# Patient Record
Sex: FEMALE | Race: WHITE | NOT HISPANIC OR LATINO | Employment: OTHER | URBAN - METROPOLITAN AREA
[De-identification: names, ages, dates, MRNs, and addresses within clinical notes are randomized per-mention and may not be internally consistent; named-entity substitution may affect disease eponyms.]

---

## 2017-10-19 ENCOUNTER — HOSPITAL ENCOUNTER (OUTPATIENT)
Facility: HOSPITAL | Age: 62
Discharge: HOME | End: 2017-10-20
Attending: EMERGENCY MEDICINE | Admitting: HOSPITALIST
Payer: MEDICARE

## 2017-10-19 DIAGNOSIS — R20.0 LEFT SIDED NUMBNESS: ICD-10-CM

## 2017-10-19 DIAGNOSIS — R41.82 ALTERED MENTAL STATUS: ICD-10-CM

## 2017-10-19 DIAGNOSIS — N30.00 ACUTE CYSTITIS WITHOUT HEMATURIA: ICD-10-CM

## 2017-10-19 DIAGNOSIS — G93.40 ENCEPHALOPATHY: Primary | ICD-10-CM

## 2017-10-19 LAB
ALBUMIN SERPL BCP-MCNC: 3.7 G/DL
ALP SERPL-CCNC: 110 U/L
ALT SERPL W/O P-5'-P-CCNC: 13 U/L
AMPHET+METHAMPHET UR QL: NEGATIVE
ANION GAP SERPL CALC-SCNC: 11 MMOL/L
AST SERPL-CCNC: 17 U/L
BACTERIA #/AREA URNS HPF: ABNORMAL /HPF
BARBITURATES UR QL SCN>200 NG/ML: NEGATIVE
BASOPHILS # BLD AUTO: 0 K/UL
BASOPHILS NFR BLD: 0.2 %
BENZODIAZ UR QL SCN>200 NG/ML: NEGATIVE
BILIRUB SERPL-MCNC: 0.4 MG/DL
BILIRUB UR QL STRIP: NEGATIVE
BUN SERPL-MCNC: 13 MG/DL
BZE UR QL SCN: NEGATIVE
CALCIUM SERPL-MCNC: 9.6 MG/DL
CANNABINOIDS UR QL SCN: NORMAL
CHLORIDE SERPL-SCNC: 104 MMOL/L
CLARITY UR: CLEAR
CO2 SERPL-SCNC: 27 MMOL/L
COLOR UR: YELLOW
CREAT SERPL-MCNC: 0.8 MG/DL
CREAT UR-MCNC: 42.3 MG/DL
DIFFERENTIAL METHOD: ABNORMAL
EOSINOPHIL # BLD AUTO: 0.1 K/UL
EOSINOPHIL NFR BLD: 0.9 %
ERYTHROCYTE [DISTWIDTH] IN BLOOD BY AUTOMATED COUNT: 12.9 %
EST. GFR  (AFRICAN AMERICAN): >60 ML/MIN/1.73 M^2
EST. GFR  (NON AFRICAN AMERICAN): >60 ML/MIN/1.73 M^2
GLUCOSE SERPL-MCNC: 103 MG/DL
GLUCOSE UR QL STRIP: NEGATIVE
HCT VFR BLD AUTO: 41.5 %
HGB BLD-MCNC: 14.1 G/DL
HGB UR QL STRIP: NEGATIVE
KETONES UR QL STRIP: NEGATIVE
LEUKOCYTE ESTERASE UR QL STRIP: ABNORMAL
LYMPHOCYTES # BLD AUTO: 0.9 K/UL
LYMPHOCYTES NFR BLD: 11.5 %
MCH RBC QN AUTO: 32.6 PG
MCHC RBC AUTO-ENTMCNC: 33.9 G/DL
MCV RBC AUTO: 96 FL
METHADONE UR QL SCN>300 NG/ML: NEGATIVE
MICROSCOPIC COMMENT: ABNORMAL
MONOCYTES # BLD AUTO: 0.5 K/UL
MONOCYTES NFR BLD: 6.5 %
NEUTROPHILS # BLD AUTO: 6.1 K/UL
NEUTROPHILS NFR BLD: 80.9 %
NITRITE UR QL STRIP: POSITIVE
OPIATES UR QL SCN: NEGATIVE
PCP UR QL SCN>25 NG/ML: NEGATIVE
PH UR STRIP: >8 [PH] (ref 5–8)
PLATELET # BLD AUTO: 271 K/UL
PMV BLD AUTO: 7.3 FL
POTASSIUM SERPL-SCNC: 3.7 MMOL/L
PROT SERPL-MCNC: 7.3 G/DL
PROT UR QL STRIP: NEGATIVE
RBC # BLD AUTO: 4.32 M/UL
SODIUM SERPL-SCNC: 142 MMOL/L
SP GR UR STRIP: 1.01 (ref 1–1.03)
TOXICOLOGY INFORMATION: NORMAL
TROPONIN I SERPL DL<=0.01 NG/ML-MCNC: <0.006 NG/ML
TSH SERPL DL<=0.005 MIU/L-ACNC: 1.82 UIU/ML
URN SPEC COLLECT METH UR: ABNORMAL
UROBILINOGEN UR STRIP-ACNC: NEGATIVE EU/DL
WBC # BLD AUTO: 7.5 K/UL
WBC #/AREA URNS HPF: 5 /HPF (ref 0–5)

## 2017-10-19 PROCEDURE — P9612 CATHETERIZE FOR URINE SPEC: HCPCS

## 2017-10-19 PROCEDURE — 84443 ASSAY THYROID STIM HORMONE: CPT

## 2017-10-19 PROCEDURE — 63600175 PHARM REV CODE 636 W HCPCS: Performed by: HOSPITALIST

## 2017-10-19 PROCEDURE — 87186 SC STD MICRODIL/AGAR DIL: CPT

## 2017-10-19 PROCEDURE — 63600175 PHARM REV CODE 636 W HCPCS: Performed by: NURSE PRACTITIONER

## 2017-10-19 PROCEDURE — 36415 COLL VENOUS BLD VENIPUNCTURE: CPT

## 2017-10-19 PROCEDURE — 85025 COMPLETE CBC W/AUTO DIFF WBC: CPT

## 2017-10-19 PROCEDURE — 87086 URINE CULTURE/COLONY COUNT: CPT

## 2017-10-19 PROCEDURE — 25000003 PHARM REV CODE 250: Performed by: EMERGENCY MEDICINE

## 2017-10-19 PROCEDURE — G0378 HOSPITAL OBSERVATION PER HR: HCPCS

## 2017-10-19 PROCEDURE — 80307 DRUG TEST PRSMV CHEM ANLYZR: CPT

## 2017-10-19 PROCEDURE — 63600175 PHARM REV CODE 636 W HCPCS: Performed by: EMERGENCY MEDICINE

## 2017-10-19 PROCEDURE — 87077 CULTURE AEROBIC IDENTIFY: CPT

## 2017-10-19 PROCEDURE — 96361 HYDRATE IV INFUSION ADD-ON: CPT

## 2017-10-19 PROCEDURE — 99285 EMERGENCY DEPT VISIT HI MDM: CPT | Mod: 25

## 2017-10-19 PROCEDURE — 25000003 PHARM REV CODE 250: Performed by: HOSPITALIST

## 2017-10-19 PROCEDURE — 96365 THER/PROPH/DIAG IV INF INIT: CPT

## 2017-10-19 PROCEDURE — 93005 ELECTROCARDIOGRAM TRACING: CPT

## 2017-10-19 PROCEDURE — 84484 ASSAY OF TROPONIN QUANT: CPT

## 2017-10-19 PROCEDURE — 25000003 PHARM REV CODE 250: Performed by: NURSE PRACTITIONER

## 2017-10-19 PROCEDURE — 81000 URINALYSIS NONAUTO W/SCOPE: CPT

## 2017-10-19 PROCEDURE — 25500020 PHARM REV CODE 255

## 2017-10-19 PROCEDURE — 96375 TX/PRO/DX INJ NEW DRUG ADDON: CPT

## 2017-10-19 PROCEDURE — 80053 COMPREHEN METABOLIC PANEL: CPT

## 2017-10-19 PROCEDURE — 87088 URINE BACTERIA CULTURE: CPT

## 2017-10-19 RX ORDER — ACETAMINOPHEN 325 MG/1
650 TABLET ORAL EVERY 8 HOURS PRN
Status: DISCONTINUED | OUTPATIENT
Start: 2017-10-19 | End: 2017-10-20 | Stop reason: HOSPADM

## 2017-10-19 RX ORDER — LISINOPRIL 10 MG/1
30 TABLET ORAL DAILY
Status: DISCONTINUED | OUTPATIENT
Start: 2017-10-20 | End: 2017-10-20 | Stop reason: HOSPADM

## 2017-10-19 RX ORDER — METHYLPREDNISOLONE SOD SUCC 125 MG
80 VIAL (EA) INJECTION EVERY 6 HOURS
Status: COMPLETED | OUTPATIENT
Start: 2017-10-19 | End: 2017-10-20

## 2017-10-19 RX ORDER — RAMELTEON 8 MG/1
8 TABLET ORAL NIGHTLY
Status: DISCONTINUED | OUTPATIENT
Start: 2017-10-19 | End: 2017-10-20 | Stop reason: HOSPADM

## 2017-10-19 RX ORDER — MECLIZINE HYDROCHLORIDE 25 MG/1
25 TABLET ORAL 3 TIMES DAILY PRN
Status: DISCONTINUED | OUTPATIENT
Start: 2017-10-19 | End: 2017-10-20 | Stop reason: HOSPADM

## 2017-10-19 RX ORDER — PANTOPRAZOLE SODIUM 40 MG/1
40 TABLET, DELAYED RELEASE ORAL DAILY
Status: DISCONTINUED | OUTPATIENT
Start: 2017-10-19 | End: 2017-10-20 | Stop reason: HOSPADM

## 2017-10-19 RX ORDER — ACETAMINOPHEN 325 MG/1
650 TABLET ORAL EVERY 6 HOURS PRN
Status: DISCONTINUED | OUTPATIENT
Start: 2017-10-19 | End: 2017-10-20 | Stop reason: HOSPADM

## 2017-10-19 RX ORDER — ACETAMINOPHEN 10 MG/ML
1000 INJECTION, SOLUTION INTRAVENOUS
Status: COMPLETED | OUTPATIENT
Start: 2017-10-19 | End: 2017-10-19

## 2017-10-19 RX ORDER — AMLODIPINE BESYLATE 5 MG/1
10 TABLET ORAL DAILY
Status: DISCONTINUED | OUTPATIENT
Start: 2017-10-20 | End: 2017-10-20 | Stop reason: HOSPADM

## 2017-10-19 RX ORDER — AMOXICILLIN 250 MG
1 CAPSULE ORAL 2 TIMES DAILY PRN
Status: DISCONTINUED | OUTPATIENT
Start: 2017-10-19 | End: 2017-10-20 | Stop reason: HOSPADM

## 2017-10-19 RX ORDER — TRAMADOL HYDROCHLORIDE 50 MG/1
50 TABLET ORAL 3 TIMES DAILY
Status: DISCONTINUED | OUTPATIENT
Start: 2017-10-19 | End: 2017-10-20 | Stop reason: HOSPADM

## 2017-10-19 RX ORDER — TRAZODONE HYDROCHLORIDE 50 MG/1
50 TABLET ORAL NIGHTLY PRN
Status: DISCONTINUED | OUTPATIENT
Start: 2017-10-19 | End: 2017-10-20 | Stop reason: HOSPADM

## 2017-10-19 RX ORDER — ONDANSETRON 2 MG/ML
4 INJECTION INTRAMUSCULAR; INTRAVENOUS EVERY 8 HOURS PRN
Status: DISCONTINUED | OUTPATIENT
Start: 2017-10-19 | End: 2017-10-20 | Stop reason: HOSPADM

## 2017-10-19 RX ORDER — ONDANSETRON 2 MG/ML
4 INJECTION INTRAMUSCULAR; INTRAVENOUS
Status: COMPLETED | OUTPATIENT
Start: 2017-10-19 | End: 2017-10-19

## 2017-10-19 RX ORDER — ONDANSETRON 2 MG/ML
4 INJECTION INTRAMUSCULAR; INTRAVENOUS EVERY 6 HOURS PRN
Status: DISCONTINUED | OUTPATIENT
Start: 2017-10-19 | End: 2017-10-20 | Stop reason: HOSPADM

## 2017-10-19 RX ORDER — FLUTICASONE PROPIONATE 50 MCG
1 SPRAY, SUSPENSION (ML) NASAL DAILY
Status: DISCONTINUED | OUTPATIENT
Start: 2017-10-20 | End: 2017-10-20 | Stop reason: HOSPADM

## 2017-10-19 RX ORDER — TIZANIDINE 4 MG/1
4 TABLET ORAL EVERY 6 HOURS PRN
Status: DISCONTINUED | OUTPATIENT
Start: 2017-10-19 | End: 2017-10-20 | Stop reason: HOSPADM

## 2017-10-19 RX ORDER — GABAPENTIN 300 MG/1
300 CAPSULE ORAL 3 TIMES DAILY
Status: DISCONTINUED | OUTPATIENT
Start: 2017-10-19 | End: 2017-10-20 | Stop reason: HOSPADM

## 2017-10-19 RX ADMIN — RAMELTEON 8 MG: 8 TABLET, FILM COATED ORAL at 08:10

## 2017-10-19 RX ADMIN — CEFTRIAXONE 1 G: 1 INJECTION, SOLUTION INTRAVENOUS at 12:10

## 2017-10-19 RX ADMIN — SODIUM CHLORIDE 1000 ML: 0.9 INJECTION, SOLUTION INTRAVENOUS at 11:10

## 2017-10-19 RX ADMIN — BUSPIRONE HYDROCHLORIDE 15 MG: 10 TABLET ORAL at 08:10

## 2017-10-19 RX ADMIN — IOHEXOL 100 ML: 350 INJECTION, SOLUTION INTRAVENOUS at 10:10

## 2017-10-19 RX ADMIN — GABAPENTIN 300 MG: 300 CAPSULE ORAL at 08:10

## 2017-10-19 RX ADMIN — PANTOPRAZOLE SODIUM 40 MG: 40 TABLET, DELAYED RELEASE ORAL at 03:10

## 2017-10-19 RX ADMIN — ACETAMINOPHEN 650 MG: 325 TABLET, FILM COATED ORAL at 06:10

## 2017-10-19 RX ADMIN — ONDANSETRON 4 MG: 2 INJECTION INTRAMUSCULAR; INTRAVENOUS at 10:10

## 2017-10-19 RX ADMIN — ACETAMINOPHEN 1000 MG: 10 INJECTION, SOLUTION INTRAVENOUS at 11:10

## 2017-10-19 RX ADMIN — TRAMADOL HYDROCHLORIDE 50 MG: 50 TABLET, FILM COATED ORAL at 08:10

## 2017-10-19 RX ADMIN — METHYLPREDNISOLONE SODIUM SUCCINATE 80 MG: 125 INJECTION, POWDER, FOR SOLUTION INTRAMUSCULAR; INTRAVENOUS at 04:10

## 2017-10-19 NOTE — HPI
Beatriz Gan is a 62 year old female with pmh degenerative disc disease, GERD, HTN, renal cell carcinoma, chronic back pain presenting to the ED with c/o a four day history of worsening back and neck pain. The patient states she was doing housework four days ago when she began having back/neck pain. She made an appointment with pain management and was scheduled to be seen this morning but awoke with nausea and altered mental status.  Per Er report the patient's  states that she urinated on herself twice since being picked up by EMS and is unable to recall events of the morning. She c/o headache, neck, and back pain at this time but is unable to state if she has had any upper respiratory, abdominal, urinary, or other symptoms throughout the week    The   reported that she did not have any changes in her mental status until she received fentanyl by ems then when she arrived in Er she was confused. Pt reports she does not remember anything from the day prior.   She says she does not take narcotic pain medications on a regular basis but that she takes tramadol and neurontin. Her pcp did give her oxycodone for this acute back pain. She admits to marijuana use on occasion  Patient had evaluation in ER and was noted to have abnormal Ua with positive nitrites and a few leukocytes. Her wbc was normal as was CT head and abdomen. She is admitted for encephalopathy. Her UDS was positive for THC but no opiates. Her urine was sent for culture and she was initiated on rocephin and neurochecks and neurology evaluation.

## 2017-10-19 NOTE — ED PROVIDER NOTES
Encounter Date: 10/19/2017       History     Chief Complaint   Patient presents with    Back Pain     hurt back Sunday      Beatriz Gan is a 62 year old female with pmh degenerative disc disease, GERD, HTN, renal cell carcinoma, chronic back pain presenting to the ED with c/o a four day history of worsening back and neck pain. The patient states she was doing housework four days ago when she began having back/neck pain. She made an appointment with pain management and was scheduled to be seen this morning but awoke with nausea and altered mental status. The patient's  states that she urinated on herself twice since being picked up by EMS and is unable to recall events of the morning. She c/o headache, neck, and back pain at this time but is unable to state if she has had any upper respiratory, abdominal, urinary, or other symptoms throughout the week.           Review of patient's allergies indicates:   Allergen Reactions    Promethazine Hives and Rash    Latex, natural rubber Hives     Per pt report-many years    Morphine Hives     Past Medical History:   Diagnosis Date    Alcohol dependence     DDD (degenerative disc disease), lumbosacral     DJD (degenerative joint disease), lumbosacral     Encounter for blood transfusion     GERD (gastroesophageal reflux disease)     Gout     Hypercholesterolemia     Hypertension     NATHALIE (iron deficiency anemia)     questionable white coat hypertension    Pneumonia     Renal cell carcinoma     Shingles 10/13/2012     Past Surgical History:   Procedure Laterality Date    APPENDECTOMY      BLADDER REPAIR      x 2    COLONOSCOPY  9/2011    ESOPHAGOGASTRODUODENOSCOPY  9/2011    EYE SURGERY      lasix bilateral    HERNIA REPAIR      hiatal hernai    HYSTERECTOMY      LAPAROSCOPIC NISSEN FUNDOPLICATION      NEPHRECTOMY      LT partial    REFRACTIVE SURGERY      TOTAL ABDOMINAL HYSTERECTOMY W/ BILATERAL SALPINGOOPHORECTOMY  age 48     Family  "History   Problem Relation Age of Onset    Hypertension Father      Social History   Substance Use Topics    Smoking status: Current Some Day Smoker     Packs/day: 1.00     Years: 40.00     Types: Cigarettes    Smokeless tobacco: Never Used    Alcohol use 7.0 oz/week     14 Standard drinks or equivalent per week      Comment: "cocktail at night"      Review of Systems   Constitutional: Negative for chills and fever.   HENT: Negative for congestion, rhinorrhea and sore throat.    Eyes: Negative for pain and redness.   Respiratory: Negative for cough and shortness of breath.    Cardiovascular: Negative for chest pain and palpitations.   Gastrointestinal: Positive for nausea. Negative for abdominal pain, diarrhea and vomiting.   Genitourinary: Negative for dysuria, flank pain, frequency, hematuria and urgency.   Musculoskeletal: Positive for back pain and neck pain. Negative for gait problem and myalgias.   Skin: Negative for rash.   Neurological: Positive for numbness (left sided) and headaches. Negative for dizziness and light-headedness.   Psychiatric/Behavioral: Positive for confusion.       Physical Exam     Initial Vitals [10/19/17 0853]   BP Pulse Resp Temp SpO2   (!) 136/90 88 14 97 °F (36.1 °C) 97 %      MAP       105.33         Physical Exam    Constitutional: Vital signs are normal. She appears well-developed and well-nourished. She is not diaphoretic. She does not have a sickly appearance. She appears distressed (moaning, crying).   HENT:   Head: Normocephalic and atraumatic.   Eyes: Conjunctivae are normal.   Neck: Normal range of motion and full passive range of motion without pain.   Cardiovascular: Normal rate, regular rhythm and normal heart sounds.   Pulmonary/Chest: Breath sounds normal. No respiratory distress.   Abdominal: Soft. Bowel sounds are normal. There is tenderness (generalized).   Genitourinary: Rectum normal. Rectal exam shows anal tone normal.   Musculoskeletal: Normal range of " motion.   Neurological: She is alert. She has normal strength. She is disoriented. Gait normal.   Reflex Scores:       Patellar reflexes are 3+ on the right side and 3+ on the left side.  Oriented to person only. Follows commands, able to have conversation with some confusion   Skin: Skin is warm and dry. Capillary refill takes less than 2 seconds.   Psychiatric: She has a normal mood and affect.         ED Course   Procedures  Labs Reviewed   CBC W/ AUTO DIFFERENTIAL - Abnormal; Notable for the following:        Result Value    MCH 32.6 (*)     MPV 7.3 (*)     Lymph # 0.9 (*)     Gran% 80.9 (*)     Lymph% 11.5 (*)     All other components within normal limits   URINALYSIS - Abnormal; Notable for the following:     pH, UA >8.0 (*)     Nitrite, UA Positive (*)     Leukocytes, UA Trace (*)     All other components within normal limits   URINALYSIS MICROSCOPIC - Abnormal; Notable for the following:     Bacteria, UA Many (*)     All other components within normal limits   CULTURE, URINE   COMPREHENSIVE METABOLIC PANEL   TROPONIN I   DRUG SCREEN PANEL, URINE EMERGENCY   TSH    Narrative:     Add on                   APC / Resident Notes:   Beatriz Good is a 62 year old female presenting to the ED with altered mental status and worsening chronic neck/back pain. She is noted to have a UTI with no acute findings on head or abdominal CT/chest xray. Patient had good rectal tone and I do not think that emergent MRI is necessary at this time. Patient remains confused and will be admitted to Dr. Garcia for additional evaluation and management.               ED Course      Clinical Impression:   The primary encounter diagnosis was Encephalopathy. Diagnoses of Altered mental status, Acute cystitis without hematuria, and Left sided numbness were also pertinent to this visit.    Disposition:   Disposition: Admitted                        Lois Ortiz NP  10/19/17 6315

## 2017-10-19 NOTE — NURSING
"Patient to room 202A post ED report.  See admission flow sheet.  Patient is currently alert and oriented x 4 but did initailly struggle with upcoming holiday.  Patient reports she does not remember yesterday "at all".    neuros are intact.    "

## 2017-10-20 VITALS
DIASTOLIC BLOOD PRESSURE: 83 MMHG | HEART RATE: 74 BPM | SYSTOLIC BLOOD PRESSURE: 134 MMHG | WEIGHT: 185 LBS | TEMPERATURE: 97 F | HEIGHT: 61 IN | BODY MASS INDEX: 34.93 KG/M2 | OXYGEN SATURATION: 98 % | RESPIRATION RATE: 18 BRPM

## 2017-10-20 PROCEDURE — 96375 TX/PRO/DX INJ NEW DRUG ADDON: CPT

## 2017-10-20 PROCEDURE — 25000003 PHARM REV CODE 250: Performed by: EMERGENCY MEDICINE

## 2017-10-20 PROCEDURE — 25000003 PHARM REV CODE 250: Performed by: HOSPITALIST

## 2017-10-20 PROCEDURE — 63600175 PHARM REV CODE 636 W HCPCS: Performed by: HOSPITALIST

## 2017-10-20 PROCEDURE — 96376 TX/PRO/DX INJ SAME DRUG ADON: CPT

## 2017-10-20 PROCEDURE — G0378 HOSPITAL OBSERVATION PER HR: HCPCS

## 2017-10-20 RX ORDER — METHYLPREDNISOLONE 4 MG/1
TABLET ORAL
Qty: 1 PACKAGE | Refills: 0 | Status: SHIPPED | OUTPATIENT
Start: 2017-10-20 | End: 2017-11-10

## 2017-10-20 RX ADMIN — AMLODIPINE BESYLATE 10 MG: 5 TABLET ORAL at 08:10

## 2017-10-20 RX ADMIN — METHYLPREDNISOLONE SODIUM SUCCINATE 80 MG: 125 INJECTION, POWDER, FOR SOLUTION INTRAMUSCULAR; INTRAVENOUS at 06:10

## 2017-10-20 RX ADMIN — BUSPIRONE HYDROCHLORIDE 15 MG: 10 TABLET ORAL at 06:10

## 2017-10-20 RX ADMIN — TRAMADOL HYDROCHLORIDE 50 MG: 50 TABLET, FILM COATED ORAL at 06:10

## 2017-10-20 RX ADMIN — GABAPENTIN 300 MG: 300 CAPSULE ORAL at 06:10

## 2017-10-20 RX ADMIN — LISINOPRIL 30 MG: 10 TABLET ORAL at 08:10

## 2017-10-20 RX ADMIN — PANTOPRAZOLE SODIUM 40 MG: 40 TABLET, DELAYED RELEASE ORAL at 08:10

## 2017-10-20 RX ADMIN — METHYLPREDNISOLONE SODIUM SUCCINATE 80 MG: 125 INJECTION, POWDER, FOR SOLUTION INTRAMUSCULAR; INTRAVENOUS at 01:10

## 2017-10-20 NOTE — PROGRESS NOTES
11:51am    [facility] acknowledge rolling walker orders for home use for patient.  OK to pull rolling walker from [facility] closet by Blas Christopher with [facility] [phone].      12:30 [facility] delivered rolling walker to patient's hospital room.  Patient signed delivery ticket.  [facility] placed completed paperwork in dme closet tray.NATANAEL Nolasco

## 2017-10-20 NOTE — PLAN OF CARE
PCP is Dr Alonzo in [place].  Verified insurance as [employer] mg[employer] medicare.  Pharmacy is [facility] on [address].  Patient is requesting a rolling walker at discharge; informed Nicky RICCI  D/c plan is home; no needs.       10/20/17 1120   Discharge Assessment   Assessment Type Discharge Planning Assessment   Confirmed/corrected address and phone number on facesheet? Yes   Assessment information obtained from? Patient   Prior to hospitilization cognitive status: Alert/Oriented   Prior to hospitalization functional status: Independent   Current cognitive status: Alert/Oriented   Current Functional Status: Independent   Lives With spouse   Able to Return to Prior Arrangements yes   Is patient able to care for self after discharge? Yes   Patient's perception of discharge disposition home or selfcare   Readmission Within The Last 30 Days no previous admission in last 30 days   Patient currently being followed by outpatient case management? No   Patient currently receives any other outside agency services? No   Equipment Currently Used at Home shower chair;cane, straight   Do you have any problems affording any of your prescribed medications? No   Is the patient taking medications as prescribed? yes   Does the patient have transportation home? Yes   Transportation Available family or friend will provide   Does the patient receive services at the [facility]? No   Discharge Plan A Home   Patient/Family In Agreement With Plan yes

## 2017-10-20 NOTE — PLAN OF CARE
Problem: Fall Risk (Adult)  Goal: Absence of Falls  Patient will demonstrate the desired outcomes by discharge/transition of care.   Outcome: Ongoing (interventions implemented as appropriate)  Alert and oriented  Chronic pain management  Ambulates with walker. Education on safety precautions  Sinus rhythm  Breathing without difficulty  Marine urine  Safe

## 2017-10-20 NOTE — HOSPITAL COURSE
"Admitted for encephalopathy after her ambulance ride during which her  reports she received a dose of iv fentanyl for her pain. Her CT head was negative and neurochecks were normal.  Her symptoms resolved completely and her pain was controlled in hospital with tramadol and zanaflex but I also gave her  Solumedrol and dc her with medrol dosepak which she states she has had in the past.She has chronic pain but "threw her back out"a few days pta and her pcp gave her oxycodone. She had  appt with new pain management physician Dr Melara so this has been rescheduled.She had some ? Findings of uti and was given dose of rocephin in ER. And I did not continue as she had no urinary symptoms.   PE -ALERT nad -she was able to get out of bed and ambulate some steps. She had mild ttp l/s spine   heent-nontraumatic, oral mmm   lungs clear   cor rrr   abd soft, bsx4,  ext -no c/c/e   neuro-Ox3, sensation intact   skin -w/d  Psych-cooperative and calm. Insight fair.     "

## 2017-10-20 NOTE — H&P
[facility] Hospital Medicine  History & Physical    Patient Name: Beatriz Gan  MRN: [id number]  Admission Date: 10/19/2017  Attending Physician: Karol Garcia MD   Primary Care Provider: Jorge Alonzo MD         Patient information was obtained from patient, spouse/SO and ER records.     Subjective:     Principal Problem:Encephalopathy    Chief Complaint:   Chief Complaint   Patient presents with    Back Pain     hurt back Sunday         HPI: Beatriz Gan is a 62 year old female with pmh degenerative disc disease, GERD, HTN, renal cell carcinoma, chronic back pain presenting to the ED with c/o a four day history of worsening back and neck pain. The patient states she was doing housework four days ago when she began having back/neck pain. She made an appointment with pain management and was scheduled to be seen this morning but awoke with nausea and altered mental status.  Per Er report the patient's  states that she urinated on herself twice since being picked up by EMS and is unable to recall events of the morning. She c/o headache, neck, and back pain at this time but is unable to state if she has had any upper respiratory, abdominal, urinary, or other symptoms throughout the week    The   reported that she did not have any changes in her mental status until she received fentanyl by ems then when she arrived in Er she was confused. Pt reports she does not remember anything from the day prior.   She says she does not take narcotic pain medications on a regular basis but that she takes tramadol and neurontin. Her pcp did give her oxycodone for this acute back pain. She admits to marijuana use on occasion  Patient had evaluation in ER and was noted to have abnormal Ua with positive nitrites and a few leukocytes. Her wbc was normal as was CT head and abdomen. She is admitted for encephalopathy. Her UDS was positive for THC but no opiates. Her urine was sent for  culture and she was initiated on rocephin and neurochecks and neurology evaluation.     Past Medical History:   Diagnosis Date    Alcohol dependence     DDD (degenerative disc disease), lumbosacral     DJD (degenerative joint disease), lumbosacral     Encounter for blood transfusion     GERD (gastroesophageal reflux disease)     Gout     Hypercholesterolemia     Hypertension     NATHALIE (iron deficiency anemia)     questionable white coat hypertension    Kidney carcinoma, left 2014    Pneumonia     Renal cell carcinoma     Shingles 10/13/2012       Past Surgical History:   Procedure Laterality Date    APPENDECTOMY      BLADDER REPAIR      x 2    COLONOSCOPY  9/2011    ESOPHAGOGASTRODUODENOSCOPY  9/2011    EYE SURGERY      lasix bilateral    HERNIA REPAIR      hiatal hernai    HYSTERECTOMY      LAPAROSCOPIC NISSEN FUNDOPLICATION      NEPHRECTOMY      LT partial    REFRACTIVE SURGERY      SKIN BIOPSY      TOTAL ABDOMINAL HYSTERECTOMY W/ BILATERAL SALPINGOOPHORECTOMY  age 48       Review of patient's allergies indicates:   Allergen Reactions    Promethazine Hives and Rash    Latex, natural rubber Hives     Per pt report-many years    Morphine Hives       No current facility-administered medications on file prior to encounter.      Current Outpatient Prescriptions on File Prior to Encounter   Medication Sig    alprazolam (XANAX) 0.5 MG tablet Take 0.5 mg by mouth 3 (three) times daily.    amlodipine (NORVASC) 10 MG tablet Take 1 tablet (10 mg total) by mouth once daily.    busPIRone (BUSPAR) 15 MG tablet Take 1 tablet (15 mg total) by mouth 3 (three) times daily.    calcium citrate (CALCITRATE) 200 mg (950 mg) tablet Take 1 tablet by mouth 2 (two) times daily.      cyanocobalamin (VITAMIN B-12) 1000 MCG tablet Take 100 mcg by mouth once daily.    cyanocobalamin 1,000 mcg/mL injection Inject 1 mL (1,000 mcg total) into the muscle Daily. Give 1 mL injection intramuscularly every month     fluticasone (FLONASE) 50 mcg/actuation nasal spray 1 spray by Nasal route daily as needed.     gabapentin (NEURONTIN) 300 MG capsule Take 1 capsule (300 mg total) by mouth 2 (two) times daily. (Patient taking differently: Take 300 mg by mouth 3 (three) times daily. )    hydrocodone-acetaminophen 5-325mg (NORCO) 5-325 mg per tablet Take 1-2 tablets by mouth every 6 (six) hours as needed for Pain.    lisinopril (PRINIVIL,ZESTRIL) 30 MG tablet TAKE ONE TABLET BY MOUTH ONCE DAILY    meclizine (ANTIVERT) 25 mg tablet Take 1 tablet (25 mg total) by mouth 3 (three) times daily as needed.    meloxicam (MOBIC) 15 MG tablet Take 15 mg by mouth once daily.    multivit-iron-min-folic acid (MULTIVITAMIN-IRON-MINERALS-FOLIC ACID) 3,500-18-0.4 unit-mg-mg Chew Take by mouth once daily.      pravastatin (PRAVACHOL) 40 MG tablet TAKE ONE TABLET BY MOUTH ONCE DAILY    tizanidine (ZANAFLEX) 2 MG tablet Take 4 mg by mouth every 6 (six) hours as needed.    tramadol (ULTRAM) 50 mg tablet Take 50 mg by mouth 3 (three) times daily.    VIT A/VIT C/VIT E/ZINC/COPPER (PRESERVISION AREDS ORAL) Take by mouth.    VITAMIN D2 50,000 unit capsule TAKE ONE CAPSULE BY MOUTH ONCE WEEKLY.     Family History     Problem Relation (Age of Onset)    Hypertension Father        Social History Main Topics    Smoking status: Current Some Day Smoker     Packs/day: 1.00     Years: 40.00     Types: Cigarettes    Smokeless tobacco: Never Used    Alcohol use 9.6 oz/week     14 Standard drinks or equivalent, 2 Cans of beer per week      Comment: 2-3 a day    Drug use: No    Sexual activity: Yes     Partners: Male     Review of Systems   Constitutional: Positive for fatigue. Negative for chills, fever and unexpected weight change.   HENT: Negative.  Negative for congestion, sinus pressure and sore throat.    Eyes: Negative.  Negative for discharge and itching.   Respiratory: Negative.  Negative for cough and shortness of breath.    Cardiovascular:  Negative.  Negative for chest pain, palpitations and leg swelling.   Gastrointestinal: Negative for abdominal distention and abdominal pain.   Endocrine: Negative for cold intolerance and heat intolerance.   Genitourinary: Negative for difficulty urinating and dysuria.   Musculoskeletal: Positive for back pain and neck pain.   Skin: Negative for color change and pallor.   Allergic/Immunologic: Negative for environmental allergies, food allergies and immunocompromised state.   Neurological: Positive for speech difficulty and headaches. Negative for dizziness and light-headedness.   Hematological: Negative for adenopathy. Does not bruise/bleed easily.   Psychiatric/Behavioral: Positive for dysphoric mood and sleep disturbance. The patient is nervous/anxious.      Objective:     Vital Signs (Most Recent):  Temp: 98.2 °F (36.8 °C) (10/19/17 1911)  Pulse: 71 (10/19/17 1911)  Resp: 17 (10/19/17 1911)  BP: 133/76 (10/19/17 1911)  SpO2: 97 % (10/19/17 1911) Vital Signs (24h Range):  Temp:  [97 °F (36.1 °C)-98.2 °F (36.8 °C)] 98.2 °F (36.8 °C)  Pulse:  [71-88] 71  Resp:  [14-18] 17  SpO2:  [94 %-98 %] 97 %  BP: (133-162)/(76-90) 133/76     Weight: 83.9 kg (185 lb)  Body mass index is 36.13 kg/m².    Physical Exam   Constitutional: She is oriented to person, place, and time. She appears well-developed and well-nourished. No distress.   HENT:   Head: Normocephalic and atraumatic.   Right Ear: External ear normal.   Left Ear: External ear normal.   Nose: Nose normal.   Mouth/Throat: Oropharynx is clear and moist.   Eyes: Conjunctivae and EOM are normal. Pupils are equal, round, and reactive to light.   Neck: Normal range of motion.   Cardiovascular: Normal rate, regular rhythm, normal heart sounds and intact distal pulses.    Pulmonary/Chest: Effort normal and breath sounds normal.   Abdominal: Soft. Bowel sounds are normal.   Musculoskeletal: She exhibits tenderness (lower back l4-5/s1 area. no spinous process tenderness). She  exhibits no edema.   Neurological: She is alert and oriented to person, place, and time. She displays normal reflexes. No cranial nerve deficit or sensory deficit.   Skin: Skin is warm and dry. Capillary refill takes less than 2 seconds.   Psychiatric: She has a normal mood and affect. Her behavior is normal.   Nursing note and vitals reviewed.       Significant Labs:   CBC:   Recent Labs  Lab 10/19/17  1000   WBC 7.50   HGB 14.1   HCT 41.5        CMP:   Recent Labs  Lab 10/19/17  1000      K 3.7      CO2 27      BUN 13   CREATININE 0.8   CALCIUM 9.6   PROT 7.3   ALBUMIN 3.7   BILITOT 0.4   ALKPHOS 110   AST 17   ALT 13   ANIONGAP 11   EGFRNONAA >60     Urine Studies:   Recent Labs  Lab 10/19/17  1020   COLORU Yellow   APPEARANCEUA Clear   PHUR >8.0*   SPECGRAV 1.015   PROTEINUA Negative   GLUCUA Negative   KETONESU Negative   BILIRUBINUA Negative   OCCULTUA Negative   NITRITE Positive*   UROBILINOGEN Negative   LEUKOCYTESUR Trace*   WBCUA 5   BACTERIA Many*       Significant Imaging: CT: I have reviewed all pertinent results/findings within the past 24 hours and my personal findings are:  ct head wnl  CXR: I have reviewed all pertinent results/findings within the past 24 hours and my personal findings are:  wnl    Assessment/Plan:     * Encephalopathy    Acute, resolved after time in Er and arrival to floor-likely 2/2 medications as doubt uti and ct head negative. Avoid oversedation-will hold sedating medications/narcotics/benzos and of for tramadol and nsaids as well as low dose m relaxers and monitor neurochecks every 4 hours           Essential hypertension    HTN=chronic; controlled; add prn iv coverage for elevation        History of partial nephrectomy- left    Due to renal cell ca-in past; avoid nephrotoxic meds and monitor renal function         Major depressive disorder, recurrent episode, moderate    Chronic and seems stable. No intentional overdose . Continue home meds            Low back pain    Acute on chronic-no particular exacerbating factors and seems acutely ms at present on chronic degen disc/facet disease -has minimal ttp. Neg slr and no evidence of cord compression as no incontinence or leg weakness-  contine meds, ice/heat  And consult PT jl   Needs to reschedule appt with pain management  Will give steroid pulse dose in hospital           VTE Risk Mitigation         Ordered     Medium Risk of VTE  Once      10/19/17 1326     Place RADHA hose  Until discontinued      10/19/17 1326      dispo-home likely am         Annmarie Aquino MD  Department of Hospital Medicine   [facility]

## 2017-10-20 NOTE — NURSING
Patient alert and oriented. Instructed on safety. Reminded to use walker when ambulating. Verbalized understanding.

## 2017-10-20 NOTE — ASSESSMENT & PLAN NOTE
Acute, resolved after time in Er and arrival to floor-likely 2/2 medications as doubt uti and ct head negative. Avoid oversedation-will hold sedating medications/narcotics/benzos and of for tramadol and nsaids as well as low dose m relaxers and monitor neurochecks every 4 hours

## 2017-10-20 NOTE — ASSESSMENT & PLAN NOTE
Acute on chronic-no particular exacerbating factors and seems acutely ms at present on chronic degen disc/facet disease -has minimal ttp. Neg slr and no evidence of cord compression as no incontinence or leg weakness-  contine meds, ice/heat  And consult PT jl   Needs to reschedule appt with pain management  Will give steroid pulse dose in hospital

## 2017-10-20 NOTE — SUBJECTIVE & OBJECTIVE
Past Medical History:   Diagnosis Date    Alcohol dependence     DDD (degenerative disc disease), lumbosacral     DJD (degenerative joint disease), lumbosacral     Encounter for blood transfusion     GERD (gastroesophageal reflux disease)     Gout     Hypercholesterolemia     Hypertension     NATHALIE (iron deficiency anemia)     questionable white coat hypertension    Kidney carcinoma, left 2014    Pneumonia     Renal cell carcinoma     Shingles 10/13/2012       Past Surgical History:   Procedure Laterality Date    APPENDECTOMY      BLADDER REPAIR      x 2    COLONOSCOPY  9/2011    ESOPHAGOGASTRODUODENOSCOPY  9/2011    EYE SURGERY      lasix bilateral    HERNIA REPAIR      hiatal hernai    HYSTERECTOMY      LAPAROSCOPIC NISSEN FUNDOPLICATION      NEPHRECTOMY      LT partial    REFRACTIVE SURGERY      SKIN BIOPSY      TOTAL ABDOMINAL HYSTERECTOMY W/ BILATERAL SALPINGOOPHORECTOMY  age 48       Review of patient's allergies indicates:   Allergen Reactions    Promethazine Hives and Rash    Latex, natural rubber Hives     Per pt report-many years    Morphine Hives       No current facility-administered medications on file prior to encounter.      Current Outpatient Prescriptions on File Prior to Encounter   Medication Sig    alprazolam (XANAX) 0.5 MG tablet Take 0.5 mg by mouth 3 (three) times daily.    amlodipine (NORVASC) 10 MG tablet Take 1 tablet (10 mg total) by mouth once daily.    busPIRone (BUSPAR) 15 MG tablet Take 1 tablet (15 mg total) by mouth 3 (three) times daily.    calcium citrate (CALCITRATE) 200 mg (950 mg) tablet Take 1 tablet by mouth 2 (two) times daily.      cyanocobalamin (VITAMIN B-12) 1000 MCG tablet Take 100 mcg by mouth once daily.    cyanocobalamin 1,000 mcg/mL injection Inject 1 mL (1,000 mcg total) into the muscle Daily. Give 1 mL injection intramuscularly every month    fluticasone (FLONASE) 50 mcg/actuation nasal spray 1 spray by Nasal route daily as needed.      gabapentin (NEURONTIN) 300 MG capsule Take 1 capsule (300 mg total) by mouth 2 (two) times daily. (Patient taking differently: Take 300 mg by mouth 3 (three) times daily. )    hydrocodone-acetaminophen 5-325mg (NORCO) 5-325 mg per tablet Take 1-2 tablets by mouth every 6 (six) hours as needed for Pain.    lisinopril (PRINIVIL,ZESTRIL) 30 MG tablet TAKE ONE TABLET BY MOUTH ONCE DAILY    meclizine (ANTIVERT) 25 mg tablet Take 1 tablet (25 mg total) by mouth 3 (three) times daily as needed.    meloxicam (MOBIC) 15 MG tablet Take 15 mg by mouth once daily.    multivit-iron-min-folic acid (MULTIVITAMIN-IRON-MINERALS-FOLIC ACID) 3,500-18-0.4 unit-mg-mg Chew Take by mouth once daily.      pravastatin (PRAVACHOL) 40 MG tablet TAKE ONE TABLET BY MOUTH ONCE DAILY    tizanidine (ZANAFLEX) 2 MG tablet Take 4 mg by mouth every 6 (six) hours as needed.    tramadol (ULTRAM) 50 mg tablet Take 50 mg by mouth 3 (three) times daily.    VIT A/VIT C/VIT E/ZINC/COPPER (PRESERVISION AREDS ORAL) Take by mouth.    VITAMIN D2 50,000 unit capsule TAKE ONE CAPSULE BY MOUTH ONCE WEEKLY.     Family History     Problem Relation (Age of Onset)    Hypertension Father        Social History Main Topics    Smoking status: Current Some Day Smoker     Packs/day: 1.00     Years: 40.00     Types: Cigarettes    Smokeless tobacco: Never Used    Alcohol use 9.6 oz/week     14 Standard drinks or equivalent, 2 Cans of beer per week      Comment: 2-3 a day    Drug use: No    Sexual activity: Yes     Partners: Male     Review of Systems   Constitutional: Positive for fatigue. Negative for chills, fever and unexpected weight change.   HENT: Negative.  Negative for congestion, sinus pressure and sore throat.    Eyes: Negative.  Negative for discharge and itching.   Respiratory: Negative.  Negative for cough and shortness of breath.    Cardiovascular: Negative.  Negative for chest pain, palpitations and leg swelling.   Gastrointestinal: Negative  for abdominal distention and abdominal pain.   Endocrine: Negative for cold intolerance and heat intolerance.   Genitourinary: Negative for difficulty urinating and dysuria.   Musculoskeletal: Positive for back pain and neck pain.   Skin: Negative for color change and pallor.   Allergic/Immunologic: Negative for environmental allergies, food allergies and immunocompromised state.   Neurological: Positive for speech difficulty and headaches. Negative for dizziness and light-headedness.   Hematological: Negative for adenopathy. Does not bruise/bleed easily.   Psychiatric/Behavioral: Positive for dysphoric mood and sleep disturbance. The patient is nervous/anxious.      Objective:     Vital Signs (Most Recent):  Temp: 98.2 °F (36.8 °C) (10/19/17 1911)  Pulse: 71 (10/19/17 1911)  Resp: 17 (10/19/17 1911)  BP: 133/76 (10/19/17 1911)  SpO2: 97 % (10/19/17 1911) Vital Signs (24h Range):  Temp:  [97 °F (36.1 °C)-98.2 °F (36.8 °C)] 98.2 °F (36.8 °C)  Pulse:  [71-88] 71  Resp:  [14-18] 17  SpO2:  [94 %-98 %] 97 %  BP: (133-162)/(76-90) 133/76     Weight: 83.9 kg (185 lb)  Body mass index is 36.13 kg/m².    Physical Exam   Constitutional: She is oriented to person, place, and time. She appears well-developed and well-nourished. No distress.   HENT:   Head: Normocephalic and atraumatic.   Right Ear: External ear normal.   Left Ear: External ear normal.   Nose: Nose normal.   Mouth/Throat: Oropharynx is clear and moist.   Eyes: Conjunctivae and EOM are normal. Pupils are equal, round, and reactive to light.   Neck: Normal range of motion.   Cardiovascular: Normal rate, regular rhythm, normal heart sounds and intact distal pulses.    Pulmonary/Chest: Effort normal and breath sounds normal.   Abdominal: Soft. Bowel sounds are normal.   Musculoskeletal: She exhibits tenderness (lower back l4-5/s1 area. no spinous process tenderness). She exhibits no edema.   Neurological: She is alert and oriented to person, place, and time. She  displays normal reflexes. No cranial nerve deficit or sensory deficit.   Skin: Skin is warm and dry. Capillary refill takes less than 2 seconds.   Psychiatric: She has a normal mood and affect. Her behavior is normal.   Nursing note and vitals reviewed.       Significant Labs:   CBC:   Recent Labs  Lab 10/19/17  1000   WBC 7.50   HGB 14.1   HCT 41.5        CMP:   Recent Labs  Lab 10/19/17  1000      K 3.7      CO2 27      BUN 13   CREATININE 0.8   CALCIUM 9.6   PROT 7.3   ALBUMIN 3.7   BILITOT 0.4   ALKPHOS 110   AST 17   ALT 13   ANIONGAP 11   EGFRNONAA >60     Urine Studies:   Recent Labs  Lab 10/19/17  1020   COLORU Yellow   APPEARANCEUA Clear   PHUR >8.0*   SPECGRAV 1.015   PROTEINUA Negative   GLUCUA Negative   KETONESU Negative   BILIRUBINUA Negative   OCCULTUA Negative   NITRITE Positive*   UROBILINOGEN Negative   LEUKOCYTESUR Trace*   WBCUA 5   BACTERIA Many*       Significant Imaging: CT: I have reviewed all pertinent results/findings within the past 24 hours and my personal findings are:  ct head wnl  CXR: I have reviewed all pertinent results/findings within the past 24 hours and my personal findings are:  wnl

## 2017-10-20 NOTE — DISCHARGE SUMMARY
[facility]  Hospital Medicine  Discharge Summary      Patient Name: Beatriz aGn  MRN: [id number]  Admission Date: 10/19/2017  Hospital Length of Stay: 0 days  Discharge Date and Time:  10/20/2017 11:06 AM  Attending Physician: Karol Garcia MD   Discharging Provider: Annmarie Aquino MD  Primary Care Provider: Jorge Alonzo MD      HPI:   Beatriz Gan is a 62 year old female with pmh degenerative disc disease, GERD, HTN, renal cell carcinoma, chronic back pain presenting to the ED with c/o a four day history of worsening back and neck pain. The patient states she was doing housework four days ago when she began having back/neck pain. She made an appointment with pain management and was scheduled to be seen this morning but awoke with nausea and altered mental status.  Per Er report the patient's  states that she urinated on herself twice since being picked up by EMS and is unable to recall events of the morning. She c/o headache, neck, and back pain at this time but is unable to state if she has had any upper respiratory, abdominal, urinary, or other symptoms throughout the week    The   reported that she did not have any changes in her mental status until she received fentanyl by ems then when she arrived in Er she was confused. Pt reports she does not remember anything from the day prior.   She says she does not take narcotic pain medications on a regular basis but that she takes tramadol and neurontin. Her pcp did give her oxycodone for this acute back pain. She admits to marijuana use on occasion  Patient had evaluation in ER and was noted to have abnormal Ua with positive nitrites and a few leukocytes. Her wbc was normal as was CT head and abdomen. She is admitted for encephalopathy. Her UDS was positive for THC but no opiates. Her urine was sent for culture and she was initiated on rocephin and neurochecks and neurology evaluation.     * No surgery found *     "  Indwelling Lines/Drains at time of discharge:   Lines/Drains/Airways          No matching active lines, drains, or airways        Hospital Course:   Admitted for encephalopathy after her ambulance ride during which her  reports she received a dose of iv fentanyl for her pain. Her CT head was negative and neurochecks were normal.  Her symptoms resolved completely and her pain was controlled in hospital with tramadol and zanaflex but I also gave her  Solumedrol and dc her with medrol dosepak which she states she has had in the past.She has chronic pain but "threw her back out"a few days pta and her pcp gave her oxycodone. She had  appt with new pain management physician Dr Melara so this has been rescheduled.She had some ? Findings of uti and was given dose of rocephin in ER. And I did not continue as she had no urinary symptoms.   PE -ALERT nad -she was able to get out of bed and ambulate some steps. She had mild ttp l/s spine   heent-nontraumatic, oral mmm   lungs clear   cor rrr   abd soft, bsx4,  ext -no c/c/e   neuro-Ox3, sensation intact   skin -w/d  Psych-cooperative and calm. Insight fair.        Consults:     Significant Diagnostic Studies:   Results for JOSE MOBLEY (MRN [id number]) as of 10/20/2017 11:06   Ref. Range 10/19/2017 10:00   WBC Latest Ref Range: 3.90 - 12.70 K/uL 7.50   RBC Latest Ref Range: 4.00 - 5.40 M/uL 4.32   Hemoglobin Latest Ref Range: 12.0 - 16.0 g/dL 14.1   Hematocrit Latest Ref Range: 37.0 - 48.5 % 41.5   MCV Latest Ref Range: 82 - 98 fL 96   MCH Latest Ref Range: 27.0 - 31.0 pg 32.6 (H)   MCHC Latest Ref Range: 32.0 - 36.0 g/dL 33.9   RDW Latest Ref Range: 11.5 - 14.5 % 12.9   Platelets Latest Ref Range: 150 - 350 K/uL 271     Results for JOSE MOBLEY (MRN [id number]) as of 10/20/2017 11:06   Ref. Range 10/19/2017 10:00   Sodium Latest Ref Range: 136 - 145 mmol/L 142   Potassium Latest Ref Range: 3.5 - 5.1 mmol/L 3.7   Chloride Latest Ref Range: 95 - 110 mmol/L 104 "   CO2 Latest Ref Range: 23 - 29 mmol/L 27   Anion Gap Latest Ref Range: 8 - 16 mmol/L 11   BUN, Bld Latest Ref Range: 8 - 23 mg/dL 13   Creatinine Latest Ref Range: 0.5 - 1.4 mg/dL 0.8   eGFR if non African American Latest Ref Range: >60 mL/min/1.73 m^2 >60   eGFR if  Latest Ref Range: >60 mL/min/1.73 m^2 >60   Glucose Latest Ref Range: 70 - 110 mg/dL 103   Calcium Latest Ref Range: 8.7 - 10.5 mg/dL 9.6   Alkaline Phosphatase Latest Ref Range: 55 - 135 U/L 110   Total Protein Latest Ref Range: 6.0 - 8.4 g/dL 7.3   Albumin Latest Ref Range: 3.5 - 5.2 g/dL 3.7   Total Bilirubin Latest Ref Range: 0.1 - 1.0 mg/dL 0.4   AST Latest Ref Range: 10 - 40 U/L 17   ALT Latest Ref Range: 10 - 44 U/L 13   Troponin I Latest Ref Range: 0.000 - 0.026 ng/mL <0.006   TSH Latest Ref Range: 0.400 - 4.000 uIU/mL 1.822   Results for JOSE MOBLEY (MRN [id number]) as of 10/20/2017 11:06   Ref. Range 10/19/2017 10:19   Benzodiazepines Unknown Negative   Methadone metabolites Unknown Negative   Phencyclidine Unknown Negative   Cocaine (Metab.) Unknown Negative   Opiate Scrn, Ur Unknown Negative   Barbiturate Screen, Ur Unknown Negative   Amphetamine Screen, Ur Unknown Negative   Marijuana (THC) Metabolite Unknown Presumptive Positive   CTHEAD-  IMPRESSION    Mild involutional changes with no acute intracranial findings.     CTAP-  Impression       1.  No acute CT findings in the abdomen or pelvis to explain this patient's symptoms. The gallbladder is mildly dilated which may reflect fasting.    2. Additional findings:  -Postoperative changes of probable fundoplication around the GE junction  -Diffuse hepatic steatosis  -Postoperative changes of left upper pole partial nephrectomy or RFA, unchanged  -Mild colonic diverticulosis  -Hysterectomy  -Probable appendectomy.           cxr  Impression       1.  No acute radiographic findings in the chest.  2. Mild cardiomegaly.       Pending Diagnostic Studies:     None         Final Active Diagnoses:    Diagnosis Date Noted POA    PRINCIPAL PROBLEM:  Encephalopathy [G93.40] 10/19/2017 Yes    Essential hypertension [I10] 08/21/2015 Yes    History of partial nephrectomy- left [Z90.5] 08/21/2015 Not Applicable    Major depressive disorder, recurrent episode, moderate [F33.1] 08/21/2015 Yes    Low back pain [M54.5] 09/24/2014 Yes      Problems Resolved During this Admission:    Diagnosis Date Noted Date Resolved POA      No new Assessment & Plan notes have been filed under this hospital service since the last note was generated.  Service: Hospital Medicine      Discharged Condition: good    Disposition: Home or Self Care    Follow Up:  Follow-up Information     Dustin Melara MD On 10/25/2017.    Specialties:  Pain Medicine, Anesthesiology  Why:  @1:15pm   Contact information:  [address] [place] [address]   [address]  [facility] [id number]  [phone]             Jorge Alonzo MD On 10/24/2017.    Specialty:  Internal Medicine  Why:  @1:00pm with Zoya Griffin NP  Contact information:  [address]  [address]  [facility] [address]  [phone]                 Patient Instructions:     Diet general     Activity as tolerated       Medications:  Reconciled Home Medications:   Current Discharge Medication List      START taking these medications    Details   methylPREDNISolone (MEDROL DOSEPACK) 4 mg tablet use as directed  Qty: 1 Package, Refills: 0         CONTINUE these medications which have NOT CHANGED    Details   alprazolam (XANAX) 0.5 MG tablet Take 0.5 mg by mouth 3 (three) times daily.      amlodipine (NORVASC) 10 MG tablet Take 1 tablet (10 mg total) by mouth once daily.  Qty: 30 tablet, Refills: 3      busPIRone (BUSPAR) 15 MG tablet Take 1 tablet (15 mg total) by mouth 3 (three) times daily.  Qty: 90 tablet, Refills: 11    Associated Diagnoses: Anxiety      calcium citrate (CALCITRATE) 200 mg (950 mg) tablet Take 1 tablet by mouth 2 (two) times daily.         cyanocobalamin (VITAMIN B-12) 1000 MCG tablet Take 100 mcg by mouth once daily.      cyanocobalamin 1,000 mcg/mL injection Inject 1 mL (1,000 mcg total) into the muscle Daily. Give 1 mL injection intramuscularly every month  Qty: 30 mL, Refills: 12    Associated Diagnoses: B12 deficiency      fluticasone (FLONASE) 50 mcg/actuation nasal spray 1 spray by Nasal route daily as needed.       gabapentin (NEURONTIN) 300 MG capsule Take 1 capsule (300 mg total) by mouth 2 (two) times daily.  Qty: 60 capsule, Refills: 0    Associated Diagnoses: Thoracic or lumbosacral neuritis or radiculitis, unspecified      hydrocodone-acetaminophen 5-325mg (NORCO) 5-325 mg per tablet Take 1-2 tablets by mouth every 6 (six) hours as needed for Pain.  Qty: 18 tablet, Refills: 0      lisinopril (PRINIVIL,ZESTRIL) 30 MG tablet TAKE ONE TABLET BY MOUTH ONCE DAILY  Qty: 30 tablet, Refills: 3      meclizine (ANTIVERT) 25 mg tablet Take 1 tablet (25 mg total) by mouth 3 (three) times daily as needed.  Qty: 30 tablet, Refills: 0    Associated Diagnoses: Cough; Shortness of breath      meloxicam (MOBIC) 15 MG tablet Take 15 mg by mouth once daily.      multivit-iron-min-folic acid (MULTIVITAMIN-IRON-MINERALS-FOLIC ACID) 3,500-18-0.4 unit-mg-mg Chew Take by mouth once daily.        pravastatin (PRAVACHOL) 40 MG tablet TAKE ONE TABLET BY MOUTH ONCE DAILY  Qty: 30 tablet, Refills: 4      tizanidine (ZANAFLEX) 2 MG tablet Take 4 mg by mouth every 6 (six) hours as needed.      tramadol (ULTRAM) 50 mg tablet Take 50 mg by mouth 3 (three) times daily.      VIT A/VIT C/VIT E/ZINC/COPPER (PRESERVISION AREDS ORAL) Take by mouth.      VITAMIN D2 50,000 unit capsule TAKE ONE CAPSULE BY MOUTH ONCE WEEKLY.  Qty: 4 capsule, Refills: 0           Time spent on the discharge of patient: 32 minutes      Annmarie Aquino MD  Department of Hospital Medicine  [facility]

## 2017-10-20 NOTE — PROGRESS NOTES
Pt cleared for d/c.  IV and tele removed.  Education provided regarding home medication regimen, follow up appts, and when to seek medical attention. Pt verbalized understanding.  Pt currently awaiting delivery of walker for home use.  Family at BS.  No needs expressed.

## 2017-10-23 LAB — BACTERIA UR CULT: NORMAL

## 2017-10-23 NOTE — PLAN OF CARE
10/23/17 1549   Final Note   Assessment Type Final Discharge Note   Discharge Disposition Home   Hospital Follow Up  Appt(s) scheduled? Yes   Discharge plans and expectations educations in teach back method with documentation complete? Yes

## 2018-05-26 ENCOUNTER — HOSPITAL ENCOUNTER (INPATIENT)
Facility: HOSPITAL | Age: 63
LOS: 7 days | Discharge: HOME | DRG: 871 | End: 2018-06-02
Attending: EMERGENCY MEDICINE | Admitting: HOSPITALIST
Payer: MEDICARE

## 2018-05-26 DIAGNOSIS — A41.50 GRAM NEGATIVE SEPSIS: Primary | ICD-10-CM

## 2018-05-26 DIAGNOSIS — N12 PYELONEPHRITIS: ICD-10-CM

## 2018-05-26 PROBLEM — N17.9 AKI (ACUTE KIDNEY INJURY): Status: ACTIVE | Noted: 2018-05-26

## 2018-05-26 LAB
ALBUMIN SERPL BCP-MCNC: 2.9 G/DL
ALP SERPL-CCNC: 201 U/L
ALT SERPL W/O P-5'-P-CCNC: 43 U/L
ANION GAP SERPL CALC-SCNC: 16 MMOL/L
AST SERPL-CCNC: 34 U/L
BACTERIA #/AREA URNS HPF: ABNORMAL /HPF
BASOPHILS # BLD AUTO: 0 K/UL
BASOPHILS NFR BLD: 0 %
BILIRUB SERPL-MCNC: 0.9 MG/DL
BILIRUB UR QL STRIP: ABNORMAL
BUN SERPL-MCNC: 36 MG/DL
CALCIUM SERPL-MCNC: 9.1 MG/DL
CHLORIDE SERPL-SCNC: 93 MMOL/L
CLARITY UR: ABNORMAL
CO2 SERPL-SCNC: 23 MMOL/L
COLOR UR: YELLOW
CREAT SERPL-MCNC: 1.8 MG/DL
DIFFERENTIAL METHOD: ABNORMAL
EOSINOPHIL # BLD AUTO: 0 K/UL
EOSINOPHIL NFR BLD: 0.1 %
ERYTHROCYTE [DISTWIDTH] IN BLOOD BY AUTOMATED COUNT: 12.8 %
EST. GFR  (AFRICAN AMERICAN): 34 ML/MIN/1.73 M^2
EST. GFR  (NON AFRICAN AMERICAN): 29 ML/MIN/1.73 M^2
GLUCOSE SERPL-MCNC: 106 MG/DL
GLUCOSE UR QL STRIP: NEGATIVE
HCT VFR BLD AUTO: 36.9 %
HGB BLD-MCNC: 12.5 G/DL
HGB UR QL STRIP: ABNORMAL
HYALINE CASTS #/AREA URNS LPF: 0 /LPF
KETONES UR QL STRIP: ABNORMAL
LACTATE SERPL-SCNC: 1.7 MMOL/L
LEUKOCYTE ESTERASE UR QL STRIP: ABNORMAL
LIPASE SERPL-CCNC: 6 U/L
LYMPHOCYTES # BLD AUTO: 0.3 K/UL
LYMPHOCYTES NFR BLD: 3.7 %
MCH RBC QN AUTO: 32.3 PG
MCHC RBC AUTO-ENTMCNC: 33.8 G/DL
MCV RBC AUTO: 96 FL
MICROSCOPIC COMMENT: ABNORMAL
MONOCYTES # BLD AUTO: 0.6 K/UL
MONOCYTES NFR BLD: 6.6 %
NEUTROPHILS # BLD AUTO: 8.1 K/UL
NEUTROPHILS NFR BLD: 89.6 %
NITRITE UR QL STRIP: NEGATIVE
PH UR STRIP: 5 [PH] (ref 5–8)
PLATELET # BLD AUTO: 146 K/UL
PMV BLD AUTO: 8.2 FL
POTASSIUM SERPL-SCNC: 3.4 MMOL/L
PROT SERPL-MCNC: 7.1 G/DL
PROT UR QL STRIP: ABNORMAL
RBC # BLD AUTO: 3.87 M/UL
RBC #/AREA URNS HPF: 15 /HPF (ref 0–4)
SODIUM SERPL-SCNC: 132 MMOL/L
SP GR UR STRIP: 1.02 (ref 1–1.03)
SQUAMOUS #/AREA URNS HPF: 12 /HPF
URN SPEC COLLECT METH UR: ABNORMAL
UROBILINOGEN UR STRIP-ACNC: 1 EU/DL
WBC # BLD AUTO: 9.1 K/UL
WBC #/AREA URNS HPF: >100 /HPF (ref 0–5)

## 2018-05-26 PROCEDURE — 83605 ASSAY OF LACTIC ACID: CPT

## 2018-05-26 PROCEDURE — 99285 EMERGENCY DEPT VISIT HI MDM: CPT | Mod: 25

## 2018-05-26 PROCEDURE — 87040 BLOOD CULTURE FOR BACTERIA: CPT

## 2018-05-26 PROCEDURE — 63600175 PHARM REV CODE 636 W HCPCS: Performed by: EMERGENCY MEDICINE

## 2018-05-26 PROCEDURE — 25000003 PHARM REV CODE 250: Performed by: HOSPITALIST

## 2018-05-26 PROCEDURE — 87086 URINE CULTURE/COLONY COUNT: CPT

## 2018-05-26 PROCEDURE — 83690 ASSAY OF LIPASE: CPT

## 2018-05-26 PROCEDURE — 96361 HYDRATE IV INFUSION ADD-ON: CPT

## 2018-05-26 PROCEDURE — 36415 COLL VENOUS BLD VENIPUNCTURE: CPT

## 2018-05-26 PROCEDURE — 63600175 PHARM REV CODE 636 W HCPCS: Performed by: HOSPITALIST

## 2018-05-26 PROCEDURE — 96365 THER/PROPH/DIAG IV INF INIT: CPT

## 2018-05-26 PROCEDURE — 85025 COMPLETE CBC W/AUTO DIFF WBC: CPT

## 2018-05-26 PROCEDURE — 87088 URINE BACTERIA CULTURE: CPT

## 2018-05-26 PROCEDURE — 87077 CULTURE AEROBIC IDENTIFY: CPT

## 2018-05-26 PROCEDURE — 96375 TX/PRO/DX INJ NEW DRUG ADDON: CPT

## 2018-05-26 PROCEDURE — 81000 URINALYSIS NONAUTO W/SCOPE: CPT

## 2018-05-26 PROCEDURE — 25000003 PHARM REV CODE 250: Performed by: EMERGENCY MEDICINE

## 2018-05-26 PROCEDURE — 87186 SC STD MICRODIL/AGAR DIL: CPT | Mod: 59

## 2018-05-26 PROCEDURE — 80053 COMPREHEN METABOLIC PANEL: CPT

## 2018-05-26 PROCEDURE — 12000002 HC ACUTE/MED SURGE SEMI-PRIVATE ROOM

## 2018-05-26 RX ORDER — AMOXICILLIN 250 MG
1 CAPSULE ORAL 2 TIMES DAILY
Status: DISCONTINUED | OUTPATIENT
Start: 2018-05-26 | End: 2018-06-03 | Stop reason: HOSPADM

## 2018-05-26 RX ORDER — HYDROCODONE BITARTRATE AND ACETAMINOPHEN 5; 325 MG/1; MG/1
2 TABLET ORAL EVERY 6 HOURS PRN
Status: DISCONTINUED | OUTPATIENT
Start: 2018-05-26 | End: 2018-06-03 | Stop reason: HOSPADM

## 2018-05-26 RX ORDER — RAMELTEON 8 MG/1
8 TABLET ORAL NIGHTLY PRN
Status: DISCONTINUED | OUTPATIENT
Start: 2018-05-26 | End: 2018-06-03 | Stop reason: HOSPADM

## 2018-05-26 RX ORDER — CELECOXIB 100 MG/1
200 CAPSULE ORAL DAILY
COMMUNITY
End: 2019-09-26

## 2018-05-26 RX ORDER — GABAPENTIN 300 MG/1
300 CAPSULE ORAL 2 TIMES DAILY
Status: DISCONTINUED | OUTPATIENT
Start: 2018-05-26 | End: 2018-06-03 | Stop reason: HOSPADM

## 2018-05-26 RX ORDER — FLUTICASONE PROPIONATE 50 MCG
1 SPRAY, SUSPENSION (ML) NASAL DAILY
Status: DISCONTINUED | OUTPATIENT
Start: 2018-05-27 | End: 2018-06-03 | Stop reason: HOSPADM

## 2018-05-26 RX ORDER — ONDANSETRON 2 MG/ML
4 INJECTION INTRAMUSCULAR; INTRAVENOUS EVERY 8 HOURS PRN
Status: DISCONTINUED | OUTPATIENT
Start: 2018-05-26 | End: 2018-06-03 | Stop reason: HOSPADM

## 2018-05-26 RX ORDER — FERROUS SULFATE, DRIED 160(50) MG
1 TABLET, EXTENDED RELEASE ORAL 2 TIMES DAILY WITH MEALS
COMMUNITY
End: 2020-04-14

## 2018-05-26 RX ORDER — TIZANIDINE 4 MG/1
4 TABLET ORAL EVERY 6 HOURS PRN
Status: DISCONTINUED | OUTPATIENT
Start: 2018-05-26 | End: 2018-06-03 | Stop reason: HOSPADM

## 2018-05-26 RX ORDER — CALCIUM CARBONATE 500(1250)
500 TABLET ORAL DAILY
Status: DISCONTINUED | OUTPATIENT
Start: 2018-05-26 | End: 2018-06-03 | Stop reason: HOSPADM

## 2018-05-26 RX ORDER — SODIUM CHLORIDE 9 MG/ML
INJECTION, SOLUTION INTRAVENOUS CONTINUOUS
Status: DISCONTINUED | OUTPATIENT
Start: 2018-05-26 | End: 2018-05-27

## 2018-05-26 RX ORDER — ALPRAZOLAM 0.25 MG/1
0.5 TABLET ORAL 3 TIMES DAILY
Status: DISCONTINUED | OUTPATIENT
Start: 2018-05-26 | End: 2018-06-03 | Stop reason: HOSPADM

## 2018-05-26 RX ORDER — CEFTRIAXONE 1 G/50ML
1 INJECTION, SOLUTION INTRAVENOUS
Status: DISCONTINUED | OUTPATIENT
Start: 2018-05-27 | End: 2018-05-27

## 2018-05-26 RX ORDER — CEFTRIAXONE 1 G/50ML
1 INJECTION, SOLUTION INTRAVENOUS
Status: DISCONTINUED | OUTPATIENT
Start: 2018-05-26 | End: 2018-05-26

## 2018-05-26 RX ORDER — ZOLPIDEM TARTRATE 5 MG/1
5 TABLET ORAL NIGHTLY
Status: DISCONTINUED | OUTPATIENT
Start: 2018-05-26 | End: 2018-06-03 | Stop reason: HOSPADM

## 2018-05-26 RX ORDER — ACETAMINOPHEN 325 MG/1
650 TABLET ORAL EVERY 4 HOURS PRN
Status: DISCONTINUED | OUTPATIENT
Start: 2018-05-26 | End: 2018-05-30

## 2018-05-26 RX ORDER — MECLIZINE HYDROCHLORIDE 25 MG/1
25 TABLET ORAL 3 TIMES DAILY PRN
Status: DISCONTINUED | OUTPATIENT
Start: 2018-05-26 | End: 2018-06-03 | Stop reason: HOSPADM

## 2018-05-26 RX ORDER — ZOLPIDEM TARTRATE 5 MG/1
5 TABLET ORAL NIGHTLY
COMMUNITY
End: 2019-10-29

## 2018-05-26 RX ORDER — IBUPROFEN 200 MG
24 TABLET ORAL
Status: DISCONTINUED | OUTPATIENT
Start: 2018-05-26 | End: 2018-06-03 | Stop reason: HOSPADM

## 2018-05-26 RX ORDER — PRAVASTATIN SODIUM 40 MG/1
40 TABLET ORAL DAILY
Status: DISCONTINUED | OUTPATIENT
Start: 2018-05-26 | End: 2018-06-03 | Stop reason: HOSPADM

## 2018-05-26 RX ORDER — IBUPROFEN 200 MG
16 TABLET ORAL
Status: DISCONTINUED | OUTPATIENT
Start: 2018-05-26 | End: 2018-06-03 | Stop reason: HOSPADM

## 2018-05-26 RX ORDER — ACETAMINOPHEN 500 MG
1000 TABLET ORAL
Status: COMPLETED | OUTPATIENT
Start: 2018-05-26 | End: 2018-05-26

## 2018-05-26 RX ADMIN — SODIUM CHLORIDE, SODIUM LACTATE, POTASSIUM CHLORIDE, AND CALCIUM CHLORIDE 1000 ML: .6; .31; .03; .02 INJECTION, SOLUTION INTRAVENOUS at 10:05

## 2018-05-26 RX ADMIN — GABAPENTIN 300 MG: 300 CAPSULE ORAL at 08:05

## 2018-05-26 RX ADMIN — BUSPIRONE HYDROCHLORIDE 15 MG: 10 TABLET ORAL at 08:05

## 2018-05-26 RX ADMIN — ONDANSETRON HYDROCHLORIDE 4 MG: 2 INJECTION INTRAMUSCULAR; INTRAVENOUS at 12:05

## 2018-05-26 RX ADMIN — ACETAMINOPHEN 1000 MG: 500 TABLET, FILM COATED ORAL at 10:05

## 2018-05-26 RX ADMIN — HYDROCODONE BITARTRATE AND ACETAMINOPHEN 2 TABLET: 5; 325 TABLET ORAL at 05:05

## 2018-05-26 RX ADMIN — SODIUM CHLORIDE: 0.9 INJECTION, SOLUTION INTRAVENOUS at 02:05

## 2018-05-26 RX ADMIN — ALPRAZOLAM 0.5 MG: 0.25 TABLET ORAL at 08:05

## 2018-05-26 RX ADMIN — BUSPIRONE HYDROCHLORIDE 15 MG: 10 TABLET ORAL at 04:05

## 2018-05-26 RX ADMIN — PRAVASTATIN SODIUM 40 MG: 40 TABLET ORAL at 03:05

## 2018-05-26 RX ADMIN — ZOLPIDEM TARTRATE 5 MG: 5 TABLET ORAL at 10:05

## 2018-05-26 RX ADMIN — CEFTRIAXONE 1 G: 1 INJECTION, SOLUTION INTRAVENOUS at 12:05

## 2018-05-26 RX ADMIN — ALPRAZOLAM 0.5 MG: 0.25 TABLET ORAL at 03:05

## 2018-05-26 RX ADMIN — CALCIUM 500 MG: 500 TABLET ORAL at 04:05

## 2018-05-26 NOTE — HPI
"Beatriz Gan is a 62 y.o. female with a PMHx of HTN, kidney infections, and kidney cancer who presents to the ED with complaints of fever, burning urination,and abdominal pain with an onset x 3 days PTA. The patient states that she has had a fever ranging between 102-103 and has been having intermittent chills.Tylenol and Nyquil have given her some relief. She reports that she recently finished a round of antibiotics for a kidney infection that was diagnoses by her PCP x 2 weeks ago. She reports that her symptoms today are similar to those she experienced with the kidney infection. The patient states that her burning urination and abdominal pain slightly improved with the antibiotics, but did not dissipate entirely.  I do not have name of antiobiotic name at this time and last culture noted in chart was from 2107 e coli with some resistance but sensitive to rocephin. She describes the abdominal pain as a soreness that is right upper abdomen and flank area  and reports some associated diarrhea without blood. Denies any bowel movements today. Reports that she was feeling nauseous and was vomiting yesterday, but "nausea pills" she took have relieved these symptoms. She also admits to having "flu like symptoms" such as a cough x 1 week ago that has not resolved. She had kidney cancer x 3 years ago which resulted in the removal of 20% of her left kidney. The patient denies frequent urination, blood in stool,  or any other symptoms at this time.   "

## 2018-05-26 NOTE — ED NOTES
Pt placed on BP, pulse ox, cardiac monitor. Hypotension noted. LR infusing per MD orders. Pt in NAD. Pt instructed to call for needs. Call light placed in reach. Understanding verbalized. ED work up in process. Will continue to monitor prn.

## 2018-05-26 NOTE — ED NOTES
"Pt presents to ED with c/o fever x 3 days. T-max 103 this AM. Also c/o urinary sx. States she was diagnosed with a UTI 2 weeks ago and has completed her full antibiotic course. Reports sx initially improved, but returned this AM.  Pt also complains of chills and "sweats." Pt ambulated to room without any difficulty. MD is at bedside for evaluation. Will monitor prn.   "

## 2018-05-26 NOTE — ASSESSMENT & PLAN NOTE
Chronic, stable,   Continue home meds with buspar and xanax  Would benefit from outpt counseling/treatment when medically improved

## 2018-05-26 NOTE — NURSING
MD aware of pt elevated temp and chronic pain, she was placed on contact precautions, pt was asked to collect stool in a collection hat that I have placed in restroom, she verbalized understanding.

## 2018-05-26 NOTE — ASSESSMENT & PLAN NOTE
Acute, failed outpt treatment with po antibiotics  Blood/urine culture  Does not meet sepsis criteria -just ashly  Hydrate, rocephin and fu cultures -consider renal ultrasound/CT  if no improvement over next 24-48 hours

## 2018-05-26 NOTE — ASSESSMENT & PLAN NOTE
Morbid obesity complicates all aspects of disease management from diagnostic modalities to treatment. Weight loss encouraged and health benefits explained to patient.

## 2018-05-26 NOTE — ED PROVIDER NOTES
"Encounter Date: 5/26/2018    SCRIBE #1 NOTE: I, Shauna Izaguirre, am scribing for, and in the presence of,  Dr. Masterson .       History     Chief Complaint   Patient presents with    Fever     since Wednesday.       05/26/2018 10:02 AM     Chief complaint: Fever       Beatriz Gan is a 62 y.o. female with a PMHx of HTN, kidney infections, and kidney cancer who presents to the ED with complaints of fever, burning urination,and abdominal pain with an onset x 3 days PTA. The patient states that she has had a fever ranging between 102-103 and has been having intermittent chills.Tylenol and Nyquil have given her some relief. She reports that she recently finished a round of antibiotics for a kidney infection that was diagnoses by her PCP x 2 weeks ago. She reports that her symptoms today are similar to those she experienced with the kidney infection. The patient states that her burning urination and abdominal pain slightly improved with the antibiotics, but did not dissipate entirely. She describes the abdominal pain as a soreness that is not specific to one quadrant and reports some associated diarrhea. Denies any bowel movements today. Reports that she was feeling nauseous and was vomiting yesterday, but "nausea pills" she took have relieved these symptoms. She also admits to having "flu like symptoms" such as a cough x 1 week ago that has not resolved. The patient relays that she had kidney cancer x 3 years ago which resulted in the removal of 20% of her left kidney. The patient denies frequent urination, blood in stool,  or any other symptoms at this time.         The history is provided by the patient.     Review of patient's allergies indicates:   Allergen Reactions    Promethazine Hives and Rash    Fentanyl Other (See Comments)     Confusion     Latex, natural rubber Hives     Per pt report-many years    Morphine Hives     Past Medical History:   Diagnosis Date    Alcohol dependence     DDD " (degenerative disc disease), lumbosacral     DJD (degenerative joint disease), lumbosacral     Encounter for blood transfusion     GERD (gastroesophageal reflux disease)     Gout     Hypercholesterolemia     Hypertension     NATHALIE (iron deficiency anemia)     questionable white coat hypertension    Kidney carcinoma, left 2014    Pneumonia     Renal cell carcinoma     Shingles 10/13/2012     Past Surgical History:   Procedure Laterality Date    APPENDECTOMY      BLADDER REPAIR      x 2    COLONOSCOPY  9/2011    ESOPHAGOGASTRODUODENOSCOPY  9/2011    EYE SURGERY      lasix bilateral    HERNIA REPAIR      hiatal hernai    HYSTERECTOMY      LAPAROSCOPIC NISSEN FUNDOPLICATION      NEPHRECTOMY      LT partial    REFRACTIVE SURGERY      SKIN BIOPSY      TOTAL ABDOMINAL HYSTERECTOMY W/ BILATERAL SALPINGOOPHORECTOMY  age 48     Family History   Problem Relation Age of Onset    Hypertension Father      Social History   Substance Use Topics    Smoking status: Former Smoker     Packs/day: 1.00     Years: 40.00     Types: Cigarettes     Quit date: 1/1/2018    Smokeless tobacco: Never Used    Alcohol use 9.6 oz/week     2 Cans of beer, 14 Standard drinks or equivalent per week      Comment: 2-3 a day/ social     Review of Systems   Constitutional: Positive for chills and fever. Negative for activity change, appetite change and fatigue.   HENT: Negative for ear pain, rhinorrhea and sore throat.    Eyes: Negative for visual disturbance.   Respiratory: Positive for cough. Negative for apnea and shortness of breath.    Cardiovascular: Negative for chest pain, palpitations and leg swelling.   Gastrointestinal: Positive for abdominal pain, diarrhea, nausea and vomiting. Negative for abdominal distention, anal bleeding, blood in stool and constipation.   Genitourinary: Negative for difficulty urinating, frequency, hematuria and urgency.        Positive burning urination   Musculoskeletal: Negative for neck pain.    Skin: Negative for pallor and rash.   Neurological: Negative for headaches.   Hematological: Does not bruise/bleed easily.   Psychiatric/Behavioral: Negative for agitation.   All other systems reviewed and are negative.      Physical Exam     Initial Vitals [05/26/18 0955]   BP Pulse Resp Temp SpO2   104/64 97 16 99.2 °F (37.3 °C) 96 %      MAP       77.33         Physical Exam    Nursing note and vitals reviewed.  Constitutional: She appears well-developed and well-nourished. She is not diaphoretic. No distress.   HENT:   Head: Normocephalic and atraumatic.   Mouth/Throat: Oropharynx is clear and moist.   Eyes: Conjunctivae are normal.   Neck: Neck supple.   Cardiovascular: Normal rate, regular rhythm, normal heart sounds and intact distal pulses. Exam reveals no gallop and no friction rub.    No murmur heard.  Pulmonary/Chest: Breath sounds normal. She has no wheezes. She has no rhonchi. She has no rales.   Abdominal: Soft. She exhibits no distension. There is tenderness in the right upper quadrant. There is no guarding and negative Belle's sign.   No flank tenderness      Musculoskeletal: Normal range of motion.   Neurological: She is alert and oriented to person, place, and time. She has normal strength. No sensory deficit.   Normal sensation    Skin: No rash noted. No erythema.   Psychiatric: Her speech is normal.         ED Course   Procedures  Labs Reviewed   CBC W/ AUTO DIFFERENTIAL - Abnormal; Notable for the following:        Result Value    RBC 3.87 (*)     Hematocrit 36.9 (*)     MCH 32.3 (*)     Platelets 146 (*)     MPV 8.2 (*)     Gran # (ANC) 8.1 (*)     Lymph # 0.3 (*)     Gran% 89.6 (*)     Lymph% 3.7 (*)     All other components within normal limits   COMPREHENSIVE METABOLIC PANEL - Abnormal; Notable for the following:     Sodium 132 (*)     Potassium 3.4 (*)     Chloride 93 (*)     BUN, Bld 36 (*)     Creatinine 1.8 (*)     Albumin 2.9 (*)     Alkaline Phosphatase 201 (*)     eGFR if   American 34 (*)     eGFR if non  29 (*)     All other components within normal limits   URINALYSIS - Abnormal; Notable for the following:     Appearance, UA Cloudy (*)     Protein, UA 2+ (*)     Ketones, UA Trace (*)     Bilirubin (UA) 1+ (*)     Occult Blood UA 3+ (*)     Leukocytes, UA 2+ (*)     All other components within normal limits   URINALYSIS MICROSCOPIC - Abnormal; Notable for the following:     RBC, UA 15 (*)     WBC, UA >100 (*)     Bacteria, UA Many (*)     All other components within normal limits   CULTURE, URINE   CULTURE, BLOOD   CULTURE, BLOOD   LIPASE   LACTIC ACID, PLASMA             Medical Decision Making:   History:   Old Medical Records: I decided to obtain old medical records.  Clinical Tests:   Lab Tests: Ordered and Reviewed            Scribe Attestation:   Scribe #1: I performed the above scribed service and the documentation accurately describes the services I performed. I attest to the accuracy of the note.    I, Dr. Martinez Masterson, personally performed the services described in this documentation. All medical record entries made by the scribe were at my direction and in my presence.  I have reviewed the chart and agree that the record reflects my personal performance and is accurate and complete. Martinez Masterson MD.  3:20 PM 05/26/2018    Beatriz Gan is a 62 y.o. female presenting with new caps recurrent fever and persistent dysuria since recent treatment for UTI consistent with likely pyelonephritis.  I doubt sepsis.  Lactic acid is normal.  Transient hypotension resolving with fluids in the emergency department more likely secondary to dehydration.  1 L lactated Ringer's bolus given here.  She does have associated ALAN.  Ceftriaxone initiated after blood and urine cultures.  I will admit for IV antibiotics given recent difficulty with oral intake and recurrence of infection on previous oral regimen.  Low suspicion for alternative intra-abdominal  pathology such as cholecystitis, abscess, obstructive uropathy, obstruction.  I do not think further CT imaging is indicated.  I have spoken with hospitalist service who will assume care.          ED Course as of May 26 1520   Sat May 26, 2018   1022 Bedside RUQ US shows gallbladder distention without stones or wall thickening; no pericholecystic fluid or inflammatory changes.  Adjacent R kidney without hydronephrosis or other abscess evident.  [MR]      ED Course User Index  [MR] Martinez Masterson MD     Clinical Impression:   The encounter diagnosis was Pyelonephritis.    Disposition:   Disposition: Admitted                        Martinez Masterson MD  05/26/18 1522

## 2018-05-26 NOTE — SUBJECTIVE & OBJECTIVE
Past Medical History:   Diagnosis Date    Alcohol dependence     DDD (degenerative disc disease), lumbosacral     DJD (degenerative joint disease), lumbosacral     Encounter for blood transfusion     GERD (gastroesophageal reflux disease)     Gout     Hypercholesterolemia     Hypertension     NATHALIE (iron deficiency anemia)     questionable white coat hypertension    Kidney carcinoma, left 2014    Pneumonia     Renal cell carcinoma     Shingles 10/13/2012       Past Surgical History:   Procedure Laterality Date    APPENDECTOMY      BLADDER REPAIR      x 2    COLONOSCOPY  9/2011    ESOPHAGOGASTRODUODENOSCOPY  9/2011    EYE SURGERY      lasix bilateral    HERNIA REPAIR      hiatal hernai    HYSTERECTOMY      LAPAROSCOPIC NISSEN FUNDOPLICATION      NEPHRECTOMY      LT partial    REFRACTIVE SURGERY      SKIN BIOPSY      TOTAL ABDOMINAL HYSTERECTOMY W/ BILATERAL SALPINGOOPHORECTOMY  age 48       Review of patient's allergies indicates:   Allergen Reactions    Promethazine Hives and Rash    Fentanyl Other (See Comments)     Confusion     Latex, natural rubber Hives     Per pt report-many years    Morphine Hives       No current facility-administered medications on file prior to encounter.      Current Outpatient Prescriptions on File Prior to Encounter   Medication Sig    alprazolam (XANAX) 0.5 MG tablet Take 0.5 mg by mouth 3 (three) times daily.    amlodipine (NORVASC) 10 MG tablet Take 1 tablet (10 mg total) by mouth once daily.    busPIRone (BUSPAR) 15 MG tablet Take 1 tablet (15 mg total) by mouth 3 (three) times daily.    calcium citrate (CALCITRATE) 200 mg (950 mg) tablet Take 1 tablet by mouth 2 (two) times daily.      fluticasone (FLONASE) 50 mcg/actuation nasal spray 1 spray by Nasal route daily as needed.     gabapentin (NEURONTIN) 300 MG capsule Take 1 capsule (300 mg total) by mouth 2 (two) times daily. (Patient taking differently: Take 300 mg by mouth 3 (three) times daily as  needed. )    hydrocodone-acetaminophen 5-325mg (NORCO) 5-325 mg per tablet Take 1-2 tablets by mouth every 6 (six) hours as needed for Pain.    lisinopril (PRINIVIL,ZESTRIL) 30 MG tablet TAKE ONE TABLET BY MOUTH ONCE DAILY    meclizine (ANTIVERT) 25 mg tablet Take 1 tablet (25 mg total) by mouth 3 (three) times daily as needed.    meloxicam (MOBIC) 15 MG tablet Take 15 mg by mouth daily as needed.     multivit-iron-min-folic acid (MULTIVITAMIN-IRON-MINERALS-FOLIC ACID) 3,500-18-0.4 unit-mg-mg Chew Take by mouth once daily.      tizanidine (ZANAFLEX) 2 MG tablet Take 4 mg by mouth every 6 (six) hours as needed.    VITAMIN D2 50,000 unit capsule TAKE ONE CAPSULE BY MOUTH ONCE WEEKLY.    pravastatin (PRAVACHOL) 40 MG tablet TAKE ONE TABLET BY MOUTH ONCE DAILY    [DISCONTINUED] cyanocobalamin (VITAMIN B-12) 1000 MCG tablet Take 100 mcg by mouth once daily.    [DISCONTINUED] cyanocobalamin 1,000 mcg/mL injection Inject 1 mL (1,000 mcg total) into the muscle Daily. Give 1 mL injection intramuscularly every month    [DISCONTINUED] tramadol (ULTRAM) 50 mg tablet Take 50 mg by mouth 3 (three) times daily.    [DISCONTINUED] VIT A/VIT C/VIT E/ZINC/COPPER (PRESERVISION AREDS ORAL) Take by mouth.     Family History     Problem Relation (Age of Onset)    Hypertension Father        Social History Main Topics    Smoking status: Current Some Day Smoker     Packs/day: 1.00     Years: 40.00     Types: Cigarettes    Smokeless tobacco: Never Used    Alcohol use 9.6 oz/week     14 Standard drinks or equivalent, 2 Cans of beer per week      Comment: 2-3 a day    Drug use: No    Sexual activity: Yes     Partners: Male     Review of Systems   Constitutional: Positive for appetite change, chills and fever.   HENT: Positive for congestion.    Eyes: Negative for discharge and itching.   Respiratory: Positive for cough. Negative for choking, shortness of breath and wheezing.    Cardiovascular: Negative for chest pain and leg  swelling.   Gastrointestinal: Positive for abdominal pain, diarrhea and nausea.   Endocrine: Negative for polydipsia and polyphagia.   Genitourinary: Positive for dysuria. Negative for difficulty urinating and hematuria.   Musculoskeletal: Positive for back pain and myalgias.   Skin: Negative.    Allergic/Immunologic: Negative for food allergies and immunocompromised state.   Neurological: Negative for headaches.   Hematological: Negative for adenopathy. Does not bruise/bleed easily.   Psychiatric/Behavioral: Positive for dysphoric mood. The patient is nervous/anxious.      Objective:     Vital Signs (Most Recent):  Temp: 99.2 °F (37.3 °C) (05/26/18 0955)  Pulse: 71 (05/26/18 1202)  Resp: 16 (05/26/18 0955)  BP: 104/62 (05/26/18 1202)  SpO2: (!) 93 % (05/26/18 1202) Vital Signs (24h Range):  Temp:  [99.2 °F (37.3 °C)] 99.2 °F (37.3 °C)  Pulse:  [71-97] 71  Resp:  [16] 16  SpO2:  [93 %-96 %] 93 %  BP: ()/(50-64) 104/62     Weight: 86.2 kg (190 lb)  Body mass index is 35.9 kg/m².    Physical Exam   Constitutional: She is oriented to person, place, and time. She appears well-developed and well-nourished. No distress.   HENT:   Head: Normocephalic and atraumatic.   Right Ear: External ear normal.   Left Ear: External ear normal.   Nose: Nose normal.   Mouth/Throat: Oropharynx is clear and moist. No oropharyngeal exudate.   Eyes: Conjunctivae and EOM are normal. Right eye exhibits no discharge. Left eye exhibits no discharge. No scleral icterus.   Neck: Normal range of motion. Neck supple. No thyromegaly present.   Cardiovascular: Normal rate, regular rhythm, normal heart sounds and intact distal pulses.  Exam reveals no gallop and no friction rub.    No murmur heard.  Pulmonary/Chest: Effort normal and breath sounds normal. No respiratory distress. She has no wheezes.   Abdominal: Soft. Bowel sounds are normal. She exhibits no distension and no mass. There is tenderness in the right upper quadrant. There is CVA  tenderness. There is no rigidity, no rebound and no guarding.   Musculoskeletal: Normal range of motion. She exhibits no edema or tenderness.   Lymphadenopathy:     She has no cervical adenopathy.   Neurological: She is alert and oriented to person, place, and time. No cranial nerve deficit. Coordination normal.   Skin: Skin is warm and dry. No rash noted. No erythema.   Psychiatric: She has a normal mood and affect. Her behavior is normal. Judgment and thought content normal.   Nursing note and vitals reviewed.        CRANIAL NERVES     CN III, IV, VI   Extraocular motions are normal.        Significant Labs:   CBC:   Recent Labs  Lab 05/26/18  1042   WBC 9.10   HGB 12.5   HCT 36.9*   *     CMP:   Recent Labs  Lab 05/26/18  1042   *   K 3.4*   CL 93*   CO2 23      BUN 36*   CREATININE 1.8*   CALCIUM 9.1   PROT 7.1   ALBUMIN 2.9*   BILITOT 0.9   ALKPHOS 201*   AST 34   ALT 43   ANIONGAP 16   EGFRNONAA 29*     Urine Studies:   Recent Labs  Lab 05/26/18  1023   COLORU Yellow   APPEARANCEUA Cloudy*   PHUR 5.0   SPECGRAV 1.025   PROTEINUA 2+*   GLUCUA Negative   KETONESU Trace*   BILIRUBINUA 1+*   OCCULTUA 3+*   NITRITE Negative   UROBILINOGEN 1.0   LEUKOCYTESUR 2+*   RBCUA 15*   WBCUA >100*   BACTERIA Many*   SQUAMEPITHEL 12   HYALINECASTS 0       Significant Imaging: na

## 2018-05-26 NOTE — H&P
"[facility] Medicine  History & Physical    Patient Name: Beatriz Gan  MRN: [id number]  Admission Date: 5/26/2018  Attending Physician: Annmarie Aquino MD   Primary Care Provider: Jorge Alonzo MD         Patient information was obtained from patient, past medical records and ER records.     Subjective:     Principal Problem:Pyelonephritis    Chief Complaint:   Chief Complaint   Patient presents with    Fever     since Wednesday.        HPI: Beatriz Gan is a 62 y.o. female with a PMHx of HTN, kidney infections, and kidney cancer who presents to the ED with complaints of fever, burning urination,and abdominal pain with an onset x 3 days PTA. The patient states that she has had a fever ranging between 102-103 and has been having intermittent chills.Tylenol and Nyquil have given her some relief. She reports that she recently finished a round of antibiotics for a kidney infection that was diagnoses by her PCP x 2 weeks ago. She reports that her symptoms today are similar to those she experienced with the kidney infection. The patient states that her burning urination and abdominal pain slightly improved with the antibiotics, but did not dissipate entirely.  I do not have name of antiobiotic name at this time and last culture noted in chart was from 2107 e coli with some resistance but sensitive to rocephin. She describes the abdominal pain as a soreness that is right upper abdomen and flank area  and reports some associated diarrhea without blood. Denies any bowel movements today. Reports that she was feeling nauseous and was vomiting yesterday, but "nausea pills" she took have relieved these symptoms. She also admits to having "flu like symptoms" such as a cough x 1 week ago that has not resolved. She had kidney cancer x 3 years ago which resulted in the removal of 20% of her left kidney. The patient denies frequent urination, blood in stool,  or any other symptoms at " this time.     Past Medical History:   Diagnosis Date    Alcohol dependence     DDD (degenerative disc disease), lumbosacral     DJD (degenerative joint disease), lumbosacral     Encounter for blood transfusion     GERD (gastroesophageal reflux disease)     Gout     Hypercholesterolemia     Hypertension     NATHALIE (iron deficiency anemia)     questionable white coat hypertension    Kidney carcinoma, left 2014    Pneumonia     Renal cell carcinoma     Shingles 10/13/2012       Past Surgical History:   Procedure Laterality Date    APPENDECTOMY      BLADDER REPAIR      x 2    COLONOSCOPY  9/2011    ESOPHAGOGASTRODUODENOSCOPY  9/2011    EYE SURGERY      lasix bilateral    HERNIA REPAIR      hiatal hernai    HYSTERECTOMY      LAPAROSCOPIC NISSEN FUNDOPLICATION      NEPHRECTOMY      LT partial    REFRACTIVE SURGERY      SKIN BIOPSY      TOTAL ABDOMINAL HYSTERECTOMY W/ BILATERAL SALPINGOOPHORECTOMY  age 48       Review of patient's allergies indicates:   Allergen Reactions    Promethazine Hives and Rash    Fentanyl Other (See Comments)     Confusion     Latex, natural rubber Hives     Per pt report-many years    Morphine Hives       No current facility-administered medications on file prior to encounter.      Current Outpatient Prescriptions on File Prior to Encounter   Medication Sig    alprazolam (XANAX) 0.5 MG tablet Take 0.5 mg by mouth 3 (three) times daily.    amlodipine (NORVASC) 10 MG tablet Take 1 tablet (10 mg total) by mouth once daily.    busPIRone (BUSPAR) 15 MG tablet Take 1 tablet (15 mg total) by mouth 3 (three) times daily.    calcium citrate (CALCITRATE) 200 mg (950 mg) tablet Take 1 tablet by mouth 2 (two) times daily.      fluticasone (FLONASE) 50 mcg/actuation nasal spray 1 spray by Nasal route daily as needed.     gabapentin (NEURONTIN) 300 MG capsule Take 1 capsule (300 mg total) by mouth 2 (two) times daily. (Patient taking differently: Take 300 mg by mouth 3 (three)  times daily as needed. )    hydrocodone-acetaminophen 5-325mg (NORCO) 5-325 mg per tablet Take 1-2 tablets by mouth every 6 (six) hours as needed for Pain.    lisinopril (PRINIVIL,ZESTRIL) 30 MG tablet TAKE ONE TABLET BY MOUTH ONCE DAILY    meclizine (ANTIVERT) 25 mg tablet Take 1 tablet (25 mg total) by mouth 3 (three) times daily as needed.    meloxicam (MOBIC) 15 MG tablet Take 15 mg by mouth daily as needed.     multivit-iron-min-folic acid (MULTIVITAMIN-IRON-MINERALS-FOLIC ACID) 3,500-18-0.4 unit-mg-mg Chew Take by mouth once daily.      tizanidine (ZANAFLEX) 2 MG tablet Take 4 mg by mouth every 6 (six) hours as needed.    VITAMIN D2 50,000 unit capsule TAKE ONE CAPSULE BY MOUTH ONCE WEEKLY.    pravastatin (PRAVACHOL) 40 MG tablet TAKE ONE TABLET BY MOUTH ONCE DAILY    [DISCONTINUED] cyanocobalamin (VITAMIN B-12) 1000 MCG tablet Take 100 mcg by mouth once daily.    [DISCONTINUED] cyanocobalamin 1,000 mcg/mL injection Inject 1 mL (1,000 mcg total) into the muscle Daily. Give 1 mL injection intramuscularly every month    [DISCONTINUED] tramadol (ULTRAM) 50 mg tablet Take 50 mg by mouth 3 (three) times daily.    [DISCONTINUED] VIT A/VIT C/VIT E/ZINC/COPPER (PRESERVISION AREDS ORAL) Take by mouth.     Family History     Problem Relation (Age of Onset)    Hypertension Father        Social History Main Topics    Smoking status: Current Some Day Smoker     Packs/day: 1.00     Years: 40.00     Types: Cigarettes    Smokeless tobacco: Never Used    Alcohol use 9.6 oz/week     14 Standard drinks or equivalent, 2 Cans of beer per week      Comment: 2-3 a day    Drug use: No    Sexual activity: Yes     Partners: Male     Review of Systems   Constitutional: Positive for appetite change, chills and fever.   HENT: Positive for congestion.    Eyes: Negative for discharge and itching.   Respiratory: Positive for cough. Negative for choking, shortness of breath and wheezing.    Cardiovascular: Negative for chest  pain and leg swelling.   Gastrointestinal: Positive for abdominal pain, diarrhea and nausea.   Endocrine: Negative for polydipsia and polyphagia.   Genitourinary: Positive for dysuria. Negative for difficulty urinating and hematuria.   Musculoskeletal: Positive for back pain and myalgias.   Skin: Negative.    Allergic/Immunologic: Negative for food allergies and immunocompromised state.   Neurological: Negative for headaches.   Hematological: Negative for adenopathy. Does not bruise/bleed easily.   Psychiatric/Behavioral: Positive for dysphoric mood. The patient is nervous/anxious.      Objective:     Vital Signs (Most Recent):  Temp: 99.2 °F (37.3 °C) (05/26/18 0955)  Pulse: 71 (05/26/18 1202)  Resp: 16 (05/26/18 0955)  BP: 104/62 (05/26/18 1202)  SpO2: (!) 93 % (05/26/18 1202) Vital Signs (24h Range):  Temp:  [99.2 °F (37.3 °C)] 99.2 °F (37.3 °C)  Pulse:  [71-97] 71  Resp:  [16] 16  SpO2:  [93 %-96 %] 93 %  BP: ()/(50-64) 104/62     Weight: 86.2 kg (190 lb)  Body mass index is 35.9 kg/m².    Physical Exam   Constitutional: She is oriented to person, place, and time. She appears well-developed and well-nourished. No distress.   HENT:   Head: Normocephalic and atraumatic.   Right Ear: External ear normal.   Left Ear: External ear normal.   Nose: Nose normal.   Mouth/Throat: Oropharynx is clear and moist. No oropharyngeal exudate.   Eyes: Conjunctivae and EOM are normal. Right eye exhibits no discharge. Left eye exhibits no discharge. No scleral icterus.   Neck: Normal range of motion. Neck supple. No thyromegaly present.   Cardiovascular: Normal rate, regular rhythm, normal heart sounds and intact distal pulses.  Exam reveals no gallop and no friction rub.    No murmur heard.  Pulmonary/Chest: Effort normal and breath sounds normal. No respiratory distress. She has no wheezes.   Abdominal: Soft. Bowel sounds are normal. She exhibits no distension and no mass. There is tenderness in the right upper quadrant.  There is CVA tenderness. There is no rigidity, no rebound and no guarding.   Musculoskeletal: Normal range of motion. She exhibits no edema or tenderness.   Lymphadenopathy:     She has no cervical adenopathy.   Neurological: She is alert and oriented to person, place, and time. No cranial nerve deficit. Coordination normal.   Skin: Skin is warm and dry. No rash noted. No erythema.   Psychiatric: She has a normal mood and affect. Her behavior is normal. Judgment and thought content normal.   Nursing note and vitals reviewed.        CRANIAL NERVES     CN III, IV, VI   Extraocular motions are normal.        Significant Labs:   CBC:   Recent Labs  Lab 05/26/18  1042   WBC 9.10   HGB 12.5   HCT 36.9*   *     CMP:   Recent Labs  Lab 05/26/18  1042   *   K 3.4*   CL 93*   CO2 23      BUN 36*   CREATININE 1.8*   CALCIUM 9.1   PROT 7.1   ALBUMIN 2.9*   BILITOT 0.9   ALKPHOS 201*   AST 34   ALT 43   ANIONGAP 16   EGFRNONAA 29*     Urine Studies:   Recent Labs  Lab 05/26/18  1023   COLORU Yellow   APPEARANCEUA Cloudy*   PHUR 5.0   SPECGRAV 1.025   PROTEINUA 2+*   GLUCUA Negative   KETONESU Trace*   BILIRUBINUA 1+*   OCCULTUA 3+*   NITRITE Negative   UROBILINOGEN 1.0   LEUKOCYTESUR 2+*   RBCUA 15*   WBCUA >100*   BACTERIA Many*   SQUAMEPITHEL 12   HYALINECASTS 0       Significant Imaging: na    Assessment/Plan:     * Pyelonephritis      Acute, failed outpt treatment with po antibiotics  Blood/urine culture  Does not meet sepsis criteria -just alan  Hydrate, rocephin and fu cultures -consider renal ultrasound/CT  if no improvement over next 24-48 hours         ALAN (acute kidney injury)    Avoid non-essential nephrotoxins and  dose medications according to GFR  Avoid aceI, Monitor I/O, daily weight  Check urine  Positive for blood and  Protein/wbc and rbc-sent for culture   Check  uric acid and cpk  Check renal us   Check C3, C4, THI, and urine eosinophils if indicated.   Trend renal function after hydration    No evidence casts             DDD (degenerative disc disease), lumbar      Chronic, pt with pain-  Continue gabapentin and   Prn hydrocodone-no acute issues         Depression      Chronic, stable,   Continue home meds with buspar and xanax  Would benefit from outpt counseling/treatment when medically improved         Body mass index 30.0-30.9, adult      Morbid obesity complicates all aspects of disease management from diagnostic modalities to treatment. Weight loss encouraged and health benefits explained to patient.          Hypokalemia      Acute, likely 2/2 decerased intake and vomiting/diarrhea  Replace and trend with magnesium         HTN (hypertension)      Chronic, stable. -  Po medications on hold due to hypotension on admit  Hold ACEI due to ALAN  Home meds as follows:   Hypertension Medications             amlodipine (NORVASC) 10 MG tablet Take 1 tablet (10 mg total) by mouth once daily.    lisinopril (PRINIVIL,ZESTRIL) 30 MG tablet TAKE ONE TABLET BY MOUTH ONCE DAILY                VTE Risk Mitigation         Ordered     IP VTE HIGH RISK PATIENT  Once      05/26/18 1426     Place RADHA hose  Until discontinued      05/26/18 1426     Place sequential compression device  Until discontinued      05/26/18 1426             Annmarie Aquino MD  Department of Hospital Medicine   [facility]

## 2018-05-26 NOTE — ED NOTES
All results noted. Pt updated on plan of care. Verbalizes understanding. No other needs identified at this time. Will monitor prn.

## 2018-05-26 NOTE — ED NOTES
Pt is to be admitted to hospital. Updated on plan of care and verbalizes understanding. No other needs identified. Will monitor prn.

## 2018-05-26 NOTE — ASSESSMENT & PLAN NOTE
Chronic, stable. -  Po medications on hold due to hypotension on admit  Hold ACEI due to ALAN  Home meds as follows:   Hypertension Medications             amlodipine (NORVASC) 10 MG tablet Take 1 tablet (10 mg total) by mouth once daily.    lisinopril (PRINIVIL,ZESTRIL) 30 MG tablet TAKE ONE TABLET BY MOUTH ONCE DAILY

## 2018-05-26 NOTE — PLAN OF CARE
Problem: Patient Care Overview  Goal: Plan of Care Review  Outcome: Ongoing (interventions implemented as appropriate)  Pt has remained free from injury this shift, she ambulates to restroom with minimal assistance related to scd's and iv pump.  She was place on contract precautions as ordered per MD.  She has been medicated for pain she states the pain is chronic back pain and medicates at home with same meds.  IV fluids started as ordered, she was oriented to room and call light with return demonstration on use of call light.

## 2018-05-26 NOTE — ASSESSMENT & PLAN NOTE
Avoid non-essential nephrotoxins and  dose medications according to GFR  Avoid aceI, Monitor I/O, daily weight  Check urine  Positive for blood and  Protein/wbc and rbc-sent for culture   Check  uric acid and cpk  Check renal us   Check C3, C4, THI, and urine eosinophils if indicated.   Trend renal function after hydration   No evidence casts

## 2018-05-27 PROBLEM — E83.39 HYPOPHOSPHATEMIA: Status: ACTIVE | Noted: 2018-05-27

## 2018-05-27 PROBLEM — E87.1 HYPONATREMIA: Status: ACTIVE | Noted: 2018-05-27

## 2018-05-27 PROBLEM — R06.02 SHORTNESS OF BREATH: Status: ACTIVE | Noted: 2018-05-27

## 2018-05-27 LAB
ANION GAP SERPL CALC-SCNC: 10 MMOL/L
BUN SERPL-MCNC: 39 MG/DL
CALCIUM SERPL-MCNC: 8.5 MG/DL
CHLORIDE SERPL-SCNC: 97 MMOL/L
CK SERPL-CCNC: 17 U/L
CO2 SERPL-SCNC: 21 MMOL/L
CREAT SERPL-MCNC: 1.7 MG/DL
EST. GFR  (AFRICAN AMERICAN): 36 ML/MIN/1.73 M^2
EST. GFR  (NON AFRICAN AMERICAN): 31 ML/MIN/1.73 M^2
GLUCOSE SERPL-MCNC: 131 MG/DL
MAGNESIUM SERPL-MCNC: 1.6 MG/DL
PHOSPHATE SERPL-MCNC: 2.1 MG/DL
POTASSIUM SERPL-SCNC: 3.3 MMOL/L
SODIUM SERPL-SCNC: 128 MMOL/L
SODIUM UR-SCNC: <20 MMOL/L
T4 FREE SERPL-MCNC: 0.95 NG/DL
TSH SERPL DL<=0.005 MIU/L-ACNC: 0.34 UIU/ML
URATE SERPL-MCNC: 7.9 MG/DL

## 2018-05-27 PROCEDURE — 83735 ASSAY OF MAGNESIUM: CPT

## 2018-05-27 PROCEDURE — G8979 MOBILITY GOAL STATUS: HCPCS | Mod: CJ

## 2018-05-27 PROCEDURE — G8978 MOBILITY CURRENT STATUS: HCPCS | Mod: CM

## 2018-05-27 PROCEDURE — 84439 ASSAY OF FREE THYROXINE: CPT

## 2018-05-27 PROCEDURE — 84100 ASSAY OF PHOSPHORUS: CPT

## 2018-05-27 PROCEDURE — 97161 PT EVAL LOW COMPLEX 20 MIN: CPT

## 2018-05-27 PROCEDURE — 99900035 HC TECH TIME PER 15 MIN (STAT)

## 2018-05-27 PROCEDURE — 94761 N-INVAS EAR/PLS OXIMETRY MLT: CPT

## 2018-05-27 PROCEDURE — 36415 COLL VENOUS BLD VENIPUNCTURE: CPT

## 2018-05-27 PROCEDURE — 12000002 HC ACUTE/MED SURGE SEMI-PRIVATE ROOM

## 2018-05-27 PROCEDURE — 84300 ASSAY OF URINE SODIUM: CPT

## 2018-05-27 PROCEDURE — 84443 ASSAY THYROID STIM HORMONE: CPT

## 2018-05-27 PROCEDURE — 82550 ASSAY OF CK (CPK): CPT

## 2018-05-27 PROCEDURE — 25000003 PHARM REV CODE 250: Performed by: HOSPITALIST

## 2018-05-27 PROCEDURE — 84550 ASSAY OF BLOOD/URIC ACID: CPT

## 2018-05-27 PROCEDURE — 25000242 PHARM REV CODE 250 ALT 637 W/ HCPCS: Performed by: HOSPITALIST

## 2018-05-27 PROCEDURE — 27000221 HC OXYGEN, UP TO 24 HOURS

## 2018-05-27 PROCEDURE — 63600175 PHARM REV CODE 636 W HCPCS: Performed by: HOSPITALIST

## 2018-05-27 PROCEDURE — 80048 BASIC METABOLIC PNL TOTAL CA: CPT

## 2018-05-27 RX ORDER — SODIUM,POTASSIUM PHOSPHATES 280-250MG
1 POWDER IN PACKET (EA) ORAL DAILY
Status: DISCONTINUED | OUTPATIENT
Start: 2018-05-27 | End: 2018-06-03 | Stop reason: HOSPADM

## 2018-05-27 RX ORDER — SODIUM CHLORIDE 9 MG/ML
1000 INJECTION, SOLUTION INTRAVENOUS CONTINUOUS
Status: ACTIVE | OUTPATIENT
Start: 2018-05-27 | End: 2018-05-27

## 2018-05-27 RX ORDER — ALBUTEROL SULFATE 2.5 MG/.5ML
2.5 SOLUTION RESPIRATORY (INHALATION) EVERY 4 HOURS PRN
Status: DISCONTINUED | OUTPATIENT
Start: 2018-05-27 | End: 2018-05-28

## 2018-05-27 RX ADMIN — FLUTICASONE PROPIONATE 50 MCG: 50 SPRAY, METERED NASAL at 09:05

## 2018-05-27 RX ADMIN — ACETAMINOPHEN 650 MG: 325 TABLET, FILM COATED ORAL at 09:05

## 2018-05-27 RX ADMIN — ALPRAZOLAM 0.5 MG: 0.25 TABLET ORAL at 09:05

## 2018-05-27 RX ADMIN — POTASSIUM & SODIUM PHOSPHATES POWDER PACK 280-160-250 MG 1 PACKET: 280-160-250 PACK at 11:05

## 2018-05-27 RX ADMIN — ALPRAZOLAM 0.5 MG: 0.25 TABLET ORAL at 02:05

## 2018-05-27 RX ADMIN — STANDARDIZED SENNA CONCENTRATE AND DOCUSATE SODIUM 1 TABLET: 8.6; 5 TABLET, FILM COATED ORAL at 09:05

## 2018-05-27 RX ADMIN — CALCIUM 500 MG: 500 TABLET ORAL at 09:05

## 2018-05-27 RX ADMIN — BUSPIRONE HYDROCHLORIDE 15 MG: 10 TABLET ORAL at 09:05

## 2018-05-27 RX ADMIN — CEFTRIAXONE 1 G: 1 INJECTION, SOLUTION INTRAVENOUS at 12:05

## 2018-05-27 RX ADMIN — GABAPENTIN 300 MG: 300 CAPSULE ORAL at 09:05

## 2018-05-27 RX ADMIN — SODIUM CHLORIDE 1000 ML: 0.9 INJECTION, SOLUTION INTRAVENOUS at 11:05

## 2018-05-27 RX ADMIN — PRAVASTATIN SODIUM 40 MG: 40 TABLET ORAL at 09:05

## 2018-05-27 RX ADMIN — PIPERACILLIN SODIUM AND TAZOBACTAM SODIUM 3.38 G: 3; .375 INJECTION, POWDER, LYOPHILIZED, FOR SOLUTION INTRAVENOUS at 09:05

## 2018-05-27 RX ADMIN — PIPERACILLIN SODIUM AND TAZOBACTAM SODIUM 3.38 G: 3; .375 INJECTION, POWDER, LYOPHILIZED, FOR SOLUTION INTRAVENOUS at 02:05

## 2018-05-27 RX ADMIN — BUSPIRONE HYDROCHLORIDE 15 MG: 10 TABLET ORAL at 02:05

## 2018-05-27 RX ADMIN — ACETAMINOPHEN 650 MG: 325 TABLET, FILM COATED ORAL at 05:05

## 2018-05-27 NOTE — SUBJECTIVE & OBJECTIVE
Interval History:   Feeling very weak and sob. -  Slight cough /feels wheezy-concerned about pneumonia as has had it in past       Review of Systems   Constitutional: Positive for appetite change, chills and fever.   HENT: Negative for congestion.    Respiratory: Positive for cough and shortness of breath. Negative for choking.    Cardiovascular: Negative for chest pain and leg swelling.   Gastrointestinal: Negative for abdominal pain, diarrhea and nausea.   Musculoskeletal: Positive for back pain and myalgias.   Skin: Negative.    Allergic/Immunologic: Negative for food allergies and immunocompromised state.   Neurological: Positive for weakness and light-headedness. Negative for headaches.   Hematological: Negative for adenopathy. Does not bruise/bleed easily.   Psychiatric/Behavioral: Positive for dysphoric mood. The patient is nervous/anxious.      Objective:     Vital Signs (Most Recent):  Temp: (!) 100.9 °F (38.3 °C) (05/27/18 0905)  Pulse: 91 (05/27/18 0742)  Resp: 18 (05/27/18 0742)  BP: 120/71 (05/27/18 0742)  SpO2: (!) 94 % (05/27/18 0742) Vital Signs (24h Range):  Temp:  [97.6 °F (36.4 °C)-101.1 °F (38.4 °C)] 100.9 °F (38.3 °C)  Pulse:  [] 91  Resp:  [16-20] 18  SpO2:  [89 %-98 %] 94 %  BP: ()/(50-71) 120/71     Weight: 86.2 kg (190 lb)  Body mass index is 32.61 kg/m².    Intake/Output Summary (Last 24 hours) at 05/27/18 1007  Last data filed at 05/26/18 1800   Gross per 24 hour   Intake           919.58 ml   Output                0 ml   Net           919.58 ml      Physical Exam   Constitutional: She is oriented to person, place, and time. She appears well-developed and well-nourished. No distress.   HENT:   Head: Normocephalic and atraumatic.   Right Ear: External ear normal.   Left Ear: External ear normal.   Nose: Nose normal.   Mouth/Throat: Oropharynx is clear and moist. No oropharyngeal exudate.   Eyes: Conjunctivae and EOM are normal. Right eye exhibits no discharge. Left eye exhibits  no discharge. No scleral icterus.   Neck: Normal range of motion. Neck supple. No thyromegaly present.   Cardiovascular: Normal rate, regular rhythm, normal heart sounds and intact distal pulses.  Exam reveals no gallop and no friction rub.    No murmur heard.  Pulmonary/Chest: Effort normal and breath sounds normal. No respiratory distress. She has no wheezes.   Abdominal: Soft. Bowel sounds are normal. She exhibits no distension and no mass. There is tenderness in the right upper quadrant. There is CVA tenderness. There is no rigidity, no rebound and no guarding.   Musculoskeletal: Normal range of motion. She exhibits no edema or tenderness.   Lymphadenopathy:     She has no cervical adenopathy.   Neurological: She is alert and oriented to person, place, and time. No cranial nerve deficit. Coordination normal.   Skin: Skin is warm and dry. No rash noted. No erythema.   Psychiatric: She has a normal mood and affect. Her behavior is normal. Judgment and thought content normal.   Nursing note and vitals reviewed.      Significant Labs: All pertinent labs within the past 24 hours have been reviewed.    Significant Imaging: I have reviewed and interpreted all pertinent imaging results/findings within the past 24 hours.

## 2018-05-27 NOTE — ASSESSMENT & PLAN NOTE
likely secondary urinary source with pyelonephritis.  Patient will need broad-spectrum antibiotic therapy with Zosyn and await for cultures in order to de-escalate and target appropriate antibiotics.  Renal ultrasound shows no evidence of post acute obstructive or anatomic abnormalities.  There was a noted potential mass on the renal ultrasound which appears discussion to the radiologist to resemble a fat pad.  Patient does not have any signs or symptoms of weight loss or B symptoms which been lead me to believe that she has a malignancy.

## 2018-05-27 NOTE — PROGRESS NOTES
Call received from [facility]- microbiology.  Spoke with Dr. Aquino regarding positive blood culture.  Skyla, Primary nurse, notified.

## 2018-05-27 NOTE — PLAN OF CARE
Problem: Patient Care Overview  Goal: Plan of Care Review  Outcome: Ongoing (interventions implemented as appropriate)  Pt AAOx4, no c/o pain/ discomfort. IVF infusing, no N/V reported by pt. Afebrile throughout shift, VSS. Pt uses call light appropriately and calls for assistance to BR, standby assist. All meds and POC reviewed with pt, verbalizes understanding, call light in reach

## 2018-05-27 NOTE — ASSESSMENT & PLAN NOTE
Body mass index is 32.61 kg/m². Morbid obesity complicates all aspects of disease management from diagnostic modalities to treatment. Weight loss encouraged and health benefits explained to patient.       No

## 2018-05-27 NOTE — ASSESSMENT & PLAN NOTE
Patient with ALAN likely d/t ATN  Which is currently controlled. Labs reviewed- BMP with Estimated Creatinine Clearance: 43.1 mL/min (based on SCr of 1.4 mg/dL). according to latest data. Monitor UOP and serial BMP and adjust therapy as needed. Renally dose meds for GFR listed above.

## 2018-05-27 NOTE — PT/OT/SLP EVAL
Physical Therapy Evaluation    Patient Name:  Beatriz Gan   MRN:  [id number]    Recommendations:     Discharge Recommendations:  home   Discharge Equipment Recommendations: none   Barriers to discharge: None    Assessment:     Beatriz Gan is a 62 y.o. female admitted with a medical diagnosis of Pyelonephritis.  She presents with the following impairments/functional limitations:  weakness, impaired endurance, gait instability, impaired functional mobilty, pain .    Rehab Prognosis:  good; patient would benefit from acute skilled PT services to address these deficits and reach maximum level of function.      Recent Surgery: * No surgery found *      Plan:     During this hospitalization, patient to be seen 6 x/week to address the above listed problems via gait training, therapeutic activities, therapeutic exercises  · Plan of Care Expires:      Plan of Care Reviewed with: patient    Subjective     Communicated with RN prior to session.  Patient found supine upon PT entry to room, agreeable to evaluation.      Chief Complaint: weakness  Patient comments/goals: improve function.  Pain/Comfort:  · Pain Rating 1: 4/10 (RTabdominal area.)  · Pain Rating Post-Intervention 1: 4/10    Patients cultural, spiritual, Confucianism conflicts given the current situation: NO    Living Environment:  In 2 [address] house with .  Prior to admission, patients level of function was independent,using cane or walker PRN.  Patient has the following equipment:  .  DME owned (not currently used): NA.  Upon discharge, patient will have assistance from .    Objective:     Patient found with: peripheral IV     General Precautions: Standard, fall   Orthopedic Precautions:N/A   Braces: N/A (pt using back brace at home for LBP)     Exams:  · Cognitive Exam:  Patient is oriented to Person, Place, Time and Situation and follows 100% of all commands   · Gross Motor Coordination:  WFL  · Postural Exam:  Patient presented  with the following abnormalities:    · -       Rounded shoulders  · -       Forward head  · Sensation:    · -       Intact  · RLE ROM: WFL  · RLE Strength: WFL  · LLE ROM: WFL  · LLE Strength: WFL    Functional Mobility:  · Bed Mobility:     · Rolling Right: minimum assistance  · Transfers:     · Sit to Stand:  minimum assistance with rolling walker  · Gait: 50' with min/CGA     AM-PAC 6 CLICK MOBILITY  Total Score:        Therapeutic Activities and Exercises:       Patient left supine with all lines intact, call button in reach and RN notified.    GOALS:    Physical Therapy Goals        Problem: Physical Therapy Goal    Goal Priority Disciplines Outcome Goal Variances Interventions   Physical Therapy Goal     PT/OT, PT      Description:  1.Pt will be independent with bed mobility and transfers.  2.Pt will ambulate 500' with AD with CGA.                    History:     Past Medical History:   Diagnosis Date    Alcohol dependence     DDD (degenerative disc disease), lumbosacral     DJD (degenerative joint disease), lumbosacral     Encounter for blood transfusion     GERD (gastroesophageal reflux disease)     Gout     Hypercholesterolemia     Hypertension     NATHALIE (iron deficiency anemia)     questionable white coat hypertension    Kidney carcinoma, left 2014    Pneumonia     Renal cell carcinoma     Shingles 10/13/2012       Past Surgical History:   Procedure Laterality Date    APPENDECTOMY      BLADDER REPAIR      x 2    COLONOSCOPY  9/2011    ESOPHAGOGASTRODUODENOSCOPY  9/2011    EYE SURGERY      lasix bilateral    HERNIA REPAIR      hiatal hernai    HYSTERECTOMY      LAPAROSCOPIC NISSEN FUNDOPLICATION      NEPHRECTOMY      LT partial    REFRACTIVE SURGERY      SKIN BIOPSY      TOTAL ABDOMINAL HYSTERECTOMY W/ BILATERAL SALPINGOOPHORECTOMY  age 48       Clinical Decision Making:     History  Co-morbidities and personal factors that may impact the plan of care Examination  Body Structures and  Functions, activity limitations and participation restrictions that may impact the plan of care Clinical Presentation   Decision Making/ Complexity Score   Co-morbidities:   [] Time since onset of injury / illness / exacerbation  [] Status of current condition  []Patient's cognitive status and safety concerns    [] Multiple Medical Problems (see med hx)  Personal Factors:   [] Patient's age  [] Prior Level of function   [] Patient's home situation (environment and family support)  [] Patient's level of motivation  [] Expected progression of patient      HISTORY:(criteria)    [] 38157 - no personal factors/history    [] 20901 - has 1-2 personal factor/comorbidity     [] 68807 - has >3 personal factor/comorbidity     Body Regions:  [] Objective examination findings  [] Head     []  Neck  [] Trunk   [] Upper Extremity  [] Lower Extremity    Body Systems:  [] For communication ability, affect, cognition, language, and learning style: the assessment of the ability to make needs known, consciousness, orientation (person, place, and time), expected emotional /behavioral responses, and learning preferences (eg, learning barriers, education  needs)  [] For the neuromuscular system: a general assessment of gross coordinated movement (eg, balance, gait, locomotion, transfers, and transitions) and motor function  (motor control and motor learning)  [] For the musculoskeletal system: the assessment of gross symmetry, gross range of motion, gross strength, height, and weight  [] For the integumentary system: the assessment of pliability(texture), presence of scar formation, skin color, and skin integrity  [] For cardiovascular/pulmonary system: the assessment of heart rate, respiratory rate, blood pressure, and edema     Activity limitations:    [] Patient's cognitive status and saf ety concerns          [] Status of current condition      [] Weight bearing restriction  [] Cardiopulmunary Restriction    Participation  Restrictions:   [] Goals and goal agreement with the patient     [] Rehab potential (prognosis) and probable outcome      Examination of Body System: (criteria)    [] 94173 - addressing 1-2 elements    [] 49520 - addressing a total of 3 or more elements     [] 99521 -  Addressing a total of 4 or more elements         Clinical Presentation: (criteria)  Choose one     On examination of body system using standardized tests and measures patient presents with (CHOOSE ONE) elements from any of the following: body structures and functions, activity limitations, and/or participation restrictions.  Leading to a clinical presentation that is considered (CHOOSE ONE)                              Clinical Decision Making  (Eval Complexity):  Choose One     Time Tracking:     PT Received On: 05/27/18  PT Start Time: 1105     PT Stop Time: 1120  PT Total Time (min): 15 min     Billable Minutes: Evaluation 15      Adan Matos, PT  05/27/2018

## 2018-05-27 NOTE — ASSESSMENT & PLAN NOTE
Chronic, controlled.  Monitor BP closely.  Will continue BP medications as needed for sustained BP control.

## 2018-05-27 NOTE — ASSESSMENT & PLAN NOTE
Patient has hypokalemia which is currently uncontrolled. Last electrolytes reviewed-   Recent Labs  Lab 05/27/18 0428 05/28/18 0428   K 3.3* 3.4*   . Will replace potassium and monitor electrolytes closely.

## 2018-05-27 NOTE — PROGRESS NOTES
05/27/18 1155   Patient Assessment/Suction   Level of Consciousness (AVPU) alert   PRE-TX-O2-ETCO2   O2 Device (Oxygen Therapy) nasal cannula   $ Is the patient on Low Flow Oxygen? Yes   Flow (L/min) 2   SpO2 (!) 94 %  (O2 sats 94% on room air. Pt request O2 to be on)   Pulse Oximetry Type Intermittent   $ Pulse Oximetry - Multiple Charge Pulse Oximetry - Multiple   Aerosol Therapy   $ Aerosol Therapy Charges PRN treatment not required   Respiratory Treatment Status PRN treatment not required     Upon entering room to assess pt. Room had heavy odor of cigarette smoke. Pt states she does not smoke. Nurse notified

## 2018-05-27 NOTE — PLAN OF CARE
Problem: Patient Care Overview  Goal: Plan of Care Review  Outcome: Ongoing (interventions implemented as appropriate)  Pt has remained free from injury this shift, she has ambulated to restroom to void as needed, ambulated in room with PT today.  She has been medicated for pain per MD order today. Antibiotics started per MD order today.  Temps elevated and treated this shift.  She is tolerating food and drink, no complaints of nausea no vomiting noted.  She was encouraged to call for assistance or needs with return demonstration on use of call light.

## 2018-05-27 NOTE — PLAN OF CARE
Discharge planner met with patient at the bedside, Patient denies any inpatient admissions less than 30 days.  Patient says she functions independently in ADL's; provide her own transportation, however, upon discharge she may need at home environment support with cleaning, as she has some functional limitations. Verified PCP as Dr. Hernandez; last seen 2 months ago. She has an upcoming appointment scheduled with Dr. Frazier on June 6th. Patient denies active home health care; patient denies dialysis care; denies coumadin for home use. Patient uses a cane and rolling walker as needed.  No needs identified at this time, case management to continue to follow.    5/27/18 1150   Discharge Assessment   Assessment Type Discharge Planning Assessment   Confirmed/corrected address and phone number on face sheet? Yes   Assessment information obtained from? Patient    Communicated expected length of stay with patient/caregiver Yes, 3-5 days    Type of Healthcare Directive Received None    If Healthcare Directive is received, is it scanned into Epic? no (comment)   Prior to hospitalization cognitive status: Alert/Oriented   Prior to hospitalization functional status: Assistive Equipment   Current cognitive status: Alert/Oriented    Current Functional Status: Assistive Equipment   Arrived From home or self-care   Lives With Spouse    Able to Return to Prior Arrangements Yes    Is patient able to care for self after discharge? Yes    Readmission Within The Last 30 Days no previous admission in last 30 days   Patient currently being followed by outpatient case management? No   Patient currently receives home health services? No   Does the patient currently use HME? Yes    Equipment Currently Used at Home Walker, rolling, cane, straight    Do you have any problems affording any of your prescribed medications? No    Is the patient taking medications as prescribed? Yes    Do you have any financial concerns preventing you from receiving  the healthcare you need? No   Does the patient have transportation to healthcare appointments? Yes   Transportation Available family or friend will provide   Discharge Plan A Home with self care   Discharge Plan B Home with family    Patient/Family In Agreement With Plan Yes

## 2018-05-27 NOTE — ASSESSMENT & PLAN NOTE
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  Patient appears to be improving and sodium is normalized.  No further treatment or evaluation is necessary at this point time.  Will continue to monitor BMP and treat as needed.

## 2018-05-28 PROBLEM — E83.39 HYPOPHOSPHATEMIA: Status: RESOLVED | Noted: 2018-05-27 | Resolved: 2018-05-28

## 2018-05-28 PROBLEM — J98.01 ACUTE BRONCHOSPASM: Status: ACTIVE | Noted: 2018-05-27

## 2018-05-28 PROBLEM — A41.50 GRAM NEGATIVE SEPSIS: Status: ACTIVE | Noted: 2018-05-26

## 2018-05-28 LAB
ANION GAP SERPL CALC-SCNC: 13 MMOL/L
BUN SERPL-MCNC: 25 MG/DL
CALCIUM SERPL-MCNC: 9.2 MG/DL
CHLORIDE SERPL-SCNC: 104 MMOL/L
CO2 SERPL-SCNC: 22 MMOL/L
CREAT SERPL-MCNC: 1.4 MG/DL
EST. GFR  (AFRICAN AMERICAN): 46 ML/MIN/1.73 M^2
EST. GFR  (NON AFRICAN AMERICAN): 40 ML/MIN/1.73 M^2
GLUCOSE SERPL-MCNC: 202 MG/DL
MAGNESIUM SERPL-MCNC: 1.9 MG/DL
PHOSPHATE SERPL-MCNC: 1.8 MG/DL
POCT GLUCOSE: 150 MG/DL (ref 70–110)
POTASSIUM SERPL-SCNC: 3.4 MMOL/L
SODIUM SERPL-SCNC: 139 MMOL/L

## 2018-05-28 PROCEDURE — 97110 THERAPEUTIC EXERCISES: CPT

## 2018-05-28 PROCEDURE — 94640 AIRWAY INHALATION TREATMENT: CPT

## 2018-05-28 PROCEDURE — 83735 ASSAY OF MAGNESIUM: CPT

## 2018-05-28 PROCEDURE — 25000003 PHARM REV CODE 250: Performed by: HOSPITALIST

## 2018-05-28 PROCEDURE — G8987 SELF CARE CURRENT STATUS: HCPCS | Mod: CK

## 2018-05-28 PROCEDURE — 87427 SHIGA-LIKE TOXIN AG IA: CPT | Mod: 59

## 2018-05-28 PROCEDURE — 97165 OT EVAL LOW COMPLEX 30 MIN: CPT

## 2018-05-28 PROCEDURE — 25000242 PHARM REV CODE 250 ALT 637 W/ HCPCS: Performed by: HOSPITALIST

## 2018-05-28 PROCEDURE — 97535 SELF CARE MNGMENT TRAINING: CPT

## 2018-05-28 PROCEDURE — 12000002 HC ACUTE/MED SURGE SEMI-PRIVATE ROOM

## 2018-05-28 PROCEDURE — 80048 BASIC METABOLIC PNL TOTAL CA: CPT

## 2018-05-28 PROCEDURE — G8988 SELF CARE GOAL STATUS: HCPCS | Mod: CJ

## 2018-05-28 PROCEDURE — 97530 THERAPEUTIC ACTIVITIES: CPT

## 2018-05-28 PROCEDURE — 99900035 HC TECH TIME PER 15 MIN (STAT)

## 2018-05-28 PROCEDURE — 87046 STOOL CULTR AEROBIC BACT EA: CPT | Mod: 59

## 2018-05-28 PROCEDURE — 87045 FECES CULTURE AEROBIC BACT: CPT

## 2018-05-28 PROCEDURE — 63600175 PHARM REV CODE 636 W HCPCS: Performed by: HOSPITALIST

## 2018-05-28 PROCEDURE — 27000221 HC OXYGEN, UP TO 24 HOURS

## 2018-05-28 PROCEDURE — 97116 GAIT TRAINING THERAPY: CPT

## 2018-05-28 PROCEDURE — 84100 ASSAY OF PHOSPHORUS: CPT

## 2018-05-28 PROCEDURE — 94761 N-INVAS EAR/PLS OXIMETRY MLT: CPT

## 2018-05-28 PROCEDURE — 36415 COLL VENOUS BLD VENIPUNCTURE: CPT

## 2018-05-28 RX ORDER — IPRATROPIUM BROMIDE AND ALBUTEROL SULFATE 2.5; .5 MG/3ML; MG/3ML
3 SOLUTION RESPIRATORY (INHALATION) EVERY 6 HOURS
Status: DISCONTINUED | OUTPATIENT
Start: 2018-05-28 | End: 2018-06-03 | Stop reason: HOSPADM

## 2018-05-28 RX ADMIN — PIPERACILLIN SODIUM AND TAZOBACTAM SODIUM 3.38 G: 3; .375 INJECTION, POWDER, LYOPHILIZED, FOR SOLUTION INTRAVENOUS at 08:05

## 2018-05-28 RX ADMIN — PIPERACILLIN SODIUM AND TAZOBACTAM SODIUM 3.38 G: 3; .375 INJECTION, POWDER, LYOPHILIZED, FOR SOLUTION INTRAVENOUS at 06:05

## 2018-05-28 RX ADMIN — ALPRAZOLAM 0.5 MG: 0.25 TABLET ORAL at 08:05

## 2018-05-28 RX ADMIN — PIPERACILLIN SODIUM AND TAZOBACTAM SODIUM 3.38 G: 3; .375 INJECTION, POWDER, LYOPHILIZED, FOR SOLUTION INTRAVENOUS at 01:05

## 2018-05-28 RX ADMIN — FLUTICASONE PROPIONATE 50 MCG: 50 SPRAY, METERED NASAL at 09:05

## 2018-05-28 RX ADMIN — BUSPIRONE HYDROCHLORIDE 15 MG: 10 TABLET ORAL at 03:05

## 2018-05-28 RX ADMIN — ZOLPIDEM TARTRATE 5 MG: 5 TABLET ORAL at 08:05

## 2018-05-28 RX ADMIN — CALCIUM 500 MG: 500 TABLET ORAL at 09:05

## 2018-05-28 RX ADMIN — BUSPIRONE HYDROCHLORIDE 15 MG: 10 TABLET ORAL at 09:05

## 2018-05-28 RX ADMIN — GABAPENTIN 300 MG: 300 CAPSULE ORAL at 08:05

## 2018-05-28 RX ADMIN — HYDROCODONE BITARTRATE AND ACETAMINOPHEN 2 TABLET: 5; 325 TABLET ORAL at 11:05

## 2018-05-28 RX ADMIN — BUSPIRONE HYDROCHLORIDE 15 MG: 10 TABLET ORAL at 08:05

## 2018-05-28 RX ADMIN — IPRATROPIUM BROMIDE AND ALBUTEROL SULFATE 3 ML: .5; 3 SOLUTION RESPIRATORY (INHALATION) at 01:05

## 2018-05-28 RX ADMIN — ALPRAZOLAM 0.5 MG: 0.25 TABLET ORAL at 03:05

## 2018-05-28 RX ADMIN — IPRATROPIUM BROMIDE AND ALBUTEROL SULFATE 3 ML: .5; 3 SOLUTION RESPIRATORY (INHALATION) at 06:05

## 2018-05-28 RX ADMIN — POTASSIUM & SODIUM PHOSPHATES POWDER PACK 280-160-250 MG 1 PACKET: 280-160-250 PACK at 09:05

## 2018-05-28 RX ADMIN — GABAPENTIN 300 MG: 300 CAPSULE ORAL at 09:05

## 2018-05-28 RX ADMIN — PRAVASTATIN SODIUM 40 MG: 40 TABLET ORAL at 09:05

## 2018-05-28 RX ADMIN — ACETAMINOPHEN 650 MG: 325 TABLET, FILM COATED ORAL at 08:05

## 2018-05-28 RX ADMIN — ALPRAZOLAM 0.5 MG: 0.25 TABLET ORAL at 09:05

## 2018-05-28 NOTE — PT/OT/SLP EVAL
"Occupational Therapy   Evaluation    Name: Beatriz Gan  MRN: [id number]  Admitting Diagnosis:  Gram negative sepsis      Recommendations:     Discharge Recommendations: home  Discharge Equipment Recommendations:  shower chair  Barriers to discharge:  None    History:     Occupational Profile:  Living Environment: Pt lives with  in a 2SH. Pt's bedroom and bathroom on 2nd floor. Pt has a another bathroom on 1st floor.  Previous level of function: Pt was independent with ADLs and functional mobility, using AD occasionally.  Equipment Owned:  walker, rolling, cane, straight  Assistance upon Discharge: Pt will receive assistance from .    Past Medical History:   Diagnosis Date    Alcohol dependence     DDD (degenerative disc disease), lumbosacral     DJD (degenerative joint disease), lumbosacral     Encounter for blood transfusion     GERD (gastroesophageal reflux disease)     Gout     Hypercholesterolemia     Hypertension     NATHALIE (iron deficiency anemia)     questionable white coat hypertension    Kidney carcinoma, left 2014    Pneumonia     Renal cell carcinoma     Shingles 10/13/2012       Past Surgical History:   Procedure Laterality Date    APPENDECTOMY      BLADDER REPAIR      x 2    COLONOSCOPY  9/2011    ESOPHAGOGASTRODUODENOSCOPY  9/2011    EYE SURGERY      lasix bilateral    HERNIA REPAIR      hiatal hernai    HYSTERECTOMY      LAPAROSCOPIC NISSEN FUNDOPLICATION      NEPHRECTOMY      LT partial    REFRACTIVE SURGERY      SKIN BIOPSY      TOTAL ABDOMINAL HYSTERECTOMY W/ BILATERAL SALPINGOOPHORECTOMY  age 48       Subjective     Chief Complaint: Discomfort with movement  Patient comments/goals: "Every bone in my body hurts."  Communicated with: nurse Wilkins prior to session.  Pain/Comfort:  · Pain Rating 1: 0/10  · Pain Rating Post-Intervention 1: 0/10    Patients cultural, spiritual, Orthodoxy conflicts given the current situation:      Objective:     Patient found " with: peripheral IV, oxygen    General Precautions: Standard, fall   Orthopedic Precautions:N/A   Braces: N/A     Occupational Performance:    Bed Mobility:    · Patient completed Scooting/Bridging with stand by assistance  · Patient completed Supine to Sit with stand by assistance  · Patient completed Sit to Supine with stand by assistance    Functional Mobility/Transfers:  · Patient completed Sit <> Stand Transfer with stand by assistance  with  rolling walker   · Patient completed Toilet Transfer Stand Pivot technique with stand by assistance with  rolling walker  · Functional Mobility: Pt ambulated from EOB to bathroom and back to EOB with CGA and RW. Some minimal changes in balance when moving sit>stand, noting posterior lean.    Activities of Daily Living:  · LB Dressing: supervision to don socks at EOB.    Cognitive/Visual Perceptual:  Cognitive/Psychosocial Skills:     -       Oriented to: x4   -       Follows Commands/attention:Follows multistep  commands  -       Communication: clear/fluent  -       Safety awareness/insight to disability: intact   -       Mood/Affect/Coping skills/emotional control: Appropriate to situation  Visual/Perceptual:      -Intact    Physical Exam:  Postural examination/scapula alignment:    -       Rounded shoulders  -       Forward head  Upper Extremity Range of Motion:     -       Right Upper Extremity: WFL  -       Left Upper Extremity: WFL  Upper Extremity Strength:    -       Right Upper Extremity: 3+/5  -       Left Upper Extremity: 3+/5   Strength:    -       Right Upper Extremity: WFL  -       Left Upper Extremity: WFL  Fine Motor Coordination:    -       Intact  Gross motor coordination:   WFL    Patient left HOB elevated with all lines intact and call button in reach    AMPAC 6 Click:  AMPAC Total Score: 19    Treatment & Education:  OT ed patient on safety with walker use for functional mobility with cues for hand placement & sequencing.  "    Education:    Assessment:     Beatriz Gan is a 62 y.o. female with a medical diagnosis of Gram negative sepsis. Performance deficits affecting function are weakness, impaired endurance, impaired cardiopulmonary response to activity, impaired self care skills, impaired functional mobilty, gait instability, decreased lower extremity function. She presents with some balance changes with transfers and ambulation. Pt c/o fatigue at the start of evaluation, limiting participation in ADLs and functional mobility.     Rehab Prognosis:  good; patient would benefit from acute skilled OT services to address these deficits and reach maximum level of function.         Clinical Decision Makin.  OT Low:  "Pt evaluation falls under low complexity for evaluation coding due to performance deficits noted in 1-3 areas as stated above and 0 co-morbities affecting current functional status. Data obtained from problem focused assessments. No modifications or assistance was required for completion of evaluation. Only brief occupational profile and history review completed."     Plan:     Patient to be seen 3 x/week to address the above listed problems via self-care/home management, therapeutic activities, therapeutic exercises  · Plan of Care Expires: 18  · Plan of Care Reviewed with: patient    This Plan of care has been discussed with the patient who was involved in its development and understands and is in agreement with the identified goals and treatment plan    GOALS:    Occupational Therapy Goals        Problem: Occupational Therapy Goal    Goal Priority Disciplines Outcome Interventions   Occupational Therapy Goal     OT, PT/OT Ongoing (interventions implemented as appropriate)    Description:  Goals to be met by: 2018     Patient will increase functional independence with ADLs by performing:    UE Dressing with Modified [place].  LE Dressing with Modified [place].  Grooming while standing " at sink with Modified [place].  Toileting from toilet with Modified [place] for hygiene and clothing management.   Supine to sit with Modified [place].  Stand pivot transfers with Modified [place].  Toilet transfer to toilet with Modified [place].                      Time Tracking:     OT Date of Treatment: 05/28/18  OT Start Time: 1510  OT Stop Time: 1541  OT Total Time (min): 31 min    Billable Minutes:Evaluation 10  Self Care/Home Management 21    Jr Clark, OT  5/28/2018

## 2018-05-28 NOTE — PLAN OF CARE
Problem: Physical Therapy Goal  Goal: Physical Therapy Goal  1.Pt will be independent with bed mobility and transfers.  2.Pt will ambulate 500' with AD with CGA.   Outcome: Ongoing (interventions implemented as appropriate)  Bed mobility, transfers, gait with rw and Min A with O2 attached, LE exercises.

## 2018-05-28 NOTE — PT/OT/SLP PROGRESS
Physical Therapy Treatment    Patient Name:  Beatriz Gan   MRN:  [id number]    Recommendations:     Discharge Recommendations:  home   Discharge Equipment Recommendations:     Barriers to discharge: None    Assessment:     Beatriz Gan is a 62 y.o. female admitted with a medical diagnosis of Pyelonephritis.  She presents with the following impairments/functional limitations:  weakness, impaired endurance, impaired self care skills, impaired functional mobilty, gait instability, decreased lower extremity function, impaired cardiopulmonary response to activity .    Rehab Prognosis:  good; patient would benefit from acute skilled PT services to address these deficits and reach maximum level of function.      Recent Surgery: * No surgery found *      Plan:     During this hospitalization, patient to be seen 6 x/week to address the above listed problems via gait training, therapeutic activities, therapeutic exercises  · Plan of Care Expires:      Plan of Care Reviewed with: patient    Subjective     Communicated with nurse Wilkins prior to session.  Patient found supine in bed upon PT entry to room, agreeable to treatment.      Chief Complaint: none stated. Reports feeling better .  Patient comments/goals: to return home  Pain/Comfort:  · Pain Rating 1: 0/10    Patients cultural, spiritual, Hoahaoism conflicts given the current situation: NO    Objective:     Patient found with: peripheral IV     General Precautions: Standard, fall   Orthopedic Precautions:N/A   Braces: N/A     Functional Mobility:  · Bed Mobility:     · Rolling Left:  contact guard assistance  · Rolling Right: contact guard assistance  · Supine to Sit: contact guard assistance  · Sit to Supine: contact guard assistance  · Transfers:     · Sit to Stand:  contact guard assistance with rolling walker  · Gait: 8' x 2 to from restroom. 200' additional in hallway with Min A.      AM-PAC 6 CLICK MOBILITY          Therapeutic Activities and  Exercises:   Transferred top EOB with A. Ambulated to restroom with Min A. Required CGA for balance to perform hygiene task in standing. Stood at sink following to wash hands with CGA. Returned to EOB with A. Unsteady upon standing , improved with distance. O2 attached throughout. Performed Le exercises at 10 reps each ; TKE's, Hip abd/add/flexion, AP's.    Patient left supine with all lines intact, call button in reach and nurse Franky notified..    GOALS:    Physical Therapy Goals        Problem: Physical Therapy Goal    Goal Priority Disciplines Outcome Goal Variances Interventions   Physical Therapy Goal     PT/OT, PT      Description:  1.Pt will be independent with bed mobility and transfers.  2.Pt will ambulate 500' with AD with CGA.                    Time Tracking:     PT Received On: 05/28/18  PT Start Time: 0850     PT Stop Time: 0920  PT Total Time (min): 30 min     Billable Minutes: Gait Training 10min, Therapeutic Activity 10min and Therapeutic Exercise 10min    Treatment Type: Treatment  PT/PTA: PTA     PTA Visit Number: 1     Vira Prasad PTA  05/28/2018

## 2018-05-28 NOTE — PLAN OF CARE
Problem: Occupational Therapy Goal  Goal: Occupational Therapy Goal  Goals to be met by: 6/7/2018     Patient will increase functional independence with ADLs by performing:    UE Dressing with Modified [place].  LE Dressing with Modified [place].  Grooming while standing at sink with Modified [place].  Toileting from toilet with Modified [place] for hygiene and clothing management.   Supine to sit with Modified [place].  Stand pivot transfers with Modified [place].  Toilet transfer to toilet with Modified [place].    Outcome: Ongoing (interventions implemented as appropriate)  OT evaluation completed today. Goals & care plan established.    MARITO Walters  5/28/2018

## 2018-05-28 NOTE — PLAN OF CARE
Neil reviewed the assessment completed by JAVIER Blackwood.  Cm visit with pt this morning. No needs verbalized at this time.  CM will continue to follow-up.       05/28/18 1051   Discharge Reassessment   Assessment Type Discharge Planning Assessment   Provided patient/caregiver education on the expected discharge date and the discharge plan Yes   Do you have any problems affording any of your prescribed medications? No

## 2018-05-28 NOTE — PLAN OF CARE
05/27/18 2017   Patient Assessment/Suction   Level of Consciousness (AVPU) alert   PRE-TX-O2-ETCO2   O2 Device (Oxygen Therapy) nasal cannula   Flow (L/min) 2   Oxygen Concentration (%) 28   SpO2 97 %   Pulse Oximetry Type Intermittent   Ready to Wean/Extubation Screen   FIO2<=50 (chart decimal) 0.28

## 2018-05-28 NOTE — ASSESSMENT & PLAN NOTE
etiology remains unclear, likely related to environmental from the hospital.  Will start patient on scheduled duo nebs and monitor.  She remains with stable O2 sats in the high 90s on room air.

## 2018-05-28 NOTE — PLAN OF CARE
05/28/18 1333   Patient Assessment/Suction   Level of Consciousness (AVPU) alert   All Lung Fields Breath Sounds clear;diminished   PRE-TX-O2-ETCO2   O2 Device (Oxygen Therapy) nasal cannula   $ Is the patient on Low Flow Oxygen? Yes   Flow (L/min) 2   Oxygen Concentration (%) 28   SpO2 97 %   Pulse Oximetry Type Intermittent   $ Pulse Oximetry - Multiple Charge Pulse Oximetry - Multiple   Pulse 88   Resp 16   Aerosol Therapy   $ Aerosol Therapy Charges Aerosol Treatment   Respiratory Treatment Status given   SVN/Inhaler Treatment Route mask   Position During Treatment HOB at 45 degrees   Patient Tolerance good   Post-Treatment   Post-treatment Heart Rate (beats/min) 91   Post-treatment Resp Rate (breaths/min) 18   All Fields Breath Sounds aeration increased   Ready to Wean/Extubation Screen   FIO2<=50 (chart decimal) 0.28

## 2018-05-28 NOTE — PROGRESS NOTES
"[facility] Medicine  Progress Note    Patient Name: Beatriz Gan  MRN: [id number]  Patient Class: IP- Inpatient   Admission Date: 5/26/2018  Length of Stay: 2 days  Attending Physician: Parminder Baldwin MD  Primary Care Provider: Jorge Alonzo MD        Subjective:     Principal Problem:Gram negative sepsis    HPI:  Beatriz Gan is a 62 y.o. female with a PMHx of HTN, kidney infections, and kidney cancer who presents to the ED with complaints of fever, burning urination,and abdominal pain with an onset x 3 days PTA. The patient states that she has had a fever ranging between 102-103 and has been having intermittent chills.Tylenol and Nyquil have given her some relief. She reports that she recently finished a round of antibiotics for a kidney infection that was diagnoses by her PCP x 2 weeks ago. She reports that her symptoms today are similar to those she experienced with the kidney infection. The patient states that her burning urination and abdominal pain slightly improved with the antibiotics, but did not dissipate entirely.  I do not have name of antiobiotic name at this time and last culture noted in chart was from 2107 e coli with some resistance but sensitive to rocephin. She describes the abdominal pain as a soreness that is right upper abdomen and flank area  and reports some associated diarrhea without blood. Denies any bowel movements today. Reports that she was feeling nauseous and was vomiting yesterday, but "nausea pills" she took have relieved these symptoms. She also admits to having "flu like symptoms" such as a cough x 1 week ago that has not resolved. She had kidney cancer x 3 years ago which resulted in the removal of 20% of her left kidney. The patient denies frequent urination, blood in stool,  or any other symptoms at this time.     Hospital Course:  No notes on file    Interval History: Patient seen and examined.  Overnight, patient continues to " complain of wheezing also complains of  Fever.  Patient fever spike at 1700 yesterday was 102.5.  Urine and blood cultures coming back positive for gram-negative rods likely E coli.  Case discussed with the patient and the bedside nurse in detail.    Review of Systems   Constitutional: Positive for fatigue.   Respiratory: Positive for cough and wheezing. Negative for shortness of breath.    Cardiovascular: Negative for chest pain and leg swelling.   Gastrointestinal: Positive for abdominal pain. Negative for nausea.   Neurological: Negative for weakness.   Psychiatric/Behavioral: Negative for confusion.   All other systems reviewed and are negative.    Objective:     Vital Signs (Most Recent):  Temp: 98 °F (36.7 °C) (05/28/18 1137)  Pulse: 86 (05/28/18 1137)  Resp: 20 (05/28/18 1137)  BP: 118/68 (05/28/18 1137)  SpO2: 96 % (05/28/18 1137) Vital Signs (24h Range):  Temp:  [98 °F (36.7 °C)-102.5 °F (39.2 °C)] 98 °F (36.7 °C)  Pulse:  [] 86  Resp:  [16-20] 20  SpO2:  [96 %-98 %] 96 %  BP: (101-118)/(57-68) 118/68     Weight: 86.2 kg (190 lb)  Body mass index is 32.61 kg/m².    Intake/Output Summary (Last 24 hours) at 05/28/18 1319  Last data filed at 05/27/18 1800   Gross per 24 hour   Intake           896.67 ml   Output              670 ml   Net           226.67 ml      Physical Exam   Constitutional: She is oriented to person, place, and time. She appears well-developed and well-nourished.   Eyes: EOM are normal. Pupils are equal, round, and reactive to light.   Cardiovascular: Normal rate, regular rhythm and intact distal pulses.    No murmur heard.  Pulmonary/Chest: Effort normal. She has wheezes.   Abdominal: Soft. She exhibits no distension. There is tenderness (Primarily in the left lower quadrant and right lower quadrant). There is guarding. There is no rebound.   Musculoskeletal: She exhibits no edema.   Neurological: She is alert and oriented to person, place, and time.   Skin: No rash noted. No pallor.    Nursing note and vitals reviewed.      Significant Labs: All pertinent labs within the past 24 hours have been reviewed.    Significant Imaging: I have reviewed all pertinent imaging results/findings within the past 24 hours.    Assessment/Plan:      * Gram negative sepsis    likely secondary urinary source with pyelonephritis.  Patient will need broad-spectrum antibiotic therapy with Zosyn and await for cultures in order to de-escalate and target appropriate antibiotics.  Renal ultrasound shows no evidence of post acute obstructive or anatomic abnormalities.  There was a noted potential mass on the renal ultrasound which appears discussion to the radiologist to resemble a fat pad.  Patient does not have any signs or symptoms of weight loss or B symptoms which been lead me to believe that she has a malignancy.        ALAN (acute kidney injury)    Patient with ALAN likely d/t ATN  Which is currently controlled. Labs reviewed- BMP with Estimated Creatinine Clearance: 43.1 mL/min (based on SCr of 1.4 mg/dL). according to latest data. Monitor UOP and serial BMP and adjust therapy as needed. Renally dose meds for GFR listed above.            Acute bronchospasm    etiology remains unclear, likely related to environmental from the hospital.  Will start patient on scheduled duo nebs and monitor.  She remains with stable O2 sats in the high 90s on room air.          Hyponatremia    Etiology likely related to SIADH from the vomiting.  Patient appears to be improving and sodium is normalized.  No further treatment or evaluation is necessary at this point time.  Will continue to monitor BMP and treat as needed.          DDD (degenerative disc disease), lumbar    Chronic, pt with pain-  Continue gabapentin and   Prn hydrocodone-no acute issues         Depression    Chronic, stable,   Continue home meds with buspar and xanax  Would benefit from outpt counseling/treatment when medically improved         Body mass index 30.0-30.9,  adult    Body mass index is 32.61 kg/m². Morbid obesity complicates all aspects of disease management from diagnostic modalities to treatment. Weight loss encouraged and health benefits explained to patient.            Hypokalemia    Patient has hypokalemia which is currently uncontrolled. Last electrolytes reviewed-   Recent Labs  Lab 05/27/18 0428 05/28/18 0428   K 3.3* 3.4*   . Will replace potassium and monitor electrolytes closely.           HTN (hypertension)    Chronic, controlled.  Monitor BP closely.  Will continue BP medications as needed for sustained BP control.            VTE Risk Mitigation         Ordered     IP VTE HIGH RISK PATIENT  Once      05/26/18 1426     Place RADHA hose  Until discontinued      05/26/18 1426     Place sequential compression device  Until discontinued      05/26/18 1426              Parminder Baldwin MD  Department of Hospital Medicine   [facility]

## 2018-05-28 NOTE — SUBJECTIVE & OBJECTIVE
Interval History: Patient seen and examined.  Overnight, patient continues to complain of wheezing also complains of  Fever.  Patient fever spike at 1700 yesterday was 102.5.  Urine and blood cultures coming back positive for gram-negative rods likely E coli.  Case discussed with the patient and the bedside nurse in detail.    Review of Systems   Constitutional: Positive for fatigue.   Respiratory: Positive for cough and wheezing. Negative for shortness of breath.    Cardiovascular: Negative for chest pain and leg swelling.   Gastrointestinal: Positive for abdominal pain. Negative for nausea.   Neurological: Negative for weakness.   Psychiatric/Behavioral: Negative for confusion.   All other systems reviewed and are negative.    Objective:     Vital Signs (Most Recent):  Temp: 98 °F (36.7 °C) (05/28/18 1137)  Pulse: 86 (05/28/18 1137)  Resp: 20 (05/28/18 1137)  BP: 118/68 (05/28/18 1137)  SpO2: 96 % (05/28/18 1137) Vital Signs (24h Range):  Temp:  [98 °F (36.7 °C)-102.5 °F (39.2 °C)] 98 °F (36.7 °C)  Pulse:  [] 86  Resp:  [16-20] 20  SpO2:  [96 %-98 %] 96 %  BP: (101-118)/(57-68) 118/68     Weight: 86.2 kg (190 lb)  Body mass index is 32.61 kg/m².    Intake/Output Summary (Last 24 hours) at 05/28/18 1319  Last data filed at 05/27/18 1800   Gross per 24 hour   Intake           896.67 ml   Output              670 ml   Net           226.67 ml      Physical Exam   Constitutional: She is oriented to person, place, and time. She appears well-developed and well-nourished.   Eyes: EOM are normal. Pupils are equal, round, and reactive to light.   Cardiovascular: Normal rate, regular rhythm and intact distal pulses.    No murmur heard.  Pulmonary/Chest: Effort normal. She has wheezes.   Abdominal: Soft. She exhibits no distension. There is tenderness (Primarily in the left lower quadrant and right lower quadrant). There is guarding. There is no rebound.   Musculoskeletal: She exhibits no edema.   Neurological: She is  alert and oriented to person, place, and time.   Skin: No rash noted. No pallor.   Nursing note and vitals reviewed.      Significant Labs: All pertinent labs within the past 24 hours have been reviewed.    Significant Imaging: I have reviewed all pertinent imaging results/findings within the past 24 hours.

## 2018-05-28 NOTE — PLAN OF CARE
Problem: Patient Care Overview  Goal: Plan of Care Review  Outcome: Ongoing (interventions implemented as appropriate)  Patient AAO, VSS. Pt remained afebrile during shift.  IVF and abx infusing as ordered.  Up with 1 person assist.  Complaints of SOB managed with PRN O2.  Pt extremely drowsy during shift.  Ambien refused.  TEDs/SCDs on. Pt verbalized understanding of POC.  Purposeful rounding done during shift to promote patient safety. Patient free from falls and injury during shift.  Non skid socks on when OOB.  Bed in lowest position, brakes locked, and call light within reach.  Will continue to monitor.

## 2018-05-29 LAB
ANION GAP SERPL CALC-SCNC: 13 MMOL/L
BACTERIA UR CULT: NORMAL
BUN SERPL-MCNC: 14 MG/DL
CALCIUM SERPL-MCNC: 9.8 MG/DL
CHLORIDE SERPL-SCNC: 103 MMOL/L
CO2 SERPL-SCNC: 26 MMOL/L
CREAT SERPL-MCNC: 1.1 MG/DL
EST. GFR  (AFRICAN AMERICAN): >60 ML/MIN/1.73 M^2
EST. GFR  (NON AFRICAN AMERICAN): 53 ML/MIN/1.73 M^2
GLUCOSE SERPL-MCNC: 124 MG/DL
MAGNESIUM SERPL-MCNC: 1.9 MG/DL
PHOSPHATE SERPL-MCNC: 3.7 MG/DL
POTASSIUM SERPL-SCNC: 3.4 MMOL/L
SODIUM SERPL-SCNC: 142 MMOL/L

## 2018-05-29 PROCEDURE — 84100 ASSAY OF PHOSPHORUS: CPT

## 2018-05-29 PROCEDURE — 36415 COLL VENOUS BLD VENIPUNCTURE: CPT

## 2018-05-29 PROCEDURE — 63600175 PHARM REV CODE 636 W HCPCS: Performed by: HOSPITALIST

## 2018-05-29 PROCEDURE — 83735 ASSAY OF MAGNESIUM: CPT

## 2018-05-29 PROCEDURE — 94640 AIRWAY INHALATION TREATMENT: CPT

## 2018-05-29 PROCEDURE — 80048 BASIC METABOLIC PNL TOTAL CA: CPT

## 2018-05-29 PROCEDURE — 12000002 HC ACUTE/MED SURGE SEMI-PRIVATE ROOM

## 2018-05-29 PROCEDURE — 97116 GAIT TRAINING THERAPY: CPT

## 2018-05-29 PROCEDURE — 27000221 HC OXYGEN, UP TO 24 HOURS

## 2018-05-29 PROCEDURE — 25000242 PHARM REV CODE 250 ALT 637 W/ HCPCS: Performed by: HOSPITALIST

## 2018-05-29 PROCEDURE — 94761 N-INVAS EAR/PLS OXIMETRY MLT: CPT

## 2018-05-29 PROCEDURE — 25000003 PHARM REV CODE 250: Performed by: HOSPITALIST

## 2018-05-29 PROCEDURE — 97530 THERAPEUTIC ACTIVITIES: CPT

## 2018-05-29 RX ORDER — CEFTRIAXONE 1 G/50ML
1 INJECTION, SOLUTION INTRAVENOUS
Status: DISCONTINUED | OUTPATIENT
Start: 2018-05-29 | End: 2018-06-03 | Stop reason: HOSPADM

## 2018-05-29 RX ADMIN — PIPERACILLIN SODIUM AND TAZOBACTAM SODIUM 3.38 G: 3; .375 INJECTION, POWDER, LYOPHILIZED, FOR SOLUTION INTRAVENOUS at 04:05

## 2018-05-29 RX ADMIN — ZOLPIDEM TARTRATE 5 MG: 5 TABLET ORAL at 09:05

## 2018-05-29 RX ADMIN — BUSPIRONE HYDROCHLORIDE 15 MG: 10 TABLET ORAL at 02:05

## 2018-05-29 RX ADMIN — CALCIUM 500 MG: 500 TABLET ORAL at 08:05

## 2018-05-29 RX ADMIN — HYDROCODONE BITARTRATE AND ACETAMINOPHEN 2 TABLET: 5; 325 TABLET ORAL at 02:05

## 2018-05-29 RX ADMIN — GABAPENTIN 300 MG: 300 CAPSULE ORAL at 09:05

## 2018-05-29 RX ADMIN — FLUTICASONE PROPIONATE 50 MCG: 50 SPRAY, METERED NASAL at 08:05

## 2018-05-29 RX ADMIN — GABAPENTIN 300 MG: 300 CAPSULE ORAL at 08:05

## 2018-05-29 RX ADMIN — BUSPIRONE HYDROCHLORIDE 15 MG: 10 TABLET ORAL at 08:05

## 2018-05-29 RX ADMIN — PIPERACILLIN SODIUM AND TAZOBACTAM SODIUM 3.38 G: 3; .375 INJECTION, POWDER, LYOPHILIZED, FOR SOLUTION INTRAVENOUS at 02:05

## 2018-05-29 RX ADMIN — PRAVASTATIN SODIUM 40 MG: 40 TABLET ORAL at 08:05

## 2018-05-29 RX ADMIN — ALPRAZOLAM 0.5 MG: 0.25 TABLET ORAL at 08:05

## 2018-05-29 RX ADMIN — IPRATROPIUM BROMIDE AND ALBUTEROL SULFATE 3 ML: .5; 3 SOLUTION RESPIRATORY (INHALATION) at 06:05

## 2018-05-29 RX ADMIN — BUSPIRONE HYDROCHLORIDE 15 MG: 10 TABLET ORAL at 09:05

## 2018-05-29 RX ADMIN — POTASSIUM & SODIUM PHOSPHATES POWDER PACK 280-160-250 MG 1 PACKET: 280-160-250 PACK at 08:05

## 2018-05-29 RX ADMIN — ALPRAZOLAM 0.5 MG: 0.25 TABLET ORAL at 09:05

## 2018-05-29 RX ADMIN — HYDROCODONE BITARTRATE AND ACETAMINOPHEN 2 TABLET: 5; 325 TABLET ORAL at 08:05

## 2018-05-29 RX ADMIN — IPRATROPIUM BROMIDE AND ALBUTEROL SULFATE 3 ML: .5; 3 SOLUTION RESPIRATORY (INHALATION) at 07:05

## 2018-05-29 RX ADMIN — ALPRAZOLAM 0.5 MG: 0.25 TABLET ORAL at 02:05

## 2018-05-29 RX ADMIN — IPRATROPIUM BROMIDE AND ALBUTEROL SULFATE 3 ML: .5; 3 SOLUTION RESPIRATORY (INHALATION) at 12:05

## 2018-05-29 RX ADMIN — CEFTRIAXONE 1 G: 1 INJECTION, SOLUTION INTRAVENOUS at 07:05

## 2018-05-29 NOTE — PLAN OF CARE
Problem: Patient Care Overview  Goal: Plan of Care Review  Outcome: Ongoing (interventions implemented as appropriate)  Awake, alert, and oriented. Patient up to bathroom with assist and walker. PIV clean dry and intact; saline locked. VS stable. Remains afebrile throughout shift. Comfort level established. Moderate pain control w/ PRN pain medication. Bed in low position, brakes locked, side rails up x 2, call light w/in reach. Verbalized understanding of POC. Open communication facilitated. Will continue to monitor.

## 2018-05-29 NOTE — PLAN OF CARE
Problem: Patient Care Overview  Goal: Individualization & Mutuality  AAOx4. Up with assist to bedside commode. IV antibiotics infused per order. VSS.  Patient remains fall free throughout my shift. Pt denies pain, denies SOB. Hourly rounding preformed for safety.  Bed low, brakes locked, SR up x2, call light within reach. Will continue to monitor.

## 2018-05-29 NOTE — PROGRESS NOTES
05/29/18 0658   Patient Assessment/Suction   Level of Consciousness (AVPU) alert   All Lung Fields Breath Sounds wheezes, expiratory   Cough Frequency infrequent   Cough Type good;nonproductive   PRE-TX-O2-ETCO2   O2 Device (Oxygen Therapy) nasal cannula   $ Is the patient on Low Flow Oxygen? Yes   Flow (L/min) 2   SpO2 97 %   Pulse Oximetry Type Intermittent   $ Pulse Oximetry - Multiple Charge Pulse Oximetry - Multiple   Pulse 70   Resp 18   Aerosol Therapy   $ Aerosol Therapy Charges Aerosol Treatment   Respiratory Treatment Status given   SVN/Inhaler Treatment Route mask   Position During Treatment HOB at 30 degrees   Patient Tolerance good   Post-Treatment   Post-treatment Heart Rate (beats/min) 73   Post-treatment Resp Rate (breaths/min) 18   All Fields Breath Sounds unchanged

## 2018-05-29 NOTE — ASSESSMENT & PLAN NOTE
Patient has hypokalemia which is currently uncontrolled. Last electrolytes reviewed-     Recent Labs  Lab 05/28/18  0428 05/29/18  0500   K 3.4* 3.4*   . Will replace potassium and monitor electrolytes closely.

## 2018-05-29 NOTE — ASSESSMENT & PLAN NOTE
Patient with ALAN likely d/t ATN  Which is currently Improving. Labs reviewed- BMP with Estimated Creatinine Clearance: 54.9 mL/min (based on SCr of 1.1 mg/dL). according to latest data. Monitor UOP and serial BMP and adjust therapy as needed. Renally dose meds for GFR listed above.

## 2018-05-29 NOTE — PLAN OF CARE
Problem: Patient Care Overview  Goal: Plan of Care Review  Outcome: Ongoing (interventions implemented as appropriate)  Patient AAOx4. POC reviewed with patient. Verbalized understanding. IV antibiotics given during shift. Aspirations precautions maintained. Teds and SCDs in place. Strict I&O calculated. Patient ran fever through out the night treated with PRN medication; relief obtained.  Patient denies pain during shift. Patient tolerating a cardiac diet well. No complaints of N/V.  IV fluids infusing as ordered Md. Patient up to the bathroom with a walker and stand by assistance. Hourly rounding on patient to promote safety. Safety maintained throughout the shift. Patient positions and repositions self independently. No new skin break down noted during shfit.   Bed locked and in lowest position. Call light in reach. Side rails up x2. NON skid socks on when OOB. Patient remained free of falls/ trauma.  Will continue to monitor.

## 2018-05-29 NOTE — SUBJECTIVE & OBJECTIVE
Interval History:  Patient seen and examined.  Continues to have fever spikes overnight with T-max 101°, and low-grade temp this morning.  Also continues to have issues with wheezing but states that her breathing is better than it was yesterday.  Plan of care discussed with the patient and the nurse as well as the patient's  at the bedside in detail.    Review of Systems   Constitutional: Positive for fatigue and fever.   Respiratory: Positive for cough and wheezing. Negative for shortness of breath.    Cardiovascular: Negative for chest pain and leg swelling.   Gastrointestinal: Positive for abdominal pain. Negative for nausea.   Neurological: Negative for weakness.   Psychiatric/Behavioral: Negative for confusion.   All other systems reviewed and are negative.    Objective:     Vital Signs (Most Recent):  Temp: 98.3 °F (36.8 °C) (05/29/18 1132)  Pulse: 66 (05/29/18 1225)  Resp: 18 (05/29/18 1225)  BP: 130/69 (05/29/18 1132)  SpO2: 99 % (05/29/18 1225) Vital Signs (24h Range):  Temp:  [98.1 °F (36.7 °C)-102.2 °F (39 °C)] 98.3 °F (36.8 °C)  Pulse:  [66-95] 66  Resp:  [14-20] 18  SpO2:  [94 %-99 %] 99 %  BP: (120-143)/(66-80) 130/69     Weight: 86.2 kg (190 lb)  Body mass index is 32.61 kg/m².    Intake/Output Summary (Last 24 hours) at 05/29/18 7246  Last data filed at 05/29/18 0443   Gross per 24 hour   Intake              750 ml   Output              900 ml   Net             -150 ml      Physical Exam   Constitutional: She is oriented to person, place, and time. She appears well-developed and well-nourished.   Eyes: EOM are normal. Pupils are equal, round, and reactive to light.   Cardiovascular: Normal rate, regular rhythm and intact distal pulses.    No murmur heard.  Pulmonary/Chest: Effort normal. She has wheezes.   Abdominal: Soft. She exhibits no distension. There is tenderness (Primarily in the left lower quadrant and right lower quadrant). There is no rebound.   Musculoskeletal: She exhibits no  edema.   Neurological: She is alert and oriented to person, place, and time.   Skin: No rash noted. No pallor.   Nursing note and vitals reviewed.      Significant Labs:  All labs in the past 24 hr have been reviewed and are noted in my assessment/plan notes as applicable  Significant Imaging: I have reviewed all pertinent imaging results/findings within the past 24 hours.

## 2018-05-29 NOTE — ASSESSMENT & PLAN NOTE
etiology remains unclear, likely related to environmental.  Will continue patient on scheduled duo nebs and monitor.  She remains with stable O2 sats in the high 90s on room air.

## 2018-05-29 NOTE — PT/OT/SLP PROGRESS
Physical Therapy Treatment    Patient Name:  Beatriz Gan   MRN:  [id number]    Recommendations:     Discharge Recommendations:  home   Discharge Equipment Recommendations: shower chair   Barriers to discharge: None    Assessment:     Beatriz Gan is a 62 y.o. female admitted with a medical diagnosis of Gram negative sepsis.  She presents with the following impairments/functional limitations:  weakness, impaired endurance, impaired cardiopulmonary response to activity, impaired functional mobilty, impaired coordination, decreased lower extremity function, impaired balance, gait instability, currently has loose bowel and pt unable to detect if she was going or not; has decreased tolerance for activity but is improving as demonstrated by increased distance walked since yesterday; pt has very good motivation to get better.    Rehab Prognosis:  good; patient would benefit from acute skilled PT services to address these deficits and reach maximum level of function.      Recent Surgery: * No surgery found *      Plan:     During this hospitalization, patient to be seen 6 x/week to address the above listed problems via gait training, therapeutic activities, therapeutic exercises  · Plan of Care Expires:  07/08/18   Plan of Care Reviewed with: patient    Subjective     Communicated with JOE Ponce prior to session.  Patient found standing, about to go to the bathroom, upon PT entry to room, agreeable to treatment.      Chief Complaint: loose stools  Patient comments/goals: walk more with PT  Pain/Comfort:  · Pain Rating 1: 0/10  · Pain Rating Post-Intervention 1: 0/10    Patients cultural, spiritual, [employer]ism conflicts given the current situation: no    Objective:     Patient found with: peripheral IV, oxygen     General Precautions: Standard, fall   Orthopedic Precautions:N/A   Braces: N/A     Functional Mobility:  · Bed Mobility:     · Rolling Left:  supervision  · Rolling Right: supervision  · Supine to  Sit: supervision  · Sit to Supine: supervision  · Transfers:     · Sit to Stand:  contact guard assistance with rolling walker and and pushing on bed rail/ grab bars in bathroom  · Gait: 350 ft with a RW with CGA, with portable O2 per NC in tow, at 2L/min, during gait  · Balance: sitting: good; Standing: static: good; dynamic: fair (+)      AM-PAC 6 CLICK MOBILITY  Turning over in bed (including adjusting bedclothes, sheets and blankets)?: 4  Sitting down on and standing up from a chair with arms (e.g., wheelchair, bedside commode, etc.): 3  Moving from lying on back to sitting on the side of the bed?: 4  Moving to and from a bed to a chair (including a wheelchair)?: 3  Need to walk in hospital room?: 3  Climbing 3-5 steps with a railing?: 1  Total Score: 18       Therapeutic Activities and Exercises:   Toilet transfers done with CGA; pt was educated on the effects of PT, safety precautions in doing functional mobility as well as mobility techniques.    Patient left supine with all lines intact, call button in reach and JOE Ponce notified..    GOALS:    Physical Therapy Goals        Problem: Physical Therapy Goal    Goal Priority Disciplines Outcome Goal Variances Interventions   Physical Therapy Goal     PT/OT, PT Ongoing (interventions implemented as appropriate)     Description:  1.Pt will be independent with bed mobility and transfers.  2.Pt will ambulate 500' with AD with CGA.                    Time Tracking:     PT Received On: 05/29/18  PT Start Time: 1540     PT Stop Time: 1620  PT Total Time (min): 40 min     Billable Minutes: Gait Training 15 and Therapeutic Activity 25    Treatment Type: Treatment  PT/PTA: PT     PTA Visit Number: 1     Manny Donato, PT  05/29/2018

## 2018-05-29 NOTE — PROGRESS NOTES
"[facility] Medicine  Progress Note    Patient Name: Beatriz Gan  MRN: [id number]  Patient Class: IP- Inpatient   Admission Date: 5/26/2018  Length of Stay: 3 days  Attending Physician: Parminder Baldwin MD  Primary Care Provider: Jorge Alonzo MD        Subjective:     Principal Problem:Gram negative sepsis    HPI:  Beatriz Gan is a 62 y.o. female with a PMHx of HTN, kidney infections, and kidney cancer who presents to the ED with complaints of fever, burning urination,and abdominal pain with an onset x 3 days PTA. The patient states that she has had a fever ranging between 102-103 and has been having intermittent chills.Tylenol and Nyquil have given her some relief. She reports that she recently finished a round of antibiotics for a kidney infection that was diagnoses by her PCP x 2 weeks ago. She reports that her symptoms today are similar to those she experienced with the kidney infection. The patient states that her burning urination and abdominal pain slightly improved with the antibiotics, but did not dissipate entirely.  I do not have name of antiobiotic name at this time and last culture noted in chart was from 2107 e coli with some resistance but sensitive to rocephin. She describes the abdominal pain as a soreness that is right upper abdomen and flank area  and reports some associated diarrhea without blood. Denies any bowel movements today. Reports that she was feeling nauseous and was vomiting yesterday, but "nausea pills" she took have relieved these symptoms. She also admits to having "flu like symptoms" such as a cough x 1 week ago that has not resolved. She had kidney cancer x 3 years ago which resulted in the removal of 20% of her left kidney. The patient denies frequent urination, blood in stool,  or any other symptoms at this time.     Hospital Course:  No notes on file    Interval History:  Patient seen and examined.  Continues to have fever spikes " overnight with T-max 101°, and low-grade temp this morning.  Also continues to have issues with wheezing but states that her breathing is better than it was yesterday.  Plan of care discussed with the patient and the nurse as well as the patient's  at the bedside in detail.    Review of Systems   Constitutional: Positive for fatigue and fever.   Respiratory: Positive for cough and wheezing. Negative for shortness of breath.    Cardiovascular: Negative for chest pain and leg swelling.   Gastrointestinal: Positive for abdominal pain. Negative for nausea.   Neurological: Negative for weakness.   Psychiatric/Behavioral: Negative for confusion.   All other systems reviewed and are negative.    Objective:     Vital Signs (Most Recent):  Temp: 98.3 °F (36.8 °C) (05/29/18 1132)  Pulse: 66 (05/29/18 1225)  Resp: 18 (05/29/18 1225)  BP: 130/69 (05/29/18 1132)  SpO2: 99 % (05/29/18 1225) Vital Signs (24h Range):  Temp:  [98.1 °F (36.7 °C)-102.2 °F (39 °C)] 98.3 °F (36.8 °C)  Pulse:  [66-95] 66  Resp:  [14-20] 18  SpO2:  [94 %-99 %] 99 %  BP: (120-143)/(66-80) 130/69     Weight: 86.2 kg (190 lb)  Body mass index is 32.61 kg/m².    Intake/Output Summary (Last 24 hours) at 05/29/18 1458  Last data filed at 05/29/18 0443   Gross per 24 hour   Intake              750 ml   Output              900 ml   Net             -150 ml      Physical Exam   Constitutional: She is oriented to person, place, and time. She appears well-developed and well-nourished.   Eyes: EOM are normal. Pupils are equal, round, and reactive to light.   Cardiovascular: Normal rate, regular rhythm and intact distal pulses.    No murmur heard.  Pulmonary/Chest: Effort normal. She has wheezes.   Abdominal: Soft. She exhibits no distension. There is tenderness (Primarily in the left lower quadrant and right lower quadrant). There is no rebound.   Musculoskeletal: She exhibits no edema.   Neurological: She is alert and oriented to person, place, and time.    Skin: No rash noted. No pallor.   Nursing note and vitals reviewed.      Significant Labs:  All labs in the past 24 hr have been reviewed and are noted in my assessment/plan notes as applicable  Significant Imaging: I have reviewed all pertinent imaging results/findings within the past 24 hours.    Assessment/Plan:      * Gram negative sepsis    likely secondary urinary source with pyelonephritis. Cultures positive for E coli which appears to be sensitive to Rocephin.  Will deescalate antibiotics.  Patient continues to have fever spikes, so likely will continue in the hospital for the next 24-48 hours until afebrile.   Renal ultrasound shows no evidence of post acute obstructive or anatomic abnormalities.  There was a noted potential mass on the renal ultrasound which appears discussion to the radiologist to resemble a fat pad.         ALAN (acute kidney injury)    Patient with ALAN likely d/t ATN  Which is currently Improving. Labs reviewed- BMP with Estimated Creatinine Clearance: 54.9 mL/min (based on SCr of 1.1 mg/dL). according to latest data. Monitor UOP and serial BMP and adjust therapy as needed. Renally dose meds for GFR listed above.            Acute bronchospasm    etiology remains unclear, likely related to environmental.  Will  continue patient on scheduled duo nebs and monitor.  She remains with stable O2 sats in the high 90s on room air.          Hyponatremia    Etiology likely related to SIADH from the vomiting. Patient appears to be improving and sodium is normalized.  No further treatment or evaluation is necessary at this point time.  Will continue to monitor BMP and treat as needed.          DDD (degenerative disc disease), lumbar    Chronic, pt with pain-  Continue gabapentin and   Prn hydrocodone-no acute issues         Depression    Chronic, stable,   Continue home meds with buspar and xanax  Would benefit from outpt counseling/treatment when medically improved         Body mass index  30.0-30.9, adult    Body mass index is 32.61 kg/m². Morbid obesity complicates all aspects of disease management from diagnostic modalities to treatment. Weight loss encouraged and health benefits explained to patient.            Hypokalemia    Patient has hypokalemia which is currently uncontrolled. Last electrolytes reviewed-     Recent Labs  Lab 05/28/18  0428 05/29/18  0500   K 3.4* 3.4*   . Will replace potassium and monitor electrolytes closely.           HTN (hypertension)    Chronic, controlled.  Monitor BP closely.  Will continue BP medications as needed for sustained BP control.            VTE Risk Mitigation         Ordered     IP VTE HIGH RISK PATIENT  Once      05/26/18 1426     Place RADHA hose  Until discontinued      05/26/18 1426     Place sequential compression device  Until discontinued      05/26/18 1426              Parminder Baldwin MD  Department of Hospital Medicine   [facility]

## 2018-05-29 NOTE — PLAN OF CARE
Problem: Physical Therapy Goal  Goal: Physical Therapy Goal  1.Pt will be independent with bed mobility and transfers.  2.Pt will ambulate 500' with AD with CGA.   Outcome: Ongoing (interventions implemented as appropriate)  Pt seen for functional mob training

## 2018-05-29 NOTE — ASSESSMENT & PLAN NOTE
likely secondary urinary source with pyelonephritis. Cultures positive for E coli which appears to be sensitive to Rocephin.  Will deescalate antibiotics.  Patient continues to have fever spikes, so likely will continue in the hospital for the next 24-48 hours until afebrile.   Renal ultrasound shows no evidence of post acute obstructive or anatomic abnormalities.  There was a noted potential mass on the renal ultrasound which appears discussion to the radiologist to resemble a fat pad.

## 2018-05-30 PROBLEM — K92.2 LOWER GI BLEED: Status: ACTIVE | Noted: 2018-05-30

## 2018-05-30 PROBLEM — D62 ACUTE BLOOD LOSS ANEMIA: Status: ACTIVE | Noted: 2018-05-30

## 2018-05-30 PROBLEM — E87.1 HYPONATREMIA: Status: RESOLVED | Noted: 2018-05-27 | Resolved: 2018-05-30

## 2018-05-30 LAB
ANION GAP SERPL CALC-SCNC: 7 MMOL/L
BACTERIA BLD CULT: NORMAL
BASOPHILS # BLD AUTO: 0 K/UL
BASOPHILS NFR BLD: 0.4 %
BUN SERPL-MCNC: 12 MG/DL
CALCIUM SERPL-MCNC: 9.3 MG/DL
CHLORIDE SERPL-SCNC: 104 MMOL/L
CO2 SERPL-SCNC: 28 MMOL/L
CREAT SERPL-MCNC: 1 MG/DL
DIFFERENTIAL METHOD: ABNORMAL
E COLI SXT1 STL QL IA: NEGATIVE
E COLI SXT2 STL QL IA: NEGATIVE
EOSINOPHIL # BLD AUTO: 0.1 K/UL
EOSINOPHIL NFR BLD: 1.9 %
ERYTHROCYTE [DISTWIDTH] IN BLOOD BY AUTOMATED COUNT: 13.8 %
EST. GFR  (AFRICAN AMERICAN): >60 ML/MIN/1.73 M^2
EST. GFR  (NON AFRICAN AMERICAN): 60 ML/MIN/1.73 M^2
GLUCOSE SERPL-MCNC: 115 MG/DL
HCT VFR BLD AUTO: 27 %
HGB BLD-MCNC: 9.1 G/DL
LYMPHOCYTES # BLD AUTO: 1 K/UL
LYMPHOCYTES NFR BLD: 15.7 %
MAGNESIUM SERPL-MCNC: 1.6 MG/DL
MCH RBC QN AUTO: 32.3 PG
MCHC RBC AUTO-ENTMCNC: 33.8 G/DL
MCV RBC AUTO: 96 FL
MONOCYTES # BLD AUTO: 0.9 K/UL
MONOCYTES NFR BLD: 14.2 %
NEUTROPHILS # BLD AUTO: 4.1 K/UL
NEUTROPHILS NFR BLD: 67.8 %
PHOSPHATE SERPL-MCNC: 3.2 MG/DL
PLATELET # BLD AUTO: 202 K/UL
PLATELET BLD QL SMEAR: ABNORMAL
PMV BLD AUTO: 7.9 FL
POTASSIUM SERPL-SCNC: 3.2 MMOL/L
RBC # BLD AUTO: 2.83 M/UL
SODIUM SERPL-SCNC: 139 MMOL/L
WBC # BLD AUTO: 6.1 K/UL

## 2018-05-30 PROCEDURE — 85025 COMPLETE CBC W/AUTO DIFF WBC: CPT

## 2018-05-30 PROCEDURE — 63600175 PHARM REV CODE 636 W HCPCS: Performed by: HOSPITALIST

## 2018-05-30 PROCEDURE — 84100 ASSAY OF PHOSPHORUS: CPT

## 2018-05-30 PROCEDURE — 25000003 PHARM REV CODE 250: Performed by: HOSPITALIST

## 2018-05-30 PROCEDURE — 25000242 PHARM REV CODE 250 ALT 637 W/ HCPCS: Performed by: HOSPITALIST

## 2018-05-30 PROCEDURE — 12000002 HC ACUTE/MED SURGE SEMI-PRIVATE ROOM

## 2018-05-30 PROCEDURE — 97116 GAIT TRAINING THERAPY: CPT

## 2018-05-30 PROCEDURE — 94761 N-INVAS EAR/PLS OXIMETRY MLT: CPT

## 2018-05-30 PROCEDURE — 25500020 PHARM REV CODE 255

## 2018-05-30 PROCEDURE — 94640 AIRWAY INHALATION TREATMENT: CPT

## 2018-05-30 PROCEDURE — 83735 ASSAY OF MAGNESIUM: CPT

## 2018-05-30 PROCEDURE — 36415 COLL VENOUS BLD VENIPUNCTURE: CPT

## 2018-05-30 PROCEDURE — 27000221 HC OXYGEN, UP TO 24 HOURS

## 2018-05-30 PROCEDURE — 80048 BASIC METABOLIC PNL TOTAL CA: CPT

## 2018-05-30 RX ORDER — POTASSIUM CHLORIDE 20 MEQ/1
40 TABLET, EXTENDED RELEASE ORAL DAILY
Status: DISCONTINUED | OUTPATIENT
Start: 2018-05-30 | End: 2018-06-03 | Stop reason: HOSPADM

## 2018-05-30 RX ORDER — ACETAMINOPHEN 500 MG
1000 TABLET ORAL EVERY 6 HOURS PRN
Status: DISCONTINUED | OUTPATIENT
Start: 2018-05-30 | End: 2018-06-03 | Stop reason: HOSPADM

## 2018-05-30 RX ORDER — SODIUM CHLORIDE 9 MG/ML
INJECTION, SOLUTION INTRAVENOUS
Status: COMPLETED
Start: 2018-05-30 | End: 2018-05-30

## 2018-05-30 RX ADMIN — IPRATROPIUM BROMIDE AND ALBUTEROL SULFATE 3 ML: .5; 3 SOLUTION RESPIRATORY (INHALATION) at 07:05

## 2018-05-30 RX ADMIN — FLUTICASONE PROPIONATE 50 MCG: 50 SPRAY, METERED NASAL at 08:05

## 2018-05-30 RX ADMIN — PRAVASTATIN SODIUM 40 MG: 40 TABLET ORAL at 08:05

## 2018-05-30 RX ADMIN — ZOLPIDEM TARTRATE 5 MG: 5 TABLET ORAL at 09:05

## 2018-05-30 RX ADMIN — BUSPIRONE HYDROCHLORIDE 15 MG: 10 TABLET ORAL at 08:05

## 2018-05-30 RX ADMIN — ONDANSETRON HYDROCHLORIDE 4 MG: 2 INJECTION INTRAMUSCULAR; INTRAVENOUS at 08:05

## 2018-05-30 RX ADMIN — ALPRAZOLAM 0.5 MG: 0.25 TABLET ORAL at 02:05

## 2018-05-30 RX ADMIN — CEFTRIAXONE 1 G: 1 INJECTION, SOLUTION INTRAVENOUS at 06:05

## 2018-05-30 RX ADMIN — IOHEXOL 100 ML: 350 INJECTION, SOLUTION INTRAVENOUS at 08:05

## 2018-05-30 RX ADMIN — GABAPENTIN 300 MG: 300 CAPSULE ORAL at 08:05

## 2018-05-30 RX ADMIN — IPRATROPIUM BROMIDE AND ALBUTEROL SULFATE 3 ML: .5; 3 SOLUTION RESPIRATORY (INHALATION) at 12:05

## 2018-05-30 RX ADMIN — IOHEXOL 30 ML: 350 INJECTION, SOLUTION INTRAVENOUS at 08:05

## 2018-05-30 RX ADMIN — ALPRAZOLAM 0.5 MG: 0.25 TABLET ORAL at 08:05

## 2018-05-30 RX ADMIN — ACETAMINOPHEN 650 MG: 325 TABLET, FILM COATED ORAL at 12:05

## 2018-05-30 RX ADMIN — POTASSIUM CHLORIDE 40 MEQ: 1500 TABLET, EXTENDED RELEASE ORAL at 12:05

## 2018-05-30 RX ADMIN — POTASSIUM & SODIUM PHOSPHATES POWDER PACK 280-160-250 MG 1 PACKET: 280-160-250 PACK at 08:05

## 2018-05-30 RX ADMIN — BUSPIRONE HYDROCHLORIDE 15 MG: 10 TABLET ORAL at 09:05

## 2018-05-30 RX ADMIN — HYDROCODONE BITARTRATE AND ACETAMINOPHEN 2 TABLET: 5; 325 TABLET ORAL at 08:05

## 2018-05-30 RX ADMIN — ACETAMINOPHEN 1000 MG: 500 TABLET, FILM COATED ORAL at 05:05

## 2018-05-30 RX ADMIN — CALCIUM 500 MG: 500 TABLET ORAL at 08:05

## 2018-05-30 RX ADMIN — GABAPENTIN 300 MG: 300 CAPSULE ORAL at 09:05

## 2018-05-30 RX ADMIN — ALPRAZOLAM 0.5 MG: 0.25 TABLET ORAL at 09:05

## 2018-05-30 RX ADMIN — ACETAMINOPHEN 650 MG: 325 TABLET, FILM COATED ORAL at 10:05

## 2018-05-30 RX ADMIN — BUSPIRONE HYDROCHLORIDE 15 MG: 10 TABLET ORAL at 02:05

## 2018-05-30 NOTE — ASSESSMENT & PLAN NOTE
etiology remains unclear, likely related to environmental.  Will continue patient on scheduled duo nebs and monitor.  She remains with stable O2 sats in the high 90s on room air. Improving.

## 2018-05-30 NOTE — ASSESSMENT & PLAN NOTE
Likely secondary to hematochezia.  Potential etiologies could include diverticulosis versus internal hemorrhoids, his bleeding was painless.  Will trend H&H and monitor patient clinically.  No indication for emergent colonoscopy or imaging at this point.  =

## 2018-05-30 NOTE — PT/OT/SLP PROGRESS
Physical Therapy Treatment    Patient Name:  Beatriz Gan   MRN:  [id number]    Recommendations:     Discharge Recommendations:  home   Discharge Equipment Recommendations:     Barriers to discharge: None    Assessment:     Beatriz Gan is a 62 y.o. female admitted with a medical diagnosis of Gram negative sepsis.  She presents with the following impairments/functional limitations:  weakness, impaired endurance, impaired cardiopulmonary response to activity, impaired functional mobilty, decreased lower extremity function, impaired balance, gait instability .    Rehab Prognosis: good; patient would benefit from acute skilled PT services to address these deficits and reach maximum level of function.      Recent Surgery: * No surgery found *      Plan:     During this hospitalization, patient to be seen 6 x/week to address the above listed problems via gait training, therapeutic activities, therapeutic exercises  · Plan of Care Expires:  07/08/18   Plan of Care Reviewed with: patient    Subjective     Communicated with nurse Newman prior to session.  Patient found supine in bed with RT present upon PT entry to room, agreeable to treatment.      Chief Complaint: headache.  Patient comments/goals: to return home. Husbands birthday Saturday.  Pain/Comfort:  · Pain Rating 1: other (see comments) (did not rate)  · Location - Orientation 1: generalized  · Location 1: head (headache)  · Pain Addressed 1: Pre-medicate for activity, Reposition, Nurse notified    Patients cultural, spiritual, Quaker conflicts given the current situation: no    Objective:     Patient found with: peripheral IV     General Precautions: Standard, fall   Orthopedic Precautions:N/A   Braces: N/A     Functional Mobility:  · Bed Mobility:     · Rolling Left:  stand by assistance  · Rolling Right: stand by assistance  · Supine to Sit: contact guard assistance  · Sit to Supine: contact guard assistance  · Transfers:     · Sit to Stand:   minimum assistance with rolling walker  · Gait: 240' with rw and CGA with RT present to assess O2 SATS      AM-PAC 6 CLICK MOBILITY          Therapeutic Activities and Exercises:   Transferred to sitting EOB with A for safety. Ambulated in hallway with rw and CGA , RT present throughout to assess O2 SATS. Returned to bed to apply ice pack given by nurse to head.     Patient left supine with all lines intact, call button in reach, nurse Trent notified and RT present..    GOALS:    Physical Therapy Goals        Problem: Physical Therapy Goal    Goal Priority Disciplines Outcome Goal Variances Interventions   Physical Therapy Goal     PT/OT, PT Ongoing (interventions implemented as appropriate)     Description:  1.Pt will be independent with bed mobility and transfers.  2.Pt will ambulate 500' with AD with CGA.                    Time Tracking:     PT Received On: 05/30/18  PT Start Time: 1140     PT Stop Time: 1150  PT Total Time (min): 10 min     Billable Minutes: Gait Training 10min    Treatment Type: Treatment  PT/PTA: PTA     PTA Visit Number: 1     Vira Prasad PTA  05/30/2018

## 2018-05-30 NOTE — ASSESSMENT & PLAN NOTE
Patient has hypokalemia which is currently uncontrolled. Last electrolytes reviewed-     Recent Labs  Lab 05/29/18  0500 05/30/18  0428   K 3.4* 3.2*   . Will replace potassium and monitor electrolytes closely.

## 2018-05-30 NOTE — PLAN OF CARE
"Problem: Patient Care Overview  Goal: Plan of Care Review  Outcome: Ongoing (interventions implemented as appropriate)  Pt remains free from injury. Pt ambulates independently without difficulty. No bloody stools this shift, h/h lab draw for AM . Febrile this shift. IV abx to be infused this evening. Tolerating meals but refused dinner tray states "too sick". Call light in reach. Nurse hourly rounding to ensure pt safety.      "

## 2018-05-30 NOTE — PROGRESS NOTES
"[facility] Medicine  Progress Note    Patient Name: Beatriz Gan  MRN: [id number]  Patient Class: IP- Inpatient   Admission Date: 5/26/2018  Length of Stay: 4 days  Attending Physician: Parminder Baldwin MD  Primary Care Provider: Jorge Alonzo MD        Subjective:     Principal Problem:Gram negative sepsis    HPI:  Beatriz Gan is a 62 y.o. female with a PMHx of HTN, kidney infections, and kidney cancer who presents to the ED with complaints of fever, burning urination,and abdominal pain with an onset x 3 days PTA. The patient states that she has had a fever ranging between 102-103 and has been having intermittent chills.Tylenol and Nyquil have given her some relief. She reports that she recently finished a round of antibiotics for a kidney infection that was diagnoses by her PCP x 2 weeks ago. She reports that her symptoms today are similar to those she experienced with the kidney infection. The patient states that her burning urination and abdominal pain slightly improved with the antibiotics, but did not dissipate entirely.  I do not have name of antiobiotic name at this time and last culture noted in chart was from 2107 e coli with some resistance but sensitive to rocephin. She describes the abdominal pain as a soreness that is right upper abdomen and flank area  and reports some associated diarrhea without blood. Denies any bowel movements today. Reports that she was feeling nauseous and was vomiting yesterday, but "nausea pills" she took have relieved these symptoms. She also admits to having "flu like symptoms" such as a cough x 1 week ago that has not resolved. She had kidney cancer x 3 years ago which resulted in the removal of 20% of her left kidney. The patient denies frequent urination, blood in stool,  or any other symptoms at this time.     Hospital Course:  No notes on file    Interval History: Patient seen and examined.  Overnight, patient had a series " of large bloody painless bowel movements and nursing report hemoglobin dropped this morning by 3 g. patient also reports some increasing weakness and states she is having headache all morning.  Headache seems to be not responsive to Tylenol or hydrocodone.  Patient states that this was a similar headache to the past when she had developed cervical spine stenosis.    Review of Systems   Constitutional: Positive for fatigue and fever.   Respiratory: Positive for cough. Negative for shortness of breath.    Cardiovascular: Negative for chest pain and leg swelling.   Gastrointestinal: Positive for abdominal pain, anal bleeding and blood in stool. Negative for nausea.   Neurological: Positive for headaches. Negative for weakness.   Psychiatric/Behavioral: Negative for confusion.   All other systems reviewed and are negative.    Objective:     Vital Signs (Most Recent):  Temp: 98.6 °F (37 °C) (05/30/18 1552)  Pulse: 83 (05/30/18 1552)  Resp: 16 (05/30/18 1552)  BP: 121/75 (05/30/18 1552)  SpO2: 96 % (05/30/18 1552) Vital Signs (24h Range):  Temp:  [96.7 °F (35.9 °C)-100.7 °F (38.2 °C)] 98.6 °F (37 °C)  Pulse:  [74-95] 83  Resp:  [16-20] 16  SpO2:  [90 %-99 %] 96 %  BP: (121-176)/(62-79) 121/75     Weight: 86.2 kg (190 lb)  Body mass index is 32.61 kg/m².    Intake/Output Summary (Last 24 hours) at 05/30/18 1558  Last data filed at 05/30/18 0620   Gross per 24 hour   Intake              600 ml   Output                0 ml   Net              600 ml      Physical Exam   Constitutional: She is oriented to person, place, and time. She appears well-developed and well-nourished.   Eyes: EOM are normal. Pupils are equal, round, and reactive to light.   Cardiovascular: Normal rate, regular rhythm and intact distal pulses.    No murmur heard.  Pulmonary/Chest: Effort normal. She has wheezes (Appear to be improving from previous.).   Abdominal: Soft. She exhibits no distension. There is tenderness. There is no rebound.    Musculoskeletal: She exhibits no edema.   Neurological: She is alert and oriented to person, place, and time.   Skin: No rash noted. No pallor.   Nursing note and vitals reviewed.      Significant Labs: All pertinent labs within the past 24 hours have been reviewed.    Significant Imaging: I have reviewed all pertinent imaging results/findings within the past 24 hours.    Assessment/Plan:      * Gram negative sepsis    likely secondary urinary source with pyelonephritis. Cultures positive for E coli which appears to be sensitive to Rocephin.  Will deescalate antibiotics.  Patient continues to have fever spikes, so likely will continue in the hospital for the next 24-48 hours until afebrile- total duration 7 days.   Renal ultrasound shows no evidence of post acute obstructive or anatomic abnormalities.  There was a noted potential mass on the renal ultrasound which appears discussion to the radiologist to resemble a fat pad.         ALAN (acute kidney injury)    Patient with ALAN likely d/t ATN  Which is currently Improving. Labs reviewed- BMP with Estimated Creatinine Clearance: 60.4 mL/min (based on SCr of 1 mg/dL). according to latest data. Monitor UOP and serial BMP and adjust therapy as needed. Renally dose meds for GFR listed above.            Acute bronchospasm    etiology remains unclear, likely related to environmental.  Will continue patient on scheduled duo nebs and monitor.  She remains with stable O2 sats in the high 90s on room air. Improving.          Acute blood loss anemia    Likely secondary to hematochezia.  Potential etiologies could include diverticulosis versus internal hemorrhoids, his bleeding was painless.  Will trend H&H and monitor patient clinically.  No indication for emergent colonoscopy or imaging at this point.  =          Lower GI bleed    Treatment per above with anemia.          DDD (degenerative disc disease), lumbar    Chronic, pt with pain-  Continue gabapentin and   Prn  hydrocodone-no acute issues         Depression    Chronic, stable,   Continue home meds with buspar and xanax  Would benefit from outpt counseling/treatment when medically improved         Body mass index 30.0-30.9, adult    Body mass index is 32.61 kg/m². Morbid obesity complicates all aspects of disease management from diagnostic modalities to treatment. Weight loss encouraged and health benefits explained to patient.            Hypokalemia    Patient has hypokalemia which is currently uncontrolled. Last electrolytes reviewed-     Recent Labs  Lab 05/29/18  0500 05/30/18  0428   K 3.4* 3.2*   . Will replace potassium and monitor electrolytes closely.           HTN (hypertension)    Chronic, controlled.  Monitor BP closely.  Will continue BP medications as needed for sustained BP control.            VTE Risk Mitigation         Ordered     IP VTE HIGH RISK PATIENT  Once      05/26/18 1426     Place RADHA hose  Until discontinued      05/26/18 1426     Place sequential compression device  Until discontinued      05/26/18 1426              Parminder Baldwin MD  Department of Hospital Medicine   [facility]

## 2018-05-30 NOTE — ASSESSMENT & PLAN NOTE
Patient with ALAN likely d/t ATN  Which is currently Improving. Labs reviewed- BMP with Estimated Creatinine Clearance: 60.4 mL/min (based on SCr of 1 mg/dL). according to latest data. Monitor UOP and serial BMP and adjust therapy as needed. Renally dose meds for GFR listed above.

## 2018-05-30 NOTE — PT/OT/SLP PROGRESS
Occupational Therapy      Patient Name:  Beatriz Gan   MRN:  [id number]    Made 3 attempts (2 attempts in the AM and 1 in the PM) to see pt for OT tx. In all attempts, pt c/o severe headache pain and fatigue. Will follow-up when schedule permits and pt is appopriate.    Jr Clark, OT  5/30/2018

## 2018-05-30 NOTE — PROGRESS NOTES
05/30/18 0715   Patient Assessment/Suction   Level of Consciousness (AVPU) alert   All Lung Fields Breath Sounds wheezes, expiratory   Cough Type none   PRE-TX-O2-ETCO2   O2 Device (Oxygen Therapy) nasal cannula   $ Is the patient on Low Flow Oxygen? Yes   Flow (L/min) 2   SpO2 (!) 94 %  (room air)   Pulse Oximetry Type Intermittent   $ Pulse Oximetry - Multiple Charge Pulse Oximetry - Multiple   Pulse 77   Resp 18   Aerosol Therapy   $ Aerosol Therapy Charges Aerosol Treatment   Respiratory Treatment Status given   SVN/Inhaler Treatment Route mask   Position During Treatment HOB at 30 degrees   Patient Tolerance good   Post-Treatment   Post-treatment Heart Rate (beats/min) 83   Post-treatment Resp Rate (breaths/min) 18   All Fields Breath Sounds unchanged

## 2018-05-30 NOTE — PLAN OF CARE
05/29/18 1943   Patient Assessment/Suction   Level of Consciousness (AVPU) alert   Respiratory Effort Normal;Unlabored   Expansion/Accessory Muscles/Retractions expansion symmetric;no retractions;no use of accessory muscles   All Lung Fields Breath Sounds diminished   LLL Breath Sounds crackles fine   RLL Breath Sounds crackles fine   Cough Type nonproductive   PRE-TX-O2-ETCO2   O2 Device (Oxygen Therapy) nasal cannula   Flow (L/min) 2   Oxygen Concentration (%) 28   SpO2 98 %   Pulse Oximetry Type Intermittent   Pulse 77   Resp 18   Aerosol Therapy   $ Aerosol Therapy Charges Aerosol Treatment   Respiratory Treatment Status given   SVN/Inhaler Treatment Route mask;with oxygen   Position During Treatment HOB at 45 degrees   Patient Tolerance good   Post-Treatment   Post-treatment Heart Rate (beats/min) 73   Post-treatment Resp Rate (breaths/min) 16   All Fields Breath Sounds unchanged   Ready to Wean/Extubation Screen   FIO2<=50 (chart decimal) 0.28

## 2018-05-30 NOTE — PLAN OF CARE
Problem: Physical Therapy Goal  Goal: Physical Therapy Goal  1.Pt will be independent with bed mobility and transfers.  2.Pt will ambulate 500' with AD with CGA.   Outcome: Ongoing (interventions implemented as appropriate)  Ambulated with rw and CGA with RT present.

## 2018-05-30 NOTE — ASSESSMENT & PLAN NOTE
likely secondary urinary source with pyelonephritis. Cultures positive for E coli which appears to be sensitive to Rocephin.  Will deescalate antibiotics.  Patient continues to have fever spikes, so likely will continue in the hospital for the next 24-48 hours until afebrile- total duration 7 days.   Renal ultrasound shows no evidence of post acute obstructive or anatomic abnormalities.  There was a noted potential mass on the renal ultrasound which appears discussion to the radiologist to resemble a fat pad.

## 2018-05-30 NOTE — SUBJECTIVE & OBJECTIVE
Interval History: Patient seen and examined.  Overnight, patient had a series of large bloody painless bowel movements and nursing report hemoglobin dropped this morning by 3 g. patient also reports some increasing weakness and states she is having headache all morning.  Headache seems to be not responsive to Tylenol or hydrocodone.  Patient states that this was a similar headache to the past when she had developed cervical spine stenosis.    Review of Systems   Constitutional: Positive for fatigue and fever.   Respiratory: Positive for cough. Negative for shortness of breath.    Cardiovascular: Negative for chest pain and leg swelling.   Gastrointestinal: Positive for abdominal pain, anal bleeding and blood in stool. Negative for nausea.   Neurological: Positive for headaches. Negative for weakness.   Psychiatric/Behavioral: Negative for confusion.   All other systems reviewed and are negative.    Objective:     Vital Signs (Most Recent):  Temp: 98.6 °F (37 °C) (05/30/18 1552)  Pulse: 83 (05/30/18 1552)  Resp: 16 (05/30/18 1552)  BP: 121/75 (05/30/18 1552)  SpO2: 96 % (05/30/18 1552) Vital Signs (24h Range):  Temp:  [96.7 °F (35.9 °C)-100.7 °F (38.2 °C)] 98.6 °F (37 °C)  Pulse:  [74-95] 83  Resp:  [16-20] 16  SpO2:  [90 %-99 %] 96 %  BP: (121-176)/(62-79) 121/75     Weight: 86.2 kg (190 lb)  Body mass index is 32.61 kg/m².    Intake/Output Summary (Last 24 hours) at 05/30/18 1558  Last data filed at 05/30/18 0620   Gross per 24 hour   Intake              600 ml   Output                0 ml   Net              600 ml      Physical Exam   Constitutional: She is oriented to person, place, and time. She appears well-developed and well-nourished.   Eyes: EOM are normal. Pupils are equal, round, and reactive to light.   Cardiovascular: Normal rate, regular rhythm and intact distal pulses.    No murmur heard.  Pulmonary/Chest: Effort normal. She has wheezes (Appear to be improving from previous.).   Abdominal: Soft. She  exhibits no distension. There is tenderness. There is no rebound.   Musculoskeletal: She exhibits no edema.   Neurological: She is alert and oriented to person, place, and time.   Skin: No rash noted. No pallor.   Nursing note and vitals reviewed.      Significant Labs: All pertinent labs within the past 24 hours have been reviewed.    Significant Imaging: I have reviewed all pertinent imaging results/findings within the past 24 hours.

## 2018-05-30 NOTE — NURSING
Dr Baldwin notified of HTN and temp pf 101.9 and pt c/o body aches and nausea. Dr Baldwin states he will put in orders for CT of the abdomen. Tylenol given for fever and pain.

## 2018-05-30 NOTE — PLAN OF CARE
Problem: Patient Care Overview  Goal: Plan of Care Review  Outcome: Ongoing (interventions implemented as appropriate)  Patient AAOx4. POC reviewed with patient. Verbalized understanding. IV antibiotics given during shift. Aspirations precautions maintained. Teds and SCDs in place. Strict I&O calculated. Patient ran fever through out the night treated with PRN medication; relief obtained.  Patient denies pain during shift. Patient tolerating a cardiac diet well. No complaints of N/V. PIV intact. Patient up to the bathroom with a walker and stand by assistance. Hourly rounding on patient to promote safety. Hourly rounding on patient to promote safety. Safety maintained throughout the shift. Patient positions and repositions self independently. No new skin break down noted during shfit.   Bed locked and in lowest position. Call light in reach. Side rails up x2. NON skid socks on when OOB. Patient remained free of falls/ trauma.  Will continue to monitor.

## 2018-05-30 NOTE — ASSESSMENT & PLAN NOTE
Body mass index is 32.61 kg/m². Morbid obesity complicates all aspects of disease management from diagnostic modalities to treatment. Weight loss encouraged and health benefits explained to patient.

## 2018-05-30 NOTE — PROGRESS NOTES
Patient had bloody stool during the day on 5/29. MD was made aware by day nurse on 5/29. Patient continued with blood in stool during the night.  Lab called to report a drop in H&H. M. LENIN Holm made aware she states she will pass it on to Dr. Baldwin and for night nurse to make day nurse aware. Will continue to monitor.

## 2018-05-31 LAB
ANION GAP SERPL CALC-SCNC: 11 MMOL/L
BACTERIA STL CULT: NORMAL
BASOPHILS # BLD AUTO: 0 K/UL
BASOPHILS NFR BLD: 0 %
BUN SERPL-MCNC: 10 MG/DL
CALCIUM SERPL-MCNC: 9.9 MG/DL
CHLORIDE SERPL-SCNC: 103 MMOL/L
CO2 SERPL-SCNC: 29 MMOL/L
CREAT SERPL-MCNC: 1 MG/DL
DIFFERENTIAL METHOD: ABNORMAL
EOSINOPHIL # BLD AUTO: 0.1 K/UL
EOSINOPHIL NFR BLD: 1.7 %
ERYTHROCYTE [DISTWIDTH] IN BLOOD BY AUTOMATED COUNT: 13.4 %
EST. GFR  (AFRICAN AMERICAN): >60 ML/MIN/1.73 M^2
EST. GFR  (NON AFRICAN AMERICAN): 60 ML/MIN/1.73 M^2
GLUCOSE SERPL-MCNC: 98 MG/DL
HCT VFR BLD AUTO: 29.6 %
HGB BLD-MCNC: 10.1 G/DL
LYMPHOCYTES # BLD AUTO: 0.9 K/UL
LYMPHOCYTES NFR BLD: 12.2 %
MAGNESIUM SERPL-MCNC: 1.6 MG/DL
MCH RBC QN AUTO: 32.4 PG
MCHC RBC AUTO-ENTMCNC: 34 G/DL
MCV RBC AUTO: 95 FL
MONOCYTES # BLD AUTO: 0.6 K/UL
MONOCYTES NFR BLD: 8.2 %
NEUTROPHILS # BLD AUTO: 6 K/UL
NEUTROPHILS NFR BLD: 77.9 %
PHOSPHATE SERPL-MCNC: 3.9 MG/DL
PLATELET # BLD AUTO: 306 K/UL
PLATELET BLD QL SMEAR: ABNORMAL
PMV BLD AUTO: 7.6 FL
POTASSIUM SERPL-SCNC: 4 MMOL/L
RBC # BLD AUTO: 3.11 M/UL
SODIUM SERPL-SCNC: 143 MMOL/L
WBC # BLD AUTO: 7.7 K/UL

## 2018-05-31 PROCEDURE — 97165 OT EVAL LOW COMPLEX 30 MIN: CPT

## 2018-05-31 PROCEDURE — 97535 SELF CARE MNGMENT TRAINING: CPT

## 2018-05-31 PROCEDURE — G8988 SELF CARE GOAL STATUS: HCPCS | Mod: CI

## 2018-05-31 PROCEDURE — 25000242 PHARM REV CODE 250 ALT 637 W/ HCPCS: Performed by: HOSPITALIST

## 2018-05-31 PROCEDURE — 25000003 PHARM REV CODE 250: Performed by: HOSPITALIST

## 2018-05-31 PROCEDURE — 83735 ASSAY OF MAGNESIUM: CPT

## 2018-05-31 PROCEDURE — 25000003 PHARM REV CODE 250: Performed by: NURSE PRACTITIONER

## 2018-05-31 PROCEDURE — 36415 COLL VENOUS BLD VENIPUNCTURE: CPT

## 2018-05-31 PROCEDURE — 12000002 HC ACUTE/MED SURGE SEMI-PRIVATE ROOM

## 2018-05-31 PROCEDURE — 94640 AIRWAY INHALATION TREATMENT: CPT

## 2018-05-31 PROCEDURE — 84100 ASSAY OF PHOSPHORUS: CPT

## 2018-05-31 PROCEDURE — G8987 SELF CARE CURRENT STATUS: HCPCS | Mod: CJ

## 2018-05-31 PROCEDURE — 63600175 PHARM REV CODE 636 W HCPCS: Performed by: HOSPITALIST

## 2018-05-31 PROCEDURE — 85025 COMPLETE CBC W/AUTO DIFF WBC: CPT

## 2018-05-31 PROCEDURE — 80048 BASIC METABOLIC PNL TOTAL CA: CPT

## 2018-05-31 PROCEDURE — 97530 THERAPEUTIC ACTIVITIES: CPT

## 2018-05-31 PROCEDURE — 97116 GAIT TRAINING THERAPY: CPT

## 2018-05-31 PROCEDURE — 94761 N-INVAS EAR/PLS OXIMETRY MLT: CPT

## 2018-05-31 PROCEDURE — 27000221 HC OXYGEN, UP TO 24 HOURS

## 2018-05-31 RX ORDER — AMLODIPINE BESYLATE 5 MG/1
10 TABLET ORAL DAILY
Status: DISCONTINUED | OUTPATIENT
Start: 2018-05-31 | End: 2018-06-03 | Stop reason: HOSPADM

## 2018-05-31 RX ADMIN — BUSPIRONE HYDROCHLORIDE 15 MG: 10 TABLET ORAL at 10:05

## 2018-05-31 RX ADMIN — GABAPENTIN 300 MG: 300 CAPSULE ORAL at 10:05

## 2018-05-31 RX ADMIN — ALPRAZOLAM 0.5 MG: 0.25 TABLET ORAL at 10:05

## 2018-05-31 RX ADMIN — BUSPIRONE HYDROCHLORIDE 15 MG: 10 TABLET ORAL at 08:05

## 2018-05-31 RX ADMIN — CALCIUM 500 MG: 500 TABLET ORAL at 10:05

## 2018-05-31 RX ADMIN — PRAVASTATIN SODIUM 40 MG: 40 TABLET ORAL at 10:05

## 2018-05-31 RX ADMIN — POTASSIUM CHLORIDE 40 MEQ: 1500 TABLET, EXTENDED RELEASE ORAL at 10:05

## 2018-05-31 RX ADMIN — CEFTRIAXONE 1 G: 1 INJECTION, SOLUTION INTRAVENOUS at 08:05

## 2018-05-31 RX ADMIN — HYDROCODONE BITARTRATE AND ACETAMINOPHEN 2 TABLET: 5; 325 TABLET ORAL at 10:05

## 2018-05-31 RX ADMIN — HYDROCODONE BITARTRATE AND ACETAMINOPHEN 2 TABLET: 5; 325 TABLET ORAL at 03:05

## 2018-05-31 RX ADMIN — AMLODIPINE BESYLATE 10 MG: 5 TABLET ORAL at 03:05

## 2018-05-31 RX ADMIN — BUSPIRONE HYDROCHLORIDE 15 MG: 10 TABLET ORAL at 03:05

## 2018-05-31 RX ADMIN — GABAPENTIN 300 MG: 300 CAPSULE ORAL at 08:05

## 2018-05-31 RX ADMIN — ZOLPIDEM TARTRATE 5 MG: 5 TABLET ORAL at 10:05

## 2018-05-31 RX ADMIN — ALPRAZOLAM 0.5 MG: 0.25 TABLET ORAL at 08:05

## 2018-05-31 RX ADMIN — IPRATROPIUM BROMIDE AND ALBUTEROL SULFATE 3 ML: .5; 3 SOLUTION RESPIRATORY (INHALATION) at 07:05

## 2018-05-31 RX ADMIN — ALPRAZOLAM 0.5 MG: 0.25 TABLET ORAL at 03:05

## 2018-05-31 RX ADMIN — IPRATROPIUM BROMIDE AND ALBUTEROL SULFATE 3 ML: .5; 3 SOLUTION RESPIRATORY (INHALATION) at 01:05

## 2018-05-31 RX ADMIN — ACETAMINOPHEN 1000 MG: 500 TABLET, FILM COATED ORAL at 10:05

## 2018-05-31 RX ADMIN — POTASSIUM & SODIUM PHOSPHATES POWDER PACK 280-160-250 MG 1 PACKET: 280-160-250 PACK at 10:05

## 2018-05-31 NOTE — SUBJECTIVE & OBJECTIVE
Interval History: Patient seen and examined.  Overnight, the patient spiked 101.8 fever and continues to have headache.  CT scan done overnight shows no evidence of acute abscess in the perinephric area, but noted perinephric stranding over the left kidney which appears abnormal.  Patient remains on appropriate antibiotics per blood/urine sensitivities.    Review of Systems   Constitutional: Positive for fatigue and fever.   Respiratory: Positive for cough. Negative for shortness of breath.    Cardiovascular: Negative for chest pain and leg swelling.   Gastrointestinal: Negative for nausea.   Neurological: Positive for headaches. Negative for weakness.   Psychiatric/Behavioral: Negative for confusion.   All other systems reviewed and are negative.    Objective:     Vital Signs (Most Recent):  Temp: 97.3 °F (36.3 °C) (05/31/18 1129)  Pulse: 78 (05/31/18 1337)  Resp: 14 (05/31/18 1337)  BP: 123/71 (05/31/18 1129)  SpO2: 99 % (05/31/18 1337) Vital Signs (24h Range):  Temp:  [96.6 °F (35.9 °C)-101.9 °F (38.8 °C)] 97.3 °F (36.3 °C)  Pulse:  [75-97] 78  Resp:  [14-22] 14  SpO2:  [91 %-99 %] 99 %  BP: (121-184)/() 123/71     Weight: 86.2 kg (190 lb)  Body mass index is 32.61 kg/m².    Intake/Output Summary (Last 24 hours) at 05/31/18 1426  Last data filed at 05/31/18 0700   Gross per 24 hour   Intake             1450 ml   Output                0 ml   Net             1450 ml      Physical Exam   Constitutional: She is oriented to person, place, and time. She appears well-developed and well-nourished.   Eyes: EOM are normal. Pupils are equal, round, and reactive to light.   Cardiovascular: Normal rate, regular rhythm and intact distal pulses.    No murmur heard.  Pulmonary/Chest: Effort normal. She has wheezes (Appear to be improving from previous.).   Abdominal: Soft. She exhibits no distension. There is tenderness (Mild). There is no rebound.   Musculoskeletal: She exhibits no edema.   Neurological: She is alert and  oriented to person, place, and time.   Skin: No rash noted. No pallor.   Nursing note and vitals reviewed.      Significant Labs: All pertinent labs within the past 24 hours have been reviewed.    Significant Imaging: I have reviewed all pertinent imaging results/findings within the past 24 hours.

## 2018-05-31 NOTE — ASSESSMENT & PLAN NOTE
Likely secondary to hematochezia.  Potential etiologies could include diverticulosis versus internal hemorrhoids, her bleeding was painless.  Will trend H&H and monitor patient clinically.  No indication for emergent colonoscopy or imaging at this point.

## 2018-05-31 NOTE — PROGRESS NOTES
"[facility] Medicine  Progress Note    Patient Name: Beatriz Gan  MRN: [id number]  Patient Class: IP- Inpatient   Admission Date: 5/26/2018  Length of Stay: 5 days  Attending Physician: Parminder Baldwin MD  Primary Care Provider: Jorge Alonzo MD        Subjective:     Principal Problem:Gram negative sepsis    HPI:  Beatriz Gan is a 62 y.o. female with a PMHx of HTN, kidney infections, and kidney cancer who presents to the ED with complaints of fever, burning urination,and abdominal pain with an onset x 3 days PTA. The patient states that she has had a fever ranging between 102-103 and has been having intermittent chills.Tylenol and Nyquil have given her some relief. She reports that she recently finished a round of antibiotics for a kidney infection that was diagnoses by her PCP x 2 weeks ago. She reports that her symptoms today are similar to those she experienced with the kidney infection. The patient states that her burning urination and abdominal pain slightly improved with the antibiotics, but did not dissipate entirely.  I do not have name of antiobiotic name at this time and last culture noted in chart was from 2107 e coli with some resistance but sensitive to rocephin. She describes the abdominal pain as a soreness that is right upper abdomen and flank area  and reports some associated diarrhea without blood. Denies any bowel movements today. Reports that she was feeling nauseous and was vomiting yesterday, but "nausea pills" she took have relieved these symptoms. She also admits to having "flu like symptoms" such as a cough x 1 week ago that has not resolved. She had kidney cancer x 3 years ago which resulted in the removal of 20% of her left kidney. The patient denies frequent urination, blood in stool,  or any other symptoms at this time.     Hospital Course:  No notes on file    Interval History: Patient seen and examined.  Overnight, the patient spiked " 101.8 fever and continues to have headache.  CT scan done overnight shows no evidence of acute abscess in the perinephric area, but noted perinephric stranding over the left kidney which appears abnormal.  Patient remains on appropriate antibiotics per blood/urine sensitivities.    Review of Systems   Constitutional: Positive for fatigue and fever.   Respiratory: Positive for cough. Negative for shortness of breath.    Cardiovascular: Negative for chest pain and leg swelling.   Gastrointestinal: Negative for nausea.   Neurological: Positive for headaches. Negative for weakness.   Psychiatric/Behavioral: Negative for confusion.   All other systems reviewed and are negative.    Objective:     Vital Signs (Most Recent):  Temp: 97.3 °F (36.3 °C) (05/31/18 1129)  Pulse: 78 (05/31/18 1337)  Resp: 14 (05/31/18 1337)  BP: 123/71 (05/31/18 1129)  SpO2: 99 % (05/31/18 1337) Vital Signs (24h Range):  Temp:  [96.6 °F (35.9 °C)-101.9 °F (38.8 °C)] 97.3 °F (36.3 °C)  Pulse:  [75-97] 78  Resp:  [14-22] 14  SpO2:  [91 %-99 %] 99 %  BP: (121-184)/() 123/71     Weight: 86.2 kg (190 lb)  Body mass index is 32.61 kg/m².    Intake/Output Summary (Last 24 hours) at 05/31/18 1426  Last data filed at 05/31/18 0700   Gross per 24 hour   Intake             1450 ml   Output                0 ml   Net             1450 ml      Physical Exam   Constitutional: She is oriented to person, place, and time. She appears well-developed and well-nourished.   Eyes: EOM are normal. Pupils are equal, round, and reactive to light.   Cardiovascular: Normal rate, regular rhythm and intact distal pulses.    No murmur heard.  Pulmonary/Chest: Effort normal. She has wheezes (Appear to be improving from previous.).   Abdominal: Soft. She exhibits no distension. There is tenderness (Mild). There is no rebound.   Musculoskeletal: She exhibits no edema.   Neurological: She is alert and oriented to person, place, and time.   Skin: No rash noted. No pallor.    Nursing note and vitals reviewed.      Significant Labs: All pertinent labs within the past 24 hours have been reviewed.    Significant Imaging: I have reviewed all pertinent imaging results/findings within the past 24 hours.    Assessment/Plan:      * Gram negative sepsis    etiology remains likely secondary to pyelonephritis.  Continue on appropriate antibiotics.  Potential cause for recurrent fever could be forming abscess versus phlegmon or secondary causes including inflammatory or medication related fever.  Will continue appropriate investigations and continue patient on antibiotics for now.  If fever has persisted for greater than 48 hr, would consider reimaging for further development of perinephric abscess.  Patient high risk for perinephric abscess given anatomic abnormalities of her kidney per surgically.        ALAN (acute kidney injury)    Patient with ALAN likely d/t ATN  Which is currently Improving. Labs reviewed- BMP with Estimated Creatinine Clearance: 60.4 mL/min (based on SCr of 1 mg/dL). according to latest data. Monitor UOP and serial BMP and adjust therapy as needed. Renally dose meds for GFR listed above.            Acute bronchospasm    etiology remains unclear, likely related to environmental.  Will continue patient on scheduled duo nebs and monitor.  She remains with stable O2 sats in the high 90s on room air. Improving.          Acute blood loss anemia    Likely secondary to hematochezia.  Potential etiologies could include diverticulosis versus internal hemorrhoids, her bleeding was painless.  Will trend H&H and monitor patient clinically.  No indication for emergent colonoscopy or imaging at this point.           Lower GI bleed    Treatment per above with anemia.          DDD (degenerative disc disease), lumbar    Chronic, pt with pain-  Continue gabapentin and   Prn hydrocodone-no acute issues         Depression    Chronic, stable,   Continue home meds with buspar and xanax  Would  benefit from outpt counseling/treatment when medically improved         Body mass index 30.0-30.9, adult    Body mass index is 32.61 kg/m². Morbid obesity complicates all aspects of disease management from diagnostic modalities to treatment. Weight loss encouraged and health benefits explained to patient.            Hypokalemia    Patient has hypokalemia which is currently controlled. Last electrolytes reviewed-     Recent Labs  Lab 05/30/18  0428 05/31/18  0553   K 3.2* 4.0   . Will replace potassium and monitor electrolytes closely.           HTN (hypertension)    Chronic, controlled.  Monitor BP closely.  Will continue BP medications as needed for sustained BP control.            VTE Risk Mitigation         Ordered     IP VTE HIGH RISK PATIENT  Once      05/26/18 1426     Place RDAHA hose  Until discontinued      05/26/18 1426     Place sequential compression device  Until discontinued      05/26/18 1426              Parminder Baldwin MD  Department of Hospital Medicine   [facility]

## 2018-05-31 NOTE — PLAN OF CARE
Problem: Patient Care Overview  Goal: Plan of Care Review  Outcome: Ongoing (interventions implemented as appropriate)  Patient AAO, VSS.  Pt remained afebrile during shift. Pt reports pain to entire body.  Pain managed with PRN analgesics.  Weak/shaky when ambulating.  Bed alarm utilized.  Pt verbalized understanding of POC.  Purposeful rounding done during shift to promote patient safety. Patient free from falls and injury during shift.  Non skid socks on when OOB.  Bed in lowest position, brakes locked, and call light within reach.  Will continue to monitor.

## 2018-05-31 NOTE — PLAN OF CARE
Problem: Occupational Therapy Goal  Goal: Occupational Therapy Goal  Goals to be met by: 6/7/2018     Patient will increase functional independence with ADLs by performing:    UE Dressing with Modified [place].  LE Dressing with Modified [place].  Grooming while standing at sink with Modified [place].  Toileting from toilet with Modified [place] for hygiene and clothing management.   Supine to sit with Modified [place].  Stand pivot transfers with Modified [place].  Toilet transfer to toilet with Modified [place].     Outcome: Ongoing (interventions implemented as appropriate)  Pt progressing toward goals. Continue with OT tx.

## 2018-05-31 NOTE — ASSESSMENT & PLAN NOTE
Patient has hypokalemia which is currently controlled. Last electrolytes reviewed-     Recent Labs  Lab 05/30/18  0428 05/31/18  0553   K 3.2* 4.0   . Will replace potassium and monitor electrolytes closely.

## 2018-05-31 NOTE — PLAN OF CARE
05/30/18 1938   Patient Assessment/Suction   Level of Consciousness (AVPU) alert   Respiratory Effort Normal;Unlabored   Expansion/Accessory Muscles/Retractions expansion symmetric;no retractions;no use of accessory muscles   All Lung Fields Breath Sounds wheezes, expiratory   Cough Type nonproductive   PRE-TX-O2-ETCO2   O2 Device (Oxygen Therapy) room air   SpO2 96 %   Pulse Oximetry Type Intermittent   Pulse 88   Resp 18   Aerosol Therapy   $ Aerosol Therapy Charges Aerosol Treatment   Respiratory Treatment Status given   SVN/Inhaler Treatment Route mask;with oxygen   Position During Treatment Sitting in bed   Patient Tolerance good   Post-Treatment   Post-treatment Heart Rate (beats/min) 83   Post-treatment Resp Rate (breaths/min) 18   All Fields Breath Sounds unchanged

## 2018-05-31 NOTE — PT/OT/SLP PROGRESS
Occupational Therapy   Treatment    Name: Beatriz Gan  MRN: [id number]  Admitting Diagnosis:  Gram negative sepsis       Recommendations:     Discharge Recommendations: home  Discharge Equipment Recommendations:  shower chair  Barriers to discharge:  None    Subjective     Communicated with: nurse prior to session.  Pain/Comfort:  · Pain Rating 1:  (pt did not rate)  · Pain Rating Post-Intervention 1:  (pt did not rate)    Patients cultural, spiritual, [employer] conflicts given the current situation:      Objective:     Patient found with: telemetry    General Precautions: Standard, fall   Orthopedic Precautions:N/A   Braces: N/A     Occupational Performance:    Bed Mobility:    · Patient completed Scooting/Bridging with stand by assistance  · Patient completed Supine to Sit with stand by assistance  · Patient completed Sit to Supine with stand by assistance     Functional Mobility/Transfers:  · Patient completed Sit <> Stand Transfer with stand by assistance  with  rolling walker   · Patient completed Toilet Transfer Stand Pivot technique with stand by assistance with  rolling walker  · Functional Mobility: Pt ambulated with CGA and RW from EOB to sink, then to bathroom and back to EOB. No LOB. Pt requested to sit while performing hair washing at sink.     Activities of Daily Living:  · Grooming: stand by assistance with oral & facial hygiene while standing at sink. Pt also washed hair with shower cap while seated at sink.   · Toileting: supervision with voiding at toilet.    Patient left HOB elevated with all lines intact and call button in reach    AMPAC 6 Click:  AMPAC Total Score: 20      Education:    Assessment:     Beatriz Gan is a 62 y.o. female with a medical diagnosis of Gram negative sepsis.    Performance deficits affecting function are weakness, impaired endurance, impaired self care skills, impaired functional mobilty. She presents with improved activity tolerance with ADLs while  standing.     Rehab Prognosis:  fair; patient would benefit from acute skilled OT services to address these deficits and reach maximum level of function.       Plan:     Patient to be seen 3 x/week to address the above listed problems via self-care/home management, therapeutic activities, therapeutic exercises  · Plan of Care Expires: 06/07/18  · Plan of Care Reviewed with: patient    This Plan of care has been discussed with the patient who was involved in its development and understands and is in agreement with the identified goals and treatment plan    GOALS:    Occupational Therapy Goals        Problem: Occupational Therapy Goal    Goal Priority Disciplines Outcome Interventions   Occupational Therapy Goal     OT, PT/OT Ongoing (interventions implemented as appropriate)    Description:  Goals to be met by: 6/7/2018     Patient will increase functional independence with ADLs by performing:    UE Dressing with Modified [place].  LE Dressing with Modified [place].  Grooming while standing at sink with Modified [place].  Toileting from toilet with Modified [place] for hygiene and clothing management.   Supine to sit with Modified [place].  Stand pivot transfers with Modified [place].  Toilet transfer to toilet with Modified [place].                      Time Tracking:     OT Date of Treatment: 05/31/18  OT Start Time: 1057  OT Stop Time: 1126  OT Total Time (min): 29 min    Billable Minutes:Self Care/Home Management 29    Jr Clark, OT  5/31/2018

## 2018-05-31 NOTE — PLAN OF CARE
Problem: Patient Care Overview  Goal: Plan of Care Review  Outcome: Ongoing (interventions implemented as appropriate)  Pt free of injury, able to call for assistance when needed, vital signs stable.Pain managed with prn pain medication.Pt worked with PT today. Iv antibiotics scheduled to be given tonight. POC discussed with pt. Will continue to monitor pt.

## 2018-05-31 NOTE — ASSESSMENT & PLAN NOTE
etiology remains likely secondary to pyelonephritis.  Continue on appropriate antibiotics.  Potential cause for recurrent fever could be forming abscess versus phlegmon or secondary causes including inflammatory or medication related fever.  Will continue appropriate investigations and continue patient on antibiotics for now.  If fever has persisted for greater than 48 hr, would consider reimaging for further development of perinephric abscess.  Patient high risk for perinephric abscess given anatomic abnormalities of her kidney per surgically.

## 2018-05-31 NOTE — PLAN OF CARE
Problem: Physical Therapy Goal  Goal: Physical Therapy Goal  1.Pt will be independent with bed mobility and transfers.  2.Pt will ambulate 500' with AD with CGA.   Outcome: Ongoing (interventions implemented as appropriate)  Ambulated 240' with rw and CGA.

## 2018-05-31 NOTE — PT/OT/SLP PROGRESS
Physical Therapy Treatment    Patient Name:  Beatriz Gan   MRN:  [id number]    Recommendations:     Discharge Recommendations:  home   Discharge Equipment Recommendations:     Barriers to discharge: None    Assessment:     Beatriz Gan is a 62 y.o. female admitted with a medical diagnosis of Gram negative sepsis.  She presents with the following impairments/functional limitations:  weakness, impaired endurance, impaired functional mobilty, gait instability, impaired balance .    Rehab Prognosis:  good; patient would benefit from acute skilled PT services to address these deficits and reach maximum level of function.      Recent Surgery: * No surgery found *      Plan:     During this hospitalization, patient to be seen 6 x/week to address the above listed problems via gait training, therapeutic activities, therapeutic exercises  · Plan of Care Expires:  07/08/18   Plan of Care Reviewed with: patient    Subjective     Communicated with nurse Louis prior to session.  Patient found supine in bed upon PT entry to room, agreeable to treatment.      Chief Complaint: recurrent headache.  Patient comments/goals: to return home when feeling better.  Pain/Comfort:  · Pain Rating 1: other (see comments) (did not rate)  · Location - Orientation 1: generalized  · Location 1: head  · Pain Addressed 1: Reposition, Nurse notified    Patients cultural, spiritual, Christian conflicts given the current situation: no    Objective:     Patient found with: telemetry     General Precautions: Standard, fall   Orthopedic Precautions:N/A   Braces: N/A     Functional Mobility:  · Bed Mobility:     · Rolling Left:  stand by assistance  · Rolling Right: stand by assistance  · Supine to Sit: contact guard assistance  · Sit to Supine: contact guard assistance  · Transfers:     · Sit to Stand:  contact guard assistance with rolling walker  · Gait: 10' to restroom, 240' in hallway with rw and CGA.      AM-PAC 6 CLICK MOBILITY           Therapeutic Activities and Exercises:   Sat EOB and donned slippers. Ambulated to restroom on / off commode with SBA. Performed hygiene task without difficulty. Slightly dizzy upon standing from commode. Returned to bed with SBA.    Patient left supine with all lines intact, call button in reach and nurse Missy notified..    GOALS:    Physical Therapy Goals        Problem: Physical Therapy Goal    Goal Priority Disciplines Outcome Goal Variances Interventions   Physical Therapy Goal     PT/OT, PT Ongoing (interventions implemented as appropriate)     Description:  1.Pt will be independent with bed mobility and transfers.  2.Pt will ambulate 500' with AD with CGA.                    Time Tracking:     PT Received On: 05/31/18  PT Start Time: 0900     PT Stop Time: 0920  PT Total Time (min): 20 min     Billable Minutes: Gait Training 12min and Therapeutic Activity 8min    Treatment Type: Treatment  PT/PTA: PTA     PTA Visit Number: 2     Vira Prasad, BENJI  05/31/2018

## 2018-06-01 LAB
ANION GAP SERPL CALC-SCNC: 8 MMOL/L
BACTERIA BLD CULT: NORMAL
BASOPHILS # BLD AUTO: 0 K/UL
BASOPHILS NFR BLD: 0.1 %
BUN SERPL-MCNC: 7 MG/DL
CALCIUM SERPL-MCNC: 9.5 MG/DL
CHLORIDE SERPL-SCNC: 104 MMOL/L
CO2 SERPL-SCNC: 27 MMOL/L
CREAT SERPL-MCNC: 0.8 MG/DL
DIFFERENTIAL METHOD: ABNORMAL
EOSINOPHIL # BLD AUTO: 0.2 K/UL
EOSINOPHIL NFR BLD: 3.2 %
ERYTHROCYTE [DISTWIDTH] IN BLOOD BY AUTOMATED COUNT: 13.8 %
EST. GFR  (AFRICAN AMERICAN): >60 ML/MIN/1.73 M^2
EST. GFR  (NON AFRICAN AMERICAN): >60 ML/MIN/1.73 M^2
GLUCOSE SERPL-MCNC: 107 MG/DL
HCT VFR BLD AUTO: 28.6 %
HGB BLD-MCNC: 9.6 G/DL
LYMPHOCYTES # BLD AUTO: 1.3 K/UL
LYMPHOCYTES NFR BLD: 17.1 %
MAGNESIUM SERPL-MCNC: 1.7 MG/DL
MCH RBC QN AUTO: 32.1 PG
MCHC RBC AUTO-ENTMCNC: 33.7 G/DL
MCV RBC AUTO: 95 FL
MONOCYTES # BLD AUTO: 0.6 K/UL
MONOCYTES NFR BLD: 8 %
NEUTROPHILS # BLD AUTO: 5.3 K/UL
NEUTROPHILS NFR BLD: 71.6 %
PHOSPHATE SERPL-MCNC: 4.7 MG/DL
PLATELET # BLD AUTO: 413 K/UL
PMV BLD AUTO: 7.2 FL
POTASSIUM SERPL-SCNC: 4.2 MMOL/L
RBC # BLD AUTO: 3 M/UL
SODIUM SERPL-SCNC: 139 MMOL/L
WBC # BLD AUTO: 7.4 K/UL

## 2018-06-01 PROCEDURE — 94761 N-INVAS EAR/PLS OXIMETRY MLT: CPT

## 2018-06-01 PROCEDURE — 63600175 PHARM REV CODE 636 W HCPCS: Performed by: HOSPITALIST

## 2018-06-01 PROCEDURE — 84100 ASSAY OF PHOSPHORUS: CPT

## 2018-06-01 PROCEDURE — 25000003 PHARM REV CODE 250: Performed by: NURSE PRACTITIONER

## 2018-06-01 PROCEDURE — 25000242 PHARM REV CODE 250 ALT 637 W/ HCPCS: Performed by: HOSPITALIST

## 2018-06-01 PROCEDURE — 25000003 PHARM REV CODE 250: Performed by: HOSPITALIST

## 2018-06-01 PROCEDURE — 83735 ASSAY OF MAGNESIUM: CPT

## 2018-06-01 PROCEDURE — 27000221 HC OXYGEN, UP TO 24 HOURS

## 2018-06-01 PROCEDURE — 94640 AIRWAY INHALATION TREATMENT: CPT

## 2018-06-01 PROCEDURE — 85025 COMPLETE CBC W/AUTO DIFF WBC: CPT

## 2018-06-01 PROCEDURE — 80048 BASIC METABOLIC PNL TOTAL CA: CPT

## 2018-06-01 PROCEDURE — 36415 COLL VENOUS BLD VENIPUNCTURE: CPT

## 2018-06-01 PROCEDURE — 12000002 HC ACUTE/MED SURGE SEMI-PRIVATE ROOM

## 2018-06-01 PROCEDURE — 97116 GAIT TRAINING THERAPY: CPT

## 2018-06-01 RX ORDER — GUAIFENESIN/DEXTROMETHORPHAN 100-10MG/5
10 SYRUP ORAL EVERY 4 HOURS PRN
Status: DISCONTINUED | OUTPATIENT
Start: 2018-06-01 | End: 2018-06-03 | Stop reason: HOSPADM

## 2018-06-01 RX ADMIN — POTASSIUM CHLORIDE 40 MEQ: 1500 TABLET, EXTENDED RELEASE ORAL at 09:06

## 2018-06-01 RX ADMIN — IPRATROPIUM BROMIDE AND ALBUTEROL SULFATE 3 ML: .5; 3 SOLUTION RESPIRATORY (INHALATION) at 07:06

## 2018-06-01 RX ADMIN — ALPRAZOLAM 0.5 MG: 0.25 TABLET ORAL at 03:06

## 2018-06-01 RX ADMIN — ALPRAZOLAM 0.5 MG: 0.25 TABLET ORAL at 09:06

## 2018-06-01 RX ADMIN — GABAPENTIN 300 MG: 300 CAPSULE ORAL at 09:06

## 2018-06-01 RX ADMIN — BUSPIRONE HYDROCHLORIDE 15 MG: 10 TABLET ORAL at 09:06

## 2018-06-01 RX ADMIN — FLUTICASONE PROPIONATE 50 MCG: 50 SPRAY, METERED NASAL at 06:06

## 2018-06-01 RX ADMIN — HYDROCODONE BITARTRATE AND ACETAMINOPHEN 2 TABLET: 5; 325 TABLET ORAL at 03:06

## 2018-06-01 RX ADMIN — ZOLPIDEM TARTRATE 5 MG: 5 TABLET ORAL at 09:06

## 2018-06-01 RX ADMIN — IPRATROPIUM BROMIDE AND ALBUTEROL SULFATE 3 ML: .5; 3 SOLUTION RESPIRATORY (INHALATION) at 01:06

## 2018-06-01 RX ADMIN — CALCIUM 500 MG: 500 TABLET ORAL at 09:06

## 2018-06-01 RX ADMIN — HYDROCODONE BITARTRATE AND ACETAMINOPHEN 2 TABLET: 5; 325 TABLET ORAL at 06:06

## 2018-06-01 RX ADMIN — BUSPIRONE HYDROCHLORIDE 15 MG: 10 TABLET ORAL at 03:06

## 2018-06-01 RX ADMIN — IPRATROPIUM BROMIDE AND ALBUTEROL SULFATE 3 ML: .5; 3 SOLUTION RESPIRATORY (INHALATION) at 12:06

## 2018-06-01 RX ADMIN — PRAVASTATIN SODIUM 40 MG: 40 TABLET ORAL at 09:06

## 2018-06-01 RX ADMIN — GUAIFENESIN AND DEXTROMETHORPHAN 10 ML: 100; 10 SYRUP ORAL at 03:06

## 2018-06-01 RX ADMIN — POTASSIUM & SODIUM PHOSPHATES POWDER PACK 280-160-250 MG 1 PACKET: 280-160-250 PACK at 09:06

## 2018-06-01 RX ADMIN — AMLODIPINE BESYLATE 10 MG: 5 TABLET ORAL at 09:06

## 2018-06-01 RX ADMIN — CEFTRIAXONE 1 G: 1 INJECTION, SOLUTION INTRAVENOUS at 09:06

## 2018-06-01 NOTE — PT/OT/SLP PROGRESS
Physical Therapy Treatment    Patient Name:  Beatriz Gan   MRN:  [id number]    Recommendations:     Discharge Recommendations:  home       Assessment:     Beatriz Gan is a 62 y.o. female admitted with a medical diagnosis of Gram negative sepsis.  She presents with the following impairments/functional limitations:  weakness, impaired endurance, impaired functional mobilty, gait instability, impaired balance .    Rehab Prognosis:  good; patient would benefit from acute skilled PT services to address these deficits and reach maximum level of function.      Recent Surgery: * No surgery found *      Plan:     During this hospitalization, patient to be seen 6 x/week to address the above listed problems via gait training, therapeutic activities, therapeutic exercises  · Plan of Care Expires:  07/08/18   Plan of Care Reviewed with: patient    Subjective     Communicated with nurse Anguiano prior to session.  Patient found supine upon PT entry to room, agreeable to treatment.      Chief Complaint: none expressed  Patient comments/goals: pt eager to walk  Pain/Comfort:  · Pain Rating 1: 0/10    Patients cultural, spiritual, Church conflicts given the current situation: no    Objective:     Patient found with: telemetry     General Precautions: Standard, fall   Orthopedic Precautions:N/A   Braces: N/A     Functional Mobility:  · Bed Mobility:     · Supine to Sit: modified independence  · Sit to Supine: modified independence    · Transfers:     · Sit to Stand:  contact guard assistance with rolling walker    · Gait: 300' with CGA using RW. occasional lateral sway noted, no major LOB        Therapeutic Activities and Exercises:   pt transferred to sitting EOB, assisted with donning slippers prior to gait    Patient left supine with all lines intact, call button in reach and nurse notified..    GOALS:    Physical Therapy Goals        Problem: Physical Therapy Goal    Goal Priority Disciplines Outcome Goal  Variances Interventions   Physical Therapy Goal     PT/OT, PT Ongoing (interventions implemented as appropriate)     Description:  1.Pt will be independent with bed mobility and transfers.  2.Pt will ambulate 500' with AD with CGA.                    Time Tracking:     PT Received On: 06/01/18  PT Start Time: 0928     PT Stop Time: 0943  PT Total Time (min): 15 min     Billable Minutes: Gait Training 15    Treatment Type: Treatment  PT/PTA: PTA     PTA Visit Number: 3     Jimena Belle PTA  06/01/2018

## 2018-06-01 NOTE — PLAN OF CARE
Problem: Physical Therapy Goal  Goal: Physical Therapy Goal  1.Pt will be independent with bed mobility and transfers.  2.Pt will ambulate 500' with AD with CGA.   Outcome: Ongoing (interventions implemented as appropriate)  PT for gait training

## 2018-06-01 NOTE — PLAN OF CARE
06/01/18 0717   Patient Assessment/Suction   Level of Consciousness (AVPU) alert   Respiratory Effort Unlabored;Normal   Expansion/Accessory Muscles/Retractions no retractions;no use of accessory muscles   All Lung Fields Breath Sounds diminished;clear   Rhythm/Pattern, Respiratory depth regular;pattern regular;unlabored   Cough Frequency infrequent   Cough Type good;nonproductive   PRE-TX-O2-ETCO2   O2 Device (Oxygen Therapy) nasal cannula   $ Is the patient on Low Flow Oxygen? Yes   Flow (L/min) 2   Oxygen Concentration (%) 28   SpO2 (!) 92 %   Pulse Oximetry Type Intermittent   $ Pulse Oximetry - Multiple Charge Pulse Oximetry - Multiple   Pulse 87   Resp 16   Aerosol Therapy   $ Aerosol Therapy Charges Aerosol Treatment   Respiratory Treatment Status given   SVN/Inhaler Treatment Route mask   Position During Treatment Sitting in bed   Patient Tolerance good   Post-Treatment   Post-treatment Heart Rate (beats/min) 83   Post-treatment Resp Rate (breaths/min) 16   All Fields Breath Sounds unchanged   Ready to Wean/Extubation Screen   FIO2<=50 (chart decimal) 0.28

## 2018-06-01 NOTE — PROGRESS NOTES
05/31/18 1928   Patient Assessment/Suction   Level of Consciousness (AVPU) alert   Respiratory Effort Unlabored   Expansion/Accessory Muscles/Retractions no use of accessory muscles;no retractions   All Lung Fields Breath Sounds diminished   Rhythm/Pattern, Respiratory unlabored   Cough Frequency infrequent   Cough Type nonproductive   PRE-TX-O2-ETCO2   O2 Device (Oxygen Therapy) nasal cannula w/ humidification   $ Is the patient on Low Flow Oxygen? Yes   Flow (L/min) 3   SpO2 98 %   Pulse Oximetry Type Intermittent   $ Pulse Oximetry - Multiple Charge Pulse Oximetry - Multiple   Pulse 95   Resp 18   Aerosol Therapy   $ Aerosol Therapy Charges Aerosol Treatment   Respiratory Treatment Status given   SVN/Inhaler Treatment Route mask   Position During Treatment HOB at 30 degrees   Patient Tolerance good   Post-Treatment   Post-treatment Heart Rate (beats/min) 90   Post-treatment Resp Rate (breaths/min) 18   All Fields Breath Sounds unchanged

## 2018-06-01 NOTE — PLAN OF CARE
Problem: Patient Care Overview  Goal: Plan of Care Review  Outcome: Ongoing (interventions implemented as appropriate)  Patient AAO, VSS. Pt remained afebrile throughout shift.  IV abx infusing as ordered. Pain controlled with PRN analgesics. No nausea or vomiting this shift.  Pt reported weakness improved this shift.  Pt verbalized understanding of POC.  Purposeful rounding done during shift to promote patient safety. Patient free from falls and injury during shift.  Non skid socks on when OOB.  Bed in lowest position, brakes locked, and call light within reach.  Will continue to monitor.

## 2018-06-02 VITALS
BODY MASS INDEX: 32.44 KG/M2 | WEIGHT: 190 LBS | TEMPERATURE: 98 F | HEART RATE: 88 BPM | OXYGEN SATURATION: 99 % | SYSTOLIC BLOOD PRESSURE: 121 MMHG | HEIGHT: 64 IN | RESPIRATION RATE: 18 BRPM | DIASTOLIC BLOOD PRESSURE: 72 MMHG

## 2018-06-02 LAB
ANION GAP SERPL CALC-SCNC: 10 MMOL/L
BASOPHILS # BLD AUTO: 0 K/UL
BASOPHILS NFR BLD: 0.3 %
BUN SERPL-MCNC: 7 MG/DL
CALCIUM SERPL-MCNC: 9.9 MG/DL
CHLORIDE SERPL-SCNC: 107 MMOL/L
CO2 SERPL-SCNC: 24 MMOL/L
CREAT SERPL-MCNC: 0.9 MG/DL
DIFFERENTIAL METHOD: ABNORMAL
EOSINOPHIL # BLD AUTO: 0.2 K/UL
EOSINOPHIL NFR BLD: 3.2 %
ERYTHROCYTE [DISTWIDTH] IN BLOOD BY AUTOMATED COUNT: 13.7 %
EST. GFR  (AFRICAN AMERICAN): >60 ML/MIN/1.73 M^2
EST. GFR  (NON AFRICAN AMERICAN): >60 ML/MIN/1.73 M^2
GLUCOSE SERPL-MCNC: 103 MG/DL
HCT VFR BLD AUTO: 29.8 %
HGB BLD-MCNC: 10 G/DL
LYMPHOCYTES # BLD AUTO: 1 K/UL
LYMPHOCYTES NFR BLD: 13.9 %
MAGNESIUM SERPL-MCNC: 1.8 MG/DL
MCH RBC QN AUTO: 32.4 PG
MCHC RBC AUTO-ENTMCNC: 33.7 G/DL
MCV RBC AUTO: 96 FL
MONOCYTES # BLD AUTO: 0.6 K/UL
MONOCYTES NFR BLD: 7.7 %
NEUTROPHILS # BLD AUTO: 5.6 K/UL
NEUTROPHILS NFR BLD: 74.9 %
PHOSPHATE SERPL-MCNC: 4.8 MG/DL
PLATELET # BLD AUTO: 515 K/UL
PMV BLD AUTO: 6.9 FL
POTASSIUM SERPL-SCNC: 4.1 MMOL/L
RBC # BLD AUTO: 3.1 M/UL
SODIUM SERPL-SCNC: 141 MMOL/L
WBC # BLD AUTO: 7.5 K/UL

## 2018-06-02 PROCEDURE — 83735 ASSAY OF MAGNESIUM: CPT

## 2018-06-02 PROCEDURE — 25000003 PHARM REV CODE 250: Performed by: NURSE PRACTITIONER

## 2018-06-02 PROCEDURE — 27000221 HC OXYGEN, UP TO 24 HOURS

## 2018-06-02 PROCEDURE — 84100 ASSAY OF PHOSPHORUS: CPT

## 2018-06-02 PROCEDURE — 25500020 PHARM REV CODE 255

## 2018-06-02 PROCEDURE — 80048 BASIC METABOLIC PNL TOTAL CA: CPT

## 2018-06-02 PROCEDURE — 25000242 PHARM REV CODE 250 ALT 637 W/ HCPCS: Performed by: HOSPITALIST

## 2018-06-02 PROCEDURE — 94640 AIRWAY INHALATION TREATMENT: CPT

## 2018-06-02 PROCEDURE — 85025 COMPLETE CBC W/AUTO DIFF WBC: CPT

## 2018-06-02 PROCEDURE — 63600175 PHARM REV CODE 636 W HCPCS: Performed by: HOSPITALIST

## 2018-06-02 PROCEDURE — 12000002 HC ACUTE/MED SURGE SEMI-PRIVATE ROOM

## 2018-06-02 PROCEDURE — 25000003 PHARM REV CODE 250: Performed by: HOSPITALIST

## 2018-06-02 PROCEDURE — 97116 GAIT TRAINING THERAPY: CPT

## 2018-06-02 PROCEDURE — 36415 COLL VENOUS BLD VENIPUNCTURE: CPT

## 2018-06-02 PROCEDURE — 94761 N-INVAS EAR/PLS OXIMETRY MLT: CPT

## 2018-06-02 RX ORDER — SODIUM CHLORIDE 9 MG/ML
INJECTION, SOLUTION INTRAVENOUS
Status: DISCONTINUED
Start: 2018-06-02 | End: 2018-06-03 | Stop reason: HOSPADM

## 2018-06-02 RX ORDER — HYDROCORTISONE 25 MG/G
CREAM TOPICAL 2 TIMES DAILY
Status: DISCONTINUED | OUTPATIENT
Start: 2018-06-02 | End: 2018-06-03 | Stop reason: HOSPADM

## 2018-06-02 RX ORDER — HYDROCORTISONE 25 MG/G
CREAM TOPICAL 2 TIMES DAILY
Qty: 20 G | Refills: 0 | Status: SHIPPED | OUTPATIENT
Start: 2018-06-02 | End: 2019-03-22 | Stop reason: ALTCHOICE

## 2018-06-02 RX ORDER — CIPROFLOXACIN 500 MG/1
500 TABLET ORAL EVERY 12 HOURS
Qty: 14 TABLET | Refills: 0 | Status: SHIPPED | OUTPATIENT
Start: 2018-06-02 | End: 2018-06-09

## 2018-06-02 RX ADMIN — GABAPENTIN 300 MG: 300 CAPSULE ORAL at 08:06

## 2018-06-02 RX ADMIN — IPRATROPIUM BROMIDE AND ALBUTEROL SULFATE 3 ML: .5; 3 SOLUTION RESPIRATORY (INHALATION) at 07:06

## 2018-06-02 RX ADMIN — BUSPIRONE HYDROCHLORIDE 15 MG: 10 TABLET ORAL at 03:06

## 2018-06-02 RX ADMIN — IPRATROPIUM BROMIDE AND ALBUTEROL SULFATE 3 ML: .5; 3 SOLUTION RESPIRATORY (INHALATION) at 01:06

## 2018-06-02 RX ADMIN — ZOLPIDEM TARTRATE 5 MG: 5 TABLET ORAL at 08:06

## 2018-06-02 RX ADMIN — IOHEXOL 100 ML: 350 INJECTION, SOLUTION INTRAVENOUS at 05:06

## 2018-06-02 RX ADMIN — ACETAMINOPHEN 1000 MG: 500 TABLET, FILM COATED ORAL at 06:06

## 2018-06-02 RX ADMIN — AMLODIPINE BESYLATE 10 MG: 5 TABLET ORAL at 08:06

## 2018-06-02 RX ADMIN — ALPRAZOLAM 0.5 MG: 0.25 TABLET ORAL at 08:06

## 2018-06-02 RX ADMIN — HYDROCORTISONE: 25 CREAM TOPICAL at 07:06

## 2018-06-02 RX ADMIN — BUSPIRONE HYDROCHLORIDE 15 MG: 10 TABLET ORAL at 08:06

## 2018-06-02 RX ADMIN — IPRATROPIUM BROMIDE AND ALBUTEROL SULFATE 3 ML: .5; 3 SOLUTION RESPIRATORY (INHALATION) at 12:06

## 2018-06-02 RX ADMIN — HYDROCODONE BITARTRATE AND ACETAMINOPHEN 2 TABLET: 5; 325 TABLET ORAL at 08:06

## 2018-06-02 RX ADMIN — CALCIUM 500 MG: 500 TABLET ORAL at 08:06

## 2018-06-02 RX ADMIN — CEFTRIAXONE 1 G: 1 INJECTION, SOLUTION INTRAVENOUS at 07:06

## 2018-06-02 RX ADMIN — FLUTICASONE PROPIONATE 50 MCG: 50 SPRAY, METERED NASAL at 08:06

## 2018-06-02 RX ADMIN — PRAVASTATIN SODIUM 40 MG: 40 TABLET ORAL at 08:06

## 2018-06-02 RX ADMIN — ALPRAZOLAM 0.5 MG: 0.25 TABLET ORAL at 03:06

## 2018-06-02 NOTE — PT/OT/SLP PROGRESS
Physical Therapy Treatment    Patient Name:  Beatriz Gan   MRN:  [id number]    Recommendations:     Discharge Recommendations:  home   Discharge Equipment Recommendations:     Barriers to discharge: None    Assessment:     Beatriz Gan is a 62 y.o. female admitted with a medical diagnosis of Gram negative sepsis.  She presents with the following impairments/functional limitations:  weakness, impaired endurance, impaired functional mobilty, gait instability, impaired balance .    Rehab Prognosis:  good; patient would benefit from acute skilled PT services to address these deficits and reach maximum level of function.      Recent Surgery: * No surgery found *      Plan:     During this hospitalization, patient to be seen 6 x/week to address the above listed problems via gait training, therapeutic activities, therapeutic exercises  · Plan of Care Expires:  07/08/18   Plan of Care Reviewed with: patient    Subjective     Communicated with nurse Wilkins prior to session.  Patient found supine in bed upon PT entry to room, agreeable to treatment.      Chief Complaint: headache but not bad.  Patient comments/goals: to return home soon.  Pain/Comfort:  · Pain Rating 1: 0/10    Patients cultural, spiritual, Sabianist conflicts given the current situation: no    Objective:     Patient found with: telemetry     General Precautions: Standard, fall   Orthopedic Precautions:N/A   Braces: N/A     Functional Mobility:  · Bed Mobility:     · Rolling Left:  supervision  · Rolling Right: supervision  · Supine to Sit: supervision  · Sit to Supine: supervision  · Transfers:     · Sit to Stand:  stand by assistance with rolling walker  · Gait: 240' with rw and CGA, 240' additional with SPC      AM-PAC 6 CLICK MOBILITY          Therapeutic Activities and Exercises:   Returned to bed following ambulation in hallway 240' x 2 with rw and CGA, then SPC and CGA. Ambulated slowly / steadily with no LOB noted.    Patient left  supine with all lines intact, call button in reach and nurse Franky notified..    GOALS:    Physical Therapy Goals        Problem: Physical Therapy Goal    Goal Priority Disciplines Outcome Goal Variances Interventions   Physical Therapy Goal     PT/OT, PT Ongoing (interventions implemented as appropriate)     Description:  1.Pt will be independent with bed mobility and transfers.  2.Pt will ambulate 500' with AD with CGA.                    Time Tracking:     PT Received On: 06/02/18  PT Start Time: 0910     PT Stop Time: 0926  PT Total Time (min): 16 min     Billable Minutes: Gait Training 16min    Treatment Type: Treatment  PT/PTA: BENJI LEBLANC Visit Number: 4     Vira Prasad PTA  06/02/2018

## 2018-06-02 NOTE — PLAN OF CARE
Problem: Physical Therapy Goal  Goal: Physical Therapy Goal  1.Pt will be independent with bed mobility and transfers.  2.Pt will ambulate 500' with AD with CGA.   Outcome: Ongoing (interventions implemented as appropriate)  Ambulation with assistive device and assistance for safety. RW / SPC used with no LOB noted.

## 2018-06-02 NOTE — PLAN OF CARE
Problem: Patient Care Overview  Goal: Plan of Care Review  Outcome: Ongoing (interventions implemented as appropriate)   Resting quietly during hourly rounding for patient safety. Pain controlled with PRN pain medication. No falls or trauma this shift. Antibiotic therapy in progress.  1 Loose BM this shift. Vitals remained WNL. Pt. Verbalizes understanding of their plan of care.

## 2018-06-02 NOTE — PLAN OF CARE
06/02/18 0726   Patient Assessment/Suction   Level of Consciousness (AVPU) alert   Respiratory Effort Normal;Unlabored   Expansion/Accessory Muscles/Retractions no retractions;no use of accessory muscles   All Lung Fields Breath Sounds diminished   Rhythm/Pattern, Respiratory unlabored;pattern regular;depth regular   Cough Frequency infrequent   Cough Type good;nonproductive   PRE-TX-O2-ETCO2   O2 Device (Oxygen Therapy) room air   $ Is the patient on Low Flow Oxygen? Yes   SpO2 96 %   Pulse Oximetry Type Intermittent   $ Pulse Oximetry - Multiple Charge Pulse Oximetry - Multiple   Pulse 89   Resp 16   Aerosol Therapy   $ Aerosol Therapy Charges Aerosol Treatment   Respiratory Treatment Status given   SVN/Inhaler Treatment Route mask   Position During Treatment Sitting in bed   Patient Tolerance good   Post-Treatment   Post-treatment Heart Rate (beats/min) 83   Post-treatment Resp Rate (breaths/min) 16   All Fields Breath Sounds unchanged

## 2018-06-02 NOTE — HOSPITAL COURSE
Admitted for pyelonephritis/gm neg sepsis  --due to pyelonephritis treated with iv rocephin and ivfBlood/urine cultures positive for  E Coli s/rocephin and cipro. . Had continued fever spike -so CT done to r/o abscess and showed inflammation around kidney. Fever improved and after 1 week iv abx with rocephin she is dc home on another week of po cipro.   Bhupinder resolved with hydration. She required po potassium replacement for hypokalemia and this resolved and she had acute blood loss anemia from an episode of hematochezia but did not require transfusion or any further gi workup as it resolved on its own. She should have gi fu in the next few weeks. Stool cultures were negative.   She also had mild sob resolved with oxygen and nebulizer tx. She had no wheeze or cough and had noted small bilateeral pleural effusions/atelectasis -likely due to hydration and immobility but her lungs were clear at time of discharge and she required no oxygen supplementation at time of discharge  Alert , nad. Ox3 heent nl lungs clear cor rrr; Abd-soft nontender. No cva tenderness or edema; no flank tendernss

## 2018-06-02 NOTE — SUBJECTIVE & OBJECTIVE
Interval History: still has suprapubic pain but no dysuria  Stool looser  Rash on forearm -round [place]; no trauma; not itchy       Review of Systems   Constitutional: Positive for fatigue and fever.   Respiratory: Positive for cough. Negative for shortness of breath.    Cardiovascular: Negative for chest pain and leg swelling.   Gastrointestinal: Negative for nausea.   Neurological: Positive for headaches. Negative for weakness.   Psychiatric/Behavioral: Negative for confusion.   All other systems reviewed and are negative.    Objective:     Vital Signs (Most Recent):  Temp: 96.6 °F (35.9 °C) (06/02/18 1233)  Pulse: 91 (06/02/18 1319)  Resp: 16 (06/02/18 1319)  BP: 130/65 (06/02/18 1233)  SpO2: 98 % (06/02/18 1319) Vital Signs (24h Range):  Temp:  [96.6 °F (35.9 °C)-100.1 °F (37.8 °C)] 96.6 °F (35.9 °C)  Pulse:  [] 91  Resp:  [16-20] 16  SpO2:  [91 %-98 %] 98 %  BP: (123-146)/(65-79) 130/65     Weight: 86.2 kg (190 lb)  Body mass index is 32.61 kg/m².    Intake/Output Summary (Last 24 hours) at 06/02/18 1514  Last data filed at 06/02/18 0551   Gross per 24 hour   Intake             1240 ml   Output                0 ml   Net             1240 ml      Physical Exam   Constitutional: She is oriented to person, place, and time. She appears well-developed and well-nourished.   Eyes: EOM are normal. Pupils are equal, round, and reactive to light.   Cardiovascular: Normal rate, regular rhythm and intact distal pulses.    No murmur heard.  Pulmonary/Chest: Effort normal. She has wheezes (Appear to be improving from previous.).   Abdominal: Soft. She exhibits no distension. There is tenderness (Mild). There is no rebound.   Musculoskeletal: She exhibits no edema.   Neurological: She is alert and oriented to person, place, and time.   Skin: No rash noted. No pallor.   Nursing note and vitals reviewed.      Significant Labs: All pertinent labs within the past 24 hours have been reviewed.    Significant Imaging: I have  reviewed and interpreted all pertinent imaging results/findings within the past 24 hours.

## 2018-06-02 NOTE — PLAN OF CARE
SW met w/ pt per request from pt to discuss options for in-home help (laundry, housecleaning, etc).  SW provided pt w/ resource directory.  Pt reports the main living area of her home is upstairs (17 steps) and she has difficulty due to her health, would like either an elevator or a chair lift for the stairs.  SW explained that we do not assist w/ arranging that, encouraged pt to contact her insurance provider to see what is covered.  Also explained that type of equipment may be at pt's expense.     06/02/18 2509   Discharge Reassessment   Assessment Type Discharge Planning Reassessment

## 2018-06-02 NOTE — PLAN OF CARE
Problem: Patient Care Overview  Goal: Plan of Care Review  Pt awake and alert, complains of pain in lower R abdomen, heat compress relieved rain. IV site clean, dry and intact during shift, saline locked. Pt ran low grade fever this evening, medication was given. Pt on cardiac diet. Will continue to monitor.

## 2018-06-03 NOTE — NURSING
Discharge instructions given, voiced understanding. IV discontinued, tolerated well. Patient aware of Cipro prescription called in. Discharged via wheelchair.

## 2018-06-03 NOTE — PLAN OF CARE
06/03/18 0859   Final Note   Assessment Type Final Discharge Note   Discharge Disposition Home

## 2018-06-03 NOTE — PLAN OF CARE
Notified NP Suit of CT results, no abscess seen. Received instructions ok to discharge as ordered per Dr. Aquino.

## 2018-06-03 NOTE — PLAN OF CARE
Problem: Patient Care Overview  Goal: Plan of Care Review  Outcome: Ongoing (interventions implemented as appropriate)  Awake, alert, and oriented. Awaiting CT results to DC patient. PIV clean dry and intact. VS stable. Remains afebrile throughout shift. Comfort level established. Moderate pain control w/ PRN pain medication. Bed in low position, brakes locked, side rails up x 2, call light w/in reach. Verbalized understanding of POC. Open communication facilitated. Will continue to monitor.

## 2018-06-04 ENCOUNTER — TELEPHONE (OUTPATIENT)
Dept: MEDSURG UNIT | Facility: HOSPITAL | Age: 63
End: 2018-06-04

## 2018-06-04 NOTE — DISCHARGE SUMMARY
"[facility] Medicine  Discharge Summary      Patient Name: Beatriz Gan  MRN: [id number]  Admission Date: 5/26/2018  Hospital Length of Stay: 7 days  Discharge Date and Time:  06/04/2018 9:40 AM  Attending Physician: No att. providers found   Discharging Provider: Annmarie Aquino MD  Primary Care Provider: Jorge Alonzo MD      HPI:   Beatriz Gan is a 62 y.o. female with a PMHx of HTN, kidney infections, and kidney cancer who presents to the ED with complaints of fever, burning urination,and abdominal pain with an onset x 3 days PTA. The patient states that she has had a fever ranging between 102-103 and has been having intermittent chills.Tylenol and Nyquil have given her some relief. She reports that she recently finished a round of antibiotics for a kidney infection that was diagnoses by her PCP x 2 weeks ago. She reports that her symptoms today are similar to those she experienced with the kidney infection. The patient states that her burning urination and abdominal pain slightly improved with the antibiotics, but did not dissipate entirely.  I do not have name of antiobiotic name at this time and last culture noted in chart was from 2107 e coli with some resistance but sensitive to rocephin. She describes the abdominal pain as a soreness that is right upper abdomen and flank area  and reports some associated diarrhea without blood. Denies any bowel movements today. Reports that she was feeling nauseous and was vomiting yesterday, but "nausea pills" she took have relieved these symptoms. She also admits to having "flu like symptoms" such as a cough x 1 week ago that has not resolved. She had kidney cancer x 3 years ago which resulted in the removal of 20% of her left kidney. The patient denies frequent urination, blood in stool,  or any other symptoms at this time.     * No surgery found *      Hospital Course:   Admitted for pyelonephritis/gm neg sepsis  --due to " pyelonephritis treated with iv rocephin and ivfBlood/urine cultures positive for  E Coli s/rocephin and cipro. . Had continued fever spike -so CT done to r/o abscess and showed inflammation around kidney. Fever improved and after 1 week iv abx with rocephin she is dc home on another week of po cipro.   Bhupinder resolved with hydration. She required po potassium replacement for hypokalemia and this resolved and she had acute blood loss anemia from an episode of hematochezia but did not require transfusion or any further gi workup as it resolved on its own. She should have gi fu in the next few weeks. Stool cultures were negative.   She also had mild sob resolved with oxygen and nebulizer tx. She had no wheeze or cough and had noted small bilateeral pleural effusions/atelectasis -likely due to hydration and immobility but her lungs were clear at time of discharge and she required no oxygen supplementation at time of discharge  Alert , nad. Ox3 heent nl lungs clear cor rrr; Abd-soft nontender. No cva tenderness or edema; no flank tendernss        Consults:     Acute blood loss anemia    Likely secondary to hematochezia.  Potential etiologies could include diverticulosis versus internal hemorrhoids, her bleeding was painless.  Will trend H&H and monitor patient clinically.  No indication for emergent colonoscopy or imaging at this point.           HTN (hypertension)    Chronic, controlled.  Monitor BP closely.  Will continue BP medications as needed for sustained BP control.            Final Active Diagnoses:    Diagnosis Date Noted POA    PRINCIPAL PROBLEM:  Gram negative sepsis [A41.50] 05/26/2018 Yes    Lower GI bleed [K92.2] 05/30/2018 Yes    Acute blood loss anemia [D62] 05/30/2018 No    Acute bronchospasm [J98.01] 05/27/2018 No    BHUPINDER (acute kidney injury) [N17.9] 05/26/2018 Yes    DDD (degenerative disc disease), lumbar [M51.36] 05/09/2014 Yes    Depression [F32.9] 05/07/2014 Yes    Body mass index 30.0-30.9,  adult [Z68.30] 04/15/2014 Not Applicable    HTN (hypertension) [I10] 01/13/2014 Yes    Hypokalemia [E87.6] 01/13/2014 Yes      Problems Resolved During this Admission:    Diagnosis Date Noted Date Resolved POA    Hyponatremia [E87.1] 05/27/2018 05/30/2018 No    Hypophosphatemia [E83.39] 05/27/2018 05/28/2018 Yes       Discharged Condition: good    Disposition: Home or Self Care    Follow Up:  Follow-up Information     Jorge Alonzo MD In 1 week.    Specialty:  Internal Medicine  Contact information:  [address]  [address]  [facility] [address]  [phone]                 Patient Instructions:     Diet Cardiac     Activity as tolerated     Notify your health care provider if you experience any of the following:  persistent nausea and vomiting or diarrhea     Notify your health care provider if you experience any of the following:  severe uncontrolled pain         Significant Diagnostic Studies:   Results for JOSE MOBLEY (MRN [id number]) as of 6/4/2018 09:41   Ref. Range 6/1/2018 04:25 6/2/2018 05:31   WBC Latest Ref Range: 3.90 - 12.70 K/uL 7.40 7.50   RBC Latest Ref Range: 4.00 - 5.40 M/uL 3.00 (L) 3.10 (L)   Hemoglobin Latest Ref Range: 12.0 - 16.0 g/dL 9.6 (L) 10.0 (L)   Hematocrit Latest Ref Range: 37.0 - 48.5 % 28.6 (L) 29.8 (L)   MCV Latest Ref Range: 82 - 98 fL 95 96   MCH Latest Ref Range: 27.0 - 31.0 pg 32.1 (H) 32.4 (H)   MCHC Latest Ref Range: 32.0 - 36.0 g/dL 33.7 33.7   RDW Latest Ref Range: 11.5 - 14.5 % 13.8 13.7   Platelets Latest Ref Range: 150 - 350 K/uL 413 (H) 515 (H)     Results for JOSE MOBLEY (MRN [id number]) as of 6/4/2018 09:41   Ref. Range 6/1/2018 04:25 6/2/2018 05:31   Sodium Latest Ref Range: 136 - 145 mmol/L 139 141   Potassium Latest Ref Range: 3.5 - 5.1 mmol/L 4.2 4.1   Chloride Latest Ref Range: 95 - 110 mmol/L 104 107   CO2 Latest Ref Range: 23 - 29 mmol/L 27 24   Anion Gap Latest Ref Range: 8 - 16 mmol/L 8 10   BUN, Bld Latest Ref Range: 8 - 23 mg/dL 7 (L)  7 (L)   Creatinine Latest Ref Range: 0.5 - 1.4 mg/dL 0.8 0.9   eGFR if non African American Latest Ref Range: >60 mL/min/1.73 m^2 >60 >60   eGFR if  Latest Ref Range: >60 mL/min/1.73 m^2 >60 >60   Glucose Latest Ref Range: 70 - 110 mg/dL 107 103   Calcium Latest Ref Range: 8.7 - 10.5 mg/dL 9.5 9.9   Phosphorus Latest Ref Range: 2.7 - 4.5 mg/dL 4.7 (H) 4.8 (H)   Magnesium Latest Ref Range: 1.6 - 2.6 mg/dL 1.7 1.8     Shiga Toxin 1 E.coli Negative    Shiga Toxin 2 E.coli Negative            Stool Culture No Salmonella,Shigella,Vibrio,Campylobacter,Yersinia isolated.      FINDINGS:  There are postoperative changes at the gastroesophageal junction.  The small bowel is normal in caliber.  The appendix is not seen, with no right lower quadrant findings of appendicitis present.  There is mild diverticulosis coli without evidence for diverticulitis.  The liver is unremarkable.  The spleen is upper normal in size.  Pancreas and adrenal glands are unremarkable.  There is focal cortical scarring of the upper pole of the left kidney without significant change.  There is no hydronephrosis.  Mild perinephric and periureteral stranding is present bilaterally.  There is no free air or free fluid.  No abdominopelvic fluid collection.  There has been a hysterectomy.  There is mild calcification of the aorta without aneurysm.      Pending Diagnostic Studies:     None         Medications:  Reconciled Home Medications:      Medication List      START taking these medications    ciprofloxacin HCl 500 MG tablet  Commonly known as:  CIPRO  Take 1 tablet (500 mg total) by mouth every 12 (twelve) hours.     hydrocortisone 2.5 % cream  Apply topically 2 (two) times daily.        CHANGE how you take these medications    gabapentin 300 MG capsule  Commonly known as:  NEURONTIN  Take 1 capsule (300 mg total) by mouth 2 (two) times daily.  What changed:  · when to take this  · reasons to take this        CONTINUE taking these  medications    ALPRAZolam 0.5 MG tablet  Commonly known as:  XANAX  Take 0.5 mg by mouth 3 (three) times daily.     amLODIPine 10 MG tablet  Commonly known as:  NORVASC  Take 1 tablet (10 mg total) by mouth once daily.     busPIRone 15 MG tablet  Commonly known as:  BUSPAR  Take 1 tablet (15 mg total) by mouth 3 (three) times daily.     CALCIUM 500 + D 500 mg(1,250mg) -200 unit per tablet  Generic drug:  calcium-vitamin D3  Take 1 tablet by mouth 2 (two) times daily with meals.     celecoxib 100 MG capsule  Commonly known as:  CeleBREX  Take 200 mg by mouth once daily.     CENTRUM 3,500-18-0.4 unit-mg-mg Chew  Generic drug:  multivit-iron-min-folic acid  Take by mouth once daily.     fluticasone 50 mcg/actuation nasal spray  Commonly known as:  FLONASE  1 spray by Nasal route daily as needed.     HYDROcodone-acetaminophen 5-325 mg per tablet  Commonly known as:  NORCO  Take 1-2 tablets by mouth every 6 (six) hours as needed for Pain.     lisinopril 30 MG tablet  Commonly known as:  PRINIVIL,ZESTRIL  TAKE ONE TABLET BY MOUTH ONCE DAILY     meclizine 25 mg tablet  Commonly known as:  ANTIVERT  Take 1 tablet (25 mg total) by mouth 3 (three) times daily as needed.     meloxicam 15 MG tablet  Commonly known as:  MOBIC  Take 15 mg by mouth daily as needed.     pravastatin 40 MG tablet  Commonly known as:  PRAVACHOL  TAKE ONE TABLET BY MOUTH ONCE DAILY     zolpidem 5 MG Tab  Commonly known as:  AMBIEN  Take 5 mg by mouth every evening.        STOP taking these medications    cyanocobalamin 1,000 mcg/mL injection     PRESERVISION AREDS ORAL     traMADol 50 mg tablet  Commonly known as:  ULTRAM     VITAMIN B-12 1000 MCG tablet  Generic drug:  cyanocobalamin            Indwelling Lines/Drains at time of discharge:   Lines/Drains/Airways          No matching active lines, drains, or airways          Time spent on the discharge of patient: 32 minutes  Patient was seen and examined on the date of discharge and determined to be  suitable for discharge.         Annmarie Aquino MD  Department of Hospital Medicine  [facility]

## 2018-08-07 ENCOUNTER — HOSPITAL ENCOUNTER (EMERGENCY)
Facility: HOSPITAL | Age: 63
Discharge: HOME | End: 2018-08-07
Attending: EMERGENCY MEDICINE
Payer: MEDICARE

## 2018-08-07 VITALS
DIASTOLIC BLOOD PRESSURE: 77 MMHG | BODY MASS INDEX: 32.79 KG/M2 | TEMPERATURE: 99 F | OXYGEN SATURATION: 95 % | WEIGHT: 191 LBS | SYSTOLIC BLOOD PRESSURE: 123 MMHG | RESPIRATION RATE: 18 BRPM | HEART RATE: 74 BPM

## 2018-08-07 DIAGNOSIS — S80.12XA CONTUSION OF LEFT LOWER EXTREMITY, INITIAL ENCOUNTER: ICD-10-CM

## 2018-08-07 DIAGNOSIS — S16.1XXA STRAIN OF NECK MUSCLE, INITIAL ENCOUNTER: ICD-10-CM

## 2018-08-07 DIAGNOSIS — S06.0X0A CONCUSSION WITHOUT LOSS OF CONSCIOUSNESS, INITIAL ENCOUNTER: ICD-10-CM

## 2018-08-07 DIAGNOSIS — R50.9 FEVER, UNSPECIFIED FEVER CAUSE: ICD-10-CM

## 2018-08-07 DIAGNOSIS — R05.9 COUGH: ICD-10-CM

## 2018-08-07 DIAGNOSIS — J06.9 VIRAL URI WITH COUGH: Primary | ICD-10-CM

## 2018-08-07 LAB
ALBUMIN SERPL BCP-MCNC: 3.8 G/DL
ALP SERPL-CCNC: 122 U/L
ALT SERPL W/O P-5'-P-CCNC: 16 U/L
ANION GAP SERPL CALC-SCNC: 11 MMOL/L
AST SERPL-CCNC: 27 U/L
BASOPHILS # BLD AUTO: 0 K/UL
BASOPHILS NFR BLD: 0.7 %
BILIRUB SERPL-MCNC: 0.4 MG/DL
BILIRUB UR QL STRIP: NEGATIVE
BUN SERPL-MCNC: 12 MG/DL
CALCIUM SERPL-MCNC: 9.6 MG/DL
CHLORIDE SERPL-SCNC: 101 MMOL/L
CLARITY UR: CLEAR
CO2 SERPL-SCNC: 26 MMOL/L
COLOR UR: YELLOW
CREAT SERPL-MCNC: 0.9 MG/DL
DIFFERENTIAL METHOD: ABNORMAL
EOSINOPHIL # BLD AUTO: 0 K/UL
EOSINOPHIL NFR BLD: 0.2 %
ERYTHROCYTE [DISTWIDTH] IN BLOOD BY AUTOMATED COUNT: 14.3 %
EST. GFR  (AFRICAN AMERICAN): >60 ML/MIN/1.73 M^2
EST. GFR  (NON AFRICAN AMERICAN): >60 ML/MIN/1.73 M^2
FLUAV AG SPEC QL IA: NEGATIVE
FLUBV AG SPEC QL IA: NEGATIVE
GLUCOSE SERPL-MCNC: 109 MG/DL
GLUCOSE UR QL STRIP: NEGATIVE
HCT VFR BLD AUTO: 39.1 %
HGB BLD-MCNC: 13 G/DL
HGB UR QL STRIP: NEGATIVE
KETONES UR QL STRIP: NEGATIVE
LEUKOCYTE ESTERASE UR QL STRIP: NEGATIVE
LYMPHOCYTES # BLD AUTO: 0.5 K/UL
LYMPHOCYTES NFR BLD: 17.6 %
MCH RBC QN AUTO: 31.1 PG
MCHC RBC AUTO-ENTMCNC: 33.3 G/DL
MCV RBC AUTO: 93 FL
MONOCYTES # BLD AUTO: 0.3 K/UL
MONOCYTES NFR BLD: 11.8 %
NEUTROPHILS # BLD AUTO: 2 K/UL
NEUTROPHILS NFR BLD: 69.7 %
NITRITE UR QL STRIP: NEGATIVE
PH UR STRIP: 8 [PH] (ref 5–8)
PLATELET # BLD AUTO: 169 K/UL
PMV BLD AUTO: 7.2 FL
POTASSIUM SERPL-SCNC: 4.1 MMOL/L
PROT SERPL-MCNC: 7.2 G/DL
PROT UR QL STRIP: NEGATIVE
RBC # BLD AUTO: 4.19 M/UL
SODIUM SERPL-SCNC: 138 MMOL/L
SP GR UR STRIP: 1.01 (ref 1–1.03)
SPECIMEN SOURCE: NORMAL
URN SPEC COLLECT METH UR: NORMAL
UROBILINOGEN UR STRIP-ACNC: NEGATIVE EU/DL
WBC # BLD AUTO: 2.9 K/UL

## 2018-08-07 PROCEDURE — 99284 EMERGENCY DEPT VISIT MOD MDM: CPT

## 2018-08-07 PROCEDURE — 87400 INFLUENZA A/B EACH AG IA: CPT | Mod: 59

## 2018-08-07 PROCEDURE — 81003 URINALYSIS AUTO W/O SCOPE: CPT

## 2018-08-07 PROCEDURE — 36415 COLL VENOUS BLD VENIPUNCTURE: CPT

## 2018-08-07 PROCEDURE — 25000003 PHARM REV CODE 250: Performed by: EMERGENCY MEDICINE

## 2018-08-07 PROCEDURE — 80053 COMPREHEN METABOLIC PANEL: CPT

## 2018-08-07 PROCEDURE — 85025 COMPLETE CBC W/AUTO DIFF WBC: CPT

## 2018-08-07 RX ORDER — IBUPROFEN 400 MG/1
800 TABLET ORAL
Status: COMPLETED | OUTPATIENT
Start: 2018-08-07 | End: 2018-08-07

## 2018-08-07 RX ORDER — ACETAMINOPHEN 500 MG
1000 TABLET ORAL
Status: COMPLETED | OUTPATIENT
Start: 2018-08-07 | End: 2018-08-07

## 2018-08-07 RX ADMIN — ACETAMINOPHEN 1000 MG: 500 TABLET, FILM COATED ORAL at 08:08

## 2018-08-07 RX ADMIN — IBUPROFEN 800 MG: 400 TABLET, FILM COATED ORAL at 08:08

## 2018-08-07 NOTE — ED PROVIDER NOTES
Encounter Date: 8/7/2018    SCRIBE #1 NOTE: Rosio QUEZADA am scribing for, and in the presence of, .       History     Chief Complaint   Patient presents with    Fever     C/o 101.6 temp this AM with generalized bodyaches/headache.     Nausea       Time seen by provider: 8:27 AM on 08/07/2018    Beatriz Gan is a 62 y.o. female with HTN, HLD, hx of Renal cell carcinoma s/p partial nephrectomy who presents to the ED with fever. Patient states she has had fever onset yesterday with a TMAX 101.6F this AM. She complains of a productive cough, nausea, headache, congestion, runny nose, and sore throat onset 3 days.She otherwise states that she tripped and fell onto her knees bruising her left lower leg and hit her head 1.5 weeks ago and ever since has had head pain, neck pain, burning down her arms and hands, and generalized body aches. Patient denies any burning with urination, blood in her urine, vomiting, diarrhea, ear pain, abdominal pain, changes in vision, or numbness/weakness. She was admitted 3 months ago for pyelonephritis and sepsis; she notes at that time she had urinary symptoms. Patient states her surgical history includes neck surgery 4 months ago. She denies smoking and endorses drinking 1-2 drinks nightly.       The history is provided by the patient. No  was used.     Review of patient's allergies indicates:   Allergen Reactions    Promethazine Hives and Rash    Fentanyl Other (See Comments)     Confusion     Latex, natural rubber Hives     Per pt report-many years    Morphine Hives     Past Medical History:   Diagnosis Date    Alcohol dependence     DDD (degenerative disc disease), lumbosacral     DJD (degenerative joint disease), lumbosacral     Encounter for blood transfusion     GERD (gastroesophageal reflux disease)     Gout     Hypercholesterolemia     Hypertension     NATHALIE (iron deficiency anemia)     questionable white coat hypertension     Kidney carcinoma, left 2014    Pneumonia     Renal cell carcinoma     Shingles 10/13/2012     Past Surgical History:   Procedure Laterality Date    APPENDECTOMY      BLADDER REPAIR      x 2    COLONOSCOPY  9/2011    ESOPHAGOGASTRODUODENOSCOPY  9/2011    EYE SURGERY      lasix bilateral    HERNIA REPAIR      hiatal hernai    HYSTERECTOMY      LAPAROSCOPIC NISSEN FUNDOPLICATION      NEPHRECTOMY      LT partial    REFRACTIVE SURGERY      SKIN BIOPSY      TOTAL ABDOMINAL HYSTERECTOMY W/ BILATERAL SALPINGOOPHORECTOMY  age 48     Family History   Problem Relation Age of Onset    Hypertension Father      Social History   Substance Use Topics    Smoking status: Former Smoker     Packs/day: 1.00     Years: 40.00     Types: Cigarettes     Quit date: 1/1/2018    Smokeless tobacco: Never Used    Alcohol use 9.6 oz/week     2 Cans of beer, 14 Standard drinks or equivalent per week      Comment: 2-3 a day/ social     Review of Systems   Constitutional: Positive for fever.   HENT: Positive for congestion, rhinorrhea and sore throat.    Eyes: Negative for visual disturbance.   Respiratory: Positive for cough. Negative for shortness of breath.    Cardiovascular: Negative for chest pain.   Gastrointestinal: Positive for nausea. Negative for abdominal pain, diarrhea and vomiting.   Genitourinary: Negative for difficulty urinating, dysuria and hematuria.   Musculoskeletal: Positive for myalgias and neck pain. Negative for back pain.   Skin: Negative for rash.   Neurological: Positive for headaches. Negative for weakness and numbness.   Hematological: Does not bruise/bleed easily.       Physical Exam     Initial Vitals [08/07/18 0802]   BP Pulse Resp Temp SpO2   (!) 186/96 95 18 98.9 °F (37.2 °C) 95 %      MAP       --         Physical Exam    Nursing note and vitals reviewed.  Constitutional: She appears well-developed.   HENT:   Head: Normocephalic and atraumatic.   Right Ear: External ear normal.   Left Ear:  External ear normal.   Mouth/Throat: Oropharynx is clear and moist. No oropharyngeal exudate.   No malocclusion. No hemotympanum. Bilateral TM's clear. Throat clear. Mild congestion noted. I do not appreciate a large hematoma, however is tender over right posterior head.   Eyes: EOM are normal. Pupils are equal, round, and reactive to light.   Neck: Normal range of motion. Neck supple.   Cardiovascular: Normal rate, regular rhythm, normal heart sounds and intact distal pulses. Exam reveals no gallop and no friction rub.    No murmur heard.  Pulmonary/Chest: Breath sounds normal. No respiratory distress. She has no decreased breath sounds. She has no wheezes. She has no rhonchi. She has no rales.   Abdominal: Soft. Bowel sounds are normal. She exhibits no distension. There is no tenderness.   Healed scarring to LUQ. Mild suprapubic tenderness.    Musculoskeletal: Normal range of motion.   5/5 grasp, wrist and elbow flexion/extrension.   Neurological: She is alert and oriented to person, place, and time. She has normal strength. No cranial nerve deficit or sensory deficit.   Skin: Skin is warm and dry.   Bruise over left anterior tibia.    Psychiatric: She has a normal mood and affect.         ED Course   Procedures  Labs Reviewed - No data to display       Imaging Results    None          Medical Decision Making:   History:   Old Medical Records: I decided to obtain old medical records.  Clinical Tests:   Lab Tests: Ordered and Reviewed  Radiological Study: Ordered and Reviewed  Patient's chest x-ray shows no evidence of pneumonia.  CT of the head and cervical spine show no acute injury. She likely has a concussion from her fall with a cervical strain and at this point I do not think she has any weakness or significant numbness to warrant emergent MRI.  She can follow up with her neurosurgeon.  In regards to her fever at home which likely related to her congestion headache and cough which is likely a viral upper  respiratory infection.  She can take over-the-counter medicines.  Chest x-ray shows no acute pneumonia.  I doubt this is meningitis.  She is stable for discharge at this time.  She does have a mild leukopenia, but no significant fevers tachycardia hypotension here to suggest sepsis.            Scribe Attestation:   Scribe #1: I performed the above scribed service and the documentation accurately describes the services I performed. I attest to the accuracy of the note.    I, Dr. Dustin Mai personally performed the services described in this documentation. All medical record entries made by the scribe were at my direction and in my presence.  I have reviewed the chart and agree that the record reflects my personal performance and is accurate and complete. Dustin Mai MD.  10:21 AM 08/07/2018    DISCLAIMER: This note was prepared with Dragon NaturallySpeaking voice recognition transcription software. Garbled syntax, mangled pronouns, and other bizarre constructions may be attributed to that software system            Clinical Impression:   The primary encounter diagnosis was Viral URI with cough. Diagnoses of Cough, Fever, unspecified fever cause, Concussion without loss of consciousness, initial encounter, Strain of neck muscle, initial encounter, and Contusion of left lower extremity, initial encounter were also pertinent to this visit.                             Dustin Mai MD  08/07/18 [id number]

## 2018-09-29 NOTE — PROGRESS NOTES
"[facility] Medicine  Progress Note    Patient Name: Beatriz Gan  MRN: [id number]  Patient Class: IP- Inpatient   Admission Date: 5/26/2018  Length of Stay: 7 days  Attending Physician: Parminder Baldwin    Primary Care Provider: Jorge Alonzo MD        Subjective:     Principal Problem:Gram negative sepsis    HPI:  Beatriz Gna is a 62 y.o. female with a PMHx of HTN, kidney infections, and kidney cancer who presents to the ED with complaints of fever, burning urination,and abdominal pain with an onset x 3 days PTA. The patient states that she has had a fever ranging between 102-103 and has been having intermittent chills.Tylenol and Nyquil have given her some relief. She reports that she recently finished a round of antibiotics for a kidney infection that was diagnoses by her PCP x 2 weeks ago. She reports that her symptoms today are similar to those she experienced with the kidney infection. The patient states that her burning urination and abdominal pain slightly improved with the antibiotics, but did not dissipate entirely.  I do not have name of antiobiotic name at this time and last culture noted in chart was from 2107 e coli with some resistance but sensitive to rocephin. She describes the abdominal pain as a soreness that is right upper abdomen and flank area  and reports some associated diarrhea without blood. Denies any bowel movements today. Reports that she was feeling nauseous and was vomiting yesterday, but "nausea pills" she took have relieved these symptoms. She also admits to having "flu like symptoms" such as a cough x 1 week ago that has not resolved. She had kidney cancer x 3 years ago which resulted in the removal of 20% of her left kidney. The patient denies frequent urination, blood in stool,  or any other symptoms at this time.     Hospital Course:  Admitted for pyelonephritis/gm neg sepsis  --due to pyelonephritis treated with iv rocephin and " ivfBlood/urine cultures positive for  E Coli s/rocephin and cipro. . Had continued fever spike -so CT done to r/o abscess and showed inflammation around kidney. Fever improved and after 1 week iv abx with rocephin she is dc home on another week of po cipro.   Bhupinder resolved with hydration. She required po potassium replacement for hypokalemia and this resolved and she had acute blood loss anemia from an episode of hematochezia but did not require transfusion or any further gi workup as it resolved on its own. She should have gi fu in the next few weeks. Stool cultures were negative.   She also had mild sob resolved with oxygen and nebulizer tx. She had no wheeze or cough and had noted small bilateeral pleural effusions/atelectasis -likely due to hydration and immobility but her lungs were clear at time of discharge and she required no oxygen supplementation at time of discharge  Alert , nad. Ox3 heent nl lungs clear cor rrr; Abd-soft nontender. No cva tenderness or edema; no flank tendernss       Interval History: Patient seen and examined. No acute events.  Patient remains on appropriate antibiotics per blood/urine sensitivities.    Review of Systems   Constitutional: Positive for fatigue and fever.   Respiratory: Positive for cough. Negative for shortness of breath.    Cardiovascular: Negative for chest pain and leg swelling.   Gastrointestinal: Negative for nausea.   Neurological: Positive for headaches. Negative for weakness.   Psychiatric/Behavioral: Negative for confusion.   All other systems reviewed and are negative.    Objective:     Vital Signs (Most Recent):  Temp: 98.3 °F (36.8 °C) (06/02/18 2001)  Pulse: 88 (06/02/18 2001)  Resp: 18 (06/02/18 2001)  BP: 121/72 (06/02/18 2001)  SpO2: 99 % (06/02/18 2001) Vital Signs (24h Range):        Weight: 86.2 kg (190 lb)  Body mass index is 32.61 kg/m².  No intake or output data in the 24 hours ending 09/29/18 1614   Physical Exam   Constitutional: She is oriented to  person, place, and time. She appears well-developed and well-nourished.   Eyes: EOM are normal. Pupils are equal, round, and reactive to light.   Cardiovascular: Normal rate, regular rhythm and intact distal pulses.   No murmur heard.  Pulmonary/Chest: Effort normal. She has wheezes (Appear to be improving from previous.).   Abdominal: Soft. She exhibits no distension. There is tenderness (Mild). There is no rebound.   Musculoskeletal: She exhibits no edema.   Neurological: She is alert and oriented to person, place, and time.   Skin: No rash noted. No pallor.   Nursing note and vitals reviewed.      Significant Labs: All pertinent labs within the past 24 hours have been reviewed.    Significant Imaging: I have reviewed all pertinent imaging results/findings within the past 24 hours.    Assessment/Plan:      * Gram negative sepsis    etiology remains likely secondary to pyelonephritis.  Continue on appropriate antibiotics.  Potential cause for recurrent fever could be forming abscess versus phlegmon or secondary causes including inflammatory or medication related fever.  Will continue appropriate investigations and continue patient on antibiotics for now.  If fever has persisted for greater than 48 hr, would consider reimaging for further development of perinephric abscess.  Patient high risk for perinephric abscess given anatomic abnormalities of her kidney per surgically.        ALAN (acute kidney injury)    Patient with ALAN likely d/t ATN  Which is currently Improving. Labs reviewed- BMP with Estimated Creatinine Clearance: 60.4 mL/min (based on SCr of 1 mg/dL). according to latest data. Monitor UOP and serial BMP and adjust therapy as needed. Renally dose meds for GFR listed above.            Acute bronchospasm    etiology remains unclear, likely related to environmental.  Will continue patient on scheduled duo nebs and monitor.  She remains with stable O2 sats in the high 90s on room air. Improving.           Acute blood loss anemia    Likely secondary to hematochezia.  Potential etiologies could include diverticulosis versus internal hemorrhoids, her bleeding was painless.  Will trend H&H and monitor patient clinically.  No indication for emergent colonoscopy or imaging at this point.           Lower GI bleed    Treatment per above with anemia.          DDD (degenerative disc disease), lumbar    Chronic, pt with pain-  Continue gabapentin and   Prn hydrocodone-no acute issues         Depression    Chronic, stable,   Continue home meds with buspar and xanax  Would benefit from outpt counseling/treatment when medically improved         Body mass index 30.0-30.9, adult    Body mass index is 32.61 kg/m². Morbid obesity complicates all aspects of disease management from diagnostic modalities to treatment. Weight loss encouraged and health benefits explained to patient.            Hypokalemia    Patient has hypokalemia which is currently controlled. Last electrolytes reviewed-     Recent Labs  Lab 05/30/18  0428 05/31/18  0553   K 3.2* 4.0   . Will replace potassium and monitor electrolytes closely.           HTN (hypertension)    Chronic, controlled.  Monitor BP closely.  Will continue BP medications as needed for sustained BP control.            VTE Risk Mitigation (From admission, onward)        Ordered     IP VTE HIGH RISK PATIENT  Once      05/26/18 3868              Parminder Baldwin MD  Department of Hospital Medicine   [facility]

## 2018-09-29 NOTE — SUBJECTIVE & OBJECTIVE
Interval History: Patient seen and examined. No acute events.  Patient remains on appropriate antibiotics per blood/urine sensitivities.    Review of Systems   Constitutional: Positive for fatigue and fever.   Respiratory: Positive for cough. Negative for shortness of breath.    Cardiovascular: Negative for chest pain and leg swelling.   Gastrointestinal: Negative for nausea.   Neurological: Positive for headaches. Negative for weakness.   Psychiatric/Behavioral: Negative for confusion.   All other systems reviewed and are negative.    Objective:     Vital Signs (Most Recent):  Temp: 98.3 °F (36.8 °C) (06/02/18 2001)  Pulse: 88 (06/02/18 2001)  Resp: 18 (06/02/18 2001)  BP: 121/72 (06/02/18 2001)  SpO2: 99 % (06/02/18 2001) Vital Signs (24h Range):        Weight: 86.2 kg (190 lb)  Body mass index is 32.61 kg/m².  No intake or output data in the 24 hours ending 09/29/18 1614   Physical Exam   Constitutional: She is oriented to person, place, and time. She appears well-developed and well-nourished.   Eyes: EOM are normal. Pupils are equal, round, and reactive to light.   Cardiovascular: Normal rate, regular rhythm and intact distal pulses.   No murmur heard.  Pulmonary/Chest: Effort normal. She has wheezes (Appear to be improving from previous.).   Abdominal: Soft. She exhibits no distension. There is tenderness (Mild). There is no rebound.   Musculoskeletal: She exhibits no edema.   Neurological: She is alert and oriented to person, place, and time.   Skin: No rash noted. No pallor.   Nursing note and vitals reviewed.      Significant Labs: All pertinent labs within the past 24 hours have been reviewed.    Significant Imaging: I have reviewed all pertinent imaging results/findings within the past 24 hours.

## 2018-10-07 NOTE — PROGRESS NOTES
"[facility] Medicine  Progress Note    Patient Name: Beatriz Gan  MRN: [id number]  Patient Class: IP- Inpatient   Admission Date: 5/26/2018  Length of Stay: 7 days  Attending Physician: No att. providers found  Primary Care Provider: Jorge Alonzo MD        Subjective:     Principal Problem:Gram negative sepsis    HPI:  Beatriz Gan is a 62 y.o. female with a PMHx of HTN, kidney infections, and kidney cancer who presents to the ED with complaints of fever, burning urination,and abdominal pain with an onset x 3 days PTA. The patient states that she has had a fever ranging between 102-103 and has been having intermittent chills.Tylenol and Nyquil have given her some relief. She reports that she recently finished a round of antibiotics for a kidney infection that was diagnoses by her PCP x 2 weeks ago. She reports that her symptoms today are similar to those she experienced with the kidney infection. The patient states that her burning urination and abdominal pain slightly improved with the antibiotics, but did not dissipate entirely.  I do not have name of antiobiotic name at this time and last culture noted in chart was from 2107 e coli with some resistance but sensitive to rocephin. She describes the abdominal pain as a soreness that is right upper abdomen and flank area  and reports some associated diarrhea without blood. Denies any bowel movements today. Reports that she was feeling nauseous and was vomiting yesterday, but "nausea pills" she took have relieved these symptoms. She also admits to having "flu like symptoms" such as a cough x 1 week ago that has not resolved. She had kidney cancer x 3 years ago which resulted in the removal of 20% of her left kidney. The patient denies frequent urination, blood in stool,  or any other symptoms at this time.     Hospital Course:  Admitted for pyelonephritis/gm neg sepsis  --due to pyelonephritis treated with iv rocephin " and ivfBlood/urine cultures positive for  E Coli s/rocephin and cipro. . Had continued fever spike -so CT done to r/o abscess and showed inflammation around kidney. Fever improved and after 1 week iv abx with rocephin she is dc home on another week of po cipro.   Bhupinder resolved with hydration. She required po potassium replacement for hypokalemia and this resolved and she had acute blood loss anemia from an episode of hematochezia but did not require transfusion or any further gi workup as it resolved on its own. She should have gi fu in the next few weeks. Stool cultures were negative.   She also had mild sob resolved with oxygen and nebulizer tx. She had no wheeze or cough and had noted small bilateeral pleural effusions/atelectasis -likely due to hydration and immobility but her lungs were clear at time of discharge and she required no oxygen supplementation at time of discharge  Alert , nad. Ox3 heent nl lungs clear cor rrr; Abd-soft nontender. No cva tenderness or edema; no flank tendernss       Interval History:       Review of Systems   Constitutional: Positive for appetite change, chills and fever.   HENT: Positive for congestion.    Eyes: Negative for discharge and itching.   Respiratory: Positive for cough. Negative for choking, shortness of breath and wheezing.    Cardiovascular: Negative for chest pain and leg swelling.   Gastrointestinal: Positive for abdominal pain, diarrhea and nausea.   Endocrine: Negative for polydipsia and polyphagia.   Genitourinary: Positive for dysuria. Negative for difficulty urinating and hematuria.   Musculoskeletal: Positive for back pain and myalgias.   Skin: Negative.    Allergic/Immunologic: Negative for food allergies and immunocompromised state.   Neurological: Negative for headaches.   Hematological: Negative for adenopathy. Does not bruise/bleed easily.   Psychiatric/Behavioral: Positive for dysphoric mood. The patient is nervous/anxious.      Objective:     Vital Signs  (Most Recent):  Temp: 98.3 °F (36.8 °C) (06/02/18 2001)  Pulse: 88 (06/02/18 2001)  Resp: 18 (06/02/18 2001)  BP: 121/72 (06/02/18 2001)  SpO2: 99 % (06/02/18 2001) Vital Signs (24h Range):        Weight: 86.2 kg (190 lb)  Body mass index is 32.61 kg/m².  No intake or output data in the 24 hours ending 10/07/18 1757   Physical Exam   Constitutional: She is oriented to person, place, and time. She appears well-developed and well-nourished. No distress.   HENT:   Head: Normocephalic and atraumatic.   Right Ear: External ear normal.   Left Ear: External ear normal.   Nose: Nose normal.   Mouth/Throat: Oropharynx is clear and moist. No oropharyngeal exudate.   Eyes: Conjunctivae and EOM are normal. Right eye exhibits no discharge. Left eye exhibits no discharge. No scleral icterus.   Neck: Normal range of motion. Neck supple. No thyromegaly present.   Cardiovascular: Normal rate, regular rhythm, normal heart sounds and intact distal pulses. Exam reveals no gallop and no friction rub.   No murmur heard.  Pulmonary/Chest: Effort normal and breath sounds normal. No respiratory distress. She has no wheezes.   Abdominal: Soft. Bowel sounds are normal. She exhibits no distension and no mass. There is tenderness in the right upper quadrant. There is CVA tenderness. There is no rigidity, no rebound and no guarding.   Musculoskeletal: Normal range of motion. She exhibits no edema or tenderness.   Lymphadenopathy:     She has no cervical adenopathy.   Neurological: She is alert and oriented to person, place, and time. No cranial nerve deficit. Coordination normal.   Skin: Skin is warm and dry. No rash noted. No erythema.   Psychiatric: She has a normal mood and affect. Her behavior is normal. Judgment and thought content normal.   Nursing note and vitals reviewed.      Significant Labs: All pertinent labs within the past 24 hours have been reviewed.    Significant Imaging: I have reviewed and interpreted all pertinent imaging  results/findings within the past 24 hours.    Assessment/Plan:      * Gram negative sepsis    etiology remains likely secondary to pyelonephritis.  Continue on appropriate antibiotics.  Potential cause for recurrent fever could be forming abscess versus phlegmon or secondary causes including inflammatory or medication related fever.  Will continue appropriate investigations and continue patient on antibiotics for now.  If fever has persisted for greater than 48 hr, would consider reimaging for further development of perinephric abscess.  Patient high risk for perinephric abscess given anatomic abnormalities of her kidney per surgically.        Acute blood loss anemia    Likely secondary to hematochezia.  Potential etiologies could include diverticulosis versus internal hemorrhoids, her bleeding was painless.  Will trend H&H and monitor patient clinically.  No indication for emergent colonoscopy or imaging at this point.           Lower GI bleed    Treatment per above with anemia.          Acute bronchospasm    etiology remains unclear, likely related to environmental.  Will continue patient on scheduled duo nebs and monitor.  She remains with stable O2 sats in the high 90s on room air. Improving.          ALAN (acute kidney injury)    Patient with ALAN likely d/t ATN  Which is currently Improving. Labs reviewed according to latest data. Monitor UOP and serial BMP and adjust therapy as needed. Renally dose meds for GFR listed above.            DDD (degenerative disc disease), lumbar    Chronic, pt with pain-  Continue gabapentin and   Prn hydrocodone-no acute issues         Depression    Chronic, stable,   Continue home meds with buspar and xanax  Would benefit from outpt counseling/treatment when medically improved         Body mass index 30.0-30.9, adult    Body mass index is 32.61 kg/m². Morbid obesity complicates all aspects of disease management from diagnostic modalities to treatment. Weight loss encouraged and  health benefits explained to patient.            Hypokalemia    Patient has hypokalemia which is currently controlled. Last electrolytes reviewed-     Recent Labs  Lab 05/30/18  0428 05/31/18  0553   K 3.2* 4.0   . Will replace potassium and monitor electrolytes closely.           HTN (hypertension)    Chronic, controlled.  Monitor BP closely.  Will continue BP medications as needed for sustained BP control.            VTE Risk Mitigation (From admission, onward)        Ordered     IP VTE HIGH RISK PATIENT  Once      05/26/18 1426              Annmarie Aquino MD  Department of Hospital Medicine   [facility]

## 2018-10-07 NOTE — ASSESSMENT & PLAN NOTE
Patient with ALAN likely d/t ATN  Which is currently Improving. Labs reviewed according to latest data. Monitor UOP and serial BMP and adjust therapy as needed. Renally dose meds for GFR listed above.

## 2018-10-07 NOTE — SUBJECTIVE & OBJECTIVE
Interval History:       Review of Systems   Constitutional: Positive for appetite change, chills and fever.   HENT: Positive for congestion.    Eyes: Negative for discharge and itching.   Respiratory: Positive for cough. Negative for choking, shortness of breath and wheezing.    Cardiovascular: Negative for chest pain and leg swelling.   Gastrointestinal: Positive for abdominal pain, diarrhea and nausea.   Endocrine: Negative for polydipsia and polyphagia.   Genitourinary: Positive for dysuria. Negative for difficulty urinating and hematuria.   Musculoskeletal: Positive for back pain and myalgias.   Skin: Negative.    Allergic/Immunologic: Negative for food allergies and immunocompromised state.   Neurological: Negative for headaches.   Hematological: Negative for adenopathy. Does not bruise/bleed easily.   Psychiatric/Behavioral: Positive for dysphoric mood. The patient is nervous/anxious.      Objective:     Vital Signs (Most Recent):  Temp: 98.3 °F (36.8 °C) (06/02/18 2001)  Pulse: 88 (06/02/18 2001)  Resp: 18 (06/02/18 2001)  BP: 121/72 (06/02/18 2001)  SpO2: 99 % (06/02/18 2001) Vital Signs (24h Range):        Weight: 86.2 kg (190 lb)  Body mass index is 32.61 kg/m².  No intake or output data in the 24 hours ending 10/07/18 1757   Physical Exam   Constitutional: She is oriented to person, place, and time. She appears well-developed and well-nourished. No distress.   HENT:   Head: Normocephalic and atraumatic.   Right Ear: External ear normal.   Left Ear: External ear normal.   Nose: Nose normal.   Mouth/Throat: Oropharynx is clear and moist. No oropharyngeal exudate.   Eyes: Conjunctivae and EOM are normal. Right eye exhibits no discharge. Left eye exhibits no discharge. No scleral icterus.   Neck: Normal range of motion. Neck supple. No thyromegaly present.   Cardiovascular: Normal rate, regular rhythm, normal heart sounds and intact distal pulses. Exam reveals no gallop and no friction rub.   No murmur  heard.  Pulmonary/Chest: Effort normal and breath sounds normal. No respiratory distress. She has no wheezes.   Abdominal: Soft. Bowel sounds are normal. She exhibits no distension and no mass. There is tenderness in the right upper quadrant. There is CVA tenderness. There is no rigidity, no rebound and no guarding.   Musculoskeletal: Normal range of motion. She exhibits no edema or tenderness.   Lymphadenopathy:     She has no cervical adenopathy.   Neurological: She is alert and oriented to person, place, and time. No cranial nerve deficit. Coordination normal.   Skin: Skin is warm and dry. No rash noted. No erythema.   Psychiatric: She has a normal mood and affect. Her behavior is normal. Judgment and thought content normal.   Nursing note and vitals reviewed.      Significant Labs: All pertinent labs within the past 24 hours have been reviewed.    Significant Imaging: I have reviewed and interpreted all pertinent imaging results/findings within the past 24 hours.

## 2019-03-22 ENCOUNTER — TELEPHONE (OUTPATIENT)
Dept: UROLOGY | Facility: CLINIC | Age: 64
End: 2019-03-22

## 2019-03-22 ENCOUNTER — OFFICE VISIT (OUTPATIENT)
Dept: UROLOGY | Facility: CLINIC | Age: 64
End: 2019-03-22
Payer: MEDICARE

## 2019-03-22 VITALS
DIASTOLIC BLOOD PRESSURE: 102 MMHG | BODY MASS INDEX: 32.6 KG/M2 | HEIGHT: 64 IN | SYSTOLIC BLOOD PRESSURE: 169 MMHG | RESPIRATION RATE: 18 BRPM | TEMPERATURE: 99 F | HEART RATE: 83 BPM | WEIGHT: 190.94 LBS

## 2019-03-22 DIAGNOSIS — C64.2 RENAL CELL CARCINOMA OF LEFT KIDNEY: Primary | ICD-10-CM

## 2019-03-22 PROCEDURE — 99204 OFFICE O/P NEW MOD 45 MIN: CPT | Mod: S$GLB,,, | Performed by: UROLOGY

## 2019-03-22 PROCEDURE — 1101F PR PT FALLS ASSESS DOC 0-1 FALLS W/OUT INJ PAST YR: ICD-10-PCS | Mod: CPTII,S$GLB,, | Performed by: UROLOGY

## 2019-03-22 PROCEDURE — 1101F PT FALLS ASSESS-DOCD LE1/YR: CPT | Mod: CPTII,S$GLB,, | Performed by: UROLOGY

## 2019-03-22 PROCEDURE — 99999 PR PBB SHADOW E&M-EST. PATIENT-LVL IV: CPT | Mod: PBBFAC,,, | Performed by: UROLOGY

## 2019-03-22 PROCEDURE — 3008F BODY MASS INDEX DOCD: CPT | Mod: CPTII,S$GLB,, | Performed by: UROLOGY

## 2019-03-22 PROCEDURE — 3077F SYST BP >= 140 MM HG: CPT | Mod: CPTII,S$GLB,, | Performed by: UROLOGY

## 2019-03-22 PROCEDURE — 3008F PR BODY MASS INDEX (BMI) DOCUMENTED: ICD-10-PCS | Mod: CPTII,S$GLB,, | Performed by: UROLOGY

## 2019-03-22 PROCEDURE — 3080F DIAST BP >= 90 MM HG: CPT | Mod: CPTII,S$GLB,, | Performed by: UROLOGY

## 2019-03-22 PROCEDURE — 99204 PR OFFICE/OUTPT VISIT, NEW, LEVL IV, 45-59 MIN: ICD-10-PCS | Mod: S$GLB,,, | Performed by: UROLOGY

## 2019-03-22 PROCEDURE — 99999 PR PBB SHADOW E&M-EST. PATIENT-LVL IV: ICD-10-PCS | Mod: PBBFAC,,, | Performed by: UROLOGY

## 2019-03-22 PROCEDURE — 3080F PR MOST RECENT DIASTOLIC BLOOD PRESSURE >= 90 MM HG: ICD-10-PCS | Mod: CPTII,S$GLB,, | Performed by: UROLOGY

## 2019-03-22 PROCEDURE — 3077F PR MOST RECENT SYSTOLIC BLOOD PRESSURE >= 140 MM HG: ICD-10-PCS | Mod: CPTII,S$GLB,, | Performed by: UROLOGY

## 2019-03-22 NOTE — LETTER
March 22, 2019      No Recipients           [facility]  [address] [place] [address] Dr. Rekha SEGOVIA [address]  Phone: [phone]  Fax: [phone]          Patient: Beatriz Gan   MR Number: [id number]   YOB: 1955   Date of Visit: 3/22/2019       Dear :    Thank you for referring Beatriz Gan to me for evaluation. Attached you will find relevant portions of my assessment and plan of care.    If you have questions, please do not hesitate to call me. I look forward to following Beatriz Gan along with you.    Sincerely,    Monico Morrissey MD    Enclosure  CC:  No Recipients    If you would like to receive this communication electronically, please contact [email or web address] or [phone] to request more information on [employer] access.    For providers and/or their staff who would like to refer a patient to [facility], please contact us through our one-stop-shop provider referral line, Melchor Martinez, at [phone].    If you feel you have received this communication in error or would no longer like to receive these types of communications, please e-mail [email or web address]

## 2019-03-22 NOTE — TELEPHONE ENCOUNTER
Returned call, patient wants to schedule her CT bone right now her car broke down and she is having it towed, she wants a call back in 30 mins, informed will do, patient verbally understood.

## 2019-03-22 NOTE — PROGRESS NOTES
HISTORY AND PHYSICAL     DRUG ALLERGIES:  PROMETHAZINE, FENTANYL, LATEX AND MORPHINE      CHIEF COMPLAINT:  Left renal cell carcinoma, status post left partial   nephrectomy.    HISTORY OF PRESENT ILLNESS:  This 63-year-old female returns for routine   recheck.  She had undergone a left partial nephrectomy for renal cell carcinoma   on 01/13/2014 and followup evaluation since then have shown no evidence of any   recurrences, although her last followup visit was in 02/12/2015.  She did   undergo CT scan on 06/02/2018, which showed no evidence of any tumor recurrences   and no evidence of any metastatic disease.  Overall, on today's visit, she   denies any other specific complaints.    PAST MEDICAL HISTORY UPDATE:  Reveals that of being hospitalized with sepsis   from a UTI last year.  She also underwent a cervical fusion one and half years   ago, hypertension, history of pneumonia, history of abnormal liver enzymes,   GERD, degenerative disk disease, iron-deficiency anemia, hyperlipidemia,   vertigo.    PAST SURGICAL HISTORY:  Include hysterectomy, appendectomy, repair of bladder   injury following hysterectomy, colonoscopy, LASIK eye surgery, the   above-mentioned cervical fusion, and the left partial nephrectomy as mentioned   above.    CURRENT MEDICATIONS:  Include BuSpar, Ambien, multivitamins, Prinivil, Flonase,   Celebrex, Norvasc, and Xanax.    FAMILY HISTORY:  No significant health problems that the patient is aware of.    SOCIAL HISTORY:  Works as a , involved in a desk type work, , one   son and one daughter in good health.  Tobacco occasional.  Alcohol none.    REVIEW OF SYMPTOMS:  GENERAL:  No weight change.  Good appetite.  HEAD AND NECK:  No headaches.  CARDIORESPIRATORY:  No chest pain or shortness of breath.  No cough.  GASTROINTESTINAL:  No trouble swallowing, constipation or diarrhea.    PHYSICAL EXAMINATION:   VITAL SIGNS:  BP of 169/102, pulse 83.  GENERAL:  This is a well-developed,  well-nourished 63-year-old female, alert,   oriented, cooperative, in no acute distress.  HEAD AND NECK:  Throat clear.  No adenopathies.  CHEST:  Clear.  HEART:  Regular.  No murmur.  ABDOMEN:  Soft, benign, and nontender.  No masses.  No hernias or organomegaly.    Well-healed left upper quadrant surgical scar.  PELVIC:  Negative.  RECTAL: Negative.    Urinalysis, none done today.    FINAL IMPRESSION:  Renal cell carcinoma status post left partial nephrectomy.    RECOMMENDATION:  We will obtain CT scan of abdomen and pelvis with and without   contrast and CMP and contact the patient with the results.      MD/MG  dd: 03/22/2019 10:43:59 (CDT)  td: 03/22/2019 23:50:37 (CDT)  Doc ID   #[id number]  Job ID #[id number]    CC:

## 2019-03-22 NOTE — TELEPHONE ENCOUNTER
----- Message from Serena Stock sent at 3/22/2019 10:42 AM CDT -----  Contact: self  Type:  Patient Returning Call    Who Called:  self  Who Left Message for Patient:  Not sure  Does the patient know what this is regarding?:  yes  Best Call Back Number:  [phone]  Additional Information:  Patient states she needs to have the CT scan appointment set up. Please call patient. Thanks!

## 2019-04-01 ENCOUNTER — HOSPITAL ENCOUNTER (OUTPATIENT)
Dept: RADIOLOGY | Facility: HOSPITAL | Age: 64
Discharge: HOME | End: 2019-04-01
Attending: UROLOGY
Payer: MEDICARE

## 2019-04-01 DIAGNOSIS — C64.2 RENAL CELL CARCINOMA OF LEFT KIDNEY: ICD-10-CM

## 2019-04-01 PROCEDURE — 74178 CT ABD&PLV WO CNTR FLWD CNTR: CPT | Mod: 26,,, | Performed by: RADIOLOGY

## 2019-04-01 PROCEDURE — 74178 CT UROGRAM ABD PELVIS W WO: ICD-10-PCS | Mod: 26,,, | Performed by: RADIOLOGY

## 2019-04-01 PROCEDURE — 25500020 PHARM REV CODE 255: Performed by: UROLOGY

## 2019-04-01 PROCEDURE — 74178 CT ABD&PLV WO CNTR FLWD CNTR: CPT | Mod: TC

## 2019-04-01 RX ADMIN — IOHEXOL 125 ML: 350 INJECTION, SOLUTION INTRAVENOUS at 09:04

## 2019-04-05 ENCOUNTER — TELEPHONE (OUTPATIENT)
Dept: UROLOGY | Facility: CLINIC | Age: 64
End: 2019-04-05

## 2019-04-05 NOTE — TELEPHONE ENCOUNTER
----- Message from Kristina Salazar sent at 4/5/2019 12:44 PM CDT -----  Type:  Test Results    Who Called:  self  Name of Test (Lab/Mammo/Etc):  Labs and CT scan  Date of Test:  04/01  Ordering Provider:  Yuni  Where the test was performed:  [facility]  Best Call Back Number:  [phone] (home)     Additional Information:  Asking to have results explained

## 2019-04-15 DIAGNOSIS — M54.50 LUMBAGO: Primary | ICD-10-CM

## 2019-04-26 ENCOUNTER — HOSPITAL ENCOUNTER (OUTPATIENT)
Dept: RADIOLOGY | Facility: HOSPITAL | Age: 64
Discharge: HOME | End: 2019-04-26
Attending: ANESTHESIOLOGY
Payer: MEDICARE

## 2019-04-26 DIAGNOSIS — M54.50 LUMBAGO: ICD-10-CM

## 2019-04-26 PROCEDURE — 72148 MRI LUMBAR SPINE W/O DYE: CPT | Mod: 26,,, | Performed by: RADIOLOGY

## 2019-04-26 PROCEDURE — 72148 MRI LUMBAR SPINE WITHOUT CONTRAST: ICD-10-PCS | Mod: 26,,, | Performed by: RADIOLOGY

## 2019-04-26 PROCEDURE — 72148 MRI LUMBAR SPINE W/O DYE: CPT | Mod: TC

## 2019-07-08 ENCOUNTER — OFFICE VISIT (OUTPATIENT)
Dept: OPTOMETRY | Facility: CLINIC | Age: 64
End: 2019-07-08
Payer: MEDICARE

## 2019-07-08 DIAGNOSIS — H04.123 DRY EYE SYNDROME, BILATERAL: ICD-10-CM

## 2019-07-08 DIAGNOSIS — H57.89 EYE IRRITATION: Primary | ICD-10-CM

## 2019-07-08 PROCEDURE — 92002 INTRM OPH EXAM NEW PATIENT: CPT | Mod: S$GLB,,, | Performed by: OPTOMETRIST

## 2019-07-08 PROCEDURE — 99999 PR PBB SHADOW E&M-EST. PATIENT-LVL II: ICD-10-PCS | Mod: PBBFAC,,, | Performed by: OPTOMETRIST

## 2019-07-08 PROCEDURE — 99999 PR PBB SHADOW E&M-EST. PATIENT-LVL II: CPT | Mod: PBBFAC,,, | Performed by: OPTOMETRIST

## 2019-07-08 PROCEDURE — 92002 PR EYE EXAM, NEW PATIENT,INTERMED: ICD-10-PCS | Mod: S$GLB,,, | Performed by: OPTOMETRIST

## 2019-07-08 RX ORDER — PREDNISOLONE ACETATE 10 MG/ML
1 SUSPENSION/ DROPS OPHTHALMIC 4 TIMES DAILY
Qty: 5 ML | Refills: 0 | Status: SHIPPED | OUTPATIENT
Start: 2019-07-08 | End: 2019-07-15

## 2019-07-08 NOTE — PROGRESS NOTES
HPI     HPI    Pt here today because right eye has been crusty in the mornings and tears   throughout the day for the last months. Pt has tried OTC allergy drops and   has not helped with tearing or crust in the morning. Pt states right eye   burns.       (+)drops/ OTC allergy drops 2 x day right eye    (-)flashes  (-)floaters  (-)diplopia     (-)Diabetes     OCULAR HISTORY  Last Eye Exam:   (+)eye surgery / LASIK surgery 12 years ago both eyes             Last edited by Dakota Roe, OD on 7/8/2019 10:48 AM. (History)            Assessment /Plan     For exam results, see Encounter Report.    Eye irritation  -     prednisoLONE acetate (PRED FORTE) 1 % DrpS; Place 1 drop into the right eye 4 (four) times daily. for 7 days  Dispense: 5 mL; Refill: 0    Dry eye syndrome, bilateral  -     prednisoLONE acetate (PRED FORTE) 1 % DrpS; Place 1 drop into the right eye 4 (four) times daily. for 7 days  Dispense: 5 mL; Refill: 0      Discussed findings and treatment options. Start Pred Forte 1%: 1 gt qid OD, shake bottle well. Return in 2 weeks for f/u and comprehensive, sooner if any worsening.

## 2019-07-22 ENCOUNTER — OFFICE VISIT (OUTPATIENT)
Dept: OPTOMETRY | Facility: CLINIC | Age: 64
End: 2019-07-22
Payer: COMMERCIAL

## 2019-07-22 DIAGNOSIS — H25.13 NUCLEAR SCLEROSIS, BILATERAL: ICD-10-CM

## 2019-07-22 DIAGNOSIS — H52.7 REFRACTIVE ERROR: ICD-10-CM

## 2019-07-22 DIAGNOSIS — H26.9 CORTICAL CATARACT: ICD-10-CM

## 2019-07-22 DIAGNOSIS — Z01.00 EXAMINATION OF EYES AND VISION: Primary | ICD-10-CM

## 2019-07-22 DIAGNOSIS — H57.89 EYE IRRITATION: ICD-10-CM

## 2019-07-22 DIAGNOSIS — H04.123 DRY EYE SYNDROME, BILATERAL: ICD-10-CM

## 2019-07-22 DIAGNOSIS — H35.30 ARMD (AGE-RELATED MACULAR DEGENERATION), BILATERAL: ICD-10-CM

## 2019-07-22 PROCEDURE — 99999 PR PBB SHADOW E&M-EST. PATIENT-LVL II: ICD-10-PCS | Mod: PBBFAC,,, | Performed by: OPTOMETRIST

## 2019-07-22 PROCEDURE — 92014 COMPRE OPH EXAM EST PT 1/>: CPT | Mod: S$GLB,,, | Performed by: OPTOMETRIST

## 2019-07-22 PROCEDURE — 99999 PR PBB SHADOW E&M-EST. PATIENT-LVL II: CPT | Mod: PBBFAC,,, | Performed by: OPTOMETRIST

## 2019-07-22 PROCEDURE — 92014 PR EYE EXAM, EST PATIENT,COMPREHESV: ICD-10-PCS | Mod: S$GLB,,, | Performed by: OPTOMETRIST

## 2019-07-22 NOTE — PROGRESS NOTES
HPI     HPI      Pt here today for early eye exam and 2 week eye irritation follow up. Pt   states eye feel better since using steroid drops.      Would patient like a refraction today? Yes. Pt uses OTC readers for small   print. Pt would like glasses so she does not have to take readers on and   off the all the time.     (+)drops / pred forte 1 drop 4 x day right eyes / pt states sometimes will   use in both eyes     (-)flashes  (-)floaters  (-)diplopia     (-)Diabetes     OCULAR HISTORY  Last Eye Exam: 3 years    (+)eye surgery LASIK sx both eyes   FAMILY HISTORY           Last edited by Dakota Roe, OD on 7/22/2019  1:30 PM. (History)            Assessment /Plan     For exam results, see Encounter Report.    Examination of eyes and vision    Eye irritation    Dry eye syndrome, bilateral    Nuclear sclerosis, bilateral    Cortical cataract    Refractive error    ARMD (age-related macular degeneration), bilateral      Improved eye irritation, mild spk temporal. Taper pred forte: 1 gt bid OD x 1 week, then decrease to 1 gt qd OD x 1 week, then d/c drops. Continue otc artificial tears throughout day. Start otc gel drop at bedtime, caution transient blurring of vision with gel use.     Mild cataracts of both eyes, not yet significant. Discussed possible ocular affects of cataracts. Acceptable BCVA OU. Discussed treatment options. Surgery not recommended at this time. Monitor yearly.     Patient denies spec Rx, happy with otc readers, not ready to start using bifocals or PALs yet. Discussed cls option, pt defers at this time. Return as desired for updated spec Rx.    ARMD OU, mild drusen. Not visually significant. Discussed findings. Start otc AREDS 2 vitamins by mouth as directed. Monitor yearly, sooner if any changes.       RTC in 1 year for comprehensive eye exam, or sooner prn.

## 2019-08-05 RX ORDER — CELECOXIB 200 MG/1
CAPSULE ORAL
COMMUNITY
Start: 2019-07-23 | End: 2019-09-26

## 2019-08-05 RX ORDER — DULOXETIN HYDROCHLORIDE 30 MG/1
CAPSULE, DELAYED RELEASE ORAL NIGHTLY
COMMUNITY
Start: 2019-06-03 | End: 2019-11-11 | Stop reason: SDUPTHER

## 2019-08-05 RX ORDER — LOSARTAN POTASSIUM AND HYDROCHLOROTHIAZIDE 25; 100 MG/1; MG/1
TABLET ORAL DAILY
COMMUNITY
Start: 2019-05-23 | End: 2019-11-11

## 2019-08-05 RX ORDER — AMLODIPINE BESYLATE 5 MG/1
TABLET ORAL DAILY
COMMUNITY
Start: 2019-07-23 | End: 2019-11-11 | Stop reason: SDUPTHER

## 2019-08-05 RX ORDER — TRAZODONE HYDROCHLORIDE 100 MG/1
TABLET ORAL
COMMUNITY
Start: 2019-06-24 | End: 2019-11-11

## 2019-08-05 RX ORDER — GABAPENTIN 400 MG/1
CAPSULE ORAL 3 TIMES DAILY
COMMUNITY
Start: 2019-07-23 | End: 2019-11-11 | Stop reason: SDUPTHER

## 2019-09-19 RX ORDER — ALPRAZOLAM 0.5 MG/1
0.5 TABLET ORAL 3 TIMES DAILY
Qty: 90 TABLET | Refills: 0 | Status: SHIPPED | OUTPATIENT
Start: 2019-09-19 | End: 2019-12-16 | Stop reason: SDUPTHER

## 2019-09-19 NOTE — TELEPHONE ENCOUNTER
Spoke to pt. She is askig for a 30 day supply of her xanax to be sent to her local pharmacy.   She has an appt to see you on 11/1.    Currently taking xanax 0.5mg 1 po tid #90 for a 30 day.   Asking if we can increase it to 1mg instead of 0.5mg, told her she might have to waiot until she is seen at her appt before/if we increase her meds.     Ok to fill xanax 0.5mg 1 po tid #90?

## 2019-09-19 NOTE — TELEPHONE ENCOUNTER
----- Message from Yokasta Bolden sent at 9/19/2019  2:58 PM CDT -----  Contact: Fam Otto [phone]  Type:  Needs Medical Advice    Who Called: Fam Otto    Would the patient rather a call back or a response via [facility]? Callback    Best Call Back Number: [phone]    Additional Information:     The pt is needing to spk with the nurse regarding her meds. The pt is requesting a call back

## 2019-09-26 ENCOUNTER — HOSPITAL ENCOUNTER (EMERGENCY)
Facility: HOSPITAL | Age: 64
Discharge: HOME | End: 2019-09-26
Attending: EMERGENCY MEDICINE
Payer: MEDICARE

## 2019-09-26 VITALS
HEIGHT: 64 IN | DIASTOLIC BLOOD PRESSURE: 99 MMHG | BODY MASS INDEX: 31.58 KG/M2 | OXYGEN SATURATION: 96 % | WEIGHT: 185 LBS | TEMPERATURE: 98 F | HEART RATE: 82 BPM | SYSTOLIC BLOOD PRESSURE: 172 MMHG | RESPIRATION RATE: 16 BRPM

## 2019-09-26 DIAGNOSIS — M77.8 LEFT WRIST TENDINITIS: ICD-10-CM

## 2019-09-26 DIAGNOSIS — M25.512 ACUTE PAIN OF LEFT SHOULDER: Primary | ICD-10-CM

## 2019-09-26 PROCEDURE — 63600175 PHARM REV CODE 636 W HCPCS: Performed by: PHYSICIAN ASSISTANT

## 2019-09-26 PROCEDURE — 29125 APPL SHORT ARM SPLINT STATIC: CPT | Mod: LT

## 2019-09-26 PROCEDURE — 96372 THER/PROPH/DIAG INJ SC/IM: CPT

## 2019-09-26 PROCEDURE — 99284 EMERGENCY DEPT VISIT MOD MDM: CPT | Mod: 25

## 2019-09-26 PROCEDURE — 25000003 PHARM REV CODE 250: Performed by: PHYSICIAN ASSISTANT

## 2019-09-26 RX ORDER — LIDOCAINE 50 MG/G
1 PATCH TOPICAL ONCE
Status: DISCONTINUED | OUTPATIENT
Start: 2019-09-26 | End: 2019-09-26 | Stop reason: HOSPADM

## 2019-09-26 RX ORDER — KETOROLAC TROMETHAMINE 30 MG/ML
30 INJECTION, SOLUTION INTRAMUSCULAR; INTRAVENOUS
Status: COMPLETED | OUTPATIENT
Start: 2019-09-26 | End: 2019-09-26

## 2019-09-26 RX ORDER — CYCLOBENZAPRINE HCL 10 MG
10 TABLET ORAL 3 TIMES DAILY PRN
Qty: 12 TABLET | Refills: 0 | Status: SHIPPED | OUTPATIENT
Start: 2019-09-26 | End: 2019-09-30

## 2019-09-26 RX ORDER — DICLOFENAC SODIUM 75 MG/1
75 TABLET, DELAYED RELEASE ORAL 2 TIMES DAILY PRN
Qty: 30 TABLET | Refills: 0 | Status: SHIPPED | OUTPATIENT
Start: 2019-09-26 | End: 2020-02-27

## 2019-09-26 RX ORDER — LIDOCAINE 50 MG/G
1 PATCH TOPICAL DAILY
Qty: 30 PATCH | Refills: 0 | Status: ON HOLD | OUTPATIENT
Start: 2019-09-26 | End: 2020-08-03 | Stop reason: HOSPADM

## 2019-09-26 RX ADMIN — LIDOCAINE 1 PATCH: 50 PATCH TOPICAL at 11:09

## 2019-09-26 RX ADMIN — KETOROLAC TROMETHAMINE 30 MG: 30 INJECTION, SOLUTION INTRAMUSCULAR at 11:09

## 2019-09-26 NOTE — ED PROVIDER NOTES
"Encounter Date: 9/26/2019    SCRIBE #1 NOTE: Jessi QUEZADA and dewayne scribing for, and in the presence of, Hope Pittman PA-C.       History     Chief Complaint   Patient presents with    Shoulder Pain     atraumatic x 1 month, able to bend elbow but not abduct shoulder (worse today)     Time seen by provider: 11:10 AM on 09/26/2019    Beatriz Gan is a 64 y.o. female with PMHx of HTN, gout, and DJD who presents to the ED with an onset of worsening left shoulder pain starting 1 month ago. She reports that the shoulder pain has started to radiate down through her whole left arm and hand. She described the pain as "burning." The patient denies any other symptoms at this time. No PSHx of the left shoulder. She does report a PSHx of a neck surgery. Drug allergies to Promethazine, Fentanyl, and Morphine.     The history is provided by the patient and the spouse.     Review of patient's allergies indicates:   Allergen Reactions    Promethazine Hives and Rash    Fentanyl Other (See Comments)     Confusion     Latex, natural rubber Hives     Per pt report-many years    Morphine Hives     Past Medical History:   Diagnosis Date    Alcohol dependence     DDD (degenerative disc disease), lumbosacral     DJD (degenerative joint disease), lumbosacral     Encounter for blood transfusion     GERD (gastroesophageal reflux disease)     Gout     Hypercholesterolemia     Hypertension     NATHALIE (iron deficiency anemia)     questionable white coat hypertension    Kidney carcinoma, left 2014    Pneumonia     Renal cell carcinoma     Shingles 10/13/2012     Past Surgical History:   Procedure Laterality Date    APPENDECTOMY      BLADDER REPAIR      x 2    COLONOSCOPY  9/2011    ESOPHAGOGASTRODUODENOSCOPY  9/2011    EYE SURGERY      lasix bilateral    HERNIA REPAIR      hiatal hernai    HYSTERECTOMY      LAPAROSCOPIC NISSEN FUNDOPLICATION      NEPHRECTOMY      LT partial    REFRACTIVE SURGERY   "    SKIN BIOPSY      TOTAL ABDOMINAL HYSTERECTOMY W/ BILATERAL SALPINGOOPHORECTOMY  age 48     Family History   Problem Relation Age of Onset    Hypertension Father     Glaucoma Neg Hx     Macular degeneration Neg Hx     Retinal detachment Neg Hx      Social History     Tobacco Use    Smoking status: Former Smoker     Packs/day: 1.00     Years: 40.00     Pack years: 40.00     Types: Cigarettes     Last attempt to quit: 2018     Years since quittin.7    Smokeless tobacco: Never Used   Substance Use Topics    Alcohol use: Yes     Alcohol/week: 16.0 standard drinks     Types: 2 Cans of beer, 14 Standard drinks or equivalent per week     Comment: 2-3 a day/ social    Drug use: No     Review of Systems   Constitutional: Negative for chills and fever.   HENT: Negative for facial swelling and trouble swallowing.    Eyes: Negative for discharge.   Respiratory: Negative for cough, chest tightness, shortness of breath and wheezing.    Cardiovascular: Negative for chest pain and palpitations.   Gastrointestinal: Negative for abdominal pain, diarrhea, nausea and vomiting.   Genitourinary: Negative for dysuria and hematuria.   Musculoskeletal: Positive for arthralgias (left shoulder). Negative for back pain, joint swelling, myalgias, neck pain and neck stiffness.   Skin: Negative for color change, pallor, rash and wound.   Neurological: Negative for dizziness, syncope, weakness, light-headedness, numbness and headaches.   Hematological: Does not bruise/bleed easily.   Psychiatric/Behavioral: The patient is not nervous/anxious.    All other systems reviewed and are negative.      Physical Exam     Initial Vitals [19 1006]   BP Pulse Resp Temp SpO2   (!) 172/99 82 16 97.7 °F (36.5 °C) 96 %      MAP       --         Physical Exam    Nursing note and vitals reviewed.  Constitutional: She appears well-developed and well-nourished. She is not diaphoretic. No distress.   HENT:   Head: Normocephalic and  atraumatic.   Neck: Normal range of motion. Neck supple.   Cardiovascular: Normal rate, regular rhythm, normal heart sounds and intact distal pulses. Exam reveals no gallop and no friction rub.    No murmur heard.  Pulmonary/Chest: Breath sounds normal. No respiratory distress. She has no wheezes. She has no rhonchi. She has no rales.   Abdominal: Soft. She exhibits no distension and no mass. There is no tenderness.   Musculoskeletal: Normal range of motion. She exhibits tenderness. She exhibits no edema.   TTP noted to right anterolateral shoulder with decreased ROM and abduction, secondary to pain.  Associated TTP noted to left trapezius and left subscapular region.  No bony TTP noted to left radial wrist.  Slightly positive DeQuervain's.  Palpable radial pulse.     Neurological: She is alert and oriented to person, place, and time. She has normal strength. No sensory deficit.   Skin: Skin is warm and dry. No rash and no abscess noted. No erythema.   Psychiatric: She has a normal mood and affect.         ED Course   Procedures  Labs Reviewed - No data to display       Imaging Results          X-Ray Shoulder 2 or More Views Left (Final result)  Result time 09/26/19 12:26:41    Final result by Edith Martínez MD (09/26/19 12:26:41)                 Impression:      No acute fracture or dislocation left shoulder.      Electronically signed by: Edith Martínez MD  Date:    09/26/2019  Time:    12:26             Narrative:    EXAMINATION:  XR SHOULDER COMPLETE 2 OR MORE VIEWS LEFT    CLINICAL HISTORY:  left shoulder pain;    TECHNIQUE:  Two or three views of the left shoulder were performed.    COMPARISON:  None    FINDINGS:  No acute fracture or dislocation left shoulder.  There is mild degenerative change at the AC joint.  Postoperative changes noted in the visualized lower cervical spine                                 Medical Decision Making:   History:   Old Medical Records: I decided to obtain old medical  records.  Differential Diagnosis:   Fracture  Dislocation  Sprain  Contusion  Strain  Spasm  Tendonitis    Clinical Tests:   Radiological Study: Ordered and Reviewed       APC / Resident Notes:   X-rays of the left shoulder show no acute abnormalities, fractures or dislocations.  There is some mild underlying osteoarthritis, which is likely contributing to her pain. She may benefit from further imaging or testing of the rotator cuff, but no need to order an MRI here in the emergency department.  The wrist pain is likely related to a tendinitis.  She is given a Velcro wrist brace.  She is discharged home to follow up with Orthopedic surgery and or her pain management physician for re-evaluation and further treatment options.  We will treat with Voltaren, Lidoderm patches and Flexeril.  She voices understanding is agreeable to the plan.  She is given specific return precautions.       Scribe Attestation:   Scribe #1: I performed the above scribed service and the documentation accurately describes the services I performed. I attest to the accuracy of the note.    I, Hope Pittman PA-C, personally performed the services described in this documentation. All medical record entries made by the scribe were at my direction and in my presence.  I have reviewed the chart and agree that the record reflects my personal performance and is accurate and complete. Hope Pittman PA-C.  8:44 PM 09/26/2019          ED Course as of Sep 26 1308   Thu Sep 26, 2019   1259 X-Ray Shoulder 2 or More Views Left [EF]      ED Course User Index  [EF] Hipolito Henry MD     Clinical Impression:       ICD-10-CM ICD-9-CM   1. Acute pain of left shoulder M25.512 719.41   2. Left wrist tendinitis M77.8 727.05         Disposition:   Disposition: Discharged  Condition: Stable                        Hope Pittman PA-C  09/26/19 2044

## 2019-10-14 ENCOUNTER — OFFICE VISIT (OUTPATIENT)
Dept: ORTHOPEDICS | Facility: CLINIC | Age: 64
End: 2019-10-14
Payer: MEDICARE

## 2019-10-14 VITALS
DIASTOLIC BLOOD PRESSURE: 78 MMHG | HEIGHT: 64 IN | SYSTOLIC BLOOD PRESSURE: 126 MMHG | HEART RATE: 72 BPM | BODY MASS INDEX: 31.58 KG/M2 | WEIGHT: 185 LBS

## 2019-10-14 DIAGNOSIS — M47.816 LUMBAR FACET ARTHROPATHY: ICD-10-CM

## 2019-10-14 DIAGNOSIS — M48.062 LUMBAR STENOSIS WITH NEUROGENIC CLAUDICATION: Primary | ICD-10-CM

## 2019-10-14 DIAGNOSIS — Z98.1 HISTORY OF FUSION OF CERVICAL SPINE: ICD-10-CM

## 2019-10-14 DIAGNOSIS — M46.92 CERVICAL SPONDYLITIS WITH RADICULITIS: ICD-10-CM

## 2019-10-14 DIAGNOSIS — M75.42 ROTATOR CUFF IMPINGEMENT SYNDROME OF LEFT SHOULDER: ICD-10-CM

## 2019-10-14 DIAGNOSIS — M54.12 CERVICAL SPONDYLITIS WITH RADICULITIS: ICD-10-CM

## 2019-10-14 DIAGNOSIS — M51.37 DDD (DEGENERATIVE DISC DISEASE), LUMBOSACRAL: ICD-10-CM

## 2019-10-14 PROCEDURE — 99204 PR OFFICE/OUTPT VISIT, NEW, LEVL IV, 45-59 MIN: ICD-10-PCS | Mod: 25,S$GLB,, | Performed by: ORTHOPAEDIC SURGERY

## 2019-10-14 PROCEDURE — 20610 LARGE JOINT ASPIRATION/INJECTION: L SUBACROMIAL BURSA: ICD-10-PCS | Mod: LT,S$GLB,, | Performed by: ORTHOPAEDIC SURGERY

## 2019-10-14 PROCEDURE — 20610 DRAIN/INJ JOINT/BURSA W/O US: CPT | Mod: LT,S$GLB,, | Performed by: ORTHOPAEDIC SURGERY

## 2019-10-14 PROCEDURE — 99204 OFFICE O/P NEW MOD 45 MIN: CPT | Mod: 25,S$GLB,, | Performed by: ORTHOPAEDIC SURGERY

## 2019-10-14 RX ORDER — METHYLPREDNISOLONE ACETATE 40 MG/ML
40 INJECTION, SUSPENSION INTRA-ARTICULAR; INTRALESIONAL; INTRAMUSCULAR; SOFT TISSUE
Status: DISCONTINUED | OUTPATIENT
Start: 2019-10-14 | End: 2019-10-14 | Stop reason: HOSPADM

## 2019-10-14 RX ADMIN — METHYLPREDNISOLONE ACETATE 40 MG: 40 INJECTION, SUSPENSION INTRA-ARTICULAR; INTRALESIONAL; INTRAMUSCULAR; SOFT TISSUE at 01:10

## 2019-10-14 NOTE — PROCEDURES
Large Joint Aspiration/Injection: L subacromial bursa  Date/Time: 10/14/2019 1:00 PM  Performed by: Ruben Martinez MD  Authorized by: Ruben Martinez MD     Consent Done?:  Yes (Verbal)  Indications:  Pain  Procedure site marked: Yes    Timeout: Prior to procedure the correct patient, procedure, and site was verified    Anesthesia  Local anesthesia used  Anesthetic: lidocaine 1% without epinephrine    Location:  Shoulder  Site:  L subacromial bursa  Prep: Patient was prepped and draped in usual sterile fashion    Needle size:  25 G  Medications:  40 mg methylPREDNISolone acetate 40 mg/mL; 40 mg methylPREDNISolone acetate 40 mg/mL  Patient tolerance:  Patient tolerated the procedure well with no immediate complications

## 2019-10-14 NOTE — PROGRESS NOTES
Subjective:       Patient ID: Beatriz Gan is a 64 y.o. female.    Chief Complaint: Pain of the Lumbar Spine (Lumbar pain for a while, worse recently. She sees PM for lower back, she has had [place] in the past. They are lasting less time than they used to. She has radiating pain down left leg. She has also done PT in the past) and Pain of the Neck (Cervical pain for a while, she has radiating pain down left arm. She has had PT in the past. She has numbness into left hand near thumb. She has had a cervical fusion in the past.)       History of Present Illness  64 show lady that comes in with complaints of back pain left shoulder pain and neck pain and back pain is been present for several years she's had periodic injections on numerous occasions to the lumbar spine done by Dr. MELISSA Garcia that do afford her some partial temporary relief recently she's noticed some trouble lifting her left shoulder. She has a history of a previous cervical fusion 3 levels done about 2 years ago with some significant improvement in her neck pain following that surgery performed elsewhere. Pain radiates down left leg. Patient also has some pain in the left arm that goes down to the left hand.    Current Medications  Current Outpatient Medications   Medication Sig Dispense Refill    ALPRAZolam (XANAX) 0.5 MG tablet Take 1 tablet (0.5 mg total) by mouth 3 (three) times daily. 90 tablet 0    amlodipine (NORVASC) 10 MG tablet Take 1 tablet (10 mg total) by mouth once daily. 30 tablet 3    amLODIPine (NORVASC) 5 MG tablet       calcium-vitamin D3 (CALCIUM 500 + D) 500 mg(1,250mg) -200 unit per tablet Take 1 tablet by mouth 2 (two) times daily with meals.      diclofenac (VOLTAREN) 75 MG EC tablet Take 1 tablet (75 mg total) by mouth 2 (two) times daily as needed. 30 tablet 0    DULoxetine (CYMBALTA) 30 MG capsule       fluticasone (FLONASE) 50 mcg/actuation nasal spray 1 spray by Nasal route daily as needed.       gabapentin  (NEURONTIN) 400 MG capsule       lidocaine (LIDODERM) 5 % Place 1 patch onto the skin once daily. Remove & Discard patch within 12 hours or as directed by MD 30 patch 0    lisinopril (PRINIVIL,ZESTRIL) 30 MG tablet TAKE ONE TABLET BY MOUTH ONCE DAILY 30 tablet 3    losartan-hydrochlorothiazide 100-25 mg (HYZAAR) 100-25 mg per tablet       multivit-iron-min-folic acid (MULTIVITAMIN-IRON-MINERALS-FOLIC ACID) 3,500-18-0.4 unit-mg-mg Chew Take by mouth once daily.        traZODone (DESYREL) 100 MG tablet       zolpidem (AMBIEN) 5 MG Tab Take 5 mg by mouth every evening.      busPIRone (BUSPAR) 15 MG tablet Take 1 tablet (15 mg total) by mouth 3 (three) times daily. 90 tablet 11     No current facility-administered medications for this visit.        Allergies  Review of patient's allergies indicates:   Allergen Reactions    Promethazine Hives and Rash    Fentanyl Other (See Comments)     Confusion     Latex, natural rubber Hives     Per pt report-many years    Morphine Hives       Past Medical History  Past Medical History:   Diagnosis Date    Alcohol dependence     DDD (degenerative disc disease), lumbosacral     DJD (degenerative joint disease), lumbosacral     Encounter for blood transfusion     GERD (gastroesophageal reflux disease)     Gout     Hypercholesterolemia     Hypertension     NATHALIE (iron deficiency anemia)     questionable white coat hypertension    Kidney carcinoma, left 2014    Pneumonia     Renal cell carcinoma     Shingles 10/13/2012       Surgical History  Past Surgical History:   Procedure Laterality Date    APPENDECTOMY      BLADDER REPAIR      x 2    COLONOSCOPY  9/2011    ESOPHAGOGASTRODUODENOSCOPY  9/2011    EYE SURGERY      lasix bilateral    HERNIA REPAIR      hiatal hernai    HYSTERECTOMY      LAPAROSCOPIC NISSEN FUNDOPLICATION      NEPHRECTOMY      LT partial    REFRACTIVE SURGERY      SKIN BIOPSY      TOTAL ABDOMINAL HYSTERECTOMY W/ BILATERAL  SALPINGOOPHORECTOMY  age 48       Family History:   Family History   Problem Relation Age of Onset    Hypertension Father     Glaucoma Neg Hx     Macular degeneration Neg Hx     Retinal detachment Neg Hx        Social History:   Social History     Socioeconomic History    Marital status:      Spouse name: Not on file    Number of children: Not on file    Years of education: Not on file    Highest education level: Not on file   Occupational History     Employer: [employer]   Social Needs    Financial resource strain: Not on file    Food insecurity:     Worry: Not on file     Inability: Not on file    Transportation needs:     Medical: Not on file     Non-medical: Not on file   Tobacco Use    Smoking status: Former Smoker     Packs/day: 1.00     Years: 40.00     Pack years: 40.00     Types: Cigarettes     Last attempt to quit: 2018     Years since quittin.7    Smokeless tobacco: Never Used   Substance and Sexual Activity    Alcohol use: Yes     Alcohol/week: 16.0 standard drinks     Types: 2 Cans of beer, 14 Standard drinks or equivalent per week     Comment: 2-3 a day/ social    Drug use: No    Sexual activity: Yes     Partners: Male   Lifestyle    Physical activity:     Days per week: Not on file     Minutes per session: Not on file    Stress: Not on file   Relationships    Social connections:     Talks on phone: Not on file     Gets together: Not on file     Attends Taoist service: Not on file     Active member of club or organization: Not on file     Attends meetings of clubs or organizations: Not on file     Relationship status: Not on file   Other Topics Concern    Not on file   Social History Narrative    Not on file       Hospitalization/Major Diagnostic Procedure:     Review of Systems     General/Constitutional:  Chills denies. Fatigue denies. Fever denies. Weight gain denies. Weight loss denies.    Respiratory:  Shortness of breath denies.    Cardiovascular:  Chest pain  denies.    Gastrointestinal:  Constipation denies. Diarrhea denies. Nausea denies. Vomiting denies.     Hematology:  Easy bruising denies. Prolonged bleeding denies.     Genitourinary:  Frequent urination denies. Pain in lower back denies. Painful urination denies.     Musculoskeletal:  See HPI for details    Skin:  Rash denies.    Neurologic:  Dizziness denies. Gait abnormalities denies. Seizures denies. Tingling/Numbess denies.    Psychiatric:  Anxiety denies. Depressed mood denies.     Objective:   Vital Signs:   Vitals:    10/14/19 1303   BP: 126/78   Pulse: 72        Physical Exam      General Examination:     Constitutional: The patient is alert and oriented to lace person and time. Mood is pleasant.     Head/Face: Normal facial features normal eyebrows    Eyes: Normal extraocular motion bilaterally    Lungs: Respirations are equal and unlabored    Gait is coordinated.    Cardiovascular: There are no swelling or varicosities present.    Lymphatic: Negative for adenopathy    Skin: Normal    Neurological: Level of consciousness normal. Oriented to place person and time and situation    Psychiatric: Oriented to time place person and situation    Examination of the left shoulder shows positive impingement test marked limitation active and passive range of motion and tenderness over the before meals joint. Cervical spine shows well-healed anterior incision nontender minimal tenderness left trapezius muscle cervical range of motion slightly restricted but nonpainful Spurling's maneuver negative reflexes normal both upper extremities. No edema adenopathy upper extremities. Lumbar exam shows patient has moderate tenderness midline over paraspinous muscles L4-S1 lumbar range of motion markedly restricted pain with extension and bending straight leg raising maneuver weakly positive left side hip and knee range of motion intact reflexes symmetrical but hypoactive. No edema adenopathy or varicosities.    XRAY Report/  Interpretation : AP and lateral x-rays of the cervical spine were performed today. Indications neck pain. Findings: Previous anterior cervical fusion with instrumentation C4-C7 with no signs of any hardware loosening. Slight spondylolisthesis C3-4  AP pelvis x-ray was taken today. Indications low back pain and/or hip pain. Findings AP pelvis x-ray appears to be normal with no evidence of fractures or significant degenerative disease  AP and lateral x-rays of lumbar spine will performed today. Indications low back pain. Findings: I'll narrowing L5-S1 disc space level  Previous lumbar MRI performed 04/26/2019 was personally reviewed evidence of disc degeneration L4-5 L5-S1 evidence of disc space narrowing at L4-5 L5-S1 evidence of lumbar spinal stenosis consisting of bilateral foraminal stenosis L4-5 and L5-S1 levels      Assessment:       1. Lumbar stenosis with neurogenic claudication    2. DDD (degenerative disc disease), lumbosacral    3. History of fusion of cervical spine    4. Rotator cuff impingement syndrome of left shoulder    5. Cervical spondylitis with radiculitis    6. Lumbar facet arthropathy        Plan:       Beatriz was seen today for pain and pain.    Diagnoses and all orders for this visit:    Lumbar stenosis with neurogenic claudication    DDD (degenerative disc disease), lumbosacral  -     X-Ray Lumbar Spine Ap And Lateral  -     X-Ray Pelvis Routine AP    History of fusion of cervical spine  -     X-Ray Cervical Spine AP And Lateral    Rotator cuff impingement syndrome of left shoulder    Cervical spondylitis with radiculitis    Lumbar facet arthropathy         No follow-ups on file.    Treatment options were discussed regards to the nature of the spinal condition conservative pain interventional and surgical options were discussed in detail and the probability of success of the separate treatment options was discussed in detail the patient expressed a clear understanding of the treatment  options would regards to surgery the procedure risks benefits complications and outcomes were discussed.  No guarantees were given regards to surgical outcome.  Patient interested in surgical intervention that should she has failed multiple injections of the lumbar spine including epidural steroid injections as well as radiofrequency ablation procedures. New updated MRI study lumbar spine is ordered.  With respect to left shoulder patient has classic rotator cuff impingement symptoms. Today under sterile conditions we injected subacromial bursa of the left shoulder no side effects were noted. MRI left shoulder is ordered.    Should she be interested in surgery she be a candidate to undergo a combined anterior and posterior lumbar fusion L4-5 and L5-S1 levels with posterior decompression and foraminotomies and posterior instrumentation L4 to the sacrum making a final recommendation for surgery until she undergoes D of new repeat lumbar MRI study    This note was created using Dragon voice recognition software that occasionally misinterpreted phrases or words.

## 2019-10-21 ENCOUNTER — HOSPITAL ENCOUNTER (OUTPATIENT)
Dept: RADIOLOGY | Facility: HOSPITAL | Age: 64
Discharge: HOME | End: 2019-10-21
Attending: ORTHOPAEDIC SURGERY
Payer: MEDICARE

## 2019-10-21 DIAGNOSIS — M48.062 LUMBAR STENOSIS WITH NEUROGENIC CLAUDICATION: ICD-10-CM

## 2019-10-21 DIAGNOSIS — M51.37 DDD (DEGENERATIVE DISC DISEASE), LUMBOSACRAL: ICD-10-CM

## 2019-10-21 DIAGNOSIS — M75.42 ROTATOR CUFF IMPINGEMENT SYNDROME OF LEFT SHOULDER: ICD-10-CM

## 2019-10-21 DIAGNOSIS — M47.816 LUMBAR FACET ARTHROPATHY: ICD-10-CM

## 2019-10-21 PROCEDURE — 72148 MRI LUMBAR SPINE W/O DYE: CPT | Mod: TC,PO

## 2019-10-21 PROCEDURE — 73221 MRI JOINT UPR EXTREM W/O DYE: CPT | Mod: TC,PO,LT

## 2019-10-28 ENCOUNTER — OFFICE VISIT (OUTPATIENT)
Dept: ORTHOPEDICS | Facility: CLINIC | Age: 64
End: 2019-10-28
Payer: MEDICARE

## 2019-10-28 VITALS
DIASTOLIC BLOOD PRESSURE: 80 MMHG | BODY MASS INDEX: 32.61 KG/M2 | SYSTOLIC BLOOD PRESSURE: 140 MMHG | HEART RATE: 95 BPM | WEIGHT: 190 LBS

## 2019-10-28 DIAGNOSIS — M75.42 ROTATOR CUFF IMPINGEMENT SYNDROME OF LEFT SHOULDER: ICD-10-CM

## 2019-10-28 DIAGNOSIS — S46.212D TRAUMATIC PARTIAL TEAR OF LEFT BICEPS TENDON, SUBSEQUENT ENCOUNTER: ICD-10-CM

## 2019-10-28 DIAGNOSIS — M47.816 LUMBAR FACET ARTHROPATHY: ICD-10-CM

## 2019-10-28 DIAGNOSIS — Z01.818 PRE-OPERATIVE EXAM: ICD-10-CM

## 2019-10-28 DIAGNOSIS — M75.122 NONTRAUMATIC COMPLETE TEAR OF LEFT ROTATOR CUFF: ICD-10-CM

## 2019-10-28 DIAGNOSIS — M19.019 AC (ACROMIOCLAVICULAR) ARTHRITIS: ICD-10-CM

## 2019-10-28 DIAGNOSIS — M51.37 DDD (DEGENERATIVE DISC DISEASE), LUMBOSACRAL: ICD-10-CM

## 2019-10-28 DIAGNOSIS — M23.312 MEDIAL MENISCUS, ANTERIOR HORN DERANGEMENT, LEFT: Primary | ICD-10-CM

## 2019-10-28 DIAGNOSIS — M75.122 NONTRAUMATIC COMPLETE TEAR OF LEFT ROTATOR CUFF: Primary | ICD-10-CM

## 2019-10-28 DIAGNOSIS — M48.062 LUMBAR STENOSIS WITH NEUROGENIC CLAUDICATION: ICD-10-CM

## 2019-10-28 DIAGNOSIS — M19.019 ACROMIOCLAVICULAR JOINT ARTHRITIS, UNSPECIFIED LATERALITY: ICD-10-CM

## 2019-10-28 PROCEDURE — 99213 PR OFFICE/OUTPT VISIT, EST, LEVL III, 20-29 MIN: ICD-10-PCS | Mod: 57,25,S$GLB, | Performed by: ORTHOPAEDIC SURGERY

## 2019-10-28 PROCEDURE — 20610 LARGE JOINT ASPIRATION/INJECTION: L KNEE: ICD-10-PCS | Mod: LT,S$GLB,, | Performed by: ORTHOPAEDIC SURGERY

## 2019-10-28 PROCEDURE — 20610 DRAIN/INJ JOINT/BURSA W/O US: CPT | Mod: LT,S$GLB,, | Performed by: ORTHOPAEDIC SURGERY

## 2019-10-28 PROCEDURE — 99213 OFFICE O/P EST LOW 20 MIN: CPT | Mod: 57,25,S$GLB, | Performed by: ORTHOPAEDIC SURGERY

## 2019-10-28 RX ORDER — METHYLPREDNISOLONE ACETATE 40 MG/ML
40 INJECTION, SUSPENSION INTRA-ARTICULAR; INTRALESIONAL; INTRAMUSCULAR; SOFT TISSUE
Status: DISCONTINUED | OUTPATIENT
Start: 2019-10-28 | End: 2019-10-28 | Stop reason: HOSPADM

## 2019-10-28 RX ADMIN — METHYLPREDNISOLONE ACETATE 40 MG: 40 INJECTION, SUSPENSION INTRA-ARTICULAR; INTRALESIONAL; INTRAMUSCULAR; SOFT TISSUE at 10:10

## 2019-10-28 NOTE — H&P (VIEW-ONLY)
Subjective:       Patient ID: Beatriz Gan is a 64 y.o. female.    Chief Complaint: Pain of the Lumbar Spine (MRIlumbar/ left shoulder results) and Pain of the Left Knee (Left knee hurts a lot and she wants x ray done, injection did not help)      History of Present Illness   64 year ago lady that comes in with complaints of back pain left shoulder pain and neck pain and back pain is been present for several years she's had periodic injections on numerous occasions to the lumbar spine done by Dr. MELISSA Garcia that do afford her some partial temporary relief recently.  Recent injection of the lumbar spine given by Dr. Hai Crook she's noticed some trouble lifting her left shoulder. She has a history of a previous cervical fusion 3 levels done about 2 years ago with some significant improvement in her neck pain following that surgery performed elsewhere. Pain radiates down left leg. Patient also has some pain in the left arm that goes down to the left hand.  Here to discuss updated lumbar MRI.  We did inject the left shoulder subacromial bursa last visit.  Injection of the left shoulder did not help her left shoulder symptoms We did discuss possibility of lumbar surgery injections of the lumbar spine did not help her complaints of pain in her left leg she has some pain and popping in the left knee itself    Current Medications  Current Outpatient Medications   Medication Sig Dispense Refill    ALPRAZolam (XANAX) 0.5 MG tablet Take 1 tablet (0.5 mg total) by mouth 3 (three) times daily. 90 tablet 0    amlodipine (NORVASC) 10 MG tablet Take 1 tablet (10 mg total) by mouth once daily. 30 tablet 3    amLODIPine (NORVASC) 5 MG tablet       calcium-vitamin D3 (CALCIUM 500 + D) 500 mg(1,250mg) -200 unit per tablet Take 1 tablet by mouth 2 (two) times daily with meals.      diclofenac (VOLTAREN) 75 MG EC tablet Take 1 tablet (75 mg total) by mouth 2 (two) times daily as needed. 30 tablet 0    DULoxetine (CYMBALTA)  30 MG capsule       fluticasone (FLONASE) 50 mcg/actuation nasal spray 1 spray by Nasal route daily as needed.       gabapentin (NEURONTIN) 400 MG capsule       lidocaine (LIDODERM) 5 % Place 1 patch onto the skin once daily. Remove & Discard patch within 12 hours or as directed by MD 30 patch 0    lisinopril (PRINIVIL,ZESTRIL) 30 MG tablet TAKE ONE TABLET BY MOUTH ONCE DAILY 30 tablet 3    losartan-hydrochlorothiazide 100-25 mg (HYZAAR) 100-25 mg per tablet       multivit-iron-min-folic acid (MULTIVITAMIN-IRON-MINERALS-FOLIC ACID) 3,500-18-0.4 unit-mg-mg Chew Take by mouth once daily.        traZODone (DESYREL) 100 MG tablet       zolpidem (AMBIEN) 5 MG Tab Take 5 mg by mouth every evening.      busPIRone (BUSPAR) 15 MG tablet Take 1 tablet (15 mg total) by mouth 3 (three) times daily. 90 tablet 11     No current facility-administered medications for this visit.        Allergies  Review of patient's allergies indicates:   Allergen Reactions    Promethazine Hives and Rash    Fentanyl Other (See Comments)     Confusion     Latex, natural rubber Hives     Per pt report-many years    Morphine Hives       Past Medical History  Past Medical History:   Diagnosis Date    Alcohol dependence     DDD (degenerative disc disease), lumbosacral     DJD (degenerative joint disease), lumbosacral     Encounter for blood transfusion     GERD (gastroesophageal reflux disease)     Gout     Hypercholesterolemia     Hypertension     NATHALIE (iron deficiency anemia)     questionable white coat hypertension    Kidney carcinoma, left 2014    Pneumonia     Renal cell carcinoma     Shingles 10/13/2012       Surgical History  Past Surgical History:   Procedure Laterality Date    APPENDECTOMY      BLADDER REPAIR      x 2    COLONOSCOPY  9/2011    ESOPHAGOGASTRODUODENOSCOPY  9/2011    EYE SURGERY      lasix bilateral    HERNIA REPAIR      hiatal hernai    HYSTERECTOMY      LAPAROSCOPIC NISSEN FUNDOPLICATION       NEPHRECTOMY      LT partial    REFRACTIVE SURGERY      SKIN BIOPSY      TOTAL ABDOMINAL HYSTERECTOMY W/ BILATERAL SALPINGOOPHORECTOMY  age 48       Family History:   Family History   Problem Relation Age of Onset    Hypertension Father     Glaucoma Neg Hx     Macular degeneration Neg Hx     Retinal detachment Neg Hx        Social History:   Social History     Socioeconomic History    Marital status:      Spouse name: Not on file    Number of children: Not on file    Years of education: Not on file    Highest education level: Not on file   Occupational History     Employer: [employer]   Social Needs    Financial resource strain: Not on file    Food insecurity:     Worry: Not on file     Inability: Not on file    Transportation needs:     Medical: Not on file     Non-medical: Not on file   Tobacco Use    Smoking status: Former Smoker     Packs/day: 1.00     Years: 40.00     Pack years: 40.00     Types: Cigarettes     Last attempt to quit: 2018     Years since quittin.8    Smokeless tobacco: Never Used   Substance and Sexual Activity    Alcohol use: Yes     Alcohol/week: 16.0 standard drinks     Types: 2 Cans of beer, 14 Standard drinks or equivalent per week     Comment: 2-3 a day/ social    Drug use: No    Sexual activity: Yes     Partners: Male   Lifestyle    Physical activity:     Days per week: Not on file     Minutes per session: Not on file    Stress: Not on file   Relationships    Social connections:     Talks on phone: Not on file     Gets together: Not on file     Attends Faith service: Not on file     Active member of club or organization: Not on file     Attends meetings of clubs or organizations: Not on file     Relationship status: Not on file   Other Topics Concern    Not on file   Social History Narrative    Not on file       Hospitalization/Major Diagnostic Procedure:     Review of Systems     General/Constitutional:  Chills denies. Fatigue denies. Fever  denies. Weight gain denies. Weight loss denies.    Respiratory:  Shortness of breath denies.    Cardiovascular:  Chest pain denies.    Gastrointestinal:  Constipation denies. Diarrhea denies. Nausea denies. Vomiting denies.     Hematology:  Easy bruising denies. Prolonged bleeding denies.     Genitourinary:  Frequent urination denies. Pain in lower back denies. Painful urination denies.     Musculoskeletal:  See HPI for details    Skin:  Rash denies.    Neurologic:  Dizziness denies. Gait abnormalities denies. Seizures denies. Tingling/Numbess denies.    Psychiatric:  Anxiety denies. Depressed mood denies.     Objective:   Vital Signs:   Vitals:    10/28/19 0952   BP: (!) 140/80   Pulse: 95        Physical Exam      General Examination:     Constitutional: The patient is alert and oriented to lace person and time. Mood is pleasant.     Head/Face: Normal facial features normal eyebrows    Eyes: Normal extraocular motion bilaterally    Lungs: Respirations are equal and unlabored    Gait is coordinated.    Cardiovascular: There are no swelling or varicosities present.    Lymphatic: Negative for adenopathy    Skin: Normal    Neurological: Level of consciousness normal. Oriented to place person and time and situation    Psychiatric: Oriented to time place person and situation  Examination of the left shoulder shows positive impingement test marked limitation active and passive range of motion and tenderness over the before meals joint. Cervical spine shows well-healed anterior incision nontender minimal tenderness left trapezius muscle cervical range of motion slightly restricted but nonpainful Spurling's maneuver negative reflexes normal both upper extremities. No edema adenopathy upper extremities. Lumbar exam shows patient has moderate tenderness midline over paraspinous muscles L4-S1 lumbar range of motion markedly restricted pain with extension and bending straight leg raising maneuver weakly positive left side hip  and knee range of motion intact reflexes symmetrical but hypoactive. No edema adenopathy or varicosities. Left knee exam shows tenderness or medial lateral joint lines with a positive Lazarus's test and positive crepitance no instability and no swelling    XRAY Report/ Interpretation:  MRI left shoulder was reviewed as evidence of a rotator cuff tear and also a tear of the long head of the biceps tendon. As well as osteoarthritis of the acromioclavicular joint MRI lumba stenosis bilaterally of facet arthropathy and foraminal S1 evidence degeneration L4-5 L5 r spine reviewed evidence of disc  joint MRI lumba stenosis bilaterally of facet arthropathy and foraminal S1 evidence degeneration L4-5 L5 r     Assessment:       1. Medial meniscus, anterior horn derangement, left    2. Nontraumatic complete tear of left rotator cuff    3. DDD (degenerative disc disease), lumbar L4-5,L5-S1    4. Lumbar stenosis with neurogenic claudication    5. Lumbar facet arthropathy        Plan:       Beatriz was seen today for pain and pain.    Diagnoses and all orders for this visit:    Medial meniscus, anterior horn derangement, left  -     X-Ray Knee 1 or 2 View Left    Nontraumatic complete tear of left rotator cuff    DDD (degenerative disc disease), lumbar L4-5,L5-S1    Lumbar stenosis with neurogenic claudication    Lumbar facet arthropathy         No follow-ups on file.    MRI left knee ordered for more evaluation of left knee complaints I suspect she has a lateral meniscus tear. Patient feels left shoulder complaints are intolerable and would recommend arthroscopic evaluation left shoulder subacromial decompression acromioplasty partial distal claviculectomy rotator cuff repair and possible biceps tenodesis or Tinel lysis will observe her low back complaints her back symptoms have been manageable with manage with periodic injections by Dr. Leger should she wish to proceed with left shoulder surgery  The technical aspects of the  surgery were discussed in detail with the patient today. The patient was able to verbalize an understanding. The procedure risk benefits alternatives and possible complications of the surgical procedure was discussed including expected outcomes. No guarantees were given regards to outcomes. Consent forms were will be signed at a later date. All questions regarding the surgery itself were answered. The patient wishes to proceed with surgery and will be scheduled. The patient may require preoperative medical clearance.    This note was created using Dragon voice recognition software that occasionally misinterpreted phrases or words.

## 2019-10-28 NOTE — PROGRESS NOTES
Subjective:       Patient ID: Beatriz Gan is a 64 y.o. female.    Chief Complaint: Pain of the Lumbar Spine (MRIlumbar/ left shoulder results) and Pain of the Left Knee (Left knee hurts a lot and she wants x ray done, injection did not help)      History of Present Illness   64 year ago lady that comes in with complaints of back pain left shoulder pain and neck pain and back pain is been present for several years she's had periodic injections on numerous occasions to the lumbar spine done by Dr. MELISSA Garcia that do afford her some partial temporary relief recently.  Recent injection of the lumbar spine given by Dr. Hai Crook she's noticed some trouble lifting her left shoulder. She has a history of a previous cervical fusion 3 levels done about 2 years ago with some significant improvement in her neck pain following that surgery performed elsewhere. Pain radiates down left leg. Patient also has some pain in the left arm that goes down to the left hand.  Here to discuss updated lumbar MRI.  We did inject the left shoulder subacromial bursa last visit.  Injection of the left shoulder did not help her left shoulder symptoms We did discuss possibility of lumbar surgery injections of the lumbar spine did not help her complaints of pain in her left leg she has some pain and popping in the left knee itself    Current Medications  Current Outpatient Medications   Medication Sig Dispense Refill    ALPRAZolam (XANAX) 0.5 MG tablet Take 1 tablet (0.5 mg total) by mouth 3 (three) times daily. 90 tablet 0    amlodipine (NORVASC) 10 MG tablet Take 1 tablet (10 mg total) by mouth once daily. 30 tablet 3    amLODIPine (NORVASC) 5 MG tablet       calcium-vitamin D3 (CALCIUM 500 + D) 500 mg(1,250mg) -200 unit per tablet Take 1 tablet by mouth 2 (two) times daily with meals.      diclofenac (VOLTAREN) 75 MG EC tablet Take 1 tablet (75 mg total) by mouth 2 (two) times daily as needed. 30 tablet 0    DULoxetine (CYMBALTA)  30 MG capsule       fluticasone (FLONASE) 50 mcg/actuation nasal spray 1 spray by Nasal route daily as needed.       gabapentin (NEURONTIN) 400 MG capsule       lidocaine (LIDODERM) 5 % Place 1 patch onto the skin once daily. Remove & Discard patch within 12 hours or as directed by MD 30 patch 0    lisinopril (PRINIVIL,ZESTRIL) 30 MG tablet TAKE ONE TABLET BY MOUTH ONCE DAILY 30 tablet 3    losartan-hydrochlorothiazide 100-25 mg (HYZAAR) 100-25 mg per tablet       multivit-iron-min-folic acid (MULTIVITAMIN-IRON-MINERALS-FOLIC ACID) 3,500-18-0.4 unit-mg-mg Chew Take by mouth once daily.        traZODone (DESYREL) 100 MG tablet       zolpidem (AMBIEN) 5 MG Tab Take 5 mg by mouth every evening.      busPIRone (BUSPAR) 15 MG tablet Take 1 tablet (15 mg total) by mouth 3 (three) times daily. 90 tablet 11     No current facility-administered medications for this visit.        Allergies  Review of patient's allergies indicates:   Allergen Reactions    Promethazine Hives and Rash    Fentanyl Other (See Comments)     Confusion     Latex, natural rubber Hives     Per pt report-many years    Morphine Hives       Past Medical History  Past Medical History:   Diagnosis Date    Alcohol dependence     DDD (degenerative disc disease), lumbosacral     DJD (degenerative joint disease), lumbosacral     Encounter for blood transfusion     GERD (gastroesophageal reflux disease)     Gout     Hypercholesterolemia     Hypertension     NATHALIE (iron deficiency anemia)     questionable white coat hypertension    Kidney carcinoma, left 2014    Pneumonia     Renal cell carcinoma     Shingles 10/13/2012       Surgical History  Past Surgical History:   Procedure Laterality Date    APPENDECTOMY      BLADDER REPAIR      x 2    COLONOSCOPY  9/2011    ESOPHAGOGASTRODUODENOSCOPY  9/2011    EYE SURGERY      lasix bilateral    HERNIA REPAIR      hiatal hernai    HYSTERECTOMY      LAPAROSCOPIC NISSEN FUNDOPLICATION       NEPHRECTOMY      LT partial    REFRACTIVE SURGERY      SKIN BIOPSY      TOTAL ABDOMINAL HYSTERECTOMY W/ BILATERAL SALPINGOOPHORECTOMY  age 48       Family History:   Family History   Problem Relation Age of Onset    Hypertension Father     Glaucoma Neg Hx     Macular degeneration Neg Hx     Retinal detachment Neg Hx        Social History:   Social History     Socioeconomic History    Marital status:      Spouse name: Not on file    Number of children: Not on file    Years of education: Not on file    Highest education level: Not on file   Occupational History     Employer: [employer]   Social Needs    Financial resource strain: Not on file    Food insecurity:     Worry: Not on file     Inability: Not on file    Transportation needs:     Medical: Not on file     Non-medical: Not on file   Tobacco Use    Smoking status: Former Smoker     Packs/day: 1.00     Years: 40.00     Pack years: 40.00     Types: Cigarettes     Last attempt to quit: 2018     Years since quittin.8    Smokeless tobacco: Never Used   Substance and Sexual Activity    Alcohol use: Yes     Alcohol/week: 16.0 standard drinks     Types: 2 Cans of beer, 14 Standard drinks or equivalent per week     Comment: 2-3 a day/ social    Drug use: No    Sexual activity: Yes     Partners: Male   Lifestyle    Physical activity:     Days per week: Not on file     Minutes per session: Not on file    Stress: Not on file   Relationships    Social connections:     Talks on phone: Not on file     Gets together: Not on file     Attends [employer] service: Not on file     Active member of club or organization: Not on file     Attends meetings of clubs or organizations: Not on file     Relationship status: Not on file   Other Topics Concern    Not on file   Social History Narrative    Not on file       Hospitalization/Major Diagnostic Procedure:     Review of Systems     General/Constitutional:  Chills denies. Fatigue denies. Fever  denies. Weight gain denies. Weight loss denies.    Respiratory:  Shortness of breath denies.    Cardiovascular:  Chest pain denies.    Gastrointestinal:  Constipation denies. Diarrhea denies. Nausea denies. Vomiting denies.     Hematology:  Easy bruising denies. Prolonged bleeding denies.     Genitourinary:  Frequent urination denies. Pain in lower back denies. Painful urination denies.     Musculoskeletal:  See HPI for details    Skin:  Rash denies.    Neurologic:  Dizziness denies. Gait abnormalities denies. Seizures denies. Tingling/Numbess denies.    Psychiatric:  Anxiety denies. Depressed mood denies.     Objective:   Vital Signs:   Vitals:    10/28/19 0952   BP: (!) 140/80   Pulse: 95        Physical Exam      General Examination:     Constitutional: The patient is alert and oriented to lace person and time. Mood is pleasant.     Head/Face: Normal facial features normal eyebrows    Eyes: Normal extraocular motion bilaterally    Lungs: Respirations are equal and unlabored    Gait is coordinated.    Cardiovascular: There are no swelling or varicosities present.    Lymphatic: Negative for adenopathy    Skin: Normal    Neurological: Level of consciousness normal. Oriented to place person and time and situation    Psychiatric: Oriented to time place person and situation  Examination of the left shoulder shows positive impingement test marked limitation active and passive range of motion and tenderness over the before meals joint. Cervical spine shows well-healed anterior incision nontender minimal tenderness left trapezius muscle cervical range of motion slightly restricted but nonpainful Spurling's maneuver negative reflexes normal both upper extremities. No edema adenopathy upper extremities. Lumbar exam shows patient has moderate tenderness midline over paraspinous muscles L4-S1 lumbar range of motion markedly restricted pain with extension and bending straight leg raising maneuver weakly positive left side hip  and knee range of motion intact reflexes symmetrical but hypoactive. No edema adenopathy or varicosities. Left knee exam shows tenderness or medial lateral joint lines with a positive Lazarus's test and positive crepitance no instability and no swelling    XRAY Report/ Interpretation:  MRI left shoulder was reviewed as evidence of a rotator cuff tear and also a tear of the long head of the biceps tendon. As well as osteoarthritis of the acromioclavicular joint MRI lumba stenosis bilaterally of facet arthropathy and foraminal S1 evidence degeneration L4-5 L5 r spine reviewed evidence of disc  joint MRI lumba stenosis bilaterally of facet arthropathy and foraminal S1 evidence degeneration L4-5 L5 r   AP and lateral x-rays taken the office today of the left knee demonstrate minor narrowing of the medial compartment  Assessment:       1. Medial meniscus, anterior horn derangement, left    2. Nontraumatic complete tear of left rotator cuff    3. DDD (degenerative disc disease), lumbar L4-5,L5-S1    4. Lumbar stenosis with neurogenic claudication    5. Lumbar facet arthropathy        Plan:       Beatriz was seen today for pain and pain.    Diagnoses and all orders for this visit:    Medial meniscus, anterior horn derangement, left  -     X-Ray Knee 1 or 2 View Left    Nontraumatic complete tear of left rotator cuff    DDD (degenerative disc disease), lumbar L4-5,L5-S1    Lumbar stenosis with neurogenic claudication    Lumbar facet arthropathy         No follow-ups on file.    MRI left knee ordered for more evaluation of left knee complaints I suspect she has a lateral meniscus tear. Patient feels left shoulder complaints are intolerable and would recommend arthroscopic evaluation left shoulder subacromial decompression acromioplasty partial distal claviculectomy rotator cuff repair and possible biceps tenodesis or Tinel lysis will observe her low back complaints her back symptoms have been manageable with manage with  periodic injections by Dr. Leger should she wish to proceed with left shoulder surgery  The technical aspects of the surgery were discussed in detail with the patient today. The patient was able to verbalize an understanding. The procedure risk benefits alternatives and possible complications of the surgical procedure was discussed including expected outcomes. No guarantees were given regards to outcomes. Consent forms were will be signed at a later date. All questions regarding the surgery itself were answered. The patient wishes to proceed with surgery and will be scheduled. The patient may require preoperative medical clearance.    I injected her left knee today to give her some relief of her symptoms.  This note was created using Dragon voice recognition software that occasionally misinterpreted phrases or words.

## 2019-10-28 NOTE — PROCEDURES
Large Joint Aspiration/Injection: L knee  Date/Time: 10/28/2019 10:15 AM  Performed by: Ruben Martinez MD  Authorized by: Ruben Martinez MD     Consent Done?:  Yes (Verbal)  Indications:  Pain  Procedure site marked: Yes    Timeout: Prior to procedure the correct patient, procedure, and site was verified    Anesthesia  Local anesthesia used  Anesthetic: lidocaine 1% without epinephrine    Location:  Knee  Site:  L knee  Prep: Patient was prepped and draped in usual sterile fashion    Needle size:  25 G  Medications:  40 mg methylPREDNISolone acetate 40 mg/mL; 40 mg methylPREDNISolone acetate 40 mg/mL  Patient tolerance:  Patient tolerated the procedure well with no immediate complications

## 2019-10-29 ENCOUNTER — HOSPITAL ENCOUNTER (OUTPATIENT)
Dept: PREADMISSION TESTING | Facility: HOSPITAL | Age: 64
Discharge: HOME | End: 2019-10-29
Attending: ORTHOPAEDIC SURGERY
Payer: MEDICARE

## 2019-10-29 ENCOUNTER — TELEPHONE (OUTPATIENT)
Dept: FAMILY MEDICINE | Facility: CLINIC | Age: 64
End: 2019-10-29

## 2019-10-29 ENCOUNTER — HOSPITAL ENCOUNTER (OUTPATIENT)
Dept: RADIOLOGY | Facility: HOSPITAL | Age: 64
Discharge: HOME | End: 2019-10-29
Attending: ORTHOPAEDIC SURGERY
Payer: MEDICARE

## 2019-10-29 VITALS — HEIGHT: 64 IN | BODY MASS INDEX: 32.61 KG/M2

## 2019-10-29 DIAGNOSIS — M75.122 NONTRAUMATIC COMPLETE TEAR OF LEFT ROTATOR CUFF: ICD-10-CM

## 2019-10-29 DIAGNOSIS — Z01.818 PRE-OPERATIVE EXAM: Primary | ICD-10-CM

## 2019-10-29 DIAGNOSIS — M75.42 ROTATOR CUFF IMPINGEMENT SYNDROME OF LEFT SHOULDER: ICD-10-CM

## 2019-10-29 LAB
ALBUMIN SERPL BCP-MCNC: 4.3 G/DL (ref 3.5–5.2)
ALP SERPL-CCNC: 101 U/L (ref 55–135)
ALT SERPL W/O P-5'-P-CCNC: 15 U/L (ref 10–44)
ANION GAP SERPL CALC-SCNC: 12 MMOL/L (ref 8–16)
AST SERPL-CCNC: 12 U/L (ref 10–40)
BASOPHILS # BLD AUTO: 0.01 K/UL (ref 0–0.2)
BASOPHILS NFR BLD: 0.1 % (ref 0–1.9)
BILIRUB SERPL-MCNC: 0.9 MG/DL (ref 0.1–1)
BUN SERPL-MCNC: 19 MG/DL (ref 8–23)
CALCIUM SERPL-MCNC: 10.4 MG/DL (ref 8.7–10.5)
CHLORIDE SERPL-SCNC: 98 MMOL/L (ref 95–110)
CO2 SERPL-SCNC: 30 MMOL/L (ref 23–29)
CREAT SERPL-MCNC: 1 MG/DL (ref 0.5–1.4)
DIFFERENTIAL METHOD: ABNORMAL
EOSINOPHIL # BLD AUTO: 0 K/UL (ref 0–0.5)
EOSINOPHIL NFR BLD: 0 % (ref 0–8)
ERYTHROCYTE [DISTWIDTH] IN BLOOD BY AUTOMATED COUNT: 12.6 % (ref 11.5–14.5)
EST. GFR  (AFRICAN AMERICAN): >60 ML/MIN/1.73 M^2
EST. GFR  (NON AFRICAN AMERICAN): 59 ML/MIN/1.73 M^2
GLUCOSE SERPL-MCNC: 129 MG/DL (ref 70–110)
HCT VFR BLD AUTO: 41.7 % (ref 37–48.5)
HGB BLD-MCNC: 13.7 G/DL (ref 12–16)
IMM GRANULOCYTES # BLD AUTO: 0.08 K/UL (ref 0–0.04)
LYMPHOCYTES # BLD AUTO: 1.2 K/UL (ref 1–4.8)
LYMPHOCYTES NFR BLD: 7 % (ref 18–48)
MCH RBC QN AUTO: 32.5 PG (ref 27–31)
MCHC RBC AUTO-ENTMCNC: 32.9 G/DL (ref 32–36)
MCV RBC AUTO: 99 FL (ref 82–98)
MONOCYTES # BLD AUTO: 1.2 K/UL (ref 0.3–1)
MONOCYTES NFR BLD: 7.1 % (ref 4–15)
NEUTROPHILS # BLD AUTO: 14.9 K/UL (ref 1.8–7.7)
NEUTROPHILS NFR BLD: 85.3 % (ref 38–73)
NRBC BLD-RTO: 0 /100 WBC
PLATELET # BLD AUTO: 295 K/UL (ref 150–350)
PMV BLD AUTO: 9.5 FL (ref 9.2–12.9)
POTASSIUM SERPL-SCNC: 4.2 MMOL/L (ref 3.5–5.1)
PROT SERPL-MCNC: 7.4 G/DL (ref 6–8.4)
RBC # BLD AUTO: 4.22 M/UL (ref 4–5.4)
SODIUM SERPL-SCNC: 140 MMOL/L (ref 136–145)
WBC # BLD AUTO: 17.4 K/UL (ref 3.9–12.7)

## 2019-10-29 PROCEDURE — 36415 COLL VENOUS BLD VENIPUNCTURE: CPT

## 2019-10-29 PROCEDURE — 71046 X-RAY EXAM CHEST 2 VIEWS: CPT | Mod: TC,FY

## 2019-10-29 PROCEDURE — 99900104 DSU ONLY-NO CHARGE-EA ADD'L HR (STAT)

## 2019-10-29 PROCEDURE — 93005 ELECTROCARDIOGRAM TRACING: CPT

## 2019-10-29 PROCEDURE — 93010 EKG 12-LEAD: ICD-10-PCS | Mod: ,,, | Performed by: INTERNAL MEDICINE

## 2019-10-29 PROCEDURE — 80053 COMPREHEN METABOLIC PANEL: CPT

## 2019-10-29 PROCEDURE — 71046 XR CHEST PA AND LATERAL: ICD-10-PCS | Mod: 26,,, | Performed by: RADIOLOGY

## 2019-10-29 PROCEDURE — 93010 ELECTROCARDIOGRAM REPORT: CPT | Mod: ,,, | Performed by: INTERNAL MEDICINE

## 2019-10-29 PROCEDURE — 71046 X-RAY EXAM CHEST 2 VIEWS: CPT | Mod: 26,,, | Performed by: RADIOLOGY

## 2019-10-29 PROCEDURE — 99900103 DSU ONLY-NO CHARGE-INITIAL HR (STAT)

## 2019-10-29 PROCEDURE — 85025 COMPLETE CBC W/AUTO DIFF WBC: CPT

## 2019-10-29 NOTE — TELEPHONE ENCOUNTER
----- Message from Radha Bell sent at 10/29/2019  2:56 PM CDT -----  Contact: [phone]/self  Patient is requesting to speak with you to reschedule her appt on 11-01-19. She is having surgery but would still like to be seen this month. Please advise.

## 2019-10-29 NOTE — DISCHARGE INSTRUCTIONS
To confirm, Your doctor has instructed you that surgery is scheduled for:     Please report to [facility], Registration the morning of surgery. You must check-in and receive a wristband before going to your procedure.    Pre-Op will call the afternoon prior to surgery between 1:00 and 6:00 PM with the final arrival time.  Phone number: [phone]    PLEASE NOTE:  The surgery schedule has many variables which may affect the time of your surgery case.  Family members should be available if your surgery time changes.  Plan to be here the day of your procedure between 4-6 hours.    MEDICATIONS:  TAKE ONLY THESE MEDICATIONS WITH A SMALL SIP OF WATER THE MORNING OF YOUR PROCEDURE:  XANAX, AMLODIPINE, BUSPIRONE, GABAPENTIN, TRAZONE  FLONASE    DO NOT TAKE THESE MEDICATIONS 5-7 DAYS PRIOR to your procedure or per your surgeon's request: ASPIRIN, ALEVE, ADVIL, IBUPROFEN, FISH OIL VITAMIN E, HERBALS  (May take Tylenol)  DICLOFENAC AND MVI - LD 10-28-19      ONLY if you are prescribed any types of blood thinners such as:  Aspirin, Coumadin, Plavix, Pradaxa, Xarelto, Aggrenox, Effient, Eliquis, Savasya, Brilinta, or any other, ask your surgeon whether you should stop taking them and how long before surgery you should stop.  You may also need to verify with the prescribing physician if it is ok to stop your medication.      INSTRUCTIONS IMPORTANT!!  · Do not eat or drink anything between midnight and the time of your procedure- this includes gum, mints, and candy.  · Do not smoke or drink alcoholic beverages 24 hours prior to your procedure.  · Shower the night before AND the morning of your procedure with a Chlorhexidine wash such as Hibiclens or Dial antibacterial soap from the neck down.  Do not get it on your face or in your eyes.  You may use your own shampoo and face wash. This helps your skin to be as bacteria free as possible.    · If you wear contact lenses, dentures, hearing aids or glasses,  bring a container to put them in during surgery and give to a family member for safe keeping.  Please leave all jewelry, piercing's and valuables at home.   · DO NOT remove hair from the surgery site.  Do not shave the incision site unless you are given specific instructions to do so.    · ONLY if you have been diagnosed with sleep apnea please bring your C-PAP machine.  · ONLY if you wear home oxygen please bring your portable oxygen tank the day of your procedure.  · ONLY if you have a history of OPEN HEART SURGERY you will need a clearance from your Cardiologist per Anesthesia.      · ONLY for patients requiring bowel prep, written instructions will be given by your doctor's office.  · ONLY if you have a neuro stimulator, please bring the controller with you the morning of surgery  · ONLY if a type and screen test is needed before surgery, please return:  · If your doctor has scheduled you for an overnight stay, bring a small overnight bag with any personal items you need.  · Make arrangements in advance for transportation home by a responsible adult.  It is not safe to drive a vehicle during the 24 hours after anesthesia.      · Visiting hours are 10:00AM to 8:30PM.  For the safety of all patients, visitors under the age of 12 are not allowed above the first floor of the hospital.    · All [facility] facilities and properties are tobacco free.  Smoking is NOT allowed.       If you have any questions about these instructions, call Pre-Op Admit  Nursing at [phone] or the Pre-Op Day Surgery Unit at [phone].

## 2019-10-30 ENCOUNTER — HOSPITAL ENCOUNTER (OUTPATIENT)
Dept: RADIOLOGY | Facility: HOSPITAL | Age: 64
Discharge: HOME | End: 2019-10-30
Attending: ORTHOPAEDIC SURGERY
Payer: MEDICARE

## 2019-10-30 ENCOUNTER — HOSPITAL ENCOUNTER (OUTPATIENT)
Dept: PREADMISSION TESTING | Facility: HOSPITAL | Age: 64
Discharge: HOME | End: 2019-10-30
Attending: ORTHOPAEDIC SURGERY
Payer: MEDICARE

## 2019-10-30 ENCOUNTER — ANESTHESIA EVENT (OUTPATIENT)
Dept: SURGERY | Facility: HOSPITAL | Age: 64
End: 2019-10-30
Payer: MEDICARE

## 2019-10-30 DIAGNOSIS — M23.312 MEDIAL MENISCUS, ANTERIOR HORN DERANGEMENT, LEFT: ICD-10-CM

## 2019-10-30 DIAGNOSIS — Z01.818 PRE-OP TESTING: ICD-10-CM

## 2019-10-30 LAB
BASOPHILS # BLD AUTO: 0.02 K/UL (ref 0–0.2)
BASOPHILS NFR BLD: 0.2 % (ref 0–1.9)
BILIRUB UR QL STRIP: NEGATIVE
CLARITY UR: CLEAR
COLOR UR: YELLOW
DIFFERENTIAL METHOD: ABNORMAL
EOSINOPHIL # BLD AUTO: 0.1 K/UL (ref 0–0.5)
EOSINOPHIL NFR BLD: 1.6 % (ref 0–8)
ERYTHROCYTE [DISTWIDTH] IN BLOOD BY AUTOMATED COUNT: 12.6 % (ref 11.5–14.5)
GLUCOSE UR QL STRIP: NEGATIVE
HCT VFR BLD AUTO: 42.2 % (ref 37–48.5)
HGB BLD-MCNC: 14 G/DL (ref 12–16)
HGB UR QL STRIP: NEGATIVE
IMM GRANULOCYTES # BLD AUTO: 0.03 K/UL (ref 0–0.04)
KETONES UR QL STRIP: NEGATIVE
LEUKOCYTE ESTERASE UR QL STRIP: NEGATIVE
LYMPHOCYTES # BLD AUTO: 2 K/UL (ref 1–4.8)
LYMPHOCYTES NFR BLD: 23.9 % (ref 18–48)
MCH RBC QN AUTO: 33.3 PG (ref 27–31)
MCHC RBC AUTO-ENTMCNC: 33.2 G/DL (ref 32–36)
MCV RBC AUTO: 100 FL (ref 82–98)
MONOCYTES # BLD AUTO: 0.9 K/UL (ref 0.3–1)
MONOCYTES NFR BLD: 11.2 % (ref 4–15)
NEUTROPHILS # BLD AUTO: 5.2 K/UL (ref 1.8–7.7)
NEUTROPHILS NFR BLD: 62.7 % (ref 38–73)
NITRITE UR QL STRIP: NEGATIVE
NRBC BLD-RTO: 0 /100 WBC
PH UR STRIP: 7 [PH] (ref 5–8)
PLATELET # BLD AUTO: 243 K/UL (ref 150–350)
PMV BLD AUTO: 9.9 FL (ref 9.2–12.9)
PROT UR QL STRIP: NEGATIVE
RBC # BLD AUTO: 4.21 M/UL (ref 4–5.4)
SP GR UR STRIP: 1.01 (ref 1–1.03)
URN SPEC COLLECT METH UR: NORMAL
UROBILINOGEN UR STRIP-ACNC: NEGATIVE EU/DL
WBC # BLD AUTO: 8.29 K/UL (ref 3.9–12.7)

## 2019-10-30 PROCEDURE — 36415 COLL VENOUS BLD VENIPUNCTURE: CPT

## 2019-10-30 PROCEDURE — 81003 URINALYSIS AUTO W/O SCOPE: CPT

## 2019-10-30 PROCEDURE — 85025 COMPLETE CBC W/AUTO DIFF WBC: CPT

## 2019-10-30 PROCEDURE — 73721 MRI JNT OF LWR EXTRE W/O DYE: CPT | Mod: TC,PO,LT

## 2019-10-30 PROCEDURE — 99900103 DSU ONLY-NO CHARGE-INITIAL HR (STAT)

## 2019-10-31 ENCOUNTER — HOSPITAL ENCOUNTER (OUTPATIENT)
Facility: HOSPITAL | Age: 64
Discharge: HOME | End: 2019-10-31
Attending: ORTHOPAEDIC SURGERY | Admitting: ORTHOPAEDIC SURGERY
Payer: MEDICARE

## 2019-10-31 ENCOUNTER — ANESTHESIA (OUTPATIENT)
Dept: SURGERY | Facility: HOSPITAL | Age: 64
End: 2019-10-31
Payer: MEDICARE

## 2019-10-31 VITALS
TEMPERATURE: 98 F | SYSTOLIC BLOOD PRESSURE: 137 MMHG | RESPIRATION RATE: 18 BRPM | OXYGEN SATURATION: 94 % | DIASTOLIC BLOOD PRESSURE: 74 MMHG | HEART RATE: 85 BPM

## 2019-10-31 DIAGNOSIS — M75.42 ROTATOR CUFF IMPINGEMENT SYNDROME OF LEFT SHOULDER: ICD-10-CM

## 2019-10-31 DIAGNOSIS — Z01.818 PRE-OP TESTING: Primary | ICD-10-CM

## 2019-10-31 DIAGNOSIS — M19.019 AC (ACROMIOCLAVICULAR) ARTHRITIS: ICD-10-CM

## 2019-10-31 DIAGNOSIS — M75.122 NONTRAUMATIC COMPLETE TEAR OF LEFT ROTATOR CUFF: ICD-10-CM

## 2019-10-31 PROCEDURE — 76942 LEFT INTERSCALENE NERVE BLOCK: ICD-10-PCS | Mod: 26,,, | Performed by: ANESTHESIOLOGY

## 2019-10-31 PROCEDURE — 71000033 HC RECOVERY, INTIAL HOUR: Performed by: ORTHOPAEDIC SURGERY

## 2019-10-31 PROCEDURE — 99900104 DSU ONLY-NO CHARGE-EA ADD'L HR (STAT): Performed by: ORTHOPAEDIC SURGERY

## 2019-10-31 PROCEDURE — 63600175 PHARM REV CODE 636 W HCPCS: Performed by: ANESTHESIOLOGY

## 2019-10-31 PROCEDURE — 37000009 HC ANESTHESIA EA ADD 15 MINS: Performed by: ORTHOPAEDIC SURGERY

## 2019-10-31 PROCEDURE — 36000711: Performed by: ORTHOPAEDIC SURGERY

## 2019-10-31 PROCEDURE — 25000003 PHARM REV CODE 250: Performed by: ANESTHESIOLOGY

## 2019-10-31 PROCEDURE — 64415 LEFT INTERSCALENE NERVE BLOCK: ICD-10-PCS | Mod: 59,LT,, | Performed by: ANESTHESIOLOGY

## 2019-10-31 PROCEDURE — 63600175 PHARM REV CODE 636 W HCPCS: Performed by: ORTHOPAEDIC SURGERY

## 2019-10-31 PROCEDURE — 37000008 HC ANESTHESIA 1ST 15 MINUTES: Performed by: ORTHOPAEDIC SURGERY

## 2019-10-31 PROCEDURE — 71000015 HC POSTOP RECOV 1ST HR: Performed by: ORTHOPAEDIC SURGERY

## 2019-10-31 PROCEDURE — 27201423 OPTIME MED/SURG SUP & DEVICES STERILE SUPPLY: Performed by: ORTHOPAEDIC SURGERY

## 2019-10-31 PROCEDURE — 64415 NJX AA&/STRD BRCH PLXS IMG: CPT | Performed by: ANESTHESIOLOGY

## 2019-10-31 PROCEDURE — D9220A PRA ANESTHESIA: ICD-10-PCS | Mod: CRNA,,, | Performed by: NURSE ANESTHETIST, CERTIFIED REGISTERED

## 2019-10-31 PROCEDURE — S0020 INJECTION, BUPIVICAINE HYDRO: HCPCS | Performed by: ANESTHESIOLOGY

## 2019-10-31 PROCEDURE — 71000039 HC RECOVERY, EACH ADD'L HOUR: Performed by: ORTHOPAEDIC SURGERY

## 2019-10-31 PROCEDURE — D9220A PRA ANESTHESIA: ICD-10-PCS | Mod: ANES,,, | Performed by: ANESTHESIOLOGY

## 2019-10-31 PROCEDURE — 63600175 PHARM REV CODE 636 W HCPCS: Performed by: NURSE ANESTHETIST, CERTIFIED REGISTERED

## 2019-10-31 PROCEDURE — 27200708 HC INTUBATION/EXCHANGE WAND: Performed by: NURSE ANESTHETIST, CERTIFIED REGISTERED

## 2019-10-31 PROCEDURE — 36000710: Performed by: ORTHOPAEDIC SURGERY

## 2019-10-31 PROCEDURE — D9220A PRA ANESTHESIA: Mod: ANES,,, | Performed by: ANESTHESIOLOGY

## 2019-10-31 PROCEDURE — C1713 ANCHOR/SCREW BN/BN,TIS/BN: HCPCS | Performed by: ORTHOPAEDIC SURGERY

## 2019-10-31 PROCEDURE — 76942 ECHO GUIDE FOR BIOPSY: CPT | Mod: 26,,, | Performed by: ANESTHESIOLOGY

## 2019-10-31 PROCEDURE — 99900103 DSU ONLY-NO CHARGE-INITIAL HR (STAT): Performed by: ORTHOPAEDIC SURGERY

## 2019-10-31 PROCEDURE — D9220A PRA ANESTHESIA: Mod: CRNA,,, | Performed by: NURSE ANESTHETIST, CERTIFIED REGISTERED

## 2019-10-31 PROCEDURE — 25000003 PHARM REV CODE 250: Performed by: NURSE ANESTHETIST, CERTIFIED REGISTERED

## 2019-10-31 PROCEDURE — 63600175 PHARM REV CODE 636 W HCPCS

## 2019-10-31 DEVICE — ANCHOR SWIVELOCK C BLU FIBERTP: Type: IMPLANTABLE DEVICE | Site: SHOULDER | Status: FUNCTIONAL

## 2019-10-31 DEVICE — ANCHOR BIOCOMP SWVLLOK: Type: IMPLANTABLE DEVICE | Site: SHOULDER | Status: FUNCTIONAL

## 2019-10-31 DEVICE — ANCHOR SWIVELOCK C TIGERTAPE: Type: IMPLANTABLE DEVICE | Site: SHOULDER | Status: FUNCTIONAL

## 2019-10-31 RX ORDER — FENTANYL CITRATE 50 UG/ML
INJECTION, SOLUTION INTRAMUSCULAR; INTRAVENOUS
Status: DISCONTINUED | OUTPATIENT
Start: 2019-10-31 | End: 2019-10-31

## 2019-10-31 RX ORDER — BUPIVACAINE HYDROCHLORIDE 5 MG/ML
INJECTION, SOLUTION EPIDURAL; INTRACAUDAL
Status: COMPLETED | OUTPATIENT
Start: 2019-10-31 | End: 2019-10-31

## 2019-10-31 RX ORDER — SODIUM CHLORIDE, SODIUM LACTATE, POTASSIUM CHLORIDE, CALCIUM CHLORIDE 600; 310; 30; 20 MG/100ML; MG/100ML; MG/100ML; MG/100ML
INJECTION, SOLUTION INTRAVENOUS CONTINUOUS
Status: DISCONTINUED | OUTPATIENT
Start: 2019-10-31 | End: 2019-10-31 | Stop reason: HOSPADM

## 2019-10-31 RX ORDER — ONDANSETRON HYDROCHLORIDE 2 MG/ML
INJECTION, SOLUTION INTRAMUSCULAR; INTRAVENOUS
Status: DISCONTINUED | OUTPATIENT
Start: 2019-10-31 | End: 2019-10-31

## 2019-10-31 RX ORDER — SODIUM CHLORIDE 0.9 % (FLUSH) 0.9 %
10 SYRINGE (ML) INJECTION
Status: DISCONTINUED | OUTPATIENT
Start: 2019-10-31 | End: 2019-10-31 | Stop reason: HOSPADM

## 2019-10-31 RX ORDER — GLYCOPYRROLATE 0.2 MG/ML
INJECTION INTRAMUSCULAR; INTRAVENOUS
Status: DISCONTINUED | OUTPATIENT
Start: 2019-10-31 | End: 2019-10-31

## 2019-10-31 RX ORDER — SODIUM CHLORIDE 0.9 % (FLUSH) 0.9 %
3 SYRINGE (ML) INJECTION EVERY 8 HOURS
Status: DISCONTINUED | OUTPATIENT
Start: 2019-10-31 | End: 2019-10-31 | Stop reason: HOSPADM

## 2019-10-31 RX ORDER — OXYCODONE AND ACETAMINOPHEN 10; 325 MG/1; MG/1
1 TABLET ORAL EVERY 6 HOURS PRN
Qty: 28 TABLET | Refills: 0 | Status: SHIPPED | OUTPATIENT
Start: 2019-10-31 | End: 2019-11-04 | Stop reason: SDUPTHER

## 2019-10-31 RX ORDER — MEPERIDINE HYDROCHLORIDE 50 MG/ML
12.5 INJECTION INTRAMUSCULAR; INTRAVENOUS; SUBCUTANEOUS ONCE AS NEEDED
Status: COMPLETED | OUTPATIENT
Start: 2019-10-31 | End: 2019-10-31

## 2019-10-31 RX ORDER — HYDROMORPHONE HYDROCHLORIDE 2 MG/ML
0.2 INJECTION, SOLUTION INTRAMUSCULAR; INTRAVENOUS; SUBCUTANEOUS EVERY 5 MIN PRN
Status: DISCONTINUED | OUTPATIENT
Start: 2019-10-31 | End: 2019-10-31 | Stop reason: HOSPADM

## 2019-10-31 RX ORDER — OXYCODONE HYDROCHLORIDE 10 MG/1
10 TABLET ORAL EVERY 4 HOURS PRN
Status: DISCONTINUED | OUTPATIENT
Start: 2019-10-31 | End: 2019-10-31 | Stop reason: HOSPADM

## 2019-10-31 RX ORDER — HYDROCODONE BITARTRATE AND ACETAMINOPHEN 5; 325 MG/1; MG/1
1 TABLET ORAL EVERY 4 HOURS PRN
Status: DISCONTINUED | OUTPATIENT
Start: 2019-10-31 | End: 2019-10-31 | Stop reason: HOSPADM

## 2019-10-31 RX ORDER — LIDOCAINE HYDROCHLORIDE 10 MG/ML
1 INJECTION, SOLUTION EPIDURAL; INFILTRATION; INTRACAUDAL; PERINEURAL ONCE
Status: COMPLETED | OUTPATIENT
Start: 2019-10-31 | End: 2019-10-31

## 2019-10-31 RX ORDER — CEFAZOLIN SODIUM 2 G/50ML
2 SOLUTION INTRAVENOUS
Status: COMPLETED | OUTPATIENT
Start: 2019-10-31 | End: 2019-10-31

## 2019-10-31 RX ORDER — SODIUM CHLORIDE 0.9 % (FLUSH) 0.9 %
3 SYRINGE (ML) INJECTION
Status: DISCONTINUED | OUTPATIENT
Start: 2019-10-31 | End: 2019-10-31 | Stop reason: HOSPADM

## 2019-10-31 RX ORDER — SUCCINYLCHOLINE CHLORIDE 20 MG/ML
INJECTION INTRAMUSCULAR; INTRAVENOUS
Status: DISCONTINUED | OUTPATIENT
Start: 2019-10-31 | End: 2019-10-31

## 2019-10-31 RX ORDER — PROPOFOL 10 MG/ML
VIAL (ML) INTRAVENOUS
Status: DISCONTINUED | OUTPATIENT
Start: 2019-10-31 | End: 2019-10-31

## 2019-10-31 RX ORDER — DIPHENHYDRAMINE HYDROCHLORIDE 50 MG/ML
25 INJECTION INTRAMUSCULAR; INTRAVENOUS EVERY 6 HOURS PRN
Status: DISCONTINUED | OUTPATIENT
Start: 2019-10-31 | End: 2019-10-31 | Stop reason: HOSPADM

## 2019-10-31 RX ORDER — LIDOCAINE HCL/PF 100 MG/5ML
SYRINGE (ML) INTRAVENOUS
Status: DISCONTINUED | OUTPATIENT
Start: 2019-10-31 | End: 2019-10-31

## 2019-10-31 RX ORDER — LORAZEPAM 2 MG/ML
0.25 INJECTION INTRAMUSCULAR
Status: DISCONTINUED | OUTPATIENT
Start: 2019-10-31 | End: 2019-10-31 | Stop reason: HOSPADM

## 2019-10-31 RX ORDER — MIDAZOLAM HYDROCHLORIDE 1 MG/ML
INJECTION, SOLUTION INTRAMUSCULAR; INTRAVENOUS
Status: DISCONTINUED | OUTPATIENT
Start: 2019-10-31 | End: 2019-10-31

## 2019-10-31 RX ORDER — MUPIROCIN 20 MG/G
1 OINTMENT TOPICAL 2 TIMES DAILY
Status: DISCONTINUED | OUTPATIENT
Start: 2019-10-31 | End: 2019-10-31 | Stop reason: HOSPADM

## 2019-10-31 RX ORDER — OXYCODONE HYDROCHLORIDE 5 MG/1
5 TABLET ORAL
Status: DISCONTINUED | OUTPATIENT
Start: 2019-10-31 | End: 2019-10-31 | Stop reason: HOSPADM

## 2019-10-31 RX ORDER — ONDANSETRON 2 MG/ML
4 INJECTION INTRAMUSCULAR; INTRAVENOUS EVERY 12 HOURS PRN
Status: DISCONTINUED | OUTPATIENT
Start: 2019-10-31 | End: 2019-10-31 | Stop reason: HOSPADM

## 2019-10-31 RX ORDER — EPINEPHRINE 1 MG/ML
INJECTION, SOLUTION INTRACARDIAC; INTRAMUSCULAR; INTRAVENOUS; SUBCUTANEOUS
Status: DISCONTINUED | OUTPATIENT
Start: 2019-10-31 | End: 2019-10-31 | Stop reason: HOSPADM

## 2019-10-31 RX ORDER — ROCURONIUM BROMIDE 10 MG/ML
INJECTION, SOLUTION INTRAVENOUS
Status: DISCONTINUED | OUTPATIENT
Start: 2019-10-31 | End: 2019-10-31

## 2019-10-31 RX ORDER — METOCLOPRAMIDE HYDROCHLORIDE 5 MG/ML
5 INJECTION INTRAMUSCULAR; INTRAVENOUS EVERY 6 HOURS PRN
Status: DISCONTINUED | OUTPATIENT
Start: 2019-10-31 | End: 2019-10-31 | Stop reason: HOSPADM

## 2019-10-31 RX ORDER — DEXAMETHASONE SODIUM PHOSPHATE 4 MG/ML
INJECTION, SOLUTION INTRA-ARTICULAR; INTRALESIONAL; INTRAMUSCULAR; INTRAVENOUS; SOFT TISSUE
Status: DISCONTINUED | OUTPATIENT
Start: 2019-10-31 | End: 2019-10-31

## 2019-10-31 RX ORDER — ONDANSETRON 2 MG/ML
4 INJECTION INTRAMUSCULAR; INTRAVENOUS DAILY PRN
Status: DISCONTINUED | OUTPATIENT
Start: 2019-10-31 | End: 2019-10-31 | Stop reason: HOSPADM

## 2019-10-31 RX ORDER — ACETAMINOPHEN 10 MG/ML
INJECTION, SOLUTION INTRAVENOUS
Status: DISCONTINUED | OUTPATIENT
Start: 2019-10-31 | End: 2019-10-31

## 2019-10-31 RX ADMIN — LIDOCAINE HYDROCHLORIDE 100 MG: 20 INJECTION, SOLUTION INTRAVENOUS at 11:10

## 2019-10-31 RX ADMIN — OXYCODONE HYDROCHLORIDE 5 MG: 5 TABLET ORAL at 12:10

## 2019-10-31 RX ADMIN — PROPOFOL 160 MG: 10 INJECTION, EMULSION INTRAVENOUS at 10:10

## 2019-10-31 RX ADMIN — HYDROMORPHONE HYDROCHLORIDE 0.2 MG: 2 INJECTION, SOLUTION INTRAMUSCULAR; INTRAVENOUS; SUBCUTANEOUS at 12:10

## 2019-10-31 RX ADMIN — MIDAZOLAM 2 MG: 1 INJECTION INTRAMUSCULAR; INTRAVENOUS at 09:10

## 2019-10-31 RX ADMIN — ACETAMINOPHEN 1000 MG: 10 INJECTION, SOLUTION INTRAVENOUS at 10:10

## 2019-10-31 RX ADMIN — DEXAMETHASONE SODIUM PHOSPHATE 4 MG: 4 INJECTION, SOLUTION INTRAMUSCULAR; INTRAVENOUS at 11:10

## 2019-10-31 RX ADMIN — ONDANSETRON 4 MG: 2 INJECTION, SOLUTION INTRAMUSCULAR; INTRAVENOUS at 11:10

## 2019-10-31 RX ADMIN — GLYCOPYRROLATE 0.2 MG: 0.2 INJECTION, SOLUTION INTRAMUSCULAR; INTRAVENOUS at 10:10

## 2019-10-31 RX ADMIN — LIDOCAINE HYDROCHLORIDE: 10 INJECTION, SOLUTION EPIDURAL; INFILTRATION; INTRACAUDAL; PERINEURAL at 07:10

## 2019-10-31 RX ADMIN — SODIUM CHLORIDE, SODIUM LACTATE, POTASSIUM CHLORIDE, AND CALCIUM CHLORIDE: .6; .31; .03; .02 INJECTION, SOLUTION INTRAVENOUS at 11:10

## 2019-10-31 RX ADMIN — MEPERIDINE HYDROCHLORIDE 12.5 MG: 50 INJECTION, SOLUTION INTRAMUSCULAR; INTRAVENOUS; SUBCUTANEOUS at 12:10

## 2019-10-31 RX ADMIN — BUPIVACAINE HYDROCHLORIDE 5 ML: 5 INJECTION, SOLUTION EPIDURAL; INTRACAUDAL; PERINEURAL at 09:10

## 2019-10-31 RX ADMIN — CEFAZOLIN 2 G: 10 INJECTION, POWDER, FOR SOLUTION INTRAVENOUS at 10:10

## 2019-10-31 RX ADMIN — FENTANYL CITRATE 50 MCG: 50 INJECTION, SOLUTION INTRAMUSCULAR; INTRAVENOUS at 11:10

## 2019-10-31 RX ADMIN — LIDOCAINE HYDROCHLORIDE 100 MG: 20 INJECTION, SOLUTION INTRAVENOUS at 10:10

## 2019-10-31 RX ADMIN — SUCCINYLCHOLINE CHLORIDE 140 MG: 20 INJECTION, SOLUTION INTRAMUSCULAR; INTRAVENOUS at 10:10

## 2019-10-31 RX ADMIN — SODIUM CHLORIDE, SODIUM LACTATE, POTASSIUM CHLORIDE, AND CALCIUM CHLORIDE: .6; .31; .03; .02 INJECTION, SOLUTION INTRAVENOUS at 07:10

## 2019-10-31 RX ADMIN — ROCURONIUM BROMIDE 5 MG: 10 INJECTION, SOLUTION INTRAVENOUS at 10:10

## 2019-10-31 RX ADMIN — FENTANYL CITRATE 50 MCG: 50 INJECTION, SOLUTION INTRAMUSCULAR; INTRAVENOUS at 09:10

## 2019-10-31 RX ADMIN — LORAZEPAM 0.25 MG: 2 INJECTION, SOLUTION INTRAMUSCULAR; INTRAVENOUS at 01:10

## 2019-10-31 RX ADMIN — FENTANYL CITRATE 50 MCG: 50 INJECTION, SOLUTION INTRAMUSCULAR; INTRAVENOUS at 10:10

## 2019-10-31 RX ADMIN — PROPOFOL 20 MG: 10 INJECTION, EMULSION INTRAVENOUS at 11:10

## 2019-10-31 NOTE — ANESTHESIA PROCEDURE NOTES
Left Interscalene Nerve Block    Patient location during procedure: pre-op   Block not for primary anesthetic.  Reason for block: at surgeon's request and post-op pain management   Post-op Pain Location: left shoulder pain  Start time: 10/31/2019 9:05 AM  Timeout: 10/31/2019 9:05 AM   End time: 10/31/2019 9:10 AM    Staffing  Authorizing Provider: Jorge Martinez MD  Performing Provider: Jorge Martinez MD    Preanesthetic Checklist  Completed: patient identified, site marked, surgical consent, pre-op evaluation, timeout performed, IV checked, risks and benefits discussed and monitors and equipment checked  Peripheral Block  Patient position: supine  Prep: ChloraPrep  Patient monitoring: heart rate, cardiac monitor, continuous pulse ox, continuous capnometry and frequent blood pressure checks  Block type: interscalene  Laterality: left  Injection technique: single shot  Needle  Needle type: Stimuplex   Needle gauge: 22 G  Needle length: 2 in  Needle localization: anatomical landmarks and ultrasound guidance  Needle insertion depth: 4 cm   -ultrasound image captured on disc.  Assessment  Injection assessment: negative aspiration, negative parasthesia and local visualized surrounding nerve  Paresthesia pain: none  Heart rate change: no  Slow fractionated injection: yes  Additional Notes  VSS.  [facility] RN monitoring vitals throughout procedure.  Patient tolerated procedure well.     exparel 10 ml dosed under US guidance.

## 2019-10-31 NOTE — DISCHARGE INSTRUCTIONS
We hope your stay was comfortable as you heal now, mend and rest.    For we have enjoyed taking care of you by giving your our best.    And as you get better, by regaining your health and strength;   We count it as a privilege to have served you and hope your time at [facility] was well spent.      Thank  You!!!After Surgery  Do's:   - Advance diet as tolerated   - Keep operative site clean and dry for 48 hours   - Remove dressing 2 days after surgery.  You may then shower.  Reapply     Immobilizer.   - Wear immobilizer.   - Keep ice pack on the shoulder for 48-72 hours.  Ice on for 20 minutes of each     hour while awake.  If discharged with [facility], use as directed.   - Check circulation frequently in fingers by pressing down on finger nail. Nail bed      should turn white and then pink when released.   -  Resume home medications __________________.  Use pain medications as     needed/prescribed.  DON'T:   - No bath or swimming. Shower after 48 hours.   - No driving for 24 hours or while taking narcotic pain medication.   - DO NOT TAKE ADDITIONAL TYLENOL/ACETAMINOPHEN WHILE TAKING  - NARCOTIC PAIN MEDICATION THAT CONTAINS  -       TYLENOL/ACETAMINOPHEN.   - NO USE OR EXERCISE OF OPERATIVE ARM.    CALL PHYSICIAN FOR:   - Redness or swelling   - Drainage (pus) from the incision site   - Persistent pain not relieved by pain medicine.    Make return appointment for ___________________.    FOR EMERGENCIES:  Contact your physician and go to the Emergency room.  Discharge Instructions: After Your Surgery/Procedure  Youve just had surgery. During surgery you were given medicine called anesthesia to keep you relaxed and free of pain. After surgery you may have some pain or nausea. This is common. Here are some tips for feeling better and getting well after surgery.     Stay on schedule with your medication.   Going home  Your doctor or nurse will show you how to take care of yourself when you go home. He or she will also  "answer your questions. Have an adult family member or friend drive you home.      For your safety we recommend these precaution for the first 24 hours after your procedure:  · Do not drive or use heavy equipment.  · Do not make important decisions or sign legal papers.  · Do not drink alcohol.  · Have someone stay with you, if needed. He or she can watch for problems and help keep you safe.  · Your concentration, balance, coordination, and judgement may be impaired for many hours after anesthesia.  Use caution when ambulating or standing up.     · You may feel weak and "washed out" after anesthesia and surgery.      Subtle residual effects of general anesthesia or sedation with regional / local anesthesia can last more than 24 hours.  Rest for the remainder of the day or longer if your Doctor/Surgeon has advised you to do so.  Although you may feel normal within the first 24 hours, your reflexes and mental ability may be impaired without you realizing it.  You may feel dizzy, lightheaded or sleepy for 24 hours or longer.      Be sure to go to all follow-up visits with your doctor. And rest after your surgery for as long as your doctor tells you to.  Coping with pain  If you have pain after surgery, pain medicine will help you feel better. Take it as told, before pain becomes severe. Also, ask your doctor or pharmacist about other ways to control pain. This might be with heat, ice, or relaxation. And follow any other instructions your surgeon or nurse gives you.  Tips for taking pain medicine  To get the best relief possible, remember these points:  · Pain medicines can upset your stomach. Taking them with a little food may help.  · Most pain relievers taken by mouth need at least 20 to 30 minutes to start to work.  · Taking medicine on a schedule can help you remember to take it. Try to time your medicine so that you can take it before starting an activity. This might be before you get dressed, go for a walk, or sit " down for dinner.  · Constipation is a common side effect of pain medicines. Call your doctor before taking any medicines such as laxatives or stool softeners to help ease constipation. Also ask if you should skip any foods. Drinking lots of fluids and eating foods such as fruits and vegetables that are high in fiber can also help. Remember, do not take laxatives unless your surgeon has prescribed them.  · Drinking alcohol and taking pain medicine can cause dizziness and slow your breathing. It can even be deadly. Do not drink alcohol while taking pain medicine.  · Pain medicine can make you react more slowly to things. Do not drive or run machinery while taking pain medicine.  Your health care provider may tell you to take acetaminophen to help ease your pain. Ask him or her how much you are supposed to take each day. Acetaminophen or other pain relievers may interact with your prescription medicines or other over-the-counter (OTC) drugs. Some prescription medicines have acetaminophen and other ingredients. Using both prescription and OTC acetaminophen for pain can cause you to overdose. Read the labels on your OTC medicines with care. This will help you to clearly know the list of ingredients, how much to take, and any warnings. It may also help you not take too much acetaminophen. If you have questions or do not understand the information, ask your pharmacist or health care provider to explain it to you before you take the OTC medicine.  Managing nausea  Some people have an upset stomach after surgery. This is often because of anesthesia, pain, or pain medicine, or the stress of surgery. These tips will help you handle nausea and eat healthy foods as you get better. If you were on a special food plan before surgery, ask your doctor if you should follow it while you get better. These tips may help:  · Do not push yourself to eat. Your body will tell you when to eat and how much.  · Start off with clear liquids and  soup. They are easier to digest.  · Next try semi-solid foods, such as mashed potatoes, applesauce, and gelatin, as you feel ready.  · Slowly move to solid foods. Dont eat fatty, rich, or spicy foods at first.  · Do not force yourself to have 3 large meals a day. Instead eat smaller amounts more often.  · Take pain medicines with a small amount of solid food, such as crackers or toast, to avoid nausea.     Call your surgeon if  · You still have pain an hour after taking medicine. The medicine may not be strong enough.  · You feel too sleepy, dizzy, or groggy. The medicine may be too strong.  · You have side effects like nausea, vomiting, or skin changes, such as rash, itching, or hives.       If you have obstructive sleep apnea  You were given anesthesia medicine during surgery to keep you comfortable and free of pain. After surgery, you may have more apnea spells because of this medicine and other medicines you were given. The spells may last longer than usual.   At home:  · Keep using the continuous positive airway pressure (CPAP) device when you sleep. Unless your health care provider tells you not to, use it when you sleep, day or night. CPAP is a common device used to treat obstructive sleep apnea.  · Talk with your provider before taking any pain medicine, muscle relaxants, or sedatives. Your provider will tell you about the possible dangers of taking these medicines.  © 9375-8480 [employer]. [address] [place] [address]. All rights reserved. This information is not intended as a substitute for professional medical care. Always follow your healthcare professional's instructions.        General Information:    1.  Do not drink alcoholic beverages including beer for 24 hours or as long as you are on pain medication..  2.  Do not drive a motor vehicle, operate machinery or power tools, or signs legal papers for 24 hours or as long as you are on pain medication.   3.  You may  experience light-headedness, dizziness, and sleepiness following surgery. Please do not stay alone. A responsible adult should be with you for this 24 hour period.  4.  Go home and rest.    5. Progress slowly to a normal diet unless instructed.  Otherwise, begin with liquids such as soft drinks, then soup and crackers working up to solid foods. Drink plenty of nonalcoholic fluids.  6.  Certain anesthetics and pain medications produce nausea and vomiting in certain       individuals. If nausea becomes a problem at home, call you doctor.    7. A nurse will be calling you sometime after surgery. Do not be alarmed. This is our way of finding out how you are doing.    8. Several times every hour while you are awake, take 2-3 deep breaths and cough. If you had stomach surgery hold a pillow or rolled towel firmly against your stomach before you cough. This will help with any pain the cough might cause.  9. Several times every hour while you are awake, pump and flex your feet 5-6 times and do foot circles. This will help prevent blood clots.    10.Call your doctor for severe pain, bleeding, fever, or signs or symptoms of infection (pain, swelling, redness, foul odor, drainage).    Post op instructions for prevention of DVT  What is deep vein thrombosis?  Deep vein thrombosis (DVT) is the medical term for blood clots in the deep veins of the leg.  These blood clots can be dangerous.  A DVT can block a blood vessel and keep blood from getting where it needs to go.  Another problem is that the clot can travel to other parts of the body such as the lungs.  A clot that travels to the lungs is called a pulmonary embolus (PE) and can cause serious problems with breathing which can lead to death.  Am I at risk for DVT/PE?  If you are not very active, you are at risk of DVT.  Anyone confined to bed, sitting for long periods of time, recovering from surgery, etc. increases the risk of DVT.  Other risk factors are cancer diagnosis,  certain medications, estrogen replacement in any form,older age, obesity, pregnancy, smoking, history of clotting disorders, and dehydration.  How will I know if I have a DVT?   Swelling in the lower leg   Pain   Warmth, redness, hardness or bulging of the vein  If you have any of these symptoms, call your doctors office right away.  Some people will not have any symptoms until the clot moves to the lungs.  What are the symptoms of a PE?   Panting, shortness of breath, or trouble breathing   Sharp, knife-like chest pain when you breathe   Coughing or coughing up blood   Rapid heartbeat  If you have any of these symptoms or get worse quickly, call 911 for emergency treatment.  How can I prevent a DVT?   Avoid long periods of inactivity and dont cross your legs--get up and walk around every hour or so.   Stay active--walking after surgery is highly encouraged.  This means you should get out of the house and walk in the neighborhood.  Going up and down stairs will not impair healing (unless advised against such activity by your doctor).     Drink plenty of noncaffeinated, nonalcoholic fluids each day to prevent dehydration.   Wear special support stockings, if they have been advised by your doctor.   If you travel, stop at least once an hour and walk around.   Avoid smoking (assistance with stopping is available through your healthcare provider)  Always notify your doctor if you are not able to follow the post operative instructions that are given to you at the time of discharge.  It may be necessary to prescribe one of the medications available to prevent DVT.    We hope your stay was comfortable as you heal now, mend and rest.    For we have enjoyed taking care of you by giving your our best.    And as you get better, by regaining your health and strength;   We count it as a privilege to have served you and hope your time at [facility] was well spent.      Thank  You!!!

## 2019-10-31 NOTE — OP NOTE
DATE OF PROCEDURE:  10/31/2019    PREOPERATIVE DIAGNOSES:  1.  Chronic rotator cuff tear, left shoulder.  2.  Acromioclavicular joint arthritis, left shoulder.    FINAL DIAGNOSES:  1.  Chronic rotator cuff tear, left shoulder.  2.  Acromioclavicular joint arthritis, left shoulder.    PROCEDURES PERFORMED:  1.  Arthroscopic shoulder decompression with acromioplasty and coracoacromial   ligament release, CPT code 02958.  2.  Arthroscopic distal claviculectomy, CPT code 50522.  3.  Mini-open rotator cuff repair, left shoulder, CPT code 67394.    SURGEON:  Ruben Martinez M.D.    ASSISTANT:  Jimena Galo, first surgical assistant.    ANESTHESIA:  General.    OPERATIVE REPORT:  Ms. Gan was brought to the Operating Room after being   given a regional block to the left upper extremity, was intubated then turned to   the right lateral decubitus position where skin traction was used to slightly   abduct and forward flex the arm, 10 pounds countertraction was used and a   beanbag was used to hold the patient slightly off of the straight lateral   position.  The left shoulder and axillary areas were then cleansed with alcohol   and prepped with ChloraPrep solution.  We distended the shoulder joint with   saline and then after prepping and draping the shoulder, introduced the   arthroscope through a posterior portal.  The intra-articular anatomy was   identified.  A tear of the rotator cuff could be identified from within the   joint.  The biceps tendon was intact.  The glenoid labrum was intact.  There   were no significant degenerative changes or loose bodies.  The arthroscope was   redirected to the subacromial bursa.  A moderate amount of proliferative   bursitis was noted.  A lateral portal was used to introduce the arthroscopic   shaver to perform a subtotal bursectomy and to identify the anatomy.   Electrocautery was used to release the coracoacromial ligament and removed soft   tissue from the anterior edge of the  acromion process.  An anterior   acromioplasty was performed with a motorized bur. An anterior portal was made to   perform a distal inferior claviculectomy at the acromioclavicular joint. We removed the proximal in the distal 8 mm of the clavicle at the acromioclavicular joint  We   then redirected the arthroscope to view the rotator cuff insertion where we   identified a 1 cm tear with slight retraction.  We debrided this area and   removed further bursal tissue and then converted the lateral portal into a   mini-open approach, measuring about 3 cm.  We identified the rotator cuff tear,   debrided the edges using the Arthrex suture anchor system.  We then repaired the   rotator cuff with a double-row technique without difficulty.  We were able to   mobilize the tendon and reattach it to its anatomic insertion.  We then   thoroughly irrigated the area and closed the deltoid splitting incision with #1   Vicryl suture, subcutaneous tissues with 2-0 Vicryl and then used Dermabond on   the skin and over the mini-open incision and used 4-0 nylon closed the   arthroscopy portals.  Sterile dressings were applied.  The patient was turned   supine.  A shoulder immobilizer was applied.  She was extubated and brought to   Recovery Room in stable condition.      ROOSEVELT/IN  dd: 10/31/2019 13:16:39 (CDT)  td: 10/31/2019 15:14:19 (CANDY)  Doc ID   #[id number]  Job ID #[id number]    CC:

## 2019-10-31 NOTE — ANESTHESIA PREPROCEDURE EVALUATION
10/31/2019  Beatriz Gan is a 64 y.o., female.    Anesthesia Evaluation    I have reviewed the Patient Summary Reports.    I have reviewed the Nursing Notes.   I have reviewed the Medications.     Review of Systems  Anesthesia Hx:  No problems with previous Anesthesia Pt reports no previous issues with anesthesia - verified at bedside.   Social:  Former Smoker    Hematology/Oncology:         -- Anemia: --  Cancer in past history (Kidney s/p partial nephrectomy):    Cardiovascular:   Hypertension, well controlled hyperlipidemia    Pulmonary:  Pulmonary Normal    Renal/:   Chronic Renal Disease    Hepatic/GI:   Hiatal Hernia, GERD, well controlled Liver Disease,    Musculoskeletal:   Arthritis     Neurological:   Neuromuscular Disease, Headaches    Endocrine:  Endocrine Normal    Psych:   Psychiatric History depression          Physical Exam  General:  Well nourished, Obesity    Airway/Jaw/Neck:  Airway Findings: Mouth Opening: Normal Tongue: Normal  General Airway Assessment: Adult  Oropharynx Findings:  Mallampati: II  Jaw/Neck Findings:  Neck ROM: Normal ROM     Eyes/Ears/Nose:  Eyes/Ears/Nose Findings:    Dental:  Dental Findings:   Chest/Lungs:  Chest/Lungs Findings: Normal Respiratory Rate     Heart/Vascular:  Heart Findings: Rate: Normal  Rhythm: Regular Rhythm        Mental Status:  Mental Status Findings:  Cooperative, Alert and Oriented         Anesthesia Plan  Type of Anesthesia, risks & benefits discussed:  Anesthesia Type:  general  Patient's Preference:   Intra-op Monitoring Plan: standard ASA monitors  Intra-op Monitoring Plan Comments:   Post Op Pain Control Plan: multimodal analgesia and peripheral nerve block  Post Op Pain Control Plan Comments:   Induction:   IV  Beta Blocker:  Patient is not currently on a Beta-Blocker (No further documentation required).       Informed Consent:  "Patient understands risks and agrees with Anesthesia plan.  Questions answered. Anesthesia consent signed with patient.  ASA Score: 3     Day of Surgery Review of History & Physical:  There are no significant changes.   H&P completed by Anesthesiologist.   Anesthesia Plan Notes: Preoperatively, Pt reports "burning" pain in shoulder and scapula radiating down into thumb constantly and not exacerbated or relieved by anything..  Recurrent parasthesias in L arm down into hand, occasional weakness, frequent dropping of items in L hand.    We discussed the possibility of a neurological cause of this pain which may not improve with surgery.        Ready For Surgery From Anesthesia Perspective.       "

## 2019-10-31 NOTE — INTERVAL H&P NOTE
The patient has been examined and the H&P has been reviewed:    I concur with the findings and no changes have occurred since H&P was written.    Anesthesia/Surgery risks, benefits and alternative options discussed and understood by patient/family.          Active Hospital Problems    Diagnosis  POA    AC (acromioclavicular) arthritis [M19.019]  Yes      Resolved Hospital Problems   No resolved problems to display.

## 2019-10-31 NOTE — PLAN OF CARE
Pt aaox4, denies pain and nausea, tolerating clear liquids, dressing cdi, family updated, anesthesia states pt  meets anesthesia criteria to transfer to Phase I, report given to JOE Lyons

## 2019-10-31 NOTE — TRANSFER OF CARE
Anesthesia Transfer of Care Note    Patient: Beatriz Gan    Procedure(s) Performed: Procedure(s) (LRB):  REPAIR, ROTATOR CUFF (Left)  EXCISION, CLAVICLE, DISTAL (Left)  ARTHROSCOPY, SHOULDER, WITH SUBACROMIAL SPACE DECOMPRESSION (Left)    Patient location: PACU    Anesthesia Type: general    Transport from OR: Transported from OR on room air with adequate spontaneous ventilation    Post pain: adequate analgesia    Post assessment: no apparent anesthetic complications    Post vital signs: stable    Level of consciousness: awake and alert    Nausea/Vomiting: no nausea/vomiting    Complications: none    Transfer of care protocol was followed      Last vitals:   Visit Vitals  /75   Pulse 68   Temp 36.4 °C (97.5 °F) (Skin)   Resp 18   SpO2 97%   Breastfeeding? No

## 2019-11-01 NOTE — ANESTHESIA POSTPROCEDURE EVALUATION
Anesthesia Post Evaluation    Patient: Beatriz Gan    Procedure(s) Performed: Procedure(s) (LRB):  REPAIR, ROTATOR CUFF (Left)  EXCISION, CLAVICLE, DISTAL (Left)  ARTHROSCOPY, SHOULDER, WITH SUBACROMIAL SPACE DECOMPRESSION (Left)    Final Anesthesia Type: general  Patient location during evaluation: PACU  Patient participation: Yes- Able to Participate  Level of consciousness: awake and alert, oriented and awake  Post-procedure vital signs: reviewed and stable  Pain management: adequate  Airway patency: patent  PONV status at discharge: No PONV  Anesthetic complications: no      Cardiovascular status: blood pressure returned to baseline and hemodynamically stable  Respiratory status: unassisted, spontaneous ventilation and room air  Hydration status: euvolemic  Follow-up not needed.          Vitals Value Taken Time   /74 10/31/2019  1:57 PM   Temp 36.4 °C (97.5 °F) 10/31/2019  1:44 PM   Pulse 85 10/31/2019  1:57 PM   Resp 18 10/31/2019  1:57 PM   SpO2 94 % 10/31/2019  1:57 PM         Event Time     Out of Recovery 13:40:00          Pain/Yaneli Score: Pain Rating Prior to Med Admin: 8 (10/31/2019 12:36 PM)  Yaneli Score: 10 (10/31/2019  1:45 PM)

## 2019-11-04 ENCOUNTER — OFFICE VISIT (OUTPATIENT)
Dept: ORTHOPEDICS | Facility: CLINIC | Age: 64
End: 2019-11-04
Payer: MEDICARE

## 2019-11-04 VITALS
SYSTOLIC BLOOD PRESSURE: 122 MMHG | HEIGHT: 64 IN | HEART RATE: 82 BPM | DIASTOLIC BLOOD PRESSURE: 70 MMHG | WEIGHT: 190 LBS | BODY MASS INDEX: 32.44 KG/M2

## 2019-11-04 DIAGNOSIS — R11.0 POSTOPERATIVE NAUSEA: ICD-10-CM

## 2019-11-04 DIAGNOSIS — Z98.890 S/P LEFT ROTATOR CUFF REPAIR: Primary | ICD-10-CM

## 2019-11-04 DIAGNOSIS — Z98.890 POSTOPERATIVE NAUSEA: ICD-10-CM

## 2019-11-04 PROCEDURE — 99024 PR POST-OP FOLLOW-UP VISIT: ICD-10-PCS | Mod: S$GLB,POP,, | Performed by: ORTHOPAEDIC SURGERY

## 2019-11-04 PROCEDURE — 99024 POSTOP FOLLOW-UP VISIT: CPT | Mod: S$GLB,POP,, | Performed by: ORTHOPAEDIC SURGERY

## 2019-11-04 RX ORDER — ONDANSETRON 4 MG/1
4 TABLET, FILM COATED ORAL EVERY 6 HOURS PRN
Qty: 60 TABLET | Refills: 2 | Status: SHIPPED | OUTPATIENT
Start: 2019-11-04 | End: 2019-12-19

## 2019-11-04 RX ORDER — OXYCODONE AND ACETAMINOPHEN 10; 325 MG/1; MG/1
1 TABLET ORAL EVERY 6 HOURS PRN
Qty: 28 TABLET | Refills: 0 | Status: SHIPPED | OUTPATIENT
Start: 2019-11-04 | End: 2019-11-11

## 2019-11-04 RX ORDER — HYDROXYZINE PAMOATE 25 MG/1
50 CAPSULE ORAL 2 TIMES DAILY PRN
Qty: 60 CAPSULE | Refills: 1 | Status: SHIPPED | OUTPATIENT
Start: 2019-11-04 | End: 2019-12-04

## 2019-11-04 RX ORDER — HYDROXYZINE PAMOATE 50 MG/1
50 CAPSULE ORAL EVERY 6 HOURS PRN
Qty: 60 CAPSULE | Refills: 1 | Status: CANCELLED | OUTPATIENT
Start: 2019-11-04 | End: 2019-11-19

## 2019-11-04 NOTE — PROGRESS NOTES
Subjective:    Patient ID: Beatriz Gan is a 64 y.o. female.    Chief Complaint: Post-op Evaluation of the Left Shoulder (Left shoulder scope 10.31.19. Suture removal. States that her shoulder is still bothering her and states that she has a sharp throbbing pain in the shoulder. )      History of Present Illness  Patient is here status post left shoulder arthroscopy with subacromial decompression and mini open rotator cuff repair of a complete tear. Patient is struggling with pain and limited mobility postoperatively.  Her surgery was just 4 days ago and she is unsure why she is here so soon after her surgery.  I am wondering as well.    Current Medications  Current Outpatient Medications   Medication Sig Dispense Refill    ALPRAZolam (XANAX) 0.5 MG tablet Take 1 tablet (0.5 mg total) by mouth 3 (three) times daily. 90 tablet 0    amLODIPine (NORVASC) 5 MG tablet once daily.       calcium-vitamin D3 (CALCIUM 500 + D) 500 mg(1,250mg) -200 unit per tablet Take 1 tablet by mouth 2 (two) times daily with meals.      diclofenac (VOLTAREN) 75 MG EC tablet Take 1 tablet (75 mg total) by mouth 2 (two) times daily as needed. 30 tablet 0    DULoxetine (CYMBALTA) 30 MG capsule nightly.       gabapentin (NEURONTIN) 400 MG capsule 3 (three) times daily.       lisinopril (PRINIVIL,ZESTRIL) 30 MG tablet TAKE ONE TABLET BY MOUTH ONCE DAILY 30 tablet 3    losartan-hydrochlorothiazide 100-25 mg (HYZAAR) 100-25 mg per tablet once daily.       multivit-iron-min-folic acid (MULTIVITAMIN-IRON-MINERALS-FOLIC ACID) 3,500-18-0.4 unit-mg-mg Chew Take by mouth once daily.        oxyCODONE-acetaminophen (PERCOCET)  mg per tablet Take 1 tablet by mouth every 6 (six) hours as needed for Pain. 28 tablet 0    amlodipine (NORVASC) 10 MG tablet Take 1 tablet (10 mg total) by mouth once daily. (Patient not taking: Reported on 11/4/2019) 30 tablet 3    busPIRone (BUSPAR) 15 MG tablet Take 1 tablet (15 mg total) by mouth 3  (three) times daily. 90 tablet 11    fluticasone (FLONASE) 50 mcg/actuation nasal spray 1 spray by Nasal route daily as needed.       lidocaine (LIDODERM) 5 % Place 1 patch onto the skin once daily. Remove & Discard patch within 12 hours or as directed by MD (Patient not taking: Reported on 11/4/2019) 30 patch 0    traZODone (DESYREL) 100 MG tablet        No current facility-administered medications for this visit.        Allergies  Review of patient's allergies indicates:   Allergen Reactions    Promethazine Hives and Rash    Latex, natural rubber Hives     Per pt report-many years    Morphine Hives       Past Medical History  Past Medical History:   Diagnosis Date    Alcohol dependence     DDD (degenerative disc disease), lumbosacral     DJD (degenerative joint disease), lumbosacral     Encounter for blood transfusion     GERD (gastroesophageal reflux disease)     Gout     Hypercholesterolemia     Hypertension     NATHALIE (iron deficiency anemia)     questionable white coat hypertension    Kidney carcinoma, left 2014    Pneumonia     Renal cell carcinoma     Shingles 10/13/2012       Surgical History  Past Surgical History:   Procedure Laterality Date    APPENDECTOMY      ARTHROSCOPY OF SHOULDER WITH DECOMPRESSION OF SUBACROMIAL SPACE Left 10/31/2019    Procedure: ARTHROSCOPY, SHOULDER, WITH SUBACROMIAL SPACE DECOMPRESSION;  Surgeon: Ruben Martinez MD;  Location: [facility] OR;  Service: Orthopedics;  Laterality: Left;    BLADDER REPAIR      x 2    COLONOSCOPY  9/2011    DISTAL CLAVICLE EXCISION Left 10/31/2019    Procedure: EXCISION, CLAVICLE, DISTAL;  Surgeon: Ruben Martinez MD;  Location: [facility] OR;  Service: Orthopedics;  Laterality: Left;    ESOPHAGOGASTRODUODENOSCOPY  9/2011    EYE SURGERY      lasix bilateral    HERNIA REPAIR      hiatal hernai    HYSTERECTOMY      LAPAROSCOPIC NISSEN FUNDOPLICATION      NEPHRECTOMY      LT partial    REFRACTIVE SURGERY      ROTATOR CUFF REPAIR Left  10/31/2019    Procedure: REPAIR, ROTATOR CUFF;  Surgeon: Ruben Martinez MD;  Location: [facility];  Service: Orthopedics;  Laterality: Left;  arthrex    SKIN BIOPSY      TOTAL ABDOMINAL HYSTERECTOMY W/ BILATERAL SALPINGOOPHORECTOMY  age 48       Family History:   Family History   Problem Relation Age of Onset    Hypertension Father     Glaucoma Neg Hx     Macular degeneration Neg Hx     Retinal detachment Neg Hx        Social History:   Social History     Socioeconomic History    Marital status:      Spouse name: Not on file    Number of children: Not on file    Years of education: Not on file    Highest education level: Not on file   Occupational History     Employer: [employer]   Social Needs    Financial resource strain: Not on file    Food insecurity:     Worry: Not on file     Inability: Not on file    Transportation needs:     Medical: Not on file     Non-medical: Not on file   Tobacco Use    Smoking status: Former Smoker     Packs/day: 1.00     Years: 40.00     Pack years: 40.00     Types: Cigarettes     Last attempt to quit: 2018     Years since quittin.8    Smokeless tobacco: Never Used   Substance and Sexual Activity    Alcohol use: Yes     Alcohol/week: 16.0 standard drinks     Types: 2 Cans of beer, 14 Standard drinks or equivalent per week     Comment: 2-3 a day/ social    Drug use: No    Sexual activity: Yes     Partners: Male   Lifestyle    Physical activity:     Days per week: Not on file     Minutes per session: Not on file    Stress: Not on file   Relationships    Social connections:     Talks on phone: Not on file     Gets together: Not on file     Attends Jainism service: Not on file     Active member of club or organization: Not on file     Attends meetings of clubs or organizations: Not on file     Relationship status: Not on file   Other Topics Concern    Not on file   Social History Narrative    Not on file       Date of surgery:  10/31/2019    Review of  "Systems     General/Constitutional: Chills denies. Fatigue denies. Fever denies. Weight gain denies. Weight loss denies.    Musculoskeletal: Comments: See HPI for details    Skin: Rash denies.    Objective:   Vital Signs:   Vitals:    11/04/19 1522   BP: 122/70   Pulse: 82        Physical Exam    This a well-developed, well nourished patient in no acute distress.  They are alert and oriented and cooperative to examination.  Pt. walks without an antalgic gait.      General Examination:     Constitutional: The patient is alert and oriented to lace person and time. Mood is pleasant.     Head/Face: Normal facial features normal eyebrows    Eyes: Normal extraocular motion bilaterally    Lungs: Respirations are equal and unlabored    Gait is coordinated.    Cardiovascular: There are no swelling or varicosities present.    Lymphatic: Negative for adenopathy    Skin: Normal    Neurological: Level of consciousness normal. Oriented to place person and time and situation    Psychiatric: Oriented to time place person and situation    Left shoulder exam:  Patient presents in a wheelchair today. Dressing removed. Incision is healing well with no signs or symptoms of infection.  I did not remove the 2 singular sutures that were present; the longer incision is glued.  Patient also presents in the immobilizer sling. Left upper extremity is distally neurovascular intact. Moderate amount of ecchymosis about the shoulder and deltoid.    XRAY Report/ Interpretation:  No new studies today      Assessment:       1. S/P left rotator cuff repair        Plan:       eBatriz was seen today for post-op evaluation.    Diagnoses and all orders for this visit:    S/P left rotator cuff repair         No follow-ups on file.  Harshil Romero, physician's assistant served in the capacity as a "scribe" for this patient encounter  A "face to face" encounter occurred with Dr. Martinez on this date  The treatment plan and medical decision making is " outlined below:  Follow-up later this week for suture removal.  To start physical therapy as directed.  I did refill her pain medication today and also gave her prescription for nausea.    I discussed the case with Dr. Martinez in person and he would like her to get started with physical therapy this week as soon as possible.  We will get this started for her.  I gave her prescription for Vistaril to help with nighttime claustrophobia.      This note was created using Dragon voice recognition software that occasionally misinterpreted phrases or words.

## 2019-11-06 ENCOUNTER — OFFICE VISIT (OUTPATIENT)
Dept: ORTHOPEDICS | Facility: CLINIC | Age: 64
End: 2019-11-06
Payer: MEDICARE

## 2019-11-06 VITALS
WEIGHT: 190 LBS | DIASTOLIC BLOOD PRESSURE: 89 MMHG | HEART RATE: 89 BPM | SYSTOLIC BLOOD PRESSURE: 124 MMHG | HEIGHT: 64 IN | BODY MASS INDEX: 32.44 KG/M2

## 2019-11-06 DIAGNOSIS — Z98.890 S/P LEFT ROTATOR CUFF REPAIR: Primary | ICD-10-CM

## 2019-11-06 PROCEDURE — 99024 PR POST-OP FOLLOW-UP VISIT: ICD-10-PCS | Mod: S$GLB,POP,, | Performed by: ORTHOPAEDIC SURGERY

## 2019-11-06 PROCEDURE — 99024 POSTOP FOLLOW-UP VISIT: CPT | Mod: S$GLB,POP,, | Performed by: ORTHOPAEDIC SURGERY

## 2019-11-06 NOTE — PROGRESS NOTES
Subjective:    Patient ID: Beatriz Gan is a 64 y.o. female.    Chief Complaint: Post-op Evaluation of the Left Shoulder (Left shoulder scope 10.31.19. Suture removal. States that her shoulder is good as long as she does not move it. )      History of Present Illness  Patient is here status post left shoulder arthroscopy with mini open rotator cuff repair.  She is here to have her sutures removed today. She did not start physical therapy as of yet.    Current Medications  Current Outpatient Medications   Medication Sig Dispense Refill    ALPRAZolam (XANAX) 0.5 MG tablet Take 1 tablet (0.5 mg total) by mouth 3 (three) times daily. 90 tablet 0    amlodipine (NORVASC) 10 MG tablet Take 1 tablet (10 mg total) by mouth once daily. 30 tablet 3    amLODIPine (NORVASC) 5 MG tablet once daily.       calcium-vitamin D3 (CALCIUM 500 + D) 500 mg(1,250mg) -200 unit per tablet Take 1 tablet by mouth 2 (two) times daily with meals.      diclofenac (VOLTAREN) 75 MG EC tablet Take 1 tablet (75 mg total) by mouth 2 (two) times daily as needed. 30 tablet 0    DULoxetine (CYMBALTA) 30 MG capsule nightly.       fluticasone (FLONASE) 50 mcg/actuation nasal spray 1 spray by Nasal route daily as needed.       gabapentin (NEURONTIN) 400 MG capsule 3 (three) times daily.       hydrOXYzine pamoate (VISTARIL) 25 MG Cap Take 2 capsules (50 mg total) by mouth 2 (two) times daily as needed (anxiety). 60 capsule 1    lidocaine (LIDODERM) 5 % Place 1 patch onto the skin once daily. Remove & Discard patch within 12 hours or as directed by MD 30 patch 0    lisinopril (PRINIVIL,ZESTRIL) 30 MG tablet TAKE ONE TABLET BY MOUTH ONCE DAILY 30 tablet 3    losartan-hydrochlorothiazide 100-25 mg (HYZAAR) 100-25 mg per tablet once daily.       multivit-iron-min-folic acid (MULTIVITAMIN-IRON-MINERALS-FOLIC ACID) 3,500-18-0.4 unit-mg-mg Chew Take by mouth once daily.        ondansetron (ZOFRAN) 4 MG tablet Take 1 tablet (4 mg total) by  mouth every 6 (six) hours as needed for Nausea. 60 tablet 2    oxyCODONE-acetaminophen (PERCOCET)  mg per tablet Take 1 tablet by mouth every 6 (six) hours as needed for Pain. 28 tablet 0    traZODone (DESYREL) 100 MG tablet       busPIRone (BUSPAR) 15 MG tablet Take 1 tablet (15 mg total) by mouth 3 (three) times daily. 90 tablet 11     No current facility-administered medications for this visit.        Allergies  Review of patient's allergies indicates:   Allergen Reactions    Promethazine Hives and Rash    Latex, natural rubber Hives     Per pt report-many years    Morphine Hives       Past Medical History  Past Medical History:   Diagnosis Date    Alcohol dependence     DDD (degenerative disc disease), lumbosacral     DJD (degenerative joint disease), lumbosacral     Encounter for blood transfusion     GERD (gastroesophageal reflux disease)     Gout     Hypercholesterolemia     Hypertension     NATHALIE (iron deficiency anemia)     questionable white coat hypertension    Kidney carcinoma, left 2014    Pneumonia     Renal cell carcinoma     Shingles 10/13/2012       Surgical History  Past Surgical History:   Procedure Laterality Date    APPENDECTOMY      ARTHROSCOPY OF SHOULDER WITH DECOMPRESSION OF SUBACROMIAL SPACE Left 10/31/2019    Procedure: ARTHROSCOPY, SHOULDER, WITH SUBACROMIAL SPACE DECOMPRESSION;  Surgeon: Ruben Martinez MD;  Location: [facility] OR;  Service: Orthopedics;  Laterality: Left;    BLADDER REPAIR      x 2    COLONOSCOPY  9/2011    DISTAL CLAVICLE EXCISION Left 10/31/2019    Procedure: EXCISION, CLAVICLE, DISTAL;  Surgeon: Ruben Martinez MD;  Location: [facility] OR;  Service: Orthopedics;  Laterality: Left;    ESOPHAGOGASTRODUODENOSCOPY  9/2011    EYE SURGERY      lasix bilateral    HERNIA REPAIR      hiatal hernai    HYSTERECTOMY      LAPAROSCOPIC NISSEN FUNDOPLICATION      NEPHRECTOMY      LT partial    REFRACTIVE SURGERY      ROTATOR CUFF REPAIR Left 10/31/2019     Procedure: REPAIR, ROTATOR CUFF;  Surgeon: Ruben Martinez MD;  Location: [facility];  Service: Orthopedics;  Laterality: Left;  arthrex    SKIN BIOPSY      TOTAL ABDOMINAL HYSTERECTOMY W/ BILATERAL SALPINGOOPHORECTOMY  age 48       Family History:   Family History   Problem Relation Age of Onset    Hypertension Father     Glaucoma Neg Hx     Macular degeneration Neg Hx     Retinal detachment Neg Hx        Social History:   Social History     Socioeconomic History    Marital status:      Spouse name: Not on file    Number of children: Not on file    Years of education: Not on file    Highest education level: Not on file   Occupational History     Employer: [employer]   Social Needs    Financial resource strain: Not on file    Food insecurity:     Worry: Not on file     Inability: Not on file    Transportation needs:     Medical: Not on file     Non-medical: Not on file   Tobacco Use    Smoking status: Former Smoker     Packs/day: 1.00     Years: 40.00     Pack years: 40.00     Types: Cigarettes     Last attempt to quit: 2018     Years since quittin.8    Smokeless tobacco: Never Used   Substance and Sexual Activity    Alcohol use: Yes     Alcohol/week: 16.0 standard drinks     Types: 2 Cans of beer, 14 Standard drinks or equivalent per week     Comment: 2-3 a day/ social    Drug use: No    Sexual activity: Yes     Partners: Male   Lifestyle    Physical activity:     Days per week: Not on file     Minutes per session: Not on file    Stress: Not on file   Relationships    Social connections:     Talks on phone: Not on file     Gets together: Not on file     Attends Anabaptism service: Not on file     Active member of club or organization: Not on file     Attends meetings of clubs or organizations: Not on file     Relationship status: Not on file   Other Topics Concern    Not on file   Social History Narrative    Not on file       Date of surgery:  10/31/2019    Review of Systems  "    General/Constitutional: Chills denies. Fatigue denies. Fever denies. Weight gain denies. Weight loss denies.    Musculoskeletal: Comments: See HPI for details    Skin: Rash denies.    Objective:   Vital Signs:   Vitals:    11/06/19 1343   BP: 124/89   Pulse: 89        Physical Exam    This a well-developed, well nourished patient in no acute distress.  They are alert and oriented and cooperative to examination.  Pt. walks without an antalgic gait.      General Examination:     Constitutional: The patient is alert and oriented to lace person and time. Mood is pleasant.     Head/Face: Normal facial features normal eyebrows    Eyes: Normal extraocular motion bilaterally    Lungs: Respirations are equal and unlabored    Gait is coordinated.    Cardiovascular: There are no swelling or varicosities present.    Lymphatic: Negative for adenopathy    Skin: Normal    Neurological: Level of consciousness normal. Oriented to place person and time and situation    Psychiatric: Oriented to time place person and situation    Left shoulder incisions are healing well with no signs or symptoms of infection.  Sutures removed today.    XRAY Report/ Interpretation:  No new studies today      Assessment:       1. S/P left rotator cuff repair        Plan:       Beatriz was seen today for post-op evaluation.    Diagnoses and all orders for this visit:    S/P left rotator cuff repair         Follow up in about 3 weeks (around 11/27/2019) for PO LT shoulder scope 10/31.  Harshil Romero, physician's assistant served in the capacity as a "scribe" for this patient encounter  A "face to face" encounter occurred with Dr. Martinez on this date  The treatment plan and medical decision making is outlined below:  To begin physical therapy as soon as possible with a postop rotator cuff repair protocol.  Follow up 3 weeks or sooner if needed.      This note was created using Dragon voice recognition software that occasionally misinterpreted phrases " or words.

## 2019-11-11 ENCOUNTER — TELEPHONE (OUTPATIENT)
Dept: FAMILY MEDICINE | Facility: CLINIC | Age: 64
End: 2019-11-11

## 2019-11-11 ENCOUNTER — OFFICE VISIT (OUTPATIENT)
Dept: PRIMARY CARE CLINIC | Facility: CLINIC | Age: 64
End: 2019-11-11
Payer: MEDICARE

## 2019-11-11 VITALS
HEIGHT: 65 IN | HEART RATE: 72 BPM | SYSTOLIC BLOOD PRESSURE: 128 MMHG | BODY MASS INDEX: 31.77 KG/M2 | TEMPERATURE: 98 F | DIASTOLIC BLOOD PRESSURE: 90 MMHG | WEIGHT: 190.69 LBS

## 2019-11-11 DIAGNOSIS — Z78.0 POSTMENOPAUSAL: ICD-10-CM

## 2019-11-11 DIAGNOSIS — F51.01 PRIMARY INSOMNIA: ICD-10-CM

## 2019-11-11 DIAGNOSIS — G89.29 CHRONIC BILATERAL LOW BACK PAIN WITHOUT SCIATICA: ICD-10-CM

## 2019-11-11 DIAGNOSIS — F41.9 ANXIETY: ICD-10-CM

## 2019-11-11 DIAGNOSIS — M54.50 CHRONIC BILATERAL LOW BACK PAIN WITHOUT SCIATICA: ICD-10-CM

## 2019-11-11 DIAGNOSIS — I10 ESSENTIAL HYPERTENSION: ICD-10-CM

## 2019-11-11 DIAGNOSIS — M51.36 DDD (DEGENERATIVE DISC DISEASE), LUMBAR: ICD-10-CM

## 2019-11-11 DIAGNOSIS — F33.1 MAJOR DEPRESSIVE DISORDER, RECURRENT EPISODE, MODERATE: Primary | ICD-10-CM

## 2019-11-11 DIAGNOSIS — Z12.31 SCREENING MAMMOGRAM, ENCOUNTER FOR: ICD-10-CM

## 2019-11-11 DIAGNOSIS — Z23 NEED FOR PROPHYLACTIC VACCINATION AND INOCULATION AGAINST INFLUENZA: ICD-10-CM

## 2019-11-11 PROBLEM — A41.50 GRAM NEGATIVE SEPSIS: Status: RESOLVED | Noted: 2018-05-26 | Resolved: 2019-11-11

## 2019-11-11 PROBLEM — N17.9 AKI (ACUTE KIDNEY INJURY): Status: RESOLVED | Noted: 2018-05-26 | Resolved: 2019-11-11

## 2019-11-11 PROBLEM — D62 ACUTE BLOOD LOSS ANEMIA: Status: RESOLVED | Noted: 2018-05-30 | Resolved: 2019-11-11

## 2019-11-11 PROBLEM — G93.40 ENCEPHALOPATHY: Status: RESOLVED | Noted: 2017-10-19 | Resolved: 2019-11-11

## 2019-11-11 PROBLEM — K92.2 LOWER GI BLEED: Status: RESOLVED | Noted: 2018-05-30 | Resolved: 2019-11-11

## 2019-11-11 PROCEDURE — 99999 PR PBB SHADOW E&M-EST. PATIENT-LVL III: CPT | Mod: PBBFAC,,, | Performed by: INTERNAL MEDICINE

## 2019-11-11 PROCEDURE — 90662 IIV NO PRSV INCREASED AG IM: CPT | Mod: PBBFAC,PN

## 2019-11-11 PROCEDURE — 99214 OFFICE O/P EST MOD 30 MIN: CPT | Mod: S$PBB,,, | Performed by: INTERNAL MEDICINE

## 2019-11-11 PROCEDURE — 99214 PR OFFICE/OUTPT VISIT, EST, LEVL IV, 30-39 MIN: ICD-10-PCS | Mod: S$PBB,,, | Performed by: INTERNAL MEDICINE

## 2019-11-11 PROCEDURE — 99999 PR PBB SHADOW E&M-EST. PATIENT-LVL III: ICD-10-PCS | Mod: PBBFAC,,, | Performed by: INTERNAL MEDICINE

## 2019-11-11 PROCEDURE — 99213 OFFICE O/P EST LOW 20 MIN: CPT | Mod: PBBFAC,PN | Performed by: INTERNAL MEDICINE

## 2019-11-11 RX ORDER — NALOXONE HYDROCHLORIDE 4 MG/.1ML
SPRAY NASAL
COMMUNITY
Start: 2019-11-08 | End: 2020-04-14

## 2019-11-11 RX ORDER — GABAPENTIN 400 MG/1
400 CAPSULE ORAL 3 TIMES DAILY
Qty: 270 CAPSULE | Refills: 3 | Status: SHIPPED | OUTPATIENT
Start: 2019-11-11 | End: 2020-03-03

## 2019-11-11 RX ORDER — AMLODIPINE BESYLATE 5 MG/1
5 TABLET ORAL DAILY
Qty: 90 TABLET | Refills: 3 | Status: SHIPPED | OUTPATIENT
Start: 2019-11-11 | End: 2020-09-09 | Stop reason: SDUPTHER

## 2019-11-11 RX ORDER — LOSARTAN POTASSIUM AND HYDROCHLOROTHIAZIDE 25; 100 MG/1; MG/1
1 TABLET ORAL DAILY
Qty: 90 TABLET | Refills: 3 | Status: SHIPPED | OUTPATIENT
Start: 2019-11-11 | End: 2021-01-11 | Stop reason: SDUPTHER

## 2019-11-11 RX ORDER — POLYETHYLENE GLYCOL 3350 17 G/17G
17 POWDER, FOR SOLUTION ORAL DAILY
Qty: 510 G | Refills: 3 | Status: SHIPPED | OUTPATIENT
Start: 2019-11-11

## 2019-11-11 RX ORDER — ZOLPIDEM TARTRATE 10 MG/1
10 TABLET ORAL NIGHTLY PRN
Qty: 90 TABLET | Refills: 0 | Status: SHIPPED | OUTPATIENT
Start: 2019-11-11 | End: 2020-01-28 | Stop reason: SDUPTHER

## 2019-11-11 RX ORDER — DULOXETIN HYDROCHLORIDE 30 MG/1
30 CAPSULE, DELAYED RELEASE ORAL DAILY
Qty: 90 CAPSULE | Refills: 3 | Status: SHIPPED | OUTPATIENT
Start: 2019-11-11 | End: 2020-04-09

## 2019-11-11 NOTE — TELEPHONE ENCOUNTER
Spoke to Buddy at Dr. Martinez office, she left the sling/cushion in the office. She can go by to pick it up...  Pt is aware

## 2019-11-11 NOTE — PROGRESS NOTES
[facility] Note    Chief Complaint      Chief Complaint   Patient presents with    Establish Care       History of Present Illness      Beatriz Gan is a 64 y.o. female with chronic conditions of HTN, Anxiety, insomnia, osteoarthritis left shoulder, chronic low back pain who presents today for: re-establish care from  and review chronic conditions.  Recently had left shoulder arthroscopy with Dr. Martinez.  Pt in sling and needs a new cushion.    HTN: BP at [facility] on losartan-hctz, amlodipine  Anxiety: incompletely controlled on cymbalta, xanax but has been on hold since surgery because taking oxycodone.    Insomnia: taking trazodone but causing headaches when waking in the morning.  Was on ambien which worked better but insurance stopped covering.    Osteoarthritis, shoulder, chronic low back pain: See above.  Sees Dr. Martinez.  Otherwise on gabapentin.  Will need lumbar spine surgery soon.    Flu shot due.  Will get  record for other immunization.  Mammogram due.  DEXA due.    Cscope 15 yrs ago.  Cologuard UTD.  Will get record.      Past Medical History:  Past Medical History:   Diagnosis Date    Alcohol dependence     DDD (degenerative disc disease), lumbosacral     DJD (degenerative joint disease), lumbosacral     Encounter for blood transfusion     GERD (gastroesophageal reflux disease)     Gout     Hypercholesterolemia     Hypertension     NATHALIE (iron deficiency anemia)     questionable white coat hypertension    Kidney carcinoma, left 2014    Pneumonia     Renal cell carcinoma     Shingles 10/13/2012       Past Surgical History:   has a past surgical history that includes Total abdominal hysterectomy w/ bilateral salpingoophorectomy (age 48); Appendectomy; Bladder repair; Colonoscopy (9/2011); Esophagogastroduodenoscopy (9/2011); Hysterectomy; Refractive surgery; Nephrectomy; Laparoscopic Nissen fundoplication; Eye surgery; Hernia repair; Skin biopsy; Rotator cuff repair  (Left, 10/31/2019); Distal clavicle excision (Left, 10/31/2019); and Arthroscopy of shoulder with decompression of subacromial space (Left, 10/31/2019).    Family History:  family history includes Hypertension in her father.     Social History:  Social History     Tobacco Use    Smoking status: Former Smoker     Packs/day: 1.00     Years: 40.00     Pack years: 40.00     Types: Cigarettes     Last attempt to quit: 2018     Years since quittin.8    Smokeless tobacco: Never Used   Substance Use Topics    Alcohol use: Yes     Alcohol/week: 16.0 standard drinks     Types: 2 Cans of beer, 14 Standard drinks or equivalent per week     Comment: 2-3 a day/ social    Drug use: No       Review of Systems   Constitutional: Positive for chills. Negative for fever and malaise/fatigue.   Respiratory: Negative for shortness of breath.    Cardiovascular: Negative for chest pain.   Gastrointestinal: Positive for constipation. Negative for diarrhea, nausea and vomiting.   Skin: Negative for rash.   Neurological: Negative for weakness.        Medications:  Outpatient Encounter Medications as of 2019   Medication Sig Dispense Refill    ALPRAZolam (XANAX) 0.5 MG tablet Take 1 tablet (0.5 mg total) by mouth 3 (three) times daily. 90 tablet 0    amLODIPine (NORVASC) 5 MG tablet Take 1 tablet (5 mg total) by mouth once daily. 90 tablet 3    calcium-vitamin D3 (CALCIUM 500 + D) 500 mg(1,250mg) -200 unit per tablet Take 1 tablet by mouth 2 (two) times daily with meals.      diclofenac (VOLTAREN) 75 MG EC tablet Take 1 tablet (75 mg total) by mouth 2 (two) times daily as needed. 30 tablet 0    DULoxetine (CYMBALTA) 30 MG capsule Take 1 capsule (30 mg total) by mouth once daily. 90 capsule 3    fluticasone (FLONASE) 50 mcg/actuation nasal spray 1 spray by Nasal route daily as needed.       gabapentin (NEURONTIN) 400 MG capsule Take 1 capsule (400 mg total) by mouth 3 (three) times daily. 270 capsule 3    hydrOXYzine  pamoate (VISTARIL) 25 MG Cap Take 2 capsules (50 mg total) by mouth 2 (two) times daily as needed (anxiety). 60 capsule 1    lidocaine (LIDODERM) 5 % Place 1 patch onto the skin once daily. Remove & Discard patch within 12 hours or as directed by MD 30 patch 0    losartan-hydrochlorothiazide 100-25 mg (HYZAAR) 100-25 mg per tablet Take 1 tablet by mouth once daily. 90 tablet 3    multivit-iron-min-folic acid (MULTIVITAMIN-IRON-MINERALS-FOLIC ACID) 3,500-18-0.4 unit-mg-mg Chew Take by mouth once daily.        NARCAN 4 mg/actuation Spry       ondansetron (ZOFRAN) 4 MG tablet Take 1 tablet (4 mg total) by mouth every 6 (six) hours as needed for Nausea. 60 tablet 2    OPW TEST CLAIM - DO NOT FILL Inject 0.5 tablets into the muscle once. OPW test claim. Do not fill. for 1 dose 1 tablet 0    oxyCODONE-acetaminophen (PERCOCET)  mg per tablet Take 1 tablet by mouth every 6 (six) hours as needed for Pain. 28 tablet 0    [DISCONTINUED] amLODIPine (NORVASC) 5 MG tablet once daily.       [DISCONTINUED] DULoxetine (CYMBALTA) 30 MG capsule nightly.       [DISCONTINUED] gabapentin (NEURONTIN) 400 MG capsule 3 (three) times daily.       [DISCONTINUED] lisinopril (PRINIVIL,ZESTRIL) 30 MG tablet TAKE ONE TABLET BY MOUTH ONCE DAILY 30 tablet 3    [DISCONTINUED] losartan-hydrochlorothiazide 100-25 mg (HYZAAR) 100-25 mg per tablet once daily.       [DISCONTINUED] traZODone (DESYREL) 100 MG tablet       polyethylene glycol (GLYCOLAX) 17 gram/dose powder Take 17 g by mouth once daily. 510 g 3    zolpidem (AMBIEN) 10 mg Tab Take 1 tablet (10 mg total) by mouth nightly as needed. 90 tablet 0    [DISCONTINUED] amlodipine (NORVASC) 10 MG tablet Take 1 tablet (10 mg total) by mouth once daily. 30 tablet 3    [DISCONTINUED] busPIRone (BUSPAR) 15 MG tablet Take 1 tablet (15 mg total) by mouth 3 (three) times daily. 90 tablet 11     No facility-administered encounter medications on file as of 11/11/2019.   "      Allergies:  Review of patient's allergies indicates:   Allergen Reactions    Promethazine Hives and Rash    Latex, natural rubber Hives     Per pt report-many years    Morphine Hives       Health Maintenance:  Immunization History   Administered Date(s) Administered    Influenza - Quadrivalent - PF (6 months and older) 10/16/2014    Pneumococcal Conjugate 01/17/2014      Health Maintenance   Topic Date Due    TETANUS VACCINE  09/08/1971    Lipid Panel  10/31/2015    LDCT Lung Screen  08/28/2016    Mammogram  02/11/2017    DEXA SCAN  02/10/2018    Pneumococcal Vaccine (65+ High/Highest Risk) (2 of 2 - PPSV23) 09/08/2018    Colonoscopy  09/28/2021    Hepatitis C Screening  Completed        Physical Exam      Vital Signs  Temp: 98 °F (36.7 °C)  Pulse: 72  BP: (!) 128/90  BP Location: Right arm  Height and Weight  Height: 5' 4.5" (163.8 cm)  Weight: 86.5 kg (190 lb 11.2 oz)  BSA (Calculated - sq m): 1.98 sq meters  BMI (Calculated): 32.2  Weight in (lb) to have BMI = 25: 147.6]    Physical Exam   Constitutional: She appears well-developed and well-nourished.   HENT:   Head: Normocephalic and atraumatic.   Right Ear: External ear normal.   Left Ear: External ear normal.   Mouth/Throat: Oropharynx is clear and moist.   Eyes: Pupils are equal, round, and reactive to light. Conjunctivae and EOM are normal.   Neck: Carotid bruit is not present.   Cardiovascular: Normal rate, regular rhythm, normal heart sounds and intact distal pulses.   No murmur heard.  Pulmonary/Chest: Effort normal and breath sounds normal. She has no wheezes. She has no rales.   Abdominal: Soft. Bowel sounds are normal. She exhibits no distension. There is no hepatosplenomegaly. There is no tenderness.   Musculoskeletal: She exhibits no edema.   Left shoulder in sling   Vitals reviewed.       Laboratory:  CBC:  Recent Labs   Lab 08/07/18  0918 10/29/19  0945 10/30/19  1040   WBC 2.90 L 17.40 H 8.29   RBC 4.19 4.22 4.21   Hemoglobin " 13.0 13.7 14.0   Hematocrit 39.1 41.7 42.2   Platelets 169 295 243   Mean Corpuscular Volume 93 99 H 100 H   Mean Corpuscular Hemoglobin 31.1 H 32.5 H 33.3 H   Mean Corpuscular Hemoglobin Conc 33.3 32.9 33.2     CMP:  Recent Labs   Lab 08/07/18  0917 04/01/19  0740 10/29/19  0945   Glucose 109 99 129 H   Calcium 9.6 10.7 H 10.4   Albumin 3.8 4.2 4.3   Total Protein 7.2 7.5 7.4   Sodium 138 140 140   Potassium 4.1 3.7 4.2   CO2 26 30 H 30 H   Chloride 101 101 98   BUN, Bld 12 16 19   Alkaline Phosphatase 122 105 101   ALT 16 13 15   AST 27 17 12   Total Bilirubin 0.4 0.6 0.9     URINALYSIS:  Recent Labs   Lab 05/26/18  1023  10/30/19  1040   Color, UA Yellow   < > Yellow   Specific Gravity, UA 1.025   < > 1.015   pH, UA 5.0   < > 7.0   Protein, UA 2+ A   < > Negative   Bacteria Many A  --   --    Nitrite, UA Negative   < > Negative   Leukocytes, UA 2+ A   < > Negative   Urobilinogen, UA 1.0   < > Negative   Hyaline Casts, UA 0  --   --     < > = values in this interval not displayed.      LIPIDS:  Recent Labs   Lab 10/19/17  1000 05/27/18  0428   TSH 1.822 0.344 L     TSH:  Recent Labs   Lab 10/19/17  1000 05/27/18  0428   TSH 1.822 0.344 L     A1C:        Assessment/Plan     Beatriz Gan is a 64 y.o.female with:    1. Essential hypertension  - losartan-hydrochlorothiazide 100-25 mg (HYZAAR) 100-25 mg per tablet; Take 1 tablet by mouth once daily.  Dispense: 90 tablet; Refill: 3  - amLODIPine (NORVASC) 5 MG tablet; Take 1 tablet (5 mg total) by mouth once daily.  Dispense: 90 tablet; Refill: 3  Continue current meds.    2. Major depressive disorder, recurrent episode, moderate  - DULoxetine (CYMBALTA) 30 MG capsule; Take 1 capsule (30 mg total) by mouth once daily.  Dispense: 90 capsule; Refill: 3  Continue current meds.    3. DDD (degenerative disc disease), lumbar  4. Chronic bilateral low back pain without sciatica  - gabapentin (NEURONTIN) 400 MG capsule; Take 1 capsule (400 mg total) by mouth 3 (three)  times daily.  Dispense: 270 capsule; Refill: 3  - polyethylene glycol (GLYCOLAX) 17 gram/dose powder; Take 17 g by mouth once daily.  Dispense: 510 g; Refill: 3  Continue current meds.    5. Primary insomnia  - zolpidem (AMBIEN) 10 mg Tab; Take 1 tablet (10 mg total) by mouth nightly as needed.  Dispense: 90 tablet; Refill: 0  Will switch back to ambien for improved efficacy.  6. Anxiety  Continue current meds.    7. Screening mammogram, encounter for  - Mammo Digital Screening Bilat; Future  8. Postmenopausal  - DXA Bone Density Spine And Hip; Future       Chronic conditions status updated as per HPI.  Other than changes above, cont current medications and maintain follow up with specialists.  Return to clinic in 6 months.    Jorge Alonzo MD  [facility]

## 2019-11-11 NOTE — TELEPHONE ENCOUNTER
----- Message from Jorge Alonzo MD sent at 11/11/2019 11:23 AM CST -----  Regarding: Shoulder sling  Pt recently had left shoulder surgery with ibrahima Patiño, and was prescribed a sling.  Pt lost the cushion that attaches to it and needs another.  Can we call Dr. Martinez's office to see how to order another sling/cushion and if they can send the order? If not, let us know what the order should look like and I will send it.

## 2019-11-12 ENCOUNTER — TELEPHONE (OUTPATIENT)
Dept: FAMILY MEDICINE | Facility: CLINIC | Age: 64
End: 2019-11-12

## 2019-11-12 NOTE — TELEPHONE ENCOUNTER
----- Message from Tanya Giraldo sent at 11/12/2019 12:00 PM CST -----  Contact: [phone]/self  Patient called in returning your call. Please advise.

## 2019-11-12 NOTE — TELEPHONE ENCOUNTER
----- Message from Meagan Moore sent at 11/12/2019 10:34 AM CST -----  Contact: Pt  Patient is requesting a callback regarding her lab work results . Please give the patient a callback at [phone].

## 2019-11-15 ENCOUNTER — OFFICE VISIT (OUTPATIENT)
Dept: ORTHOPEDICS | Facility: CLINIC | Age: 64
End: 2019-11-15
Payer: MEDICARE

## 2019-11-15 VITALS — DIASTOLIC BLOOD PRESSURE: 70 MMHG | SYSTOLIC BLOOD PRESSURE: 122 MMHG | WEIGHT: 185 LBS | BODY MASS INDEX: 31.26 KG/M2

## 2019-11-15 DIAGNOSIS — Z98.890 S/P LEFT ROTATOR CUFF REPAIR: Primary | ICD-10-CM

## 2019-11-15 DIAGNOSIS — M75.22 BICEPS TENDINITIS ON LEFT: ICD-10-CM

## 2019-11-15 PROCEDURE — 96372 IM INJECTION: ICD-10-PCS | Mod: S$GLB,,, | Performed by: PHYSICIAN ASSISTANT

## 2019-11-15 PROCEDURE — 99024 PR POST-OP FOLLOW-UP VISIT: ICD-10-PCS | Mod: S$GLB,POP,, | Performed by: PHYSICIAN ASSISTANT

## 2019-11-15 PROCEDURE — 99024 POSTOP FOLLOW-UP VISIT: CPT | Mod: S$GLB,POP,, | Performed by: PHYSICIAN ASSISTANT

## 2019-11-15 PROCEDURE — 96372 THER/PROPH/DIAG INJ SC/IM: CPT | Mod: S$GLB,,, | Performed by: PHYSICIAN ASSISTANT

## 2019-11-15 RX ORDER — METHYLPREDNISOLONE ACETATE 40 MG/ML
40 INJECTION, SUSPENSION INTRA-ARTICULAR; INTRALESIONAL; INTRAMUSCULAR; SOFT TISSUE
Status: DISCONTINUED | OUTPATIENT
Start: 2019-11-15 | End: 2019-11-15 | Stop reason: HOSPADM

## 2019-11-15 RX ORDER — OXYCODONE AND ACETAMINOPHEN 7.5; 325 MG/1; MG/1
1 TABLET ORAL EVERY 6 HOURS PRN
Qty: 28 TABLET | Refills: 0 | Status: SHIPPED | OUTPATIENT
Start: 2019-11-15 | End: 2019-11-22

## 2019-11-15 RX ADMIN — METHYLPREDNISOLONE ACETATE 40 MG: 40 INJECTION, SUSPENSION INTRA-ARTICULAR; INTRALESIONAL; INTRAMUSCULAR; SOFT TISSUE at 09:11

## 2019-11-15 NOTE — PROGRESS NOTES
Subjective:    Patient ID: Beatriz Gan is a 64 y.o. female.    Chief Complaint: Post-op Evaluation of the Left Shoulder (Left Shoulder Scope 10.31.19 pulled her shoulder trying to break up a dog fight Wednesday as she fell against a cabinet.)      History of Present Illness  Patient is here secondary to new left shoulder and upper extremity pain which started 2 days ago when she fell while trying to break up a fight between 2 of her pet dogs.  She continues to have the postoperative shoulder pain but now has some pain that runs anteriorly down the biceps into the thumb.    Current Medications  Current Outpatient Medications   Medication Sig Dispense Refill    ALPRAZolam (XANAX) 0.5 MG tablet Take 1 tablet (0.5 mg total) by mouth 3 (three) times daily. 90 tablet 0    amLODIPine (NORVASC) 5 MG tablet Take 1 tablet (5 mg total) by mouth once daily. 90 tablet 3    calcium-vitamin D3 (CALCIUM 500 + D) 500 mg(1,250mg) -200 unit per tablet Take 1 tablet by mouth 2 (two) times daily with meals.      diclofenac (VOLTAREN) 75 MG EC tablet Take 1 tablet (75 mg total) by mouth 2 (two) times daily as needed. 30 tablet 0    DULoxetine (CYMBALTA) 30 MG capsule Take 1 capsule (30 mg total) by mouth once daily. 90 capsule 3    fluticasone (FLONASE) 50 mcg/actuation nasal spray 1 spray by Nasal route daily as needed.       gabapentin (NEURONTIN) 400 MG capsule Take 1 capsule (400 mg total) by mouth 3 (three) times daily. 270 capsule 3    hydrOXYzine pamoate (VISTARIL) 25 MG Cap Take 2 capsules (50 mg total) by mouth 2 (two) times daily as needed (anxiety). 60 capsule 1    lidocaine (LIDODERM) 5 % Place 1 patch onto the skin once daily. Remove & Discard patch within 12 hours or as directed by MD 30 patch 0    losartan-hydrochlorothiazide 100-25 mg (HYZAAR) 100-25 mg per tablet Take 1 tablet by mouth once daily. 90 tablet 3    multivit-iron-min-folic acid (MULTIVITAMIN-IRON-MINERALS-FOLIC ACID) 3,500-18-0.4  unit-mg-mg Chew Take by mouth once daily.        NARCAN 4 mg/actuation Spry       ondansetron (ZOFRAN) 4 MG tablet Take 1 tablet (4 mg total) by mouth every 6 (six) hours as needed for Nausea. 60 tablet 2    polyethylene glycol (GLYCOLAX) 17 gram/dose powder Take 17 g by mouth once daily. 510 g 3    zolpidem (AMBIEN) 10 mg Tab Take 1 tablet (10 mg total) by mouth nightly as needed. 90 tablet 0    oxyCODONE-acetaminophen (PERCOCET) 7.5-325 mg per tablet Take 1 tablet by mouth every 6 (six) hours as needed for Pain. 28 tablet 0     No current facility-administered medications for this visit.        Allergies  Review of patient's allergies indicates:   Allergen Reactions    Phenergan [promethazine] Hives and Rash    Latex, natural rubber Hives     Per pt report-many years    Morphine Hives       Past Medical History  Past Medical History:   Diagnosis Date    Alcohol dependence     DDD (degenerative disc disease), lumbosacral     DJD (degenerative joint disease), lumbosacral     Encounter for blood transfusion     GERD (gastroesophageal reflux disease)     Gout     Hypercholesterolemia     Hypertension     NATHALIE (iron deficiency anemia)     questionable white coat hypertension    Kidney carcinoma, left 2014    Pneumonia     Renal cell carcinoma     Shingles 10/13/2012       Surgical History  Past Surgical History:   Procedure Laterality Date    APPENDECTOMY      ARTHROSCOPY OF SHOULDER WITH DECOMPRESSION OF SUBACROMIAL SPACE Left 10/31/2019    Procedure: ARTHROSCOPY, SHOULDER, WITH SUBACROMIAL SPACE DECOMPRESSION;  Surgeon: Ruben Martinez MD;  Location: [facility] OR;  Service: Orthopedics;  Laterality: Left;    BLADDER REPAIR      x 2    COLONOSCOPY  9/2011    DISTAL CLAVICLE EXCISION Left 10/31/2019    Procedure: EXCISION, CLAVICLE, DISTAL;  Surgeon: Ruben Martinez MD;  Location: [facility] OR;  Service: Orthopedics;  Laterality: Left;    ESOPHAGOGASTRODUODENOSCOPY  9/2011    EYE SURGERY      lasix  bilateral    HERNIA REPAIR      hiatal hernai    HYSTERECTOMY      LAPAROSCOPIC NISSEN FUNDOPLICATION      NEPHRECTOMY      LT partial    REFRACTIVE SURGERY      ROTATOR CUFF REPAIR Left 10/31/2019    Procedure: REPAIR, ROTATOR CUFF;  Surgeon: Ruben Martinez MD;  Location: [facility] OR;  Service: Orthopedics;  Laterality: Left;  arthrex    SKIN BIOPSY      TOTAL ABDOMINAL HYSTERECTOMY W/ BILATERAL SALPINGOOPHORECTOMY  age 48       Family History:   Family History   Problem Relation Age of Onset    Hypertension Father     Glaucoma Neg Hx     Macular degeneration Neg Hx     Retinal detachment Neg Hx        Social History:   Social History     Socioeconomic History    Marital status:      Spouse name: Not on file    Number of children: Not on file    Years of education: Not on file    Highest education level: Not on file   Occupational History     Employer: [employer]   Social Needs    Financial resource strain: Not on file    Food insecurity:     Worry: Not on file     Inability: Not on file    Transportation needs:     Medical: Not on file     Non-medical: Not on file   Tobacco Use    Smoking status: Former Smoker     Packs/day: 1.00     Years: 40.00     Pack years: 40.00     Types: Cigarettes     Last attempt to quit: 2018     Years since quittin.8    Smokeless tobacco: Never Used   Substance and Sexual Activity    Alcohol use: Yes     Alcohol/week: 16.0 standard drinks     Types: 2 Cans of beer, 14 Standard drinks or equivalent per week     Comment: 2-3 a day/ social    Drug use: No    Sexual activity: Yes     Partners: Male   Lifestyle    Physical activity:     Days per week: Not on file     Minutes per session: Not on file    Stress: Not on file   Relationships    Social connections:     Talks on phone: Not on file     Gets together: Not on file     Attends Anabaptist service: Not on file     Active member of club or organization: Not on file     Attends meetings of clubs or  organizations: Not on file     Relationship status: Not on file   Other Topics Concern    Not on file   Social History Narrative    Not on file       Date of surgery:  10/31/2019    Review of Systems     General/Constitutional: Chills denies. Fatigue denies. Fever denies. Weight gain denies. Weight loss denies.    Musculoskeletal: Comments: See HPI for details    Skin: Rash denies.    Objective:   Vital Signs:   Vitals:    11/15/19 0921   BP: 122/70        Physical Exam    This a well-developed, well nourished patient in no acute distress.  They are alert and oriented and cooperative to examination.  Pt. walks without an antalgic gait.      General Examination:     Constitutional: The patient is alert and oriented to lace person and time. Mood is pleasant.     Head/Face: Normal facial features normal eyebrows    Eyes: Normal extraocular motion bilaterally    Lungs: Respirations are equal and unlabored    Gait is coordinated.    Cardiovascular: There are no swelling or varicosities present.    Lymphatic: Negative for adenopathy    Skin: Normal    Neurological: Level of consciousness normal. Oriented to place person and time and situation    Psychiatric: Oriented to time place person and situation    Left shoulder exam demonstrates normal postoperative ecchymosis and limited range of motion.  However she definitely has some tenderness palpation along the biceps.  XRAY Report/ Interpretation:  No new studies today      Assessment:       1. S/P left rotator cuff repair    2. Biceps tendinitis on left        Plan:       Beatriz was seen today for post-op evaluation.    Diagnoses and all orders for this visit:    S/P left rotator cuff repair  -     IM injection  -     oxyCODONE-acetaminophen (PERCOCET) 7.5-325 mg per tablet; Take 1 tablet by mouth every 6 (six) hours as needed for Pain.    Biceps tendinitis on left         No follow-ups on file.    Patient no longer needs to wear an immobilizer sling on the left  shoulder.    I do not think that she injured her rotator cuff.  I think she has some tendinitis of the biceps tendon and the biceps itself.  Continue with physical therapy and follow up as scheduled in about 3 weeks.  If the elevated pain remains then at that time we will order an updated left shoulder MRI.  Postop pain medication was refilled today.      This note was created using Dragon voice recognition software that occasionally misinterpreted phrases or words.

## 2019-11-15 NOTE — PROCEDURES
IM injection  Date/Time: 11/15/2019 9:30 AM  Performed by: THANIA Mccauley  Authorized by: THANIA Mccauley     Consent Done?:  Yes (Verbal)  Timeout: prior to procedure the correct patient, procedure, and site was verified    Indications:  Pain  Site marked: the procedure site was marked    Timeout: prior to procedure the correct patient, procedure, and site was verified    Location: IM injection to left gluteus.  Prep: patient was prepped and draped in usual sterile fashion    Needle size:  25 G  Medications:  40 mg methylPREDNISolone acetate 40 mg/mL; 40 mg methylPREDNISolone acetate 40 mg/mL  Patient tolerance:  Patient tolerated the procedure well with no immediate complications

## 2019-11-26 DIAGNOSIS — Z98.890 S/P LEFT ROTATOR CUFF REPAIR: Primary | ICD-10-CM

## 2019-11-26 NOTE — TELEPHONE ENCOUNTER
----- Message from Radha Phillip sent at 11/26/2019 11:51 AM CST -----  Contact: patient  Dr mendoza pt-needs a rx for oxycodone pts# [phone]

## 2019-11-27 RX ORDER — OXYCODONE AND ACETAMINOPHEN 7.5; 325 MG/1; MG/1
1 TABLET ORAL EVERY 6 HOURS PRN
Qty: 28 TABLET | Refills: 0 | Status: SHIPPED | OUTPATIENT
Start: 2019-11-27 | End: 2019-12-04

## 2019-12-02 ENCOUNTER — OFFICE VISIT (OUTPATIENT)
Dept: ORTHOPEDICS | Facility: CLINIC | Age: 64
End: 2019-12-02
Payer: MEDICARE

## 2019-12-02 VITALS — WEIGHT: 190 LBS | DIASTOLIC BLOOD PRESSURE: 90 MMHG | SYSTOLIC BLOOD PRESSURE: 148 MMHG | BODY MASS INDEX: 32.11 KG/M2

## 2019-12-02 DIAGNOSIS — M23.312 MEDIAL MENISCUS, ANTERIOR HORN DERANGEMENT, LEFT: ICD-10-CM

## 2019-12-02 DIAGNOSIS — Z98.890 S/P LEFT ROTATOR CUFF REPAIR: Primary | ICD-10-CM

## 2019-12-02 PROCEDURE — 1159F PR MEDICATION LIST DOCUMENTED IN MEDICAL RECORD: ICD-10-PCS | Mod: S$GLB,,, | Performed by: ORTHOPAEDIC SURGERY

## 2019-12-02 PROCEDURE — 1125F PR PAIN SEVERITY QUANTIFIED, PAIN PRESENT: ICD-10-PCS | Mod: S$GLB,,, | Performed by: ORTHOPAEDIC SURGERY

## 2019-12-02 PROCEDURE — 99213 OFFICE O/P EST LOW 20 MIN: CPT | Mod: 57,S$GLB,, | Performed by: ORTHOPAEDIC SURGERY

## 2019-12-02 PROCEDURE — 1159F MED LIST DOCD IN RCRD: CPT | Mod: S$GLB,,, | Performed by: ORTHOPAEDIC SURGERY

## 2019-12-02 PROCEDURE — 1125F AMNT PAIN NOTED PAIN PRSNT: CPT | Mod: S$GLB,,, | Performed by: ORTHOPAEDIC SURGERY

## 2019-12-02 PROCEDURE — 99213 PR OFFICE/OUTPT VISIT, EST, LEVL III, 20-29 MIN: ICD-10-PCS | Mod: 57,S$GLB,, | Performed by: ORTHOPAEDIC SURGERY

## 2019-12-02 NOTE — PROGRESS NOTES
Subjective:    Patient ID: Beatriz Gan is a 64 y.o. female.    Chief Complaint: Post-op Evaluation of the Left Shoulder ( LT shoulder scope 10/31.  She has moderate pain and severe if she lifts it the way she is not suppose to. She is in P.T.)      History of Present Illness  Patient is here status post left shoulder arthroscopy with mini open rotator cuff repair.  Overall doing very well and progressing with physical therapy.  Today her chief complaint is left knee pain.  Previous left knee MRI demonstrates medial meniscus tear and tricompartmental chondromalacia.  She would like to proceed with arthroscopy.  She has had previous intra-articular corticosteroid injections that have not helped.    Current Medications  Current Outpatient Medications   Medication Sig Dispense Refill    ALPRAZolam (XANAX) 0.5 MG tablet Take 1 tablet (0.5 mg total) by mouth 3 (three) times daily. 90 tablet 0    amLODIPine (NORVASC) 5 MG tablet Take 1 tablet (5 mg total) by mouth once daily. 90 tablet 3    calcium-vitamin D3 (CALCIUM 500 + D) 500 mg(1,250mg) -200 unit per tablet Take 1 tablet by mouth 2 (two) times daily with meals.      diclofenac (VOLTAREN) 75 MG EC tablet Take 1 tablet (75 mg total) by mouth 2 (two) times daily as needed. 30 tablet 0    DULoxetine (CYMBALTA) 30 MG capsule Take 1 capsule (30 mg total) by mouth once daily. 90 capsule 3    fluticasone (FLONASE) 50 mcg/actuation nasal spray 1 spray by Nasal route daily as needed.       gabapentin (NEURONTIN) 400 MG capsule Take 1 capsule (400 mg total) by mouth 3 (three) times daily. 270 capsule 3    hydrOXYzine pamoate (VISTARIL) 25 MG Cap Take 2 capsules (50 mg total) by mouth 2 (two) times daily as needed (anxiety). 60 capsule 1    lidocaine (LIDODERM) 5 % Place 1 patch onto the skin once daily. Remove & Discard patch within 12 hours or as directed by MD 30 patch 0    losartan-hydrochlorothiazide 100-25 mg (HYZAAR) 100-25 mg per tablet Take 1  tablet by mouth once daily. 90 tablet 3    multivit-iron-min-folic acid (MULTIVITAMIN-IRON-MINERALS-FOLIC ACID) 3,500-18-0.4 unit-mg-mg Chew Take by mouth once daily.        NARCAN 4 mg/actuation Spry       ondansetron (ZOFRAN) 4 MG tablet Take 1 tablet (4 mg total) by mouth every 6 (six) hours as needed for Nausea. 60 tablet 2    oxyCODONE-acetaminophen (PERCOCET) 7.5-325 mg per tablet Take 1 tablet by mouth every 6 (six) hours as needed for Pain. 28 tablet 0    polyethylene glycol (GLYCOLAX) 17 gram/dose powder Take 17 g by mouth once daily. 510 g 3    zolpidem (AMBIEN) 10 mg Tab Take 1 tablet (10 mg total) by mouth nightly as needed. 90 tablet 0     No current facility-administered medications for this visit.        Allergies  Review of patient's allergies indicates:   Allergen Reactions    Phenergan [promethazine] Hives and Rash    Latex, natural rubber Hives     Per pt report-many years    Morphine Hives       Past Medical History  Past Medical History:   Diagnosis Date    Alcohol dependence     DDD (degenerative disc disease), lumbosacral     DJD (degenerative joint disease), lumbosacral     Encounter for blood transfusion     GERD (gastroesophageal reflux disease)     Gout     Hypercholesterolemia     Hypertension     NATHALIE (iron deficiency anemia)     questionable white coat hypertension    Kidney carcinoma, left 2014    Pneumonia     Renal cell carcinoma     Shingles 10/13/2012       Surgical History  Past Surgical History:   Procedure Laterality Date    APPENDECTOMY      ARTHROSCOPY OF SHOULDER WITH DECOMPRESSION OF SUBACROMIAL SPACE Left 10/31/2019    Procedure: ARTHROSCOPY, SHOULDER, WITH SUBACROMIAL SPACE DECOMPRESSION;  Surgeon: Ruben Martinez MD;  Location: [facility];  Service: Orthopedics;  Laterality: Left;    BLADDER REPAIR      x 2    COLONOSCOPY  9/2011    DISTAL CLAVICLE EXCISION Left 10/31/2019    Procedure: EXCISION, CLAVICLE, DISTAL;  Surgeon: Ruben Martinez MD;   Location: [facility] OR;  Service: Orthopedics;  Laterality: Left;    ESOPHAGOGASTRODUODENOSCOPY  2011    EYE SURGERY      lasix bilateral    HERNIA REPAIR      hiatal hernai    HYSTERECTOMY      LAPAROSCOPIC NISSEN FUNDOPLICATION      NEPHRECTOMY      LT partial    REFRACTIVE SURGERY      ROTATOR CUFF REPAIR Left 10/31/2019    Procedure: REPAIR, ROTATOR CUFF;  Surgeon: Ruben Martinez MD;  Location: [facility] OR;  Service: Orthopedics;  Laterality: Left;  arthrex    SKIN BIOPSY      TOTAL ABDOMINAL HYSTERECTOMY W/ BILATERAL SALPINGOOPHORECTOMY  age 48       Family History:   Family History   Problem Relation Age of Onset    Hypertension Father     Glaucoma Neg Hx     Macular degeneration Neg Hx     Retinal detachment Neg Hx        Social History:   Social History     Socioeconomic History    Marital status:      Spouse name: Not on file    Number of children: Not on file    Years of education: Not on file    Highest education level: Not on file   Occupational History     Employer: [employer]   Social Needs    Financial resource strain: Not on file    Food insecurity:     Worry: Not on file     Inability: Not on file    Transportation needs:     Medical: Not on file     Non-medical: Not on file   Tobacco Use    Smoking status: Former Smoker     Packs/day: 1.00     Years: 40.00     Pack years: 40.00     Types: Cigarettes     Last attempt to quit: 2018     Years since quittin.9    Smokeless tobacco: Never Used   Substance and Sexual Activity    Alcohol use: Yes     Alcohol/week: 16.0 standard drinks     Types: 2 Cans of beer, 14 Standard drinks or equivalent per week     Comment: 2-3 a day/ social    Drug use: No    Sexual activity: Yes     Partners: Male   Lifestyle    Physical activity:     Days per week: Not on file     Minutes per session: Not on file    Stress: Not on file   Relationships    Social connections:     Talks on phone: Not on file     Gets together: Not on file      Attends [employer] service: Not on file     Active member of club or organization: Not on file     Attends meetings of clubs or organizations: Not on file     Relationship status: Not on file   Other Topics Concern    Not on file   Social History Narrative    Not on file       Date of surgery:  10/31/2019    Review of Systems     General/Constitutional: Chills denies. Fatigue denies. Fever denies. Weight gain denies. Weight loss denies.    Musculoskeletal: Comments: See HPI for details    Skin: Rash denies.    Objective:   Vital Signs:   Vitals:    12/02/19 1013   BP: (!) 148/90        Physical Exam    This a well-developed, well nourished patient in no acute distress.  They are alert and oriented and cooperative to examination.  Pt. walks without an antalgic gait.      General Examination:     Constitutional: The patient is alert and oriented to lace person and time. Mood is pleasant.     Head/Face: Normal facial features normal eyebrows    Eyes: Normal extraocular motion bilaterally    Lungs: Respirations are equal and unlabored    Gait is coordinated.    Cardiovascular: There are no swelling or varicosities present.    Lymphatic: Negative for adenopathy    Skin: Normal    Neurological: Level of consciousness normal. Oriented to place person and time and situation    Psychiatric: Oriented to time place person and situation    Left shoulder exam demonstrates much improved active and passive range of motion with only mild pain.  Good strength.  No signs of impingement.    Left knee exam demonstrates tenderness to palpation of the medial joint line and a mildly antalgic gait. No ligamentous instability. Positive Lazarus's but normal strength and range of motion.    XRAY Report/ Interpretation:  Previous recent left knee MRI demonstrates tricompartmental chondromalacia and medial meniscal tear      Assessment:       1. S/P left rotator cuff repair    2. Medial meniscus, anterior horn derangement, left        Plan:  "      Beatriz was seen today for post-op evaluation.    Diagnoses and all orders for this visit:    S/P left rotator cuff repair    Medial meniscus, anterior horn derangement, left         No follow-ups on file.  Harshil Romero, physician's assistant served in the capacity as a "scribe" for this patient encounter  A "face to face" encounter occurred with Dr. Martinez on this date  The treatment plan and medical decision making is outlined below:  Continue with physical therapy status post left shoulder rotator cuff repair to maximize strength, functional ability, and range of motion. Proceed with left knee arthroscopy with medial meniscal tear debridement.      This note was created using Dragon voice recognition software that occasionally misinterpreted phrases or words.               "

## 2019-12-02 NOTE — LETTER
December 2, 2019      [facility] includes the [facility]  [address]  [place] [address]  Phone: [phone]  Fax: [phone]       Patient: Beatriz Gan   YOB: 1955  Date of Visit: 12/02/2019    To Whom It May Concern:    Destiny Gan  was at our office on 12/02/2019. She may return to work on 12/03/2019 part time as tolerated. If you have any questions or concerns, or if I can be of further assistance, please do not hesitate to contact me.    Sincerely,    Harshil Romero PA-C

## 2019-12-16 DIAGNOSIS — M23.312 MEDIAL MENISCUS, ANTERIOR HORN DERANGEMENT, LEFT: ICD-10-CM

## 2019-12-16 DIAGNOSIS — Z98.890 S/P LEFT ROTATOR CUFF REPAIR: Primary | ICD-10-CM

## 2019-12-16 RX ORDER — OXYCODONE AND ACETAMINOPHEN 7.5; 325 MG/1; MG/1
1 TABLET ORAL EVERY 6 HOURS PRN
Qty: 28 TABLET | Refills: 0 | Status: SHIPPED | OUTPATIENT
Start: 2019-12-16 | End: 2019-12-23

## 2019-12-16 RX ORDER — ALPRAZOLAM 0.5 MG/1
TABLET ORAL
Qty: 90 TABLET | Refills: 0 | Status: SHIPPED | OUTPATIENT
Start: 2019-12-16 | End: 2019-12-30 | Stop reason: SDUPTHER

## 2019-12-16 NOTE — TELEPHONE ENCOUNTER
----- Message from Kendra Doyle sent at 12/16/2019 11:02 AM CST -----  Contact: patient  Patient is due for surgery after [place] with Dr Martinez and she was wondering if she can get something for pain until then, call her back at  [phone].

## 2019-12-30 RX ORDER — ALPRAZOLAM 0.5 MG/1
0.5 TABLET ORAL 3 TIMES DAILY PRN
Qty: 90 TABLET | Refills: 1 | Status: SHIPPED | OUTPATIENT
Start: 2019-12-30 | End: 2020-05-04 | Stop reason: SDUPTHER

## 2019-12-30 NOTE — TELEPHONE ENCOUNTER
Patient states that david did not fill her prescription for the Xanax. Requesting if she can get her refill sent to [facility] in [place].     Patient is states that she is having pain while urinating and wanted to know if you wanted her to come into the office for urine test or if you can call in an antibiotic for her.

## 2019-12-30 NOTE — TELEPHONE ENCOUNTER
----- Message from Gill Gallardo sent at 12/30/2019 11:50 AM CST -----  Contact: self/[phone]  Pt is calling to speak with someone in the office regarding her prescription for ALPRAZolam (XANAX) 0.5 MG tablet. Please call and advise.          Thank You

## 2020-01-07 NOTE — DISCHARGE SUMMARY
[facility]  Orthopedics  Discharge Summary      Patient Name: Beatriz Gan  MRN: [id number]  Admission Date: 10/31/2019  Hospital Length of Stay: 0 days  Discharge Date and Time: 10/31/2019  2:19 PM  Attending Physician: No att. providers found   Discharging Provider: Ruben Martinez MD  Primary Care Provider: Jorge Alonzo MD    HPI:  Patient has history of chronic left shoulder pain due to left rotator cuff repair due to failure of conservative measures surgery was elected    Procedure(s) (LRB):  REPAIR, ROTATOR CUFF (Left)  EXCISION, CLAVICLE, DISTAL (Left)  ARTHROSCOPY, SHOULDER, WITH SUBACROMIAL SPACE DECOMPRESSION (Left)      Hospital Course:  Patient underwent surgery on October 31, 2019 with no problems surgically        Significant Diagnostic Studies: Labs: All labs within the past 24 hours have been reviewed      Pending Diagnostic Studies:     None        Final Active Diagnoses:    Diagnosis Date Noted POA    AC (acromioclavicular) arthritis [M19.019] 10/31/2019 Yes    Nontraumatic complete tear of left rotator cuff [M75.122] 10/31/2019 Yes      Problems Resolved During this Admission:      Discharged Condition: good    Disposition: Home or Self Care    Follow Up:  Follow-up Information     Ruben Martinez MD In 4 days.    Specialties:  Orthopedic Surgery, Surgery  Why:  For wound re-check  Contact information:  [address] [address]  [phone]                 Patient Instructions:      [facility] FOR HOME USE   Order Comments: Shoulder immobilizer at all times     Diet general     Keep surgical extremity elevated     Ice to affected area     No driving, operating heavy equipment or signing legal documents while taking pain medication.     Call MD for:  temperature >100.4     Call MD for:  persistent nausea and vomiting     Call MD for:  severe uncontrolled pain     Call MD for:  difficulty breathing, headache or visual disturbances      Call MD for:  redness, tenderness, or signs of infection (pain, swelling, redness, odor or green/yellow discharge around incision site)     Call MD for:  hives     Call MD for:  persistent dizziness or light-headedness     Call MD for:  extreme fatigue     Leave dressing on - Keep it clean, dry, and intact until clinic visit     Sponge bath only until clinic visit     Other restrictions (specify):     Change dressing (specify)   Order Comments: Dressing change: one times per day using gauze or as needed.     Medications:  Percocet 10 mg every 4 hr as needed for pain  Reconciled Home Medications:      Medication List      CONTINUE taking these medications    Calcium 500 + D 500 mg(1,250mg) -200 unit per tablet  Generic drug:  calcium-vitamin D3  Take 1 tablet by mouth 2 (two) times daily with meals.     Centrum 3,500-18-0.4 unit-mg-mg Chew  Generic drug:  multivit-iron-min-folic acid  Take by mouth once daily.     diclofenac 75 MG EC tablet  Commonly known as:  VOLTAREN  Take 1 tablet (75 mg total) by mouth 2 (two) times daily as needed.     fluticasone propionate 50 mcg/actuation nasal spray  Commonly known as:  FLONASE  1 spray by Nasal route daily as needed.     lidocaine 5 %  Commonly known as:  LIDODERM  Place 1 patch onto the skin once daily. Remove & Discard patch within 12 hours or as directed by MD        STOP taking these medications    busPIRone 15 MG tablet  Commonly known as:  BUSPAR     lisinopril 30 MG tablet  Commonly known as:  PRINIVIL,ZESTRIL     losartan-hydrochlorothiazide 100-25 mg 100-25 mg per tablet  Commonly known as:  HYZAAR     traZODone 100 MG tablet  Commonly known as:  MILDRED Martinez MD  Orthopedics  [facility]

## 2020-01-08 DIAGNOSIS — M23.312 MEDIAL MENISCUS, ANTERIOR HORN DERANGEMENT, LEFT: Primary | ICD-10-CM

## 2020-01-08 DIAGNOSIS — Z98.890 S/P LEFT ROTATOR CUFF REPAIR: Primary | ICD-10-CM

## 2020-01-08 DIAGNOSIS — M23.312 MEDIAL MENISCUS, ANTERIOR HORN DERANGEMENT, LEFT: ICD-10-CM

## 2020-01-08 RX ORDER — OXYCODONE AND ACETAMINOPHEN 7.5; 325 MG/1; MG/1
1 TABLET ORAL EVERY 6 HOURS PRN
Qty: 28 TABLET | Refills: 0 | Status: SHIPPED | OUTPATIENT
Start: 2020-01-08 | End: 2020-01-16

## 2020-01-08 NOTE — TELEPHONE ENCOUNTER
----- Message from Thalia Potter sent at 1/8/2020  9:36 AM CST -----  Contact: Patient  Patient called yesterday and is ready to schedule her surgery with Dr. Martinez for her left knee. Also needs refill on oxycodone. Please call [phone]

## 2020-01-09 ENCOUNTER — HOSPITAL ENCOUNTER (OUTPATIENT)
Dept: PREADMISSION TESTING | Facility: HOSPITAL | Age: 65
Discharge: HOME | End: 2020-01-09
Attending: ORTHOPAEDIC SURGERY
Payer: MEDICARE

## 2020-01-09 VITALS — BODY MASS INDEX: 30.82 KG/M2 | HEIGHT: 65 IN | WEIGHT: 185 LBS

## 2020-01-09 DIAGNOSIS — M23.312 MEDIAL MENISCUS, ANTERIOR HORN DERANGEMENT, LEFT: ICD-10-CM

## 2020-01-09 LAB
ALBUMIN SERPL BCP-MCNC: 4 G/DL (ref 3.5–5.2)
ALP SERPL-CCNC: 106 U/L (ref 55–135)
ALT SERPL W/O P-5'-P-CCNC: 10 U/L (ref 10–44)
ANION GAP SERPL CALC-SCNC: 11 MMOL/L (ref 8–16)
AST SERPL-CCNC: 14 U/L (ref 10–40)
BACTERIA #/AREA URNS HPF: NORMAL /HPF
BASOPHILS # BLD AUTO: 0.03 K/UL (ref 0–0.2)
BASOPHILS NFR BLD: 0.6 % (ref 0–1.9)
BILIRUB SERPL-MCNC: 0.6 MG/DL (ref 0.1–1)
BILIRUB UR QL STRIP: NEGATIVE
BUN SERPL-MCNC: 16 MG/DL (ref 8–23)
CALCIUM SERPL-MCNC: 10 MG/DL (ref 8.7–10.5)
CHLORIDE SERPL-SCNC: 98 MMOL/L (ref 95–110)
CLARITY UR: CLEAR
CO2 SERPL-SCNC: 32 MMOL/L (ref 23–29)
COLOR UR: YELLOW
CREAT SERPL-MCNC: 0.8 MG/DL (ref 0.5–1.4)
DIFFERENTIAL METHOD: ABNORMAL
EOSINOPHIL # BLD AUTO: 0.1 K/UL (ref 0–0.5)
EOSINOPHIL NFR BLD: 2.3 % (ref 0–8)
ERYTHROCYTE [DISTWIDTH] IN BLOOD BY AUTOMATED COUNT: 12.5 % (ref 11.5–14.5)
EST. GFR  (AFRICAN AMERICAN): >60 ML/MIN/1.73 M^2
EST. GFR  (NON AFRICAN AMERICAN): >60 ML/MIN/1.73 M^2
GLUCOSE SERPL-MCNC: 98 MG/DL (ref 70–110)
GLUCOSE UR QL STRIP: NEGATIVE
HCT VFR BLD AUTO: 42 % (ref 37–48.5)
HGB BLD-MCNC: 13.9 G/DL (ref 12–16)
HGB UR QL STRIP: NEGATIVE
IMM GRANULOCYTES # BLD AUTO: 0.01 K/UL (ref 0–0.04)
KETONES UR QL STRIP: NEGATIVE
LEUKOCYTE ESTERASE UR QL STRIP: ABNORMAL
LYMPHOCYTES # BLD AUTO: 1.8 K/UL (ref 1–4.8)
LYMPHOCYTES NFR BLD: 37.6 % (ref 18–48)
MCH RBC QN AUTO: 32 PG (ref 27–31)
MCHC RBC AUTO-ENTMCNC: 33.1 G/DL (ref 32–36)
MCV RBC AUTO: 97 FL (ref 82–98)
MICROSCOPIC COMMENT: NORMAL
MONOCYTES # BLD AUTO: 0.5 K/UL (ref 0.3–1)
MONOCYTES NFR BLD: 9.9 % (ref 4–15)
NEUTROPHILS # BLD AUTO: 2.4 K/UL (ref 1.8–7.7)
NEUTROPHILS NFR BLD: 49.4 % (ref 38–73)
NITRITE UR QL STRIP: NEGATIVE
NRBC BLD-RTO: 0 /100 WBC
PH UR STRIP: 6 [PH] (ref 5–8)
PLATELET # BLD AUTO: 273 K/UL (ref 150–350)
PMV BLD AUTO: 9.5 FL (ref 9.2–12.9)
POTASSIUM SERPL-SCNC: 3.7 MMOL/L (ref 3.5–5.1)
PROT SERPL-MCNC: 7.2 G/DL (ref 6–8.4)
PROT UR QL STRIP: NEGATIVE
RBC # BLD AUTO: 4.34 M/UL (ref 4–5.4)
SODIUM SERPL-SCNC: 141 MMOL/L (ref 136–145)
SP GR UR STRIP: 1.01 (ref 1–1.03)
SQUAMOUS #/AREA URNS HPF: 5 /HPF
URN SPEC COLLECT METH UR: ABNORMAL
UROBILINOGEN UR STRIP-ACNC: NEGATIVE EU/DL
WBC # BLD AUTO: 4.87 K/UL (ref 3.9–12.7)
WBC #/AREA URNS HPF: 4 /HPF (ref 0–5)

## 2020-01-09 PROCEDURE — 85025 COMPLETE CBC W/AUTO DIFF WBC: CPT

## 2020-01-09 PROCEDURE — 81000 URINALYSIS NONAUTO W/SCOPE: CPT

## 2020-01-09 PROCEDURE — 99900103 DSU ONLY-NO CHARGE-INITIAL HR (STAT)

## 2020-01-09 PROCEDURE — 36415 COLL VENOUS BLD VENIPUNCTURE: CPT

## 2020-01-09 PROCEDURE — 99900104 DSU ONLY-NO CHARGE-EA ADD'L HR (STAT)

## 2020-01-09 PROCEDURE — 80053 COMPREHEN METABOLIC PANEL: CPT

## 2020-01-09 NOTE — DISCHARGE INSTRUCTIONS
To confirm, Your doctor has instructed you that surgery is scheduled for: 1/16/2020    Please report to [facility], Registration the morning of surgery. You must check-in and receive a wristband before going to your procedure.    Pre-Op will call the afternoon prior to surgery between 1:00 and 6:00 PM with the final arrival time.  Phone number: [phone]    PLEASE NOTE:  The surgery schedule has many variables which may affect the time of your surgery case.  Family members should be available if your surgery time changes.  Plan to be here the day of your procedure between 4-6 hours.    MEDICATIONS:  TAKE ONLY THESE MEDICATIONS WITH A SMALL SIP OF WATER THE MORNING OF YOUR PROCEDURE:  XANAX IF NEEDED, AMLODIPINE, CYMBALTA, GABAPENTIN, FLONASE, OXYCODONE IF NEEDED    DO NOT TAKE THESE MEDICATIONS 5-7 DAYS PRIOR to your procedure or per your surgeon's request:   ASPIRIN, ALEVE, ADVIL, IBUPROFEN, FISH OIL VITAMIN E, HERBALS, DICLOFENAC  (May take Tylenol)    ONLY if you are prescribed any types of blood thinners such as:  Aspirin, Coumadin, Plavix, Pradaxa, Xarelto, Aggrenox, Effient, Eliquis, Savasya, Brilinta, or any other, ask your surgeon whether you should stop taking them and how long before surgery you should stop.  You may also need to verify with the prescribing physician if it is ok to stop your medication.      INSTRUCTIONS IMPORTANT!!  · Do not eat or drink anything between midnight and the time of your procedure- this includes gum, mints, and candy.  · Do not smoke or drink alcoholic beverages 24 hours prior to your procedure.  · Shower the night before AND the morning of your procedure with a Chlorhexidine wash such as Hibiclens or Dial antibacterial soap from the neck down.  Do not get it on your face or in your eyes.  You may use your own shampoo and face wash. This helps your skin to be as bacteria free as possible.    · If you wear contact lenses, dentures, hearing aids or  glasses, bring a container to put them in during surgery and give to a family member for safe keeping.  Please leave all jewelry, piercing's and valuables at home.   · DO NOT remove hair from the surgery site.  Do not shave the incision site unless you are given specific instructions to do so.    · ONLY if you have been diagnosed with sleep apnea please bring your C-PAP machine.  · ONLY if you wear home oxygen please bring your portable oxygen tank the day of your procedure.  · ONLY if you have a history of OPEN HEART SURGERY you will need a clearance from your Cardiologist per Anesthesia.      · ONLY for patients requiring bowel prep, written instructions will be given by your doctor's office.  · ONLY if you have a neuro stimulator, please bring the controller with you the morning of surgery  · ONLY if a type and screen test is needed before surgery, please return: N/A  · If your doctor has scheduled you for an overnight stay, bring a small overnight bag with any personal items you need.  · Make arrangements in advance for transportation home by a responsible adult.  It is not safe to drive a vehicle during the 24 hours after anesthesia.      · Visiting hours are 10:00AM to 8:30PM.  For the safety of all patients, visitors under the age of 12 are not allowed above the first floor of the hospital.    · All [facility] facilities and properties are tobacco free.  Smoking is NOT allowed.       If you have any questions about these instructions, call Pre-Op Admit  Nursing at [phone] or the Pre-Op Day Surgery Unit at [phone].

## 2020-01-15 ENCOUNTER — ANESTHESIA EVENT (OUTPATIENT)
Dept: SURGERY | Facility: HOSPITAL | Age: 65
End: 2020-01-15
Payer: MEDICARE

## 2020-01-16 ENCOUNTER — HOSPITAL ENCOUNTER (OUTPATIENT)
Facility: HOSPITAL | Age: 65
Discharge: HOME | End: 2020-01-16
Attending: ORTHOPAEDIC SURGERY | Admitting: ORTHOPAEDIC SURGERY
Payer: MEDICARE

## 2020-01-16 ENCOUNTER — ANESTHESIA (OUTPATIENT)
Dept: SURGERY | Facility: HOSPITAL | Age: 65
End: 2020-01-16
Payer: MEDICARE

## 2020-01-16 DIAGNOSIS — M23.312 MEDIAL MENISCUS, ANTERIOR HORN DERANGEMENT, LEFT: ICD-10-CM

## 2020-01-16 PROCEDURE — 99900103 DSU ONLY-NO CHARGE-INITIAL HR (STAT): Performed by: ORTHOPAEDIC SURGERY

## 2020-01-16 PROCEDURE — 27201423 OPTIME MED/SURG SUP & DEVICES STERILE SUPPLY: Performed by: ORTHOPAEDIC SURGERY

## 2020-01-16 PROCEDURE — 63600175 PHARM REV CODE 636 W HCPCS: Performed by: ANESTHESIOLOGY

## 2020-01-16 PROCEDURE — D9220A PRA ANESTHESIA: Mod: CRNA,,, | Performed by: NURSE ANESTHETIST, CERTIFIED REGISTERED

## 2020-01-16 PROCEDURE — D9220A PRA ANESTHESIA: ICD-10-PCS | Mod: CRNA,,, | Performed by: NURSE ANESTHETIST, CERTIFIED REGISTERED

## 2020-01-16 PROCEDURE — 36000710: Performed by: ORTHOPAEDIC SURGERY

## 2020-01-16 PROCEDURE — 25000003 PHARM REV CODE 250: Performed by: ANESTHESIOLOGY

## 2020-01-16 PROCEDURE — D9220A PRA ANESTHESIA: ICD-10-PCS | Mod: ANES,,, | Performed by: ANESTHESIOLOGY

## 2020-01-16 PROCEDURE — S0020 INJECTION, BUPIVICAINE HYDRO: HCPCS | Performed by: ORTHOPAEDIC SURGERY

## 2020-01-16 PROCEDURE — 63600175 PHARM REV CODE 636 W HCPCS: Performed by: ORTHOPAEDIC SURGERY

## 2020-01-16 PROCEDURE — 71000015 HC POSTOP RECOV 1ST HR: Performed by: ORTHOPAEDIC SURGERY

## 2020-01-16 PROCEDURE — 37000009 HC ANESTHESIA EA ADD 15 MINS: Performed by: ORTHOPAEDIC SURGERY

## 2020-01-16 PROCEDURE — 25000003 PHARM REV CODE 250: Performed by: ORTHOPAEDIC SURGERY

## 2020-01-16 PROCEDURE — 36000711: Performed by: ORTHOPAEDIC SURGERY

## 2020-01-16 PROCEDURE — 99900104 DSU ONLY-NO CHARGE-EA ADD'L HR (STAT): Performed by: ORTHOPAEDIC SURGERY

## 2020-01-16 PROCEDURE — 27200651 HC AIRWAY, LMA: Performed by: ANESTHESIOLOGY

## 2020-01-16 PROCEDURE — 63600175 PHARM REV CODE 636 W HCPCS: Performed by: NURSE ANESTHETIST, CERTIFIED REGISTERED

## 2020-01-16 PROCEDURE — 71000033 HC RECOVERY, INTIAL HOUR: Performed by: ORTHOPAEDIC SURGERY

## 2020-01-16 PROCEDURE — 37000008 HC ANESTHESIA 1ST 15 MINUTES: Performed by: ORTHOPAEDIC SURGERY

## 2020-01-16 PROCEDURE — D9220A PRA ANESTHESIA: Mod: ANES,,, | Performed by: ANESTHESIOLOGY

## 2020-01-16 RX ORDER — SODIUM CHLORIDE 0.9 % (FLUSH) 0.9 %
10 SYRINGE (ML) INJECTION
Status: DISCONTINUED | OUTPATIENT
Start: 2020-01-16 | End: 2020-01-16 | Stop reason: HOSPADM

## 2020-01-16 RX ORDER — PHENYLEPHRINE HYDROCHLORIDE 10 MG/ML
INJECTION INTRAVENOUS
Status: DISCONTINUED | OUTPATIENT
Start: 2020-01-16 | End: 2020-01-16

## 2020-01-16 RX ORDER — LIDOCAINE HCL/PF 100 MG/5ML
SYRINGE (ML) INTRAVENOUS
Status: DISCONTINUED | OUTPATIENT
Start: 2020-01-16 | End: 2020-01-16

## 2020-01-16 RX ORDER — CEFAZOLIN SODIUM 2 G/50ML
2 SOLUTION INTRAVENOUS
Status: COMPLETED | OUTPATIENT
Start: 2020-01-16 | End: 2020-01-16

## 2020-01-16 RX ORDER — OXYCODONE HYDROCHLORIDE 5 MG/1
5 TABLET ORAL EVERY 4 HOURS PRN
Status: CANCELLED | OUTPATIENT
Start: 2020-01-16

## 2020-01-16 RX ORDER — DEXAMETHASONE SODIUM PHOSPHATE 4 MG/ML
INJECTION, SOLUTION INTRA-ARTICULAR; INTRALESIONAL; INTRAMUSCULAR; INTRAVENOUS; SOFT TISSUE
Status: DISCONTINUED | OUTPATIENT
Start: 2020-01-16 | End: 2020-01-16

## 2020-01-16 RX ORDER — SODIUM CHLORIDE, SODIUM LACTATE, POTASSIUM CHLORIDE, CALCIUM CHLORIDE 600; 310; 30; 20 MG/100ML; MG/100ML; MG/100ML; MG/100ML
INJECTION, SOLUTION INTRAVENOUS CONTINUOUS
Status: DISCONTINUED | OUTPATIENT
Start: 2020-01-16 | End: 2020-01-16 | Stop reason: HOSPADM

## 2020-01-16 RX ORDER — OXYCODONE AND ACETAMINOPHEN 5; 325 MG/1; MG/1
1 TABLET ORAL EVERY 6 HOURS PRN
Qty: 28 TABLET | Refills: 0 | Status: SHIPPED | OUTPATIENT
Start: 2020-01-16 | End: 2020-01-23

## 2020-01-16 RX ORDER — LIDOCAINE HYDROCHLORIDE 10 MG/ML
0.5 INJECTION, SOLUTION EPIDURAL; INFILTRATION; INTRACAUDAL; PERINEURAL ONCE
Status: COMPLETED | OUTPATIENT
Start: 2020-01-16 | End: 2020-01-16

## 2020-01-16 RX ORDER — LIDOCAINE HYDROCHLORIDE 10 MG/ML
INJECTION, SOLUTION EPIDURAL; INFILTRATION; INTRACAUDAL; PERINEURAL
Status: DISCONTINUED | OUTPATIENT
Start: 2020-01-16 | End: 2020-01-16 | Stop reason: HOSPADM

## 2020-01-16 RX ORDER — OXYCODONE HYDROCHLORIDE 10 MG/1
10 TABLET ORAL EVERY 4 HOURS PRN
Status: CANCELLED | OUTPATIENT
Start: 2020-01-16

## 2020-01-16 RX ORDER — FENTANYL CITRATE 50 UG/ML
INJECTION, SOLUTION INTRAMUSCULAR; INTRAVENOUS
Status: DISCONTINUED | OUTPATIENT
Start: 2020-01-16 | End: 2020-01-16

## 2020-01-16 RX ORDER — OXYCODONE HYDROCHLORIDE 5 MG/1
5 TABLET ORAL ONCE AS NEEDED
Status: COMPLETED | OUTPATIENT
Start: 2020-01-16 | End: 2020-01-16

## 2020-01-16 RX ORDER — FENTANYL CITRATE 50 UG/ML
25 INJECTION, SOLUTION INTRAMUSCULAR; INTRAVENOUS EVERY 5 MIN PRN
Status: COMPLETED | OUTPATIENT
Start: 2020-01-16 | End: 2020-01-16

## 2020-01-16 RX ORDER — BUPIVACAINE HYDROCHLORIDE 5 MG/ML
INJECTION, SOLUTION EPIDURAL; INTRACAUDAL
Status: DISCONTINUED | OUTPATIENT
Start: 2020-01-16 | End: 2020-01-16 | Stop reason: HOSPADM

## 2020-01-16 RX ORDER — KETOROLAC TROMETHAMINE 30 MG/ML
INJECTION, SOLUTION INTRAMUSCULAR; INTRAVENOUS
Status: DISCONTINUED | OUTPATIENT
Start: 2020-01-16 | End: 2020-01-16

## 2020-01-16 RX ORDER — ONDANSETRON HYDROCHLORIDE 2 MG/ML
INJECTION, SOLUTION INTRAMUSCULAR; INTRAVENOUS
Status: DISCONTINUED | OUTPATIENT
Start: 2020-01-16 | End: 2020-01-16

## 2020-01-16 RX ORDER — ONDANSETRON 4 MG/1
8 TABLET, ORALLY DISINTEGRATING ORAL EVERY 8 HOURS PRN
Status: CANCELLED | OUTPATIENT
Start: 2020-01-16

## 2020-01-16 RX ORDER — MIDAZOLAM HYDROCHLORIDE 1 MG/ML
INJECTION, SOLUTION INTRAMUSCULAR; INTRAVENOUS
Status: DISCONTINUED | OUTPATIENT
Start: 2020-01-16 | End: 2020-01-16

## 2020-01-16 RX ORDER — PROPOFOL 10 MG/ML
VIAL (ML) INTRAVENOUS
Status: DISCONTINUED | OUTPATIENT
Start: 2020-01-16 | End: 2020-01-16

## 2020-01-16 RX ORDER — ONDANSETRON 2 MG/ML
4 INJECTION INTRAMUSCULAR; INTRAVENOUS ONCE AS NEEDED
Status: DISCONTINUED | OUTPATIENT
Start: 2020-01-16 | End: 2020-01-16 | Stop reason: HOSPADM

## 2020-01-16 RX ORDER — TRIAMCINOLONE ACETONIDE 40 MG/ML
INJECTION, SUSPENSION INTRA-ARTICULAR; INTRAMUSCULAR
Status: DISCONTINUED | OUTPATIENT
Start: 2020-01-16 | End: 2020-01-16 | Stop reason: HOSPADM

## 2020-01-16 RX ORDER — ACETAMINOPHEN 10 MG/ML
INJECTION, SOLUTION INTRAVENOUS
Status: DISCONTINUED | OUTPATIENT
Start: 2020-01-16 | End: 2020-01-16

## 2020-01-16 RX ADMIN — FENTANYL CITRATE 25 MCG: 0.05 INJECTION, SOLUTION INTRAMUSCULAR; INTRAVENOUS at 07:01

## 2020-01-16 RX ADMIN — OXYCODONE HYDROCHLORIDE 5 MG: 5 TABLET ORAL at 07:01

## 2020-01-16 RX ADMIN — MIDAZOLAM 2 MG: 1 INJECTION INTRAMUSCULAR; INTRAVENOUS at 06:01

## 2020-01-16 RX ADMIN — KETOROLAC TROMETHAMINE 30 MG: 30 INJECTION, SOLUTION INTRAMUSCULAR; INTRAVENOUS at 07:01

## 2020-01-16 RX ADMIN — PHENYLEPHRINE HYDROCHLORIDE 100 MCG: 10 INJECTION INTRAVENOUS at 07:01

## 2020-01-16 RX ADMIN — PROPOFOL 150 MG: 10 INJECTION, EMULSION INTRAVENOUS at 07:01

## 2020-01-16 RX ADMIN — FENTANYL CITRATE 50 MCG: 50 INJECTION, SOLUTION INTRAMUSCULAR; INTRAVENOUS at 06:01

## 2020-01-16 RX ADMIN — CEFAZOLIN SODIUM 2 G: 2 SOLUTION INTRAVENOUS at 07:01

## 2020-01-16 RX ADMIN — SODIUM CHLORIDE, SODIUM GLUCONATE, SODIUM ACETATE, POTASSIUM CHLORIDE, MAGNESIUM CHLORIDE, SODIUM PHOSPHATE, DIBASIC, AND POTASSIUM PHOSPHATE: .53; .5; .37; .037; .03; .012; .00082 INJECTION, SOLUTION INTRAVENOUS at 06:01

## 2020-01-16 RX ADMIN — ONDANSETRON 4 MG: 2 INJECTION, SOLUTION INTRAMUSCULAR; INTRAVENOUS at 07:01

## 2020-01-16 RX ADMIN — DEXAMETHASONE SODIUM PHOSPHATE 4 MG: 4 INJECTION, SOLUTION INTRAMUSCULAR; INTRAVENOUS at 07:01

## 2020-01-16 RX ADMIN — LIDOCAINE HYDROCHLORIDE 75 MG: 20 INJECTION, SOLUTION INTRAVENOUS at 07:01

## 2020-01-16 RX ADMIN — LIDOCAINE HYDROCHLORIDE 5 MG: 10 INJECTION, SOLUTION EPIDURAL; INFILTRATION; INTRACAUDAL; PERINEURAL at 06:01

## 2020-01-16 RX ADMIN — FENTANYL CITRATE 25 MCG: 0.05 INJECTION, SOLUTION INTRAMUSCULAR; INTRAVENOUS at 08:01

## 2020-01-16 RX ADMIN — ACETAMINOPHEN 1000 MG: 10 INJECTION, SOLUTION INTRAVENOUS at 07:01

## 2020-01-16 NOTE — ANESTHESIA POSTPROCEDURE EVALUATION
Anesthesia Post Evaluation    Patient: Beatriz Gan    Procedure(s) Performed: Procedure(s) (LRB):  ARTHROSCOPY, KNEE, WITH MEDIAL MENISCECTOMY (Left)    Final Anesthesia Type: general    Patient location during evaluation: PACU  Patient participation: Yes- Able to Participate  Level of consciousness: awake and alert  Post-procedure vital signs: reviewed and stable  Pain management: adequate  Airway patency: patent    PONV status at discharge: No PONV  Anesthetic complications: no      Cardiovascular status: hemodynamically stable  Respiratory status: unassisted and room air  Hydration status: euvolemic  Follow-up not needed.          Vitals Value Taken Time   /75 1/16/2020  8:42 AM   Temp 36.7 °C (98 °F) 1/16/2020  8:25 AM   Pulse 63 1/16/2020  8:42 AM   Resp 18 1/16/2020  8:42 AM   SpO2 96 % 1/16/2020  8:42 AM         Event Time     Out of Recovery 08:23:44          Pain/Yaneli Score: Pain Rating Prior to Med Admin: 4 (1/16/2020  8:15 AM)  Yaneli Score: 10 (1/16/2020  8:23 AM)

## 2020-01-16 NOTE — OP NOTE
DATE OF PROCEDURE:  01/16/2020    PREOPERATIVE DIAGNOSES:  1.  Medial meniscus tear, left knee.  2.  Chondromalacia, left knee.    FINAL DIAGNOSES:  1.  Medial meniscus tear, left knee.  2.  Chondromalacia, left knee.    PROCEDURE PERFORMED:  Arthroscopic partial medial meniscectomy, left knee.    SURGEON:  Ruben Martinez M.D.    ASSISTANT:  Jimena Galo, surgical assistant.    ANESTHESIA:  General.    OPERATIVE REPORT:  Ms. Gan was brought to the Operating Room, placed on   the table in supine position and intubated.  The tourniquet was placed on the   mid left thigh and then the leg was placed in arthroscopic leg aaron.  The leg   was cleansed with alcohol and prepped with ChloraPrep solution from the   tourniquet down to the ankle and then draped in the usual fashion.  We   exsanguinated the leg with Esmarch tourniquet with the knee flexed.  We inflated   pneumatic tourniquet to 300 mmHg and then inserted the arthroscope to the mid   patellar portal.  Lateral meniscus was inspected.  It was intact.  The articular   surfaces were fine.  The medial compartment was inspected.  There were grade II   to III chondromalacic changes.  In the medial compartment, we noticed a tear of   the posterior horn of the medial meniscus.  An accessory medial portal was made   and arthroscopic shaver as well as scissors was used to debride the tear back   to stable rim.  No loose bodies were noted.  The knee was thoroughly lavaged.    The patellofemoral joint was inspected.  The 4-0 nylon was used to close the 2   stab incisions and then we injected a mixture of Marcaine and Depo-Medrol into   the knee joint for postop pain control, followed by a compressive dressing.  The   tourniquet was deflated.  The patient was extubated and brought to Recovery   Room in stable condition.      ROOSEVELT/MG  dd: 01/16/2020 09:34:24 (CST)  td: 01/16/2020 14:48:07 (CST)  Doc ID   #[id number]  Job ID #[id number]    CC:

## 2020-01-16 NOTE — PLAN OF CARE
Report to Serena. Patient denies pain/nausea, some incisional burning noted. VS stable, resting comfortably, and daughter at bedside.

## 2020-01-16 NOTE — H&P
CC/Indication for Procedure: 64 y.o. female with Medial meniscus, anterior horn derangement, left [M23.312]  Medial meniscus, anterior horn derangement, left [M23.312].    Patient scheduled for MD KNEE SCOPE SINGLE MENISECECTOMY [[id number]] (ARTHROSCOPY, KNEE, WITH MENISCECTOMY).    Past Medical History:   Diagnosis Date    Alcohol dependence     DDD (degenerative disc disease), lumbosacral     DJD (degenerative joint disease), lumbosacral     Encounter for blood transfusion     GERD (gastroesophageal reflux disease)     Gout     Hypercholesterolemia     Hypertension     NATHALIE (iron deficiency anemia)     questionable white coat hypertension    Kidney carcinoma, left 2014    Pneumonia     Renal cell carcinoma     Shingles 10/13/2012     Past Surgical History:   Procedure Laterality Date    APPENDECTOMY      ARTHROSCOPY OF SHOULDER WITH DECOMPRESSION OF SUBACROMIAL SPACE Left 10/31/2019    Procedure: ARTHROSCOPY, SHOULDER, WITH SUBACROMIAL SPACE DECOMPRESSION;  Surgeon: Ruben Martinez MD;  Location: [facility];  Service: Orthopedics;  Laterality: Left;    BLADDER REPAIR      x 2    COLONOSCOPY  9/2011    DISTAL CLAVICLE EXCISION Left 10/31/2019    Procedure: EXCISION, CLAVICLE, DISTAL;  Surgeon: Ruben Martinez MD;  Location: [facility] OR;  Service: Orthopedics;  Laterality: Left;    ESOPHAGOGASTRODUODENOSCOPY  9/2011    EYE SURGERY      lasix bilateral    HERNIA REPAIR      hiatal hernai    HYSTERECTOMY      LAPAROSCOPIC NISSEN FUNDOPLICATION      NEPHRECTOMY      LT partial    REFRACTIVE SURGERY      ROTATOR CUFF REPAIR Left 10/31/2019    Procedure: REPAIR, ROTATOR CUFF;  Surgeon: Ruben Martinez MD;  Location: [facility] OR;  Service: Orthopedics;  Laterality: Left;  arthrex    SKIN BIOPSY      TOTAL ABDOMINAL HYSTERECTOMY W/ BILATERAL SALPINGOOPHORECTOMY  age 48     Family History   Problem Relation Age of Onset    Hypertension Father     Glaucoma Neg Hx     Macular degeneration Neg Hx      Retinal detachment Neg Hx      Social History     Socioeconomic History    Marital status:      Spouse name: Not on file    Number of children: Not on file    Years of education: Not on file    Highest education level: Not on file   Occupational History     Employer: [employer]   Social Needs    Financial resource strain: Not on file    Food insecurity:     Worry: Not on file     Inability: Not on file    Transportation needs:     Medical: Not on file     Non-medical: Not on file   Tobacco Use    Smoking status: Former Smoker     Packs/day: 1.00     Years: 40.00     Pack years: 40.00     Types: Cigarettes     Last attempt to quit: 2018     Years since quittin.0    Smokeless tobacco: Never Used   Substance and Sexual Activity    Alcohol use: Yes     Alcohol/week: 16.0 standard drinks     Types: 2 Cans of beer, 14 Standard drinks or equivalent per week     Comment: 2-3 a day/ social    Drug use: No    Sexual activity: Yes     Partners: Male   Lifestyle    Physical activity:     Days per week: Not on file     Minutes per session: Not on file    Stress: Not on file   Relationships    Social connections:     Talks on phone: Not on file     Gets together: Not on file     Attends [employer] service: Not on file     Active member of club or organization: Not on file     Attends meetings of clubs or organizations: Not on file     Relationship status: Not on file   Other Topics Concern    Not on file   Social History Narrative    Not on file       Review of patient's allergies indicates:   Allergen Reactions    Phenergan [promethazine] Hives and Rash    Latex, natural rubber Hives     Per pt report-many years    Morphine Hives         Current Facility-Administered Medications:     cefazolin (ANCEF) 2 gram in dextrose 5% 50 mL IVPB (premix), 2 g, Intravenous, On Call Procedure, Ruben Martinez MD    electrolyte-S (ISOLYTE), , Intravenous, Continuous, Jorge Martinez MD, Last Rate: 10  mL/hr at 01/16/20 0610    lactated ringers infusion, , Intravenous, Continuous, Jorge Martinez MD    ROS:    Denies chest pain or palpitations  Denies shortness of breath  Denies fevers or chills  Denies chest pain  Denies abdominal pain    PE:    General Appearance: Well nourished  Orientation: Oriented to time, place, person  Mental Status: Alert  Heart: RRR  Lungs: CTA  Abdomen: Soft and non-tender    Anesthesia/Surgery risks, benefits and alternative options discussed and understood by patient/family.    This note was created using Dragon voice recognition software that occasionally misinterpreted phrases or words.

## 2020-01-16 NOTE — ANESTHESIA PREPROCEDURE EVALUATION
01/16/2020  Beatriz Gan is a 64 y.o., female.    Pre-op Assessment    I have reviewed the Patient Summary Reports.     I have reviewed the Nursing Notes.   I have reviewed the Medications.     Review of Systems  Anesthesia Hx:  No problems with previous Anesthesia Hx of Anesthetic complications    Social:  Former Smoker    Hematology/Oncology:         -- Anemia: --  Cancer in past history (Kidney s/p partial nephrectomy):    Cardiovascular:   Hypertension, well controlled hyperlipidemia    Pulmonary:   Pneumonia    Renal/:   Chronic Renal Disease    Hepatic/GI:   Hiatal Hernia, GERD, well controlled Liver Disease,    Musculoskeletal:   Arthritis     Neurological:   Neuromuscular Disease, Headaches    Endocrine:  Endocrine Normal    Psych:   Psychiatric History depression          Physical Exam  General:  Obesity    Airway/Jaw/Neck:  Airway Findings: Mouth Opening: Normal Tongue: Normal  General Airway Assessment: Adult  Oropharynx Findings:  Mallampati: II  Jaw/Neck Findings:  Neck ROM: Normal ROM     Eyes/Ears/Nose:  Eyes/Ears/Nose Findings:    Dental:  Dental Findings: In tact   Chest/Lungs:  Chest/Lungs Findings: Normal Respiratory Rate, Clear to auscultation     Heart/Vascular:  Heart Findings: Rate: Normal  Rhythm: Regular Rhythm        Mental Status:  Mental Status Findings:  Cooperative, Alert and Oriented         Anesthesia Plan  Type of Anesthesia, risks & benefits discussed:  Anesthesia Type:  general  Patient's Preference:   Intra-op Monitoring Plan: standard ASA monitors  Intra-op Monitoring Plan Comments:   Post Op Pain Control Plan: multimodal analgesia and IV/PO Opioids PRN  Post Op Pain Control Plan Comments:   Induction:   IV  Beta Blocker:  Patient is not currently on a Beta-Blocker (No further documentation required).       Informed Consent: Patient understands risks and agrees  with Anesthesia plan.  Questions answered. Anesthesia consent signed with patient.  ASA Score: 3     Day of Surgery Review of History & Physical: I have interviewed and examined the patient. I have reviewed the patient's H&P dated:  There are no significant changes.  H&P update referred to the surgeon.         Ready For Surgery From Anesthesia Perspective.

## 2020-01-16 NOTE — DISCHARGE INSTRUCTIONS
"Discharge Instructions: After Your Surgery/Procedure  Youve just had surgery. During surgery you were given medicine called anesthesia to keep you relaxed and free of pain. After surgery you may have some pain or nausea. This is common. Here are some tips for feeling better and getting well after surgery.     Stay on schedule with your medication.   Going home  Your doctor or nurse will show you how to take care of yourself when you go home. He or she will also answer your questions. Have an adult family member or friend drive you home.      For your safety we recommend these precaution for the first 24 hours after your procedure:  · Do not drive or use heavy equipment.  · Do not make important decisions or sign legal papers.  · Do not drink alcohol.  · Have someone stay with you, if needed. He or she can watch for problems and help keep you safe.  · Your concentration, balance, coordination, and judgement may be impaired for many hours after anesthesia.  Use caution when ambulating or standing up.     · You may feel weak and "washed out" after anesthesia and surgery.      Subtle residual effects of general anesthesia or sedation with regional / local anesthesia can last more than 24 hours.  Rest for the remainder of the day or longer if your Doctor/Surgeon has advised you to do so.  Although you may feel normal within the first 24 hours, your reflexes and mental ability may be impaired without you realizing it.  You may feel dizzy, lightheaded or sleepy for 24 hours or longer.      Be sure to go to all follow-up visits with your doctor. And rest after your surgery for as long as your doctor tells you to.  Coping with pain  If you have pain after surgery, pain medicine will help you feel better. Take it as told, before pain becomes severe. Also, ask your doctor or pharmacist about other ways to control pain. This might be with heat, ice, or relaxation. And follow any other instructions your surgeon or nurse gives " you.  Tips for taking pain medicine  To get the best relief possible, remember these points:  · Pain medicines can upset your stomach. Taking them with a little food may help.  · Most pain relievers taken by mouth need at least 20 to 30 minutes to start to work.  · Taking medicine on a schedule can help you remember to take it. Try to time your medicine so that you can take it before starting an activity. This might be before you get dressed, go for a walk, or sit down for dinner.  · Constipation is a common side effect of pain medicines. Call your doctor before taking any medicines such as laxatives or stool softeners to help ease constipation. Also ask if you should skip any foods. Drinking lots of fluids and eating foods such as fruits and vegetables that are high in fiber can also help. Remember, do not take laxatives unless your surgeon has prescribed them.  · Drinking alcohol and taking pain medicine can cause dizziness and slow your breathing. It can even be deadly. Do not drink alcohol while taking pain medicine.  · Pain medicine can make you react more slowly to things. Do not drive or run machinery while taking pain medicine.  Your health care provider may tell you to take acetaminophen to help ease your pain. Ask him or her how much you are supposed to take each day. Acetaminophen or other pain relievers may interact with your prescription medicines or other over-the-counter (OTC) drugs. Some prescription medicines have acetaminophen and other ingredients. Using both prescription and OTC acetaminophen for pain can cause you to overdose. Read the labels on your OTC medicines with care. This will help you to clearly know the list of ingredients, how much to take, and any warnings. It may also help you not take too much acetaminophen. If you have questions or do not understand the information, ask your pharmacist or health care provider to explain it to you before you take the OTC medicine.  Managing  nausea  Some people have an upset stomach after surgery. This is often because of anesthesia, pain, or pain medicine, or the stress of surgery. These tips will help you handle nausea and eat healthy foods as you get better. If you were on a special food plan before surgery, ask your doctor if you should follow it while you get better. These tips may help:  · Do not push yourself to eat. Your body will tell you when to eat and how much.  · Start off with clear liquids and soup. They are easier to digest.  · Next try semi-solid foods, such as mashed potatoes, applesauce, and gelatin, as you feel ready.  · Slowly move to solid foods. Dont eat fatty, rich, or spicy foods at first.  · Do not force yourself to have 3 large meals a day. Instead eat smaller amounts more often.  · Take pain medicines with a small amount of solid food, such as crackers or toast, to avoid nausea.     Call your surgeon if  · You still have pain an hour after taking medicine. The medicine may not be strong enough.  · You feel too sleepy, dizzy, or groggy. The medicine may be too strong.  · You have side effects like nausea, vomiting, or skin changes, such as rash, itching, or hives.       If you have obstructive sleep apnea  You were given anesthesia medicine during surgery to keep you comfortable and free of pain. After surgery, you may have more apnea spells because of this medicine and other medicines you were given. The spells may last longer than usual.   At home:  · Keep using the continuous positive airway pressure (CPAP) device when you sleep. Unless your health care provider tells you not to, use it when you sleep, day or night. CPAP is a common device used to treat obstructive sleep apnea.  · Talk with your provider before taking any pain medicine, muscle relaxants, or sedatives. Your provider will tell you about the possible dangers of taking these medicines.  © 0873-5915 [employer] [address] [place] [address]  [place] [address]. All rights reserved. This information is not intended as a substitute for professional medical care. Always follow your healthcare professional's instructions.      General Information:    1.  Do not drink alcoholic beverages including beer for 24 hours or as long as you are on pain medication..  2.  Do not drive a motor vehicle, operate machinery or power tools, or signs legal papers for 24 hours or as long as you are on pain medication.   3.  You may experience light-headedness, dizziness, and sleepiness following surgery. Please do not stay alone. A responsible adult should be with you for this 24 hour period.  4.  Go home and rest.    5. Progress slowly to a normal diet unless instructed.  Otherwise, begin with liquids such as soft drinks, then soup and crackers working up to solid foods. Drink plenty of nonalcoholic fluids.  6.  Certain anesthetics and pain medications produce nausea and vomiting in certain       individuals. If nausea becomes a problem at home, call you doctor.    7. A nurse will be calling you sometime after surgery. Do not be alarmed. This is our way of finding out how you are doing.    8. Several times every hour while you are awake, take 2-3 deep breaths and cough. If you had stomach surgery hold a pillow or rolled towel firmly against your stomach before you cough. This will help with any pain the cough might cause.  9. Several times every hour while you are awake, pump and flex your feet 5-6 times and do foot circles. This will help prevent blood clots.    10.Call your doctor for severe pain, bleeding, fever, or signs or symptoms of infection (pain, swelling, redness, foul odor, drainage).    Post op instructions for prevention of DVT  What is deep vein thrombosis?  Deep vein thrombosis (DVT) is the medical term for blood clots in the deep veins of the leg.  These blood clots can be dangerous.  A DVT can block a blood vessel and keep blood from getting where it needs to go.   Another problem is that the clot can travel to other parts of the body such as the lungs.  A clot that travels to the lungs is called a pulmonary embolus (PE) and can cause serious problems with breathing which can lead to death.  Am I at risk for DVT/PE?  If you are not very active, you are at risk of DVT.  Anyone confined to bed, sitting for long periods of time, recovering from surgery, etc. increases the risk of DVT.  Other risk factors are cancer diagnosis, certain medications, estrogen replacement in any form,older age, obesity, pregnancy, smoking, history of clotting disorders, and dehydration.  How will I know if I have a DVT?   Swelling in the lower leg   Pain   Warmth, redness, hardness or bulging of the vein  If you have any of these symptoms, call your doctors office right away.  Some people will not have any symptoms until the clot moves to the lungs.  What are the symptoms of a PE?   Panting, shortness of breath, or trouble breathing   Sharp, knife-like chest pain when you breathe   Coughing or coughing up blood   Rapid heartbeat  If you have any of these symptoms or get worse quickly, call 911 for emergency treatment.  How can I prevent a DVT?   Avoid long periods of inactivity and dont cross your legs--get up and walk around every hour or so.   Stay active--walking after surgery is highly encouraged.  This means you should get out of the house and walk in the neighborhood.  Going up and down stairs will not impair healing (unless advised against such activity by your doctor).     Drink plenty of noncaffeinated, nonalcoholic fluids each day to prevent dehydration.   Wear special support stockings, if they have been advised by your doctor.   If you travel, stop at least once an hour and walk around.   Avoid smoking (assistance with stopping is available through your healthcare provider)  Always notify your doctor if you are not able to follow the post operative instructions that are  given to you at the time of discharge.  It may be necessary to prescribe one of the medications available to prevent DVT.    We hope your stay was comfortable as you heal now, mend and rest.    For we have enjoyed taking care of you by giving your our best.    And as you get better, by regaining your health and strength;   We count it as a privilege to have served you and hope your time at [facility] was well spent.      Thank  You!!!

## 2020-01-16 NOTE — TRANSFER OF CARE
"Anesthesia Transfer of Care Note    Patient: Beatriz Gan    Procedure(s) Performed: Procedure(s) (LRB):  ARTHROSCOPY, KNEE, WITH MEDIAL MENISCECTOMY (Left)    Patient location: PACU    Anesthesia Type: general    Transport from OR: Transported from OR on 2-3 L/min O2 by NC with adequate spontaneous ventilation    Post pain: adequate analgesia    Post assessment: no apparent anesthetic complications    Post vital signs: stable    Level of consciousness: awake    Nausea/Vomiting: no nausea/vomiting    Complications: none    Transfer of care protocol was followed      Last vitals:   Visit Vitals  /67   Pulse 68   Temp 36.5 °C (97.7 °F)   Resp 18   Ht 5' 4.5" (1.638 m)   Wt 83.9 kg (185 lb)   SpO2 97%   Breastfeeding? No   BMI 31.26 kg/m²     "

## 2020-01-16 NOTE — ANESTHESIA PROCEDURE NOTES
Intubation  Performed by: Aman Galicia CRNA  Authorized by: Nik Forbes MD     Intubation:     Induction:  Intravenous    Intubated:  Postinduction    Attempts:  1    Attempted By:  CRNA    Difficult Airway Encountered?: No      Complications:  None    Airway Device:  Supraglottic airway/LMA    Airway Device Size:  3.0    Style/Cuff Inflation:  Cuffed    Secured at:  The lips    Placement Verified By:  Capnometry    Complicating Factors:  None

## 2020-01-16 NOTE — BRIEF OP NOTE
[facility]  Brief Operative Note    Surgery Date: 1/16/2020     Surgeon(s) and Role:     * Ruben Martinez MD - Primary    Assisting Surgeon: Jimena Galo    Pre-op Diagnosis:  Medial meniscus, anterior horn derangement, left [M23.312]    Post-op Diagnosis:  Post-Op Diagnosis Codes:     * Medial meniscus, anterior horn derangement, left [M23.312]    Procedure(s) (LRB):  ARTHROSCOPY, KNEE, WITH MEDIAL MENISCECTOMY (Left)    Anesthesia: General    Description of the findings of the procedure(s):  Posterior horn medial meniscus tear, chondromalacia    Estimated Blood Loss: * No values recorded between 1/16/2020  7:16 AM and 1/16/2020  7:32 AM *         Specimens:   Specimen (12h ago, onward)    None            Discharge Note    OUTCOME: Patient tolerated treatment/procedure well without complication and is now ready for discharge.    DISPOSITION: Home or Self Care    FINAL DIAGNOSIS:  <principal problem not specified>    FOLLOWUP: In clinic

## 2020-01-17 VITALS
OXYGEN SATURATION: 96 % | BODY MASS INDEX: 30.82 KG/M2 | TEMPERATURE: 98 F | WEIGHT: 185 LBS | SYSTOLIC BLOOD PRESSURE: 120 MMHG | RESPIRATION RATE: 18 BRPM | HEIGHT: 65 IN | HEART RATE: 63 BPM | DIASTOLIC BLOOD PRESSURE: 75 MMHG

## 2020-01-22 ENCOUNTER — TELEPHONE (OUTPATIENT)
Dept: ORTHOPEDICS | Facility: CLINIC | Age: 65
End: 2020-01-22

## 2020-01-22 NOTE — TELEPHONE ENCOUNTER
Spoke with the patient. I explained that 2/3 is too long of a wait since her sx was on 1/16. I made her an appt for Friday 1/24.

## 2020-01-22 NOTE — TELEPHONE ENCOUNTER
----- Message from Maci Posadas sent at 1/22/2020 11:38 AM CST -----  Contact: Patient [phone]  Patient removed her sutures and stated she is to sick to keep her appt today. Her next appt is 2/3/20. Dr Martinez

## 2020-01-23 DIAGNOSIS — M48.062 LUMBAR STENOSIS WITH NEUROGENIC CLAUDICATION: Primary | ICD-10-CM

## 2020-01-23 RX ORDER — HYDROCODONE BITARTRATE AND ACETAMINOPHEN 7.5; 325 MG/1; MG/1
1 TABLET ORAL EVERY 6 HOURS PRN
Qty: 28 TABLET | Refills: 0 | Status: SHIPPED | OUTPATIENT
Start: 2020-01-23 | End: 2020-01-24

## 2020-01-24 ENCOUNTER — OFFICE VISIT (OUTPATIENT)
Dept: ORTHOPEDICS | Facility: CLINIC | Age: 65
End: 2020-01-24
Payer: MEDICARE

## 2020-01-24 ENCOUNTER — TELEPHONE (OUTPATIENT)
Dept: ORTHOPEDICS | Facility: CLINIC | Age: 65
End: 2020-01-24

## 2020-01-24 VITALS
WEIGHT: 185 LBS | SYSTOLIC BLOOD PRESSURE: 140 MMHG | HEIGHT: 65 IN | HEART RATE: 74 BPM | DIASTOLIC BLOOD PRESSURE: 89 MMHG | BODY MASS INDEX: 30.82 KG/M2

## 2020-01-24 DIAGNOSIS — M80.00XA AGE-RELATED OSTEOPOROSIS WITH CURRENT PATHOLOGICAL FRACTURE, INITIAL ENCOUNTER: ICD-10-CM

## 2020-01-24 DIAGNOSIS — S22.080A COMPRESSION FRACTURE OF T12 VERTEBRA, INITIAL ENCOUNTER: ICD-10-CM

## 2020-01-24 DIAGNOSIS — S22.080A COMPRESSION FRACTURE OF T12 VERTEBRA, INITIAL ENCOUNTER: Primary | ICD-10-CM

## 2020-01-24 DIAGNOSIS — M54.50 LUMBAR PAIN: Primary | ICD-10-CM

## 2020-01-24 PROCEDURE — 99024 POSTOP FOLLOW-UP VISIT: CPT | Mod: S$GLB,,, | Performed by: PHYSICIAN ASSISTANT

## 2020-01-24 PROCEDURE — 1125F PR PAIN SEVERITY QUANTIFIED, PAIN PRESENT: ICD-10-PCS | Mod: S$GLB,,, | Performed by: PHYSICIAN ASSISTANT

## 2020-01-24 PROCEDURE — 1159F PR MEDICATION LIST DOCUMENTED IN MEDICAL RECORD: ICD-10-PCS | Mod: S$GLB,,, | Performed by: PHYSICIAN ASSISTANT

## 2020-01-24 PROCEDURE — 99213 OFFICE O/P EST LOW 20 MIN: CPT | Mod: 24,25,S$GLB, | Performed by: PHYSICIAN ASSISTANT

## 2020-01-24 PROCEDURE — 1159F MED LIST DOCD IN RCRD: CPT | Mod: S$GLB,,, | Performed by: PHYSICIAN ASSISTANT

## 2020-01-24 PROCEDURE — 99024 PR POST-OP FOLLOW-UP VISIT: ICD-10-PCS | Mod: S$GLB,,, | Performed by: PHYSICIAN ASSISTANT

## 2020-01-24 PROCEDURE — 1125F AMNT PAIN NOTED PAIN PRSNT: CPT | Mod: S$GLB,,, | Performed by: PHYSICIAN ASSISTANT

## 2020-01-24 PROCEDURE — 99213 PR OFFICE/OUTPT VISIT, EST, LEVL III, 20-29 MIN: ICD-10-PCS | Mod: 24,25,S$GLB, | Performed by: PHYSICIAN ASSISTANT

## 2020-01-24 NOTE — H&P (VIEW-ONLY)
Subjective:    Patient ID: Beatriz Gan is a 64 y.o. female.    Chief Complaint: Post-op Evaluation of the Left Knee (Left knee scope 1.16.2020. Suture removal. States that her knee doing ok, States that her stitches came out. ) and Pain of the Lumbar Spine (L spine pain x 1.22.19. States that she fell and and hurt her back. States that she has issues with moving and states that her pain is worse with activity. )      History of Present Illness  Patient is here follow-up for status post left knee arthroscopy.  Overall the left knee is doing well with minimal pain.  However she has severe back pain which has limited her functional mobility.  The secondary to a fall 2 days ago.    Current Medications  Current Outpatient Medications   Medication Sig Dispense Refill    ALPRAZolam (XANAX) 0.5 MG tablet Take 1 tablet (0.5 mg total) by mouth 3 (three) times daily as needed for Anxiety. 90 tablet 1    amLODIPine (NORVASC) 5 MG tablet Take 1 tablet (5 mg total) by mouth once daily. 90 tablet 3    calcium-vitamin D3 (CALCIUM 500 + D) 500 mg(1,250mg) -200 unit per tablet Take 1 tablet by mouth 2 (two) times daily with meals.      diclofenac (VOLTAREN) 75 MG EC tablet Take 1 tablet (75 mg total) by mouth 2 (two) times daily as needed. 30 tablet 0    DULoxetine (CYMBALTA) 30 MG capsule Take 1 capsule (30 mg total) by mouth once daily. 90 capsule 3    fluticasone (FLONASE) 50 mcg/actuation nasal spray 1 spray by Nasal route daily as needed.       gabapentin (NEURONTIN) 400 MG capsule Take 1 capsule (400 mg total) by mouth 3 (three) times daily. 270 capsule 3    HYDROcodone-acetaminophen (NORCO) 7.5-325 mg per tablet Take 1 tablet by mouth every 6 (six) hours as needed for Pain. 28 tablet 0    lidocaine (LIDODERM) 5 % Place 1 patch onto the skin once daily. Remove & Discard patch within 12 hours or as directed by MD 30 patch 0    losartan-hydrochlorothiazide 100-25 mg (HYZAAR) 100-25 mg per tablet Take 1  tablet by mouth once daily. 90 tablet 3    multivit-iron-min-folic acid (MULTIVITAMIN-IRON-MINERALS-FOLIC ACID) 3,500-18-0.4 unit-mg-mg Chew Take by mouth once daily.        NARCAN 4 mg/actuation Spry       polyethylene glycol (GLYCOLAX) 17 gram/dose powder Take 17 g by mouth once daily. 510 g 3    zolpidem (AMBIEN) 10 mg Tab Take 1 tablet (10 mg total) by mouth nightly as needed. 90 tablet 0     No current facility-administered medications for this visit.        Allergies  Review of patient's allergies indicates:   Allergen Reactions    Phenergan [promethazine] Hives and Rash    Latex, natural rubber Hives     Per pt report-many years    Morphine Hives       Past Medical History  Past Medical History:   Diagnosis Date    Alcohol dependence     DDD (degenerative disc disease), lumbosacral     DJD (degenerative joint disease), lumbosacral     Encounter for blood transfusion     GERD (gastroesophageal reflux disease)     Gout     Hypercholesterolemia     Hypertension     NATHALIE (iron deficiency anemia)     questionable white coat hypertension    Kidney carcinoma, left 2014    Pneumonia     Renal cell carcinoma     Shingles 10/13/2012       Surgical History  Past Surgical History:   Procedure Laterality Date    APPENDECTOMY      ARTHROSCOPY OF SHOULDER WITH DECOMPRESSION OF SUBACROMIAL SPACE Left 10/31/2019    Procedure: ARTHROSCOPY, SHOULDER, WITH SUBACROMIAL SPACE DECOMPRESSION;  Surgeon: Ruben Martinez MD;  Location: [facility] OR;  Service: Orthopedics;  Laterality: Left;    BLADDER REPAIR      x 2    COLONOSCOPY  9/2011    DISTAL CLAVICLE EXCISION Left 10/31/2019    Procedure: EXCISION, CLAVICLE, DISTAL;  Surgeon: Ruben Martinez MD;  Location: [facility] OR;  Service: Orthopedics;  Laterality: Left;    ESOPHAGOGASTRODUODENOSCOPY  9/2011    EYE SURGERY      lasix bilateral    HERNIA REPAIR      hiatal hernai    HYSTERECTOMY      KNEE ARTHROSCOPY W/ MENISCECTOMY Left 1/16/2020    Procedure:  ARTHROSCOPY, KNEE, WITH MEDIAL MENISCECTOMY;  Surgeon: Ruben Martinez MD;  Location: [facility] OR;  Service: Orthopedics;  Laterality: Left;    LAPAROSCOPIC NISSEN FUNDOPLICATION      NEPHRECTOMY      LT partial    REFRACTIVE SURGERY      ROTATOR CUFF REPAIR Left 10/31/2019    Procedure: REPAIR, ROTATOR CUFF;  Surgeon: Ruben Martinez MD;  Location: [facility] OR;  Service: Orthopedics;  Laterality: Left;  arthrex    SKIN BIOPSY      TOTAL ABDOMINAL HYSTERECTOMY W/ BILATERAL SALPINGOOPHORECTOMY  age 48       Family History:   Family History   Problem Relation Age of Onset    Hypertension Father     Glaucoma Neg Hx     Macular degeneration Neg Hx     Retinal detachment Neg Hx        Social History:   Social History     Socioeconomic History    Marital status:      Spouse name: Not on file    Number of children: Not on file    Years of education: Not on file    Highest education level: Not on file   Occupational History     Employer: [employer]   Social Needs    Financial resource strain: Not on file    Food insecurity:     Worry: Not on file     Inability: Not on file    Transportation needs:     Medical: Not on file     Non-medical: Not on file   Tobacco Use    Smoking status: Former Smoker     Packs/day: 1.00     Years: 40.00     Pack years: 40.00     Types: Cigarettes     Last attempt to quit: 2018     Years since quittin.0    Smokeless tobacco: Never Used   Substance and Sexual Activity    Alcohol use: Yes     Alcohol/week: 16.0 standard drinks     Types: 2 Cans of beer, 14 Standard drinks or equivalent per week     Comment: 2-3 a day/ social    Drug use: No    Sexual activity: Yes     Partners: Male   Lifestyle    Physical activity:     Days per week: Not on file     Minutes per session: Not on file    Stress: Not on file   Relationships    Social connections:     Talks on phone: Not on file     Gets together: Not on file     Attends Gnosticist service: Not on file     Active member  of club or organization: Not on file     Attends meetings of clubs or organizations: Not on file     Relationship status: Not on file   Other Topics Concern    Not on file   Social History Narrative    Not on file       Date of surgery:  01/16/2020    Review of Systems     General/Constitutional: Chills denies. Fatigue denies. Fever denies. Weight gain denies. Weight loss denies.    Musculoskeletal: Comments: See HPI for details    Skin: Rash denies.    Objective:   Vital Signs:   Vitals:    01/24/20 1140   BP: (!) 140/89   Pulse:         Physical Exam    This a well-developed, well nourished patient in no acute distress.  They are alert and oriented and cooperative to examination.  Pt. walks without an antalgic gait.      General Examination:     Constitutional: The patient is alert and oriented to lace person and time. Mood is pleasant.     Head/Face: Normal facial features normal eyebrows    Eyes: Normal extraocular motion bilaterally    Lungs: Respirations are equal and unlabored    Gait is coordinated.    Cardiovascular: There are no swelling or varicosities present.    Lymphatic: Negative for adenopathy    Skin: Normal    Neurological: Level of consciousness normal. Oriented to place person and time and situation    Psychiatric: Oriented to time place person and situation    Lumbar exam.  Patient sitting in a wheelchair today and not ambulating secondary to complaints of pain.  She is wearing a lumbar corset.  Tenderness palpation at the thoracolumbar junction.  Bilateral lower extremities are distal neurovascular intact.    XRAY Report/ Interpretation:  Lumbar AP and lateral x-rays taken in the office today reviewed the patient demonstrate any anterior wedge deformity compression fracture at T12.  This was not present on previous lumbar MRI from October 2019.      Assessment:       1. Lumbar pain    2. Compression fracture of T12 vertebra, initial encounter    3. Age-related osteoporosis with current  pathological fracture, initial encounter        Plan:       Beatriz was seen today for post-op evaluation and pain.    Diagnoses and all orders for this visit:    Lumbar pain  -     X-Ray Lumbar Spine Ap And Lateral    Compression fracture of T12 vertebra, initial encounter    Age-related osteoporosis with current pathological fracture, initial encounter         No follow-ups on file.    Recommend follow-up with primary care provider for osteoporosis treatment. We also will refer her to Dr. Melara for a T12 kyphoplasty.  Continue with lumbar corset and activity as tolerated.  Follow-up as scheduled for her lumbar spine in the future.  I refilled her pain medicine.      This note was created using Dragon voice recognition software that occasionally misinterpreted phrases or words.

## 2020-01-24 NOTE — PROGRESS NOTES
Subjective:    Patient ID: Beatriz Gan is a 64 y.o. female.    Chief Complaint: Post-op Evaluation of the Left Knee (Left knee scope 1.16.2020. Suture removal. States that her knee doing ok, States that her stitches came out. ) and Pain of the Lumbar Spine (L spine pain x 1.22.19. States that she fell and and hurt her back. States that she has issues with moving and states that her pain is worse with activity. )      History of Present Illness  Patient is here follow-up for status post left knee arthroscopy.  Overall the left knee is doing well with minimal pain.  However she has severe back pain which has limited her functional mobility.  The secondary to a fall 2 days ago.    Current Medications  Current Outpatient Medications   Medication Sig Dispense Refill    ALPRAZolam (XANAX) 0.5 MG tablet Take 1 tablet (0.5 mg total) by mouth 3 (three) times daily as needed for Anxiety. 90 tablet 1    amLODIPine (NORVASC) 5 MG tablet Take 1 tablet (5 mg total) by mouth once daily. 90 tablet 3    calcium-vitamin D3 (CALCIUM 500 + D) 500 mg(1,250mg) -200 unit per tablet Take 1 tablet by mouth 2 (two) times daily with meals.      diclofenac (VOLTAREN) 75 MG EC tablet Take 1 tablet (75 mg total) by mouth 2 (two) times daily as needed. 30 tablet 0    DULoxetine (CYMBALTA) 30 MG capsule Take 1 capsule (30 mg total) by mouth once daily. 90 capsule 3    fluticasone (FLONASE) 50 mcg/actuation nasal spray 1 spray by Nasal route daily as needed.       gabapentin (NEURONTIN) 400 MG capsule Take 1 capsule (400 mg total) by mouth 3 (three) times daily. 270 capsule 3    HYDROcodone-acetaminophen (NORCO) 7.5-325 mg per tablet Take 1 tablet by mouth every 6 (six) hours as needed for Pain. 28 tablet 0    lidocaine (LIDODERM) 5 % Place 1 patch onto the skin once daily. Remove & Discard patch within 12 hours or as directed by MD 30 patch 0    losartan-hydrochlorothiazide 100-25 mg (HYZAAR) 100-25 mg per tablet Take 1  tablet by mouth once daily. 90 tablet 3    multivit-iron-min-folic acid (MULTIVITAMIN-IRON-MINERALS-FOLIC ACID) 3,500-18-0.4 unit-mg-mg Chew Take by mouth once daily.        NARCAN 4 mg/actuation Spry       polyethylene glycol (GLYCOLAX) 17 gram/dose powder Take 17 g by mouth once daily. 510 g 3    zolpidem (AMBIEN) 10 mg Tab Take 1 tablet (10 mg total) by mouth nightly as needed. 90 tablet 0     No current facility-administered medications for this visit.        Allergies  Review of patient's allergies indicates:   Allergen Reactions    Phenergan [promethazine] Hives and Rash    Latex, natural rubber Hives     Per pt report-many years    Morphine Hives       Past Medical History  Past Medical History:   Diagnosis Date    Alcohol dependence     DDD (degenerative disc disease), lumbosacral     DJD (degenerative joint disease), lumbosacral     Encounter for blood transfusion     GERD (gastroesophageal reflux disease)     Gout     Hypercholesterolemia     Hypertension     NATHALIE (iron deficiency anemia)     questionable white coat hypertension    Kidney carcinoma, left 2014    Pneumonia     Renal cell carcinoma     Shingles 10/13/2012       Surgical History  Past Surgical History:   Procedure Laterality Date    APPENDECTOMY      ARTHROSCOPY OF SHOULDER WITH DECOMPRESSION OF SUBACROMIAL SPACE Left 10/31/2019    Procedure: ARTHROSCOPY, SHOULDER, WITH SUBACROMIAL SPACE DECOMPRESSION;  Surgeon: Ruben Martinez MD;  Location: [facility] OR;  Service: Orthopedics;  Laterality: Left;    BLADDER REPAIR      x 2    COLONOSCOPY  9/2011    DISTAL CLAVICLE EXCISION Left 10/31/2019    Procedure: EXCISION, CLAVICLE, DISTAL;  Surgeon: Ruben Martinez MD;  Location: [facility] OR;  Service: Orthopedics;  Laterality: Left;    ESOPHAGOGASTRODUODENOSCOPY  9/2011    EYE SURGERY      lasix bilateral    HERNIA REPAIR      hiatal hernai    HYSTERECTOMY      KNEE ARTHROSCOPY W/ MENISCECTOMY Left 1/16/2020    Procedure:  ARTHROSCOPY, KNEE, WITH MEDIAL MENISCECTOMY;  Surgeon: Ruben Martinez MD;  Location: [facility] OR;  Service: Orthopedics;  Laterality: Left;    LAPAROSCOPIC NISSEN FUNDOPLICATION      NEPHRECTOMY      LT partial    REFRACTIVE SURGERY      ROTATOR CUFF REPAIR Left 10/31/2019    Procedure: REPAIR, ROTATOR CUFF;  Surgeon: Ruben Martinez MD;  Location: [facility] OR;  Service: Orthopedics;  Laterality: Left;  arthrex    SKIN BIOPSY      TOTAL ABDOMINAL HYSTERECTOMY W/ BILATERAL SALPINGOOPHORECTOMY  age 48       Family History:   Family History   Problem Relation Age of Onset    Hypertension Father     Glaucoma Neg Hx     Macular degeneration Neg Hx     Retinal detachment Neg Hx        Social History:   Social History     Socioeconomic History    Marital status:      Spouse name: Not on file    Number of children: Not on file    Years of education: Not on file    Highest education level: Not on file   Occupational History     Employer: [employer]   Social Needs    Financial resource strain: Not on file    Food insecurity:     Worry: Not on file     Inability: Not on file    Transportation needs:     Medical: Not on file     Non-medical: Not on file   Tobacco Use    Smoking status: Former Smoker     Packs/day: 1.00     Years: 40.00     Pack years: 40.00     Types: Cigarettes     Last attempt to quit: 2018     Years since quittin.0    Smokeless tobacco: Never Used   Substance and Sexual Activity    Alcohol use: Yes     Alcohol/week: 16.0 standard drinks     Types: 2 Cans of beer, 14 Standard drinks or equivalent per week     Comment: 2-3 a day/ social    Drug use: No    Sexual activity: Yes     Partners: Male   Lifestyle    Physical activity:     Days per week: Not on file     Minutes per session: Not on file    Stress: Not on file   Relationships    Social connections:     Talks on phone: Not on file     Gets together: Not on file     Attends Zoroastrianism service: Not on file     Active member  of club or organization: Not on file     Attends meetings of clubs or organizations: Not on file     Relationship status: Not on file   Other Topics Concern    Not on file   Social History Narrative    Not on file       Date of surgery:  01/16/2020    Review of Systems     General/Constitutional: Chills denies. Fatigue denies. Fever denies. Weight gain denies. Weight loss denies.    Musculoskeletal: Comments: See HPI for details    Skin: Rash denies.    Objective:   Vital Signs:   Vitals:    01/24/20 1140   BP: (!) 140/89   Pulse:         Physical Exam    This a well-developed, well nourished patient in no acute distress.  They are alert and oriented and cooperative to examination.  Pt. walks without an antalgic gait.      General Examination:     Constitutional: The patient is alert and oriented to lace person and time. Mood is pleasant.     Head/Face: Normal facial features normal eyebrows    Eyes: Normal extraocular motion bilaterally    Lungs: Respirations are equal and unlabored    Gait is coordinated.    Cardiovascular: There are no swelling or varicosities present.    Lymphatic: Negative for adenopathy    Skin: Normal    Neurological: Level of consciousness normal. Oriented to place person and time and situation    Psychiatric: Oriented to time place person and situation    Lumbar exam.  Patient sitting in a wheelchair today and not ambulating secondary to complaints of pain.  She is wearing a lumbar corset.  Tenderness palpation at the thoracolumbar junction.  Bilateral lower extremities are distal neurovascular intact.    XRAY Report/ Interpretation:  Lumbar AP and lateral x-rays taken in the office today reviewed the patient demonstrate any anterior wedge deformity compression fracture at T12.  This was not present on previous lumbar MRI from October 2019.      Assessment:       1. Lumbar pain    2. Compression fracture of T12 vertebra, initial encounter    3. Age-related osteoporosis with current  pathological fracture, initial encounter        Plan:       Beatriz was seen today for post-op evaluation and pain.    Diagnoses and all orders for this visit:    Lumbar pain  -     X-Ray Lumbar Spine Ap And Lateral    Compression fracture of T12 vertebra, initial encounter    Age-related osteoporosis with current pathological fracture, initial encounter         No follow-ups on file.    Recommend follow-up with primary care provider for osteoporosis treatment. We also will refer her to Dr. Melara for a T12 kyphoplasty.  Continue with lumbar corset and activity as tolerated.  Follow-up as scheduled for her lumbar spine in the future.  I refilled her pain medicine.      This note was created using Dragon voice recognition software that occasionally misinterpreted phrases or words.

## 2020-01-24 NOTE — PATIENT INSTRUCTIONS
Kyphoplasty    Kyphoplasty is a procedure that can help relieve the pain of vertebral compression fracture. (This is a collapse of bone in your spine most commonly caused by osteoporosis.) It does this by strengthening your spine (vertebrae) with special cement. The procedure usually takes 30 to 45 minutes.  Preparing for the procedure  Tell your healthcare provider about all medicines you take. This includes over-the-counter medicines, herbs, vitamins, and other supplements.  · Ask your healthcare provider if there are medicines you must stop taking before the procedure.  · Do not eat or drink anything for at least 8 hours before the procedure.  · Arrange for an adult family member or friend to drive you home.  During the procedure    Your healthcare provider will give you anesthesia. This is medicine to keep you from feeling pain during the procedure. During kyphoplasty:  · Your surgeon makes one or more tiny incisions in your back.  · Using video X-ray images as a guide, your surgeon inserts a hollow tube through the incision into the collapsed vertebra.  · A small balloon is passed through the tube into the vertebra. There it is inflated to open a space.  · The balloon is then removed and the empty space is filled with special cement for bones.  Risks and complications  Kyphoplasty is considered safe. If complications do happen, they may include the following:  · Nerve damage  · Cement leakage  · Heart or lung problems  · New or unrelieved back pain  · Infection  After the procedure  You will be sent to a recovery room after the procedure. Usually, you will go home later the same day. Or, you may stay the night in a hospital room. Once youre ready to go home, your healthcare provider will tell you how to care for yourself.  When to call your healthcare provider  Call your healthcare provider if you have any of these symptoms:  · Fever of 100.4°F (38.0°C) or higher  · New pain, weakness, tingling, or numbness  in your legs  · New or unrelieved back pain   Date Last Reviewed: 7/1/2016  © 8814-7549 [employer]. [address] [place] [address], [place] [address]. All rights reserved. This information is not intended as a substitute for professional medical care. Always follow your healthcare professional's instructions.

## 2020-01-28 ENCOUNTER — HOSPITAL ENCOUNTER (OUTPATIENT)
Dept: PREADMISSION TESTING | Facility: HOSPITAL | Age: 65
Discharge: HOME | End: 2020-01-28
Payer: MEDICARE

## 2020-01-28 DIAGNOSIS — S22.080A COMPRESSION FRACTURE OF T12 VERTEBRA, INITIAL ENCOUNTER: Primary | ICD-10-CM

## 2020-01-28 DIAGNOSIS — F51.01 PRIMARY INSOMNIA: ICD-10-CM

## 2020-01-28 RX ORDER — ZOLPIDEM TARTRATE 10 MG/1
10 TABLET ORAL NIGHTLY PRN
Qty: 90 TABLET | Refills: 0 | Status: SHIPPED | OUTPATIENT
Start: 2020-01-28 | End: 2020-04-14 | Stop reason: SDUPTHER

## 2020-01-28 NOTE — TELEPHONE ENCOUNTER
----- Message from Thalia Potter sent at 1/28/2020  4:18 PM CST -----  Contact: Patient  Patient needs refill on oxycodone, Dr. Martinez patient. Patient wants something stronger than this. Please call [phone]

## 2020-01-28 NOTE — TELEPHONE ENCOUNTER
----- Message from Thalia Potter sent at 1/28/2020  4:18 PM CST -----  Contact: Patient  Patient needs refill on oxycodone, Dr. Martinez patient. Patient wants something stronger than this. Please call [phone]

## 2020-01-28 NOTE — TELEPHONE ENCOUNTER
----- Message from Meagan Moore sent at 1/28/2020 12:09 PM CST -----  Contact: Pt   Patient needs refill on zolpidem (AMBIEN) 10 mg Tab  called into  [facility]  pharmacy at [phone] . Please call patient at [phone] if you have ant questions. Thanks !

## 2020-01-29 ENCOUNTER — TELEPHONE (OUTPATIENT)
Dept: ORTHOPEDICS | Facility: CLINIC | Age: 65
End: 2020-01-29

## 2020-01-29 RX ORDER — OXYCODONE AND ACETAMINOPHEN 7.5; 325 MG/1; MG/1
1 TABLET ORAL
Qty: 42 TABLET | Refills: 0 | Status: SHIPPED | OUTPATIENT
Start: 2020-01-29 | End: 2020-02-05

## 2020-01-29 NOTE — TELEPHONE ENCOUNTER
----- Message from Paulette Stein sent at 1/29/2020  9:59 AM CST -----  Contact: Pt  Pt states the pharmacy needs physician approval due to different Rx and stronger dosage. Pt call back # [phone].

## 2020-01-30 ENCOUNTER — ANESTHESIA EVENT (OUTPATIENT)
Dept: SURGERY | Facility: HOSPITAL | Age: 65
End: 2020-01-30
Payer: MEDICARE

## 2020-01-30 ENCOUNTER — TELEPHONE (OUTPATIENT)
Dept: FAMILY MEDICINE | Facility: CLINIC | Age: 65
End: 2020-01-30

## 2020-01-30 DIAGNOSIS — F51.01 PRIMARY INSOMNIA: ICD-10-CM

## 2020-01-30 RX ORDER — ZOLPIDEM TARTRATE 10 MG/1
10 TABLET ORAL NIGHTLY PRN
Qty: 90 TABLET | Refills: 0 | Status: CANCELLED | OUTPATIENT
Start: 2020-01-30 | End: 2020-07-30

## 2020-01-30 NOTE — TELEPHONE ENCOUNTER
----- Message from Kristin Ordaz sent at 1/30/2020  9:59 AM CST -----  Contact: Self  Pt stated that her  put in a request for Dr. Alonzo to refill her Ambien on 01/28/2020. Pt would [facility] [address] [facility] [address] [facility]. Pt stated that she still hasn't heard from [facility] and she wanted to have her med before her back surgery on tomorrow. Pt asked that the nurse call her to let her know what's going on. Mrs. Henderson can be reached at [phone].      Thank you, have a great day.

## 2020-01-31 ENCOUNTER — HOSPITAL ENCOUNTER (OUTPATIENT)
Facility: HOSPITAL | Age: 65
Discharge: HOME | End: 2020-01-31
Attending: ANESTHESIOLOGY | Admitting: ANESTHESIOLOGY
Payer: MEDICARE

## 2020-01-31 ENCOUNTER — HOSPITAL ENCOUNTER (OUTPATIENT)
Dept: RADIOLOGY | Facility: HOSPITAL | Age: 65
Discharge: HOME | End: 2020-01-31
Attending: ANESTHESIOLOGY
Payer: MEDICARE

## 2020-01-31 ENCOUNTER — ANESTHESIA (OUTPATIENT)
Dept: SURGERY | Facility: HOSPITAL | Age: 65
End: 2020-01-31
Payer: MEDICARE

## 2020-01-31 DIAGNOSIS — S22.080A COMPRESSION FRACTURE OF T12 VERTEBRA, INITIAL ENCOUNTER: Primary | ICD-10-CM

## 2020-01-31 DIAGNOSIS — M54.9 BACK PAIN: ICD-10-CM

## 2020-01-31 DIAGNOSIS — S22.080A COMPRESSION FRACTURE OF T12 VERTEBRA: ICD-10-CM

## 2020-01-31 PROCEDURE — 25000003 PHARM REV CODE 250: Performed by: ANESTHESIOLOGY

## 2020-01-31 PROCEDURE — 99900103 DSU ONLY-NO CHARGE-INITIAL HR (STAT): Performed by: ANESTHESIOLOGY

## 2020-01-31 PROCEDURE — D9220A PRA ANESTHESIA: Mod: ANES,,, | Performed by: ANESTHESIOLOGY

## 2020-01-31 PROCEDURE — 36000706: Performed by: ANESTHESIOLOGY

## 2020-01-31 PROCEDURE — 63600175 PHARM REV CODE 636 W HCPCS: Performed by: NURSE ANESTHETIST, CERTIFIED REGISTERED

## 2020-01-31 PROCEDURE — 63600175 PHARM REV CODE 636 W HCPCS: Performed by: ANESTHESIOLOGY

## 2020-01-31 PROCEDURE — C1726 CATH, BAL DIL, NON-VASCULAR: HCPCS | Performed by: ANESTHESIOLOGY

## 2020-01-31 PROCEDURE — 22513 PR PERQ VERTEBRAL AUGMENTATION UNI/BILAT THORACIC: ICD-10-PCS | Mod: ,,, | Performed by: ANESTHESIOLOGY

## 2020-01-31 PROCEDURE — 37000009 HC ANESTHESIA EA ADD 15 MINS: Performed by: ANESTHESIOLOGY

## 2020-01-31 PROCEDURE — 22513 PERQ VERTEBRAL AUGMENTATION: CPT | Mod: ,,, | Performed by: ANESTHESIOLOGY

## 2020-01-31 PROCEDURE — 36000707: Performed by: ANESTHESIOLOGY

## 2020-01-31 PROCEDURE — D9220A PRA ANESTHESIA: ICD-10-PCS | Mod: ANES,,, | Performed by: ANESTHESIOLOGY

## 2020-01-31 PROCEDURE — 25500020 PHARM REV CODE 255: Performed by: ANESTHESIOLOGY

## 2020-01-31 PROCEDURE — 37000008 HC ANESTHESIA 1ST 15 MINUTES: Performed by: ANESTHESIOLOGY

## 2020-01-31 PROCEDURE — D9220A PRA ANESTHESIA: ICD-10-PCS | Mod: CRNA,,, | Performed by: NURSE ANESTHETIST, CERTIFIED REGISTERED

## 2020-01-31 PROCEDURE — 76000 FLUOROSCOPY <1 HR PHYS/QHP: CPT | Mod: TC

## 2020-01-31 PROCEDURE — 71000039 HC RECOVERY, EACH ADD'L HOUR: Performed by: ANESTHESIOLOGY

## 2020-01-31 PROCEDURE — 99900104 DSU ONLY-NO CHARGE-EA ADD'L HR (STAT): Performed by: ANESTHESIOLOGY

## 2020-01-31 PROCEDURE — 71000015 HC POSTOP RECOV 1ST HR: Performed by: ANESTHESIOLOGY

## 2020-01-31 PROCEDURE — C1713 ANCHOR/SCREW BN/BN,TIS/BN: HCPCS | Performed by: ANESTHESIOLOGY

## 2020-01-31 PROCEDURE — D9220A PRA ANESTHESIA: Mod: CRNA,,, | Performed by: NURSE ANESTHETIST, CERTIFIED REGISTERED

## 2020-01-31 PROCEDURE — 71000033 HC RECOVERY, INTIAL HOUR: Performed by: ANESTHESIOLOGY

## 2020-01-31 DEVICE — CEMENT AUTOPLEX KIT W/VERTEPLE: Type: IMPLANTABLE DEVICE | Site: SPINE THORACIC | Status: FUNCTIONAL

## 2020-01-31 RX ORDER — BUPIVACAINE HYDROCHLORIDE AND EPINEPHRINE 2.5; 5 MG/ML; UG/ML
INJECTION, SOLUTION EPIDURAL; INFILTRATION; INTRACAUDAL; PERINEURAL
Status: DISCONTINUED | OUTPATIENT
Start: 2020-01-31 | End: 2020-01-31 | Stop reason: HOSPADM

## 2020-01-31 RX ORDER — DEXAMETHASONE SODIUM PHOSPHATE 4 MG/ML
INJECTION, SOLUTION INTRA-ARTICULAR; INTRALESIONAL; INTRAMUSCULAR; INTRAVENOUS; SOFT TISSUE
Status: DISCONTINUED | OUTPATIENT
Start: 2020-01-31 | End: 2020-01-31

## 2020-01-31 RX ORDER — MIDAZOLAM HYDROCHLORIDE 1 MG/ML
INJECTION, SOLUTION INTRAMUSCULAR; INTRAVENOUS
Status: DISCONTINUED | OUTPATIENT
Start: 2020-01-31 | End: 2020-01-31

## 2020-01-31 RX ORDER — SODIUM CHLORIDE, SODIUM LACTATE, POTASSIUM CHLORIDE, CALCIUM CHLORIDE 600; 310; 30; 20 MG/100ML; MG/100ML; MG/100ML; MG/100ML
INJECTION, SOLUTION INTRAVENOUS CONTINUOUS
Status: ACTIVE | OUTPATIENT
Start: 2020-01-31

## 2020-01-31 RX ORDER — FENTANYL CITRATE 50 UG/ML
INJECTION, SOLUTION INTRAMUSCULAR; INTRAVENOUS
Status: DISCONTINUED | OUTPATIENT
Start: 2020-01-31 | End: 2020-01-31

## 2020-01-31 RX ORDER — ONDANSETRON 2 MG/ML
4 INJECTION INTRAMUSCULAR; INTRAVENOUS ONCE AS NEEDED
Status: DISCONTINUED | OUTPATIENT
Start: 2020-01-31 | End: 2020-01-31 | Stop reason: HOSPADM

## 2020-01-31 RX ORDER — LIDOCAINE HYDROCHLORIDE 10 MG/ML
1 INJECTION, SOLUTION EPIDURAL; INFILTRATION; INTRACAUDAL; PERINEURAL ONCE
Status: COMPLETED | OUTPATIENT
Start: 2020-01-31 | End: 2020-01-31

## 2020-01-31 RX ORDER — LIDOCAINE HYDROCHLORIDE 10 MG/ML
INJECTION, SOLUTION EPIDURAL; INFILTRATION; INTRACAUDAL; PERINEURAL
Status: DISCONTINUED | OUTPATIENT
Start: 2020-01-31 | End: 2020-01-31 | Stop reason: HOSPADM

## 2020-01-31 RX ORDER — LIDOCAINE HCL/PF 100 MG/5ML
SYRINGE (ML) INTRAVENOUS
Status: DISCONTINUED | OUTPATIENT
Start: 2020-01-31 | End: 2020-01-31

## 2020-01-31 RX ORDER — LORAZEPAM 2 MG/ML
0.25 INJECTION INTRAMUSCULAR
Status: DISCONTINUED | OUTPATIENT
Start: 2020-01-31 | End: 2020-01-31 | Stop reason: HOSPADM

## 2020-01-31 RX ORDER — PROPOFOL 10 MG/ML
VIAL (ML) INTRAVENOUS
Status: DISCONTINUED | OUTPATIENT
Start: 2020-01-31 | End: 2020-01-31

## 2020-01-31 RX ORDER — FENTANYL CITRATE 50 UG/ML
25 INJECTION, SOLUTION INTRAMUSCULAR; INTRAVENOUS EVERY 5 MIN PRN
Status: COMPLETED | OUTPATIENT
Start: 2020-01-31 | End: 2020-01-31

## 2020-01-31 RX ORDER — CEFAZOLIN SODIUM 2 G/50ML
2 SOLUTION INTRAVENOUS
Status: COMPLETED | OUTPATIENT
Start: 2020-01-31 | End: 2020-01-31

## 2020-01-31 RX ORDER — KETOROLAC TROMETHAMINE 30 MG/ML
INJECTION, SOLUTION INTRAMUSCULAR; INTRAVENOUS
Status: DISCONTINUED | OUTPATIENT
Start: 2020-01-31 | End: 2020-01-31

## 2020-01-31 RX ORDER — OXYCODONE AND ACETAMINOPHEN 7.5; 325 MG/1; MG/1
1 TABLET ORAL EVERY 8 HOURS PRN
Qty: 21 TABLET | Refills: 0 | Status: SHIPPED | OUTPATIENT
Start: 2020-01-31 | End: 2020-02-26

## 2020-01-31 RX ORDER — SODIUM CHLORIDE, SODIUM LACTATE, POTASSIUM CHLORIDE, CALCIUM CHLORIDE 600; 310; 30; 20 MG/100ML; MG/100ML; MG/100ML; MG/100ML
INJECTION, SOLUTION INTRAVENOUS ONCE AS NEEDED
Status: DISCONTINUED | OUTPATIENT
Start: 2020-01-31 | End: 2020-01-31 | Stop reason: HOSPADM

## 2020-01-31 RX ADMIN — FENTANYL CITRATE 25 MCG: 50 INJECTION INTRAMUSCULAR; INTRAVENOUS at 08:01

## 2020-01-31 RX ADMIN — PROPOFOL 20 MG: 10 INJECTION, EMULSION INTRAVENOUS at 07:01

## 2020-01-31 RX ADMIN — FENTANYL CITRATE 100 MCG: 50 INJECTION, SOLUTION INTRAMUSCULAR; INTRAVENOUS at 07:01

## 2020-01-31 RX ADMIN — LIDOCAINE HYDROCHLORIDE 50 MG: 20 INJECTION, SOLUTION INTRAVENOUS at 07:01

## 2020-01-31 RX ADMIN — LIDOCAINE HYDROCHLORIDE 10 MG: 10 INJECTION, SOLUTION EPIDURAL; INFILTRATION; INTRACAUDAL; PERINEURAL at 06:01

## 2020-01-31 RX ADMIN — SODIUM CHLORIDE, SODIUM GLUCONATE, SODIUM ACETATE, POTASSIUM CHLORIDE, MAGNESIUM CHLORIDE, SODIUM PHOSPHATE, DIBASIC, AND POTASSIUM PHOSPHATE: .53; .5; .37; .037; .03; .012; .00082 INJECTION, SOLUTION INTRAVENOUS at 06:01

## 2020-01-31 RX ADMIN — LORAZEPAM 0.25 MG: 2 INJECTION, SOLUTION INTRAMUSCULAR; INTRAVENOUS at 08:01

## 2020-01-31 RX ADMIN — DEXAMETHASONE SODIUM PHOSPHATE 4 MG: 4 INJECTION, SOLUTION INTRAMUSCULAR; INTRAVENOUS at 07:01

## 2020-01-31 RX ADMIN — KETOROLAC TROMETHAMINE 30 MG: 30 INJECTION, SOLUTION INTRAMUSCULAR; INTRAVENOUS at 07:01

## 2020-01-31 RX ADMIN — CEFAZOLIN SODIUM 2 G: 2 SOLUTION INTRAVENOUS at 07:01

## 2020-01-31 RX ADMIN — MIDAZOLAM 2 MG: 1 INJECTION INTRAMUSCULAR; INTRAVENOUS at 06:01

## 2020-01-31 NOTE — DISCHARGE SUMMARY
[facility]  Discharge Note  Short Stay    Admit Date: 1/31/2020    Discharge Date and Time: 1/31/2020    Attending Physician: Dk Rosales MD     Discharge Provider: Dk Rosales    Diagnoses:  Active Hospital Problems    Diagnosis  POA    *Compression fracture of T12 vertebra [S22.080A]  Yes      Resolved Hospital Problems   No resolved problems to display.       Hospital Course: T12 balloon kyphoplasty     Discharged Condition: Good    Final Diagnoses:   Active Hospital Problems    Diagnosis  POA    *Compression fracture of T12 vertebra [S22.080A]  Yes      Resolved Hospital Problems   No resolved problems to display.       Disposition: Home or Self Care    Follow up/Patient Instructions:    Medications:  Reconciled Home Medications:      Medication List      CHANGE how you take these medications    * oxyCODONE-acetaminophen 7.5-325 mg per tablet  Commonly known as:  PERCOCET  Take 1 tablet by mouth every 4 to 6 hours as needed for Pain.  What changed:  Another medication with the same name was added. Make sure you understand how and when to take each.     * oxyCODONE-acetaminophen 7.5-325 mg per tablet  Commonly known as:  PERCOCET  Take 1 tablet by mouth every 8 (eight) hours as needed for Pain.  What changed:  You were already taking a medication with the same name, and this prescription was added. Make sure you understand how and when to take each.         * This list has 2 medication(s) that are the same as other medications prescribed for you. Read the directions carefully, and ask your doctor or other care provider to review them with you.            CONTINUE taking these medications    ALPRAZolam 0.5 MG tablet  Commonly known as:  XANAX  Take 1 tablet (0.5 mg total) by mouth 3 (three) times daily as needed for Anxiety.     amLODIPine 5 MG tablet  Commonly known as:  NORVASC  Take 1 tablet (5 mg total) by mouth once daily.     Calcium 500 + D 500 mg(1,250mg) -200 unit per tablet  Generic  drug:  calcium-vitamin D3  Take 1 tablet by mouth 2 (two) times daily with meals.     Centrum 3,500-18-0.4 unit-mg-mg Chew  Generic drug:  multivit-iron-min-folic acid  Take by mouth once daily.     diclofenac 75 MG EC tablet  Commonly known as:  VOLTAREN  Take 1 tablet (75 mg total) by mouth 2 (two) times daily as needed.     DULoxetine 30 MG capsule  Commonly known as:  CYMBALTA  Take 1 capsule (30 mg total) by mouth once daily.     fluticasone propionate 50 mcg/actuation nasal spray  Commonly known as:  FLONASE  1 spray by Nasal route daily as needed.     gabapentin 400 MG capsule  Commonly known as:  NEURONTIN  Take 1 capsule (400 mg total) by mouth 3 (three) times daily.     lidocaine 5 %  Commonly known as:  LIDODERM  Place 1 patch onto the skin once daily. Remove & Discard patch within 12 hours or as directed by MD     losartan-hydrochlorothiazide 100-25 mg 100-25 mg per tablet  Commonly known as:  HYZAAR  Take 1 tablet by mouth once daily.     Narcan 4 mg/actuation Spry  Generic drug:  naloxone     polyethylene glycol 17 gram/dose powder  Commonly known as:  GLYCOLAX  Take 17 g by mouth once daily.     zolpidem 10 mg Tab  Commonly known as:  AMBIEN  Take 1 tablet (10 mg total) by mouth nightly as needed.          Discharge Procedure Orders   Diet general     Call MD for:  temperature >100.4     Call MD for:  persistent nausea and vomiting     Call MD for:  severe uncontrolled pain     Call MD for:  difficulty breathing, headache or visual disturbances     Call MD for:  redness, tenderness, or signs of infection (pain, swelling, redness, odor or green/yellow discharge around incision site)     Call MD for:  hives     Call MD for:  persistent dizziness or light-headedness     Call MD for:  extreme fatigue        Follow up with MD in 2-3 weeks    Discharge Procedure Orders (must include Diet, Follow-up, Activity):   Discharge Procedure Orders (must include Diet, Follow-up, Activity)   Diet general     Call MD  for:  temperature >100.4     Call MD for:  persistent nausea and vomiting     Call MD for:  severe uncontrolled pain     Call MD for:  difficulty breathing, headache or visual disturbances     Call MD for:  redness, tenderness, or signs of infection (pain, swelling, redness, odor or green/yellow discharge around incision site)     Call MD for:  hives     Call MD for:  persistent dizziness or light-headedness     Call MD for:  extreme fatigue

## 2020-01-31 NOTE — OP NOTE
PROCEDURE PERFORMED: Transpedicular Kyphoplasty at the T12 level, on the bilateral side.                                                                                PREPROCEDURE DIAGNOSIS:                                                      1.  Wedge compression fracture of the T12 thoracic vertebra         2.  Pain at these levels of compression fracture.                            3.  No myelopathy or retropulsed fragments.                                                                                                             POSTPROCEDURE DIAGNOSIS:   Same    SURGEON:  Dk Rosales MD     Assistant:  none                                                                                                                                                                               Anesthesia: MAC per anesthesia                                                                                                              LOCAL ANESTHETIC USED:  Lidocaine 1% at each side of vertebra, 4 ml at each level                                                                               ESTIMATED BLOOD LOSS: 20 mL                                                                                                                        COMPLICATIONS: none                                                                                                                                 BONE MARROW BIOPSY:  N/A                                                                               TECHNIQUE: A time out was taken to identify patient, procedure and side, and allergies were reviewed. With the patient lying in the prone position and after receiving the intravenous sedation, the area was prepped and draped using the usual sterile fashion, using ChloraPrep and a body fenestrated drape. The area of the compression fracture(s) was determined under fluoroscopic guidance using the AP and lateral views.  Local anesthetic was given with  a 25-gauge 1.5 inch needle.  That was followed with completing the local anesthetic injection with a 3.5inch 22-gauge spinal needle going down all the way to the periosteum of the desired pedicle. A 2-mm longitudinal incision was made around the 22-gauge spinal needle to allow introduction of the trocar and cannula to the vertebral body.  Through a  transpedicular approach, the trocar and cannula were introduced and advanced slowly.  Once in the posterior one-third of the vertebral body, a drill was placed and advanced to the anterior one-third of the vertebral body. This was performed bilaterally at each level.  Kyphon balloons were then placed in each trochar and inflated under live fluoroscopy until noted to be filling the vertebral body, no height change noted, no movement of the end plates noted. The methylmethacrylate resin was mixed and approximately 5 ml was injected. Minimal escape was observed while introducing the cement and this was done under live fluoroscopy.  The cannula was applied to each trocar under live fluoroscopy as well.  Each trocar and cannula was removed under live fluoroscopy in a very slow fashion to observe the contrast-impregnated cement. After the trocars were removed, the sites were cleaned and dried. Dermabond was then used to close the incisions.                                                                                The patient layed supine for 60min after the procedure. The patient was monitored after the procedure with no change in neuromuscular status. The patient was given post-procedure and discharge instructions at home.  Instruction regarding bandage were given.  The patient was advised to call if developing any severe pain neurologic changes. Otherwise the patient will follow up with us in 1 week at our clinic.

## 2020-01-31 NOTE — ANESTHESIA PREPROCEDURE EVALUATION
01/31/2020  Beatriz Gan is a 64 y.o., female.    Pre-op Assessment    I have reviewed the Patient Summary Reports.     I have reviewed the Nursing Notes.   I have reviewed the Medications.     Review of Systems  Anesthesia Hx:  No problems with previous Anesthesia Hx of Anesthetic complications    Social:  Former Smoker    Hematology/Oncology:         -- Anemia: --  Cancer in past history (Kidney s/p partial nephrectomy):    Cardiovascular:   Hypertension, well controlled hyperlipidemia    Pulmonary:   Pneumonia    Renal/:   Chronic Renal Disease    Hepatic/GI:   Hiatal Hernia, GERD, well controlled Liver Disease,    Musculoskeletal:   Arthritis  Compression fracture of T12   Neurological:   Neuromuscular Disease, Headaches    Endocrine:  Endocrine Normal    Psych:   Psychiatric History depression          Physical Exam  General:  Obesity    Airway/Jaw/Neck:  Airway Findings: Mouth Opening: Normal Tongue: Normal  General Airway Assessment: Adult  Oropharynx Findings:  Mallampati: II  Jaw/Neck Findings:  Neck ROM: Normal ROM     Eyes/Ears/Nose:  Eyes/Ears/Nose Findings:    Dental:  Dental Findings: In tact   Chest/Lungs:  Chest/Lungs Findings: Normal Respiratory Rate, Clear to auscultation     Heart/Vascular:  Heart Findings: Rate: Normal  Rhythm: Regular Rhythm        Mental Status:  Mental Status Findings:  Cooperative, Alert and Oriented         Anesthesia Plan  Type of Anesthesia, risks & benefits discussed:  Anesthesia Type:  MAC  Patient's Preference:   Intra-op Monitoring Plan: standard ASA monitors  Intra-op Monitoring Plan Comments:   Post Op Pain Control Plan: multimodal analgesia and IV/PO Opioids PRN  Post Op Pain Control Plan Comments:   Induction:   IV  Beta Blocker:  Patient is not currently on a Beta-Blocker (No further documentation required).       Informed Consent: Patient  understands risks and agrees with Anesthesia plan.  Questions answered. Anesthesia consent signed with patient.  ASA Score: 3     Day of Surgery Review of History & Physical: I have interviewed and examined the patient. I have reviewed the patient's H&P dated:  There are no significant changes.  H&P update referred to the surgeon.         Ready For Surgery From Anesthesia Perspective.

## 2020-01-31 NOTE — TRANSFER OF CARE
"Anesthesia Transfer of Care Note    Patient: Beatriz Gan    Procedure(s) Performed: Procedure(s) (LRB):  Kyphoplasty T12 (N/A)    Patient location: PACU    Anesthesia Type: general    Transport from OR: Transported from OR on 2-3 L/min O2 by NC with adequate spontaneous ventilation    Post pain: pain needs to be addressed    Post assessment: no apparent anesthetic complications and tolerated procedure well    Post vital signs: stable    Level of consciousness: awake, alert and oriented    Nausea/Vomiting: no nausea/vomiting    Complications: none    Transfer of care protocol was followed      Last vitals:   Visit Vitals  BP (!) 141/85 (BP Location: Left arm, Patient Position: Sitting)   Pulse 61   Temp 36.7 °C (98.1 °F) (Skin)   Resp 18   Ht 5' 4.5" (1.638 m)   Wt 83.9 kg (185 lb)   SpO2 96%   Breastfeeding? No   BMI 31.26 kg/m²     "

## 2020-01-31 NOTE — DISCHARGE INSTRUCTIONS
Post-Procedure instructions: These instructions are for general purpose only. For specific Post-Procedure instructions, please follow the written instructions given to you following the procedure.  If you received sedation during the procedure, do not drive, operate machinery or make any important decisions until the next 24-48 hours after surgery.   If you have stopped taking Aspirin prior to the procedure, please restart taking it from the day after your procedure. You may restart your Plavix or Coumadin the day after your procedure unless otherwise instructed.  Do not shower or soak in the bathtub for 1-2 days after your surgery.   Remove all small bandages on your incision 24-48 hours after the surgery.   No heavy lifting or strenuous activity until you are seen and cleared by the doctor.       Please seek medical help immediately if any of the following symptoms occur:  1. Severe increase in your usual pain or appearance of new pain. 2. Prolonged or increasing weakness or numbness in the legs or arms. 3. Drainage, redness, active bleeding, or increased swelling at the injection site. 4. Temperature over 100.0 degrees F. 5. Headache that increases when your head is upright and decreases when you lie flat. 6. Shortness of breath, chest pain, or problems breathing. DO NOT LEAVE A MESSAGE ON THE PROCEDURE PHONE LINE      Please contact Selina or Mirlande [phone] if your physical condition changes prior to your procedure, if you should need to delay or cancel your procedure or if you have any questions about your procedure or these instructions.  Please follow up with Dr. Melara on                   at _______                   Please make follow up appt. with     .  Your follow up appt. will be determined AFTER procedure.  Your next procedure is scheduled for    . You will be called with instructions and arrival time several days prior to procedure        General Information:    1.  Do not drink alcoholic  beverages including beer for 24 hours or as long as you are on pain medication..  2.  Do not drive a motor vehicle, operate machinery or power tools, or signs legal papers for 24 hours or as long as you are on pain medication.   3.  You may experience light-headedness, dizziness, and sleepiness following surgery. Please do not stay alone. A responsible adult should be with you for this 24 hour period.  4.  Go home and rest.    5. Progress slowly to a normal diet unless instructed.  Otherwise, begin with liquids such as soft drinks, then soup and crackers working up to solid foods. Drink plenty of nonalcoholic fluids.  6.  Certain anesthetics and pain medications produce nausea and vomiting in certain       individuals. If nausea becomes a problem at home, call you doctor.    7. A nurse will be calling you sometime after surgery. Do not be alarmed. This is our way of finding out how you are doing.    8. Several times every hour while you are awake, take 2-3 deep breaths and cough. If you had stomach surgery hold a pillow or rolled towel firmly against your stomach before you cough. This will help with any pain the cough might cause.  9. Several times every hour while you are awake, pump and flex your feet 5-6 times and do foot circles. This will help prevent blood clots.    10.Call your doctor for severe pain, bleeding, fever, or signs or symptoms of infection (pain, swelling, redness, foul odor, drainage).    Post op instructions for prevention of DVT  What is deep vein thrombosis?  Deep vein thrombosis (DVT) is the medical term for blood clots in the deep veins of the leg.  These blood clots can be dangerous.  A DVT can block a blood vessel and keep blood from getting where it needs to go.  Another problem is that the clot can travel to other parts of the body such as the lungs.  A clot that travels to the lungs is called a pulmonary embolus (PE) and can cause serious problems with breathing which can lead to  death.  Am I at risk for DVT/PE?  If you are not very active, you are at risk of DVT.  Anyone confined to bed, sitting for long periods of time, recovering from surgery, etc. increases the risk of DVT.  Other risk factors are cancer diagnosis, certain medications, estrogen replacement in any form,older age, obesity, pregnancy, smoking, history of clotting disorders, and dehydration.  How will I know if I have a DVT?   Swelling in the lower leg   Pain   Warmth, redness, hardness or bulging of the vein  If you have any of these symptoms, call your doctors office right away.  Some people will not have any symptoms until the clot moves to the lungs.  What are the symptoms of a PE?   Panting, shortness of breath, or trouble breathing   Sharp, knife-like chest pain when you breathe   Coughing or coughing up blood   Rapid heartbeat  If you have any of these symptoms or get worse quickly, call 911 for emergency treatment.  How can I prevent a DVT?   Avoid long periods of inactivity and dont cross your legs--get up and walk around every hour or so.   Stay active--walking after surgery is highly encouraged.  This means you should get out of the house and walk in the neighborhood.  Going up and down stairs will not impair healing (unless advised against such activity by your doctor).     Drink plenty of noncaffeinated, nonalcoholic fluids each day to prevent dehydration.   Wear special support stockings, if they have been advised by your doctor.   If you travel, stop at least once an hour and walk around.   Avoid smoking (assistance with stopping is available through your healthcare provider)  Always notify your doctor if you are not able to follow the post operative instructions that are given to you at the time of discharge.  It may be necessary to prescribe one of the medications available to prevent DVT.    Discharge Instructions: After Your Surgery/Procedure  Youve just had surgery. During surgery you were  "given medicine called anesthesia to keep you relaxed and free of pain. After surgery you may have some pain or nausea. This is common. Here are some tips for feeling better and getting well after surgery.     Stay on schedule with your medication.   Going home  Your doctor or nurse will show you how to take care of yourself when you go home. He or she will also answer your questions. Have an adult family member or friend drive you home.      For your safety we recommend these precaution for the first 24 hours after your procedure:  · Do not drive or use heavy equipment.  · Do not make important decisions or sign legal papers.  · Do not drink alcohol.  · Have someone stay with you, if needed. He or she can watch for problems and help keep you safe.  · Your concentration, balance, coordination, and judgement may be impaired for many hours after anesthesia.  Use caution when ambulating or standing up.     · You may feel weak and "washed out" after anesthesia and surgery.      Subtle residual effects of general anesthesia or sedation with regional / local anesthesia can last more than 24 hours.  Rest for the remainder of the day or longer if your Doctor/Surgeon has advised you to do so.  Although you may feel normal within the first 24 hours, your reflexes and mental ability may be impaired without you realizing it.  You may feel dizzy, lightheaded or sleepy for 24 hours or longer.      Be sure to go to all follow-up visits with your doctor. And rest after your surgery for as long as your doctor tells you to.  Coping with pain  If you have pain after surgery, pain medicine will help you feel better. Take it as told, before pain becomes severe. Also, ask your doctor or pharmacist about other ways to control pain. This might be with heat, ice, or relaxation. And follow any other instructions your surgeon or nurse gives you.  Tips for taking pain medicine  To get the best relief possible, remember these points:  · Pain " medicines can upset your stomach. Taking them with a little food may help.  · Most pain relievers taken by mouth need at least 20 to 30 minutes to start to work.  · Taking medicine on a schedule can help you remember to take it. Try to time your medicine so that you can take it before starting an activity. This might be before you get dressed, go for a walk, or sit down for dinner.  · Constipation is a common side effect of pain medicines. Call your doctor before taking any medicines such as laxatives or stool softeners to help ease constipation. Also ask if you should skip any foods. Drinking lots of fluids and eating foods such as fruits and vegetables that are high in fiber can also help. Remember, do not take laxatives unless your surgeon has prescribed them.  · Drinking alcohol and taking pain medicine can cause dizziness and slow your breathing. It can even be deadly. Do not drink alcohol while taking pain medicine.  · Pain medicine can make you react more slowly to things. Do not drive or run machinery while taking pain medicine.  Your health care provider may tell you to take acetaminophen to help ease your pain. Ask him or her how much you are supposed to take each day. Acetaminophen or other pain relievers may interact with your prescription medicines or other over-the-counter (OTC) drugs. Some prescription medicines have acetaminophen and other ingredients. Using both prescription and OTC acetaminophen for pain can cause you to overdose. Read the labels on your OTC medicines with care. This will help you to clearly know the list of ingredients, how much to take, and any warnings. It may also help you not take too much acetaminophen. If you have questions or do not understand the information, ask your pharmacist or health care provider to explain it to you before you take the OTC medicine.  Managing nausea  Some people have an upset stomach after surgery. This is often because of anesthesia, pain, or pain  medicine, or the stress of surgery. These tips will help you handle nausea and eat healthy foods as you get better. If you were on a special food plan before surgery, ask your doctor if you should follow it while you get better. These tips may help:  · Do not push yourself to eat. Your body will tell you when to eat and how much.  · Start off with clear liquids and soup. They are easier to digest.  · Next try semi-solid foods, such as mashed potatoes, applesauce, and gelatin, as you feel ready.  · Slowly move to solid foods. Dont eat fatty, rich, or spicy foods at first.  · Do not force yourself to have 3 large meals a day. Instead eat smaller amounts more often.  · Take pain medicines with a small amount of solid food, such as crackers or toast, to avoid nausea.     Call your surgeon if  · You still have pain an hour after taking medicine. The medicine may not be strong enough.  · You feel too sleepy, dizzy, or groggy. The medicine may be too strong.  · You have side effects like nausea, vomiting, or skin changes, such as rash, itching, or hives.       If you have obstructive sleep apnea  You were given anesthesia medicine during surgery to keep you comfortable and free of pain. After surgery, you may have more apnea spells because of this medicine and other medicines you were given. The spells may last longer than usual.   At home:  · Keep using the continuous positive airway pressure (CPAP) device when you sleep. Unless your health care provider tells you not to, use it when you sleep, day or night. CPAP is a common device used to treat obstructive sleep apnea.  · Talk with your provider before taking any pain medicine, muscle relaxants, or sedatives. Your provider will tell you about the possible dangers of taking these medicines.  © 7707-1219 [employer]. [address] [place] [address], [place] [address]. All rights reserved. This information is not intended as a substitute for professional medical  care. Always follow your healthcare professional's instructions.      Fall Prevention  Falls often occur due to slipping, tripping or losing your balance. Millions of people fall every year and injure themselves. Here are ways to reduce your risk of falling again.  · Think about your fall, was there anything that caused your fall that can be fixed, removed, or replaced?  · Make your home safe by keeping walkways clear of objects you may trip over.  · Use non-slip pads under rugs. Do not use area rugs or small throw rugs.  · Use non-slip mats in bathtubs and showers.  · Install handrails and lights on staircases.  · Do not walk in poorly lit areas.  · Do not stand on chairs or wobbly ladders.  · Use caution when reaching overhead or looking upward. This position can cause a loss of balance.  · Be sure your shoes fit properly, have non-slip bottoms and are in good condition.   · Wear shoes both inside and out. Avoid going barefoot or wearing slippers.  · Be cautious when going up and down stairs, curbs, and when walking on uneven sidewalks.  · If your balance is poor, consider using a cane or walker.  · If your fall was related to alcohol use, stop or limit alcohol intake.   · If your fall was related to use of sleeping medicines, talk to your doctor about this. You may need to reduce your dosage at bedtime if you awaken during the night to go to the bathroom.    · To reduce the need for nighttime bathroom trips:  ¨ Avoid drinking fluids for several hours before going to bed  ¨ Empty your bladder before going to bed  ¨ Men can keep a urinal at the bedside  · Stay as active as you can. Balance, flexibility, strength, and endurance all come from exercise. They all play a role in preventing falls. Ask your healthcare provider which types of activity are right for you.  · Get your vision checked on a regular basis.  · If you have pets, know where they are before you stand up or walk so you don't trip over them.  · Use  night lights.  Date Last Reviewed: 11/5/2015  © 4887-8509 [employer]. [address] [place] [address], [place] [address]. All rights reserved. This information is not intended as a substitute for professional medical care. Always follow your healthcare professional's instructions.

## 2020-01-31 NOTE — PLAN OF CARE
Pt transferred to phase II. VSS, pain improved with medication, bandaids to back cdi, tolerated clear liquids without N/V. Report given to JOE Lyons.

## 2020-01-31 NOTE — ANESTHESIA POSTPROCEDURE EVALUATION
Anesthesia Post Evaluation    Patient: Beatriz Gan    Procedure(s) Performed: Procedure(s) (LRB):  Kyphoplasty T12 (N/A)    Final Anesthesia Type: general    Patient location during evaluation: PACU  Patient participation: Yes- Able to Participate  Level of consciousness: awake and alert  Post-procedure vital signs: reviewed and stable  Pain management: adequate  Airway patency: patent    PONV status at discharge: No PONV  Anesthetic complications: no      Cardiovascular status: hemodynamically stable  Respiratory status: unassisted and room air  Hydration status: euvolemic  Follow-up not needed.          Vitals Value Taken Time   /74 1/31/2020  9:19 AM   Temp 36.7 °C (98 °F) 1/31/2020  9:08 AM   Pulse 69 1/31/2020  9:19 AM   Resp 18 1/31/2020  9:19 AM   SpO2 98 % 1/31/2020  9:19 AM         Event Time     Out of Recovery 09:07:26          Pain/Yaneli Score: Pain Rating Prior to Med Admin: 5 (1/31/2020  8:26 AM)  Yaneli Score: 10 (1/31/2020  9:20 AM)

## 2020-01-31 NOTE — INTERVAL H&P NOTE
The patient has been examined and the H&P has been reviewed:    I concur with the findings and no changes have occurred since H&P was written.     This patient has been cleared for surgery in an ambulatory surgical facility    ASA 3,  Mallampatti Score 3  No history of anesthetic complications  Plan for MAC per anesthesia      Anesthesia/Surgery risks, benefits and alternative options discussed and understood by patient/family.          Active Hospital Problems    Diagnosis  POA    Compression fracture of T12 vertebra [S22.080A]  Yes      Resolved Hospital Problems   No resolved problems to display.

## 2020-02-03 ENCOUNTER — TELEPHONE (OUTPATIENT)
Dept: PAIN MEDICINE | Facility: CLINIC | Age: 65
End: 2020-02-03

## 2020-02-03 VITALS
OXYGEN SATURATION: 98 % | SYSTOLIC BLOOD PRESSURE: 129 MMHG | RESPIRATION RATE: 18 BRPM | TEMPERATURE: 98 F | HEIGHT: 65 IN | WEIGHT: 185 LBS | DIASTOLIC BLOOD PRESSURE: 74 MMHG | HEART RATE: 69 BPM | BODY MASS INDEX: 30.82 KG/M2

## 2020-02-03 NOTE — TELEPHONE ENCOUNTER
----- Message from Siddhartha Akbar sent at 2/3/2020 12:23 PM CST -----  Contact: Patient  Type: Needs Medical Advice    Who Called:  Patient  Best Call Back Number: [phone]  Additional Information: Patient would like to discuss receiving orders for water therapy. Please call to advise. Thanks!

## 2020-02-04 ENCOUNTER — TELEPHONE (OUTPATIENT)
Dept: ORTHOPEDICS | Facility: CLINIC | Age: 65
End: 2020-02-04

## 2020-02-04 DIAGNOSIS — S22.080A COMPRESSION FRACTURE OF T12 VERTEBRA, INITIAL ENCOUNTER: Primary | ICD-10-CM

## 2020-02-04 NOTE — TELEPHONE ENCOUNTER
----- Message from Paulette Stein sent at 2/4/2020 12:14 PM CST -----  Contact: Pt  Pt states she would like to do water therapy. Pt call back #[phone].

## 2020-02-05 ENCOUNTER — TELEPHONE (OUTPATIENT)
Dept: ORTHOPEDICS | Facility: CLINIC | Age: 65
End: 2020-02-05

## 2020-02-05 NOTE — TELEPHONE ENCOUNTER
----- Message from Kendra Doyle sent at 2/5/2020 12:34 PM CST -----  Contact: Garett New from [facility]  We sent her over to treat a compression fracture and he was wondering if we could change your treatment plan to say they can treat her left shoulder and knee. Call him back at [phone].

## 2020-02-07 ENCOUNTER — TELEPHONE (OUTPATIENT)
Dept: PAIN MEDICINE | Facility: CLINIC | Age: 65
End: 2020-02-07

## 2020-02-07 NOTE — TELEPHONE ENCOUNTER
Please advise if ok to schedule right L4-5 and L5-S1 transforaminal epidural steroid injection. Pt had Kypho T12 on 1/31/2020

## 2020-02-07 NOTE — TELEPHONE ENCOUNTER
----- Message from Sofya Dyer sent at 2/7/2020 11:08 AM CST -----  Type: Needs Medical Advice    Who Called:  patient  Best Call Back Number: [phone]  Additional Information: calling to schedule an appt for an epidural shot. Please give call back

## 2020-02-11 ENCOUNTER — OFFICE VISIT (OUTPATIENT)
Dept: PAIN MEDICINE | Facility: CLINIC | Age: 65
End: 2020-02-11
Payer: MEDICARE

## 2020-02-11 VITALS
BODY MASS INDEX: 30.81 KG/M2 | HEART RATE: 89 BPM | WEIGHT: 184.94 LBS | DIASTOLIC BLOOD PRESSURE: 92 MMHG | HEIGHT: 65 IN | SYSTOLIC BLOOD PRESSURE: 162 MMHG

## 2020-02-11 DIAGNOSIS — M51.36 DDD (DEGENERATIVE DISC DISEASE), LUMBAR: ICD-10-CM

## 2020-02-11 DIAGNOSIS — M47.896 OTHER SPONDYLOSIS, LUMBAR REGION: Primary | ICD-10-CM

## 2020-02-11 DIAGNOSIS — Z87.81 HISTORY OF VERTEBRAL COMPRESSION FRACTURE: ICD-10-CM

## 2020-02-11 PROCEDURE — 99204 PR OFFICE/OUTPT VISIT, NEW, LEVL IV, 45-59 MIN: ICD-10-PCS | Mod: 25,S$PBB,ICN, | Performed by: ANESTHESIOLOGY

## 2020-02-11 PROCEDURE — 99999 PR PBB SHADOW E&M-EST. PATIENT-LVL IV: ICD-10-PCS | Mod: PBBFAC,,, | Performed by: ANESTHESIOLOGY

## 2020-02-11 PROCEDURE — 96372 THER/PROPH/DIAG INJ SC/IM: CPT | Mod: S$PBB,ICN,, | Performed by: ANESTHESIOLOGY

## 2020-02-11 PROCEDURE — 99214 OFFICE O/P EST MOD 30 MIN: CPT | Mod: PBBFAC,PN | Performed by: ANESTHESIOLOGY

## 2020-02-11 PROCEDURE — 99999 PR PBB SHADOW E&M-EST. PATIENT-LVL IV: CPT | Mod: PBBFAC,,, | Performed by: ANESTHESIOLOGY

## 2020-02-11 PROCEDURE — 99204 OFFICE O/P NEW MOD 45 MIN: CPT | Mod: 25,S$PBB,ICN, | Performed by: ANESTHESIOLOGY

## 2020-02-11 PROCEDURE — 96372 PR INJECTION,THERAP/PROPH/DIAG2ST, IM OR SUBCUT: ICD-10-PCS | Mod: S$PBB,ICN,, | Performed by: ANESTHESIOLOGY

## 2020-02-11 RX ORDER — METHYLPREDNISOLONE ACETATE 80 MG/ML
80 INJECTION, SUSPENSION INTRA-ARTICULAR; INTRALESIONAL; INTRAMUSCULAR; SOFT TISSUE
Status: COMPLETED | OUTPATIENT
Start: 2020-02-11 | End: 2020-02-11

## 2020-02-11 RX ADMIN — METHYLPREDNISOLONE ACETATE 80 MG: 80 INJECTION, SUSPENSION INTRA-ARTICULAR; INTRALESIONAL; INTRAMUSCULAR; SOFT TISSUE at 10:02

## 2020-02-11 NOTE — LETTER
February 11, 2020      Ruben Martinez MD  [address]  [address]  [place] [address]           [facility]  [address] [place] [address] [address]  [place] [address]  Phone: [phone]  Fax: [phone]          Patient: Beatriz Gan   MR Number: [id number]   YOB: 1955   Date of Visit: 2/11/2020       Dear Dr. Ruben Martinez:    Thank you for referring Beatriz Gan to me for evaluation. Attached you will find relevant portions of my assessment and plan of care.    If you have questions, please do not hesitate to call me. I look forward to following Beatriz Gan along with you.    Sincerely,    Dk Rosales MD    Enclosure  CC:  No Recipients    If you would like to receive this communication electronically, please contact [email or web address] or [phone] to request more information on [employer] access.    For providers and/or their staff who would like to refer a patient to [facility], please contact us through our one-stop-shop provider referral line, [facility], at [phone].    If you feel you have received this communication in error or would no longer like to receive these types of communications, please e-mail [email or web address]

## 2020-02-11 NOTE — H&P (VIEW-ONLY)
"  Referring Physician: Ruben Martinez MD     PCP: Jorge Alonzo MD    CC: low back pain    HPI:   Beatriz Gan is a 64 y.o. female with PMH significant for hx of left rotator cuff repair (Dr. Martinez), hx of left arthroscopic knee surgery, hx of partial nephrectomy, hx of ETOH dependence, and HTN presents as a referral for the evaluation of low back pain. Patient reports that her pain approximately in 2014 after no inciting incident or trauma. The patient reports that she experienced a new type of mid back pain when she slipped and fell at home approximately 1 week prior to her kyphoplasty with me. Today, the patient wants her low back pain addressed as her mid back pain resolved s/p kyphoplasty. The patient localizes her pain to the area across her lower back. Patient denies of radiation of her pain (the patient reports of having pain previously that radiated towards her left foot but states that her injections via Dr. Leger resolved her radicular pain). Patient describes her pain as a shooting, throbbing, and burning type of pain. Patient reports that her pain current pain is a 10/10. Patient denies of any urinary/fecal incontinence, saddle anesthesia, or weakness.     Of note, the patient reports that she fell 1 week prior to her T12 kyphoplasty with me on 1/31/2020. Patient was in discussion to her lumbar spine surgery with Dr. Martinez but she fell prior to scheduling a procedure date.    Aggravating factors: constant pain; activity in general, sitting    Mitigating factors: ice    Relevant Surgeries: no    Interventional Therapies: yes; Dr. Leger; patient reports of getting approximately 4-5 injections and reports of approximately 3 months of relief with her injections. Patient reports of attempting to perform diagnostic MBB's with Dr. Leger but the patient reports that she could not tolerate the pain. Of note, the patient reports of getting "nerves burned" in [facility] with good relief " several years ago.    1/31/2020: T12 kyphoplasty - resolution of upper back pain    : Reviewed and consistent with medication use as prescribed.  2/4/2020: Percocet 7.5-325 mg; #21 tablets  1/29/2020: Percocet 7.5-325 mg; #42 tablets  1/23/2020: Norco 7.5-325 mg; #28 tablets  1/16/2020: Percocet 5-325 mg; #28 tablets  1/8/2020: Percocet 7.5-325 mg; #28 tablets  12/16/2019: Percocet 7.5-325 mg; #28 tablets  11/27/2019: Percocet 7.5-325 mg; #28 tablets  11/15/2019: Percocet 7.5-325 mg; #28 tablets  11/7/2019: Percocet  mg; #28 tablets  11/1/2019: Percocet  mg; #28 tablets    Non-pharmacologic Treatment:     · Physical Therapy: yes; currently participating in aqua therapy   · Ice/Heat: yes; ice is better than heat   · TENS: no; never used   · Massage: no  · Chiropractic care: yes; saw once a few years ago with benefit   · Acupuncture: no         Pain Medications:         · Currently taking: Gabapentin 400 mg PO TID, duloxetine 30 mg PO q day; OTC Tylenol BID/TID    · Has tried in the past:    · Opioids: yes; oxycodone - stopped taking secondary to side effects   · NSAIDS: no; cannot take secondary to hx of partial nephrectomy   · Tylenol: yes  · Muscle relaxants: no  · TCAs: no  · SNRIs: yes  · Anticonvulsants: yes  · topical creams: yes; lidocaine patches with some relief    Anticoagulation: no    ROS:  Review of Systems   Constitutional: Negative for chills and fever.   HENT: Negative for sore throat.    Eyes: Negative for visual disturbance.   Respiratory: Negative for shortness of breath.    Cardiovascular: Negative for chest pain.   Gastrointestinal: Negative for nausea and vomiting.   Genitourinary: Negative for difficulty urinating.   Musculoskeletal: Positive for back pain.   Skin: Negative for rash.   Allergic/Immunologic: Negative for immunocompromised state.   Neurological: Negative for syncope.   Hematological: Does not bruise/bleed easily.   Psychiatric/Behavioral: Negative for suicidal  ideas.        Past Medical History:   Diagnosis Date    Alcohol dependence     DDD (degenerative disc disease), lumbosacral     DJD (degenerative joint disease), lumbosacral     Encounter for blood transfusion     GERD (gastroesophageal reflux disease)     Gout     Hypercholesterolemia     Hypertension     NATHALIE (iron deficiency anemia)     questionable white coat hypertension    Kidney carcinoma, left 2014    Pneumonia     Renal cell carcinoma     Shingles 10/13/2012     Past Surgical History:   Procedure Laterality Date    APPENDECTOMY      ARTHROSCOPY OF SHOULDER WITH DECOMPRESSION OF SUBACROMIAL SPACE Left 10/31/2019    Procedure: ARTHROSCOPY, SHOULDER, WITH SUBACROMIAL SPACE DECOMPRESSION;  Surgeon: Ruben Martinez MD;  Location: [facility] OR;  Service: Orthopedics;  Laterality: Left;    BLADDER REPAIR      x 2    COLONOSCOPY  9/2011    DISTAL CLAVICLE EXCISION Left 10/31/2019    Procedure: EXCISION, CLAVICLE, DISTAL;  Surgeon: Ruben Martinez MD;  Location: [facility] OR;  Service: Orthopedics;  Laterality: Left;    ESOPHAGOGASTRODUODENOSCOPY  9/2011    EYE SURGERY      lasix bilateral    FIXATION KYPHOPLASTY N/A 1/31/2020    Procedure: Kyphoplasty T12;  Surgeon: Dk Rosales MD;  Location: [facility] OR;  Service: Pain Management;  Laterality: N/A;    HERNIA REPAIR      hiatal hernai    HYSTERECTOMY      KNEE ARTHROSCOPY W/ MENISCECTOMY Left 1/16/2020    Procedure: ARTHROSCOPY, KNEE, WITH MEDIAL MENISCECTOMY;  Surgeon: Ruben Martinez MD;  Location: [facility] OR;  Service: Orthopedics;  Laterality: Left;    LAPAROSCOPIC NISSEN FUNDOPLICATION      NEPHRECTOMY      LT partial    REFRACTIVE SURGERY      ROTATOR CUFF REPAIR Left 10/31/2019    Procedure: REPAIR, ROTATOR CUFF;  Surgeon: Ruben Martinez MD;  Location: [facility] OR;  Service: Orthopedics;  Laterality: Left;  arthrex    SKIN BIOPSY      TOTAL ABDOMINAL HYSTERECTOMY W/ BILATERAL SALPINGOOPHORECTOMY  age 48     Family History   Problem Relation  "Age of Onset    Hypertension Father     Glaucoma Neg Hx     Macular degeneration Neg Hx     Retinal detachment Neg Hx      Social History     Socioeconomic History    Marital status:      Spouse name: Not on file    Number of children: Not on file    Years of education: Not on file    Highest education level: Not on file   Occupational History     Employer: [employer]   Social Needs    Financial resource strain: Not on file    Food insecurity:     Worry: Not on file     Inability: Not on file    Transportation needs:     Medical: Not on file     Non-medical: Not on file   Tobacco Use    Smoking status: Former Smoker     Packs/day: 1.00     Years: 40.00     Pack years: 40.00     Types: Cigarettes     Last attempt to quit: 2018     Years since quittin.1    Smokeless tobacco: Never Used   Substance and Sexual Activity    Alcohol use: Yes     Alcohol/week: 28.0 standard drinks     Types: 14 Standard drinks or equivalent, 14 Cans of beer per week     Comment: 2-3 a day/ social    Drug use: No    Sexual activity: Yes     Partners: Male   Lifestyle    Physical activity:     Days per week: Not on file     Minutes per session: Not on file    Stress: Not on file   Relationships    Social connections:     Talks on phone: Not on file     Gets together: Not on file     Attends Muslim service: Not on file     Active member of club or organization: Not on file     Attends meetings of clubs or organizations: Not on file     Relationship status: Not on file   Other Topics Concern    Not on file   Social History Narrative    Not on file         Allergies: See med card    Vitals:    20 0957   BP: (!) 162/92   Pulse: 89   Weight: 83.9 kg (184 lb 15.5 oz)   Height: 5' 4.5" (1.638 m)   PainSc: 10-Worst pain ever   PainLoc: Back         Physical exam:  Physical Exam   Constitutional: She is oriented to person, place, and time and well-developed, well-nourished, and in no distress.   HENT: "   Head: Normocephalic and atraumatic.   Eyes: Conjunctivae and EOM are normal. Right eye exhibits no discharge. Left eye exhibits no discharge.   Cardiovascular: Normal rate.   Pulmonary/Chest: Effort normal and breath sounds normal. No respiratory distress.   Abdominal: Soft.   Neurological: She is alert and oriented to person, place, and time.   Skin: Skin is warm and dry. No rash noted. She is not diaphoretic.   Psychiatric: Mood, memory, affect and judgment normal.   Nursing note and vitals reviewed.       UPPER EXTREMITIES: Normal alignment, normal range of motion, no atrophy, no skin changes,  hair growth and nail growth normal and equal bilaterally. No swelling, no tenderness.    LOWER EXTREMITIES:  Normal alignment, normal range of motion, no atrophy, no skin changes,  hair growth and nail growth normal and equal bilaterally. No swelling, no tenderness.    LUMBAR SPINE  Lumbar spine: ROM is severely limited with flexion extension and oblique extension with increased pain (worse with extension).     ((--)) Supine straight leg raise    ((+)) Facet loading   Internal and external rotation of the hip causes no increased pain on either side.  Myofascial exam: Tenderness to palpation across lumbar paraspinous muscles.    ((+)) TTP at the SI joint  ((+)) AYLEEN's test  One leg stand: cannot perform secondary to fall risk    ((+)) Distraction test    CRANIAL NERVES:  II:  PERRL bilaterally,   III,IV,VI: EOMI.    V:  Facial sensation equal bilaterally  VII:  Facial motor function normal.  VIII:  Hearing equal to finger rub bilaterally  IX/X: Gag normal, palate symmetric  XI:  Shoulder shrug equal, head turn equal  XII:  Tongue midline without fasciculations      MOTOR: Tone and bulk: normal bilateral upper and lower Strength: normal   Delt Bi Tri WE WF     R 5 5 5 5 5 5   L 5 5 5 5 5 5     IP ADD ABD Quad TA Gas HAM  R 5 5 5 5 5 5 5  L 5 5 5 5 5 5 5    SENSATION: Light touch and pinprick intact  bilaterally  REFLEXES: normal, symmetric, nonbrisk.  Toes down, no clonus. Negative nicholson's sign bilaterally.  GAIT: normal rise, base, steps, and arm swing.        Imaging: MRI Lumbar spine without contrast (10/21/2019):  The lumbar spine is in satisfactory alignment.  The vertebral bodies are of normal height.  There is diffuse disc desiccation with disc space narrowing at L1-2 and L2-3.  There is disc space narrowing and degenerative endplate changes at L5-S1.    The conus medullaris is normal in appearance and ends at L1.    At T12-L1 there is no significant abnormality.    At L1-2 there is broad-based disc bulge without central canal stenosis or foraminal narrowing    At L2-3 there is broad-based disc bulge asymmetric to the right resulting in mild right foraminal narrowing there is no nerve encroachment.    At L3-4 there is a shallow disc bulge without central canal stenosis or foraminal narrowing    At L4-5 there is broad-based disc bulge and facet hypertrophy with flattening of the thecal sac.  There is mild foraminal narrowing    At L5-S1 there is broad-based disc bulge and facet hypertrophy resulting in bilateral foraminal narrowing, right greater than left.  This is stable.    Assessment: Beatriz Gan is a 64 y.o. female with PMH significant for hx of left rotator cuff repair (Dr. Martinez), hx of left arthroscopic knee surgery, hx of partial nephrectomy, hx of ETOH dependence, and HTN presents as a referral for the evaluation of low back pain. The patient reports of resolution of her mid back pain s/p kyphoplasty on 1/31/2020 but now reports of low back pain. I suspect DDD and facet arthropathy are contributing to the patient's pain. Treatment plan outlined below.     Plan:  - Procedural records requested from [facility] pain management with Dr. Twan Becerra.    - Muscle Stimulator/TENS unit was ordered to prevent further disuse atrophy and help with pain. The patient  would likely benefit from this. It is to be use it at least twice a day for a minimum of 60 minutes each time.  - IM depo-medrol injection performed in clinic today as patient is requesting treatment for her acute pain.   - Continue Gabapentin 400 mg PO TID, duloxetine 30 mg PO q day; OTC Tylenol BID/TID as prescribed by PCP  - I encouraged continued participation with aquatherapy. I have stressed the importance of physical activity and a home exercise plan to help with chronic pain and improve health.   - Plan to schedule patient for RFA once procedural records reviewed; if I am unable to obtain those records, the patient will have to perform a diagnostic MBB prior. This was explained to the patient and she expressed understanding.     Thank you for referring this interesting patient, and I look forward to continuing to collaborate in her care.    Dk Rosales MD  Pain Management    Addendum: 2/14/2020 @ 1100    8/21/2017: Right sacroiliac joint injection   4/10/2017: C7-T1 cervical interlaminar epidural steroid injection - 50% relief per chart review  2/6/2017: Right sacroiliac joint injection   8/2016: Lumbar RFA - good relief

## 2020-02-12 ENCOUNTER — TELEPHONE (OUTPATIENT)
Dept: PHYSICAL MEDICINE AND REHAB | Facility: CLINIC | Age: 65
End: 2020-02-12

## 2020-02-12 NOTE — TELEPHONE ENCOUNTER
----- Message from Nicolasa Singer sent at 2/12/2020  2:16 PM CST -----  Contact: Pt  Type: Needs Medical Advice    Who Called:  Pt  Best Call Back Number: [phone]  Additional Information: Requesting a call back regarding if the office received pt paper work from old doctor   Please Advise ---Thank you

## 2020-02-14 ENCOUNTER — HOSPITAL ENCOUNTER (OUTPATIENT)
Dept: RADIOLOGY | Facility: HOSPITAL | Age: 65
Discharge: HOME | End: 2020-02-14
Attending: INTERNAL MEDICINE
Payer: MEDICARE

## 2020-02-14 ENCOUNTER — TELEPHONE (OUTPATIENT)
Dept: PAIN MEDICINE | Facility: CLINIC | Age: 65
End: 2020-02-14

## 2020-02-14 DIAGNOSIS — Z12.31 SCREENING MAMMOGRAM, ENCOUNTER FOR: ICD-10-CM

## 2020-02-14 DIAGNOSIS — Z78.0 POSTMENOPAUSAL: ICD-10-CM

## 2020-02-14 DIAGNOSIS — M43.06 SPONDYLOLYSIS, LUMBAR REGION: Primary | ICD-10-CM

## 2020-02-14 PROCEDURE — 77063 MAMMO DIGITAL SCREENING BILAT WITH TOMOSYNTHESIS_CAD: ICD-10-PCS | Mod: 26,,, | Performed by: RADIOLOGY

## 2020-02-14 PROCEDURE — 77080 DXA BONE DENSITY AXIAL: CPT | Mod: TC

## 2020-02-14 PROCEDURE — 77067 SCR MAMMO BI INCL CAD: CPT | Mod: TC

## 2020-02-14 PROCEDURE — 77063 BREAST TOMOSYNTHESIS BI: CPT | Mod: 26,,, | Performed by: RADIOLOGY

## 2020-02-14 PROCEDURE — 77067 MAMMO DIGITAL SCREENING BILAT WITH TOMOSYNTHESIS_CAD: ICD-10-PCS | Mod: 26,,, | Performed by: RADIOLOGY

## 2020-02-14 PROCEDURE — 77080 DEXA BONE DENSITY SPINE HIP: ICD-10-PCS | Mod: 26,,, | Performed by: RADIOLOGY

## 2020-02-14 PROCEDURE — 77067 SCR MAMMO BI INCL CAD: CPT | Mod: 26,,, | Performed by: RADIOLOGY

## 2020-02-14 PROCEDURE — 77080 DXA BONE DENSITY AXIAL: CPT | Mod: 26,,, | Performed by: RADIOLOGY

## 2020-02-14 NOTE — TELEPHONE ENCOUNTER
----- Message from Meghan Luis sent at 2/14/2020 10:02 AM CST -----     Pt  Is calling to   See  If  Paperwork  Was  Received  From  Dr Villarreal   /please call [phone]

## 2020-02-14 NOTE — PROGRESS NOTES
Bone density exam shows osteoporosis in the lumbar spine and left hip.  Considering her recent spinal stress fracture, I would recommend starting alendronate to strengthen bones and prevent further fractures.  If ok, let me know and I will send in alendronate

## 2020-02-14 NOTE — TELEPHONE ENCOUNTER
----- Message from Dk Rosales MD sent at 2/14/2020 11:01 AM CST -----  Records reviewed. Patient can be scheduled for bilateral L3, L4, L5 medial branch radiofrequency ablation and there is documentation that she has had a prior lumbar RFA with benefit.

## 2020-02-14 NOTE — TELEPHONE ENCOUNTER
Pt informed records are on Dr Rosales's desk and we will call her back with any instructions after he reviews. Pt agreed

## 2020-02-17 DIAGNOSIS — M81.0 AGE-RELATED OSTEOPOROSIS WITHOUT CURRENT PATHOLOGICAL FRACTURE: Primary | ICD-10-CM

## 2020-02-17 RX ORDER — ALENDRONATE SODIUM 70 MG/1
70 TABLET ORAL
Qty: 12 TABLET | Refills: 3 | Status: SHIPPED | OUTPATIENT
Start: 2020-02-17 | End: 2020-02-26

## 2020-02-24 ENCOUNTER — TELEPHONE (OUTPATIENT)
Dept: FAMILY MEDICINE | Facility: CLINIC | Age: 65
End: 2020-02-24

## 2020-02-24 ENCOUNTER — TELEPHONE (OUTPATIENT)
Dept: ORTHOPEDICS | Facility: CLINIC | Age: 65
End: 2020-02-24

## 2020-02-24 NOTE — TELEPHONE ENCOUNTER
Called and spoke with pt in regards of wanting to have her medication changed. Pt states she is taking alendronate 70 mg, and she is having really bad stomach pains and gas. Pt states she also has stomach cramps as well, and will stop taking medication since she keeps feeling like this. Patient wants to know what else can she take besides this medication. Please advise.

## 2020-02-24 NOTE — TELEPHONE ENCOUNTER
----- Message from Ovidio Rojas sent at 2/24/2020  3:30 PM CST -----  Contact: Self   Pt requesting call back in regards to changing alendronate (FOSAMAX) 70 MG tablet to something different. Pt states medication causing stomach issues.      Call back number is [phone].      Thanks

## 2020-02-24 NOTE — TELEPHONE ENCOUNTER
Was on hold with pharmacy and no one came back to the phone. Dr. Alonzo did not prescribe the percocet

## 2020-02-24 NOTE — TELEPHONE ENCOUNTER
----- Message from Lizbet Hill sent at 2/24/2020  3:42 PM CST -----  Contact:  Candi @ [facility]-[phone]   Candi @ [facility]-[phone] is requesting a call back regarding the pt being on ALPRAZolam (XANAX) 0.5 MG tablet as well as oxyCODONE-acetaminophen (PERCOCET) 7.5-325 mg per tablet and would like know if the dr was aware of that. Please call

## 2020-02-24 NOTE — TELEPHONE ENCOUNTER
----- Message from Kendra Doyle sent at 2/24/2020  9:03 AM CST -----  Contact: patient  She had surgery with Dr Martinez 1/16/2020 and he did a left knee arthroscopy, she is still doing the water therapy but is experiencing a lot of pain and swelling still, She is due for a procedure Friday with Dr Rosales and he also put a tens unit on her, call her back at [phone].

## 2020-02-26 ENCOUNTER — PATIENT MESSAGE (OUTPATIENT)
Dept: PRIMARY CARE CLINIC | Facility: CLINIC | Age: 65
End: 2020-02-26

## 2020-02-26 ENCOUNTER — OFFICE VISIT (OUTPATIENT)
Dept: ORTHOPEDICS | Facility: CLINIC | Age: 65
End: 2020-02-26
Payer: MEDICARE

## 2020-02-26 VITALS — WEIGHT: 183 LBS | DIASTOLIC BLOOD PRESSURE: 90 MMHG | SYSTOLIC BLOOD PRESSURE: 132 MMHG | BODY MASS INDEX: 30.93 KG/M2

## 2020-02-26 DIAGNOSIS — M94.262 CHONDROMALACIA, KNEE, LEFT: Primary | ICD-10-CM

## 2020-02-26 DIAGNOSIS — Z98.890 HISTORY OF ARTHROSCOPY OF LEFT KNEE: ICD-10-CM

## 2020-02-26 PROCEDURE — 99024 PR POST-OP FOLLOW-UP VISIT: ICD-10-PCS | Mod: S$GLB,POP,, | Performed by: ORTHOPAEDIC SURGERY

## 2020-02-26 PROCEDURE — 99024 POSTOP FOLLOW-UP VISIT: CPT | Mod: S$GLB,POP,, | Performed by: ORTHOPAEDIC SURGERY

## 2020-02-26 RX ORDER — TRAMADOL HYDROCHLORIDE AND ACETAMINOPHEN 37.5; 325 MG/1; MG/1
1 TABLET, FILM COATED ORAL EVERY 6 HOURS PRN
Qty: 28 TABLET | Refills: 2 | Status: SHIPPED | OUTPATIENT
Start: 2020-02-26 | End: 2020-03-04

## 2020-02-26 NOTE — PROGRESS NOTES
Subjective:    Patient ID: Beatriz Gan is a 64 y.o. female.    Chief Complaint: Post-op Evaluation of the Left Knee (P/O Left knee scope 1/16/2020-  sharp pain/swelling. She is in aqua therapy )      History of Present Illness  Status post arthroscopy left knee patient did well initially but has some persistent pain and swelling on the medial aspect.  Denies any additional trauma.  At the time of surgery for in addition to the medial meniscus tear we did note some moderate chondromalacia of the medial compartment.    Current Medications  Current Outpatient Medications   Medication Sig Dispense Refill    ALPRAZolam (XANAX) 0.5 MG tablet Take 1 tablet (0.5 mg total) by mouth 3 (three) times daily as needed for Anxiety. 90 tablet 1    amLODIPine (NORVASC) 5 MG tablet Take 1 tablet (5 mg total) by mouth once daily. 90 tablet 3    calcium-vitamin D3 (CALCIUM 500 + D) 500 mg(1,250mg) -200 unit per tablet Take 1 tablet by mouth 2 (two) times daily with meals.      diclofenac (VOLTAREN) 75 MG EC tablet Take 1 tablet (75 mg total) by mouth 2 (two) times daily as needed. 30 tablet 0    DULoxetine (CYMBALTA) 30 MG capsule Take 1 capsule (30 mg total) by mouth once daily. 90 capsule 3    fluticasone (FLONASE) 50 mcg/actuation nasal spray 1 spray by Nasal route daily as needed.       gabapentin (NEURONTIN) 400 MG capsule Take 1 capsule (400 mg total) by mouth 3 (three) times daily. 270 capsule 3    lidocaine (LIDODERM) 5 % Place 1 patch onto the skin once daily. Remove & Discard patch within 12 hours or as directed by MD 30 patch 0    losartan-hydrochlorothiazide 100-25 mg (HYZAAR) 100-25 mg per tablet Take 1 tablet by mouth once daily. 90 tablet 3    multivit-iron-min-folic acid (MULTIVITAMIN-IRON-MINERALS-FOLIC ACID) 3,500-18-0.4 unit-mg-mg Chew Take by mouth once daily.        NARCAN 4 mg/actuation Spry       polyethylene glycol (GLYCOLAX) 17 gram/dose powder Take 17 g by mouth once daily. 510 g 3     "zolpidem (AMBIEN) 10 mg Tab Take 1 tablet (10 mg total) by mouth nightly as needed. 90 tablet 0    tramadol-acetaminophen 37.5-325 mg (ULTRACET) 37.5-325 mg Tab Take 1 tablet by mouth every 6 (six) hours as needed for Pain. 28 tablet 2     No current facility-administered medications for this visit.      Facility-Administered Medications Ordered in Other Visits   Medication Dose Route Frequency Provider Last Rate Last Dose    electrolyte-S (ISOLYTE)   Intravenous Continuous Kota Ortega MD 10 mL/hr at 01/31/20 0624      lactated ringers infusion   Intravenous Continuous Kota Ortega MD           Allergies  Review of patient's allergies indicates:   Allergen Reactions    Phenergan [promethazine] Hives and Rash    Latex, natural rubber Hives     Per pt report-many years    Morphine Hives    Nsaids (non-steroidal anti-inflammatory drug)      Due to "kidney cancer"       Past Medical History  Past Medical History:   Diagnosis Date    Alcohol dependence     DDD (degenerative disc disease), lumbosacral     DJD (degenerative joint disease), lumbosacral     Encounter for blood transfusion     GERD (gastroesophageal reflux disease)     Gout     Hypercholesterolemia     Hypertension     NATHALIE (iron deficiency anemia)     questionable white coat hypertension    Kidney carcinoma, left 2014    Pneumonia     Renal cell carcinoma     Shingles 10/13/2012       Surgical History  Past Surgical History:   Procedure Laterality Date    APPENDECTOMY      ARTHROSCOPY OF SHOULDER WITH DECOMPRESSION OF SUBACROMIAL SPACE Left 10/31/2019    Procedure: ARTHROSCOPY, SHOULDER, WITH SUBACROMIAL SPACE DECOMPRESSION;  Surgeon: Ruben Martinez MD;  Location: [facility] OR;  Service: Orthopedics;  Laterality: Left;    BLADDER REPAIR      x 2    COLONOSCOPY  9/2011    DISTAL CLAVICLE EXCISION Left 10/31/2019    Procedure: EXCISION, CLAVICLE, DISTAL;  Surgeon: Ruben Martinez MD;  Location: [facility] OR;  Service: Orthopedics;  " Laterality: Left;    ESOPHAGOGASTRODUODENOSCOPY  2011    EYE SURGERY      lasix bilateral    FIXATION KYPHOPLASTY N/A 2020    Procedure: Kyphoplasty T12;  Surgeon: Dk Rosales MD;  Location: [facility] OR;  Service: Pain Management;  Laterality: N/A;    HERNIA REPAIR      hiatal hernai    HYSTERECTOMY      KNEE ARTHROSCOPY W/ MENISCECTOMY Left 2020    Procedure: ARTHROSCOPY, KNEE, WITH MEDIAL MENISCECTOMY;  Surgeon: Ruben Martinez MD;  Location: [facility] OR;  Service: Orthopedics;  Laterality: Left;    LAPAROSCOPIC NISSEN FUNDOPLICATION      NEPHRECTOMY      LT partial    REFRACTIVE SURGERY      ROTATOR CUFF REPAIR Left 10/31/2019    Procedure: REPAIR, ROTATOR CUFF;  Surgeon: Ruben Martinez MD;  Location: [facility] OR;  Service: Orthopedics;  Laterality: Left;  arthrex    SKIN BIOPSY      TOTAL ABDOMINAL HYSTERECTOMY W/ BILATERAL SALPINGOOPHORECTOMY  age 48       Family History:   Family History   Problem Relation Age of Onset    Hypertension Father     Glaucoma Neg Hx     Macular degeneration Neg Hx     Retinal detachment Neg Hx        Social History:   Social History     Socioeconomic History    Marital status:      Spouse name: Not on file    Number of children: Not on file    Years of education: Not on file    Highest education level: Not on file   Occupational History     Employer: [employer]   Social Needs    Financial resource strain: Not on file    Food insecurity:     Worry: Not on file     Inability: Not on file    Transportation needs:     Medical: Not on file     Non-medical: Not on file   Tobacco Use    Smoking status: Former Smoker     Packs/day: 1.00     Years: 40.00     Pack years: 40.00     Types: Cigarettes     Last attempt to quit: 2018     Years since quittin.1    Smokeless tobacco: Never Used   Substance and Sexual Activity    Alcohol use: Yes     Alcohol/week: 28.0 standard drinks     Types: 14 Standard drinks or equivalent, 14 Cans of beer per week      Comment: 2-3 a day/ social    Drug use: No    Sexual activity: Yes     Partners: Male   Lifestyle    Physical activity:     Days per week: Not on file     Minutes per session: Not on file    Stress: Not on file   Relationships    Social connections:     Talks on phone: Not on file     Gets together: Not on file     Attends Buddhist service: Not on file     Active member of club or organization: Not on file     Attends meetings of clubs or organizations: Not on file     Relationship status: Not on file   Other Topics Concern    Not on file   Social History Narrative    Not on file       Date of surgery:     Review of Systems     General/Constitutional: Chills denies. Fatigue denies. Fever denies. Weight gain denies. Weight loss denies.    Musculoskeletal: Comments: See HPI for details    Skin: Rash denies.    Objective:   Vital Signs:   Vitals:    02/26/20 1015   BP: (!) 132/90        Physical Exam    This a well-developed, well nourished patient in no acute distress.  They are alert and oriented and cooperative to examination.  Pt. walks without an antalgic gait.      General Examination:     Constitutional: The patient is alert and oriented to lace person and time. Mood is pleasant.     Head/Face: Normal facial features normal eyebrows    Eyes: Normal extraocular motion bilaterally    Lungs: Respirations are equal and unlabored    Gait is coordinated.    Cardiovascular: There are no swelling or varicosities present.    Lymphatic: Negative for adenopathy    Skin: Normal    Neurological: Level of consciousness normal. Oriented to place person and time and situation    Psychiatric: Oriented to time place person and situation    Left knee incisions well healed moderate tenderness along the medial joint line no swelling limited flexion no instability noted  XRAY Report/ Interpretation:        Assessment:       1. Chondromalacia, knee, left    2. History of arthroscopy of left knee        Plan:       Beatriz  was seen today for post-op evaluation.    Diagnoses and all orders for this visit:    Chondromalacia, knee, left    History of arthroscopy of left knee    Other orders  -     tramadol-acetaminophen 37.5-325 mg (ULTRACET) 37.5-325 mg Tab; Take 1 tablet by mouth every 6 (six) hours as needed for Pain.         No follow-ups on file.    Pain probably on the basis of chondromalacia however repeat MRI study will be ordered and then if indicated will consider viscosupplementation injection left knee      This note was created using Dragon voice recognition software that occasionally misinterpreted phrases or words.

## 2020-02-27 ENCOUNTER — HOSPITAL ENCOUNTER (OUTPATIENT)
Dept: RADIOLOGY | Facility: HOSPITAL | Age: 65
Discharge: HOME | End: 2020-02-27
Attending: ORTHOPAEDIC SURGERY
Payer: MEDICARE

## 2020-02-27 DIAGNOSIS — Z98.890 HISTORY OF ARTHROSCOPY OF LEFT KNEE: ICD-10-CM

## 2020-02-27 DIAGNOSIS — M94.262 CHONDROMALACIA, KNEE, LEFT: ICD-10-CM

## 2020-02-27 PROCEDURE — 73721 MRI JNT OF LWR EXTRE W/O DYE: CPT | Mod: TC,PO,LT

## 2020-02-28 ENCOUNTER — HOSPITAL ENCOUNTER (OUTPATIENT)
Facility: AMBULARY SURGERY CENTER | Age: 65
Discharge: HOME | End: 2020-02-28
Attending: ANESTHESIOLOGY | Admitting: ANESTHESIOLOGY
Payer: MEDICARE

## 2020-02-28 DIAGNOSIS — M47.816 LUMBAR FACET ARTHROPATHY: Primary | ICD-10-CM

## 2020-02-28 DIAGNOSIS — M47.896 OTHER SPONDYLOSIS, LUMBAR REGION: ICD-10-CM

## 2020-02-28 PROCEDURE — 64636 DESTROY L/S FACET JNT ADDL: CPT | Mod: RT | Performed by: ANESTHESIOLOGY

## 2020-02-28 PROCEDURE — 99152 MOD SED SAME PHYS/QHP 5/>YRS: CPT | Mod: ,,, | Performed by: ANESTHESIOLOGY

## 2020-02-28 PROCEDURE — 64635 DESTROY LUMB/SAC FACET JNT: CPT | Mod: 50,,, | Performed by: ANESTHESIOLOGY

## 2020-02-28 PROCEDURE — 64635 DESTROY LUMB/SAC FACET JNT: CPT | Mod: LT

## 2020-02-28 PROCEDURE — 99152 PR MOD CONSCIOUS SEDATION, SAME PHYS, 5+ YRS, FIRST 15 MIN: ICD-10-PCS | Mod: ,,, | Performed by: ANESTHESIOLOGY

## 2020-02-28 PROCEDURE — 64635 PR DESTROY LUMB/SAC FACET JNT: ICD-10-PCS | Mod: 50,,, | Performed by: ANESTHESIOLOGY

## 2020-02-28 PROCEDURE — 64635 DESTROY LUMB/SAC FACET JNT: CPT | Mod: RT | Performed by: ANESTHESIOLOGY

## 2020-02-28 PROCEDURE — 64636 DESTROY L/S FACET JNT ADDL: CPT | Mod: LT

## 2020-02-28 PROCEDURE — 64636 PR DESTROY L/S FACET JNT ADDL: ICD-10-PCS | Mod: 50,,, | Performed by: ANESTHESIOLOGY

## 2020-02-28 PROCEDURE — 64636 DESTROY L/S FACET JNT ADDL: CPT | Mod: 50,,, | Performed by: ANESTHESIOLOGY

## 2020-02-28 RX ORDER — LIDOCAINE HYDROCHLORIDE 20 MG/ML
INJECTION, SOLUTION EPIDURAL; INFILTRATION; INTRACAUDAL; PERINEURAL
Status: DISCONTINUED | OUTPATIENT
Start: 2020-02-28 | End: 2020-02-28 | Stop reason: HOSPADM

## 2020-02-28 RX ORDER — METHYLPREDNISOLONE ACETATE 80 MG/ML
INJECTION, SUSPENSION INTRA-ARTICULAR; INTRALESIONAL; INTRAMUSCULAR; SOFT TISSUE
Status: DISCONTINUED | OUTPATIENT
Start: 2020-02-28 | End: 2020-02-28 | Stop reason: HOSPADM

## 2020-02-28 RX ORDER — SODIUM CHLORIDE, SODIUM LACTATE, POTASSIUM CHLORIDE, CALCIUM CHLORIDE 600; 310; 30; 20 MG/100ML; MG/100ML; MG/100ML; MG/100ML
INJECTION, SOLUTION INTRAVENOUS ONCE AS NEEDED
Status: COMPLETED | OUTPATIENT
Start: 2020-02-28 | End: 2020-02-28

## 2020-02-28 RX ORDER — MIDAZOLAM HYDROCHLORIDE 2 MG/2ML
INJECTION, SOLUTION INTRAMUSCULAR; INTRAVENOUS
Status: DISCONTINUED | OUTPATIENT
Start: 2020-02-28 | End: 2020-02-28 | Stop reason: HOSPADM

## 2020-02-28 RX ORDER — FENTANYL CITRATE 50 UG/ML
INJECTION, SOLUTION INTRAMUSCULAR; INTRAVENOUS
Status: DISCONTINUED | OUTPATIENT
Start: 2020-02-28 | End: 2020-02-28 | Stop reason: HOSPADM

## 2020-02-28 RX ORDER — LIDOCAINE HYDROCHLORIDE 10 MG/ML
INJECTION, SOLUTION EPIDURAL; INFILTRATION; INTRACAUDAL; PERINEURAL
Status: DISCONTINUED | OUTPATIENT
Start: 2020-02-28 | End: 2020-02-28 | Stop reason: HOSPADM

## 2020-02-28 RX ORDER — BUPIVACAINE HYDROCHLORIDE 2.5 MG/ML
INJECTION, SOLUTION EPIDURAL; INFILTRATION; INTRACAUDAL
Status: DISCONTINUED | OUTPATIENT
Start: 2020-02-28 | End: 2020-02-28 | Stop reason: HOSPADM

## 2020-02-28 RX ADMIN — SODIUM CHLORIDE, SODIUM LACTATE, POTASSIUM CHLORIDE, CALCIUM CHLORIDE: 600; 310; 30; 20 INJECTION, SOLUTION INTRAVENOUS at 08:02

## 2020-02-28 NOTE — DISCHARGE INSTRUCTIONS
Before leaving, please make sure you have all your personal belongings such as glasses, purses, wallets, keys, cell phones, jewelry, jackets etc      Radiofrequency of Nerves    After this procedure you may have increased pain for three days and lingering pain for up to 6 weeks.   Most patients feel better after 4-6  weeks.    Use your pain medications as needed but the goal of this treartment is to wean you off your pain medication.  You may have weakness after the procedure due to the numbing injection.  You may feel a sunburn effect and some patients may need a burn cream for the skin after 2 days.    Use ice pack for discomfort :apply for 20 minutes, remove for 20 minutes for up to 2 days. Do not sleep with ice pack.  Do not use a heating pad or take tub baths or swim for 2 days.  You may shower today  Gradually increase your activities.  Dont lift anything over 10 lbs for the first 24 hours  Dont drive the day of the procedure Wait 24 hrs to drive.  Wait until tomorrow to resume any blood thinners (aspirin, Plavix, Coumadin) but you may resume all your other medications today.  If Diabetic, monitor you glucose carefully due to steroids  used for this procedure    Seek Medical Attention if you have:  Severe pain or headache  Fever or chills  Redness or swelling around the injection site   Difficulty breathing  Vomiting or Increasing numbness or weakness in arms or legs    After Surgery:  Always be aware that any surgery can cause these symptoms:    Pain- Medication can be prescribed for pain to decrease your pain but may not completely take your pain away.  Over the Counter pain medicine my be enough and you can always use Ice and rest to help ease pain.    Bleeding- a little bleeding after a surgery is usually within normal.  If there is a lot of blood you need to notify your MD.  Emergency treatments of bleeding are cold application, elevation of the bleeding site and compression.    Infection- Infection  after surgery is NOT a normal occurrence.  Signs of infection are fever, swelling, hot to touch the incision.  If this occurs notify your MD immediately.    Nausea- this can be common after a surgery especially if you have had anesthesia medicine or are taking pain medicine. The steroid the Dr injected can have a side effect of Nausea.  Staying on clear liquids, bland foods, gingerale, or over the counter anti nausea medicines can help.  If you vomit more than once, notify your MD.  Anti Nausea medicines can be prescribed.

## 2020-02-28 NOTE — DISCHARGE SUMMARY
[facility]  Discharge Note  Short Stay    Admit Date: 2/28/2020    Discharge Date and Time: 2/28/2020    Attending Physician: Dk Rosales MD     Discharge Provider: Dk Rosales    Diagnoses:  Active Hospital Problems    Diagnosis  POA    *Other spondylosis, lumbar region [M47.896]  Yes      Resolved Hospital Problems   No resolved problems to display.       Hospital Course: Lumbar medial branch radiofrequency ablation     Discharged Condition: Good    Final Diagnoses:   Active Hospital Problems    Diagnosis  POA    *Other spondylosis, lumbar region [M47.896]  Yes      Resolved Hospital Problems   No resolved problems to display.       Disposition: Home or Self Care    Follow up/Patient Instructions:    Medications:  Reconciled Home Medications:      Medication List      CONTINUE taking these medications    ALPRAZolam 0.5 MG tablet  Commonly known as:  XANAX  Take 1 tablet (0.5 mg total) by mouth 3 (three) times daily as needed for Anxiety.     amLODIPine 5 MG tablet  Commonly known as:  NORVASC  Take 1 tablet (5 mg total) by mouth once daily.     Calcium 500 + D 500 mg(1,250mg) -200 unit per tablet  Generic drug:  calcium-vitamin D3  Take 1 tablet by mouth 2 (two) times daily with meals.     Centrum 3,500-18-0.4 unit-mg-mg Chew  Generic drug:  multivit-iron-min-folic acid  Take by mouth once daily.     DULoxetine 30 MG capsule  Commonly known as:  CYMBALTA  Take 1 capsule (30 mg total) by mouth once daily.     fluticasone propionate 50 mcg/actuation nasal spray  Commonly known as:  FLONASE  1 spray by Nasal route daily as needed.     gabapentin 400 MG capsule  Commonly known as:  NEURONTIN  Take 1 capsule (400 mg total) by mouth 3 (three) times daily.     lidocaine 5 %  Commonly known as:  LIDODERM  Place 1 patch onto the skin once daily. Remove & Discard patch within 12 hours or as directed by MD     losartan-hydrochlorothiazide 100-25 mg 100-25 mg per tablet  Commonly known as:  HYZAAR  Take  1 tablet by mouth once daily.     Narcan 4 mg/actuation Spry  Generic drug:  naloxone     polyethylene glycol 17 gram/dose powder  Commonly known as:  GLYCOLAX  Take 17 g by mouth once daily.     tramadol-acetaminophen 37.5-325 mg 37.5-325 mg Tab  Commonly known as:  ULTRACET  Take 1 tablet by mouth every 6 (six) hours as needed for Pain.     zolpidem 10 mg Tab  Commonly known as:  AMBIEN  Take 1 tablet (10 mg total) by mouth nightly as needed.          Discharge Procedure Orders   Diet general     Call MD for:  temperature >100.4     Call MD for:  persistent nausea and vomiting     Call MD for:  severe uncontrolled pain     Call MD for:  difficulty breathing, headache or visual disturbances     Call MD for:  redness, tenderness, or signs of infection (pain, swelling, redness, odor or green/yellow discharge around incision site)     Call MD for:  hives     Call MD for:  persistent dizziness or light-headedness     Call MD for:  extreme fatigue        Follow up with MD in 2-3 weeks    Discharge Procedure Orders (must include Diet, Follow-up, Activity):   Discharge Procedure Orders (must include Diet, Follow-up, Activity)   Diet general     Call MD for:  temperature >100.4     Call MD for:  persistent nausea and vomiting     Call MD for:  severe uncontrolled pain     Call MD for:  difficulty breathing, headache or visual disturbances     Call MD for:  redness, tenderness, or signs of infection (pain, swelling, redness, odor or green/yellow discharge around incision site)     Call MD for:  hives     Call MD for:  persistent dizziness or light-headedness     Call MD for:  extreme fatigue

## 2020-02-28 NOTE — INTERVAL H&P NOTE
The patient has been examined and the H&P has been reviewed:    I concur with the findings and no changes have occurred since H&P was written.     This patient has been cleared for surgery in an ambulatory surgical facility    ASA 3,  Mallampatti Score 3  No history of anesthetic complications  Plan for RN IV sedation      Anesthesia/Surgery risks, benefits and alternative options discussed and understood by patient/family.          Active Hospital Problems    Diagnosis  POA    Other spondylosis, lumbar region [M47.896]  Yes      Resolved Hospital Problems   No resolved problems to display.

## 2020-02-28 NOTE — OP NOTE
PROCEDURE DATE: 2/28/2020    PROCEDURE:  Radiofrequency ablation of the L3, L4, L5 medial branch nerves on the bilateral-side utilizing fluoroscopy    DIAGNOSIS:  Other lumbar spondylosis  Post op Diagnosis: Same    PHYSICIAN: Dk Rosales MD    MEDICATIONS INJECTED:  From a mixture of 6ml of 0.25% bupivicaine and 80mg of methylprednisone,  1ml of this solution was injected at each level.    LOCAL ANESTHETIC USED: Lidocaine 1%, 2 ml given at each site.    SEDATION MEDICATIONS: RN IV sedation    ESTIMATED BLOOD LOSS:  none    COMPLICATIONS:  none    TECHNIQUE:  A time out was taken to identify patient and procedure side prior to starting the procedure. Laying in a prone position, the patient was prepped and draped in the usual sterile fashion using ChloraPrep and sterile towels.  The levels were determined under fluoroscopic guidance and then marked.  Local anesthetic was given by raising a wheal at the skin over each site and then infiltrated approximately 2cm deeper.  A 20-gauge  100 mm RF needle was introduced to the anatomic location of the L3, L4, L5 medial branch nerves. Motor stimulation up to 2 Volts at each level confirmed no motor nerve involvement.  Impedance was less than 800 ohms at each level.  1ml of 2% lidocaine was instilled prior to lesioning.  Ablation was performed per level utilizing radiofrequency generator 80°C for 60 seconds.  Needles were then rotated 90° and a second lesion was performed.  The above noted medication was then injected slowly. The patient tolerated the procedure well.     The patient was monitored after the procedure.  Patient was given post procedure and discharge instructions to follow at home.  The patient was discharged in a stable condition

## 2020-02-29 ENCOUNTER — PATIENT MESSAGE (OUTPATIENT)
Dept: PRIMARY CARE CLINIC | Facility: CLINIC | Age: 65
End: 2020-02-29

## 2020-02-29 DIAGNOSIS — M81.0 AGE-RELATED OSTEOPOROSIS WITHOUT CURRENT PATHOLOGICAL FRACTURE: Primary | ICD-10-CM

## 2020-03-02 VITALS
DIASTOLIC BLOOD PRESSURE: 81 MMHG | WEIGHT: 184.94 LBS | HEART RATE: 98 BPM | OXYGEN SATURATION: 96 % | TEMPERATURE: 98 F | RESPIRATION RATE: 17 BRPM | HEIGHT: 65 IN | SYSTOLIC BLOOD PRESSURE: 144 MMHG | BODY MASS INDEX: 30.81 KG/M2

## 2020-03-02 PROBLEM — M81.0 AGE-RELATED OSTEOPOROSIS WITHOUT CURRENT PATHOLOGICAL FRACTURE: Status: ACTIVE | Noted: 2020-03-02

## 2020-03-02 NOTE — TELEPHONE ENCOUNTER
Can you please enter the Dx code in patient chart for the reclast... I can then place the therapy plan and ref for the infusion. Per your Dexa results.... Bone density exam shows osteoporosis in the lumbar spine and left hip

## 2020-03-03 ENCOUNTER — OFFICE VISIT (OUTPATIENT)
Dept: PAIN MEDICINE | Facility: CLINIC | Age: 65
End: 2020-03-03
Payer: MEDICARE

## 2020-03-03 VITALS
HEART RATE: 87 BPM | BODY MASS INDEX: 31.24 KG/M2 | WEIGHT: 183 LBS | HEIGHT: 64 IN | SYSTOLIC BLOOD PRESSURE: 157 MMHG | DIASTOLIC BLOOD PRESSURE: 94 MMHG

## 2020-03-03 DIAGNOSIS — M47.896 OTHER SPONDYLOSIS, LUMBAR REGION: Primary | ICD-10-CM

## 2020-03-03 DIAGNOSIS — M51.36 DDD (DEGENERATIVE DISC DISEASE), LUMBAR: ICD-10-CM

## 2020-03-03 PROCEDURE — 99999 PR PBB SHADOW E&M-EST. PATIENT-LVL III: ICD-10-PCS | Mod: PBBFAC,,, | Performed by: ANESTHESIOLOGY

## 2020-03-03 PROCEDURE — 99213 OFFICE O/P EST LOW 20 MIN: CPT | Mod: PBBFAC,PN | Performed by: ANESTHESIOLOGY

## 2020-03-03 PROCEDURE — 99999 PR PBB SHADOW E&M-EST. PATIENT-LVL III: CPT | Mod: PBBFAC,,, | Performed by: ANESTHESIOLOGY

## 2020-03-03 PROCEDURE — 99214 PR OFFICE/OUTPT VISIT, EST, LEVL IV, 30-39 MIN: ICD-10-PCS | Mod: S$PBB,25,, | Performed by: ANESTHESIOLOGY

## 2020-03-03 PROCEDURE — 99214 OFFICE O/P EST MOD 30 MIN: CPT | Mod: S$PBB,25,, | Performed by: ANESTHESIOLOGY

## 2020-03-03 RX ORDER — GABAPENTIN 600 MG/1
600 TABLET ORAL 3 TIMES DAILY
Qty: 90 TABLET | Refills: 11 | Status: SHIPPED | OUTPATIENT
Start: 2020-03-03 | End: 2021-04-12 | Stop reason: SDUPTHER

## 2020-03-03 NOTE — PROGRESS NOTES
FOLLOW UP NOTE:     CHIEF COMPLAINT: left knee pain     INITIAL HISTORY OF PRESENT ILLNESS: Beatriz Gan is a 64 y.o. female with PMH significant for hx of left rotator cuff repair (Dr. Martinez), hx of left arthroscopic knee surgery, hx of partial nephrectomy, hx of ETOH dependence, and HTN presents as a referral for the evaluation of low back pain. Patient reports that her pain approximately in 2014 after no inciting incident or trauma. The patient reports that she experienced a new type of mid back pain when she slipped and fell at home approximately 1 week prior to her kyphoplasty with me. Today, the patient wants her low back pain addressed as her mid back pain resolved s/p kyphoplasty. The patient localizes her pain to the area across her lower back. Patient denies of radiation of her pain (the patient reports of having pain previously that radiated towards her left foot but states that her injections via Dr. Leger resolved her radicular pain). Patient describes her pain as a shooting, throbbing, and burning type of pain. Patient reports that her pain current pain is a 10/10. Patient denies of any urinary/fecal incontinence, saddle anesthesia, or weakness.      Of note, the patient reports that she fell 1 week prior to her T12 kyphoplasty with me on 1/31/2020. Patient was in discussion to her lumbar spine surgery with Dr. Martinez but she fell prior to scheduling a procedure date.     Aggravating factors: constant pain; activity in general, sitting     Mitigating factors: ice     Relevant Surgeries: no    INTERVAL HISTORY OF PRESENT ILLNESS: Beatriz Gan is a 64 y.o. female with PMH significant for hx of left rotator cuff repair (Dr. Martinez), hx of left arthroscopic knee surgery, hx of partial nephrectomy, hx of ETOH dependence, and HTN presents as an established patient for the continued management of back and left knee pain. The patient is s/p bilateral L3, L4, and L5 medial branch  radiofrequency ablation on 2/28/2020, and the patient reports that she is currently satisfied with the amount of pain relief she has received in regards to her chronic low back pain. I explained to the patient that more time is required in order to determine the overall benefit of her most recent ablation, and she expressed understanding. Today, the patient is reporting that her left knee is bothering her the most at this point in time. Of note, Dr. Martinez is currently evaluating the patient for her left knee pain, and the patient is scheduled to see Dr. Martinez in 1 week to discuss the most recent MRI findings of her left knee. Otherwise, the patient did not report of any other pain issues that she wanted to address at this point in time. The patient denies of any significant changes in her health since her last appointment. Patient denies of any urinary/fecal incontinence, saddle anesthesia, or weakness.     Of note, the patient reports that she has taken gabapentin 400 mg PO TID without any side effects but cannot discern of any benefit.     INTERVENTIONAL PAIN HISTORY:  8/21/2017: Right sacroiliac joint injection   4/10/2017: C7-T1 cervical interlaminar epidural steroid injection - 50% relief per chart review  2/6/2017: Right sacroiliac joint injection   8/2016: Lumbar RFA - good relief     2/28/2020: Radiofrequency ablation of the L3, L4, L5 medial branch nerves on the bilateral-side - good relief  1/31/2020: T12 kyphoplasty - resolution of upper back pain    CURRENT PAIN MEDICATIONS:   Gabapentin 400 mg PO TID  duloxetine 30 mg PO q day  Tramadol PRN    ROS:  Review of Systems   Constitutional: Negative for chills and fever.   HENT: Negative for sore throat.    Eyes: Negative for visual disturbance.   Respiratory: Negative for shortness of breath.    Cardiovascular: Negative for chest pain.   Gastrointestinal: Negative for nausea and vomiting.   Genitourinary: Negative for difficulty urinating.  "  Musculoskeletal: Positive for arthralgias and back pain.   Skin: Negative for rash.   Allergic/Immunologic: Negative for immunocompromised state.   Neurological: Negative for syncope.   Hematological: Does not bruise/bleed easily.   Psychiatric/Behavioral: Negative for suicidal ideas.        MEDICAL, SURGICAL, FAMILY, SOCIAL HX: reviewed    MEDICATIONS/ALLERGIES: reviewed    PHYSICAL EXAM:    VITALS: Vitals reviewed.   Vitals:    03/03/20 1139   BP: (!) 157/94   Pulse: 87   Weight: 83 kg (183 lb)   Height: 5' 4" (1.626 m)   PainSc:   8   PainLoc: Knee       Physical Exam   Constitutional: She is oriented to person, place, and time and well-developed, well-nourished, and in no distress.   HENT:   Head: Normocephalic and atraumatic.   Eyes: Conjunctivae and EOM are normal. Right eye exhibits no discharge. Left eye exhibits no discharge.   Cardiovascular: Normal rate.   Pulmonary/Chest: Effort normal and breath sounds normal. No respiratory distress.   Abdominal: Soft.   Neurological: She is alert and oriented to person, place, and time.   Skin: Skin is warm and dry. No rash noted. She is not diaphoretic.   Psychiatric: Mood, memory, affect and judgment normal.   Nursing note and vitals reviewed.     UPPER EXTREMITIES: Normal alignment, normal range of motion, no atrophy, no skin changes,  hair growth and nail growth normal and equal bilaterally. No swelling, no tenderness.    LOWER EXTREMITIES:  Normal alignment, normal range of motion, no atrophy, no skin changes,  hair growth and nail growth normal and equal bilaterally. No swelling, no tenderness.     LUMBAR SPINE  Lumbar spine: ROM is severely limited with flexion extension and oblique extension with increased pain (worse with extension).     ((--)) Supine straight leg raise    ((+)) Facet loading   Internal and external rotation of the hip causes no increased pain on either side.  Myofascial exam: Tenderness to palpation across lumbar paraspinous " muscles.     ((+)) TTP at the SI joint  ((+)) AYLEEN's test  One leg stand: cannot perform secondary to fall risk    ((+)) Distraction test     MOTOR: Tone and bulk: normal bilateral upper and lower Strength: normal   Delt      Bi         Tri        WE      WF                        R          5          5          5          5          5          5            L          5          5          5          5          5          5               IP         ADD     ABD     Quad   TA        Gas      HAM  R          5          5          5          5          5          5          5  L          5          5          5          5          5          5          5     SENSATION: Light touch and pinprick intact bilaterally  REFLEXES: normal, symmetric, nonbrisk.  Toes down, no clonus. Negative nicholson's sign bilaterally.  GAIT: normal rise, base, steps, and arm swing.      IMAGING: MRI Left Knee without contrast (2/27/2020):  1. Decreased size of posterior horn and body of medial meniscus since 10/28/2019, most suggestive of interval meniscal debridement/partial meniscectomy.  Correlation with surgical history is requested.  No displaced meniscal fragment.  2. New patchy mild bone marrow edema involving proximal left tibia.  Although nonspecific, this could be related to altered mechanics/sequelae of chronic repetitive stress.  3. Advanced right knee osteoarthrosis affecting the medial and patellofemoral compartments as described, unchanged.    ASSESSMENT: Beatriz Gan is a 64 y.o. female with PMH significant for hx of left rotator cuff repair (Dr. Martinez), hx of left arthroscopic knee surgery, hx of partial nephrectomy, hx of ETOH dependence, and HTN presents as an established patient for the continued management of back and left knee pain. The patient is s/p bilateral L3, L4, and L5 medial branch radiofrequency ablation on 2/28/2020 with good relief at this point in time. The patient requested for a left knee  injection, but I instructed the patient to continue follow-up with Dr. Martinez as he is currently evaluating the patient for left knee pain. Treatment plan outlined below.    PLAN:  1. Increase gabapentin to 600 mg PO TID as the patient reports of no benefit with 400 mg PO TID dosing.   2. Encouraged the patient to continue follow-up with Dr. Martinez for left knee pain.   3. Continue duloxetine 30 mg PO q day as prescribed  4. I have stressed the importance of physical activity and a home exercise plan to help with chronic pain and improve health.   5. RTC in 6 weeks for follow-up    Dk Rosales MD  Pain Management

## 2020-03-06 ENCOUNTER — PATIENT MESSAGE (OUTPATIENT)
Dept: PRIMARY CARE CLINIC | Facility: CLINIC | Age: 65
End: 2020-03-06

## 2020-03-09 ENCOUNTER — PATIENT MESSAGE (OUTPATIENT)
Dept: PRIMARY CARE CLINIC | Facility: CLINIC | Age: 65
End: 2020-03-09

## 2020-03-09 NOTE — TELEPHONE ENCOUNTER
Can you cancel the existing therapy plan for the reclast, so that I can enter a new plan for the prolia.

## 2020-03-11 ENCOUNTER — OFFICE VISIT (OUTPATIENT)
Dept: ORTHOPEDICS | Facility: CLINIC | Age: 65
End: 2020-03-11
Payer: MEDICARE

## 2020-03-11 VITALS
HEART RATE: 101 BPM | WEIGHT: 183 LBS | HEIGHT: 64 IN | DIASTOLIC BLOOD PRESSURE: 88 MMHG | BODY MASS INDEX: 31.24 KG/M2 | SYSTOLIC BLOOD PRESSURE: 138 MMHG

## 2020-03-11 DIAGNOSIS — Z98.890 HISTORY OF ARTHROSCOPY OF LEFT KNEE: ICD-10-CM

## 2020-03-11 DIAGNOSIS — M17.12 PRIMARY OSTEOARTHRITIS OF LEFT KNEE: Primary | ICD-10-CM

## 2020-03-11 DIAGNOSIS — M94.262 CHONDROMALACIA, KNEE, LEFT: ICD-10-CM

## 2020-03-11 PROCEDURE — 99024 POSTOP FOLLOW-UP VISIT: CPT | Mod: S$GLB,POP,, | Performed by: ORTHOPAEDIC SURGERY

## 2020-03-11 PROCEDURE — 99024 PR POST-OP FOLLOW-UP VISIT: ICD-10-PCS | Mod: S$GLB,POP,, | Performed by: ORTHOPAEDIC SURGERY

## 2020-03-11 PROCEDURE — 20610 DRAIN/INJ JOINT/BURSA W/O US: CPT | Mod: 58,LT,S$GLB, | Performed by: ORTHOPAEDIC SURGERY

## 2020-03-11 PROCEDURE — 20610 LARGE JOINT ASPIRATION/INJECTION: L KNEE: ICD-10-PCS | Mod: 58,LT,S$GLB, | Performed by: ORTHOPAEDIC SURGERY

## 2020-03-11 RX ORDER — HYDROCODONE BITARTRATE AND ACETAMINOPHEN 5; 325 MG/1; MG/1
1 TABLET ORAL EVERY 8 HOURS PRN
Qty: 21 TABLET | Refills: 0 | Status: SHIPPED | OUTPATIENT
Start: 2020-03-11 | End: 2020-03-18

## 2020-03-11 RX ORDER — TRAMADOL HYDROCHLORIDE AND ACETAMINOPHEN 37.5; 325 MG/1; MG/1
1 TABLET, FILM COATED ORAL EVERY 6 HOURS PRN
COMMUNITY
End: 2020-04-14

## 2020-03-11 NOTE — PROGRESS NOTES
Subjective:    Patient ID: Beatriz Gan is a 64 y.o. female.    Chief Complaint: Post-op Evaluation of the Left Knee (Left knee scope 1.16.2020. MRI results. States that her knee is still bothering her, constant pain, gets a sharp pain every once and a while if she steps wrong. )      History of Present Illness  Status post arthroscopy left knee with some persistent medial knee pain status status post recent repeat MRI    Current Medications  Current Outpatient Medications   Medication Sig Dispense Refill    ALPRAZolam (XANAX) 0.5 MG tablet Take 1 tablet (0.5 mg total) by mouth 3 (three) times daily as needed for Anxiety. 90 tablet 1    amLODIPine (NORVASC) 5 MG tablet Take 1 tablet (5 mg total) by mouth once daily. 90 tablet 3    calcium-vitamin D3 (CALCIUM 500 + D) 500 mg(1,250mg) -200 unit per tablet Take 1 tablet by mouth 2 (two) times daily with meals.      DULoxetine (CYMBALTA) 30 MG capsule Take 1 capsule (30 mg total) by mouth once daily. 90 capsule 3    fluticasone (FLONASE) 50 mcg/actuation nasal spray 1 spray by Nasal route daily as needed.       gabapentin (NEURONTIN) 600 MG tablet Take 1 tablet (600 mg total) by mouth 3 (three) times daily. 90 tablet 11    lidocaine (LIDODERM) 5 % Place 1 patch onto the skin once daily. Remove & Discard patch within 12 hours or as directed by MD 30 patch 0    losartan-hydrochlorothiazide 100-25 mg (HYZAAR) 100-25 mg per tablet Take 1 tablet by mouth once daily. 90 tablet 3    multivit-iron-min-folic acid (MULTIVITAMIN-IRON-MINERALS-FOLIC ACID) 3,500-18-0.4 unit-mg-mg Chew Take by mouth once daily.        NARCAN 4 mg/actuation Spry       polyethylene glycol (GLYCOLAX) 17 gram/dose powder Take 17 g by mouth once daily. 510 g 3    tramadol-acetaminophen 37.5-325 mg (ULTRACET) 37.5-325 mg Tab Take 1 tablet by mouth every 6 (six) hours as needed for Pain.      zolpidem (AMBIEN) 10 mg Tab Take 1 tablet (10 mg total) by mouth nightly as needed. 90  "tablet 0    HYDROcodone-acetaminophen (NORCO) 5-325 mg per tablet Take 1 tablet by mouth every 8 (eight) hours as needed for Pain. 21 tablet 0     No current facility-administered medications for this visit.      Facility-Administered Medications Ordered in Other Visits   Medication Dose Route Frequency Provider Last Rate Last Dose    electrolyte-S (ISOLYTE)   Intravenous Continuous Kota Ortega MD 10 mL/hr at 01/31/20 0624      lactated ringers infusion   Intravenous Continuous Kota Ortega MD           Allergies  Review of patient's allergies indicates:   Allergen Reactions    Phenergan [promethazine] Hives and Rash    Latex, natural rubber Hives     Per pt report-many years    Morphine Hives    Nsaids (non-steroidal anti-inflammatory drug)      Due to "kidney cancer"       Past Medical History  Past Medical History:   Diagnosis Date    Alcohol dependence     DDD (degenerative disc disease), lumbosacral     DJD (degenerative joint disease), lumbosacral     Encounter for blood transfusion     GERD (gastroesophageal reflux disease)     Gout     Hypercholesterolemia     Hypertension     NATHALIE (iron deficiency anemia)     questionable white coat hypertension    Kidney carcinoma, left 2014    Pneumonia     Renal cell carcinoma     Shingles 10/13/2012       Surgical History  Past Surgical History:   Procedure Laterality Date    APPENDECTOMY      ARTHROSCOPY OF SHOULDER WITH DECOMPRESSION OF SUBACROMIAL SPACE Left 10/31/2019    Procedure: ARTHROSCOPY, SHOULDER, WITH SUBACROMIAL SPACE DECOMPRESSION;  Surgeon: Ruben Martinez MD;  Location: [facility] OR;  Service: Orthopedics;  Laterality: Left;    BLADDER REPAIR      x 2    COLONOSCOPY  9/2011    DISTAL CLAVICLE EXCISION Left 10/31/2019    Procedure: EXCISION, CLAVICLE, DISTAL;  Surgeon: Ruben Martinez MD;  Location: [facility] OR;  Service: Orthopedics;  Laterality: Left;    ESOPHAGOGASTRODUODENOSCOPY  9/2011    EYE SURGERY      lasix bilateral    " FIXATION KYPHOPLASTY N/A 1/31/2020    Procedure: Kyphoplasty T12;  Surgeon: Dk Rosales MD;  Location: [facility] OR;  Service: Pain Management;  Laterality: N/A;    HERNIA REPAIR      hiatal hernai    HYSTERECTOMY      KNEE ARTHROSCOPY W/ MENISCECTOMY Left 1/16/2020    Procedure: ARTHROSCOPY, KNEE, WITH MEDIAL MENISCECTOMY;  Surgeon: Ruben Martinez MD;  Location: [facility] OR;  Service: Orthopedics;  Laterality: Left;    LAPAROSCOPIC NISSEN FUNDOPLICATION      NEPHRECTOMY      LT partial    RADIOFREQUENCY ABLATION OF LUMBAR MEDIAL BRANCH NERVE AT SINGLE LEVEL Bilateral 2/28/2020    Procedure: Radiofrequency Ablation, Nerve, Spinal, Lumbar, Medial Branch, 1 Level;  Surgeon: Dk Rosales MD;  Location[facility] OR;  Service: Pain Management;  Laterality: Bilateral;  L3,L4,L5 - Burned at 80 degrees C. for 60 seconds x 2 each site    REFRACTIVE SURGERY      ROTATOR CUFF REPAIR Left 10/31/2019    Procedure: REPAIR, ROTATOR CUFF;  Surgeon: Ruben Martinez MD;  Location: [facility] OR;  Service: Orthopedics;  Laterality: Left;  arthrex    SKIN BIOPSY      TOTAL ABDOMINAL HYSTERECTOMY W/ BILATERAL SALPINGOOPHORECTOMY  age 48       Family History:   Family History   Problem Relation Age of Onset    Hypertension Father     Glaucoma Neg Hx     Macular degeneration Neg Hx     Retinal detachment Neg Hx        Social History:   Social History     Socioeconomic History    Marital status:      Spouse name: Not on file    Number of children: Not on file    Years of education: Not on file    Highest education level: Not on file   Occupational History     Employer: [employer]   Social Needs    Financial resource strain: Not on file    Food insecurity:     Worry: Not on file     Inability: Not on file    Transportation needs:     Medical: Not on file     Non-medical: Not on file   Tobacco Use    Smoking status: Former Smoker     Packs/day: 1.00     Years: 40.00     Pack years: 40.00     Types: Cigarettes     Last  attempt to quit: 2018     Years since quittin.1    Smokeless tobacco: Never Used   Substance and Sexual Activity    Alcohol use: Yes     Alcohol/week: 28.0 standard drinks     Types: 14 Standard drinks or equivalent, 14 Cans of beer per week     Comment: 2-3 a day/ social    Drug use: No    Sexual activity: Yes     Partners: Male   Lifestyle    Physical activity:     Days per week: Not on file     Minutes per session: Not on file    Stress: Not on file   Relationships    Social connections:     Talks on phone: Not on file     Gets together: Not on file     Attends Congregational service: Not on file     Active member of club or organization: Not on file     Attends meetings of clubs or organizations: Not on file     Relationship status: Not on file   Other Topics Concern    Not on file   Social History Narrative    Not on file       Date of surgery:  2020    Review of Systems     General/Constitutional: Chills denies. Fatigue denies. Fever denies. Weight gain denies. Weight loss denies.    Musculoskeletal: Comments: See HPI for details    Skin: Rash denies.    Objective:   Vital Signs:   Vitals:    20 1345   BP: 138/88   Pulse: 101        Physical Exam    This a well-developed, well nourished patient in no acute distress.  They are alert and oriented and cooperative to examination.  Pt. walks without an antalgic gait.      General Examination:     Constitutional: The patient is alert and oriented to lace person and time. Mood is pleasant.     Head/Face: Normal facial features normal eyebrows    Eyes: Normal extraocular motion bilaterally    Lungs: Respirations are equal and unlabored    Gait is coordinated.    Cardiovascular: There are no swelling or varicosities present.    Lymphatic: Negative for adenopathy    Skin: Normal    Neurological: Level of consciousness normal. Oriented to place person and time and situation    Psychiatric: Oriented to time place person and  situation    Medial joint line tenderness noted no swelling  XRAY Report/ Interpretation:  MRI consistent with previous partial medial meniscectomy and chondromalacia      Assessment:       1. Primary osteoarthritis of left knee    2. History of arthroscopy of left knee    3. Chondromalacia, knee, left        Plan:       Beatriz was seen today for post-op evaluation.    Diagnoses and all orders for this visit:    Primary osteoarthritis of left knee  -     Large Joint Aspiration/Injection: L knee  -     ORTHOTIC DEVICE (DME)    History of arthroscopy of left knee  -     Large Joint Aspiration/Injection: L knee  -     ORTHOTIC DEVICE (DME)    Chondromalacia, knee, left  -     Large Joint Aspiration/Injection: L knee  -     ORTHOTIC DEVICE (DME)    Other orders  -     HYDROcodone-acetaminophen (NORCO) 5-325 mg per tablet; Take 1 tablet by mouth every 8 (eight) hours as needed for Pain.         Follow up in about 6 weeks (around 4/22/2020) for PO LT knee scope 1/16/20.    I believe the residual knee pain is from chondromalacia and early osteoarthritis treatment options were discussed and viscosupplementation injection given.  No side effects given.  Return 6 weeks standing AP and lateral x-rays left knee.  Patient has problems with ambulation would benefit from having a lift chair at home since she has a house that is 2 story      This note was created using Dragon voice recognition software that occasionally misinterpreted phrases or words.

## 2020-03-11 NOTE — PROCEDURES
Large Joint Aspiration/Injection: L knee  Performed by: Ruben Martinez MD  Authorized by: Ruben Martinez MD  Date/Time: 3/11/2020 1:45 PM    Consent Done?:  Yes (Verbal)  Indications:  pain  Timeout: Immediately prior to procedure a time out was called to verify the correct patient, procedure, equipment, support staff and site/side marked as required.  Prep:Patient was prepped and draped in the usual sterile fashion.  Procedure site marked: Yes     Anesthesia  Local anesthesia not used  Anesthesia: local infiltration  Anesthetic: lidocaine 1% without epinephrine    Details:     Ultrasonic Guidance for needle placement: No    Medications: 48 mg hylan g-f 20 48 mg/6 mL  Patient tolerance:  patient tolerated the procedure well with no immediate complications

## 2020-03-16 ENCOUNTER — PATIENT MESSAGE (OUTPATIENT)
Dept: PRIMARY CARE CLINIC | Facility: CLINIC | Age: 65
End: 2020-03-16

## 2020-03-30 ENCOUNTER — PATIENT MESSAGE (OUTPATIENT)
Dept: ORTHOPEDICS | Facility: CLINIC | Age: 65
End: 2020-03-30

## 2020-03-30 DIAGNOSIS — Z98.890 S/P LEFT ROTATOR CUFF REPAIR: ICD-10-CM

## 2020-03-30 DIAGNOSIS — Z98.890 HISTORY OF ARTHROSCOPY OF LEFT KNEE: Primary | ICD-10-CM

## 2020-03-31 ENCOUNTER — TELEPHONE (OUTPATIENT)
Dept: ORTHOPEDICS | Facility: CLINIC | Age: 65
End: 2020-03-31

## 2020-03-31 RX ORDER — HYDROCODONE BITARTRATE AND ACETAMINOPHEN 5; 325 MG/1; MG/1
1 TABLET ORAL EVERY 8 HOURS PRN
Qty: 21 TABLET | Refills: 0 | Status: SHIPPED | OUTPATIENT
Start: 2020-03-31 | End: 2020-04-07

## 2020-03-31 NOTE — TELEPHONE ENCOUNTER
----- Message from Kendra Doyle sent at 3/31/2020 10:35 AM CDT -----  Contact: [facility] pharmacy  They were calling because they need clarification on a prescription that was sent over by Harshil MONTEIRO, call her back at [phone].

## 2020-04-08 ENCOUNTER — PATIENT MESSAGE (OUTPATIENT)
Dept: ADMINISTRATIVE | Facility: HOSPITAL | Age: 65
End: 2020-04-08

## 2020-04-09 ENCOUNTER — PATIENT MESSAGE (OUTPATIENT)
Dept: FAMILY MEDICINE | Facility: CLINIC | Age: 65
End: 2020-04-09

## 2020-04-09 ENCOUNTER — PATIENT MESSAGE (OUTPATIENT)
Dept: PRIMARY CARE CLINIC | Facility: CLINIC | Age: 65
End: 2020-04-09

## 2020-04-09 ENCOUNTER — OFFICE VISIT (OUTPATIENT)
Dept: FAMILY MEDICINE | Facility: CLINIC | Age: 65
End: 2020-04-09
Payer: MEDICARE

## 2020-04-09 ENCOUNTER — PATIENT MESSAGE (OUTPATIENT)
Dept: ADMINISTRATIVE | Facility: HOSPITAL | Age: 65
End: 2020-04-09

## 2020-04-09 DIAGNOSIS — R06.09 DYSPNEA ON EXERTION: ICD-10-CM

## 2020-04-09 DIAGNOSIS — R53.83 FATIGUE, UNSPECIFIED TYPE: ICD-10-CM

## 2020-04-09 DIAGNOSIS — F33.1 MAJOR DEPRESSIVE DISORDER, RECURRENT EPISODE, MODERATE: ICD-10-CM

## 2020-04-09 DIAGNOSIS — F41.9 ANXIETY: Primary | ICD-10-CM

## 2020-04-09 DIAGNOSIS — R07.9 CHEST PAIN, UNSPECIFIED TYPE: ICD-10-CM

## 2020-04-09 PROCEDURE — 99213 OFFICE O/P EST LOW 20 MIN: CPT | Mod: 95,,, | Performed by: INTERNAL MEDICINE

## 2020-04-09 PROCEDURE — 99213 PR OFFICE/OUTPT VISIT, EST, LEVL III, 20-29 MIN: ICD-10-PCS | Mod: 95,,, | Performed by: INTERNAL MEDICINE

## 2020-04-09 RX ORDER — DULOXETIN HYDROCHLORIDE 30 MG/1
30 CAPSULE, DELAYED RELEASE ORAL 2 TIMES DAILY
Qty: 180 CAPSULE | Refills: 3 | COMMUNITY
Start: 2020-04-09 | End: 2020-09-09 | Stop reason: SDUPTHER

## 2020-04-09 NOTE — PROGRESS NOTES
[facility] Virtual Visit Note    The patient location is: Home  The chief complaint leading to consultation is: fatigue, shortness of breath  Visit type: Virtual visit with synchronous audio and video  Total time spent with patient: 20 min  Each patient to whom he or she provides medical services by telemedicine is:  (1) informed of the relationship between the physician and patient and the respective role of any other health care provider with respect to management of the patient; and (2) notified that he or she may decline to receive medical services by telemedicine and may withdraw from such care at any time.      Chief Complaint      Chief Complaint   Patient presents with    Fatigue    Shortness of Breath    Cough       History of Present Illness      Beatriz Gan is a 64 y.o. female with chronic conditions of HTN, anxiety, insomnia, osteoarthritis left shoulder, chronic low back pain who presents today for: complains of cough, fatigue, shortness of breath since recent surgery.  Shortness of breath present during any exertion.  Does have chest pain with exertion.  Pt has fallen twice, mechanical falls no LOC, since surgery.  Has not been getting PT.  Has never had a stress test.  Had a preop EKG in 10/2019 which suggested new inferior infarct but I don't see that it was addressed any further.  Does not have a way to assess pulse ox at home.    Pt has been having increased anxiety and depression post surgically and now with COVID pandemic.  Takes cymbalta 30 mg daily and xanax 0.5 mg TID already.      Past Medical History:  Past Medical History:   Diagnosis Date    Alcohol dependence     DDD (degenerative disc disease), lumbosacral     DJD (degenerative joint disease), lumbosacral     Encounter for blood transfusion     GERD (gastroesophageal reflux disease)     Gout     Hypercholesterolemia     Hypertension     NATHALIE (iron deficiency anemia)     questionable white coat hypertension     Kidney carcinoma, left 2014    Pneumonia     Renal cell carcinoma     Shingles 10/13/2012       Past Surgical History:   has a past surgical history that includes Total abdominal hysterectomy w/ bilateral salpingoophorectomy (age 48); Appendectomy; Bladder repair; Colonoscopy (2011); Esophagogastroduodenoscopy (2011); Hysterectomy; Refractive surgery; Nephrectomy; Laparoscopic Nissen fundoplication; Eye surgery; Hernia repair; Skin biopsy; Rotator cuff repair (Left, 10/31/2019); Distal clavicle excision (Left, 10/31/2019); Arthroscopy of shoulder with decompression of subacromial space (Left, 10/31/2019); Knee arthroscopy w/ meniscectomy (Left, 2020); Fixation Kyphoplasty (N/A, 2020); and Radiofrequency ablation of lumbar medial branch nerve at single level (Bilateral, 2020).    Family History:  family history includes Hypertension in her father.     Social History:  Social History     Tobacco Use    Smoking status: Former Smoker     Packs/day: 1.00     Years: 40.00     Pack years: 40.00     Types: Cigarettes     Last attempt to quit: 2018     Years since quittin.2    Smokeless tobacco: Never Used   Substance Use Topics    Alcohol use: Yes     Alcohol/week: 28.0 standard drinks     Types: 14 Standard drinks or equivalent, 14 Cans of beer per week     Comment: 2-3 a day/ social    Drug use: No       Review of Systems   Constitutional: Positive for malaise/fatigue. Negative for chills and fever.   Respiratory: Positive for cough and shortness of breath.    Cardiovascular: Positive for chest pain.   Gastrointestinal: Negative for constipation, diarrhea, nausea and vomiting.   Skin: Negative for rash.   Neurological: Negative for weakness.   Psychiatric/Behavioral: Positive for depression. The patient is nervous/anxious.         Medications:  Outpatient Encounter Medications as of 2020   Medication Sig Dispense Refill    ALPRAZolam (XANAX) 0.5 MG tablet Take 1 tablet (0.5 mg  total) by mouth 3 (three) times daily as needed for Anxiety. 90 tablet 1    amLODIPine (NORVASC) 5 MG tablet Take 1 tablet (5 mg total) by mouth once daily. 90 tablet 3    calcium-vitamin D3 (CALCIUM 500 + D) 500 mg(1,250mg) -200 unit per tablet Take 1 tablet by mouth 2 (two) times daily with meals.      DULoxetine (CYMBALTA) 30 MG capsule Take 1 capsule (30 mg total) by mouth 2 (two) times daily. 180 capsule 3    fluticasone (FLONASE) 50 mcg/actuation nasal spray 1 spray by Nasal route daily as needed.       gabapentin (NEURONTIN) 600 MG tablet Take 1 tablet (600 mg total) by mouth 3 (three) times daily. 90 tablet 11    lidocaine (LIDODERM) 5 % Place 1 patch onto the skin once daily. Remove & Discard patch within 12 hours or as directed by MD 30 patch 0    losartan-hydrochlorothiazide 100-25 mg (HYZAAR) 100-25 mg per tablet Take 1 tablet by mouth once daily. 90 tablet 3    multivit-iron-min-folic acid (MULTIVITAMIN-IRON-MINERALS-FOLIC ACID) 3,500-18-0.4 unit-mg-mg Chew Take by mouth once daily.        NARCAN 4 mg/actuation Spry       polyethylene glycol (GLYCOLAX) 17 gram/dose powder Take 17 g by mouth once daily. 510 g 3    tramadol-acetaminophen 37.5-325 mg (ULTRACET) 37.5-325 mg Tab Take 1 tablet by mouth every 6 (six) hours as needed for Pain.      zolpidem (AMBIEN) 10 mg Tab Take 1 tablet (10 mg total) by mouth nightly as needed. 90 tablet 0    [DISCONTINUED] DULoxetine (CYMBALTA) 30 MG capsule Take 1 capsule (30 mg total) by mouth once daily. 90 capsule 3     Facility-Administered Encounter Medications as of 4/9/2020   Medication Dose Route Frequency Provider Last Rate Last Dose    electrolyte-S (ISOLYTE)   Intravenous Continuous Kota Ortega MD 10 mL/hr at 01/31/20 0624      lactated ringers infusion   Intravenous Continuous Kota Ortega MD           Allergies:  Review of patient's allergies indicates:   Allergen Reactions    Phenergan [promethazine] Hives and Rash    Latex, natural  "rubber Hives     Per pt report-many years    Morphine Hives    Nsaids (non-steroidal anti-inflammatory drug)      Due to "kidney cancer"       Health Maintenance:  Immunization History   Administered Date(s) Administered    Influenza - High Dose - PF (65 years and older) 11/11/2019    Influenza - Quadrivalent - PF (6 months and older) 10/22/2008, 10/16/2014, 11/05/2015, 01/09/2017    Influenza - Trivalent - Adjuvanted - PF 09/11/2018    Influenza A (H1N1) 2009 Monovalent - IM 01/08/2010    Pneumococcal Conjugate - 13 Valent 01/17/2014, 01/17/2014    Tdap 06/21/2011      Health Maintenance   Topic Date Due    Lipid Panel  10/31/2015    LDCT Lung Screen  08/28/2016    Pneumococcal Vaccine (65+ High/Highest Risk) (2 of 2 - PPSV23) 09/08/2018    TETANUS VACCINE  06/21/2021    Colonoscopy  09/28/2021    Mammogram  02/14/2022    DEXA SCAN  02/14/2023    Hepatitis C Screening  Completed        Physical Exam       ]    Physical Exam   Constitutional: She is oriented to person, place, and time. She appears well-developed and well-nourished. No distress.   Eyes: Conjunctivae and EOM are normal. Right eye exhibits no discharge. Left eye exhibits no discharge. No scleral icterus.   Pulmonary/Chest: Effort normal. No respiratory distress.   Neurological: She is alert and oriented to person, place, and time.   Skin: She is not diaphoretic.   Psychiatric: She has a normal mood and affect. Her behavior is normal. Judgment and thought content normal.        Laboratory:  CBC:  Recent Labs   Lab 10/29/19  0945 10/30/19  1040 01/09/20  1007   WBC 17.40 H 8.29 4.87   RBC 4.22 4.21 4.34   Hemoglobin 13.7 14.0 13.9   Hematocrit 41.7 42.2 42.0   Platelets 295 243 273   Mean Corpuscular Volume 99 H 100 H 97   Mean Corpuscular Hemoglobin 32.5 H 33.3 H 32.0 H   Mean Corpuscular Hemoglobin Conc 32.9 33.2 33.1     CMP:  Recent Labs   Lab 04/01/19  0740 10/29/19  0945 01/09/20  1007   Glucose 99 129 H 98   Calcium 10.7 H 10.4 " 10.0   Albumin 4.2 4.3 4.0   Total Protein 7.5 7.4 7.2   Sodium 140 140 141   Potassium 3.7 4.2 3.7   CO2 30 H 30 H 32 H   Chloride 101 98 98   BUN, Bld 16 19 16   Alkaline Phosphatase 105 101 106   ALT 13 15 10   AST 17 12 14   Total Bilirubin 0.6 0.9 0.6     URINALYSIS:  Recent Labs   Lab 05/26/18  1023  01/09/20  1010   Color, UA Yellow   < > Yellow   Specific Gravity, UA 1.025   < > 1.015   pH, UA 5.0   < > 6.0   Protein, UA 2+ A   < > Negative   Bacteria Many A  --  Rare   Nitrite, UA Negative   < > Negative   Leukocytes, UA 2+ A   < > Trace A   Urobilinogen, UA 1.0   < > Negative   Hyaline Casts, UA 0  --   --     < > = values in this interval not displayed.      LIPIDS:  Recent Labs   Lab 10/19/17  1000 05/27/18  0428   TSH 1.822 0.344 L     TSH:  Recent Labs   Lab 10/19/17  1000 05/27/18  0428   TSH 1.822 0.344 L     A1C:        Assessment/Plan     Beatriz Gan is a 64 y.o.female with:    1. Anxiety  2. Major depressive disorder, recurrent episode, moderate  - DULoxetine (CYMBALTA) 30 MG capsule; Take 1 capsule (30 mg total) by mouth 2 (two) times daily.  Dispense: 180 capsule; Refill: 3  Increasing cymbalta to twice daily.    3. Fatigue, unspecified type  4. Dyspnea on exertion  5. Chest pain, unspecified type  Scheduling for in office visit to further evaluate.  May need repeat EKG and CXR.  May also need stress test.  Pt instructed to go to ER if symptoms worsen.     Jorge Alonzo MD  [facility]

## 2020-04-13 ENCOUNTER — PATIENT OUTREACH (OUTPATIENT)
Dept: ADMINISTRATIVE | Facility: OTHER | Age: 65
End: 2020-04-13

## 2020-04-13 ENCOUNTER — TELEPHONE (OUTPATIENT)
Dept: ADMINISTRATIVE | Facility: HOSPITAL | Age: 65
End: 2020-04-13

## 2020-04-14 ENCOUNTER — OFFICE VISIT (OUTPATIENT)
Dept: PAIN MEDICINE | Facility: CLINIC | Age: 65
End: 2020-04-14
Payer: MEDICARE

## 2020-04-14 ENCOUNTER — OFFICE VISIT (OUTPATIENT)
Dept: FAMILY MEDICINE | Facility: CLINIC | Age: 65
End: 2020-04-14
Payer: MEDICARE

## 2020-04-14 VITALS
BODY MASS INDEX: 32.97 KG/M2 | HEIGHT: 64 IN | HEART RATE: 83 BPM | RESPIRATION RATE: 18 BRPM | TEMPERATURE: 99 F | OXYGEN SATURATION: 97 % | WEIGHT: 193.13 LBS

## 2020-04-14 DIAGNOSIS — F51.01 PRIMARY INSOMNIA: ICD-10-CM

## 2020-04-14 DIAGNOSIS — R06.09 DYSPNEA ON EXERTION: Primary | ICD-10-CM

## 2020-04-14 DIAGNOSIS — M47.896 OTHER SPONDYLOSIS, LUMBAR REGION: Primary | ICD-10-CM

## 2020-04-14 DIAGNOSIS — M51.36 DDD (DEGENERATIVE DISC DISEASE), LUMBAR: ICD-10-CM

## 2020-04-14 DIAGNOSIS — R53.83 FATIGUE, UNSPECIFIED TYPE: ICD-10-CM

## 2020-04-14 DIAGNOSIS — H92.03 OTALGIA OF BOTH EARS: ICD-10-CM

## 2020-04-14 DIAGNOSIS — R42 LIGHTHEADEDNESS: ICD-10-CM

## 2020-04-14 DIAGNOSIS — M17.12 PRIMARY OSTEOARTHRITIS OF LEFT KNEE: ICD-10-CM

## 2020-04-14 DIAGNOSIS — M51.37 DDD (DEGENERATIVE DISC DISEASE), LUMBOSACRAL: ICD-10-CM

## 2020-04-14 PROCEDURE — 99999 PR PBB SHADOW E&M-EST. PATIENT-LVL IV: CPT | Mod: PBBFAC,,, | Performed by: INTERNAL MEDICINE

## 2020-04-14 PROCEDURE — 99214 OFFICE O/P EST MOD 30 MIN: CPT | Mod: 95,,, | Performed by: ANESTHESIOLOGY

## 2020-04-14 PROCEDURE — 93010 ELECTROCARDIOGRAM REPORT: CPT | Mod: S$PBB,,, | Performed by: INTERNAL MEDICINE

## 2020-04-14 PROCEDURE — 93005 ELECTROCARDIOGRAM TRACING: CPT | Mod: PBBFAC,PN | Performed by: INTERNAL MEDICINE

## 2020-04-14 PROCEDURE — 99999 PR PBB SHADOW E&M-EST. PATIENT-LVL IV: ICD-10-PCS | Mod: PBBFAC,,, | Performed by: INTERNAL MEDICINE

## 2020-04-14 PROCEDURE — 99213 PR OFFICE/OUTPT VISIT, EST, LEVL III, 20-29 MIN: ICD-10-PCS | Mod: S$PBB,,, | Performed by: INTERNAL MEDICINE

## 2020-04-14 PROCEDURE — 93010 EKG 12-LEAD: ICD-10-PCS | Mod: S$PBB,,, | Performed by: INTERNAL MEDICINE

## 2020-04-14 PROCEDURE — 99213 OFFICE O/P EST LOW 20 MIN: CPT | Mod: S$PBB,,, | Performed by: INTERNAL MEDICINE

## 2020-04-14 PROCEDURE — 99214 PR OFFICE/OUTPT VISIT, EST, LEVL IV, 30-39 MIN: ICD-10-PCS | Mod: 95,,, | Performed by: ANESTHESIOLOGY

## 2020-04-14 PROCEDURE — 99214 OFFICE O/P EST MOD 30 MIN: CPT | Mod: PBBFAC,25,PN | Performed by: INTERNAL MEDICINE

## 2020-04-14 RX ORDER — HYDROCODONE BITARTRATE AND ACETAMINOPHEN 5; 325 MG/1; MG/1
1 TABLET ORAL EVERY 6 HOURS PRN
Qty: 21 TABLET | Refills: 0 | Status: SHIPPED | OUTPATIENT
Start: 2020-04-14 | End: 2020-04-22 | Stop reason: SDUPTHER

## 2020-04-14 RX ORDER — BUDESONIDE AND FORMOTEROL FUMARATE DIHYDRATE 80; 4.5 UG/1; UG/1
2 AEROSOL RESPIRATORY (INHALATION) 2 TIMES DAILY
Qty: 1 INHALER | Refills: 3 | Status: SHIPPED | OUTPATIENT
Start: 2020-04-14 | End: 2020-09-08

## 2020-04-14 RX ORDER — AZELASTINE 1 MG/ML
1 SPRAY, METERED NASAL 2 TIMES DAILY
Qty: 30 ML | Refills: 3 | Status: SHIPPED | OUTPATIENT
Start: 2020-04-14 | End: 2021-03-08 | Stop reason: SDUPTHER

## 2020-04-14 RX ORDER — ZOLPIDEM TARTRATE 10 MG/1
10 TABLET ORAL NIGHTLY PRN
Qty: 90 TABLET | Refills: 0 | Status: SHIPPED | OUTPATIENT
Start: 2020-04-14 | End: 2020-07-28

## 2020-04-14 RX ORDER — DICLOFENAC SODIUM 50 MG/1
50 TABLET, DELAYED RELEASE ORAL 2 TIMES DAILY WITH MEALS
Qty: 60 TABLET | Refills: 2 | Status: SHIPPED | OUTPATIENT
Start: 2020-04-14 | End: 2020-07-02

## 2020-04-14 NOTE — PROGRESS NOTES
Chronic Pain-Tele-Medicine-Established Note (Follow up visit)    The patient location is: home  The chief complaint leading to consultation is: back and left knee pain  Visit type: Virtual visit with synchronous audio and video  Total time spent with patient: 20 minutes  Each patient to whom he or she provides medical services by telemedicine is: (1) informed of the relationship between the physician and patient and the respective role of any other health care provider with respect to management of the patient; and (2) notified that he or she may decline to receive medical services by telemedicine and may withdraw from such care at any time.    Notes:    SUBJECTIVE:    INITIAL HISTORY OF PRESENT ILLNESS: Beatriz Gan is a 64 y.o. female with PMH significant for hx of left rotator cuff repair (Dr. Martinez), hx of left arthroscopic knee surgery, hx of partial nephrectomy, hx of ETOH dependence, and HTN presents as a referral for the evaluation of low back pain. Patient reports that her pain approximately in 2014 after no inciting incident or trauma. The patient reports that she experienced a new type of mid back pain when she slipped and fell at home approximately 1 week prior to her kyphoplasty with me. Today, the patient wants her low back pain addressed as her mid back pain resolved s/p kyphoplasty. The patient localizes her pain to the area across her lower back. Patient denies of radiation of her pain (the patient reports of having pain previously that radiated towards her left foot but states that her injections via Dr. Leger resolved her radicular pain). Patient describes her pain as a shooting, throbbing, and burning type of pain. Patient reports that her pain current pain is a 10/10. Patient denies of any urinary/fecal incontinence, saddle anesthesia, or weakness.      Of note, the patient reports that she fell 1 week prior to her T12 kyphoplasty with me on 1/31/2020. Patient was in discussion to  her lumbar spine surgery with Dr. Martinez but she fell prior to scheduling a procedure date.     Aggravating factors: constant pain; activity in general, sitting     Mitigating factors: ice    Beatriz Gan presents tele-medicine appointment for a follow-up appointment for back and left knee pain. In the interim, the patient does endorse of falling at home and hitting her back on a dresser approximately one week ago. The patient endorses of soreness at that site of impact. However, the patient reports that her worse pain is her left knee. Of note, the patient is s/p hylan supplementation via Dr. Ruben Martinez on 3/11/2020. Patient reports of no relief with that injection. Patient reports that her knee pain is currently a 9/10 in the AM (time in which her pain is worse). With use and as the day goes on, the pain in her left knee reduces to a 5/10. The patient continues to report of low back pain without radiation. The patient is interested in getting an [place] in the future to see if that further improves her back pain. Patient denies of any urinary/fecal incontinence, saddle anesthesia, or weakness.     Of note, the patient discussed possible knee replacement with Dr. Martinez. The patient reports that she would like to exhaust conservative management prior to pursuing surgery.     Pain Medications:  Gabapentin 400 mg PO TID (higher doses caused GI upset)  duloxetine 30 mg PO q day  Norco 5-325 mg PO daily PRN    Pain Procedures:   2/28/2020: Bilateral L3, L4, and L5 medial branch radiofrequency ablation   8/21/2017: Right sacroiliac joint injection   4/10/2017: C7-T1 cervical interlaminar epidural steroid injection - 50% relief per chart review  2/6/2017: Right sacroiliac joint injection   8/2016: Lumbar RFA - good relief     Imaging: MRI Left knee (2/27/2020):  1. Decreased size of posterior horn and body of medial meniscus since 10/28/2019, most suggestive of interval meniscal debridement/partial meniscectomy.   Correlation with surgical history is requested.  No displaced meniscal fragment.  2. New patchy mild bone marrow edema involving proximal left tibia.  Although nonspecific, this could be related to altered mechanics/sequelae of chronic repetitive stress.  3. Advanced right knee osteoarthrosis affecting the medial and patellofemoral compartments as described, unchanged.    Past Medical History:   Diagnosis Date    Alcohol dependence     DDD (degenerative disc disease), lumbosacral     DJD (degenerative joint disease), lumbosacral     Encounter for blood transfusion     GERD (gastroesophageal reflux disease)     Gout     Hypercholesterolemia     Hypertension     NATHALIE (iron deficiency anemia)     questionable white coat hypertension    Kidney carcinoma, left 2014    Pneumonia     Renal cell carcinoma     Shingles 10/13/2012     Past Surgical History:   Procedure Laterality Date    APPENDECTOMY      ARTHROSCOPY OF SHOULDER WITH DECOMPRESSION OF SUBACROMIAL SPACE Left 10/31/2019    Procedure: ARTHROSCOPY, SHOULDER, WITH SUBACROMIAL SPACE DECOMPRESSION;  Surgeon: Ruben Martinez MD;  Location: [facility] OR;  Service: Orthopedics;  Laterality: Left;    BLADDER REPAIR      x 2    COLONOSCOPY  9/2011    DISTAL CLAVICLE EXCISION Left 10/31/2019    Procedure: EXCISION, CLAVICLE, DISTAL;  Surgeon: Ruben Martinez MD;  Location: [facility] OR;  Service: Orthopedics;  Laterality: Left;    ESOPHAGOGASTRODUODENOSCOPY  9/2011    EYE SURGERY      lasix bilateral    FIXATION KYPHOPLASTY N/A 1/31/2020    Procedure: Kyphoplasty T12;  Surgeon: Dk Rosales MD;  Location: [facility] OR;  Service: Pain Management;  Laterality: N/A;    HERNIA REPAIR      hiatal hernai    HYSTERECTOMY      KNEE ARTHROSCOPY W/ MENISCECTOMY Left 1/16/2020    Procedure: ARTHROSCOPY, KNEE, WITH MEDIAL MENISCECTOMY;  Surgeon: Ruben Martinez MD;  Location: [facility] OR;  Service: Orthopedics;  Laterality: Left;    LAPAROSCOPIC NISSEN FUNDOPLICATION       NEPHRECTOMY      LT partial    RADIOFREQUENCY ABLATION OF LUMBAR MEDIAL BRANCH NERVE AT SINGLE LEVEL Bilateral 2020    Procedure: Radiofrequency Ablation, Nerve, Spinal, Lumbar, Medial Branch, 1 Level;  Surgeon: Dk Rosales MD;  Location: [facility];  Service: Pain Management;  Laterality: Bilateral;  L3,L4,L5 - Burned at 80 degrees C. for 60 seconds x 2 each site    REFRACTIVE SURGERY      ROTATOR CUFF REPAIR Left 10/31/2019    Procedure: REPAIR, ROTATOR CUFF;  Surgeon: Ruben Martinez MD;  Location: [facility] OR;  Service: Orthopedics;  Laterality: Left;  arthrex    SKIN BIOPSY      TOTAL ABDOMINAL HYSTERECTOMY W/ BILATERAL SALPINGOOPHORECTOMY  age 48     Social History     Socioeconomic History    Marital status:      Spouse name: Not on file    Number of children: Not on file    Years of education: Not on file    Highest education level: Not on file   Occupational History     Employer: [employer]   Social Needs    Financial resource strain: Somewhat hard    Food insecurity:     Worry: Sometimes true     Inability: Sometimes true    Transportation needs:     Medical: No     Non-medical: No   Tobacco Use    Smoking status: Former Smoker     Packs/day: 1.00     Years: 40.00     Pack years: 40.00     Types: Cigarettes     Last attempt to quit: 2018     Years since quittin.2    Smokeless tobacco: Never Used   Substance and Sexual Activity    Alcohol use: Yes     Alcohol/week: 28.0 standard drinks     Types: 14 Standard drinks or equivalent, 14 Cans of beer per week     Frequency: 4 or more times a week     Drinks per session: 1 or 2     Binge frequency: Never     Comment: 2-3 a day/ social    Drug use: No    Sexual activity: Yes     Partners: Male   Lifestyle    Physical activity:     Days per week: 0 days     Minutes per session: 10 min    Stress: Very much   Relationships    Social connections:     Talks on phone: Twice a week     Gets together: Once a week     Attends  "Restorationism service: Not on file     Active member of club or organization: Yes     Attends meetings of clubs or organizations: More than 4 times per year     Relationship status:    Other Topics Concern    Not on file   Social History Narrative    Not on file     Family History   Problem Relation Age of Onset    Hypertension Father     Glaucoma Neg Hx     Macular degeneration Neg Hx     Retinal detachment Neg Hx      Review of patient's allergies indicates:   Allergen Reactions    Phenergan [promethazine] Hives and Rash    Latex, natural rubber Hives     Per pt report-many years    Morphine Hives    Nsaids (non-steroidal anti-inflammatory drug)      Due to "kidney cancer"     Current Outpatient Medications   Medication Sig    ALPRAZolam (XANAX) 0.5 MG tablet Take 1 tablet (0.5 mg total) by mouth 3 (three) times daily as needed for Anxiety.    amLODIPine (NORVASC) 5 MG tablet Take 1 tablet (5 mg total) by mouth once daily.    calcium-vitamin D3 (CALCIUM 500 + D) 500 mg(1,250mg) -200 unit per tablet Take 1 tablet by mouth 2 (two) times daily with meals.    diclofenac (VOLTAREN) 50 MG EC tablet Take 1 tablet (50 mg total) by mouth 2 (two) times daily with meals.    DULoxetine (CYMBALTA) 30 MG capsule Take 1 capsule (30 mg total) by mouth 2 (two) times daily.    fluticasone (FLONASE) 50 mcg/actuation nasal spray 1 spray by Nasal route daily as needed.     gabapentin (NEURONTIN) 600 MG tablet Take 1 tablet (600 mg total) by mouth 3 (three) times daily.    lidocaine (LIDODERM) 5 % Place 1 patch onto the skin once daily. Remove & Discard patch within 12 hours or as directed by MD    losartan-hydrochlorothiazide 100-25 mg (HYZAAR) 100-25 mg per tablet Take 1 tablet by mouth once daily.    multivit-iron-min-folic acid (MULTIVITAMIN-IRON-MINERALS-FOLIC ACID) 3,500-18-0.4 unit-mg-mg Chew Take by mouth once daily.      NARCAN 4 mg/actuation Spry     polyethylene glycol (GLYCOLAX) 17 gram/dose powder " Take 17 g by mouth once daily.    tramadol-acetaminophen 37.5-325 mg (ULTRACET) 37.5-325 mg Tab Take 1 tablet by mouth every 6 (six) hours as needed for Pain.    zolpidem (AMBIEN) 10 mg Tab Take 1 tablet (10 mg total) by mouth nightly as needed.     No current facility-administered medications for this visit.      Facility-Administered Medications Ordered in Other Visits   Medication    electrolyte-S (ISOLYTE)    lactated ringers infusion       REVIEW OF SYSTEMS:  GENERAL: No weight loss, malaise or fevers.  HEENT: No recent changes in vision or hearing  NECK: Negative for lumps, no difficulty with swallowing.  RESPIRATORY: Negative for cough, wheezing or shortness of breath, patient denies any recent URI.  CARDIOVASCULAR: Negative for chest pain, leg swelling or palpitations.  GI: Negative for abdominal discomfort, blood in stools or black stools or change in bowel habits.  MUSCULOSKELETAL: See HPI.  SKIN: Negative for lesions, rash, and itching.  PSYCH: No mood disorder or recent psychosocial stressors. Patients sleep is not disturbed secondary to pain.  HEMATOLOGY/LYMPHOLOGY: Negative for prolonged bleeding, bruising easily or swollen nodes. Patient is not currently taking any anti-coagulants  NEURO: No history of headaches, syncope, paralysis, seizures or tremors.    All other reviewed and negative other than HPI.    OBJECTIVE: limited physical exam secondary to telemedicine consultation  GEN: No acute distress; patient is alert and oriented to person, place, and time  Head: Normocephalic and atraumatic.   Eyes: Conjunctivae and EOM are normal.   Cardiovascular: Unable to assess  Pulmonary/Chest: Effort normal. No respiratory distress.   Neurological: She is alert and oriented to person, place, and time.   Skin: Skin is dry. She is not diaphoretic.   Psychiatric: Mood, memory, affect and judgment normal.     ASSESSMENT: 64 y.o. year old female with back and left knee pain. The patient is interested in an  epidural steroid injection for her low back pain in the future. The patient would also like to explore non-surgical options for her left knee pain. Treatment plan outlined below.     1. Other spondylosis, lumbar region  diclofenac (VOLTAREN) 50 MG EC tablet   2. DDD (degenerative disc disease), lumbar     3. Primary osteoarthritis of left knee         PLAN:  - Plan for genicular nerve blocks and lumbar [place] in the future pending pandemic status  - Prescribed diclofenac 50 mg PO BID for pain  - Continue gabapentin 400 mg PO TID as she could not tolerate higher doses  - Continue duloxetine 30 mg PO BID as prescribed  - I have stressed the importance of physical activity and a home exercise plan to help with chronic pain and improve health.   - RTC in 2 months for follow-up    The above plan and management options were discussed at length with patient. Patient is in agreement with the above and verbalized understanding. It will be communicated with the referring physician via electronic record, fax, or mail.    Dk Rosales  04/14/2020

## 2020-04-14 NOTE — PROGRESS NOTES
[facility] Note    Chief Complaint      Chief Complaint   Patient presents with    Fatigue    Shortness of Breath    Dizziness    Otalgia     in both ears       History of Present Illness      Beatriz Gan is a 64 y.o. female with chronic conditions of HTN, Anxiety, insomnia, osteoarthritis left shoulder, chronic low back pain who presents today for: complaints of fatigue, shortness of breath, lightheadedness, ear fullness and pain.  Has been unable to walk short distances without shortness of breath which resolves after sitting down and resting.  Cough is present in the morning which is productive of phlegm, does not last throughout the day.  Symptoms started in the past month.  In the past few months, started with left had left knee arthroscopy 1/16/2020. Then, has had T12 compression fracture balloon kyphoplasty on 1/31/2020.  Then had lumbar nerve ablation 2/28/2020.   Then, had left knee steroid injection 3/11/2020.    Also increased anxiety for which we increased cymbalta to twice daily.      Past Medical History:  Past Medical History:   Diagnosis Date    Alcohol dependence     DDD (degenerative disc disease), lumbosacral     DJD (degenerative joint disease), lumbosacral     Encounter for blood transfusion     GERD (gastroesophageal reflux disease)     Gout     Hypercholesterolemia     Hypertension     NATHALIE (iron deficiency anemia)     questionable white coat hypertension    Kidney carcinoma, left 2014    Pneumonia     Renal cell carcinoma     Shingles 10/13/2012       Past Surgical History:   has a past surgical history that includes Total abdominal hysterectomy w/ bilateral salpingoophorectomy (age 48); Appendectomy; Bladder repair; Colonoscopy (9/2011); Esophagogastroduodenoscopy (9/2011); Hysterectomy; Refractive surgery; Nephrectomy; Laparoscopic Nissen fundoplication; Eye surgery; Hernia repair; Skin biopsy; Rotator cuff repair (Left, 10/31/2019); Distal  clavicle excision (Left, 10/31/2019); Arthroscopy of shoulder with decompression of subacromial space (Left, 10/31/2019); Knee arthroscopy w/ meniscectomy (Left, 2020); Fixation Kyphoplasty (N/A, 2020); and Radiofrequency ablation of lumbar medial branch nerve at single level (Bilateral, 2020).    Family History:  family history includes Hypertension in her father.     Social History:  Social History     Tobacco Use    Smoking status: Current Some Day Smoker     Packs/day: 1.00     Years: 40.00     Pack years: 40.00     Types: Cigarettes     Last attempt to quit: 2018     Years since quittin.2    Smokeless tobacco: Never Used   Substance Use Topics    Alcohol use: Yes     Alcohol/week: 28.0 standard drinks     Types: 14 Cans of beer, 14 Standard drinks or equivalent per week     Frequency: 4 or more times a week     Drinks per session: 1 or 2     Binge frequency: Never     Comment: 2-3 a day/ social    Drug use: No       Review of Systems   Constitutional: Negative for chills, fever and malaise/fatigue.   HENT: Positive for ear pain and sore throat.    Respiratory: Positive for sputum production and shortness of breath. Negative for hemoptysis and wheezing.    Cardiovascular: Positive for chest pain, orthopnea, leg swelling and PND. Negative for claudication.   Gastrointestinal: Negative for abdominal pain, constipation, diarrhea, nausea and vomiting.   Musculoskeletal: Negative for neck pain.   Skin: Positive for rash.   Neurological: Positive for headaches. Negative for weakness.        Medications:  Outpatient Encounter Medications as of 2020   Medication Sig Dispense Refill    ALPRAZolam (XANAX) 0.5 MG tablet Take 1 tablet (0.5 mg total) by mouth 3 (three) times daily as needed for Anxiety. 90 tablet 1    amLODIPine (NORVASC) 5 MG tablet Take 1 tablet (5 mg total) by mouth once daily. 90 tablet 3    diclofenac (VOLTAREN) 50 MG EC tablet Take 1 tablet (50 mg total) by mouth 2  (two) times daily with meals. 60 tablet 2    DULoxetine (CYMBALTA) 30 MG capsule Take 1 capsule (30 mg total) by mouth 2 (two) times daily. 180 capsule 3    gabapentin (NEURONTIN) 600 MG tablet Take 1 tablet (600 mg total) by mouth 3 (three) times daily. 90 tablet 11    losartan-hydrochlorothiazide 100-25 mg (HYZAAR) 100-25 mg per tablet Take 1 tablet by mouth once daily. 90 tablet 3    multivit-iron-min-folic acid (MULTIVITAMIN-IRON-MINERALS-FOLIC ACID) 3,500-18-0.4 unit-mg-mg Chew Take by mouth once daily.        polyethylene glycol (GLYCOLAX) 17 gram/dose powder Take 17 g by mouth once daily. 510 g 3    zolpidem (AMBIEN) 10 mg Tab Take 1 tablet (10 mg total) by mouth nightly as needed. 90 tablet 0    [DISCONTINUED] zolpidem (AMBIEN) 10 mg Tab Take 1 tablet (10 mg total) by mouth nightly as needed. 90 tablet 0    fluticasone (FLONASE) 50 mcg/actuation nasal spray 1 spray by Nasal route daily as needed.       lidocaine (LIDODERM) 5 % Place 1 patch onto the skin once daily. Remove & Discard patch within 12 hours or as directed by MD (Patient not taking: Reported on 4/14/2020) 30 patch 0    [DISCONTINUED] calcium-vitamin D3 (CALCIUM 500 + D) 500 mg(1,250mg) -200 unit per tablet Take 1 tablet by mouth 2 (two) times daily with meals.      [DISCONTINUED] NARCAN 4 mg/actuation Spry       [DISCONTINUED] tramadol-acetaminophen 37.5-325 mg (ULTRACET) 37.5-325 mg Tab Take 1 tablet by mouth every 6 (six) hours as needed for Pain.       Facility-Administered Encounter Medications as of 4/14/2020   Medication Dose Route Frequency Provider Last Rate Last Dose    electrolyte-S (ISOLYTE)   Intravenous Continuous Kota Ortega MD 10 mL/hr at 01/31/20 0624      lactated ringers infusion   Intravenous Continuous Kota Ortega MD           Allergies:  Review of patient's allergies indicates:   Allergen Reactions    Phenergan [promethazine] Hives and Rash    Latex, natural rubber Hives     Per pt report-many years  "   Morphine Hives    Nsaids (non-steroidal anti-inflammatory drug)      Due to "kidney cancer"       Health Maintenance:  Immunization History   Administered Date(s) Administered    Influenza 10/16/2014    Influenza - High Dose - PF (65 years and older) 11/11/2019    Influenza - Quadrivalent - PF (6 months and older) 10/22/2008, 10/16/2014, 11/05/2015, 01/09/2017    Influenza - Trivalent - Adjuvanted - PF 09/11/2018    Influenza A (H1N1) 2009 Monovalent - IM 01/08/2010    Pneumococcal Conjugate - 13 Valent 01/17/2014, 01/17/2014    Tdap 06/21/2011      Health Maintenance   Topic Date Due    Lipid Panel  10/31/2015    LDCT Lung Screen  08/28/2016    Pneumococcal Vaccine (65+ High/Highest Risk) (2 of 2 - PPSV23) 09/08/2018    TETANUS VACCINE  06/21/2021    Colonoscopy  09/28/2021    Mammogram  02/14/2022    DEXA SCAN  02/14/2023    Hepatitis C Screening  Completed        Physical Exam      Vital Signs  Temp: 98.5 °F (36.9 °C)  Temp src: Oral  Pulse: 83  Resp: 18  SpO2: 97 %  Pain Score:   6  Pain Loc: Ear  Height and Weight  Height: 5' 4" (162.6 cm)  Weight: 87.6 kg (193 lb 2 oz)  BSA (Calculated - sq m): 1.99 sq meters  BMI (Calculated): 33.1  Weight in (lb) to have BMI = 25: 145.3]    Physical Exam   Constitutional: She appears well-developed and well-nourished.   HENT:   Head: Normocephalic and atraumatic.   Right Ear: External ear normal.   Left Ear: External ear normal.   Mouth/Throat: Oropharynx is clear and moist.   Eyes: Pupils are equal, round, and reactive to light. Conjunctivae and EOM are normal.   Neck: Carotid bruit is not present.   Cardiovascular: Normal rate, regular rhythm, normal heart sounds and intact distal pulses.   No murmur heard.  Pulmonary/Chest: Effort normal and breath sounds normal. She has no wheezes. She has no rales.   Abdominal: Soft. Bowel sounds are normal. She exhibits no distension. There is no hepatosplenomegaly. There is no tenderness.   Musculoskeletal: She " exhibits no edema.   Vitals reviewed.       Laboratory:  CBC:  Recent Labs   Lab 10/29/19  0945 10/30/19  1040 01/09/20  1007   WBC 17.40 H 8.29 4.87   RBC 4.22 4.21 4.34   Hemoglobin 13.7 14.0 13.9   Hematocrit 41.7 42.2 42.0   Platelets 295 243 273   Mean Corpuscular Volume 99 H 100 H 97   Mean Corpuscular Hemoglobin 32.5 H 33.3 H 32.0 H   Mean Corpuscular Hemoglobin Conc 32.9 33.2 33.1     CMP:  Recent Labs   Lab 04/01/19  0740 10/29/19  0945 01/09/20  1007   Glucose 99 129 H 98   Calcium 10.7 H 10.4 10.0   Albumin 4.2 4.3 4.0   Total Protein 7.5 7.4 7.2   Sodium 140 140 141   Potassium 3.7 4.2 3.7   CO2 30 H 30 H 32 H   Chloride 101 98 98   BUN, Bld 16 19 16   Alkaline Phosphatase 105 101 106   ALT 13 15 10   AST 17 12 14   Total Bilirubin 0.6 0.9 0.6     URINALYSIS:  Recent Labs   Lab 05/26/18  1023  01/09/20  1010   Color, UA Yellow   < > Yellow   Specific Gravity, UA 1.025   < > 1.015   pH, UA 5.0   < > 6.0   Protein, UA 2+ A   < > Negative   Bacteria Many A  --  Rare   Nitrite, UA Negative   < > Negative   Leukocytes, UA 2+ A   < > Trace A   Urobilinogen, UA 1.0   < > Negative   Hyaline Casts, UA 0  --   --     < > = values in this interval not displayed.      LIPIDS:  Recent Labs   Lab 10/19/17  1000 05/27/18  0428   TSH 1.822 0.344 L     TSH:  Recent Labs   Lab 10/19/17  1000 05/27/18  0428   TSH 1.822 0.344 L     A1C:        Assessment/Plan     Beatriz Gan is a 64 y.o.female with:    1. Dyspnea on exertion  - EKG 12-lead  - X-Ray Chest PA And Lateral; Future  2. Fatigue, unspecified type  - EKG 12-lead  - X-Ray Chest PA And Lateral; Future  3. Lightheadedness  - EKG 12-lead  - X-Ray Chest PA And Lateral; Future  EKG in office with normal sinus rhtyhm, normal intervals and no ST segment changes or TWI.  Vitals are reassuring.  Will further evaluate postop symptoms with CXR.  Will need to start PT as soon as schedule opens back up.  Also considering undiagnosed COPD.  Will try to set up PFTs  although scheduling soon may be difficult considering COVID pandemic.  Will start on symbicort empirically to see if symptoms improve.  4. Otalgia of both ears  Will add azelastine  5. Primary insomnia  - zolpidem (AMBIEN) 10 mg Tab; Take 1 tablet (10 mg total) by mouth nightly as needed.  Dispense: 90 tablet; Refill: 0       Chronic conditions status updated as per HPI.  Other than changes above, cont current medications and maintain follow up with specialists.  RTC as scheduled.    Jorge Alonzo MD  [facility]      Answers for HPI/ROS submitted by the patient on 4/13/2020   Shortness of breath  Chronicity: recurrent  Onset: more than 1 month ago  Frequency: constantly  Progression since onset: waxing and waning  Episode duration: 24 hours  coryza: No  leg pain: No  rhinorrhea: Yes  swollen glands: Yes  syncope: No  Aggravating factors: emotional upset, exercise, any activity  Improvement on treatment: no relief  Risk factors for DVT/PE: no known risk factors  Treatments tried: OTC cough suppressants  COPD: Yes  pneumonia: Yes  recent surgery: Yes

## 2020-04-22 ENCOUNTER — OFFICE VISIT (OUTPATIENT)
Dept: ORTHOPEDICS | Facility: CLINIC | Age: 65
End: 2020-04-22
Payer: MEDICARE

## 2020-04-22 DIAGNOSIS — Z98.890 HISTORY OF ARTHROSCOPY OF LEFT KNEE: ICD-10-CM

## 2020-04-22 DIAGNOSIS — M51.37 DDD (DEGENERATIVE DISC DISEASE), LUMBOSACRAL: ICD-10-CM

## 2020-04-22 DIAGNOSIS — M17.12 PRIMARY OSTEOARTHRITIS OF LEFT KNEE: Primary | ICD-10-CM

## 2020-04-22 DIAGNOSIS — M94.262 CHONDROMALACIA, KNEE, LEFT: ICD-10-CM

## 2020-04-22 PROCEDURE — 99213 OFFICE O/P EST LOW 20 MIN: CPT | Mod: 95,,, | Performed by: ORTHOPAEDIC SURGERY

## 2020-04-22 PROCEDURE — 99213 PR OFFICE/OUTPT VISIT, EST, LEVL III, 20-29 MIN: ICD-10-PCS | Mod: 95,,, | Performed by: ORTHOPAEDIC SURGERY

## 2020-04-22 RX ORDER — HYDROCODONE BITARTRATE AND ACETAMINOPHEN 5; 325 MG/1; MG/1
1 TABLET ORAL EVERY 6 HOURS PRN
Qty: 21 TABLET | Refills: 0 | Status: SHIPPED | OUTPATIENT
Start: 2020-04-22 | End: 2020-05-13 | Stop reason: SDUPTHER

## 2020-04-22 NOTE — PROGRESS NOTES
Subjective:        The chief complaint leading to consultation is:  Residual left knee pain secondary to chondromalacia; status post left knee arthroscopy in January 2020.  Status post left knee intra-articular Synvisc-One injection 6 weeks ago  The patient location is:  [place], [place]  Visit type: Virtual visit with synchronous audio/video or audio only  This was a video visit in lieu of in-person visit due to the coronavirus emergency. Patient acknowledged and consented to the video visit encounter.     HPI    Continues to have left knee pain status post arthroscopy and Synvisc intra-articular.  Is already established with Dr. Rosales as a pain management physician.  She has discuss her left knee with Dr. Rosales in the past and Dr. Rosales has recommended a radiofrequency ablation.  The patient is interested in this and does not want a yet consider a total knee arthroplasty.  Past Surgical History:   Procedure Laterality Date    APPENDECTOMY      ARTHROSCOPY OF SHOULDER WITH DECOMPRESSION OF SUBACROMIAL SPACE Left 10/31/2019    Procedure: ARTHROSCOPY, SHOULDER, WITH SUBACROMIAL SPACE DECOMPRESSION;  Surgeon: Ruben Martinez MD;  Location: [facility] OR;  Service: Orthopedics;  Laterality: Left;    BLADDER REPAIR      x 2    COLONOSCOPY  9/2011    DISTAL CLAVICLE EXCISION Left 10/31/2019    Procedure: EXCISION, CLAVICLE, DISTAL;  Surgeon: Ruben Martinez MD;  Location: [facility] OR;  Service: Orthopedics;  Laterality: Left;    ESOPHAGOGASTRODUODENOSCOPY  9/2011    EYE SURGERY      lasix bilateral    FIXATION KYPHOPLASTY N/A 1/31/2020    Procedure: Kyphoplasty T12;  Surgeon: Dk Rosales MD;  Location: [facility] OR;  Service: Pain Management;  Laterality: N/A;    HERNIA REPAIR      hiatal hernai    HYSTERECTOMY      KNEE ARTHROSCOPY W/ MENISCECTOMY Left 1/16/2020    Procedure: ARTHROSCOPY, KNEE, WITH MEDIAL MENISCECTOMY;  Surgeon: Ruben Martinez MD;  Location: [facility] OR;  Service: Orthopedics;  Laterality:  Left;    LAPAROSCOPIC NISSEN FUNDOPLICATION      NEPHRECTOMY      LT partial    RADIOFREQUENCY ABLATION OF LUMBAR MEDIAL BRANCH NERVE AT SINGLE LEVEL Bilateral 2/28/2020    Procedure: Radiofrequency Ablation, Nerve, Spinal, Lumbar, Medial Branch, 1 Level;  Surgeon: Dk Rosales MD;  Location[facility] OR;  Service: Pain Management;  Laterality: Bilateral;  L3,L4,L5 - Burned at 80 degrees C. for 60 seconds x 2 each site    REFRACTIVE SURGERY      ROTATOR CUFF REPAIR Left 10/31/2019    Procedure: REPAIR, ROTATOR CUFF;  Surgeon: Ruben Martinez MD;  Location: [facility] OR;  Service: Orthopedics;  Laterality: Left;  arthrex    SKIN BIOPSY      TOTAL ABDOMINAL HYSTERECTOMY W/ BILATERAL SALPINGOOPHORECTOMY  age 48     Past Medical History:   Diagnosis Date    Alcohol dependence     DDD (degenerative disc disease), lumbosacral     DJD (degenerative joint disease), lumbosacral     Encounter for blood transfusion     GERD (gastroesophageal reflux disease)     Gout     Hypercholesterolemia     Hypertension     NATHALIE (iron deficiency anemia)     questionable white coat hypertension    Kidney carcinoma, left 2014    Pneumonia     Renal cell carcinoma     Shingles 10/13/2012     Family History   Problem Relation Age of Onset    Hypertension Father     Glaucoma Neg Hx     Macular degeneration Neg Hx     Retinal detachment Neg Hx         Social History:   Marital Status:   Alcohol History:  reports that she drinks about 28.0 standard drinks of alcohol per week.  Tobacco History:  reports that she has been smoking cigarettes. She has a 40.00 pack-year smoking history. She has never used smokeless tobacco.  Drug History:  reports that she does not use drugs.    Review of patient's allergies indicates:   Allergen Reactions    Phenergan [promethazine] Hives and Rash    Latex, natural rubber Hives     Per pt report-many years    Morphine Hives    Nsaids (non-steroidal anti-inflammatory drug)      Due to  ""kidney cancer"       Current Outpatient Medications   Medication Sig Dispense Refill    ALPRAZolam (XANAX) 0.5 MG tablet Take 1 tablet (0.5 mg total) by mouth 3 (three) times daily as needed for Anxiety. 90 tablet 1    amLODIPine (NORVASC) 5 MG tablet Take 1 tablet (5 mg total) by mouth once daily. 90 tablet 3    azelastine (ASTELIN) 137 mcg (0.1 %) nasal spray 1 spray (137 mcg total) by Nasal route 2 (two) times daily. 30 mL 3    budesonide-formoterol 80-4.5 mcg (SYMBICORT) 80-4.5 mcg/actuation HFAA Inhale 2 puffs into the lungs 2 (two) times daily. Controller 1 Inhaler 3    diclofenac (VOLTAREN) 50 MG EC tablet Take 1 tablet (50 mg total) by mouth 2 (two) times daily with meals. 60 tablet 2    DULoxetine (CYMBALTA) 30 MG capsule Take 1 capsule (30 mg total) by mouth 2 (two) times daily. 180 capsule 3    fluticasone (FLONASE) 50 mcg/actuation nasal spray 1 spray by Nasal route daily as needed.       gabapentin (NEURONTIN) 600 MG tablet Take 1 tablet (600 mg total) by mouth 3 (three) times daily. 90 tablet 11    HYDROcodone-acetaminophen (NORCO) 5-325 mg per tablet Take 1 tablet by mouth every 6 (six) hours as needed for Pain. 21 tablet 0    lidocaine (LIDODERM) 5 % Place 1 patch onto the skin once daily. Remove & Discard patch within 12 hours or as directed by MD (Patient not taking: Reported on 4/14/2020) 30 patch 0    losartan-hydrochlorothiazide 100-25 mg (HYZAAR) 100-25 mg per tablet Take 1 tablet by mouth once daily. 90 tablet 3    multivit-iron-min-folic acid (MULTIVITAMIN-IRON-MINERALS-FOLIC ACID) 3,500-18-0.4 unit-mg-mg Chew Take by mouth once daily.        polyethylene glycol (GLYCOLAX) 17 gram/dose powder Take 17 g by mouth once daily. 510 g 3    zolpidem (AMBIEN) 10 mg Tab Take 1 tablet (10 mg total) by mouth nightly as needed. 90 tablet 0     No current facility-administered medications for this visit.      Facility-Administered Medications Ordered in Other Visits   Medication Dose Route " Frequency Provider Last Rate Last Dose    electrolyte-S (ISOLYTE)   Intravenous Continuous Kota Ortega MD 10 mL/hr at 01/31/20 0624      lactated ringers infusion   Intravenous Continuous Kota Ortega MD           Review of Systems      Objective:        Physical Exam:   Physical Exam         Assessment:       1. Primary osteoarthritis of left knee    2. Chondromalacia, knee, left    3. History of arthroscopy of left knee      Plan:   Primary osteoarthritis of left knee    Chondromalacia, knee, left    History of arthroscopy of left knee      No follow-ups on file.    Total time spent with patient: 15  Patient's history diagnosis and recommendations were discussed with the physician's of systems.  Treatment plan is listed as below  At this time we recommend she follow up with Dr. Rosales for medical pain management/medications as well as proceeding with the left knee radiofrequency ablation.  I did give her a refill on her pain medicine but will refer future refills to her pain management specialist.  Follow-up in 3 months or sooner if needed    Each patient to whom he or she provides medical services by telemedicine is:  (1) informed of the relationship between the physician and patient and the respective role of any other health care provider with respect to management of the patient; and (2) notified that he or she may decline to receive medical services by telemedicine and may withdraw from such care at any time.    This note was created using [employer] voice recognition software that occasionally misinterprets phrases or words.

## 2020-04-30 ENCOUNTER — PATIENT MESSAGE (OUTPATIENT)
Dept: FAMILY MEDICINE | Facility: CLINIC | Age: 65
End: 2020-04-30

## 2020-05-01 ENCOUNTER — OFFICE VISIT (OUTPATIENT)
Dept: PRIMARY CARE CLINIC | Facility: CLINIC | Age: 65
End: 2020-05-01
Payer: MEDICARE

## 2020-05-01 DIAGNOSIS — R53.81 MALAISE: ICD-10-CM

## 2020-05-01 DIAGNOSIS — J32.9 SINUSITIS, UNSPECIFIED CHRONICITY, UNSPECIFIED LOCATION: Primary | ICD-10-CM

## 2020-05-01 DIAGNOSIS — R05.9 COUGH: ICD-10-CM

## 2020-05-01 DIAGNOSIS — H92.02 LEFT EAR PAIN: ICD-10-CM

## 2020-05-01 PROCEDURE — 99214 PR OFFICE/OUTPT VISIT, EST, LEVL IV, 30-39 MIN: ICD-10-PCS | Mod: 95,,, | Performed by: INTERNAL MEDICINE

## 2020-05-01 PROCEDURE — 99214 OFFICE O/P EST MOD 30 MIN: CPT | Mod: 95,,, | Performed by: INTERNAL MEDICINE

## 2020-05-01 RX ORDER — AZITHROMYCIN 250 MG/1
TABLET, FILM COATED ORAL
Qty: 6 TABLET | Refills: 0 | Status: SHIPPED | OUTPATIENT
Start: 2020-05-01 | End: 2020-05-06

## 2020-05-01 RX ORDER — METHYLPREDNISOLONE 4 MG/1
TABLET ORAL
Qty: 1 PACKAGE | Refills: 0 | Status: SHIPPED | OUTPATIENT
Start: 2020-05-01 | End: 2020-05-22

## 2020-05-01 NOTE — PROGRESS NOTES
[facility] Virtual Visit Note    The patient location is: Home  The chief complaint leading to consultation is: Sore throat  Visit type: Virtual visit with synchronous audio and video  Total time spent with patient: 20 min  Each patient to whom he or she provides medical services by telemedicine is:  (1) informed of the relationship between the physician and patient and the respective role of any other health care provider with respect to management of the patient; and (2) notified that he or she may decline to receive medical services by telemedicine and may withdraw from such care at any time.      Chief Complaint      Chief Complaint   Patient presents with    Sore Throat    Cough       History of Present Illness      Beatriz Gan is a 64 y.o. female with chronic conditions of HTN, Anxiety, insomnia, osteoarthritis left shoulder, chronic low back pain who presents today for: complaints of sore throat on the left side, postnasal drip, left ear pain.  Symptoms present for past few weeks but seem to be worsened in the past few days.  No fevers, chills.  Cough described at last visit, still present in the mornings. Has been using symbicort, nasal azelastine without improvement.  Still with fatigue/malaise.    Past Medical History:  Past Medical History:   Diagnosis Date    Alcohol dependence     DDD (degenerative disc disease), lumbosacral     DJD (degenerative joint disease), lumbosacral     Encounter for blood transfusion     GERD (gastroesophageal reflux disease)     Gout     Hypercholesterolemia     Hypertension     NATHALIE (iron deficiency anemia)     questionable white coat hypertension    Kidney carcinoma, left 2014    Pneumonia     Renal cell carcinoma     Shingles 10/13/2012       Past Surgical History:   has a past surgical history that includes Total abdominal hysterectomy w/ bilateral salpingoophorectomy (age 48); Appendectomy; Bladder repair; Colonoscopy (9/2011);  Esophagogastroduodenoscopy (2011); Hysterectomy; Refractive surgery; Nephrectomy; Laparoscopic Nissen fundoplication; Eye surgery; Hernia repair; Skin biopsy; Rotator cuff repair (Left, 10/31/2019); Distal clavicle excision (Left, 10/31/2019); Arthroscopy of shoulder with decompression of subacromial space (Left, 10/31/2019); Knee arthroscopy w/ meniscectomy (Left, 2020); Fixation Kyphoplasty (N/A, 2020); and Radiofrequency ablation of lumbar medial branch nerve at single level (Bilateral, 2020).    Family History:  family history includes Hypertension in her father.     Social History:  Social History     Tobacco Use    Smoking status: Current Some Day Smoker     Packs/day: 1.00     Years: 40.00     Pack years: 40.00     Types: Cigarettes     Last attempt to quit: 2018     Years since quittin.3    Smokeless tobacco: Never Used   Substance Use Topics    Alcohol use: Yes     Alcohol/week: 28.0 standard drinks     Types: 14 Cans of beer, 14 Standard drinks or equivalent per week     Frequency: 4 or more times a week     Drinks per session: 1 or 2     Binge frequency: Never     Comment: 2-3 a day/ social    Drug use: No       Review of Systems   Constitutional: Negative for chills, fever and malaise/fatigue.   Respiratory: Negative for shortness of breath.    Cardiovascular: Negative for chest pain.   Gastrointestinal: Negative for constipation, diarrhea, nausea and vomiting.   Skin: Negative for rash.   Neurological: Negative for weakness.        Medications:  Outpatient Encounter Medications as of 2020   Medication Sig Dispense Refill    ALPRAZolam (XANAX) 0.5 MG tablet Take 1 tablet (0.5 mg total) by mouth 3 (three) times daily as needed for Anxiety. 90 tablet 1    amLODIPine (NORVASC) 5 MG tablet Take 1 tablet (5 mg total) by mouth once daily. 90 tablet 3    azelastine (ASTELIN) 137 mcg (0.1 %) nasal spray 1 spray (137 mcg total) by Nasal route 2 (two) times daily. 30 mL 3     azithromycin (Z-QUYEN) 250 MG tablet Take 2 tablets by mouth on day 1; Take 1 tablet by mouth on days 2-5 6 tablet 0    budesonide-formoterol 80-4.5 mcg (SYMBICORT) 80-4.5 mcg/actuation HFAA Inhale 2 puffs into the lungs 2 (two) times daily. Controller 1 Inhaler 3    diclofenac (VOLTAREN) 50 MG EC tablet Take 1 tablet (50 mg total) by mouth 2 (two) times daily with meals. 60 tablet 2    DULoxetine (CYMBALTA) 30 MG capsule Take 1 capsule (30 mg total) by mouth 2 (two) times daily. 180 capsule 3    fluticasone (FLONASE) 50 mcg/actuation nasal spray 1 spray by Nasal route daily as needed.       gabapentin (NEURONTIN) 600 MG tablet Take 1 tablet (600 mg total) by mouth 3 (three) times daily. 90 tablet 11    HYDROcodone-acetaminophen (NORCO) 5-325 mg per tablet Take 1 tablet by mouth every 6 (six) hours as needed for Pain. 21 tablet 0    lidocaine (LIDODERM) 5 % Place 1 patch onto the skin once daily. Remove & Discard patch within 12 hours or as directed by MD (Patient not taking: Reported on 4/14/2020) 30 patch 0    losartan-hydrochlorothiazide 100-25 mg (HYZAAR) 100-25 mg per tablet Take 1 tablet by mouth once daily. 90 tablet 3    methylPREDNISolone (MEDROL DOSEPACK) 4 mg tablet use as directed 1 Package 0    multivit-iron-min-folic acid (MULTIVITAMIN-IRON-MINERALS-FOLIC ACID) 3,500-18-0.4 unit-mg-mg Chew Take by mouth once daily.        polyethylene glycol (GLYCOLAX) 17 gram/dose powder Take 17 g by mouth once daily. 510 g 3    zolpidem (AMBIEN) 10 mg Tab Take 1 tablet (10 mg total) by mouth nightly as needed. 90 tablet 0     Facility-Administered Encounter Medications as of 5/1/2020   Medication Dose Route Frequency Provider Last Rate Last Dose    electrolyte-S (ISOLYTE)   Intravenous Continuous Kota Ortega MD 10 mL/hr at 01/31/20 0624      lactated ringers infusion   Intravenous Continuous Kota Ortega MD           Allergies:  Review of patient's allergies indicates:   Allergen Reactions     "Phenergan [promethazine] Hives and Rash    Latex, natural rubber Hives     Per pt report-many years    Morphine Hives    Nsaids (non-steroidal anti-inflammatory drug)      Due to "kidney cancer"       Health Maintenance:  Immunization History   Administered Date(s) Administered    Influenza 10/16/2014    Influenza - High Dose - PF (65 years and older) 11/11/2019    Influenza - Quadrivalent - PF (6 months and older) 10/22/2008, 10/16/2014, 11/05/2015, 01/09/2017    Influenza - Trivalent - Adjuvanted - PF 09/11/2018    Influenza A (H1N1) 2009 Monovalent - IM 01/08/2010    Pneumococcal Conjugate - 13 Valent 01/17/2014, 01/17/2014    Tdap 06/21/2011      Health Maintenance   Topic Date Due    Lipid Panel  10/31/2015    LDCT Lung Screen  08/28/2016    Pneumococcal Vaccine (65+ High/Highest Risk) (2 of 2 - PPSV23) 09/08/2018    TETANUS VACCINE  06/21/2021    Colonoscopy  09/28/2021    Mammogram  02/14/2022    DEXA SCAN  02/14/2023    Hepatitis C Screening  Completed        Physical Exam       ]    Physical Exam   Constitutional: She is oriented to person, place, and time. She appears well-developed and well-nourished. No distress.   Eyes: Conjunctivae and EOM are normal. Right eye exhibits no discharge. Left eye exhibits no discharge. No scleral icterus.   Pulmonary/Chest: Effort normal. No respiratory distress.   Neurological: She is alert and oriented to person, place, and time.   Skin: She is not diaphoretic.   Psychiatric: She has a normal mood and affect. Her behavior is normal. Judgment and thought content normal.        Laboratory:  CBC:  Recent Labs   Lab 10/29/19  0945 10/30/19  1040 01/09/20  1007   WBC 17.40 H 8.29 4.87   RBC 4.22 4.21 4.34   Hemoglobin 13.7 14.0 13.9   Hematocrit 41.7 42.2 42.0   Platelets 295 243 273   Mean Corpuscular Volume 99 H 100 H 97   Mean Corpuscular Hemoglobin 32.5 H 33.3 H 32.0 H   Mean Corpuscular Hemoglobin Conc 32.9 33.2 33.1     CMP:  Recent Labs   Lab " 04/01/19  0740 10/29/19  0945 01/09/20  1007   Glucose 99 129 H 98   Calcium 10.7 H 10.4 10.0   Albumin 4.2 4.3 4.0   Total Protein 7.5 7.4 7.2   Sodium 140 140 141   Potassium 3.7 4.2 3.7   CO2 30 H 30 H 32 H   Chloride 101 98 98   BUN, Bld 16 19 16   Alkaline Phosphatase 105 101 106   ALT 13 15 10   AST 17 12 14   Total Bilirubin 0.6 0.9 0.6     URINALYSIS:  Recent Labs   Lab 05/26/18  1023  01/09/20  1010   Color, UA Yellow   < > Yellow   Specific Gravity, UA 1.025   < > 1.015   pH, UA 5.0   < > 6.0   Protein, UA 2+ A   < > Negative   Bacteria Many A  --  Rare   Nitrite, UA Negative   < > Negative   Leukocytes, UA 2+ A   < > Trace A   Urobilinogen, UA 1.0   < > Negative   Hyaline Casts, UA 0  --   --     < > = values in this interval not displayed.      LIPIDS:  Recent Labs   Lab 10/19/17  1000 05/27/18  0428   TSH 1.822 0.344 L     TSH:  Recent Labs   Lab 10/19/17  1000 05/27/18  0428   TSH 1.822 0.344 L     A1C:        Assessment/Plan     Beatriz Gan is a 64 y.o.female with:    1. Sinusitis, unspecified chronicity, unspecified location  - methylPREDNISolone (MEDROL DOSEPACK) 4 mg tablet; use as directed  Dispense: 1 Package; Refill: 0  - azithromycin (Z-QUYEN) 250 MG tablet; Take 2 tablets by mouth on day 1; Take 1 tablet by mouth on days 2-5  Dispense: 6 tablet; Refill: 0    2. Cough  - methylPREDNISolone (MEDROL DOSEPACK) 4 mg tablet; use as directed  Dispense: 1 Package; Refill: 0  - azithromycin (Z-QUYEN) 250 MG tablet; Take 2 tablets by mouth on day 1; Take 1 tablet by mouth on days 2-5  Dispense: 6 tablet; Refill: 0    3. Malaise  - methylPREDNISolone (MEDROL DOSEPACK) 4 mg tablet; use as directed  Dispense: 1 Package; Refill: 0  - azithromycin (Z-QUYEN) 250 MG tablet; Take 2 tablets by mouth on day 1; Take 1 tablet by mouth on days 2-5  Dispense: 6 tablet; Refill: 0    4. Left ear pain  - methylPREDNISolone (MEDROL DOSEPACK) 4 mg tablet; use as directed  Dispense: 1 Package; Refill: 0  - azithromycin  (Z-QUYEN) 250 MG tablet; Take 2 tablets by mouth on day 1; Take 1 tablet by mouth on days 2-5  Dispense: 6 tablet; Refill: 0     Symptoms refractory to azelastine, symbicort.  New sore throat and worsening ear pain.  Will call in medrol and zpak for possible infection.  Due to symptoms, will test for COVID. Otherwise continue with regimen and f/u with pain managmenet and ortho as scheduled.      Jorge Alonzo MD  [facility]

## 2020-05-02 ENCOUNTER — LAB VISIT (OUTPATIENT)
Dept: INTERNAL MEDICINE | Facility: CLINIC | Age: 65
End: 2020-05-02
Payer: MEDICARE

## 2020-05-02 DIAGNOSIS — H92.02 LEFT EAR PAIN: ICD-10-CM

## 2020-05-02 DIAGNOSIS — R53.81 MALAISE: ICD-10-CM

## 2020-05-02 DIAGNOSIS — R05.9 COUGH: ICD-10-CM

## 2020-05-02 LAB — SARS-COV-2 RNA RESP QL NAA+PROBE: NOT DETECTED

## 2020-05-02 PROCEDURE — U0002 COVID-19 LAB TEST NON-CDC: HCPCS

## 2020-05-04 RX ORDER — ALPRAZOLAM 0.5 MG/1
0.5 TABLET ORAL 3 TIMES DAILY PRN
Qty: 90 TABLET | Refills: 1 | Status: ON HOLD | OUTPATIENT
Start: 2020-05-04 | End: 2020-10-19

## 2020-05-04 NOTE — TELEPHONE ENCOUNTER
----- Message from Antonietta Ybarra sent at 5/4/2020  8:50 AM CDT -----  Contact: self/[phone]  Patient is returning a phone call.  Who left a message for the patient: Dr Alonzo  Does patient know what this is regarding:  no  Comments:

## 2020-05-11 ENCOUNTER — TELEPHONE (OUTPATIENT)
Dept: FAMILY MEDICINE | Facility: CLINIC | Age: 65
End: 2020-05-11

## 2020-05-11 NOTE — TELEPHONE ENCOUNTER
----- Message from Adriana Shahid sent at 5/11/2020  8:39 AM CDT -----  Contact: patient  Pt missed a call and would like the nurse to return their call.    Pt can be reached at [phone]       Thanks  KB

## 2020-05-12 ENCOUNTER — PATIENT MESSAGE (OUTPATIENT)
Dept: ORTHOPEDICS | Facility: CLINIC | Age: 65
End: 2020-05-12

## 2020-05-12 DIAGNOSIS — M51.37 DDD (DEGENERATIVE DISC DISEASE), LUMBOSACRAL: ICD-10-CM

## 2020-05-13 ENCOUNTER — TELEPHONE (OUTPATIENT)
Dept: ORTHOPEDICS | Facility: CLINIC | Age: 65
End: 2020-05-13

## 2020-05-13 RX ORDER — HYDROCODONE BITARTRATE AND ACETAMINOPHEN 5; 325 MG/1; MG/1
1 TABLET ORAL EVERY 6 HOURS PRN
Qty: 28 TABLET | Refills: 0 | Status: SHIPPED | OUTPATIENT
Start: 2020-05-13 | End: 2020-05-20

## 2020-05-13 NOTE — TELEPHONE ENCOUNTER
----- Message from Paulette Stein sent at 5/13/2020  2:13 PM CDT -----  Contact: Candi bush/ [facility]  Candi stated that she can't fill the [place] W/O Provider approval due to Pt taking Ambien and Xanax which were filled on May 4 and May 8. Call back # [phone]

## 2020-05-14 ENCOUNTER — TELEPHONE (OUTPATIENT)
Dept: FAMILY MEDICINE | Facility: CLINIC | Age: 65
End: 2020-05-14

## 2020-05-14 ENCOUNTER — OFFICE VISIT (OUTPATIENT)
Dept: FAMILY MEDICINE | Facility: CLINIC | Age: 65
End: 2020-05-14
Payer: MEDICARE

## 2020-05-14 DIAGNOSIS — F41.9 ANXIETY: ICD-10-CM

## 2020-05-14 DIAGNOSIS — I10 ESSENTIAL HYPERTENSION: Primary | ICD-10-CM

## 2020-05-14 DIAGNOSIS — F51.01 PRIMARY INSOMNIA: ICD-10-CM

## 2020-05-14 DIAGNOSIS — Z85.528 HISTORY OF RENAL CELL CARCINOMA: ICD-10-CM

## 2020-05-14 DIAGNOSIS — M51.36 DDD (DEGENERATIVE DISC DISEASE), LUMBAR: ICD-10-CM

## 2020-05-14 DIAGNOSIS — E78.2 HYPERLIPIDEMIA, MIXED: ICD-10-CM

## 2020-05-14 DIAGNOSIS — F33.1 MAJOR DEPRESSIVE DISORDER, RECURRENT EPISODE, MODERATE: ICD-10-CM

## 2020-05-14 PROBLEM — J98.01 ACUTE BRONCHOSPASM: Status: RESOLVED | Noted: 2018-05-27 | Resolved: 2020-05-14

## 2020-05-14 PROCEDURE — 99214 PR OFFICE/OUTPT VISIT, EST, LEVL IV, 30-39 MIN: ICD-10-PCS | Mod: 95,,, | Performed by: INTERNAL MEDICINE

## 2020-05-14 PROCEDURE — 99214 OFFICE O/P EST MOD 30 MIN: CPT | Mod: 95,,, | Performed by: INTERNAL MEDICINE

## 2020-05-14 NOTE — PROGRESS NOTES
[facility] Virtual Visit Note    The patient location is: home  The chief complaint leading to consultation is: follow up chronic conditions  Visit type: Virtual visit with synchronous audio and video  Total time spent with patient: 20 min  Each patient to whom he or she provides medical services by telemedicine is:  (1) informed of the relationship between the physician and patient and the respective role of any other health care provider with respect to management of the patient; and (2) notified that he or she may decline to receive medical services by telemedicine and may withdraw from such care at any time.      Chief Complaint      Chief Complaint   Patient presents with    Follow-up       History of Present Illness      Beatriz Gan is a 64 y.o. female with chronic conditions of HTN, Anxiety, insomnia, osteoarthritis left shoulder, chronic low back pain, hx of RCC who presents today for: follow up chronic conditions.  Since last visit, has had improvement with sinus congestion and sore throat after medrol dosepack and zithromax.   HTN: BP at goal on losartan-hctz, amlodipine  Anxiety: Controlled on cymbalta, xanax as needed.  SYmptoms better now that pain has improved.  Insomnia: Controlled on ambien.  No new or worsening symptoms  Osteoarthritis, shoulder, chronic low back pain:  Sees Dr. Martinez and Dr. Rosales.  Doing much better on gabapentin and diclofenac. Still waiting for PT to reopen.    Hx of RCC: Sees Dr. Morrissey for surveillance.  Will be due for CT soon.  Pt waiting 2/2 COVID concerns  Flu shot UTD.  TdAP 2011.  Prevnar UTD.    Mammogram 2019.  DEXA 2019.    Cscope 15 yrs ago.  Cologuard UTD.      Past Medical History:  Past Medical History:   Diagnosis Date    Alcohol dependence     DDD (degenerative disc disease), lumbosacral     DJD (degenerative joint disease), lumbosacral     Encounter for blood transfusion     GERD (gastroesophageal reflux disease)     Gout      Hypercholesterolemia     Hypertension     NATHALIE (iron deficiency anemia)     questionable white coat hypertension    Kidney carcinoma, left     Pneumonia     Renal cell carcinoma     Shingles 10/13/2012       Past Surgical History:   has a past surgical history that includes Total abdominal hysterectomy w/ bilateral salpingoophorectomy (age 48); Appendectomy; Bladder repair; Colonoscopy (2011); Esophagogastroduodenoscopy (2011); Hysterectomy; Refractive surgery; Nephrectomy; Laparoscopic Nissen fundoplication; Eye surgery; Hernia repair; Skin biopsy; Rotator cuff repair (Left, 10/31/2019); Distal clavicle excision (Left, 10/31/2019); Arthroscopy of shoulder with decompression of subacromial space (Left, 10/31/2019); Knee arthroscopy w/ meniscectomy (Left, 2020); Fixation Kyphoplasty (N/A, 2020); and Radiofrequency ablation of lumbar medial branch nerve at single level (Bilateral, 2020).    Family History:  family history includes Hypertension in her father.     Social History:  Social History     Tobacco Use    Smoking status: Current Some Day Smoker     Packs/day: 1.00     Years: 40.00     Pack years: 40.00     Types: Cigarettes     Last attempt to quit: 2018     Years since quittin.3    Smokeless tobacco: Never Used   Substance Use Topics    Alcohol use: Yes     Alcohol/week: 28.0 standard drinks     Types: 14 Cans of beer, 14 Standard drinks or equivalent per week     Frequency: 4 or more times a week     Drinks per session: 1 or 2     Binge frequency: Never     Comment: 2-3 a day/ social    Drug use: No       Review of Systems   Constitutional: Negative for chills, fever and malaise/fatigue.   HENT: Negative for hearing loss.    Eyes: Positive for discharge.   Respiratory: Negative for shortness of breath and wheezing.    Cardiovascular: Positive for palpitations. Negative for chest pain.   Gastrointestinal: Negative for blood in stool, constipation, diarrhea, nausea and  vomiting.   Genitourinary: Negative for dysuria and hematuria.   Musculoskeletal: Negative for neck pain.   Skin: Negative for rash.   Neurological: Positive for weakness and headaches.   Endo/Heme/Allergies: Negative for polydipsia.        Medications:  Outpatient Encounter Medications as of 5/14/2020   Medication Sig Dispense Refill    ALPRAZolam (XANAX) 0.5 MG tablet Take 1 tablet (0.5 mg total) by mouth 3 (three) times daily as needed for Anxiety. 90 tablet 1    amLODIPine (NORVASC) 5 MG tablet Take 1 tablet (5 mg total) by mouth once daily. 90 tablet 3    azelastine (ASTELIN) 137 mcg (0.1 %) nasal spray 1 spray (137 mcg total) by Nasal route 2 (two) times daily. 30 mL 3    budesonide-formoterol 80-4.5 mcg (SYMBICORT) 80-4.5 mcg/actuation HFAA Inhale 2 puffs into the lungs 2 (two) times daily. Controller 1 Inhaler 3    diclofenac (VOLTAREN) 50 MG EC tablet Take 1 tablet (50 mg total) by mouth 2 (two) times daily with meals. 60 tablet 2    DULoxetine (CYMBALTA) 30 MG capsule Take 1 capsule (30 mg total) by mouth 2 (two) times daily. 180 capsule 3    fluticasone (FLONASE) 50 mcg/actuation nasal spray 1 spray by Nasal route daily as needed.       gabapentin (NEURONTIN) 600 MG tablet Take 1 tablet (600 mg total) by mouth 3 (three) times daily. 90 tablet 11    HYDROcodone-acetaminophen (NORCO) 5-325 mg per tablet Take 1 tablet by mouth every 6 (six) hours as needed for Pain. 28 tablet 0    lidocaine (LIDODERM) 5 % Place 1 patch onto the skin once daily. Remove & Discard patch within 12 hours or as directed by MD (Patient not taking: Reported on 4/14/2020) 30 patch 0    losartan-hydrochlorothiazide 100-25 mg (HYZAAR) 100-25 mg per tablet Take 1 tablet by mouth once daily. 90 tablet 3    methylPREDNISolone (MEDROL DOSEPACK) 4 mg tablet use as directed 1 Package 0    multivit-iron-min-folic acid (MULTIVITAMIN-IRON-MINERALS-FOLIC ACID) 3,500-18-0.4 unit-mg-mg Chew Take by mouth once daily.         "polyethylene glycol (GLYCOLAX) 17 gram/dose powder Take 17 g by mouth once daily. 510 g 3    zolpidem (AMBIEN) 10 mg Tab Take 1 tablet (10 mg total) by mouth nightly as needed. 90 tablet 0     Facility-Administered Encounter Medications as of 5/14/2020   Medication Dose Route Frequency Provider Last Rate Last Dose    electrolyte-S (ISOLYTE)   Intravenous Continuous Kota Ortega MD 10 mL/hr at 01/31/20 0624      lactated ringers infusion   Intravenous Continuous Kota Ortega MD           Allergies:  Review of patient's allergies indicates:   Allergen Reactions    Phenergan [promethazine] Hives and Rash    Latex, natural rubber Hives     Per pt report-many years    Morphine Hives    Nsaids (non-steroidal anti-inflammatory drug)      Due to "kidney cancer"       Health Maintenance:  Immunization History   Administered Date(s) Administered    Influenza 10/16/2014    Influenza - High Dose - PF (65 years and older) 11/11/2019    Influenza - Quadrivalent - PF (6 months and older) 10/22/2008, 10/16/2014, 11/05/2015, 01/09/2017    Influenza - Trivalent - Adjuvanted - PF 09/11/2018    Influenza A (H1N1) 2009 Monovalent - IM 01/08/2010    Pneumococcal Conjugate - 13 Valent 01/17/2014, 01/17/2014    Tdap 06/21/2011      Health Maintenance   Topic Date Due    Lipid Panel  10/31/2015    LDCT Lung Screen  08/28/2016    Pneumococcal Vaccine (65+ High/Highest Risk) (2 of 2 - PPSV23) 09/08/2018    TETANUS VACCINE  06/21/2021    Colonoscopy  09/28/2021    Mammogram  02/14/2022    DEXA SCAN  02/14/2023    Hepatitis C Screening  Completed        Physical Exam       ]    Physical Exam   Constitutional: She is oriented to person, place, and time. She appears well-developed and well-nourished. No distress.   Eyes: Conjunctivae and EOM are normal. Right eye exhibits no discharge. Left eye exhibits no discharge. No scleral icterus.   Pulmonary/Chest: Effort normal. No respiratory distress.   Neurological: She is " alert and oriented to person, place, and time.   Skin: She is not diaphoretic.   Psychiatric: She has a normal mood and affect. Her behavior is normal. Judgment and thought content normal.        Laboratory:  CBC:  Recent Labs   Lab 10/29/19  0945 10/30/19  1040 01/09/20  1007   WBC 17.40 H 8.29 4.87   RBC 4.22 4.21 4.34   Hemoglobin 13.7 14.0 13.9   Hematocrit 41.7 42.2 42.0   Platelets 295 243 273   Mean Corpuscular Volume 99 H 100 H 97   Mean Corpuscular Hemoglobin 32.5 H 33.3 H 32.0 H   Mean Corpuscular Hemoglobin Conc 32.9 33.2 33.1     CMP:  Recent Labs   Lab 04/01/19  0740 10/29/19  0945 01/09/20  1007   Glucose 99 129 H 98   Calcium 10.7 H 10.4 10.0   Albumin 4.2 4.3 4.0   Total Protein 7.5 7.4 7.2   Sodium 140 140 141   Potassium 3.7 4.2 3.7   CO2 30 H 30 H 32 H   Chloride 101 98 98   BUN, Bld 16 19 16   Alkaline Phosphatase 105 101 106   ALT 13 15 10   AST 17 12 14   Total Bilirubin 0.6 0.9 0.6     URINALYSIS:  Recent Labs   Lab 05/26/18  1023  01/09/20  1010   Color, UA Yellow   < > Yellow   Specific Gravity, UA 1.025   < > 1.015   pH, UA 5.0   < > 6.0   Protein, UA 2+ A   < > Negative   Bacteria Many A  --  Rare   Nitrite, UA Negative   < > Negative   Leukocytes, UA 2+ A   < > Trace A   Urobilinogen, UA 1.0   < > Negative   Hyaline Casts, UA 0  --   --     < > = values in this interval not displayed.      LIPIDS:  Recent Labs   Lab 10/19/17  1000 05/27/18  0428   TSH 1.822 0.344 L     TSH:  Recent Labs   Lab 10/19/17  1000 05/27/18  0428   TSH 1.822 0.344 L     A1C:        Assessment/Plan     Beatriz Gan is a 64 y.o.female with:    1. Essential hypertension  - CBC auto differential; Future  - Comprehensive metabolic panel; Future  - Lipid Panel; Future  Continue current meds.    2. Anxiety  3. Major depressive disorder, recurrent episode, moderate  Continue current meds.    4. Primary insomnia  Continue current meds.    5. DDD (degenerative disc disease), lumbar  Continue current meds.  F/U  with Dr. Rosales and Dr. Martinez as scheduled.  6. History of renal cell carcinoma  - CBC auto differential; Future  Labs ordered.  F/U with Dr. Morrissey as scheduled  7. Hyperlipidemia, mixed  - Comprehensive metabolic panel; Future  - Lipid Panel; Future   Update labs.    Chronic conditions status updated as per HPI.  Other than changes above, cont current medications and maintain follow up with specialists.  Return to clinic in 6 months.    Jorge Alonzo MD  [facility]                Answers for HPI/ROS submitted by the patient on 5/12/2020   activity change: No  unexpected weight change: Yes  rhinorrhea: No  trouble swallowing: No  visual disturbance: No  chest tightness: Yes  difficulty urinating: Yes  menstrual problem: No  joint swelling: Yes  arthralgias: Yes  confusion: Yes  dysphoric mood: Yes

## 2020-05-14 NOTE — TELEPHONE ENCOUNTER
----- Message from Jorge Alonzo MD sent at 5/14/2020 11:16 AM CDT -----  Please call and schedule labs

## 2020-05-18 ENCOUNTER — LAB VISIT (OUTPATIENT)
Dept: LAB | Facility: HOSPITAL | Age: 65
End: 2020-05-18
Attending: INTERNAL MEDICINE
Payer: MEDICARE

## 2020-05-18 ENCOUNTER — TELEPHONE (OUTPATIENT)
Dept: FAMILY MEDICINE | Facility: CLINIC | Age: 65
End: 2020-05-18

## 2020-05-18 DIAGNOSIS — I10 ESSENTIAL HYPERTENSION: ICD-10-CM

## 2020-05-18 DIAGNOSIS — E78.2 HYPERLIPIDEMIA, MIXED: ICD-10-CM

## 2020-05-18 DIAGNOSIS — Z85.528 HISTORY OF RENAL CELL CARCINOMA: ICD-10-CM

## 2020-05-18 LAB
ALBUMIN SERPL BCP-MCNC: 3.8 G/DL (ref 3.5–5.2)
ALP SERPL-CCNC: 118 U/L (ref 55–135)
ALT SERPL W/O P-5'-P-CCNC: 15 U/L (ref 10–44)
ANION GAP SERPL CALC-SCNC: 11 MMOL/L (ref 8–16)
AST SERPL-CCNC: 17 U/L (ref 10–40)
BASOPHILS # BLD AUTO: 0.05 K/UL (ref 0–0.2)
BASOPHILS NFR BLD: 1 % (ref 0–1.9)
BILIRUB SERPL-MCNC: 0.6 MG/DL (ref 0.1–1)
BUN SERPL-MCNC: 23 MG/DL (ref 8–23)
CALCIUM SERPL-MCNC: 9.5 MG/DL (ref 8.7–10.5)
CHLORIDE SERPL-SCNC: 103 MMOL/L (ref 95–110)
CHOLEST SERPL-MCNC: 253 MG/DL (ref 120–199)
CHOLEST/HDLC SERPL: 3.3 {RATIO} (ref 2–5)
CO2 SERPL-SCNC: 28 MMOL/L (ref 23–29)
CREAT SERPL-MCNC: 1 MG/DL (ref 0.5–1.4)
DIFFERENTIAL METHOD: ABNORMAL
EOSINOPHIL # BLD AUTO: 0.2 K/UL (ref 0–0.5)
EOSINOPHIL NFR BLD: 3.5 % (ref 0–8)
ERYTHROCYTE [DISTWIDTH] IN BLOOD BY AUTOMATED COUNT: 12.3 % (ref 11.5–14.5)
EST. GFR  (AFRICAN AMERICAN): >60 ML/MIN/1.73 M^2
EST. GFR  (NON AFRICAN AMERICAN): 59 ML/MIN/1.73 M^2
GLUCOSE SERPL-MCNC: 94 MG/DL (ref 70–110)
HCT VFR BLD AUTO: 41.1 % (ref 37–48.5)
HDLC SERPL-MCNC: 77 MG/DL (ref 40–75)
HDLC SERPL: 30.4 % (ref 20–50)
HGB BLD-MCNC: 13.2 G/DL (ref 12–16)
IMM GRANULOCYTES # BLD AUTO: 0.01 K/UL (ref 0–0.04)
IMM GRANULOCYTES NFR BLD AUTO: 0.2 % (ref 0–0.5)
LDLC SERPL CALC-MCNC: 151.2 MG/DL (ref 63–159)
LYMPHOCYTES # BLD AUTO: 1.9 K/UL (ref 1–4.8)
LYMPHOCYTES NFR BLD: 38.2 % (ref 18–48)
MCH RBC QN AUTO: 31.7 PG (ref 27–31)
MCHC RBC AUTO-ENTMCNC: 32.1 G/DL (ref 32–36)
MCV RBC AUTO: 99 FL (ref 82–98)
MONOCYTES # BLD AUTO: 0.4 K/UL (ref 0.3–1)
MONOCYTES NFR BLD: 8.7 % (ref 4–15)
NEUTROPHILS # BLD AUTO: 2.4 K/UL (ref 1.8–7.7)
NEUTROPHILS NFR BLD: 48.4 % (ref 38–73)
NONHDLC SERPL-MCNC: 176 MG/DL
NRBC BLD-RTO: 0 /100 WBC
PLATELET # BLD AUTO: 240 K/UL (ref 150–350)
PMV BLD AUTO: 9.8 FL (ref 9.2–12.9)
POTASSIUM SERPL-SCNC: 4 MMOL/L (ref 3.5–5.1)
PROT SERPL-MCNC: 7.1 G/DL (ref 6–8.4)
RBC # BLD AUTO: 4.16 M/UL (ref 4–5.4)
SODIUM SERPL-SCNC: 142 MMOL/L (ref 136–145)
TRIGL SERPL-MCNC: 124 MG/DL (ref 30–150)
WBC # BLD AUTO: 4.92 K/UL (ref 3.9–12.7)

## 2020-05-18 PROCEDURE — 80053 COMPREHEN METABOLIC PANEL: CPT

## 2020-05-18 PROCEDURE — 80061 LIPID PANEL: CPT

## 2020-05-18 PROCEDURE — 85025 COMPLETE CBC W/AUTO DIFF WBC: CPT

## 2020-05-18 PROCEDURE — 36415 COLL VENOUS BLD VENIPUNCTURE: CPT

## 2020-05-18 NOTE — TELEPHONE ENCOUNTER
----- Message from Jorge Alonzo MD sent at 5/18/2020  4:30 PM CDT -----  Cholesterol too high.  Recommend focusing more on low fat diet and keeping weight down to prevent needing medications in the near future.  Otherwise labs ok.

## 2020-05-18 NOTE — TELEPHONE ENCOUNTER
Spoke with patient. Informed patient of recent lab results and Dr Alonzo's recommendations. Pt verbalizes understanding.

## 2020-05-18 NOTE — PROGRESS NOTES
Cholesterol too high.  Recommend focusing more on low fat diet and keeping weight down to prevent needing medications in the near future.  Otherwise labs ok.

## 2020-05-20 ENCOUNTER — TELEPHONE (OUTPATIENT)
Dept: PAIN MEDICINE | Facility: CLINIC | Age: 65
End: 2020-05-20

## 2020-05-20 NOTE — TELEPHONE ENCOUNTER
----- Message from Jailyn Ruizza sent at 5/20/2020  3:28 PM CDT -----  Type: Needs Medical Advice  Who Called:  Beatriz  Symptoms (please be specific):  Pain in knee and low back  How long has patient had these symptoms:  She said for a while  Pharmacy name and phone #:    [facility] [address] [place], [place] - [address]  [address]  [facility] [address]  Phone: [phone] Fax: [phone]  Best Call Back Number: [phone]  Additional Information: Wants to discuss future plan of care.  Thank you!

## 2020-05-21 ENCOUNTER — PATIENT OUTREACH (OUTPATIENT)
Dept: ADMINISTRATIVE | Facility: OTHER | Age: 65
End: 2020-05-21

## 2020-05-25 ENCOUNTER — OFFICE VISIT (OUTPATIENT)
Dept: PAIN MEDICINE | Facility: CLINIC | Age: 65
End: 2020-05-25
Payer: MEDICARE

## 2020-05-25 ENCOUNTER — TELEPHONE (OUTPATIENT)
Dept: PAIN MEDICINE | Facility: CLINIC | Age: 65
End: 2020-05-25

## 2020-05-25 DIAGNOSIS — Z01.818 PREOP TESTING: ICD-10-CM

## 2020-05-25 DIAGNOSIS — M17.12 PRIMARY OSTEOARTHRITIS OF LEFT KNEE: Primary | ICD-10-CM

## 2020-05-25 DIAGNOSIS — G89.29 CHRONIC PAIN OF LEFT KNEE: Primary | ICD-10-CM

## 2020-05-25 DIAGNOSIS — M25.562 CHRONIC PAIN OF LEFT KNEE: Primary | ICD-10-CM

## 2020-05-25 DIAGNOSIS — Z98.890 HISTORY OF ARTHROSCOPY OF LEFT KNEE: ICD-10-CM

## 2020-05-25 DIAGNOSIS — M94.262 CHONDROMALACIA, KNEE, LEFT: ICD-10-CM

## 2020-05-25 PROCEDURE — 99213 OFFICE O/P EST LOW 20 MIN: CPT | Mod: 95,,, | Performed by: ANESTHESIOLOGY

## 2020-05-25 PROCEDURE — 99213 PR OFFICE/OUTPT VISIT, EST, LEVL III, 20-29 MIN: ICD-10-PCS | Mod: 95,,, | Performed by: ANESTHESIOLOGY

## 2020-05-25 NOTE — H&P (VIEW-ONLY)
Chronic Pain-Tele-Medicine-Established Note (Follow up visit)    The patient location is: home  The chief complaint leading to consultation is: left knee pain  Visit type: Virtual visit with synchronous audio and video  Total time spent with patient: 12 minutes  Each patient to whom he or she provides medical services by telemedicine is: (1) informed of the relationship between the physician and patient and the respective role of any other health care provider with respect to management of the patient; and (2) notified that he or she may decline to receive medical services by telemedicine and may withdraw from such care at any time.    Notes:    SUBJECTIVE:  INITIAL HISTORY OF PRESENT ILLNESS: Beatriz Gan is a 64 y.o. female with PMH significant for hx of left rotator cuff repair (Dr. Martinez), hx of left arthroscopic knee surgery, hx of partial nephrectomy, hx of ETOH dependence, and HTN presents as a referral for the evaluation of low back pain. Patient reports that her pain approximately in 2014 after no inciting incident or trauma. The patient reports that she experienced a new type of mid back pain when she slipped and fell at home approximately 1 week prior to her kyphoplasty with me. Today, the patient wants her low back pain addressed as her mid back pain resolved s/p kyphoplasty. The patient localizes her pain to the area across her lower back. Patient denies of radiation of her pain (the patient reports of having pain previously that radiated towards her left foot but states that her injections via Dr. Leger resolved her radicular pain). Patient describes her pain as a shooting, throbbing, and burning type of pain. Patient reports that her pain current pain is a 10/10. Patient denies of any urinary/fecal incontinence, saddle anesthesia, or weakness.      Of note, the patient reports that she fell 1 week prior to her T12 kyphoplasty with me on 1/31/2020. Patient was in discussion to her lumbar  spine surgery with Dr. Martinez but she fell prior to scheduling a procedure date.     Aggravating factors: constant pain; activity in general, sitting     Mitigating factors: ice    Beatriz Gan presents tele-medicine appointment for a follow-up appointment for left knee pain. Since the last visit, Beatriz Gan states the pain has been persistant. The patient is endorsing of back pain as well but reports that her left knee pain is worse right now. The patient denies of any significant changes in her health since her last appointment. The patient also denies of any changes in the character of her pain since her last appointment. The patient continues to want to avoid knee replacement at this time if at all possible. The patient presents today to discuss possible genicular nerve cooled radiofrequency ablation. Patient denies of any urinary/fecal incontinence, saddle anesthesia, or weakness.     Pain Medications:  Gabapentin 400 mg PO TID (higher doses caused GI upset)  duloxetine 30 mg PO q day  Norco 5-325 mg PO daily PRN     Pain Procedures:   3/11/2020: hylan supplementation via Dr. Ruben Martinez - no relief  2/28/2020: Bilateral L3, L4, and L5 medial branch radiofrequency ablation   8/21/2017: Right sacroiliac joint injection   4/10/2017: C7-T1 cervical interlaminar epidural steroid injection - 50% relief per chart review  2/6/2017: Right sacroiliac joint injection   8/2016: Lumbar RFA - good relief      report: Reviewed and consistent with medication use as prescribed.    Imaging: no new imaging of the knee to review    Past Medical History:   Diagnosis Date    Alcohol dependence     DDD (degenerative disc disease), lumbosacral     DJD (degenerative joint disease), lumbosacral     Encounter for blood transfusion     GERD (gastroesophageal reflux disease)     Gout     Hypercholesterolemia     Hypertension     NATHALIE (iron deficiency anemia)     questionable white coat hypertension     Kidney carcinoma, left 2014    Pneumonia     Renal cell carcinoma     Shingles 10/13/2012     Past Surgical History:   Procedure Laterality Date    APPENDECTOMY      ARTHROSCOPY OF SHOULDER WITH DECOMPRESSION OF SUBACROMIAL SPACE Left 10/31/2019    Procedure: ARTHROSCOPY, SHOULDER, WITH SUBACROMIAL SPACE DECOMPRESSION;  Surgeon: Ruben Martinez MD;  [facility]: [facility];  Service: Orthopedics;  Laterality: Left;    BLADDER REPAIR      x 2    COLONOSCOPY  9/2011    DISTAL CLAVICLE EXCISION Left 10/31/2019    Procedure: EXCISION, CLAVICLE, DISTAL;  Surgeon: Ruben Martinez MD;  Location: [facility] OR;  Service: Orthopedics;  Laterality: Left;    ESOPHAGOGASTRODUODENOSCOPY  9/2011    EYE SURGERY      lasix bilateral    FIXATION KYPHOPLASTY N/A 1/31/2020    Procedure: Kyphoplasty T12;  Surgeon: Dk Rosales MD;  Location: [facility];  Service: Pain Management;  Laterality: N/A;    HERNIA REPAIR      hiatal hernai    HYSTERECTOMY      KNEE ARTHROSCOPY W/ MENISCECTOMY Left 1/16/2020    Procedure: ARTHROSCOPY, KNEE, WITH MEDIAL MENISCECTOMY;  Surgeon: Ruben Martinez MD;  Location: [facility];  Service: Orthopedics;  Laterality: Left;    LAPAROSCOPIC NISSEN FUNDOPLICATION      NEPHRECTOMY      LT partial    RADIOFREQUENCY ABLATION OF LUMBAR MEDIAL BRANCH NERVE AT SINGLE LEVEL Bilateral 2/28/2020    Procedure: Radiofrequency Ablation, Nerve, Spinal, Lumbar, Medial Branch, 1 Level;  Surgeon: Dk Rosales MD;  Location: [facility];  Service: Pain Management;  Laterality: Bilateral;  L3,L4,L5 - Burned at 80 degrees C. for 60 seconds x 2 each site    REFRACTIVE SURGERY      ROTATOR CUFF REPAIR Left 10/31/2019    Procedure: REPAIR, ROTATOR CUFF;  Surgeon: Ruben Martinez MD;  Location: [facility];  Service: Orthopedics;  Laterality: Left;  arthrex    SKIN BIOPSY      TOTAL ABDOMINAL HYSTERECTOMY W/ BILATERAL SALPINGOOPHORECTOMY  age 48     Social History     Socioeconomic History    Marital status:       "Spouse name: Not on file    Number of children: Not on file    Years of education: Not on file    Highest education level: Not on file   Occupational History     Employer: [employer]   Social Needs    Financial resource strain: Somewhat hard    Food insecurity:     Worry: Sometimes true     Inability: Sometimes true    Transportation needs:     Medical: No     Non-medical: No   Tobacco Use    Smoking status: Current Some Day Smoker     Packs/day: 1.00     Years: 40.00     Pack years: 40.00     Types: Cigarettes     Last attempt to quit: 2018     Years since quittin.3    Smokeless tobacco: Never Used   Substance and Sexual Activity    Alcohol use: Yes     Alcohol/week: 28.0 standard drinks     Types: 14 Cans of beer, 14 Standard drinks or equivalent per week     Frequency: 4 or more times a week     Drinks per session: 1 or 2     Binge frequency: Never     Comment: 2-3 a day/ social    Drug use: No    Sexual activity: Yes     Partners: Male   Lifestyle    Physical activity:     Days per week: 0 days     Minutes per session: 0 min    Stress: Very much   Relationships    Social connections:     Talks on phone: Once a week     Gets together: Never     Attends Zoroastrian service: Not on file     Active member of club or organization: Yes     Attends meetings of clubs or organizations: More than 4 times per year     Relationship status:    Other Topics Concern    Not on file   Social History Narrative    Not on file     Family History   Problem Relation Age of Onset    Hypertension Father     Glaucoma Neg Hx     Macular degeneration Neg Hx     Retinal detachment Neg Hx      Review of patient's allergies indicates:   Allergen Reactions    Phenergan [promethazine] Hives and Rash    Latex, natural rubber Hives     Per pt report-many years    Morphine Hives    Nsaids (non-steroidal anti-inflammatory drug)      Due to "kidney cancer"     Current Outpatient Medications   Medication Sig    " ALPRAZolam (XANAX) 0.5 MG tablet Take 1 tablet (0.5 mg total) by mouth 3 (three) times daily as needed for Anxiety.    amLODIPine (NORVASC) 5 MG tablet Take 1 tablet (5 mg total) by mouth once daily.    azelastine (ASTELIN) 137 mcg (0.1 %) nasal spray 1 spray (137 mcg total) by Nasal route 2 (two) times daily.    budesonide-formoterol 80-4.5 mcg (SYMBICORT) 80-4.5 mcg/actuation HFAA Inhale 2 puffs into the lungs 2 (two) times daily. Controller    diclofenac (VOLTAREN) 50 MG EC tablet Take 1 tablet (50 mg total) by mouth 2 (two) times daily with meals.    DULoxetine (CYMBALTA) 30 MG capsule Take 1 capsule (30 mg total) by mouth 2 (two) times daily.    fluticasone (FLONASE) 50 mcg/actuation nasal spray 1 spray by Nasal route daily as needed.     gabapentin (NEURONTIN) 600 MG tablet Take 1 tablet (600 mg total) by mouth 3 (three) times daily.    lidocaine (LIDODERM) 5 % Place 1 patch onto the skin once daily. Remove & Discard patch within 12 hours or as directed by MD (Patient not taking: Reported on 4/14/2020)    losartan-hydrochlorothiazide 100-25 mg (HYZAAR) 100-25 mg per tablet Take 1 tablet by mouth once daily.    multivit-iron-min-folic acid (MULTIVITAMIN-IRON-MINERALS-FOLIC ACID) 3,500-18-0.4 unit-mg-mg Chew Take by mouth once daily.      polyethylene glycol (GLYCOLAX) 17 gram/dose powder Take 17 g by mouth once daily.    zolpidem (AMBIEN) 10 mg Tab Take 1 tablet (10 mg total) by mouth nightly as needed.     No current facility-administered medications for this visit.      Facility-Administered Medications Ordered in Other Visits   Medication    electrolyte-S (ISOLYTE)    lactated ringers infusion       REVIEW OF SYSTEMS:  GENERAL: No weight loss, malaise or fevers.  HEENT: No recent changes in vision or hearing  NECK: Negative for lumps, no difficulty with swallowing.  RESPIRATORY: Negative for cough, wheezing or shortness of breath, patient denies any recent URI.  CARDIOVASCULAR: Negative for  chest pain, leg swelling or palpitations.  GI: Negative for abdominal discomfort, blood in stools or black stools or change in bowel habits.  MUSCULOSKELETAL: See HPI.  SKIN: Negative for lesions, rash, and itching.  PSYCH: No mood disorder or recent psychosocial stressors. Patients sleep is not disturbed secondary to pain.  HEMATOLOGY/LYMPHOLOGY: Negative for prolonged bleeding, bruising easily or swollen nodes. Patient is not currently taking any anti-coagulants  NEURO: No history of headaches, syncope, paralysis, seizures or tremors.    All other reviewed and negative other than HPI.    OBJECTIVE: limited physical exam secondary to telemedicine consultation  GEN: No acute distress; patient is alert and oriented to person, place, and time  Head: Normocephalic and atraumatic.   Eyes: Conjunctivae and EOM are normal.   Cardiovascular: Unable to assess  Pulmonary/Chest: Effort normal. No respiratory distress.   Neurological: She is alert and oriented to person, place, and time.   Skin: Skin is dry. She is not diaphoretic.   Psychiatric: Mood, memory, affect and judgment normal.     ASSESSMENT: 64 y.o. year old female with back and left knee pain. The patient prefers non-surgical options for her left knee pain at this time. Treatment plan outlined below.     1. Primary osteoarthritis of left knee  Ambulatory referral/consult to Pain Clinic   2. Chondromalacia, knee, left  Ambulatory referral/consult to Pain Clinic   3. History of arthroscopy of left knee  Ambulatory referral/consult to Pain Clinic       PLAN:  - Schedule for left knee genicular nerve blocks. Plan to offer lumbar [place] in the future pending treatment of her left knee  - Continue gabapentin 400 mg PO TID as she could not tolerate higher doses  - Continue duloxetine 30 mg PO BID as prescribed  - The patient will not be receiving chronic opioid management from myself given her concurrent benzodiazepine use.   - I have stressed the importance of physical  activity and a home exercise plan to help with chronic pain and improve health.   - RTC for the procedure as outlined above    The above plan and management options were discussed at length with patient. Patient is in agreement with the above and verbalized understanding. It will be communicated with the referring physician via electronic record, fax, or mail.    Dk Rosales  05/25/2020

## 2020-05-25 NOTE — H&P (VIEW-ONLY)
Chronic Pain-Tele-Medicine-Established Note (Follow up visit)    The patient location is: home  The chief complaint leading to consultation is: left knee pain  Visit type: Virtual visit with synchronous audio and video  Total time spent with patient: 12 minutes  Each patient to whom he or she provides medical services by telemedicine is: (1) informed of the relationship between the physician and patient and the respective role of any other health care provider with respect to management of the patient; and (2) notified that he or she may decline to receive medical services by telemedicine and may withdraw from such care at any time.    Notes:    SUBJECTIVE:  INITIAL HISTORY OF PRESENT ILLNESS: Beatriz Gan is a 64 y.o. female with PMH significant for hx of left rotator cuff repair (Dr. Martinez), hx of left arthroscopic knee surgery, hx of partial nephrectomy, hx of ETOH dependence, and HTN presents as a referral for the evaluation of low back pain. Patient reports that her pain approximately in 2014 after no inciting incident or trauma. The patient reports that she experienced a new type of mid back pain when she slipped and fell at home approximately 1 week prior to her kyphoplasty with me. Today, the patient wants her low back pain addressed as her mid back pain resolved s/p kyphoplasty. The patient localizes her pain to the area across her lower back. Patient denies of radiation of her pain (the patient reports of having pain previously that radiated towards her left foot but states that her injections via Dr. Leger resolved her radicular pain). Patient describes her pain as a shooting, throbbing, and burning type of pain. Patient reports that her pain current pain is a 10/10. Patient denies of any urinary/fecal incontinence, saddle anesthesia, or weakness.      Of note, the patient reports that she fell 1 week prior to her T12 kyphoplasty with me on 1/31/2020. Patient was in discussion to her lumbar  spine surgery with Dr. Martinez but she fell prior to scheduling a procedure date.     Aggravating factors: constant pain; activity in general, sitting     Mitigating factors: ice    Beatriz Gan presents tele-medicine appointment for a follow-up appointment for left knee pain. Since the last visit, Beatriz Gan states the pain has been persistant. The patient is endorsing of back pain as well but reports that her left knee pain is worse right now. The patient denies of any significant changes in her health since her last appointment. The patient also denies of any changes in the character of her pain since her last appointment. The patient continues to want to avoid knee replacement at this time if at all possible. The patient presents today to discuss possible genicular nerve cooled radiofrequency ablation. Patient denies of any urinary/fecal incontinence, saddle anesthesia, or weakness.     Pain Medications:  Gabapentin 400 mg PO TID (higher doses caused GI upset)  duloxetine 30 mg PO q day  Norco 5-325 mg PO daily PRN     Pain Procedures:   3/11/2020: hylan supplementation via Dr. Ruben Martinez - no relief  2/28/2020: Bilateral L3, L4, and L5 medial branch radiofrequency ablation   8/21/2017: Right sacroiliac joint injection   4/10/2017: C7-T1 cervical interlaminar epidural steroid injection - 50% relief per chart review  2/6/2017: Right sacroiliac joint injection   8/2016: Lumbar RFA - good relief      report: Reviewed and consistent with medication use as prescribed.    Imaging: no new imaging of the knee to review    Past Medical History:   Diagnosis Date    Alcohol dependence     DDD (degenerative disc disease), lumbosacral     DJD (degenerative joint disease), lumbosacral     Encounter for blood transfusion     GERD (gastroesophageal reflux disease)     Gout     Hypercholesterolemia     Hypertension     NATHALIE (iron deficiency anemia)     questionable white coat hypertension     Kidney carcinoma, left 2014    Pneumonia     Renal cell carcinoma     Shingles 10/13/2012     Past Surgical History:   Procedure Laterality Date    APPENDECTOMY      ARTHROSCOPY OF SHOULDER WITH DECOMPRESSION OF SUBACROMIAL SPACE Left 10/31/2019    Procedure: ARTHROSCOPY, SHOULDER, WITH SUBACROMIAL SPACE DECOMPRESSION;  Surgeon: Ruben Martinez MD;  Location: [facility];  Service: Orthopedics;  Laterality: Left;    BLADDER REPAIR      x 2    COLONOSCOPY  9/2011    DISTAL CLAVICLE EXCISION Left 10/31/2019    Procedure: EXCISION, CLAVICLE, DISTAL;  Surgeon: Ruben Martinez MD;  Location: [facility] OR;  Service: Orthopedics;  Laterality: Left;    ESOPHAGOGASTRODUODENOSCOPY  9/2011    EYE SURGERY      lasix bilateral    FIXATION KYPHOPLASTY N/A 1/31/2020    Procedure: Kyphoplasty T12;  Surgeon: Dk Rosales MD;  Location: [facility];  Service: Pain Management;  Laterality: N/A;    HERNIA REPAIR      hiatal hernai    HYSTERECTOMY      KNEE ARTHROSCOPY W/ MENISCECTOMY Left 1/16/2020    Procedure: ARTHROSCOPY, KNEE, WITH MEDIAL MENISCECTOMY;  Surgeon: Ruben Martinez MD;  Location: [facility];  Service: Orthopedics;  Laterality: Left;    LAPAROSCOPIC NISSEN FUNDOPLICATION      NEPHRECTOMY      LT partial    RADIOFREQUENCY ABLATION OF LUMBAR MEDIAL BRANCH NERVE AT SINGLE LEVEL Bilateral 2/28/2020    Procedure: Radiofrequency Ablation, Nerve, Spinal, Lumbar, Medial Branch, 1 Level;  Surgeon: Dk Rosales MD;  Location: [facility];  Service: Pain Management;  Laterality: Bilateral;  L3,L4,L5 - Burned at 80 degrees C. for 60 seconds x 2 each site    REFRACTIVE SURGERY      ROTATOR CUFF REPAIR Left 10/31/2019    Procedure: REPAIR, ROTATOR CUFF;  Surgeon: Ruben Martinez MD;  Location: [facility];  Service: Orthopedics;  Laterality: Left;  arthrex    SKIN BIOPSY      TOTAL ABDOMINAL HYSTERECTOMY W/ BILATERAL SALPINGOOPHORECTOMY  age 48     Social History     Socioeconomic History    Marital status:       "Spouse name: Not on file    Number of children: Not on file    Years of education: Not on file    Highest education level: Not on file   Occupational History     Employer: [employer]   Social Needs    Financial resource strain: Somewhat hard    Food insecurity:     Worry: Sometimes true     Inability: Sometimes true    Transportation needs:     Medical: No     Non-medical: No   Tobacco Use    Smoking status: Current Some Day Smoker     Packs/day: 1.00     Years: 40.00     Pack years: 40.00     Types: Cigarettes     Last attempt to quit: 2018     Years since quittin.3    Smokeless tobacco: Never Used   Substance and Sexual Activity    Alcohol use: Yes     Alcohol/week: 28.0 standard drinks     Types: 14 Cans of beer, 14 Standard drinks or equivalent per week     Frequency: 4 or more times a week     Drinks per session: 1 or 2     Binge frequency: Never     Comment: 2-3 a day/ social    Drug use: No    Sexual activity: Yes     Partners: Male   Lifestyle    Physical activity:     Days per week: 0 days     Minutes per session: 0 min    Stress: Very much   Relationships    Social connections:     Talks on phone: Once a week     Gets together: Never     Attends Yarsanism service: Not on file     Active member of club or organization: Yes     Attends meetings of clubs or organizations: More than 4 times per year     Relationship status:    Other Topics Concern    Not on file   Social History Narrative    Not on file     Family History   Problem Relation Age of Onset    Hypertension Father     Glaucoma Neg Hx     Macular degeneration Neg Hx     Retinal detachment Neg Hx      Review of patient's allergies indicates:   Allergen Reactions    Phenergan [promethazine] Hives and Rash    Latex, natural rubber Hives     Per pt report-many years    Morphine Hives    Nsaids (non-steroidal anti-inflammatory drug)      Due to "kidney cancer"     Current Outpatient Medications   Medication Sig    " ALPRAZolam (XANAX) 0.5 MG tablet Take 1 tablet (0.5 mg total) by mouth 3 (three) times daily as needed for Anxiety.    amLODIPine (NORVASC) 5 MG tablet Take 1 tablet (5 mg total) by mouth once daily.    azelastine (ASTELIN) 137 mcg (0.1 %) nasal spray 1 spray (137 mcg total) by Nasal route 2 (two) times daily.    budesonide-formoterol 80-4.5 mcg (SYMBICORT) 80-4.5 mcg/actuation HFAA Inhale 2 puffs into the lungs 2 (two) times daily. Controller    diclofenac (VOLTAREN) 50 MG EC tablet Take 1 tablet (50 mg total) by mouth 2 (two) times daily with meals.    DULoxetine (CYMBALTA) 30 MG capsule Take 1 capsule (30 mg total) by mouth 2 (two) times daily.    fluticasone (FLONASE) 50 mcg/actuation nasal spray 1 spray by Nasal route daily as needed.     gabapentin (NEURONTIN) 600 MG tablet Take 1 tablet (600 mg total) by mouth 3 (three) times daily.    lidocaine (LIDODERM) 5 % Place 1 patch onto the skin once daily. Remove & Discard patch within 12 hours or as directed by MD (Patient not taking: Reported on 4/14/2020)    losartan-hydrochlorothiazide 100-25 mg (HYZAAR) 100-25 mg per tablet Take 1 tablet by mouth once daily.    multivit-iron-min-folic acid (MULTIVITAMIN-IRON-MINERALS-FOLIC ACID) 3,500-18-0.4 unit-mg-mg Chew Take by mouth once daily.      polyethylene glycol (GLYCOLAX) 17 gram/dose powder Take 17 g by mouth once daily.    zolpidem (AMBIEN) 10 mg Tab Take 1 tablet (10 mg total) by mouth nightly as needed.     No current facility-administered medications for this visit.      Facility-Administered Medications Ordered in Other Visits   Medication    electrolyte-S (ISOLYTE)    lactated ringers infusion       REVIEW OF SYSTEMS:  GENERAL: No weight loss, malaise or fevers.  HEENT: No recent changes in vision or hearing  NECK: Negative for lumps, no difficulty with swallowing.  RESPIRATORY: Negative for cough, wheezing or shortness of breath, patient denies any recent URI.  CARDIOVASCULAR: Negative for  chest pain, leg swelling or palpitations.  GI: Negative for abdominal discomfort, blood in stools or black stools or change in bowel habits.  MUSCULOSKELETAL: See HPI.  SKIN: Negative for lesions, rash, and itching.  PSYCH: No mood disorder or recent psychosocial stressors. Patients sleep is not disturbed secondary to pain.  HEMATOLOGY/LYMPHOLOGY: Negative for prolonged bleeding, bruising easily or swollen nodes. Patient is not currently taking any anti-coagulants  NEURO: No history of headaches, syncope, paralysis, seizures or tremors.    All other reviewed and negative other than HPI.    OBJECTIVE: limited physical exam secondary to telemedicine consultation  GEN: No acute distress; patient is alert and oriented to person, place, and time  Head: Normocephalic and atraumatic.   Eyes: Conjunctivae and EOM are normal.   Cardiovascular: Unable to assess  Pulmonary/Chest: Effort normal. No respiratory distress.   Neurological: She is alert and oriented to person, place, and time.   Skin: Skin is dry. She is not diaphoretic.   Psychiatric: Mood, memory, affect and judgment normal.     ASSESSMENT: 64 y.o. year old female with back and left knee pain. The patient prefers non-surgical options for her left knee pain at this time. Treatment plan outlined below.     1. Primary osteoarthritis of left knee  Ambulatory referral/consult to Pain Clinic   2. Chondromalacia, knee, left  Ambulatory referral/consult to Pain Clinic   3. History of arthroscopy of left knee  Ambulatory referral/consult to Pain Clinic       PLAN:  - Schedule for left knee genicular nerve blocks. Plan to offer lumbar [place] in the future pending treatment of her left knee  - Continue gabapentin 400 mg PO TID as she could not tolerate higher doses  - Continue duloxetine 30 mg PO BID as prescribed  - The patient will not be receiving chronic opioid management from myself given her concurrent benzodiazepine use.   - I have stressed the importance of physical  activity and a home exercise plan to help with chronic pain and improve health.   - RTC for the procedure as outlined above    The above plan and management options were discussed at length with patient. Patient is in agreement with the above and verbalized understanding. It will be communicated with the referring physician via electronic record, fax, or mail.    Dk Rosalse  05/25/2020

## 2020-05-25 NOTE — PROGRESS NOTES
Chronic Pain-Tele-Medicine-Established Note (Follow up visit)    The patient location is: home  The chief complaint leading to consultation is: left knee pain  Visit type: Virtual visit with synchronous audio and video  Total time spent with patient: 12 minutes  Each patient to whom he or she provides medical services by telemedicine is: (1) informed of the relationship between the physician and patient and the respective role of any other health care provider with respect to management of the patient; and (2) notified that he or she may decline to receive medical services by telemedicine and may withdraw from such care at any time.    Notes:    SUBJECTIVE:  INITIAL HISTORY OF PRESENT ILLNESS: Beatriz Gan is a 64 y.o. female with PMH significant for hx of left rotator cuff repair (Dr. Martinez), hx of left arthroscopic knee surgery, hx of partial nephrectomy, hx of ETOH dependence, and HTN presents as a referral for the evaluation of low back pain. Patient reports that her pain approximately in 2014 after no inciting incident or trauma. The patient reports that she experienced a new type of mid back pain when she slipped and fell at home approximately 1 week prior to her kyphoplasty with me. Today, the patient wants her low back pain addressed as her mid back pain resolved s/p kyphoplasty. The patient localizes her pain to the area across her lower back. Patient denies of radiation of her pain (the patient reports of having pain previously that radiated towards her left foot but states that her injections via Dr. Leger resolved her radicular pain). Patient describes her pain as a shooting, throbbing, and burning type of pain. Patient reports that her pain current pain is a 10/10. Patient denies of any urinary/fecal incontinence, saddle anesthesia, or weakness.      Of note, the patient reports that she fell 1 week prior to her T12 kyphoplasty with me on 1/31/2020. Patient was in discussion to her lumbar  spine surgery with Dr. Martinez but she fell prior to scheduling a procedure date.     Aggravating factors: constant pain; activity in general, sitting     Mitigating factors: ice    Beatriz Gan presents tele-medicine appointment for a follow-up appointment for left knee pain. Since the last visit, Beatriz Gan states the pain has been persistant. The patient is endorsing of back pain as well but reports that her left knee pain is worse right now. The patient denies of any significant changes in her health since her last appointment. The patient also denies of any changes in the character of her pain since her last appointment. The patient continues to want to avoid knee replacement at this time if at all possible. The patient presents today to discuss possible genicular nerve cooled radiofrequency ablation. Patient denies of any urinary/fecal incontinence, saddle anesthesia, or weakness.     Pain Medications:  Gabapentin 400 mg PO TID (higher doses caused GI upset)  duloxetine 30 mg PO q day  Norco 5-325 mg PO daily PRN     Pain Procedures:   3/11/2020: hylan supplementation via Dr. Ruben Martinez - no relief  2/28/2020: Bilateral L3, L4, and L5 medial branch radiofrequency ablation   8/21/2017: Right sacroiliac joint injection   4/10/2017: C7-T1 cervical interlaminar epidural steroid injection - 50% relief per chart review  2/6/2017: Right sacroiliac joint injection   8/2016: Lumbar RFA - good relief      report: Reviewed and consistent with medication use as prescribed.    Imaging: no new imaging of the knee to review    Past Medical History:   Diagnosis Date    Alcohol dependence     DDD (degenerative disc disease), lumbosacral     DJD (degenerative joint disease), lumbosacral     Encounter for blood transfusion     GERD (gastroesophageal reflux disease)     Gout     Hypercholesterolemia     Hypertension     NATHALIE (iron deficiency anemia)     questionable white coat hypertension     Kidney carcinoma, left 2014    Pneumonia     Renal cell carcinoma     Shingles 10/13/2012     Past Surgical History:   Procedure Laterality Date    APPENDECTOMY      ARTHROSCOPY OF SHOULDER WITH DECOMPRESSION OF SUBACROMIAL SPACE Left 10/31/2019    Procedure: ARTHROSCOPY, SHOULDER, WITH SUBACROMIAL SPACE DECOMPRESSION;  Surgeon: Ruben Martinez MD;  Location: [facility];  Service: Orthopedics;  Laterality: Left;    BLADDER REPAIR      x 2    COLONOSCOPY  9/2011    DISTAL CLAVICLE EXCISION Left 10/31/2019    Procedure: EXCISION, CLAVICLE, DISTAL;  Surgeon: Ruben Martinez MD;  Location: [facility] OR;  Service: Orthopedics;  Laterality: Left;    ESOPHAGOGASTRODUODENOSCOPY  9/2011    EYE SURGERY      lasix bilateral    FIXATION KYPHOPLASTY N/A 1/31/2020    Procedure: Kyphoplasty T12;  Surgeon: Dk Rosales MD;  Location: [facility];  Service: Pain Management;  Laterality: N/A;    HERNIA REPAIR      hiatal hernai    HYSTERECTOMY      KNEE ARTHROSCOPY W/ MENISCECTOMY Left 1/16/2020    Procedure: ARTHROSCOPY, KNEE, WITH MEDIAL MENISCECTOMY;  Surgeon: Ruben Martinez MD;  Location: [facility];  Service: Orthopedics;  Laterality: Left;    LAPAROSCOPIC NISSEN FUNDOPLICATION      NEPHRECTOMY      LT partial    RADIOFREQUENCY ABLATION OF LUMBAR MEDIAL BRANCH NERVE AT SINGLE LEVEL Bilateral 2/28/2020    Procedure: Radiofrequency Ablation, Nerve, Spinal, Lumbar, Medial Branch, 1 Level;  Surgeon: Dk Rosales MD;  [facility];  Service: Pain Management;  Laterality: Bilateral;  L3,L4,L5 - Burned at 80 degrees C. for 60 seconds x 2 each site    REFRACTIVE SURGERY      ROTATOR CUFF REPAIR Left 10/31/2019    Procedure: REPAIR, ROTATOR CUFF;  Surgeon: Ruben Martinez MD;  Location: [facility];  Service: Orthopedics;  Laterality: Left;  arthrex    SKIN BIOPSY      TOTAL ABDOMINAL HYSTERECTOMY W/ BILATERAL SALPINGOOPHORECTOMY  age 48     Social History     Socioeconomic History    Marital status:       "Spouse name: Not on file    Number of children: Not on file    Years of education: Not on file    Highest education level: Not on file   Occupational History     Employer: [employer]   Social Needs    Financial resource strain: Somewhat hard    Food insecurity:     Worry: Sometimes true     Inability: Sometimes true    Transportation needs:     Medical: No     Non-medical: No   Tobacco Use    Smoking status: Current Some Day Smoker     Packs/day: 1.00     Years: 40.00     Pack years: 40.00     Types: Cigarettes     Last attempt to quit: 2018     Years since quittin.3    Smokeless tobacco: Never Used   Substance and Sexual Activity    Alcohol use: Yes     Alcohol/week: 28.0 standard drinks     Types: 14 Cans of beer, 14 Standard drinks or equivalent per week     Frequency: 4 or more times a week     Drinks per session: 1 or 2     Binge frequency: Never     Comment: 2-3 a day/ social    Drug use: No    Sexual activity: Yes     Partners: Male   Lifestyle    Physical activity:     Days per week: 0 days     Minutes per session: 0 min    Stress: Very much   Relationships    Social connections:     Talks on phone: Once a week     Gets together: Never     Attends [employer] service: Not on file     Active member of club or organization: Yes     Attends meetings of clubs or organizations: More than 4 times per year     Relationship status:    Other Topics Concern    Not on file   Social History Narrative    Not on file     Family History   Problem Relation Age of Onset    Hypertension Father     Glaucoma Neg Hx     Macular degeneration Neg Hx     Retinal detachment Neg Hx      Review of patient's allergies indicates:   Allergen Reactions    Phenergan [promethazine] Hives and Rash    Latex, natural rubber Hives     Per pt report-many years    Morphine Hives    Nsaids (non-steroidal anti-inflammatory drug)      Due to "kidney cancer"     Current Outpatient Medications   Medication Sig    " ALPRAZolam (XANAX) 0.5 MG tablet Take 1 tablet (0.5 mg total) by mouth 3 (three) times daily as needed for Anxiety.    amLODIPine (NORVASC) 5 MG tablet Take 1 tablet (5 mg total) by mouth once daily.    azelastine (ASTELIN) 137 mcg (0.1 %) nasal spray 1 spray (137 mcg total) by Nasal route 2 (two) times daily.    budesonide-formoterol 80-4.5 mcg (SYMBICORT) 80-4.5 mcg/actuation HFAA Inhale 2 puffs into the lungs 2 (two) times daily. Controller    diclofenac (VOLTAREN) 50 MG EC tablet Take 1 tablet (50 mg total) by mouth 2 (two) times daily with meals.    DULoxetine (CYMBALTA) 30 MG capsule Take 1 capsule (30 mg total) by mouth 2 (two) times daily.    fluticasone (FLONASE) 50 mcg/actuation nasal spray 1 spray by Nasal route daily as needed.     gabapentin (NEURONTIN) 600 MG tablet Take 1 tablet (600 mg total) by mouth 3 (three) times daily.    lidocaine (LIDODERM) 5 % Place 1 patch onto the skin once daily. Remove & Discard patch within 12 hours or as directed by MD (Patient not taking: Reported on 4/14/2020)    losartan-hydrochlorothiazide 100-25 mg (HYZAAR) 100-25 mg per tablet Take 1 tablet by mouth once daily.    multivit-iron-min-folic acid (MULTIVITAMIN-IRON-MINERALS-FOLIC ACID) 3,500-18-0.4 unit-mg-mg Chew Take by mouth once daily.      polyethylene glycol (GLYCOLAX) 17 gram/dose powder Take 17 g by mouth once daily.    zolpidem (AMBIEN) 10 mg Tab Take 1 tablet (10 mg total) by mouth nightly as needed.     No current facility-administered medications for this visit.      Facility-Administered Medications Ordered in Other Visits   Medication    electrolyte-S (ISOLYTE)    lactated ringers infusion       REVIEW OF SYSTEMS:  GENERAL: No weight loss, malaise or fevers.  HEENT: No recent changes in vision or hearing  NECK: Negative for lumps, no difficulty with swallowing.  RESPIRATORY: Negative for cough, wheezing or shortness of breath, patient denies any recent URI.  CARDIOVASCULAR: Negative for  chest pain, leg swelling or palpitations.  GI: Negative for abdominal discomfort, blood in stools or black stools or change in bowel habits.  MUSCULOSKELETAL: See HPI.  SKIN: Negative for lesions, rash, and itching.  PSYCH: No mood disorder or recent psychosocial stressors. Patients sleep is not disturbed secondary to pain.  HEMATOLOGY/LYMPHOLOGY: Negative for prolonged bleeding, bruising easily or swollen nodes. Patient is not currently taking any anti-coagulants  NEURO: No history of headaches, syncope, paralysis, seizures or tremors.    All other reviewed and negative other than HPI.    OBJECTIVE: limited physical exam secondary to telemedicine consultation  GEN: No acute distress; patient is alert and oriented to person, place, and time  Head: Normocephalic and atraumatic.   Eyes: Conjunctivae and EOM are normal.   Cardiovascular: Unable to assess  Pulmonary/Chest: Effort normal. No respiratory distress.   Neurological: She is alert and oriented to person, place, and time.   Skin: Skin is dry. She is not diaphoretic.   Psychiatric: Mood, memory, affect and judgment normal.     ASSESSMENT: 64 y.o. year old female with back and left knee pain. The patient prefers non-surgical options for her left knee pain at this time. Treatment plan outlined below.     1. Primary osteoarthritis of left knee  Ambulatory referral/consult to Pain Clinic   2. Chondromalacia, knee, left  Ambulatory referral/consult to Pain Clinic   3. History of arthroscopy of left knee  Ambulatory referral/consult to Pain Clinic       PLAN:  - Schedule for left knee genicular nerve blocks. Plan to offer lumbar [place] in the future pending treatment of her left knee  - Continue gabapentin 400 mg PO TID as she could not tolerate higher doses  - Continue duloxetine 30 mg PO BID as prescribed  - The patient will not be receiving chronic opioid management from myself given her concurrent benzodiazepine use.   - I have stressed the importance of physical  activity and a home exercise plan to help with chronic pain and improve health.   - RTC for the procedure as outlined above    The above plan and management options were discussed at length with patient. Patient is in agreement with the above and verbalized understanding. It will be communicated with the referring physician via electronic record, fax, or mail.    Dk Rosales  05/25/2020

## 2020-05-25 NOTE — LETTER
May 25, 2020      Ruben Martinez MD  [address]  [address]  [place] [address]           [place] [facility]  [address] [place] [address] [address]  [place] [address]  Phone: [phone]  Fax: [phone]          Patient: Beatriz Gan   MR Number: [id number]   YOB: 1955   Date of Visit: 5/25/2020       Dear Dr. Ruben Martinez:    Thank you for referring Beatriz Gan to me for evaluation. Attached you will find relevant portions of my assessment and plan of care.    If you have questions, please do not hesitate to call me. I look forward to following Beatriz Gan along with you.    Sincerely,    Dk Rosales MD    Enclosure  CC:  No Recipients    If you would like to receive this communication electronically, please contact [email or web address] or [phone] to request more information on [employer] access.    For providers and/or their staff who would like to refer a patient to [facility], please contact us through our one-stop-shop provider referral line, [facility], at [phone].    If you feel you have received this communication in error or would no longer like to receive these types of communications, please e-mail [email or web address]

## 2020-05-25 NOTE — TELEPHONE ENCOUNTER
----- Message from Vick Stevenson sent at 5/25/2020  9:52 AM CDT -----  Type: Needs Medical Advice  Who Called:  Patient    Best Call Back Number: [phone]  Additional Information: Patient states that she is waiting for the doctor for her 9:45 VV.

## 2020-06-02 ENCOUNTER — PATIENT MESSAGE (OUTPATIENT)
Dept: ORTHOPEDICS | Facility: CLINIC | Age: 65
End: 2020-06-02

## 2020-06-02 DIAGNOSIS — Z98.890 S/P LEFT ROTATOR CUFF REPAIR: ICD-10-CM

## 2020-06-02 DIAGNOSIS — Z98.890 HISTORY OF ARTHROSCOPY OF LEFT KNEE: Primary | ICD-10-CM

## 2020-06-03 RX ORDER — HYDROCODONE BITARTRATE AND ACETAMINOPHEN 5; 325 MG/1; MG/1
1 TABLET ORAL EVERY 6 HOURS PRN
Qty: 28 TABLET | Refills: 0 | Status: SHIPPED | OUTPATIENT
Start: 2020-06-03 | End: 2020-06-10

## 2020-06-06 ENCOUNTER — LAB VISIT (OUTPATIENT)
Dept: PRIMARY CARE CLINIC | Facility: CLINIC | Age: 65
End: 2020-06-06
Payer: MEDICARE

## 2020-06-06 DIAGNOSIS — Z01.818 PREOP TESTING: ICD-10-CM

## 2020-06-06 PROCEDURE — U0003 INFECTIOUS AGENT DETECTION BY NUCLEIC ACID (DNA OR RNA); SEVERE ACUTE RESPIRATORY SYNDROME CORONAVIRUS 2 (SARS-COV-2) (CORONAVIRUS DISEASE [COVID-19]), AMPLIFIED PROBE TECHNIQUE, MAKING USE OF HIGH THROUGHPUT TECHNOLOGIES AS DESCRIBED BY CMS-2020-01-R: HCPCS

## 2020-06-06 NOTE — PROGRESS NOTES
.Pt presented for Pre-procedure drive thru testing. Patient identified using two patient identifiers prior to specimen collection. Questions answered prior to departure and education given.

## 2020-06-07 LAB — SARS-COV-2 RNA RESP QL NAA+PROBE: NOT DETECTED

## 2020-06-08 ENCOUNTER — HOSPITAL ENCOUNTER (OUTPATIENT)
Facility: AMBULARY SURGERY CENTER | Age: 65
Discharge: HOME | End: 2020-06-08
Attending: ANESTHESIOLOGY | Admitting: ANESTHESIOLOGY
Payer: MEDICARE

## 2020-06-08 DIAGNOSIS — M23.312 MEDIAL MENISCUS, ANTERIOR HORN DERANGEMENT, LEFT: Primary | ICD-10-CM

## 2020-06-08 DIAGNOSIS — M25.562 CHRONIC PAIN OF LEFT KNEE: ICD-10-CM

## 2020-06-08 DIAGNOSIS — G89.29 CHRONIC PAIN OF LEFT KNEE: ICD-10-CM

## 2020-06-08 PROCEDURE — 64454 NJX AA&/STRD GNCLR NRV BRNCH: CPT | Mod: LT,,, | Performed by: ANESTHESIOLOGY

## 2020-06-08 PROCEDURE — 64450 NJX AA&/STRD OTHER PN/BRANCH: CPT | Performed by: ANESTHESIOLOGY

## 2020-06-08 PROCEDURE — 64454 PR NERVE BLOCK INJ, ANES/STEROID, GENICULAR NERVE, W/IMG: ICD-10-PCS | Mod: LT,,, | Performed by: ANESTHESIOLOGY

## 2020-06-08 PROCEDURE — 64454 NJX AA&/STRD GNCLR NRV BRNCH: CPT | Performed by: ANESTHESIOLOGY

## 2020-06-08 PROCEDURE — 77002 NEEDLE LOCALIZATION BY XRAY: CPT | Performed by: ANESTHESIOLOGY

## 2020-06-08 RX ORDER — MIDAZOLAM HYDROCHLORIDE 1 MG/ML
INJECTION INTRAMUSCULAR; INTRAVENOUS
Status: DISCONTINUED | OUTPATIENT
Start: 2020-06-08 | End: 2020-06-08 | Stop reason: HOSPADM

## 2020-06-08 RX ORDER — LIDOCAINE HYDROCHLORIDE 10 MG/ML
INJECTION, SOLUTION EPIDURAL; INFILTRATION; INTRACAUDAL; PERINEURAL
Status: DISCONTINUED | OUTPATIENT
Start: 2020-06-08 | End: 2020-06-08 | Stop reason: HOSPADM

## 2020-06-08 RX ORDER — SODIUM CHLORIDE, SODIUM LACTATE, POTASSIUM CHLORIDE, CALCIUM CHLORIDE 600; 310; 30; 20 MG/100ML; MG/100ML; MG/100ML; MG/100ML
INJECTION, SOLUTION INTRAVENOUS ONCE AS NEEDED
Status: COMPLETED | OUTPATIENT
Start: 2020-06-08 | End: 2020-06-08

## 2020-06-08 RX ORDER — LIDOCAINE HYDROCHLORIDE 20 MG/ML
INJECTION, SOLUTION EPIDURAL; INFILTRATION; INTRACAUDAL; PERINEURAL
Status: DISCONTINUED | OUTPATIENT
Start: 2020-06-08 | End: 2020-06-08 | Stop reason: HOSPADM

## 2020-06-08 RX ADMIN — SODIUM CHLORIDE, SODIUM LACTATE, POTASSIUM CHLORIDE, CALCIUM CHLORIDE: 600; 310; 30; 20 INJECTION, SOLUTION INTRAVENOUS at 11:06

## 2020-06-08 NOTE — DISCHARGE SUMMARY
[facility]  Discharge Note  Short Stay    Admit Date: 6/8/2020    Discharge Date and Time: 6/8/2020    Attending Physician: Dk Rosales MD     Discharge Provider: Dk Rosales    Diagnoses:  Active Hospital Problems    Diagnosis  POA    *Chronic pain of left knee [M25.562, G89.29]  Yes      Resolved Hospital Problems   No resolved problems to display.       Hospital Course: Left knee peripheral nerve block X 3    Discharged Condition: Good    Final Diagnoses:   Active Hospital Problems    Diagnosis  POA    *Chronic pain of left knee [M25.562, G89.29]  Yes      Resolved Hospital Problems   No resolved problems to display.       Disposition: Home or Self Care    Follow up/Patient Instructions:    Medications:  Reconciled Home Medications:      Medication List      CONTINUE taking these medications    ALPRAZolam 0.5 MG tablet  Commonly known as:  XANAX  Take 1 tablet (0.5 mg total) by mouth 3 (three) times daily as needed for Anxiety.     amLODIPine 5 MG tablet  Commonly known as:  NORVASC  Take 1 tablet (5 mg total) by mouth once daily.     azelastine 137 mcg (0.1 %) nasal spray  Commonly known as:  ASTELIN  1 spray (137 mcg total) by Nasal route 2 (two) times daily.     budesonide-formoterol 80-4.5 mcg 80-4.5 mcg/actuation Hfaa  Commonly known as:  SYMBICORT  Inhale 2 puffs into the lungs 2 (two) times daily. Controller     CENTRUM 3,500-18-0.4 unit-mg-mg Chew  Generic drug:  multivit-iron-min-folic acid  Take by mouth once daily.     diclofenac 50 MG EC tablet  Commonly known as:  VOLTAREN  Take 1 tablet (50 mg total) by mouth 2 (two) times daily with meals.     DULoxetine 30 MG capsule  Commonly known as:  CYMBALTA  Take 1 capsule (30 mg total) by mouth 2 (two) times daily.     fluticasone propionate 50 mcg/actuation nasal spray  Commonly known as:  FLONASE  1 spray by Nasal route daily as needed.     gabapentin 600 MG tablet  Commonly known as:  NEURONTIN  Take 1 tablet (600 mg total) by mouth  3 (three) times daily.     HYDROcodone-acetaminophen 5-325 mg per tablet  Commonly known as:  NORCO  Take 1 tablet by mouth every 6 (six) hours as needed for Pain.     lidocaine 5 %  Commonly known as:  LIDODERM  Place 1 patch onto the skin once daily. Remove & Discard patch within 12 hours or as directed by MD     losartan-hydrochlorothiazide 100-25 mg 100-25 mg per tablet  Commonly known as:  HYZAAR  Take 1 tablet by mouth once daily.     polyethylene glycol 17 gram/dose powder  Commonly known as:  GLYCOLAX  Take 17 g by mouth once daily.     zolpidem 10 mg Tab  Commonly known as:  AMBIEN  Take 1 tablet (10 mg total) by mouth nightly as needed.          Discharge Procedure Orders   Diet general     Call MD for:  temperature >100.4     Call MD for:  persistent nausea and vomiting     Call MD for:  severe uncontrolled pain     Call MD for:  difficulty breathing, headache or visual disturbances     Call MD for:  redness, tenderness, or signs of infection (pain, swelling, redness, odor or green/yellow discharge around incision site)     Call MD for:  hives     Call MD for:  persistent dizziness or light-headedness     Call MD for:  extreme fatigue        Follow up with MD in 2-3 weeks    Discharge Procedure Orders (must include Diet, Follow-up, Activity):   Discharge Procedure Orders (must include Diet, Follow-up, Activity)   Diet general     Call MD for:  temperature >100.4     Call MD for:  persistent nausea and vomiting     Call MD for:  severe uncontrolled pain     Call MD for:  difficulty breathing, headache or visual disturbances     Call MD for:  redness, tenderness, or signs of infection (pain, swelling, redness, odor or green/yellow discharge around incision site)     Call MD for:  hives     Call MD for:  persistent dizziness or light-headedness     Call MD for:  extreme fatigue

## 2020-06-08 NOTE — INTERVAL H&P NOTE
The patient has been examined and the H&P has been reviewed:    I concur with the findings and no changes have occurred since H&P was written.     This patient has been cleared for surgery in an ambulatory surgical facility    ASA 3,  Mallampatti Score 3  No history of anesthetic complications  Plan for RN IV sedation      Anesthesia/Surgery risks, benefits and alternative options discussed and understood by patient/family.          Active Hospital Problems    Diagnosis  POA    Chronic pain of left knee [M25.562, G89.29]  Yes      Resolved Hospital Problems   No resolved problems to display.

## 2020-06-08 NOTE — OP NOTE
PROCEDURE DATE: 6/8/2020    PROCEDURE: Superior lateral, Superior medial and Inferior medial genicular nerve blocks, left-sided    Diagnosis: Left knee pain     Post Op Diagnosis: Same     PHYSICIAN: Dk Rosales M.D.     LOCAL ANESTHESIA: Lidocaine 1%, 3 ml total.     MEDICATION INJECTED: 2 mL of lidocaine 25 at each nerve     SEDATION MEDICATIONS: RN IV sedation    COMPLICATIONS: None     ESTIMATED BLOOD LOSS: None     TECHNIQUE: A time-out was taken to identify patient and procedure side prior to starting procedure.   With the patient laying supine and the knees semi-flexed, the left knee was prepped and draped in the usual sterile fashion using ChloraPrep and a fenestrated drape. Knee joint line was determined under fluoroscopic guidance. The targets included the superior lateral (SL), superior medial (SM) and inferior medial (IM) genicular nerves which past periosteal areas connecting the shaft of the femur to bilateral epicondyles and the shaft of tibia to the medial epicondyle. The local anesthetic was given using a 25-gauge 1.5 inch needle. 3.5 in 25 g spinal needed was introduced into each target area. After negative aspiration for blood, the medication was then injected. The patient tolerated the procedure well.     If found to have greater than a 50% recovery and so will be scheduled for a radiofrequency ablation of the corresponding nerves.     Patient was given post procedure and discharge instructions to follow at home. The patient was discharged in a stable condition

## 2020-06-08 NOTE — PLAN OF CARE
Patient sent home with her jewelry in a sealed pack. She was able to walk to the car using her cane. Chago is driving her home. Dr Rosales spoke to her post procedure. Her pain diary was reviewed and sent home with her.

## 2020-06-09 ENCOUNTER — TELEPHONE (OUTPATIENT)
Dept: PAIN MEDICINE | Facility: CLINIC | Age: 65
End: 2020-06-09

## 2020-06-09 VITALS
HEART RATE: 80 BPM | OXYGEN SATURATION: 97 % | SYSTOLIC BLOOD PRESSURE: 141 MMHG | BODY MASS INDEX: 32.95 KG/M2 | TEMPERATURE: 98 F | DIASTOLIC BLOOD PRESSURE: 84 MMHG | HEIGHT: 64 IN | WEIGHT: 193 LBS | RESPIRATION RATE: 18 BRPM

## 2020-06-09 DIAGNOSIS — M25.562 CHRONIC PAIN OF LEFT KNEE: Primary | ICD-10-CM

## 2020-06-09 DIAGNOSIS — G89.29 CHRONIC PAIN OF LEFT KNEE: Primary | ICD-10-CM

## 2020-06-09 NOTE — TELEPHONE ENCOUNTER
----- Message from Laura Banegas sent at 6/9/2020 10:25 AM CDT -----  Contact: patient  Type: Needs Medical Advice  Who Called:  patient  Best Call Back Number: [phone]  Additional Information: Patient is calling to check in with the nurse per Dr. Rosales to see how she is doing after surgery.  Please call to advise.  Thanks!

## 2020-06-09 NOTE — TELEPHONE ENCOUNTER
Spoke to pt states she received an 80% or more decrease in pain following nerve block and would like to continue with RFA. RFA scheduled

## 2020-06-17 ENCOUNTER — LAB VISIT (OUTPATIENT)
Dept: PRIMARY CARE CLINIC | Facility: CLINIC | Age: 65
End: 2020-06-17
Payer: MEDICARE

## 2020-06-17 PROCEDURE — U0003 INFECTIOUS AGENT DETECTION BY NUCLEIC ACID (DNA OR RNA); SEVERE ACUTE RESPIRATORY SYNDROME CORONAVIRUS 2 (SARS-COV-2) (CORONAVIRUS DISEASE [COVID-19]), AMPLIFIED PROBE TECHNIQUE, MAKING USE OF HIGH THROUGHPUT TECHNOLOGIES AS DESCRIBED BY CMS-2020-01-R: HCPCS

## 2020-06-18 LAB — SARS-COV-2 RNA RESP QL NAA+PROBE: NOT DETECTED

## 2020-06-19 ENCOUNTER — HOSPITAL ENCOUNTER (OUTPATIENT)
Facility: HOSPITAL | Age: 65
Discharge: HOME | End: 2020-06-19
Attending: ANESTHESIOLOGY | Admitting: ANESTHESIOLOGY
Payer: MEDICARE

## 2020-06-19 DIAGNOSIS — M25.562 CHRONIC PAIN OF LEFT KNEE: Primary | ICD-10-CM

## 2020-06-19 DIAGNOSIS — G89.29 CHRONIC PAIN OF LEFT KNEE: Primary | ICD-10-CM

## 2020-06-19 PROCEDURE — 64624 PR DESTRUCT, NEUROLYTIC AGENT, GENICULAR NERVE, W/IMG: ICD-10-PCS | Mod: LT,,, | Performed by: ANESTHESIOLOGY

## 2020-06-19 PROCEDURE — 64624 DSTRJ NULYT AGT GNCLR NRV: CPT | Mod: LT,,, | Performed by: ANESTHESIOLOGY

## 2020-06-19 PROCEDURE — 63600175 PHARM REV CODE 636 W HCPCS: Performed by: ANESTHESIOLOGY

## 2020-06-19 PROCEDURE — 25000003 PHARM REV CODE 250: Performed by: ANESTHESIOLOGY

## 2020-06-19 PROCEDURE — 99900103 DSU ONLY-NO CHARGE-INITIAL HR (STAT): Performed by: ANESTHESIOLOGY

## 2020-06-19 PROCEDURE — 71000015 HC POSTOP RECOV 1ST HR: Performed by: ANESTHESIOLOGY

## 2020-06-19 PROCEDURE — 99900103 DSU ONLY-NO CHARGE-INITIAL HR (STAT)

## 2020-06-19 PROCEDURE — 36000705 HC OR TIME LEV I EA ADD 15 MIN: Performed by: ANESTHESIOLOGY

## 2020-06-19 PROCEDURE — 99900104 DSU ONLY-NO CHARGE-EA ADD'L HR (STAT): Performed by: ANESTHESIOLOGY

## 2020-06-19 PROCEDURE — 36000704 HC OR TIME LEV I 1ST 15 MIN: Performed by: ANESTHESIOLOGY

## 2020-06-19 RX ORDER — MIDAZOLAM HYDROCHLORIDE 1 MG/ML
INJECTION INTRAMUSCULAR; INTRAVENOUS
Status: DISCONTINUED | OUTPATIENT
Start: 2020-06-19 | End: 2020-06-19 | Stop reason: HOSPADM

## 2020-06-19 RX ORDER — FENTANYL CITRATE 50 UG/ML
INJECTION, SOLUTION INTRAMUSCULAR; INTRAVENOUS
Status: DISCONTINUED | OUTPATIENT
Start: 2020-06-19 | End: 2020-06-19 | Stop reason: HOSPADM

## 2020-06-19 RX ORDER — BUPIVACAINE HYDROCHLORIDE 2.5 MG/ML
INJECTION, SOLUTION EPIDURAL; INFILTRATION; INTRACAUDAL
Status: DISCONTINUED | OUTPATIENT
Start: 2020-06-19 | End: 2020-06-19 | Stop reason: HOSPADM

## 2020-06-19 RX ORDER — METHYLPREDNISOLONE ACETATE 80 MG/ML
INJECTION, SUSPENSION INTRA-ARTICULAR; INTRALESIONAL; INTRAMUSCULAR; SOFT TISSUE
Status: DISCONTINUED | OUTPATIENT
Start: 2020-06-19 | End: 2020-06-19 | Stop reason: HOSPADM

## 2020-06-19 RX ORDER — LIDOCAINE HYDROCHLORIDE 10 MG/ML
INJECTION, SOLUTION EPIDURAL; INFILTRATION; INTRACAUDAL; PERINEURAL
Status: DISCONTINUED | OUTPATIENT
Start: 2020-06-19 | End: 2020-06-19 | Stop reason: HOSPADM

## 2020-06-19 RX ORDER — SODIUM CHLORIDE, SODIUM LACTATE, POTASSIUM CHLORIDE, CALCIUM CHLORIDE 600; 310; 30; 20 MG/100ML; MG/100ML; MG/100ML; MG/100ML
INJECTION, SOLUTION INTRAVENOUS ONCE AS NEEDED
Status: DISCONTINUED | OUTPATIENT
Start: 2020-06-19 | End: 2020-06-19 | Stop reason: HOSPADM

## 2020-06-19 RX ADMIN — SODIUM CHLORIDE, SODIUM GLUCONATE, SODIUM ACETATE, POTASSIUM CHLORIDE, MAGNESIUM CHLORIDE, SODIUM PHOSPHATE, DIBASIC, AND POTASSIUM PHOSPHATE: .53; .5; .37; .037; .03; .012; .00082 INJECTION, SOLUTION INTRAVENOUS at 08:06

## 2020-06-19 NOTE — PLAN OF CARE
Vss, mary po fluids, denies pain, ambulates easily. IV removed, catheter intact. Discharge instructions provided and states understanding. States ready to go home.  Discharged from facility with family per wheelchair.

## 2020-06-19 NOTE — DISCHARGE INSTRUCTIONS
Pain injection instructions:     This procedure may take a couple weeks to relieve pain    No driving for 24 hrs.   Activity as tolerated- gradually increase activities.  Dont lift over 10 lbs for 24 hrs   No heat at injection sites x 2 days. No heating pads, hot tubs, saunas, or swimming in any body of water or pool for 2 days.  Use ice pack for mild swelling and for comfort , apply for 20 minutes, remove for 20 minute intervals. No direct contact of ice itself  to skin.  May shower today. No tub baths for two days.      Resume Aspirin, Plavix, or Coumadin the day after the procedure unless otherwise instructed.   If diabetic,monitor your glucose carefully as steroids can increase your glucose level    Seek immediate medical help for:   Severe increase in your usual pain or appearance of new pain.  Prolonged (mor than 8 hours) or increasing weakness or numbness in the legs or arms.  .    Fever above 101 ,Drainage,redness,active bleeding, or increased swelling at the injection site.  Headache, shortness of breath, chest pain, or breathing problems.

## 2020-06-19 NOTE — OP NOTE
DATE: 6/19/2020    PROCEDURE: Radiofrequency Ablations of Superior lateral, Superior medial and Inferior medial genicular nerves, left-sided     Diagnosis: Left knee pain     Post Op Diagnosis: Same     PHYSICIAN: Dk Rosales M.D.     LOCAL ANESTHESIA: Lidocaine 1%, 3 ml total.     MEDICATION INJECTED: Mixture of 5ml of 0.5% bupivacaine and 40mg of methylprednisone     SEDATION MEDICATIONS: RN IV sedation    COMPLICATIONS: None     ESTIMATED BLOOD LOSS: None     TECHNIQUE: A time-out was taken to identify patient and procedure side prior to starting procedure.   With the patient laying supine and the knees semi-flexed, the left knee was prepped and draped in the usual sterile fashion using ChloraPrep and a fenestrated drape. Knee joint line was determined under fluoroscopic guidance. The targets included the superior lateral (SL), superior medial (SM) and inferior medial (IM) genicular nerves which past periosteal areas connecting the shaft of the femur to bilateral epicondyles and the shaft of tibia to the medial epicondyle. The local anesthetic was given using a 25-gauge 1.5 inch needle. 50 mm RF needle was introduced into each target area. Motor stimulation up to 2 Volts at each level confirmed no motor nerve involvement. Impedance was less than 800 ohms at each level.   1ml of 2% lidocaine was instilled prior to lesioning. Ablation was performed per level utilizing radiofrequency generator approximately 80°C for 150 seconds. The above noted medication was then injected slowly. The patient tolerated the procedure well    Patient was given post procedure and discharge instructions to follow at home. The patient was discharged in a stable condition

## 2020-06-19 NOTE — DISCHARGE SUMMARY
[facility]  Discharge Note  Short Stay    Admit Date: 6/19/2020    Discharge Date and Time: 6/19/2020    Attending Physician: Dk Rosales MD     Discharge Provider: Dk Rosales    Diagnoses:  Active Hospital Problems    Diagnosis  POA    *Chronic pain of left knee [M25.562, G89.29]  Yes      Resolved Hospital Problems   No resolved problems to display.       Hospital Course: Left knee peripheral nerve radiofrequency ablation X 3    Discharged Condition: Good    Final Diagnoses:   Active Hospital Problems    Diagnosis  POA    *Chronic pain of left knee [M25.562, G89.29]  Yes      Resolved Hospital Problems   No resolved problems to display.       Disposition: Home or Self Care    Follow up/Patient Instructions:    Medications:  Reconciled Home Medications:      Medication List      CONTINUE taking these medications    ALPRAZolam 0.5 MG tablet  Commonly known as: XANAX  Take 1 tablet (0.5 mg total) by mouth 3 (three) times daily as needed for Anxiety.     amLODIPine 5 MG tablet  Commonly known as: NORVASC  Take 1 tablet (5 mg total) by mouth once daily.     azelastine 137 mcg (0.1 %) nasal spray  Commonly known as: ASTELIN  1 spray (137 mcg total) by Nasal route 2 (two) times daily.     budesonide-formoterol 80-4.5 mcg 80-4.5 mcg/actuation Hfaa  Commonly known as: SYMBICORT  Inhale 2 puffs into the lungs 2 (two) times daily. Controller     diclofenac 50 MG EC tablet  Commonly known as: VOLTAREN  Take 1 tablet (50 mg total) by mouth 2 (two) times daily with meals.     DULoxetine 30 MG capsule  Commonly known as: CYMBALTA  Take 1 capsule (30 mg total) by mouth 2 (two) times daily.     gabapentin 600 MG tablet  Commonly known as: NEURONTIN  Take 1 tablet (600 mg total) by mouth 3 (three) times daily.     losartan-hydrochlorothiazide 100-25 mg 100-25 mg per tablet  Commonly known as: HYZAAR  Take 1 tablet by mouth once daily.     polyethylene glycol 17 gram/dose powder  Commonly known as:  GLYCOLAX  Take 17 g by mouth once daily.     zolpidem 10 mg Tab  Commonly known as: AMBIEN  Take 1 tablet (10 mg total) by mouth nightly as needed.        ASK your doctor about these medications    CENTRUM 3,500-18-0.4 unit-mg-mg Chew  Generic drug: multivit-iron-min-folic acid  Take by mouth once daily.     fluticasone propionate 50 mcg/actuation nasal spray  Commonly known as: FLONASE  1 spray by Nasal route daily as needed.     lidocaine 5 %  Commonly known as: LIDODERM  Place 1 patch onto the skin once daily. Remove & Discard patch within 12 hours or as directed by MD          Discharge Procedure Orders   Diet general     Call MD for:  temperature >100.4     Call MD for:  persistent nausea and vomiting     Call MD for:  severe uncontrolled pain     Call MD for:  difficulty breathing, headache or visual disturbances     Call MD for:  redness, tenderness, or signs of infection (pain, swelling, redness, odor or green/yellow discharge around incision site)     Call MD for:  hives     Call MD for:  persistent dizziness or light-headedness     Call MD for:  extreme fatigue        Follow up with MD in 2-3 weeks    Discharge Procedure Orders (must include Diet, Follow-up, Activity):   Discharge Procedure Orders (must include Diet, Follow-up, Activity)   Diet general     Call MD for:  temperature >100.4     Call MD for:  persistent nausea and vomiting     Call MD for:  severe uncontrolled pain     Call MD for:  difficulty breathing, headache or visual disturbances     Call MD for:  redness, tenderness, or signs of infection (pain, swelling, redness, odor or green/yellow discharge around incision site)     Call MD for:  hives     Call MD for:  persistent dizziness or light-headedness     Call MD for:  extreme fatigue

## 2020-06-22 VITALS
HEIGHT: 64 IN | HEART RATE: 90 BPM | DIASTOLIC BLOOD PRESSURE: 78 MMHG | RESPIRATION RATE: 18 BRPM | OXYGEN SATURATION: 96 % | TEMPERATURE: 98 F | SYSTOLIC BLOOD PRESSURE: 147 MMHG | BODY MASS INDEX: 33.29 KG/M2 | WEIGHT: 195 LBS

## 2020-06-29 ENCOUNTER — PATIENT MESSAGE (OUTPATIENT)
Dept: FAMILY MEDICINE | Facility: CLINIC | Age: 65
End: 2020-06-29

## 2020-06-29 NOTE — TELEPHONE ENCOUNTER
I don't think it is related to a medication side effect. Recommend she try magnesium oxide 400 mg daily or 200 mg twice daily.  Should be available OTC.  This may help with the cramps.

## 2020-07-01 ENCOUNTER — PATIENT MESSAGE (OUTPATIENT)
Dept: PAIN MEDICINE | Facility: CLINIC | Age: 65
End: 2020-07-01

## 2020-07-01 DIAGNOSIS — M17.12 PRIMARY OSTEOARTHRITIS OF LEFT KNEE: Primary | ICD-10-CM

## 2020-07-02 DIAGNOSIS — M47.896 OTHER SPONDYLOSIS, LUMBAR REGION: ICD-10-CM

## 2020-07-02 RX ORDER — DICLOFENAC SODIUM 50 MG/1
TABLET, DELAYED RELEASE ORAL
Qty: 60 TABLET | Refills: 0 | Status: ON HOLD | OUTPATIENT
Start: 2020-07-02 | End: 2020-08-03 | Stop reason: HOSPADM

## 2020-07-02 RX ORDER — HYDROCODONE BITARTRATE AND ACETAMINOPHEN 7.5; 325 MG/1; MG/1
1 TABLET ORAL EVERY 6 HOURS PRN
Qty: 28 TABLET | Refills: 0 | Status: SHIPPED | OUTPATIENT
Start: 2020-07-02 | End: 2020-07-15 | Stop reason: SDUPTHER

## 2020-07-02 NOTE — TELEPHONE ENCOUNTER
----- Message from Dk Rosales MD sent at 7/2/2020  3:25 PM CDT -----  At least two more weeks would be ideal.

## 2020-07-08 ENCOUNTER — PATIENT MESSAGE (OUTPATIENT)
Dept: UROLOGY | Facility: CLINIC | Age: 65
End: 2020-07-08

## 2020-07-15 ENCOUNTER — OFFICE VISIT (OUTPATIENT)
Dept: ORTHOPEDICS | Facility: CLINIC | Age: 65
End: 2020-07-15
Payer: MEDICARE

## 2020-07-15 VITALS
HEART RATE: 80 BPM | WEIGHT: 190 LBS | HEIGHT: 64 IN | DIASTOLIC BLOOD PRESSURE: 79 MMHG | SYSTOLIC BLOOD PRESSURE: 115 MMHG | BODY MASS INDEX: 32.44 KG/M2

## 2020-07-15 DIAGNOSIS — Z98.890 HISTORY OF ARTHROSCOPY OF LEFT KNEE: ICD-10-CM

## 2020-07-15 DIAGNOSIS — M17.12 PRIMARY OSTEOARTHRITIS OF LEFT KNEE: Primary | ICD-10-CM

## 2020-07-15 PROCEDURE — 99213 PR OFFICE/OUTPT VISIT, EST, LEVL III, 20-29 MIN: ICD-10-PCS | Mod: S$GLB,,, | Performed by: ORTHOPAEDIC SURGERY

## 2020-07-15 PROCEDURE — 99213 OFFICE O/P EST LOW 20 MIN: CPT | Mod: S$GLB,,, | Performed by: ORTHOPAEDIC SURGERY

## 2020-07-15 RX ORDER — HYDROCODONE BITARTRATE AND ACETAMINOPHEN 7.5; 325 MG/1; MG/1
1 TABLET ORAL EVERY 6 HOURS PRN
Qty: 28 TABLET | Refills: 0 | Status: ON HOLD | OUTPATIENT
Start: 2020-07-15 | End: 2020-08-03 | Stop reason: HOSPADM

## 2020-07-15 RX ORDER — HYDROCODONE BITARTRATE AND ACETAMINOPHEN 7.5; 325 MG/1; MG/1
1 TABLET ORAL EVERY 6 HOURS PRN
Qty: 28 TABLET | Refills: 0 | Status: SHIPPED | OUTPATIENT
Start: 2020-07-15 | End: 2020-07-15 | Stop reason: SDUPTHER

## 2020-07-15 RX ORDER — CALCIUM CARBONATE 600 MG
600 TABLET ORAL ONCE
Status: ON HOLD | COMMUNITY
End: 2023-05-04

## 2020-07-15 RX ORDER — MAGNESIUM 30 MG
TABLET ORAL ONCE
COMMUNITY

## 2020-07-15 NOTE — PROGRESS NOTES
Subjective:       Patient ID: Beatriz Gan is a 64 y.o. female.    Chief Complaint: Pain of the Left Knee (Had left knee RFA done with Dr Rosales about a month or so ago, helped for about a week, then pain came back to where it started, swelling, popping, giving way, uses ice in pm, has throbbing pain )      History of Present Illness  Patient is here today to follow-up for persistent left knee pain.  She has known left knee degenerative joint disease.  She underwent a left knee arthroscopy which did not help too much.  She then underwent left knee radiofrequency ablation which did not help either.  This process has been going on for quite a long time.  She states that she can no longer continue to live with her symptoms and is ready to proceed with more aggressive surgical treatment options.    Current Medications  Current Outpatient Medications   Medication Sig Dispense Refill    ALPRAZolam (XANAX) 0.5 MG tablet Take 1 tablet (0.5 mg total) by mouth 3 (three) times daily as needed for Anxiety. 90 tablet 1    amLODIPine (NORVASC) 5 MG tablet Take 1 tablet (5 mg total) by mouth once daily. 90 tablet 3    azelastine (ASTELIN) 137 mcg (0.1 %) nasal spray 1 spray (137 mcg total) by Nasal route 2 (two) times daily. 30 mL 3    budesonide-formoterol 80-4.5 mcg (SYMBICORT) 80-4.5 mcg/actuation HFAA Inhale 2 puffs into the lungs 2 (two) times daily. Controller 1 Inhaler 3    calcium carbonate (OS-TERI) 600 mg calcium (1,500 mg) Tab Take 600 mg by mouth once.      diclofenac (VOLTAREN) 50 MG EC tablet TAKE 1 TABLET BY MOUTH TWICE DAILY WITH MEALS 60 tablet 0    DULoxetine (CYMBALTA) 30 MG capsule Take 1 capsule (30 mg total) by mouth 2 (two) times daily. 180 capsule 3    fluticasone (FLONASE) 50 mcg/actuation nasal spray 1 spray by Nasal route daily as needed.       gabapentin (NEURONTIN) 600 MG tablet Take 1 tablet (600 mg total) by mouth 3 (three) times daily. 90 tablet 11    HYDROcodone-acetaminophen  "(NORCO) 7.5-325 mg per tablet Take 1 tablet by mouth every 6 (six) hours as needed for Pain. 28 tablet 0    lidocaine (LIDODERM) 5 % Place 1 patch onto the skin once daily. Remove & Discard patch within 12 hours or as directed by MD 30 patch 0    losartan-hydrochlorothiazide 100-25 mg (HYZAAR) 100-25 mg per tablet Take 1 tablet by mouth once daily. 90 tablet 3    magnesium 30 mg Tab Take by mouth once.      multivit-iron-min-folic acid (MULTIVITAMIN-IRON-MINERALS-FOLIC ACID) 3,500-18-0.4 unit-mg-mg Chew Take by mouth once daily.        polyethylene glycol (GLYCOLAX) 17 gram/dose powder Take 17 g by mouth once daily. 510 g 3    zolpidem (AMBIEN) 10 mg Tab Take 1 tablet (10 mg total) by mouth nightly as needed. 90 tablet 0     No current facility-administered medications for this visit.      Facility-Administered Medications Ordered in Other Visits   Medication Dose Route Frequency Provider Last Rate Last Dose    electrolyte-S (ISOLYTE)   Intravenous Continuous Kota Ortega MD 10 mL/hr at 01/31/20 0624      lactated ringers infusion   Intravenous Continuous Kota Ortega MD           Allergies  Review of patient's allergies indicates:   Allergen Reactions    Phenergan [promethazine] Hives and Rash    Latex, natural rubber Hives     Per pt report-many years    Morphine Hives    Nsaids (non-steroidal anti-inflammatory drug)      Due to "kidney cancer"       Past Medical History  Past Medical History:   Diagnosis Date    Alcohol dependence     DDD (degenerative disc disease), lumbosacral     DJD (degenerative joint disease), lumbosacral     Encounter for blood transfusion     GERD (gastroesophageal reflux disease)     Gout     Hypercholesterolemia     Hypertension     NATHALIE (iron deficiency anemia)     questionable white coat hypertension    Kidney carcinoma, left 2014    Pneumonia     Renal cell carcinoma     Shingles 10/13/2012       Surgical History  Past Surgical History:   Procedure " Laterality Date    APPENDECTOMY      ARTHROSCOPY OF SHOULDER WITH DECOMPRESSION OF SUBACROMIAL SPACE Left 10/31/2019    Procedure: ARTHROSCOPY, SHOULDER, WITH SUBACROMIAL SPACE DECOMPRESSION;  Surgeon: Ruben Martinez MD;  Location: [facility] OR;  Service: Orthopedics;  Laterality: Left;    BLADDER REPAIR      x 2    COLONOSCOPY  9/2011    DISTAL CLAVICLE EXCISION Left 10/31/2019    Procedure: EXCISION, CLAVICLE, DISTAL;  Surgeon: Ruben Martinez MD;  Location: [facility] OR;  Service: Orthopedics;  Laterality: Left;    ESOPHAGOGASTRODUODENOSCOPY  9/2011    EYE SURGERY      lasix bilateral    FIXATION KYPHOPLASTY N/A 1/31/2020    Procedure: Kyphoplasty T12;  Surgeon: Dk Rosales MD;  Location: [facility] OR;  Service: Pain Management;  Laterality: N/A;    HERNIA REPAIR      hiatal hernai    HYSTERECTOMY      INJECTION OF ANESTHETIC AGENT AROUND NERVE Left 6/8/2020    Procedure: Block, Nerve;  Surgeon: Dk Rosales MD;  Location: [facility] OR;  Service: Pain Management;  Laterality: Left;  left genicular nerve block     KNEE ARTHROSCOPY W/ MENISCECTOMY Left 1/16/2020    Procedure: ARTHROSCOPY, KNEE, WITH MEDIAL MENISCECTOMY;  Surgeon: Ruben Martinez MD;  Location: [facility] OR;  Service: Orthopedics;  Laterality: Left;    LAPAROSCOPIC NISSEN FUNDOPLICATION      NEPHRECTOMY      LT partial    RADIOFREQUENCY ABLATION Left 6/19/2020    Procedure: Radiofrequency Ablation;  Surgeon: Dk Rosales MD;  Location: [facility] OR;  Service: Pain Management;  Laterality: Left;    RADIOFREQUENCY ABLATION OF LUMBAR MEDIAL BRANCH NERVE AT SINGLE LEVEL Bilateral 2/28/2020    Procedure: Radiofrequency Ablation, Nerve, Spinal, Lumbar, Medial Branch, 1 Level;  Surgeon: Dk Rosales MD;  Location: [facility] OR;  Service: Pain Management;  Laterality: Bilateral;  L3,L4,L5 - Burned at 80 degrees C. for 60 seconds x 2 each site    REFRACTIVE SURGERY      ROTATOR CUFF REPAIR Left 10/31/2019    Procedure: REPAIR, ROTATOR CUFF;  Surgeon:  Ruben Martinez MD;  Location: [facility];  Service: Orthopedics;  Laterality: Left;  arthrex    SKIN BIOPSY      TOTAL ABDOMINAL HYSTERECTOMY W/ BILATERAL SALPINGOOPHORECTOMY  age 48       Family History:   Family History   Problem Relation Age of Onset    Hypertension Father     Glaucoma Neg Hx     Macular degeneration Neg Hx     Retinal detachment Neg Hx        Social History:   Social History     Socioeconomic History    Marital status:      Spouse name: Not on file    Number of children: Not on file    Years of education: Not on file    Highest education level: Not on file   Occupational History     Employer: [employer]   Social Needs    Financial resource strain: Somewhat hard    Food insecurity     Worry: Sometimes true     Inability: Sometimes true    Transportation needs     Medical: No     Non-medical: No   Tobacco Use    Smoking status: Current Some Day Smoker     Packs/day: 1.00     Years: 40.00     Pack years: 40.00     Types: Cigarettes     Last attempt to quit: 2018     Years since quittin.5    Smokeless tobacco: Never Used   Substance and Sexual Activity    Alcohol use: Yes     Alcohol/week: 28.0 standard drinks     Types: 14 Cans of beer, 14 Standard drinks or equivalent per week     Frequency: 4 or more times a week     Drinks per session: 1 or 2     Binge frequency: Never     Comment: 2-3 a day/ social    Drug use: No    Sexual activity: Yes     Partners: Male   Lifestyle    Physical activity     Days per week: 0 days     Minutes per session: 0 min    Stress: Very much   Relationships    Social connections     Talks on phone: Once a week     Gets together: Never     Attends Christianity service: Not on file     Active member of club or organization: Yes     Attends meetings of clubs or organizations: More than 4 times per year     Relationship status:    Other Topics Concern    Not on file   Social History Narrative    Not on file       Hospitalization/Major  Diagnostic Procedure:     Review of Systems     General/Constitutional:  Chills denies. Fatigue denies. Fever denies. Weight gain denies. Weight loss denies.    Respiratory:  Shortness of breath denies.    Cardiovascular:  Chest pain denies.    Gastrointestinal:  Constipation denies. Diarrhea denies. Nausea denies. Vomiting denies.     Hematology:  Easy bruising denies. Prolonged bleeding denies.     Genitourinary:  Frequent urination denies. Pain in lower back denies. Painful urination denies.     Musculoskeletal:  See HPI for details    Skin:  Rash denies.    Neurologic:  Dizziness denies. Gait abnormalities denies. Seizures denies. Tingling/Numbess denies.    Psychiatric:  Anxiety denies. Depressed mood denies.     Objective:   Vital Signs:   Vitals:    07/15/20 1331   BP: 115/79   Pulse: 80        Physical Exam      General Examination:     Constitutional: The patient is alert and oriented to lace person and time. Mood is pleasant.     Head/Face: Normal facial features normal eyebrows    Eyes: Normal extraocular motion bilaterally    Lungs: Respirations are equal and unlabored    Gait is coordinated.    Cardiovascular: There are no swelling or varicosities present.    Lymphatic: Negative for adenopathy    Skin: Normal    Neurological: Level of consciousness normal. Oriented to place person and time and situation    Psychiatric: Oriented to time place person and situation    Left knee exam:  Antalgic gait with decreased weight-bearing left lower extremity.  Medial joint line tenderness palpation.  No gross abnormality or instability noted.  Range of motion is straight within functional limits.    XRAY Report/ Interpretation:  Left knee AP and lateral x-rays taken in the office today reviewed the patient demonstrate significant medial compartment degenerative joint disease      Assessment:       1. Primary osteoarthritis of left knee    2. History of arthroscopy of left knee        Plan:       Beatriz was seen  "today for pain.    Diagnoses and all orders for this visit:    Primary osteoarthritis of left knee    History of arthroscopy of left knee         No follow-ups on file.  Harshil Romero, physician's assistant served in the capacity as a "scribe" for this patient encounter  A "face to face" encounter occurred with Dr. Martinez on this date  The treatment plan and medical decision making is outlined below:    Recommend proceeding with left total knee arthroplasty    This note was created using Dragon voice recognition software that occasionally misinterpreted phrases or words.  "

## 2020-07-15 NOTE — H&P (VIEW-ONLY)
Subjective:       Patient ID: Beatriz Gan is a 64 y.o. female.    Chief Complaint: Pain of the Left Knee (Had left knee RFA done with Dr Rosales about a month or so ago, helped for about a week, then pain came back to where it started, swelling, popping, giving way, uses ice in pm, has throbbing pain )      History of Present Illness  Patient is here today to follow-up for persistent left knee pain.  She has known left knee degenerative joint disease.  She underwent a left knee arthroscopy which did not help too much.  She then underwent left knee radiofrequency ablation which did not help either.  This process has been going on for quite a long time.  She states that she can no longer continue to live with her symptoms and is ready to proceed with more aggressive surgical treatment options.    Current Medications  Current Outpatient Medications   Medication Sig Dispense Refill    ALPRAZolam (XANAX) 0.5 MG tablet Take 1 tablet (0.5 mg total) by mouth 3 (three) times daily as needed for Anxiety. 90 tablet 1    amLODIPine (NORVASC) 5 MG tablet Take 1 tablet (5 mg total) by mouth once daily. 90 tablet 3    azelastine (ASTELIN) 137 mcg (0.1 %) nasal spray 1 spray (137 mcg total) by Nasal route 2 (two) times daily. 30 mL 3    budesonide-formoterol 80-4.5 mcg (SYMBICORT) 80-4.5 mcg/actuation HFAA Inhale 2 puffs into the lungs 2 (two) times daily. Controller 1 Inhaler 3    calcium carbonate (OS-TERI) 600 mg calcium (1,500 mg) Tab Take 600 mg by mouth once.      diclofenac (VOLTAREN) 50 MG EC tablet TAKE 1 TABLET BY MOUTH TWICE DAILY WITH MEALS 60 tablet 0    DULoxetine (CYMBALTA) 30 MG capsule Take 1 capsule (30 mg total) by mouth 2 (two) times daily. 180 capsule 3    fluticasone (FLONASE) 50 mcg/actuation nasal spray 1 spray by Nasal route daily as needed.       gabapentin (NEURONTIN) 600 MG tablet Take 1 tablet (600 mg total) by mouth 3 (three) times daily. 90 tablet 11    HYDROcodone-acetaminophen  "(NORCO) 7.5-325 mg per tablet Take 1 tablet by mouth every 6 (six) hours as needed for Pain. 28 tablet 0    lidocaine (LIDODERM) 5 % Place 1 patch onto the skin once daily. Remove & Discard patch within 12 hours or as directed by MD 30 patch 0    losartan-hydrochlorothiazide 100-25 mg (HYZAAR) 100-25 mg per tablet Take 1 tablet by mouth once daily. 90 tablet 3    magnesium 30 mg Tab Take by mouth once.      multivit-iron-min-folic acid (MULTIVITAMIN-IRON-MINERALS-FOLIC ACID) 3,500-18-0.4 unit-mg-mg Chew Take by mouth once daily.        polyethylene glycol (GLYCOLAX) 17 gram/dose powder Take 17 g by mouth once daily. 510 g 3    zolpidem (AMBIEN) 10 mg Tab Take 1 tablet (10 mg total) by mouth nightly as needed. 90 tablet 0     No current facility-administered medications for this visit.      Facility-Administered Medications Ordered in Other Visits   Medication Dose Route Frequency Provider Last Rate Last Dose    electrolyte-S (ISOLYTE)   Intravenous Continuous Kota Ortega MD 10 mL/hr at 01/31/20 0624      lactated ringers infusion   Intravenous Continuous Kota Ortega MD           Allergies  Review of patient's allergies indicates:   Allergen Reactions    Phenergan [promethazine] Hives and Rash    Latex, natural rubber Hives     Per pt report-many years    Morphine Hives    Nsaids (non-steroidal anti-inflammatory drug)      Due to "kidney cancer"       Past Medical History  Past Medical History:   Diagnosis Date    Alcohol dependence     DDD (degenerative disc disease), lumbosacral     DJD (degenerative joint disease), lumbosacral     Encounter for blood transfusion     GERD (gastroesophageal reflux disease)     Gout     Hypercholesterolemia     Hypertension     NATHALIE (iron deficiency anemia)     questionable white coat hypertension    Kidney carcinoma, left 2014    Pneumonia     Renal cell carcinoma     Shingles 10/13/2012       Surgical History  Past Surgical History:   Procedure " Laterality Date    APPENDECTOMY      ARTHROSCOPY OF SHOULDER WITH DECOMPRESSION OF SUBACROMIAL SPACE Left 10/31/2019    Procedure: ARTHROSCOPY, SHOULDER, WITH SUBACROMIAL SPACE DECOMPRESSION;  Surgeon: Ruben Martinez MD;  Location: [facility] OR;  Service: Orthopedics;  Laterality: Left;    BLADDER REPAIR      x 2    COLONOSCOPY  9/2011    DISTAL CLAVICLE EXCISION Left 10/31/2019    Procedure: EXCISION, CLAVICLE, DISTAL;  Surgeon: Ruben Martinez MD;  Location: [facility] OR;  Service: Orthopedics;  Laterality: Left;    ESOPHAGOGASTRODUODENOSCOPY  9/2011    EYE SURGERY      lasix bilateral    FIXATION KYPHOPLASTY N/A 1/31/2020    Procedure: Kyphoplasty T12;  Surgeon: Dk Rosales MD;  Location: [facility] OR;  Service: Pain Management;  Laterality: N/A;    HERNIA REPAIR      hiatal hernai    HYSTERECTOMY      INJECTION OF ANESTHETIC AGENT AROUND NERVE Left 6/8/2020    Procedure: Block, Nerve;  Surgeon: Dk Rosales MD;  Location: [facility] OR;  Service: Pain Management;  Laterality: Left;  left genicular nerve block     KNEE ARTHROSCOPY W/ MENISCECTOMY Left 1/16/2020    Procedure: ARTHROSCOPY, KNEE, WITH MEDIAL MENISCECTOMY;  Surgeon: Ruben Martinez MD;  Location: [facility] OR;  Service: Orthopedics;  Laterality: Left;    LAPAROSCOPIC NISSEN FUNDOPLICATION      NEPHRECTOMY      LT partial    RADIOFREQUENCY ABLATION Left 6/19/2020    Procedure: Radiofrequency Ablation;  Surgeon: Dk Rosales MD;  Location: [facility] OR;  Service: Pain Management;  Laterality: Left;    RADIOFREQUENCY ABLATION OF LUMBAR MEDIAL BRANCH NERVE AT SINGLE LEVEL Bilateral 2/28/2020    Procedure: Radiofrequency Ablation, Nerve, Spinal, Lumbar, Medial Branch, 1 Level;  Surgeon: Dk Rosales MD;  Location: [facility] OR;  Service: Pain Management;  Laterality: Bilateral;  L3,L4,L5 - Burned at 80 degrees C. for 60 seconds x 2 each site    REFRACTIVE SURGERY      ROTATOR CUFF REPAIR Left 10/31/2019    Procedure: REPAIR, ROTATOR CUFF;  Surgeon:  Ruben Martinez MD;  Location: [facility];  Service: Orthopedics;  Laterality: Left;  arthrex    SKIN BIOPSY      TOTAL ABDOMINAL HYSTERECTOMY W/ BILATERAL SALPINGOOPHORECTOMY  age 48       Family History:   Family History   Problem Relation Age of Onset    Hypertension Father     Glaucoma Neg Hx     Macular degeneration Neg Hx     Retinal detachment Neg Hx        Social History:   Social History     Socioeconomic History    Marital status:      Spouse name: Not on file    Number of children: Not on file    Years of education: Not on file    Highest education level: Not on file   Occupational History     Employer: [employer]   Social Needs    Financial resource strain: Somewhat hard    Food insecurity     Worry: Sometimes true     Inability: Sometimes true    Transportation needs     Medical: No     Non-medical: No   Tobacco Use    Smoking status: Current Some Day Smoker     Packs/day: 1.00     Years: 40.00     Pack years: 40.00     Types: Cigarettes     Last attempt to quit: 2018     Years since quittin.5    Smokeless tobacco: Never Used   Substance and Sexual Activity    Alcohol use: Yes     Alcohol/week: 28.0 standard drinks     Types: 14 Cans of beer, 14 Standard drinks or equivalent per week     Frequency: 4 or more times a week     Drinks per session: 1 or 2     Binge frequency: Never     Comment: 2-3 a day/ social    Drug use: No    Sexual activity: Yes     Partners: Male   Lifestyle    Physical activity     Days per week: 0 days     Minutes per session: 0 min    Stress: Very much   Relationships    Social connections     Talks on phone: Once a week     Gets together: Never     Attends Baptism service: Not on file     Active member of club or organization: Yes     Attends meetings of clubs or organizations: More than 4 times per year     Relationship status:    Other Topics Concern    Not on file   Social History Narrative    Not on file       Hospitalization/Major  Diagnostic Procedure:     Review of Systems     General/Constitutional:  Chills denies. Fatigue denies. Fever denies. Weight gain denies. Weight loss denies.    Respiratory:  Shortness of breath denies.    Cardiovascular:  Chest pain denies.    Gastrointestinal:  Constipation denies. Diarrhea denies. Nausea denies. Vomiting denies.     Hematology:  Easy bruising denies. Prolonged bleeding denies.     Genitourinary:  Frequent urination denies. Pain in lower back denies. Painful urination denies.     Musculoskeletal:  See HPI for details    Skin:  Rash denies.    Neurologic:  Dizziness denies. Gait abnormalities denies. Seizures denies. Tingling/Numbess denies.    Psychiatric:  Anxiety denies. Depressed mood denies.     Objective:   Vital Signs:   Vitals:    07/15/20 1331   BP: 115/79   Pulse: 80        Physical Exam      General Examination:     Constitutional: The patient is alert and oriented to lace person and time. Mood is pleasant.     Head/Face: Normal facial features normal eyebrows    Eyes: Normal extraocular motion bilaterally    Lungs: Respirations are equal and unlabored    Gait is coordinated.    Cardiovascular: There are no swelling or varicosities present.    Lymphatic: Negative for adenopathy    Skin: Normal    Neurological: Level of consciousness normal. Oriented to place person and time and situation    Psychiatric: Oriented to time place person and situation    Left knee exam:  Antalgic gait with decreased weight-bearing left lower extremity.  Medial joint line tenderness palpation.  No gross abnormality or instability noted.  Range of motion is straight within functional limits.    XRAY Report/ Interpretation:  Left knee AP and lateral x-rays taken in the office today reviewed the patient demonstrate significant medial compartment degenerative joint disease      Assessment:       1. Primary osteoarthritis of left knee    2. History of arthroscopy of left knee        Plan:       Beatriz was seen  "today for pain.    Diagnoses and all orders for this visit:    Primary osteoarthritis of left knee    History of arthroscopy of left knee         No follow-ups on file.  Harshil Romero, physician's assistant served in the capacity as a "scribe" for this patient encounter  A "face to face" encounter occurred with Dr. Martinez on this date  The treatment plan and medical decision making is outlined below:    Recommend proceeding with left total knee arthroplasty    This note was created using Dragon voice recognition software that occasionally misinterpreted phrases or words.  "

## 2020-07-16 DIAGNOSIS — Z01.818 PRE-OP TESTING: ICD-10-CM

## 2020-07-16 DIAGNOSIS — Z03.818 ENCOUNTER FOR OBSERVATION FOR SUSPECTED EXPOSURE TO OTHER BIOLOGICAL AGENTS RULED OUT: ICD-10-CM

## 2020-07-16 DIAGNOSIS — M17.12 PRIMARY OSTEOARTHRITIS OF LEFT KNEE: Primary | ICD-10-CM

## 2020-07-21 NOTE — PRE ADMISSION SCREENING
JOINT CAMP ASSESSMENT    Name Beatriz Gan   MRN [id number]    Age/Sex 64 y.o. female    Surgeon Dr. Ruben Martinez   Joint Camp Date 7/23/2020   Surgery Date 7/30/2020   Procedure Left Knee arthroplasty   Insurance Payor: MEDICARE / Plan: MEDICARE PART A & B / Product Type: Government /    Care Team Patient Care Team:  Jorge Alonzo MD as PCP - General (Internal Medicine)  Bonilla Richard MD as Consulting Physician (Hematology and Oncology)  Harshil Marcelo MD as Consulting Physician (Gastroenterology)    [facility]   [address] [address] [facility] [address]  Phone: [phone] Fax: [phone]     AM-PAC Score    20   Risk Assessment Score 30     Past Medical History:   Diagnosis Date    Alcohol dependence     DDD (degenerative disc disease), lumbosacral     DJD (degenerative joint disease), lumbosacral     Encounter for blood transfusion     GERD (gastroesophageal reflux disease)     Gout     Hypercholesterolemia     Hypertension     NATHALIE (iron deficiency anemia)     questionable white coat hypertension    Kidney carcinoma, left 2014    Pneumonia     Renal cell carcinoma     Shingles 10/13/2012       Past Surgical History:   Procedure Laterality Date    APPENDECTOMY      ARTHROSCOPY OF SHOULDER WITH DECOMPRESSION OF SUBACROMIAL SPACE Left 10/31/2019    Procedure: ARTHROSCOPY, SHOULDER, WITH SUBACROMIAL SPACE DECOMPRESSION;  Surgeon: Ruben Martinez MD;  Location: [facility] OR;  Service: Orthopedics;  Laterality: Left;    BLADDER REPAIR      x 2    COLONOSCOPY  9/2011    DISTAL CLAVICLE EXCISION Left 10/31/2019    Procedure: EXCISION, CLAVICLE, DISTAL;  Surgeon: Ruben Martinez MD;  Location: [facility] OR;  Service: Orthopedics;  Laterality: Left;    ESOPHAGOGASTRODUODENOSCOPY  9/2011    EYE SURGERY      lasix bilateral    FIXATION KYPHOPLASTY N/A 1/31/2020    Procedure: Kyphoplasty T12;  Surgeon: Dk MAC  MD Connie;  Location: [facility] OR;  Service: Pain Management;  Laterality: N/A;    HERNIA REPAIR      hiatal hernai    HYSTERECTOMY      INJECTION OF ANESTHETIC AGENT AROUND NERVE Left 6/8/2020    Procedure: Block, Nerve;  Surgeon: Dk Rosales MD;  Location: [facility] OR;  Service: Pain Management;  Laterality: Left;  left genicular nerve block     KNEE ARTHROSCOPY W/ MENISCECTOMY Left 1/16/2020    Procedure: ARTHROSCOPY, KNEE, WITH MEDIAL MENISCECTOMY;  Surgeon: Ruben Martinez MD;  Location: [facility] OR;  Service: Orthopedics;  Laterality: Left;    LAPAROSCOPIC NISSEN FUNDOPLICATION      NEPHRECTOMY      LT partial    RADIOFREQUENCY ABLATION Left 6/19/2020    Procedure: Radiofrequency Ablation;  Surgeon: Dk Rosales MD;  Location: [facility] OR;  Service: Pain Management;  Laterality: Left;    RADIOFREQUENCY ABLATION OF LUMBAR MEDIAL BRANCH NERVE AT SINGLE LEVEL Bilateral 2/28/2020    Procedure: Radiofrequency Ablation, Nerve, Spinal, Lumbar, Medial Branch, 1 Level;  Surgeon: Dk Rosales MD;  Location: [facility] OR;  Service: Pain Management;  Laterality: Bilateral;  L3,L4,L5 - Burned at 80 degrees C. for 60 seconds x 2 each site    REFRACTIVE SURGERY      ROTATOR CUFF REPAIR Left 10/31/2019    Procedure: REPAIR, ROTATOR CUFF;  Surgeon: Ruben Martinez MD;  Location: [facility] OR;  Service: Orthopedics;  Laterality: Left;  arthrex    SKIN BIOPSY      TOTAL ABDOMINAL HYSTERECTOMY W/ BILATERAL SALPINGOOPHORECTOMY  age 48         Home Enviroment     Living Arrangement: Lives with spouse, Lives with family (11 year old granddaughter)  Home Environment: 2-story house, number of outside stairs: 0; number of inside stairs:15; bedroom on 2nd floor, tub-shower  Home Safety Concerns: Pets in the home: dogs (2) and cats (1).    DISCHARGE CAREGIVER/SUPPORT SYSTEM     Identified post-op caregiver: Patient has spouse / significant other and children / family / friends to help, patient and spouse.  Patient's caregiver(s) will be  able to provide physical assistance. Patient will have someone to assist overnight.      Caregiver present at pre-op interview:  No      PRE-OPERATIVE FUNCTIONAL STATUS     Employment: Retired    Pre-op Functional Status: Patient is independent with mobility/ambulation, transfers, ADL's, IADL's.    Use of assistive device for ambulation: straight cane  ADL: self care  ADL Limitations: difficulty with walking  Medical Restrictions: Frequent Falls; Patient reports three falls in the past six months.    POTENTIAL BARRIERS TO DISCHARGE/POTENTIAL POST-OP COMPLICATIONS     Patient has a history hypertension and of alcohol dependence, drinking 28 standard drinks per week and may experience withdrawal symptoms. Pt is an everyday smoker 3-4 cigarettes per day with 40 pack-year smoking history which may delay wound healing.     Patient has allergies to latex, morphine, phenargan and avoids NSAIDs due to a history of renal cell carcinoma with history of a partial left nephrectomy in 2014. Patient also has a history of gout, left shoulder subacromial space decompression and rotator cuff repair (2019) and T-12 kyphoplasty (01/2020).    DISCHARGE PLAN     Expected LOS of 2 days or less for joint replacement discussed with patient. We also discussed a discharge path of HH for approximately two weeks with a transition to outpatient PT on the third week given no post-op complications.      Patient in agreement with discharge plan: Yes    Discharge to: Discharge home with home health (PT/OT) x2 weeks with transition to outpatient PT     HH:  [facility]     OP PT: Nicole Chavez.     Home DME: rolling walker, single point cane, quad cane and shower chair    Needed DME at D/C: tub transfer bench     Rx: Per Dr. Ruben Martinez at discharge     Meds to Beds: N/A  Patient expected to discharge on Aspirin 325mg by mouth twice daily for DVT prophylaxis. Coumadin Clinic Needed: No

## 2020-07-21 NOTE — PRE ADMISSION SCREENING
Patient Name: Beatriz Gan  YOB: 1955   MRN: [id number]     [facility]   Basic Mobility Inpatient Short Form 6 Clicks         How much difficulty does the patient currently have  Unable  A Lot  A Little  None      1. Turning over in bed (including adjusting bedclothes, sheets and blankets)?     1 []    2 []    3 [x]    4 []        2. Sitting down on and standing up from a chair with arms (e.g., wheelchair, bedside commode, etc.)     1 []  2 [x]  3 []     4 []      3. Moving from lying on back to sitting on the side of the bed?     1 []  2 []  3 [x]    4 []    How much help from another person does the patient currently need  Total  A Lot  A Little  None      4. Moving to and from a bed to a chair (including a wheelchair)?    1 []  2 []  3 []    4 [x]      5. Need to walk in hospital room?    1 []  2 []  3 []    4 [x]      6. Climbing 3-5 steps with a railing?    1 []  2 []  3 []    4 [x]       Raw Score:       20           CMS 0-100% Score:    35.83       %   Standardized Score:     47.67          CMS Modifier:         CJ                                 [facility]   Basic Mobility Inpatient Short Form 6 Clicks Score Conversion Table*         *Use this form to convert -PAC Basic Mobility Inpatient Raw Scores.   [facility] Inpatient Basic Mobility Short Form Scoring Example   1. Add the number values associated with the response to each item. For example, items totals yield a Raw Score of 21.   2. Match the raw score to the t-Scale scores (t-Scale score = 50.25, SE = 4.69).   3. Find the associated CMS % (CMS % = 28.97%).   4. Locate the correct CMS Functional Modifier Code, or G Code (G code = CJ)     NOTE: Each -PAC Short Form has a separate conversion table. Make sure that you use the correct conversion table.       Instruction Manual - page 45 contains conversion table

## 2020-07-21 NOTE — PRE ADMISSION SCREENING
"               [facility] Risk Assessment Scale    Patient Name: Beatriz Gan  YOB: 1955  MRN: [id number]            RIsk Factor Measure Recommendation Patient Data Scale/Score   BMI >40 Reconsider surgery, weight loss   Estimated body mass index is 32.61 kg/m² as calculated from the following:    Height as of this encounter: 5' 4" (1.626 m).    Weight as of this encounter: 86.2 kg (190 lb).   [] 0 = 1 - 24.9  [] 1 = 25-29.9  [x] 2 = 30-34.9  [] 3 = 35-39.9  [] 4 = 40-44.9  [] 5 = 45-99.9   Hemoglobin AIC (if applicable) >9 Delay surgery until DM under control  Refer for:  · Nutrition Therapy  · Exercise   · Medication    No results found for: LABA1C, HGBA1C    Lab Results   Component Value Date    GLU 96 07/23/2020      [x] 0 = 4.0-5.6  [] 1 = 5.7-6.4  [] 2 = 6.5-6.9  [] 3 = 7.0-7.9  [] 4 = 8.0-8.9  [] 5 = 9.0-12   Hemoglobin (Anemia) <9 Delay surgery   Correct anemia Lab Results   Component Value Date    HGB 12.3 07/23/2020    [] 20 - <9.0                    Albumin <3 Delay surgery &Workup Lab Results   Component Value Date    ALBUMIN 3.7 07/23/2020    [] 20 - <3.0   Smoking Cessation >4 Weeks Delay Surgery  Refer to OP Cessation Class    3-4 cigarettes per day [x] 20 - current smoker                                _0.2___ PPD                    Hx of MI, PE, Arrhythmia, CVA, DVT <30 Days Delay Surgery    N/A [] 20      Infection Variable Delay surgery and re-evaluate   N/A [] 20 - recent/current infection     Depression (PHQ) >10 out of 27 Delay Surgery and re-evaluate  Medication  Counseling              [x] 0     []1     []2     []3      []4      [] 5                    (1-4)      (5-9)  (10-14)  (15-19)   (20-27)     Memory Impairment & Memory loss (Mini-Cog Screening Tool) Advanced dementia and/or Parkinson's Reconsider surgery N/A       Physical Conditioning (Modified AM-PAC Per Physical Therapy at [facility]) Unable to ambulate on day of surgery Delay surgery and " re-evaluate  Pre-Rehabilitation   (PT evaluation)       []  0   []4       [x]8     []12        []16     []20       (<20%)   (<40%)   (<60%)   (<80% )    (>80%)     Home Environment/Caregiver support  (Per /Navigator Interview)    Availability of basic services and/or approprate assistance during post-operative period Delay surgery and re-evaluate  Safe home environment  Health   1 week post-surgery  Transportation  availability  Ability to obtain DME/Medications post-op    [x] 0     []1     []2     []3     []4     [] 5  [x] 0     []1     []2     []3     []4     [] 5  [x] 0     []1     []2     []3     []4     [] 5  [x] 0     []1     []2     []3     []4     [] 5         MD Contact: Ruben Martinez Comments:  Total Score:  30

## 2020-07-23 ENCOUNTER — HOSPITAL ENCOUNTER (OUTPATIENT)
Dept: RADIOLOGY | Facility: HOSPITAL | Age: 65
Discharge: HOME | End: 2020-07-23
Attending: ORTHOPAEDIC SURGERY
Payer: MEDICARE

## 2020-07-23 ENCOUNTER — TELEPHONE (OUTPATIENT)
Dept: ORTHOPEDICS | Facility: CLINIC | Age: 65
End: 2020-07-23

## 2020-07-23 ENCOUNTER — HOSPITAL ENCOUNTER (OUTPATIENT)
Dept: PREADMISSION TESTING | Facility: HOSPITAL | Age: 65
Discharge: HOME | End: 2020-07-23
Attending: ORTHOPAEDIC SURGERY
Payer: MEDICARE

## 2020-07-23 VITALS — HEIGHT: 64 IN | WEIGHT: 190 LBS | BODY MASS INDEX: 32.44 KG/M2

## 2020-07-23 DIAGNOSIS — M17.12 PRIMARY OSTEOARTHRITIS OF LEFT KNEE: ICD-10-CM

## 2020-07-23 DIAGNOSIS — Z01.818 PRE-OP TESTING: ICD-10-CM

## 2020-07-23 DIAGNOSIS — Z01.818 PREOP TESTING: Primary | ICD-10-CM

## 2020-07-23 DIAGNOSIS — Z96.652 S/P TOTAL KNEE REPLACEMENT, LEFT: Primary | ICD-10-CM

## 2020-07-23 LAB
ABO + RH BLD: NORMAL
ALBUMIN SERPL BCP-MCNC: 3.7 G/DL (ref 3.5–5.2)
ALP SERPL-CCNC: 121 U/L (ref 55–135)
ALT SERPL W/O P-5'-P-CCNC: 15 U/L (ref 10–44)
ANION GAP SERPL CALC-SCNC: 9 MMOL/L (ref 8–16)
AST SERPL-CCNC: 21 U/L (ref 10–40)
BACTERIA #/AREA URNS HPF: ABNORMAL /HPF
BASOPHILS # BLD AUTO: 0.03 K/UL (ref 0–0.2)
BASOPHILS NFR BLD: 0.8 % (ref 0–1.9)
BILIRUB SERPL-MCNC: 0.4 MG/DL (ref 0.1–1)
BILIRUB UR QL STRIP: NEGATIVE
BLD GP AB SCN CELLS X3 SERPL QL: NORMAL
BUN SERPL-MCNC: 18 MG/DL (ref 8–23)
CALCIUM SERPL-MCNC: 9.5 MG/DL (ref 8.7–10.5)
CHLORIDE SERPL-SCNC: 97 MMOL/L (ref 95–110)
CLARITY UR: CLEAR
CO2 SERPL-SCNC: 30 MMOL/L (ref 23–29)
COLOR UR: YELLOW
CREAT SERPL-MCNC: 1 MG/DL (ref 0.5–1.4)
DIFFERENTIAL METHOD: ABNORMAL
EOSINOPHIL # BLD AUTO: 0.1 K/UL (ref 0–0.5)
EOSINOPHIL NFR BLD: 2.8 % (ref 0–8)
ERYTHROCYTE [DISTWIDTH] IN BLOOD BY AUTOMATED COUNT: 12.5 % (ref 11.5–14.5)
EST. GFR  (AFRICAN AMERICAN): >60 ML/MIN/1.73 M^2
EST. GFR  (NON AFRICAN AMERICAN): 59 ML/MIN/1.73 M^2
GLUCOSE SERPL-MCNC: 96 MG/DL (ref 70–110)
GLUCOSE UR QL STRIP: NEGATIVE
HCT VFR BLD AUTO: 38.8 % (ref 37–48.5)
HGB BLD-MCNC: 12.3 G/DL (ref 12–16)
HGB UR QL STRIP: NEGATIVE
IMM GRANULOCYTES # BLD AUTO: 0.01 K/UL (ref 0–0.04)
IMM GRANULOCYTES NFR BLD AUTO: 0.3 % (ref 0–0.5)
KETONES UR QL STRIP: NEGATIVE
LEUKOCYTE ESTERASE UR QL STRIP: ABNORMAL
LYMPHOCYTES # BLD AUTO: 1.4 K/UL (ref 1–4.8)
LYMPHOCYTES NFR BLD: 36.6 % (ref 18–48)
MCH RBC QN AUTO: 32.5 PG (ref 27–31)
MCHC RBC AUTO-ENTMCNC: 31.7 G/DL (ref 32–36)
MCV RBC AUTO: 102 FL (ref 82–98)
MICROSCOPIC COMMENT: ABNORMAL
MONOCYTES # BLD AUTO: 0.4 K/UL (ref 0.3–1)
MONOCYTES NFR BLD: 9 % (ref 4–15)
NEUTROPHILS # BLD AUTO: 2 K/UL (ref 1.8–7.7)
NEUTROPHILS NFR BLD: 50.5 % (ref 38–73)
NITRITE UR QL STRIP: NEGATIVE
NRBC BLD-RTO: 0 /100 WBC
PH UR STRIP: 8 [PH] (ref 5–8)
PLATELET # BLD AUTO: 247 K/UL (ref 150–350)
PMV BLD AUTO: 9.5 FL (ref 9.2–12.9)
POTASSIUM SERPL-SCNC: 4 MMOL/L (ref 3.5–5.1)
PROT SERPL-MCNC: 7.1 G/DL (ref 6–8.4)
PROT UR QL STRIP: NEGATIVE
RBC # BLD AUTO: 3.79 M/UL (ref 4–5.4)
RBC #/AREA URNS HPF: 1 /HPF (ref 0–4)
SODIUM SERPL-SCNC: 136 MMOL/L (ref 136–145)
SP GR UR STRIP: 1.01 (ref 1–1.03)
SQUAMOUS #/AREA URNS HPF: 2 /HPF
URN SPEC COLLECT METH UR: ABNORMAL
UROBILINOGEN UR STRIP-ACNC: 1 EU/DL
WBC # BLD AUTO: 3.91 K/UL (ref 3.9–12.7)
WBC #/AREA URNS HPF: 3 /HPF (ref 0–5)

## 2020-07-23 PROCEDURE — 86850 RBC ANTIBODY SCREEN: CPT

## 2020-07-23 PROCEDURE — 81000 URINALYSIS NONAUTO W/SCOPE: CPT

## 2020-07-23 PROCEDURE — 99900103 DSU ONLY-NO CHARGE-INITIAL HR (STAT)

## 2020-07-23 PROCEDURE — 99900104 DSU ONLY-NO CHARGE-EA ADD'L HR (STAT)

## 2020-07-23 PROCEDURE — 85025 COMPLETE CBC W/AUTO DIFF WBC: CPT

## 2020-07-23 PROCEDURE — 80053 COMPREHEN METABOLIC PANEL: CPT

## 2020-07-23 PROCEDURE — 87081 CULTURE SCREEN ONLY: CPT

## 2020-07-23 NOTE — DISCHARGE INSTRUCTIONS

## 2020-07-24 NOTE — OR NURSING
Results for JOSE MOBLEY (MRN [id number]) as of 7/24/2020 14:01   Ref. Range 7/23/2020 08:32   Specimen UA Unknown Urine, Clean Catch   Color, UA Latest Ref Range: Yellow, Straw, Marine  Yellow   Appearance, UA Latest Ref Range: Clear  Clear   Specific [place], UA Latest Ref Range: 1.005 - 1.030  1.010   pH, UA Latest Ref Range: 5.0 - 8.0  8.0   Protein, UA Latest Ref Range: Negative  Negative   Glucose, UA Latest Ref Range: Negative  Negative   Ketones, UA Latest Ref Range: Negative  Negative   Occult Blood UA Latest Ref Range: Negative  Negative   NITRITE UA Latest Ref Range: Negative  Negative   UROBILINOGEN UA Latest Ref Range: <2.0 EU/dL 1.0   Bilirubin (UA) Latest Ref Range: Negative  Negative   Leukocytes, UA Latest Ref Range: Negative  1+ (A)   RBC, UA Latest Ref Range: 0 - 4 /hpf 1   WBC, UA Latest Ref Range: 0 - 5 /hpf 3   Bacteria, UA Latest Ref Range: None-Occ /hpf Few (A)   Squam Epithel, UA Latest Units: /hpf 2   Microscopic Comment Unknown SEE COMMENT     Informed Eunice with Dr. Martinez's office to let Dr. Martinez know. Patient was put on antibiotics and repeat AM of surgery per Eunice with Dr. Martinez.

## 2020-07-25 LAB — MRSA SPEC QL CULT: NORMAL

## 2020-07-27 ENCOUNTER — LAB VISIT (OUTPATIENT)
Dept: PRIMARY CARE CLINIC | Facility: CLINIC | Age: 65
End: 2020-07-27
Payer: MEDICARE

## 2020-07-27 DIAGNOSIS — F51.01 PRIMARY INSOMNIA: ICD-10-CM

## 2020-07-27 DIAGNOSIS — Z03.818 ENCOUNTER FOR OBSERVATION FOR SUSPECTED EXPOSURE TO OTHER BIOLOGICAL AGENTS RULED OUT: ICD-10-CM

## 2020-07-27 PROCEDURE — U0003 INFECTIOUS AGENT DETECTION BY NUCLEIC ACID (DNA OR RNA); SEVERE ACUTE RESPIRATORY SYNDROME CORONAVIRUS 2 (SARS-COV-2) (CORONAVIRUS DISEASE [COVID-19]), AMPLIFIED PROBE TECHNIQUE, MAKING USE OF HIGH THROUGHPUT TECHNOLOGIES AS DESCRIBED BY CMS-2020-01-R: HCPCS

## 2020-07-28 ENCOUNTER — TELEPHONE (OUTPATIENT)
Dept: FAMILY MEDICINE | Facility: CLINIC | Age: 65
End: 2020-07-28

## 2020-07-28 ENCOUNTER — PATIENT MESSAGE (OUTPATIENT)
Dept: FAMILY MEDICINE | Facility: CLINIC | Age: 65
End: 2020-07-28

## 2020-07-28 LAB — SARS-COV-2 RNA RESP QL NAA+PROBE: NOT DETECTED

## 2020-07-28 RX ORDER — SULFAMETHOXAZOLE AND TRIMETHOPRIM 800; 160 MG/1; MG/1
1 TABLET ORAL 2 TIMES DAILY
Qty: 10 TABLET | Refills: 0 | Status: ON HOLD | OUTPATIENT
Start: 2020-07-28 | End: 2020-08-03 | Stop reason: HOSPADM

## 2020-07-28 RX ORDER — ZOLPIDEM TARTRATE 10 MG/1
TABLET ORAL
Qty: 90 TABLET | Refills: 0 | Status: ON HOLD | OUTPATIENT
Start: 2020-07-28 | End: 2020-10-19

## 2020-07-28 NOTE — PROGRESS NOTES
Refill Routing Note     Medication(s) are not appropriate for processing by [facility]:    Medication Outside of Protocol    Appointments  past 12m or future 3m with PCP    Date Provider   Last Visit   5/14/2020 Jorge Alonzo MD   Next Visit   Visit date not found Jorge Alonzo MD           Automatic Epic Protocol Generated Data:    Requested Prescriptions   Pending Prescriptions Disp Refills    zolpidem (AMBIEN) 10 mg Tab [Pharmacy Med Name: Zolpidem Tartrate 10 MG Oral Tablet] 90 tablet 0     Sig: TAKE 1 TABLET BY MOUTH ONCE DAILY AT NIGHT AS NEEDED       There is no refill protocol information for this order           Note composed:8:06 PM 07/27/2020

## 2020-07-28 NOTE — TELEPHONE ENCOUNTER
Patient gets [place] from Ruben Martinez(ortho), Xanax from you and Ambien from you. Pharm making sure you are aware and okay filling?

## 2020-07-28 NOTE — TELEPHONE ENCOUNTER
----- Message from Waleska Chung sent at 7/28/2020  9:24 AM CDT -----  Regarding: clarification  Contact: Edna Mancia [phone]  Pharmacy is calling to clarify an RX.  RX name:  Xanax  What do they need to clarify:  Patient  receives Norco from a different per scriber.    Comments:

## 2020-07-28 NOTE — TELEPHONE ENCOUNTER
If pt needs a preop clearance or form, it would need an appt.  If not, then nothing further needed from me

## 2020-07-29 ENCOUNTER — TELEPHONE (OUTPATIENT)
Dept: FAMILY MEDICINE | Facility: CLINIC | Age: 65
End: 2020-07-29

## 2020-07-29 ENCOUNTER — ANESTHESIA EVENT (OUTPATIENT)
Dept: SURGERY | Facility: HOSPITAL | Age: 65
End: 2020-07-29
Payer: MEDICARE

## 2020-07-29 NOTE — TELEPHONE ENCOUNTER
----- Message from Anabela Pa sent at 7/29/2020 10:17 AM CDT -----  Contact: Chago (spouse) [phone]  Requesting an RX refill or new RX.  Is this a refill or new RX:  Refill   RX name and strength: ALPRAZolam (XANAX) 0.5 MG tablet  Directions (copy/paste from chart): : Take 1 tablet (0.5 mg total) by mouth 3 (three) times daily as needed for Anxiety   Is this a 30 day or 90 day RX:  30  Local pharmacy or mail order pharmacy:  local   Pharmacy name and phone # (copy/paste from chart):[facility]    [phone] (Phone)  [phone] (Fax)      Comments:  Need this filled today prior to 4:00 pm per        Requesting an RX refill or new RX.  Is this a refill or new RX:  Refill  RX name and strength: zolpidem (AMBIEN) 10 mg Tab  Directions (copy/paste from chart):  TAKE 1 TABLET BY MOUTH ONCE DAILY AT NIGHT AS NEEDED  Is this a 30 day or 90 day RX:  local  Local pharmacy or mail order pharmacy:    Pharmacy name and phone # (copy/paste from chart):  [facility]    [phone] (Phone)  [phone] (Fax)     Comments:

## 2020-07-29 NOTE — TELEPHONE ENCOUNTER
Ambien already yesterday, spoke to pharm. Xanax last refilled 05/2020 with a refill, pharm aware okay to fill spoke to Felisha  Patient aware

## 2020-07-30 ENCOUNTER — HOSPITAL ENCOUNTER (OUTPATIENT)
Facility: HOSPITAL | Age: 65
Discharge: HOME | End: 2020-08-03
Attending: ORTHOPAEDIC SURGERY | Admitting: INTERNAL MEDICINE
Payer: MEDICARE

## 2020-07-30 ENCOUNTER — ANESTHESIA (OUTPATIENT)
Dept: SURGERY | Facility: HOSPITAL | Age: 65
End: 2020-07-30
Payer: MEDICARE

## 2020-07-30 DIAGNOSIS — Z01.818 PREOP TESTING: ICD-10-CM

## 2020-07-30 DIAGNOSIS — M17.12 PRIMARY OSTEOARTHRITIS OF LEFT KNEE: Primary | ICD-10-CM

## 2020-07-30 DIAGNOSIS — M51.36 DDD (DEGENERATIVE DISC DISEASE), LUMBAR: ICD-10-CM

## 2020-07-30 DIAGNOSIS — M47.817 LUMBOSACRAL SPONDYLOSIS WITHOUT MYELOPATHY: ICD-10-CM

## 2020-07-30 DIAGNOSIS — M47.816 LUMBAR SPONDYLOSIS: ICD-10-CM

## 2020-07-30 LAB
ALBUMIN SERPL BCP-MCNC: 3.8 G/DL (ref 3.5–5.2)
ALP SERPL-CCNC: 108 U/L (ref 55–135)
ALT SERPL W/O P-5'-P-CCNC: 16 U/L (ref 10–44)
ANION GAP SERPL CALC-SCNC: 12 MMOL/L (ref 8–16)
AST SERPL-CCNC: 18 U/L (ref 10–40)
BACTERIA #/AREA URNS HPF: ABNORMAL /HPF
BASOPHILS # BLD AUTO: 0.04 K/UL (ref 0–0.2)
BASOPHILS NFR BLD: 0.8 % (ref 0–1.9)
BILIRUB SERPL-MCNC: 0.3 MG/DL (ref 0.1–1)
BILIRUB UR QL STRIP: NEGATIVE
BUN SERPL-MCNC: 12 MG/DL (ref 8–23)
CALCIUM SERPL-MCNC: 8.8 MG/DL (ref 8.7–10.5)
CHLORIDE SERPL-SCNC: 106 MMOL/L (ref 95–110)
CLARITY UR: CLEAR
CO2 SERPL-SCNC: 21 MMOL/L (ref 23–29)
COLOR UR: YELLOW
CREAT SERPL-MCNC: 1.1 MG/DL (ref 0.5–1.4)
DIFFERENTIAL METHOD: ABNORMAL
EOSINOPHIL # BLD AUTO: 0.1 K/UL (ref 0–0.5)
EOSINOPHIL NFR BLD: 1.8 % (ref 0–8)
ERYTHROCYTE [DISTWIDTH] IN BLOOD BY AUTOMATED COUNT: 12.6 % (ref 11.5–14.5)
EST. GFR  (AFRICAN AMERICAN): >60 ML/MIN/1.73 M^2
EST. GFR  (NON AFRICAN AMERICAN): 52 ML/MIN/1.73 M^2
GLUCOSE SERPL-MCNC: 121 MG/DL (ref 70–110)
GLUCOSE UR QL STRIP: NEGATIVE
HCT VFR BLD AUTO: 36.9 % (ref 37–48.5)
HGB BLD-MCNC: 11.4 G/DL (ref 12–16)
HGB UR QL STRIP: NEGATIVE
IMM GRANULOCYTES # BLD AUTO: 0.02 K/UL (ref 0–0.04)
IMM GRANULOCYTES NFR BLD AUTO: 0.4 % (ref 0–0.5)
KETONES UR QL STRIP: NEGATIVE
LEUKOCYTE ESTERASE UR QL STRIP: ABNORMAL
LYMPHOCYTES # BLD AUTO: 1.6 K/UL (ref 1–4.8)
LYMPHOCYTES NFR BLD: 31.2 % (ref 18–48)
MCH RBC QN AUTO: 32.6 PG (ref 27–31)
MCHC RBC AUTO-ENTMCNC: 30.9 G/DL (ref 32–36)
MCV RBC AUTO: 105 FL (ref 82–98)
MICROSCOPIC COMMENT: ABNORMAL
MONOCYTES # BLD AUTO: 0.5 K/UL (ref 0.3–1)
MONOCYTES NFR BLD: 8.8 % (ref 4–15)
NEUTROPHILS # BLD AUTO: 2.9 K/UL (ref 1.8–7.7)
NEUTROPHILS NFR BLD: 57 % (ref 38–73)
NITRITE UR QL STRIP: NEGATIVE
NON-SQ EPI CELLS #/AREA URNS HPF: 1 /HPF
NRBC BLD-RTO: 0 /100 WBC
PH UR STRIP: 7 [PH] (ref 5–8)
PLATELET # BLD AUTO: 264 K/UL (ref 150–350)
PMV BLD AUTO: 9.3 FL (ref 9.2–12.9)
POTASSIUM SERPL-SCNC: 3.8 MMOL/L (ref 3.5–5.1)
PROT SERPL-MCNC: 6.8 G/DL (ref 6–8.4)
PROT UR QL STRIP: NEGATIVE
RBC # BLD AUTO: 3.5 M/UL (ref 4–5.4)
SODIUM SERPL-SCNC: 139 MMOL/L (ref 136–145)
SP GR UR STRIP: 1.01 (ref 1–1.03)
SQUAMOUS #/AREA URNS HPF: 1 /HPF
URN SPEC COLLECT METH UR: ABNORMAL
UROBILINOGEN UR STRIP-ACNC: NEGATIVE EU/DL
WBC # BLD AUTO: 5.13 K/UL (ref 3.9–12.7)
WBC #/AREA URNS HPF: 4 /HPF (ref 0–5)

## 2020-07-30 PROCEDURE — 25000003 PHARM REV CODE 250: Performed by: NURSE ANESTHETIST, CERTIFIED REGISTERED

## 2020-07-30 PROCEDURE — 25000003 PHARM REV CODE 250: Performed by: PHYSICIAN ASSISTANT

## 2020-07-30 PROCEDURE — 36000711: Performed by: ORTHOPAEDIC SURGERY

## 2020-07-30 PROCEDURE — 25000003 PHARM REV CODE 250: Performed by: INTERNAL MEDICINE

## 2020-07-30 PROCEDURE — 63600175 PHARM REV CODE 636 W HCPCS: Performed by: NURSE PRACTITIONER

## 2020-07-30 PROCEDURE — 64447 NJX AA&/STRD FEMORAL NRV IMG: CPT | Performed by: ANESTHESIOLOGY

## 2020-07-30 PROCEDURE — 25000003 PHARM REV CODE 250: Performed by: ORTHOPAEDIC SURGERY

## 2020-07-30 PROCEDURE — 63600175 PHARM REV CODE 636 W HCPCS: Performed by: NURSE ANESTHETIST, CERTIFIED REGISTERED

## 2020-07-30 PROCEDURE — 27201423 OPTIME MED/SURG SUP & DEVICES STERILE SUPPLY: Performed by: ORTHOPAEDIC SURGERY

## 2020-07-30 PROCEDURE — 64447 PR NERVE BLOCK INJ, ANES/STEROID, FEMORAL, INCL IMAG GUIDANCE: ICD-10-PCS | Mod: 59,LT,, | Performed by: ANESTHESIOLOGY

## 2020-07-30 PROCEDURE — 63600175 PHARM REV CODE 636 W HCPCS: Performed by: INTERNAL MEDICINE

## 2020-07-30 PROCEDURE — 97110 THERAPEUTIC EXERCISES: CPT

## 2020-07-30 PROCEDURE — C1729 CATH, DRAINAGE: HCPCS | Performed by: ORTHOPAEDIC SURGERY

## 2020-07-30 PROCEDURE — 85025 COMPLETE CBC W/AUTO DIFF WBC: CPT

## 2020-07-30 PROCEDURE — 94799 UNLISTED PULMONARY SVC/PX: CPT

## 2020-07-30 PROCEDURE — D9220A PRA ANESTHESIA: ICD-10-PCS | Mod: ANES,,, | Performed by: ANESTHESIOLOGY

## 2020-07-30 PROCEDURE — D9220A PRA ANESTHESIA: ICD-10-PCS | Mod: CRNA,,, | Performed by: NURSE ANESTHETIST, CERTIFIED REGISTERED

## 2020-07-30 PROCEDURE — C1713 ANCHOR/SCREW BN/BN,TIS/BN: HCPCS | Performed by: ORTHOPAEDIC SURGERY

## 2020-07-30 PROCEDURE — 81000 URINALYSIS NONAUTO W/SCOPE: CPT

## 2020-07-30 PROCEDURE — 94761 N-INVAS EAR/PLS OXIMETRY MLT: CPT

## 2020-07-30 PROCEDURE — 97116 GAIT TRAINING THERAPY: CPT

## 2020-07-30 PROCEDURE — C1776 JOINT DEVICE (IMPLANTABLE): HCPCS | Performed by: ORTHOPAEDIC SURGERY

## 2020-07-30 PROCEDURE — 71000033 HC RECOVERY, INTIAL HOUR: Performed by: ORTHOPAEDIC SURGERY

## 2020-07-30 PROCEDURE — 99900103 DSU ONLY-NO CHARGE-INITIAL HR (STAT): Performed by: ORTHOPAEDIC SURGERY

## 2020-07-30 PROCEDURE — 37000009 HC ANESTHESIA EA ADD 15 MINS: Performed by: ORTHOPAEDIC SURGERY

## 2020-07-30 PROCEDURE — 27200688 HC TRAY, SPINAL-HYPER/ ISOBARIC: Performed by: ANESTHESIOLOGY

## 2020-07-30 PROCEDURE — S0020 INJECTION, BUPIVICAINE HYDRO: HCPCS | Performed by: ANESTHESIOLOGY

## 2020-07-30 PROCEDURE — 80053 COMPREHEN METABOLIC PANEL: CPT

## 2020-07-30 PROCEDURE — 36000710: Performed by: ORTHOPAEDIC SURGERY

## 2020-07-30 PROCEDURE — D9220A PRA ANESTHESIA: Mod: ANES,,, | Performed by: ANESTHESIOLOGY

## 2020-07-30 PROCEDURE — 99900104 DSU ONLY-NO CHARGE-EA ADD'L HR (STAT): Performed by: ORTHOPAEDIC SURGERY

## 2020-07-30 PROCEDURE — 63600175 PHARM REV CODE 636 W HCPCS: Performed by: ANESTHESIOLOGY

## 2020-07-30 PROCEDURE — 64447 NJX AA&/STRD FEMORAL NRV IMG: CPT | Mod: 59,LT,, | Performed by: ANESTHESIOLOGY

## 2020-07-30 PROCEDURE — 27200750 HC INSULATED NEEDLE/ STIMUPLEX: Performed by: ANESTHESIOLOGY

## 2020-07-30 PROCEDURE — 63600175 PHARM REV CODE 636 W HCPCS: Performed by: PHYSICIAN ASSISTANT

## 2020-07-30 PROCEDURE — 36415 COLL VENOUS BLD VENIPUNCTURE: CPT

## 2020-07-30 PROCEDURE — 63600175 PHARM REV CODE 636 W HCPCS: Performed by: ORTHOPAEDIC SURGERY

## 2020-07-30 PROCEDURE — 63600175 PHARM REV CODE 636 W HCPCS

## 2020-07-30 PROCEDURE — 25000003 PHARM REV CODE 250: Performed by: ANESTHESIOLOGY

## 2020-07-30 PROCEDURE — 37000008 HC ANESTHESIA 1ST 15 MINUTES: Performed by: ORTHOPAEDIC SURGERY

## 2020-07-30 PROCEDURE — D9220A PRA ANESTHESIA: Mod: CRNA,,, | Performed by: NURSE ANESTHETIST, CERTIFIED REGISTERED

## 2020-07-30 PROCEDURE — 99900035 HC TECH TIME PER 15 MIN (STAT)

## 2020-07-30 PROCEDURE — C9290 INJ, BUPIVACAINE LIPOSOME: HCPCS | Performed by: ANESTHESIOLOGY

## 2020-07-30 PROCEDURE — 71000039 HC RECOVERY, EACH ADD'L HOUR: Performed by: ORTHOPAEDIC SURGERY

## 2020-07-30 PROCEDURE — 97162 PT EVAL MOD COMPLEX 30 MIN: CPT

## 2020-07-30 PROCEDURE — 76942 ECHO GUIDE FOR BIOPSY: CPT | Mod: 26,,, | Performed by: ANESTHESIOLOGY

## 2020-07-30 PROCEDURE — 76942 PR U/S GUIDANCE FOR NEEDLE GUIDANCE: ICD-10-PCS | Mod: 26,,, | Performed by: ANESTHESIOLOGY

## 2020-07-30 DEVICE — IMPLANTABLE DEVICE: Type: IMPLANTABLE DEVICE | Site: KNEE | Status: FUNCTIONAL

## 2020-07-30 DEVICE — CEMENT BONE SMRTST GENTEC 40GR: Type: IMPLANTABLE DEVICE | Site: KNEE | Status: FUNCTIONAL

## 2020-07-30 DEVICE — PATELLA OVAL DOME 32MM: Type: IMPLANTABLE DEVICE | Site: KNEE | Status: FUNCTIONAL

## 2020-07-30 RX ORDER — LOSARTAN POTASSIUM 100 MG/1
100 TABLET ORAL DAILY
Status: DISCONTINUED | OUTPATIENT
Start: 2020-07-30 | End: 2020-08-01

## 2020-07-30 RX ORDER — PROPOFOL 10 MG/ML
VIAL (ML) INTRAVENOUS
Status: DISCONTINUED | OUTPATIENT
Start: 2020-07-30 | End: 2020-07-30

## 2020-07-30 RX ORDER — POLYETHYLENE GLYCOL 3350 17 G/17G
17 POWDER, FOR SOLUTION ORAL DAILY
Status: DISCONTINUED | OUTPATIENT
Start: 2020-07-30 | End: 2020-07-30

## 2020-07-30 RX ORDER — DULOXETIN HYDROCHLORIDE 30 MG/1
30 CAPSULE, DELAYED RELEASE ORAL 2 TIMES DAILY
Status: DISCONTINUED | OUTPATIENT
Start: 2020-07-30 | End: 2020-08-03 | Stop reason: HOSPADM

## 2020-07-30 RX ORDER — PREGABALIN 75 MG/1
75 CAPSULE ORAL 2 TIMES DAILY
Status: DISCONTINUED | OUTPATIENT
Start: 2020-07-30 | End: 2020-08-03 | Stop reason: HOSPADM

## 2020-07-30 RX ORDER — OXYCODONE HYDROCHLORIDE 5 MG/1
5 TABLET ORAL EVERY 4 HOURS PRN
Status: DISCONTINUED | OUTPATIENT
Start: 2020-07-30 | End: 2020-08-03 | Stop reason: HOSPADM

## 2020-07-30 RX ORDER — AZELASTINE 1 MG/ML
1 SPRAY, METERED NASAL 2 TIMES DAILY
Status: DISCONTINUED | OUTPATIENT
Start: 2020-07-30 | End: 2020-08-03 | Stop reason: HOSPADM

## 2020-07-30 RX ORDER — ACETAMINOPHEN 325 MG/1
650 TABLET ORAL EVERY 4 HOURS PRN
Status: DISCONTINUED | OUTPATIENT
Start: 2020-07-30 | End: 2020-08-03 | Stop reason: HOSPADM

## 2020-07-30 RX ORDER — GLYCOPYRROLATE 0.2 MG/ML
INJECTION INTRAMUSCULAR; INTRAVENOUS
Status: DISCONTINUED | OUTPATIENT
Start: 2020-07-30 | End: 2020-07-30

## 2020-07-30 RX ORDER — OXYCODONE HYDROCHLORIDE 5 MG/1
5 TABLET ORAL
Status: DISCONTINUED | OUTPATIENT
Start: 2020-07-30 | End: 2020-07-30 | Stop reason: SDUPTHER

## 2020-07-30 RX ORDER — BACITRACIN 50000 [IU]/1
INJECTION, POWDER, FOR SOLUTION INTRAMUSCULAR
Status: DISCONTINUED | OUTPATIENT
Start: 2020-07-30 | End: 2020-07-30 | Stop reason: HOSPADM

## 2020-07-30 RX ORDER — EPHEDRINE SULFATE 50 MG/ML
INJECTION, SOLUTION INTRAVENOUS
Status: DISCONTINUED | OUTPATIENT
Start: 2020-07-30 | End: 2020-07-30

## 2020-07-30 RX ORDER — FENTANYL CITRATE 50 UG/ML
INJECTION, SOLUTION INTRAMUSCULAR; INTRAVENOUS
Status: DISCONTINUED | OUTPATIENT
Start: 2020-07-30 | End: 2020-07-30

## 2020-07-30 RX ORDER — MAGNESIUM GLUCONATE 27 MG(500)
27 TABLET ORAL 2 TIMES DAILY
Status: DISCONTINUED | OUTPATIENT
Start: 2020-07-30 | End: 2020-08-03 | Stop reason: HOSPADM

## 2020-07-30 RX ORDER — ACETAMINOPHEN 500 MG
1000 TABLET ORAL
Status: ACTIVE | OUTPATIENT
Start: 2020-07-30 | End: 2020-07-31

## 2020-07-30 RX ORDER — PREGABALIN 75 MG/1
75 CAPSULE ORAL 2 TIMES DAILY
Status: DISCONTINUED | OUTPATIENT
Start: 2020-07-30 | End: 2020-07-30 | Stop reason: SDUPTHER

## 2020-07-30 RX ORDER — ACETAMINOPHEN 500 MG
1000 TABLET ORAL
Status: ACTIVE | OUTPATIENT
Start: 2020-07-30 | End: 2020-07-30

## 2020-07-30 RX ORDER — FENTANYL CITRATE 50 UG/ML
25 INJECTION, SOLUTION INTRAMUSCULAR; INTRAVENOUS EVERY 5 MIN PRN
Status: DISCONTINUED | OUTPATIENT
Start: 2020-07-30 | End: 2020-07-30 | Stop reason: HOSPADM

## 2020-07-30 RX ORDER — OXYCODONE AND ACETAMINOPHEN 10; 325 MG/1; MG/1
1 TABLET ORAL EVERY 4 HOURS PRN
Qty: 40 TABLET | Refills: 0 | Status: SHIPPED | OUTPATIENT
Start: 2020-07-30 | End: 2020-08-06

## 2020-07-30 RX ORDER — MIDAZOLAM HYDROCHLORIDE 1 MG/ML
INJECTION, SOLUTION INTRAMUSCULAR; INTRAVENOUS
Status: DISCONTINUED | OUTPATIENT
Start: 2020-07-30 | End: 2020-07-30

## 2020-07-30 RX ORDER — LOPERAMIDE HYDROCHLORIDE 2 MG/1
2 CAPSULE ORAL CONTINUOUS PRN
Status: DISCONTINUED | OUTPATIENT
Start: 2020-07-30 | End: 2020-08-03 | Stop reason: HOSPADM

## 2020-07-30 RX ORDER — CALCIUM CARBONATE 500(1250)
500 TABLET ORAL 2 TIMES DAILY
Status: DISCONTINUED | OUTPATIENT
Start: 2020-07-30 | End: 2020-08-03 | Stop reason: HOSPADM

## 2020-07-30 RX ORDER — SULFAMETHOXAZOLE AND TRIMETHOPRIM 800; 160 MG/1; MG/1
1 TABLET ORAL 2 TIMES DAILY
Status: DISCONTINUED | OUTPATIENT
Start: 2020-07-30 | End: 2020-07-31

## 2020-07-30 RX ORDER — HYDROMORPHONE HYDROCHLORIDE 2 MG/ML
0.2 INJECTION, SOLUTION INTRAMUSCULAR; INTRAVENOUS; SUBCUTANEOUS EVERY 5 MIN PRN
Status: DISCONTINUED | OUTPATIENT
Start: 2020-07-30 | End: 2020-07-30 | Stop reason: HOSPADM

## 2020-07-30 RX ORDER — LOSARTAN POTASSIUM AND HYDROCHLOROTHIAZIDE 12.5; 5 MG/1; MG/1
2 TABLET ORAL DAILY
Status: DISCONTINUED | OUTPATIENT
Start: 2020-07-30 | End: 2020-07-30

## 2020-07-30 RX ORDER — OXYCODONE HYDROCHLORIDE 5 MG/1
5 TABLET ORAL
Status: DISCONTINUED | OUTPATIENT
Start: 2020-07-30 | End: 2020-08-03 | Stop reason: HOSPADM

## 2020-07-30 RX ORDER — OXYCODONE HYDROCHLORIDE 10 MG/1
10 TABLET ORAL EVERY 4 HOURS PRN
Status: DISCONTINUED | OUTPATIENT
Start: 2020-07-30 | End: 2020-08-03 | Stop reason: HOSPADM

## 2020-07-30 RX ORDER — TRANEXAMIC ACID 100 MG/ML
10 INJECTION, SOLUTION INTRAVENOUS EVERY 8 HOURS
Status: DISCONTINUED | OUTPATIENT
Start: 2020-07-30 | End: 2020-07-30 | Stop reason: SDUPTHER

## 2020-07-30 RX ORDER — ACETAMINOPHEN 10 MG/ML
INJECTION, SOLUTION INTRAVENOUS
Status: DISCONTINUED | OUTPATIENT
Start: 2020-07-30 | End: 2020-07-30

## 2020-07-30 RX ORDER — FLUTICASONE FUROATE AND VILANTEROL 100; 25 UG/1; UG/1
1 POWDER RESPIRATORY (INHALATION) DAILY
Status: DISCONTINUED | OUTPATIENT
Start: 2020-07-30 | End: 2020-07-31

## 2020-07-30 RX ORDER — ONDANSETRON HYDROCHLORIDE 2 MG/ML
INJECTION, SOLUTION INTRAMUSCULAR; INTRAVENOUS
Status: DISCONTINUED | OUTPATIENT
Start: 2020-07-30 | End: 2020-07-30

## 2020-07-30 RX ORDER — ZOLPIDEM TARTRATE 5 MG/1
10 TABLET ORAL NIGHTLY
Status: DISCONTINUED | OUTPATIENT
Start: 2020-07-30 | End: 2020-07-31

## 2020-07-30 RX ORDER — FAMOTIDINE 20 MG/1
20 TABLET, FILM COATED ORAL 2 TIMES DAILY
Status: DISCONTINUED | OUTPATIENT
Start: 2020-07-30 | End: 2020-08-03 | Stop reason: HOSPADM

## 2020-07-30 RX ORDER — ONDANSETRON 2 MG/ML
4 INJECTION INTRAMUSCULAR; INTRAVENOUS EVERY 12 HOURS PRN
Status: DISCONTINUED | OUTPATIENT
Start: 2020-07-30 | End: 2020-08-02

## 2020-07-30 RX ORDER — AMLODIPINE BESYLATE 5 MG/1
5 TABLET ORAL DAILY
Status: DISCONTINUED | OUTPATIENT
Start: 2020-07-30 | End: 2020-08-03 | Stop reason: HOSPADM

## 2020-07-30 RX ORDER — MUPIROCIN 20 MG/G
1 OINTMENT TOPICAL 2 TIMES DAILY
Status: DISCONTINUED | OUTPATIENT
Start: 2020-07-30 | End: 2020-08-03 | Stop reason: HOSPADM

## 2020-07-30 RX ORDER — HYDROMORPHONE HYDROCHLORIDE 1 MG/ML
0.2 INJECTION, SOLUTION INTRAMUSCULAR; INTRAVENOUS; SUBCUTANEOUS EVERY 10 MIN PRN
Status: DISCONTINUED | OUTPATIENT
Start: 2020-07-30 | End: 2020-07-30

## 2020-07-30 RX ORDER — PHENYLEPHRINE HYDROCHLORIDE 10 MG/ML
INJECTION INTRAVENOUS
Status: DISCONTINUED | OUTPATIENT
Start: 2020-07-30 | End: 2020-07-30

## 2020-07-30 RX ORDER — DOCUSATE SODIUM 100 MG/1
100 CAPSULE, LIQUID FILLED ORAL EVERY 12 HOURS
Status: DISCONTINUED | OUTPATIENT
Start: 2020-07-30 | End: 2020-08-03 | Stop reason: HOSPADM

## 2020-07-30 RX ORDER — LOPERAMIDE HYDROCHLORIDE 2 MG/1
4 CAPSULE ORAL ONCE
Status: COMPLETED | OUTPATIENT
Start: 2020-07-30 | End: 2020-07-30

## 2020-07-30 RX ORDER — PROPOFOL 10 MG/ML
VIAL (ML) INTRAVENOUS CONTINUOUS PRN
Status: DISCONTINUED | OUTPATIENT
Start: 2020-07-30 | End: 2020-07-30

## 2020-07-30 RX ORDER — PREGABALIN 75 MG/1
75 CAPSULE ORAL ONCE
Status: DISPENSED | OUTPATIENT
Start: 2020-07-30

## 2020-07-30 RX ORDER — BUPIVACAINE HYDROCHLORIDE 7.5 MG/ML
INJECTION, SOLUTION INTRASPINAL
Status: DISCONTINUED | OUTPATIENT
Start: 2020-07-30 | End: 2020-07-30

## 2020-07-30 RX ORDER — LIDOCAINE HYDROCHLORIDE 10 MG/ML
1 INJECTION, SOLUTION EPIDURAL; INFILTRATION; INTRACAUDAL; PERINEURAL ONCE
Status: COMPLETED | OUTPATIENT
Start: 2020-07-30 | End: 2020-07-30

## 2020-07-30 RX ORDER — OXYCODONE HYDROCHLORIDE 5 MG/1
5 TABLET ORAL
Status: DISCONTINUED | OUTPATIENT
Start: 2020-07-30 | End: 2020-07-30 | Stop reason: HOSPADM

## 2020-07-30 RX ORDER — ALPRAZOLAM 0.25 MG/1
0.5 TABLET ORAL 3 TIMES DAILY PRN
Status: DISCONTINUED | OUTPATIENT
Start: 2020-07-30 | End: 2020-08-02

## 2020-07-30 RX ORDER — ACETAMINOPHEN 10 MG/ML
1000 INJECTION, SOLUTION INTRAVENOUS ONCE
Status: COMPLETED | OUTPATIENT
Start: 2020-07-30 | End: 2020-07-30

## 2020-07-30 RX ORDER — HYDROMORPHONE HYDROCHLORIDE 1 MG/ML
1 INJECTION, SOLUTION INTRAMUSCULAR; INTRAVENOUS; SUBCUTANEOUS EVERY 6 HOURS PRN
Status: DISCONTINUED | OUTPATIENT
Start: 2020-07-30 | End: 2020-08-03 | Stop reason: HOSPADM

## 2020-07-30 RX ORDER — HYDROCHLOROTHIAZIDE 25 MG/1
25 TABLET ORAL DAILY
Status: DISCONTINUED | OUTPATIENT
Start: 2020-07-30 | End: 2020-08-01

## 2020-07-30 RX ORDER — SODIUM CHLORIDE, SODIUM LACTATE, POTASSIUM CHLORIDE, CALCIUM CHLORIDE 600; 310; 30; 20 MG/100ML; MG/100ML; MG/100ML; MG/100ML
INJECTION, SOLUTION INTRAVENOUS CONTINUOUS
Status: DISCONTINUED | OUTPATIENT
Start: 2020-07-30 | End: 2020-07-31

## 2020-07-30 RX ORDER — MIDAZOLAM HYDROCHLORIDE 1 MG/ML
INJECTION INTRAMUSCULAR; INTRAVENOUS
Status: DISCONTINUED | OUTPATIENT
Start: 2020-07-30 | End: 2020-07-30

## 2020-07-30 RX ORDER — CEFAZOLIN SODIUM 2 G/50ML
2 SOLUTION INTRAVENOUS
Status: COMPLETED | OUTPATIENT
Start: 2020-07-30 | End: 2020-07-30

## 2020-07-30 RX ORDER — OXYCODONE HYDROCHLORIDE 10 MG/1
10 TABLET ORAL
Status: DISCONTINUED | OUTPATIENT
Start: 2020-07-30 | End: 2020-08-03 | Stop reason: HOSPADM

## 2020-07-30 RX ORDER — OXYCODONE HYDROCHLORIDE 10 MG/1
10 TABLET ORAL
Status: DISCONTINUED | OUTPATIENT
Start: 2020-07-30 | End: 2020-07-30 | Stop reason: SDUPTHER

## 2020-07-30 RX ORDER — BUPIVACAINE HYDROCHLORIDE 5 MG/ML
INJECTION, SOLUTION EPIDURAL; INTRACAUDAL
Status: DISCONTINUED | OUTPATIENT
Start: 2020-07-30 | End: 2020-07-30

## 2020-07-30 RX ORDER — LIDOCAINE HCL/PF 100 MG/5ML
SYRINGE (ML) INTRAVENOUS
Status: DISCONTINUED | OUTPATIENT
Start: 2020-07-30 | End: 2020-07-30

## 2020-07-30 RX ORDER — SODIUM CHLORIDE 0.9 % (FLUSH) 0.9 %
10 SYRINGE (ML) INJECTION
Status: DISCONTINUED | OUTPATIENT
Start: 2020-07-30 | End: 2020-08-03 | Stop reason: HOSPADM

## 2020-07-30 RX ORDER — POLYETHYLENE GLYCOL 3350 17 G/17G
17 POWDER, FOR SOLUTION ORAL DAILY
Status: DISCONTINUED | OUTPATIENT
Start: 2020-07-30 | End: 2020-08-03 | Stop reason: HOSPADM

## 2020-07-30 RX ADMIN — MIDAZOLAM 1 MG: 1 INJECTION INTRAMUSCULAR; INTRAVENOUS at 11:07

## 2020-07-30 RX ADMIN — HYDROMORPHONE HYDROCHLORIDE 0.2 MG: 1 INJECTION, SOLUTION INTRAMUSCULAR; INTRAVENOUS; SUBCUTANEOUS at 05:07

## 2020-07-30 RX ADMIN — ACETAMINOPHEN 1000 MG: 10 INJECTION, SOLUTION INTRAVENOUS at 12:07

## 2020-07-30 RX ADMIN — BUPIVACAINE HYDROCHLORIDE 10 ML: 5 INJECTION, SOLUTION EPIDURAL; INTRACAUDAL; PERINEURAL at 11:07

## 2020-07-30 RX ADMIN — EPHEDRINE SULFATE 10 MG: 50 INJECTION, SOLUTION INTRAMUSCULAR; INTRAVENOUS; SUBCUTANEOUS at 12:07

## 2020-07-30 RX ADMIN — LIDOCAINE HYDROCHLORIDE 20 MG: 20 INJECTION, SOLUTION INTRAVENOUS at 11:07

## 2020-07-30 RX ADMIN — FENTANYL CITRATE 50 MCG: 50 INJECTION, SOLUTION INTRAMUSCULAR; INTRAVENOUS at 11:07

## 2020-07-30 RX ADMIN — HYDROCHLOROTHIAZIDE 25 MG: 25 TABLET ORAL at 05:07

## 2020-07-30 RX ADMIN — DOCUSATE SODIUM 100 MG: 100 CAPSULE, LIQUID FILLED ORAL at 08:07

## 2020-07-30 RX ADMIN — AZELASTINE HYDROCHLORIDE 137 MCG: 137 SPRAY, METERED NASAL at 08:07

## 2020-07-30 RX ADMIN — GLYCOPYRROLATE 0.1 MG: 0.2 INJECTION, SOLUTION INTRAMUSCULAR; INTRAVENOUS at 11:07

## 2020-07-30 RX ADMIN — DULOXETINE 30 MG: 30 CAPSULE, DELAYED RELEASE ORAL at 08:07

## 2020-07-30 RX ADMIN — OXYCODONE HYDROCHLORIDE 10 MG: 10 TABLET ORAL at 10:07

## 2020-07-30 RX ADMIN — POLYETHYLENE GLYCOL 3350 17 G: 17 POWDER, FOR SOLUTION ORAL at 05:07

## 2020-07-30 RX ADMIN — OXYCODONE HYDROCHLORIDE 5 MG: 5 TABLET ORAL at 02:07

## 2020-07-30 RX ADMIN — LIDOCAINE HYDROCHLORIDE 10 MG: 10 INJECTION, SOLUTION EPIDURAL; INFILTRATION; INTRACAUDAL; PERINEURAL at 10:07

## 2020-07-30 RX ADMIN — FAMOTIDINE 20 MG: 20 TABLET ORAL at 08:07

## 2020-07-30 RX ADMIN — BUPIVACAINE HYDROCHLORIDE IN DEXTROSE 15 MG: 7.5 INJECTION, SOLUTION SUBARACHNOID at 11:07

## 2020-07-30 RX ADMIN — PHENYLEPHRINE HYDROCHLORIDE 100 MCG: 10 INJECTION INTRAVENOUS at 12:07

## 2020-07-30 RX ADMIN — AMLODIPINE BESYLATE 5 MG: 5 TABLET ORAL at 05:07

## 2020-07-30 RX ADMIN — SULFAMETHOXAZOLE AND TRIMETHOPRIM 1 TABLET: 800; 160 TABLET ORAL at 08:07

## 2020-07-30 RX ADMIN — MULTIPLE VITAMINS W/ MINERALS TAB 1 TABLET: TAB at 04:07

## 2020-07-30 RX ADMIN — ACETAMINOPHEN 1000 MG: 10 INJECTION, SOLUTION INTRAVENOUS at 05:07

## 2020-07-30 RX ADMIN — MUPIROCIN 1 G: 20 OINTMENT TOPICAL at 08:07

## 2020-07-30 RX ADMIN — RIVAROXABAN 10 MG: 10 TABLET, FILM COATED ORAL at 05:07

## 2020-07-30 RX ADMIN — LOPERAMIDE HYDROCHLORIDE 4 MG: 2 CAPSULE ORAL at 04:07

## 2020-07-30 RX ADMIN — CALCIUM 500 MG: 500 TABLET ORAL at 08:07

## 2020-07-30 RX ADMIN — OXYCODONE HYDROCHLORIDE 10 MG: 10 TABLET ORAL at 07:07

## 2020-07-30 RX ADMIN — BUPIVACAINE 20 ML: 13.3 INJECTION, SUSPENSION, LIPOSOMAL INFILTRATION at 11:07

## 2020-07-30 RX ADMIN — PROPOFOL 50 MCG/KG/MIN: 10 INJECTION, EMULSION INTRAVENOUS at 11:07

## 2020-07-30 RX ADMIN — SODIUM CHLORIDE, SODIUM GLUCONATE, SODIUM ACETATE, POTASSIUM CHLORIDE, MAGNESIUM CHLORIDE, SODIUM PHOSPHATE, DIBASIC, AND POTASSIUM PHOSPHATE: .53; .5; .37; .037; .03; .012; .00082 INJECTION, SOLUTION INTRAVENOUS at 10:07

## 2020-07-30 RX ADMIN — Medication 27 MG: at 08:07

## 2020-07-30 RX ADMIN — ZOLPIDEM TARTRATE 10 MG: 5 TABLET, COATED ORAL at 08:07

## 2020-07-30 RX ADMIN — CEFAZOLIN SODIUM 2 G: 2 SOLUTION INTRAVENOUS at 11:07

## 2020-07-30 RX ADMIN — LOSARTAN POTASSIUM 100 MG: 100 TABLET, FILM COATED ORAL at 05:07

## 2020-07-30 RX ADMIN — PROPOFOL 20 MG: 10 INJECTION, EMULSION INTRAVENOUS at 11:07

## 2020-07-30 RX ADMIN — DEXTROSE 3 G: 50 INJECTION, SOLUTION INTRAVENOUS at 07:07

## 2020-07-30 RX ADMIN — HYDROMORPHONE HYDROCHLORIDE 1 MG: 1 INJECTION, SOLUTION INTRAMUSCULAR; INTRAVENOUS; SUBCUTANEOUS at 10:07

## 2020-07-30 RX ADMIN — SODIUM CHLORIDE 1000 ML: 0.9 INJECTION, SOLUTION INTRAVENOUS at 10:07

## 2020-07-30 RX ADMIN — ONDANSETRON 4 MG: 2 INJECTION, SOLUTION INTRAMUSCULAR; INTRAVENOUS at 11:07

## 2020-07-30 RX ADMIN — TRANEXAMIC ACID 862 MG: 100 INJECTION, SOLUTION INTRAVENOUS at 08:07

## 2020-07-30 RX ADMIN — PREGABALIN 75 MG: 75 CAPSULE ORAL at 08:07

## 2020-07-30 RX ADMIN — SODIUM CHLORIDE, SODIUM LACTATE, POTASSIUM CHLORIDE, AND CALCIUM CHLORIDE: .6; .31; .03; .02 INJECTION, SOLUTION INTRAVENOUS at 03:07

## 2020-07-30 NOTE — H&P
[facility] Medicine  History & Physical    Patient Name: Beatriz Gan  MRN: [id number]  Admission Date: 7/30/2020  Attending Physician: Shiela Mann MD   Primary Care Provider: Jorge Alonzo MD         Patient information was obtained from patient and past medical records.     Start time: 4:00 pm  Chief complaint: Left knee pain  The patient location is: [id number]  The patient arrived at: 3:30 pm  Present with the patient at the time of the telemed/virtual assessment:Franchesca Quiroga and pt's Nurse     Subjective:     Principal Problem:Primary osteoarthritis of left knee    Chief Complaint: No chief complaint on file.       HPI: The pt is 63 yo female with PMHx of DJD, Gout, HTN, Hyperlipidemia , Iron def anemia , Renal cell carcinoma and  Alcohol dependence who admitted after total  left knee arthroplasty by Dr. Ruben Martinez. Pt with known h/o left knee degenerative joint disease has had  persistent left knee pain.   She underwent a left knee arthroscopy which did not help much.  She then underwent left knee radiofrequency ablation without good result.  This process has been going on for quite a long time.  She states that she can no longer continue to live with her symptoms and is ready to proceed with more aggressive surgical treatment options. She underwent left total knee arthroplasty today . No immediate post op complications and currently has no c/o chest pain, SOB or palpitations.      Past Medical History:   Diagnosis Date    Alcohol dependence     DDD (degenerative disc disease), lumbosacral     DJD (degenerative joint disease), lumbosacral     Encounter for blood transfusion     GERD (gastroesophageal reflux disease)     Gout     Hypercholesterolemia     Hypertension     NATHALIE (iron deficiency anemia)     questionable white coat hypertension    Kidney carcinoma, left 2014    Pneumonia     Renal cell carcinoma     Shingles 10/13/2012       Past Surgical History:    Procedure Laterality Date    APPENDECTOMY      ARTHROSCOPY OF SHOULDER WITH DECOMPRESSION OF SUBACROMIAL SPACE Left 10/31/2019    Procedure: ARTHROSCOPY, SHOULDER, WITH SUBACROMIAL SPACE DECOMPRESSION;  Surgeon: Ruben Martinez MD;  Location: [facility] OR;  Service: Orthopedics;  Laterality: Left;    BLADDER REPAIR      x 2    COLONOSCOPY  9/2011    DISTAL CLAVICLE EXCISION Left 10/31/2019    Procedure: EXCISION, CLAVICLE, DISTAL;  Surgeon: Ruben Martinez MD;  Location: [facility] OR;  Service: Orthopedics;  Laterality: Left;    ESOPHAGOGASTRODUODENOSCOPY  9/2011    EYE SURGERY      lasix bilateral    FIXATION KYPHOPLASTY N/A 1/31/2020    Procedure: Kyphoplasty T12;  Surgeon: Dk Rosales MD;  Location: [facility] OR;  Service: Pain Management;  Laterality: N/A;    HERNIA REPAIR      hiatal hernai    HYSTERECTOMY      INJECTION OF ANESTHETIC AGENT AROUND NERVE Left 6/8/2020    Procedure: Block, Nerve;  Surgeon: Dk Rosales MD;  Location: [facility] OR;  Service: Pain Management;  Laterality: Left;  left genicular nerve block     KNEE ARTHROSCOPY W/ MENISCECTOMY Left 1/16/2020    Procedure: ARTHROSCOPY, KNEE, WITH MEDIAL MENISCECTOMY;  Surgeon: Ruben Martinez MD;  Location: [facility] OR;  Service: Orthopedics;  Laterality: Left;    LAPAROSCOPIC NISSEN FUNDOPLICATION      NEPHRECTOMY      LT partial    RADIOFREQUENCY ABLATION Left 6/19/2020    Procedure: Radiofrequency Ablation;  Surgeon: Dk Rosales MD;  Location: [facility] OR;  Service: Pain Management;  Laterality: Left;    RADIOFREQUENCY ABLATION OF LUMBAR MEDIAL BRANCH NERVE AT SINGLE LEVEL Bilateral 2/28/2020    Procedure: Radiofrequency Ablation, Nerve, Spinal, Lumbar, Medial Branch, 1 Level;  Surgeon: Dk Rosales MD;  Location: [facility] OR;  Service: Pain Management;  Laterality: Bilateral;  L3,L4,L5 - Burned at 80 degrees C. for 60 seconds x 2 each site    REFRACTIVE SURGERY      ROTATOR CUFF REPAIR Left 10/31/2019    Procedure: REPAIR, ROTATOR CUFF;   "Surgeon: Ruben Martinez MD;  Location: [facility];  Service: Orthopedics;  Laterality: Left;  arthrex    SKIN BIOPSY      TOTAL ABDOMINAL HYSTERECTOMY W/ BILATERAL SALPINGOOPHORECTOMY  age 48       Review of patient's allergies indicates:   Allergen Reactions    Phenergan [promethazine] Hives and Rash    Latex, natural rubber Hives     Per pt report-many years    Morphine Hives    Nsaids (non-steroidal anti-inflammatory drug)      Due to "kidney cancer"       Current Facility-Administered Medications on File Prior to Encounter   Medication    electrolyte-S (ISOLYTE)    lactated ringers infusion     Current Outpatient Medications on File Prior to Encounter   Medication Sig    ALPRAZolam (XANAX) 0.5 MG tablet Take 1 tablet (0.5 mg total) by mouth 3 (three) times daily as needed for Anxiety.    amLODIPine (NORVASC) 5 MG tablet Take 1 tablet (5 mg total) by mouth once daily.    azelastine (ASTELIN) 137 mcg (0.1 %) nasal spray 1 spray (137 mcg total) by Nasal route 2 (two) times daily.    budesonide-formoterol 80-4.5 mcg (SYMBICORT) 80-4.5 mcg/actuation HFAA Inhale 2 puffs into the lungs 2 (two) times daily. Controller    DULoxetine (CYMBALTA) 30 MG capsule Take 1 capsule (30 mg total) by mouth 2 (two) times daily.    HYDROcodone-acetaminophen (NORCO) 7.5-325 mg per tablet Take 1 tablet by mouth every 6 (six) hours as needed for Pain.    calcium carbonate (OS-TERI) 600 mg calcium (1,500 mg) Tab Take 600 mg by mouth once.    diclofenac (VOLTAREN) 50 MG EC tablet TAKE 1 TABLET BY MOUTH TWICE DAILY WITH MEALS    fluticasone (FLONASE) 50 mcg/actuation nasal spray 1 spray by Nasal route daily as needed.     gabapentin (NEURONTIN) 600 MG tablet Take 1 tablet (600 mg total) by mouth 3 (three) times daily.    lidocaine (LIDODERM) 5 % Place 1 patch onto the skin once daily. Remove & Discard patch within 12 hours or as directed by MD    losartan-hydrochlorothiazide 100-25 mg (HYZAAR) 100-25 mg per tablet Take 1 " tablet by mouth once daily.    magnesium 30 mg Tab Take by mouth once.    multivit-iron-min-folic acid (MULTIVITAMIN-IRON-MINERALS-FOLIC ACID) 3,500-18-0.4 unit-mg-mg Chew Take by mouth once daily.      polyethylene glycol (GLYCOLAX) 17 gram/dose powder Take 17 g by mouth once daily. (Patient taking differently: Take 17 g by mouth daily as needed. )     Family History     Problem Relation (Age of Onset)    Hypertension Father        Tobacco Use    Smoking status: Light Tobacco Smoker     Packs/day: 1.00     Years: 40.00     Pack years: 40.00     Types: Cigarettes     Last attempt to quit: 2018     Years since quittin.5    Smokeless tobacco: Never Used    Tobacco comment: smokes 2-3 cigarettes a night   Substance and Sexual Activity    Alcohol use: Yes     Alcohol/week: 28.0 standard drinks     Types: 14 Cans of beer, 14 Standard drinks or equivalent per week     Frequency: 4 or more times a week     Drinks per session: 1 or 2     Binge frequency: Never     Comment: 2-3 a day/ social    Drug use: No    Sexual activity: Yes     Partners: Male     Review of Systems   Constitutional: Positive for activity change. Negative for fever.   HENT: Negative for sore throat.    Eyes: Negative for visual disturbance.   Respiratory: Negative for cough, chest tightness and shortness of breath.    Cardiovascular: Negative for chest pain, palpitations and leg swelling.   Gastrointestinal: Negative for abdominal distention, abdominal pain, constipation, diarrhea, nausea and vomiting.   Endocrine: Negative for polyuria.   Genitourinary: Negative for decreased urine volume, dysuria, flank pain, frequency and hematuria.   Musculoskeletal: Positive for arthralgias (Left knee). Negative for back pain and gait problem.   Skin: Negative for rash.   Neurological: Negative for syncope, speech difficulty, weakness, light-headedness and headaches.   Psychiatric/Behavioral: Negative for confusion, hallucinations and sleep  disturbance.     Objective:     Vital Signs (Most Recent):  Temp: 98 °F (36.7 °C) (07/30/20 1547)  Pulse: 86 (07/30/20 1547)  Resp: 14 (07/30/20 1547)  BP: 113/76 (07/30/20 1547)  SpO2: 98 % (07/30/20 1547) Vital Signs (24h Range):  Temp:  [97.4 °F (36.3 °C)-98.3 °F (36.8 °C)] 98 °F (36.7 °C)  Pulse:  [] 86  Resp:  [8-25] 14  SpO2:  [93 %-100 %] 98 %  BP: (102-147)/(63-83) 113/76     Weight: 86.2 kg (190 lb)  Body mass index is 32.61 kg/m².    Physical Exam  Constitutional:       General: She is not in acute distress.     Appearance: She is well-developed. She is not diaphoretic.   HENT:      Head: Normocephalic and atraumatic.      Mouth/Throat:      Pharynx: No oropharyngeal exudate.   Eyes:      Conjunctiva/sclera: Conjunctivae normal.      Pupils: Pupils are equal, round, and reactive to light.   Neck:      Musculoskeletal: Normal range of motion and neck supple.      Thyroid: No thyromegaly.      Vascular: No JVD.   Cardiovascular:      Rate and Rhythm: Normal rate and regular rhythm.      Heart sounds: Normal heart sounds. No murmur.   Pulmonary:      Effort: Pulmonary effort is normal. No respiratory distress.      Breath sounds: Normal breath sounds. No wheezing or rales.   Chest:      Chest wall: No tenderness.   Abdominal:      General: Bowel sounds are normal. There is no distension.      Palpations: Abdomen is soft.      Tenderness: There is no abdominal tenderness. There is no guarding or rebound.   Musculoskeletal: Normal range of motion.   Lymphadenopathy:      Cervical: No cervical adenopathy.   Skin:     General: Skin is warm and dry.      Findings: No rash.      Comments: Surgical dressing , left knee   Neurological:      Mental Status: She is alert and oriented to person, place, and time.      Cranial Nerves: No cranial nerve deficit.      Sensory: No sensory deficit.      Deep Tendon Reflexes: Reflexes normal.           CRANIAL NERVES     CN III, IV, VI   Pupils are equal, round, and  reactive to light.       Significant Labs:   Results for orders placed or performed during the hospital encounter of 07/30/20   Urinalysis, Reflex to Urine Culture Urine, Clean Catch    Specimen: Urine   Result Value Ref Range    Specimen UA Urine, Clean Catch     Color, UA Yellow Yellow, Straw, Marine    Appearance, UA Clear Clear    pH, UA 7.0 5.0 - 8.0    Specific Gravity, UA 1.010 1.005 - 1.030    Protein, UA Negative Negative    Glucose, UA Negative Negative    Ketones, UA Negative Negative    Bilirubin (UA) Negative Negative    Occult Blood UA Negative Negative    Nitrite, UA Negative Negative    Urobilinogen, UA Negative <2.0 EU/dL    Leukocytes, UA 1+ (A) Negative   Urinalysis Microscopic   Result Value Ref Range    WBC, UA 4 0 - 5 /hpf    Bacteria Rare None-Occ /hpf    Squam Epithel, UA 1 /hpf    Non-Squam Epith 1 (A) <1/hpf /hpf    Microscopic Comment SEE COMMENT    Comprehensive metabolic panel   Result Value Ref Range    Sodium 139 136 - 145 mmol/L    Potassium 3.8 3.5 - 5.1 mmol/L    Chloride 106 95 - 110 mmol/L    CO2 21 (L) 23 - 29 mmol/L    Glucose 121 (H) 70 - 110 mg/dL    BUN, Bld 12 8 - 23 mg/dL    Creatinine 1.1 0.5 - 1.4 mg/dL    Calcium 8.8 8.7 - 10.5 mg/dL    Total Protein 6.8 6.0 - 8.4 g/dL    Albumin 3.8 3.5 - 5.2 g/dL    Total Bilirubin 0.3 0.1 - 1.0 mg/dL    Alkaline Phosphatase 108 55 - 135 U/L    AST 18 10 - 40 U/L    ALT 16 10 - 44 U/L    Anion Gap 12 8 - 16 mmol/L    eGFR if African American >60 >60 mL/min/1.73 m^2    eGFR if non African American 52 (A) >60 mL/min/1.73 m^2   CBC auto differential   Result Value Ref Range    WBC 5.13 3.90 - 12.70 K/uL    RBC 3.50 (L) 4.00 - 5.40 M/uL    Hemoglobin 11.4 (L) 12.0 - 16.0 g/dL    Hematocrit 36.9 (L) 37.0 - 48.5 %    Mean Corpuscular Volume 105 (H) 82 - 98 fL    Mean Corpuscular Hemoglobin 32.6 (H) 27.0 - 31.0 pg    Mean Corpuscular Hemoglobin Conc 30.9 (L) 32.0 - 36.0 g/dL    RDW 12.6 11.5 - 14.5 %    Platelets 264 150 - 350 K/uL    MPV 9.3  9.2 - 12.9 fL    Immature Granulocytes 0.4 0.0 - 0.5 %    Gran # (ANC) 2.9 1.8 - 7.7 K/uL    Immature Grans (Abs) 0.02 0.00 - 0.04 K/uL    Lymph # 1.6 1.0 - 4.8 K/uL    Mono # 0.5 0.3 - 1.0 K/uL    Eos # 0.1 0.0 - 0.5 K/uL    Baso # 0.04 0.00 - 0.20 K/uL    nRBC 0 0 /100 WBC    Gran% 57.0 38.0 - 73.0 %    Lymph% 31.2 18.0 - 48.0 %    Mono% 8.8 4.0 - 15.0 %    Eosinophil% 1.8 0.0 - 8.0 %    Basophil% 0.8 0.0 - 1.9 %    Differential Method Automated          Significant Imaging:         Assessment/Plan:     * Primary osteoarthritis of left knee  -S/P ARTHROPLASTY, KNEE LEFT   -PT/OT  -Pain control  -VTE prophylaxis          Essential hypertension  - Review Home meds and resume if appropriate       GERD (gastroesophageal reflux disease)  - Continue H2 blocker         VTE Risk Mitigation (From admission, onward)         Ordered     rivaroxaban tablet 10 mg  With dinner      07/30/20 1535     IP VTE LOW RISK PATIENT  Once      07/30/20 1535     Place sequential compression device  Until discontinued      07/30/20 1535     Place sequential compression device  Until discontinued      07/30/20 0855     Place RADHA hose  Until discontinued      07/30/20 0855                   End time:  4:30 pm    Total time spent with patient: 10 min    The attending portion of this evaluation, treatment, and documentation was performed per Shiela Mann MD via audiovisual.      Shiela Mann MD  Department of Hospital Medicine   [facility]

## 2020-07-30 NOTE — ANESTHESIA PROCEDURE NOTES
Peripheral Block    Patient location during procedure: pre-op   Block not for primary anesthetic.  Reason for block: at surgeon's request and post-op pain management   Post-op Pain Location: Left knee  Start time: 7/30/2020 11:01 AM  Timeout: 7/30/2020 11:00 AM   End time: 7/30/2020 11:05 AM    Staffing  Authorizing Provider: Yodit Santos MD  Performing Provider: Yodit Santos MD    Preanesthetic Checklist  Completed: patient identified, site marked, surgical consent, pre-op evaluation, timeout performed, IV checked, risks and benefits discussed and monitors and equipment checked  Peripheral Block  Patient position: supine  Prep: ChloraPrep  Patient monitoring: heart rate, cardiac monitor, continuous pulse ox, continuous capnometry and frequent blood pressure checks  Block type: adductor canal  Laterality: left  Injection technique: single shot  Needle  Needle type: Stimuplex   Needle gauge: 21 G  Needle length: 4 in  Needle localization: anatomical landmarks and ultrasound guidance   -ultrasound image captured on disc.  Assessment  Injection assessment: negative aspiration, negative parasthesia and local visualized surrounding nerve  Paresthesia pain: none  Heart rate change: no  Slow fractionated injection: yes  Additional Notes  VSS.  [facility] RN monitoring vitals throughout procedure.  Patient tolerated procedure well.     Exparel 20mL  Bupivacaine 0.5% 10mL

## 2020-07-30 NOTE — ANESTHESIA POSTPROCEDURE EVALUATION
Anesthesia Post Evaluation    Patient: Beatriz Gan    Procedure(s) Performed: Procedure(s) (LRB):  ARTHROPLASTY, KNEE LEFT (Left)    Final Anesthesia Type: spinal    Patient location during evaluation: PACU  Patient participation: Yes- Able to Participate  Level of consciousness: awake and alert, oriented and awake  Post-procedure vital signs: reviewed and stable  Pain management: adequate  Airway patency: patent    PONV status at discharge: No PONV  Anesthetic complications: no      Cardiovascular status: blood pressure returned to baseline and hemodynamically stable  Respiratory status: unassisted, spontaneous ventilation and room air  Hydration status: euvolemic  Follow-up not needed.          Vitals Value Taken Time   /74 07/30/20 1454   Temp 36.3 °C (97.4 °F) 07/30/20 1330   Pulse 90 07/30/20 1457   Resp 23 07/30/20 1457   SpO2 95 % 07/30/20 1457   Vitals shown include unvalidated device data.      No case tracking events are documented in the log.      Pain/Yaneli Score: Pain Rating Prior to Med Admin: 5 (7/30/2020  2:56 PM)  Yaneli Score: 9 (7/30/2020  2:15 PM)

## 2020-07-30 NOTE — PT/OT/SLP EVAL
Physical Therapy Evaluation    Patient Name:  Beatriz Gan   MRN:  [id number]    Recommendations:     Discharge Recommendations:  home health PT   Discharge Equipment Recommendations: none   Barriers to discharge: None    Assessment:     Beatriz Gan is a 64 y.o. female admitted with a medical diagnosis of Primary osteoarthritis of left knee.  She presents with the following impairments/functional limitations:  weakness, impaired endurance, impaired functional mobilty, gait instability, decreased lower extremity function, pain, decreased ROM, orthopedic precautions . Pt completed thera ex. Then ambulated 30ft with RW with RK immobilizer. CPM set up 0/45 degrees. Pt c/o pain requiring pre medication.    Rehab Prognosis: Good; patient would benefit from acute skilled PT services to address these deficits and reach maximum level of function.    Recent Surgery: Procedure(s) (LRB):  ARTHROPLASTY, KNEE LEFT (Left) Day of Surgery    Plan:     During this hospitalization, patient to be seen BID(S1S1) to address the identified rehab impairments via gait training, therapeutic activities, therapeutic exercises and progress toward the following goals:    · Plan of Care Expires:  08/30/20    Subjective   Pt initially anxious about getting up as it is too soon  Pt does not remember [facility] [place] [facility]  Chief Complaint: pain anterior LK  Patient/Family Comments/goals: get well  Pain/Comfort:  · Pain Rating 1: 10/10  · Location - Side 1: Left  · Location - Orientation 1: anterior  · Location 1: knee  · Pain Addressed 1: Pre-medicate for activity, Reposition, Distraction    Patients cultural, spiritual, Muslim conflicts given the current situation:      Living Environment:  Home with spouse  16 steps to bedroom   Prior to admission, patients level of function was independent.  Equipment used at home: walker, rolling.  DME owned (not currently used): none.  Upon discharge, patient will have assistance from  family.    Objective:     Communicated with nurse Manzano prior to session.  Patient found HOB elevated with peripheral IV, SCD, cryotherapy  RODRICK drain upon PT entry to room.    General Precautions: Standard, fall   Orthopedic Precautions:LLE weight bearing as tolerated   Braces: Knee immobilizer     Exams:  · RLE ROM: WFL  · RLE Strength: WFL  · LLE ROM: Deficits: ~60 degrees flexion  · LLE Strength: Deficits: 3/5    Functional Mobility:  · Bed Mobility:     · Rolling Left:  minimum assistance  · Scooting: minimum assistance  · Supine to Sit: minimum assistance  · Sit to Supine: minimum assistance  · Transfers:     · Sit to Stand:  minimum assistance with rolling walker  · Gait: 30ft with RW min assist    Therapeutic Activities and Exercises:   thera ex with AP,QS,SLR,HS  CPM applied 0/45    AM-PAC 6 CLICK MOBILITY  Total Score:16     Patient left HOB elevated with all lines intact, call button in reach and nurse Natacha/spouse/telemedicine with hospitalist present.    GOALS:   Multidisciplinary Problems     Physical Therapy Goals        Problem: Physical Therapy Goal    Goal Priority Disciplines Outcome Goal Variances Interventions   Physical Therapy Goal     PT, PT/OT Ongoing, Progressing     Description: Goals to be met by: 2020     Patient will increase functional independence with mobility by performin. Supine to sit with Contact Guard Assistance  2. Sit to stand transfer with Contact Guard Assistance  3. Gait  x 250 feet with Contact Guard Assistance using Rolling Walker.   4. Ascend/descend 16 stair with bilateral Handrails Contact Guard Assistance u.   5. Lower extremity exercise program x20 reps                    History:     Past Medical History:   Diagnosis Date    Alcohol dependence     DDD (degenerative disc disease), lumbosacral     DJD (degenerative joint disease), lumbosacral     Encounter for blood transfusion     GERD (gastroesophageal reflux disease)     Gout      Hypercholesterolemia     Hypertension     NATHALIE (iron deficiency anemia)     questionable white coat hypertension    Kidney carcinoma, left 2014    Pneumonia     Renal cell carcinoma     Shingles 10/13/2012       Past Surgical History:   Procedure Laterality Date    APPENDECTOMY      ARTHROSCOPY OF SHOULDER WITH DECOMPRESSION OF SUBACROMIAL SPACE Left 10/31/2019    Procedure: ARTHROSCOPY, SHOULDER, WITH SUBACROMIAL SPACE DECOMPRESSION;  Surgeon: Ruben Martinez MD;  Location: [facility] OR;  Service: Orthopedics;  Laterality: Left;    BLADDER REPAIR      x 2    COLONOSCOPY  9/2011    DISTAL CLAVICLE EXCISION Left 10/31/2019    Procedure: EXCISION, CLAVICLE, DISTAL;  Surgeon: Ruben Martinez MD;  Location: [facility] OR;  Service: Orthopedics;  Laterality: Left;    ESOPHAGOGASTRODUODENOSCOPY  9/2011    EYE SURGERY      lasix bilateral    FIXATION KYPHOPLASTY N/A 1/31/2020    Procedure: Kyphoplasty T12;  Surgeon: Dk Rosales MD;  Location: [facility] OR;  Service: Pain Management;  Laterality: N/A;    HERNIA REPAIR      hiatal hernai    HYSTERECTOMY      INJECTION OF ANESTHETIC AGENT AROUND NERVE Left 6/8/2020    Procedure: Block, Nerve;  Surgeon: Dk Rosales MD;  Location: [facility] OR;  Service: Pain Management;  Laterality: Left;  left genicular nerve block     KNEE ARTHROSCOPY W/ MENISCECTOMY Left 1/16/2020    Procedure: ARTHROSCOPY, KNEE, WITH MEDIAL MENISCECTOMY;  Surgeon: Ruben Martinez MD;  Location: [facility] OR;  Service: Orthopedics;  Laterality: Left;    LAPAROSCOPIC NISSEN FUNDOPLICATION      NEPHRECTOMY      LT partial    RADIOFREQUENCY ABLATION Left 6/19/2020    Procedure: Radiofrequency Ablation;  Surgeon: Dk Rosales MD;  Location: [facility] OR;  Service: Pain Management;  Laterality: Left;    RADIOFREQUENCY ABLATION OF LUMBAR MEDIAL BRANCH NERVE AT SINGLE LEVEL Bilateral 2/28/2020    Procedure: Radiofrequency Ablation, Nerve, Spinal, Lumbar, Medial Branch, 1 Level;  Surgeon: Dk Rosales  MD;  Location: [facility] OR;  Service: Pain Management;  Laterality: Bilateral;  L3,L4,L5 - Burned at 80 degrees C. for 60 seconds x 2 each site    REFRACTIVE SURGERY      ROTATOR CUFF REPAIR Left 10/31/2019    Procedure: REPAIR, ROTATOR CUFF;  Surgeon: Ruben Martinez MD;  Location: [facility] OR;  Service: Orthopedics;  Laterality: Left;  arthrex    SKIN BIOPSY      TOTAL ABDOMINAL HYSTERECTOMY W/ BILATERAL SALPINGOOPHORECTOMY  age 48       Time Tracking:     PT Received On: 07/30/20  PT Start Time: 1541     PT Stop Time: 1614  PT Total Time (min): 33 min     Billable Minutes: Evaluation 10, Gait Training 13 and Therapeutic Exercise 10      Aleida Foster, PT  07/30/2020

## 2020-07-30 NOTE — SUBJECTIVE & OBJECTIVE
Past Medical History:   Diagnosis Date    Alcohol dependence     DDD (degenerative disc disease), lumbosacral     DJD (degenerative joint disease), lumbosacral     Encounter for blood transfusion     GERD (gastroesophageal reflux disease)     Gout     Hypercholesterolemia     Hypertension     NATHALIE (iron deficiency anemia)     questionable white coat hypertension    Kidney carcinoma, left 2014    Pneumonia     Renal cell carcinoma     Shingles 10/13/2012       Past Surgical History:   Procedure Laterality Date    APPENDECTOMY      ARTHROSCOPY OF SHOULDER WITH DECOMPRESSION OF SUBACROMIAL SPACE Left 10/31/2019    Procedure: ARTHROSCOPY, SHOULDER, WITH SUBACROMIAL SPACE DECOMPRESSION;  Surgeon: Ruben Martinez MD;  Location: [facility] OR;  Service: Orthopedics;  Laterality: Left;    BLADDER REPAIR      x 2    COLONOSCOPY  9/2011    DISTAL CLAVICLE EXCISION Left 10/31/2019    Procedure: EXCISION, CLAVICLE, DISTAL;  Surgeon: Ruben Martinez MD;  Location: [facility] OR;  Service: Orthopedics;  Laterality: Left;    ESOPHAGOGASTRODUODENOSCOPY  9/2011    EYE SURGERY      lasix bilateral    FIXATION KYPHOPLASTY N/A 1/31/2020    Procedure: Kyphoplasty T12;  Surgeon: Dk Rosales MD;  Location: [facility] OR;  Service: Pain Management;  Laterality: N/A;    HERNIA REPAIR      hiatal hernai    HYSTERECTOMY      INJECTION OF ANESTHETIC AGENT AROUND NERVE Left 6/8/2020    Procedure: Block, Nerve;  Surgeon: Dk Rosales MD;  Location: [facility] OR;  Service: Pain Management;  Laterality: Left;  left genicular nerve block     KNEE ARTHROSCOPY W/ MENISCECTOMY Left 1/16/2020    Procedure: ARTHROSCOPY, KNEE, WITH MEDIAL MENISCECTOMY;  Surgeon: Ruben Martinez MD;  Location: [facility] OR;  Service: Orthopedics;  Laterality: Left;    LAPAROSCOPIC NISSEN FUNDOPLICATION      NEPHRECTOMY      LT partial    RADIOFREQUENCY ABLATION Left 6/19/2020    Procedure: Radiofrequency Ablation;  Surgeon: Dk Rosales MD;  Location: [facility]  "OR;  Service: Pain Management;  Laterality: Left;    RADIOFREQUENCY ABLATION OF LUMBAR MEDIAL BRANCH NERVE AT SINGLE LEVEL Bilateral 2/28/2020    Procedure: Radiofrequency Ablation, Nerve, Spinal, Lumbar, Medial Branch, 1 Level;  Surgeon: Dk Rosales MD;  Location: [facility] OR;  Service: Pain Management;  Laterality: Bilateral;  L3,L4,L5 - Burned at 80 degrees C. for 60 seconds x 2 each site    REFRACTIVE SURGERY      ROTATOR CUFF REPAIR Left 10/31/2019    Procedure: REPAIR, ROTATOR CUFF;  Surgeon: Ruben Martinez MD;  Location: [facility] OR;  Service: Orthopedics;  Laterality: Left;  arthrex    SKIN BIOPSY      TOTAL ABDOMINAL HYSTERECTOMY W/ BILATERAL SALPINGOOPHORECTOMY  age 48       Review of patient's allergies indicates:   Allergen Reactions    Phenergan [promethazine] Hives and Rash    Latex, natural rubber Hives     Per pt report-many years    Morphine Hives    Nsaids (non-steroidal anti-inflammatory drug)      Due to "kidney cancer"       Current Facility-Administered Medications on File Prior to Encounter   Medication    electrolyte-S (ISOLYTE)    lactated ringers infusion     Current Outpatient Medications on File Prior to Encounter   Medication Sig    ALPRAZolam (XANAX) 0.5 MG tablet Take 1 tablet (0.5 mg total) by mouth 3 (three) times daily as needed for Anxiety.    amLODIPine (NORVASC) 5 MG tablet Take 1 tablet (5 mg total) by mouth once daily.    azelastine (ASTELIN) 137 mcg (0.1 %) nasal spray 1 spray (137 mcg total) by Nasal route 2 (two) times daily.    budesonide-formoterol 80-4.5 mcg (SYMBICORT) 80-4.5 mcg/actuation HFAA Inhale 2 puffs into the lungs 2 (two) times daily. Controller    DULoxetine (CYMBALTA) 30 MG capsule Take 1 capsule (30 mg total) by mouth 2 (two) times daily.    HYDROcodone-acetaminophen (NORCO) 7.5-325 mg per tablet Take 1 tablet by mouth every 6 (six) hours as needed for Pain.    calcium carbonate (OS-TERI) 600 mg calcium (1,500 mg) Tab Take 600 mg by mouth " once.    diclofenac (VOLTAREN) 50 MG EC tablet TAKE 1 TABLET BY MOUTH TWICE DAILY WITH MEALS    fluticasone (FLONASE) 50 mcg/actuation nasal spray 1 spray by Nasal route daily as needed.     gabapentin (NEURONTIN) 600 MG tablet Take 1 tablet (600 mg total) by mouth 3 (three) times daily.    lidocaine (LIDODERM) 5 % Place 1 patch onto the skin once daily. Remove & Discard patch within 12 hours or as directed by MD    losartan-hydrochlorothiazide 100-25 mg (HYZAAR) 100-25 mg per tablet Take 1 tablet by mouth once daily.    magnesium 30 mg Tab Take by mouth once.    multivit-iron-min-folic acid (MULTIVITAMIN-IRON-MINERALS-FOLIC ACID) 3,500-18-0.4 unit-mg-mg Chew Take by mouth once daily.      polyethylene glycol (GLYCOLAX) 17 gram/dose powder Take 17 g by mouth once daily. (Patient taking differently: Take 17 g by mouth daily as needed. )     Family History     Problem Relation (Age of Onset)    Hypertension Father        Tobacco Use    Smoking status: Light Tobacco Smoker     Packs/day: 1.00     Years: 40.00     Pack years: 40.00     Types: Cigarettes     Last attempt to quit: 2018     Years since quittin.5    Smokeless tobacco: Never Used    Tobacco comment: smokes 2-3 cigarettes a night   Substance and Sexual Activity    Alcohol use: Yes     Alcohol/week: 28.0 standard drinks     Types: 14 Cans of beer, 14 Standard drinks or equivalent per week     Frequency: 4 or more times a week     Drinks per session: 1 or 2     Binge frequency: Never     Comment: 2-3 a day/ social    Drug use: No    Sexual activity: Yes     Partners: Male     Review of Systems   Constitutional: Positive for activity change. Negative for fever.   HENT: Negative for sore throat.    Eyes: Negative for visual disturbance.   Respiratory: Negative for cough, chest tightness and shortness of breath.    Cardiovascular: Negative for chest pain, palpitations and leg swelling.   Gastrointestinal: Negative for abdominal distention,  abdominal pain, constipation, diarrhea, nausea and vomiting.   Endocrine: Negative for polyuria.   Genitourinary: Negative for decreased urine volume, dysuria, flank pain, frequency and hematuria.   Musculoskeletal: Positive for arthralgias (Left knee). Negative for back pain and gait problem.   Skin: Negative for rash.   Neurological: Negative for syncope, speech difficulty, weakness, light-headedness and headaches.   Psychiatric/Behavioral: Negative for confusion, hallucinations and sleep disturbance.     Objective:     Vital Signs (Most Recent):  Temp: 98 °F (36.7 °C) (07/30/20 1547)  Pulse: 86 (07/30/20 1547)  Resp: 14 (07/30/20 1547)  BP: 113/76 (07/30/20 1547)  SpO2: 98 % (07/30/20 1547) Vital Signs (24h Range):  Temp:  [97.4 °F (36.3 °C)-98.3 °F (36.8 °C)] 98 °F (36.7 °C)  Pulse:  [] 86  Resp:  [8-25] 14  SpO2:  [93 %-100 %] 98 %  BP: (102-147)/(63-83) 113/76     Weight: 86.2 kg (190 lb)  Body mass index is 32.61 kg/m².    Physical Exam  Constitutional:       General: She is not in acute distress.     Appearance: She is well-developed. She is not diaphoretic.   HENT:      Head: Normocephalic and atraumatic.      Mouth/Throat:      Pharynx: No oropharyngeal exudate.   Eyes:      Conjunctiva/sclera: Conjunctivae normal.      Pupils: Pupils are equal, round, and reactive to light.   Neck:      Musculoskeletal: Normal range of motion and neck supple.      Thyroid: No thyromegaly.      Vascular: No JVD.   Cardiovascular:      Rate and Rhythm: Normal rate and regular rhythm.      Heart sounds: Normal heart sounds. No murmur.   Pulmonary:      Effort: Pulmonary effort is normal. No respiratory distress.      Breath sounds: Normal breath sounds. No wheezing or rales.   Chest:      Chest wall: No tenderness.   Abdominal:      General: Bowel sounds are normal. There is no distension.      Palpations: Abdomen is soft.      Tenderness: There is no abdominal tenderness. There is no guarding or rebound.    Musculoskeletal: Normal range of motion.   Lymphadenopathy:      Cervical: No cervical adenopathy.   Skin:     General: Skin is warm and dry.      Findings: No rash.      Comments: Surgical dressing , left knee   Neurological:      Mental Status: She is alert and oriented to person, place, and time.      Cranial Nerves: No cranial nerve deficit.      Sensory: No sensory deficit.      Deep Tendon Reflexes: Reflexes normal.           CRANIAL NERVES     CN III, IV, VI   Pupils are equal, round, and reactive to light.       Significant Labs:   Results for orders placed or performed during the hospital encounter of 07/30/20   Urinalysis, Reflex to Urine Culture Urine, Clean Catch    Specimen: Urine   Result Value Ref Range    Specimen UA Urine, Clean Catch     Color, UA Yellow Yellow, Straw, Marine    Appearance, UA Clear Clear    pH, UA 7.0 5.0 - 8.0    Specific Gravity, UA 1.010 1.005 - 1.030    Protein, UA Negative Negative    Glucose, UA Negative Negative    Ketones, UA Negative Negative    Bilirubin (UA) Negative Negative    Occult Blood UA Negative Negative    Nitrite, UA Negative Negative    Urobilinogen, UA Negative <2.0 EU/dL    Leukocytes, UA 1+ (A) Negative   Urinalysis Microscopic   Result Value Ref Range    WBC, UA 4 0 - 5 /hpf    Bacteria Rare None-Occ /hpf    Squam Epithel, UA 1 /hpf    Non-Squam Epith 1 (A) <1/hpf /hpf    Microscopic Comment SEE COMMENT    Comprehensive metabolic panel   Result Value Ref Range    Sodium 139 136 - 145 mmol/L    Potassium 3.8 3.5 - 5.1 mmol/L    Chloride 106 95 - 110 mmol/L    CO2 21 (L) 23 - 29 mmol/L    Glucose 121 (H) 70 - 110 mg/dL    BUN, Bld 12 8 - 23 mg/dL    Creatinine 1.1 0.5 - 1.4 mg/dL    Calcium 8.8 8.7 - 10.5 mg/dL    Total Protein 6.8 6.0 - 8.4 g/dL    Albumin 3.8 3.5 - 5.2 g/dL    Total Bilirubin 0.3 0.1 - 1.0 mg/dL    Alkaline Phosphatase 108 55 - 135 U/L    AST 18 10 - 40 U/L    ALT 16 10 - 44 U/L    Anion Gap 12 8 - 16 mmol/L    eGFR if African American  >60 >60 mL/min/1.73 m^2    eGFR if non African American 52 (A) >60 mL/min/1.73 m^2   CBC auto differential   Result Value Ref Range    WBC 5.13 3.90 - 12.70 K/uL    RBC 3.50 (L) 4.00 - 5.40 M/uL    Hemoglobin 11.4 (L) 12.0 - 16.0 g/dL    Hematocrit 36.9 (L) 37.0 - 48.5 %    Mean Corpuscular Volume 105 (H) 82 - 98 fL    Mean Corpuscular Hemoglobin 32.6 (H) 27.0 - 31.0 pg    Mean Corpuscular Hemoglobin Conc 30.9 (L) 32.0 - 36.0 g/dL    RDW 12.6 11.5 - 14.5 %    Platelets 264 150 - 350 K/uL    MPV 9.3 9.2 - 12.9 fL    Immature Granulocytes 0.4 0.0 - 0.5 %    Gran # (ANC) 2.9 1.8 - 7.7 K/uL    Immature Grans (Abs) 0.02 0.00 - 0.04 K/uL    Lymph # 1.6 1.0 - 4.8 K/uL    Mono # 0.5 0.3 - 1.0 K/uL    Eos # 0.1 0.0 - 0.5 K/uL    Baso # 0.04 0.00 - 0.20 K/uL    nRBC 0 0 /100 WBC    Gran% 57.0 38.0 - 73.0 %    Lymph% 31.2 18.0 - 48.0 %    Mono% 8.8 4.0 - 15.0 %    Eosinophil% 1.8 0.0 - 8.0 %    Basophil% 0.8 0.0 - 1.9 %    Differential Method Automated          Significant Imaging:

## 2020-07-30 NOTE — ANESTHESIA PREPROCEDURE EVALUATION
07/30/2020  Beatriz Gan is a 64 y.o., female.    Anesthesia Evaluation    I have reviewed the Patient Summary Reports.    I have reviewed the Nursing Notes.    I have reviewed the Medications.     Review of Systems  Anesthesia Hx:  No problems with previous Anesthesia Hx of Anesthetic complications    Social:  Former Smoker    Hematology/Oncology:         -- Anemia: --  Cancer in past history (Kidney s/p partial nephrectomy):    Cardiovascular:   Hypertension, well controlled hyperlipidemia    Pulmonary:   Pneumonia    Renal/:   Chronic Renal Disease    Hepatic/GI:   Hiatal Hernia, GERD, well controlled Liver Disease,    Musculoskeletal:   Arthritis     Neurological:   Neuromuscular Disease, Headaches    Endocrine:  Endocrine Normal    Psych:   Psychiatric History depression          Physical Exam  General:  Obesity    Airway/Jaw/Neck:  Airway Findings: Mouth Opening: Normal Tongue: Normal  General Airway Assessment: Adult  Oropharynx Findings:  Mallampati: II  Jaw/Neck Findings:  Neck ROM: Normal ROM     Eyes/Ears/Nose:  Eyes/Ears/Nose Findings:    Dental:  Dental Findings: In tact   Chest/Lungs:  Chest/Lungs Findings: Normal Respiratory Rate, Clear to auscultation     Heart/Vascular:  Heart Findings: Rate: Normal  Rhythm: Regular Rhythm        Mental Status:  Mental Status Findings:  Cooperative, Alert and Oriented         Anesthesia Plan  Type of Anesthesia, risks & benefits discussed:  Anesthesia Type:  spinal  Patient's Preference: spinal  Intra-op Monitoring Plan: standard ASA monitors  Intra-op Monitoring Plan Comments:   Post Op Pain Control Plan: multimodal analgesia, peripheral nerve block, per primary service following discharge from PACU and IV/PO Opioids PRN  Post Op Pain Control Plan Comments:   Induction:   IV  Beta Blocker:  Patient is not currently on a Beta-Blocker (No further  documentation required).       Informed Consent: Patient understands risks and agrees with Anesthesia plan.  Questions answered. Anesthesia consent signed with patient.  ASA Score: 3     Day of Surgery Review of History & Physical:  There are no significant changes.   H&P completed by Anesthesiologist.       Ready For Surgery From Anesthesia Perspective.

## 2020-07-30 NOTE — BRIEF OP NOTE
[facility]  Brief Operative Note    SUMMARY     Surgery Date: 7/30/2020     Surgeon(s) and Role:     * Ruben Martinez MD - Primary    Assisting Surgeon: darcy tavarez    Pre-op Diagnosis:  Primary osteoarthritis of left knee [M17.12]    Post-op Diagnosis:  Post-Op Diagnosis Codes:     * Primary osteoarthritis of left knee [M17.12]    Procedure(s) (LRB):  ARTHROPLASTY, KNEE LEFT (Left)    Anesthesia: General    Description of Procedure: left tkr    Description of the findings of the procedure:  Osteoarthritis left knee    Estimated Blood Loss: * No values recorded between 7/30/2020 12:11 PM and 7/30/2020  1:10 PM *         Specimens:   Specimen (12h ago, onward)    None

## 2020-07-30 NOTE — OR NURSING
Patient was consented by both Dr. Martinez and Dr. Santos, valuables per spouse to include her cane, iv bolus in progress, oxycodone IR given. Per Dr. Santos patient only given oxycodone.

## 2020-07-30 NOTE — OP NOTE
Dictation #1  MRN:[id number]  [facility]:[id number]  [facility]  Orthopedic  Operative Note    SUMMARY     Date of Procedure: 7/30/2020     Procedure: Procedure(s) (LRB):  ARTHROPLASTY, KNEE LEFT (Left)       Surgeon(s) and Role:     * Ruben Martinez MD - Primary    Assisting Surgeon:  Harshil Romero physician's assistant    Pre-Operative Diagnosis: Primary osteoarthritis of left knee [M17.12]    Post-Operative Diagnosis: Post-Op Diagnosis Codes:     * Primary osteoarthritis of left knee [M17.12]    Anesthesia: General    Procedure in General:  The patient was brought to the operating room while supine was intubated in the catheters placed in the bladder.  A tourniquet was placed on the proximal left thigh and the left lower extremity was cleansed with alcohol then prepped and draped in the usual fashion after prepping with ChloraPrep solution.  A time-out was performed we exsanguinated the leg with the Esmarch tourniquet and with the knee flexed inflated pneumatic tourniquet to 250 mm of mercury.  A straight anterior incision was made on the left knee and medial parapatellar arthrotomy incision there are marked to degenerative change in the knee were this on the medial compartment including the patellofemoral joint the knee was flexed and using the Holger and Holger PFC rotating platform intramedullary femoral guide extramedullary tibial guide we sized a size tibia ft to 2.5 size.  The articular surface of the patella was removed and patella was sized at 32 mm.  A trial left to 5 sized femoral component and tibial components were inserted with a trial 32 mm patella and a 10 mm disc space the knee was balanced ligamentous wise and there was full extension.  Trial components removed the knee was thoroughly irrigated methylmethacrylate was prepared we cemented a size 2.5 tibial base plate base plate and 32 mm patellar button and then used a porous-coated size left 2.5 femoral component 10.5 mm rotating  platform was used and knee was reduced wound was closed in layers and a drain was staples on the skin the patient was awakened and brought to recovery room in stable condition          Complications: None    Estimated Blood Loss (EBL):            Implants:   Implant Name Type Inv. Item Serial No.  Lot No. LRB No. Used Action   CEMENT BONE SMRTST GENTEC 40GR - [id number]  CEMENT BONE SMRTST GENTEC 40GR  [employer] [id number] Left 1 Implanted   PATELLA OVAL DOME 32MM - [id number]  PATELLA OVAL DOME 32MM  [employer] [id number] Left 1 Implanted   TRAY BS TIB CEMENT TRAY SZ 2.5 - [id number]  TRAY BS TIB CEMENT TRAY SZ 2.5  [employer] [id number] Left 1 Implanted   FEMUR SIG C-R POROCOAT - [id number]  FEMUR SIG C-R POROCOAT  [employer] [id number] Left 1 Implanted   PIN THREADED STERILE - [id number]  PIN THREADED STERILE  [employer] [id number] Left 1 Implanted and Explanted   PIN TEMPORARY - [id number]  PIN TEMPORARY  [employer] [id number] Left 1 Implanted   SIGMA TIBIAL INSERT ROTATING PLATFORM CURVED AOX    [employer] [id number] Left 1 Implanted       Specimens:   Specimen (12h ago, onward)    None                  Condition: Good    Disposition: PACU - hemodynamically stable.    Attestation: I was present and scrubbed for the entire procedure.

## 2020-07-30 NOTE — INTERVAL H&P NOTE
The patient has been examined and the H&P has been reviewed:    I concur with the findings and no changes have occurred since H&P was written.    Surgery risks, benefits and alternative options discussed and understood by patient/family.          Active Hospital Problems    Diagnosis  POA    Primary osteoarthritis of left knee [M17.12]  Yes      Resolved Hospital Problems   No resolved problems to display.

## 2020-07-30 NOTE — NURSING
Pt transported from pacu via bed. Report received from tyler MARTINEZ. Oriented to care setting. Bed in low position, call light in reach.

## 2020-07-30 NOTE — PLAN OF CARE
Problem: Physical Therapy Goal  Goal: Physical Therapy Goal  Description: Goals to be met by: 2020     Patient will increase functional independence with mobility by performin. Supine to sit with Contact Guard Assistance  2. Sit to stand transfer with Contact Guard Assistance  3. Gait  x 250 feet with Contact Guard Assistance using Rolling Walker.   4. Ascend/descend 16 stair with bilateral Handrails Contact Guard Assistance u.   5. Lower extremity exercise program x20 reps   Outcome: Ongoing, Progressing   PT eval- gait 30ft with RW min assist with knee immobilizer. Thera ex x 20 reps. CPM set up 0/45 degrees.    Placed in observation for evaluation and treatment of abdominal swelling and shortness of breath due to heart failure exacerbation.  IV diuresis with Furosemide.  Sodium and fluid restriction.  Very good response.  Shortness of breath significantly improved.  Remained hemodynamically stable throughout.  Evaluation by Cardiology who assisted with management.  Discharge plan to return home.  Optimize medical management.  Continued follow up outpatient with Cardiology.

## 2020-07-30 NOTE — HPI
The pt is 65 yo female with PMHx of DJD, Gout, HTN, Hyperlipidemia , Iron def anemia , Renal cell carcinoma and  Alcohol dependence who admitted after total  left knee arthroplasty by Dr. Ruben Martinez. Pt with known h/o left knee degenerative joint disease has had  persistent left knee pain.   She underwent a left knee arthroscopy which did not help much.  She then underwent left knee radiofrequency ablation without good result.  This process has been going on for quite a long time.  She states that she can no longer continue to live with her symptoms and is ready to proceed with more aggressive surgical treatment options. She underwent left total knee arthroplasty today . No immediate post op complications and currently has no c/o chest pain, SOB or palpitations.

## 2020-07-30 NOTE — TRANSFER OF CARE
"Anesthesia Transfer of Care Note    Patient: Beatriz Gan    Procedure(s) Performed: Procedure(s) (LRB):  ARTHROPLASTY, KNEE LEFT (Left)    Patient location: PACU    Anesthesia Type: MAC, regional and spinal    Transport from OR: Transported from OR on room air with adequate spontaneous ventilation    Post pain: adequate analgesia    Post assessment: no apparent anesthetic complications and tolerated procedure well    Post vital signs: stable    Level of consciousness: awake    Nausea/Vomiting: no nausea/vomiting    Transfer of care protocol was followed      Last vitals:   Visit Vitals  /73 (BP Location: Left arm, Patient Position: Lying)   Pulse 69   Temp 36.8 °C (98.3 °F) (Temporal)   Resp 10   Ht 5' 4" (1.626 m)   Wt 86.2 kg (190 lb)   SpO2 100%   Breastfeeding No   BMI 32.61 kg/m²     "
97

## 2020-07-30 NOTE — PLAN OF CARE
POC reviewed with pt.  at bedside. Pt verbalized understanding. Moderate pain controlled with prn meds per order. Cpm and cryo in use. Bed in low position, call light in reach. Will continue to monitor.

## 2020-07-30 NOTE — ANESTHESIA PROCEDURE NOTES
Spinal    Diagnosis: OA  Patient location during procedure: OR  Start time: 7/30/2020 11:50 AM  Timeout: 7/30/2020 11:48 AM  End time: 7/30/2020 11:54 AM    Staffing  Authorizing Provider: Yodit Santos MD  Performing Provider: Yodit Santos MD    Preanesthetic Checklist  Completed: patient identified, site marked, surgical consent, pre-op evaluation, timeout performed, IV checked, risks and benefits discussed and monitors and equipment checked  Spinal Block  Patient position: sitting  Prep: ChloraPrep  Patient monitoring: heart rate, cardiac monitor, continuous pulse ox and frequent blood pressure checks  Approach: midline  Location: L3-4  Injection technique: single shot  CSF Fluid: clear free-flowing CSF  Needle  Needle type: Onesimo   Needle gauge: 25 G  Needle length: 3.5 in  Additional Documentation: incremental injection, negative aspiration for heme and no paresthesia on injection  Needle localization: anatomical landmarks  Assessment   Dermatomal levels determined by alcohol wipe  Ease of block: easy  Patient's tolerance of the procedure: comfortable throughout block  Additional Notes  Hyperbaric bupivacaine 0.75% 15mg

## 2020-07-30 NOTE — PLAN OF CARE
Patient is stable at this time.  VSS. Anesthesiologist at patient's bedside and is ok for patient to transfer to the floor.  Dressings clean, dry and intact.  Pain is a 3/10.  No complaints of nausea or vomiting.  Patient tolerating po intake well.   Bautista cath present and patent and draining well.

## 2020-07-30 NOTE — PLAN OF CARE
arrived with spouse, plan of care reviewed, urinalysis completed, warm blanket provided. Patient to be admitted and spouse to keep all valuables

## 2020-07-31 DIAGNOSIS — Z96.652 S/P TOTAL KNEE REPLACEMENT, LEFT: Primary | ICD-10-CM

## 2020-07-31 PROBLEM — N39.0 UTI (URINARY TRACT INFECTION): Status: ACTIVE | Noted: 2020-07-31

## 2020-07-31 LAB
ALBUMIN SERPL BCP-MCNC: 3.2 G/DL (ref 3.5–5.2)
ALP SERPL-CCNC: 92 U/L (ref 55–135)
ALT SERPL W/O P-5'-P-CCNC: 10 U/L (ref 10–44)
ANION GAP SERPL CALC-SCNC: 7 MMOL/L (ref 8–16)
AST SERPL-CCNC: 15 U/L (ref 10–40)
BASOPHILS # BLD AUTO: 0.02 K/UL (ref 0–0.2)
BASOPHILS NFR BLD: 0.4 % (ref 0–1.9)
BILIRUB SERPL-MCNC: 0.3 MG/DL (ref 0.1–1)
BNP SERPL-MCNC: 87 PG/ML (ref 0–99)
BUN SERPL-MCNC: 11 MG/DL (ref 8–23)
CALCIUM SERPL-MCNC: 8.6 MG/DL (ref 8.7–10.5)
CHLORIDE SERPL-SCNC: 101 MMOL/L (ref 95–110)
CO2 SERPL-SCNC: 25 MMOL/L (ref 23–29)
CREAT SERPL-MCNC: 1.3 MG/DL (ref 0.5–1.4)
DIFFERENTIAL METHOD: ABNORMAL
EOSINOPHIL # BLD AUTO: 0.1 K/UL (ref 0–0.5)
EOSINOPHIL NFR BLD: 1.3 % (ref 0–8)
ERYTHROCYTE [DISTWIDTH] IN BLOOD BY AUTOMATED COUNT: 12.6 % (ref 11.5–14.5)
EST. GFR  (AFRICAN AMERICAN): 49 ML/MIN/1.73 M^2
EST. GFR  (NON AFRICAN AMERICAN): 43 ML/MIN/1.73 M^2
GLUCOSE SERPL-MCNC: 121 MG/DL (ref 70–110)
HCT VFR BLD AUTO: 29.5 % (ref 37–48.5)
HGB BLD-MCNC: 9.2 G/DL (ref 12–16)
IMM GRANULOCYTES # BLD AUTO: 0.01 K/UL (ref 0–0.04)
IMM GRANULOCYTES NFR BLD AUTO: 0.2 % (ref 0–0.5)
LYMPHOCYTES # BLD AUTO: 0.7 K/UL (ref 1–4.8)
LYMPHOCYTES NFR BLD: 14 % (ref 18–48)
MAGNESIUM SERPL-MCNC: 1.6 MG/DL (ref 1.6–2.6)
MCH RBC QN AUTO: 32.7 PG (ref 27–31)
MCHC RBC AUTO-ENTMCNC: 31.2 G/DL (ref 32–36)
MCV RBC AUTO: 105 FL (ref 82–98)
MONOCYTES # BLD AUTO: 0.7 K/UL (ref 0.3–1)
MONOCYTES NFR BLD: 12.3 % (ref 4–15)
NEUTROPHILS # BLD AUTO: 3.8 K/UL (ref 1.8–7.7)
NEUTROPHILS NFR BLD: 71.8 % (ref 38–73)
NRBC BLD-RTO: 0 /100 WBC
PHOSPHATE SERPL-MCNC: 3.2 MG/DL (ref 2.7–4.5)
PLATELET # BLD AUTO: 208 K/UL (ref 150–350)
PMV BLD AUTO: 9.6 FL (ref 9.2–12.9)
POTASSIUM SERPL-SCNC: 3.9 MMOL/L (ref 3.5–5.1)
PROT SERPL-MCNC: 5.7 G/DL (ref 6–8.4)
RBC # BLD AUTO: 2.81 M/UL (ref 4–5.4)
SODIUM SERPL-SCNC: 133 MMOL/L (ref 136–145)
WBC # BLD AUTO: 5.27 K/UL (ref 3.9–12.7)

## 2020-07-31 PROCEDURE — 97165 OT EVAL LOW COMPLEX 30 MIN: CPT

## 2020-07-31 PROCEDURE — 63600175 PHARM REV CODE 636 W HCPCS: Performed by: INTERNAL MEDICINE

## 2020-07-31 PROCEDURE — 36415 COLL VENOUS BLD VENIPUNCTURE: CPT

## 2020-07-31 PROCEDURE — 99900035 HC TECH TIME PER 15 MIN (STAT)

## 2020-07-31 PROCEDURE — 85025 COMPLETE CBC W/AUTO DIFF WBC: CPT

## 2020-07-31 PROCEDURE — 94761 N-INVAS EAR/PLS OXIMETRY MLT: CPT

## 2020-07-31 PROCEDURE — 25000003 PHARM REV CODE 250: Performed by: INTERNAL MEDICINE

## 2020-07-31 PROCEDURE — 97116 GAIT TRAINING THERAPY: CPT | Mod: CQ,59

## 2020-07-31 PROCEDURE — 83880 ASSAY OF NATRIURETIC PEPTIDE: CPT

## 2020-07-31 PROCEDURE — 94640 AIRWAY INHALATION TREATMENT: CPT

## 2020-07-31 PROCEDURE — 80053 COMPREHEN METABOLIC PANEL: CPT

## 2020-07-31 PROCEDURE — 97530 THERAPEUTIC ACTIVITIES: CPT | Mod: CQ

## 2020-07-31 PROCEDURE — 97110 THERAPEUTIC EXERCISES: CPT | Mod: CQ

## 2020-07-31 PROCEDURE — 84100 ASSAY OF PHOSPHORUS: CPT

## 2020-07-31 PROCEDURE — 83735 ASSAY OF MAGNESIUM: CPT

## 2020-07-31 PROCEDURE — 97530 THERAPEUTIC ACTIVITIES: CPT

## 2020-07-31 PROCEDURE — 94799 UNLISTED PULMONARY SVC/PX: CPT

## 2020-07-31 PROCEDURE — 25000242 PHARM REV CODE 250 ALT 637 W/ HCPCS: Performed by: INTERNAL MEDICINE

## 2020-07-31 PROCEDURE — 63600175 PHARM REV CODE 636 W HCPCS: Performed by: NURSE PRACTITIONER

## 2020-07-31 RX ORDER — TALC
6 POWDER (GRAM) TOPICAL NIGHTLY PRN
Status: DISCONTINUED | OUTPATIENT
Start: 2020-07-31 | End: 2020-08-03 | Stop reason: HOSPADM

## 2020-07-31 RX ORDER — IPRATROPIUM BROMIDE AND ALBUTEROL SULFATE 2.5; .5 MG/3ML; MG/3ML
3 SOLUTION RESPIRATORY (INHALATION)
Status: DISCONTINUED | OUTPATIENT
Start: 2020-07-31 | End: 2020-08-03 | Stop reason: HOSPADM

## 2020-07-31 RX ORDER — SULFAMETHOXAZOLE AND TRIMETHOPRIM 800; 160 MG/1; MG/1
1 TABLET ORAL 2 TIMES DAILY
Status: COMPLETED | OUTPATIENT
Start: 2020-07-31 | End: 2020-08-01

## 2020-07-31 RX ORDER — BUDESONIDE 0.5 MG/2ML
0.5 INHALANT ORAL EVERY 12 HOURS
Status: DISCONTINUED | OUTPATIENT
Start: 2020-07-31 | End: 2020-08-03 | Stop reason: HOSPADM

## 2020-07-31 RX ADMIN — HYDROMORPHONE HYDROCHLORIDE 1 MG: 1 INJECTION, SOLUTION INTRAMUSCULAR; INTRAVENOUS; SUBCUTANEOUS at 10:07

## 2020-07-31 RX ADMIN — IPRATROPIUM BROMIDE AND ALBUTEROL SULFATE 3 ML: .5; 3 SOLUTION RESPIRATORY (INHALATION) at 07:07

## 2020-07-31 RX ADMIN — OXYCODONE HYDROCHLORIDE 10 MG: 10 TABLET ORAL at 05:07

## 2020-07-31 RX ADMIN — DULOXETINE 30 MG: 30 CAPSULE, DELAYED RELEASE ORAL at 09:07

## 2020-07-31 RX ADMIN — MUPIROCIN 1 G: 20 OINTMENT TOPICAL at 09:07

## 2020-07-31 RX ADMIN — HYDROMORPHONE HYDROCHLORIDE 1 MG: 1 INJECTION, SOLUTION INTRAMUSCULAR; INTRAVENOUS; SUBCUTANEOUS at 09:07

## 2020-07-31 RX ADMIN — CALCIUM 500 MG: 500 TABLET ORAL at 08:07

## 2020-07-31 RX ADMIN — OXYCODONE HYDROCHLORIDE 10 MG: 10 TABLET ORAL at 03:07

## 2020-07-31 RX ADMIN — AZELASTINE HYDROCHLORIDE 137 MCG: 137 SPRAY, METERED NASAL at 09:07

## 2020-07-31 RX ADMIN — DOCUSATE SODIUM 100 MG: 100 CAPSULE, LIQUID FILLED ORAL at 08:07

## 2020-07-31 RX ADMIN — HYDROMORPHONE HYDROCHLORIDE 1 MG: 1 INJECTION, SOLUTION INTRAMUSCULAR; INTRAVENOUS; SUBCUTANEOUS at 05:07

## 2020-07-31 RX ADMIN — FLUTICASONE FUROATE AND VILANTEROL TRIFENATATE 1 PUFF: 100; 25 POWDER RESPIRATORY (INHALATION) at 08:07

## 2020-07-31 RX ADMIN — CALCIUM 500 MG: 500 TABLET ORAL at 09:07

## 2020-07-31 RX ADMIN — AMLODIPINE BESYLATE 5 MG: 5 TABLET ORAL at 09:07

## 2020-07-31 RX ADMIN — IPRATROPIUM BROMIDE AND ALBUTEROL SULFATE 3 ML: .5; 3 SOLUTION RESPIRATORY (INHALATION) at 09:07

## 2020-07-31 RX ADMIN — PREGABALIN 75 MG: 75 CAPSULE ORAL at 09:07

## 2020-07-31 RX ADMIN — OXYCODONE HYDROCHLORIDE 10 MG: 10 TABLET ORAL at 11:07

## 2020-07-31 RX ADMIN — FAMOTIDINE 20 MG: 20 TABLET ORAL at 09:07

## 2020-07-31 RX ADMIN — DOCUSATE SODIUM 100 MG: 100 CAPSULE, LIQUID FILLED ORAL at 09:07

## 2020-07-31 RX ADMIN — MULTIPLE VITAMINS W/ MINERALS TAB 1 TABLET: TAB at 09:07

## 2020-07-31 RX ADMIN — Medication 27 MG: at 09:07

## 2020-07-31 RX ADMIN — FAMOTIDINE 20 MG: 20 TABLET ORAL at 08:07

## 2020-07-31 RX ADMIN — DEXTROSE 3 G: 50 INJECTION, SOLUTION INTRAVENOUS at 03:07

## 2020-07-31 RX ADMIN — SULFAMETHOXAZOLE AND TRIMETHOPRIM 1 TABLET: 800; 160 TABLET ORAL at 09:07

## 2020-07-31 RX ADMIN — BUDESONIDE 0.5 MG: 0.5 SUSPENSION RESPIRATORY (INHALATION) at 09:07

## 2020-07-31 RX ADMIN — SODIUM CHLORIDE, SODIUM LACTATE, POTASSIUM CHLORIDE, AND CALCIUM CHLORIDE: .6; .31; .03; .02 INJECTION, SOLUTION INTRAVENOUS at 03:07

## 2020-07-31 RX ADMIN — HYDROCHLOROTHIAZIDE 25 MG: 25 TABLET ORAL at 09:07

## 2020-07-31 RX ADMIN — LOSARTAN POTASSIUM 100 MG: 100 TABLET, FILM COATED ORAL at 09:07

## 2020-07-31 RX ADMIN — SULFAMETHOXAZOLE AND TRIMETHOPRIM 1 TABLET: 800; 160 TABLET ORAL at 08:07

## 2020-07-31 RX ADMIN — HYDROMORPHONE HYDROCHLORIDE 1 MG: 1 INJECTION, SOLUTION INTRAMUSCULAR; INTRAVENOUS; SUBCUTANEOUS at 03:07

## 2020-07-31 RX ADMIN — PREGABALIN 75 MG: 75 CAPSULE ORAL at 08:07

## 2020-07-31 RX ADMIN — BUDESONIDE 0.5 MG: 0.5 SUSPENSION RESPIRATORY (INHALATION) at 08:07

## 2020-07-31 RX ADMIN — IPRATROPIUM BROMIDE AND ALBUTEROL SULFATE 3 ML: .5; 3 SOLUTION RESPIRATORY (INHALATION) at 01:07

## 2020-07-31 RX ADMIN — DULOXETINE 30 MG: 30 CAPSULE, DELAYED RELEASE ORAL at 08:07

## 2020-07-31 RX ADMIN — RIVAROXABAN 10 MG: 10 TABLET, FILM COATED ORAL at 05:07

## 2020-07-31 NOTE — HOSPITAL COURSE
uneventful.  CPM machine for the most part was inoperable this weekend so patient did not use it much

## 2020-07-31 NOTE — PT/OT/SLP EVAL
Occupational Therapy   Evaluation and Discharge Note    Name: Beatriz Gan  MRN: [id number]  Admitting Diagnosis:  Primary osteoarthritis of left knee 1 Day Post-Op    Recommendations:     Discharge Recommendations: home, home with home health  Discharge Equipment Recommendations:  none  Barriers to discharge:  None    Assessment:     Beatriz Gan is a 64 y.o. female with a medical diagnosis of Primary osteoarthritis of left knee. At this time, patient is functioning at level for ADL adequate for D/C home with HHOT/PT and 24/7 Supervision/(A) from family and best friend. Patient reports PLOF as (I) to Mod (I) and will have (A) of spouse and great granddtr in evening/night and (A) of best friend during the day upon discharge. Patient reports she already owns and uses raised toilet with rails and has tub bench set-up but did not use; pt also reports ordering a hip kit. OTR providing education/instruction in addition to handouts containing written instruction/visual images of each step of don/doffing clothing using hip kit items and sit<>stand/tub bench transfers using RW. OTR also providing education/instruction with demonstration regarding correct transfer sequence/techniques using RW, adaptive dressing techniques using items in hip kit, and fall prevention/safety awareness in home environment. Patient verbalizes understanding and demonstrates/in agreement when appropriate.     Plan:     During this hospitalization, patient does not require further acute OT services.  Please re-consult if situation changes.    · Plan of Care Reviewed with: patient    Subjective     Chief Complaint: Discomfort of L knee  Patient/Family Comments/goals: To go home tomorrow    Occupational Profile:  Living Environment: Lives with spouse and great granddtr in single story home with 3 SHIRLENE and tub/shower combo  Previous level of function: (I) to Mod (I)  Equipment Used at home:  bath bench, walker, rolling, cane,  straight  Assistance upon Discharge: spouse and great granddtr in evening/night and best friend during the day    Pain/Comfort:  · Pain Rating 1: (unrated)  · Location - Side 1: Left  · Location - Orientation 1: generalized  · Location 1: knee  · Pain Addressed 1: Distraction, Cessation of Activity  · Pain Rating Post-Intervention 1: (unrated pain of left knee)    Patients cultural, spiritual, Shinto conflicts given the current situation: no    Objective:     Communicated with: Nurse prior to session.  Patient found up in bedside chair with RODRICK drain, cryotherapy(chair alarm) upon OT entry to room.    General Precautions: Standard, fall   Orthopedic Precautions:LLE weight bearing as tolerated   Braces: Knee immobilizer     Occupational Performance:  Activities of Daily Living:  · Feeding/Grooming/UE dressing: (I) after Set-up  · Toileting: Min (A)  · LE dressing: Mod (A)    Cognitive/Visual Perceptual:  Oriented x 4    Physical Exam:  Bilateral Upper Extremity strength WFL    AMPAC 6 Click ADL:  AMPAC Total Score: 19    Treatment & Education:  - OTR providing education/instruction regarding OT role/POC, safety awareness and fall prevention, adaptive techniques to increase safety and independence with self care tasks (especially dressing, toileting) as well as instructing patient on correct set-up of DME in home environment - patient reports she already owns/uses raised toilet with rails; OTR instructing patient that she should not attempt to transfer to/from tub/shower until HHOT or PT arrive; pt verbalizes/demonstrates understanding and in agreement; OTR also reinforcing importance of covering incision during bathing to prevent infection and different methods of covering incision - patient verbalizes understanding and in agreement    - OTR also providing education/instruction with demonstration as well as handouts providing step-by-step written instruction with pictures depicting each step for the following:  sit<>stand & tub bench transfers using RW, adaptive dressing techniques using hip kit items (as well as alternating UR support of walker crossbar to manage clothing up/down hips) - pt verbalizes understanding and demonstrates/in agreement when appropriate; pt demonstrates correct use of reacher to (A) with ADL  Education:    Patient left up in bedside chair with all lines intact, call button in reach, chair alarm on and nurse notified    GOALS:   Multidisciplinary Problems     Occupational Therapy Goals     Not on file                History:     Past Medical History:   Diagnosis Date    Alcohol dependence     DDD (degenerative disc disease), lumbosacral     DJD (degenerative joint disease), lumbosacral     Encounter for blood transfusion     GERD (gastroesophageal reflux disease)     Gout     Hypercholesterolemia     Hypertension     NATHALIE (iron deficiency anemia)     questionable white coat hypertension    Kidney carcinoma, left 2014    Pneumonia     Renal cell carcinoma     Shingles 10/13/2012       Past Surgical History:   Procedure Laterality Date    APPENDECTOMY      ARTHROSCOPY OF SHOULDER WITH DECOMPRESSION OF SUBACROMIAL SPACE Left 10/31/2019    Procedure: ARTHROSCOPY, SHOULDER, WITH SUBACROMIAL SPACE DECOMPRESSION;  Surgeon: Ruben Martinez MD;  Location: [facility] OR;  Service: Orthopedics;  Laterality: Left;    BLADDER REPAIR      x 2    COLONOSCOPY  9/2011    DISTAL CLAVICLE EXCISION Left 10/31/2019    Procedure: EXCISION, CLAVICLE, DISTAL;  Surgeon: Ruben Martinez MD;  Location: [facility] OR;  Service: Orthopedics;  Laterality: Left;    ESOPHAGOGASTRODUODENOSCOPY  9/2011    EYE SURGERY      lasix bilateral    FIXATION KYPHOPLASTY N/A 1/31/2020    Procedure: Kyphoplasty T12;  Surgeon: Dk Rosales MD;  Location: [facility] OR;  Service: Pain Management;  Laterality: N/A;    HERNIA REPAIR      hiatal hernai    HYSTERECTOMY      INJECTION OF ANESTHETIC AGENT AROUND NERVE Left 6/8/2020     Procedure: Block, Nerve;  Surgeon: Dk Rosales MD;  Location: [facility] OR;  Service: Pain Management;  Laterality: Left;  left genicular nerve block     KNEE ARTHROPLASTY Left 7/30/2020    Procedure: ARTHROPLASTY, KNEE LEFT;  Surgeon: Ruben Martinez MD;  Location: [facility] OR;  Service: Orthopedics;  Laterality: Left;  REP VALERY RUBY    KNEE ARTHROSCOPY W/ MENISCECTOMY Left 1/16/2020    Procedure: ARTHROSCOPY, KNEE, WITH MEDIAL MENISCECTOMY;  Surgeon: Ruben Martinez MD;  Location: [facility] OR;  Service: Orthopedics;  Laterality: Left;    LAPAROSCOPIC NISSEN FUNDOPLICATION      NEPHRECTOMY      LT partial    RADIOFREQUENCY ABLATION Left 6/19/2020    Procedure: Radiofrequency Ablation;  Surgeon: Dk Rosales MD;  Location: [facility] OR;  Service: Pain Management;  Laterality: Left;    RADIOFREQUENCY ABLATION OF LUMBAR MEDIAL BRANCH NERVE AT SINGLE LEVEL Bilateral 2/28/2020    Procedure: Radiofrequency Ablation, Nerve, Spinal, Lumbar, Medial Branch, 1 Level;  Surgeon: Dk Rosales MD;  Location: [facility] OR;  Service: Pain Management;  Laterality: Bilateral;  L3,L4,L5 - Burned at 80 degrees C. for 60 seconds x 2 each site    REFRACTIVE SURGERY      ROTATOR CUFF REPAIR Left 10/31/2019    Procedure: REPAIR, ROTATOR CUFF;  Surgeon: Ruben Martinez MD;  Location: [facility] OR;  Service: Orthopedics;  Laterality: Left;  arthrex    SKIN BIOPSY      TOTAL ABDOMINAL HYSTERECTOMY W/ BILATERAL SALPINGOOPHORECTOMY  age 48       Time Tracking:     OT Date of Treatment: 07/31/20  OT Start Time: 0926  OT Stop Time: 0959((minus 9 mins for education handout retrieval))  OT Total Time (min): 33 min    Billable Minutes:Evaluation 15  Therapeutic Activity 9    JCARLOS Alex, MARITO  7/31/2020

## 2020-07-31 NOTE — ASSESSMENT & PLAN NOTE
-S/P ARTHROPLASTY, KNEE LEFT   -PT/OT  -Pain control  -VTE prophylaxis   7/31- Cont PT/OT. DC home tomorrow with HH

## 2020-07-31 NOTE — PLAN OF CARE
Problem: Physical Therapy Goal  Goal: Physical Therapy Goal  Description: Goals to be met by: 2020     Patient will increase functional independence with mobility by performin. Supine to sit with Contact Guard Assistance  2. Sit to stand transfer with Contact Guard Assistance  3. Gait  x 250 feet with Contact Guard Assistance using Rolling Walker.   4. Ascend/descend 16 stair with bilateral Handrails Contact Guard Assistance u.   5. Lower extremity exercise program x20 reps   Outcome: Ongoing, Progressing   Ambulate with rw and assistance for safety , WBAT LLE with KI in place.

## 2020-07-31 NOTE — ASSESSMENT & PLAN NOTE
Cont to increase OOB activity with PT and nursing  Change dressing today after 1200 and pull drain  Plan for DC home with HH tomorrow am

## 2020-07-31 NOTE — PROGRESS NOTES
"[facility]  Orthopedics  Progress Note    Patient Name: Beatriz Gan  MRN: [id number]  Admission Date: 7/30/2020  Hospital Length of Stay: 0 days  Attending Provider: hSiela Mann MD  Primary Care Provider: Jorge Alonzo MD  Follow-up For: Procedure(s) (LRB):  ARTHROPLASTY, KNEE LEFT (Left)    Post-Operative Day: 1 Day Post-Op  Subjective:     Principal Problem:Primary osteoarthritis of left knee    Principal Orthopedic Problem: S/P L TKA    Interval History: none    Review of patient's allergies indicates:   Allergen Reactions    Phenergan [promethazine] Hives and Rash    Latex, natural rubber Hives     Per pt report-many years    Morphine Hives    Nsaids (non-steroidal anti-inflammatory drug)      Due to "kidney cancer"       Current Facility-Administered Medications   Medication    acetaminophen tablet 650 mg    acetaminophen tablet 650 mg    acetaminophen tablet 650 mg    ALPRAZolam tablet 0.5 mg    amLODIPine tablet 5 mg    azelastine 137 mcg (0.1 %) nasal spray 137 mcg    calcium carbonate (OS-TERI) tablet 500 mg    docusate sodium capsule 100 mg    DULoxetine DR capsule 30 mg    electrolyte-S (ISOLYTE)    famotidine tablet 20 mg    fluticasone furoate-vilanteroL 100-25 mcg/dose diskus inhaler 1 puff    hydroCHLOROthiazide tablet 25 mg    HYDROmorphone injection 1 mg    lactated ringers infusion    loperamide capsule 2 mg    losartan tablet 100 mg    magnesium gluconate tablet 27 mg    multivitamin tablet    mupirocin 2 % ointment 1 g    ondansetron injection 4 mg    oxyCODONE immediate release tablet 5 mg    oxyCODONE immediate release tablet 5 mg    oxyCODONE immediate release tablet Tab 10 mg    oxyCODONE immediate release tablet Tab 10 mg    polyethylene glycol packet 17 g    pregabalin capsule 75 mg    pregabalin capsule 75 mg    rivaroxaban tablet 10 mg    sodium chloride 0.9% flush 10 mL    sulfamethoxazole-trimethoprim 800-160mg per " "tablet 1 tablet    zolpidem tablet 10 mg     Facility-Administered Medications Ordered in Other Encounters   Medication    electrolyte-S (ISOLYTE)    lactated ringers infusion     Objective:     Vital Signs (Most Recent):  Temp: 98.4 °F (36.9 °C) (07/31/20 0742)  Pulse: 95 (07/31/20 0742)  Resp: 17 (07/31/20 0742)  BP: 106/66 (07/31/20 0742)  SpO2: (!) 93 % (07/31/20 0742) Vital Signs (24h Range):  Temp:  [95.9 °F (35.5 °C)-98.6 °F (37 °C)] 98.4 °F (36.9 °C)  Pulse:  [] 95  Resp:  [8-25] 17  SpO2:  [91 %-100 %] 93 %  BP: (102-147)/(60-83) 106/66     Weight: 86.2 kg (190 lb)  Height: 5' 4" (162.6 cm)  Body mass index is 32.61 kg/m².      Intake/Output Summary (Last 24 hours) at 7/31/2020 0747  Last data filed at 7/31/2020 0600  Gross per 24 hour   Intake 3160 ml   Output 1905 ml   Net 1255 ml       General    Nursing note and vitals reviewed.  Constitutional: She is oriented to person, place, and time. She appears well-developed and well-nourished.   Pulmonary/Chest: Effort normal.   Neurological: She is alert and oriented to person, place, and time.   Psychiatric: She has a normal mood and affect. Her behavior is normal.             Left Knee Exam     Comments:  LLE DNVI. Dressing with mild bloody drainage.      Significant Labs:   CBC:   Recent Labs   Lab 07/30/20  1415 07/31/20 0527   WBC 5.13 5.27   HGB 11.4* 9.2*   HCT 36.9* 29.5*    208     CMP:   Recent Labs   Lab 07/30/20  1414 07/31/20 0527    133*   K 3.8 3.9    101   CO2 21* 25   * 121*   BUN 12 11   CREATININE 1.1 1.3   CALCIUM 8.8 8.6*   PROT 6.8 5.7*   ALBUMIN 3.8 3.2*   BILITOT 0.3 0.3   ALKPHOS 108 92   AST 18 15   ALT 16 10   ANIONGAP 12 7*   EGFRNONAA 52* 43*     All pertinent labs within the past 24 hours have been reviewed.    Significant Imaging: X-Ray: I have reviewed all pertinent results/findings and my personal findings are:  The patient has undergone a left knee arthroplasty with metallic femoral and " tibial components in good position.  An acute fracture is not seen.  Cutaneous staples and a soft tissue drain is noted in place.    Assessment/Plan:     * Primary osteoarthritis of left knee  Cont to increase OOB activity with PT and nursing  Change dressing today after 1200 and pull drain  Plan for DC home with HH tomorrow am          THANIA SHOEMAKER  Orthopedics  [facility]

## 2020-07-31 NOTE — HOSPITAL COURSE
This patient was observed by hospital medicine after undergoing left TKA with Dr. Martinez on 7/30/20.  Patient experienced postop fever.  Blood cultures and urine cultures were obtained which remained negative.  Patient was encouraged on IS use.  Chest x-ray and UA were negative.  Wound without signs of infection.  Patient has known CKD and had slight elevation above baseline creatinine.  She was administered IV fluids and renal insufficiency improved and returned to baseline.  Patient developed acute postop anemia and required 2 units packed red blood cells transfusion.  Patient worked with PT/OT post-operatively who recommended Home health which was ordered. Her pain was controlled with oral narcotics as prescribed by orthopedist.  She was discharged home after cleared by orthopedist on xarelto for 21 days as per orthopedic recommendations for VTE prophylaxis, as well as oral narcotics per orthopedic orders. Fall precautions were discussed with patient and family. Return precautions were discussed at length. Patient to follow up with primary care provider on discharge for any medical needs     Physical Exam:  AAO x 4, pleasant, no distress  HRRR; lungs CTA, resp even unlabored  Left knee bandage CDI, +palp pulses, sensation

## 2020-07-31 NOTE — PLAN OF CARE
Cm completed the assessment with pt at bedside.  Pt lives with spouse and uses dme to get around.  PCP is Dr. Alonzo. Insurance verified as Medicare/Generic Commercial.  Pt denies diabetes, dialysis and coumadin.  Disposition:  Pt will discharge to home with spouse. Pt signed the pt's choice disclosure form for [facility]. Pt request Hip kit and tub bench. No other needs verbalized at this time. Cm communicated with Eartha, [facility]. Ok to pull from closet. She will bill pt.       07/31/20 8274   Discharge Assessment   Assessment Type Discharge Planning Assessment   Confirmed/corrected address and phone number on facesheet? Yes   Assessment information obtained from? Patient   Expected Length of Stay (days) 2   Communicated expected length of stay with patient/caregiver yes   Prior to hospitilization cognitive status: Alert/Oriented   Prior to hospitalization functional status: Assistive Equipment;Independent   Current cognitive status: Alert/Oriented   Current Functional Status: Independent;Assistive Equipment   Facility Arrived From: home   Lives With spouse   Able to Return to Prior Arrangements yes   Is patient able to care for self after discharge? Yes   Who are your caregiver(s) and their phone number(s)? dani - [phone]   Patient's perception of discharge disposition home or selfcare   Readmission Within the Last 30 Days no previous admission in last 30 days   Patient currently being followed by outpatient case management? No   Patient currently receives any other outside agency services? No   Equipment Currently Used at Home walker, rolling;shower chair;cane, straight;cane, quad   Do you have any problems affording any of your prescribed medications? No   Is the patient taking medications as prescribed? yes  ([facility] pharmacy)   Does the patient have transportation home? Yes   Transportation Anticipated family or friend will provide   Dialysis Name and Scheduled days na   Does the patient receive  services at the [facility] Clinic? No   Discharge Plan A Home with family;Home Health   Discharge Plan B Home with family;Home Health   DME Needed Upon Discharge  bath bench;hip kit   Patient/Family in Agreement with Plan yes

## 2020-07-31 NOTE — RESPIRATORY THERAPY
07/30/20 2033   Patient Assessment/Suction   Level of Consciousness (AVPU) alert   PRE-TX-O2   O2 Device (Oxygen Therapy) room air   SpO2 (!) 94 %   Pulse Oximetry Type Intermittent   Pulse 75   Resp 16   Incentive Spirometer   $ Incentive Spirometer Charges done with encouragement   Administration (IS) proper technique demonstrated   Number of Repetitions (IS) 10   Level Incentive Spirometer (mL) 1500   Patient Tolerance (IS) good

## 2020-07-31 NOTE — PT/OT/SLP PROGRESS
Physical Therapy Treatment    Patient Name:  Beatriz Gan   MRN:  [id number]    Recommendations:     Discharge Recommendations:  home health PT   Discharge Equipment Recommendations: none   Barriers to discharge: None    Assessment:     Beatriz Gan is a 64 y.o. female admitted with a medical diagnosis of Primary osteoarthritis of left knee.  She presents with the following impairments/functional limitations: pain, weakness, limited LLE ROM,    .Tolerated treatment. Ambulated with rw and Min A with verbal cues for gait sequence, chair follow for safety.    Rehab Prognosis: Good; patient would benefit from acute skilled PT services to address these deficits and reach maximum level of function.    Recent Surgery: Procedure(s) (LRB):  ARTHROPLASTY, KNEE LEFT (Left) 1 Day Post-Op    Plan:     During this hospitalization, patient to be seen BID to address the identified rehab impairments via gait training, therapeutic activities, therapeutic exercises and progress toward the following goals:    · Plan of Care Expires:  08/30/20    Subjective     Chief Complaint: pain L knee  Patient/Family Comments/goals: to return home tomorrow.  Pain/Comfort:  · Pain Rating 1: 10/10  · Location - Side 1: Left  · Location - Orientation 1: generalized  · Location 1: knee  · Pain Addressed 1: Pre-medicate for activity, Reposition, Nurse notified      Objective:     Communicated with nurse Cabezas prior to session.  Patient found supine with bed alarm, cryotherapy, RODRICK drain, peripheral IV, SCD(CPM at bedside.) upon PT entry to room.     General Precautions: Standard, fall   Orthopedic Precautions:LLE weight bearing as tolerated   Braces: Knee immobilizer(applied prior to session)     Functional Mobility:  · Bed Mobility:     · Rolling Left:  contact guard assistance  · Supine to Sit: minimum assistance  · Transfers:     · Sit to Stand:  minimum assistance with rolling walker  · Gait: 50' x 2 with rw and Min A, WBAT LLE with  KI in place.      AM-PAC 6 CLICK MOBILITY          Therapeutic Activities and Exercises:   KI applied in supine with gentle movement L knee due to pain.   Transferred EOB with Min A , sat briefly . SpO2 @ 96%.   Ambulated slowly in hallway with rw and Min A with KI in place ( Step to gait ). 50' x 2 with seated rest between each .   Returned to room. Performed LE exercises AROM RLE / AAROM LLE at 10 reps each :' SLR's, HS, AP's, QS.       Patient left up in chair with all lines intact, call button in reach, chair alarm on and nurse Jocelynn notified..    GOALS:   Multidisciplinary Problems     Physical Therapy Goals        Problem: Physical Therapy Goal    Goal Priority Disciplines Outcome Goal Variances Interventions   Physical Therapy Goal     PT, PT/OT Ongoing, Progressing     Description: Goals to be met by: 2020     Patient will increase functional independence with mobility by performin. Supine to sit with Contact Guard Assistance  2. Sit to stand transfer with Contact Guard Assistance  3. Gait  x 250 feet with Contact Guard Assistance using Rolling Walker.   4. Ascend/descend 16 stair with bilateral Handrails Contact Guard Assistance u.   5. Lower extremity exercise program x20 reps                    Time Tracking:     PT Received On: 20  PT Start Time: 0850     PT Stop Time: 0925  PT Total Time (min): 35 min     Billable Minutes: Gait Training 15min and Therapeutic Exercise 20min    Treatment Type: Treatment  PT/PTA: PTA     PTA Visit Number: 1     Vira Prasad, PTA  2020

## 2020-07-31 NOTE — RESPIRATORY THERAPY
07/31/20 0841   Patient Assessment/Suction   Level of Consciousness (AVPU) alert   Respiratory Effort Normal;Unlabored   Expansion/Accessory Muscles/Retractions expansion symmetric   All Lung Fields Breath Sounds diminished;clear   Cough Frequency frequent   Cough Type good;loose;nonproductive   PRE-TX-O2   O2 Device (Oxygen Therapy) room air   SpO2 97 %   Pulse Oximetry Type Intermittent   $ Pulse Oximetry - Multiple Charge Pulse Oximetry - Multiple   Pulse 86   Resp 18   Inhaler   $ Inhaler Charges MDI (Metered Dose Inahler) Treatment;Mouth rinsed post treatment   Respiratory Treatment Status (Inhaler) given;mouth rinsed post treatment   Treatment Route (Inhaler) mouthpiece   Patient Position (Inhaler) HOB elevated   Post Treatment Assessment (Inhaler) breath sounds unchanged   Signs of Intolerance (Inhaler) none   Breath Sounds Post-Respiratory Treatment   Throughout All Fields Post-Treatment All Fields   Throughout All Fields Post-Treatment no change   Post-treatment Heart Rate (beats/min) 88   Post-treatment Resp Rate (breaths/min) 18

## 2020-07-31 NOTE — PROGRESS NOTES
[facility] Medicine  Telemedicine Progress Note    Patient Name: Beatriz Gan  MRN: [id number]  Patient Class: OP- Outpatient Recovery   Admission Date: 7/30/2020  Length of Stay: 0 days  Attending Physician: Shiela Mann MD  Primary Care Provider: Jorge Alonzo MD        Start time:9:00am  Chief complaint: Left knee pain  The patient location is: [id number]  The patient arrived at:   Present with the patient at the time of the telemed/virtual assessment:Brianna Quiroga and floor nurse Natacha Paige    Subjective:     Principal Problem:Primary osteoarthritis of left knee        HPI:  The pt is 63 yo female with PMHx of DJD, Gout, HTN, Hyperlipidemia , Iron def anemia , Renal cell carcinoma and  Alcohol dependence who admitted after total  left knee arthroplasty by Dr. Ruben Martinez. Pt with known h/o left knee degenerative joint disease has had  persistent left knee pain.   She underwent a left knee arthroscopy which did not help much.  She then underwent left knee radiofrequency ablation without good result.  This process has been going on for quite a long time.  She states that she can no longer continue to live with her symptoms and is ready to proceed with more aggressive surgical treatment options. She underwent left total knee arthroplasty today . No immediate post op complications and currently has no c/o chest pain, SOB or palpitations.      Overview/Hospital Course:  7/31- POD #1  Afebrile . Noted pt on O2 at 2L/min via NC. Mild hypoxia overnight . Pt reported H/O COPD and uses inhaler at home . Denies overt SOB or wheezing . On exam diminished breath sounds at bases. Will stop IVF. Get BNP.  Encouraged IS. Ortho recommended Cont to increase OOB activity with PT and nursing. Change dressing today after 1200 and pull drain. Plan for DC home with HH tomorrow am. Labs Hgb 9.2>11.4, Na 133, K 3.9, creatinine 1.3>1.1.     Interval History:   increase OOB activity with PT  and nursing. Change dressing today after 1200 and pull drain. Plan for DC home with HH tomorrow am.     Review of Systems   Constitutional: Positive for activity change. Negative for appetite change and fever.   HENT: Negative for sore throat.    Eyes: Negative for visual disturbance.   Respiratory: Negative for cough, chest tightness and shortness of breath.    Cardiovascular: Negative for chest pain, palpitations and leg swelling.   Gastrointestinal: Negative for abdominal distention, abdominal pain, constipation, diarrhea, nausea and vomiting.   Endocrine: Negative for polyuria.   Genitourinary: Negative for decreased urine volume, dysuria, flank pain, frequency and hematuria.   Musculoskeletal: Positive for arthralgias (left knee). Negative for back pain and gait problem.        Surgical dressings in place, left knee   Skin: Negative for rash.   Neurological: Negative for syncope, speech difficulty, weakness, light-headedness and headaches.   Psychiatric/Behavioral: Negative for confusion, hallucinations and sleep disturbance.     Objective:     Vital Signs (Most Recent):  Temp: 98.4 °F (36.9 °C) (07/31/20 0742)  Pulse: 91 (07/31/20 0949)  Resp: 18 (07/31/20 0949)  BP: 106/66 (07/31/20 0742)  SpO2: (!) 94 % (07/31/20 0949) Vital Signs (24h Range):  Temp:  [95.9 °F (35.5 °C)-98.6 °F (37 °C)] 98.4 °F (36.9 °C)  Pulse:  [] 91  Resp:  [8-25] 18  SpO2:  [91 %-100 %] 94 %  BP: (102-147)/(60-83) 106/66     Weight: 86.2 kg (190 lb)  Body mass index is 32.61 kg/m².    Intake/Output Summary (Last 24 hours) at 7/31/2020 1010  Last data filed at 7/31/2020 0600  Gross per 24 hour   Intake 3160 ml   Output 1905 ml   Net 1255 ml      Physical Exam  Constitutional:       General: She is not in acute distress.     Appearance: She is well-developed. She is not diaphoretic.   HENT:      Head: Normocephalic and atraumatic.      Mouth/Throat:      Pharynx: No oropharyngeal exudate.   Eyes:      Conjunctiva/sclera: Conjunctivae  normal.      Pupils: Pupils are equal, round, and reactive to light.   Neck:      Musculoskeletal: Normal range of motion and neck supple.      Thyroid: No thyromegaly.      Vascular: No JVD.   Cardiovascular:      Rate and Rhythm: Normal rate and regular rhythm.      Heart sounds: Normal heart sounds. No murmur.   Pulmonary:      Effort: Pulmonary effort is normal. No respiratory distress.      Breath sounds: No wheezing or rales.      Comments: Decreased breath sounds at bases   Chest:      Chest wall: No tenderness.   Abdominal:      General: Bowel sounds are normal. There is no distension.      Palpations: Abdomen is soft.      Tenderness: There is no abdominal tenderness. There is no guarding or rebound.   Musculoskeletal: Normal range of motion.   Lymphadenopathy:      Cervical: No cervical adenopathy.   Skin:     General: Skin is warm and dry.      Findings: No rash.      Comments: Surgical dressings in place , left knee   Neurological:      Mental Status: She is alert and oriented to person, place, and time.      Cranial Nerves: No cranial nerve deficit.      Sensory: No sensory deficit.      Deep Tendon Reflexes: Reflexes normal.         Significant Labs:   BMP:   Recent Labs   Lab 07/31/20  0527   *   *   K 3.9      CO2 25   BUN 11   CREATININE 1.3   CALCIUM 8.6*   MG 1.6     CBC:   Recent Labs   Lab 07/30/20  1415 07/31/20  0527   WBC 5.13 5.27   HGB 11.4* 9.2*   HCT 36.9* 29.5*    208     CMP:   Recent Labs   Lab 07/30/20  1414 07/31/20  0527    133*   K 3.8 3.9    101   CO2 21* 25   * 121*   BUN 12 11   CREATININE 1.1 1.3   CALCIUM 8.8 8.6*   PROT 6.8 5.7*   ALBUMIN 3.8 3.2*   BILITOT 0.3 0.3   ALKPHOS 108 92   AST 18 15   ALT 16 10   ANIONGAP 12 7*   EGFRNONAA 52* 43*       Significant Imaging: CXR-The lungs are clear, with normal appearance of pulmonary vasculature and no pleural effusion or pneumothorax.       Assessment/Plan:      * Primary osteoarthritis  of left knee  -S/P ARTHROPLASTY, KNEE LEFT   -PT/OT  -Pain control  -VTE prophylaxis   7/31- Cont PT/OT. DC home tomorrow with HH         Essential hypertension  - Review Home meds and resume if appropriate       GERD (gastroesophageal reflux disease)  - Continue H2 blocker       Recent H/O UTI (urinary tract infection)  - Complete Bactrim x 3 days started as outpatient.        VTE Risk Mitigation (From admission, onward)         Ordered     rivaroxaban tablet 10 mg  With dinner      07/30/20 1535     IP VTE LOW RISK PATIENT  Once      07/30/20 1535     Place sequential compression device  Until discontinued      07/30/20 1535     Place sequential compression device  Until discontinued      07/30/20 0855     Place RADHA hose  Until discontinued      07/30/20 0855                      I have assessed these finding virtually using telemed platform and with assistance of bedside nurse                 The attending portion of this evaluation, treatment, and documentation was performed per Shiela Mann MD via Telemedicine AudioVisual using the secure [employer] software platform with 2 way audio/video. The provider was located off-site and the patient is located in the hospital. The aforementioned video software was utilized to document the relevant history and physical exam    Shiela Mann MD  Department of Hospital Medicine   [facility]

## 2020-07-31 NOTE — SUBJECTIVE & OBJECTIVE
Interval History:   increase OOB activity with PT and nursing. Change dressing today after 1200 and pull drain. Plan for DC home with HH tomorrow am.     Review of Systems   Constitutional: Positive for activity change. Negative for appetite change and fever.   HENT: Negative for sore throat.    Eyes: Negative for visual disturbance.   Respiratory: Negative for cough, chest tightness and shortness of breath.    Cardiovascular: Negative for chest pain, palpitations and leg swelling.   Gastrointestinal: Negative for abdominal distention, abdominal pain, constipation, diarrhea, nausea and vomiting.   Endocrine: Negative for polyuria.   Genitourinary: Negative for decreased urine volume, dysuria, flank pain, frequency and hematuria.   Musculoskeletal: Positive for arthralgias (left knee). Negative for back pain and gait problem.        Surgical dressings in place, left knee   Skin: Negative for rash.   Neurological: Negative for syncope, speech difficulty, weakness, light-headedness and headaches.   Psychiatric/Behavioral: Negative for confusion, hallucinations and sleep disturbance.     Objective:     Vital Signs (Most Recent):  Temp: 98.4 °F (36.9 °C) (07/31/20 0742)  Pulse: 91 (07/31/20 0949)  Resp: 18 (07/31/20 0949)  BP: 106/66 (07/31/20 0742)  SpO2: (!) 94 % (07/31/20 0949) Vital Signs (24h Range):  Temp:  [95.9 °F (35.5 °C)-98.6 °F (37 °C)] 98.4 °F (36.9 °C)  Pulse:  [] 91  Resp:  [8-25] 18  SpO2:  [91 %-100 %] 94 %  BP: (102-147)/(60-83) 106/66     Weight: 86.2 kg (190 lb)  Body mass index is 32.61 kg/m².    Intake/Output Summary (Last 24 hours) at 7/31/2020 1010  Last data filed at 7/31/2020 0600  Gross per 24 hour   Intake 3160 ml   Output 1905 ml   Net 1255 ml      Physical Exam  Constitutional:       General: She is not in acute distress.     Appearance: She is well-developed. She is not diaphoretic.   HENT:      Head: Normocephalic and atraumatic.      Mouth/Throat:      Pharynx: No oropharyngeal  exudate.   Eyes:      Conjunctiva/sclera: Conjunctivae normal.      Pupils: Pupils are equal, round, and reactive to light.   Neck:      Musculoskeletal: Normal range of motion and neck supple.      Thyroid: No thyromegaly.      Vascular: No JVD.   Cardiovascular:      Rate and Rhythm: Normal rate and regular rhythm.      Heart sounds: Normal heart sounds. No murmur.   Pulmonary:      Effort: Pulmonary effort is normal. No respiratory distress.      Breath sounds: No wheezing or rales.      Comments: Decreased breath sounds at bases   Chest:      Chest wall: No tenderness.   Abdominal:      General: Bowel sounds are normal. There is no distension.      Palpations: Abdomen is soft.      Tenderness: There is no abdominal tenderness. There is no guarding or rebound.   Musculoskeletal: Normal range of motion.   Lymphadenopathy:      Cervical: No cervical adenopathy.   Skin:     General: Skin is warm and dry.      Findings: No rash.      Comments: Surgical dressings in place , left knee   Neurological:      Mental Status: She is alert and oriented to person, place, and time.      Cranial Nerves: No cranial nerve deficit.      Sensory: No sensory deficit.      Deep Tendon Reflexes: Reflexes normal.         Significant Labs:   BMP:   Recent Labs   Lab 07/31/20  0527   *   *   K 3.9      CO2 25   BUN 11   CREATININE 1.3   CALCIUM 8.6*   MG 1.6     CBC:   Recent Labs   Lab 07/30/20  1415 07/31/20  0527   WBC 5.13 5.27   HGB 11.4* 9.2*   HCT 36.9* 29.5*    208     CMP:   Recent Labs   Lab 07/30/20  1414 07/31/20  0527    133*   K 3.8 3.9    101   CO2 21* 25   * 121*   BUN 12 11   CREATININE 1.1 1.3   CALCIUM 8.8 8.6*   PROT 6.8 5.7*   ALBUMIN 3.8 3.2*   BILITOT 0.3 0.3   ALKPHOS 108 92   AST 18 15   ALT 16 10   ANIONGAP 12 7*   EGFRNONAA 52* 43*       Significant Imaging: CXR-The lungs are clear, with normal appearance of pulmonary vasculature and no pleural effusion or  pneumothorax.

## 2020-07-31 NOTE — PLAN OF CARE
Cm delivered the transferred tub bench/hip/knee kit to pt. Pt signed the equipment release    07/31/20 7285   Post-Acute Status   Post-Acute Authorization HME   HME Status Set-up Complete   forms.   will take tub bench home.  Cm notified

## 2020-07-31 NOTE — SUBJECTIVE & OBJECTIVE
"Principal Problem:Primary osteoarthritis of left knee    Principal Orthopedic Problem: S/P L TKA    Interval History: none    Review of patient's allergies indicates:   Allergen Reactions    Phenergan [promethazine] Hives and Rash    Latex, natural rubber Hives     Per pt report-many years    Morphine Hives    Nsaids (non-steroidal anti-inflammatory drug)      Due to "kidney cancer"       Current Facility-Administered Medications   Medication    acetaminophen tablet 650 mg    acetaminophen tablet 650 mg    acetaminophen tablet 650 mg    ALPRAZolam tablet 0.5 mg    amLODIPine tablet 5 mg    azelastine 137 mcg (0.1 %) nasal spray 137 mcg    calcium carbonate (OS-TERI) tablet 500 mg    docusate sodium capsule 100 mg    DULoxetine DR capsule 30 mg    electrolyte-S (ISOLYTE)    famotidine tablet 20 mg    fluticasone furoate-vilanteroL 100-25 mcg/dose diskus inhaler 1 puff    hydroCHLOROthiazide tablet 25 mg    HYDROmorphone injection 1 mg    lactated ringers infusion    loperamide capsule 2 mg    losartan tablet 100 mg    magnesium gluconate tablet 27 mg    multivitamin tablet    mupirocin 2 % ointment 1 g    ondansetron injection 4 mg    oxyCODONE immediate release tablet 5 mg    oxyCODONE immediate release tablet 5 mg    oxyCODONE immediate release tablet Tab 10 mg    oxyCODONE immediate release tablet Tab 10 mg    polyethylene glycol packet 17 g    pregabalin capsule 75 mg    pregabalin capsule 75 mg    rivaroxaban tablet 10 mg    sodium chloride 0.9% flush 10 mL    sulfamethoxazole-trimethoprim 800-160mg per tablet 1 tablet    zolpidem tablet 10 mg     Facility-Administered Medications Ordered in Other Encounters   Medication    electrolyte-S (ISOLYTE)    lactated ringers infusion     Objective:     Vital Signs (Most Recent):  Temp: 98.4 °F (36.9 °C) (07/31/20 0742)  Pulse: 95 (07/31/20 0742)  Resp: 17 (07/31/20 0742)  BP: 106/66 (07/31/20 0742)  SpO2: (!) 93 % (07/31/20 0742) " "Vital Signs (24h Range):  Temp:  [95.9 °F (35.5 °C)-98.6 °F (37 °C)] 98.4 °F (36.9 °C)  Pulse:  [] 95  Resp:  [8-25] 17  SpO2:  [91 %-100 %] 93 %  BP: (102-147)/(60-83) 106/66     Weight: 86.2 kg (190 lb)  Height: 5' 4" (162.6 cm)  Body mass index is 32.61 kg/m².      Intake/Output Summary (Last 24 hours) at 7/31/2020 0747  Last data filed at 7/31/2020 0600  Gross per 24 hour   Intake 3160 ml   Output 1905 ml   Net 1255 ml       General    Nursing note and vitals reviewed.  Constitutional: She is oriented to person, place, and time. She appears well-developed and well-nourished.   Pulmonary/Chest: Effort normal.   Neurological: She is alert and oriented to person, place, and time.   Psychiatric: She has a normal mood and affect. Her behavior is normal.             Left Knee Exam     Comments:  LLE DNVI. Dressing with mild bloody drainage.      Significant Labs:   CBC:   Recent Labs   Lab 07/30/20  1415 07/31/20  0527   WBC 5.13 5.27   HGB 11.4* 9.2*   HCT 36.9* 29.5*    208     CMP:   Recent Labs   Lab 07/30/20  1414 07/31/20  0527    133*   K 3.8 3.9    101   CO2 21* 25   * 121*   BUN 12 11   CREATININE 1.1 1.3   CALCIUM 8.8 8.6*   PROT 6.8 5.7*   ALBUMIN 3.8 3.2*   BILITOT 0.3 0.3   ALKPHOS 108 92   AST 18 15   ALT 16 10   ANIONGAP 12 7*   EGFRNONAA 52* 43*     All pertinent labs within the past 24 hours have been reviewed.    Significant Imaging: X-Ray: I have reviewed all pertinent results/findings and my personal findings are:  The patient has undergone a left knee arthroplasty with metallic femoral and tibial components in good position.  An acute fracture is not seen.  Cutaneous staples and a soft tissue drain is noted in place.  "

## 2020-07-31 NOTE — PLAN OF CARE
Orders reviewed via [facility] by [facility].       07/31/20 1443   Post-Acute Status   Post-Acute Authorization HME   HME Status Referrals Sent

## 2020-07-31 NOTE — HPI
POD #4 S/P L TKA  - Doing well.  - Expected post-op pain - controlled with meds  -  continues to slowly improve.

## 2020-07-31 NOTE — PLAN OF CARE
Cm sent referral for HH via [employer] to [facility].       07/31/20 9543   Post-Acute Status   Post-Acute Authorization Home Health   Home Health Status Referrals Sent   Patient choice form signed by patient/caregiver List with quality metrics by geographic area provided

## 2020-07-31 NOTE — PLAN OF CARE
Plan of care reviewed with pt,Verbalized understanding. Saeid drain draining blood. Dressing to the left knee clean and intact.Bautista intact. Cryo in place. Pain was managed  by PRN pain medication. Call light kept within reach, bed in locked and lowest position, side rails up x2.

## 2020-07-31 NOTE — PT/OT/SLP PROGRESS
Physical Therapy Treatment    Patient Name:  Beatriz Gan   MRN:  [id number]    Recommendations:     Discharge Recommendations:  home health PT   Discharge Equipment Recommendations: none   Barriers to discharge: None    Assessment:     Beatriz Gan is a 64 y.o. female admitted with a medical diagnosis of Primary osteoarthritis of left knee.  She presents with the following impairments/functional limitations:  weakness, impaired endurance, impaired self care skills, impaired functional mobilty, gait instability, decreased lower extremity function, pain . Tolerated treatment. Reports better pain tolerance.     Rehab Prognosis: Good; patient would benefit from acute skilled PT services to address these deficits and reach maximum level of function.    Recent Surgery: Procedure(s) (LRB):  ARTHROPLASTY, KNEE LEFT (Left) 1 Day Post-Op    Plan:     During this hospitalization, patient to be seen BID to address the identified rehab impairments via gait training, therapeutic activities, therapeutic exercises and progress toward the following goals:    · Plan of Care Expires:  08/30/20    Subjective     Chief Complaint: pain L knee  Patient/Family Comments/goals: to return home  Pain/Comfort:  · Pain Rating 1: 7/10  · Location - Side 1: Left  · Location - Orientation 1: generalized  · Location 1: knee  · Pain Addressed 1: Pre-medicate for activity, Reposition, Nurse notified      Objective:     Communicated with nurse Cabezas prior to session.  Patient found supine with bed alarm, cryotherapy, RODRICK drain upon PT entry to room.     General Precautions: Standard, fall   Orthopedic Precautions:LLE weight bearing as tolerated   Braces: Knee immobilizer(applied prior to session)     Functional Mobility:  · Bed Mobility:     · Rolling Left:  contact guard assistance  · Rolling Right: contact guard assistance  · Supine to Sit: contact guard assistance  · Sit to Supine: contact guard assistance  · Transfers:     · Sit  to Stand:  minimum assistance with rolling walker  · Gait: 120' with rw and Min A, WBAT LLE with KI in place.      AM-PAC 6 CLICK MOBILITY          Therapeutic Activities and Exercises:   KI applied in supine.   Transferred EOB with CGA. SPT bed to chair with rw and Min A.   Up / down 5 steps with CGA with B handrails.    Ambulated in hallway following. Chair to bed via SPT with rw and Min A.   CPM applied.       Patient left supine with all lines intact, call button in reach, bed alarm on, nurse Jocelynn notified and caregiver present..    GOALS:   Multidisciplinary Problems     Physical Therapy Goals        Problem: Physical Therapy Goal    Goal Priority Disciplines Outcome Goal Variances Interventions   Physical Therapy Goal     PT, PT/OT Ongoing, Progressing     Description: Goals to be met by: 2020     Patient will increase functional independence with mobility by performin. Supine to sit with Contact Guard Assistance  2. Sit to stand transfer with Contact Guard Assistance  3. Gait  x 250 feet with Contact Guard Assistance using Rolling Walker.   4. Ascend/descend 16 stair with bilateral Handrails Contact Guard Assistance u.   5. Lower extremity exercise program x20 reps                    Time Tracking:     PT Received On: 20  PT Start Time: 1300     PT Stop Time: 1330  PT Total Time (min): 30 min     Billable Minutes: Gait Training 13min and Therapeutic Activity 17min    Treatment Type: Treatment  PT/PTA: PTA     PTA Visit Number: 1     Vira Prasad PTA  2020

## 2020-07-31 NOTE — PLAN OF CARE
POC reviewed with pt. Pt AAOX4. Pt verbalized understanding. Moderate pain controlled with prn meds per order. Neuro checks done q4h. Cryo therapy in use. Immobilizer in place for OOB activity. RODRICK drain intact and draining. Bed in low position, call light in reach. Will continue to monitor.

## 2020-07-31 NOTE — UM SECONDARY REVIEW
Other (see comment)    Level of Care Issue    Chart reviewed. No obs or inpatient criteria at this time. Dr Martinez's practice is to have TKA's 2 days. Pt to remain in outpatient recovery. Supervisor is aware. kv

## 2020-08-01 PROBLEM — R33.9 URINARY RETENTION: Status: ACTIVE | Noted: 2020-08-01

## 2020-08-01 PROBLEM — N18.30 CKD (CHRONIC KIDNEY DISEASE) STAGE 3, GFR 30-59 ML/MIN: Status: ACTIVE | Noted: 2020-08-01

## 2020-08-01 LAB
ALBUMIN SERPL BCP-MCNC: 2.9 G/DL (ref 3.5–5.2)
ALP SERPL-CCNC: 96 U/L (ref 55–135)
ALT SERPL W/O P-5'-P-CCNC: 10 U/L (ref 10–44)
ANION GAP SERPL CALC-SCNC: 12 MMOL/L (ref 8–16)
AST SERPL-CCNC: 28 U/L (ref 10–40)
BASOPHILS # BLD AUTO: 0.03 K/UL (ref 0–0.2)
BASOPHILS NFR BLD: 0.4 % (ref 0–1.9)
BILIRUB SERPL-MCNC: 0.6 MG/DL (ref 0.1–1)
BUN SERPL-MCNC: 19 MG/DL (ref 8–23)
CALCIUM SERPL-MCNC: 9 MG/DL (ref 8.7–10.5)
CHLORIDE SERPL-SCNC: 96 MMOL/L (ref 95–110)
CO2 SERPL-SCNC: 21 MMOL/L (ref 23–29)
CREAT SERPL-MCNC: 1.9 MG/DL (ref 0.5–1.4)
DIFFERENTIAL METHOD: ABNORMAL
EOSINOPHIL # BLD AUTO: 0.1 K/UL (ref 0–0.5)
EOSINOPHIL NFR BLD: 1.1 % (ref 0–8)
ERYTHROCYTE [DISTWIDTH] IN BLOOD BY AUTOMATED COUNT: 12.7 % (ref 11.5–14.5)
EST. GFR  (AFRICAN AMERICAN): 31 ML/MIN/1.73 M^2
EST. GFR  (NON AFRICAN AMERICAN): 27 ML/MIN/1.73 M^2
GLUCOSE SERPL-MCNC: 115 MG/DL (ref 70–110)
HCT VFR BLD AUTO: 25.7 % (ref 37–48.5)
HGB BLD-MCNC: 8.3 G/DL (ref 12–16)
IMM GRANULOCYTES # BLD AUTO: 0.03 K/UL (ref 0–0.04)
IMM GRANULOCYTES NFR BLD AUTO: 0.4 % (ref 0–0.5)
LYMPHOCYTES # BLD AUTO: 1 K/UL (ref 1–4.8)
LYMPHOCYTES NFR BLD: 13.1 % (ref 18–48)
MAGNESIUM SERPL-MCNC: 1.7 MG/DL (ref 1.6–2.6)
MCH RBC QN AUTO: 32.9 PG (ref 27–31)
MCHC RBC AUTO-ENTMCNC: 32.3 G/DL (ref 32–36)
MCV RBC AUTO: 102 FL (ref 82–98)
MONOCYTES # BLD AUTO: 0.8 K/UL (ref 0.3–1)
MONOCYTES NFR BLD: 10.8 % (ref 4–15)
NEUTROPHILS # BLD AUTO: 5.5 K/UL (ref 1.8–7.7)
NEUTROPHILS NFR BLD: 74.2 % (ref 38–73)
NRBC BLD-RTO: 0 /100 WBC
PLATELET # BLD AUTO: 204 K/UL (ref 150–350)
PMV BLD AUTO: 10 FL (ref 9.2–12.9)
POTASSIUM SERPL-SCNC: 3.8 MMOL/L (ref 3.5–5.1)
PROT SERPL-MCNC: 5.9 G/DL (ref 6–8.4)
RBC # BLD AUTO: 2.52 M/UL (ref 4–5.4)
SODIUM SERPL-SCNC: 129 MMOL/L (ref 136–145)
WBC # BLD AUTO: 7.42 K/UL (ref 3.9–12.7)

## 2020-08-01 PROCEDURE — 25000242 PHARM REV CODE 250 ALT 637 W/ HCPCS: Performed by: INTERNAL MEDICINE

## 2020-08-01 PROCEDURE — 97116 GAIT TRAINING THERAPY: CPT | Mod: CQ

## 2020-08-01 PROCEDURE — 25000003 PHARM REV CODE 250: Performed by: INTERNAL MEDICINE

## 2020-08-01 PROCEDURE — 94640 AIRWAY INHALATION TREATMENT: CPT

## 2020-08-01 PROCEDURE — 63600175 PHARM REV CODE 636 W HCPCS: Performed by: INTERNAL MEDICINE

## 2020-08-01 PROCEDURE — 99900035 HC TECH TIME PER 15 MIN (STAT)

## 2020-08-01 PROCEDURE — 97530 THERAPEUTIC ACTIVITIES: CPT | Mod: CQ

## 2020-08-01 PROCEDURE — 83735 ASSAY OF MAGNESIUM: CPT

## 2020-08-01 PROCEDURE — 94761 N-INVAS EAR/PLS OXIMETRY MLT: CPT

## 2020-08-01 PROCEDURE — 94799 UNLISTED PULMONARY SVC/PX: CPT

## 2020-08-01 PROCEDURE — 63600175 PHARM REV CODE 636 W HCPCS: Performed by: NURSE PRACTITIONER

## 2020-08-01 PROCEDURE — 97110 THERAPEUTIC EXERCISES: CPT | Mod: CQ

## 2020-08-01 PROCEDURE — 36415 COLL VENOUS BLD VENIPUNCTURE: CPT

## 2020-08-01 PROCEDURE — 85025 COMPLETE CBC W/AUTO DIFF WBC: CPT

## 2020-08-01 PROCEDURE — 80053 COMPREHEN METABOLIC PANEL: CPT

## 2020-08-01 PROCEDURE — 25000003 PHARM REV CODE 250: Performed by: NURSE PRACTITIONER

## 2020-08-01 RX ORDER — SODIUM CHLORIDE 9 MG/ML
INJECTION, SOLUTION INTRAVENOUS CONTINUOUS
Status: DISCONTINUED | OUTPATIENT
Start: 2020-08-01 | End: 2020-08-03

## 2020-08-01 RX ORDER — IPRATROPIUM BROMIDE AND ALBUTEROL SULFATE 2.5; .5 MG/3ML; MG/3ML
SOLUTION RESPIRATORY (INHALATION)
Status: DISPENSED
Start: 2020-08-01 | End: 2020-08-01

## 2020-08-01 RX ORDER — LORAZEPAM 2 MG/ML
1 INJECTION INTRAMUSCULAR EVERY 4 HOURS PRN
Status: DISCONTINUED | OUTPATIENT
Start: 2020-08-01 | End: 2020-08-03 | Stop reason: HOSPADM

## 2020-08-01 RX ADMIN — BUDESONIDE 0.5 MG: 0.5 SUSPENSION RESPIRATORY (INHALATION) at 07:08

## 2020-08-01 RX ADMIN — ALPRAZOLAM 0.5 MG: 0.25 TABLET ORAL at 12:08

## 2020-08-01 RX ADMIN — RIVAROXABAN 10 MG: 10 TABLET, FILM COATED ORAL at 04:08

## 2020-08-01 RX ADMIN — ALPRAZOLAM 0.5 MG: 0.25 TABLET ORAL at 08:08

## 2020-08-01 RX ADMIN — FAMOTIDINE 20 MG: 20 TABLET ORAL at 08:08

## 2020-08-01 RX ADMIN — MULTIPLE VITAMINS W/ MINERALS TAB 1 TABLET: TAB at 09:08

## 2020-08-01 RX ADMIN — IPRATROPIUM BROMIDE AND ALBUTEROL SULFATE 3 ML: .5; 3 SOLUTION RESPIRATORY (INHALATION) at 07:08

## 2020-08-01 RX ADMIN — Medication 27 MG: at 09:08

## 2020-08-01 RX ADMIN — HYDROMORPHONE HYDROCHLORIDE 1 MG: 1 INJECTION, SOLUTION INTRAMUSCULAR; INTRAVENOUS; SUBCUTANEOUS at 05:08

## 2020-08-01 RX ADMIN — OXYCODONE 5 MG: 5 TABLET ORAL at 09:08

## 2020-08-01 RX ADMIN — MUPIROCIN 1 G: 20 OINTMENT TOPICAL at 08:08

## 2020-08-01 RX ADMIN — CALCIUM 500 MG: 500 TABLET ORAL at 09:08

## 2020-08-01 RX ADMIN — ONDANSETRON 4 MG: 2 INJECTION INTRAMUSCULAR; INTRAVENOUS at 12:08

## 2020-08-01 RX ADMIN — ACETAMINOPHEN 650 MG: 325 TABLET ORAL at 05:08

## 2020-08-01 RX ADMIN — DULOXETINE 30 MG: 30 CAPSULE, DELAYED RELEASE ORAL at 08:08

## 2020-08-01 RX ADMIN — SULFAMETHOXAZOLE AND TRIMETHOPRIM 1 TABLET: 800; 160 TABLET ORAL at 09:08

## 2020-08-01 RX ADMIN — FAMOTIDINE 20 MG: 20 TABLET ORAL at 09:08

## 2020-08-01 RX ADMIN — PREGABALIN 75 MG: 75 CAPSULE ORAL at 08:08

## 2020-08-01 RX ADMIN — OXYCODONE HYDROCHLORIDE 10 MG: 10 TABLET ORAL at 08:08

## 2020-08-01 RX ADMIN — DOCUSATE SODIUM 100 MG: 100 CAPSULE, LIQUID FILLED ORAL at 09:08

## 2020-08-01 RX ADMIN — IPRATROPIUM BROMIDE AND ALBUTEROL SULFATE 3 ML: .5; 3 SOLUTION RESPIRATORY (INHALATION) at 01:08

## 2020-08-01 RX ADMIN — SODIUM CHLORIDE 500 ML: 0.9 INJECTION, SOLUTION INTRAVENOUS at 11:08

## 2020-08-01 RX ADMIN — MUPIROCIN 1 G: 20 OINTMENT TOPICAL at 09:08

## 2020-08-01 RX ADMIN — ACETAMINOPHEN 650 MG: 325 TABLET ORAL at 06:08

## 2020-08-01 RX ADMIN — SULFAMETHOXAZOLE AND TRIMETHOPRIM 1 TABLET: 800; 160 TABLET ORAL at 08:08

## 2020-08-01 RX ADMIN — BUDESONIDE 0.5 MG: 0.5 SUSPENSION RESPIRATORY (INHALATION) at 08:08

## 2020-08-01 RX ADMIN — IPRATROPIUM BROMIDE AND ALBUTEROL SULFATE 3 ML: .5; 3 SOLUTION RESPIRATORY (INHALATION) at 08:08

## 2020-08-01 RX ADMIN — AZELASTINE HYDROCHLORIDE 137 MCG: 137 SPRAY, METERED NASAL at 09:08

## 2020-08-01 RX ADMIN — DULOXETINE 30 MG: 30 CAPSULE, DELAYED RELEASE ORAL at 09:08

## 2020-08-01 RX ADMIN — AZELASTINE HYDROCHLORIDE 137 MCG: 137 SPRAY, METERED NASAL at 08:08

## 2020-08-01 RX ADMIN — CALCIUM 500 MG: 500 TABLET ORAL at 08:08

## 2020-08-01 RX ADMIN — POLYETHYLENE GLYCOL 3350 17 G: 17 POWDER, FOR SOLUTION ORAL at 09:08

## 2020-08-01 RX ADMIN — Medication 27 MG: at 08:08

## 2020-08-01 RX ADMIN — SODIUM CHLORIDE: 0.9 INJECTION, SOLUTION INTRAVENOUS at 12:08

## 2020-08-01 NOTE — ASSESSMENT & PLAN NOTE
Patient's anemia is currently controlled. Has not received any PRBCs to date.. Etiology likely d/t acute blood loss on chronic NATHALIE  Current CBC reviewed-   Lab Results   Component Value Date    HGB 8.3 (L) 08/01/2020    HCT 25.7 (L) 08/01/2020     Monitor serial CBC and transfuse if patient becomes hemodynamically unstable, symptomatic or H/H drops below 7/21.

## 2020-08-01 NOTE — SUBJECTIVE & OBJECTIVE
Interval History:  Notes reviewed.  Patient developed fever last night that was resolved with Tylenol.  No further fever since.  Patient's labs reviewed and kidney function slightly elevated.  This was discussed with patient.  Will start IV fluids and continue to monitor and hopefully patient can be DC home tomorrow.  Patient working with PT well.  She denies any acute complaints.  Bautista was removed this morning however patient developed urinary retention and required intermittent catheterization.  Will continue to monitor today and reinsert catheter if necessary.    Review of Systems   Constitutional: Negative for chills, fatigue and fever.   HENT: Negative for congestion, sore throat and trouble swallowing.    Eyes: Negative for photophobia and visual disturbance.   Respiratory: Negative for cough, chest tightness and shortness of breath.    Cardiovascular: Negative for chest pain, palpitations and leg swelling.   Gastrointestinal: Negative for abdominal pain, diarrhea, nausea and vomiting.   Genitourinary: Positive for difficulty urinating. Negative for dysuria, flank pain, frequency and urgency.   Musculoskeletal: Positive for arthralgias and gait problem. Negative for back pain.   Skin: Negative for color change, pallor and rash.   Neurological: Negative for dizziness, weakness and light-headedness.   Hematological: Negative for adenopathy.   Psychiatric/Behavioral: Negative for agitation, behavioral problems and confusion.   All other systems reviewed and are negative.    Objective:     Vital Signs (Most Recent):  Temp: 97.1 °F (36.2 °C) (08/01/20 0904)  Pulse: 92 (08/01/20 1348)  Resp: 16 (08/01/20 1348)  BP: (!) 98/56 (08/01/20 0904)  SpO2: 96 % (08/01/20 1348) Vital Signs (24h Range):  Temp:  [97.1 °F (36.2 °C)-101.5 °F (38.6 °C)] 97.1 °F (36.2 °C)  Pulse:  [72-92] 92  Resp:  [15-20] 16  SpO2:  [92 %-97 %] 96 %  BP: ()/(50-67) 98/56     Weight: 86.2 kg (190 lb)  Body mass index is 32.61  kg/m².    Intake/Output Summary (Last 24 hours) at 8/1/2020 1353  Last data filed at 8/1/2020 0700  Gross per 24 hour   Intake 120 ml   Output 600 ml   Net -480 ml      Physical Exam  Vitals signs and nursing note reviewed.   Constitutional:       Appearance: She is well-developed.   HENT:      Head: Normocephalic and atraumatic.      Right Ear: External ear normal.      Left Ear: External ear normal.      Nose: Nose normal.      Mouth/Throat:      Mouth: Mucous membranes are moist.      Pharynx: Oropharynx is clear. No oropharyngeal exudate or posterior oropharyngeal erythema.   Eyes:      Conjunctiva/sclera: Conjunctivae normal.      Pupils: Pupils are equal, round, and reactive to light.   Neck:      Musculoskeletal: Normal range of motion and neck supple.      Vascular: No JVD.   Cardiovascular:      Rate and Rhythm: Normal rate and regular rhythm.      Pulses: Normal pulses.      Heart sounds: Normal heart sounds. No murmur.   Pulmonary:      Effort: Pulmonary effort is normal. No respiratory distress.      Breath sounds: Normal breath sounds. No stridor. No wheezing, rhonchi or rales.   Abdominal:      General: Bowel sounds are normal. There is no distension.      Palpations: Abdomen is soft.      Tenderness: There is no abdominal tenderness.   Musculoskeletal:      Comments: Limited range of motion to left knee secondary to recent TKA and cryotherapy and dressing in place.  Sensation intact and extremity warm.  Pedal pulse is strong and equal.   Skin:     General: Skin is warm and dry.      Capillary Refill: Capillary refill takes 2 to 3 seconds.   Neurological:      General: No focal deficit present.      Mental Status: She is alert and oriented to person, place, and time.      Motor: No weakness.   Psychiatric:         Mood and Affect: Mood normal.         Behavior: Behavior normal.         Thought Content: Thought content normal.         Significant Labs:   CBC:   Recent Labs   Lab 07/30/20  1415  07/31/20  0527 08/01/20  0703   WBC 5.13 5.27 7.42   HGB 11.4* 9.2* 8.3*   HCT 36.9* 29.5* 25.7*    208 204     CMP:   Recent Labs   Lab 07/30/20  1414 07/31/20  0527 08/01/20  0703    133* 129*   K 3.8 3.9 3.8    101 96   CO2 21* 25 21*   * 121* 115*   BUN 12 11 19   CREATININE 1.1 1.3 1.9*   CALCIUM 8.8 8.6* 9.0   PROT 6.8 5.7* 5.9*   ALBUMIN 3.8 3.2* 2.9*   BILITOT 0.3 0.3 0.6   ALKPHOS 108 92 96   AST 18 15 28   ALT 16 10 10   ANIONGAP 12 7* 12   EGFRNONAA 52* 43* 27*     All pertinent labs within the past 24 hours have been reviewed.    Significant Imaging: I have reviewed all pertinent imaging results/findings within the past 24 hours.

## 2020-08-01 NOTE — NURSING
Pt's BP 98/56 this morning. BP medications and diuretic held. NP Cm notified. Will continue to monitor.

## 2020-08-01 NOTE — SUBJECTIVE & OBJECTIVE
"Principal Problem:Primary osteoarthritis of left knee    Principal Orthopedic Problem: S/P L TKA    Interval History: none    Review of patient's allergies indicates:   Allergen Reactions    Phenergan [promethazine] Hives and Rash    Latex, natural rubber Hives     Per pt report-many years    Morphine Hives    Nsaids (non-steroidal anti-inflammatory drug)      Due to "kidney cancer"       Current Facility-Administered Medications   Medication    acetaminophen tablet 650 mg    acetaminophen tablet 650 mg    acetaminophen tablet 650 mg    albuterol-ipratropium 2.5 mg-0.5 mg/3 mL nebulizer solution 3 mL    ALPRAZolam tablet 0.5 mg    amLODIPine tablet 5 mg    azelastine 137 mcg (0.1 %) nasal spray 137 mcg    budesonide nebulizer solution 0.5 mg    calcium carbonate (OS-TERI) tablet 500 mg    docusate sodium capsule 100 mg    DULoxetine DR capsule 30 mg    electrolyte-S (ISOLYTE)    famotidine tablet 20 mg    hydroCHLOROthiazide tablet 25 mg    HYDROmorphone injection 1 mg    loperamide capsule 2 mg    losartan tablet 100 mg    magnesium gluconate tablet 27 mg    melatonin tablet 6 mg    multivitamin tablet    mupirocin 2 % ointment 1 g    ondansetron injection 4 mg    oxyCODONE immediate release tablet 5 mg    oxyCODONE immediate release tablet 5 mg    oxyCODONE immediate release tablet Tab 10 mg    oxyCODONE immediate release tablet Tab 10 mg    polyethylene glycol packet 17 g    pregabalin capsule 75 mg    pregabalin capsule 75 mg    rivaroxaban tablet 10 mg    sodium chloride 0.9% flush 10 mL    sulfamethoxazole-trimethoprim 800-160mg per tablet 1 tablet     Facility-Administered Medications Ordered in Other Encounters   Medication    electrolyte-S (ISOLYTE)    lactated ringers infusion     Objective:     Vital Signs (Most Recent):  Temp: 98.5 °F (36.9 °C) (08/01/20 0546)  Pulse: 75 (08/01/20 0718)  Resp: 16 (08/01/20 0718)  BP: (!) 119/58 (08/01/20 0441)  SpO2: 96 % (08/01/20 " "0718) Vital Signs (24h Range):  Temp:  [97.3 °F (36.3 °C)-101.5 °F (38.6 °C)] 98.5 °F (36.9 °C)  Pulse:  [72-92] 75  Resp:  [15-20] 16  SpO2:  [92 %-97 %] 96 %  BP: ()/(50-67) 119/58     Weight: 86.2 kg (190 lb)  Height: 5' 4" (162.6 cm)  Body mass index is 32.61 kg/m².      Intake/Output Summary (Last 24 hours) at 8/1/2020 0814  Last data filed at 8/1/2020 0700  Gross per 24 hour   Intake 360 ml   Output 660 ml   Net -300 ml       General    Nursing note and vitals reviewed.  Constitutional: She is oriented to person, place, and time. She appears well-developed and well-nourished.   Pulmonary/Chest: Effort normal.   Neurological: She is alert and oriented to person, place, and time.   Psychiatric: She has a normal mood and affect. Her behavior is normal.             Left Knee Exam     Comments:  LLE DNVI. Dressing/ Incision C/D/I      Significant Labs:   CBC:   Recent Labs   Lab 07/30/20  1415 07/31/20  0527 08/01/20  0703   WBC 5.13 5.27 7.42   HGB 11.4* 9.2* 8.3*   HCT 36.9* 29.5* 25.7*    208 204     CMP:   Recent Labs   Lab 07/30/20  1414 07/31/20  0527    133*   K 3.8 3.9    101   CO2 21* 25   * 121*   BUN 12 11   CREATININE 1.1 1.3   CALCIUM 8.8 8.6*   PROT 6.8 5.7*   ALBUMIN 3.8 3.2*   BILITOT 0.3 0.3   ALKPHOS 108 92   AST 18 15   ALT 16 10   ANIONGAP 12 7*   EGFRNONAA 52* 43*     All pertinent labs within the past 24 hours have been reviewed.    Significant Imaging: None  "

## 2020-08-01 NOTE — PROGRESS NOTES
"[facility]  Orthopedics  Progress Note    Patient Name: Beatriz Gan  MRN: [id number]  Admission Date: 7/30/2020  Hospital Length of Stay: 0 days  Attending Provider: Shiela Mann MD  Primary Care Provider: Jorge Alonzo MD  Follow-up For: Procedure(s) (LRB):  ARTHROPLASTY, KNEE LEFT (Left)    Post-Operative Day: 2 Days Post-Op  Subjective:     Principal Problem:Primary osteoarthritis of left knee    Principal Orthopedic Problem: S/P L TKA    Interval History: none    Review of patient's allergies indicates:   Allergen Reactions    Phenergan [promethazine] Hives and Rash    Latex, natural rubber Hives     Per pt report-many years    Morphine Hives    Nsaids (non-steroidal anti-inflammatory drug)      Due to "kidney cancer"       Current Facility-Administered Medications   Medication    acetaminophen tablet 650 mg    acetaminophen tablet 650 mg    acetaminophen tablet 650 mg    albuterol-ipratropium 2.5 mg-0.5 mg/3 mL nebulizer solution 3 mL    ALPRAZolam tablet 0.5 mg    amLODIPine tablet 5 mg    azelastine 137 mcg (0.1 %) nasal spray 137 mcg    budesonide nebulizer solution 0.5 mg    calcium carbonate (OS-TERI) tablet 500 mg    docusate sodium capsule 100 mg    DULoxetine DR capsule 30 mg    electrolyte-S (ISOLYTE)    famotidine tablet 20 mg    hydroCHLOROthiazide tablet 25 mg    HYDROmorphone injection 1 mg    loperamide capsule 2 mg    losartan tablet 100 mg    magnesium gluconate tablet 27 mg    melatonin tablet 6 mg    multivitamin tablet    mupirocin 2 % ointment 1 g    ondansetron injection 4 mg    oxyCODONE immediate release tablet 5 mg    oxyCODONE immediate release tablet 5 mg    oxyCODONE immediate release tablet Tab 10 mg    oxyCODONE immediate release tablet Tab 10 mg    polyethylene glycol packet 17 g    pregabalin capsule 75 mg    pregabalin capsule 75 mg    rivaroxaban tablet 10 mg    sodium chloride 0.9% flush 10 mL    " "sulfamethoxazole-trimethoprim 800-160mg per tablet 1 tablet     Facility-Administered Medications Ordered in Other Encounters   Medication    electrolyte-S (ISOLYTE)    lactated ringers infusion     Objective:     Vital Signs (Most Recent):  Temp: 98.5 °F (36.9 °C) (08/01/20 0546)  Pulse: 75 (08/01/20 0718)  Resp: 16 (08/01/20 0718)  BP: (!) 119/58 (08/01/20 0441)  SpO2: 96 % (08/01/20 0718) Vital Signs (24h Range):  Temp:  [97.3 °F (36.3 °C)-101.5 °F (38.6 °C)] 98.5 °F (36.9 °C)  Pulse:  [72-92] 75  Resp:  [15-20] 16  SpO2:  [92 %-97 %] 96 %  BP: ()/(50-67) 119/58     Weight: 86.2 kg (190 lb)  Height: 5' 4" (162.6 cm)  Body mass index is 32.61 kg/m².      Intake/Output Summary (Last 24 hours) at 8/1/2020 0814  Last data filed at 8/1/2020 0700  Gross per 24 hour   Intake 360 ml   Output 660 ml   Net -300 ml       General    Nursing note and vitals reviewed.  Constitutional: She is oriented to person, place, and time. She appears well-developed and well-nourished.   Pulmonary/Chest: Effort normal.   Neurological: She is alert and oriented to person, place, and time.   Psychiatric: She has a normal mood and affect. Her behavior is normal.             Left Knee Exam     Comments:  LLE DNVI. Dressing/ Incision C/D/I      Significant Labs:   CBC:   Recent Labs   Lab 07/30/20  1415 07/31/20  0527 08/01/20  0703   WBC 5.13 5.27 7.42   HGB 11.4* 9.2* 8.3*   HCT 36.9* 29.5* 25.7*    208 204     CMP:   Recent Labs   Lab 07/30/20  1414 07/31/20  0527    133*   K 3.8 3.9    101   CO2 21* 25   * 121*   BUN 12 11   CREATININE 1.1 1.3   CALCIUM 8.8 8.6*   PROT 6.8 5.7*   ALBUMIN 3.8 3.2*   BILITOT 0.3 0.3   ALKPHOS 108 92   AST 18 15   ALT 16 10   ANIONGAP 12 7*   EGFRNONAA 52* 43*     All pertinent labs within the past 24 hours have been reviewed.    Significant Imaging: None    Assessment/Plan:     * Primary osteoarthritis of left knee  Stable to PR home with           HUGH PANIAGUA, " PA  Orthopedics  [facility]

## 2020-08-01 NOTE — ASSESSMENT & PLAN NOTE
POD 2 s/p left TKA with Dr. Martinez  Continue to follow Orthopedic recommendations.  Needs aggressive incentive spirometry.  Follow hemoglobin and hematocrit closely.  Pain control with IV narcotics and antiemetics as needed.  Physical therapy as per Orthopedics protocol with fall precautions.  xarelto for DVT prophylaxis per orthopedic recommendations.

## 2020-08-01 NOTE — PLAN OF CARE
Plan of care reviewed with pt, Verbalized understanding. Dressing to the left knee clean and intact . Pain was managed by PRN pain medication. Slept well.Call light kept within reach, bed in locked and lowest position, side rails up x2.

## 2020-08-01 NOTE — CARE UPDATE
Receiving aerosol tx Q6WA and Q12.. IS at bedside and instructed to perform Q4.       08/01/20 0718 08/01/20 0726   Patient Assessment/Suction   Level of Consciousness (AVPU) alert  --    Respiratory Effort Unlabored  --    Expansion/Accessory Muscles/Retractions no use of accessory muscles  --    All Lung Fields Breath Sounds clear  --    Rhythm/Pattern, Respiratory unlabored  --    Cough Frequency no cough  --    PRE-TX-O2   O2 Device (Oxygen Therapy) room air room air   SpO2 96 % 96 %   Pulse Oximetry Type Intermittent  --    $ Pulse Oximetry - Multiple Charge Pulse Oximetry - Multiple  --    Pulse 75 75   Resp 16 16   Aerosol Therapy   $ Aerosol Therapy Charges Aerosol Treatment Aerosol Treatment   Respiratory Treatment Status (SVN) given given   Treatment Route (SVN) mouthpiece mouthpiece;oxygen   Patient Position (SVN) semi-Cohn's semi-Cohn's   Post Treatment Assessment (SVN) breath sounds unchanged breath sounds unchanged   Signs of Intolerance (SVN) none none   Breath Sounds Post-Respiratory Treatment   Throughout All Fields Post-Treatment All Fields All Fields   Throughout All Fields Post-Treatment no change no change   Post-treatment Heart Rate (beats/min) 78 78   Post-treatment Resp Rate (breaths/min) 16 16   Incentive Spirometer   $ Incentive Spirometer Charges  --  done with encouragement   Administration (IS)  --  proper technique demonstrated   Number of Repetitions (IS)  --  10   Level Incentive Spirometer (mL)  --  1250   Patient Tolerance (IS)  --  good

## 2020-08-01 NOTE — NURSING
Pt just voided 150 cc and after that she mentioned that she is not able to empty her bladder, bladder scan confirmed 536 cc. NP (Stephanie) was notified and order for cath scan was given. 600 urine was removed.

## 2020-08-01 NOTE — NURSING
Pt stated she felt feverish. Checked her temp; it is 100.3. Will give tylenol and continue to monitor.

## 2020-08-01 NOTE — ASSESSMENT & PLAN NOTE
Chronic, controlled.  Latest blood pressure and vitals reviewed-   Temp:  [97.1 °F (36.2 °C)-101.5 °F (38.6 °C)]   Pulse:  [72-92]   Resp:  [15-20]   BP: ()/(50-67)   SpO2:  [92 %-97 %] .   Home meds for hypertension were reviewed and noted below. Hospital anti-hypertensive changes were made as shown below.  Hypertension Medications             amLODIPine (NORVASC) 5 MG tablet Take 1 tablet (5 mg total) by mouth once daily.    losartan-hydrochlorothiazide 100-25 mg (HYZAAR) 100-25 mg per tablet Take 1 tablet by mouth once daily.      Hospital Medications             amLODIPine tablet 5 mg 5 mg, Oral, Daily, HOLD  IF BP UNDER 140/90    hydroCHLOROthiazide tablet 25 mg 25 mg, Oral, Daily, HOLD IF BP UNDER 140/90        Will utilize p.r.n. blood pressure medication only if patient's blood pressure greater than  180/110 and she develops symptoms such as worsening chest pain or shortness of breath.    Holding hydrochlorothiazide and losartan due to slight worsening of kidney function

## 2020-08-01 NOTE — RESPIRATORY THERAPY
07/31/20 1955   Patient Assessment/Suction   Level of Consciousness (AVPU) alert   Respiratory Effort Normal;Unlabored   Expansion/Accessory Muscles/Retractions expansion symmetric;no retractions;no use of accessory muscles   All Lung Fields Breath Sounds clear   Cough Frequency no cough   PRE-TX-O2   O2 Device (Oxygen Therapy) room air   SpO2 96 %   Pulse Oximetry Type Intermittent   Pulse 72   Resp 18   Aerosol Therapy   $ Aerosol Therapy Charges Aerosol Treatment   Respiratory Treatment Status (SVN) given   Treatment Route (SVN) mouthpiece;oxygen   Patient Position (SVN) HOB elevated   Signs of Intolerance (SVN) none   Breath Sounds Post-Respiratory Treatment   Throughout All Fields Post-Treatment All Fields   Throughout All Fields Post-Treatment no change   Post-treatment Heart Rate (beats/min) 75   Post-treatment Resp Rate (breaths/min) 16   Incentive Spirometer   $ Incentive Spirometer Charges done with encouragement   Administration (IS) proper technique demonstrated   Number of Repetitions (IS) 10   Level Incentive Spirometer (mL) 1500   Patient Tolerance (IS) good

## 2020-08-01 NOTE — PT/OT/SLP PROGRESS
Physical Therapy Treatment    Patient Name:  Beatriz Gan   MRN:  [id number]    Recommendations:     Discharge Recommendations:  home health PT   Discharge Equipment Recommendations: none   Barriers to discharge: None    Assessment:     Beatriz Gan is a 64 y.o. female admitted with a medical diagnosis of Primary osteoarthritis of left knee.  She presents with the following impairments/functional limitations:  weakness, impaired endurance, impaired self care skills, impaired functional mobilty, gait instability, decreased lower extremity function, pain . Tolerated treatment. Presented with notable shakiness BUE's , nurse Renetta and NP informed. BP seated EOB , 119 / 68.   Ambulated with rw and assistance, up / down steps and BLE exercises performed. Nurse Jackson reported concern that CPM was not working. PTA assessed and found CPM operating and demonstrated to nurse how to operate.     Rehab Prognosis: Good; patient would benefit from acute skilled PT services to address these deficits and reach maximum level of function.    Recent Surgery: Procedure(s) (LRB):  ARTHROPLASTY, KNEE LEFT (Left) 2 Days Post-Op    Plan:     During this hospitalization, patient to be seen BID to address the identified rehab impairments via gait training, therapeutic activities, therapeutic exercises and progress toward the following goals:    · Plan of Care Expires:  08/30/20    Subjective     Chief Complaint: had fever last night.  Patient/Family Comments/goals: to return home.  Pain/Comfort:  · Pain Rating 1: 6/10  · Location - Side 1: Left  · Location - Orientation 1: generalized  · Location 1: knee  · Pain Addressed 1: Pre-medicate for activity, Reposition, Nurse notified      Objective:     Communicated with nurse Jackson prior to session.  Patient found supine with bed alarm, cryotherapy, SCD, telemetry upon PT entry to room.     General Precautions: Standard, fall   Orthopedic Precautions:LLE weight bearing as  tolerated   Braces:       Functional Mobility:  · Bed Mobility:     · Rolling Left:  contact guard assistance  · Supine to Sit: contact guard assistance  · Transfers:     · Sit to Stand:  contact guard assistance with rolling walker  · Gait: 250' with rw and Min A, WBAT LLE with KI in place.      AM-PAC 6 CLICK MOBILITY          Therapeutic Activities and Exercises:   KI applied in supine.    Transferred EOB , BP assessed. Ambulated in hallway.    Up / down 5 steps with CGA.   BLE exercises at 10 reps each ; SLR's, HS, QS, AP's.    Patient left up in chair with all lines intact, call button in reach, chair alarm on and nurse Renetta notified..    GOALS:   Multidisciplinary Problems     Physical Therapy Goals        Problem: Physical Therapy Goal    Goal Priority Disciplines Outcome Goal Variances Interventions   Physical Therapy Goal     PT, PT/OT Ongoing, Progressing     Description: Goals to be met by: 2020     Patient will increase functional independence with mobility by performin. Supine to sit with Contact Guard Assistance  2. Sit to stand transfer with Contact Guard Assistance  3. Gait  x 250 feet with Contact Guard Assistance using Rolling Walker.   4. Ascend/descend 16 stair with bilateral Handrails Contact Guard Assistance u.   5. Lower extremity exercise program x20 reps                    Time Tracking:     PT Received On: 20  PT Start Time: 0935     PT Stop Time: 1014  PT Total Time (min): 39 min     Billable Minutes: Gait Training 18min, Therapeutic Activity 12min and Therapeutic Exercise 9min    Treatment Type: Treatment  PT/PTA: PTA     PTA Visit Number: 2     Vira Prasad, PTA  2020

## 2020-08-01 NOTE — PROGRESS NOTES
[facility] Medicine  Progress Note    Patient Name: Beatriz Gan  MRN: [id number]  Patient Class: OP- Outpatient Recovery   Admission Date: 7/30/2020  Length of Stay: 0 days  Attending Physician: Miri Montelongo MD  Primary Care Provider: Jorge Alonzo MD        Subjective:     Principal Problem:Primary osteoarthritis of left knee        HPI:  The pt is 65 yo female with PMHx of DJD, Gout, HTN, Hyperlipidemia , Iron def anemia , Renal cell carcinoma and  Alcohol dependence who admitted after total  left knee arthroplasty by Dr. Ruben Martinez. Pt with known h/o left knee degenerative joint disease has had  persistent left knee pain.   She underwent a left knee arthroscopy which did not help much.  She then underwent left knee radiofrequency ablation without good result.  This process has been going on for quite a long time.  She states that she can no longer continue to live with her symptoms and is ready to proceed with more aggressive surgical treatment options. She underwent left total knee arthroplasty today . No immediate post op complications and currently has no c/o chest pain, SOB or palpitations.      Overview/Hospital Course:  7/31- POD #1  Afebrile . Noted pt on O2 at 2L/min via NC. Mild hypoxia overnight . Pt reported H/O COPD and uses inhaler at home . Denies overt SOB or wheezing . On exam diminished breath sounds at bases. Will stop IVF. Get BNP.  Encouraged IS. Ortho recommended Cont to increase OOB activity with PT and nursing. Change dressing today after 1200 and pull drain. Plan for DC home with HH tomorrow am. Labs Hgb 9.2>11.4, Na 133, K 3.9, creatinine 1.3>1.1.     Interval History:  Notes reviewed.  Patient developed fever last night that was resolved with Tylenol.  No further fever since.  Patient's labs reviewed and kidney function slightly elevated.  This was discussed with patient.  Will start IV fluids and continue to monitor and hopefully patient  can be DC home tomorrow.  Patient working with PT well.  She denies any acute complaints.  Bautista was removed this morning however patient developed urinary retention and required intermittent catheterization.  Will continue to monitor today and reinsert catheter if necessary.    Review of Systems   Constitutional: Negative for chills, fatigue and fever.   HENT: Negative for congestion, sore throat and trouble swallowing.    Eyes: Negative for photophobia and visual disturbance.   Respiratory: Negative for cough, chest tightness and shortness of breath.    Cardiovascular: Negative for chest pain, palpitations and leg swelling.   Gastrointestinal: Negative for abdominal pain, diarrhea, nausea and vomiting.   Genitourinary: Positive for difficulty urinating. Negative for dysuria, flank pain, frequency and urgency.   Musculoskeletal: Positive for arthralgias and gait problem. Negative for back pain.   Skin: Negative for color change, pallor and rash.   Neurological: Negative for dizziness, weakness and light-headedness.   Hematological: Negative for adenopathy.   Psychiatric/Behavioral: Negative for agitation, behavioral problems and confusion.   All other systems reviewed and are negative.    Objective:     Vital Signs (Most Recent):  Temp: 97.1 °F (36.2 °C) (08/01/20 0904)  Pulse: 92 (08/01/20 1348)  Resp: 16 (08/01/20 1348)  BP: (!) 98/56 (08/01/20 0904)  SpO2: 96 % (08/01/20 1348) Vital Signs (24h Range):  Temp:  [97.1 °F (36.2 °C)-101.5 °F (38.6 °C)] 97.1 °F (36.2 °C)  Pulse:  [72-92] 92  Resp:  [15-20] 16  SpO2:  [92 %-97 %] 96 %  BP: ()/(50-67) 98/56     Weight: 86.2 kg (190 lb)  Body mass index is 32.61 kg/m².    Intake/Output Summary (Last 24 hours) at 8/1/2020 1353  Last data filed at 8/1/2020 0700  Gross per 24 hour   Intake 120 ml   Output 600 ml   Net -480 ml      Physical Exam  Vitals signs and nursing note reviewed.   Constitutional:       Appearance: She is well-developed.   HENT:      Head:  Normocephalic and atraumatic.      Right Ear: External ear normal.      Left Ear: External ear normal.      Nose: Nose normal.      Mouth/Throat:      Mouth: Mucous membranes are moist.      Pharynx: Oropharynx is clear. No oropharyngeal exudate or posterior oropharyngeal erythema.   Eyes:      Conjunctiva/sclera: Conjunctivae normal.      Pupils: Pupils are equal, round, and reactive to light.   Neck:      Musculoskeletal: Normal range of motion and neck supple.      Vascular: No JVD.   Cardiovascular:      Rate and Rhythm: Normal rate and regular rhythm.      Pulses: Normal pulses.      Heart sounds: Normal heart sounds. No murmur.   Pulmonary:      Effort: Pulmonary effort is normal. No respiratory distress.      Breath sounds: Normal breath sounds. No stridor. No wheezing, rhonchi or rales.   Abdominal:      General: Bowel sounds are normal. There is no distension.      Palpations: Abdomen is soft.      Tenderness: There is no abdominal tenderness.   Musculoskeletal:      Comments: Limited range of motion to left knee secondary to recent TKA and cryotherapy and dressing in place.  Sensation intact and extremity warm.  Pedal pulse is strong and equal.   Skin:     General: Skin is warm and dry.      Capillary Refill: Capillary refill takes 2 to 3 seconds.   Neurological:      General: No focal deficit present.      Mental Status: She is alert and oriented to person, place, and time.      Motor: No weakness.   Psychiatric:         Mood and Affect: Mood normal.         Behavior: Behavior normal.         Thought Content: Thought content normal.         Significant Labs:   CBC:   Recent Labs   Lab 07/30/20  1415 07/31/20  0527 08/01/20  0703   WBC 5.13 5.27 7.42   HGB 11.4* 9.2* 8.3*   HCT 36.9* 29.5* 25.7*    208 204     CMP:   Recent Labs   Lab 07/30/20  1414 07/31/20  0527 08/01/20  0703    133* 129*   K 3.8 3.9 3.8    101 96   CO2 21* 25 21*   * 121* 115*   BUN 12 11 19   CREATININE 1.1  1.3 1.9*   CALCIUM 8.8 8.6* 9.0   PROT 6.8 5.7* 5.9*   ALBUMIN 3.8 3.2* 2.9*   BILITOT 0.3 0.3 0.6   ALKPHOS 108 92 96   AST 18 15 28   ALT 16 10 10   ANIONGAP 12 7* 12   EGFRNONAA 52* 43* 27*     All pertinent labs within the past 24 hours have been reviewed.    Significant Imaging: I have reviewed all pertinent imaging results/findings within the past 24 hours.      Assessment/Plan:      * Primary osteoarthritis of left knee  POD 2 s/p left TKA with Dr. Martinez  Continue to follow Orthopedic recommendations.  Needs aggressive incentive spirometry.  Follow hemoglobin and hematocrit closely.  Pain control with IV narcotics and antiemetics as needed.  Physical therapy as per Orthopedics protocol with fall precautions.  xarelto for DVT prophylaxis per orthopedic recommendations.         Recent H/O UTI (urinary tract infection)  Complete Bactrim x 3 days started as outpatient.      Essential hypertension  Chronic, controlled.  Latest blood pressure and vitals reviewed-   Temp:  [97.1 °F (36.2 °C)-101.5 °F (38.6 °C)]   Pulse:  [72-92]   Resp:  [15-20]   BP: ()/(50-67)   SpO2:  [92 %-97 %] .   Home meds for hypertension were reviewed and noted below. Hospital anti-hypertensive changes were made as shown below.  Hypertension Medications             amLODIPine (NORVASC) 5 MG tablet Take 1 tablet (5 mg total) by mouth once daily.    losartan-hydrochlorothiazide 100-25 mg (HYZAAR) 100-25 mg per tablet Take 1 tablet by mouth once daily.      Hospital Medications             amLODIPine tablet 5 mg 5 mg, Oral, Daily, HOLD  IF BP UNDER 140/90    hydroCHLOROthiazide tablet 25 mg 25 mg, Oral, Daily, HOLD IF BP UNDER 140/90        Will utilize p.r.n. blood pressure medication only if patient's blood pressure greater than  180/110 and she develops symptoms such as worsening chest pain or shortness of breath.    Holding hydrochlorothiazide and losartan due to slight worsening of kidney function      GERD (gastroesophageal reflux  disease)  Chronic stable   continue home meds      NATHALIE (iron deficiency anemia)  Patient's anemia is currently controlled. Has not received any PRBCs to date.. Etiology likely d/t acute blood loss on chronic NATHALIE  Current CBC reviewed-   Lab Results   Component Value Date    HGB 8.3 (L) 08/01/2020    HCT 25.7 (L) 08/01/2020     Monitor serial CBC and transfuse if patient becomes hemodynamically unstable, symptomatic or H/H drops below 7/21.         VTE Risk Mitigation (From admission, onward)         Ordered     rivaroxaban tablet 10 mg  With dinner      07/30/20 1535     IP VTE LOW RISK PATIENT  Once      07/30/20 1535     Place sequential compression device  Until discontinued      07/30/20 1535     Place sequential compression device  Until discontinued      07/30/20 0855     Place RADHA hose  Until discontinued      07/30/20 0855                      Nicky Pabon NP  Department of Hospital Medicine   [facility]

## 2020-08-01 NOTE — PLAN OF CARE
Problem: Physical Therapy Goal  Goal: Physical Therapy Goal  Description: Goals to be met by: 2020     Patient will increase functional independence with mobility by performin. Supine to sit with Contact Guard Assistance  2. Sit to stand transfer with Contact Guard Assistance  3. Gait  x 250 feet with Contact Guard Assistance using Rolling Walker.   4. Ascend/descend 16 stair with bilateral Handrails Contact Guard Assistance u.   5. Lower extremity exercise program x20 reps   Outcome: Ongoing, Progressing   Ambulate with rw and Min A, WBAT LLE with KI in place.

## 2020-08-02 LAB
ALBUMIN SERPL BCP-MCNC: 2.6 G/DL (ref 3.5–5.2)
ALP SERPL-CCNC: 103 U/L (ref 55–135)
ALT SERPL W/O P-5'-P-CCNC: 9 U/L (ref 10–44)
ANION GAP SERPL CALC-SCNC: 10 MMOL/L (ref 8–16)
AST SERPL-CCNC: 25 U/L (ref 10–40)
BASOPHILS # BLD AUTO: 0.01 K/UL (ref 0–0.2)
BASOPHILS NFR BLD: 0.2 % (ref 0–1.9)
BILIRUB SERPL-MCNC: 0.4 MG/DL (ref 0.1–1)
BILIRUB UR QL STRIP: NEGATIVE
BUN SERPL-MCNC: 13 MG/DL (ref 8–23)
CALCIUM SERPL-MCNC: 8.8 MG/DL (ref 8.7–10.5)
CHLORIDE SERPL-SCNC: 101 MMOL/L (ref 95–110)
CLARITY UR: CLEAR
CO2 SERPL-SCNC: 24 MMOL/L (ref 23–29)
COLOR UR: YELLOW
CREAT SERPL-MCNC: 1.2 MG/DL (ref 0.5–1.4)
DIFFERENTIAL METHOD: ABNORMAL
EOSINOPHIL # BLD AUTO: 0 K/UL (ref 0–0.5)
EOSINOPHIL NFR BLD: 0.7 % (ref 0–8)
ERYTHROCYTE [DISTWIDTH] IN BLOOD BY AUTOMATED COUNT: 12.3 % (ref 11.5–14.5)
EST. GFR  (AFRICAN AMERICAN): 54 ML/MIN/1.73 M^2
EST. GFR  (NON AFRICAN AMERICAN): 47 ML/MIN/1.73 M^2
GLUCOSE SERPL-MCNC: 105 MG/DL (ref 70–110)
GLUCOSE UR QL STRIP: NEGATIVE
HCT VFR BLD AUTO: 24.3 % (ref 37–48.5)
HGB BLD-MCNC: 7.7 G/DL (ref 12–16)
HGB UR QL STRIP: NEGATIVE
IMM GRANULOCYTES # BLD AUTO: 0.01 K/UL (ref 0–0.04)
IMM GRANULOCYTES NFR BLD AUTO: 0.2 % (ref 0–0.5)
KETONES UR QL STRIP: NEGATIVE
LEUKOCYTE ESTERASE UR QL STRIP: NEGATIVE
LYMPHOCYTES # BLD AUTO: 0.4 K/UL (ref 1–4.8)
LYMPHOCYTES NFR BLD: 9.3 % (ref 18–48)
MCH RBC QN AUTO: 32.8 PG (ref 27–31)
MCHC RBC AUTO-ENTMCNC: 31.7 G/DL (ref 32–36)
MCV RBC AUTO: 103 FL (ref 82–98)
MONOCYTES # BLD AUTO: 0.4 K/UL (ref 0.3–1)
MONOCYTES NFR BLD: 8.3 % (ref 4–15)
NEUTROPHILS # BLD AUTO: 3.5 K/UL (ref 1.8–7.7)
NEUTROPHILS NFR BLD: 81.3 % (ref 38–73)
NITRITE UR QL STRIP: NEGATIVE
NRBC BLD-RTO: 0 /100 WBC
PH UR STRIP: 6 [PH] (ref 5–8)
PLATELET # BLD AUTO: 153 K/UL (ref 150–350)
PMV BLD AUTO: 10.6 FL (ref 9.2–12.9)
POTASSIUM SERPL-SCNC: 3.9 MMOL/L (ref 3.5–5.1)
PROT SERPL-MCNC: 5.7 G/DL (ref 6–8.4)
PROT UR QL STRIP: NEGATIVE
RBC # BLD AUTO: 2.35 M/UL (ref 4–5.4)
SODIUM SERPL-SCNC: 135 MMOL/L (ref 136–145)
SP GR UR STRIP: <=1.005 (ref 1–1.03)
URN SPEC COLLECT METH UR: ABNORMAL
UROBILINOGEN UR STRIP-ACNC: NEGATIVE EU/DL
WBC # BLD AUTO: 4.32 K/UL (ref 3.9–12.7)

## 2020-08-02 PROCEDURE — 87086 URINE CULTURE/COLONY COUNT: CPT

## 2020-08-02 PROCEDURE — 25000242 PHARM REV CODE 250 ALT 637 W/ HCPCS: Performed by: INTERNAL MEDICINE

## 2020-08-02 PROCEDURE — 25000003 PHARM REV CODE 250: Performed by: PHYSICIAN ASSISTANT

## 2020-08-02 PROCEDURE — 85025 COMPLETE CBC W/AUTO DIFF WBC: CPT

## 2020-08-02 PROCEDURE — 99900035 HC TECH TIME PER 15 MIN (STAT)

## 2020-08-02 PROCEDURE — 25000003 PHARM REV CODE 250: Performed by: INTERNAL MEDICINE

## 2020-08-02 PROCEDURE — 87040 BLOOD CULTURE FOR BACTERIA: CPT | Mod: 59

## 2020-08-02 PROCEDURE — 63600175 PHARM REV CODE 636 W HCPCS: Performed by: INTERNAL MEDICINE

## 2020-08-02 PROCEDURE — 94640 AIRWAY INHALATION TREATMENT: CPT

## 2020-08-02 PROCEDURE — 97530 THERAPEUTIC ACTIVITIES: CPT | Mod: CQ

## 2020-08-02 PROCEDURE — 81003 URINALYSIS AUTO W/O SCOPE: CPT

## 2020-08-02 PROCEDURE — 25000003 PHARM REV CODE 250: Performed by: NURSE PRACTITIONER

## 2020-08-02 PROCEDURE — 97116 GAIT TRAINING THERAPY: CPT | Mod: CQ,59

## 2020-08-02 PROCEDURE — 94761 N-INVAS EAR/PLS OXIMETRY MLT: CPT

## 2020-08-02 PROCEDURE — 36415 COLL VENOUS BLD VENIPUNCTURE: CPT

## 2020-08-02 PROCEDURE — 94799 UNLISTED PULMONARY SVC/PX: CPT

## 2020-08-02 PROCEDURE — 80053 COMPREHEN METABOLIC PANEL: CPT

## 2020-08-02 PROCEDURE — 63600175 PHARM REV CODE 636 W HCPCS: Performed by: NURSE PRACTITIONER

## 2020-08-02 RX ORDER — ONDANSETRON 2 MG/ML
4 INJECTION INTRAMUSCULAR; INTRAVENOUS EVERY 6 HOURS PRN
Status: DISCONTINUED | OUTPATIENT
Start: 2020-08-02 | End: 2020-08-03 | Stop reason: HOSPADM

## 2020-08-02 RX ORDER — IBUPROFEN 400 MG/1
400 TABLET ORAL EVERY 6 HOURS PRN
Status: DISCONTINUED | OUTPATIENT
Start: 2020-08-02 | End: 2020-08-03 | Stop reason: HOSPADM

## 2020-08-02 RX ORDER — ALPRAZOLAM 0.25 MG/1
0.5 TABLET ORAL 2 TIMES DAILY
Status: DISCONTINUED | OUTPATIENT
Start: 2020-08-02 | End: 2020-08-03 | Stop reason: HOSPADM

## 2020-08-02 RX ADMIN — CALCIUM 500 MG: 500 TABLET ORAL at 08:08

## 2020-08-02 RX ADMIN — PREGABALIN 75 MG: 75 CAPSULE ORAL at 08:08

## 2020-08-02 RX ADMIN — DOCUSATE SODIUM 100 MG: 100 CAPSULE, LIQUID FILLED ORAL at 08:08

## 2020-08-02 RX ADMIN — BUDESONIDE 0.5 MG: 0.5 SUSPENSION RESPIRATORY (INHALATION) at 07:08

## 2020-08-02 RX ADMIN — MUPIROCIN 1 G: 20 OINTMENT TOPICAL at 08:08

## 2020-08-02 RX ADMIN — FAMOTIDINE 20 MG: 20 TABLET ORAL at 08:08

## 2020-08-02 RX ADMIN — DULOXETINE 30 MG: 30 CAPSULE, DELAYED RELEASE ORAL at 08:08

## 2020-08-02 RX ADMIN — Medication 27 MG: at 08:08

## 2020-08-02 RX ADMIN — OXYCODONE 5 MG: 5 TABLET ORAL at 07:08

## 2020-08-02 RX ADMIN — ONDANSETRON 4 MG: 2 INJECTION INTRAMUSCULAR; INTRAVENOUS at 10:08

## 2020-08-02 RX ADMIN — IPRATROPIUM BROMIDE AND ALBUTEROL SULFATE 3 ML: .5; 3 SOLUTION RESPIRATORY (INHALATION) at 07:08

## 2020-08-02 RX ADMIN — RIVAROXABAN 10 MG: 10 TABLET, FILM COATED ORAL at 05:08

## 2020-08-02 RX ADMIN — AZELASTINE HYDROCHLORIDE 137 MCG: 137 SPRAY, METERED NASAL at 08:08

## 2020-08-02 RX ADMIN — IBUPROFEN 400 MG: 400 TABLET, FILM COATED ORAL at 08:08

## 2020-08-02 RX ADMIN — ALPRAZOLAM 0.5 MG: 0.25 TABLET ORAL at 08:08

## 2020-08-02 RX ADMIN — ALPRAZOLAM 0.5 MG: 0.25 TABLET ORAL at 10:08

## 2020-08-02 RX ADMIN — OXYCODONE 5 MG: 5 TABLET ORAL at 08:08

## 2020-08-02 RX ADMIN — SODIUM CHLORIDE: 0.9 INJECTION, SOLUTION INTRAVENOUS at 10:08

## 2020-08-02 RX ADMIN — ONDANSETRON 4 MG: 2 INJECTION INTRAMUSCULAR; INTRAVENOUS at 04:08

## 2020-08-02 RX ADMIN — MULTIPLE VITAMINS W/ MINERALS TAB 1 TABLET: TAB at 08:08

## 2020-08-02 RX ADMIN — IPRATROPIUM BROMIDE AND ALBUTEROL SULFATE 3 ML: .5; 3 SOLUTION RESPIRATORY (INHALATION) at 01:08

## 2020-08-02 RX ADMIN — SODIUM CHLORIDE: 0.9 INJECTION, SOLUTION INTRAVENOUS at 08:08

## 2020-08-02 RX ADMIN — POLYETHYLENE GLYCOL 3350 17 G: 17 POWDER, FOR SOLUTION ORAL at 08:08

## 2020-08-02 NOTE — SUBJECTIVE & OBJECTIVE
Interval History:  Notes reviewed, no acute events overnight.  Patient continues to have intermittent low-grade fevers.  Patient denies chest pain or shortness of breath.  She does report feeling intermittent chills with malaise and body aches.  She denies dysuria but reports urinary hesitancy.  She denies foul-smelling urine.  She states her knee pain is controlled with pain medication.  Patient advise will order UA, chest x-ray and blood cultures.  Patient encouraged to get out of bed as much as possible today and to use incentive spirometer.    Review of Systems   Constitutional: Positive for chills, fatigue and fever.   HENT: Negative for congestion, sore throat and trouble swallowing.    Eyes: Negative for photophobia and visual disturbance.   Respiratory: Negative for cough, chest tightness and shortness of breath.    Cardiovascular: Negative for chest pain, palpitations and leg swelling.   Gastrointestinal: Negative for abdominal pain, diarrhea, nausea and vomiting.   Endocrine: Negative for polyphagia and polyuria.   Genitourinary: Negative for difficulty urinating, dysuria and urgency.        Hesitancy   Musculoskeletal: Positive for arthralgias and myalgias. Negative for back pain and gait problem.   Neurological: Negative for dizziness, weakness and light-headedness.   Hematological: Negative for adenopathy.   Psychiatric/Behavioral: Negative for agitation, behavioral problems and confusion. The patient is nervous/anxious.    All other systems reviewed and are negative.    Objective:     Vital Signs (Most Recent):  Temp: (!) 101 °F (38.3 °C) (08/02/20 0833)  Pulse: 96 (08/02/20 0751)  Resp: 18 (08/02/20 0833)  BP: 107/63 (08/02/20 0727)  SpO2: (!) 92 % (08/02/20 0748) Vital Signs (24h Range):  Temp:  [97.7 °F (36.5 °C)-101 °F (38.3 °C)] 101 °F (38.3 °C)  Pulse:  [] 96  Resp:  [14-18] 18  SpO2:  [92 %-97 %] 92 %  BP: ()/(54-63) 107/63     Weight: 86.2 kg (190 lb)  Body mass index is 32.61  kg/m².    Intake/Output Summary (Last 24 hours) at 8/2/2020 1025  Last data filed at 8/2/2020 0500  Gross per 24 hour   Intake 2095 ml   Output 1800 ml   Net 295 ml      Physical Exam  Vitals signs and nursing note reviewed.   Constitutional:       General: She is not in acute distress.     Appearance: Normal appearance. She is well-developed. She is not ill-appearing or diaphoretic.   HENT:      Head: Normocephalic and atraumatic.      Right Ear: External ear normal.      Left Ear: External ear normal.      Nose: Nose normal.      Mouth/Throat:      Mouth: Mucous membranes are moist.      Pharynx: Oropharynx is clear. No oropharyngeal exudate or posterior oropharyngeal erythema.   Eyes:      Conjunctiva/sclera: Conjunctivae normal.      Pupils: Pupils are equal, round, and reactive to light.   Neck:      Musculoskeletal: Normal range of motion and neck supple.      Vascular: No JVD.   Cardiovascular:      Rate and Rhythm: Normal rate and regular rhythm.      Pulses: Normal pulses.      Heart sounds: Normal heart sounds. No murmur.   Pulmonary:      Effort: Pulmonary effort is normal. No respiratory distress.      Breath sounds: Rhonchi present. No wheezing.      Comments: Rhonchi to bilateral lower lobes slightly improved after coughing  Abdominal:      General: Bowel sounds are normal. There is no distension.      Palpations: Abdomen is soft.      Tenderness: There is no abdominal tenderness.   Musculoskeletal: Normal range of motion.      Comments: Some limitation to range of motion of left knee secondary to recent TKA, dressing in place CDI   Skin:     General: Skin is warm and dry.      Capillary Refill: Capillary refill takes 2 to 3 seconds.      Coloration: Skin is not jaundiced or pale.      Findings: No erythema or rash.   Neurological:      General: No focal deficit present.      Mental Status: She is alert and oriented to person, place, and time.      Sensory: No sensory deficit.      Motor: No weakness.    Psychiatric:         Mood and Affect: Mood normal.         Behavior: Behavior normal.         Thought Content: Thought content normal.         Significant Labs:   CBC:   Recent Labs   Lab 08/01/20  0703 08/02/20  0508   WBC 7.42 4.32   HGB 8.3* 7.7*   HCT 25.7* 24.3*    153     CMP:   Recent Labs   Lab 08/01/20  0703 08/02/20  0508   * 135*   K 3.8 3.9   CL 96 101   CO2 21* 24   * 105   BUN 19 13   CREATININE 1.9* 1.2   CALCIUM 9.0 8.8   PROT 5.9* 5.7*   ALBUMIN 2.9* 2.6*   BILITOT 0.6 0.4   ALKPHOS 96 103   AST 28 25   ALT 10 9*   ANIONGAP 12 10   EGFRNONAA 27* 47*     All pertinent labs within the past 24 hours have been reviewed.    Significant Imaging: I have reviewed all pertinent imaging results/findings within the past 24 hours.

## 2020-08-02 NOTE — PLAN OF CARE
POC discussed with pt, pt verbalized understanding. Oriented x4. PIV cdi, infusing. Pt up to bedside commode with 1 person assist, weight bearing as tolerated. Purewick draining clear yellow urine. SCD's and foot pumps on. Dressing cdi. Cryo in room but pt asked to have a break from it. Pt complained of pain, medicated per orders, pain resolved. CPM machine on for 3/6 hours during the night, pt refused to have it on any longer. Call light in reach, bed alarm set, safety maintained. No complaints or requests at this time, will continue to monitor.

## 2020-08-02 NOTE — PLAN OF CARE
POC reviewed with pt; pt verbalized understanding. Pt AAO x 4. Pt ambulates with walker and one person assistance. Pt voids in toilet w/o difficulty. Room air. PRN pain and nausea medications administered. Pt running low-grade fever; tylenol administered. PIV intact. IVF administered. Surgical dressing changed; cdi. Immobilizer at bedside; in use when pt is OOB. Cryo in use throughout shift. SCDs/foot pumps in use and xarelto administered. Call bell in reach, bed locked, bed alarm on, and fall band and non skid socks on. Will continue to monitor.

## 2020-08-02 NOTE — PROGRESS NOTES
"[facility]  Orthopedics  Progress Note    Patient Name: Beatriz Gan  MRN: [id number]  Admission Date: 7/30/2020  Hospital Length of Stay: 0 days  Attending Provider: Miri Montelongo MD  Primary Care Provider: Jorge Alonzo MD  Follow-up For: Procedure(s) (LRB):  ARTHROPLASTY, KNEE LEFT (Left)    Post-Operative Day: 3 Days Post-Op  Subjective:     Principal Problem:Primary osteoarthritis of left knee    Principal Orthopedic Problem: S/P L TKA    Interval History: intermittent fever    Review of patient's allergies indicates:   Allergen Reactions    Phenergan [promethazine] Hives and Rash    Latex, natural rubber Hives     Per pt report-many years    Morphine Hives    Nsaids (non-steroidal anti-inflammatory drug)      Due to "kidney cancer"       Current Facility-Administered Medications   Medication    0.9%  NaCl infusion    acetaminophen tablet 650 mg    acetaminophen tablet 650 mg    acetaminophen tablet 650 mg    albuterol-ipratropium 2.5 mg-0.5 mg/3 mL nebulizer solution 3 mL    ALPRAZolam tablet 0.5 mg    amLODIPine tablet 5 mg    azelastine 137 mcg (0.1 %) nasal spray 137 mcg    budesonide nebulizer solution 0.5 mg    calcium carbonate (OS-TERI) tablet 500 mg    docusate sodium capsule 100 mg    DULoxetine DR capsule 30 mg    electrolyte-S (ISOLYTE)    famotidine tablet 20 mg    HYDROmorphone injection 1 mg    ibuprofen tablet 400 mg    loperamide capsule 2 mg    lorazepam injection 1 mg    magnesium gluconate tablet 27 mg    melatonin tablet 6 mg    multivitamin tablet    mupirocin 2 % ointment 1 g    ondansetron injection 4 mg    oxyCODONE immediate release tablet 5 mg    oxyCODONE immediate release tablet 5 mg    oxyCODONE immediate release tablet Tab 10 mg    oxyCODONE immediate release tablet Tab 10 mg    polyethylene glycol packet 17 g    pregabalin capsule 75 mg    pregabalin capsule 75 mg    rivaroxaban tablet 10 mg    sodium chloride " "0.9% flush 10 mL     Facility-Administered Medications Ordered in Other Encounters   Medication    electrolyte-S (ISOLYTE)    lactated ringers infusion     Objective:     Vital Signs (Most Recent):  Temp: 97.4 °F (36.3 °C) (08/02/20 1110)  Pulse: 87 (08/02/20 1341)  Resp: 16 (08/02/20 1341)  BP: (!) 98/53 (08/02/20 1110)  SpO2: 97 % (08/02/20 1341) Vital Signs (24h Range):  Temp:  [97.4 °F (36.3 °C)-101 °F (38.3 °C)] 97.4 °F (36.3 °C)  Pulse:  [] 87  Resp:  [14-18] 16  SpO2:  [92 %-97 %] 97 %  BP: ()/(53-63) 98/53     Weight: 86.2 kg (190 lb)  Height: 5' 4" (162.6 cm)  Body mass index is 32.61 kg/m².      Intake/Output Summary (Last 24 hours) at 8/2/2020 1528  Last data filed at 8/2/2020 0500  Gross per 24 hour   Intake 1975 ml   Output 900 ml   Net 1075 ml       Ortho/SPM Exam    Significant Labs:   CBC:   Recent Labs   Lab 08/01/20  0703 08/02/20  0508   WBC 7.42 4.32   HGB 8.3* 7.7*   HCT 25.7* 24.3*    153     CMP:   Recent Labs   Lab 08/01/20  0703 08/02/20  0508   * 135*   K 3.8 3.9   CL 96 101   CO2 21* 24   * 105   BUN 19 13   CREATININE 1.9* 1.2   CALCIUM 9.0 8.8   PROT 5.9* 5.7*   ALBUMIN 2.9* 2.6*   BILITOT 0.6 0.4   ALKPHOS 96 103   AST 28 25   ALT 10 9*   ANIONGAP 12 10   EGFRNONAA 27* 47*     CRP: No results for input(s): CRP in the last 48 hours.  Urine Culture: No results for input(s): LABURIN in the last 48 hours.  Urine Studies: No results for input(s): COLORU, APPEARANCEUA, PHUR, SPECGRAV, PROTEINUA, GLUCUA, KETONESU, BILIRUBINUA, OCCULTUA, NITRITE, UROBILINOGEN, LEUKOCYTESUR, RBCUA, WBCUA, BACTERIA, SQUAMEPITHEL, HYALINECASTS in the last 48 hours.    Invalid input(s): ASHLEY  All pertinent labs within the past 24 hours have been reviewed.    Significant Imaging: X-Ray: I have reviewed all pertinent results/findings and my personal findings are:  No acute abnormality    Assessment/Plan:     * Primary osteoarthritis of left knee  Stable to HI home with " HH  Pending infection work up          THANIA SHOEMAKER  Orthopedics  [facility]

## 2020-08-02 NOTE — NURSING
Pt's temp 101. LENIN Pabon notified. Orders to give medication. Will continue to monitor.    /63. Will hold BP meds for now; NP aware.

## 2020-08-02 NOTE — ASSESSMENT & PLAN NOTE
Improved today  BMP reviewed  noted Estimated Creatinine Clearance: 30.9 mL/min (A) (based on SCr of 1.9 mg/dL (H)). according to latest data. Monitor UOP and serial BMP and adjust therapy as needed. Renally dose meds.  Continue IV fluids normal saline at 75 cc/hour  Continue to monitor closely

## 2020-08-02 NOTE — CARE UPDATE
Aerosol tx Q4. IS therapy done with fair effort       08/02/20 0748 08/02/20 0751   Patient Assessment/Suction   Level of Consciousness (AVPU) alert  --    Respiratory Effort Unlabored  --    Expansion/Accessory Muscles/Retractions expansion symmetric  --    All Lung Fields Breath Sounds diminished;wheezes, expiratory  (mild wheezing)  --    Rhythm/Pattern, Respiratory unlabored  --    Cough Frequency no cough  --    PRE-TX-O2   O2 Device (Oxygen Therapy) room air  --    SpO2 (!) 92 %  --    Pulse Oximetry Type Intermittent  --    $ Pulse Oximetry - Multiple Charge Pulse Oximetry - Multiple  --    Pulse 96 96   Resp 16 16   Aerosol Therapy   $ Aerosol Therapy Charges Aerosol Treatment Aerosol Treatment   Respiratory Treatment Status (SVN) given given   Treatment Route (SVN) mask;oxygen mask;oxygen   Patient Position (SVN) HOB elevated HOB elevated   Post Treatment Assessment (SVN) increased aeration increased aeration   Signs of Intolerance (SVN) none none   Breath Sounds Post-Respiratory Treatment   Throughout All Fields Post-Treatment All Fields All Fields   Throughout All Fields Post-Treatment aeration increased aeration increased   Post-treatment Heart Rate (beats/min) 96 96   Post-treatment Resp Rate (breaths/min) 16 16   Incentive Spirometer   $ Incentive Spirometer Charges  --  done with encouragement   Administration (IS)  --  proper technique demonstrated   Number of Repetitions (IS)  --  10   Level Incentive Spirometer (mL)  --  1000   Patient Tolerance (IS)  --  fair

## 2020-08-02 NOTE — ASSESSMENT & PLAN NOTE
Patient's anemia is currently controlled. Has not received any PRBCs to date.. Etiology likely d/t acute blood loss on chronic NATHALIE  Current CBC reviewed-   Lab Results   Component Value Date    HGB 7.7 (L) 08/02/2020    HCT 24.3 (L) 08/02/2020     Monitor serial CBC and transfuse if patient becomes hemodynamically unstable, symptomatic or H/H drops below 7/21.

## 2020-08-02 NOTE — ASSESSMENT & PLAN NOTE
POD 3 s/p left TKA with Dr. Martinez  Continue to follow Orthopedic recommendations.  Needs aggressive incentive spirometry.  Follow hemoglobin and hematocrit closely.  Pain control with IV narcotics and antiemetics as needed.  Physical therapy as per Orthopedics protocol with fall precautions.  xarelto for DVT prophylaxis per orthopedic recommendations.

## 2020-08-02 NOTE — PLAN OF CARE
"POC reviewed with pt; pt verbalized understanding. Pt AAO x 4. Pt a bit confused at the beginning of shift, stating "she thought her  was here overnight and her dogs were in bed with her." No hallucinations noted; confusion resolved within an hour of awakening. Pt ambulates with one person assistance and walker. Immobilizer in use when OOB. Surgical dressing cdi. Cryo in use and SCDs in place. Pt voids in toilet w/o difficulty. Room air. PRN pain and nausea medication administered. Pt given scheduled xanax to prevent withdrawals. Pt febrile; resolved after administration of ibuprofen. PIV cdi. IVF administered. Call bell in reach, bed locked, bed alarm on, and fall band and non skid socks on. Will continue to monitor.    "

## 2020-08-02 NOTE — ASSESSMENT & PLAN NOTE
Complete Bactrim x 3 days started as outpatient.  Repeating UA today given patient's intermittent fevers

## 2020-08-02 NOTE — PROGRESS NOTES
[facility] Medicine  Progress Note    Patient Name: Beatriz Gan  MRN: [id number]  Patient Class: OP- Outpatient Recovery   Admission Date: 7/30/2020  Length of Stay: 0 days  Attending Physician: Miri Montelongo MD  Primary Care Provider: Jorge Alonzo MD        Subjective:     Principal Problem:Primary osteoarthritis of left knee        HPI:  The pt is 63 yo female with PMHx of DJD, Gout, HTN, Hyperlipidemia , Iron def anemia , Renal cell carcinoma and  Alcohol dependence who admitted after total  left knee arthroplasty by Dr. Ruben Martinez. Pt with known h/o left knee degenerative joint disease has had  persistent left knee pain.   She underwent a left knee arthroscopy which did not help much.  She then underwent left knee radiofrequency ablation without good result.  This process has been going on for quite a long time.  She states that she can no longer continue to live with her symptoms and is ready to proceed with more aggressive surgical treatment options. She underwent left total knee arthroplasty today . No immediate post op complications and currently has no c/o chest pain, SOB or palpitations.      Overview/Hospital Course:  7/31- POD #1  Afebrile . Noted pt on O2 at 2L/min via NC. Mild hypoxia overnight . Pt reported H/O COPD and uses inhaler at home . Denies overt SOB or wheezing . On exam diminished breath sounds at bases. Will stop IVF. Get BNP.  Encouraged IS. Ortho recommended Cont to increase OOB activity with PT and nursing. Change dressing today after 1200 and pull drain. Plan for DC home with HH tomorrow am. Labs Hgb 9.2>11.4, Na 133, K 3.9, creatinine 1.3>1.1.     Interval History:  Notes reviewed, no acute events overnight.  Patient continues to have intermittent low-grade fevers.  Patient denies chest pain or shortness of breath.  She does report feeling intermittent chills with malaise and body aches.  She denies dysuria but reports urinary  hesitancy.  She denies foul-smelling urine.  She states her knee pain is controlled with pain medication.  Patient advise will order UA, chest x-ray and blood cultures.  Patient encouraged to get out of bed as much as possible today and to use incentive spirometer.    Review of Systems   Constitutional: Positive for chills, fatigue and fever.   HENT: Negative for congestion, sore throat and trouble swallowing.    Eyes: Negative for photophobia and visual disturbance.   Respiratory: Negative for cough, chest tightness and shortness of breath.    Cardiovascular: Negative for chest pain, palpitations and leg swelling.   Gastrointestinal: Negative for abdominal pain, diarrhea, nausea and vomiting.   Endocrine: Negative for polyphagia and polyuria.   Genitourinary: Negative for difficulty urinating, dysuria and urgency.        Hesitancy   Musculoskeletal: Positive for arthralgias and myalgias. Negative for back pain and gait problem.   Neurological: Negative for dizziness, weakness and light-headedness.   Hematological: Negative for adenopathy.   Psychiatric/Behavioral: Negative for agitation, behavioral problems and confusion. The patient is nervous/anxious.    All other systems reviewed and are negative.    Objective:     Vital Signs (Most Recent):  Temp: (!) 101 °F (38.3 °C) (08/02/20 0833)  Pulse: 96 (08/02/20 0751)  Resp: 18 (08/02/20 0833)  BP: 107/63 (08/02/20 0727)  SpO2: (!) 92 % (08/02/20 0748) Vital Signs (24h Range):  Temp:  [97.7 °F (36.5 °C)-101 °F (38.3 °C)] 101 °F (38.3 °C)  Pulse:  [] 96  Resp:  [14-18] 18  SpO2:  [92 %-97 %] 92 %  BP: ()/(54-63) 107/63     Weight: 86.2 kg (190 lb)  Body mass index is 32.61 kg/m².    Intake/Output Summary (Last 24 hours) at 8/2/2020 1025  Last data filed at 8/2/2020 0500  Gross per 24 hour   Intake 2095 ml   Output 1800 ml   Net 295 ml      Physical Exam  Vitals signs and nursing note reviewed.   Constitutional:       General: She is not in acute distress.      Appearance: Normal appearance. She is well-developed. She is not ill-appearing or diaphoretic.   HENT:      Head: Normocephalic and atraumatic.      Right Ear: External ear normal.      Left Ear: External ear normal.      Nose: Nose normal.      Mouth/Throat:      Mouth: Mucous membranes are moist.      Pharynx: Oropharynx is clear. No oropharyngeal exudate or posterior oropharyngeal erythema.   Eyes:      Conjunctiva/sclera: Conjunctivae normal.      Pupils: Pupils are equal, round, and reactive to light.   Neck:      Musculoskeletal: Normal range of motion and neck supple.      Vascular: No JVD.   Cardiovascular:      Rate and Rhythm: Normal rate and regular rhythm.      Pulses: Normal pulses.      Heart sounds: Normal heart sounds. No murmur.   Pulmonary:      Effort: Pulmonary effort is normal. No respiratory distress.      Breath sounds: Rhonchi present. No wheezing.      Comments: Rhonchi to bilateral lower lobes slightly improved after coughing  Abdominal:      General: Bowel sounds are normal. There is no distension.      Palpations: Abdomen is soft.      Tenderness: There is no abdominal tenderness.   Musculoskeletal: Normal range of motion.      Comments: Some limitation to range of motion of left knee secondary to recent TKA, dressing in place CDI   Skin:     General: Skin is warm and dry.      Capillary Refill: Capillary refill takes 2 to 3 seconds.      Coloration: Skin is not jaundiced or pale.      Findings: No erythema or rash.   Neurological:      General: No focal deficit present.      Mental Status: She is alert and oriented to person, place, and time.      Sensory: No sensory deficit.      Motor: No weakness.   Psychiatric:         Mood and Affect: Mood normal.         Behavior: Behavior normal.         Thought Content: Thought content normal.         Significant Labs:   CBC:   Recent Labs   Lab 08/01/20  0703 08/02/20  0508   WBC 7.42 4.32   HGB 8.3* 7.7*   HCT 25.7* 24.3*    153      CMP:   Recent Labs   Lab 08/01/20  0703 08/02/20  0508   * 135*   K 3.8 3.9   CL 96 101   CO2 21* 24   * 105   BUN 19 13   CREATININE 1.9* 1.2   CALCIUM 9.0 8.8   PROT 5.9* 5.7*   ALBUMIN 2.9* 2.6*   BILITOT 0.6 0.4   ALKPHOS 96 103   AST 28 25   ALT 10 9*   ANIONGAP 12 10   EGFRNONAA 27* 47*     All pertinent labs within the past 24 hours have been reviewed.    Significant Imaging: I have reviewed all pertinent imaging results/findings within the past 24 hours.      Assessment/Plan:      * Primary osteoarthritis of left knee  POD 3 s/p left TKA with Dr. Martinez  Continue to follow Orthopedic recommendations.  Needs aggressive incentive spirometry.  Follow hemoglobin and hematocrit closely.  Pain control with IV narcotics and antiemetics as needed.  Physical therapy as per Orthopedics protocol with fall precautions.  xarelto for DVT prophylaxis per orthopedic recommendations.         Urinary retention  Acute post Bautista removal -  Now resolved      CKD (chronic kidney disease) stage 3, GFR 30-59 ml/min  Improved today  BMP reviewed  noted Estimated Creatinine Clearance: 30.9 mL/min (A) (based on SCr of 1.9 mg/dL (H)). according to latest data. Monitor UOP and serial BMP and adjust therapy as needed. Renally dose meds.  Continue IV fluids normal saline at 75 cc/hour  Continue to monitor closely      Recent H/O UTI (urinary tract infection)  Complete Bactrim x 3 days started as outpatient.  Repeating UA today given patient's intermittent fevers    Essential hypertension  Chronic, controlled.  Latest blood pressure and vitals reviewed-   Temp:  [97.1 °F (36.2 °C)-101.5 °F (38.6 °C)]   Pulse:  [72-92]   Resp:  [15-20]   BP: ()/(50-67)   SpO2:  [92 %-97 %] .   Home meds for hypertension were reviewed and noted below. Hospital anti-hypertensive changes were made as shown below.  Hypertension Medications             amLODIPine (NORVASC) 5 MG tablet Take 1 tablet (5 mg total) by mouth once daily.     losartan-hydrochlorothiazide 100-25 mg (HYZAAR) 100-25 mg per tablet Take 1 tablet by mouth once daily.      Hospital Medications             amLODIPine tablet 5 mg 5 mg, Oral, Daily, HOLD  IF BP UNDER 140/90    hydroCHLOROthiazide tablet 25 mg 25 mg, Oral, Daily, HOLD IF BP UNDER 140/90        Will utilize p.r.n. blood pressure medication only if patient's blood pressure greater than  180/110 and she develops symptoms such as worsening chest pain or shortness of breath.    Holding hydrochlorothiazide and losartan due to slight worsening of kidney function      GERD (gastroesophageal reflux disease)  Chronic stable   continue home meds      NATHALIE (iron deficiency anemia)  Patient's anemia is currently controlled. Has not received any PRBCs to date.. Etiology likely d/t acute blood loss on chronic NATHALIE  Current CBC reviewed-   Lab Results   Component Value Date    HGB 7.7 (L) 08/02/2020    HCT 24.3 (L) 08/02/2020     Monitor serial CBC and transfuse if patient becomes hemodynamically unstable, symptomatic or H/H drops below 7/21.         VTE Risk Mitigation (From admission, onward)         Ordered     rivaroxaban tablet 10 mg  With dinner      07/30/20 1535     IP VTE LOW RISK PATIENT  Once      07/30/20 1535     Place sequential compression device  Until discontinued      07/30/20 1535     Place sequential compression device  Until discontinued      07/30/20 0855     Place RADHA hose  Until discontinued      07/30/20 0855                      Nicky Pabon NP  Department of Hospital Medicine   [facility]

## 2020-08-02 NOTE — SUBJECTIVE & OBJECTIVE
"Principal Problem:Primary osteoarthritis of left knee    Principal Orthopedic Problem: S/P L TKA    Interval History: intermittent fever    Review of patient's allergies indicates:   Allergen Reactions    Phenergan [promethazine] Hives and Rash    Latex, natural rubber Hives     Per pt report-many years    Morphine Hives    Nsaids (non-steroidal anti-inflammatory drug)      Due to "kidney cancer"       Current Facility-Administered Medications   Medication    0.9%  NaCl infusion    acetaminophen tablet 650 mg    acetaminophen tablet 650 mg    acetaminophen tablet 650 mg    albuterol-ipratropium 2.5 mg-0.5 mg/3 mL nebulizer solution 3 mL    ALPRAZolam tablet 0.5 mg    amLODIPine tablet 5 mg    azelastine 137 mcg (0.1 %) nasal spray 137 mcg    budesonide nebulizer solution 0.5 mg    calcium carbonate (OS-TERI) tablet 500 mg    docusate sodium capsule 100 mg    DULoxetine DR capsule 30 mg    electrolyte-S (ISOLYTE)    famotidine tablet 20 mg    HYDROmorphone injection 1 mg    ibuprofen tablet 400 mg    loperamide capsule 2 mg    lorazepam injection 1 mg    magnesium gluconate tablet 27 mg    melatonin tablet 6 mg    multivitamin tablet    mupirocin 2 % ointment 1 g    ondansetron injection 4 mg    oxyCODONE immediate release tablet 5 mg    oxyCODONE immediate release tablet 5 mg    oxyCODONE immediate release tablet Tab 10 mg    oxyCODONE immediate release tablet Tab 10 mg    polyethylene glycol packet 17 g    pregabalin capsule 75 mg    pregabalin capsule 75 mg    rivaroxaban tablet 10 mg    sodium chloride 0.9% flush 10 mL     Facility-Administered Medications Ordered in Other Encounters   Medication    electrolyte-S (ISOLYTE)    lactated ringers infusion     Objective:     Vital Signs (Most Recent):  Temp: 97.4 °F (36.3 °C) (08/02/20 1110)  Pulse: 87 (08/02/20 1341)  Resp: 16 (08/02/20 1341)  BP: (!) 98/53 (08/02/20 1110)  SpO2: 97 % (08/02/20 1341) Vital Signs (24h " "Range):  Temp:  [97.4 °F (36.3 °C)-101 °F (38.3 °C)] 97.4 °F (36.3 °C)  Pulse:  [] 87  Resp:  [14-18] 16  SpO2:  [92 %-97 %] 97 %  BP: ()/(53-63) 98/53     Weight: 86.2 kg (190 lb)  Height: 5' 4" (162.6 cm)  Body mass index is 32.61 kg/m².      Intake/Output Summary (Last 24 hours) at 8/2/2020 1528  Last data filed at 8/2/2020 0500  Gross per 24 hour   Intake 1975 ml   Output 900 ml   Net 1075 ml       Ortho/SPM Exam    Significant Labs:   CBC:   Recent Labs   Lab 08/01/20  0703 08/02/20  0508   WBC 7.42 4.32   HGB 8.3* 7.7*   HCT 25.7* 24.3*    153     CMP:   Recent Labs   Lab 08/01/20  0703 08/02/20  0508   * 135*   K 3.8 3.9   CL 96 101   CO2 21* 24   * 105   BUN 19 13   CREATININE 1.9* 1.2   CALCIUM 9.0 8.8   PROT 5.9* 5.7*   ALBUMIN 2.9* 2.6*   BILITOT 0.6 0.4   ALKPHOS 96 103   AST 28 25   ALT 10 9*   ANIONGAP 12 10   EGFRNONAA 27* 47*     CRP: No results for input(s): CRP in the last 48 hours.  Urine Culture: No results for input(s): LABURIN in the last 48 hours.  Urine Studies: No results for input(s): COLORU, APPEARANCEUA, PHUR, SPECGRAV, PROTEINUA, GLUCUA, KETONESU, BILIRUBINUA, OCCULTUA, NITRITE, UROBILINOGEN, LEUKOCYTESUR, RBCUA, WBCUA, BACTERIA, SQUAMEPITHEL, HYALINECASTS in the last 48 hours.    Invalid input(s): WRIGHTSUR  All pertinent labs within the past 24 hours have been reviewed.    Significant Imaging: X-Ray: I have reviewed all pertinent results/findings and my personal findings are:  No acute abnormality  "

## 2020-08-02 NOTE — NURSING
Pt blood pressure 90/54 MAP 60. Juan Antonio notified. 500 ml bolus infused per orders.     BP retaken 107/60 with MAP 77 resulted.

## 2020-08-02 NOTE — NURSING
Pt's discharge on hold per NP related to fever this am and ua pending. Also hypotensive this am. Pt c/o generalized weakness.

## 2020-08-02 NOTE — PT/OT/SLP PROGRESS
Physical Therapy Treatment    Patient Name:  Beatriz Gan   MRN:  [id number]    Recommendations:     Discharge Recommendations:  home health PT   Discharge Equipment Recommendations: none   Barriers to discharge: None    Assessment:     Beatriz Gan is a 64 y.o. female admitted with a medical diagnosis of Primary osteoarthritis of left knee.  She presents with the following impairments/functional limitations:    .    Rehab Prognosis: Good; patient would benefit from acute skilled PT services to address these deficits and reach maximum level of function.    Recent Surgery: Procedure(s) (LRB):  ARTHROPLASTY, KNEE LEFT (Left) 3 Days Post-Op    Plan:     During this hospitalization, patient to be seen daily to address the identified rehab impairments via gait training, therapeutic activities, therapeutic exercises and progress toward the following goals:    · Plan of Care Expires:  08/30/20    Subjective     Chief Complaint: Nausea and generally not feeling good, refusing therapy  Patient/Family Comments/goals: States wanting to go home soon  Pain/Comfort:  · Pain Rating 1: 10/10  · Location - Side 1: Left  · Location - Orientation 1: generalized  · Location 1: knee  · Pain Addressed 1: Pre-medicate for activity, Reposition, Distraction  · Pain Rating Post-Intervention 1: 8/10      Objective:     Communicated with nurseAnnaleee prior to session.  Patient found supine with PureWick, peripheral IV, SCD upon PT entry to room.     General Precautions: Standard, fall   Orthopedic Precautions:LLE weight bearing as tolerated   Braces: Knee immobilizer     Functional Mobility:  · Bed Mobility:     · Rolling Left:  stand by assistance  · Transfers:     · Sit to Stand:  minimum assistance with rolling walker  · Toilet Transfer: stand by assistance with  rolling walker  using  Step Transfer  · Gait: 60' with RW      AM-PAC 6 CLICK MOBILITY          Therapeutic Activities and Exercises:   Assisted with LE dressing  (min)    Patient left up in chair with all lines intact, call button in reach, chair alarm on, nurse notified and propped L LE to decrease L hip ER along with placement of polar ice pack..    GOALS:   Multidisciplinary Problems     Physical Therapy Goals        Problem: Physical Therapy Goal    Goal Priority Disciplines Outcome Goal Variances Interventions   Physical Therapy Goal     PT, PT/OT Ongoing, Progressing     Description: Goals to be met by: 2020     Patient will increase functional independence with mobility by performin. Supine to sit with Contact Guard Assistance  2. Sit to stand transfer with Contact Guard Assistance  3. Gait  x 250 feet with Contact Guard Assistance using Rolling Walker.   4. Ascend/descend 16 stair with bilateral Handrails Contact Guard Assistance u.   5. Lower extremity exercise program x20 reps                    Time Tracking:     PT Received On: 20  PT Start Time: 0910     PT Stop Time: 1010  PT Total Time (min): 60 min     Billable Minutes: Gait Training 25 and Therapeutic Activity 35    Treatment Type: Treatment  PT/PTA: PTA     PTA Visit Number: 1     Rhonda Boudreaux, PTA  2020

## 2020-08-02 NOTE — RESPIRATORY THERAPY
08/01/20 2012   Patient Assessment/Suction   Level of Consciousness (AVPU) alert   Respiratory Effort Normal;Unlabored   Expansion/Accessory Muscles/Retractions expansion symmetric;no retractions;no use of accessory muscles   All Lung Fields Breath Sounds diminished;clear   Cough Frequency no cough   PRE-TX-O2   O2 Device (Oxygen Therapy) room air   SpO2 (!) 94 %   Pulse Oximetry Type Intermittent   Pulse 95   Resp 16   Aerosol Therapy   $ Aerosol Therapy Charges Aerosol Treatment   Respiratory Treatment Status (SVN) given   Treatment Route (SVN) mask;oxygen   Patient Position (SVN) HOB elevated   Signs of Intolerance (SVN) none   Breath Sounds Post-Respiratory Treatment   Throughout All Fields Post-Treatment All Fields   Throughout All Fields Post-Treatment no change   Post-treatment Heart Rate (beats/min) 90   Post-treatment Resp Rate (breaths/min) 16   Incentive Spirometer   $ Incentive Spirometer Charges done independently per patient

## 2020-08-03 VITALS
TEMPERATURE: 98 F | HEART RATE: 80 BPM | SYSTOLIC BLOOD PRESSURE: 112 MMHG | DIASTOLIC BLOOD PRESSURE: 80 MMHG | HEIGHT: 64 IN | BODY MASS INDEX: 32.44 KG/M2 | WEIGHT: 190 LBS | OXYGEN SATURATION: 99 % | RESPIRATION RATE: 14 BRPM

## 2020-08-03 LAB
ABO + RH BLD: NORMAL
ALBUMIN SERPL BCP-MCNC: 2.4 G/DL (ref 3.5–5.2)
ALP SERPL-CCNC: 104 U/L (ref 55–135)
ALT SERPL W/O P-5'-P-CCNC: 11 U/L (ref 10–44)
ANION GAP SERPL CALC-SCNC: 8 MMOL/L (ref 8–16)
AST SERPL-CCNC: 21 U/L (ref 10–40)
BACTERIA UR CULT: NO GROWTH
BASOPHILS # BLD AUTO: 0.01 K/UL (ref 0–0.2)
BASOPHILS NFR BLD: 0.3 % (ref 0–1.9)
BILIRUB SERPL-MCNC: 0.4 MG/DL (ref 0.1–1)
BLD GP AB SCN CELLS X3 SERPL QL: NORMAL
BLD PROD TYP BPU: NORMAL
BLD PROD TYP BPU: NORMAL
BLOOD UNIT EXPIRATION DATE: NORMAL
BLOOD UNIT EXPIRATION DATE: NORMAL
BLOOD UNIT TYPE CODE: 5100
BLOOD UNIT TYPE CODE: 5100
BLOOD UNIT TYPE: NORMAL
BLOOD UNIT TYPE: NORMAL
BUN SERPL-MCNC: 10 MG/DL (ref 8–23)
CALCIUM SERPL-MCNC: 8.9 MG/DL (ref 8.7–10.5)
CHLORIDE SERPL-SCNC: 103 MMOL/L (ref 95–110)
CO2 SERPL-SCNC: 26 MMOL/L (ref 23–29)
CODING SYSTEM: NORMAL
CODING SYSTEM: NORMAL
CREAT SERPL-MCNC: 0.9 MG/DL (ref 0.5–1.4)
DIFFERENTIAL METHOD: ABNORMAL
DISPENSE STATUS: NORMAL
DISPENSE STATUS: NORMAL
EOSINOPHIL # BLD AUTO: 0.1 K/UL (ref 0–0.5)
EOSINOPHIL NFR BLD: 1.7 % (ref 0–8)
ERYTHROCYTE [DISTWIDTH] IN BLOOD BY AUTOMATED COUNT: 12.3 % (ref 11.5–14.5)
EST. GFR  (AFRICAN AMERICAN): >60 ML/MIN/1.73 M^2
EST. GFR  (NON AFRICAN AMERICAN): >60 ML/MIN/1.73 M^2
GLUCOSE SERPL-MCNC: 104 MG/DL (ref 70–110)
HCT VFR BLD AUTO: 21.4 % (ref 37–48.5)
HGB BLD-MCNC: 6.7 G/DL (ref 12–16)
IMM GRANULOCYTES # BLD AUTO: 0.01 K/UL (ref 0–0.04)
IMM GRANULOCYTES NFR BLD AUTO: 0.3 % (ref 0–0.5)
LYMPHOCYTES # BLD AUTO: 0.7 K/UL (ref 1–4.8)
LYMPHOCYTES NFR BLD: 21.5 % (ref 18–48)
MCH RBC QN AUTO: 32.5 PG (ref 27–31)
MCHC RBC AUTO-ENTMCNC: 31.3 G/DL (ref 32–36)
MCV RBC AUTO: 104 FL (ref 82–98)
MONOCYTES # BLD AUTO: 0.5 K/UL (ref 0.3–1)
MONOCYTES NFR BLD: 15.8 % (ref 4–15)
NEUTROPHILS # BLD AUTO: 1.8 K/UL (ref 1.8–7.7)
NEUTROPHILS NFR BLD: 60.4 % (ref 38–73)
NRBC BLD-RTO: 0 /100 WBC
NUM UNITS TRANS PACKED RBC: NORMAL
NUM UNITS TRANS PACKED RBC: NORMAL
PLATELET # BLD AUTO: 156 K/UL (ref 150–350)
PMV BLD AUTO: 10.2 FL (ref 9.2–12.9)
POTASSIUM SERPL-SCNC: 4.1 MMOL/L (ref 3.5–5.1)
PROT SERPL-MCNC: 5.4 G/DL (ref 6–8.4)
RBC # BLD AUTO: 2.06 M/UL (ref 4–5.4)
SODIUM SERPL-SCNC: 137 MMOL/L (ref 136–145)
WBC # BLD AUTO: 3.03 K/UL (ref 3.9–12.7)

## 2020-08-03 PROCEDURE — 86901 BLOOD TYPING SEROLOGIC RH(D): CPT

## 2020-08-03 PROCEDURE — 94761 N-INVAS EAR/PLS OXIMETRY MLT: CPT

## 2020-08-03 PROCEDURE — 25000003 PHARM REV CODE 250: Performed by: INTERNAL MEDICINE

## 2020-08-03 PROCEDURE — 36430 TRANSFUSION BLD/BLD COMPNT: CPT

## 2020-08-03 PROCEDURE — 25000003 PHARM REV CODE 250: Performed by: NURSE PRACTITIONER

## 2020-08-03 PROCEDURE — 36415 COLL VENOUS BLD VENIPUNCTURE: CPT

## 2020-08-03 PROCEDURE — 97116 GAIT TRAINING THERAPY: CPT | Mod: CQ

## 2020-08-03 PROCEDURE — 85025 COMPLETE CBC W/AUTO DIFF WBC: CPT

## 2020-08-03 PROCEDURE — 94799 UNLISTED PULMONARY SVC/PX: CPT

## 2020-08-03 PROCEDURE — 86920 COMPATIBILITY TEST SPIN: CPT

## 2020-08-03 PROCEDURE — 80053 COMPREHEN METABOLIC PANEL: CPT

## 2020-08-03 PROCEDURE — 94640 AIRWAY INHALATION TREATMENT: CPT

## 2020-08-03 PROCEDURE — 25000242 PHARM REV CODE 250 ALT 637 W/ HCPCS: Performed by: INTERNAL MEDICINE

## 2020-08-03 PROCEDURE — P9016 RBC LEUKOCYTES REDUCED: HCPCS

## 2020-08-03 RX ORDER — HYDROCODONE BITARTRATE AND ACETAMINOPHEN 500; 5 MG/1; MG/1
TABLET ORAL
Status: DISCONTINUED | OUTPATIENT
Start: 2020-08-03 | End: 2020-08-03 | Stop reason: HOSPADM

## 2020-08-03 RX ADMIN — IPRATROPIUM BROMIDE AND ALBUTEROL SULFATE 3 ML: .5; 3 SOLUTION RESPIRATORY (INHALATION) at 01:08

## 2020-08-03 RX ADMIN — ACETAMINOPHEN 650 MG: 325 TABLET ORAL at 01:08

## 2020-08-03 RX ADMIN — MUPIROCIN 1 G: 20 OINTMENT TOPICAL at 09:08

## 2020-08-03 RX ADMIN — CALCIUM 500 MG: 500 TABLET ORAL at 09:08

## 2020-08-03 RX ADMIN — OXYCODONE 5 MG: 5 TABLET ORAL at 01:08

## 2020-08-03 RX ADMIN — FAMOTIDINE 20 MG: 20 TABLET ORAL at 09:08

## 2020-08-03 RX ADMIN — DOCUSATE SODIUM 100 MG: 100 CAPSULE, LIQUID FILLED ORAL at 09:08

## 2020-08-03 RX ADMIN — IPRATROPIUM BROMIDE AND ALBUTEROL SULFATE 3 ML: .5; 3 SOLUTION RESPIRATORY (INHALATION) at 07:08

## 2020-08-03 RX ADMIN — BUDESONIDE 0.5 MG: 0.5 SUSPENSION RESPIRATORY (INHALATION) at 07:08

## 2020-08-03 RX ADMIN — PREGABALIN 75 MG: 75 CAPSULE ORAL at 09:08

## 2020-08-03 RX ADMIN — Medication 27 MG: at 09:08

## 2020-08-03 RX ADMIN — MULTIPLE VITAMINS W/ MINERALS TAB 1 TABLET: TAB at 09:08

## 2020-08-03 RX ADMIN — DULOXETINE 30 MG: 30 CAPSULE, DELAYED RELEASE ORAL at 09:08

## 2020-08-03 RX ADMIN — AZELASTINE HYDROCHLORIDE 137 MCG: 137 SPRAY, METERED NASAL at 09:08

## 2020-08-03 RX ADMIN — AMLODIPINE BESYLATE 5 MG: 5 TABLET ORAL at 09:08

## 2020-08-03 RX ADMIN — ALPRAZOLAM 0.5 MG: 0.25 TABLET ORAL at 09:08

## 2020-08-03 NOTE — SUBJECTIVE & OBJECTIVE
Interval History:  Notes reviewed, no acute events overnight.  Patient seen and examined this morning sitting up in bedside chair.  She reports that she feels weak but desires to go home.  Advised patient that weakness is secondary to anemia and if she tolerates PRBC transfusion well without any complications and is doing well she can go home this evening.  Patient in good spirits and happy with plan of care.  She reports pain to leg is controlled.  She denies nausea and vomiting.  Her appetite is poor but she states that she does not eat a lot at home.  She only eats 2 meals a day on a normal basis.  I encouraged patient on boost supplements for nutritional intake.  Patient verbalized understanding and agreeable.    Review of Systems   Constitutional: Positive for fatigue. Negative for chills and fever.   HENT: Negative for congestion, sinus pressure and trouble swallowing.    Eyes: Negative for photophobia and visual disturbance.   Respiratory: Negative for cough, chest tightness and shortness of breath.    Cardiovascular: Negative for chest pain, palpitations and leg swelling.   Gastrointestinal: Negative for abdominal pain, diarrhea, nausea and vomiting.   Genitourinary: Negative for difficulty urinating, dysuria, frequency and urgency.   Musculoskeletal: Positive for arthralgias. Negative for back pain and gait problem.   Skin: Negative for color change, pallor, rash and wound.   Neurological: Negative for dizziness, weakness and light-headedness.   Hematological: Negative for adenopathy.   Psychiatric/Behavioral: Negative for agitation, behavioral problems and confusion.   All other systems reviewed and are negative.    Objective:     Vital Signs (Most Recent):  Temp: (P) 97.2 °F (36.2 °C) (08/03/20 1045)  Pulse: (P) 83 (08/03/20 1045)  Resp: (P) 18 (08/03/20 1045)  BP: (P) 111/65 (08/03/20 1045)  SpO2: (P) 99 % (08/03/20 1045) Vital Signs (24h Range):  Temp:  [96 °F (35.6 °C)-100.5 °F (38.1 °C)] (P) 97.2 °F  (36.2 °C)  Pulse:  [74-97] (P) 83  Resp:  [14-18] (P) 18  SpO2:  [95 %-100 %] (P) 99 %  BP: ()/(47-75) (P) 111/65     Weight: 86.2 kg (190 lb)  Body mass index is 32.61 kg/m².    Intake/Output Summary (Last 24 hours) at 8/3/2020 1238  Last data filed at 8/3/2020 0500  Gross per 24 hour   Intake 2377 ml   Output 800 ml   Net 1577 ml      Physical Exam  Vitals signs and nursing note reviewed.   Constitutional:       General: She is not in acute distress.     Appearance: Normal appearance. She is well-developed. She is not ill-appearing or diaphoretic.   HENT:      Head: Normocephalic and atraumatic.      Right Ear: External ear normal.      Left Ear: External ear normal.      Nose: Nose normal. No congestion or rhinorrhea.      Mouth/Throat:      Mouth: Mucous membranes are moist.      Pharynx: Oropharynx is clear. No oropharyngeal exudate or posterior oropharyngeal erythema.   Eyes:      Conjunctiva/sclera: Conjunctivae normal.      Pupils: Pupils are equal, round, and reactive to light.   Neck:      Musculoskeletal: Normal range of motion and neck supple.      Vascular: No JVD.   Cardiovascular:      Rate and Rhythm: Normal rate and regular rhythm.      Pulses: Normal pulses.      Heart sounds: Normal heart sounds. No murmur.   Pulmonary:      Effort: Pulmonary effort is normal. No respiratory distress.      Breath sounds: Normal breath sounds. No stridor. No wheezing, rhonchi or rales.   Chest:      Chest wall: No tenderness.   Abdominal:      General: Bowel sounds are normal. There is no distension.      Palpations: Abdomen is soft.      Tenderness: There is no abdominal tenderness.   Musculoskeletal: Normal range of motion.         General: No swelling or tenderness.      Comments: Limited range of motion to left knee secondary to recent TKA with mild limited mobility.   Skin:     General: Skin is warm and dry.      Capillary Refill: Capillary refill takes 2 to 3 seconds.   Neurological:      Mental Status:  She is alert and oriented to person, place, and time.   Psychiatric:         Behavior: Behavior normal.         Thought Content: Thought content normal.         Significant Labs:   CBC:   Recent Labs   Lab 08/02/20  0508 08/03/20  0507   WBC 4.32 3.03*   HGB 7.7* 6.7*   HCT 24.3* 21.4*    156     CMP:   Recent Labs   Lab 08/02/20  0508 08/03/20  0507   * 137   K 3.9 4.1    103   CO2 24 26    104   BUN 13 10   CREATININE 1.2 0.9   CALCIUM 8.8 8.9   PROT 5.7* 5.4*   ALBUMIN 2.6* 2.4*   BILITOT 0.4 0.4   ALKPHOS 103 104   AST 25 21   ALT 9* 11   ANIONGAP 10 8   EGFRNONAA 47* >60     All pertinent labs within the past 24 hours have been reviewed.    Significant Imaging: I have reviewed all pertinent imaging results/findings within the past 24 hours.

## 2020-08-03 NOTE — ASSESSMENT & PLAN NOTE
Remains stable  BMP reviewed  noted Estimated Creatinine Clearance: 30.9 mL/min (A) (based on SCr of 1.9 mg/dL (H)). according to latest data. Monitor UOP and serial BMP and adjust therapy as needed. Renally dose meds.  Continue to monitor closely     No

## 2020-08-03 NOTE — PT/OT/SLP PROGRESS
Physical Therapy      Patient Name:  Beatriz Gan   MRN:  [id number]    Patient not seen today secondary to Other (Comment)(Patient receiving blood per nurse Rebekah. CPM to be exchanged for discharge.). Will follow-up 8/4/2020.    Vira Prasad PTA

## 2020-08-03 NOTE — PLAN OF CARE
Problem: Physical Therapy Goal  Goal: Physical Therapy Goal  Description: Goals to be met by: 2020     Patient will increase functional independence with mobility by performin. Supine to sit with Contact Guard Assistance  2. Sit to stand transfer with Contact Guard Assistance  3. Gait  x 250 feet with Contact Guard Assistance using Rolling Walker.   4. Ascend/descend 16 stair with bilateral Handrails Contact Guard Assistance u.   5. Lower extremity exercise program x20 reps   Outcome: Ongoing, Progressing  Ambulate with rw and CGA

## 2020-08-03 NOTE — PLAN OF CARE
POC discussed with pt, pt verbalized understanding. Oriented x4. PIV cdi, infusing. Pt up to bedside commode with 1 person assist, weight bearing as tolerated. Purewick draining clear yellow urine. SCD's and foot pumps on. Dressing cdi, changed. Cryo on and ice refilled periodically throughout the shift. Pt complained of pain, medicated per orders, pain resolved. CPM machine appears broken, informed charge nurse. Pt appears confused for moments throughout the night. Also, became upset throughout the night stating that she is ready to go. Blood pressure monitored due to spurts of hypotension. Temperature monitored closely because of spurts of elevated temps. Call light in reach, bed alarm set, safety maintained. No complaints or requests at this time, will continue to monitor.

## 2020-08-03 NOTE — ASSESSMENT & PLAN NOTE
POD 4 s/p left TKA with Dr. Martinez  Continue to follow Orthopedic recommendations.  Needs aggressive incentive spirometry.  Follow hemoglobin and hematocrit closely.  Pain control with IV narcotics and antiemetics as needed.  Physical therapy as per Orthopedics protocol with fall precautions.  xarelto for DVT prophylaxis per orthopedic recommendations.

## 2020-08-03 NOTE — RESPIRATORY THERAPY
08/02/20 1950   Patient Assessment/Suction   Level of Consciousness (AVPU) alert   Respiratory Effort Normal;Unlabored   Expansion/Accessory Muscles/Retractions expansion symmetric;no retractions;no use of accessory muscles   All Lung Fields Breath Sounds clear   Cough Frequency no cough   PRE-TX-O2   O2 Device (Oxygen Therapy) room air   SpO2 96 %   Pulse Oximetry Type Intermittent   Pulse 95   Resp 16   Aerosol Therapy   $ Aerosol Therapy Charges Aerosol Treatment   Respiratory Treatment Status (SVN) given   Treatment Route (SVN) mask;oxygen   Patient Position (SVN) HOB elevated   Signs of Intolerance (SVN) none   Breath Sounds Post-Respiratory Treatment   Throughout All Fields Post-Treatment All Fields   Throughout All Fields Post-Treatment no change   Post-treatment Heart Rate (beats/min) 90   Post-treatment Resp Rate (breaths/min) 16   Incentive Spirometer   $ Incentive Spirometer Charges done independently per patient

## 2020-08-03 NOTE — PROGRESS NOTES
[facility] Medicine  Progress Note    Patient Name: Beatriz Gan  MRN: [id number]  Patient Class: OP- Outpatient Recovery   Admission Date: 7/30/2020  Length of Stay: 0 days  Attending Physician: Miri Montelongo MD  Primary Care Provider: Jorge Alonzo MD        Subjective:     Principal Problem:Primary osteoarthritis of left knee        HPI:  The pt is 65 yo female with PMHx of DJD, Gout, HTN, Hyperlipidemia , Iron def anemia , Renal cell carcinoma and  Alcohol dependence who admitted after total  left knee arthroplasty by Dr. Ruben Martinez. Pt with known h/o left knee degenerative joint disease has had  persistent left knee pain.   She underwent a left knee arthroscopy which did not help much.  She then underwent left knee radiofrequency ablation without good result.  This process has been going on for quite a long time.  She states that she can no longer continue to live with her symptoms and is ready to proceed with more aggressive surgical treatment options. She underwent left total knee arthroplasty today . No immediate post op complications and currently has no c/o chest pain, SOB or palpitations.      Overview/Hospital Course:  7/31- POD #1  Afebrile . Noted pt on O2 at 2L/min via NC. Mild hypoxia overnight . Pt reported H/O COPD and uses inhaler at home . Denies overt SOB or wheezing . On exam diminished breath sounds at bases. Will stop IVF. Get BNP.  Encouraged IS. Ortho recommended Cont to increase OOB activity with PT and nursing. Change dressing today after 1200 and pull drain. Plan for DC home with HH tomorrow am. Labs Hgb 9.2>11.4, Na 133, K 3.9, creatinine 1.3>1.1.     Interval History:  Notes reviewed, no acute events overnight.  Patient seen and examined this morning sitting up in bedside chair.  She reports that she feels weak but desires to go home.  Advised patient that weakness is secondary to anemia and if she tolerates PRBC transfusion well without  any complications and is doing well she can go home this evening.  Patient in good spirits and happy with plan of care.  She reports pain to leg is controlled.  She denies nausea and vomiting.  Her appetite is poor but she states that she does not eat a lot at home.  She only eats 2 meals a day on a normal basis.  I encouraged patient on boost supplements for nutritional intake.  Patient verbalized understanding and agreeable.    Review of Systems   Constitutional: Positive for fatigue. Negative for chills and fever.   HENT: Negative for congestion, sinus pressure and trouble swallowing.    Eyes: Negative for photophobia and visual disturbance.   Respiratory: Negative for cough, chest tightness and shortness of breath.    Cardiovascular: Negative for chest pain, palpitations and leg swelling.   Gastrointestinal: Negative for abdominal pain, diarrhea, nausea and vomiting.   Genitourinary: Negative for difficulty urinating, dysuria, frequency and urgency.   Musculoskeletal: Positive for arthralgias. Negative for back pain and gait problem.   Skin: Negative for color change, pallor, rash and wound.   Neurological: Negative for dizziness, weakness and light-headedness.   Hematological: Negative for adenopathy.   Psychiatric/Behavioral: Negative for agitation, behavioral problems and confusion.   All other systems reviewed and are negative.    Objective:     Vital Signs (Most Recent):  Temp: (P) 97.2 °F (36.2 °C) (08/03/20 1045)  Pulse: (P) 83 (08/03/20 1045)  Resp: (P) 18 (08/03/20 1045)  BP: (P) 111/65 (08/03/20 1045)  SpO2: (P) 99 % (08/03/20 1045) Vital Signs (24h Range):  Temp:  [96 °F (35.6 °C)-100.5 °F (38.1 °C)] (P) 97.2 °F (36.2 °C)  Pulse:  [74-97] (P) 83  Resp:  [14-18] (P) 18  SpO2:  [95 %-100 %] (P) 99 %  BP: ()/(47-75) (P) 111/65     Weight: 86.2 kg (190 lb)  Body mass index is 32.61 kg/m².    Intake/Output Summary (Last 24 hours) at 8/3/2020 6184  Last data filed at 8/3/2020 0500  Gross per 24 hour    Intake 2377 ml   Output 800 ml   Net 1577 ml      Physical Exam  Vitals signs and nursing note reviewed.   Constitutional:       General: She is not in acute distress.     Appearance: Normal appearance. She is well-developed. She is not ill-appearing or diaphoretic.   HENT:      Head: Normocephalic and atraumatic.      Right Ear: External ear normal.      Left Ear: External ear normal.      Nose: Nose normal. No congestion or rhinorrhea.      Mouth/Throat:      Mouth: Mucous membranes are moist.      Pharynx: Oropharynx is clear. No oropharyngeal exudate or posterior oropharyngeal erythema.   Eyes:      Conjunctiva/sclera: Conjunctivae normal.      Pupils: Pupils are equal, round, and reactive to light.   Neck:      Musculoskeletal: Normal range of motion and neck supple.      Vascular: No JVD.   Cardiovascular:      Rate and Rhythm: Normal rate and regular rhythm.      Pulses: Normal pulses.      Heart sounds: Normal heart sounds. No murmur.   Pulmonary:      Effort: Pulmonary effort is normal. No respiratory distress.      Breath sounds: Normal breath sounds. No stridor. No wheezing, rhonchi or rales.   Chest:      Chest wall: No tenderness.   Abdominal:      General: Bowel sounds are normal. There is no distension.      Palpations: Abdomen is soft.      Tenderness: There is no abdominal tenderness.   Musculoskeletal: Normal range of motion.         General: No swelling or tenderness.      Comments: Limited range of motion to left knee secondary to recent TKA with mild limited mobility.   Skin:     General: Skin is warm and dry.      Capillary Refill: Capillary refill takes 2 to 3 seconds.   Neurological:      Mental Status: She is alert and oriented to person, place, and time.   Psychiatric:         Behavior: Behavior normal.         Thought Content: Thought content normal.         Significant Labs:   CBC:   Recent Labs   Lab 08/02/20  0508 08/03/20  0507   WBC 4.32 3.03*   HGB 7.7* 6.7*   HCT 24.3* 21.4*   PLT  153 156     CMP:   Recent Labs   Lab 08/02/20  0508 08/03/20  0507   * 137   K 3.9 4.1    103   CO2 24 26    104   BUN 13 10   CREATININE 1.2 0.9   CALCIUM 8.8 8.9   PROT 5.7* 5.4*   ALBUMIN 2.6* 2.4*   BILITOT 0.4 0.4   ALKPHOS 103 104   AST 25 21   ALT 9* 11   ANIONGAP 10 8   EGFRNONAA 47* >60     All pertinent labs within the past 24 hours have been reviewed.    Significant Imaging: I have reviewed all pertinent imaging results/findings within the past 24 hours.      Assessment/Plan:      * Primary osteoarthritis of left knee  POD 4 s/p left TKA with Dr. Martinez  Continue to follow Orthopedic recommendations.  Needs aggressive incentive spirometry.  Follow hemoglobin and hematocrit closely.  Pain control with IV narcotics and antiemetics as needed.  Physical therapy as per Orthopedics protocol with fall precautions.  xarelto for DVT prophylaxis per orthopedic recommendations.         Urinary retention  Acute post Bautista removal -  Now resolved      CKD (chronic kidney disease) stage 3, GFR 30-59 ml/min  Remains stable  BMP reviewed  noted Estimated Creatinine Clearance: 30.9 mL/min (A) (based on SCr of 1.9 mg/dL (H)). according to latest data. Monitor UOP and serial BMP and adjust therapy as needed. Renally dose meds.  Continue to monitor closely      Recent H/O UTI (urinary tract infection)  Complete Bactrim x 3 days started as outpatient.  Repeating UA today given patient's intermittent fevers    Essential hypertension  Chronic, controlled.  Latest blood pressure and vitals reviewed-   Temp:  [97.1 °F (36.2 °C)-101.5 °F (38.6 °C)]   Pulse:  [72-92]   Resp:  [15-20]   BP: ()/(50-67)   SpO2:  [92 %-97 %] .   Home meds for hypertension were reviewed and noted below. Hospital anti-hypertensive changes were made as shown below.  Hypertension Medications             amLODIPine (NORVASC) 5 MG tablet Take 1 tablet (5 mg total) by mouth once daily.    losartan-hydrochlorothiazide 100-25 mg  (HYZAAR) 100-25 mg per tablet Take 1 tablet by mouth once daily.      Hospital Medications             amLODIPine tablet 5 mg 5 mg, Oral, Daily, HOLD  IF BP UNDER 140/90    hydroCHLOROthiazide tablet 25 mg 25 mg, Oral, Daily, HOLD IF BP UNDER 140/90        Will utilize p.r.n. blood pressure medication only if patient's blood pressure greater than  180/110 and she develops symptoms such as worsening chest pain or shortness of breath.    Holding hydrochlorothiazide and losartan due to slight worsening of kidney function      GERD (gastroesophageal reflux disease)  Chronic stable   continue home meds      NATHALIE (iron deficiency anemia)  Patient's anemia is currently uncontrolled. Has not received any PRBCs to date.. Etiology likely d/t acute blood loss on chronic NATHALIE  Current CBC reviewed-   Lab Results   Component Value Date    HGB 6.7 (L) 08/03/2020    HCT 21.4 (L) 08/03/2020     Patient receiving 2 units PRBC today        VTE Risk Mitigation (From admission, onward)         Ordered     rivaroxaban tablet 10 mg  With dinner      07/30/20 1535     IP VTE LOW RISK PATIENT  Once      07/30/20 1535     Place sequential compression device  Until discontinued      07/30/20 1535     Place sequential compression device  Until discontinued      07/30/20 0855     Place RADHA hose  Until discontinued      07/30/20 0855                      Nicky Pabon NP  Department of Hospital Medicine   [facility]

## 2020-08-03 NOTE — ASSESSMENT & PLAN NOTE
Patient's anemia is currently uncontrolled. Has not received any PRBCs to date.. Etiology likely d/t acute blood loss on chronic NATHALIE  Current CBC reviewed-   Lab Results   Component Value Date    HGB 6.7 (L) 08/03/2020    HCT 21.4 (L) 08/03/2020     Patient receiving 2 units PRBC today

## 2020-08-03 NOTE — NURSING
Pt H&H dropped to 6.7 and 21.4, Stephanie NP notified, I unit of blood to be transfused. Awaiting type and screen. Will inform oncoming nurse.

## 2020-08-03 NOTE — PROGRESS NOTES
"[facility]  Orthopedics  Progress Note    Patient Name: Beatriz Gan  MRN: [id number]  Admission Date: 7/30/2020  Hospital Length of Stay: 0 days  Attending Provider: Miri Montelongo MD  Primary Care Provider: Jorge Alonzo MD  Follow-up For: Procedure(s) (LRB):  ARTHROPLASTY, KNEE LEFT (Left)    Post-Operative Day: 4 Days Post-Op  Subjective:     Principal Problem:Primary osteoarthritis of left knee    Principal Orthopedic Problem:       Interval History:  Postoperative fever    Review of patient's allergies indicates:   Allergen Reactions    Phenergan [promethazine] Hives and Rash    Latex, natural rubber Hives     Per pt report-many years    Morphine Hives    Nsaids (non-steroidal anti-inflammatory drug)      Due to "kidney cancer"       Current Facility-Administered Medications   Medication    0.9%  NaCl infusion (for blood administration)    acetaminophen tablet 650 mg    acetaminophen tablet 650 mg    acetaminophen tablet 650 mg    albuterol-ipratropium 2.5 mg-0.5 mg/3 mL nebulizer solution 3 mL    ALPRAZolam tablet 0.5 mg    amLODIPine tablet 5 mg    azelastine 137 mcg (0.1 %) nasal spray 137 mcg    budesonide nebulizer solution 0.5 mg    calcium carbonate (OS-TERI) tablet 500 mg    docusate sodium capsule 100 mg    DULoxetine DR capsule 30 mg    famotidine tablet 20 mg    HYDROmorphone injection 1 mg    ibuprofen tablet 400 mg    loperamide capsule 2 mg    lorazepam injection 1 mg    magnesium gluconate tablet 27 mg    melatonin tablet 6 mg    multivitamin tablet    mupirocin 2 % ointment 1 g    ondansetron injection 4 mg    oxyCODONE immediate release tablet 5 mg    oxyCODONE immediate release tablet 5 mg    oxyCODONE immediate release tablet Tab 10 mg    oxyCODONE immediate release tablet Tab 10 mg    polyethylene glycol packet 17 g    pregabalin capsule 75 mg    pregabalin capsule 75 mg    rivaroxaban tablet 10 mg    sodium chloride 0.9% " "flush 10 mL     Facility-Administered Medications Ordered in Other Encounters   Medication    electrolyte-S (ISOLYTE)    lactated ringers infusion     Objective:     Vital Signs (Most Recent):  Temp: 99.2 °F (37.3 °C) (08/03/20 0303)  Pulse: 74 (08/03/20 0719)  Resp: 14 (08/03/20 0719)  BP: (!) 117/57 (08/03/20 0303)  SpO2: 100 % (08/03/20 0717) Vital Signs (24h Range):  Temp:  [96 °F (35.6 °C)-101 °F (38.3 °C)] 99.2 °F (37.3 °C)  Pulse:  [74-97] 74  Resp:  [14-18] 14  SpO2:  [92 %-100 %] 100 %  BP: ()/(47-57) 117/57     Weight: 86.2 kg (190 lb)  Height: 5' 4" (162.6 cm)  Body mass index is 32.61 kg/m².      Intake/Output Summary (Last 24 hours) at 8/3/2020 0740  Last data filed at 8/3/2020 0500  Gross per 24 hour   Intake 2854 ml   Output 800 ml   Net 2054 ml       General    Vitals reviewed.  Constitutional: She is oriented to person, place, and time.   Pulmonary/Chest: Effort normal.   Abdominal: Soft.   Neurological: She is alert and oriented to person, place, and time.   Psychiatric: She has a normal mood and affect.             Left Knee Exam     Comments:  Left knee incision clean and dry.  No calf swelling.  Active dorsiflexion of ankle without pain.  Limited range of motion as expected of left knee.      Significant Labs: All pertinent labs within the past 24 hours have been reviewed.    Significant Imaging: None    Assessment/Plan:     * Primary osteoarthritis of left knee  Stable to DE home with  when cleared by hospitalist  Pending infection work up          Ruben Martinez MD  Orthopedics  [facility]  "

## 2020-08-03 NOTE — SUBJECTIVE & OBJECTIVE
"Principal Problem:Primary osteoarthritis of left knee    Principal Orthopedic Problem:       Interval History:  Postoperative fever    Review of patient's allergies indicates:   Allergen Reactions    Phenergan [promethazine] Hives and Rash    Latex, natural rubber Hives     Per pt report-many years    Morphine Hives    Nsaids (non-steroidal anti-inflammatory drug)      Due to "kidney cancer"       Current Facility-Administered Medications   Medication    0.9%  NaCl infusion (for blood administration)    acetaminophen tablet 650 mg    acetaminophen tablet 650 mg    acetaminophen tablet 650 mg    albuterol-ipratropium 2.5 mg-0.5 mg/3 mL nebulizer solution 3 mL    ALPRAZolam tablet 0.5 mg    amLODIPine tablet 5 mg    azelastine 137 mcg (0.1 %) nasal spray 137 mcg    budesonide nebulizer solution 0.5 mg    calcium carbonate (OS-TERI) tablet 500 mg    docusate sodium capsule 100 mg    DULoxetine DR capsule 30 mg    famotidine tablet 20 mg    HYDROmorphone injection 1 mg    ibuprofen tablet 400 mg    loperamide capsule 2 mg    lorazepam injection 1 mg    magnesium gluconate tablet 27 mg    melatonin tablet 6 mg    multivitamin tablet    mupirocin 2 % ointment 1 g    ondansetron injection 4 mg    oxyCODONE immediate release tablet 5 mg    oxyCODONE immediate release tablet 5 mg    oxyCODONE immediate release tablet Tab 10 mg    oxyCODONE immediate release tablet Tab 10 mg    polyethylene glycol packet 17 g    pregabalin capsule 75 mg    pregabalin capsule 75 mg    rivaroxaban tablet 10 mg    sodium chloride 0.9% flush 10 mL     Facility-Administered Medications Ordered in Other Encounters   Medication    electrolyte-S (ISOLYTE)    lactated ringers infusion     Objective:     Vital Signs (Most Recent):  Temp: 99.2 °F (37.3 °C) (08/03/20 0303)  Pulse: 74 (08/03/20 0719)  Resp: 14 (08/03/20 0719)  BP: (!) 117/57 (08/03/20 0303)  SpO2: 100 % (08/03/20 0717) Vital Signs (24h Range):  Temp:  " "[96 °F (35.6 °C)-101 °F (38.3 °C)] 99.2 °F (37.3 °C)  Pulse:  [74-97] 74  Resp:  [14-18] 14  SpO2:  [92 %-100 %] 100 %  BP: ()/(47-57) 117/57     Weight: 86.2 kg (190 lb)  Height: 5' 4" (162.6 cm)  Body mass index is 32.61 kg/m².      Intake/Output Summary (Last 24 hours) at 8/3/2020 0740  Last data filed at 8/3/2020 0500  Gross per 24 hour   Intake 2854 ml   Output 800 ml   Net 2054 ml       General    Vitals reviewed.  Constitutional: She is oriented to person, place, and time.   Pulmonary/Chest: Effort normal.   Abdominal: Soft.   Neurological: She is alert and oriented to person, place, and time.   Psychiatric: She has a normal mood and affect.             Left Knee Exam     Comments:  Left knee incision clean and dry.  No calf swelling.  Active dorsiflexion of ankle without pain.  Limited range of motion as expected of left knee.      Significant Labs: All pertinent labs within the past 24 hours have been reviewed.    Significant Imaging: None  "

## 2020-08-03 NOTE — NURSING
Pt blood pressure 98/55 with MAP 73. [facility] notfied, no new orders at this time, will continue to monitor.

## 2020-08-03 NOTE — PLAN OF CARE
Pt aaox4 afebrile today did receive two units of packed red blood cells today and will be dc once blood transfusions complete. She will go home with home care starting in am. Pt lives with  and he is primary cg. Pt bed low call bell with in reach hourly rounding maintained.

## 2020-08-03 NOTE — NURSING
Pt dc to care of family pt left unit with one person transport iv removed and dc instructions given pt has no questions cryo machine and cpm machine dc with pt

## 2020-08-03 NOTE — PT/OT/SLP PROGRESS
Physical Therapy Treatment    Patient Name:  Beatriz Gan   MRN:  [id number]    Recommendations:     Discharge Recommendations:  home health PT   Discharge Equipment Recommendations: none   Barriers to discharge: None    Assessment:     Beatriz Gan is a 64 y.o. female admitted with a medical diagnosis of Primary osteoarthritis of left knee.  She presents with the following impairments/functional limitations:  weakness, impaired endurance, impaired self care skills, impaired functional mobilty, gait instability, decreased lower extremity function Pt states that she had a rough night, but was agreeable to treatment. Treatment limited due to increased weakness today. Nurse reported low BP and CPM not functioning. BP taken prior to ambulation and was 135/62. CPM assessed and found to be broken. Pt became emotional following tx, nurse Rebekah was notified of her condition.     Rehab Prognosis: Good; patient would benefit from acute skilled PT services to address these deficits and reach maximum level of function.    Recent Surgery: Procedure(s) (LRB):  ARTHROPLASTY, KNEE LEFT (Left) 4 Days Post-Op    Plan:     During this hospitalization, patient to be seen BID to address the identified rehab impairments via gait training, therapeutic activities, therapeutic exercises and progress toward the following goals:    · Plan of Care Expires:  08/30/20    Subjective     Chief Complaint: General malaise  Patient/Family Comments/goals: To return home  Pain/Comfort:  · Pain Rating 1: 0/10      Objective:     Communicated with nurse Welch prior to session.  Patient found supine with bed alarm, PureWick, peripheral IV upon PT entry to room.     General Precautions: Standard, fall   Orthopedic Precautions:LLE weight bearing as tolerated   Braces: Knee immobilizer     Functional Mobility:  · Bed Mobility:     · Rolling Left:  contact guard assistance  · Supine to Sit: contact guard assistance  · Transfers:     · Sit  to Stand:  minimum assistance with rolling walker  · Gait: 8' x 2 to restroom and then to chair.      AM-PAC 6 CLICK MOBILITY          Therapeutic Activities and Exercises:   KI applied.  Pt ambulated 8' x 2 with rw/CGA WBAT LLE.  On and off commode with Min A.         Patient left up in chair with all lines intact, call button in reach, chair alarm on and nurse Rebekah notified..    GOALS:   Multidisciplinary Problems     Physical Therapy Goals        Problem: Physical Therapy Goal    Goal Priority Disciplines Outcome Goal Variances Interventions   Physical Therapy Goal     PT, PT/OT Ongoing, Progressing     Description: Goals to be met by: 2020     Patient will increase functional independence with mobility by performin. Supine to sit with Contact Guard Assistance  2. Sit to stand transfer with Contact Guard Assistance  3. Gait  x 250 feet with Contact Guard Assistance using Rolling Walker.   4. Ascend/descend 16 stair with bilateral Handrails Contact Guard Assistance u.   5. Lower extremity exercise program x20 reps                    Time Tracking:     PT Received On: 20  PT Start Time: 855     PT Stop Time: 913  PT Total Time (min): 18 min     Billable Minutes: Gait Training 18    Treatment Type: Treatment  PT/PTA: PTA     PTA Visit Number: 2     Vira Prasad, BENJI  2020

## 2020-08-03 NOTE — UM SECONDARY REVIEW
Physician Advisor External    Level of Care Issue    8/3 @ 8:03am Case submitted to EHR for review. Kv  8/3 @ 10:37am Per EHR online Dr Caceres recommendation is outpatient procedure for 7/30, 9/1, 8/2 and 8/3. kv

## 2020-08-04 ENCOUNTER — TELEPHONE (OUTPATIENT)
Dept: ORTHOPEDICS | Facility: CLINIC | Age: 65
End: 2020-08-04

## 2020-08-04 ENCOUNTER — TELEPHONE (OUTPATIENT)
Dept: MEDSURG UNIT | Facility: HOSPITAL | Age: 65
End: 2020-08-04

## 2020-08-04 NOTE — DISCHARGE INSTRUCTIONS
You are to take xarelto twice daily for 21 days after surgery to prevent blood clots.  Please obtain and take an over-the-counter stool softener while taking oral narcotics postoperatively to prevent constipation.  Please be careful postoperatively to avoid falls.    Thank you for choosing [facility] for your medical care. The primary doctor who is taking care of you at the time of your discharge is Miri Montelongo MD.     You were admitted to the hospital with Primary osteoarthritis of left knee.     Please note your discharge instructions, including diet/activity restrictions, follow-up appointments, and medication changes.  If you have any questions about your medical issues, prescriptions, or any other questions, please feel free to contact the [facility] Medicine Dept at [phone] and we will help.    If you are previously with Home health, outpatient PT/OT or under a therapy program, you are cleared to return to those programs.    Please direct all long term medication refills and follow up to your primary care provider, Jorge Alonzo MD. Thank you again for letting us take care of your health care needs.    Please note the following discharge instructions per your discharging physician-  Nicky Pabon NP

## 2020-08-04 NOTE — PLAN OF CARE
08/04/20 0736   Final Note   Assessment Type Final Discharge Note   Anticipated Discharge Disposition Home-Health

## 2020-08-04 NOTE — TELEPHONE ENCOUNTER
----- Message from Paulette Stein sent at 8/4/2020 12:29 PM CDT -----  Contact: Pt  Pt states she was told that home health and out patient therapy would be coming to her home today for wound care and therapy however she has not been contacted. Pt wants to know who they are and their phone #'s  Pt call back # [phone]

## 2020-08-04 NOTE — DISCHARGE SUMMARY
[facility] Hospital Medicine  Discharge Summary      Patient Name: Beatriz Gan  MRN: [id number]  Admission Date: 7/30/2020  Hospital Length of Stay: 0 days  Discharge Date and Time: 8/3/2020  6:40 PM  Attending Physician: No att. providers found   Discharging Provider: Nicky Pabon NP  Primary Care Provider: Jorge Alonzo MD      HPI:   The pt is 65 yo female with PMHx of DJD, Gout, HTN, Hyperlipidemia , Iron def anemia , Renal cell carcinoma and  Alcohol dependence who admitted after total  left knee arthroplasty by Dr. Ruben Martinez. Pt with known h/o left knee degenerative joint disease has had  persistent left knee pain.   She underwent a left knee arthroscopy which did not help much.  She then underwent left knee radiofrequency ablation without good result.  This process has been going on for quite a long time.  She states that she can no longer continue to live with her symptoms and is ready to proceed with more aggressive surgical treatment options. She underwent left total knee arthroplasty today . No immediate post op complications and currently has no c/o chest pain, SOB or palpitations.      Procedure(s) (LRB):  ARTHROPLASTY, KNEE LEFT (Left)      Hospital Course:   This patient was observed by hospital medicine after undergoing left TKA with Dr. Martinez on 7/30/20.  Patient experienced postop fever.  Blood cultures and urine cultures were obtained which remained negative.  Patient was encouraged on IS use.  Chest x-ray and UA were negative.  Wound without signs of infection.  Patient has known CKD and had slight elevation above baseline creatinine.  She was administered IV fluids and renal insufficiency improved and returned to baseline.  Patient developed acute postop anemia and required 2 units packed red blood cells transfusion.  Patient worked with PT/OT post-operatively who recommended Home health which was ordered. Her pain was controlled with oral narcotics as  prescribed by orthopedist.  She was discharged home after cleared by orthopedist on xarelto for 21 days as per orthopedic recommendations for VTE prophylaxis, as well as oral narcotics per orthopedic orders. Fall precautions were discussed with patient and family. Return precautions were discussed at length. Patient to follow up with primary care provider on discharge for any medical needs     Physical Exam:  AAO x 4, pleasant, no distress  HRRR; lungs CTA, resp even unlabored  Left knee bandage CDI, +palp pulses, sensation     Consults:     No new Assessment & Plan notes have been filed under this hospital service since the last note was generated.  Service: Hospital Medicine    Final Active Diagnoses:    Diagnosis Date Noted POA    PRINCIPAL PROBLEM:  Primary osteoarthritis of left knee [M17.12] 07/16/2020 Yes    CKD (chronic kidney disease) stage 3, GFR 30-59 ml/min [N18.3] 08/01/2020 Yes    Urinary retention [R33.9] 08/01/2020 No    Recent H/O UTI (urinary tract infection) [N39.0] 07/31/2020 Yes    Essential hypertension [I10] 08/21/2015 Yes    GERD (gastroesophageal reflux disease) [K21.9]  Yes    NATHALIE (iron deficiency anemia) [D50.9]  Yes      Problems Resolved During this Admission:       Discharged Condition: good    Disposition: Home-Health Care [facility] [place] [facility]    Follow Up:  Follow-up Information     THANIA SHOEMAKER On 8/14/2020.    Specialty: Orthopedic Surgery  Why: post op apt in avs  Contact information:  [address]  [address]  [facility] [address]  [phone]             [facility].    Contact information:  [address], [address]  [facility] [id number] [phone]           Jorge Alonzo MD In 1 week.    Specialty: Internal Medicine  Why: to recheck your symptoms  Contact information:  [address]  [address]  [place] [address] [phone]                 Patient Instructions:      TRANSFER TUB BENCH FOR HOME USE     Order Specific Question Answer Comments  "  Type of Transfer Tub Bench: Unpadded    Height: 5' 4" (1.626 m)    Weight: 86.2 kg (190 lb)    Does patient have medical equipment at home? walker, rolling    Length of need (1-99 months): 99      HIP KIT FOR HOME USE     Order Specific Question Answer Comments   Height: 5' 4" (1.626 m)    Weight: 86.2 kg (190 lb)    Does patient have medical equipment at home? walker, rolling    Length of need (1-99 months): 99    Type: [place] Hip Kit    Vendor: [facility]    Expected Date of Delivery: 7/31/2020      Ambulatory referral/consult to Home Health   Standing Status: Future   Referral Priority: Routine Referral Type: Home Health   Referral Reason: Specialty Services Required   Requested Specialty: Home Health Services   Number of Visits Requested: 1     Diet Cardiac     Notify your health care provider if you experience any of the following:  temperature >100.4     Notify your health care provider if you experience any of the following:  persistent nausea and vomiting or diarrhea     Notify your health care provider if you experience any of the following:  severe uncontrolled pain     Notify your health care provider if you experience any of the following:  redness, tenderness, or signs of infection (pain, swelling, redness, odor or green/yellow discharge around incision site)     Notify your health care provider if you experience any of the following:  difficulty breathing or increased cough     Notify your health care provider if you experience any of the following:  persistent dizziness, light-headedness, or visual disturbances     Notify your health care provider if you experience any of the following:  increased confusion or weakness     Activity as tolerated       Significant Diagnostic Studies: Labs:   CMP   Recent Labs   Lab 08/03/20  0507      K 4.1      CO2 26      BUN 10   CREATININE 0.9   CALCIUM 8.9   PROT 5.4*   ALBUMIN 2.4*   BILITOT 0.4   ALKPHOS 104   AST 21   ALT 11   ANIONGAP " 8   ESTGFRAFRICA >60   EGFRNONAA >60   , CBC   Recent Labs   Lab 08/03/20  0507   WBC 3.03*   HGB 6.7*   HCT 21.4*       and All labs within the past 24 hours have been reviewed    Pending Diagnostic Studies:     None         Medications:  Reconciled Home Medications:      Medication List      START taking these medications    oxyCODONE-acetaminophen  mg per tablet  Commonly known as: PERCOCET  Take 1 tablet by mouth every 4 (four) hours as needed.     rivaroxaban 10 mg Tab  Commonly known as: XARELTO  Take 1 tablet (10 mg total) by mouth once daily. for 21 days        CHANGE how you take these medications    polyethylene glycol 17 gram/dose powder  Commonly known as: GLYCOLAX  Take 17 g by mouth once daily.  What changed:   · when to take this  · reasons to take this        CONTINUE taking these medications    ALPRAZolam 0.5 MG tablet  Commonly known as: XANAX  Take 1 tablet (0.5 mg total) by mouth 3 (three) times daily as needed for Anxiety.     amLODIPine 5 MG tablet  Commonly known as: NORVASC  Take 1 tablet (5 mg total) by mouth once daily.     azelastine 137 mcg (0.1 %) nasal spray  Commonly known as: ASTELIN  1 spray (137 mcg total) by Nasal route 2 (two) times daily.     budesonide-formoterol 80-4.5 mcg 80-4.5 mcg/actuation Hfaa  Commonly known as: SYMBICORT  Inhale 2 puffs into the lungs 2 (two) times daily. Controller     calcium carbonate 600 mg calcium (1,500 mg) Tab  Commonly known as: OS-TERI  Take 600 mg by mouth once.     CENTRUM 3,500-18-0.4 unit-mg-mg Chew  Generic drug: multivit-iron-min-folic acid  Take by mouth once daily.     DULoxetine 30 MG capsule  Commonly known as: CYMBALTA  Take 1 capsule (30 mg total) by mouth 2 (two) times daily.     fluticasone propionate 50 mcg/actuation nasal spray  Commonly known as: FLONASE  1 spray by Nasal route daily as needed.     gabapentin 600 MG tablet  Commonly known as: NEURONTIN  Take 1 tablet (600 mg total) by mouth 3 (three) times daily.      losartan-hydrochlorothiazide 100-25 mg 100-25 mg per tablet  Commonly known as: HYZAAR  Take 1 tablet by mouth once daily.     magnesium 30 mg Tab  Take by mouth once.     VITAMIN D3 ORAL  Take by mouth once daily.     zolpidem 10 mg Tab  Commonly known as: AMBIEN  TAKE 1 TABLET BY MOUTH ONCE DAILY AT NIGHT AS NEEDED        STOP taking these medications    diclofenac 50 MG EC tablet  Commonly known as: VOLTAREN     HYDROcodone-acetaminophen 7.5-325 mg per tablet  Commonly known as: NORCO     lidocaine 5 %  Commonly known as: LIDODERM     sulfamethoxazole-trimethoprim 800-160mg 800-160 mg Tab  Commonly known as: BACTRIM DS            Indwelling Lines/Drains at time of discharge:   Lines/Drains/Airways     Drain            Female External Urinary Catheter 08/02/20 0200 2 days                Time spent on the discharge of patient: 33 minutes  Patient was seen and examined on the date of discharge and determined to be suitable for discharge.         Nicky Pabon NP  Department of Hospital Medicine  [facility]

## 2020-08-04 NOTE — TELEPHONE ENCOUNTER
[facility] . Not scheduled since she was still in hospital. Spoke with patient and updated her as well.

## 2020-08-05 ENCOUNTER — TELEPHONE (OUTPATIENT)
Dept: FAMILY MEDICINE | Facility: CLINIC | Age: 65
End: 2020-08-05

## 2020-08-05 ENCOUNTER — TELEPHONE (OUTPATIENT)
Dept: UROLOGY | Facility: CLINIC | Age: 65
End: 2020-08-05

## 2020-08-05 NOTE — TELEPHONE ENCOUNTER
----- Message from Shilpi Jimenez sent at 8/5/2020  4:02 PM CDT -----  Contact: [phone]  [facility] to the pt prescription for zolpidem (AMBIEN) 10 mg [place] stated she need to confirm prior to Prescribing the medication to the pt due to multiple Providers prescribing other medications within the Facility.     Please call and advise

## 2020-08-05 NOTE — TELEPHONE ENCOUNTER
----- Message from Catherine Perry sent at 8/5/2020  3:06 PM CDT -----  Regarding: advice  Contact: Patient/[phone] (home)  Type: Needs Medical Advice  Who Called:  Patient/[phone] (home)     Additional Information: Wants to know if office needs a cat scan or any other tests done. She has an appointment on 8/12/20. Please call to advise.

## 2020-08-05 NOTE — TELEPHONE ENCOUNTER
Spoke with patient, she wants to know should she do any imaging and labs before he appt. She states that she just had knee surgery and is trying not to be up and out about. She wants to have imaging and lab work done before hand so the MD can address then. Informed I will give message to MD and call her back with advisement, patient verbally understood.

## 2020-08-06 ENCOUNTER — TELEPHONE (OUTPATIENT)
Dept: UROLOGY | Facility: CLINIC | Age: 65
End: 2020-08-06

## 2020-08-06 DIAGNOSIS — C64.2 RENAL CELL CARCINOMA OF LEFT KIDNEY: Primary | ICD-10-CM

## 2020-08-06 DIAGNOSIS — C68.9 MALIGNANT NEOPLASM OF URINARY ORGAN, UNSPECIFIED: ICD-10-CM

## 2020-08-06 NOTE — TELEPHONE ENCOUNTER
Spoke with patient, informed the MD has ordered CT and labs to be done before her appt, CT scheduled, patient verbally understood.

## 2020-08-07 LAB
BACTERIA BLD CULT: NORMAL
BACTERIA BLD CULT: NORMAL

## 2020-08-09 ENCOUNTER — PATIENT OUTREACH (OUTPATIENT)
Dept: ADMINISTRATIVE | Facility: OTHER | Age: 65
End: 2020-08-09

## 2020-08-09 NOTE — PROGRESS NOTES
Chart was reviewed for overdue [facility] Encounters ([facility])  topics  Updates were requested from care everywhere  Health Maintenance has been updated

## 2020-08-10 ENCOUNTER — HOSPITAL ENCOUNTER (OUTPATIENT)
Dept: RADIOLOGY | Facility: HOSPITAL | Age: 65
Discharge: HOME | End: 2020-08-10
Attending: UROLOGY
Payer: MEDICARE

## 2020-08-10 DIAGNOSIS — C64.2 RENAL CELL CARCINOMA OF LEFT KIDNEY: ICD-10-CM

## 2020-08-10 DIAGNOSIS — C68.9 MALIGNANT NEOPLASM OF URINARY ORGAN, UNSPECIFIED: ICD-10-CM

## 2020-08-10 PROCEDURE — 74178 CT ABD&PLV WO CNTR FLWD CNTR: CPT | Mod: 26,,, | Performed by: RADIOLOGY

## 2020-08-10 PROCEDURE — 74178 CT ABD&PLV WO CNTR FLWD CNTR: CPT | Mod: TC

## 2020-08-10 PROCEDURE — 74178 CT UROGRAM ABD PELVIS W WO: ICD-10-PCS | Mod: 26,,, | Performed by: RADIOLOGY

## 2020-08-10 PROCEDURE — 25500020 PHARM REV CODE 255

## 2020-08-10 RX ADMIN — IOHEXOL 125 ML: 350 INJECTION, SOLUTION INTRAVENOUS at 02:08

## 2020-08-11 ENCOUNTER — TELEPHONE (OUTPATIENT)
Dept: ORTHOPEDICS | Facility: CLINIC | Age: 65
End: 2020-08-11

## 2020-08-11 NOTE — TELEPHONE ENCOUNTER
Do you have a cough? no  Have you had a cough since your surgical procedure ?no  Are you short of breath?no  Have you experienced shortness of breath since your surgical procedure?no  Do you have a fever? no   Did you have a fever since your surgical procedure? no  If yes, what was your temperature n/a   Any redness at the surgery site? no Warm to the touch? no  Have you been tested for COVID-19 since your surgical procedure? no What was your result? N/A

## 2020-08-12 ENCOUNTER — OFFICE VISIT (OUTPATIENT)
Dept: UROLOGY | Facility: CLINIC | Age: 65
End: 2020-08-12
Payer: MEDICARE

## 2020-08-12 VITALS
WEIGHT: 212.5 LBS | DIASTOLIC BLOOD PRESSURE: 83 MMHG | RESPIRATION RATE: 18 BRPM | BODY MASS INDEX: 36.28 KG/M2 | HEART RATE: 100 BPM | SYSTOLIC BLOOD PRESSURE: 123 MMHG | HEIGHT: 64 IN | TEMPERATURE: 96 F

## 2020-08-12 DIAGNOSIS — C64.2 RENAL CELL CARCINOMA OF LEFT KIDNEY: Primary | ICD-10-CM

## 2020-08-12 LAB
BILIRUB SERPL-MCNC: ABNORMAL MG/DL
BLOOD URINE, POC: ABNORMAL
CLARITY, POC UA: CLEAR
COLOR, POC UA: YELLOW
GLUCOSE UR QL STRIP: ABNORMAL
KETONES UR QL STRIP: ABNORMAL
LEUKOCYTE ESTERASE URINE, POC: ABNORMAL
NITRITE, POC UA: ABNORMAL
PH, POC UA: 7.5
PROTEIN, POC: ABNORMAL
SPECIFIC GRAVITY, POC UA: 1.02
UROBILINOGEN, POC UA: 1

## 2020-08-12 PROCEDURE — 99213 OFFICE O/P EST LOW 20 MIN: CPT | Mod: PBBFAC,PN | Performed by: UROLOGY

## 2020-08-12 PROCEDURE — 99214 OFFICE O/P EST MOD 30 MIN: CPT | Mod: 25,S$PBB,, | Performed by: UROLOGY

## 2020-08-12 PROCEDURE — 81002 URINALYSIS NONAUTO W/O SCOPE: CPT | Mod: PBBFAC,PN | Performed by: UROLOGY

## 2020-08-12 PROCEDURE — 99999 PR PBB SHADOW E&M-EST. PATIENT-LVL III: CPT | Mod: PBBFAC,,, | Performed by: UROLOGY

## 2020-08-12 PROCEDURE — 81000 URINALYSIS NONAUTO W/SCOPE: CPT | Mod: PBBFAC,PN | Performed by: UROLOGY

## 2020-08-12 PROCEDURE — 99214 PR OFFICE/OUTPT VISIT, EST, LEVL IV, 30-39 MIN: ICD-10-PCS | Mod: 25,S$PBB,, | Performed by: UROLOGY

## 2020-08-12 PROCEDURE — 99999 PR PBB SHADOW E&M-EST. PATIENT-LVL III: ICD-10-PCS | Mod: PBBFAC,,, | Performed by: UROLOGY

## 2020-08-12 NOTE — PROGRESS NOTES
OFFICE NOTE  [unfilled]  [id number]  8/12/2020       CHIEF COMPLAINT:   renal cell carcinoma, status post left partial nephrectomy     HISTORY OF PRESENT ILLNESS:   this 64-year-old female returns routine recheck.  She has history renal cell carcinoma for which she underwent left partial nephrectomy on 01/13/2014 and returning for routine recheck and denies any complaints..  Most recent CT scan on 08/10/2020 did not show evidence of any tumor recurrence and the only finding was what appears to be a tiny cyst in the right kidney.    Medical history update reveals that she underwent left knee arthroplasty arthroplasty and left rotator cuff procedure on 07/30/2020    Physical exam:  Abdomen - soft benign nontender no masses no hernias no organomegaly.  Well-healed left upper quadrant surgical scar.                                    UA - negative with pH 7.5     FINAL IMPRESSION:  Renal cell carcinoma status post left partial nephrectomy with no evidence of recurrences       RECOMMENDATIONS:   recheck 6 months

## 2020-08-13 ENCOUNTER — EXTERNAL HOME HEALTH (OUTPATIENT)
Dept: HOME HEALTH SERVICES | Facility: HOSPITAL | Age: 65
End: 2020-08-13

## 2020-08-14 ENCOUNTER — OFFICE VISIT (OUTPATIENT)
Dept: ORTHOPEDICS | Facility: CLINIC | Age: 65
End: 2020-08-14
Payer: MEDICARE

## 2020-08-14 VITALS — DIASTOLIC BLOOD PRESSURE: 68 MMHG | BODY MASS INDEX: 31.76 KG/M2 | WEIGHT: 185 LBS | SYSTOLIC BLOOD PRESSURE: 108 MMHG

## 2020-08-14 DIAGNOSIS — Z96.652 S/P TOTAL KNEE ARTHROPLASTY, LEFT: Primary | ICD-10-CM

## 2020-08-14 PROCEDURE — 99024 PR POST-OP FOLLOW-UP VISIT: ICD-10-PCS | Mod: S$GLB,POP,, | Performed by: PHYSICIAN ASSISTANT

## 2020-08-14 PROCEDURE — 99024 POSTOP FOLLOW-UP VISIT: CPT | Mod: S$GLB,POP,, | Performed by: PHYSICIAN ASSISTANT

## 2020-08-14 RX ORDER — ALENDRONATE SODIUM 70 MG/1
70 TABLET ORAL
Status: ON HOLD | COMMUNITY
Start: 2020-05-08 | End: 2023-05-04

## 2020-08-14 RX ORDER — OXYCODONE AND ACETAMINOPHEN 10; 325 MG/1; MG/1
1 TABLET ORAL EVERY 4 HOURS PRN
Qty: 42 TABLET | Refills: 0 | Status: SHIPPED | OUTPATIENT
Start: 2020-08-14 | End: 2020-08-26 | Stop reason: SDUPTHER

## 2020-08-14 RX ORDER — OXYCODONE AND ACETAMINOPHEN 10; 325 MG/1; MG/1
1 TABLET ORAL EVERY 4 HOURS PRN
COMMUNITY
End: 2020-08-14 | Stop reason: SDUPTHER

## 2020-08-14 NOTE — PROGRESS NOTES
Subjective:    Patient ID: Beatriz Gan is a 64 y.o. female.    Chief Complaint: Post-op Evaluation of the Left Knee (STAPLES L TKA 7/30. She has moderate pain with a lot of throbbing. She is in home P.T)      History of Present Illness  Patient is here for her 2 week postoperative appointment status post left total knee arthroplasty.  Overall doing well with home health physical therapy.  Pain is tolerable.    Current Medications  Current Outpatient Medications   Medication Sig Dispense Refill    alendronate (FOSAMAX) 70 MG tablet Take 70 mg by mouth every 7 days.      ALPRAZolam (XANAX) 0.5 MG tablet Take 1 tablet (0.5 mg total) by mouth 3 (three) times daily as needed for Anxiety. 90 tablet 1    amLODIPine (NORVASC) 5 MG tablet Take 1 tablet (5 mg total) by mouth once daily. 90 tablet 3    azelastine (ASTELIN) 137 mcg (0.1 %) nasal spray 1 spray (137 mcg total) by Nasal route 2 (two) times daily. 30 mL 3    budesonide-formoterol 80-4.5 mcg (SYMBICORT) 80-4.5 mcg/actuation HFAA Inhale 2 puffs into the lungs 2 (two) times daily. Controller 1 Inhaler 3    calcium carbonate (OS-TERI) 600 mg calcium (1,500 mg) Tab Take 600 mg by mouth once.      cholecalciferol, vitamin D3, (VITAMIN D3 ORAL) Take by mouth once daily.      DULoxetine (CYMBALTA) 30 MG capsule Take 1 capsule (30 mg total) by mouth 2 (two) times daily. 180 capsule 3    fluticasone (FLONASE) 50 mcg/actuation nasal spray 1 spray by Nasal route daily as needed.       gabapentin (NEURONTIN) 600 MG tablet Take 1 tablet (600 mg total) by mouth 3 (three) times daily. 90 tablet 11    losartan-hydrochlorothiazide 100-25 mg (HYZAAR) 100-25 mg per tablet Take 1 tablet by mouth once daily. 90 tablet 3    magnesium 30 mg Tab Take by mouth once.      multivit-iron-min-folic acid (MULTIVITAMIN-IRON-MINERALS-FOLIC ACID) 3,500-18-0.4 unit-mg-mg Chew Take by mouth once daily.        oxyCODONE-acetaminophen (PERCOCET)  mg per tablet Take 1  "tablet by mouth every 4 (four) hours as needed for Pain.      polyethylene glycol (GLYCOLAX) 17 gram/dose powder Take 17 g by mouth once daily. (Patient taking differently: Take 17 g by mouth daily as needed. ) 510 g 3    rivaroxaban (XARELTO) 10 mg Tab Take 1 tablet (10 mg total) by mouth once daily. for 21 days 21 tablet 0    zolpidem (AMBIEN) 10 mg Tab TAKE 1 TABLET BY MOUTH ONCE DAILY AT NIGHT AS NEEDED 90 tablet 0     No current facility-administered medications for this visit.      Facility-Administered Medications Ordered in Other Visits   Medication Dose Route Frequency Provider Last Rate Last Dose    electrolyte-S (ISOLYTE)   Intravenous Continuous Kota Ortega MD 10 mL/hr at 01/31/20 0624      lactated ringers infusion   Intravenous Continuous Kota Ortega MD        pregabalin capsule 75 mg  75 mg Oral Once Shiela Mann MD           Allergies  Review of patient's allergies indicates:   Allergen Reactions    Phenergan [promethazine] Hives and Rash    Latex, natural rubber Hives     Per pt report-many years    Morphine Hives    Nsaids (non-steroidal anti-inflammatory drug)      Due to "kidney cancer"       Past Medical History  Past Medical History:   Diagnosis Date    Alcohol dependence     DDD (degenerative disc disease), lumbosacral     DJD (degenerative joint disease), lumbosacral     Encounter for blood transfusion     GERD (gastroesophageal reflux disease)     Gout     Hypercholesterolemia     Hypertension     NATHALIE (iron deficiency anemia)     questionable white coat hypertension    Kidney carcinoma, left 2014    Pneumonia     Renal cell carcinoma     Shingles 10/13/2012       Surgical History  Past Surgical History:   Procedure Laterality Date    APPENDECTOMY      ARTHROSCOPY OF SHOULDER WITH DECOMPRESSION OF SUBACROMIAL SPACE Left 10/31/2019    Procedure: ARTHROSCOPY, SHOULDER, WITH SUBACROMIAL SPACE DECOMPRESSION;  Surgeon: Ruben Martinez MD;  Location: [facility] OR;  " Service: Orthopedics;  Laterality: Left;    BLADDER REPAIR      x 2    COLONOSCOPY  9/2011    DISTAL CLAVICLE EXCISION Left 10/31/2019    Procedure: EXCISION, CLAVICLE, DISTAL;  Surgeon: Ruben Martinez MD;  Location: [facility] OR;  Service: Orthopedics;  Laterality: Left;    ESOPHAGOGASTRODUODENOSCOPY  9/2011    EYE SURGERY      lasix bilateral    FIXATION KYPHOPLASTY N/A 1/31/2020    Procedure: Kyphoplasty T12;  Surgeon: Dk Rosales MD;  Location: [facility] OR;  Service: Pain Management;  Laterality: N/A;    HERNIA REPAIR      hiatal hernai    HYSTERECTOMY      INJECTION OF ANESTHETIC AGENT AROUND NERVE Left 6/8/2020    Procedure: Block, Nerve;  Surgeon: Dk Rosales MD;  Location: [facility] OR;  Service: Pain Management;  Laterality: Left;  left genicular nerve block     KNEE ARTHROPLASTY Left 7/30/2020    Procedure: ARTHROPLASTY, KNEE LEFT;  Surgeon: Ruben Martinez MD;  Location: [facility] OR;  Service: Orthopedics;  Laterality: Left;  REP VALERY RUBY    KNEE ARTHROSCOPY W/ MENISCECTOMY Left 1/16/2020    Procedure: ARTHROSCOPY, KNEE, WITH MEDIAL MENISCECTOMY;  Surgeon: Ruben Martinez MD;  Location: [facility] OR;  Service: Orthopedics;  Laterality: Left;    LAPAROSCOPIC NISSEN FUNDOPLICATION      NEPHRECTOMY      LT partial    RADIOFREQUENCY ABLATION Left 6/19/2020    Procedure: Radiofrequency Ablation;  Surgeon: Dk Rosales MD;  Location: [facility] OR;  Service: Pain Management;  Laterality: Left;    RADIOFREQUENCY ABLATION OF LUMBAR MEDIAL BRANCH NERVE AT SINGLE LEVEL Bilateral 2/28/2020    Procedure: Radiofrequency Ablation, Nerve, Spinal, Lumbar, Medial Branch, 1 Level;  Surgeon: Dk Rosales MD;  Location: [facility] OR;  Service: Pain Management;  Laterality: Bilateral;  L3,L4,L5 - Burned at 80 degrees C. for 60 seconds x 2 each site    REFRACTIVE SURGERY      ROTATOR CUFF REPAIR Left 10/31/2019    Procedure: REPAIR, ROTATOR CUFF;  Surgeon: Ruben Martinez MD;  Location: [facility] OR;  Service: Orthopedics;   Laterality: Left;  arthrex    SKIN BIOPSY      TOTAL ABDOMINAL HYSTERECTOMY W/ BILATERAL SALPINGOOPHORECTOMY  age 48       Family History:   Family History   Problem Relation Age of Onset    Hypertension Father     Glaucoma Neg Hx     Macular degeneration Neg Hx     Retinal detachment Neg Hx        Social History:   Social History     Socioeconomic History    Marital status:      Spouse name: Not on file    Number of children: Not on file    Years of education: Not on file    Highest education level: Not on file   Occupational History     Employer: [employer]   Social Needs    Financial resource strain: Somewhat hard    Food insecurity     Worry: Sometimes true     Inability: Sometimes true    Transportation needs     Medical: No     Non-medical: No   Tobacco Use    Smoking status: Light Tobacco Smoker     Packs/day: 1.00     Years: 40.00     Pack years: 40.00     Types: Cigarettes     Last attempt to quit: 2018     Years since quittin.6    Smokeless tobacco: Never Used    Tobacco comment: smokes 2-3 cigarettes a night   Substance and Sexual Activity    Alcohol use: Yes     Alcohol/week: 28.0 standard drinks     Types: 14 Cans of beer, 14 Standard drinks or equivalent per week     Frequency: 4 or more times a week     Drinks per session: 1 or 2     Binge frequency: Never     Comment: 2-3 a day/ social    Drug use: No    Sexual activity: Yes     Partners: Male   Lifestyle    Physical activity     Days per week: 0 days     Minutes per session: 0 min    Stress: Very much   Relationships    Social connections     Talks on phone: Once a week     Gets together: Never     Attends [employer] service: Not on file     Active member of club or organization: Yes     Attends meetings of clubs or organizations: More than 4 times per year     Relationship status:    Other Topics Concern    Not on file   Social History Narrative    Not on file       Date of surgery:  2020    Review  of Systems     General/Constitutional: Chills denies. Fatigue denies. Fever denies. Weight gain denies. Weight loss denies.    Musculoskeletal: Comments: See HPI for details    Skin: Rash denies.    Objective:   Vital Signs:   Vitals:    08/14/20 0942   BP: 108/68        Physical Exam    This a well-developed, well nourished patient in no acute distress.  They are alert and oriented and cooperative to examination.  Pt. walks without an antalgic gait.      General Examination:     Constitutional: The patient is alert and oriented to lace person and time. Mood is pleasant.     Head/Face: Normal facial features normal eyebrows    Eyes: Normal extraocular motion bilaterally    Lungs: Respirations are equal and unlabored    Gait is coordinated.    Cardiovascular: There are no swelling or varicosities present.    Lymphatic: Negative for adenopathy    Skin: Normal    Neurological: Level of consciousness normal. Oriented to place person and time and situation    Psychiatric: Oriented to time place person and situation    Left knee exam demonstrates well-healing incision.  Staples removed.  No signs or symptoms of infection.  Steri-Strips placed.  Active range of motion for extension.  Flexion to approximately 80°.    XRAY Report/ Interpretation:  Left knee postoperative x-ray AP and lateral demonstrates a normal postoperative appearance      Assessment:       1. S/P total knee arthroplasty, left        Plan:       Beatriz was seen today for post-op evaluation.    Diagnoses and all orders for this visit:    S/P total knee arthroplasty, left  -     X-Ray Knee 1 or 2 View Left         No follow-ups on file.    Continue with home health physical therapy to maximize strength and functional mobility.  May start to drive.  I refilled her pain medication.  Follow-up in 4 weeks or sooner if needed.      This note was created using Dragon voice recognition software that occasionally misinterpreted phrases or words.

## 2020-08-18 ENCOUNTER — DOCUMENT SCAN (OUTPATIENT)
Dept: HOME HEALTH SERVICES | Facility: HOSPITAL | Age: 65
End: 2020-08-18

## 2020-08-21 ENCOUNTER — OFFICE VISIT (OUTPATIENT)
Dept: ORTHOPEDICS | Facility: CLINIC | Age: 65
End: 2020-08-21
Payer: MEDICARE

## 2020-08-21 VITALS — SYSTOLIC BLOOD PRESSURE: 102 MMHG | BODY MASS INDEX: 32.61 KG/M2 | DIASTOLIC BLOOD PRESSURE: 64 MMHG | WEIGHT: 190 LBS

## 2020-08-21 DIAGNOSIS — T81.41XA POSTOPERATIVE ABSCESS INVOLVING SUTURE: ICD-10-CM

## 2020-08-21 DIAGNOSIS — Z96.652 S/P TOTAL KNEE ARTHROPLASTY, LEFT: Primary | ICD-10-CM

## 2020-08-21 PROCEDURE — 99024 PR POST-OP FOLLOW-UP VISIT: ICD-10-PCS | Mod: S$GLB,POP,, | Performed by: PHYSICIAN ASSISTANT

## 2020-08-21 PROCEDURE — 99024 POSTOP FOLLOW-UP VISIT: CPT | Mod: S$GLB,POP,, | Performed by: PHYSICIAN ASSISTANT

## 2020-08-21 RX ORDER — HYDROXYZINE PAMOATE 25 MG/1
CAPSULE ORAL
Status: ON HOLD | COMMUNITY
Start: 2020-08-15 | End: 2020-10-19

## 2020-08-21 RX ORDER — ONDANSETRON 4 MG/1
4 TABLET, FILM COATED ORAL
Qty: 60 TABLET | Refills: 1 | Status: SHIPPED | OUTPATIENT
Start: 2020-08-21 | End: 2021-09-13 | Stop reason: SDUPTHER

## 2020-08-21 NOTE — PROGRESS NOTES
Subjective:    Patient ID: Beatriz Gan is a 64 y.o. female.    Chief Complaint: Post-op Evaluation of the Left Knee (PO Left Knee pain/swelling/redness, something sticking out x 2 days  Left Knee TKA 7/30/2020)      History of Present Illness  Patient is here complaining of some very focal pain just underneath the incision.  She was told to come in today.    Current Medications  Current Outpatient Medications   Medication Sig Dispense Refill    alendronate (FOSAMAX) 70 MG tablet Take 70 mg by mouth every 7 days.      ALPRAZolam (XANAX) 0.5 MG tablet Take 1 tablet (0.5 mg total) by mouth 3 (three) times daily as needed for Anxiety. 90 tablet 1    amLODIPine (NORVASC) 5 MG tablet Take 1 tablet (5 mg total) by mouth once daily. 90 tablet 3    azelastine (ASTELIN) 137 mcg (0.1 %) nasal spray 1 spray (137 mcg total) by Nasal route 2 (two) times daily. 30 mL 3    budesonide-formoterol 80-4.5 mcg (SYMBICORT) 80-4.5 mcg/actuation HFAA Inhale 2 puffs into the lungs 2 (two) times daily. Controller 1 Inhaler 3    calcium carbonate (OS-TERI) 600 mg calcium (1,500 mg) Tab Take 600 mg by mouth once.      cholecalciferol, vitamin D3, (VITAMIN D3 ORAL) Take by mouth once daily.      DULoxetine (CYMBALTA) 30 MG capsule Take 1 capsule (30 mg total) by mouth 2 (two) times daily. 180 capsule 3    fluticasone (FLONASE) 50 mcg/actuation nasal spray 1 spray by Nasal route daily as needed.       gabapentin (NEURONTIN) 600 MG tablet Take 1 tablet (600 mg total) by mouth 3 (three) times daily. 90 tablet 11    hydrOXYzine pamoate (VISTARIL) 25 MG Cap TAKE 2 CAPSULES BY MOUTH TWICE DAILY AS NEEDED FOR ANXIETY      losartan-hydrochlorothiazide 100-25 mg (HYZAAR) 100-25 mg per tablet Take 1 tablet by mouth once daily. 90 tablet 3    magnesium 30 mg Tab Take by mouth once.      multivit-iron-min-folic acid (MULTIVITAMIN-IRON-MINERALS-FOLIC ACID) 3,500-18-0.4 unit-mg-mg Chew Take by mouth once daily.         "oxyCODONE-acetaminophen (PERCOCET)  mg per tablet Take 1 tablet by mouth every 4 (four) hours as needed for Pain. 42 tablet 0    polyethylene glycol (GLYCOLAX) 17 gram/dose powder Take 17 g by mouth once daily. (Patient taking differently: Take 17 g by mouth daily as needed. ) 510 g 3    rivaroxaban (XARELTO) 10 mg Tab Take 1 tablet (10 mg total) by mouth once daily. for 21 days 21 tablet 0    zolpidem (AMBIEN) 10 mg Tab TAKE 1 TABLET BY MOUTH ONCE DAILY AT NIGHT AS NEEDED 90 tablet 0     No current facility-administered medications for this visit.      Facility-Administered Medications Ordered in Other Visits   Medication Dose Route Frequency Provider Last Rate Last Dose    electrolyte-S (ISOLYTE)   Intravenous Continuous Kota Ortega MD 10 mL/hr at 01/31/20 0624      lactated ringers infusion   Intravenous Continuous Kota Ortega MD        pregabalin capsule 75 mg  75 mg Oral Once Shiela Mann MD           Allergies  Review of patient's allergies indicates:   Allergen Reactions    Phenergan [promethazine] Hives and Rash    Latex, natural rubber Hives     Per pt report-many years    Morphine Hives    Nsaids (non-steroidal anti-inflammatory drug)      Due to "kidney cancer"       Past Medical History  Past Medical History:   Diagnosis Date    Alcohol dependence     DDD (degenerative disc disease), lumbosacral     DJD (degenerative joint disease), lumbosacral     Encounter for blood transfusion     GERD (gastroesophageal reflux disease)     Gout     Hypercholesterolemia     Hypertension     NATHALIE (iron deficiency anemia)     questionable white coat hypertension    Kidney carcinoma, left 2014    Pneumonia     Renal cell carcinoma     Shingles 10/13/2012       Surgical History  Past Surgical History:   Procedure Laterality Date    APPENDECTOMY      ARTHROSCOPY OF SHOULDER WITH DECOMPRESSION OF SUBACROMIAL SPACE Left 10/31/2019    Procedure: ARTHROSCOPY, SHOULDER, WITH SUBACROMIAL " SPACE DECOMPRESSION;  Surgeon: Ruben Martinez MD;  Location: [facility] OR;  Service: Orthopedics;  Laterality: Left;    BLADDER REPAIR      x 2    COLONOSCOPY  9/2011    DISTAL CLAVICLE EXCISION Left 10/31/2019    Procedure: EXCISION, CLAVICLE, DISTAL;  Surgeon: Ruben Martinez MD;  Location: [facility] OR;  Service: Orthopedics;  Laterality: Left;    ESOPHAGOGASTRODUODENOSCOPY  9/2011    EYE SURGERY      lasix bilateral    FIXATION KYPHOPLASTY N/A 1/31/2020    Procedure: Kyphoplasty T12;  Surgeon: Dk Rosales MD;  Location: [facility] OR;  Service: Pain Management;  Laterality: N/A;    HERNIA REPAIR      hiatal hernai    HYSTERECTOMY      INJECTION OF ANESTHETIC AGENT AROUND NERVE Left 6/8/2020    Procedure: Block, Nerve;  Surgeon: Dk Rosales MD;  Location: [facility] OR;  Service: Pain Management;  Laterality: Left;  left genicular nerve block     KNEE ARTHROPLASTY Left 7/30/2020    Procedure: ARTHROPLASTY, KNEE LEFT;  Surgeon: Ruben Martinez MD;  Location: [facility] OR;  Service: Orthopedics;  Laterality: Left;  REP VALERY RUBY    KNEE ARTHROSCOPY W/ MENISCECTOMY Left 1/16/2020    Procedure: ARTHROSCOPY, KNEE, WITH MEDIAL MENISCECTOMY;  Surgeon: Ruben Martinez MD;  Location: [facility] OR;  Service: Orthopedics;  Laterality: Left;    LAPAROSCOPIC NISSEN FUNDOPLICATION      NEPHRECTOMY      LT partial    RADIOFREQUENCY ABLATION Left 6/19/2020    Procedure: Radiofrequency Ablation;  Surgeon: Dk Rosales MD;  Location: [facility] OR;  Service: Pain Management;  Laterality: Left;    RADIOFREQUENCY ABLATION OF LUMBAR MEDIAL BRANCH NERVE AT SINGLE LEVEL Bilateral 2/28/2020    Procedure: Radiofrequency Ablation, Nerve, Spinal, Lumbar, Medial Branch, 1 Level;  Surgeon: Dk Rosales MD;  Location: [facility] OR;  Service: Pain Management;  Laterality: Bilateral;  L3,L4,L5 - Burned at 80 degrees C. for 60 seconds x 2 each site    REFRACTIVE SURGERY      ROTATOR CUFF REPAIR Left 10/31/2019    Procedure: REPAIR, ROTATOR CUFF;   Surgeon: Ruben Martinez MD;  Location: [facility];  Service: Orthopedics;  Laterality: Left;  arthrex    SKIN BIOPSY      TOTAL ABDOMINAL HYSTERECTOMY W/ BILATERAL SALPINGOOPHORECTOMY  age 48       Family History:   Family History   Problem Relation Age of Onset    Hypertension Father     Glaucoma Neg Hx     Macular degeneration Neg Hx     Retinal detachment Neg Hx        Social History:   Social History     Socioeconomic History    Marital status:      Spouse name: Not on file    Number of children: Not on file    Years of education: Not on file    Highest education level: Not on file   Occupational History     Employer: [employer]   Social Needs    Financial resource strain: Somewhat hard    Food insecurity     Worry: Sometimes true     Inability: Sometimes true    Transportation needs     Medical: No     Non-medical: No   Tobacco Use    Smoking status: Light Tobacco Smoker     Packs/day: 1.00     Years: 40.00     Pack years: 40.00     Types: Cigarettes     Last attempt to quit: 2018     Years since quittin.6    Smokeless tobacco: Never Used    Tobacco comment: smokes 2-3 cigarettes a night   Substance and Sexual Activity    Alcohol use: Yes     Alcohol/week: 28.0 standard drinks     Types: 14 Cans of beer, 14 Standard drinks or equivalent per week     Frequency: 4 or more times a week     Drinks per session: 1 or 2     Binge frequency: Never     Comment: 2-3 a day/ social    Drug use: No    Sexual activity: Yes     Partners: Male   Lifestyle    Physical activity     Days per week: 0 days     Minutes per session: 0 min    Stress: Very much   Relationships    Social connections     Talks on phone: Once a week     Gets together: Never     Attends Nondenominational service: Not on file     Active member of club or organization: Yes     Attends meetings of clubs or organizations: More than 4 times per year     Relationship status:    Other Topics Concern    Not on file   Social  History Narrative    Not on file       Date of surgery:  07/30/2020    Review of Systems     General/Constitutional: Chills denies. Fatigue denies. Fever denies. Weight gain denies. Weight loss denies.    Musculoskeletal: Comments: See HPI for details    Skin: Rash denies.    Objective:   Vital Signs:   Vitals:    08/21/20 1213   BP: 102/64        Physical Exam    This a well-developed, well nourished patient in no acute distress.  They are alert and oriented and cooperative to examination.  Pt. walks without an antalgic gait.      General Examination:     Constitutional: The patient is alert and oriented to lace person and time. Mood is pleasant.     Head/Face: Normal facial features normal eyebrows    Eyes: Normal extraocular motion bilaterally    Lungs: Respirations are equal and unlabored    Gait is coordinated.    Cardiovascular: There are no swelling or varicosities present.    Lymphatic: Negative for adenopathy    Skin: Normal    Neurological: Level of consciousness normal. Oriented to place person and time and situation    Psychiatric: Oriented to time place person and situation    Incision is healing well except for 1 small spot that seems to be dehisced.  It appears that there is some type of small protrusion of a subcutaneous suture.  I pulled this out to release it.  XRAY Report/ Interpretation:  Left knee AP and lateral x-ray taken in the office today showed a normal postoperative appearance      Assessment:       1. S/P total knee arthroplasty, left        Plan:       Beatriz was seen today for post-op evaluation.    Diagnoses and all orders for this visit:    S/P total knee arthroplasty, left  -     X-Ray Knee 1 or 2 View Left         No follow-ups on file.    Keep the area clean and dry.  Cleansed with peroxide daily.  Follow up next week as scheduled      This note was created using Dragon voice recognition software that occasionally misinterpreted phrases or words.

## 2020-08-26 DIAGNOSIS — Z96.652 S/P TOTAL KNEE ARTHROPLASTY, LEFT: ICD-10-CM

## 2020-08-26 RX ORDER — OXYCODONE AND ACETAMINOPHEN 10; 325 MG/1; MG/1
1 TABLET ORAL EVERY 6 HOURS PRN
Qty: 28 TABLET | Refills: 0 | Status: SHIPPED | OUTPATIENT
Start: 2020-08-26 | End: 2020-09-02

## 2020-08-26 NOTE — TELEPHONE ENCOUNTER
----- Message from Alysa Amador sent at 8/26/2020  9:55 AM CDT -----  Phone #: [phone]  Patient is requesting a refill of Oxycodone 10mg    Pharmacy:   [id number] - [address]ULI[address]TE Davidson  [address] [place] [address]  [address] [place], [place] [address] [place] [address]  Phone: [phone] Fax: [phone]

## 2020-08-31 ENCOUNTER — TELEPHONE (OUTPATIENT)
Dept: ORTHOPEDICS | Facility: CLINIC | Age: 65
End: 2020-08-31

## 2020-08-31 NOTE — TELEPHONE ENCOUNTER
----- Message from Maci Posadas sent at 8/31/2020 10:05 AM CDT -----  [phone] [facility]. Please call Gabby about CPM machine that was gave to patient at [facility]. [facility] told Gabby to call Dr Martinez office to get patients information.

## 2020-09-01 ENCOUNTER — TELEPHONE (OUTPATIENT)
Dept: FAMILY MEDICINE | Facility: CLINIC | Age: 65
End: 2020-09-01

## 2020-09-01 NOTE — TELEPHONE ENCOUNTER
----- Message from Diane Scott sent at 9/1/2020  1:22 PM CDT -----  Type:  Patient Returning Call    Who Called:pt   Who Left Message for Patient:pt  Does the patient know what this is regarding? Pt want a call from the nurse  Would the patient rather a call back or a response via [employer]?  Call   Best Call Back Number:[phone]  Additional Information:  pt want appt but there only appt in Oct

## 2020-09-01 NOTE — TELEPHONE ENCOUNTER
----- Message from Nicole Lizama sent at 9/1/2020  1:45 PM CDT -----  Regarding: Call back  Type:  Patient Returning Call    Who Called: patient  Who Left Message for Patient: Anaid  Does the patient know what this is regarding?: yes  Would the patient rather a call back or a response via [employer]?  Call back  Best Call Back Number: [phone]  Additional Information:  please call today

## 2020-09-02 ENCOUNTER — DOCUMENT SCAN (OUTPATIENT)
Dept: HOME HEALTH SERVICES | Facility: HOSPITAL | Age: 65
End: 2020-09-02

## 2020-09-05 DIAGNOSIS — R42 LIGHTHEADEDNESS: ICD-10-CM

## 2020-09-05 DIAGNOSIS — R53.83 FATIGUE, UNSPECIFIED TYPE: ICD-10-CM

## 2020-09-05 DIAGNOSIS — R06.09 DYSPNEA ON EXERTION: ICD-10-CM

## 2020-09-05 NOTE — TELEPHONE ENCOUNTER
No new care gaps identified.  Powered by [employer]. Reference number: [id number]. 9/05/2020 3:52:23 PM CDT

## 2020-09-08 ENCOUNTER — TELEPHONE (OUTPATIENT)
Dept: FAMILY MEDICINE | Facility: CLINIC | Age: 65
End: 2020-09-08

## 2020-09-08 ENCOUNTER — TELEPHONE (OUTPATIENT)
Dept: PAIN MEDICINE | Facility: CLINIC | Age: 65
End: 2020-09-08

## 2020-09-08 DIAGNOSIS — M51.36 DDD (DEGENERATIVE DISC DISEASE), LUMBAR: ICD-10-CM

## 2020-09-08 DIAGNOSIS — M51.34 DDD (DEGENERATIVE DISC DISEASE), THORACIC: ICD-10-CM

## 2020-09-08 DIAGNOSIS — Z96.652 S/P TOTAL KNEE ARTHROPLASTY, LEFT: Primary | ICD-10-CM

## 2020-09-08 DIAGNOSIS — M47.896 OTHER SPONDYLOSIS, LUMBAR REGION: Primary | ICD-10-CM

## 2020-09-08 RX ORDER — BUDESONIDE AND FORMOTEROL FUMARATE DIHYDRATE 80; 4.5 UG/1; UG/1
AEROSOL RESPIRATORY (INHALATION)
Qty: 30.6 G | Refills: 2 | Status: SHIPPED | OUTPATIENT
Start: 2020-09-08 | End: 2022-08-05

## 2020-09-08 RX ORDER — ETODOLAC 300 MG/1
300 CAPSULE ORAL 2 TIMES DAILY
Qty: 60 CAPSULE | Refills: 0 | Status: SHIPPED | OUTPATIENT
Start: 2020-09-08 | End: 2020-10-15

## 2020-09-08 RX ORDER — OXYCODONE AND ACETAMINOPHEN 7.5; 325 MG/1; MG/1
1 TABLET ORAL EVERY 6 HOURS PRN
Qty: 28 TABLET | Refills: 0 | Status: SHIPPED | OUTPATIENT
Start: 2020-09-08 | End: 2020-09-16 | Stop reason: SDUPTHER

## 2020-09-08 NOTE — TELEPHONE ENCOUNTER
----- Message from Paulette Stein sent at 9/8/2020 10:05 AM CDT -----  Contact: Pt  Pt states she needs an Rx refill of Oxycodone 10.5/325mg    Pt states she is completely out and that she called on Friday to request a refill however she was told by her pharmacy that they have not received it as of yet.    Pt call back # [phone]

## 2020-09-08 NOTE — PROGRESS NOTES
Refill Routing Note   Medication(s) are not appropriate for processing by [facility]:       - Drug-Disease Interaction (hypokalemia)     Medication-related problems identified: Drug-disease interaction  Medication Therapy Plan: CDMR. Your patient has a diagnosis of Hypokalemia. Patients with hypokalemia should be carefully evaluated before initiating therapy and monitored closely while taking FORMOTEROL.; please advise; defer   Medication reconciliation completed: No      Automatic Epic Generated Protocol Data:        Requested Prescriptions   Pending Prescriptions Disp Refills    SYMBICORT 80-4.5 mcg/actuation HFAA [Pharmacy Med Name: Symbicort 80-4.5 MCG/ACT Inhalation Aerosol] 30.6 g 2     Sig: Inhale 2 puffs by mouth twice daily       Pulmonology:  Combination Products Failed - 9/8/2020  1:08 PM        Failed - There is a short-acting beta agonist medication on the active medication list        Passed - Patient is at least 18 years old        Passed - Patient has applicable pulmonary diagnosis on their problem list        Passed - Last Heart Rate in normal range within 360 days.     Pulse Readings from Last 3 Encounters:   08/12/20 100   08/03/20 80   07/15/20 80             Passed - Office visit in past 12 months or future 90 days.     Recent Outpatient Visits            2 weeks ago S/P total knee arthroplasty, left    [facility] THANIA Mccauley    3 weeks ago S/P total knee arthroplasty, left    [facility] THANIA Mccauley    3 weeks ago Renal cell carcinoma of left kidney    [facility] Monico Morrissey MD    1 month ago Primary osteoarthritis of left knee    [facility] Ruben Martinez MD    3 months ago Primary osteoarthritis of left knee    [facility] Dk Rosales MD          Future Appointments              In 6 days Dk Rosales MD [facility]     In 1 week MD Stuart Lee [facility], [facility]    In 3 weeks Jorge Alonzo MD [place] [facility]    In 5 months MD Stuart [facility]rí[facility] - Urology, [facility]                      Appointments  past 12m or future 3m with PCP    Date Provider   Last Visit   5/14/2020 Jorge Alonzo MD   Next Visit   9/8/2020 Jorge Alonzo MD   ED visits in past 90 days: 0     Note composed:1:10 PM 09/08/2020

## 2020-09-08 NOTE — TELEPHONE ENCOUNTER
It looks like this Rx was discontinued. Duloxetine 30 mg PO BID as prescribed.Patient is asking id you can prescribe something else for her finger pain.

## 2020-09-08 NOTE — TELEPHONE ENCOUNTER
----- Message from Meghan Luis sent at 9/8/2020 11:08 AM CDT -----  Contact: call pt   [phone]   Type: Needs Medical Advice  Who Called:pt  Best Call Back Number: call pt   [phone]  Additional Information:  pt  is  calling   with  a  few  questions  for   nurse // please call  for details

## 2020-09-09 ENCOUNTER — PATIENT MESSAGE (OUTPATIENT)
Dept: FAMILY MEDICINE | Facility: CLINIC | Age: 65
End: 2020-09-09

## 2020-09-09 DIAGNOSIS — F33.1 MAJOR DEPRESSIVE DISORDER, RECURRENT EPISODE, MODERATE: ICD-10-CM

## 2020-09-09 DIAGNOSIS — I10 ESSENTIAL HYPERTENSION: ICD-10-CM

## 2020-09-09 RX ORDER — AMLODIPINE BESYLATE 5 MG/1
5 TABLET ORAL DAILY
Qty: 90 TABLET | Refills: 3 | Status: SHIPPED | OUTPATIENT
Start: 2020-09-09 | End: 2021-07-29

## 2020-09-09 RX ORDER — DULOXETIN HYDROCHLORIDE 30 MG/1
30 CAPSULE, DELAYED RELEASE ORAL 2 TIMES DAILY
Qty: 180 CAPSULE | Refills: 3 | Status: SHIPPED | OUTPATIENT
Start: 2020-09-09 | End: 2020-09-29

## 2020-09-09 NOTE — TELEPHONE ENCOUNTER
No new care gaps identified.  Powered by [employer]. Reference number: [id number]. 9/09/2020 12:11:23 PM CDT

## 2020-09-10 ENCOUNTER — PATIENT OUTREACH (OUTPATIENT)
Dept: ADMINISTRATIVE | Facility: OTHER | Age: 65
End: 2020-09-10

## 2020-09-10 NOTE — PROGRESS NOTES
Chart was reviewed for overdue [facility]ters (FELIX)  topics  Updates were requested from care everywhere  Health Maintenance has been updated  LINKS immunization registry triggered

## 2020-09-14 ENCOUNTER — TELEPHONE (OUTPATIENT)
Dept: ORTHOPEDICS | Facility: CLINIC | Age: 65
End: 2020-09-14

## 2020-09-14 ENCOUNTER — OFFICE VISIT (OUTPATIENT)
Dept: PAIN MEDICINE | Facility: CLINIC | Age: 65
End: 2020-09-14
Payer: MEDICARE

## 2020-09-14 ENCOUNTER — DOCUMENT SCAN (OUTPATIENT)
Dept: HOME HEALTH SERVICES | Facility: HOSPITAL | Age: 65
End: 2020-09-14

## 2020-09-14 VITALS
BODY MASS INDEX: 32.45 KG/M2 | HEIGHT: 64 IN | WEIGHT: 190.06 LBS | DIASTOLIC BLOOD PRESSURE: 88 MMHG | HEART RATE: 73 BPM | SYSTOLIC BLOOD PRESSURE: 135 MMHG

## 2020-09-14 DIAGNOSIS — Z96.652 S/P TOTAL KNEE ARTHROPLASTY, LEFT: Primary | ICD-10-CM

## 2020-09-14 DIAGNOSIS — M43.06 SPONDYLOLYSIS, LUMBAR REGION: Primary | ICD-10-CM

## 2020-09-14 DIAGNOSIS — M47.896 OTHER SPONDYLOSIS, LUMBAR REGION: Primary | ICD-10-CM

## 2020-09-14 DIAGNOSIS — M51.36 DDD (DEGENERATIVE DISC DISEASE), LUMBAR: ICD-10-CM

## 2020-09-14 PROCEDURE — 99999 PR PBB SHADOW E&M-EST. PATIENT-LVL IV: ICD-10-PCS | Mod: PBBFAC,,, | Performed by: ANESTHESIOLOGY

## 2020-09-14 PROCEDURE — 99999 PR PBB SHADOW E&M-EST. PATIENT-LVL IV: CPT | Mod: PBBFAC,,, | Performed by: ANESTHESIOLOGY

## 2020-09-14 PROCEDURE — 99214 OFFICE O/P EST MOD 30 MIN: CPT | Mod: PBBFAC,PN | Performed by: ANESTHESIOLOGY

## 2020-09-14 PROCEDURE — 99214 OFFICE O/P EST MOD 30 MIN: CPT | Mod: S$PBB,,, | Performed by: ANESTHESIOLOGY

## 2020-09-14 PROCEDURE — 99214 PR OFFICE/OUTPT VISIT, EST, LEVL IV, 30-39 MIN: ICD-10-PCS | Mod: S$PBB,,, | Performed by: ANESTHESIOLOGY

## 2020-09-14 NOTE — TELEPHONE ENCOUNTER
----- Message from Kendra Doyle sent at 9/11/2020  2:32 PM CDT -----  Regarding: [facility]  Contact: Henny  She was calling to let us know she is discharged from Home Health PT and wants to be sent to [phone] for outpatient therapy.

## 2020-09-16 ENCOUNTER — TELEPHONE (OUTPATIENT)
Dept: ORTHOPEDICS | Facility: CLINIC | Age: 65
End: 2020-09-16

## 2020-09-16 ENCOUNTER — OFFICE VISIT (OUTPATIENT)
Dept: ORTHOPEDICS | Facility: CLINIC | Age: 65
End: 2020-09-16
Payer: MEDICARE

## 2020-09-16 VITALS — DIASTOLIC BLOOD PRESSURE: 94 MMHG | BODY MASS INDEX: 32.61 KG/M2 | SYSTOLIC BLOOD PRESSURE: 144 MMHG | WEIGHT: 190 LBS

## 2020-09-16 DIAGNOSIS — T81.41XA ABSCESS INVOLVING SUTURE: ICD-10-CM

## 2020-09-16 DIAGNOSIS — Z96.652 S/P TOTAL KNEE ARTHROPLASTY, LEFT: Primary | ICD-10-CM

## 2020-09-16 PROCEDURE — 99024 PR POST-OP FOLLOW-UP VISIT: ICD-10-PCS | Mod: S$GLB,POP,, | Performed by: ORTHOPAEDIC SURGERY

## 2020-09-16 PROCEDURE — 99024 POSTOP FOLLOW-UP VISIT: CPT | Mod: S$GLB,POP,, | Performed by: ORTHOPAEDIC SURGERY

## 2020-09-16 RX ORDER — OXYCODONE AND ACETAMINOPHEN 7.5; 325 MG/1; MG/1
1 TABLET ORAL EVERY 6 HOURS PRN
Qty: 28 TABLET | Refills: 0 | Status: SHIPPED | OUTPATIENT
Start: 2020-09-16 | End: 2020-09-23

## 2020-09-16 NOTE — TELEPHONE ENCOUNTER
----- Message from Lorena David sent at 9/16/2020 12:04 PM CDT -----  Candi steen [facility] wants to get verification on the Pericet ordered, patient is also on Ambien, please call @ [phone]

## 2020-09-16 NOTE — PROGRESS NOTES
Subjective:    Patient ID: Beatriz Gan is a 65 y.o. female.    Chief Complaint: Post-op Evaluation of the Left Knee (PO L TKA (7/30/20) She has moderate pain. P.T starts tomorrow.)      History of Present Illness  Status post left total knee replacement is undergoing physical therapy    Current Medications  Current Outpatient Medications   Medication Sig Dispense Refill    alendronate (FOSAMAX) 70 MG tablet Take 70 mg by mouth every 7 days.      ALPRAZolam (XANAX) 0.5 MG tablet Take 1 tablet (0.5 mg total) by mouth 3 (three) times daily as needed for Anxiety. 90 tablet 1    amLODIPine (NORVASC) 5 MG tablet Take 1 tablet (5 mg total) by mouth once daily. 90 tablet 3    azelastine (ASTELIN) 137 mcg (0.1 %) nasal spray 1 spray (137 mcg total) by Nasal route 2 (two) times daily. 30 mL 3    calcium carbonate (OS-TERI) 600 mg calcium (1,500 mg) Tab Take 600 mg by mouth once.      cholecalciferol, vitamin D3, (VITAMIN D3 ORAL) Take by mouth once daily.      DULoxetine (CYMBALTA) 30 MG capsule Take 1 capsule (30 mg total) by mouth 2 (two) times daily. 180 capsule 3    etodolac (LODINE) 300 MG Cap Take 1 capsule (300 mg total) by mouth 2 (two) times daily. 60 capsule 0    fluticasone (FLONASE) 50 mcg/actuation nasal spray 1 spray by Nasal route daily as needed.       gabapentin (NEURONTIN) 600 MG tablet Take 1 tablet (600 mg total) by mouth 3 (three) times daily. 90 tablet 11    hydrOXYzine pamoate (VISTARIL) 25 MG Cap TAKE 2 CAPSULES BY MOUTH TWICE DAILY AS NEEDED FOR ANXIETY      losartan-hydrochlorothiazide 100-25 mg (HYZAAR) 100-25 mg per tablet Take 1 tablet by mouth once daily. 90 tablet 3    magnesium 30 mg Tab Take by mouth once.      multivit-iron-min-folic acid (MULTIVITAMIN-IRON-MINERALS-FOLIC ACID) 3,500-18-0.4 unit-mg-mg Chew Take by mouth once daily.        ondansetron (ZOFRAN) 4 MG tablet Take 1 tablet (4 mg total) by mouth as needed for Nausea. 60 tablet 1    polyethylene glycol  "(GLYCOLAX) 17 gram/dose powder Take 17 g by mouth once daily. (Patient taking differently: Take 17 g by mouth daily as needed. ) 510 g 3    SYMBICORT 80-4.5 mcg/actuation HFAA Inhale 2 puffs by mouth twice daily 30.6 g 2    zolpidem (AMBIEN) 10 mg Tab TAKE 1 TABLET BY MOUTH ONCE DAILY AT NIGHT AS NEEDED 90 tablet 0    oxyCODONE-acetaminophen (PERCOCET) 7.5-325 mg per tablet Take 1 tablet by mouth every 6 (six) hours as needed for Pain. 28 tablet 0    rivaroxaban (XARELTO) 10 mg Tab Take 1 tablet (10 mg total) by mouth once daily. for 21 days 21 tablet 0     No current facility-administered medications for this visit.      Facility-Administered Medications Ordered in Other Visits   Medication Dose Route Frequency Provider Last Rate Last Dose    electrolyte-S (ISOLYTE)   Intravenous Continuous Kota Ortega MD 10 mL/hr at 01/31/20 0624      lactated ringers infusion   Intravenous Continuous Kota Ortega MD        pregabalin capsule 75 mg  75 mg Oral Once Shiela Mann MD           Allergies  Review of patient's allergies indicates:   Allergen Reactions    Phenergan [promethazine] Hives and Rash    Latex, natural rubber Hives     Per pt report-many years    Morphine Hives    Nsaids (non-steroidal anti-inflammatory drug)      Due to "kidney cancer"       Past Medical History  Past Medical History:   Diagnosis Date    Alcohol dependence     DDD (degenerative disc disease), lumbosacral     DJD (degenerative joint disease), lumbosacral     Encounter for blood transfusion     GERD (gastroesophageal reflux disease)     Gout     Hypercholesterolemia     Hypertension     NATHALIE (iron deficiency anemia)     questionable white coat hypertension    Kidney carcinoma, left 2014    Pneumonia     Renal cell carcinoma     Shingles 10/13/2012       Surgical History  Past Surgical History:   Procedure Laterality Date    APPENDECTOMY      ARTHROSCOPY OF SHOULDER WITH DECOMPRESSION OF SUBACROMIAL SPACE Left " 10/31/2019    Procedure: ARTHROSCOPY, SHOULDER, WITH SUBACROMIAL SPACE DECOMPRESSION;  Surgeon: Ruben Martinez MD;  Location: [facility] OR;  Service: Orthopedics;  Laterality: Left;    BLADDER REPAIR      x 2    COLONOSCOPY  9/2011    DISTAL CLAVICLE EXCISION Left 10/31/2019    Procedure: EXCISION, CLAVICLE, DISTAL;  Surgeon: Ruben Martinez MD;  Location: [facility] OR;  Service: Orthopedics;  Laterality: Left;    ESOPHAGOGASTRODUODENOSCOPY  9/2011    EYE SURGERY      lasix bilateral    FIXATION KYPHOPLASTY N/A 1/31/2020    Procedure: Kyphoplasty T12;  Surgeon: Dk Rosales MD;  Location: [facility] OR;  Service: Pain Management;  Laterality: N/A;    HERNIA REPAIR      hiatal hernai    HYSTERECTOMY      INJECTION OF ANESTHETIC AGENT AROUND NERVE Left 6/8/2020    Procedure: Block, Nerve;  Surgeon: Dk Rosales MD;  Location: [facility] OR;  Service: Pain Management;  Laterality: Left;  left genicular nerve block     KNEE ARTHROPLASTY Left 7/30/2020    Procedure: ARTHROPLASTY, KNEE LEFT;  Surgeon: Ruben Martinez MD;  Location: [facility] OR;  Service: Orthopedics;  Laterality: Left;  REP VALERY RUBY    KNEE ARTHROSCOPY W/ MENISCECTOMY Left 1/16/2020    Procedure: ARTHROSCOPY, KNEE, WITH MEDIAL MENISCECTOMY;  Surgeon: Ruben Martinez MD;  Location: [facility] OR;  Service: Orthopedics;  Laterality: Left;    LAPAROSCOPIC NISSEN FUNDOPLICATION      NEPHRECTOMY      LT partial    RADIOFREQUENCY ABLATION Left 6/19/2020    Procedure: Radiofrequency Ablation;  Surgeon: Dk Rosales MD;  Location: [facility] OR;  Service: Pain Management;  Laterality: Left;    RADIOFREQUENCY ABLATION OF LUMBAR MEDIAL BRANCH NERVE AT SINGLE LEVEL Bilateral 2/28/2020    Procedure: Radiofrequency Ablation, Nerve, Spinal, Lumbar, Medial Branch, 1 Level;  Surgeon: Dk Rosales MD;  Location: [facility] OR;  Service: Pain Management;  Laterality: Bilateral;  L3,L4,L5 - Burned at 80 degrees C. for 60 seconds x 2 each site    REFRACTIVE SURGERY      ROTATOR  CUFF REPAIR Left 10/31/2019    Procedure: REPAIR, ROTATOR CUFF;  Surgeon: Ruben Martinez MD;  Location: [facility] OR;  Service: Orthopedics;  Laterality: Left;  arthrex    SKIN BIOPSY      TOTAL ABDOMINAL HYSTERECTOMY W/ BILATERAL SALPINGOOPHORECTOMY  age 48       Family History:   Family History   Problem Relation Age of Onset    Hypertension Father     Glaucoma Neg Hx     Macular degeneration Neg Hx     Retinal detachment Neg Hx        Social History:   Social History     Socioeconomic History    Marital status:      Spouse name: Not on file    Number of children: Not on file    Years of education: Not on file    Highest education level: Not on file   Occupational History     Employer: [employer]   Social Needs    Financial resource strain: Somewhat hard    Food insecurity     Worry: Sometimes true     Inability: Sometimes true    Transportation needs     Medical: No     Non-medical: No   Tobacco Use    Smoking status: Light Tobacco Smoker     Packs/day: 1.00     Years: 40.00     Pack years: 40.00     Types: Cigarettes     Last attempt to quit: 2018     Years since quittin.7    Smokeless tobacco: Never Used    Tobacco comment: smokes 2-3 cigarettes a night   Substance and Sexual Activity    Alcohol use: Yes     Alcohol/week: 28.0 standard drinks     Types: 14 Cans of beer, 14 Standard drinks or equivalent per week     Frequency: 4 or more times a week     Drinks per session: 1 or 2     Binge frequency: Never     Comment: 2-3 a day/ social    Drug use: No    Sexual activity: Yes     Partners: Male   Lifestyle    Physical activity     Days per week: 0 days     Minutes per session: 0 min    Stress: Very much   Relationships    Social connections     Talks on phone: Once a week     Gets together: Never     Attends Gnosticist service: Not on file     Active member of club or organization: Yes     Attends meetings of clubs or organizations: More than 4 times per year     Relationship  status:    Other Topics Concern    Not on file   Social History Narrative    Not on file       Date of surgery:  July 30, 2020    Review of Systems     General/Constitutional: Chills denies. Fatigue denies. Fever denies. Weight gain denies. Weight loss denies.    Musculoskeletal: Comments: See HPI for details    Skin: Rash denies.    Objective:   Vital Signs:   Vitals:    09/16/20 1048   BP: (!) 144/94        Physical Exam    This a well-developed, well nourished patient in no acute distress.  They are alert and oriented and cooperative to examination.  Pt. walks without an antalgic gait.      General Examination:     Constitutional: The patient is alert and oriented to lace person and time. Mood is pleasant.     Head/Face: Normal facial features normal eyebrows    Eyes: Normal extraocular motion bilaterally    Lungs: Respirations are equal and unlabored    Gait is coordinated.    Cardiovascular: There are no swelling or varicosities present.    Lymphatic: Negative for adenopathy    Skin: Normal    Neurological: Level of consciousness normal. Oriented to place person and time and situation    Psychiatric: Oriented to time place person and situation    Left knee incision well-healed range motion 0-100 degrees small eschar measuring 1 x 1 mm midportion of the incision which was probed notes no signs of any infection  XRAY Report/ Interpretation:        Assessment:       1. S/P total knee arthroplasty, left    2. Abscess involving suture        Plan:       Beatriz was seen today for post-op evaluation.    Diagnoses and all orders for this visit:    S/P total knee arthroplasty, left  -     oxyCODONE-acetaminophen (PERCOCET) 7.5-325 mg per tablet; Take 1 tablet by mouth every 6 (six) hours as needed for Pain.    Abscess involving suture         Follow up in about 4 weeks (around 10/14/2020) for PO LT TKA 7/30/20.    Local care instructions given no need for antibiotics to continue with physical therapy.    Rosales is managing low back pain and will have repeat rhizotomy      This note was created using Dragon voice recognition software that occasionally misinterpreted phrases or words.

## 2020-09-21 ENCOUNTER — TELEPHONE (OUTPATIENT)
Dept: PAIN MEDICINE | Facility: CLINIC | Age: 65
End: 2020-09-21

## 2020-09-21 DIAGNOSIS — Z87.81 HISTORY OF COMPRESSION FRACTURE OF SPINE: ICD-10-CM

## 2020-09-21 DIAGNOSIS — M51.36 DDD (DEGENERATIVE DISC DISEASE), LUMBAR: Primary | ICD-10-CM

## 2020-09-21 DIAGNOSIS — M51.34 DDD (DEGENERATIVE DISC DISEASE), THORACIC: ICD-10-CM

## 2020-09-21 RX ORDER — NAPROXEN 500 MG/1
500 TABLET ORAL 2 TIMES DAILY WITH MEALS
Qty: 60 TABLET | Refills: 2 | Status: SHIPPED | OUTPATIENT
Start: 2020-09-21 | End: 2020-10-15

## 2020-09-21 NOTE — TELEPHONE ENCOUNTER
----- Message from Mallorie Willis sent at 9/21/2020  9:39 AM CDT -----  Regarding: pharm  Contact: Brinda Bishop  Type:  Pharmacy Calling to Clarify an RX    Name of Caller:  Brinda  Pharmacy Name:  [facility]  Prescription Name:  meloxicam,ibuprofen and naproxen are the only drugs covered  What do they need to clarify?: Pls call to adv which one   Best Call Back Number:  [phone]  Additional Information:  Pls call pharm to adv

## 2020-09-22 DIAGNOSIS — Z01.818 PRE-OP TESTING: ICD-10-CM

## 2020-09-25 ENCOUNTER — LAB VISIT (OUTPATIENT)
Dept: PRIMARY CARE CLINIC | Facility: CLINIC | Age: 65
End: 2020-09-25
Payer: MEDICARE

## 2020-09-25 DIAGNOSIS — Z01.818 PRE-OP TESTING: ICD-10-CM

## 2020-09-25 PROCEDURE — U0003 INFECTIOUS AGENT DETECTION BY NUCLEIC ACID (DNA OR RNA); SEVERE ACUTE RESPIRATORY SYNDROME CORONAVIRUS 2 (SARS-COV-2) (CORONAVIRUS DISEASE [COVID-19]), AMPLIFIED PROBE TECHNIQUE, MAKING USE OF HIGH THROUGHPUT TECHNOLOGIES AS DESCRIBED BY CMS-2020-01-R: HCPCS

## 2020-09-25 NOTE — PROGRESS NOTES
Pt presented for Pre-procedure drive thru testing. Patient identified using two patient identifiers prior to specimen collection. Questions answered prior to departure and education given.

## 2020-09-26 LAB — SARS-COV-2 RNA RESP QL NAA+PROBE: NOT DETECTED

## 2020-09-28 ENCOUNTER — TELEPHONE (OUTPATIENT)
Dept: PAIN MEDICINE | Facility: CLINIC | Age: 65
End: 2020-09-28

## 2020-09-28 ENCOUNTER — PATIENT MESSAGE (OUTPATIENT)
Dept: RESEARCH | Facility: OTHER | Age: 65
End: 2020-09-28

## 2020-09-28 NOTE — TELEPHONE ENCOUNTER
----- Message from Tayler Mckeon sent at 9/26/2020 10:47 AM CDT -----  Type: Needs Medical Advice  Who Called:  pt  Best Call Back Number:[phone]  Additional Information: Patient is calling to cancel her procedure for 9/28 due to her not feeling well.Please call back and advise.

## 2020-09-29 ENCOUNTER — PATIENT MESSAGE (OUTPATIENT)
Dept: PAIN MEDICINE | Facility: CLINIC | Age: 65
End: 2020-09-29

## 2020-09-29 ENCOUNTER — TELEPHONE (OUTPATIENT)
Dept: FAMILY MEDICINE | Facility: CLINIC | Age: 65
End: 2020-09-29

## 2020-09-29 ENCOUNTER — OFFICE VISIT (OUTPATIENT)
Dept: FAMILY MEDICINE | Facility: CLINIC | Age: 65
End: 2020-09-29
Payer: MEDICARE

## 2020-09-29 VITALS
DIASTOLIC BLOOD PRESSURE: 70 MMHG | HEART RATE: 80 BPM | BODY MASS INDEX: 33.49 KG/M2 | SYSTOLIC BLOOD PRESSURE: 124 MMHG | OXYGEN SATURATION: 98 % | TEMPERATURE: 98 F | HEIGHT: 64 IN | WEIGHT: 196.19 LBS

## 2020-09-29 DIAGNOSIS — Z85.528 HISTORY OF RENAL CELL CARCINOMA: ICD-10-CM

## 2020-09-29 DIAGNOSIS — M54.50 CHRONIC BILATERAL LOW BACK PAIN WITHOUT SCIATICA: ICD-10-CM

## 2020-09-29 DIAGNOSIS — F41.9 ANXIETY: ICD-10-CM

## 2020-09-29 DIAGNOSIS — Z23 NEED FOR INFLUENZA VACCINATION: ICD-10-CM

## 2020-09-29 DIAGNOSIS — R30.0 DYSURIA: ICD-10-CM

## 2020-09-29 DIAGNOSIS — F51.01 PRIMARY INSOMNIA: ICD-10-CM

## 2020-09-29 DIAGNOSIS — G89.29 CHRONIC BILATERAL LOW BACK PAIN WITHOUT SCIATICA: ICD-10-CM

## 2020-09-29 DIAGNOSIS — I10 ESSENTIAL HYPERTENSION: Primary | ICD-10-CM

## 2020-09-29 LAB
BILIRUB SERPL-MCNC: ABNORMAL MG/DL
BLOOD, POC UA: ABNORMAL
GLUCOSE UR QL STRIP: ABNORMAL
KETONES UR QL STRIP: ABNORMAL
LEUKOCYTE ESTERASE URINE, POC: ABNORMAL
NITRITE, POC UA: ABNORMAL
PH, POC UA: 5
PROTEIN, POC: ABNORMAL
SPECIFIC GRAVITY, POC UA: 1.01
UROBILINOGEN, POC UA: 1

## 2020-09-29 PROCEDURE — 81003 URINALYSIS AUTO W/O SCOPE: CPT | Mod: PBBFAC,PN | Performed by: INTERNAL MEDICINE

## 2020-09-29 PROCEDURE — 99999 PR PBB SHADOW E&M-EST. PATIENT-LVL V: CPT | Mod: PBBFAC,,, | Performed by: INTERNAL MEDICINE

## 2020-09-29 PROCEDURE — 99214 PR OFFICE/OUTPT VISIT, EST, LEVL IV, 30-39 MIN: ICD-10-PCS | Mod: S$PBB,,, | Performed by: INTERNAL MEDICINE

## 2020-09-29 PROCEDURE — 87086 URINE CULTURE/COLONY COUNT: CPT

## 2020-09-29 PROCEDURE — 90694 VACC AIIV4 NO PRSRV 0.5ML IM: CPT | Mod: PBBFAC,PN

## 2020-09-29 PROCEDURE — 99999 PR PBB SHADOW E&M-EST. PATIENT-LVL V: ICD-10-PCS | Mod: PBBFAC,,, | Performed by: INTERNAL MEDICINE

## 2020-09-29 PROCEDURE — 99215 OFFICE O/P EST HI 40 MIN: CPT | Mod: PBBFAC,PN | Performed by: INTERNAL MEDICINE

## 2020-09-29 PROCEDURE — G0008 ADMIN INFLUENZA VIRUS VAC: HCPCS | Mod: PBBFAC,PN

## 2020-09-29 PROCEDURE — 99214 OFFICE O/P EST MOD 30 MIN: CPT | Mod: S$PBB,,, | Performed by: INTERNAL MEDICINE

## 2020-09-29 RX ORDER — FLUOXETINE HYDROCHLORIDE 40 MG/1
40 CAPSULE ORAL DAILY
Qty: 90 CAPSULE | Refills: 3 | Status: SHIPPED | OUTPATIENT
Start: 2020-09-29 | End: 2021-03-30 | Stop reason: SDUPTHER

## 2020-09-29 RX ORDER — HYDROCODONE BITARTRATE AND ACETAMINOPHEN 7.5; 325 MG/1; MG/1
1 TABLET ORAL EVERY 6 HOURS PRN
Qty: 28 TABLET | Refills: 0 | Status: SHIPPED | OUTPATIENT
Start: 2020-09-29 | End: 2020-11-03

## 2020-09-29 NOTE — TELEPHONE ENCOUNTER
----- Message from Donya Rene sent at 9/29/2020  4:41 PM CDT -----  Contact: Candi bush/[facility]  [phone]  [facility] calling to speak with you regarding Rx HYDROcodone-acetaminophen (NORCO) 7.5-325 mg per tablet        Please advise

## 2020-09-29 NOTE — PROGRESS NOTES
[facility] Note    Chief Complaint      Chief Complaint   Patient presents with    Hypertension     6 month f/u       History of Present Illness      Beatriz Gan is a 65 y.o. female with chronic conditions of HTN, Anxiety, insomnia, osteoarthritis left shoulder, chronic low back pain, hx of RCC who presents today for: follow up chronic conditions.  Complains of increased urinary frequency and urgency for the past few days.  HTN: BP at goal on losartan-hctz, amlodipine  Anxiety: Incompletely controlled on cymbalta, xanax as needed.  Pt wants to try getting back on prozac.  Insomnia: Controlled on ambien.  No new or worsening symptoms  Osteoarthritis, shoulder, chronic low back pain:  Sees Dr. Martinez and Dr. Rosales.  Doing much better on gabapentin and diclofenac. Restarted PT.  Hx of RCC: Sees Dr. Morrissey for surveillance.  .  Pt waiting 2/2 COVID concerns  Flu shot due.  TdAP 2011.  Prevnar UTD.    Mammogram 2019.  DEXA 2019.    Cscope 15 yrs ago.  Cologuard UTD.      Past Medical History:  Past Medical History:   Diagnosis Date    Alcohol dependence     DDD (degenerative disc disease), lumbosacral     DJD (degenerative joint disease), lumbosacral     Encounter for blood transfusion     GERD (gastroesophageal reflux disease)     Gout     Hypercholesterolemia     Hypertension     NATHALIE (iron deficiency anemia)     questionable white coat hypertension    Kidney carcinoma, left 2014    Pneumonia     Renal cell carcinoma     Shingles 10/13/2012       Past Surgical History:   has a past surgical history that includes Total abdominal hysterectomy w/ bilateral salpingoophorectomy (age 48); Appendectomy; Bladder repair; Colonoscopy (9/2011); Esophagogastroduodenoscopy (9/2011); Hysterectomy; Refractive surgery; Nephrectomy; Laparoscopic Nissen fundoplication; Eye surgery; Hernia repair; Skin biopsy; Rotator cuff repair (Left, 10/31/2019); Distal clavicle excision (Left, 10/31/2019);  Arthroscopy of shoulder with decompression of subacromial space (Left, 10/31/2019); Knee arthroscopy w/ meniscectomy (Left, 2020); Fixation Kyphoplasty (N/A, 2020); Radiofrequency ablation of lumbar medial branch nerve at single level (Bilateral, 2020); Injection of anesthetic agent around nerve (Left, 2020); Radiofrequency ablation (Left, 2020); and Knee Arthroplasty (Left, 2020).    Family History:  family history includes Hypertension in her father.     Social History:  Social History     Tobacco Use    Smoking status: Former Smoker     Packs/day: 1.00     Years: 40.00     Pack years: 40.00     Types: Cigarettes     Quit date: 2018     Years since quittin.7    Smokeless tobacco: Never Used    Tobacco comment: smokes 2-3 cigarettes a night   Substance Use Topics    Alcohol use: Yes     Alcohol/week: 12.0 standard drinks     Types: 6 Cans of beer, 6 Standard drinks or equivalent per week     Frequency: 4 or more times a week     Drinks per session: 1 or 2     Binge frequency: Never     Comment: 2-3 a day/ social    Drug use: No       I personally reviewed all past medical, surgical, social and family history.    Review of Systems   Constitutional: Negative for chills, fever and malaise/fatigue.   Respiratory: Negative for shortness of breath.    Cardiovascular: Negative for chest pain.   Gastrointestinal: Negative for constipation, diarrhea, nausea and vomiting.   Skin: Negative for rash.   Neurological: Negative for weakness.   All other systems reviewed and are negative.       Medications:  Outpatient Encounter Medications as of 2020   Medication Sig Note Dispense Refill    alendronate (FOSAMAX) 70 MG tablet Take 70 mg by mouth every 7 days.       ALPRAZolam (XANAX) 0.5 MG tablet Take 1 tablet (0.5 mg total) by mouth 3 (three) times daily as needed for Anxiety.  90 tablet 1    amLODIPine (NORVASC) 5 MG tablet Take 1 tablet (5 mg total) by mouth once daily.  90  tablet 3    azelastine (ASTELIN) 137 mcg (0.1 %) nasal spray 1 spray (137 mcg total) by Nasal route 2 (two) times daily.  30 mL 3    calcium carbonate (OS-TERI) 600 mg calcium (1,500 mg) Tab Take 600 mg by mouth once.       cholecalciferol, vitamin D3, (VITAMIN D3 ORAL) Take by mouth once daily.       etodolac (LODINE) 300 MG Cap Take 1 capsule (300 mg total) by mouth 2 (two) times daily.  60 capsule 0    fluticasone (FLONASE) 50 mcg/actuation nasal spray 1 spray by Nasal route daily as needed.  7/23/2020: Not taking      gabapentin (NEURONTIN) 600 MG tablet Take 1 tablet (600 mg total) by mouth 3 (three) times daily.  90 tablet 11    hydrOXYzine pamoate (VISTARIL) 25 MG Cap TAKE 2 CAPSULES BY MOUTH TWICE DAILY AS NEEDED FOR ANXIETY       losartan-hydrochlorothiazide 100-25 mg (HYZAAR) 100-25 mg per tablet Take 1 tablet by mouth once daily.  90 tablet 3    magnesium 30 mg Tab Take by mouth once.       multivit-iron-min-folic acid (MULTIVITAMIN-IRON-MINERALS-FOLIC ACID) 3,500-18-0.4 unit-mg-mg Chew Take by mouth once daily.         naproxen (NAPROSYN) 500 MG tablet Take 1 tablet (500 mg total) by mouth 2 (two) times daily with meals.  60 tablet 2    ondansetron (ZOFRAN) 4 MG tablet Take 1 tablet (4 mg total) by mouth as needed for Nausea.  60 tablet 1    polyethylene glycol (GLYCOLAX) 17 gram/dose powder Take 17 g by mouth once daily. (Patient taking differently: Take 17 g by mouth daily as needed. )  510 g 3    SYMBICORT 80-4.5 mcg/actuation HFAA Inhale 2 puffs by mouth twice daily  30.6 g 2    zolpidem (AMBIEN) 10 mg Tab TAKE 1 TABLET BY MOUTH ONCE DAILY AT NIGHT AS NEEDED  90 tablet 0    [DISCONTINUED] DULoxetine (CYMBALTA) 30 MG capsule Take 1 capsule (30 mg total) by mouth 2 (two) times daily.  180 capsule 3    FLUoxetine 40 MG capsule Take 1 capsule (40 mg total) by mouth once daily.  90 capsule 3    HYDROcodone-acetaminophen (NORCO) 7.5-325 mg per tablet Take 1 tablet by mouth every 6 (six)  "hours as needed for Pain.  28 tablet 0    rivaroxaban (XARELTO) 10 mg Tab Take 1 tablet (10 mg total) by mouth once daily. for 21 days  21 tablet 0     Facility-Administered Encounter Medications as of 9/29/2020   Medication Dose Route Frequency Provider Last Rate Last Dose    electrolyte-S (ISOLYTE)   Intravenous Continuous Kota Ortega MD 10 mL/hr at 01/31/20 0624      lactated ringers infusion   Intravenous Continuous Kota Ortega MD        pregabalin capsule 75 mg  75 mg Oral Once Shiela Mann MD           Allergies:  Review of patient's allergies indicates:   Allergen Reactions    Phenergan [promethazine] Hives and Rash    Latex, natural rubber Hives     Per pt report-many years    Morphine Hives    Nsaids (non-steroidal anti-inflammatory drug)      Due to "kidney cancer"       Health Maintenance:  Immunization History   Administered Date(s) Administered    Influenza 10/16/2014    Influenza (FLUAD) - Trivalent - Adjuvanted - PF (65+) 09/11/2018    Influenza - High Dose - PF (65 years and older) 11/11/2019    Influenza - Quadrivalent - PF *Preferred* (6 months and older) 10/22/2008, 10/16/2014, 11/05/2015, 01/09/2017    Influenza A (H1N1) 2009 Monovalent - IM 01/08/2010    Pneumococcal Conjugate - 13 Valent 01/17/2014, 01/17/2014    Tdap 06/21/2011      Health Maintenance   Topic Date Due    LDCT Lung Screen  08/28/2016    Pneumococcal Vaccine (65+ High/Highest Risk) (2 of 2 - PPSV23) 09/08/2018    Lipid Panel  05/18/2021    TETANUS VACCINE  06/21/2021    Mammogram  02/14/2022    DEXA SCAN  02/14/2023    Hepatitis C Screening  Completed        Physical Exam      Vital Signs  Temp: 97.7 °F (36.5 °C)  Temp src: Oral  Pulse: 80  SpO2: 98 %  BP: 124/70  BP Location: Right arm  Patient Position: Sitting  Pain Score: 0-No pain  Height and Weight  Height: 5' 4" (162.6 cm)  Weight: 89 kg (196 lb 3.4 oz)  BSA (Calculated - sq m): 2 sq meters  BMI (Calculated): 33.7  Weight in (lb) to have " BMI = 25: 145.3]    Physical Exam  Vitals signs reviewed.   Constitutional:       Appearance: She is well-developed.   HENT:      Head: Normocephalic and atraumatic.      Right Ear: External ear normal.      Left Ear: External ear normal.   Eyes:      Conjunctiva/sclera: Conjunctivae normal.      Pupils: Pupils are equal, round, and reactive to light.   Neck:      Vascular: No carotid bruit.   Cardiovascular:      Rate and Rhythm: Normal rate and regular rhythm.      Heart sounds: Normal heart sounds. No murmur.   Pulmonary:      Effort: Pulmonary effort is normal.      Breath sounds: Normal breath sounds. No wheezing or rales.   Abdominal:      General: Bowel sounds are normal. There is no distension.      Palpations: Abdomen is soft.      Tenderness: There is no abdominal tenderness.          Laboratory:  CBC:  Recent Labs   Lab 08/02/20  0508 08/03/20  0507 08/10/20  1233   WBC 4.32 3.03 L 4.45   RBC 2.35 L 2.06 L 3.57 L   Hemoglobin 7.7 L 6.7 L 11.1 L   Hematocrit 24.3 L 21.4 L 35.5 L   Platelets 153 156 442 H   Mean Corpuscular Volume 103 H 104 H 99 H   Mean Corpuscular Hemoglobin 32.8 H 32.5 H 31.1 H   Mean Corpuscular Hemoglobin Conc 31.7 L 31.3 L 31.3 L     CMP:  Recent Labs   Lab 08/02/20  0508 08/03/20  0507 08/10/20  1233   Glucose 105 104 104   Calcium 8.8 8.9 10.3   Albumin 2.6 L 2.4 L 3.1 L   Total Protein 5.7 L 5.4 L 7.0   Sodium 135 L 137 140   Potassium 3.9 4.1 4.1   CO2 24 26 29   Chloride 101 103 101   BUN, Bld 13 10 10   Alkaline Phosphatase 103 104 131   ALT 9 L 11 18   AST 25 21 25   Total Bilirubin 0.4 0.4 0.4     URINALYSIS:  Recent Labs   Lab 05/26/18  1023  07/30/20  0910 08/02/20  1535 08/12/20  0942   Color, UA Yellow   < > Yellow Yellow Yellow   Clarity, UA  --   --   --   --  Clear   Specific Gravity, UA 1.025   < > 1.010 <=1.005 A  --    Spec Grav UA  --   --   --   --  1.020   pH, UA 5.0   < > 7.0 6.0 7.5   Protein, UA 2+ A   < > Negative Negative  --    Bacteria Many A   < > Rare  --    --    Nitrite, UA Negative   < > Negative Negative neg   Leukocytes, UA 2+ A   < > 1+ A Negative  --    Urobilinogen, UA 1.0   < > Negative Negative 1.0   Hyaline Casts, UA 0  --   --   --   --     < > = values in this interval not displayed.      LIPIDS:  Recent Labs   Lab 10/19/17  1000 05/27/18  0428 05/18/20  0926   TSH 1.822 0.344 L  --    HDL  --   --  77 H   Cholesterol  --   --  253 H   Triglycerides  --   --  124   LDL Cholesterol  --   --  151.2   Hdl/Cholesterol Ratio  --   --  30.4   Non-HDL Cholesterol  --   --  176   Total Cholesterol/HDL Ratio  --   --  3.3     TSH:  Recent Labs   Lab 10/19/17  1000 05/27/18  0428   TSH 1.822 0.344 L     A1C:        Assessment/Plan     Beatriz Gan is a 65 y.o.female with:    1. Essential hypertension  Continue current meds.    2. Anxiety  Continue current xanax, change cymbalta to prozac.     3. Primary insomnia  Continue current meds.    4. History of renal cell carcinoma  Recent surveillance stable.  F/U with Dr. almanza   5. Chronic bilateral low back pain without sciatica  Continue current meds.  F/U with DR. Rosales and Dr. Martinez.  6. Dysuria  UA in office with 1+ leukocytes, negative nitrite and neg blood.  Will send for culture.  Increase water intake and holding off on antibiotics for now.    Chronic conditions status updated as per HPI.  Other than changes above, cont current medications and maintain follow up with specialists.  Return to clinic in 6 months.    Jorge Alonzo MD  [facility]

## 2020-09-29 NOTE — PROGRESS NOTES
Urinalysis does not show a clear sign of infection.  We are sending it for culture regardless and if bacteria shows up, I will send in an antibiotic.  In the meantime, drink plenty water to flush the urinary tract.

## 2020-09-30 LAB — BACTERIA UR CULT: NO GROWTH

## 2020-10-01 ENCOUNTER — PATIENT MESSAGE (OUTPATIENT)
Dept: OTHER | Facility: OTHER | Age: 65
End: 2020-10-01

## 2020-10-01 DIAGNOSIS — Z01.818 PRE-OP TESTING: ICD-10-CM

## 2020-10-01 DIAGNOSIS — M43.06 SPONDYLOLYSIS, LUMBAR REGION: Primary | ICD-10-CM

## 2020-10-12 ENCOUNTER — TELEPHONE (OUTPATIENT)
Dept: PAIN MEDICINE | Facility: CLINIC | Age: 65
End: 2020-10-12

## 2020-10-12 DIAGNOSIS — Z96.652 S/P TOTAL KNEE ARTHROPLASTY, LEFT: Primary | ICD-10-CM

## 2020-10-12 RX ORDER — TRAMADOL HYDROCHLORIDE 50 MG/1
50 TABLET ORAL EVERY 6 HOURS PRN
Qty: 28 TABLET | Refills: 0 | Status: SHIPPED | OUTPATIENT
Start: 2020-10-12 | End: 2020-10-19

## 2020-10-12 NOTE — TELEPHONE ENCOUNTER
----- Message from Kendra Doyle sent at 10/12/2020  9:50 AM CDT -----  Regarding: Medication Refill Request  Contact: Patient  Phone #: [phone]  Patient is requesting a refill of Hydrocodone 7.5-325 MG  Pharmacy:   [address] [place] [address] [address]  Phone: [phone] Fax: [phone]

## 2020-10-12 NOTE — TELEPHONE ENCOUNTER
----- Message from Siddhartha Akbar sent at 10/12/2020  4:54 PM CDT -----  Type: Needs Medical Advice    Who Called:  Patient  Best Call Back Number: [phone]  Additional Information: Patient would like to discuss authorization. Please call to advise. Thanks!

## 2020-10-12 NOTE — TELEPHONE ENCOUNTER
----- Message from Siddhartha Akbar sent at 10/12/2020  4:54 PM CDT -----  Type: Needs Medical Advice    Who Called:  Patient  Best Call Back Number: [phone]  Additional Information: Patient would like to discuss authorization. Please call to advise. Thanks!

## 2020-10-13 ENCOUNTER — TELEPHONE (OUTPATIENT)
Dept: PAIN MEDICINE | Facility: CLINIC | Age: 65
End: 2020-10-13

## 2020-10-13 DIAGNOSIS — M43.06 SPONDYLOLYSIS, LUMBAR REGION: Primary | ICD-10-CM

## 2020-10-14 ENCOUNTER — TELEPHONE (OUTPATIENT)
Dept: ORTHOPEDICS | Facility: CLINIC | Age: 65
End: 2020-10-14

## 2020-10-14 ENCOUNTER — OFFICE VISIT (OUTPATIENT)
Dept: ORTHOPEDICS | Facility: CLINIC | Age: 65
End: 2020-10-14
Payer: MEDICARE

## 2020-10-14 VITALS
BODY MASS INDEX: 33.46 KG/M2 | SYSTOLIC BLOOD PRESSURE: 128 MMHG | HEIGHT: 64 IN | OXYGEN SATURATION: 98 % | DIASTOLIC BLOOD PRESSURE: 70 MMHG | WEIGHT: 196 LBS | HEART RATE: 88 BPM

## 2020-10-14 DIAGNOSIS — Z96.652 S/P TOTAL KNEE ARTHROPLASTY, LEFT: Primary | ICD-10-CM

## 2020-10-14 DIAGNOSIS — S83.402A SPRAIN OF COLLATERAL LIGAMENT OF LEFT KNEE, INITIAL ENCOUNTER: ICD-10-CM

## 2020-10-14 PROCEDURE — 99024 POSTOP FOLLOW-UP VISIT: CPT | Mod: S$GLB,POP,, | Performed by: ORTHOPAEDIC SURGERY

## 2020-10-14 PROCEDURE — 99024 PR POST-OP FOLLOW-UP VISIT: ICD-10-PCS | Mod: S$GLB,POP,, | Performed by: ORTHOPAEDIC SURGERY

## 2020-10-14 RX ORDER — OXYCODONE AND ACETAMINOPHEN 7.5; 325 MG/1; MG/1
1 TABLET ORAL EVERY 6 HOURS PRN
Qty: 28 TABLET | Refills: 0 | Status: ON HOLD | OUTPATIENT
Start: 2020-10-14 | End: 2020-10-19

## 2020-10-14 NOTE — PROGRESS NOTES
128 70         Subjective:    Patient ID: Beatriz Gan is a 65 y.o. female.    Chief Complaint: Post-op Evaluation of the Left Knee (Left TKA 7/30/2020 post op follow up. Under the care of  for pain mgmt. Missed a step leaving [place] and came down hard on to that leg. Having pain and swelling, told PT about it. We sent tramadol, states is not helping. )      History of Present Illness  Status post total knee replacement performed July patient slipped coming out of [place] and twisted her leg and has some increased pain    Current Medications  Current Outpatient Medications   Medication Sig Dispense Refill    ALPRAZolam (XANAX) 0.5 MG tablet Take 1 tablet (0.5 mg total) by mouth 3 (three) times daily as needed for Anxiety. 90 tablet 1    amLODIPine (NORVASC) 5 MG tablet Take 1 tablet (5 mg total) by mouth once daily. 90 tablet 3    azelastine (ASTELIN) 137 mcg (0.1 %) nasal spray 1 spray (137 mcg total) by Nasal route 2 (two) times daily. 30 mL 3    calcium carbonate (OS-TERI) 600 mg calcium (1,500 mg) Tab Take 600 mg by mouth once.      cholecalciferol, vitamin D3, (VITAMIN D3 ORAL) Take by mouth once daily.      etodolac (LODINE) 300 MG Cap Take 1 capsule (300 mg total) by mouth 2 (two) times daily. 60 capsule 0    FLUoxetine 40 MG capsule Take 1 capsule (40 mg total) by mouth once daily. 90 capsule 3    fluticasone (FLONASE) 50 mcg/actuation nasal spray 1 spray by Nasal route daily as needed.       gabapentin (NEURONTIN) 600 MG tablet Take 1 tablet (600 mg total) by mouth 3 (three) times daily. 90 tablet 11    hydrOXYzine pamoate (VISTARIL) 25 MG Cap TAKE 2 CAPSULES BY MOUTH TWICE DAILY AS NEEDED FOR ANXIETY      losartan-hydrochlorothiazide 100-25 mg (HYZAAR) 100-25 mg per tablet Take 1 tablet by mouth once daily. 90 tablet 3    magnesium 30 mg Tab Take by mouth once.      multivit-iron-min-folic acid (MULTIVITAMIN-IRON-MINERALS-FOLIC ACID) 3,500-18-0.4 unit-mg-mg Chew Take by mouth  "once daily.        naproxen (NAPROSYN) 500 MG tablet Take 1 tablet (500 mg total) by mouth 2 (two) times daily with meals. 60 tablet 2    ondansetron (ZOFRAN) 4 MG tablet Take 1 tablet (4 mg total) by mouth as needed for Nausea. 60 tablet 1    SYMBICORT 80-4.5 mcg/actuation HFAA Inhale 2 puffs by mouth twice daily 30.6 g 2    traMADoL (ULTRAM) 50 mg tablet Take 1 tablet (50 mg total) by mouth every 6 (six) hours as needed for Pain. 28 tablet 0    zolpidem (AMBIEN) 10 mg Tab TAKE 1 TABLET BY MOUTH ONCE DAILY AT NIGHT AS NEEDED 90 tablet 0    alendronate (FOSAMAX) 70 MG tablet Take 70 mg by mouth every 7 days.      HYDROcodone-acetaminophen (NORCO) 7.5-325 mg per tablet Take 1 tablet by mouth every 6 (six) hours as needed for Pain. (Patient not taking: Reported on 10/14/2020) 28 tablet 0    polyethylene glycol (GLYCOLAX) 17 gram/dose powder Take 17 g by mouth once daily. (Patient not taking: Reported on 10/14/2020) 510 g 3    rivaroxaban (XARELTO) 10 mg Tab Take 1 tablet (10 mg total) by mouth once daily. for 21 days 21 tablet 0     No current facility-administered medications for this visit.      Facility-Administered Medications Ordered in Other Visits   Medication Dose Route Frequency Provider Last Rate Last Dose    electrolyte-S (ISOLYTE)   Intravenous Continuous Kota Ortega MD 10 mL/hr at 01/31/20 0624      lactated ringers infusion   Intravenous Continuous Kota Ortega MD        pregabalin capsule 75 mg  75 mg Oral Once Shiela Mann MD           Allergies  Review of patient's allergies indicates:   Allergen Reactions    Phenergan [promethazine] Hives and Rash    Latex, natural rubber Hives     Per pt report-many years    Morphine Hives    Nsaids (non-steroidal anti-inflammatory drug)      Due to "kidney cancer"       Past Medical History  Past Medical History:   Diagnosis Date    Alcohol dependence     DDD (degenerative disc disease), lumbosacral     DJD (degenerative joint disease), " lumbosacral     Encounter for blood transfusion     GERD (gastroesophageal reflux disease)     Gout     Hypercholesterolemia     Hypertension     NATHALIE (iron deficiency anemia)     questionable white coat hypertension    Kidney carcinoma, left 2014    Pneumonia     Renal cell carcinoma     Shingles 10/13/2012       Surgical History  Past Surgical History:   Procedure Laterality Date    APPENDECTOMY      ARTHROSCOPY OF SHOULDER WITH DECOMPRESSION OF SUBACROMIAL SPACE Left 10/31/2019    Procedure: ARTHROSCOPY, SHOULDER, WITH SUBACROMIAL SPACE DECOMPRESSION;  Surgeon: Ruben Martinez MD;  Location: [facility] OR;  Service: Orthopedics;  Laterality: Left;    BLADDER REPAIR      x 2    COLONOSCOPY  9/2011    DISTAL CLAVICLE EXCISION Left 10/31/2019    Procedure: EXCISION, CLAVICLE, DISTAL;  Surgeon: Ruben Martinez MD;  Location: [facility] OR;  Service: Orthopedics;  Laterality: Left;    ESOPHAGOGASTRODUODENOSCOPY  9/2011    EYE SURGERY      lasix bilateral    FIXATION KYPHOPLASTY N/A 1/31/2020    Procedure: Kyphoplasty T12;  Surgeon: Dk Rosales MD;  Location: [facility] OR;  Service: Pain Management;  Laterality: N/A;    HERNIA REPAIR      hiatal hernai    HYSTERECTOMY      INJECTION OF ANESTHETIC AGENT AROUND NERVE Left 6/8/2020    Procedure: Block, Nerve;  Surgeon: Dk Rosales MD;  Location: [facility] OR;  Service: Pain Management;  Laterality: Left;  left genicular nerve block     KNEE ARTHROPLASTY Left 7/30/2020    Procedure: ARTHROPLASTY, KNEE LEFT;  Surgeon: Ruben Martinez MD;  Location: [facility] OR;  Service: Orthopedics;  Laterality: Left;  REP VALERY RUBY    KNEE ARTHROSCOPY W/ MENISCECTOMY Left 1/16/2020    Procedure: ARTHROSCOPY, KNEE, WITH MEDIAL MENISCECTOMY;  Surgeon: Ruben Martinez MD;  Location: [facility] OR;  Service: Orthopedics;  Laterality: Left;    LAPAROSCOPIC NISSEN FUNDOPLICATION      NEPHRECTOMY      LT partial    RADIOFREQUENCY ABLATION Left 6/19/2020    Procedure: Radiofrequency  Ablation;  Surgeon: Dk Rosales MD;  Location: [facility] OR;  Service: Pain Management;  Laterality: Left;    RADIOFREQUENCY ABLATION OF LUMBAR MEDIAL BRANCH NERVE AT SINGLE LEVEL Bilateral 2020    Procedure: Radiofrequency Ablation, Nerve, Spinal, Lumbar, Medial Branch, 1 Level;  Surgeon: Dk Rosales MD;  Location: [facility] OR;  Service: Pain Management;  Laterality: Bilateral;  L3,L4,L5 - Burned at 80 degrees C. for 60 seconds x 2 each site    REFRACTIVE SURGERY      ROTATOR CUFF REPAIR Left 10/31/2019    Procedure: REPAIR, ROTATOR CUFF;  Surgeon: Ruben Martinez MD;  Location: [facility] OR;  Service: Orthopedics;  Laterality: Left;  arthrex    SKIN BIOPSY      TOTAL ABDOMINAL HYSTERECTOMY W/ BILATERAL SALPINGOOPHORECTOMY  age 48       Family History:   Family History   Problem Relation Age of Onset    Hypertension Father     Glaucoma Neg Hx     Macular degeneration Neg Hx     Retinal detachment Neg Hx        Social History:   Social History     Socioeconomic History    Marital status:      Spouse name: Not on file    Number of children: Not on file    Years of education: Not on file    Highest education level: Not on file   Occupational History     Employer: [employer]   Social Needs    Financial resource strain: Somewhat hard    Food insecurity     Worry: Sometimes true     Inability: Sometimes true    Transportation needs     Medical: No     Non-medical: No   Tobacco Use    Smoking status: Former Smoker     Packs/day: 1.00     Years: 40.00     Pack years: 40.00     Types: Cigarettes     Quit date: 2018     Years since quittin.7    Smokeless tobacco: Never Used    Tobacco comment: smokes 2-3 cigarettes a night   Substance and Sexual Activity    Alcohol use: Yes     Alcohol/week: 12.0 standard drinks     Types: 6 Cans of beer, 6 Standard drinks or equivalent per week     Frequency: 4 or more times a week     Drinks per session: 1 or 2     Binge frequency: Never     Comment: 2-3  a day/ social    Drug use: No    Sexual activity: Yes     Partners: Male   Lifestyle    Physical activity     Days per week: 0 days     Minutes per session: 0 min    Stress: Very much   Relationships    Social connections     Talks on phone: Once a week     Gets together: Never     Attends Baptism service: Not on file     Active member of club or organization: Yes     Attends meetings of clubs or organizations: More than 4 times per year     Relationship status:    Other Topics Concern    Not on file   Social History Narrative    Not on file       Date of surgery:     Review of Systems     General/Constitutional: Chills denies. Fatigue denies. Fever denies. Weight gain denies. Weight loss denies.    Musculoskeletal: Comments: See HPI for details    Skin: Rash denies.    Objective:   Vital Signs:   Vitals:    10/14/20 1050   BP: 128/70   Pulse: 88        Physical Exam    This a well-developed, well nourished patient in no acute distress.  They are alert and oriented and cooperative to examination.  Pt. walks without an antalgic gait.      General Examination:     Constitutional: The patient is alert and oriented to lace person and time. Mood is pleasant.     Head/Face: Normal facial features normal eyebrows    Eyes: Normal extraocular motion bilaterally    Lungs: Respirations are equal and unlabored    Gait is coordinated.    Cardiovascular: There are no swelling or varicosities present.    Lymphatic: Negative for adenopathy    Skin: Normal    Neurological: Level of consciousness normal. Oriented to place person and time and situation    Psychiatric: Oriented to time place person and situation    Left knee shows tenderness of medial retinaculum of the left knee no swelling range of motion 0-100 degrees.  No instability.  XRAY Report/ Interpretation:  AP lateral x-rays left knee were performed and reviewed normal postoperative appearance      Assessment:       1. S/P total knee arthroplasty, left    2.  Sprain of collateral ligament of left knee, initial encounter        Plan:       There are no diagnoses linked to this encounter.       No follow-ups on file.    I believe the patient has a contusion and strain of the left knee the given an external knee brace to help with weight-bearing short term pain medicines given continue physical therapy warm compresses advised      This note was created using Dragon voice recognition software that occasionally misinterpreted phrases or words.

## 2020-10-14 NOTE — TELEPHONE ENCOUNTER
Candi with [facility] called questioning the change in prescription from Tramadol to Percocet I advised that the DX code is on the prescription and the change was under physician direction

## 2020-10-16 ENCOUNTER — LAB VISIT (OUTPATIENT)
Dept: PRIMARY CARE CLINIC | Facility: CLINIC | Age: 65
End: 2020-10-16
Payer: MEDICARE

## 2020-10-16 DIAGNOSIS — Z01.818 PRE-OP TESTING: ICD-10-CM

## 2020-10-16 PROCEDURE — U0003 INFECTIOUS AGENT DETECTION BY NUCLEIC ACID (DNA OR RNA); SEVERE ACUTE RESPIRATORY SYNDROME CORONAVIRUS 2 (SARS-COV-2) (CORONAVIRUS DISEASE [COVID-19]), AMPLIFIED PROBE TECHNIQUE, MAKING USE OF HIGH THROUGHPUT TECHNOLOGIES AS DESCRIBED BY CMS-2020-01-R: HCPCS

## 2020-10-17 LAB — SARS-COV-2 RNA RESP QL NAA+PROBE: NOT DETECTED

## 2020-10-18 DIAGNOSIS — F51.01 PRIMARY INSOMNIA: ICD-10-CM

## 2020-10-19 ENCOUNTER — HOSPITAL ENCOUNTER (OUTPATIENT)
Facility: AMBULARY SURGERY CENTER | Age: 65
Discharge: HOME | End: 2020-10-19
Attending: ANESTHESIOLOGY | Admitting: ANESTHESIOLOGY
Payer: MEDICARE

## 2020-10-19 DIAGNOSIS — M51.37 DDD (DEGENERATIVE DISC DISEASE), LUMBOSACRAL: Primary | ICD-10-CM

## 2020-10-19 DIAGNOSIS — M47.896 OTHER SPONDYLOSIS, LUMBAR REGION: ICD-10-CM

## 2020-10-19 PROCEDURE — 64636 DESTROY L/S FACET JNT ADDL: CPT | Mod: 50,,, | Performed by: ANESTHESIOLOGY

## 2020-10-19 PROCEDURE — 64635 PR DESTROY LUMB/SAC FACET JNT: ICD-10-PCS | Mod: 50,,, | Performed by: ANESTHESIOLOGY

## 2020-10-19 PROCEDURE — 64635 DESTROY LUMB/SAC FACET JNT: CPT | Mod: 50,,, | Performed by: ANESTHESIOLOGY

## 2020-10-19 PROCEDURE — 99152 MOD SED SAME PHYS/QHP 5/>YRS: CPT | Mod: ,,, | Performed by: ANESTHESIOLOGY

## 2020-10-19 PROCEDURE — 64636 DESTROY L/S FACET JNT ADDL: CPT | Mod: LT | Performed by: ANESTHESIOLOGY

## 2020-10-19 PROCEDURE — 99152 PR MOD CONSCIOUS SEDATION, SAME PHYS, 5+ YRS, FIRST 15 MIN: ICD-10-PCS | Mod: ,,, | Performed by: ANESTHESIOLOGY

## 2020-10-19 PROCEDURE — 64636 PR DESTROY L/S FACET JNT ADDL: ICD-10-PCS | Mod: 50,,, | Performed by: ANESTHESIOLOGY

## 2020-10-19 PROCEDURE — 64635 DESTROY LUMB/SAC FACET JNT: CPT | Mod: RT | Performed by: ANESTHESIOLOGY

## 2020-10-19 RX ORDER — FENTANYL CITRATE 50 UG/ML
INJECTION, SOLUTION INTRAMUSCULAR; INTRAVENOUS
Status: DISCONTINUED | OUTPATIENT
Start: 2020-10-19 | End: 2020-10-19 | Stop reason: HOSPADM

## 2020-10-19 RX ORDER — ZOLPIDEM TARTRATE 10 MG/1
TABLET ORAL
Qty: 90 TABLET | Refills: 0 | Status: SHIPPED | OUTPATIENT
Start: 2020-10-19 | End: 2021-01-19

## 2020-10-19 RX ORDER — LIDOCAINE HYDROCHLORIDE 10 MG/ML
INJECTION, SOLUTION EPIDURAL; INFILTRATION; INTRACAUDAL; PERINEURAL
Status: DISCONTINUED | OUTPATIENT
Start: 2020-10-19 | End: 2020-10-19 | Stop reason: HOSPADM

## 2020-10-19 RX ORDER — METHYLPREDNISOLONE ACETATE 80 MG/ML
INJECTION, SUSPENSION INTRA-ARTICULAR; INTRALESIONAL; INTRAMUSCULAR; SOFT TISSUE
Status: DISCONTINUED | OUTPATIENT
Start: 2020-10-19 | End: 2020-10-19 | Stop reason: HOSPADM

## 2020-10-19 RX ORDER — SODIUM CHLORIDE, SODIUM LACTATE, POTASSIUM CHLORIDE, CALCIUM CHLORIDE 600; 310; 30; 20 MG/100ML; MG/100ML; MG/100ML; MG/100ML
INJECTION, SOLUTION INTRAVENOUS ONCE AS NEEDED
Status: COMPLETED | OUTPATIENT
Start: 2020-10-19 | End: 2020-10-19

## 2020-10-19 RX ORDER — LIDOCAINE HYDROCHLORIDE 20 MG/ML
INJECTION, SOLUTION EPIDURAL; INFILTRATION; INTRACAUDAL; PERINEURAL
Status: DISCONTINUED | OUTPATIENT
Start: 2020-10-19 | End: 2020-10-19 | Stop reason: HOSPADM

## 2020-10-19 RX ORDER — ALPRAZOLAM 0.5 MG/1
TABLET ORAL
Qty: 90 TABLET | Refills: 0 | Status: SHIPPED | OUTPATIENT
Start: 2020-10-19 | End: 2020-10-22

## 2020-10-19 RX ORDER — BUPIVACAINE HYDROCHLORIDE 2.5 MG/ML
INJECTION, SOLUTION EPIDURAL; INFILTRATION; INTRACAUDAL
Status: DISCONTINUED | OUTPATIENT
Start: 2020-10-19 | End: 2020-10-19 | Stop reason: HOSPADM

## 2020-10-19 RX ORDER — MIDAZOLAM HYDROCHLORIDE 2 MG/2ML
INJECTION, SOLUTION INTRAMUSCULAR; INTRAVENOUS
Status: DISCONTINUED | OUTPATIENT
Start: 2020-10-19 | End: 2020-10-19 | Stop reason: HOSPADM

## 2020-10-19 RX ADMIN — SODIUM CHLORIDE, SODIUM LACTATE, POTASSIUM CHLORIDE, CALCIUM CHLORIDE: 600; 310; 30; 20 INJECTION, SOLUTION INTRAVENOUS at 02:10

## 2020-10-19 NOTE — TELEPHONE ENCOUNTER
----- Message from Maria Luz Beatty sent at 10/19/2020  2:36 PM CDT -----  Regarding: [facility] [phone]  Contact: Ricky [phone]  Calling to verify patients medication ALPRAZolam (XANAX) 0.5 MG tablet 90 tablet since she recently received some pain medications.

## 2020-10-19 NOTE — PROGRESS NOTES
Refill Routing Note   Medication(s) are not appropriate for processing by [facility] for the following reason(s):     - Outside of protocol    [facility] actions taken in this encounter: Route          Medication reconciliation completed: No   Automatic Epic Generated Protocol Data:        Requested Prescriptions   Pending Prescriptions Disp Refills    zolpidem (AMBIEN) 10 mg Tab [Pharmacy Med Name: Zolpidem Tartrate 10 MG Oral Tablet] 90 tablet 0     Sig: TAKE 1 TABLET BY MOUTH ONCE DAILY AT NIGHT AS NEEDED       There is no refill protocol information for this order       ALPRAZolam (XANAX) 0.5 MG tablet [Pharmacy Med Name: ALPRAZolam 0.5 MG Oral Tablet] 90 tablet 0     Sig: TAKE 1 TABLET BY MOUTH THREE TIMES DAILY AS NEEDED FOR ANXIETY       There is no refill protocol information for this order           Appointments  past 12m or future 3m with PCP    Date Provider   Last Visit   9/29/2020 Jorge Alonzo MD   Next Visit   3/30/2021 Jorge Alonzo MD   ED visits in past 90 days: 0        Note composed:12:06 PM 10/19/2020

## 2020-10-19 NOTE — DISCHARGE SUMMARY
[facility]  Discharge Note  Short Stay    Procedure(s) (LRB):  Radiofrequency Ablation, Nerve, Spinal, Lumbar, Medial Branch, 1 Level (Bilateral)    OUTCOME: Patient tolerated treatment/procedure well without complication and is now ready for discharge.    DISPOSITION: Home or Self Care    FINAL DIAGNOSIS:  Other spondylosis, lumbar region    FOLLOWUP: In clinic    DISCHARGE INSTRUCTIONS:    Discharge Procedure Orders   Diet general     Call MD for:  temperature >100.4     Call MD for:  persistent nausea and vomiting     Call MD for:  severe uncontrolled pain     Call MD for:  difficulty breathing, headache or visual disturbances     Call MD for:  redness, tenderness, or signs of infection (pain, swelling, redness, odor or green/yellow discharge around incision site)     Call MD for:  hives     Call MD for:  persistent dizziness or light-headedness     Call MD for:  extreme fatigue

## 2020-10-19 NOTE — OP NOTE
PROCEDURE DATE: 10/19/2020    PROCEDURE:  Radiofrequency ablation of the L3, L4, and L5 medial branch nerves on the bilateral-side utilizing fluoroscopy    DIAGNOSIS:  Other lumbar spondylosis  Post op Diagnosis: Same    PHYSICIAN: Dk Rosales MD    MEDICATIONS INJECTED:  From a mixture of 6ml of 0.25% bupivicaine and 80mg of methylprednisone,  1ml of this solution was injected at each level.    LOCAL ANESTHETIC USED: Lidocaine 1%, 2 ml given at each site.    SEDATION MEDICATIONS: Under my direction supervision, intravenous moderate sedation was administered during the course of this procedure, with continuous monitoring of hemodynamic parameters. Total time of sedation was 20 minutes.     ESTIMATED BLOOD LOSS:  none    COMPLICATIONS:  none    TECHNIQUE:  A time out was taken to identify patient and procedure side prior to starting the procedure. Laying in a prone position, the patient was prepped and draped in the usual sterile fashion using ChloraPrep and sterile towels.  The levels were determined under fluoroscopic guidance and then marked.  Local anesthetic was given by raising a wheal at the skin over each site and then infiltrated approximately 2cm deeper.  A 20-gauge  100 mm RF needle was introduced to the anatomic location of the bilateral L3, L4, and L5 medial branch nerves. Motor stimulation up to 2 Volts at each level confirmed no motor nerve involvement.  Impedance was less than 800 ohms at each level.  1ml of 2% lidocaine was instilled prior to lesioning.  Ablation was performed per level utilizing radiofrequency generator 80°C for 60 seconds.  Needles were then rotated 90° and a second lesion was performed.  The above noted medication was then injected slowly. The patient tolerated the procedure well.     The patient was monitored after the procedure.  Patient was given post procedure and discharge instructions to follow at home.  The patient was discharged in a stable condition

## 2020-10-19 NOTE — DISCHARGE INSTRUCTIONS
Before leaving, please make sure you have all your personal belongings such as glasses, purses, wallets, keys, cell phones, jewelry, jackets etc     RADIOFREQUENCY/Pain injection instructions:     This procedure may take a several weeks to relieve pain  You may get some pain relief from the local anesthetic initally.   Steroids can have side effects of flushed face or nervous feeling.    No driving for 24 hrs.   Activity as tolerated- gradually increase activities.  Dont lift over 10 lbs for 24 hrs   No heat at injection sites for 2 full days. No heating pads, hot tubs, saunas, or swimming in any body of water or pool for 2 full days.  Use ice pack for mild swelling and for comfort , apply for 20 minutes, remove for 20 minute intervals. No direct contact of ice itself  to skin.  May shower today.  Do not allow shower water to beat on injections site(s) for 2 full days. No tub baths for two full days.      Resume Aspirin, Plavix, or Coumadin the day after the procedure unless otherwise instructed.   If diabetic,monitor your glucose carefully as steroids can increase your glucose level    Seek immediate medical help for:   Severe increase in your usual pain or appearance of new pain.  Prolonged (more than 8 hours) or increasing weakness or numbness in the legs or arms. Numbing medicine was injected and can affect the messages to and from the brain and legs or arms.  .    Fever above 100.4 degrees F ,Drainage,redness,active bleeding, or increased swelling at the injection site.  Headache, shortness of breath, chest pain, or breathing problems.     Radiofrequency of Nerves    After this procedure you may have increased pain for three days and lingering pain for up to 6 weeks.   Most patients feel better after 4-6  weeks.    A steroid may also be injected to help with pain relief.  Steroids can have side effect of flushed face and nervous feeling.  Use your pain medications as needed but the goal of this treartment is to  wean you off your pain medication.  You may have weakness after the procedure due to the numbing injection.  You may feel a sunburn effect and some patients may need a burn cream for the skin, but only apply starting 2 days after your procedure if needed.    Use ice pack for discomfort :apply for 20 minutes, remove for 20 minutes for up to 2 days. Do not sleep with ice pack.  Do not use a heating pad or take tub baths or swim for 2 days.  You may shower today( see directions above)  Gradually increase your activities.  Dont lift anything over 10 lbs for the first 24 hours  Dont drive the day of the procedure Wait 24 hrs to drive.  Wait until tomorrow to resume any blood thinners (aspirin, Plavix, Coumadin) but you may resume all your other medications today.  If Diabetic, monitor you glucose carefully due to steroids  used for this procedure    Seek Medical Attention if you have:  Severe pain or headache  Fever or chills  Redness or swelling around the injection site   Difficulty breathing  Vomiting or Increasing numbness or weakness in arms or legs    After Surgery:  Always be aware that any surgery can cause these symptoms:    Pain- Medication can be prescribed for pain to decrease your pain but may not completely take your pain away.  Over the Counter pain medicine my be enough and you can always use Ice and rest to help ease pain.    Bleeding- a little bleeding after a surgery is usually within normal.  If there is a lot of blood you need to notify your MD.  Emergency treatments of bleeding are cold application, elevation of the bleeding site and compression.    Infection- Infection after surgery is NOT a normal occurrence.  Signs of infection are fever, swelling, hot to touch the incision.  If this occurs notify your MD immediately.    Nausea- this can be common after a surgery especially if you have had anesthesia medicine or are taking pain medicine. The steroid the Dr injected can have a side effect of  Nausea.  Staying on clear liquids, bland foods, gingerale, or over the counter anti nausea medicines can help.  If you vomit more than once, notify your MD.  Anti Nausea medicines can be prescribed.

## 2020-10-19 NOTE — H&P
"FOLLOW UP NOTE:     CHIEF COMPLAINT: back pain    INTERVAL HISTORY OF PRESENT ILLNESS: Beatriz Gan is a 65 y.o. female who presents for bilateral L3, L4, and L5 medial branch radiofrequency ablation. The patient denies of any significant changes in her health since her last appointment. The patient also denies of any changes in the character of her pain since her last appointment.     ROS:  Review of Systems   Constitutional: Negative for chills and fever.   HENT: Negative for sore throat.    Eyes: Negative for visual disturbance.   Respiratory: Negative for shortness of breath.    Cardiovascular: Negative for chest pain.   Gastrointestinal: Negative for nausea and vomiting.   Genitourinary: Negative for difficulty urinating.   Musculoskeletal: Positive for back pain.   Skin: Negative for rash.   Allergic/Immunologic: Negative for immunocompromised state.   Neurological: Negative for syncope.   Hematological: Does not bruise/bleed easily.   Psychiatric/Behavioral: Negative for suicidal ideas.        MEDICAL, SURGICAL, FAMILY, SOCIAL HX: reviewed    MEDICATIONS/ALLERGIES: reviewed    PHYSICAL EXAM:    VITALS: Vitals reviewed.   Vitals:    10/14/20 1424 10/19/20 1415   BP:  (!) 171/79   Pulse:  79   Resp:  19   Temp:  98.2 °F (36.8 °C)   TempSrc:  Skin   SpO2:  96%   Weight: 88.9 kg (196 lb)    Height: 5' 4" (1.626 m)        Physical Exam   Constitutional: She is oriented to person, place, and time and well-developed, well-nourished, and in no distress.   HENT:   Head: Normocephalic and atraumatic.   Eyes: Conjunctivae and EOM are normal. Right eye exhibits no discharge. Left eye exhibits no discharge.   Cardiovascular: Normal rate.   Pulmonary/Chest: Effort normal and breath sounds normal. No respiratory distress.   Abdominal: Soft.   Neurological: She is alert and oriented to person, place, and time.   Skin: Skin is warm and dry. No rash noted. She is not diaphoretic.   Psychiatric: Mood, memory, affect " and judgment normal.   Nursing note and vitals reviewed.       EXTREMITIES:    Gen: No cyanosis, edema, varicosities, or tenderness to palpation BLE   Skin: Warm, pink, dry, no rashes, no lesions BLE   Strength: 5/5 motor strength BLE   ROM: hips, knees and ankles without pain or instability.     NEUROLOGICAL:    Gen: No clonus or spasticity.   Gait: Normal without antalgic lean   DTR's: 2+ in bilateral patellar, and ankle   BABINSKI: Absent bilaterally  Sensory: Intact to light touch and proprioception BLE    ASSESSMENT: Beatriz Gan is a 65 y.o. female who presents for bilateral L3, L4, and L5 medial branch radiofrequency ablation.     PLAN:  1. Proceed with bilateral L3, L4, and L5 medial branch radiofrequency ablation as previously discussed.    This patient has been cleared for surgery in an ambulatory surgical facility    ASA 3,  Mallampatti Score 3  No history of anesthetic complications  Plan for RN IV sedation    Dk Rosales MD  Pain Management

## 2020-10-19 NOTE — PLAN OF CARE
Patient sitting in wheelchair and says she is ready to go home; she denies pain, nausea limb weakness or dizziness. Vital signs and injection sites stable All belongings listed on pre op check list have been returned to patient. Patient's spouse present and states he is ready to take pt home and he is driving.

## 2020-10-20 VITALS
SYSTOLIC BLOOD PRESSURE: 127 MMHG | HEART RATE: 72 BPM | BODY MASS INDEX: 33.46 KG/M2 | RESPIRATION RATE: 18 BRPM | TEMPERATURE: 98 F | HEIGHT: 64 IN | OXYGEN SATURATION: 98 % | DIASTOLIC BLOOD PRESSURE: 56 MMHG | WEIGHT: 196 LBS

## 2020-10-22 RX ORDER — ALPRAZOLAM 0.5 MG/1
TABLET ORAL
Qty: 90 TABLET | Refills: 0 | Status: SHIPPED | OUTPATIENT
Start: 2020-10-22 | End: 2020-12-14 | Stop reason: SDUPTHER

## 2020-10-22 NOTE — PROGRESS NOTES
Refill Routing Note   Medication(s) are not appropriate for processing by [facility] for the following reason(s):     - Outside of Protocol  [facility] actions completed for this encounter: Route          Medication reconciliation completed: No   Automatic Epic Generated Protocol Data:        Requested Prescriptions   Pending Prescriptions Disp Refills    ALPRAZolam (XANAX) 0.5 MG tablet [Pharmacy Med Name: ALPRAZolam 0.5 MG Oral Tablet] 90 tablet 0     Sig: TAKE 1 TABLET BY MOUTH THREE TIMES DAILY AS NEEDED FOR ANXIETY       There is no refill protocol information for this order           Appointments  past 12m or future 3m with PCP    Date Provider   Last Visit   9/29/2020 Jorge Alonzo MD   Next Visit   3/30/2021 Jorge Alonzo MD   ED visits in past 90 days: 0        Note composed:9:11 PM 10/21/2020

## 2020-10-22 NOTE — TELEPHONE ENCOUNTER
----- Message from Margret Cristobal sent at 10/22/2020 10:15 AM CDT -----  Is this a refill or new RX: Refill    RX name and strength:ALPRAZolam (XANAX) 0.5 MG tablet    Is this a 30 day or 90 day RX: Pharmacy name and phone # [facility] [place] [address] [address] [phone] (Phone) [phone] (Fax)

## 2020-11-02 DIAGNOSIS — S83.402A SPRAIN OF COLLATERAL LIGAMENT OF LEFT KNEE, INITIAL ENCOUNTER: Primary | ICD-10-CM

## 2020-11-02 NOTE — TELEPHONE ENCOUNTER
----- Message from Lorena David sent at 11/2/2020 11:31 AM CST -----  Phone #: [phone]  Patient is requesting a refill of Percoset-Dr Martinez        Pharmacy:   [address] [place] [address] [address]  Phone: [phone] Fax: [phone]

## 2020-11-03 ENCOUNTER — TELEPHONE (OUTPATIENT)
Dept: ORTHOPEDICS | Facility: CLINIC | Age: 65
End: 2020-11-03

## 2020-11-03 RX ORDER — HYDROCODONE BITARTRATE AND ACETAMINOPHEN 7.5; 325 MG/1; MG/1
1 TABLET ORAL EVERY 6 HOURS PRN
Qty: 28 TABLET | Refills: 0 | Status: SHIPPED | OUTPATIENT
Start: 2020-11-03 | End: 2020-11-10

## 2020-11-03 NOTE — TELEPHONE ENCOUNTER
----- Message from Alysa Amador sent at 11/3/2020  9:48 AM CST -----  Patient said she called yesterday morning to refill Pecocet 7.5mg.  [phone]

## 2020-11-20 ENCOUNTER — OFFICE VISIT (OUTPATIENT)
Dept: ORTHOPEDICS | Facility: CLINIC | Age: 65
End: 2020-11-20
Payer: MEDICARE

## 2020-11-20 VITALS
HEART RATE: 84 BPM | HEIGHT: 64 IN | OXYGEN SATURATION: 99 % | BODY MASS INDEX: 33.46 KG/M2 | SYSTOLIC BLOOD PRESSURE: 128 MMHG | DIASTOLIC BLOOD PRESSURE: 80 MMHG | WEIGHT: 196 LBS

## 2020-11-20 DIAGNOSIS — Z96.652 S/P TOTAL KNEE ARTHROPLASTY, LEFT: Primary | ICD-10-CM

## 2020-11-20 PROCEDURE — 99213 PR OFFICE/OUTPT VISIT, EST, LEVL III, 20-29 MIN: ICD-10-PCS | Mod: S$GLB,,, | Performed by: PHYSICIAN ASSISTANT

## 2020-11-20 PROCEDURE — 99213 OFFICE O/P EST LOW 20 MIN: CPT | Mod: S$GLB,,, | Performed by: PHYSICIAN ASSISTANT

## 2020-11-20 NOTE — PROGRESS NOTES
Subjective:       Patient ID: Beatriz Gan is a 65 y.o. female.    Chief Complaint: Pain and Post-op Evaluation of the Left Knee (Post op L-TKA on 7/30/20, was doing well until set back falling at [employer], seen here 10/14/20 for that and no new injury just slowed recovery. Lot of clicking and knee cap feels unsteady)      History of Present Illness  Patient is here status post left total knee arthroplasty about 4 months postop.  She remains doing better than she was prior to the surgery but does have some increased pain since the fall that she had in October.  However she does feel like she is improving.  She continues with physical therapy could she is making progress.    Current Medications  Current Outpatient Medications   Medication Sig Dispense Refill    ALPRAZolam (XANAX) 0.5 MG tablet TAKE 1 TABLET BY MOUTH THREE TIMES DAILY AS NEEDED FOR ANXIETY 90 tablet 0    amLODIPine (NORVASC) 5 MG tablet Take 1 tablet (5 mg total) by mouth once daily. 90 tablet 3    azelastine (ASTELIN) 137 mcg (0.1 %) nasal spray 1 spray (137 mcg total) by Nasal route 2 (two) times daily. 30 mL 3    calcium carbonate (OS-TERI) 600 mg calcium (1,500 mg) Tab Take 600 mg by mouth once.      cholecalciferol, vitamin D3, (VITAMIN D3 ORAL) Take by mouth once daily.      fluticasone (FLONASE) 50 mcg/actuation nasal spray 1 spray by Nasal route daily as needed.       gabapentin (NEURONTIN) 600 MG tablet Take 1 tablet (600 mg total) by mouth 3 (three) times daily. 90 tablet 11    losartan-hydrochlorothiazide 100-25 mg (HYZAAR) 100-25 mg per tablet Take 1 tablet by mouth once daily. 90 tablet 3    magnesium 30 mg Tab Take by mouth once.      multivit-iron-min-folic acid (MULTIVITAMIN-IRON-MINERALS-FOLIC ACID) 3,500-18-0.4 unit-mg-mg Chew Take by mouth once daily.        ondansetron (ZOFRAN) 4 MG tablet Take 1 tablet (4 mg total) by mouth as needed for Nausea. 60 tablet 1    polyethylene glycol (GLYCOLAX) 17 gram/dose powder  "Take 17 g by mouth once daily. 510 g 3    SYMBICORT 80-4.5 mcg/actuation HFAA Inhale 2 puffs by mouth twice daily 30.6 g 2    zolpidem (AMBIEN) 10 mg Tab TAKE 1 TABLET BY MOUTH ONCE DAILY AT NIGHT AS NEEDED 90 tablet 0    alendronate (FOSAMAX) 70 MG tablet Take 70 mg by mouth every 7 days.      FLUoxetine 40 MG capsule Take 1 capsule (40 mg total) by mouth once daily. 90 capsule 3     No current facility-administered medications for this visit.      Facility-Administered Medications Ordered in Other Visits   Medication Dose Route Frequency Provider Last Rate Last Dose    electrolyte-S (ISOLYTE)   Intravenous Continuous Kota Ortega MD 10 mL/hr at 01/31/20 0624      lactated ringers infusion   Intravenous Continuous Kota Ortega MD        pregabalin capsule 75 mg  75 mg Oral Once Shiela Mann MD           Allergies  Review of patient's allergies indicates:   Allergen Reactions    Phenergan [promethazine] Hives and Rash    Latex, natural rubber Hives     Per pt report-many years    Morphine Hives    Nsaids (non-steroidal anti-inflammatory drug)      Due to "kidney cancer"       Past Medical History  Past Medical History:   Diagnosis Date    Alcohol dependence     DDD (degenerative disc disease), lumbosacral     DJD (degenerative joint disease), lumbosacral     Encounter for blood transfusion     GERD (gastroesophageal reflux disease)     Gout     Hypercholesterolemia     Hypertension     NATHALIE (iron deficiency anemia)     questionable white coat hypertension    Kidney carcinoma, left 2014    Pneumonia     Renal cell carcinoma     Shingles 10/13/2012       Surgical History  Past Surgical History:   Procedure Laterality Date    APPENDECTOMY      ARTHROSCOPY OF SHOULDER WITH DECOMPRESSION OF SUBACROMIAL SPACE Left 10/31/2019    Procedure: ARTHROSCOPY, SHOULDER, WITH SUBACROMIAL SPACE DECOMPRESSION;  Surgeon: Ruben Martinez MD;  Location: [facility];  Service: Orthopedics;  Laterality: " Left;    BLADDER REPAIR      x 2    COLONOSCOPY  9/2011    DISTAL CLAVICLE EXCISION Left 10/31/2019    Procedure: EXCISION, CLAVICLE, DISTAL;  Surgeon: Ruben Martinez MD;  Location: [facility] OR;  Service: Orthopedics;  Laterality: Left;    ESOPHAGOGASTRODUODENOSCOPY  9/2011    EYE SURGERY      lasix bilateral    FIXATION KYPHOPLASTY N/A 1/31/2020    Procedure: Kyphoplasty T12;  Surgeon: Dk Rosales MD;  Location: [facility] OR;  Service: Pain Management;  Laterality: N/A;    HERNIA REPAIR      hiatal hernai    HYSTERECTOMY      INJECTION OF ANESTHETIC AGENT AROUND NERVE Left 6/8/2020    Procedure: Block, Nerve;  Surgeon: Dk Rosales MD;  Location: [facility] OR;  Service: Pain Management;  Laterality: Left;  left genicular nerve block     KNEE ARTHROPLASTY Left 7/30/2020    Procedure: ARTHROPLASTY, KNEE LEFT;  Surgeon: Ruben Martinez MD;  Location: [facility] OR;  Service: Orthopedics;  Laterality: Left;  REP VALERY RUBY    KNEE ARTHROSCOPY W/ MENISCECTOMY Left 1/16/2020    Procedure: ARTHROSCOPY, KNEE, WITH MEDIAL MENISCECTOMY;  Surgeon: Ruben Martinez MD;  Location: [facility] OR;  Service: Orthopedics;  Laterality: Left;    LAPAROSCOPIC NISSEN FUNDOPLICATION      NEPHRECTOMY      LT partial    RADIOFREQUENCY ABLATION Left 6/19/2020    Procedure: Radiofrequency Ablation;  Surgeon: Dk Rosales MD;  Location: [facility] OR;  Service: Pain Management;  Laterality: Left;    RADIOFREQUENCY ABLATION OF LUMBAR MEDIAL BRANCH NERVE AT SINGLE LEVEL Bilateral 2/28/2020    Procedure: Radiofrequency Ablation, Nerve, Spinal, Lumbar, Medial Branch, 1 Level;  Surgeon: Dk Rosales MD;  Location: [facility] OR;  Service: Pain Management;  Laterality: Bilateral;  L3,L4,L5 - Burned at 80 degrees C. for 60 seconds x 2 each site    RADIOFREQUENCY ABLATION OF LUMBAR MEDIAL BRANCH NERVE AT SINGLE LEVEL Bilateral 10/19/2020    Procedure: Radiofrequency Ablation, Nerve, Spinal, Lumbar, Medial Branch, 1 Level;  Surgeon: Dk Rosales MD;   Location: [facility] OR;  Service: Pain Management;  Laterality: Bilateral;  L3, L4, L5    REFRACTIVE SURGERY      ROTATOR CUFF REPAIR Left 10/31/2019    Procedure: REPAIR, ROTATOR CUFF;  Surgeon: Ruben Martinez MD;  Location: [facility] OR;  Service: Orthopedics;  Laterality: Left;  arthrex    SKIN BIOPSY      TOTAL ABDOMINAL HYSTERECTOMY W/ BILATERAL SALPINGOOPHORECTOMY  age 48       Family History:   Family History   Problem Relation Age of Onset    Hypertension Father     Glaucoma Neg Hx     Macular degeneration Neg Hx     Retinal detachment Neg Hx        Social History:   Social History     Socioeconomic History    Marital status:      Spouse name: Not on file    Number of children: Not on file    Years of education: Not on file    Highest education level: Not on file   Occupational History     Employer: [employer]   Social Needs    Financial resource strain: Somewhat hard    Food insecurity     Worry: Sometimes true     Inability: Sometimes true    Transportation needs     Medical: No     Non-medical: No   Tobacco Use    Smoking status: Former Smoker     Packs/day: 1.00     Years: 40.00     Pack years: 40.00     Types: Cigarettes     Quit date: 2018     Years since quittin.8    Smokeless tobacco: Never Used    Tobacco comment: smokes 2-3 cigarettes a night   Substance and Sexual Activity    Alcohol use: Yes     Alcohol/week: 12.0 standard drinks     Types: 6 Cans of beer, 6 Standard drinks or equivalent per week     Frequency: 4 or more times a week     Drinks per session: 1 or 2     Binge frequency: Never     Comment: 2-3 a day/ social    Drug use: No    Sexual activity: Yes     Partners: Male   Lifestyle    Physical activity     Days per week: 0 days     Minutes per session: 0 min    Stress: Very much   Relationships    Social connections     Talks on phone: Once a week     Gets together: Never     Attends Synagogue service: Not on file     Active member of club or organization:  Yes     Attends meetings of clubs or organizations: More than 4 times per year     Relationship status:    Other Topics Concern    Not on file   Social History Narrative    Not on file       Hospitalization/Major Diagnostic Procedure:     Review of Systems     General/Constitutional:  Chills denies. Fatigue denies. Fever denies. Weight gain denies. Weight loss denies.    Respiratory:  Shortness of breath denies.    Cardiovascular:  Chest pain denies.    Gastrointestinal:  Constipation denies. Diarrhea denies. Nausea denies. Vomiting denies.     Hematology:  Easy bruising denies. Prolonged bleeding denies.     Genitourinary:  Frequent urination denies. Pain in lower back denies. Painful urination denies.     Musculoskeletal:  See HPI for details    Skin:  Rash denies.    Neurologic:  Dizziness denies. Gait abnormalities denies. Seizures denies. Tingling/Numbess denies.    Psychiatric:  Anxiety denies. Depressed mood denies.     Objective:   Vital Signs:   Vitals:    11/20/20 0853   BP: 128/80   Pulse: 84        Physical Exam      General Examination:     Constitutional: The patient is alert and oriented to lace person and time. Mood is pleasant.     Head/Face: Normal facial features normal eyebrows    Eyes: Normal extraocular motion bilaterally    Lungs: Respirations are equal and unlabored    Gait is coordinated.    Cardiovascular: There are no swelling or varicosities present.    Lymphatic: Negative for adenopathy    Skin: Normal    Neurological: Level of consciousness normal. Oriented to place person and time and situation    Psychiatric: Oriented to time place person and situation    Left knee exam demonstrates only some mild residual effusion with medial joint line tenderness palpation; this is where she hit her knee when she fell.  She has excellent range of motion actively and normal strength.  XRAY Report/ Interpretation:  No new studies today      Assessment:       1. S/P total knee arthroplasty,  left        Plan:       Beatriz was seen today for pain and post-op evaluation.    Diagnoses and all orders for this visit:    S/P total knee arthroplasty, left         No follow-ups on file.    Continue to increase activity as tolerated.  Follow-up for 1 year postoperative appointment.  I think that she will be much improved by that time    This note was created using Dragon voice recognition software that occasionally misinterpreted phrases or words.

## 2020-11-25 ENCOUNTER — PATIENT OUTREACH (OUTPATIENT)
Dept: ADMINISTRATIVE | Facility: OTHER | Age: 65
End: 2020-11-25

## 2020-11-30 ENCOUNTER — OFFICE VISIT (OUTPATIENT)
Dept: PAIN MEDICINE | Facility: CLINIC | Age: 65
End: 2020-11-30
Payer: MEDICARE

## 2020-11-30 VITALS
BODY MASS INDEX: 33.46 KG/M2 | DIASTOLIC BLOOD PRESSURE: 75 MMHG | SYSTOLIC BLOOD PRESSURE: 128 MMHG | HEART RATE: 73 BPM | WEIGHT: 196 LBS | HEIGHT: 64 IN

## 2020-11-30 DIAGNOSIS — M51.36 DDD (DEGENERATIVE DISC DISEASE), LUMBAR: Primary | ICD-10-CM

## 2020-11-30 DIAGNOSIS — Z01.818 PRE-OP TESTING: ICD-10-CM

## 2020-11-30 DIAGNOSIS — M47.896 OTHER SPONDYLOSIS, LUMBAR REGION: ICD-10-CM

## 2020-11-30 PROCEDURE — 99214 OFFICE O/P EST MOD 30 MIN: CPT | Mod: PBBFAC,PN | Performed by: ANESTHESIOLOGY

## 2020-11-30 PROCEDURE — 99999 PR PBB SHADOW E&M-EST. PATIENT-LVL IV: ICD-10-PCS | Mod: PBBFAC,,, | Performed by: ANESTHESIOLOGY

## 2020-11-30 PROCEDURE — 99214 OFFICE O/P EST MOD 30 MIN: CPT | Mod: S$PBB,,, | Performed by: ANESTHESIOLOGY

## 2020-11-30 PROCEDURE — 99999 PR PBB SHADOW E&M-EST. PATIENT-LVL IV: CPT | Mod: PBBFAC,,, | Performed by: ANESTHESIOLOGY

## 2020-11-30 PROCEDURE — 99214 PR OFFICE/OUTPT VISIT, EST, LEVL IV, 30-39 MIN: ICD-10-PCS | Mod: S$PBB,,, | Performed by: ANESTHESIOLOGY

## 2020-11-30 PROCEDURE — 96372 THER/PROPH/DIAG INJ SC/IM: CPT | Mod: PBBFAC,PN

## 2020-11-30 RX ORDER — KETOROLAC TROMETHAMINE 30 MG/ML
30 INJECTION, SOLUTION INTRAMUSCULAR; INTRAVENOUS
Status: COMPLETED | OUTPATIENT
Start: 2020-11-30 | End: 2020-11-30

## 2020-11-30 RX ADMIN — KETOROLAC TROMETHAMINE 30 MG: 60 INJECTION, SOLUTION INTRAMUSCULAR at 09:11

## 2020-11-30 NOTE — PROGRESS NOTES
FOLLOW UP NOTE:     CHIEF COMPLAINT: back pain    INITIAL HISTORY OF PRESENT ILLNESS: Beatriz Gan is a 64 y.o. female with PMH significant for hx of left rotator cuff repair (Dr. Martinez), hx of left arthroscopic knee surgery, hx of partial nephrectomy, hx of ETOH dependence, and HTN presents as a referral for the evaluation of low back pain. Patient reports that her pain approximately in 2014 after no inciting incident or trauma. The patient reports that she experienced a new type of mid back pain when she slipped and fell at home approximately 1 week prior to her kyphoplasty with me. Today, the patient wants her low back pain addressed as her mid back pain resolved s/p kyphoplasty. The patient localizes her pain to the area across her lower back. Patient denies of radiation of her pain (the patient reports of having pain previously that radiated towards her left foot but states that her injections via Dr. Leger resolved her radicular pain). Patient describes her pain as a shooting, throbbing, and burning type of pain. Patient reports that her pain current pain is a 10/10. Patient denies of any urinary/fecal incontinence, saddle anesthesia, or weakness.      Of note, the patient reports that she fell 1 week prior to her T12 kyphoplasty with me on 1/31/2020. Patient was in discussion to her lumbar spine surgery with Dr. Martinez but she fell prior to scheduling a procedure date.     Aggravating factors: constant pain; activity in general, sitting     Mitigating factors: ice    INTERVAL HISTORY OF PRESENT ILLNESS: Beatriz Gan is a 65 y.o. female with PMH significant for hx of left rotator cuff repair (Dr. Martinez), hx of left arthroscopic knee surgery, hx of partial nephrectomy, hx of ETOH dependence, HTN, and hx of left total knee arthroplasty (7/30/2020; Dr. Ruben Martinez) presents for the continued management of low back pain. The patient is s/p radiofrequency ablation of the L3, L4, and L5 medial  branch nerves on the bilateral-side on 10/19/2020, and she reports of a few weeks of benefit. Today, the patient localizes her pain to the middle of her lower back. The patient reports of radiation to her bilateral hips. The patient describes her pain as throbbing type of pain. She reports of using the TENS unit 3-4 times per day with temporary relief. She has also tried a heating pad without benefit. She reports that she cannot take gabapentin 600 mg tablets secondary to GI upset. Patient denies of any urinary/fecal incontinence, saddle anesthesia, or weakness.     INTERVENTIONAL PAIN HISTORY:  10/19/2020: Radiofrequency ablation of the L3, L4, and L5 medial branch nerves on the bilateral-side - a few weeks of benefit   6/19/2020: Radiofrequency Ablations of Superior lateral, Superior medial and Inferior medial genicular nerves, left-sided   3/11/2020: hylan supplementation via Dr. Ruben Martinez - no relief  2/28/2020: Bilateral L3, L4, and L5 medial branch radiofrequency ablation   8/21/2017: Right sacroiliac joint injection   4/10/2017: C7-T1 cervical interlaminar epidural steroid injection - 50% relief per chart review  2/6/2017: Right sacroiliac joint injection   8/2016: Lumbar RFA - good relief     CURRENT PAIN MEDICATIONS:   Gabapentin 400 mg PO TID  TENS unit - 3-4 times per day        ROS:  Review of Systems   Constitutional: Negative for chills and fever.   HENT: Negative for sore throat.    Eyes: Negative for visual disturbance.   Respiratory: Negative for shortness of breath.    Cardiovascular: Negative for chest pain.   Gastrointestinal: Negative for nausea and vomiting.   Genitourinary: Negative for difficulty urinating.   Musculoskeletal: Positive for back pain.   Skin: Negative for rash.   Allergic/Immunologic: Negative for immunocompromised state.   Neurological: Negative for syncope.   Hematological: Does not bruise/bleed easily.   Psychiatric/Behavioral: Negative for suicidal ideas.        MEDICAL,  "SURGICAL, FAMILY, SOCIAL HX: reviewed    MEDICATIONS/ALLERGIES: reviewed    PHYSICAL EXAM:    VITALS: Vitals reviewed.   Vitals:    11/30/20 0858   BP: 128/75   Pulse: 73   Weight: 88.9 kg (195 lb 15.8 oz)   Height: 5' 4" (1.626 m)   PainSc:   3   PainLoc: Back       Physical Exam   Constitutional: She is oriented to person, place, and time and well-developed, well-nourished, and in no distress.   HENT:   Head: Normocephalic and atraumatic.   Eyes: Conjunctivae and EOM are normal. Right eye exhibits no discharge. Left eye exhibits no discharge.   Cardiovascular: Normal rate.   Pulmonary/Chest: Effort normal and breath sounds normal. No respiratory distress.   Abdominal: Soft.   Neurological: She is alert and oriented to person, place, and time.   Skin: Skin is warm and dry. No rash noted. She is not diaphoretic.   Psychiatric: Mood, memory, affect and judgment normal.   Nursing note and vitals reviewed.       UPPER EXTREMITIES: Normal alignment, normal range of motion, no atrophy, no skin changes,  hair growth and nail growth normal and equal bilaterally. No swelling, no tenderness.    LOWER EXTREMITIES:  Normal alignment, normal range of motion, no atrophy, no skin changes,  hair growth and nail growth normal and equal bilaterally. No swelling, no tenderness.     LUMBAR SPINE  Lumbar spine: ROM is severely limited with flexion extension and oblique extension with increased pain (worse with extension).     ((--)) Supine straight leg raise    ((+)) Facet loading   Internal and external rotation of the hip causes no increased pain on either side.  Myofascial exam: Tenderness to palpation across lumbar paraspinous muscles.     ((+)) TTP at the SI joint  ((+)) AYLEEN's test  ((+)) One leg stand on the right side  ((+)) Distraction test     MOTOR: Tone and bulk: normal bilateral upper and lower Strength: normal "   Delt      Bi         Tri        WE      WF                        R          5          5          5          5          5          5            L          5          5          5          5          5          5               IP         ADD     ABD     Quad   TA        Gas      HAM  R          5          5          5          5          5          5          5  L          5          5          5          5          5          5          5     SENSATION: Light touch and pinprick intact bilaterally  REFLEXES: normal, symmetric, nonbrisk.  Toes down, no clonus. Negative nicholson's sign bilaterally.  GAIT: normal rise, base, steps, and arm swing.      IMAGING: no new imaging to review    ASSESSMENT: Beatriz Gan is a 65 y.o. female with PMH significant for hx of left rotator cuff repair (Dr. Martinez), hx of left arthroscopic knee surgery, hx of partial nephrectomy, hx of ETOH dependence, HTN, and hx of left total knee arthroplasty (7/30/2020; Dr. Ruben Martinez) presents for the continued management of low back pain. The patient is s/p radiofrequency ablation of the L3, L4, and L5 medial branch nerves on the bilateral-side on 10/19/2020, and she reports of a few weeks of benefit. Today, the patient localizes her pain to the middle of her lower back. No prior lumbar epidural steroid injection in the past. Treatment plan outlined below.     PLAN:  1. Schedule for L4-L5 lumbar interlaminar epidural steroid injection  2. Administered IM toradol injection today for breakthrough pain  3. I have stressed the importance of physical activity and a home exercise plan to help with chronic pain and improve health.  4. Continue gabapentin 400 mg PO TID as prescribed for neuropathic pain  5. RTC for the procedure as outlined above     Dk Rosales MD  Pain Management

## 2020-12-04 ENCOUNTER — LAB VISIT (OUTPATIENT)
Dept: PRIMARY CARE CLINIC | Facility: CLINIC | Age: 65
End: 2020-12-04
Payer: MEDICARE

## 2020-12-04 DIAGNOSIS — Z01.818 PRE-OP TESTING: ICD-10-CM

## 2020-12-04 PROCEDURE — U0003 INFECTIOUS AGENT DETECTION BY NUCLEIC ACID (DNA OR RNA); SEVERE ACUTE RESPIRATORY SYNDROME CORONAVIRUS 2 (SARS-COV-2) (CORONAVIRUS DISEASE [COVID-19]), AMPLIFIED PROBE TECHNIQUE, MAKING USE OF HIGH THROUGHPUT TECHNOLOGIES AS DESCRIBED BY CMS-2020-01-R: HCPCS

## 2020-12-05 LAB — SARS-COV-2 RNA RESP QL NAA+PROBE: NOT DETECTED

## 2020-12-07 ENCOUNTER — HOSPITAL ENCOUNTER (OUTPATIENT)
Facility: AMBULARY SURGERY CENTER | Age: 65
Discharge: HOME | End: 2020-12-07
Attending: ANESTHESIOLOGY | Admitting: ANESTHESIOLOGY
Payer: MEDICARE

## 2020-12-07 DIAGNOSIS — M47.817 DJD (DEGENERATIVE JOINT DISEASE), LUMBOSACRAL: Primary | ICD-10-CM

## 2020-12-07 DIAGNOSIS — M51.36 DEGENERATIVE DISC DISEASE, LUMBAR: ICD-10-CM

## 2020-12-07 PROBLEM — M51.369 DEGENERATIVE DISC DISEASE, LUMBAR: Status: ACTIVE | Noted: 2020-12-07

## 2020-12-07 PROCEDURE — 62323 NJX INTERLAMINAR LMBR/SAC: CPT | Mod: ,,, | Performed by: ANESTHESIOLOGY

## 2020-12-07 PROCEDURE — 62323 NJX INTERLAMINAR LMBR/SAC: CPT | Performed by: ANESTHESIOLOGY

## 2020-12-07 PROCEDURE — 62323 PR INJ LUMBAR/SACRAL, W/IMAGING GUIDANCE: ICD-10-PCS | Mod: ,,, | Performed by: ANESTHESIOLOGY

## 2020-12-07 RX ORDER — LIDOCAINE HYDROCHLORIDE 10 MG/ML
INJECTION, SOLUTION EPIDURAL; INFILTRATION; INTRACAUDAL; PERINEURAL
Status: DISCONTINUED | OUTPATIENT
Start: 2020-12-07 | End: 2020-12-07 | Stop reason: HOSPADM

## 2020-12-07 RX ORDER — MIDAZOLAM HYDROCHLORIDE 2 MG/2ML
INJECTION, SOLUTION INTRAMUSCULAR; INTRAVENOUS
Status: DISCONTINUED | OUTPATIENT
Start: 2020-12-07 | End: 2020-12-07 | Stop reason: HOSPADM

## 2020-12-07 RX ORDER — SODIUM CHLORIDE, SODIUM LACTATE, POTASSIUM CHLORIDE, CALCIUM CHLORIDE 600; 310; 30; 20 MG/100ML; MG/100ML; MG/100ML; MG/100ML
INJECTION, SOLUTION INTRAVENOUS ONCE AS NEEDED
Status: COMPLETED | OUTPATIENT
Start: 2020-12-07 | End: 2020-12-07

## 2020-12-07 RX ORDER — SODIUM CHLORIDE 9 MG/ML
INJECTION, SOLUTION INTRAMUSCULAR; INTRAVENOUS; SUBCUTANEOUS
Status: DISCONTINUED | OUTPATIENT
Start: 2020-12-07 | End: 2020-12-07 | Stop reason: HOSPADM

## 2020-12-07 RX ORDER — FENTANYL CITRATE 50 UG/ML
INJECTION, SOLUTION INTRAMUSCULAR; INTRAVENOUS
Status: DISCONTINUED | OUTPATIENT
Start: 2020-12-07 | End: 2020-12-07 | Stop reason: HOSPADM

## 2020-12-07 RX ORDER — DEXAMETHASONE SODIUM PHOSPHATE 10 MG/ML
INJECTION INTRAMUSCULAR; INTRAVENOUS
Status: DISCONTINUED | OUTPATIENT
Start: 2020-12-07 | End: 2020-12-07 | Stop reason: HOSPADM

## 2020-12-07 RX ADMIN — SODIUM CHLORIDE, SODIUM LACTATE, POTASSIUM CHLORIDE, CALCIUM CHLORIDE: 600; 310; 30; 20 INJECTION, SOLUTION INTRAVENOUS at 01:12

## 2020-12-07 NOTE — PLAN OF CARE
Stable states ready to go home, mary po fluids, pain tolerable, no leg weakness, ambulated to car with RN to

## 2020-12-07 NOTE — OP NOTE
PROCEDURE DATE: 12/7/2020    PROCEDURE:  Lumbar interlaminar epidural steroid injection at L4-L5 under fluoroscopic guidance.    DIAGNOSIS: LUMBAR DISC DISPLACEMENT WITHOUT MYELOPATHY  POSTOP DIAGNOSIS: SAME    PHYSICIAN: Dk Rosales M.D.    MEDICATIONS INJECTED: 10 mg dexamethasone with 4 ml of preservative free NaCl    LOCAL ANESTHETIC INJECTED:    Lidocaine 1% 3 ml total    SEDATION MEDICATIONS: RN IV sedation    ESTIMATED BLOOD LOSS:  none    COMPLICATIONS:  none    TECHNIQUE:  Time-out taken to identify patient and procedure prior to starting the procedure.  With the patient laying in a prone position, the area was prepped and draped in the usual sterile fashion using ChloraPrep and a fenestrated drape.  After determining the target level with an AP fluoroscopic view, local anesthetic was given using a 25-gauge 1.5 inch needle by raising a wheal and then infiltrating toward the interlaminar entry space.  A 3.5 inch 20-gauge Touhy needle was introduced under AP fluoroscopic guidance to the interlaminar space of L4-L5. Once the trajectory was established, the needle was visualized in the lateral view and advanced using loss of resistance technique. Once in the desired position, 2 mL contrast was injected to confirm placement and there was no vascular uptake nor intrathecal spread.  The medication was then injected slowly. The patient tolerated the procedure well.      The patient was monitored after the procedure.   They were given post-procedure and discharge instructions to follow at home.  The patient was discharged in a stable condition.

## 2020-12-07 NOTE — INTERVAL H&P NOTE

## 2020-12-08 ENCOUNTER — PATIENT MESSAGE (OUTPATIENT)
Dept: FAMILY MEDICINE | Facility: CLINIC | Age: 65
End: 2020-12-08

## 2020-12-08 VITALS
HEART RATE: 84 BPM | TEMPERATURE: 97 F | BODY MASS INDEX: 33.46 KG/M2 | SYSTOLIC BLOOD PRESSURE: 125 MMHG | WEIGHT: 196 LBS | HEIGHT: 64 IN | DIASTOLIC BLOOD PRESSURE: 74 MMHG | OXYGEN SATURATION: 94 % | RESPIRATION RATE: 20 BRPM

## 2020-12-08 NOTE — TELEPHONE ENCOUNTER
Not sure where the duloxetine came from.  We changed that to fluoxetine at 9/29/2020 visit.  Disregard and only take fluoxetine

## 2020-12-11 ENCOUNTER — PATIENT MESSAGE (OUTPATIENT)
Dept: OTHER | Facility: OTHER | Age: 65
End: 2020-12-11

## 2020-12-12 ENCOUNTER — PATIENT MESSAGE (OUTPATIENT)
Dept: FAMILY MEDICINE | Facility: CLINIC | Age: 65
End: 2020-12-12

## 2020-12-14 ENCOUNTER — PATIENT MESSAGE (OUTPATIENT)
Dept: PAIN MEDICINE | Facility: CLINIC | Age: 65
End: 2020-12-14

## 2020-12-14 ENCOUNTER — TELEPHONE (OUTPATIENT)
Dept: PAIN MEDICINE | Facility: CLINIC | Age: 65
End: 2020-12-14

## 2020-12-14 ENCOUNTER — PATIENT MESSAGE (OUTPATIENT)
Dept: FAMILY MEDICINE | Facility: CLINIC | Age: 65
End: 2020-12-14

## 2020-12-14 DIAGNOSIS — Z87.81 HISTORY OF COMPRESSION FRACTURE OF SPINE: ICD-10-CM

## 2020-12-14 DIAGNOSIS — M51.36 DDD (DEGENERATIVE DISC DISEASE), LUMBAR: Primary | ICD-10-CM

## 2020-12-14 DIAGNOSIS — M47.896 OTHER SPONDYLOSIS, LUMBAR REGION: ICD-10-CM

## 2020-12-14 RX ORDER — NAPROXEN 500 MG/1
500 TABLET ORAL 2 TIMES DAILY WITH MEALS
Qty: 120 TABLET | Refills: 2 | Status: SHIPPED | OUTPATIENT
Start: 2020-12-14 | End: 2021-06-11

## 2020-12-14 RX ORDER — ALPRAZOLAM 0.5 MG/1
0.5 TABLET ORAL 3 TIMES DAILY PRN
Qty: 90 TABLET | Refills: 0 | Status: SHIPPED | OUTPATIENT
Start: 2020-12-14 | End: 2021-03-10 | Stop reason: SDUPTHER

## 2020-12-14 RX ORDER — DULOXETIN HYDROCHLORIDE 30 MG/1
30 CAPSULE, DELAYED RELEASE ORAL 2 TIMES DAILY
COMMUNITY
Start: 2020-12-06 | End: 2021-03-30

## 2020-12-14 NOTE — TELEPHONE ENCOUNTER
----- Message from Serena Stock sent at 12/14/2020  9:51 AM CST -----  Regarding: pharmacy  Contact: [facility]  Type:  Pharmacy Calling to Clarify an RX    Name of Caller:  Brinda  Pharmacy Name:  [facility]  Prescription Name:  Naproxen 500 mg  What do they need to clarify?:  refill requested  Best Call Back Number:  [phone]  Additional Information:  Brinda states the patient called for a refill but it shows it was canceled. Please call Brinda to advise. Thanks!

## 2021-01-03 ENCOUNTER — PATIENT OUTREACH (OUTPATIENT)
Dept: ADMINISTRATIVE | Facility: OTHER | Age: 66
End: 2021-01-03

## 2021-01-11 ENCOUNTER — PATIENT MESSAGE (OUTPATIENT)
Dept: FAMILY MEDICINE | Facility: CLINIC | Age: 66
End: 2021-01-11

## 2021-01-11 DIAGNOSIS — I10 ESSENTIAL HYPERTENSION: ICD-10-CM

## 2021-01-11 RX ORDER — LOSARTAN POTASSIUM AND HYDROCHLOROTHIAZIDE 25; 100 MG/1; MG/1
1 TABLET ORAL DAILY
Qty: 90 TABLET | Refills: 3 | Status: SHIPPED | OUTPATIENT
Start: 2021-01-11 | End: 2022-03-05

## 2021-01-18 DIAGNOSIS — F51.01 PRIMARY INSOMNIA: ICD-10-CM

## 2021-01-19 RX ORDER — ZOLPIDEM TARTRATE 10 MG/1
TABLET ORAL
Qty: 90 TABLET | Refills: 0 | Status: SHIPPED | OUTPATIENT
Start: 2021-01-19 | End: 2021-04-26

## 2021-01-27 ENCOUNTER — LAB VISIT (OUTPATIENT)
Dept: PRIMARY CARE CLINIC | Facility: OTHER | Age: 66
End: 2021-01-27
Attending: INTERNAL MEDICINE
Payer: MEDICARE

## 2021-01-27 DIAGNOSIS — Z20.822 ENCOUNTER FOR LABORATORY TESTING FOR COVID-19 VIRUS: ICD-10-CM

## 2021-01-27 PROCEDURE — U0003 INFECTIOUS AGENT DETECTION BY NUCLEIC ACID (DNA OR RNA); SEVERE ACUTE RESPIRATORY SYNDROME CORONAVIRUS 2 (SARS-COV-2) (CORONAVIRUS DISEASE [COVID-19]), AMPLIFIED PROBE TECHNIQUE, MAKING USE OF HIGH THROUGHPUT TECHNOLOGIES AS DESCRIBED BY CMS-2020-01-R: HCPCS

## 2021-01-28 LAB — SARS-COV-2 RNA RESP QL NAA+PROBE: NOT DETECTED

## 2021-02-04 ENCOUNTER — PATIENT MESSAGE (OUTPATIENT)
Dept: FAMILY MEDICINE | Facility: CLINIC | Age: 66
End: 2021-02-04

## 2021-03-06 ENCOUNTER — PATIENT MESSAGE (OUTPATIENT)
Dept: FAMILY MEDICINE | Facility: CLINIC | Age: 66
End: 2021-03-06

## 2021-03-06 ENCOUNTER — PATIENT MESSAGE (OUTPATIENT)
Dept: PAIN MEDICINE | Facility: CLINIC | Age: 66
End: 2021-03-06

## 2021-03-06 DIAGNOSIS — Z87.81 HISTORY OF COMPRESSION FRACTURE OF SPINE: ICD-10-CM

## 2021-03-06 DIAGNOSIS — M51.36 DDD (DEGENERATIVE DISC DISEASE), LUMBAR: Primary | ICD-10-CM

## 2021-03-06 DIAGNOSIS — M47.896 OTHER SPONDYLOSIS, LUMBAR REGION: ICD-10-CM

## 2021-03-06 DIAGNOSIS — H92.03 OTALGIA OF BOTH EARS: ICD-10-CM

## 2021-03-08 RX ORDER — AZELASTINE 1 MG/ML
1 SPRAY, METERED NASAL 2 TIMES DAILY
Qty: 30 ML | Refills: 3 | Status: SHIPPED | OUTPATIENT
Start: 2021-03-08 | End: 2022-07-07

## 2021-03-10 ENCOUNTER — PATIENT MESSAGE (OUTPATIENT)
Dept: PAIN MEDICINE | Facility: CLINIC | Age: 66
End: 2021-03-10

## 2021-03-10 ENCOUNTER — PATIENT MESSAGE (OUTPATIENT)
Dept: FAMILY MEDICINE | Facility: CLINIC | Age: 66
End: 2021-03-10

## 2021-03-10 RX ORDER — ALPRAZOLAM 0.5 MG/1
0.5 TABLET ORAL 3 TIMES DAILY PRN
Qty: 90 TABLET | Refills: 0 | Status: SHIPPED | OUTPATIENT
Start: 2021-03-10 | End: 2021-04-26

## 2021-03-11 ENCOUNTER — PATIENT MESSAGE (OUTPATIENT)
Dept: PAIN MEDICINE | Facility: CLINIC | Age: 66
End: 2021-03-11

## 2021-03-11 ENCOUNTER — TELEPHONE (OUTPATIENT)
Dept: PAIN MEDICINE | Facility: CLINIC | Age: 66
End: 2021-03-11

## 2021-03-15 ENCOUNTER — PATIENT MESSAGE (OUTPATIENT)
Dept: FAMILY MEDICINE | Facility: CLINIC | Age: 66
End: 2021-03-15

## 2021-03-15 DIAGNOSIS — S09.90XA HEAD TRAUMA, INITIAL ENCOUNTER: ICD-10-CM

## 2021-03-15 DIAGNOSIS — R07.81 RIB PAIN: Primary | ICD-10-CM

## 2021-03-16 ENCOUNTER — HOSPITAL ENCOUNTER (OUTPATIENT)
Dept: RADIOLOGY | Facility: HOSPITAL | Age: 66
Discharge: HOME | End: 2021-03-16
Attending: INTERNAL MEDICINE
Payer: MEDICARE

## 2021-03-16 ENCOUNTER — PATIENT MESSAGE (OUTPATIENT)
Dept: FAMILY MEDICINE | Facility: CLINIC | Age: 66
End: 2021-03-16

## 2021-03-16 DIAGNOSIS — R07.81 RIB PAIN: ICD-10-CM

## 2021-03-16 DIAGNOSIS — S09.90XA HEAD TRAUMA, INITIAL ENCOUNTER: ICD-10-CM

## 2021-03-16 PROCEDURE — 71100 X-RAY EXAM RIBS UNI 2 VIEWS: CPT | Mod: 26,RT,, | Performed by: RADIOLOGY

## 2021-03-16 PROCEDURE — 70450 CT HEAD WITHOUT CONTRAST: ICD-10-PCS | Mod: 26,,, | Performed by: RADIOLOGY

## 2021-03-16 PROCEDURE — 71100 X-RAY EXAM RIBS UNI 2 VIEWS: CPT | Mod: TC,FY,RT

## 2021-03-16 PROCEDURE — 71100 XR RIBS 2 VIEW RIGHT: ICD-10-PCS | Mod: 26,RT,, | Performed by: RADIOLOGY

## 2021-03-16 PROCEDURE — 70450 CT HEAD/BRAIN W/O DYE: CPT | Mod: 26,,, | Performed by: RADIOLOGY

## 2021-03-16 PROCEDURE — 70450 CT HEAD/BRAIN W/O DYE: CPT | Mod: TC

## 2021-03-17 ENCOUNTER — PATIENT MESSAGE (OUTPATIENT)
Dept: FAMILY MEDICINE | Facility: CLINIC | Age: 66
End: 2021-03-17

## 2021-03-17 DIAGNOSIS — R07.89 ACUTE CHEST WALL PAIN: Primary | ICD-10-CM

## 2021-03-17 RX ORDER — HYDROCODONE BITARTRATE AND ACETAMINOPHEN 7.5; 325 MG/1; MG/1
1 TABLET ORAL EVERY 6 HOURS PRN
Qty: 21 TABLET | Refills: 0 | Status: SHIPPED | OUTPATIENT
Start: 2021-03-17 | End: 2022-08-04 | Stop reason: SDUPTHER

## 2021-03-29 ENCOUNTER — TELEPHONE (OUTPATIENT)
Dept: PRIMARY CARE CLINIC | Facility: CLINIC | Age: 66
End: 2021-03-29

## 2021-03-29 DIAGNOSIS — R07.81 RIB PAIN: Primary | ICD-10-CM

## 2021-03-29 RX ORDER — OXYCODONE AND ACETAMINOPHEN 7.5; 325 MG/1; MG/1
1 TABLET ORAL EVERY 6 HOURS PRN
Qty: 21 TABLET | Refills: 0 | Status: SHIPPED | OUTPATIENT
Start: 2021-03-29 | End: 2021-09-30

## 2021-03-30 ENCOUNTER — OFFICE VISIT (OUTPATIENT)
Dept: FAMILY MEDICINE | Facility: CLINIC | Age: 66
End: 2021-03-30
Payer: MEDICARE

## 2021-03-30 VITALS
OXYGEN SATURATION: 97 % | SYSTOLIC BLOOD PRESSURE: 124 MMHG | DIASTOLIC BLOOD PRESSURE: 80 MMHG | WEIGHT: 200.5 LBS | HEIGHT: 64 IN | HEART RATE: 74 BPM | BODY MASS INDEX: 34.23 KG/M2 | TEMPERATURE: 98 F

## 2021-03-30 DIAGNOSIS — F41.9 ANXIETY: ICD-10-CM

## 2021-03-30 DIAGNOSIS — Z85.528 HISTORY OF RENAL CELL CARCINOMA: ICD-10-CM

## 2021-03-30 DIAGNOSIS — I10 ESSENTIAL HYPERTENSION: ICD-10-CM

## 2021-03-30 DIAGNOSIS — S09.90XA HEAD TRAUMA, INITIAL ENCOUNTER: Primary | ICD-10-CM

## 2021-03-30 DIAGNOSIS — D50.8 IRON DEFICIENCY ANEMIA SECONDARY TO INADEQUATE DIETARY IRON INTAKE: ICD-10-CM

## 2021-03-30 DIAGNOSIS — E78.2 HYPERLIPIDEMIA, MIXED: ICD-10-CM

## 2021-03-30 DIAGNOSIS — J42 CHRONIC BRONCHITIS, UNSPECIFIED CHRONIC BRONCHITIS TYPE: ICD-10-CM

## 2021-03-30 DIAGNOSIS — S22.31XA CLOSED FRACTURE OF ONE RIB OF RIGHT SIDE, INITIAL ENCOUNTER: ICD-10-CM

## 2021-03-30 DIAGNOSIS — K21.9 GASTROESOPHAGEAL REFLUX DISEASE WITHOUT ESOPHAGITIS: ICD-10-CM

## 2021-03-30 DIAGNOSIS — R53.83 FATIGUE, UNSPECIFIED TYPE: ICD-10-CM

## 2021-03-30 PROCEDURE — 99999 PR PBB SHADOW E&M-EST. PATIENT-LVL V: CPT | Mod: PBBFAC,,, | Performed by: INTERNAL MEDICINE

## 2021-03-30 PROCEDURE — 99215 OFFICE O/P EST HI 40 MIN: CPT | Mod: PBBFAC,PN | Performed by: INTERNAL MEDICINE

## 2021-03-30 PROCEDURE — 99214 PR OFFICE/OUTPT VISIT, EST, LEVL IV, 30-39 MIN: ICD-10-PCS | Mod: S$PBB,,, | Performed by: INTERNAL MEDICINE

## 2021-03-30 PROCEDURE — 99214 OFFICE O/P EST MOD 30 MIN: CPT | Mod: S$PBB,,, | Performed by: INTERNAL MEDICINE

## 2021-03-30 PROCEDURE — 99999 PR PBB SHADOW E&M-EST. PATIENT-LVL V: ICD-10-PCS | Mod: PBBFAC,,, | Performed by: INTERNAL MEDICINE

## 2021-03-30 RX ORDER — ALBUTEROL SULFATE 90 UG/1
2 AEROSOL, METERED RESPIRATORY (INHALATION) EVERY 4 HOURS PRN
Qty: 18 G | Refills: 11 | Status: ON HOLD | OUTPATIENT
Start: 2021-03-30 | End: 2021-05-17

## 2021-03-30 RX ORDER — FLUOXETINE HYDROCHLORIDE 40 MG/1
40 CAPSULE ORAL 2 TIMES DAILY
Qty: 180 CAPSULE | Refills: 3 | Status: ON HOLD | OUTPATIENT
Start: 2021-03-30 | End: 2021-05-17

## 2021-04-01 ENCOUNTER — TELEPHONE (OUTPATIENT)
Dept: UROLOGY | Facility: CLINIC | Age: 66
End: 2021-04-01

## 2021-04-01 DIAGNOSIS — R31.9 HEMATURIA OF UNKNOWN CAUSE: ICD-10-CM

## 2021-04-01 DIAGNOSIS — C64.2 RENAL CELL CARCINOMA OF LEFT KIDNEY: Primary | ICD-10-CM

## 2021-04-12 ENCOUNTER — PATIENT MESSAGE (OUTPATIENT)
Dept: PRIMARY CARE CLINIC | Facility: CLINIC | Age: 66
End: 2021-04-12

## 2021-04-14 ENCOUNTER — HOSPITAL ENCOUNTER (OUTPATIENT)
Dept: RADIOLOGY | Facility: HOSPITAL | Age: 66
Discharge: HOME | End: 2021-04-14
Attending: NURSE PRACTITIONER
Payer: MEDICARE

## 2021-04-14 DIAGNOSIS — R31.9 HEMATURIA OF UNKNOWN CAUSE: ICD-10-CM

## 2021-04-14 PROCEDURE — 74178 CT UROGRAM ABD PELVIS W WO: ICD-10-PCS | Mod: 26,,, | Performed by: RADIOLOGY

## 2021-04-14 PROCEDURE — 74178 CT ABD&PLV WO CNTR FLWD CNTR: CPT | Mod: 26,,, | Performed by: RADIOLOGY

## 2021-04-14 PROCEDURE — 74178 CT ABD&PLV WO CNTR FLWD CNTR: CPT | Mod: TC

## 2021-04-14 PROCEDURE — 25500020 PHARM REV CODE 255

## 2021-04-14 RX ADMIN — IOHEXOL 125 ML: 350 INJECTION, SOLUTION INTRAVENOUS at 11:04

## 2021-04-18 ENCOUNTER — PATIENT OUTREACH (OUTPATIENT)
Dept: ADMINISTRATIVE | Facility: OTHER | Age: 66
End: 2021-04-18

## 2021-04-18 ENCOUNTER — PATIENT MESSAGE (OUTPATIENT)
Dept: ADMINISTRATIVE | Facility: OTHER | Age: 66
End: 2021-04-18

## 2021-04-18 DIAGNOSIS — Z12.31 ENCOUNTER FOR SCREENING MAMMOGRAM FOR MALIGNANT NEOPLASM OF BREAST: Primary | ICD-10-CM

## 2021-04-24 DIAGNOSIS — F51.01 PRIMARY INSOMNIA: ICD-10-CM

## 2021-04-26 RX ORDER — ZOLPIDEM TARTRATE 10 MG/1
TABLET ORAL
Qty: 90 TABLET | Refills: 0 | Status: SHIPPED | OUTPATIENT
Start: 2021-04-26 | End: 2021-07-13 | Stop reason: SDUPTHER

## 2021-04-26 RX ORDER — ALPRAZOLAM 0.5 MG/1
TABLET ORAL
Qty: 90 TABLET | Refills: 0 | Status: SHIPPED | OUTPATIENT
Start: 2021-04-26 | End: 2021-06-11

## 2021-04-27 ENCOUNTER — OFFICE VISIT (OUTPATIENT)
Dept: UROLOGY | Facility: CLINIC | Age: 66
End: 2021-04-27
Payer: MEDICARE

## 2021-04-27 VITALS
HEART RATE: 83 BPM | HEIGHT: 64 IN | WEIGHT: 197.31 LBS | RESPIRATION RATE: 18 BRPM | SYSTOLIC BLOOD PRESSURE: 148 MMHG | BODY MASS INDEX: 33.69 KG/M2 | DIASTOLIC BLOOD PRESSURE: 98 MMHG

## 2021-04-27 DIAGNOSIS — C68.9 MALIGNANT NEOPLASM OF URINARY ORGAN, UNSPECIFIED: ICD-10-CM

## 2021-04-27 DIAGNOSIS — C64.2 RENAL CELL CARCINOMA OF LEFT KIDNEY: Primary | ICD-10-CM

## 2021-04-27 PROCEDURE — 99215 OFFICE O/P EST HI 40 MIN: CPT | Mod: PBBFAC,PN | Performed by: NURSE PRACTITIONER

## 2021-04-27 PROCEDURE — 99214 OFFICE O/P EST MOD 30 MIN: CPT | Mod: S$PBB,,, | Performed by: NURSE PRACTITIONER

## 2021-04-27 PROCEDURE — 99999 PR PBB SHADOW E&M-EST. PATIENT-LVL V: ICD-10-PCS | Mod: PBBFAC,,, | Performed by: NURSE PRACTITIONER

## 2021-04-27 PROCEDURE — 99214 PR OFFICE/OUTPT VISIT, EST, LEVL IV, 30-39 MIN: ICD-10-PCS | Mod: S$PBB,,, | Performed by: NURSE PRACTITIONER

## 2021-04-27 PROCEDURE — 99999 PR PBB SHADOW E&M-EST. PATIENT-LVL V: CPT | Mod: PBBFAC,,, | Performed by: NURSE PRACTITIONER

## 2021-05-06 ENCOUNTER — PATIENT MESSAGE (OUTPATIENT)
Dept: PAIN MEDICINE | Facility: CLINIC | Age: 66
End: 2021-05-06

## 2021-05-06 ENCOUNTER — PATIENT MESSAGE (OUTPATIENT)
Dept: ADMINISTRATIVE | Facility: OTHER | Age: 66
End: 2021-05-06

## 2021-05-06 ENCOUNTER — TELEPHONE (OUTPATIENT)
Dept: PAIN MEDICINE | Facility: CLINIC | Age: 66
End: 2021-05-06

## 2021-05-06 ENCOUNTER — PATIENT MESSAGE (OUTPATIENT)
Dept: FAMILY MEDICINE | Facility: CLINIC | Age: 66
End: 2021-05-06

## 2021-05-06 DIAGNOSIS — R07.81 RIB PAIN: ICD-10-CM

## 2021-05-06 DIAGNOSIS — M47.896 OTHER SPONDYLOSIS, LUMBAR REGION: ICD-10-CM

## 2021-05-06 DIAGNOSIS — M51.36 DDD (DEGENERATIVE DISC DISEASE), LUMBAR: Primary | ICD-10-CM

## 2021-05-06 DIAGNOSIS — Z87.81 HISTORY OF COMPRESSION FRACTURE OF SPINE: ICD-10-CM

## 2021-05-06 DIAGNOSIS — M51.36 DDD (DEGENERATIVE DISC DISEASE), LUMBAR: ICD-10-CM

## 2021-05-07 ENCOUNTER — PATIENT MESSAGE (OUTPATIENT)
Dept: FAMILY MEDICINE | Facility: CLINIC | Age: 66
End: 2021-05-07

## 2021-05-07 ENCOUNTER — TELEPHONE (OUTPATIENT)
Dept: PAIN MEDICINE | Facility: CLINIC | Age: 66
End: 2021-05-07

## 2021-05-07 DIAGNOSIS — M54.16 LUMBAR RADICULOPATHY: Primary | ICD-10-CM

## 2021-05-07 RX ORDER — OXYCODONE AND ACETAMINOPHEN 7.5; 325 MG/1; MG/1
1 TABLET ORAL EVERY 12 HOURS PRN
Qty: 12 TABLET | Refills: 0 | Status: SHIPPED | OUTPATIENT
Start: 2021-05-07 | End: 2021-05-18 | Stop reason: SDUPTHER

## 2021-05-07 RX ORDER — OXYCODONE AND ACETAMINOPHEN 7.5; 325 MG/1; MG/1
1 TABLET ORAL EVERY 6 HOURS PRN
Qty: 30 TABLET | Refills: 0 | Status: SHIPPED | OUTPATIENT
Start: 2021-05-07 | End: 2021-05-07 | Stop reason: SDUPTHER

## 2021-05-14 ENCOUNTER — LAB VISIT (OUTPATIENT)
Dept: PRIMARY CARE CLINIC | Facility: CLINIC | Age: 66
End: 2021-05-14
Payer: MEDICARE

## 2021-05-14 DIAGNOSIS — Z01.818 PRE-OP TESTING: ICD-10-CM

## 2021-05-14 PROCEDURE — U0005 INFEC AGEN DETEC AMPLI PROBE: HCPCS | Performed by: ANESTHESIOLOGY

## 2021-05-14 PROCEDURE — U0003 INFECTIOUS AGENT DETECTION BY NUCLEIC ACID (DNA OR RNA); SEVERE ACUTE RESPIRATORY SYNDROME CORONAVIRUS 2 (SARS-COV-2) (CORONAVIRUS DISEASE [COVID-19]), AMPLIFIED PROBE TECHNIQUE, MAKING USE OF HIGH THROUGHPUT TECHNOLOGIES AS DESCRIBED BY CMS-2020-01-R: HCPCS | Performed by: ANESTHESIOLOGY

## 2021-05-15 LAB — SARS-COV-2 RNA RESP QL NAA+PROBE: NOT DETECTED

## 2021-05-17 ENCOUNTER — HOSPITAL ENCOUNTER (OUTPATIENT)
Facility: AMBULARY SURGERY CENTER | Age: 66
Discharge: HOME | End: 2021-05-17
Attending: ANESTHESIOLOGY | Admitting: ANESTHESIOLOGY
Payer: MEDICARE

## 2021-05-17 DIAGNOSIS — M51.36 DEGENERATIVE DISC DISEASE, LUMBAR: Primary | ICD-10-CM

## 2021-05-17 PROCEDURE — 62323 NJX INTERLAMINAR LMBR/SAC: CPT | Mod: ,,, | Performed by: ANESTHESIOLOGY

## 2021-05-17 PROCEDURE — 62323 NJX INTERLAMINAR LMBR/SAC: CPT | Performed by: ANESTHESIOLOGY

## 2021-05-17 PROCEDURE — 62323 PR INJ LUMBAR/SACRAL, W/IMAGING GUIDANCE: ICD-10-PCS | Mod: ,,, | Performed by: ANESTHESIOLOGY

## 2021-05-17 RX ORDER — DEXAMETHASONE SODIUM PHOSPHATE 10 MG/ML
INJECTION INTRAMUSCULAR; INTRAVENOUS
Status: DISCONTINUED | OUTPATIENT
Start: 2021-05-17 | End: 2021-05-17 | Stop reason: HOSPADM

## 2021-05-17 RX ORDER — SODIUM CHLORIDE, SODIUM LACTATE, POTASSIUM CHLORIDE, CALCIUM CHLORIDE 600; 310; 30; 20 MG/100ML; MG/100ML; MG/100ML; MG/100ML
INJECTION, SOLUTION INTRAVENOUS ONCE AS NEEDED
Status: COMPLETED | OUTPATIENT
Start: 2021-05-17 | End: 2021-05-17

## 2021-05-17 RX ORDER — MIDAZOLAM HYDROCHLORIDE 2 MG/2ML
INJECTION, SOLUTION INTRAMUSCULAR; INTRAVENOUS
Status: DISCONTINUED | OUTPATIENT
Start: 2021-05-17 | End: 2021-05-17 | Stop reason: HOSPADM

## 2021-05-17 RX ORDER — LIDOCAINE HYDROCHLORIDE 10 MG/ML
INJECTION, SOLUTION EPIDURAL; INFILTRATION; INTRACAUDAL; PERINEURAL
Status: DISCONTINUED | OUTPATIENT
Start: 2021-05-17 | End: 2021-05-17 | Stop reason: HOSPADM

## 2021-05-17 RX ORDER — SODIUM CHLORIDE 9 MG/ML
INJECTION, SOLUTION INTRAMUSCULAR; INTRAVENOUS; SUBCUTANEOUS
Status: DISCONTINUED | OUTPATIENT
Start: 2021-05-17 | End: 2021-05-17 | Stop reason: HOSPADM

## 2021-05-17 RX ORDER — FENTANYL CITRATE 50 UG/ML
INJECTION, SOLUTION INTRAMUSCULAR; INTRAVENOUS
Status: DISCONTINUED | OUTPATIENT
Start: 2021-05-17 | End: 2021-05-17 | Stop reason: HOSPADM

## 2021-05-17 RX ADMIN — SODIUM CHLORIDE, SODIUM LACTATE, POTASSIUM CHLORIDE, CALCIUM CHLORIDE: 600; 310; 30; 20 INJECTION, SOLUTION INTRAVENOUS at 02:05

## 2021-05-18 VITALS
SYSTOLIC BLOOD PRESSURE: 158 MMHG | TEMPERATURE: 98 F | WEIGHT: 197 LBS | BODY MASS INDEX: 33.63 KG/M2 | DIASTOLIC BLOOD PRESSURE: 73 MMHG | HEIGHT: 64 IN | HEART RATE: 65 BPM | RESPIRATION RATE: 18 BRPM | OXYGEN SATURATION: 96 %

## 2021-05-18 DIAGNOSIS — Z87.81 HISTORY OF COMPRESSION FRACTURE OF SPINE: ICD-10-CM

## 2021-05-18 DIAGNOSIS — M51.36 DDD (DEGENERATIVE DISC DISEASE), LUMBAR: ICD-10-CM

## 2021-05-18 DIAGNOSIS — M47.896 OTHER SPONDYLOSIS, LUMBAR REGION: ICD-10-CM

## 2021-05-18 RX ORDER — OXYCODONE AND ACETAMINOPHEN 7.5; 325 MG/1; MG/1
1 TABLET ORAL EVERY 12 HOURS PRN
Qty: 12 TABLET | Refills: 0 | Status: SHIPPED | OUTPATIENT
Start: 2021-05-18 | End: 2021-09-30

## 2021-06-11 RX ORDER — ALPRAZOLAM 0.5 MG/1
TABLET ORAL
Qty: 90 TABLET | Refills: 0 | Status: SHIPPED | OUTPATIENT
Start: 2021-06-11 | End: 2021-07-13 | Stop reason: SDUPTHER

## 2021-06-14 ENCOUNTER — PATIENT OUTREACH (OUTPATIENT)
Dept: ADMINISTRATIVE | Facility: OTHER | Age: 66
End: 2021-06-14

## 2021-06-15 ENCOUNTER — OFFICE VISIT (OUTPATIENT)
Dept: PAIN MEDICINE | Facility: CLINIC | Age: 66
End: 2021-06-15
Payer: MEDICARE

## 2021-06-15 ENCOUNTER — HOSPITAL ENCOUNTER (OUTPATIENT)
Dept: RADIOLOGY | Facility: HOSPITAL | Age: 66
Discharge: HOME | End: 2021-06-15
Attending: ANESTHESIOLOGY
Payer: MEDICARE

## 2021-06-15 VITALS
SYSTOLIC BLOOD PRESSURE: 140 MMHG | DIASTOLIC BLOOD PRESSURE: 86 MMHG | HEART RATE: 62 BPM | WEIGHT: 197.06 LBS | HEIGHT: 64 IN | BODY MASS INDEX: 33.64 KG/M2

## 2021-06-15 DIAGNOSIS — M51.36 DDD (DEGENERATIVE DISC DISEASE), LUMBAR: ICD-10-CM
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
## 2021-06-15 DIAGNOSIS — M47.896 OTHER SPONDYLOSIS, LUMBAR REGION: ICD-10-CM

## 2021-06-15 PROCEDURE — 99999 PR PBB SHADOW E&M-EST. PATIENT-LVL III: CPT | Mod: PBBFAC,,, | Performed by: ANESTHESIOLOGY

## 2021-06-15 PROCEDURE — 72148 MRI LUMBAR SPINE W/O DYE: CPT | Mod: 26,,, | Performed by: RADIOLOGY

## 2021-06-15 PROCEDURE — 99213 OFFICE O/P EST LOW 20 MIN: CPT | Mod: PBBFAC,PN,25 | Performed by: ANESTHESIOLOGY

## 2021-06-15 PROCEDURE — 99214 PR OFFICE/OUTPT VISIT, EST, LEVL IV, 30-39 MIN: ICD-10-PCS | Mod: S$PBB,,, | Performed by: ANESTHESIOLOGY

## 2021-06-15 PROCEDURE — 71100 X-RAY EXAM RIBS UNI 2 VIEWS: CPT | Mod: 26,RT,, | Performed by: RADIOLOGY

## 2021-06-15 PROCEDURE — 99999 PR PBB SHADOW E&M-EST. PATIENT-LVL III: ICD-10-PCS | Mod: PBBFAC,,, | Performed by: ANESTHESIOLOGY

## 2021-06-15 PROCEDURE — 72148 MRI LUMBAR SPINE WITHOUT CONTRAST: ICD-10-PCS | Mod: 26,,, | Performed by: RADIOLOGY

## 2021-06-15 PROCEDURE — 71100 XR RIBS 2 VIEW RIGHT: ICD-10-PCS | Mod: 26,RT,, | Performed by: RADIOLOGY

## 2021-06-15 PROCEDURE — 72148 MRI LUMBAR SPINE W/O DYE: CPT | Mod: TC

## 2021-06-15 PROCEDURE — 72146 MRI CHEST SPINE W/O DYE: CPT | Mod: 26,,, | Performed by: RADIOLOGY

## 2021-06-15 PROCEDURE — 71100 X-RAY EXAM RIBS UNI 2 VIEWS: CPT | Mod: TC,FY,RT

## 2021-06-15 PROCEDURE — 72146 MRI CHEST SPINE W/O DYE: CPT | Mod: TC

## 2021-06-15 PROCEDURE — 72146 MRI THORACIC SPINE WITHOUT CONTRAST: ICD-10-PCS | Mod: 26,,, | Performed by: RADIOLOGY

## 2021-06-15 PROCEDURE — 99214 OFFICE O/P EST MOD 30 MIN: CPT | Mod: S$PBB,,, | Performed by: ANESTHESIOLOGY

## 2021-06-15 RX ORDER — OXYCODONE AND ACETAMINOPHEN 7.5; 325 MG/1; MG/1
1 TABLET ORAL EVERY 6 HOURS PRN
Qty: 30 TABLET | Refills: 0 | Status: SHIPPED | OUTPATIENT
Start: 2021-06-15 | End: 2021-09-30

## 2021-07-07 ENCOUNTER — PATIENT MESSAGE (OUTPATIENT)
Dept: ADMINISTRATIVE | Facility: HOSPITAL | Age: 66
End: 2021-07-07

## 2021-07-09 ENCOUNTER — OFFICE VISIT (OUTPATIENT)
Dept: ORTHOPEDICS | Facility: CLINIC | Age: 66
End: 2021-07-09
Payer: MEDICARE

## 2021-07-09 VITALS
HEART RATE: 72 BPM | DIASTOLIC BLOOD PRESSURE: 78 MMHG | HEIGHT: 64 IN | BODY MASS INDEX: 33.63 KG/M2 | SYSTOLIC BLOOD PRESSURE: 124 MMHG | WEIGHT: 197 LBS

## 2021-07-09 DIAGNOSIS — M51.37 DDD (DEGENERATIVE DISC DISEASE), LUMBOSACRAL: ICD-10-CM

## 2021-07-09 DIAGNOSIS — Z96.652 HISTORY OF TOTAL KNEE ARTHROPLASTY, LEFT: Primary | ICD-10-CM

## 2021-07-09 PROCEDURE — 99213 PR OFFICE/OUTPT VISIT, EST, LEVL III, 20-29 MIN: ICD-10-PCS | Mod: S$GLB,,, | Performed by: PHYSICIAN ASSISTANT

## 2021-07-09 PROCEDURE — 99213 OFFICE O/P EST LOW 20 MIN: CPT | Mod: S$GLB,,, | Performed by: PHYSICIAN ASSISTANT

## 2021-07-09 RX ORDER — FLUOXETINE HYDROCHLORIDE 40 MG/1
40 CAPSULE ORAL 2 TIMES DAILY
COMMUNITY
Start: 2021-06-28 | End: 2022-06-24

## 2021-07-13 DIAGNOSIS — F51.01 PRIMARY INSOMNIA: ICD-10-CM

## 2021-07-13 RX ORDER — ZOLPIDEM TARTRATE 10 MG/1
TABLET ORAL
Qty: 90 TABLET | Refills: 0 | Status: SHIPPED | OUTPATIENT
Start: 2021-07-13 | End: 2021-10-12

## 2021-07-13 RX ORDER — ALPRAZOLAM 0.5 MG/1
0.5 TABLET ORAL 3 TIMES DAILY PRN
Qty: 90 TABLET | Refills: 0 | Status: SHIPPED | OUTPATIENT
Start: 2021-07-13 | End: 2021-09-13 | Stop reason: SDUPTHER

## 2021-07-24 ENCOUNTER — PATIENT OUTREACH (OUTPATIENT)
Dept: ADMINISTRATIVE | Facility: OTHER | Age: 66
End: 2021-07-24

## 2021-07-27 ENCOUNTER — OFFICE VISIT (OUTPATIENT)
Dept: PAIN MEDICINE | Facility: CLINIC | Age: 66
End: 2021-07-27
Payer: MEDICARE

## 2021-07-27 VITALS
BODY MASS INDEX: 33.64 KG/M2 | SYSTOLIC BLOOD PRESSURE: 136 MMHG | DIASTOLIC BLOOD PRESSURE: 82 MMHG | HEIGHT: 64 IN | WEIGHT: 197.06 LBS

## 2021-07-27 DIAGNOSIS — M54.16 LUMBAR RADICULOPATHY: ICD-10-CM

## 2021-07-27 DIAGNOSIS — Z87.81 HISTORY OF COMPRESSION FRACTURE OF SPINE: ICD-10-CM

## 2021-07-27 DIAGNOSIS — M51.36 DDD (DEGENERATIVE DISC DISEASE), LUMBAR: Primary | ICD-10-CM

## 2021-07-27 PROCEDURE — 99214 PR OFFICE/OUTPT VISIT, EST, LEVL IV, 30-39 MIN: ICD-10-PCS | Mod: S$PBB,,, | Performed by: ANESTHESIOLOGY

## 2021-07-27 PROCEDURE — 99999 PR PBB SHADOW E&M-EST. PATIENT-LVL III: CPT | Mod: PBBFAC,,, | Performed by: ANESTHESIOLOGY

## 2021-07-27 PROCEDURE — 99214 OFFICE O/P EST MOD 30 MIN: CPT | Mod: S$PBB,,, | Performed by: ANESTHESIOLOGY

## 2021-07-27 PROCEDURE — 99213 OFFICE O/P EST LOW 20 MIN: CPT | Mod: PBBFAC,PN | Performed by: ANESTHESIOLOGY

## 2021-07-27 PROCEDURE — 99999 PR PBB SHADOW E&M-EST. PATIENT-LVL III: ICD-10-PCS | Mod: PBBFAC,,, | Performed by: ANESTHESIOLOGY

## 2021-07-27 RX ORDER — OXYCODONE AND ACETAMINOPHEN 7.5; 325 MG/1; MG/1
1 TABLET ORAL EVERY 6 HOURS PRN
Qty: 30 TABLET | Refills: 0 | Status: SHIPPED | OUTPATIENT
Start: 2021-07-27 | End: 2021-09-30

## 2021-07-28 DIAGNOSIS — I10 ESSENTIAL HYPERTENSION: ICD-10-CM

## 2021-07-29 RX ORDER — AMLODIPINE BESYLATE 5 MG/1
TABLET ORAL
Qty: 90 TABLET | Refills: 2 | Status: SHIPPED | OUTPATIENT
Start: 2021-07-29 | End: 2023-08-29

## 2021-07-30 ENCOUNTER — HOSPITAL ENCOUNTER (OUTPATIENT)
Facility: AMBULARY SURGERY CENTER | Age: 66
Discharge: HOME | End: 2021-07-30
Attending: ANESTHESIOLOGY | Admitting: ANESTHESIOLOGY
Payer: MEDICARE

## 2021-07-30 DIAGNOSIS — M51.36 DEGENERATIVE DISC DISEASE, LUMBAR: Primary | ICD-10-CM

## 2021-07-30 PROCEDURE — 64483 NJX AA&/STRD TFRM EPI L/S 1: CPT | Mod: 50,,, | Performed by: ANESTHESIOLOGY

## 2021-07-30 PROCEDURE — 64483 NJX AA&/STRD TFRM EPI L/S 1: CPT | Mod: RT | Performed by: ANESTHESIOLOGY

## 2021-07-30 PROCEDURE — 64483 PR EPIDURAL INJ, ANES/STEROID, TRANSFORAMINAL, LUMB/SACR, SNGL LEVL: ICD-10-PCS | Mod: 50,,, | Performed by: ANESTHESIOLOGY

## 2021-07-30 RX ORDER — LIDOCAINE HYDROCHLORIDE 10 MG/ML
INJECTION, SOLUTION EPIDURAL; INFILTRATION; INTRACAUDAL; PERINEURAL
Status: DISCONTINUED
Start: 2021-07-30 | End: 2021-07-30 | Stop reason: HOSPADM

## 2021-07-30 RX ORDER — DEXAMETHASONE SODIUM PHOSPHATE 10 MG/ML
INJECTION INTRAMUSCULAR; INTRAVENOUS
Status: DISCONTINUED | OUTPATIENT
Start: 2021-07-30 | End: 2021-07-30 | Stop reason: HOSPADM

## 2021-07-30 RX ORDER — BUPIVACAINE HYDROCHLORIDE 2.5 MG/ML
INJECTION, SOLUTION EPIDURAL; INFILTRATION; INTRACAUDAL
Status: DISCONTINUED | OUTPATIENT
Start: 2021-07-30 | End: 2021-07-30 | Stop reason: HOSPADM

## 2021-07-30 RX ORDER — MIDAZOLAM HYDROCHLORIDE 1 MG/ML
INJECTION INTRAMUSCULAR; INTRAVENOUS
Status: DISCONTINUED | OUTPATIENT
Start: 2021-07-30 | End: 2021-07-30 | Stop reason: HOSPADM

## 2021-07-30 RX ORDER — SODIUM CHLORIDE, SODIUM LACTATE, POTASSIUM CHLORIDE, CALCIUM CHLORIDE 600; 310; 30; 20 MG/100ML; MG/100ML; MG/100ML; MG/100ML
INJECTION, SOLUTION INTRAVENOUS ONCE AS NEEDED
Status: COMPLETED | OUTPATIENT
Start: 2021-07-30 | End: 2021-07-30

## 2021-07-30 RX ORDER — LIDOCAINE HYDROCHLORIDE 10 MG/ML
INJECTION, SOLUTION EPIDURAL; INFILTRATION; INTRACAUDAL; PERINEURAL
Status: DISCONTINUED | OUTPATIENT
Start: 2021-07-30 | End: 2021-07-30 | Stop reason: HOSPADM

## 2021-07-30 RX ORDER — FENTANYL CITRATE 50 UG/ML
INJECTION, SOLUTION INTRAMUSCULAR; INTRAVENOUS
Status: DISCONTINUED | OUTPATIENT
Start: 2021-07-30 | End: 2021-07-30 | Stop reason: HOSPADM

## 2021-07-30 RX ADMIN — SODIUM CHLORIDE, SODIUM LACTATE, POTASSIUM CHLORIDE, CALCIUM CHLORIDE: 600; 310; 30; 20 INJECTION, SOLUTION INTRAVENOUS at 09:07

## 2021-08-02 VITALS
RESPIRATION RATE: 17 BRPM | DIASTOLIC BLOOD PRESSURE: 72 MMHG | SYSTOLIC BLOOD PRESSURE: 142 MMHG | HEART RATE: 63 BPM | BODY MASS INDEX: 33.63 KG/M2 | OXYGEN SATURATION: 97 % | TEMPERATURE: 98 F | WEIGHT: 197 LBS | HEIGHT: 64 IN

## 2021-09-13 DIAGNOSIS — Z87.81 HISTORY OF COMPRESSION FRACTURE OF SPINE: ICD-10-CM

## 2021-09-13 DIAGNOSIS — M47.896 OTHER SPONDYLOSIS, LUMBAR REGION: ICD-10-CM

## 2021-09-13 DIAGNOSIS — T81.41XA POSTOPERATIVE ABSCESS INVOLVING SUTURE: ICD-10-CM

## 2021-09-13 DIAGNOSIS — Z96.652 S/P TOTAL KNEE ARTHROPLASTY, LEFT: ICD-10-CM

## 2021-09-13 DIAGNOSIS — M51.36 DDD (DEGENERATIVE DISC DISEASE), LUMBAR: ICD-10-CM

## 2021-09-13 RX ORDER — ONDANSETRON 4 MG/1
4 TABLET, FILM COATED ORAL
Qty: 60 TABLET | Refills: 1 | Status: SHIPPED | OUTPATIENT
Start: 2021-09-13

## 2021-09-13 RX ORDER — ALPRAZOLAM 0.5 MG/1
0.5 TABLET ORAL 3 TIMES DAILY PRN
Qty: 90 TABLET | Refills: 0 | Status: SHIPPED | OUTPATIENT
Start: 2021-09-13 | End: 2021-09-30 | Stop reason: SDUPTHER

## 2021-09-30 ENCOUNTER — TELEPHONE (OUTPATIENT)
Dept: FAMILY MEDICINE | Facility: CLINIC | Age: 66
End: 2021-09-30

## 2021-09-30 ENCOUNTER — OFFICE VISIT (OUTPATIENT)
Dept: FAMILY MEDICINE | Facility: CLINIC | Age: 66
End: 2021-09-30
Payer: COMMERCIAL

## 2021-09-30 VITALS
DIASTOLIC BLOOD PRESSURE: 80 MMHG | HEART RATE: 67 BPM | HEIGHT: 64 IN | TEMPERATURE: 98 F | OXYGEN SATURATION: 97 % | SYSTOLIC BLOOD PRESSURE: 134 MMHG | WEIGHT: 195.13 LBS | BODY MASS INDEX: 33.31 KG/M2

## 2021-09-30 DIAGNOSIS — F41.9 ANXIETY: ICD-10-CM

## 2021-09-30 DIAGNOSIS — Z12.11 SCREEN FOR COLON CANCER: ICD-10-CM

## 2021-09-30 DIAGNOSIS — F51.01 PRIMARY INSOMNIA: ICD-10-CM

## 2021-09-30 DIAGNOSIS — M51.36 DEGENERATIVE DISC DISEASE, LUMBAR: ICD-10-CM

## 2021-09-30 DIAGNOSIS — R73.01 IMPAIRED FASTING GLUCOSE: ICD-10-CM

## 2021-09-30 DIAGNOSIS — E78.2 HYPERLIPIDEMIA, MIXED: ICD-10-CM

## 2021-09-30 DIAGNOSIS — I10 ESSENTIAL HYPERTENSION: Primary | ICD-10-CM

## 2021-09-30 DIAGNOSIS — M81.0 AGE-RELATED OSTEOPOROSIS WITHOUT CURRENT PATHOLOGICAL FRACTURE: ICD-10-CM

## 2021-09-30 DIAGNOSIS — Z85.528 HISTORY OF RENAL CELL CARCINOMA: ICD-10-CM

## 2021-09-30 DIAGNOSIS — Z00.00 ANNUAL PHYSICAL EXAM: ICD-10-CM

## 2021-09-30 PROCEDURE — 99214 OFFICE O/P EST MOD 30 MIN: CPT | Mod: S$GLB,,, | Performed by: INTERNAL MEDICINE

## 2021-09-30 PROCEDURE — 99999 PR PBB SHADOW E&M-EST. PATIENT-LVL V: ICD-10-PCS | Mod: PBBFAC,,, | Performed by: INTERNAL MEDICINE

## 2021-09-30 PROCEDURE — 99214 PR OFFICE/OUTPT VISIT, EST, LEVL IV, 30-39 MIN: ICD-10-PCS | Mod: S$GLB,,, | Performed by: INTERNAL MEDICINE

## 2021-09-30 PROCEDURE — 99999 PR PBB SHADOW E&M-EST. PATIENT-LVL V: CPT | Mod: PBBFAC,,, | Performed by: INTERNAL MEDICINE

## 2021-09-30 RX ORDER — ALPRAZOLAM 1 MG/1
1 TABLET ORAL 2 TIMES DAILY PRN
Qty: 60 TABLET | Refills: 2 | Status: SHIPPED | OUTPATIENT
Start: 2021-09-30 | End: 2022-01-18 | Stop reason: SDUPTHER

## 2021-09-30 RX ORDER — CELECOXIB 200 MG/1
200 CAPSULE ORAL DAILY
Qty: 30 CAPSULE | Refills: 5 | Status: SHIPPED | OUTPATIENT
Start: 2021-09-30 | End: 2022-05-19

## 2021-10-01 ENCOUNTER — LAB VISIT (OUTPATIENT)
Dept: LAB | Facility: HOSPITAL | Age: 66
End: 2021-10-01
Attending: INTERNAL MEDICINE
Payer: COMMERCIAL

## 2021-10-01 DIAGNOSIS — E78.2 HYPERLIPIDEMIA, MIXED: ICD-10-CM

## 2021-10-01 DIAGNOSIS — R73.01 IMPAIRED FASTING GLUCOSE: ICD-10-CM

## 2021-10-01 LAB
ALBUMIN SERPL BCP-MCNC: 3.9 G/DL (ref 3.5–5.2)
ALP SERPL-CCNC: 109 U/L (ref 55–135)
ALT SERPL W/O P-5'-P-CCNC: 25 U/L (ref 10–44)
ANION GAP SERPL CALC-SCNC: 13 MMOL/L (ref 8–16)
AST SERPL-CCNC: 24 U/L (ref 10–40)
BILIRUB SERPL-MCNC: 0.6 MG/DL (ref 0.1–1)
BUN SERPL-MCNC: 15 MG/DL (ref 8–23)
CALCIUM SERPL-MCNC: 9.8 MG/DL (ref 8.7–10.5)
CHLORIDE SERPL-SCNC: 99 MMOL/L (ref 95–110)
CHOLEST SERPL-MCNC: 215 MG/DL (ref 120–199)
CHOLEST/HDLC SERPL: 2.4 {RATIO} (ref 2–5)
CO2 SERPL-SCNC: 25 MMOL/L (ref 23–29)
CREAT SERPL-MCNC: 0.9 MG/DL (ref 0.5–1.4)
EST. GFR  (AFRICAN AMERICAN): >60 ML/MIN/1.73 M^2
EST. GFR  (NON AFRICAN AMERICAN): >60 ML/MIN/1.73 M^2
ESTIMATED AVG GLUCOSE: 91 MG/DL (ref 68–131)
GLUCOSE SERPL-MCNC: 97 MG/DL (ref 70–110)
HBA1C MFR BLD: 4.8 % (ref 4–5.6)
HDLC SERPL-MCNC: 90 MG/DL (ref 40–75)
HDLC SERPL: 41.9 % (ref 20–50)
LDLC SERPL CALC-MCNC: 111 MG/DL (ref 63–159)
NONHDLC SERPL-MCNC: 125 MG/DL
POTASSIUM SERPL-SCNC: 3.9 MMOL/L (ref 3.5–5.1)
PROT SERPL-MCNC: 7.2 G/DL (ref 6–8.4)
SODIUM SERPL-SCNC: 137 MMOL/L (ref 136–145)
TRIGL SERPL-MCNC: 70 MG/DL (ref 30–150)

## 2021-10-01 PROCEDURE — 36415 COLL VENOUS BLD VENIPUNCTURE: CPT | Mod: PO | Performed by: INTERNAL MEDICINE

## 2021-10-01 PROCEDURE — 83036 HEMOGLOBIN GLYCOSYLATED A1C: CPT | Performed by: INTERNAL MEDICINE

## 2021-10-01 PROCEDURE — 80053 COMPREHEN METABOLIC PANEL: CPT | Performed by: INTERNAL MEDICINE

## 2021-10-01 PROCEDURE — 80061 LIPID PANEL: CPT | Performed by: INTERNAL MEDICINE

## 2021-10-04 ENCOUNTER — HOSPITAL ENCOUNTER (OUTPATIENT)
Dept: RADIOLOGY | Facility: HOSPITAL | Age: 66
Discharge: HOME | End: 2021-10-04
Attending: INTERNAL MEDICINE
Payer: COMMERCIAL

## 2021-10-04 ENCOUNTER — PATIENT MESSAGE (OUTPATIENT)
Dept: PRIMARY CARE CLINIC | Facility: CLINIC | Age: 66
End: 2021-10-04

## 2021-10-04 DIAGNOSIS — Z12.31 ENCOUNTER FOR SCREENING MAMMOGRAM FOR MALIGNANT NEOPLASM OF BREAST: ICD-10-CM

## 2021-10-04 PROCEDURE — 77063 BREAST TOMOSYNTHESIS BI: CPT | Mod: 26,,, | Performed by: RADIOLOGY

## 2021-10-04 PROCEDURE — 77067 SCR MAMMO BI INCL CAD: CPT | Mod: 26,,, | Performed by: RADIOLOGY

## 2021-10-04 PROCEDURE — 77067 SCR MAMMO BI INCL CAD: CPT | Mod: TC

## 2021-10-04 PROCEDURE — 77067 MAMMO DIGITAL SCREENING BILAT WITH TOMO: ICD-10-PCS | Mod: 26,,, | Performed by: RADIOLOGY

## 2021-10-04 PROCEDURE — 77063 MAMMO DIGITAL SCREENING BILAT WITH TOMO: ICD-10-PCS | Mod: 26,,, | Performed by: RADIOLOGY

## 2021-10-05 ENCOUNTER — PATIENT MESSAGE (OUTPATIENT)
Dept: ADMINISTRATIVE | Facility: HOSPITAL | Age: 66
End: 2021-10-05

## 2021-10-08 ENCOUNTER — INFUSION (OUTPATIENT)
Dept: INFUSION THERAPY | Facility: HOSPITAL | Age: 66
End: 2021-10-08
Attending: INTERNAL MEDICINE
Payer: COMMERCIAL

## 2021-10-08 VITALS
TEMPERATURE: 97 F | HEART RATE: 66 BPM | RESPIRATION RATE: 18 BRPM | DIASTOLIC BLOOD PRESSURE: 84 MMHG | WEIGHT: 194.88 LBS | BODY MASS INDEX: 33.45 KG/M2 | OXYGEN SATURATION: 97 % | SYSTOLIC BLOOD PRESSURE: 134 MMHG

## 2021-10-08 DIAGNOSIS — M81.0 AGE-RELATED OSTEOPOROSIS WITHOUT CURRENT PATHOLOGICAL FRACTURE: Primary | ICD-10-CM

## 2021-10-08 PROCEDURE — 96372 THER/PROPH/DIAG INJ SC/IM: CPT

## 2021-10-08 PROCEDURE — 63600175 PHARM REV CODE 636 W HCPCS: Mod: JG | Performed by: INTERNAL MEDICINE

## 2021-10-08 RX ADMIN — DENOSUMAB 60 MG: 60 INJECTION SUBCUTANEOUS at 12:10

## 2021-10-12 DIAGNOSIS — F51.01 PRIMARY INSOMNIA: ICD-10-CM

## 2021-10-12 RX ORDER — ZOLPIDEM TARTRATE 10 MG/1
TABLET ORAL
Qty: 90 TABLET | Refills: 0 | Status: SHIPPED | OUTPATIENT
Start: 2021-10-12 | End: 2022-01-18

## 2021-10-20 ENCOUNTER — LAB VISIT (OUTPATIENT)
Dept: LAB | Facility: HOSPITAL | Age: 66
End: 2021-10-20
Attending: INTERNAL MEDICINE
Payer: COMMERCIAL

## 2021-10-20 DIAGNOSIS — Z12.11 SCREEN FOR COLON CANCER: ICD-10-CM

## 2021-10-20 PROCEDURE — 82274 ASSAY TEST FOR BLOOD FECAL: CPT | Performed by: INTERNAL MEDICINE

## 2021-10-25 LAB — HEMOCCULT STL QL IA: NEGATIVE

## 2021-10-26 ENCOUNTER — PATIENT MESSAGE (OUTPATIENT)
Dept: FAMILY MEDICINE | Facility: CLINIC | Age: 66
End: 2021-10-26
Payer: COMMERCIAL

## 2021-11-08 ENCOUNTER — TELEPHONE (OUTPATIENT)
Dept: PAIN MEDICINE | Facility: CLINIC | Age: 66
End: 2021-11-08
Payer: COMMERCIAL

## 2022-01-18 DIAGNOSIS — F41.9 ANXIETY: Primary | ICD-10-CM

## 2022-01-18 DIAGNOSIS — F51.01 PRIMARY INSOMNIA: ICD-10-CM

## 2022-01-18 RX ORDER — ZOLPIDEM TARTRATE 10 MG/1
TABLET ORAL
Qty: 90 TABLET | Refills: 0 | Status: SHIPPED | OUTPATIENT
Start: 2022-01-18 | End: 2022-05-05

## 2022-01-18 RX ORDER — ALPRAZOLAM 1 MG/1
1 TABLET ORAL 2 TIMES DAILY PRN
Qty: 60 TABLET | Refills: 2 | Status: SHIPPED | OUTPATIENT
Start: 2022-01-18 | End: 2022-03-17

## 2022-01-18 NOTE — TELEPHONE ENCOUNTER
No new care gaps identified.  Powered by [employer] Reference number: [id number].   1/18/2022 8:58:56 AM CST

## 2022-03-02 DIAGNOSIS — I10 ESSENTIAL HYPERTENSION: ICD-10-CM

## 2022-03-02 NOTE — TELEPHONE ENCOUNTER
No new care gaps identified.  Powered by [employer]. Reference number: [id number].   3/02/2022 11:46:26 AM CST

## 2022-03-05 RX ORDER — LOSARTAN POTASSIUM AND HYDROCHLOROTHIAZIDE 25; 100 MG/1; MG/1
1 TABLET ORAL DAILY
Qty: 90 TABLET | Refills: 1 | Status: SHIPPED | OUTPATIENT
Start: 2022-03-05 | End: 2022-10-17

## 2022-03-05 NOTE — TELEPHONE ENCOUNTER
Refill Authorization Note   Beatriz Gan  is requesting a refill authorization.  Brief Assessment and Rationale for Refill:  Approve     Medication Therapy Plan:       Medication Reconciliation Completed: No   Comments:   --->Care Gap information included below if applicable.       Requested Prescriptions   Pending Prescriptions Disp Refills    losartan-hydrochlorothiazide 100-25 mg (HYZAAR) 100-25 mg per tablet [Pharmacy Med Name: Losartan Potassium-HCTZ 100-25 MG Oral Tablet] 90 tablet 1     Sig: Take 1 tablet by mouth once daily       Cardiovascular: ARB + Diuretic Combos Passed - 3/5/2022 12:53 PM        Passed - Patient is at least 18 years old        Passed - Last BP in normal range within 360 days     BP Readings from Last 1 Encounters:   10/08/21 134/84               Passed - Valid encounter within last 15 months     Recent Visits  Date Type Provider Dept   09/30/21 Office Visit Jorge Alonzo MD [facility]   03/30/21 Office Visit Joreg Alonzo MD [facility]   09/29/20 Office Visit Jorge Alonzo MD [facility]   05/14/20 Office Visit Jorge Alonzo MD [facility]   05/01/20 Office Visit Jorge Alonzo MD [facility]   04/14/20 Office Visit Jorge Alonzo MD [facility]   04/09/20 Office Visit Jorge Alonzo MD [facility]   Showing recent visits within past 720 days and meeting all other requirements  Future Appointments  No visits were found meeting these conditions.  Showing future appointments within next 150 days and meeting all other requirements      Future Appointments              In 3 weeks MD Magdy Baeza B[facility] Magdy short    In 1 month INJECTION CHAIR, [facility], [facility]                Passed - K in normal range and within 360 days     POC Potassium   Date Value Ref Range Status   01/13/2014 2.9 (L) 3.5 - 5.1 mmol/L  Final     Potassium   Date Value Ref Range Status   10/01/2021 3.9 3.5 - 5.1 mmol/L Final   03/30/2021 4.7 3.5 - 5.1 mmol/L Final   08/10/2020 4.1 3.5 - 5.1 mmol/L Final              Passed - Na is between 130 and 148 and within 360 days     POC Sodium   Date Value Ref Range Status   01/13/2014 141 136 - 145 mmol/L Final     Sodium   Date Value Ref Range Status   10/01/2021 137 136 - 145 mmol/L Final   03/30/2021 139 136 - 145 mmol/L Final   08/10/2020 140 136 - 145 mmol/L Final              Passed - Cr is 1.39 or below and within 360 days     Lab Results   Component Value Date    CREATININE 0.9 10/01/2021    CREATININE 1.10 03/30/2021    CREATININE 1.1 08/10/2020              Passed - eGFR within 360 days     Lab Results   Component Value Date    EGFRNONAA >60.0 10/01/2021    EGFRNONAA 52.1 (A) 03/30/2021    EGFRNONAA 52 (A) 08/10/2020                    Appointments  past 12m or future 3m with PCP    Date Provider   Last Visit   9/30/2021 Jorge Alnozo MD   Next Visit   3/31/2022 Jorge Alonzo MD   ED visits in past 90 days: 0     Note composed:12:55 PM 03/05/2022

## 2022-03-17 DIAGNOSIS — F41.9 ANXIETY: ICD-10-CM

## 2022-03-17 RX ORDER — ALPRAZOLAM 1 MG/1
TABLET ORAL
Qty: 60 TABLET | Refills: 2 | Status: SHIPPED | OUTPATIENT
Start: 2022-03-17 | End: 2022-07-05

## 2022-03-17 NOTE — TELEPHONE ENCOUNTER
No new care gaps identified.  Powered by [email or web address]. Reference number: [id number].   3/17/2022 11:05:56 AM CDT

## 2022-03-24 ENCOUNTER — OFFICE VISIT (OUTPATIENT)
Dept: PAIN MEDICINE | Facility: CLINIC | Age: 67
End: 2022-03-24
Payer: MEDICARE

## 2022-03-24 VITALS — BODY MASS INDEX: 33.12 KG/M2 | HEIGHT: 64 IN | WEIGHT: 194 LBS
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
## 2022-03-24 DIAGNOSIS — M25.532 LEFT WRIST PAIN: ICD-10-CM

## 2022-03-24 DIAGNOSIS — M51.34 DDD (DEGENERATIVE DISC DISEASE), THORACIC: ICD-10-CM

## 2022-03-24 DIAGNOSIS — M43.06 SPONDYLOLYSIS, LUMBAR REGION: ICD-10-CM

## 2022-03-24 DIAGNOSIS — M51.36 DDD (DEGENERATIVE DISC DISEASE), LUMBAR: Primary | ICD-10-CM

## 2022-03-24 PROCEDURE — 3008F BODY MASS INDEX DOCD: CPT | Mod: CPTII,S$GLB,, | Performed by: PHYSICIAN ASSISTANT

## 2022-03-24 PROCEDURE — 3288F FALL RISK ASSESSMENT DOCD: CPT | Mod: CPTII,S$GLB,, | Performed by: PHYSICIAN ASSISTANT

## 2022-03-24 PROCEDURE — 99999 PR PBB SHADOW E&M-EST. PATIENT-LVL III: ICD-10-PCS | Mod: PBBFAC,,, | Performed by: PHYSICIAN ASSISTANT

## 2022-03-24 PROCEDURE — 1125F AMNT PAIN NOTED PAIN PRSNT: CPT | Mod: CPTII,S$GLB,, | Performed by: PHYSICIAN ASSISTANT

## 2022-03-24 PROCEDURE — 99999 PR PBB SHADOW E&M-EST. PATIENT-LVL III: CPT | Mod: PBBFAC,,, | Performed by: PHYSICIAN ASSISTANT

## 2022-03-24 PROCEDURE — 1125F PR PAIN SEVERITY QUANTIFIED, PAIN PRESENT: ICD-10-PCS | Mod: CPTII,S$GLB,, | Performed by: PHYSICIAN ASSISTANT

## 2022-03-24 PROCEDURE — 1100F PTFALLS ASSESS-DOCD GE2>/YR: CPT | Mod: CPTII,S$GLB,, | Performed by: PHYSICIAN ASSISTANT

## 2022-03-24 PROCEDURE — 3288F PR FALLS RISK ASSESSMENT DOCUMENTED: ICD-10-PCS | Mod: CPTII,S$GLB,, | Performed by: PHYSICIAN ASSISTANT

## 2022-03-24 PROCEDURE — 99214 OFFICE O/P EST MOD 30 MIN: CPT | Mod: S$GLB,,, | Performed by: PHYSICIAN ASSISTANT

## 2022-03-24 PROCEDURE — 99214 PR OFFICE/OUTPT VISIT, EST, LEVL IV, 30-39 MIN: ICD-10-PCS | Mod: S$GLB,,, | Performed by: PHYSICIAN ASSISTANT

## 2022-03-24 PROCEDURE — 1100F PR PT FALLS ASSESS DOC 2+ FALLS/FALL W/INJURY/YR: ICD-10-PCS | Mod: CPTII,S$GLB,, | Performed by: PHYSICIAN ASSISTANT

## 2022-03-24 PROCEDURE — 3008F PR BODY MASS INDEX (BMI) DOCUMENTED: ICD-10-PCS | Mod: CPTII,S$GLB,, | Performed by: PHYSICIAN ASSISTANT

## 2022-03-24 NOTE — DISCHARGE INSTRUCTIONS
Pain injection instructions:     This procedure may take a couple weeks to relieve pain  You may get some pain relief from the local anesthetic initally.   Steroids can have side effects of flushed face or nervous feeling.    No driving for 24 hrs.   Activity as tolerated- gradually increase activities.  Dont lift over 10 lbs for 24 hrs   No heat at injection sites for 2 full days. No heating pads, hot tubs, saunas, or swimming in any body of water or pool for 2 full days.  Use ice pack for mild swelling and for comfort , apply for 20 minutes, remove for 20 minute intervals. No direct contact of ice itself  to skin.  May shower today.  Do not allow shower water to beat on injections site(s) for 2 full days. No tub baths for two full days.      Resume Aspirin, Plavix, or Coumadin the day after the procedure unless otherwise instructed.   If diabetic,monitor your glucose carefully as steroids can increase your glucose level    Seek immediate medical help for:   Severe increase in your usual pain or appearance of new pain.  Prolonged (more than 8 hours) or increasing weakness or numbness in the legs or arms.   .    Fever above 100.4 degrees F ,Drainage,redness,active bleeding, or increased swelling at the injection site.  Headache, shortness of breath, chest pain, or breathing problems.    After Surgery:  Always be aware that any surgery can cause these symptoms:    Pain- Medication can be prescribed for pain to decrease your pain but may not completely take your pain away. Over the Counter pain medicine my be enough and you can always use Ice and rest to help ease pain.    Bleeding- a little bleeding after a surgery is usually within normal.  If there is a lot of blood you need to notify your MD.  Emergency treatments of bleeding are cold application, elevation of the bleeding site and compression.    Infection- Infection after surgery is NOT a normal occurrence.  Signs of infection are fever, swelling, hot to touch  the incision.  If this occurs notify your MD immediately.    Nausea- this can be common after a surgery especially if you have had anesthesia medicine or are taking pain medicine.  Steroids have a side effect of nausea sometimes. Staying on clear liquids, bland foods, gingerale, or over the counter anti nausea medicines can help.  If you vomit more than once, notify your MD.  Anti Nausea medicines can be prescribed.

## 2022-03-24 NOTE — PROGRESS NOTES
FOLLOW UP NOTE:     CHIEF COMPLAINT: back pain    INITIAL HISTORY OF PRESENT ILLNESS: Beatriz Gan is a 64 y.o. female with PMH significant for hx of left rotator cuff repair (Dr. Martinez), hx of left arthroscopic knee surgery, hx of partial nephrectomy, hx of ETOH dependence, and HTN presents as a referral for the evaluation of low back pain. Patient reports that her pain approximately in 2014 after no inciting incident or trauma. The patient reports that she experienced a new type of mid back pain when she slipped and fell at home approximately 1 week prior to her kyphoplasty with me. Today, the patient wants her low back pain addressed as her mid back pain resolved s/p kyphoplasty. The patient localizes her pain to the area across her lower back. Patient denies of radiation of her pain (the patient reports of having pain previously that radiated towards her left foot but states that her injections via Dr. Leger resolved her radicular pain). Patient describes her pain as a shooting, throbbing, and burning type of pain. Patient reports that her pain current pain is a 10/10. Patient denies of any urinary/fecal incontinence, saddle anesthesia, or weakness.      Of note, the patient reports that she fell 1 week prior to her T12 kyphoplasty with me on 1/31/2020. Patient was in discussion to her lumbar spine surgery with Dr. Martinez but she fell prior to scheduling a procedure date.     Aggravating factors: constant pain; activity in general, sitting     Mitigating factors: ice  Pt has been seen in the clinic before, however pt is new to me.     History above per Dr. Rosales    INTERVAL HISTORY OF PRESENT ILLNESS: Beatriz Gan is a 66 y.o. female  with PMH significant for hx of left rotator cuff repair (Dr. Martinez), hx of left arthroscopic knee surgery, hx of partial nephrectomy, hx of ETOH dependence, HTN, and hx of left total knee arthroplasty (7/30/2020; Dr. Ruben Martinez) presents for the continued  management of low back pain. her  passed away 1 week ago suddenly after being diagnosed <8weeks ago with colon cancer. The day after the  she had her floors waxed and she fell and hurt her left wrist and low back. She was evaluated in the ED and xrays were negative for fracture. I do not have these to review. She was instructed to wear a brace for left thumb and wrist pain. She is also icing regularly. Today, the patient localizes her pain to the area across her lower back. The patient denies of radiation of her back pain. The patient reports that any activity in general worsens her pain. The patient reports that her pain is improved with laying down. Requests a repeat [place]  The patient denies of any significant changes in her health since her last appointment. The patient also denies of any changes in the character of her pain since her last appointment. The patient reports that her current pain is a 6-7/10. Patient denies of any urinary/fecal incontinence, saddle anesthesia, or weakness.     The patient presents to discuss her most recent MRI results.     INTERVENTIONAL PAIN HISTORY:  21 b/l TF [place] at L5-S1 >50% relief  2021: Lumbar interlaminar epidural steroid injection at L4-5  2020: Lumbar interlaminar epidural steroid injection at L4-L5   10/19/2020: Radiofrequency ablation of the L3, L4, and L5 medial branch nerves on the bilateral-side - a few weeks of benefit   2020: Radiofrequency Ablations of Superior lateral, Superior medial and Inferior medial genicular nerves, left-sided   3/11/2020: hylan supplementation via Dr. Ruben Martinez - no relief  2020: Bilateral L3, L4, and L5 medial branch radiofrequency ablation   2017: Right sacroiliac joint injection   4/10/2017: C7-T1 cervical interlaminar epidural steroid injection - 50% relief per chart review  2017: Right sacroiliac joint injection   2016: Lumbar RFA - good relief     CURRENT PAIN MEDICATIONS:  "  Gabapentin 600 mg PO TID  Xanax 0.5 mg PO BID  TENS unit - 3-4 times per day      ROS:  Review of Systems   Constitutional: Negative for chills and fever.   HENT: Negative for sore throat.    Eyes: Negative for visual disturbance.   Respiratory: Negative for shortness of breath.    Cardiovascular: Negative for chest pain.   Gastrointestinal: Negative for nausea and vomiting.   Genitourinary: Negative for difficulty urinating.   Musculoskeletal: Positive for arthralgias and back pain.   Skin: Negative for rash.   Allergic/Immunologic: Negative for immunocompromised state.   Neurological: Negative for syncope.   Hematological: Does not bruise/bleed easily.   Psychiatric/Behavioral: Negative for suicidal ideas.        MEDICAL, SURGICAL, FAMILY, SOCIAL HX: reviewed    MEDICATIONS/ALLERGIES: reviewed    PHYSICAL EXAM:    VITALS: Vitals reviewed.   Vitals:    03/24/22 1042   Weight: 88 kg (194 lb)   Height: 5' 4" (1.626 m)   PainSc:   8   PainLoc: Back       Physical Exam  Vitals and nursing note reviewed.   Constitutional:       Appearance: She is not diaphoretic.   HENT:      Head: Normocephalic and atraumatic.   Eyes:      General:         Right eye: No discharge.         Left eye: No discharge.      Conjunctiva/sclera: Conjunctivae normal.   Cardiovascular:      Rate and Rhythm: Normal rate.   Pulmonary:      Effort: Pulmonary effort is normal. No respiratory distress.      Breath sounds: Normal breath sounds.   Abdominal:      Palpations: Abdomen is soft.   Skin:     General: Skin is warm and dry.      Findings: No rash.   Neurological:      Mental Status: She is alert and oriented to person, place, and time.   Psychiatric:         Mood and Affect: Mood and affect normal.         Cognition and Memory: Memory normal.         Judgment: Judgment normal.          UPPER EXTREMITIES: Tenderness to snuff box on left.   LOWER EXTREMITIES:  Normal alignment, normal range of motion, no atrophy, no skin changes,  hair growth " and nail growth normal and equal bilaterally. No swelling, no tenderness.     LUMBAR SPINE  Lumbar spine: ROM is severely limited with flexion extension and oblique extension with increased pain (worse with extension).     ((--)) Supine straight leg raise    ((+)) Facet loading   Internal and external rotation of the hip causes no increased pain on either side.  Myofascial exam: Tenderness to palpation across lumbar paraspinous muscles.     MOTOR: Tone and bulk: normal bilateral upper and lower Strength: normal   Delt      Bi         Tri        WE      WF                        R          5          5          5          5          5          5            L          5          5          5          5          5          5               IP         ADD     ABD     Quad   TA        Gas      HAM  R          5          5          5          5          5          5          5  L          5          5          5          5          5          5          5     SENSATION: Light touch and pinprick intact bilaterally  REFLEXES: normal, symmetric, nonbrisk.  Toes down, no clonus. Negative nicholson's sign bilaterally.  GAIT: normal rise, base, steps, and arm swing.      IMAGING:    MRI Lumbar Spine without contrast (6/15/2021):  Vertebral column: There is a remote fracture of the T12 vertebral body with mild-to-moderate loss of vertebral body height and 5 mm retropulsion of the posterosuperior cortical margin into the ventral spinal canal.  This fracture was acute on plain films dated 01/24/2020 where there is only mild loss of vertebral body height.  The vertebral bodies had normal height on MRI dated 10/21/2019.  The remainder of the vertebral bodies maintain normal height.  Alignment is normal.  There is marked disc space narrowing at the L5-S1 level where there are chronic degenerative endplate signal changes with similar findings anteriorly at the L1-2 level where there is moderate disc space narrowing.  There is  mild-to-moderate disc space narrowing at the L2-3 level.  The discs are all desiccated.  Baseline marrow signal is normal.     Spinal canal, conus, epidural space: The spinal canal is developmentally normal.  The conus terminates at the level of T12-L1 and remains normal in contour and signal intensity.  There is no abnormal epidural mass or fluid collection.     Findings by level:     There is borderline to mild spinal stenosis at the level of superior T12 related to retropulsion but there is no compression of the lower cord/conus and the foramina are patent.     T12-L1: Unremarkable     L1-2: There is disc space narrowing with minimal bulging of the annulus.  There is no spinal canal or significant foraminal stenosis.     L2-3: There is disc space narrowing with a mild disc bulge and mild facet joint arthropathy.  The disc bulges slightly eccentric to the right.  There is mild right foraminal stenosis without definite change.     L3-4: There is minimal bulging of the annulus.  There is no spinal canal or significant foraminal stenosis.  There is no change.     L4-5: There is a mild diffuse disc bulge with osteophytic ridging.  There is mild facet joint arthropathy.  There is no spinal stenosis.  There is only mild bilateral foraminal stenosis without change.     L5-S1: There is marked disc space narrowing.  There is a disc bulge with osteophytic ridging.  There is only mild facet joint arthropathy.  There is no spinal stenosis.  There is moderate-to-marked right and mild-to-moderate left foraminal stenosis without significant change.    MRI Thoracic Spine without contrast (6/15/2021):  There is a remote fracture of the T12 vertebral body with mild-to-moderate loss of vertebral body height and 5 mm retropulsion of the posterosuperior cortical margin into the ventral spinal canal.  There are changes of previous vertebral augmentation at this level.  The remainder of the thoracic vertebral bodies maintain normal  height.  There is no acute fracture.  Alignment is normal.  The study is mildly motion degraded.  There is no significant disc space narrowing.     Spinal canal, cord, epidural space:     The spinal canal is developmentally normal.  The cord is normal in caliber, contour and signal intensity.  There is no abnormal epidural mass or fluid collection.     Findings by level: There is no significant disc bulge or focal disc extrusion at any level.  There is borderline to mild spinal stenosis at the level of T11-T12 related to the chronic retropulsion of T12 but there is no compression of the lower cord or conus.  Otherwise, there is no spinal stenosis or cord compression.  There is no significant foraminal stenosis.    X-ray right ribs (6/15/2021):  On one view there is irregularity of the lateral right 4th rib suggesting a healing fracture without displacement.  An acute rib fracture is not identified.  Underlying pleural or pulmonary abnormalities are not seen.    ASSESSMENT: Beatriz Gan is a 65 y.o. female  with PMH significant for hx of left rotator cuff repair (Dr. Martinez), hx of left arthroscopic knee surgery, hx of partial nephrectomy, hx of ETOH dependence, HTN, and hx of left total knee arthroplasty (7/30/2020; Dr. Ruben Martinez) presents for the continued management of low back pain. Today, the patient localizes her pain to the area across her lower back without radiation of her back pain. TTreatment plan outlined below.     PLAN:  1. Schedule for bilateral L5-S1 transforaminal epidural steroid injection. I have explained the risks, benefits, and alternatives of the procedure in detail. The patient voices understanding and all questions have been answered. The patient agrees to proceed as planned. Written Consent obtained.   2. If no improvement with left thumb and wrist  In 3 weeks recommend further imaging.   3. I have stressed the importance of physical activity and a home exercise plan to help with  chronic pain and improve health.  4. Continue gabapentin as prescribed for neuropathic pain  5. RTC for the procedure as outlined above

## 2022-03-24 NOTE — H&P (VIEW-ONLY)
FOLLOW UP NOTE:     CHIEF COMPLAINT: back pain    INITIAL HISTORY OF PRESENT ILLNESS: Beatriz Gan is a 64 y.o. female with PMH significant for hx of left rotator cuff repair (Dr. Martinez), hx of left arthroscopic knee surgery, hx of partial nephrectomy, hx of ETOH dependence, and HTN presents as a referral for the evaluation of low back pain. Patient reports that her pain approximately in 2014 after no inciting incident or trauma. The patient reports that she experienced a new type of mid back pain when she slipped and fell at home approximately 1 week prior to her kyphoplasty with me. Today, the patient wants her low back pain addressed as her mid back pain resolved s/p kyphoplasty. The patient localizes her pain to the area across her lower back. Patient denies of radiation of her pain (the patient reports of having pain previously that radiated towards her left foot but states that her injections via Dr. Leger resolved her radicular pain). Patient describes her pain as a shooting, throbbing, and burning type of pain. Patient reports that her pain current pain is a 10/10. Patient denies of any urinary/fecal incontinence, saddle anesthesia, or weakness.      Of note, the patient reports that she fell 1 week prior to her T12 kyphoplasty with me on 1/31/2020. Patient was in discussion to her lumbar spine surgery with Dr. Maritnez but she fell prior to scheduling a procedure date.     Aggravating factors: constant pain; activity in general, sitting     Mitigating factors: ice  Pt has been seen in the clinic before, however pt is new to me.     History above per Dr. Rosales    INTERVAL HISTORY OF PRESENT ILLNESS: Beatriz Gan is a 66 y.o. female  with PMH significant for hx of left rotator cuff repair (Dr. Martinez), hx of left arthroscopic knee surgery, hx of partial nephrectomy, hx of ETOH dependence, HTN, and hx of left total knee arthroplasty (7/30/2020; Dr. Ruben Martinez) presents for the continued  management of low back pain. her  passed away 1 week ago suddenly after being diagnosed <8weeks ago with colon cancer. The day after the  she had her floors waxed and she fell and hurt her left wrist and low back. She was evaluated in the ED and xrays were negative for fracture. I do not have these to review. She was instructed to wear a brace for left thumb and wrist pain. She is also icing regularly. Today, the patient localizes her pain to the area across her lower back. The patient denies of radiation of her back pain. The patient reports that any activity in general worsens her pain. The patient reports that her pain is improved with laying down. Requests a repeat [place]  The patient denies of any significant changes in her health since her last appointment. The patient also denies of any changes in the character of her pain since her last appointment. The patient reports that her current pain is a 6-7/10. Patient denies of any urinary/fecal incontinence, saddle anesthesia, or weakness.     The patient presents to discuss her most recent MRI results.     INTERVENTIONAL PAIN HISTORY:  21 b/l TF [place] at L5-S1 >50% relief  2021: Lumbar interlaminar epidural steroid injection at L4-5  2020: Lumbar interlaminar epidural steroid injection at L4-L5   10/19/2020: Radiofrequency ablation of the L3, L4, and L5 medial branch nerves on the bilateral-side - a few weeks of benefit   2020: Radiofrequency Ablations of Superior lateral, Superior medial and Inferior medial genicular nerves, left-sided   3/11/2020: hylan supplementation via Dr. Ruben Martinez - no relief  2020: Bilateral L3, L4, and L5 medial branch radiofrequency ablation   2017: Right sacroiliac joint injection   4/10/2017: C7-T1 cervical interlaminar epidural steroid injection - 50% relief per chart review  2017: Right sacroiliac joint injection   2016: Lumbar RFA - good relief     CURRENT PAIN MEDICATIONS:  "  Gabapentin 600 mg PO TID  Xanax 0.5 mg PO BID  TENS unit - 3-4 times per day      ROS:  Review of Systems   Constitutional: Negative for chills and fever.   HENT: Negative for sore throat.    Eyes: Negative for visual disturbance.   Respiratory: Negative for shortness of breath.    Cardiovascular: Negative for chest pain.   Gastrointestinal: Negative for nausea and vomiting.   Genitourinary: Negative for difficulty urinating.   Musculoskeletal: Positive for arthralgias and back pain.   Skin: Negative for rash.   Allergic/Immunologic: Negative for immunocompromised state.   Neurological: Negative for syncope.   Hematological: Does not bruise/bleed easily.   Psychiatric/Behavioral: Negative for suicidal ideas.        MEDICAL, SURGICAL, FAMILY, SOCIAL HX: reviewed    MEDICATIONS/ALLERGIES: reviewed    PHYSICAL EXAM:    VITALS: Vitals reviewed.   Vitals:    03/24/22 1042   Weight: 88 kg (194 lb)   Height: 5' 4" (1.626 m)   PainSc:   8   PainLoc: Back       Physical Exam  Vitals and nursing note reviewed.   Constitutional:       Appearance: She is not diaphoretic.   HENT:      Head: Normocephalic and atraumatic.   Eyes:      General:         Right eye: No discharge.         Left eye: No discharge.      Conjunctiva/sclera: Conjunctivae normal.   Cardiovascular:      Rate and Rhythm: Normal rate.   Pulmonary:      Effort: Pulmonary effort is normal. No respiratory distress.      Breath sounds: Normal breath sounds.   Abdominal:      Palpations: Abdomen is soft.   Skin:     General: Skin is warm and dry.      Findings: No rash.   Neurological:      Mental Status: She is alert and oriented to person, place, and time.   Psychiatric:         Mood and Affect: Mood and affect normal.         Cognition and Memory: Memory normal.         Judgment: Judgment normal.          UPPER EXTREMITIES: Tenderness to snuff box on left.   LOWER EXTREMITIES:  Normal alignment, normal range of motion, no atrophy, no skin changes,  hair growth " and nail growth normal and equal bilaterally. No swelling, no tenderness.     LUMBAR SPINE  Lumbar spine: ROM is severely limited with flexion extension and oblique extension with increased pain (worse with extension).     ((--)) Supine straight leg raise    ((+)) Facet loading   Internal and external rotation of the hip causes no increased pain on either side.  Myofascial exam: Tenderness to palpation across lumbar paraspinous muscles.     MOTOR: Tone and bulk: normal bilateral upper and lower Strength: normal   Delt      Bi         Tri        WE      WF                        R          5          5          5          5          5          5            L          5          5          5          5          5          5               IP         ADD     ABD     Quad   TA        Gas      HAM  R          5          5          5          5          5          5          5  L          5          5          5          5          5          5          5     SENSATION: Light touch and pinprick intact bilaterally  REFLEXES: normal, symmetric, nonbrisk.  Toes down, no clonus. Negative nicholson's sign bilaterally.  GAIT: normal rise, base, steps, and arm swing.      IMAGING:    MRI Lumbar Spine without contrast (6/15/2021):  Vertebral column: There is a remote fracture of the T12 vertebral body with mild-to-moderate loss of vertebral body height and 5 mm retropulsion of the posterosuperior cortical margin into the ventral spinal canal.  This fracture was acute on plain films dated 01/24/2020 where there is only mild loss of vertebral body height.  The vertebral bodies had normal height on MRI dated 10/21/2019.  The remainder of the vertebral bodies maintain normal height.  Alignment is normal.  There is marked disc space narrowing at the L5-S1 level where there are chronic degenerative endplate signal changes with similar findings anteriorly at the L1-2 level where there is moderate disc space narrowing.  There is  mild-to-moderate disc space narrowing at the L2-3 level.  The discs are all desiccated.  Baseline marrow signal is normal.     Spinal canal, conus, epidural space: The spinal canal is developmentally normal.  The conus terminates at the level of T12-L1 and remains normal in contour and signal intensity.  There is no abnormal epidural mass or fluid collection.     Findings by level:     There is borderline to mild spinal stenosis at the level of superior T12 related to retropulsion but there is no compression of the lower cord/conus and the foramina are patent.     T12-L1: Unremarkable     L1-2: There is disc space narrowing with minimal bulging of the annulus.  There is no spinal canal or significant foraminal stenosis.     L2-3: There is disc space narrowing with a mild disc bulge and mild facet joint arthropathy.  The disc bulges slightly eccentric to the right.  There is mild right foraminal stenosis without definite change.     L3-4: There is minimal bulging of the annulus.  There is no spinal canal or significant foraminal stenosis.  There is no change.     L4-5: There is a mild diffuse disc bulge with osteophytic ridging.  There is mild facet joint arthropathy.  There is no spinal stenosis.  There is only mild bilateral foraminal stenosis without change.     L5-S1: There is marked disc space narrowing.  There is a disc bulge with osteophytic ridging.  There is only mild facet joint arthropathy.  There is no spinal stenosis.  There is moderate-to-marked right and mild-to-moderate left foraminal stenosis without significant change.    MRI Thoracic Spine without contrast (6/15/2021):  There is a remote fracture of the T12 vertebral body with mild-to-moderate loss of vertebral body height and 5 mm retropulsion of the posterosuperior cortical margin into the ventral spinal canal.  There are changes of previous vertebral augmentation at this level.  The remainder of the thoracic vertebral bodies maintain normal  height.  There is no acute fracture.  Alignment is normal.  The study is mildly motion degraded.  There is no significant disc space narrowing.     Spinal canal, cord, epidural space:     The spinal canal is developmentally normal.  The cord is normal in caliber, contour and signal intensity.  There is no abnormal epidural mass or fluid collection.     Findings by level: There is no significant disc bulge or focal disc extrusion at any level.  There is borderline to mild spinal stenosis at the level of T11-T12 related to the chronic retropulsion of T12 but there is no compression of the lower cord or conus.  Otherwise, there is no spinal stenosis or cord compression.  There is no significant foraminal stenosis.    X-ray right ribs (6/15/2021):  On one view there is irregularity of the lateral right 4th rib suggesting a healing fracture without displacement.  An acute rib fracture is not identified.  Underlying pleural or pulmonary abnormalities are not seen.    ASSESSMENT: Beatriz Gan is a 65 y.o. female  with PMH significant for hx of left rotator cuff repair (Dr. Martinez), hx of left arthroscopic knee surgery, hx of partial nephrectomy, hx of ETOH dependence, HTN, and hx of left total knee arthroplasty (7/30/2020; Dr. Ruben Martinez) presents for the continued management of low back pain. Today, the patient localizes her pain to the area across her lower back without radiation of her back pain. TTreatment plan outlined below.     PLAN:  1. Schedule for bilateral L5-S1 transforaminal epidural steroid injection. I have explained the risks, benefits, and alternatives of the procedure in detail. The patient voices understanding and all questions have been answered. The patient agrees to proceed as planned. Written Consent obtained.   2. If no improvement with left thumb and wrist  In 3 weeks recommend further imaging.   3. I have stressed the importance of physical activity and a home exercise plan to help with  chronic pain and improve health.  4. Continue gabapentin as prescribed for neuropathic pain  5. RTC for the procedure as outlined above

## 2022-03-25 ENCOUNTER — TELEPHONE (OUTPATIENT)
Dept: INTERNAL MEDICINE | Facility: CLINIC | Age: 67
End: 2022-03-25
Payer: MEDICARE

## 2022-03-25 DIAGNOSIS — M81.0 AGE-RELATED OSTEOPOROSIS WITHOUT CURRENT PATHOLOGICAL FRACTURE: ICD-10-CM

## 2022-03-25 DIAGNOSIS — Z78.0 POSTMENOPAUSAL: Primary | ICD-10-CM

## 2022-03-25 NOTE — TELEPHONE ENCOUNTER
----- Message from Florencia Dl sent at 3/25/2022  2:14 PM CDT -----  Please review patient's Appointment Desk for an upcoming PROLIA INJECTION appointment.       The following are needed for this appointment.   Please contact patient for any tests or follow-up needed:      ORDER.  Current / active in the Epic Therapy Plan, signed within a year.  LABS. Serum Calcium within 6 weeks of treatment.    DEXA SCAN within the last 2 years   DENTAL PRECAUTION CONFIRMED WITH PATIENT. No recent issues (infections, etc). No invasive procedures recently done or planned (root canal, extraction, implants, etc.)  A patient will need to bring a Dental Clearance signed by patient's dental provider if there are recent dental issues/procedures within the last 3 months.   FOLLOW-UP APPOINTMENT within the last 12 months with the diagnosis mentioned in the visit notes.         Any item unavailable by the day prior to the scheduled appointment will cause the appointment to be CANCELLED.        To reschedule appointments, please contact [facility] INFUSION SCHEDULING POOL or call [phone] or [phone].       Thank you,  [facility], Infusion Department

## 2022-03-28 ENCOUNTER — HOSPITAL ENCOUNTER (OUTPATIENT)
Facility: AMBULARY SURGERY CENTER | Age: 67
Discharge: HOME | End: 2022-03-28
Attending: ANESTHESIOLOGY | Admitting: ANESTHESIOLOGY
Payer: MEDICARE

## 2022-03-28 DIAGNOSIS — M51.36 DEGENERATIVE DISC DISEASE, LUMBAR: Primary | ICD-10-CM

## 2022-03-28 PROCEDURE — 64483 NJX AA&/STRD TFRM EPI L/S 1: CPT | Mod: 50 | Performed by: ANESTHESIOLOGY

## 2022-03-28 PROCEDURE — 64483 NJX AA&/STRD TFRM EPI L/S 1: CPT | Mod: 50,,, | Performed by: ANESTHESIOLOGY

## 2022-03-28 PROCEDURE — 64483 PR EPIDURAL INJ, ANES/STEROID, TRANSFORAMINAL, LUMB/SACR, SNGL LEVL: ICD-10-PCS | Mod: 50,,, | Performed by: ANESTHESIOLOGY

## 2022-03-28 RX ORDER — MIDAZOLAM HYDROCHLORIDE 1 MG/ML
INJECTION INTRAMUSCULAR; INTRAVENOUS
Status: DISCONTINUED | OUTPATIENT
Start: 2022-03-28 | End: 2022-03-28 | Stop reason: HOSPADM

## 2022-03-28 RX ORDER — FENTANYL CITRATE 50 UG/ML
INJECTION, SOLUTION INTRAMUSCULAR; INTRAVENOUS
Status: DISCONTINUED | OUTPATIENT
Start: 2022-03-28 | End: 2022-03-28 | Stop reason: HOSPADM

## 2022-03-28 RX ORDER — SODIUM CHLORIDE, SODIUM LACTATE, POTASSIUM CHLORIDE, CALCIUM CHLORIDE 600; 310; 30; 20 MG/100ML; MG/100ML; MG/100ML; MG/100ML
INJECTION, SOLUTION INTRAVENOUS ONCE AS NEEDED
Status: COMPLETED | OUTPATIENT
Start: 2022-03-28 | End: 2022-03-28

## 2022-03-28 RX ORDER — BUPIVACAINE HYDROCHLORIDE 2.5 MG/ML
INJECTION, SOLUTION EPIDURAL; INFILTRATION; INTRACAUDAL
Status: DISCONTINUED | OUTPATIENT
Start: 2022-03-28 | End: 2022-03-28 | Stop reason: HOSPADM

## 2022-03-28 RX ORDER — DEXAMETHASONE SODIUM PHOSPHATE 10 MG/ML
INJECTION INTRAMUSCULAR; INTRAVENOUS
Status: DISCONTINUED | OUTPATIENT
Start: 2022-03-28 | End: 2022-03-28 | Stop reason: HOSPADM

## 2022-03-28 RX ORDER — LIDOCAINE HYDROCHLORIDE 10 MG/ML
INJECTION, SOLUTION EPIDURAL; INFILTRATION; INTRACAUDAL; PERINEURAL
Status: DISCONTINUED | OUTPATIENT
Start: 2022-03-28 | End: 2022-03-28 | Stop reason: HOSPADM

## 2022-03-28 RX ADMIN — SODIUM CHLORIDE, SODIUM LACTATE, POTASSIUM CHLORIDE, CALCIUM CHLORIDE: 600; 310; 30; 20 INJECTION, SOLUTION INTRAVENOUS at 09:03

## 2022-03-28 NOTE — PLAN OF CARE
Discharge instructions given to pt, verbalized understanding.  Able to tolerate PO fluids.  IV removed.  No c/o pain or weakness to lower ext's.  Ambulating out to friend  per RN in no distress.

## 2022-03-28 NOTE — DISCHARGE SUMMARY
[facility]  Discharge Note  Short Stay    Procedure(s) (LRB):  INJECTION, STEROID, EPIDURAL, TRANSFORAMINAL APPROACH Lumbar L5-S1 (Bilateral)    OUTCOME: Patient tolerated treatment/procedure well without complication and is now ready for discharge.    DISPOSITION: Home or Self Care    FINAL DIAGNOSIS:  Degenerative disc disease, lumbar    FOLLOWUP: In clinic    DISCHARGE INSTRUCTIONS:    Discharge Procedure Orders   Diet general     Call MD for:  temperature >100.4     Call MD for:  persistent nausea and vomiting     Call MD for:  severe uncontrolled pain     Call MD for:  difficulty breathing, headache or visual disturbances     Call MD for:  redness, tenderness, or signs of infection (pain, swelling, redness, odor or green/yellow discharge around incision site)     Call MD for:  hives     Call MD for:  persistent dizziness or light-headedness     Call MD for:  extreme fatigue        TIME SPENT ON DISCHARGE: 30 minutes

## 2022-03-28 NOTE — OP NOTE
PROCEDURE DATE: 3/28/2022    PROCEDURE: Bilateral L5-S1 transforaminal epidural steroid injection under fluoroscopy    DIAGNOSIS: M51.16 Intervertebral disc disorders with radiculopathy, lumbar region  Post op diagnosis: Same    PHYSICIAN: kD Rosales MD  ASSISTANT: Narciso Wren MD    MEDICATIONS INJECTED:  Dexamethasone 5mg (0.5ml) and 1.5ml 0.25% bupivicaine at each nerve root.     LOCAL ANESTHETIC INJECTED:  Lidocaine 1%. 2 ml per site.    SEDATION MEDICATIONS: RN IV sedation    ESTIMATED BLOOD LOSS:  none    COMPLICATIONS:  none    TECHNIQUE:   A time-out was taken to identify patient and procedure side prior to starting the procedure. The patient was placed in a prone position, prepped and draped in the usual sterile fashion using ChloraPrep and sterile towels.  The area to be injected was determined under fluoroscopic guidance in AP and oblique view.  Local anesthetic was given by raising a wheal and going down to the hub of a 25-gauge 1.5 inch needle.  In oblique view, a 5 inch 22-gauge bent-tip spinal needle was introduced towards 6 oclock position of the pedicle of each above named nerve root level.  The needle was walked medially then hinged into the neural foramen and position was confirmed in AP and lateral views.  2 mL contrast dye was injected to confirm appropriate placement and that there was no vascular uptake.  After negative aspiration for blood or CSF, the medication was then injected. This was performed at the L5-S1 level(s). The patient tolerated the procedure well.    The patient was monitored after the procedure.  Patient was given post procedure and discharge instructions to follow at home. The patient was discharged in a stable condition.

## 2022-03-29 VITALS
DIASTOLIC BLOOD PRESSURE: 82 MMHG | TEMPERATURE: 98 F | BODY MASS INDEX: 33.3 KG/M2 | HEART RATE: 72 BPM | WEIGHT: 194 LBS | SYSTOLIC BLOOD PRESSURE: 139 MMHG | RESPIRATION RATE: 18 BRPM | OXYGEN SATURATION: 96 %

## 2022-03-30 ENCOUNTER — HOSPITAL ENCOUNTER (OUTPATIENT)
Dept: RADIOLOGY | Facility: CLINIC | Age: 67
Discharge: HOME | End: 2022-03-30
Attending: INTERNAL MEDICINE
Payer: MEDICARE

## 2022-03-30 DIAGNOSIS — M81.0 AGE-RELATED OSTEOPOROSIS WITHOUT CURRENT PATHOLOGICAL FRACTURE: ICD-10-CM

## 2022-03-30 DIAGNOSIS — Z78.0 POSTMENOPAUSAL: ICD-10-CM

## 2022-03-30 PROCEDURE — 77080 DXA BONE DENSITY AXIAL: CPT | Mod: TC,PO

## 2022-03-30 PROCEDURE — 77080 DEXA BONE DENSITY SPINE HIP: ICD-10-PCS | Mod: 26,,, | Performed by: RADIOLOGY

## 2022-03-30 PROCEDURE — 77080 DXA BONE DENSITY AXIAL: CPT | Mod: 26,,, | Performed by: RADIOLOGY

## 2022-03-31 ENCOUNTER — OFFICE VISIT (OUTPATIENT)
Dept: INTERNAL MEDICINE | Facility: CLINIC | Age: 67
End: 2022-03-31
Payer: MEDICARE

## 2022-03-31 VITALS
HEART RATE: 79 BPM | OXYGEN SATURATION: 98 % | WEIGHT: 185.44 LBS | DIASTOLIC BLOOD PRESSURE: 90 MMHG | SYSTOLIC BLOOD PRESSURE: 110 MMHG | BODY MASS INDEX: 31.66 KG/M2 | HEIGHT: 64 IN

## 2022-03-31 DIAGNOSIS — F41.9 ANXIETY: ICD-10-CM

## 2022-03-31 DIAGNOSIS — M81.0 AGE-RELATED OSTEOPOROSIS WITHOUT CURRENT PATHOLOGICAL FRACTURE: ICD-10-CM

## 2022-03-31 DIAGNOSIS — I10 ESSENTIAL HYPERTENSION: Primary | ICD-10-CM

## 2022-03-31 DIAGNOSIS — F51.01 PRIMARY INSOMNIA: ICD-10-CM

## 2022-03-31 DIAGNOSIS — Z85.528 HISTORY OF RENAL CELL CARCINOMA: ICD-10-CM

## 2022-03-31 PROCEDURE — 3008F BODY MASS INDEX DOCD: CPT | Mod: CPTII,S$GLB,, | Performed by: INTERNAL MEDICINE

## 2022-03-31 PROCEDURE — 3080F DIAST BP >= 90 MM HG: CPT | Mod: CPTII,S$GLB,, | Performed by: INTERNAL MEDICINE

## 2022-03-31 PROCEDURE — 1125F PR PAIN SEVERITY QUANTIFIED, PAIN PRESENT: ICD-10-PCS | Mod: CPTII,S$GLB,, | Performed by: INTERNAL MEDICINE

## 2022-03-31 PROCEDURE — 1159F MED LIST DOCD IN RCRD: CPT | Mod: CPTII,S$GLB,, | Performed by: INTERNAL MEDICINE

## 2022-03-31 PROCEDURE — 3008F PR BODY MASS INDEX (BMI) DOCUMENTED: ICD-10-PCS | Mod: CPTII,S$GLB,, | Performed by: INTERNAL MEDICINE

## 2022-03-31 PROCEDURE — 1160F PR REVIEW ALL MEDS BY PRESCRIBER/CLIN PHARMACIST DOCUMENTED: ICD-10-PCS | Mod: CPTII,S$GLB,, | Performed by: INTERNAL MEDICINE

## 2022-03-31 PROCEDURE — 1159F PR MEDICATION LIST DOCUMENTED IN MEDICAL RECORD: ICD-10-PCS | Mod: CPTII,S$GLB,, | Performed by: INTERNAL MEDICINE

## 2022-03-31 PROCEDURE — 1125F AMNT PAIN NOTED PAIN PRSNT: CPT | Mod: CPTII,S$GLB,, | Performed by: INTERNAL MEDICINE

## 2022-03-31 PROCEDURE — 99499 RISK ADDL DX/OHS AUDIT: ICD-10-PCS | Mod: S$GLB,,, | Performed by: INTERNAL MEDICINE

## 2022-03-31 PROCEDURE — 99999 PR PBB SHADOW E&M-EST. PATIENT-LVL IV: CPT | Mod: PBBFAC,,, | Performed by: INTERNAL MEDICINE

## 2022-03-31 PROCEDURE — 3080F PR MOST RECENT DIASTOLIC BLOOD PRESSURE >= 90 MM HG: ICD-10-PCS | Mod: CPTII,S$GLB,, | Performed by: INTERNAL MEDICINE

## 2022-03-31 PROCEDURE — 99999 PR PBB SHADOW E&M-EST. PATIENT-LVL IV: ICD-10-PCS | Mod: PBBFAC,,, | Performed by: INTERNAL MEDICINE

## 2022-03-31 PROCEDURE — 99214 OFFICE O/P EST MOD 30 MIN: CPT | Mod: S$GLB,,, | Performed by: INTERNAL MEDICINE

## 2022-03-31 PROCEDURE — 3074F PR MOST RECENT SYSTOLIC BLOOD PRESSURE < 130 MM HG: ICD-10-PCS | Mod: CPTII,S$GLB,, | Performed by: INTERNAL MEDICINE

## 2022-03-31 PROCEDURE — 99499 UNLISTED E&M SERVICE: CPT | Mod: S$GLB,,, | Performed by: INTERNAL MEDICINE

## 2022-03-31 PROCEDURE — 1160F RVW MEDS BY RX/DR IN RCRD: CPT | Mod: CPTII,S$GLB,, | Performed by: INTERNAL MEDICINE

## 2022-03-31 PROCEDURE — 3074F SYST BP LT 130 MM HG: CPT | Mod: CPTII,S$GLB,, | Performed by: INTERNAL MEDICINE

## 2022-03-31 PROCEDURE — 99214 PR OFFICE/OUTPT VISIT, EST, LEVL IV, 30-39 MIN: ICD-10-PCS | Mod: S$GLB,,, | Performed by: INTERNAL MEDICINE

## 2022-03-31 NOTE — PROGRESS NOTES
[facility] Note    Chief Complaint      Chief Complaint   Patient presents with    Hypertension     6 month f/u       History of Present Illness      Beatriz Gan is a 66 y.o. female with chronic conditions of HTN, Anxiety, insomnia, osteoarthritis left shoulder, chronic low back pain, hx of RCC who presents today for: follow up chronic conditions.  Since last visit, pt's  passed away from   HTN: BP at goal on losartan-hctz, amlodipine  Anxiety: Incompletely controlled on prozac, xanax as needed.  Will considering adjusting prozac dose but pt not sure what number of capsules she's taking daily.  Insomnia: Controlled on ambien.  No new or worsening symptoms  Osteoarthritis, shoulder, chronic low back pain:  Sees Dr. Martinez and Dr. Rosales.  Doing much better on gabapentin and diclofenac. Restarted PT.  Hx of RCC: Sees Dr. Morrissey for surveillance.  Pt waiting 2/2 COVID concerns  Osteoporosis: Previously on Prolia.  Bone density does not indicate restarting treatment.  Will repeat DEXA in 2024.  Flu shot due.  TdAP 2011.  Prevnar UTD.    Mammogram 2021.  DEXA 2019.    Cscope 15 yrs ago.  Cologuard UTD per pt?  Due for FOBT    Past Medical History:  Past Medical History:   Diagnosis Date    Alcohol dependence     DDD (degenerative disc disease), lumbosacral     DJD (degenerative joint disease), lumbosacral     Encounter for blood transfusion     GERD (gastroesophageal reflux disease)     Gout     Hypercholesterolemia     Hypertension     NATHALIE (iron deficiency anemia)     questionable white coat hypertension    Kidney carcinoma, left 2014    Pneumonia     Renal cell carcinoma     Shingles 10/13/2012       Past Surgical History:   has a past surgical history that includes Total abdominal hysterectomy w/ bilateral salpingoophorectomy (age 48); Appendectomy; Bladder repair; Colonoscopy (9/2011); Esophagogastroduodenoscopy (9/2011); Hysterectomy; Refractive surgery; Nephrectomy;  Laparoscopic Nissen fundoplication; Eye surgery; Hernia repair; Skin biopsy; Rotator cuff repair (Left, 10/31/2019); Distal clavicle excision (Left, 10/31/2019); Arthroscopy of shoulder with decompression of subacromial space (Left, 10/31/2019); Knee arthroscopy w/ meniscectomy (Left, 2020); Fixation Kyphoplasty (N/A, 2020); Radiofrequency ablation of lumbar medial branch nerve at single level (Bilateral, 2020); Injection of anesthetic agent around nerve (Left, 2020); Radiofrequency ablation (Left, 2020); Knee Arthroplasty (Left, 2020); Radiofrequency ablation of lumbar medial branch nerve at single level (Bilateral, 10/19/2020); Epidural steroid injection into lumbar spine (N/A, 2020); Epidural steroid injection into lumbar spine (N/A, 2021); Transforaminal epidural injection of steroid (Bilateral, 2021); and Transforaminal epidural injection of steroid (Bilateral, 3/28/2022).    Family History:  family history includes Hypertension in her father.     Social History:  Social History     Tobacco Use    Smoking status: Former Smoker     Packs/day: 1.00     Years: 40.00     Pack years: 40.00     Types: Cigarettes     Quit date: 2018     Years since quittin.2    Smokeless tobacco: Never Used    Tobacco comment: smokes 2-3 cigarettes a night   Substance Use Topics    Alcohol use: Yes     Alcohol/week: 12.0 standard drinks     Types: 6 Cans of beer, 6 Standard drinks or equivalent per week     Comment: 2-3 a day/ social    Drug use: No       I personally reviewed all past medical, surgical, social and family history.    Review of Systems   Constitutional: Negative for chills, fever and malaise/fatigue.   Respiratory: Negative for shortness of breath.    Cardiovascular: Negative for chest pain.   Gastrointestinal: Negative for constipation, diarrhea, nausea and vomiting.   Skin: Negative for rash.   Neurological: Negative for weakness.   All other systems reviewed  and are negative.       Medications:  Outpatient Encounter Medications as of 3/31/2022   Medication Sig Note Dispense Refill    alendronate (FOSAMAX) 70 MG tablet Take 70 mg by mouth every 7 days.       ALPRAZolam (XANAX) 1 MG tablet Take 1 tablet by mouth twice daily as needed for anxiety  60 tablet 2    amLODIPine (NORVASC) 5 MG tablet Take 1 tablet by mouth once daily  90 tablet 2    calcium carbonate (OS-TERI) 600 mg calcium (1,500 mg) Tab Take 600 mg by mouth once.       celecoxib (CELEBREX) 200 MG capsule Take 1 capsule (200 mg total) by mouth once daily.  30 capsule 5    cholecalciferol, vitamin D3, (VITAMIN D3 ORAL) Take by mouth once daily.       FLUoxetine 40 MG capsule Take 40 mg by mouth 2 (two) times daily.       gabapentin (NEURONTIN) 600 MG tablet Take 1 tablet (600 mg total) by mouth 3 (three) times daily.  90 tablet 11    HYDROcodone-acetaminophen (NORCO) 7.5-325 mg per tablet Take 1 tablet by mouth every 6 (six) hours as needed for Pain.  21 tablet 0    losartan-hydrochlorothiazide 100-25 mg (HYZAAR) 100-25 mg per tablet Take 1 tablet by mouth once daily  90 tablet 1    magnesium 30 mg Tab Take by mouth once.       MULTIVIT-IRON-MIN-FOLIC ACID 3,500-18-0.4 UNIT-MG-MG ORAL CHEW Take by mouth once daily.       ondansetron (ZOFRAN) 4 MG tablet Take 1 tablet (4 mg total) by mouth as needed for Nausea.  60 tablet 1    polyethylene glycol (GLYCOLAX) 17 gram/dose powder Take 17 g by mouth once daily.  510 g 3    SYMBICORT 80-4.5 mcg/actuation HFAA Inhale 2 puffs by mouth twice daily  30.6 g 2    zolpidem (AMBIEN) 10 mg Tab TAKE 1 TABLET BY MOUTH ONCE DAILY AT NIGHT AS NEEDED  90 tablet 0    azelastine (ASTELIN) 137 mcg (0.1 %) nasal spray 1 spray (137 mcg total) by Nasal route 2 (two) times daily.  30 mL 3    fluticasone (FLONASE) 50 mcg/actuation nasal spray 1 spray by Nasal route daily as needed.  7/23/2020: Not taking       Facility-Administered Encounter Medications as of 3/31/2022  "  Medication Dose Route Frequency Provider Last Rate Last Admin    electrolyte-S (ISOLYTE)   Intravenous Continuous Kota Ortega MD 10 mL/hr at 01/31/20 0624 New Bag at 01/31/20 0624    lactated ringers infusion   Intravenous Continuous Kota Ortega MD        pregabalin capsule 75 mg  75 mg Oral Once Shiela Mann MD           Allergies:  Review of patient's allergies indicates:   Allergen Reactions    Phenergan [promethazine] Hives and Rash    Latex, natural rubber Hives     Per pt report-many years    Morphine Hives    Nsaids (non-steroidal anti-inflammatory drug)      Due to "kidney cancer"       Health Maintenance:  Immunization History   Administered Date(s) Administered    COVID-19, MRNA, LN-S, PF (Pfizer) (Purple Cap) 02/10/2021, 03/03/2021    Influenza 10/16/2014    Influenza (FLUAD) - Quadrivalent - Adjuvanted - PF *Preferred* (65+) 09/29/2020    Influenza (FLUAD) - Trivalent - Adjuvanted - PF (65+) 09/11/2018    Influenza - High Dose - PF (65 years and older) 11/11/2019    Influenza - Quadrivalent - PF *Preferred* (6 months and older) 10/22/2008, 10/16/2014, 11/05/2015, 01/09/2017    Influenza A (H1N1) 2009 Monovalent - IM 01/08/2010    Pneumococcal Conjugate - 13 Valent 01/17/2014, 01/17/2014    Tdap 06/21/2011      Health Maintenance   Topic Date Due    LDCT Lung Screen  08/28/2016    TETANUS VACCINE  06/21/2021    Lipid Panel  10/01/2022    Mammogram  10/04/2022    DEXA Scan  03/30/2025    Hepatitis C Screening  Completed        Physical Exam      Vital Signs  Pulse: 79  SpO2: 98 %  BP: (!) 110/90  BP Location: Left arm  Patient Position: Sitting  Pain Score:   6  Pain Loc: Hand  Height and Weight  Height: 5' 4" (162.6 cm)  Weight: 84.1 kg (185 lb 6.5 oz)  BSA (Calculated - sq m): 1.95 sq meters  BMI (Calculated): 31.8  Weight in (lb) to have BMI = 25: 145.3]    Physical Exam  Vitals reviewed.   Constitutional:       Appearance: She is well-developed.   HENT:      Head: " Normocephalic and atraumatic.      Right Ear: External ear normal.      Left Ear: External ear normal.   Cardiovascular:      Rate and Rhythm: Normal rate and regular rhythm.      Heart sounds: Normal heart sounds. No murmur heard.  Pulmonary:      Effort: Pulmonary effort is normal.      Breath sounds: Normal breath sounds. No wheezing or rales.   Abdominal:      General: Bowel sounds are normal. There is no distension.      Palpations: Abdomen is soft.      Tenderness: There is no abdominal tenderness.          Laboratory:  CBC:  Recent Labs   Lab 08/03/20  0507 08/10/20  1233 03/30/21  1052   WBC 3.03 L 4.45 6.45   RBC 2.06 L 3.57 L 3.94 L   Hemoglobin 6.7 L 11.1 L 14.0   Hematocrit 21.4 L 35.5 L 40.0   Platelets 156 442 H 289    H 99 H 102 H   MCH 32.5 H 31.1 H 35.5 H   MCHC 31.3 L 31.3 L 35.0     CMP:  Recent Labs   Lab 08/10/20  1233 03/30/21  1052 10/01/21  0921 03/30/22  1138   Glucose 104 112 H 97 115 H   Calcium 10.3 10.3 9.8 9.4   Albumin 3.1 L 4.4 3.9  --    Total Protein 7.0 7.8 7.2  --    Sodium 140 139 137 136   Potassium 4.1 4.7 3.9 3.0 L   CO2 29 30 H 25 25   Chloride 101 98 99 100   BUN 10 18 H 15 23   Alkaline Phosphatase 131 123 109  --    ALT 18 15 25  --    AST 25 26 24  --    Total Bilirubin 0.4 0.7 0.6  --      URINALYSIS:  Recent Labs   Lab 08/12/20  0942 09/29/20  1553 03/30/21  1052   Color, UA Yellow  --  Yellow   Clarity, UA Clear  --   --    Specific Gravity, UA  --   --  1.015   Spec Grav UA 1.020   < >  --    pH, UA 7.5   < > 7.0   Protein, UA  --   --  Negative   Bacteria  --   --  Few A   Nitrite, UA neg   < > Negative   Leukocytes, UA  --   --  Trace A   Urobilinogen, UA 1.0   < > Negative    < > = values in this interval not displayed.      LIPIDS:  Recent Labs   Lab 05/18/20  0926 03/30/21  1052 10/01/21  0921   TSH  --  1.200  --    HDL 77 H 80 H 90 H   Cholesterol 253 H 223 H 215 H   Triglycerides 124 84 70   LDL Cholesterol 151.2 126.2 111.0   HDL/Cholesterol Ratio 30.4  35.9 41.9   Non-HDL Cholesterol 176 143 125   Total Cholesterol/HDL Ratio 3.3 2.8 2.4     TSH:  Recent Labs   Lab 03/30/21  1052   TSH 1.200     A1C:  Recent Labs   Lab 10/01/21  0921   Hemoglobin A1C 4.8       Assessment/Plan     Beatriz Gan is a 66 y.o.female with:    1. Essential hypertension  Continue current meds.    2. Anxiety  Continue current meds.    3. Primary insomnia  Continue current meds.    4. History of renal cell carcinoma  STable.  F/U with urology for surveillance  5. Age-related osteoporosis without current pathological fracture  Repeat DEXA in 2 yrs.  Ok to put prolia on hold now considering recent DEXA results.    Chronic conditions status updated as per HPI.  Other than changes above, cont current medications and maintain follow up with specialists.  No follow-ups on file.    Future Appointments   Date Time Provider Department Center   4/8/2022 11:00 AM INJECTION CHAIR, [place] [facility] [place] [facility] [place]   4/27/2022  9:40 AM Aaliyah Zuluaga PA-C [facility] PAINT [place]       Jorge Alonzo MD  [facility]

## 2022-04-11 ENCOUNTER — TELEPHONE (OUTPATIENT)
Dept: INTERNAL MEDICINE | Facility: CLINIC | Age: 67
End: 2022-04-11
Payer: MEDICARE

## 2022-04-11 NOTE — TELEPHONE ENCOUNTER
----- Message from Imani Lynn sent at 4/11/2022 10:36 AM CDT -----    Please request Dr Alonzo to discontinue the Prolia therapy plan orders, may enter with a new therapy plan later on if Prolia will need to re-start in the future.      Per Appt cancellation note by [facility] staff (Appt 4/8/22):  Dexa has improved, will re-check in 2 years.        Thank you,  Garett Diallo  [facility]

## 2022-04-22 ENCOUNTER — PATIENT OUTREACH (OUTPATIENT)
Dept: ADMINISTRATIVE | Facility: OTHER | Age: 67
End: 2022-04-22
Payer: MEDICARE

## 2022-04-27 ENCOUNTER — OFFICE VISIT (OUTPATIENT)
Dept: PAIN MEDICINE | Facility: CLINIC | Age: 67
End: 2022-04-27
Payer: MEDICARE

## 2022-04-27 VITALS
SYSTOLIC BLOOD PRESSURE: 143 MMHG | HEART RATE: 79 BPM | WEIGHT: 185 LBS | DIASTOLIC BLOOD PRESSURE: 96 MMHG | HEIGHT: 64 IN | BODY MASS INDEX: 31.58 KG/M2

## 2022-04-27 DIAGNOSIS — M51.36 DDD (DEGENERATIVE DISC DISEASE), LUMBAR: Primary | ICD-10-CM

## 2022-04-27 DIAGNOSIS — M43.06 SPONDYLOLYSIS, LUMBAR REGION: Primary | ICD-10-CM

## 2022-04-27 DIAGNOSIS — M51.34 DDD (DEGENERATIVE DISC DISEASE), THORACIC: ICD-10-CM

## 2022-04-27 DIAGNOSIS — M43.06 SPONDYLOLYSIS, LUMBAR REGION: ICD-10-CM

## 2022-04-27 PROCEDURE — 3077F PR MOST RECENT SYSTOLIC BLOOD PRESSURE >= 140 MM HG: ICD-10-PCS | Mod: CPTII,S$GLB,, | Performed by: PHYSICIAN ASSISTANT

## 2022-04-27 PROCEDURE — 3008F BODY MASS INDEX DOCD: CPT | Mod: CPTII,S$GLB,, | Performed by: PHYSICIAN ASSISTANT

## 2022-04-27 PROCEDURE — 3080F PR MOST RECENT DIASTOLIC BLOOD PRESSURE >= 90 MM HG: ICD-10-PCS | Mod: CPTII,S$GLB,, | Performed by: PHYSICIAN ASSISTANT

## 2022-04-27 PROCEDURE — 3077F SYST BP >= 140 MM HG: CPT | Mod: CPTII,S$GLB,, | Performed by: PHYSICIAN ASSISTANT

## 2022-04-27 PROCEDURE — 1159F MED LIST DOCD IN RCRD: CPT | Mod: CPTII,S$GLB,, | Performed by: PHYSICIAN ASSISTANT

## 2022-04-27 PROCEDURE — 99999 PR PBB SHADOW E&M-EST. PATIENT-LVL III: ICD-10-PCS | Mod: PBBFAC,,, | Performed by: PHYSICIAN ASSISTANT

## 2022-04-27 PROCEDURE — 99213 PR OFFICE/OUTPT VISIT, EST, LEVL III, 20-29 MIN: ICD-10-PCS | Mod: S$GLB,,, | Performed by: PHYSICIAN ASSISTANT

## 2022-04-27 PROCEDURE — 3288F FALL RISK ASSESSMENT DOCD: CPT | Mod: CPTII,S$GLB,, | Performed by: PHYSICIAN ASSISTANT

## 2022-04-27 PROCEDURE — 1125F PR PAIN SEVERITY QUANTIFIED, PAIN PRESENT: ICD-10-PCS | Mod: CPTII,S$GLB,, | Performed by: PHYSICIAN ASSISTANT

## 2022-04-27 PROCEDURE — 3008F PR BODY MASS INDEX (BMI) DOCUMENTED: ICD-10-PCS | Mod: CPTII,S$GLB,, | Performed by: PHYSICIAN ASSISTANT

## 2022-04-27 PROCEDURE — 1159F PR MEDICATION LIST DOCUMENTED IN MEDICAL RECORD: ICD-10-PCS | Mod: CPTII,S$GLB,, | Performed by: PHYSICIAN ASSISTANT

## 2022-04-27 PROCEDURE — 1101F PR PT FALLS ASSESS DOC 0-1 FALLS W/OUT INJ PAST YR: ICD-10-PCS | Mod: CPTII,S$GLB,, | Performed by: PHYSICIAN ASSISTANT

## 2022-04-27 PROCEDURE — 99999 PR PBB SHADOW E&M-EST. PATIENT-LVL III: CPT | Mod: PBBFAC,,, | Performed by: PHYSICIAN ASSISTANT

## 2022-04-27 PROCEDURE — 1125F AMNT PAIN NOTED PAIN PRSNT: CPT | Mod: CPTII,S$GLB,, | Performed by: PHYSICIAN ASSISTANT

## 2022-04-27 PROCEDURE — 3080F DIAST BP >= 90 MM HG: CPT | Mod: CPTII,S$GLB,, | Performed by: PHYSICIAN ASSISTANT

## 2022-04-27 PROCEDURE — 99213 OFFICE O/P EST LOW 20 MIN: CPT | Mod: S$GLB,,, | Performed by: PHYSICIAN ASSISTANT

## 2022-04-27 PROCEDURE — 3288F PR FALLS RISK ASSESSMENT DOCUMENTED: ICD-10-PCS | Mod: CPTII,S$GLB,, | Performed by: PHYSICIAN ASSISTANT

## 2022-04-27 PROCEDURE — 1101F PT FALLS ASSESS-DOCD LE1/YR: CPT | Mod: CPTII,S$GLB,, | Performed by: PHYSICIAN ASSISTANT

## 2022-04-27 NOTE — PROGRESS NOTES
FOLLOW UP NOTE:     CHIEF COMPLAINT: back pain    INITIAL HISTORY OF PRESENT ILLNESS: Beatriz Gan is a 64 y.o. female with PMH significant for hx of left rotator cuff repair (Dr. Martinez), hx of left arthroscopic knee surgery, hx of partial nephrectomy, hx of ETOH dependence, and HTN presents as a referral for the evaluation of low back pain. Patient reports that her pain approximately in 2014 after no inciting incident or trauma. The patient reports that she experienced a new type of mid back pain when she slipped and fell at home approximately 1 week prior to her kyphoplasty with me. Today, the patient wants her low back pain addressed as her mid back pain resolved s/p kyphoplasty. The patient localizes her pain to the area across her lower back. Patient denies of radiation of her pain (the patient reports of having pain previously that radiated towards her left foot but states that her injections via Dr. Leger resolved her radicular pain). Patient describes her pain as a shooting, throbbing, and burning type of pain. Patient reports that her pain current pain is a 10/10. Patient denies of any urinary/fecal incontinence, saddle anesthesia, or weakness.      Of note, the patient reports that she fell 1 week prior to her T12 kyphoplasty with me on 1/31/2020. Patient was in discussion to her lumbar spine surgery with Dr. Martinez but she fell prior to scheduling a procedure date.     Aggravating factors: constant pain; activity in general, sitting     Mitigating factors: ice  Pt has been seen in the clinic before, however pt is new to me.     History above per Dr. Rosales    INTERVAL HISTORY OF PRESENT ILLNESS: Beatriz Gan is a 66 y.o. female  with PMH significant for hx of left rotator cuff repair (Dr. Martinez), hx of left arthroscopic knee surgery, hx of partial nephrectomy, hx of ETOH dependence, HTN, and hx of left total knee arthroplasty (7/30/2020; Dr. Ruben Martinez) presents for the continued  management of low back pain. Her  passed away 1 month ago suddenly after being diagnosed <8weeks prior with colon cancer. She is s/p lumbar TF [place]OR[place] bilaterally at L5-S1. Reports 50% relief of midline low back pain. Pain continues to make household chores difficult. The patient denies of radiation of her back pain. The patient reports that any activity in general worsens her pain. The patient reports that her pain is improved with laying down. The patient denies of any significant changes in her health since her last appointment. The patient also denies of any changes in the character of her pain since her last appointment. The patient reports that her current pain is a 4/10. Patient denies of any urinary/fecal incontinence, saddle anesthesia, or weakness.       INTERVENTIONAL PAIN HISTORY:  3/28/22 b/l TF FLORIDA at L5-S1 >50% relief  7/30/21 b/l TF FLORIDA at L5-S1 >50% relief  5/17/2021: Lumbar interlaminar epidural steroid injection at L4-5  12/7/2020: Lumbar interlaminar epidural steroid injection at L4-L5   10/19/2020: Radiofrequency ablation of the L3, L4, and L5 medial branch nerves on the bilateral-side - a few weeks of benefit   6/19/2020: Radiofrequency Ablations of Superior lateral, Superior medial and Inferior medial genicular nerves, left-sided   3/11/2020: hylan supplementation via Dr. Ruben Martinez - no relief  2/28/2020: Bilateral L3, L4, and L5 medial branch radiofrequency ablation   8/21/2017: Right sacroiliac joint injection   4/10/2017: C7-T1 cervical interlaminar epidural steroid injection - 50% relief per chart review  2/6/2017: Right sacroiliac joint injection   8/2016: Lumbar RFA - good relief     CURRENT PAIN MEDICATIONS:   Gabapentin 600 mg PO TID  Xanax 0.5 mg PO BID  TENS unit - 3-4 times per day   not updated today as  is down.       ROS:  Review of Systems   Constitutional: Negative for chills and fever.   HENT: Negative for sore throat.    Eyes: Negative for visual disturbance.  "  Respiratory: Negative for shortness of breath.    Cardiovascular: Negative for chest pain.   Gastrointestinal: Negative for nausea and vomiting.   Genitourinary: Negative for difficulty urinating.   Musculoskeletal: Positive for arthralgias and back pain.   Skin: Negative for rash.   Allergic/Immunologic: Negative for immunocompromised state.   Neurological: Negative for syncope.   Hematological: Does not bruise/bleed easily.   Psychiatric/Behavioral: Negative for suicidal ideas.        MEDICAL, SURGICAL, FAMILY, SOCIAL HX: reviewed    MEDICATIONS/ALLERGIES: reviewed    PHYSICAL EXAM:    VITALS: Vitals reviewed.   Vitals:    04/27/22 0941   BP: (!) 143/96   Pulse: 79   Weight: 83.9 kg (185 lb)   Height: 5' 4" (1.626 m)   PainSc:   4   PainLoc: Back       Physical Exam  Vitals and nursing note reviewed.   Constitutional:       Appearance: She is not diaphoretic.   HENT:      Head: Normocephalic and atraumatic.   Eyes:      General:         Right eye: No discharge.         Left eye: No discharge.      Conjunctiva/sclera: Conjunctivae normal.   Cardiovascular:      Rate and Rhythm: Normal rate.   Pulmonary:      Effort: Pulmonary effort is normal. No respiratory distress.      Breath sounds: Normal breath sounds.   Abdominal:      Palpations: Abdomen is soft.   Skin:     General: Skin is warm and dry.      Findings: No rash.   Neurological:      Mental Status: She is alert and oriented to person, place, and time.   Psychiatric:         Mood and Affect: Mood and affect normal.         Cognition and Memory: Memory normal.         Judgment: Judgment normal.          UPPER EXTREMITIES: Tenderness to snuff box on left.   LOWER EXTREMITIES:  Normal alignment, normal range of motion, no atrophy, no skin changes,  hair growth and nail growth normal and equal bilaterally. No swelling, no tenderness.     LUMBAR SPINE  Lumbar spine: ROM is severely limited with flexion extension and oblique extension with increased pain (worse " with extension).     ((--)) Supine straight leg raise    ((+)) Facet loading   Internal and external rotation of the hip causes no increased pain on either side.  Myofascial exam: Tenderness to palpation across lumbar paraspinous muscles.     MOTOR: Tone and bulk: normal bilateral upper and lower Strength: normal   Delt      Bi         Tri        WE      WF                        R          5          5          5          5          5          5            L          5          5          5          5          5          5               IP         ADD     ABD     Quad   TA        Gas      HAM  R          5          5          5          5          5          5          5  L          5          5          5          5          5          5          5     SENSATION: Light touch and pinprick intact bilaterally  REFLEXES: normal, symmetric, nonbrisk.  Toes down, no clonus. Negative nicholson's sign bilaterally.  GAIT: normal rise, base, steps, and arm swing.      IMAGING:    MRI Lumbar Spine without contrast (6/15/2021):  Vertebral column: There is a remote fracture of the T12 vertebral body with mild-to-moderate loss of vertebral body height and 5 mm retropulsion of the posterosuperior cortical margin into the ventral spinal canal.  This fracture was acute on plain films dated 01/24/2020 where there is only mild loss of vertebral body height.  The vertebral bodies had normal height on MRI dated 10/21/2019.  The remainder of the vertebral bodies maintain normal height.  Alignment is normal.  There is marked disc space narrowing at the L5-S1 level where there are chronic degenerative endplate signal changes with similar findings anteriorly at the L1-2 level where there is moderate disc space narrowing.  There is mild-to-moderate disc space narrowing at the L2-3 level.  The discs are all desiccated.  Baseline marrow signal is normal.     Spinal canal, conus, epidural space: The spinal canal is developmentally normal.   The conus terminates at the level of T12-L1 and remains normal in contour and signal intensity.  There is no abnormal epidural mass or fluid collection.     Findings by level:     There is borderline to mild spinal stenosis at the level of superior T12 related to retropulsion but there is no compression of the lower cord/conus and the foramina are patent.     T12-L1: Unremarkable     L1-2: There is disc space narrowing with minimal bulging of the annulus.  There is no spinal canal or significant foraminal stenosis.     L2-3: There is disc space narrowing with a mild disc bulge and mild facet joint arthropathy.  The disc bulges slightly eccentric to the right.  There is mild right foraminal stenosis without definite change.     L3-4: There is minimal bulging of the annulus.  There is no spinal canal or significant foraminal stenosis.  There is no change.     L4-5: There is a mild diffuse disc bulge with osteophytic ridging.  There is mild facet joint arthropathy.  There is no spinal stenosis.  There is only mild bilateral foraminal stenosis without change.     L5-S1: There is marked disc space narrowing.  There is a disc bulge with osteophytic ridging.  There is only mild facet joint arthropathy.  There is no spinal stenosis.  There is moderate-to-marked right and mild-to-moderate left foraminal stenosis without significant change.    MRI Thoracic Spine without contrast (6/15/2021):  There is a remote fracture of the T12 vertebral body with mild-to-moderate loss of vertebral body height and 5 mm retropulsion of the posterosuperior cortical margin into the ventral spinal canal.  There are changes of previous vertebral augmentation at this level.  The remainder of the thoracic vertebral bodies maintain normal height.  There is no acute fracture.  Alignment is normal.  The study is mildly motion degraded.  There is no significant disc space narrowing.     Spinal canal, cord, epidural space:     The spinal canal is  developmentally normal.  The cord is normal in caliber, contour and signal intensity.  There is no abnormal epidural mass or fluid collection.     Findings by level: There is no significant disc bulge or focal disc extrusion at any level.  There is borderline to mild spinal stenosis at the level of T11-T12 related to the chronic retropulsion of T12 but there is no compression of the lower cord or conus.  Otherwise, there is no spinal stenosis or cord compression.  There is no significant foraminal stenosis.    X-ray right ribs (6/15/2021):  On one view there is irregularity of the lateral right 4th rib suggesting a healing fracture without displacement.  An acute rib fracture is not identified.  Underlying pleural or pulmonary abnormalities are not seen.    ASSESSMENT: Beatriz Gan is a 65 y.o. female  with PMH significant for hx of left rotator cuff repair (Dr. Martinez), hx of left arthroscopic knee surgery, hx of partial nephrectomy, hx of ETOH dependence, HTN, and hx of left total knee arthroplasty (7/30/2020; Dr. Ruben Martinez) presents for the continued management of low back pain. Today, the patient localizes her pain to the area across her lower back without radiation of her back pain. Treatment plan outlined below.     PLAN:  1. Monitor progress from bilateral L5-S1 transforaminal epidural steroid injection.  2. Schedule repeat bilateral L3, 4, 5 MB RFA. I have explained the risks, benefits, and alternatives of the procedure in detail. The patient voices understanding and all questions have been answered. The patient agrees to proceed as planned. Written Consent obtained.   3. I have stressed the importance of physical activity and a home exercise plan to help with chronic pain and improve health.  4. Continue gabapentin as prescribed for neuropathic pain  5. RTC for the procedure as outlined above

## 2022-05-05 ENCOUNTER — TELEPHONE (OUTPATIENT)
Dept: PAIN MEDICINE | Facility: CLINIC | Age: 67
End: 2022-05-05
Payer: MEDICARE

## 2022-05-05 ENCOUNTER — PATIENT MESSAGE (OUTPATIENT)
Dept: INTERNAL MEDICINE | Facility: CLINIC | Age: 67
End: 2022-05-05
Payer: MEDICARE

## 2022-05-05 DIAGNOSIS — F51.01 PRIMARY INSOMNIA: ICD-10-CM

## 2022-05-05 RX ORDER — ZOLPIDEM TARTRATE 10 MG/1
TABLET ORAL
Qty: 90 TABLET | Refills: 0 | Status: SHIPPED | OUTPATIENT
Start: 2022-05-05 | End: 2022-07-21

## 2022-05-05 NOTE — TELEPHONE ENCOUNTER
No new care gaps identified.  [facility] Embedded Care Gaps. Reference number: [id number]. 5/05/2022   11:27:44 AM LIONELT

## 2022-05-05 NOTE — TELEPHONE ENCOUNTER
----- Message from Madelaine Alcantara sent at 5/5/2022 11:48 AM CDT -----  Patient Call Back    Who Called: PT     What is the request in detail: pt calling to speak with someone regarding the cancelling of her upcoming procedure. Pls advise.     Can the clinic reply by [facility]?    Best Call Back Number: [phone]

## 2022-05-19 DIAGNOSIS — M51.36 DEGENERATIVE DISC DISEASE, LUMBAR: ICD-10-CM

## 2022-05-19 RX ORDER — CELECOXIB 200 MG/1
CAPSULE ORAL
Qty: 30 CAPSULE | Refills: 5 | Status: SHIPPED | OUTPATIENT
Start: 2022-05-19 | End: 2023-08-11

## 2022-05-31 ENCOUNTER — TELEPHONE (OUTPATIENT)
Dept: INTERNAL MEDICINE | Facility: CLINIC | Age: 67
End: 2022-05-31
Payer: MEDICARE

## 2022-06-24 RX ORDER — FLUOXETINE HYDROCHLORIDE 40 MG/1
CAPSULE ORAL
Qty: 180 CAPSULE | Refills: 3 | Status: SHIPPED | OUTPATIENT
Start: 2022-06-24

## 2022-06-24 NOTE — TELEPHONE ENCOUNTER
No new care gaps identified.  [facility] Embedded Care Gaps. Reference number: [id number]. 6/24/2022   10:54:29 AM LIONELT

## 2022-06-24 NOTE — TELEPHONE ENCOUNTER
Refill Routing Note   Medication(s) are not appropriate for processing by [facility] for the following reason(s):      - Medication not previously prescribed by PCP    [facility] action(s):  Defer          Medication reconciliation completed: No     Appointments  past 12m or future 3m with PCP    Date Provider   Last Visit   3/31/2022 Jorge Alonzo MD   Next Visit   10/4/2022 Jorge Alonzo MD   ED visits in past 90 days: 0        Note composed:2:02 PM 06/24/2022

## 2022-07-06 DIAGNOSIS — H92.03 OTALGIA OF BOTH EARS: ICD-10-CM

## 2022-07-06 NOTE — TELEPHONE ENCOUNTER
No new care gaps identified.  [facility] Embedded Care Gaps. Reference number: [id number]. 7/06/2022   11:52:01 AM CANDY

## 2022-07-06 NOTE — TELEPHONE ENCOUNTER
Refill Routing Note   Medication(s) are not appropriate for processing by [facility] for the following reason(s):      - Indication is outside of scope for [facility]    [facility] action(s):  Defer          Medication reconciliation completed: No     Appointments  past 12m or future 3m with PCP    Date Provider   Last Visit   3/31/2022 Jorge Alonzo MD   Next Visit   10/4/2022 Jorge Alonzo MD   ED visits in past 90 days: 0        Note composed:4:35 PM 07/06/2022

## 2022-07-07 RX ORDER — AZELASTINE 1 MG/ML
SPRAY, METERED NASAL
Qty: 30 ML | Refills: 3 | Status: SHIPPED | OUTPATIENT
Start: 2022-07-07

## 2022-08-04 ENCOUNTER — PATIENT MESSAGE (OUTPATIENT)
Dept: INTERNAL MEDICINE | Facility: CLINIC | Age: 67
End: 2022-08-04
Payer: MEDICARE

## 2022-08-04 DIAGNOSIS — R06.09 DYSPNEA ON EXERTION: ICD-10-CM

## 2022-08-04 DIAGNOSIS — R42 LIGHTHEADEDNESS: ICD-10-CM

## 2022-08-04 DIAGNOSIS — J42 CHRONIC BRONCHITIS, UNSPECIFIED CHRONIC BRONCHITIS TYPE: Primary | ICD-10-CM

## 2022-08-04 DIAGNOSIS — R53.83 FATIGUE, UNSPECIFIED TYPE: ICD-10-CM

## 2022-08-05 RX ORDER — BUDESONIDE AND FORMOTEROL FUMARATE DIHYDRATE 160; 4.5 UG/1; UG/1
2 AEROSOL RESPIRATORY (INHALATION) EVERY 12 HOURS
Qty: 10.2 G | Refills: 11 | Status: SHIPPED | OUTPATIENT
Start: 2022-08-05 | End: 2022-08-11 | Stop reason: SDUPTHER

## 2022-08-05 RX ORDER — BUDESONIDE AND FORMOTEROL FUMARATE DIHYDRATE 80; 4.5 UG/1; UG/1
2 AEROSOL RESPIRATORY (INHALATION) 2 TIMES DAILY
Qty: 30.6 G | Refills: 2 | Status: CANCELLED | OUTPATIENT
Start: 2022-08-05

## 2022-08-05 NOTE — TELEPHONE ENCOUNTER
No new care gaps identified.  [facility] Embedded Care Gaps. Reference number: [id number]. 8/05/2022   7:39:10 AM CDT

## 2022-08-11 DIAGNOSIS — F41.9 ANXIETY: ICD-10-CM

## 2022-08-11 NOTE — TELEPHONE ENCOUNTER
No new care gaps identified.  [facility] Embedded Care Gaps. Reference number: [id number]. 8/11/2022   9:30:14 AM CDT   Psych/Behavioral

## 2022-08-12 RX ORDER — ALPRAZOLAM 1 MG/1
TABLET ORAL
Qty: 60 TABLET | Refills: 2 | Status: SHIPPED | OUTPATIENT
Start: 2022-08-12 | End: 2022-11-06 | Stop reason: SDUPTHER

## 2022-10-04 ENCOUNTER — OFFICE VISIT (OUTPATIENT)
Dept: INTERNAL MEDICINE | Facility: CLINIC | Age: 67
End: 2022-10-04
Payer: MEDICARE

## 2022-10-04 VITALS
HEIGHT: 64 IN | BODY MASS INDEX: 29.96 KG/M2 | OXYGEN SATURATION: 97 % | WEIGHT: 175.5 LBS | HEART RATE: 72 BPM | DIASTOLIC BLOOD PRESSURE: 80 MMHG | SYSTOLIC BLOOD PRESSURE: 136 MMHG

## 2022-10-04 DIAGNOSIS — R73.01 IMPAIRED FASTING GLUCOSE: ICD-10-CM

## 2022-10-04 DIAGNOSIS — F51.01 PRIMARY INSOMNIA: ICD-10-CM

## 2022-10-04 DIAGNOSIS — I10 ESSENTIAL HYPERTENSION: Primary | ICD-10-CM

## 2022-10-04 DIAGNOSIS — E78.2 HYPERLIPIDEMIA, MIXED: ICD-10-CM

## 2022-10-04 DIAGNOSIS — Z23 NEED FOR VACCINATION: ICD-10-CM

## 2022-10-04 DIAGNOSIS — F33.1 MAJOR DEPRESSIVE DISORDER, RECURRENT EPISODE, MODERATE: ICD-10-CM

## 2022-10-04 DIAGNOSIS — Z85.528 HISTORY OF RENAL CELL CARCINOMA: ICD-10-CM

## 2022-10-04 DIAGNOSIS — F41.9 ANXIETY: ICD-10-CM

## 2022-10-04 DIAGNOSIS — J42 CHRONIC BRONCHITIS, UNSPECIFIED CHRONIC BRONCHITIS TYPE: ICD-10-CM

## 2022-10-04 DIAGNOSIS — Z12.31 SCREENING MAMMOGRAM, ENCOUNTER FOR: ICD-10-CM

## 2022-10-04 DIAGNOSIS — M54.12 CERVICAL SPONDYLITIS WITH RADICULITIS: ICD-10-CM

## 2022-10-04 DIAGNOSIS — Z12.11 SCREEN FOR COLON CANCER: ICD-10-CM

## 2022-10-04 DIAGNOSIS — M46.92 CERVICAL SPONDYLITIS WITH RADICULITIS: ICD-10-CM

## 2022-10-04 DIAGNOSIS — M47.816 LUMBAR SPONDYLOSIS: ICD-10-CM

## 2022-10-04 DIAGNOSIS — M81.0 AGE-RELATED OSTEOPOROSIS WITHOUT CURRENT PATHOLOGICAL FRACTURE: ICD-10-CM

## 2022-10-04 PROBLEM — N18.30 CKD (CHRONIC KIDNEY DISEASE) STAGE 3, GFR 30-59 ML/MIN: Status: RESOLVED | Noted: 2020-08-01 | Resolved: 2022-10-04

## 2022-10-04 PROCEDURE — 3079F PR MOST RECENT DIASTOLIC BLOOD PRESSURE 80-89 MM HG: ICD-10-PCS | Mod: CPTII,S$GLB,, | Performed by: INTERNAL MEDICINE

## 2022-10-04 PROCEDURE — 99214 PR OFFICE/OUTPT VISIT, EST, LEVL IV, 30-39 MIN: ICD-10-PCS | Mod: 25,S$GLB,, | Performed by: INTERNAL MEDICINE

## 2022-10-04 PROCEDURE — 99999 PR PBB SHADOW E&M-EST. PATIENT-LVL V: CPT | Mod: PBBFAC,,, | Performed by: INTERNAL MEDICINE

## 2022-10-04 PROCEDURE — 99999 PR PBB SHADOW E&M-EST. PATIENT-LVL V: ICD-10-PCS | Mod: PBBFAC,,, | Performed by: INTERNAL MEDICINE

## 2022-10-04 PROCEDURE — 3075F SYST BP GE 130 - 139MM HG: CPT | Mod: CPTII,S$GLB,, | Performed by: INTERNAL MEDICINE

## 2022-10-04 PROCEDURE — 90694 FLU VACCINE - QUADRIVALENT - ADJUVANTED: ICD-10-PCS | Mod: S$GLB,,, | Performed by: INTERNAL MEDICINE

## 2022-10-04 PROCEDURE — 1159F MED LIST DOCD IN RCRD: CPT | Mod: CPTII,S$GLB,, | Performed by: INTERNAL MEDICINE

## 2022-10-04 PROCEDURE — 99499 RISK ADDL DX/OHS AUDIT: ICD-10-PCS | Mod: S$GLB,,, | Performed by: INTERNAL MEDICINE

## 2022-10-04 PROCEDURE — 3079F DIAST BP 80-89 MM HG: CPT | Mod: CPTII,S$GLB,, | Performed by: INTERNAL MEDICINE

## 2022-10-04 PROCEDURE — 1159F PR MEDICATION LIST DOCUMENTED IN MEDICAL RECORD: ICD-10-PCS | Mod: CPTII,S$GLB,, | Performed by: INTERNAL MEDICINE

## 2022-10-04 PROCEDURE — 3075F PR MOST RECENT SYSTOLIC BLOOD PRESS GE 130-139MM HG: ICD-10-PCS | Mod: CPTII,S$GLB,, | Performed by: INTERNAL MEDICINE

## 2022-10-04 PROCEDURE — G0008 ADMIN INFLUENZA VIRUS VAC: HCPCS | Mod: S$GLB,,, | Performed by: INTERNAL MEDICINE

## 2022-10-04 PROCEDURE — 99214 OFFICE O/P EST MOD 30 MIN: CPT | Mod: 25,S$GLB,, | Performed by: INTERNAL MEDICINE

## 2022-10-04 PROCEDURE — 90694 VACC AIIV4 NO PRSRV 0.5ML IM: CPT | Mod: S$GLB,,, | Performed by: INTERNAL MEDICINE

## 2022-10-04 PROCEDURE — 1126F AMNT PAIN NOTED NONE PRSNT: CPT | Mod: CPTII,S$GLB,, | Performed by: INTERNAL MEDICINE

## 2022-10-04 PROCEDURE — 3008F PR BODY MASS INDEX (BMI) DOCUMENTED: ICD-10-PCS | Mod: CPTII,S$GLB,, | Performed by: INTERNAL MEDICINE

## 2022-10-04 PROCEDURE — 1126F PR PAIN SEVERITY QUANTIFIED, NO PAIN PRESENT: ICD-10-PCS | Mod: CPTII,S$GLB,, | Performed by: INTERNAL MEDICINE

## 2022-10-04 PROCEDURE — 99499 UNLISTED E&M SERVICE: CPT | Mod: S$GLB,,, | Performed by: INTERNAL MEDICINE

## 2022-10-04 PROCEDURE — 3008F BODY MASS INDEX DOCD: CPT | Mod: CPTII,S$GLB,, | Performed by: INTERNAL MEDICINE

## 2022-10-04 PROCEDURE — G0008 FLU VACCINE - QUADRIVALENT - ADJUVANTED: ICD-10-PCS | Mod: S$GLB,,, | Performed by: INTERNAL MEDICINE

## 2022-10-06 ENCOUNTER — LAB VISIT (OUTPATIENT)
Dept: LAB | Facility: HOSPITAL | Age: 67
End: 2022-10-06
Attending: INTERNAL MEDICINE
Payer: MEDICARE

## 2022-10-06 DIAGNOSIS — E78.2 HYPERLIPIDEMIA, MIXED: ICD-10-CM

## 2022-10-06 DIAGNOSIS — R73.01 IMPAIRED FASTING GLUCOSE: ICD-10-CM

## 2022-10-06 DIAGNOSIS — Z85.528 HISTORY OF RENAL CELL CARCINOMA: ICD-10-CM

## 2022-10-06 LAB
ALBUMIN SERPL BCP-MCNC: 3.8 G/DL (ref 3.5–5.2)
ALP SERPL-CCNC: 113 U/L (ref 55–135)
ALT SERPL W/O P-5'-P-CCNC: 15 U/L (ref 10–44)
ANION GAP SERPL CALC-SCNC: 10 MMOL/L (ref 8–16)
AST SERPL-CCNC: 21 U/L (ref 10–40)
BASOPHILS # BLD AUTO: 0.03 K/UL (ref 0–0.2)
BASOPHILS NFR BLD: 0.7 % (ref 0–1.9)
BILIRUB SERPL-MCNC: 0.7 MG/DL (ref 0.1–1)
BUN SERPL-MCNC: 9 MG/DL (ref 8–23)
CALCIUM SERPL-MCNC: 9.9 MG/DL (ref 8.7–10.5)
CHLORIDE SERPL-SCNC: 100 MMOL/L (ref 95–110)
CHOLEST SERPL-MCNC: 188 MG/DL (ref 120–199)
CHOLEST/HDLC SERPL: 2.5 {RATIO} (ref 2–5)
CO2 SERPL-SCNC: 26 MMOL/L (ref 23–29)
CREAT SERPL-MCNC: 0.8 MG/DL (ref 0.5–1.4)
DIFFERENTIAL METHOD: ABNORMAL
EOSINOPHIL # BLD AUTO: 0.2 K/UL (ref 0–0.5)
EOSINOPHIL NFR BLD: 3.4 % (ref 0–8)
ERYTHROCYTE [DISTWIDTH] IN BLOOD BY AUTOMATED COUNT: 12.4 % (ref 11.5–14.5)
EST. GFR  (NO RACE VARIABLE): >60 ML/MIN/1.73 M^2
ESTIMATED AVG GLUCOSE: 91 MG/DL (ref 68–131)
GLUCOSE SERPL-MCNC: 84 MG/DL (ref 70–110)
HBA1C MFR BLD: 4.8 % (ref 4–5.6)
HCT VFR BLD AUTO: 43 % (ref 37–48.5)
HDLC SERPL-MCNC: 75 MG/DL (ref 40–75)
HDLC SERPL: 39.9 % (ref 20–50)
HGB BLD-MCNC: 14 G/DL (ref 12–16)
IMM GRANULOCYTES # BLD AUTO: 0.01 K/UL (ref 0–0.04)
IMM GRANULOCYTES NFR BLD AUTO: 0.2 % (ref 0–0.5)
LDLC SERPL CALC-MCNC: 98.4 MG/DL (ref 63–159)
LYMPHOCYTES # BLD AUTO: 1 K/UL (ref 1–4.8)
LYMPHOCYTES NFR BLD: 22.2 % (ref 18–48)
MCH RBC QN AUTO: 32.7 PG (ref 27–31)
MCHC RBC AUTO-ENTMCNC: 32.6 G/DL (ref 32–36)
MCV RBC AUTO: 101 FL (ref 82–98)
MONOCYTES # BLD AUTO: 0.5 K/UL (ref 0.3–1)
MONOCYTES NFR BLD: 11 % (ref 4–15)
NEUTROPHILS # BLD AUTO: 2.8 K/UL (ref 1.8–7.7)
NEUTROPHILS NFR BLD: 62.5 % (ref 38–73)
NONHDLC SERPL-MCNC: 113 MG/DL
NRBC BLD-RTO: 0 /100 WBC
PLATELET # BLD AUTO: 229 K/UL (ref 150–450)
PMV BLD AUTO: 11 FL (ref 9.2–12.9)
POTASSIUM SERPL-SCNC: 4 MMOL/L (ref 3.5–5.1)
PROT SERPL-MCNC: 6.8 G/DL (ref 6–8.4)
RBC # BLD AUTO: 4.28 M/UL (ref 4–5.4)
SODIUM SERPL-SCNC: 136 MMOL/L (ref 136–145)
TRIGL SERPL-MCNC: 73 MG/DL (ref 30–150)
WBC # BLD AUTO: 4.45 K/UL (ref 3.9–12.7)

## 2022-10-06 PROCEDURE — 80053 COMPREHEN METABOLIC PANEL: CPT | Performed by: INTERNAL MEDICINE

## 2022-10-06 PROCEDURE — 80061 LIPID PANEL: CPT | Performed by: INTERNAL MEDICINE

## 2022-10-06 PROCEDURE — 36415 COLL VENOUS BLD VENIPUNCTURE: CPT | Mod: PO | Performed by: INTERNAL MEDICINE

## 2022-10-06 PROCEDURE — 85025 COMPLETE CBC W/AUTO DIFF WBC: CPT | Performed by: INTERNAL MEDICINE

## 2022-10-06 PROCEDURE — 83036 HEMOGLOBIN GLYCOSYLATED A1C: CPT | Performed by: INTERNAL MEDICINE

## 2022-11-06 ENCOUNTER — PATIENT MESSAGE (OUTPATIENT)
Dept: INTERNAL MEDICINE | Facility: CLINIC | Age: 67
End: 2022-11-06
Payer: MEDICARE

## 2022-11-06 DIAGNOSIS — F41.9 ANXIETY: ICD-10-CM

## 2022-11-06 DIAGNOSIS — S39.012A BACK STRAIN, INITIAL ENCOUNTER: Primary | ICD-10-CM

## 2022-11-07 ENCOUNTER — PATIENT MESSAGE (OUTPATIENT)
Dept: INTERNAL MEDICINE | Facility: CLINIC | Age: 67
End: 2022-11-07
Payer: MEDICARE

## 2022-11-07 RX ORDER — ALPRAZOLAM 1 MG/1
1 TABLET ORAL 2 TIMES DAILY
Qty: 60 TABLET | Refills: 2 | Status: SHIPPED | OUTPATIENT
Start: 2022-11-07 | End: 2023-02-24

## 2022-11-07 RX ORDER — TIZANIDINE 4 MG/1
4 TABLET ORAL EVERY 8 HOURS PRN
Qty: 30 TABLET | Refills: 0 | Status: SHIPPED | OUTPATIENT
Start: 2022-11-07 | End: 2023-01-19

## 2022-11-23 NOTE — PLAN OF CARE
05/28/18 0819   Patient Assessment/Suction   Level of Consciousness (AVPU) alert   PRE-TX-O2-ETCO2   O2 Device (Oxygen Therapy) nasal cannula   $ Is the patient on Low Flow Oxygen? Yes   Flow (L/min) 2   Oxygen Concentration (%) 28   SpO2 96 %   Pulse Oximetry Type Intermittent   $ Pulse Oximetry - Multiple Charge Pulse Oximetry - Multiple   Pulse 92   Resp 20   Aerosol Therapy   $ Aerosol Therapy Charges PRN treatment not required   Ready to Wean/Extubation Screen   FIO2<=50 (chart decimal) 0.28      2022    Jeffery Al    Chief Complaint   Patient presents with    Discuss Medications     spriva    Hypertension     Discuss HR    Dizziness    Flu Vaccine       HPI  History was obtained from patient. Alvarado is a 38 y.o. female who presents today with the followin. Primary hypertension    2. Vertigo    Patient presents today complaining of vertigo. Patient states that she has been getting his spinning some sensation intermittently usually when she moves too quickly or abruptly. Sometimes she gets a spinning sensation when she is sitting still. Patient states this has been going on for months but in the last month it has become more frequent. Patient has had some tinnitus but no change in her auditory acuity. Patient has had a couple sinus infections in the last few months but does not seem to be related to her vertigo. Patient previously was on lisinopril for her blood pressure. She has been off it for about 4 months and her blood pressure is back up. REVIEW OF SYMPTOMS    Review of Systems   Constitutional:  Negative for chills, fatigue and fever. HENT:  Negative for congestion, mouth sores, postnasal drip, rhinorrhea and sinus pain. Eyes:  Negative for photophobia, discharge and redness. Respiratory:  Positive for shortness of breath. Negative for cough. Cardiovascular:  Negative for chest pain. Gastrointestinal: Negative. Genitourinary:  Negative for difficulty urinating, dysuria, hematuria and urgency. Neurological:  Positive for dizziness. Negative for headaches. Psychiatric/Behavioral:  Negative for sleep disturbance.       PAST MEDICAL HISTORY  Past Medical History:   Diagnosis Date    Anxiety     Arthritis     Asthma     well controlled, on inhalers    Bipolar 1 disorder ([address]     COPD (chronic obstructive pulmonary disease) ([address]     Depression     Fibromyalgia     GERD (gastroesophageal reflux disease)     Glaucoma     Suspected by eye specialist . no eye meds    Hypertension     Macular edema     Cm    Migraines     Morbid obesity due to excess calories (HCC)     Neuropathy     PTSD (post-traumatic stress disorder)     Splenomegaly 09/24/2020       FAMILY HISTORY  Family History   Problem Relation Age of Onset    Diabetes Mother     Cancer Mother         ovarian    Diabetes Father     Cancer Sister         NHL    Diabetes Sister     High Blood Pressure Brother     Breast Cancer Maternal Aunt     Uterine Cancer Neg Hx     Colon Cancer Neg Hx        SOCIAL HISTORY  Social History     Socioeconomic History    Marital status:      Spouse name: Karo Wood    Number of children: 2    Years of education: 13    Highest education level: None   Occupational History    Occupation: disability-, LPN   Tobacco Use    Smoking status: Every Day     Packs/day: 1.00     Years: 20.00     Pack years: 20.00     Types: Cigarettes     Start date: 11/3/1995    Smokeless tobacco: Never   Vaping Use    Vaping Use: Former    Substances: Never   Substance and Sexual Activity    Alcohol use: Not Currently    Drug use: Not Currently     Types: Marijuana [place] [employer])     Comment: medical marijuana last used 3/2021     Social Determinants of Health     Financial Resource Strain: Low Risk     Difficulty of Paying Living Expenses: Not hard at all   Food Insecurity: No Food Insecurity    Worried About Running Out of Food in the Last Year: Never true    [address] in the Last Year: Never true   Transportation Needs: No Transportation Needs    Lack of Transportation (Medical): No    Lack of Transportation (Non-Medical): No   Physical Activity: Sufficiently Active    Days of Exercise per Week: 7 days    Minutes of Exercise per Session: 60 min   Stress: No Stress Concern Present    Feeling of Stress : Only a little   Social Connections:  Moderately Integrated    Frequency of Communication with Friends and Family: More than three times a week    Frequency of Social Gatherings with Friends tablet Take 1 tablet by mouth 2 times daily as needed for Muscle spasms 60 tablet 2    pregabalin (LYRICA) 50 MG capsule Take 1 capsule by mouth 3 times daily for 90 days. 90 capsule 2    Vibegron (GEMTESA) 75 MG TABS Take 75 mg by mouth nightly      ondansetron (ZOFRAN) 4 MG tablet Take 1 tablet by mouth 3 times daily as needed for Nausea or Vomiting 15 tablet 0    fluticasone (FLOVENT HFA) 44 MCG/ACT inhaler Inhale 2 puffs into the lungs 2 times daily 1 each 3    albuterol sulfate HFA (PROVENTIL HFA) 108 (90 Base) MCG/ACT inhaler Inhale 2 puffs into the lungs every 4 hours as needed for Wheezing 1 each 5    tiotropium (SPIRIVA HANDIHALER) 18 MCG inhalation capsule Inhale 1 capsule into the lungs daily 30 capsule 5    Acetaminophen (TYLENOL) 325 MG CAPS Take 650 mg by mouth every 4 hours as needed (pain)       ibuprofen (ADVIL;MOTRIN) 800 MG tablet Take 800 mg by mouth every 6 hours as needed for Pain       No current facility-administered medications for this visit. ALLERGIES  Allergies   Allergen Reactions    Keflex [Cephalexin]      Hives, rash      Prednisone Rash    Cymbalta [Duloxetine Hcl] Other (See Comments)     angry    Lamictal [Lamotrigine] Nausea And Vomiting    Neurontin [Gabapentin] Other (See Comments)     Makes me feel very weird       PHYSICAL EXAM    BP (!) 148/97   Pulse (!) 107   Temp 98.1 °F (36.7 °C) (Temporal)   Resp 20   Ht 5' 3\" (1.6 m)   Wt (!) 313 lb (142 kg)   LMP 02/22/2021 (Approximate)   BMI 55.45 kg/m²     Physical Exam  Vitals and nursing note reviewed. Constitutional:       Appearance: Normal appearance. HENT:      Right Ear: Tympanic membrane, ear canal and external ear normal.      Left Ear: Tympanic membrane, ear canal and external ear normal.      Nose: No congestion or rhinorrhea. Mouth/Throat:      Mouth: Mucous membranes are moist.      Pharynx: Oropharynx is clear. No oropharyngeal exudate or posterior oropharyngeal erythema.    Eyes:      General: No scleral icterus. Extraocular Movements: Extraocular movements intact. Conjunctiva/sclera: Conjunctivae normal.      Pupils: Pupils are equal, round, and reactive to light. Cardiovascular:      Rate and Rhythm: Normal rate and regular rhythm. Heart sounds: Normal heart sounds. Pulmonary:      Effort: Pulmonary effort is normal.      Breath sounds: Normal breath sounds. Musculoskeletal:      Cervical back: Neck supple. Skin:     General: Skin is warm. Neurological:      Mental Status: She is alert and oriented to person, place, and time. Psychiatric:         Mood and Affect: Mood normal.       ASSESSMENT & PLAN    Luz Brasher was seen today for discuss medications, hypertension, dizziness and flu vaccine. Diagnoses and all orders for this visit:    Primary hypertension  -     benazepril (LOTENSIN) 20 MG tablet; Take 1 tablet by mouth daily  Patient is started on benazepril today. She will follow-up in 4 weeks for recheck of her blood pressure. Patient is advised that it does not matter what time of the day she takes her medication but just take it about the same time every day. Vertigo  -     [facility] Otolaryngology  Patient is referred to otolaryngology for further evaluation of her dizziness. Other orders  -     Influenza, FLUCELVAX, (age 10 mo+), IM, Preservative Free, 0.5 mL      Return in about 4 weeks (around 12/21/2022). Electronically signed by [employer]zloop.  Christina Pollard DO on 11/23/2022

## 2023-03-22 ENCOUNTER — OFFICE VISIT (OUTPATIENT)
Dept: ORTHOPEDICS | Facility: CLINIC | Age: 68
End: 2023-03-22
Payer: MEDICARE

## 2023-03-22 ENCOUNTER — TELEPHONE (OUTPATIENT)
Dept: PAIN MEDICINE | Facility: CLINIC | Age: 68
End: 2023-03-22
Payer: MEDICARE

## 2023-03-22 VITALS
DIASTOLIC BLOOD PRESSURE: 68 MMHG | BODY MASS INDEX: 29.02 KG/M2 | SYSTOLIC BLOOD PRESSURE: 128 MMHG | HEIGHT: 64 IN | WEIGHT: 170 LBS

## 2023-03-22 DIAGNOSIS — M47.816 LUMBAR FACET ARTHROPATHY: ICD-10-CM

## 2023-03-22 DIAGNOSIS — M51.36 DISC DEGENERATION, LUMBAR: ICD-10-CM

## 2023-03-22 DIAGNOSIS — Z98.1 HISTORY OF FUSION OF CERVICAL SPINE: ICD-10-CM

## 2023-03-22 DIAGNOSIS — M46.02 SPINAL ENTHESOPATHY OF CERVICAL REGION: ICD-10-CM

## 2023-03-22 DIAGNOSIS — M54.2 CERVICAL PAIN (NECK): Primary | ICD-10-CM

## 2023-03-22 DIAGNOSIS — M54.6 THORACIC BACK PAIN: ICD-10-CM

## 2023-03-22 DIAGNOSIS — M54.16 LUMBAR RADICULOPATHY: Primary | ICD-10-CM

## 2023-03-22 DIAGNOSIS — M54.50 LUMBAR PAIN: ICD-10-CM

## 2023-03-22 PROCEDURE — 1125F AMNT PAIN NOTED PAIN PRSNT: CPT | Mod: CPTII,S$GLB,, | Performed by: ORTHOPAEDIC SURGERY

## 2023-03-22 PROCEDURE — 3008F BODY MASS INDEX DOCD: CPT | Mod: CPTII,S$GLB,, | Performed by: ORTHOPAEDIC SURGERY

## 2023-03-22 PROCEDURE — 20552 NJX 1/MLT TRIGGER POINT 1/2: CPT | Mod: RT,S$GLB,, | Performed by: ORTHOPAEDIC SURGERY

## 2023-03-22 PROCEDURE — 1160F PR REVIEW ALL MEDS BY PRESCRIBER/CLIN PHARMACIST DOCUMENTED: ICD-10-PCS | Mod: CPTII,S$GLB,, | Performed by: ORTHOPAEDIC SURGERY

## 2023-03-22 PROCEDURE — 1101F PT FALLS ASSESS-DOCD LE1/YR: CPT | Mod: CPTII,S$GLB,, | Performed by: ORTHOPAEDIC SURGERY

## 2023-03-22 PROCEDURE — 3074F SYST BP LT 130 MM HG: CPT | Mod: CPTII,S$GLB,, | Performed by: ORTHOPAEDIC SURGERY

## 2023-03-22 PROCEDURE — 1101F PR PT FALLS ASSESS DOC 0-1 FALLS W/OUT INJ PAST YR: ICD-10-PCS | Mod: CPTII,S$GLB,, | Performed by: ORTHOPAEDIC SURGERY

## 2023-03-22 PROCEDURE — 1159F MED LIST DOCD IN RCRD: CPT | Mod: CPTII,S$GLB,, | Performed by: ORTHOPAEDIC SURGERY

## 2023-03-22 PROCEDURE — 3078F PR MOST RECENT DIASTOLIC BLOOD PRESSURE < 80 MM HG: ICD-10-PCS | Mod: CPTII,S$GLB,, | Performed by: ORTHOPAEDIC SURGERY

## 2023-03-22 PROCEDURE — 1160F RVW MEDS BY RX/DR IN RCRD: CPT | Mod: CPTII,S$GLB,, | Performed by: ORTHOPAEDIC SURGERY

## 2023-03-22 PROCEDURE — 99213 OFFICE O/P EST LOW 20 MIN: CPT | Mod: 25,S$GLB,, | Performed by: ORTHOPAEDIC SURGERY

## 2023-03-22 PROCEDURE — 3288F FALL RISK ASSESSMENT DOCD: CPT | Mod: CPTII,S$GLB,, | Performed by: ORTHOPAEDIC SURGERY

## 2023-03-22 PROCEDURE — 1159F PR MEDICATION LIST DOCUMENTED IN MEDICAL RECORD: ICD-10-PCS | Mod: CPTII,S$GLB,, | Performed by: ORTHOPAEDIC SURGERY

## 2023-03-22 PROCEDURE — 3008F PR BODY MASS INDEX (BMI) DOCUMENTED: ICD-10-PCS | Mod: CPTII,S$GLB,, | Performed by: ORTHOPAEDIC SURGERY

## 2023-03-22 PROCEDURE — 1125F PR PAIN SEVERITY QUANTIFIED, PAIN PRESENT: ICD-10-PCS | Mod: CPTII,S$GLB,, | Performed by: ORTHOPAEDIC SURGERY

## 2023-03-22 PROCEDURE — 3074F PR MOST RECENT SYSTOLIC BLOOD PRESSURE < 130 MM HG: ICD-10-PCS | Mod: CPTII,S$GLB,, | Performed by: ORTHOPAEDIC SURGERY

## 2023-03-22 PROCEDURE — 3288F PR FALLS RISK ASSESSMENT DOCUMENTED: ICD-10-PCS | Mod: CPTII,S$GLB,, | Performed by: ORTHOPAEDIC SURGERY

## 2023-03-22 PROCEDURE — 20552 TRIGGER POINT INJECTION: ICD-10-PCS | Mod: RT,S$GLB,, | Performed by: ORTHOPAEDIC SURGERY

## 2023-03-22 PROCEDURE — 3078F DIAST BP <80 MM HG: CPT | Mod: CPTII,S$GLB,, | Performed by: ORTHOPAEDIC SURGERY

## 2023-03-22 PROCEDURE — 99213 PR OFFICE/OUTPT VISIT, EST, LEVL III, 20-29 MIN: ICD-10-PCS | Mod: 25,S$GLB,, | Performed by: ORTHOPAEDIC SURGERY

## 2023-03-22 RX ORDER — TRIAMCINOLONE ACETONIDE 40 MG/ML
40 INJECTION, SUSPENSION INTRA-ARTICULAR; INTRAMUSCULAR
Status: DISCONTINUED | OUTPATIENT
Start: 2023-03-22 | End: 2023-03-22 | Stop reason: HOSPADM

## 2023-03-22 RX ADMIN — TRIAMCINOLONE ACETONIDE 40 MG: 40 INJECTION, SUSPENSION INTRA-ARTICULAR; INTRAMUSCULAR at 02:03

## 2023-03-22 NOTE — PROGRESS NOTES
Subjective:       Patient ID: Beatriz Gan is a 67 y.o. female.    Chief Complaint: Pain of the Neck (Cervical pain for about 3 weeks or so, she has pain on right side of neck and into shoulder blade with burning sensation. Numbness in hand. She also gets headaches. ) and Pain of the Lumbar Spine (Lumbar pain for a while but getting worse. She has radiating pain down her right leg into about calf. Pain level varies)      History of Present Illness    Prior to meeting with the patient I reviewed the medical chart in [facility]. This included reviewing the previous progress notes from our office, review of the patient's last appointment with their primary care provider, review of any visits to the emergency room, and review of any pain management appointments or procedures.   Patient is here for reassessment chief complaint of right parascapular pain that radiates into the right axilla and down the right upper extremity to the hand with some numbness and tingling.  This started about 3 weeks ago.  Also about 1 week ago started to have a recurrence of right-sided low back pain with right lower extremity radicular symptoms.  Most recently was being treated by pain management for her lumbar and cervical spines.  Has a history of multilevel cervical fusion.    And a sed note the patient has had multiple deaths in her close in immediate family over the last year.  She states that this has led to her not taking care of herself because she has been taking care of others close to her.    Current Medications  Current Outpatient Medications   Medication Sig Dispense Refill    alendronate (FOSAMAX) 70 MG tablet Take 70 mg by mouth every 7 days.      ALPRAZolam (XANAX) 1 MG tablet Take 1 tablet by mouth twice daily as needed for anxiety 60 tablet 0    amLODIPine (NORVASC) 5 MG tablet Take 1 tablet by mouth once daily 90 tablet 2    azelastine (ASTELIN) 137 mcg (0.1 %) nasal spray Use 1 spray(s) in each nostril twice daily 30  mL 3    budesonide-formoterol 160-4.5 mcg (SYMBICORT) 160-4.5 mcg/actuation HFAA Inhale 2 puffs into the lungs every 12 (twelve) hours. Controller 10.2 g 11    calcium carbonate (OS-TERI) 600 mg calcium (1,500 mg) Tab Take 600 mg by mouth once.      celecoxib (CELEBREX) 200 MG capsule Take 1 capsule by mouth once daily 30 capsule 5    cholecalciferol, vitamin D3, (VITAMIN D3 ORAL) Take by mouth once daily.      FLUoxetine 40 MG capsule Take 1 capsule by mouth twice daily 180 capsule 3    gabapentin (NEURONTIN) 600 MG tablet TAKE 1 TABLET BY MOUTH THREE TIMES DAILY 90 tablet 0    HYDROcodone-acetaminophen (NORCO) 7.5-325 mg per tablet Take 1 tablet by mouth every 6 (six) hours as needed for Pain. 21 tablet 0    losartan-hydrochlorothiazide 100-25 mg (HYZAAR) 100-25 mg per tablet Take 1 tablet by mouth once daily 90 tablet 3    magnesium 30 mg Tab Take by mouth once.      MULTIVIT-IRON-MIN-FOLIC ACID 3,500-18-0.4 UNIT-MG-MG ORAL CHEW Take by mouth once daily.      ondansetron (ZOFRAN) 4 MG tablet Take 1 tablet (4 mg total) by mouth as needed for Nausea. 60 tablet 1    polyethylene glycol (GLYCOLAX) 17 gram/dose powder Take 17 g by mouth once daily. 510 g 3    tiZANidine (ZANAFLEX) 4 MG tablet TAKE 1 TABLET BY MOUTH EVERY 8 HOURS AS NEEDED 30 tablet 0    zolpidem (AMBIEN) 10 mg Tab TAKE 1 TABLET BY MOUTH ONCE DAILY AT NIGHT AS NEEDED 90 tablet 0    fluticasone (FLONASE) 50 mcg/actuation nasal spray 1 spray by Nasal route daily as needed.        No current facility-administered medications for this visit.     Facility-Administered Medications Ordered in Other Visits   Medication Dose Route Frequency Provider Last Rate Last Admin    electrolyte-S (ISOLYTE)   Intravenous Continuous Kota Ortega MD 10 mL/hr at 01/31/20 0624 New Bag at 01/31/20 0624    lactated ringers infusion   Intravenous Continuous Kota Ortega MD        pregabalin capsule 75 mg  75 mg Oral Once Shiela Mann MD           Allergies  Review of  "patient's allergies indicates:   Allergen Reactions    Phenergan [promethazine] Hives and Rash    Latex, natural rubber Hives     Per pt report-many years    Morphine Hives    Nsaids (non-steroidal anti-inflammatory drug)      Due to "kidney cancer"       Past Medical History  Past Medical History:   Diagnosis Date    Alcohol dependence     DDD (degenerative disc disease), lumbosacral     DJD (degenerative joint disease), lumbosacral     Encounter for blood transfusion     GERD (gastroesophageal reflux disease)     Gout     Hypercholesterolemia     Hypertension     NATHALIE (iron deficiency anemia)     questionable white coat hypertension    Kidney carcinoma, left 2014    Pneumonia     Renal cell carcinoma     Shingles 10/13/2012       Surgical History  Past Surgical History:   Procedure Laterality Date    APPENDECTOMY      ARTHROSCOPY OF SHOULDER WITH DECOMPRESSION OF SUBACROMIAL SPACE Left 10/31/2019    Procedure: ARTHROSCOPY, SHOULDER, WITH SUBACROMIAL SPACE DECOMPRESSION;  Surgeon: Ruben Martinez MD;  Location: [facility] OR;  Service: Orthopedics;  Laterality: Left;    BLADDER REPAIR      x 2    COLONOSCOPY  9/2011    DISTAL CLAVICLE EXCISION Left 10/31/2019    Procedure: EXCISION, CLAVICLE, DISTAL;  Surgeon: Ruben Martinez MD;  Location: [facility] OR;  Service: Orthopedics;  Laterality: Left;    EPIDURAL STEROID INJECTION INTO LUMBAR SPINE N/A 12/7/2020    Procedure: Injection-steroid-epidural-lumbar;  Surgeon: Dk Rosales MD;  Location: [facility] OR;  Service: Pain Management;  Laterality: N/A;  L4-L5    EPIDURAL STEROID INJECTION INTO LUMBAR SPINE N/A 5/17/2021    Procedure: Injection-steroid-epidural-lumbar;  Surgeon: Dk Rosales MD;  Location: [facility] OR;  Service: Pain Management;  Laterality: N/A;  L4-L5    ESOPHAGOGASTRODUODENOSCOPY  9/2011    EYE SURGERY      lasix bilateral    FIXATION KYPHOPLASTY N/A 1/31/2020    Procedure: Kyphoplasty T12;  Surgeon: Dk Rosales MD;  Location: [facility] OR;  Service: Pain " Management;  Laterality: N/A;    HERNIA REPAIR      hiatal hernai    HYSTERECTOMY      INJECTION OF ANESTHETIC AGENT AROUND NERVE Left 6/8/2020    Procedure: Block, Nerve;  Surgeon: Dk Rosales MD;  Location: [facility] OR;  Service: Pain Management;  Laterality: Left;  left genicular nerve block     KNEE ARTHROPLASTY Left 7/30/2020    Procedure: ARTHROPLASTY, KNEE LEFT;  Surgeon: Ruben Martinez MD;  Location: [facility] OR;  Service: Orthopedics;  Laterality: Left;  REP VALERY RUBY    KNEE ARTHROSCOPY W/ MENISCECTOMY Left 1/16/2020    Procedure: ARTHROSCOPY, KNEE, WITH MEDIAL MENISCECTOMY;  Surgeon: Ruben Martinez MD;  Location: [facility] OR;  Service: Orthopedics;  Laterality: Left;    LAPAROSCOPIC NISSEN FUNDOPLICATION      NEPHRECTOMY      LT partial    RADIOFREQUENCY ABLATION Left 6/19/2020    Procedure: Radiofrequency Ablation;  Surgeon: Dk Rosales MD;  Location: [facility] OR;  Service: Pain Management;  Laterality: Left;    RADIOFREQUENCY ABLATION OF LUMBAR MEDIAL BRANCH NERVE AT SINGLE LEVEL Bilateral 2/28/2020    Procedure: Radiofrequency Ablation, Nerve, Spinal, Lumbar, Medial Branch, 1 Level;  Surgeon: kD Rosales MD;  Location: [facility] OR;  Service: Pain Management;  Laterality: Bilateral;  L3,L4,L5 - Burned at 80 degrees C. for 60 seconds x 2 each site    RADIOFREQUENCY ABLATION OF LUMBAR MEDIAL BRANCH NERVE AT SINGLE LEVEL Bilateral 10/19/2020    Procedure: Radiofrequency Ablation, Nerve, Spinal, Lumbar, Medial Branch, 1 Level;  Surgeon: Dk Rosales MD;  Location: [facility] OR;  Service: Pain Management;  Laterality: Bilateral;  L3, L4, L5    REFRACTIVE SURGERY      ROTATOR CUFF REPAIR Left 10/31/2019    Procedure: REPAIR, ROTATOR CUFF;  Surgeon: Ruben Martinez MD;  Location: [facility] OR;  Service: Orthopedics;  Laterality: Left;  arthrex    SKIN BIOPSY      TOTAL ABDOMINAL HYSTERECTOMY W/ BILATERAL SALPINGOOPHORECTOMY  age 48    TRANSFORAMINAL EPIDURAL INJECTION OF STEROID Bilateral 7/30/2021    Procedure:  Injection,steroid,epidural,transforaminal approach;  Surgeon: Dk Rosales MD;  Location: [facility] OR;  Service: Pain Management;  Laterality: Bilateral;  L5-S1     TRANSFORAMINAL EPIDURAL INJECTION OF STEROID Bilateral 3/28/2022    Procedure: INJECTION, STEROID, EPIDURAL, TRANSFORAMINAL APPROACH Lumbar L5-S1;  Surgeon: Dk Rosales MD;  Location: [facility] OR;  Service: Pain Management;  Laterality: Bilateral;       Family History:   Family History   Problem Relation Age of Onset    Hypertension Father     Glaucoma Neg Hx     Macular degeneration Neg Hx     Retinal detachment Neg Hx        Social History:   Social History     Socioeconomic History    Marital status:    Occupational History     Employer: [employer]   Tobacco Use    Smoking status: Former     Packs/day: 1.00     Years: 40.00     Pack years: 40.00     Types: Cigarettes     Quit date: 2018     Years since quittin.2    Smokeless tobacco: Never    Tobacco comments:     smokes 2-3 cigarettes a night   Substance and Sexual Activity    Alcohol use: Yes     Alcohol/week: 12.0 standard drinks     Types: 6 Cans of beer, 6 Standard drinks or equivalent per week     Comment: 2-3 a day/ social    Drug use: No    Sexual activity: Yes     Partners: Male       Hospitalization/Major Diagnostic Procedure:     Review of Systems     General/Constitutional:  Chills denies. Fatigue denies. Fever denies. Weight gain denies. Weight loss denies.    Respiratory:  Shortness of breath denies.    Cardiovascular:  Chest pain denies.    Gastrointestinal:  Constipation denies. Diarrhea denies. Nausea denies. Vomiting denies.     Hematology:  Easy bruising denies. Prolonged bleeding denies.     Genitourinary:  Frequent urination denies. Pain in lower back denies. Painful urination denies.     Musculoskeletal:  See HPI for details    Skin:  Rash denies.    Neurologic:  Dizziness denies. Gait abnormalities denies. Seizures denies. Tingling/Numbess denies.    Psychiatric:   Anxiety denies. Depressed mood denies.     Objective:   Vital Signs:   Vitals:    03/22/23 1321   BP: 128/68        Physical Exam      General Examination:     Constitutional: The patient is alert and oriented to lace person and time. Mood is pleasant.     Head/Face: Normal facial features normal eyebrows    Eyes: Normal extraocular motion bilaterally    Lungs: Respirations are equal and unlabored    Gait is coordinated.    Cardiovascular: There are no swelling or varicosities present.    Lymphatic: Negative for adenopathy    Skin: Normal    Neurological: Level of consciousness normal. Oriented to place person and time and situation    Psychiatric: Oriented to time place person and situation    Cervical exam demonstrates limited range of motion in all planes but especially with right rotation.  Tenderness palpation with muscle spasm and trigger point of the right upper trapezius muscle.  Bilateral upper extremities demonstrate some gross right upper extremity weakness secondary to pain with a positive Gifford's.    Lumbar spine demonstrates an antalgic gait with the staff.  Lumbar range of motion significantly diminished in all planes.  Lower extremities are distal neurovascular intact with negative straight leg raise maneuver.  XRAY Report/ Interpretation:  Cervical AP and lateral x-rays taken in the office today demonstrates C4 through C7 ACDF.  C3-4 adjacent level degenerative change with spondylolisthesis is noted.    Thoracic x-ray AP and lateral taken in the office today reviewed the patient demonstrates previous kyphoplasty at T12 but no other abnormalities noted.    Lumbar AP lateral x-rays taken in the office today reviewed the patient demonstrates multilevel mild degenerative disc disease and facet arthropathy.      Assessment:       1. Cervical pain (neck)    2. Lumbar pain    3. Thoracic back pain    4. History of fusion of cervical spine    5. Disc degeneration, lumbar    6. Lumbar facet arthropathy     7. Spinal enthesopathy of cervical region          Plan:       Beatriz was seen today for pain and pain.    Diagnoses and all orders for this visit:    Cervical pain (neck)  -     X-Ray Cervical Spine AP And Lateral    Lumbar pain  -     X-Ray Lumbar Spine Ap And Lateral    Thoracic back pain  -     X-Ray Thoracic Spine AP Lateral    History of fusion of cervical spine    Disc degeneration, lumbar    Lumbar facet arthropathy    Spinal enthesopathy of cervical region         No follow-ups on file.  This is to attest that the physician's assistant Elliott Renteria served in the capacity as scribe for this patient's encounter.  This is also to verify that I have reviewed the patient's history and helped formulate the treatment plan and discussed it with the physician's assistant.  I have actively participated in the evaluation and treatment plan for this patient visit.  The treatment plan and medical decision-making is outlined below  Patient was given a right upper trapezius trigger point injection with 40 mg of Kenalog and 1 cc lidocaine.  She will be referred to Dr. Melara for a repeat bilateral L5-S1 transforaminal [place].  Follow-up with us in 8 weeks or sooner if needed.  I did instruct her that if the cervical symptoms persist despite the trigger point injection then in about 3 weeks she should call and we will order a cervical MRI.    Treatment options were discussed with regards to the nature of the medical condition. Conservative pain intervention and surgical options were discussed in detail. The probability of success of each separate treatment option was discussed. The patient expressed a clear understanding of the treatment options. With regards to surgery, the procedure risk, benefits, complications, and outcomes were discussed. No guarantees were given with regards to surgical outcome.   The risk of complications, morbidity, and mortality of patient management decisions have been made at the time of this  visit. These are associated with the patient's problems, diagnostic procedures and treatment options. This includes the possible management options selected and those considered but not selected by the patient after shared medical decision making we discussed with the patient.     This note was created using Dragon voice recognition software that occasionally misinterpreted phrases or words.

## 2023-03-22 NOTE — PROCEDURES
Trigger Point Injection  Performed by: Ruben Martinez MD  Authorized by: Ruben Martinze MD     Trapezus:  Right    Consent Done?:  Yes (Verbal)    Pre-Procedure:   Indications:  Pain  Site marked: the procedure site was marked     Timeout: prior to procedure the correct patient, procedure, and site was verified    Prep: patient was prepped and draped in usual sterile fashion     Local anesthesia used?: Yes    Local anesthetic:  Lidocaine 1% without epinephrine  Medications: 40 mg triamcinolone acetonide 40 mg/mL

## 2023-03-22 NOTE — TELEPHONE ENCOUNTER
Provider Comments 03/22/2023  2:09 PM Ruben Martinez MD Provider Comments -   Note    Bilateral L5-S1 TFESI

## 2023-03-24 ENCOUNTER — TELEPHONE (OUTPATIENT)
Dept: ORTHOPEDICS | Facility: CLINIC | Age: 68
End: 2023-03-24

## 2023-03-24 DIAGNOSIS — M54.12 CERVICAL RADICULOPATHY: ICD-10-CM

## 2023-03-24 DIAGNOSIS — M48.02 SPINAL STENOSIS, CERVICAL REGION: Primary | ICD-10-CM

## 2023-03-28 ENCOUNTER — HOSPITAL ENCOUNTER (OUTPATIENT)
Dept: RADIOLOGY | Facility: HOSPITAL | Age: 68
Discharge: HOME | End: 2023-03-28
Attending: PHYSICIAN ASSISTANT
Payer: MEDICARE

## 2023-03-28 DIAGNOSIS — M48.02 SPINAL STENOSIS, CERVICAL REGION: ICD-10-CM

## 2023-03-28 DIAGNOSIS — M54.12 CERVICAL RADICULOPATHY: ICD-10-CM

## 2023-03-28 PROCEDURE — 72141 MRI NECK SPINE W/O DYE: CPT | Mod: TC

## 2023-04-04 DIAGNOSIS — F51.01 PRIMARY INSOMNIA: ICD-10-CM

## 2023-04-04 NOTE — TELEPHONE ENCOUNTER
----- Message from Eliezer Alcaraz sent at 4/4/2023  5:16 PM CDT -----  .Type:  RX Refill Request    Who Called: pt  Refill or New Rx:refill  RX Name and Strength:zolpidem (AMBIEN) 10 mg Tab  How is the patient currently taking it? (ex. 1XDay):TAKE 1 TABLET BY MOUTH ONCE DAILY AT NIGHT AS NEEDED  Is this a 30 day or 90 day RX:90  Preferred Pharmacy with phone number:[facility] on [address]  Local or Mail Order:local  Ordering Provider:[place]  Would the patient rather a call back or a response via [facility]? Call back  Best Call Back Number:[phone]  Additional Information:        Pt stated her refill for her medication been on back order for one month and would like it be sent to [facility] on [address] and [address] as soon as possible please pt didn't have her medication for 2 weeks please call pt back

## 2023-04-05 ENCOUNTER — OFFICE VISIT (OUTPATIENT)
Dept: ORTHOPEDICS | Facility: CLINIC | Age: 68
End: 2023-04-05
Payer: MEDICARE

## 2023-04-05 VITALS
SYSTOLIC BLOOD PRESSURE: 124 MMHG | HEIGHT: 64 IN | WEIGHT: 170 LBS | DIASTOLIC BLOOD PRESSURE: 66 MMHG | BODY MASS INDEX: 29.02 KG/M2

## 2023-04-05 DIAGNOSIS — Z98.1 HISTORY OF FUSION OF CERVICAL SPINE: Primary | ICD-10-CM

## 2023-04-05 DIAGNOSIS — M48.02 SPINAL STENOSIS, CERVICAL REGION: ICD-10-CM

## 2023-04-05 DIAGNOSIS — F51.01 PRIMARY INSOMNIA: ICD-10-CM

## 2023-04-05 DIAGNOSIS — G56.01 CARPAL TUNNEL SYNDROME OF RIGHT WRIST: ICD-10-CM

## 2023-04-05 DIAGNOSIS — M54.12 CERVICAL RADICULOPATHY: ICD-10-CM

## 2023-04-05 PROCEDURE — 1125F AMNT PAIN NOTED PAIN PRSNT: CPT | Mod: CPTII,S$GLB,, | Performed by: ORTHOPAEDIC SURGERY

## 2023-04-05 PROCEDURE — 1159F MED LIST DOCD IN RCRD: CPT | Mod: CPTII,S$GLB,, | Performed by: ORTHOPAEDIC SURGERY

## 2023-04-05 PROCEDURE — 3288F PR FALLS RISK ASSESSMENT DOCUMENTED: ICD-10-PCS | Mod: CPTII,S$GLB,, | Performed by: ORTHOPAEDIC SURGERY

## 2023-04-05 PROCEDURE — 3288F FALL RISK ASSESSMENT DOCD: CPT | Mod: CPTII,S$GLB,, | Performed by: ORTHOPAEDIC SURGERY

## 2023-04-05 PROCEDURE — 1101F PT FALLS ASSESS-DOCD LE1/YR: CPT | Mod: CPTII,S$GLB,, | Performed by: ORTHOPAEDIC SURGERY

## 2023-04-05 PROCEDURE — 99213 OFFICE O/P EST LOW 20 MIN: CPT | Mod: S$GLB,,, | Performed by: ORTHOPAEDIC SURGERY

## 2023-04-05 PROCEDURE — 99213 PR OFFICE/OUTPT VISIT, EST, LEVL III, 20-29 MIN: ICD-10-PCS | Mod: S$GLB,,, | Performed by: ORTHOPAEDIC SURGERY

## 2023-04-05 PROCEDURE — 3008F PR BODY MASS INDEX (BMI) DOCUMENTED: ICD-10-PCS | Mod: CPTII,S$GLB,, | Performed by: ORTHOPAEDIC SURGERY

## 2023-04-05 PROCEDURE — 1159F PR MEDICATION LIST DOCUMENTED IN MEDICAL RECORD: ICD-10-PCS | Mod: CPTII,S$GLB,, | Performed by: ORTHOPAEDIC SURGERY

## 2023-04-05 PROCEDURE — 3078F PR MOST RECENT DIASTOLIC BLOOD PRESSURE < 80 MM HG: ICD-10-PCS | Mod: CPTII,S$GLB,, | Performed by: ORTHOPAEDIC SURGERY

## 2023-04-05 PROCEDURE — 3074F SYST BP LT 130 MM HG: CPT | Mod: CPTII,S$GLB,, | Performed by: ORTHOPAEDIC SURGERY

## 2023-04-05 PROCEDURE — 1160F PR REVIEW ALL MEDS BY PRESCRIBER/CLIN PHARMACIST DOCUMENTED: ICD-10-PCS | Mod: CPTII,S$GLB,, | Performed by: ORTHOPAEDIC SURGERY

## 2023-04-05 PROCEDURE — 3008F BODY MASS INDEX DOCD: CPT | Mod: CPTII,S$GLB,, | Performed by: ORTHOPAEDIC SURGERY

## 2023-04-05 PROCEDURE — 3078F DIAST BP <80 MM HG: CPT | Mod: CPTII,S$GLB,, | Performed by: ORTHOPAEDIC SURGERY

## 2023-04-05 PROCEDURE — 1160F RVW MEDS BY RX/DR IN RCRD: CPT | Mod: CPTII,S$GLB,, | Performed by: ORTHOPAEDIC SURGERY

## 2023-04-05 PROCEDURE — 1101F PR PT FALLS ASSESS DOC 0-1 FALLS W/OUT INJ PAST YR: ICD-10-PCS | Mod: CPTII,S$GLB,, | Performed by: ORTHOPAEDIC SURGERY

## 2023-04-05 PROCEDURE — 3074F PR MOST RECENT SYSTOLIC BLOOD PRESSURE < 130 MM HG: ICD-10-PCS | Mod: CPTII,S$GLB,, | Performed by: ORTHOPAEDIC SURGERY

## 2023-04-05 PROCEDURE — 1125F PR PAIN SEVERITY QUANTIFIED, PAIN PRESENT: ICD-10-PCS | Mod: CPTII,S$GLB,, | Performed by: ORTHOPAEDIC SURGERY

## 2023-04-05 RX ORDER — ZOLPIDEM TARTRATE 10 MG/1
10 TABLET ORAL NIGHTLY PRN
Qty: 90 TABLET | Refills: 0 | Status: SHIPPED | OUTPATIENT
Start: 2023-04-05 | End: 2023-08-11

## 2023-04-05 RX ORDER — ZOLPIDEM TARTRATE 10 MG/1
10 TABLET ORAL NIGHTLY PRN
Qty: 90 TABLET | Refills: 0 | Status: SHIPPED | OUTPATIENT
Start: 2023-04-05 | End: 2023-04-05 | Stop reason: SDUPTHER

## 2023-04-05 RX ORDER — HYDROCODONE BITARTRATE AND ACETAMINOPHEN 5; 325 MG/1; MG/1
1 TABLET ORAL EVERY 6 HOURS PRN
Qty: 28 TABLET | Refills: 0 | Status: SHIPPED | OUTPATIENT
Start: 2023-04-05 | End: 2023-05-02 | Stop reason: SDUPTHER

## 2023-04-05 NOTE — H&P (VIEW-ONLY)
Subjective:       Patient ID: Beatriz Gan is a 67 y.o. female.    Chief Complaint: Pain of the Neck (Here for MRI results cervical, pain varies)      History of Present Illness    Prior to meeting with the patient I reviewed the medical chart in [facility]. This included reviewing the previous progress notes from our office, review of the patient's last appointment with their primary care provider, review of any visits to the emergency room, and review of any pain management appointments or procedures.   Ms. Henderson comes today for follow-up for her right-sided neck pain with radiation into her shoulder.  She had a trigger point injection there about 2 weeks ago with 0 relief of her symptoms.  She subsequently underwent an MRI to further evaluate her cervical spine.  She does have a previous history of cervical fusion from C4-5, C5-6, C6-7.  This was done greater than 10 years ago by Dr. Martinez.  She comes in today with MRI results for review.  She continues to complain of right-sided neck pain extending into her right shoulder/scapular region.  She also complains pain that extends into right hand, median nerve distribution.    Current Medications  Current Outpatient Medications   Medication Sig Dispense Refill    alendronate (FOSAMAX) 70 MG tablet Take 70 mg by mouth every 7 days.      ALPRAZolam (XANAX) 1 MG tablet Take 1 tablet by mouth twice daily as needed for anxiety 60 tablet 0    amLODIPine (NORVASC) 5 MG tablet Take 1 tablet by mouth once daily 90 tablet 2    azelastine (ASTELIN) 137 mcg (0.1 %) nasal spray Use 1 spray(s) in each nostril twice daily 30 mL 3    budesonide-formoterol 160-4.5 mcg (SYMBICORT) 160-4.5 mcg/actuation HFAA Inhale 2 puffs into the lungs every 12 (twelve) hours. Controller 10.2 g 11    calcium carbonate (OS-TERI) 600 mg calcium (1,500 mg) Tab Take 600 mg by mouth once.      celecoxib (CELEBREX) 200 MG capsule Take 1 capsule by mouth once daily 30 capsule 5    cholecalciferol,  "vitamin D3, (VITAMIN D3 ORAL) Take by mouth once daily.      FLUoxetine 40 MG capsule Take 1 capsule by mouth twice daily 180 capsule 3    gabapentin (NEURONTIN) 600 MG tablet TAKE 1 TABLET BY MOUTH THREE TIMES DAILY 90 tablet 0    HYDROcodone-acetaminophen (NORCO) 7.5-325 mg per tablet Take 1 tablet by mouth every 6 (six) hours as needed for Pain. 21 tablet 0    losartan-hydrochlorothiazide 100-25 mg (HYZAAR) 100-25 mg per tablet Take 1 tablet by mouth once daily 90 tablet 3    magnesium 30 mg Tab Take by mouth once.      MULTIVIT-IRON-MIN-FOLIC ACID 3,500-18-0.4 UNIT-MG-MG ORAL CHEW Take by mouth once daily.      ondansetron (ZOFRAN) 4 MG tablet Take 1 tablet (4 mg total) by mouth as needed for Nausea. 60 tablet 1    polyethylene glycol (GLYCOLAX) 17 gram/dose powder Take 17 g by mouth once daily. 510 g 3    tiZANidine (ZANAFLEX) 4 MG tablet TAKE 1 TABLET BY MOUTH EVERY 8 HOURS AS NEEDED 30 tablet 0    fluticasone (FLONASE) 50 mcg/actuation nasal spray 1 spray by Nasal route daily as needed.       zolpidem (AMBIEN) 10 mg Tab Take 1 tablet (10 mg total) by mouth nightly as needed. 90 tablet 0     No current facility-administered medications for this visit.     Facility-Administered Medications Ordered in Other Visits   Medication Dose Route Frequency Provider Last Rate Last Admin    electrolyte-S (ISOLYTE)   Intravenous Continuous Kota Ortega MD 10 mL/hr at 01/31/20 0624 New Bag at 01/31/20 0624    lactated ringers infusion   Intravenous Continuous Kota Ortega MD        pregabalin capsule 75 mg  75 mg Oral Once Shiela Mann MD           Allergies  Review of patient's allergies indicates:   Allergen Reactions    Phenergan [promethazine] Hives and Rash    Latex, natural rubber Hives     Per pt report-many years    Morphine Hives    Nsaids (non-steroidal anti-inflammatory drug)      Due to "kidney cancer"       Past Medical History  Past Medical History:   Diagnosis Date    Alcohol dependence     DDD " (degenerative disc disease), lumbosacral     DJD (degenerative joint disease), lumbosacral     Encounter for blood transfusion     GERD (gastroesophageal reflux disease)     Gout     Hypercholesterolemia     Hypertension     NATHALIE (iron deficiency anemia)     questionable white coat hypertension    Kidney carcinoma, left 2014    Pneumonia     Renal cell carcinoma     Shingles 10/13/2012       Surgical History  Past Surgical History:   Procedure Laterality Date    APPENDECTOMY      ARTHROSCOPY OF SHOULDER WITH DECOMPRESSION OF SUBACROMIAL SPACE Left 10/31/2019    Procedure: ARTHROSCOPY, SHOULDER, WITH SUBACROMIAL SPACE DECOMPRESSION;  Surgeon: Ruben Martinez MD;  Location: [facility] OR;  Service: Orthopedics;  Laterality: Left;    BLADDER REPAIR      x 2    COLONOSCOPY  9/2011    DISTAL CLAVICLE EXCISION Left 10/31/2019    Procedure: EXCISION, CLAVICLE, DISTAL;  Surgeon: Ruben Martinez MD;  Location: [facility] OR;  Service: Orthopedics;  Laterality: Left;    EPIDURAL STEROID INJECTION INTO LUMBAR SPINE N/A 12/7/2020    Procedure: Injection-steroid-epidural-lumbar;  Surgeon: Dk Rosales MD;  Location: [facility] OR;  Service: Pain Management;  Laterality: N/A;  L4-L5    EPIDURAL STEROID INJECTION INTO LUMBAR SPINE N/A 5/17/2021    Procedure: Injection-steroid-epidural-lumbar;  Surgeon: Dk Rosales MD;  Location: [facility] OR;  Service: Pain Management;  Laterality: N/A;  L4-L5    ESOPHAGOGASTRODUODENOSCOPY  9/2011    EYE SURGERY      lasix bilateral    FIXATION KYPHOPLASTY N/A 1/31/2020    Procedure: Kyphoplasty T12;  Surgeon: Dk Rosales MD;  Location: [facility] OR;  Service: Pain Management;  Laterality: N/A;    HERNIA REPAIR      hiatal hernai    HYSTERECTOMY      INJECTION OF ANESTHETIC AGENT AROUND NERVE Left 6/8/2020    Procedure: Block, Nerve;  Surgeon: Dk Rosales MD;  Location: [facility] OR;  Service: Pain Management;  Laterality: Left;  left genicular nerve block     KNEE ARTHROPLASTY Left 7/30/2020    Procedure:  ARTHROPLASTY, KNEE LEFT;  Surgeon: Ruben Martinez MD;  Location: [facility] OR;  Service: Orthopedics;  Laterality: Left;  REP VALERY RUBY    KNEE ARTHROSCOPY W/ MENISCECTOMY Left 1/16/2020    Procedure: ARTHROSCOPY, KNEE, WITH MEDIAL MENISCECTOMY;  Surgeon: Ruben Martinez MD;  Location: [facility] OR;  Service: Orthopedics;  Laterality: Left;    LAPAROSCOPIC NISSEN FUNDOPLICATION      NEPHRECTOMY      LT partial    RADIOFREQUENCY ABLATION Left 6/19/2020    Procedure: Radiofrequency Ablation;  Surgeon: Dk Rosales MD;  Location: [facility] OR;  Service: Pain Management;  Laterality: Left;    RADIOFREQUENCY ABLATION OF LUMBAR MEDIAL BRANCH NERVE AT SINGLE LEVEL Bilateral 2/28/2020    Procedure: Radiofrequency Ablation, Nerve, Spinal, Lumbar, Medial Branch, 1 Level;  Surgeon: kD Rosales MD;  Location: [facility] OR;  Service: Pain Management;  Laterality: Bilateral;  L3,L4,L5 - Burned at 80 degrees C. for 60 seconds x 2 each site    RADIOFREQUENCY ABLATION OF LUMBAR MEDIAL BRANCH NERVE AT SINGLE LEVEL Bilateral 10/19/2020    Procedure: Radiofrequency Ablation, Nerve, Spinal, Lumbar, Medial Branch, 1 Level;  Surgeon: Dk Rosales MD;  Location: [facility] OR;  Service: Pain Management;  Laterality: Bilateral;  L3, L4, L5    REFRACTIVE SURGERY      ROTATOR CUFF REPAIR Left 10/31/2019    Procedure: REPAIR, ROTATOR CUFF;  Surgeon: Ruben Martinez MD;  Location: [facility] OR;  Service: Orthopedics;  Laterality: Left;  arthrex    SKIN BIOPSY      TOTAL ABDOMINAL HYSTERECTOMY W/ BILATERAL SALPINGOOPHORECTOMY  age 48    TRANSFORAMINAL EPIDURAL INJECTION OF STEROID Bilateral 7/30/2021    Procedure: Injection,steroid,epidural,transforaminal approach;  Surgeon: Dk Rosales MD;  Location: [facility] OR;  Service: Pain Management;  Laterality: Bilateral;  L5-S1     TRANSFORAMINAL EPIDURAL INJECTION OF STEROID Bilateral 3/28/2022    Procedure: INJECTION, STEROID, EPIDURAL, TRANSFORAMINAL APPROACH Lumbar L5-S1;  Surgeon: Dk Rosales MD;   Location: [facility];  Service: Pain Management;  Laterality: Bilateral;       Family History:   Family History   Problem Relation Age of Onset    Hypertension Father     Glaucoma Neg Hx     Macular degeneration Neg Hx     Retinal detachment Neg Hx        Social History:   Social History     Socioeconomic History    Marital status:    Occupational History     Employer: [employer]   Tobacco Use    Smoking status: Former     Packs/day: 1.00     Years: 40.00     Pack years: 40.00     Types: Cigarettes     Quit date: 2018     Years since quittin.2    Smokeless tobacco: Never    Tobacco comments:     smokes 2-3 cigarettes a night   Substance and Sexual Activity    Alcohol use: Yes     Alcohol/week: 12.0 standard drinks     Types: 6 Cans of beer, 6 Standard drinks or equivalent per week     Comment: 2-3 a day/ social    Drug use: No    Sexual activity: Yes     Partners: Male       Hospitalization/Major Diagnostic Procedure:     Review of Systems     General/Constitutional:  Chills denies. Fatigue denies. Fever denies. Weight gain denies. Weight loss denies.    Respiratory:  Shortness of breath denies.    Cardiovascular:  Chest pain denies.    Gastrointestinal:  Constipation denies. Diarrhea denies. Nausea denies. Vomiting denies.     Hematology:  Easy bruising denies. Prolonged bleeding denies.     Genitourinary:  Frequent urination denies. Pain in lower back denies. Painful urination denies.     Musculoskeletal:  See HPI for details    Skin:  Rash denies.    Neurologic:  Dizziness denies. Gait abnormalities denies. Seizures denies. Tingling/Numbess denies.    Psychiatric:  Anxiety denies. Depressed mood denies.     Objective:   Vital Signs:   Vitals:    23 1204   BP: 124/66        Physical Exam      General Examination:     Constitutional: The patient is alert and oriented to lace person and time. Mood is pleasant.     Head/Face: Normal facial features normal eyebrows    Eyes: Normal extraocular motion  bilaterally    Lungs: Respirations are equal and unlabored    Gait is coordinated.    Cardiovascular: There are no swelling or varicosities present.    Lymphatic: Negative for adenopathy    Skin: Normal    Neurological: Level of consciousness normal. Oriented to place person and time and situation    Psychiatric: Oriented to time place person and situation    Cervical exam: Her cervical exam is similar to where she was on her last visit about 2 weeks ago.  She has limited range of motion in all planes but especially with right rotation.  Tenderness to palpation with muscle spasm and trigger point of the right upper trapezius muscle.  Bilateral upper extremities demonstrate some gross right upper extremity weakness secondary to pain with a positive Gifford's.  She also has paresthesias in the median nerve distribution of the right hand with prolonged wrist flexion consistent with a positive Phalen sign.    XRAY Report/ Interpretation:  No new radiographs were taken on today's clinic visit.  However did review images report of her cervical spine MRI which do reveal severe right-sided foraminal stenosis at C3-4 secondary to degenerative facet and uncinate hypertrophy.    Assessment:       1. History of fusion of cervical spine    2. Cervical radiculopathy    3. Spinal stenosis, cervical region    4. Carpal tunnel syndrome of right wrist        Plan:       Beatriz was seen today for pain.    Diagnoses and all orders for this visit:    History of fusion of cervical spine    Cervical radiculopathy    Spinal stenosis, cervical region    Carpal tunnel syndrome of right wrist  -     EMG W/ ULTRASOUND AND NERVE CONDUCTION TEST 1 Extremity; Future         Follow up in about 2 weeks (around 4/19/2023) for EMG/NCS Results.    Plan is to proceed with a C3-4 anterior cervical fusion with a stand-alone interbody.  Also I would like to get right upper extremity EMG/NCS as soon as possible to perform a right carpal tunnel release in  conjunction with this cervical fusion procedure.  Risks and benefits of both procedures were discussed with her today by Dr. Martinez.  All of her questions were answered by Dr. Martinez.  She clearly understood and wished to proceed.  Surgical consents were obtained today.      This is to attest that the physician's assistant Elliott Renteria served in the capacity as scribe for this patient's encounter.  This is also to verify that I have reviewed the patient's history and helped formulate the treatment plan and discussed it with the physician's assistant.  I have actively participated in the evaluation and treatment plan for this patient visit.  The treatment plan and medical decision-making is outlined below  Treatment options were discussed with regards to the nature of the medical condition. Conservative pain intervention and surgical options were discussed in detail. The probability of success of each separate treatment option was discussed. The patient expressed a clear understanding of the treatment options. With regards to surgery, the procedure risk, benefits, complications, and outcomes were discussed. No guarantees were given with regards to surgical outcome.   The risk of complications, morbidity, and mortality of patient management decisions have been made at the time of this visit. These are associated with the patient's problems, diagnostic procedures and treatment options. This includes the possible management options selected and those considered but not selected by the patient after shared medical decision making we discussed with the patient.     This note was created using Dragon voice recognition software that occasionally misinterpreted phrases or words.

## 2023-04-05 NOTE — TELEPHONE ENCOUNTER
----- Message from Eliezer Alcaraz sent at 4/4/2023  5:16 PM CDT -----  .Type:  RX Refill Request    Who Called: pt  Refill or New Rx:refill  RX Name and Strength:zolpidem (AMBIEN) 10 mg Tab  How is the patient currently taking it? (ex. 1XDay):TAKE 1 TABLET BY MOUTH ONCE DAILY AT NIGHT AS NEEDED  Is this a 30 day or 90 day RX:90  Preferred Pharmacy with phone number:[facility] on [email or web address]  Local or Mail Order:local  Ordering Provider:[place]  Would the patient rather a call back or a response via [facility]? Call back  Best Call Back Number:[phone]  Additional Information:        Pt stated her refill for her medication been on back order for one month and would like it be sent to [facility] on [email or web address] and [address] as soon as possible please pt didn't have her medication for 2 weeks please call pt back

## 2023-04-05 NOTE — TELEPHONE ENCOUNTER
----- Message from Gisel Bryant sent at 4/5/2023 10:05 AM CDT -----  Contact: pt @ [phone]  Rx Refill/Request    Is this a Refill or New Rx:  refill    Rx Name and Strength:  zolpidem (AMBIEN) 10 mg Tab    Preferred Pharmacy with phone number:[id number] - [place], [place] - [address] [[id number]]    Communication Preference:Asking for call back     Additional Information

## 2023-04-05 NOTE — PROGRESS NOTES
Subjective:       Patient ID: Beatriz Gan is a 67 y.o. female.    Chief Complaint: Pain of the Neck (Here for MRI results cervical, pain varies)      History of Present Illness    Prior to meeting with the patient I reviewed the medical chart in [facility]. This included reviewing the previous progress notes from our office, review of the patient's last appointment with their primary care provider, review of any visits to the emergency room, and review of any pain management appointments or procedures.   Ms. Henderson comes today for follow-up for her right-sided neck pain with radiation into her shoulder.  She had a trigger point injection there about 2 weeks ago with 0 relief of her symptoms.  She subsequently underwent an MRI to further evaluate her cervical spine.  She does have a previous history of cervical fusion from C4-5, C5-6, C6-7.  This was done greater than 10 years ago by Dr. Martinez.  She comes in today with MRI results for review.  She continues to complain of right-sided neck pain extending into her right shoulder/scapular region.  She also complains pain that extends into right hand, median nerve distribution.    Current Medications  Current Outpatient Medications   Medication Sig Dispense Refill    alendronate (FOSAMAX) 70 MG tablet Take 70 mg by mouth every 7 days.      ALPRAZolam (XANAX) 1 MG tablet Take 1 tablet by mouth twice daily as needed for anxiety 60 tablet 0    amLODIPine (NORVASC) 5 MG tablet Take 1 tablet by mouth once daily 90 tablet 2    azelastine (ASTELIN) 137 mcg (0.1 %) nasal spray Use 1 spray(s) in each nostril twice daily 30 mL 3    budesonide-formoterol 160-4.5 mcg (SYMBICORT) 160-4.5 mcg/actuation HFAA Inhale 2 puffs into the lungs every 12 (twelve) hours. Controller 10.2 g 11    calcium carbonate (OS-TERI) 600 mg calcium (1,500 mg) Tab Take 600 mg by mouth once.      celecoxib (CELEBREX) 200 MG capsule Take 1 capsule by mouth once daily 30 capsule 5    cholecalciferol,  "vitamin D3, (VITAMIN D3 ORAL) Take by mouth once daily.      FLUoxetine 40 MG capsule Take 1 capsule by mouth twice daily 180 capsule 3    gabapentin (NEURONTIN) 600 MG tablet TAKE 1 TABLET BY MOUTH THREE TIMES DAILY 90 tablet 0    HYDROcodone-acetaminophen (NORCO) 7.5-325 mg per tablet Take 1 tablet by mouth every 6 (six) hours as needed for Pain. 21 tablet 0    losartan-hydrochlorothiazide 100-25 mg (HYZAAR) 100-25 mg per tablet Take 1 tablet by mouth once daily 90 tablet 3    magnesium 30 mg Tab Take by mouth once.      MULTIVIT-IRON-MIN-FOLIC ACID 3,500-18-0.4 UNIT-MG-MG ORAL CHEW Take by mouth once daily.      ondansetron (ZOFRAN) 4 MG tablet Take 1 tablet (4 mg total) by mouth as needed for Nausea. 60 tablet 1    polyethylene glycol (GLYCOLAX) 17 gram/dose powder Take 17 g by mouth once daily. 510 g 3    tiZANidine (ZANAFLEX) 4 MG tablet TAKE 1 TABLET BY MOUTH EVERY 8 HOURS AS NEEDED 30 tablet 0    fluticasone (FLONASE) 50 mcg/actuation nasal spray 1 spray by Nasal route daily as needed.       zolpidem (AMBIEN) 10 mg Tab Take 1 tablet (10 mg total) by mouth nightly as needed. 90 tablet 0     No current facility-administered medications for this visit.     Facility-Administered Medications Ordered in Other Visits   Medication Dose Route Frequency Provider Last Rate Last Admin    electrolyte-S (ISOLYTE)   Intravenous Continuous Kota Ortega MD 10 mL/hr at 01/31/20 0624 New Bag at 01/31/20 0624    lactated ringers infusion   Intravenous Continuous Kota Ortega MD        pregabalin capsule 75 mg  75 mg Oral Once Shiela Mann MD           Allergies  Review of patient's allergies indicates:   Allergen Reactions    Phenergan [promethazine] Hives and Rash    Latex, natural rubber Hives     Per pt report-many years    Morphine Hives    Nsaids (non-steroidal anti-inflammatory drug)      Due to "kidney cancer"       Past Medical History  Past Medical History:   Diagnosis Date    Alcohol dependence     DDD " (degenerative disc disease), lumbosacral     DJD (degenerative joint disease), lumbosacral     Encounter for blood transfusion     GERD (gastroesophageal reflux disease)     Gout     Hypercholesterolemia     Hypertension     NATHALIE (iron deficiency anemia)     questionable white coat hypertension    Kidney carcinoma, left 2014    Pneumonia     Renal cell carcinoma     Shingles 10/13/2012       Surgical History  Past Surgical History:   Procedure Laterality Date    APPENDECTOMY      ARTHROSCOPY OF SHOULDER WITH DECOMPRESSION OF SUBACROMIAL SPACE Left 10/31/2019    Procedure: ARTHROSCOPY, SHOULDER, WITH SUBACROMIAL SPACE DECOMPRESSION;  Surgeon: Ruben Martinez MD;  Location: [facility] OR;  Service: Orthopedics;  Laterality: Left;    BLADDER REPAIR      x 2    COLONOSCOPY  9/2011    DISTAL CLAVICLE EXCISION Left 10/31/2019    Procedure: EXCISION, CLAVICLE, DISTAL;  Surgeon: Ruben Martinez MD;  Location: [facility] OR;  Service: Orthopedics;  Laterality: Left;    EPIDURAL STEROID INJECTION INTO LUMBAR SPINE N/A 12/7/2020    Procedure: Injection-steroid-epidural-lumbar;  Surgeon: Dk Rosales MD;  Location: [facility] OR;  Service: Pain Management;  Laterality: N/A;  L4-L5    EPIDURAL STEROID INJECTION INTO LUMBAR SPINE N/A 5/17/2021    Procedure: Injection-steroid-epidural-lumbar;  Surgeon: Dk Rosales MD;  Location: [facility] OR;  Service: Pain Management;  Laterality: N/A;  L4-L5    ESOPHAGOGASTRODUODENOSCOPY  9/2011    EYE SURGERY      lasix bilateral    FIXATION KYPHOPLASTY N/A 1/31/2020    Procedure: Kyphoplasty T12;  Surgeon: Dk Rosales MD;  Location: [facility] OR;  Service: Pain Management;  Laterality: N/A;    HERNIA REPAIR      hiatal hernai    HYSTERECTOMY      INJECTION OF ANESTHETIC AGENT AROUND NERVE Left 6/8/2020    Procedure: Block, Nerve;  Surgeon: Dk Rosales MD;  Location: [facility] OR;  Service: Pain Management;  Laterality: Left;  left genicular nerve block     KNEE ARTHROPLASTY Left 7/30/2020    Procedure:  ARTHROPLASTY, KNEE LEFT;  Surgeon: Ruben Martinez MD;  Location: [facility] OR;  Service: Orthopedics;  Laterality: Left;  REP VALERY RUBY    KNEE ARTHROSCOPY W/ MENISCECTOMY Left 1/16/2020    Procedure: ARTHROSCOPY, KNEE, WITH MEDIAL MENISCECTOMY;  Surgeon: Ruben Martinez MD;  Location: [facility] OR;  Service: Orthopedics;  Laterality: Left;    LAPAROSCOPIC NISSEN FUNDOPLICATION      NEPHRECTOMY      LT partial    RADIOFREQUENCY ABLATION Left 6/19/2020    Procedure: Radiofrequency Ablation;  Surgeon: Dk Rosales MD;  Location: [facility] OR;  Service: Pain Management;  Laterality: Left;    RADIOFREQUENCY ABLATION OF LUMBAR MEDIAL BRANCH NERVE AT SINGLE LEVEL Bilateral 2/28/2020    Procedure: Radiofrequency Ablation, Nerve, Spinal, Lumbar, Medial Branch, 1 Level;  Surgeon: Dk Rosales MD;  Location: [facility] OR;  Service: Pain Management;  Laterality: Bilateral;  L3,L4,L5 - Burned at 80 degrees C. for 60 seconds x 2 each site    RADIOFREQUENCY ABLATION OF LUMBAR MEDIAL BRANCH NERVE AT SINGLE LEVEL Bilateral 10/19/2020    Procedure: Radiofrequency Ablation, Nerve, Spinal, Lumbar, Medial Branch, 1 Level;  Surgeon: Dk Rosales MD;  Location: [facility] OR;  Service: Pain Management;  Laterality: Bilateral;  L3, L4, L5    REFRACTIVE SURGERY      ROTATOR CUFF REPAIR Left 10/31/2019    Procedure: REPAIR, ROTATOR CUFF;  Surgeon: Ruben Martinez MD;  Location: [facility] OR;  Service: Orthopedics;  Laterality: Left;  arthrex    SKIN BIOPSY      TOTAL ABDOMINAL HYSTERECTOMY W/ BILATERAL SALPINGOOPHORECTOMY  age 48    TRANSFORAMINAL EPIDURAL INJECTION OF STEROID Bilateral 7/30/2021    Procedure: Injection,steroid,epidural,transforaminal approach;  Surgeon: Dk Rosales MD;  Location: [facility] OR;  Service: Pain Management;  Laterality: Bilateral;  L5-S1     TRANSFORAMINAL EPIDURAL INJECTION OF STEROID Bilateral 3/28/2022    Procedure: INJECTION, STEROID, EPIDURAL, TRANSFORAMINAL APPROACH Lumbar L5-S1;  Surgeon: Dk Rosales MD;   Location: [facility] OR;  Service: Pain Management;  Laterality: Bilateral;       Family History:   Family History   Problem Relation Age of Onset    Hypertension Father     Glaucoma Neg Hx     Macular degeneration Neg Hx     Retinal detachment Neg Hx        Social History:   Social History     Socioeconomic History    Marital status:    Occupational History     Employer: [employer]   Tobacco Use    Smoking status: Former     Packs/day: 1.00     Years: 40.00     Pack years: 40.00     Types: Cigarettes     Quit date: 2018     Years since quittin.2    Smokeless tobacco: Never    Tobacco comments:     smokes 2-3 cigarettes a night   Substance and Sexual Activity    Alcohol use: Yes     Alcohol/week: 12.0 standard drinks     Types: 6 Cans of beer, 6 Standard drinks or equivalent per week     Comment: 2-3 a day/ social    Drug use: No    Sexual activity: Yes     Partners: Male       Hospitalization/Major Diagnostic Procedure:     Review of Systems     General/Constitutional:  Chills denies. Fatigue denies. Fever denies. Weight gain denies. Weight loss denies.    Respiratory:  Shortness of breath denies.    Cardiovascular:  Chest pain denies.    Gastrointestinal:  Constipation denies. Diarrhea denies. Nausea denies. Vomiting denies.     Hematology:  Easy bruising denies. Prolonged bleeding denies.     Genitourinary:  Frequent urination denies. Pain in lower back denies. Painful urination denies.     Musculoskeletal:  See HPI for details    Skin:  Rash denies.    Neurologic:  Dizziness denies. Gait abnormalities denies. Seizures denies. Tingling/Numbess denies.    Psychiatric:  Anxiety denies. Depressed mood denies.     Objective:   Vital Signs:   Vitals:    23 1204   BP: 124/66        Physical Exam      General Examination:     Constitutional: The patient is alert and oriented to lace person and time. Mood is pleasant.     Head/Face: Normal facial features normal eyebrows    Eyes: Normal extraocular motion  bilaterally    Lungs: Respirations are equal and unlabored    Gait is coordinated.    Cardiovascular: There are no swelling or varicosities present.    Lymphatic: Negative for adenopathy    Skin: Normal    Neurological: Level of consciousness normal. Oriented to place person and time and situation    Psychiatric: Oriented to time place person and situation    Cervical exam: Her cervical exam is similar to where she was on her last visit about 2 weeks ago.  She has limited range of motion in all planes but especially with right rotation.  Tenderness to palpation with muscle spasm and trigger point of the right upper trapezius muscle.  Bilateral upper extremities demonstrate some gross right upper extremity weakness secondary to pain with a positive Gifford's.  She also has paresthesias in the median nerve distribution of the right hand with prolonged wrist flexion consistent with a positive Phalen sign.    XRAY Report/ Interpretation:  No new radiographs were taken on today's clinic visit.  However did review images report of her cervical spine MRI which do reveal severe right-sided foraminal stenosis at C3-4 secondary to degenerative facet and uncinate hypertrophy.    Assessment:       1. History of fusion of cervical spine    2. Cervical radiculopathy    3. Spinal stenosis, cervical region    4. Carpal tunnel syndrome of right wrist        Plan:       Beatriz was seen today for pain.    Diagnoses and all orders for this visit:    History of fusion of cervical spine    Cervical radiculopathy    Spinal stenosis, cervical region    Carpal tunnel syndrome of right wrist  -     EMG W/ ULTRASOUND AND NERVE CONDUCTION TEST 1 Extremity; Future         Follow up in about 2 weeks (around 4/19/2023) for EMG/NCS Results.    Plan is to proceed with a C3-4 anterior cervical fusion with a stand-alone interbody.  Also I would like to get right upper extremity EMG/NCS as soon as possible to perform a right carpal tunnel release in  conjunction with this cervical fusion procedure.  Risks and benefits of both procedures were discussed with her today by Dr. Martinez.  All of her questions were answered by Dr. Martinez.  She clearly understood and wished to proceed.  Surgical consents were obtained today.      This is to attest that the physician's assistant Elliott Renteria served in the capacity as scribe for this patient's encounter.  This is also to verify that I have reviewed the patient's history and helped formulate the treatment plan and discussed it with the physician's assistant.  I have actively participated in the evaluation and treatment plan for this patient visit.  The treatment plan and medical decision-making is outlined below  Treatment options were discussed with regards to the nature of the medical condition. Conservative pain intervention and surgical options were discussed in detail. The probability of success of each separate treatment option was discussed. The patient expressed a clear understanding of the treatment options. With regards to surgery, the procedure risk, benefits, complications, and outcomes were discussed. No guarantees were given with regards to surgical outcome.   The risk of complications, morbidity, and mortality of patient management decisions have been made at the time of this visit. These are associated with the patient's problems, diagnostic procedures and treatment options. This includes the possible management options selected and those considered but not selected by the patient after shared medical decision making we discussed with the patient.     This note was created using Dragon voice recognition software that occasionally misinterpreted phrases or words.

## 2023-04-05 NOTE — TELEPHONE ENCOUNTER
No new care gaps identified.  [facility] Embedded Care Gaps. Reference number: [id number]. 4/05/2023   7:44:17 AM LIONELT

## 2023-04-05 NOTE — TELEPHONE ENCOUNTER
----- Message from Gigi Escudero sent at 4/5/2023  9:43 AM CDT -----  Contact: [facility] pharmacy   Pharmacy is calling to clarify an RX.  RX name:  zolpidem (AMBIEN) 10 mg Tab  What do they need to clarify:  Not in stock not sure when it will be available please sent to a different pharmacy   Comments:

## 2023-04-11 ENCOUNTER — TELEPHONE (OUTPATIENT)
Dept: ORTHOPEDICS | Facility: CLINIC | Age: 68
End: 2023-04-11

## 2023-04-11 DIAGNOSIS — G56.01 CARPAL TUNNEL SYNDROME OF RIGHT WRIST: ICD-10-CM

## 2023-04-11 DIAGNOSIS — M48.02 CERVICAL SPINAL STENOSIS: ICD-10-CM

## 2023-04-11 DIAGNOSIS — M48.02 SPINAL STENOSIS, CERVICAL REGION: Primary | ICD-10-CM

## 2023-04-11 DIAGNOSIS — M54.12 CERVICAL RADICULOPATHY: ICD-10-CM

## 2023-04-11 NOTE — TELEPHONE ENCOUNTER
Spoke with the patient. Notified surgery will be scheduled once she has her emg/ncs completed. Patient was provided with the phone number to [facility]. She will call back once she is scheduled for test.

## 2023-04-11 NOTE — TELEPHONE ENCOUNTER
----- Message from Lorena David sent at 4/11/2023 10:55 AM CDT -----  Patient returning your call.  [phone]

## 2023-04-21 ENCOUNTER — PATIENT MESSAGE (OUTPATIENT)
Dept: ADMINISTRATIVE | Facility: HOSPITAL | Age: 68
End: 2023-04-21
Payer: MEDICARE

## 2023-04-25 DIAGNOSIS — F41.9 ANXIETY: ICD-10-CM

## 2023-04-25 RX ORDER — ALPRAZOLAM 1 MG/1
TABLET ORAL
Qty: 60 TABLET | Refills: 2 | Status: SHIPPED | OUTPATIENT
Start: 2023-04-25 | End: 2023-07-31

## 2023-04-25 NOTE — TELEPHONE ENCOUNTER
No new care gaps identified.  [facility] Embedded Care Gaps. Reference number: [id number]. 4/25/2023   12:28:39 PM CDT

## 2023-04-26 DIAGNOSIS — M51.36 DDD (DEGENERATIVE DISC DISEASE), LUMBAR: ICD-10-CM

## 2023-04-26 DIAGNOSIS — M47.896 OTHER SPONDYLOSIS, LUMBAR REGION: ICD-10-CM

## 2023-04-26 RX ORDER — GABAPENTIN 600 MG/1
600 TABLET ORAL 3 TIMES DAILY
Qty: 90 TABLET | Refills: 2 | Status: SHIPPED | OUTPATIENT
Start: 2023-04-26 | End: 2024-03-28 | Stop reason: SDUPTHER

## 2023-04-26 NOTE — TELEPHONE ENCOUNTER
----- Message from Mariano Card sent at 4/26/2023 10:48 AM CDT -----  Phone #: [phone]  Patient is requesting a refill of  gabapentin (NEURONTIN) 600 MG tablet                  [facility] [address] [place] [address] [address] [facility] [address]  Phone: [phone] Fax: [phone]

## 2023-04-27 ENCOUNTER — HOSPITAL ENCOUNTER (OUTPATIENT)
Dept: RADIOLOGY | Facility: HOSPITAL | Age: 68
Discharge: HOME | End: 2023-04-27
Attending: ORTHOPAEDIC SURGERY | Admitting: ORTHOPAEDIC SURGERY
Payer: MEDICARE

## 2023-04-27 ENCOUNTER — HOSPITAL ENCOUNTER (OUTPATIENT)
Dept: PREADMISSION TESTING | Facility: HOSPITAL | Age: 68
Discharge: HOME | End: 2023-04-27
Attending: ORTHOPAEDIC SURGERY | Admitting: ORTHOPAEDIC SURGERY
Payer: MEDICARE

## 2023-04-27 VITALS — WEIGHT: 164 LBS | BODY MASS INDEX: 28 KG/M2 | HEIGHT: 64 IN

## 2023-04-27 DIAGNOSIS — M48.02 SPINAL STENOSIS, CERVICAL REGION: ICD-10-CM

## 2023-04-27 DIAGNOSIS — G56.01 CARPAL TUNNEL SYNDROME OF RIGHT WRIST: ICD-10-CM

## 2023-04-27 DIAGNOSIS — Z01.818 PRE-OP TESTING: ICD-10-CM

## 2023-04-27 DIAGNOSIS — M54.12 CERVICAL RADICULOPATHY: ICD-10-CM

## 2023-04-27 LAB
ABO + RH BLD: NORMAL
ALBUMIN SERPL BCP-MCNC: 4.2 G/DL (ref 3.5–5.2)
ALP SERPL-CCNC: 100 U/L (ref 55–135)
ALT SERPL W/O P-5'-P-CCNC: 11 U/L (ref 10–44)
ANION GAP SERPL CALC-SCNC: 9 MMOL/L (ref 8–16)
AST SERPL-CCNC: 16 U/L (ref 10–40)
BASOPHILS # BLD AUTO: 0.04 K/UL (ref 0–0.2)
BASOPHILS NFR BLD: 0.7 % (ref 0–1.9)
BILIRUB SERPL-MCNC: 0.8 MG/DL (ref 0.1–1)
BILIRUB UR QL STRIP: NEGATIVE
BLD GP AB SCN CELLS X3 SERPL QL: NORMAL
BUN SERPL-MCNC: 12 MG/DL (ref 8–23)
CALCIUM SERPL-MCNC: 10 MG/DL (ref 8.7–10.5)
CHLORIDE SERPL-SCNC: 103 MMOL/L (ref 95–110)
CLARITY UR: CLEAR
CO2 SERPL-SCNC: 28 MMOL/L (ref 23–29)
COLOR UR: YELLOW
CREAT SERPL-MCNC: 0.8 MG/DL (ref 0.5–1.4)
DIFFERENTIAL METHOD: ABNORMAL
EOSINOPHIL # BLD AUTO: 0.1 K/UL (ref 0–0.5)
EOSINOPHIL NFR BLD: 1.5 % (ref 0–8)
ERYTHROCYTE [DISTWIDTH] IN BLOOD BY AUTOMATED COUNT: 12.3 % (ref 11.5–14.5)
EST. GFR  (NO RACE VARIABLE): >60 ML/MIN/1.73 M^2
GLUCOSE SERPL-MCNC: 90 MG/DL (ref 70–110)
GLUCOSE UR QL STRIP: NEGATIVE
HCT VFR BLD AUTO: 43.6 % (ref 37–48.5)
HGB BLD-MCNC: 14.4 G/DL (ref 12–16)
HGB UR QL STRIP: NEGATIVE
IMM GRANULOCYTES # BLD AUTO: 0.01 K/UL (ref 0–0.04)
IMM GRANULOCYTES NFR BLD AUTO: 0.2 % (ref 0–0.5)
KETONES UR QL STRIP: NEGATIVE
LEUKOCYTE ESTERASE UR QL STRIP: NEGATIVE
LYMPHOCYTES # BLD AUTO: 1.5 K/UL (ref 1–4.8)
LYMPHOCYTES NFR BLD: 26.9 % (ref 18–48)
MCH RBC QN AUTO: 32.2 PG (ref 27–31)
MCHC RBC AUTO-ENTMCNC: 33 G/DL (ref 32–36)
MCV RBC AUTO: 98 FL (ref 82–98)
MONOCYTES # BLD AUTO: 0.5 K/UL (ref 0.3–1)
MONOCYTES NFR BLD: 8.8 % (ref 4–15)
NEUTROPHILS # BLD AUTO: 3.4 K/UL (ref 1.8–7.7)
NEUTROPHILS NFR BLD: 61.9 % (ref 38–73)
NITRITE UR QL STRIP: NEGATIVE
NRBC BLD-RTO: 0 /100 WBC
PH UR STRIP: 7 [PH] (ref 5–8)
PLATELET # BLD AUTO: 297 K/UL (ref 150–450)
PMV BLD AUTO: 9.8 FL (ref 9.2–12.9)
POTASSIUM SERPL-SCNC: 4.1 MMOL/L (ref 3.5–5.1)
PROT SERPL-MCNC: 7.5 G/DL (ref 6–8.4)
PROT UR QL STRIP: NEGATIVE
RBC # BLD AUTO: 4.47 M/UL (ref 4–5.4)
SODIUM SERPL-SCNC: 140 MMOL/L (ref 136–145)
SP GR UR STRIP: 1.01 (ref 1–1.03)
SPECIMEN OUTDATE: NORMAL
URN SPEC COLLECT METH UR: NORMAL
UROBILINOGEN UR STRIP-ACNC: NEGATIVE EU/DL
WBC # BLD AUTO: 5.46 K/UL (ref 3.9–12.7)

## 2023-04-27 PROCEDURE — 93010 EKG 12-LEAD: ICD-10-PCS | Mod: ,,, | Performed by: INTERNAL MEDICINE

## 2023-04-27 PROCEDURE — 71046 X-RAY EXAM CHEST 2 VIEWS: CPT | Mod: TC,FY

## 2023-04-27 PROCEDURE — 71046 XR CHEST PA AND LATERAL: ICD-10-PCS | Mod: 26,,, | Performed by: RADIOLOGY

## 2023-04-27 PROCEDURE — 80053 COMPREHEN METABOLIC PANEL: CPT | Performed by: ORTHOPAEDIC SURGERY

## 2023-04-27 PROCEDURE — 36415 COLL VENOUS BLD VENIPUNCTURE: CPT | Performed by: ORTHOPAEDIC SURGERY

## 2023-04-27 PROCEDURE — 81003 URINALYSIS AUTO W/O SCOPE: CPT | Performed by: ORTHOPAEDIC SURGERY

## 2023-04-27 PROCEDURE — 99900103 DSU ONLY-NO CHARGE-INITIAL HR (STAT)

## 2023-04-27 PROCEDURE — 86900 BLOOD TYPING SEROLOGIC ABO: CPT | Performed by: ORTHOPAEDIC SURGERY

## 2023-04-27 PROCEDURE — 93005 ELECTROCARDIOGRAM TRACING: CPT

## 2023-04-27 PROCEDURE — 71046 X-RAY EXAM CHEST 2 VIEWS: CPT | Mod: 26,,, | Performed by: RADIOLOGY

## 2023-04-27 PROCEDURE — 85025 COMPLETE CBC W/AUTO DIFF WBC: CPT | Performed by: ORTHOPAEDIC SURGERY

## 2023-04-27 PROCEDURE — 99900104 DSU ONLY-NO CHARGE-EA ADD'L HR (STAT)

## 2023-04-27 PROCEDURE — 93010 ELECTROCARDIOGRAM REPORT: CPT | Mod: ,,, | Performed by: INTERNAL MEDICINE

## 2023-04-27 NOTE — DISCHARGE INSTRUCTIONS
To confirm, Your doctor has instructed you that surgery is scheduled for: 5/4/23 with Dr. Martinez    Please report to [facility] the morning of surgery. You must check-in and receive a wristband before going to your procedure.    Pre-Op will call the afternoon prior to surgery between 1:00 and 6:00 PM with the final arrival time.  Phone number: [phone]    PLEASE NOTE:  The surgery schedule has many variables which may affect the time of your surgery case.  Family members should be available if your surgery time changes.  Plan to be here the day of your procedure between 4-6 hours.    MEDICATIONS:  TAKE ONLY THESE MEDICATIONS WITH A SMALL SIP OF WATER THE MORNING OF YOUR PROCEDURE:  AMLODIPINE, XANAX IF NEEDED, SYMBICORT, ASTELIN, FLONASE, FLUOXETINE, GABAPENTIN, HYDROCODONE OF NEEDED, ZOFRAN IF NEEDED    NO LOSARTAN/ HCTZ MORNING OF SURGERY BUT DO NOT STOP DAYS BEFORE.      DO NOT TAKE THESE MEDICATIONS 5-7 DAYS PRIOR to your procedure or per your surgeon's request:   ASPIRIN, ALEVE, ADVIL, IBUPROFEN, FISH OIL VITAMIN E, HERBALS, CELEBREX  (May take Tylenol)    ONLY if you are prescribed any types of blood thinners such as:  Aspirin, Coumadin, Plavix, Pradaxa, Xarelto, Aggrenox, Effient, Eliquis, Savasya, Brilinta, or any other, ask your surgeon whether you should stop taking them and how long before surgery you should stop.  You may also need to verify with the prescribing physician if it is ok to stop your medication.      INSTRUCTIONS IMPORTANT!!  Do not eat or drink anything between midnight and the time of your procedure- this includes gum, mints, and candy.  Do not smoke or drink alcoholic beverages 24 hours prior to your procedure.  Shower the night before AND the morning of your procedure with a Chlorhexidine wash such as Hibiclens or Dial antibacterial soap from the neck down.  Do not get it on your face or in your eyes.  You may use your own shampoo and face wash.  This helps your skin to be as bacteria free as possible.    If you wear contact lenses, dentures, hearing aids or glasses, bring a container to put them in during surgery and give to a family member for safe keeping.  Please leave all jewelry, piercing's and valuables at home. You must remove your false eyelashes prior to surgery.    DO NOT remove hair from the surgery site.  Do not shave the incision site unless you are given specific instructions to do so.    ONLY if you have been diagnosed with sleep apnea please bring your C-PAP machine.  ONLY if you wear home oxygen please bring your portable oxygen tank the day of your procedure.  ONLY if you have a history of OPEN HEART SURGERY you will need a clearance from your Cardiologist per Anesthesia.      ONLY for patients requiring bowel prep, written instructions will be given by your doctor's office.  ONLY if you have a neuro stimulator, please bring the controller with you the morning of surgery  ONLY if a type and screen test is needed before surgery, please return:  If your doctor has scheduled you for an overnight stay, bring a small overnight bag with any personal items you need.  Make arrangements in advance for transportation home by a responsible adult.  It is not safe to drive a vehicle during the 24 hours after anesthesia.      [facility] Visitor Policy    Effective September 26, 2022    [facility] will resume routine visitation for COVID-19 negative patients, including inpatients, outpatients, and procedural areas, in accordance with local campus procedures.    All [facility] facilities and properties are tobacco free.  Smoking is NOT allowed.   If you have any questions about these instructions, call Pre-Op Admit  Nursing at [phone] or the Pre-Op Day Surgery Unit at [phone].

## 2023-05-02 ENCOUNTER — TELEPHONE (OUTPATIENT)
Dept: ORTHOPEDICS | Facility: CLINIC | Age: 68
End: 2023-05-02

## 2023-05-02 RX ORDER — HYDROCODONE BITARTRATE AND ACETAMINOPHEN 5; 325 MG/1; MG/1
1 TABLET ORAL EVERY 6 HOURS PRN
Qty: 6 TABLET | Refills: 0 | Status: ON HOLD | OUTPATIENT
Start: 2023-05-02 | End: 2023-05-04 | Stop reason: HOSPADM

## 2023-05-02 NOTE — TELEPHONE ENCOUNTER
----- Message from Mariano Card sent at 5/2/2023  2:37 PM CDT -----  Phone #: [phone]  Patient is requesting a refill of Norco  5-325mg              Pharmacy:   [facility] [address]  [address] [address] [address]  Phone: [phone] Fax: [phone]

## 2023-05-03 ENCOUNTER — ANESTHESIA EVENT (OUTPATIENT)
Dept: SURGERY | Facility: HOSPITAL | Age: 68
End: 2023-05-03
Payer: MEDICARE

## 2023-05-04 ENCOUNTER — ANESTHESIA (OUTPATIENT)
Dept: SURGERY | Facility: HOSPITAL | Age: 68
End: 2023-05-04
Payer: MEDICARE

## 2023-05-04 ENCOUNTER — HOSPITAL ENCOUNTER (OUTPATIENT)
Facility: HOSPITAL | Age: 68
Discharge: HOME | End: 2023-05-05
Attending: ORTHOPAEDIC SURGERY | Admitting: ORTHOPAEDIC SURGERY
Payer: MEDICARE

## 2023-05-04 DIAGNOSIS — M48.02 CERVICAL SPINAL STENOSIS: ICD-10-CM

## 2023-05-04 DIAGNOSIS — M48.02 SPINAL STENOSIS, CERVICAL REGION: ICD-10-CM

## 2023-05-04 DIAGNOSIS — G56.01 CARPAL TUNNEL SYNDROME OF RIGHT WRIST: ICD-10-CM

## 2023-05-04 DIAGNOSIS — M54.12 CERVICAL RADICULOPATHY: ICD-10-CM

## 2023-05-04 LAB
ANION GAP SERPL CALC-SCNC: 10 MMOL/L (ref 8–16)
BASOPHILS # BLD AUTO: 0.03 K/UL (ref 0–0.2)
BASOPHILS NFR BLD: 0.5 % (ref 0–1.9)
BUN SERPL-MCNC: 11 MG/DL (ref 8–23)
CALCIUM SERPL-MCNC: 8.7 MG/DL (ref 8.7–10.5)
CHLORIDE SERPL-SCNC: 103 MMOL/L (ref 95–110)
CO2 SERPL-SCNC: 26 MMOL/L (ref 23–29)
CREAT SERPL-MCNC: 1 MG/DL (ref 0.5–1.4)
DIFFERENTIAL METHOD: ABNORMAL
EOSINOPHIL # BLD AUTO: 0 K/UL (ref 0–0.5)
EOSINOPHIL NFR BLD: 0.5 % (ref 0–8)
ERYTHROCYTE [DISTWIDTH] IN BLOOD BY AUTOMATED COUNT: 12.3 % (ref 11.5–14.5)
EST. GFR  (NO RACE VARIABLE): >60 ML/MIN/1.73 M^2
GLUCOSE SERPL-MCNC: 122 MG/DL (ref 70–110)
HCT VFR BLD AUTO: 42.8 % (ref 37–48.5)
HGB BLD-MCNC: 14 G/DL (ref 12–16)
IMM GRANULOCYTES # BLD AUTO: 0.03 K/UL (ref 0–0.04)
IMM GRANULOCYTES NFR BLD AUTO: 0.5 % (ref 0–0.5)
LYMPHOCYTES # BLD AUTO: 0.7 K/UL (ref 1–4.8)
LYMPHOCYTES NFR BLD: 10.9 % (ref 18–48)
MCH RBC QN AUTO: 32.4 PG (ref 27–31)
MCHC RBC AUTO-ENTMCNC: 32.7 G/DL (ref 32–36)
MCV RBC AUTO: 99 FL (ref 82–98)
MONOCYTES # BLD AUTO: 0.2 K/UL (ref 0.3–1)
MONOCYTES NFR BLD: 2.4 % (ref 4–15)
NEUTROPHILS # BLD AUTO: 5.4 K/UL (ref 1.8–7.7)
NEUTROPHILS NFR BLD: 85.2 % (ref 38–73)
NRBC BLD-RTO: 0 /100 WBC
PLATELET # BLD AUTO: 255 K/UL (ref 150–450)
PMV BLD AUTO: 9.4 FL (ref 9.2–12.9)
POTASSIUM SERPL-SCNC: 4.3 MMOL/L (ref 3.5–5.1)
RBC # BLD AUTO: 4.32 M/UL (ref 4–5.4)
SODIUM SERPL-SCNC: 139 MMOL/L (ref 136–145)
WBC # BLD AUTO: 6.34 K/UL (ref 3.9–12.7)

## 2023-05-04 PROCEDURE — 63600175 PHARM REV CODE 636 W HCPCS: Performed by: NURSE ANESTHETIST, CERTIFIED REGISTERED

## 2023-05-04 PROCEDURE — 63600175 PHARM REV CODE 636 W HCPCS: Performed by: ORTHOPAEDIC SURGERY

## 2023-05-04 PROCEDURE — D9220A PRA ANESTHESIA: ICD-10-PCS | Mod: CRNA,,, | Performed by: NURSE ANESTHETIST, CERTIFIED REGISTERED

## 2023-05-04 PROCEDURE — D9220A PRA ANESTHESIA: Mod: CRNA,,, | Performed by: NURSE ANESTHETIST, CERTIFIED REGISTERED

## 2023-05-04 PROCEDURE — 36000710: Performed by: ORTHOPAEDIC SURGERY

## 2023-05-04 PROCEDURE — 25000003 PHARM REV CODE 250: Performed by: NURSE ANESTHETIST, CERTIFIED REGISTERED

## 2023-05-04 PROCEDURE — D9220A PRA ANESTHESIA: ICD-10-PCS | Mod: ANES,,, | Performed by: ANESTHESIOLOGY

## 2023-05-04 PROCEDURE — C1713 ANCHOR/SCREW BN/BN,TIS/BN: HCPCS | Performed by: ORTHOPAEDIC SURGERY

## 2023-05-04 PROCEDURE — 71000039 HC RECOVERY, EACH ADD'L HOUR: Performed by: ORTHOPAEDIC SURGERY

## 2023-05-04 PROCEDURE — 88311 DECALCIFY TISSUE: CPT | Mod: 26,,, | Performed by: PATHOLOGY

## 2023-05-04 PROCEDURE — 99900103 DSU ONLY-NO CHARGE-INITIAL HR (STAT): Performed by: ORTHOPAEDIC SURGERY

## 2023-05-04 PROCEDURE — 94799 UNLISTED PULMONARY SVC/PX: CPT

## 2023-05-04 PROCEDURE — 25000242 PHARM REV CODE 250 ALT 637 W/ HCPCS: Performed by: ORTHOPAEDIC SURGERY

## 2023-05-04 PROCEDURE — 63600175 PHARM REV CODE 636 W HCPCS: Performed by: ANESTHESIOLOGY

## 2023-05-04 PROCEDURE — 37000008 HC ANESTHESIA 1ST 15 MINUTES: Performed by: ORTHOPAEDIC SURGERY

## 2023-05-04 PROCEDURE — 25000003 PHARM REV CODE 250: Performed by: ANESTHESIOLOGY

## 2023-05-04 PROCEDURE — 88304 TISSUE EXAM BY PATHOLOGIST: CPT | Mod: 26,,, | Performed by: PATHOLOGY

## 2023-05-04 PROCEDURE — 71000033 HC RECOVERY, INTIAL HOUR: Performed by: ORTHOPAEDIC SURGERY

## 2023-05-04 PROCEDURE — D9220A PRA ANESTHESIA: Mod: ANES,,, | Performed by: ANESTHESIOLOGY

## 2023-05-04 PROCEDURE — 36415 COLL VENOUS BLD VENIPUNCTURE: CPT | Performed by: ORTHOPAEDIC SURGERY

## 2023-05-04 PROCEDURE — 27000221 HC OXYGEN, UP TO 24 HOURS

## 2023-05-04 PROCEDURE — 99900035 HC TECH TIME PER 15 MIN (STAT)

## 2023-05-04 PROCEDURE — 99900104 DSU ONLY-NO CHARGE-EA ADD'L HR (STAT): Performed by: ORTHOPAEDIC SURGERY

## 2023-05-04 PROCEDURE — 80048 BASIC METABOLIC PNL TOTAL CA: CPT | Performed by: ORTHOPAEDIC SURGERY

## 2023-05-04 PROCEDURE — 25000003 PHARM REV CODE 250: Performed by: ORTHOPAEDIC SURGERY

## 2023-05-04 PROCEDURE — 88304 PR  SURG PATH,LEVEL III: ICD-10-PCS | Mod: 26,,, | Performed by: PATHOLOGY

## 2023-05-04 PROCEDURE — 88311 PR  DECALCIFY TISSUE: ICD-10-PCS | Mod: 26,,, | Performed by: PATHOLOGY

## 2023-05-04 PROCEDURE — 94761 N-INVAS EAR/PLS OXIMETRY MLT: CPT

## 2023-05-04 PROCEDURE — 85025 COMPLETE CBC W/AUTO DIFF WBC: CPT | Performed by: ORTHOPAEDIC SURGERY

## 2023-05-04 PROCEDURE — 27800903 OPTIME MED/SURG SUP & DEVICES OTHER IMPLANTS: Performed by: ORTHOPAEDIC SURGERY

## 2023-05-04 PROCEDURE — 36000711: Performed by: ORTHOPAEDIC SURGERY

## 2023-05-04 PROCEDURE — 88304 TISSUE EXAM BY PATHOLOGIST: CPT | Performed by: PATHOLOGY

## 2023-05-04 PROCEDURE — C1729 CATH, DRAINAGE: HCPCS | Performed by: ORTHOPAEDIC SURGERY

## 2023-05-04 PROCEDURE — 37000009 HC ANESTHESIA EA ADD 15 MINS: Performed by: ORTHOPAEDIC SURGERY

## 2023-05-04 DEVICE — PUTTY GRAFTON DBF 6CC: Type: IMPLANTABLE DEVICE | Site: SPINE CERVICAL | Status: FUNCTIONAL

## 2023-05-04 DEVICE — CAGE 5030741 ANATOMIC PTC 14X11X7MM
Type: IMPLANTABLE DEVICE | Site: SPINE CERVICAL | Status: FUNCTIONAL
Brand: ANATOMIC PEEK PTC CERVICAL FUSION SYSTEM

## 2023-05-04 DEVICE — SCREW ATLANTIS VASD 4MM X14MM: Type: IMPLANTABLE DEVICE | Site: SPINE CERVICAL | Status: FUNCTIONAL

## 2023-05-04 RX ORDER — HYDROMORPHONE HYDROCHLORIDE 1 MG/ML
1 INJECTION, SOLUTION INTRAMUSCULAR; INTRAVENOUS; SUBCUTANEOUS
Status: DISCONTINUED | OUTPATIENT
Start: 2023-05-04 | End: 2023-05-05 | Stop reason: HOSPADM

## 2023-05-04 RX ORDER — FENTANYL CITRATE 50 UG/ML
INJECTION, SOLUTION INTRAMUSCULAR; INTRAVENOUS
Status: DISCONTINUED | OUTPATIENT
Start: 2023-05-04 | End: 2023-05-04

## 2023-05-04 RX ORDER — CEFAZOLIN SODIUM 2 G/50ML
2 SOLUTION INTRAVENOUS
Status: COMPLETED | OUTPATIENT
Start: 2023-05-04 | End: 2023-05-04

## 2023-05-04 RX ORDER — PROCHLORPERAZINE EDISYLATE 5 MG/ML
5 INJECTION INTRAMUSCULAR; INTRAVENOUS EVERY 6 HOURS PRN
Status: DISCONTINUED | OUTPATIENT
Start: 2023-05-04 | End: 2023-05-05

## 2023-05-04 RX ORDER — PROPOFOL 10 MG/ML
VIAL (ML) INTRAVENOUS
Status: DISCONTINUED | OUTPATIENT
Start: 2023-05-04 | End: 2023-05-04

## 2023-05-04 RX ORDER — LIDOCAINE HYDROCHLORIDE 10 MG/ML
1 INJECTION, SOLUTION EPIDURAL; INFILTRATION; INTRACAUDAL; PERINEURAL ONCE
Status: COMPLETED | OUTPATIENT
Start: 2023-05-04 | End: 2023-05-04

## 2023-05-04 RX ORDER — MIDAZOLAM HYDROCHLORIDE 1 MG/ML
INJECTION INTRAMUSCULAR; INTRAVENOUS
Status: DISCONTINUED | OUTPATIENT
Start: 2023-05-04 | End: 2023-05-04

## 2023-05-04 RX ORDER — ROCURONIUM BROMIDE 10 MG/ML
INJECTION, SOLUTION INTRAVENOUS
Status: DISCONTINUED | OUTPATIENT
Start: 2023-05-04 | End: 2023-05-04

## 2023-05-04 RX ORDER — MAG HYDROX/ALUMINUM HYD/SIMETH 200-200-20
30 SUSPENSION, ORAL (FINAL DOSE FORM) ORAL EVERY 4 HOURS PRN
Status: DISCONTINUED | OUTPATIENT
Start: 2023-05-04 | End: 2023-05-05 | Stop reason: HOSPADM

## 2023-05-04 RX ORDER — METHYLPREDNISOLONE ACETATE 80 MG/ML
INJECTION, SUSPENSION INTRA-ARTICULAR; INTRALESIONAL; INTRAMUSCULAR; SOFT TISSUE
Status: DISCONTINUED | OUTPATIENT
Start: 2023-05-04 | End: 2023-05-04 | Stop reason: HOSPADM

## 2023-05-04 RX ORDER — TALC
9 POWDER (GRAM) TOPICAL NIGHTLY PRN
Status: DISCONTINUED | OUTPATIENT
Start: 2023-05-04 | End: 2023-05-05 | Stop reason: HOSPADM

## 2023-05-04 RX ORDER — LOSARTAN POTASSIUM AND HYDROCHLOROTHIAZIDE 12.5; 5 MG/1; MG/1
2 TABLET ORAL DAILY
Status: DISCONTINUED | OUTPATIENT
Start: 2023-05-04 | End: 2023-05-05 | Stop reason: HOSPADM

## 2023-05-04 RX ORDER — OXYCODONE HYDROCHLORIDE 5 MG/1
5 TABLET ORAL EVERY 4 HOURS PRN
Status: DISCONTINUED | OUTPATIENT
Start: 2023-05-04 | End: 2023-05-05 | Stop reason: HOSPADM

## 2023-05-04 RX ORDER — MUPIROCIN 20 MG/G
1 OINTMENT TOPICAL 2 TIMES DAILY
Status: DISCONTINUED | OUTPATIENT
Start: 2023-05-04 | End: 2023-05-05 | Stop reason: HOSPADM

## 2023-05-04 RX ORDER — GABAPENTIN 300 MG/1
600 CAPSULE ORAL 3 TIMES DAILY
Status: DISCONTINUED | OUTPATIENT
Start: 2023-05-04 | End: 2023-05-05 | Stop reason: HOSPADM

## 2023-05-04 RX ORDER — NALOXONE HCL 0.4 MG/ML
0.02 VIAL (ML) INJECTION ONCE
Status: DISCONTINUED | OUTPATIENT
Start: 2023-05-04 | End: 2023-05-05 | Stop reason: HOSPADM

## 2023-05-04 RX ORDER — BUDESONIDE AND FORMOTEROL FUMARATE DIHYDRATE 160; 4.5 UG/1; UG/1
2 AEROSOL RESPIRATORY (INHALATION) EVERY 12 HOURS
Status: DISCONTINUED | OUTPATIENT
Start: 2023-05-04 | End: 2023-05-05

## 2023-05-04 RX ORDER — SODIUM CHLORIDE, SODIUM LACTATE, POTASSIUM CHLORIDE, CALCIUM CHLORIDE 600; 310; 30; 20 MG/100ML; MG/100ML; MG/100ML; MG/100ML
INJECTION, SOLUTION INTRAVENOUS CONTINUOUS
Status: DISCONTINUED | OUTPATIENT
Start: 2023-05-04 | End: 2023-05-04

## 2023-05-04 RX ORDER — DIPHENHYDRAMINE HYDROCHLORIDE 50 MG/ML
12.5 INJECTION INTRAMUSCULAR; INTRAVENOUS EVERY 4 HOURS PRN
Status: DISCONTINUED | OUTPATIENT
Start: 2023-05-04 | End: 2023-05-05 | Stop reason: HOSPADM

## 2023-05-04 RX ORDER — OXYCODONE AND ACETAMINOPHEN 10; 325 MG/1; MG/1
1 TABLET ORAL EVERY 4 HOURS PRN
Qty: 40 TABLET | Refills: 0 | Status: SHIPPED | OUTPATIENT
Start: 2023-05-04 | End: 2023-05-08 | Stop reason: SDUPTHER

## 2023-05-04 RX ORDER — METOCLOPRAMIDE HYDROCHLORIDE 5 MG/ML
10 INJECTION INTRAMUSCULAR; INTRAVENOUS EVERY 10 MIN PRN
Status: DISCONTINUED | OUTPATIENT
Start: 2023-05-04 | End: 2023-05-04

## 2023-05-04 RX ORDER — HYDROMORPHONE HCL 2 MG/ML
VIAL (ML) INJECTION
Status: DISCONTINUED | OUTPATIENT
Start: 2023-05-04 | End: 2023-05-04

## 2023-05-04 RX ORDER — SODIUM CHLORIDE, SODIUM LACTATE, POTASSIUM CHLORIDE, CALCIUM CHLORIDE 600; 310; 30; 20 MG/100ML; MG/100ML; MG/100ML; MG/100ML
INJECTION, SOLUTION INTRAVENOUS CONTINUOUS
Status: DISCONTINUED | OUTPATIENT
Start: 2023-05-04 | End: 2023-05-05 | Stop reason: HOSPADM

## 2023-05-04 RX ORDER — BISACODYL 10 MG
10 SUPPOSITORY, RECTAL RECTAL DAILY
Status: DISCONTINUED | OUTPATIENT
Start: 2023-05-05 | End: 2023-05-05 | Stop reason: HOSPADM

## 2023-05-04 RX ORDER — DOCUSATE SODIUM 100 MG/1
100 CAPSULE, LIQUID FILLED ORAL DAILY
Status: DISCONTINUED | OUTPATIENT
Start: 2023-05-05 | End: 2023-05-05 | Stop reason: HOSPADM

## 2023-05-04 RX ORDER — ACETAMINOPHEN 10 MG/ML
INJECTION, SOLUTION INTRAVENOUS
Status: DISCONTINUED | OUTPATIENT
Start: 2023-05-04 | End: 2023-05-04

## 2023-05-04 RX ORDER — ONDANSETRON 4 MG/1
8 TABLET, ORALLY DISINTEGRATING ORAL EVERY 6 HOURS PRN
Status: DISCONTINUED | OUTPATIENT
Start: 2023-05-04 | End: 2023-05-05 | Stop reason: HOSPADM

## 2023-05-04 RX ORDER — ACETAMINOPHEN 325 MG/1
650 TABLET ORAL EVERY 6 HOURS PRN
Status: DISCONTINUED | OUTPATIENT
Start: 2023-05-05 | End: 2023-05-05 | Stop reason: HOSPADM

## 2023-05-04 RX ORDER — AMLODIPINE BESYLATE 5 MG/1
5 TABLET ORAL DAILY
Status: DISCONTINUED | OUTPATIENT
Start: 2023-05-05 | End: 2023-05-05 | Stop reason: HOSPADM

## 2023-05-04 RX ORDER — ONDANSETRON HYDROCHLORIDE 2 MG/ML
INJECTION, SOLUTION INTRAMUSCULAR; INTRAVENOUS
Status: DISCONTINUED | OUTPATIENT
Start: 2023-05-04 | End: 2023-05-04

## 2023-05-04 RX ORDER — PROPOFOL 10 MG/ML
VIAL (ML) INTRAVENOUS CONTINUOUS PRN
Status: DISCONTINUED | OUTPATIENT
Start: 2023-05-04 | End: 2023-05-04

## 2023-05-04 RX ORDER — CEFAZOLIN SODIUM 2 G/50ML
2 SOLUTION INTRAVENOUS
Status: COMPLETED | OUTPATIENT
Start: 2023-05-04 | End: 2023-05-05

## 2023-05-04 RX ORDER — HYDROMORPHONE HYDROCHLORIDE 2 MG/ML
2 INJECTION, SOLUTION INTRAMUSCULAR; INTRAVENOUS; SUBCUTANEOUS
Status: DISCONTINUED | OUTPATIENT
Start: 2023-05-04 | End: 2023-05-05 | Stop reason: HOSPADM

## 2023-05-04 RX ORDER — HYDROMORPHONE HYDROCHLORIDE 2 MG/ML
0.2 INJECTION, SOLUTION INTRAMUSCULAR; INTRAVENOUS; SUBCUTANEOUS EVERY 5 MIN PRN
Status: COMPLETED | OUTPATIENT
Start: 2023-05-04 | End: 2023-05-04

## 2023-05-04 RX ORDER — OXYCODONE HYDROCHLORIDE 10 MG/1
10 TABLET ORAL EVERY 4 HOURS PRN
Status: DISCONTINUED | OUTPATIENT
Start: 2023-05-04 | End: 2023-05-05 | Stop reason: HOSPADM

## 2023-05-04 RX ORDER — DIPHENHYDRAMINE HCL 25 MG
50 CAPSULE ORAL EVERY 6 HOURS PRN
Status: DISCONTINUED | OUTPATIENT
Start: 2023-05-04 | End: 2023-05-05 | Stop reason: HOSPADM

## 2023-05-04 RX ORDER — AMOXICILLIN 250 MG
2 CAPSULE ORAL NIGHTLY PRN
Status: DISCONTINUED | OUTPATIENT
Start: 2023-05-04 | End: 2023-05-05 | Stop reason: HOSPADM

## 2023-05-04 RX ORDER — DEXMEDETOMIDINE HYDROCHLORIDE 100 UG/ML
INJECTION, SOLUTION INTRAVENOUS
Status: DISCONTINUED | OUTPATIENT
Start: 2023-05-04 | End: 2023-05-19 | Stop reason: HOSPADM

## 2023-05-04 RX ORDER — LIDOCAINE HYDROCHLORIDE 20 MG/ML
INJECTION INTRAVENOUS
Status: DISCONTINUED | OUTPATIENT
Start: 2023-05-04 | End: 2023-05-04

## 2023-05-04 RX ORDER — HYDROMORPHONE HCL IN 0.9% NACL 6 MG/30 ML
PATIENT CONTROLLED ANALGESIA SYRINGE INTRAVENOUS CONTINUOUS
Status: DISCONTINUED | OUTPATIENT
Start: 2023-05-04 | End: 2023-05-04

## 2023-05-04 RX ORDER — DEXAMETHASONE SODIUM PHOSPHATE 4 MG/ML
INJECTION, SOLUTION INTRA-ARTICULAR; INTRALESIONAL; INTRAMUSCULAR; INTRAVENOUS; SOFT TISSUE
Status: DISCONTINUED | OUTPATIENT
Start: 2023-05-04 | End: 2023-05-04

## 2023-05-04 RX ORDER — SUCCINYLCHOLINE CHLORIDE 20 MG/ML
INJECTION INTRAMUSCULAR; INTRAVENOUS
Status: DISCONTINUED | OUTPATIENT
Start: 2023-05-04 | End: 2023-05-04

## 2023-05-04 RX ORDER — OXYCODONE HYDROCHLORIDE 5 MG/1
5 TABLET ORAL ONCE AS NEEDED
Status: COMPLETED | OUTPATIENT
Start: 2023-05-04 | End: 2023-05-04

## 2023-05-04 RX ORDER — FLUOXETINE HYDROCHLORIDE 20 MG/1
40 CAPSULE ORAL 2 TIMES DAILY
Status: DISCONTINUED | OUTPATIENT
Start: 2023-05-04 | End: 2023-05-05 | Stop reason: HOSPADM

## 2023-05-04 RX ORDER — ONDANSETRON 4 MG/1
8 TABLET, ORALLY DISINTEGRATING ORAL EVERY 8 HOURS PRN
Status: DISCONTINUED | OUTPATIENT
Start: 2023-05-04 | End: 2023-05-04 | Stop reason: SDUPTHER

## 2023-05-04 RX ADMIN — SODIUM CHLORIDE, SODIUM GLUCONATE, SODIUM ACETATE, POTASSIUM CHLORIDE AND MAGNESIUM CHLORIDE: 526; 502; 368; 37; 30 INJECTION, SOLUTION INTRAVENOUS at 12:05

## 2023-05-04 RX ADMIN — HYDROMORPHONE HYDROCHLORIDE 0.2 MG: 2 INJECTION, SOLUTION INTRAMUSCULAR; INTRAVENOUS; SUBCUTANEOUS at 01:05

## 2023-05-04 RX ADMIN — HYDROMORPHONE HYDROCHLORIDE: 2 INJECTION, SOLUTION INTRAMUSCULAR; INTRAVENOUS; SUBCUTANEOUS at 02:05

## 2023-05-04 RX ADMIN — GLYCOPYRROLATE 0.2 MG: 0.2 INJECTION, SOLUTION INTRAMUSCULAR; INTRAVITREAL at 11:05

## 2023-05-04 RX ADMIN — SUCCINYLCHOLINE CHLORIDE 120 MG: 20 INJECTION, SOLUTION INTRAMUSCULAR; INTRAVENOUS at 11:05

## 2023-05-04 RX ADMIN — ONDANSETRON 4 MG: 2 INJECTION INTRAMUSCULAR; INTRAVENOUS at 11:05

## 2023-05-04 RX ADMIN — HYDROMORPHONE HYDROCHLORIDE 0.5 MG: 2 INJECTION, SOLUTION INTRAMUSCULAR; INTRAVENOUS; SUBCUTANEOUS at 12:05

## 2023-05-04 RX ADMIN — OXYCODONE HYDROCHLORIDE 5 MG: 5 TABLET ORAL at 01:05

## 2023-05-04 RX ADMIN — HYDROMORPHONE HYDROCHLORIDE 0.2 MG: 2 INJECTION, SOLUTION INTRAMUSCULAR; INTRAVENOUS; SUBCUTANEOUS at 02:05

## 2023-05-04 RX ADMIN — LIDOCAINE HYDROCHLORIDE 80 MG: 20 INJECTION, SOLUTION INTRAVENOUS at 11:05

## 2023-05-04 RX ADMIN — LIDOCAINE HYDROCHLORIDE 10 MG: 10 INJECTION, SOLUTION EPIDURAL; INFILTRATION; INTRACAUDAL; PERINEURAL at 08:05

## 2023-05-04 RX ADMIN — ACETAMINOPHEN 1000 MG: 10 INJECTION, SOLUTION INTRAVENOUS at 11:05

## 2023-05-04 RX ADMIN — PROPOFOL 150 MG: 10 INJECTION, EMULSION INTRAVENOUS at 11:05

## 2023-05-04 RX ADMIN — HYDROMORPHONE HYDROCHLORIDE 1 MG: 2 INJECTION, SOLUTION INTRAMUSCULAR; INTRAVENOUS; SUBCUTANEOUS at 11:05

## 2023-05-04 RX ADMIN — FLUOXETINE HYDROCHLORIDE 40 MG: 20 CAPSULE ORAL at 08:05

## 2023-05-04 RX ADMIN — BUDESONIDE AND FORMOTEROL FUMARATE DIHYDRATE 2 PUFF: 160; 4.5 AEROSOL RESPIRATORY (INHALATION) at 08:05

## 2023-05-04 RX ADMIN — DEXMEDETOMIDINE HYDROCHLORIDE 8 MCG: 100 INJECTION, SOLUTION INTRAVENOUS at 11:05

## 2023-05-04 RX ADMIN — CEFAZOLIN SODIUM 2 G: 2 SOLUTION INTRAVENOUS at 08:05

## 2023-05-04 RX ADMIN — ROCURONIUM BROMIDE 5 MG: 10 INJECTION, SOLUTION INTRAVENOUS at 11:05

## 2023-05-04 RX ADMIN — CEFAZOLIN SODIUM 2 G: 2 SOLUTION INTRAVENOUS at 11:05

## 2023-05-04 RX ADMIN — OXYCODONE HYDROCHLORIDE 10 MG: 10 TABLET ORAL at 09:05

## 2023-05-04 RX ADMIN — MIDAZOLAM HYDROCHLORIDE 2 MG: 1 INJECTION, SOLUTION INTRAMUSCULAR; INTRAVENOUS at 11:05

## 2023-05-04 RX ADMIN — FENTANYL CITRATE 100 MCG: 0.05 INJECTION, SOLUTION INTRAMUSCULAR; INTRAVENOUS at 11:05

## 2023-05-04 RX ADMIN — PHENYLEPHRINE HYDROCHLORIDE 25 MCG/MIN: 10 INJECTION INTRAVENOUS at 11:05

## 2023-05-04 RX ADMIN — SODIUM CHLORIDE, POTASSIUM CHLORIDE, SODIUM LACTATE AND CALCIUM CHLORIDE: 600; 310; 30; 20 INJECTION, SOLUTION INTRAVENOUS at 03:05

## 2023-05-04 RX ADMIN — MUPIROCIN 1 G: 20 OINTMENT TOPICAL at 08:05

## 2023-05-04 RX ADMIN — PROPOFOL 8 MCG/KG/MIN: 10 INJECTION, EMULSION INTRAVENOUS at 11:05

## 2023-05-04 RX ADMIN — HYDROMORPHONE HYDROCHLORIDE 0.5 MG: 2 INJECTION, SOLUTION INTRAMUSCULAR; INTRAVENOUS; SUBCUTANEOUS at 01:05

## 2023-05-04 RX ADMIN — SODIUM CHLORIDE, SODIUM GLUCONATE, SODIUM ACETATE, POTASSIUM CHLORIDE AND MAGNESIUM CHLORIDE: 526; 502; 368; 37; 30 INJECTION, SOLUTION INTRAVENOUS at 08:05

## 2023-05-04 RX ADMIN — DEXAMETHASONE SODIUM PHOSPHATE 8 MG: 4 INJECTION, SOLUTION INTRA-ARTICULAR; INTRALESIONAL; INTRAMUSCULAR; INTRAVENOUS; SOFT TISSUE at 11:05

## 2023-05-04 NOTE — SUBJECTIVE & OBJECTIVE
Past Medical History:   Diagnosis Date    Alcohol dependence     DDD (degenerative disc disease), lumbosacral     DJD (degenerative joint disease), lumbosacral     Encounter for blood transfusion     GERD (gastroesophageal reflux disease)     Gout     Hypercholesterolemia     Hypertension     NATHALIE (iron deficiency anemia)     questionable white coat hypertension    Kidney carcinoma, left 2014    Pneumonia     Renal cell carcinoma     Shingles 10/13/2012       Past Surgical History:   Procedure Laterality Date    APPENDECTOMY      ARTHROSCOPY OF SHOULDER WITH DECOMPRESSION OF SUBACROMIAL SPACE Left 10/31/2019    Procedure: ARTHROSCOPY, SHOULDER, WITH SUBACROMIAL SPACE DECOMPRESSION;  Surgeon: Ruben Martinez MD;  Location: [facility] OR;  Service: Orthopedics;  Laterality: Left;    BLADDER REPAIR      x 2    COLONOSCOPY  9/2011    DISTAL CLAVICLE EXCISION Left 10/31/2019    Procedure: EXCISION, CLAVICLE, DISTAL;  Surgeon: Ruben Martinez MD;  Location: [facility] OR;  Service: Orthopedics;  Laterality: Left;    EPIDURAL STEROID INJECTION INTO LUMBAR SPINE N/A 12/7/2020    Procedure: Injection-steroid-epidural-lumbar;  Surgeon: Dk Rosales MD;  Location: [facility] OR;  Service: Pain Management;  Laterality: N/A;  L4-L5    EPIDURAL STEROID INJECTION INTO LUMBAR SPINE N/A 5/17/2021    Procedure: Injection-steroid-epidural-lumbar;  Surgeon: Dk Rosales MD;  Location: [facility] OR;  Service: Pain Management;  Laterality: N/A;  L4-L5    ESOPHAGOGASTRODUODENOSCOPY  9/2011    EYE SURGERY      lasix bilateral    FIXATION KYPHOPLASTY N/A 1/31/2020    Procedure: Kyphoplasty T12;  Surgeon: Dk Rosales MD;  Location: [facility] OR;  Service: Pain Management;  Laterality: N/A;    HERNIA REPAIR      hiatal hernai    HYSTERECTOMY      INJECTION OF ANESTHETIC AGENT AROUND NERVE Left 6/8/2020    Procedure: Block, Nerve;  Surgeon: Dk Rosales MD;  Location: [facility] OR;  Service: Pain Management;  Laterality: Left;  left genicular nerve block      KNEE ARTHROPLASTY Left 7/30/2020    Procedure: ARTHROPLASTY, KNEE LEFT;  Surgeon: Ruben Martinez MD;  Location: [facility] OR;  Service: Orthopedics;  Laterality: Left;  REP VALERY RUBY    KNEE ARTHROSCOPY W/ MENISCECTOMY Left 1/16/2020    Procedure: ARTHROSCOPY, KNEE, WITH MEDIAL MENISCECTOMY;  Surgeon: Ruben Martinez MD;  Location: [facility] OR;  Service: Orthopedics;  Laterality: Left;    LAPAROSCOPIC NISSEN FUNDOPLICATION      NEPHRECTOMY      LT partial    RADIOFREQUENCY ABLATION Left 6/19/2020    Procedure: Radiofrequency Ablation;  Surgeon: Dk Rosales MD;  Location: [facility] OR;  Service: Pain Management;  Laterality: Left;    RADIOFREQUENCY ABLATION OF LUMBAR MEDIAL BRANCH NERVE AT SINGLE LEVEL Bilateral 2/28/2020    Procedure: Radiofrequency Ablation, Nerve, Spinal, Lumbar, Medial Branch, 1 Level;  Surgeon: Dk Rosales MD;  Location: [facility] OR;  Service: Pain Management;  Laterality: Bilateral;  L3,L4,L5 - Burned at 80 degrees C. for 60 seconds x 2 each site    RADIOFREQUENCY ABLATION OF LUMBAR MEDIAL BRANCH NERVE AT SINGLE LEVEL Bilateral 10/19/2020    Procedure: Radiofrequency Ablation, Nerve, Spinal, Lumbar, Medial Branch, 1 Level;  Surgeon: Dk Rosales MD;  Location: [facility] OR;  Service: Pain Management;  Laterality: Bilateral;  L3, L4, L5    REFRACTIVE SURGERY      ROTATOR CUFF REPAIR Left 10/31/2019    Procedure: REPAIR, ROTATOR CUFF;  Surgeon: Ruben Martinez MD;  Location: [facility] OR;  Service: Orthopedics;  Laterality: Left;  arthrex    SKIN BIOPSY      TOTAL ABDOMINAL HYSTERECTOMY W/ BILATERAL SALPINGOOPHORECTOMY  age 48    TRANSFORAMINAL EPIDURAL INJECTION OF STEROID Bilateral 7/30/2021    Procedure: Injection,steroid,epidural,transforaminal approach;  Surgeon: Dk Rosales MD;  Location: [facility] OR;  Service: Pain Management;  Laterality: Bilateral;  L5-S1     TRANSFORAMINAL EPIDURAL INJECTION OF STEROID Bilateral 3/28/2022    Procedure: INJECTION, STEROID, EPIDURAL, TRANSFORAMINAL APPROACH Lumbar  "L5-S1;  Surgeon: Dk Rosales MD;  Location: [facility] OR;  Service: Pain Management;  Laterality: Bilateral;       Review of patient's allergies indicates:   Allergen Reactions    Phenergan [promethazine] Hives and Rash    Latex, natural rubber Hives     Per pt report-many years    Morphine Hives    Nsaids (non-steroidal anti-inflammatory drug)      Due to "kidney cancer"       Current Facility-Administered Medications on File Prior to Encounter   Medication    electrolyte-S (ISOLYTE)    lactated ringers infusion    pregabalin capsule 75 mg     Current Outpatient Medications on File Prior to Encounter   Medication Sig    amLODIPine (NORVASC) 5 MG tablet Take 1 tablet by mouth once daily    azelastine (ASTELIN) 137 mcg (0.1 %) nasal spray Use 1 spray(s) in each nostril twice daily    budesonide-formoterol 160-4.5 mcg (SYMBICORT) 160-4.5 mcg/actuation HFAA Inhale 2 puffs into the lungs every 12 (twelve) hours. Controller    celecoxib (CELEBREX) 200 MG capsule Take 1 capsule by mouth once daily    cholecalciferol, vitamin D3, (VITAMIN D3 ORAL) Take by mouth once daily.    FLUoxetine 40 MG capsule Take 1 capsule by mouth twice daily    fluticasone (FLONASE) 50 mcg/actuation nasal spray 1 spray by Nasal route daily as needed.     losartan-hydrochlorothiazide 100-25 mg (HYZAAR) 100-25 mg per tablet Take 1 tablet by mouth once daily    magnesium 30 mg Tab Take by mouth once.    MULTIVIT-IRON-MIN-FOLIC ACID 3,500-18-0.4 UNIT-MG-MG ORAL CHEW Take by mouth once daily.    polyethylene glycol (GLYCOLAX) 17 gram/dose powder Take 17 g by mouth once daily.    zolpidem (AMBIEN) 10 mg Tab Take 1 tablet (10 mg total) by mouth nightly as needed.    ondansetron (ZOFRAN) 4 MG tablet Take 1 tablet (4 mg total) by mouth as needed for Nausea.    [DISCONTINUED] alendronate (FOSAMAX) 70 MG tablet Take 70 mg by mouth every 7 days.    [DISCONTINUED] calcium carbonate (OS-TERI) 600 mg calcium (1,500 mg) Tab Take 600 mg by mouth once.    " [DISCONTINUED] tiZANidine (ZANAFLEX) 4 MG tablet TAKE 1 TABLET BY MOUTH EVERY 8 HOURS AS NEEDED (Patient not taking: Reported on 2023)     Family History       Problem Relation (Age of Onset)    Hypertension Father          Tobacco Use    Smoking status: Former     Packs/day: 1.00     Years: 40.00     Pack years: 40.00     Types: Cigarettes     Quit date: 2018     Years since quittin.3    Smokeless tobacco: Never    Tobacco comments:     smokes 2-3 cigarettes a night   Substance and Sexual Activity    Alcohol use: Yes     Alcohol/week: 12.0 standard drinks     Types: 6 Cans of beer, 6 Standard drinks or equivalent per week     Comment: 2-3 a day/ social    Drug use: No    Sexual activity: Yes     Partners: Male     Review of Systems   Constitutional:  Positive for fatigue. Negative for chills and fever.   HENT:  Negative for congestion and sore throat.    Eyes:  Negative for visual disturbance.   Respiratory:  Negative for cough and shortness of breath.    Cardiovascular:  Negative for chest pain and palpitations.   Gastrointestinal:  Negative for abdominal pain, nausea and vomiting.   Endocrine: Negative for cold intolerance and heat intolerance.   Genitourinary:  Negative for dysuria and hematuria.   Musculoskeletal:  Positive for back pain and neck pain. Negative for arthralgias and myalgias.   Skin:  Negative for pallor and rash.   Neurological:  Positive for weakness. Negative for tremors and seizures.   Hematological:  Negative for adenopathy. Does not bruise/bleed easily.   Psychiatric/Behavioral:  Negative for hallucinations.    Objective:     Vital Signs (Most Recent):  Temp: 97.9 °F (36.6 °C) (23 0755)  Pulse: (!) 56 (23 0755)  Resp: 18 (23 0755)  BP: (!) 148/75 (23 0755)  SpO2: 99 % (23 0755)   Vital Signs (24h Range):  Temp:  [97.9 °F (36.6 °C)] 97.9 °F (36.6 °C)  Pulse:  [56] 56  Resp:  [18] 18  SpO2:  [99 %] 99 %  BP: (148)/(75) 148/75     Weight: 74.8 kg (165  lb)  Body mass index is 28.32 kg/m².     Physical Exam  Constitutional:       Appearance: She is well-developed.   HENT:      Head: Normocephalic and atraumatic.   Eyes:      Conjunctiva/sclera: Conjunctivae normal.      Pupils: Pupils are equal, round, and reactive to light.   Neck:      Thyroid: No thyromegaly.      Vascular: No JVD.      Comments: Neck surgical dressing clean dry and intact with a RODRICK drain in place.  Soft neck collar is in place.  Cardiovascular:      Rate and Rhythm: Normal rate and regular rhythm.      Heart sounds: No murmur heard.    No friction rub. No gallop.   Pulmonary:      Effort: Pulmonary effort is normal.      Breath sounds: Normal breath sounds.   Abdominal:      General: Bowel sounds are normal. There is no distension.      Palpations: Abdomen is soft. There is no mass.      Tenderness: There is no abdominal tenderness.   Musculoskeletal:         General: Normal range of motion.      Cervical back: Neck supple.   Skin:     General: Skin is warm and dry.   Neurological:      Mental Status: She is oriented to person, place, and time.      Cranial Nerves: No cranial nerve deficit.   Psychiatric:         Behavior: Behavior normal.            CRANIAL NERVES     CN III, IV, VI   Pupils are equal, round, and reactive to light.     Significant Labs: All pertinent labs within the past 24 hours have been reviewed.  CBC: No results for input(s): WBC, HGB, HCT, PLT in the last 48 hours.  CMP: No results for input(s): NA, K, CL, CO2, GLU, BUN, CREATININE, CALCIUM, PROT, ALBUMIN, BILITOT, ALKPHOS, AST, ALT, ANIONGAP, EGFRNONAA in the last 48 hours.    Invalid input(s): ESTGFAFRICA    Significant Imaging: None

## 2023-05-04 NOTE — HPI
Patient is a 67-year-old  female with past medical history significant for degenerative disc disease, coronary artery disease status post coronary artery bypass graft surgery, anxiety/depression disorder, chronic kidney disease stage 3, chronic obstructive pulmonary disease and history of hypertension who underwent C3-4 anterior cervical disc fusion surgery performed by Dr. Ruben Martinez.  Patient denies any new upper or lower extremity weakness, numbness, loss of bowel or bladder functions. Post-operatively, patient denied chest pain, shortness of breath, abdominal pain, nausea, vomiting, headache, vision changes, focal neuro-deficits, cough or fever.

## 2023-05-04 NOTE — PLAN OF CARE
VSS. NAD. Resp E/U. Calm and cooperative. Speech appropriate. Tolerating clear liquids. Denies nausea. Pain controlled. IV patent. No swelling or redness. Dressing to neck CDI. RODRICK drain CDI and draining. Bautista intact and draining. Tele box #[id number]. PCA connected and  all precautions taken. Dr. Garcia saw pt bedside in pacu. mFriend notified of patient status. All safety measures taken. Bed in low position. Bedrails up x2. Bed locked.

## 2023-05-04 NOTE — INTERVAL H&P NOTE
The patient has been examined and the H&P has been reviewed:    I concur with the findings and no changes have occurred since H&P was written.    Surgery risks, benefits and alternative options discussed and understood by patient/family.          Active Hospital Problems    Diagnosis  POA    CKD (chronic kidney disease) stage 3, GFR 30-59 ml/min [N18.30]  Yes             Anxiety [F41.9]  Yes    Essential hypertension [I10]  Yes     CAD s/p CABG [I25.10]  Yes    GERD (gastroesophageal reflux disease) [K21.9]  Yes      Resolved Hospital Problems   No resolved problems to display.

## 2023-05-04 NOTE — ANESTHESIA PROCEDURE NOTES
Intubation    Date/Time: 5/4/2023 11:45 AM  Performed by: Dustin León CRNA  Authorized by: Yodit Santos MD     Intubation:     Induction:  Intravenous    Intubated:  Postinduction    Mask Ventilation:  Easy mask    Attempts:  1    Attempted By:  Student    Method of Intubation:  Video laryngoscopy    Blade:  Martinez 3    Laryngeal View Grade: Grade I - full view of cords      Difficult Airway Encountered?: No      Complications:  None    Airway Device:  Oral endotracheal tube (w/ dragonfly monitor attahced)    Airway Device Size:  7.0    Style/Cuff Inflation:  Cuffed (inflated to minimal occlusive pressure)    Inflation Amount (mL):  4    Tube secured:  20    Secured at:  The lips    Placement Verified By:  Capnometry    Complicating Factors:  None    Findings Post-Intubation:  BS equal bilateral

## 2023-05-04 NOTE — H&P
[facility] Medicine  History & Physical    Patient Name: Beatriz Gan  MRN: [id number]  Patient Class: OP- Hospital Outpatient Surgery  Admission Date: 5/4/2023  Attending Physician: Ruben Martinez MD   Primary Care Provider: Jorge Alonzo MD         Patient information was obtained from patient and past medical records.     Subjective:     Principal Problem: s/p ACDF  Chief Complaint: Postop Medical Management     HPI: Patient is a 67-year-old  female with past medical history significant for degenerative disc disease, coronary artery disease status post coronary artery bypass graft surgery, anxiety/depression disorder, chronic kidney disease stage 3, chronic obstructive pulmonary disease and history of hypertension who underwent C3-4 anterior cervical disc fusion surgery performed by Dr. Ruben Martinez.  Patient denies any new upper or lower extremity weakness, numbness, loss of bowel or bladder functions. Post-operatively, patient denied chest pain, shortness of breath, abdominal pain, nausea, vomiting, headache, vision changes, focal neuro-deficits, cough or fever.      Past Medical History:   Diagnosis Date    Alcohol dependence     DDD (degenerative disc disease), lumbosacral     DJD (degenerative joint disease), lumbosacral     Encounter for blood transfusion     GERD (gastroesophageal reflux disease)     Gout     Hypercholesterolemia     Hypertension     NATHALIE (iron deficiency anemia)     questionable white coat hypertension    Kidney carcinoma, left 2014    Pneumonia     Renal cell carcinoma     Shingles 10/13/2012       Past Surgical History:   Procedure Laterality Date    APPENDECTOMY      ARTHROSCOPY OF SHOULDER WITH DECOMPRESSION OF SUBACROMIAL SPACE Left 10/31/2019    Procedure: ARTHROSCOPY, SHOULDER, WITH SUBACROMIAL SPACE DECOMPRESSION;  Surgeon: Ruben Martinez MD;  Location: [facility];  Service: Orthopedics;  Laterality: Left;    BLADDER REPAIR      x 2     COLONOSCOPY  9/2011    DISTAL CLAVICLE EXCISION Left 10/31/2019    Procedure: EXCISION, CLAVICLE, DISTAL;  Surgeon: Ruben Martinez MD;  Location: [facility] OR;  Service: Orthopedics;  Laterality: Left;    EPIDURAL STEROID INJECTION INTO LUMBAR SPINE N/A 12/7/2020    Procedure: Injection-steroid-epidural-lumbar;  Surgeon: Dk Rosales MD;  Location: [facility] OR;  Service: Pain Management;  Laterality: N/A;  L4-L5    EPIDURAL STEROID INJECTION INTO LUMBAR SPINE N/A 5/17/2021    Procedure: Injection-steroid-epidural-lumbar;  Surgeon: Dk Rosales MD;  Location: [facility] OR;  Service: Pain Management;  Laterality: N/A;  L4-L5    ESOPHAGOGASTRODUODENOSCOPY  9/2011    EYE SURGERY      lasix bilateral    FIXATION KYPHOPLASTY N/A 1/31/2020    Procedure: Kyphoplasty T12;  Surgeon: Dk Rosales MD;  Location: [facility] OR;  Service: Pain Management;  Laterality: N/A;    HERNIA REPAIR      hiatal hernai    HYSTERECTOMY      INJECTION OF ANESTHETIC AGENT AROUND NERVE Left 6/8/2020    Procedure: Block, Nerve;  Surgeon: Dk Rosales MD;  Location: [facility] OR;  Service: Pain Management;  Laterality: Left;  left genicular nerve block     KNEE ARTHROPLASTY Left 7/30/2020    Procedure: ARTHROPLASTY, KNEE LEFT;  Surgeon: Ruben Martinez MD;  Location: [facility] OR;  Service: Orthopedics;  Laterality: Left;  REP VALERY RUBY    KNEE ARTHROSCOPY W/ MENISCECTOMY Left 1/16/2020    Procedure: ARTHROSCOPY, KNEE, WITH MEDIAL MENISCECTOMY;  Surgeon: Ruben Martinez MD;  Location: [facility] OR;  Service: Orthopedics;  Laterality: Left;    LAPAROSCOPIC NISSEN FUNDOPLICATION      NEPHRECTOMY      LT partial    RADIOFREQUENCY ABLATION Left 6/19/2020    Procedure: Radiofrequency Ablation;  Surgeon: Dk Rosales MD;  Location: [facility] OR;  Service: Pain Management;  Laterality: Left;    RADIOFREQUENCY ABLATION OF LUMBAR MEDIAL BRANCH NERVE AT SINGLE LEVEL Bilateral 2/28/2020    Procedure: Radiofrequency Ablation, Nerve, Spinal, Lumbar, Medial Branch, 1  "Level;  Surgeon: Dk Rosales MD;  Location: [facility] OR;  Service: Pain Management;  Laterality: Bilateral;  L3,L4,L5 - Burned at 80 degrees C. for 60 seconds x 2 each site    RADIOFREQUENCY ABLATION OF LUMBAR MEDIAL BRANCH NERVE AT SINGLE LEVEL Bilateral 10/19/2020    Procedure: Radiofrequency Ablation, Nerve, Spinal, Lumbar, Medial Branch, 1 Level;  Surgeon: Dk Rosales MD;  Location: [facility] OR;  Service: Pain Management;  Laterality: Bilateral;  L3, L4, L5    REFRACTIVE SURGERY      ROTATOR CUFF REPAIR Left 10/31/2019    Procedure: REPAIR, ROTATOR CUFF;  Surgeon: Ruben Martinez MD;  Location: [facility] OR;  Service: Orthopedics;  Laterality: Left;  arthrex    SKIN BIOPSY      TOTAL ABDOMINAL HYSTERECTOMY W/ BILATERAL SALPINGOOPHORECTOMY  age 48    TRANSFORAMINAL EPIDURAL INJECTION OF STEROID Bilateral 7/30/2021    Procedure: Injection,steroid,epidural,transforaminal approach;  Surgeon: Dk Rosales MD;  Location: [facility] OR;  Service: Pain Management;  Laterality: Bilateral;  L5-S1     TRANSFORAMINAL EPIDURAL INJECTION OF STEROID Bilateral 3/28/2022    Procedure: INJECTION, STEROID, EPIDURAL, TRANSFORAMINAL APPROACH Lumbar L5-S1;  Surgeon: Dk Rosales MD;  Location: [facility] OR;  Service: Pain Management;  Laterality: Bilateral;       Review of patient's allergies indicates:   Allergen Reactions    Phenergan [promethazine] Hives and Rash    Latex, natural rubber Hives     Per pt report-many years    Morphine Hives    Nsaids (non-steroidal anti-inflammatory drug)      Due to "kidney cancer"       Current Facility-Administered Medications on File Prior to Encounter   Medication    electrolyte-S (ISOLYTE)    lactated ringers infusion    pregabalin capsule 75 mg     Current Outpatient Medications on File Prior to Encounter   Medication Sig    amLODIPine (NORVASC) 5 MG tablet Take 1 tablet by mouth once daily    azelastine (ASTELIN) 137 mcg (0.1 %) nasal spray Use 1 spray(s) in each nostril twice daily    " budesonide-formoterol 160-4.5 mcg (SYMBICORT) 160-4.5 mcg/actuation HFAA Inhale 2 puffs into the lungs every 12 (twelve) hours. Controller    celecoxib (CELEBREX) 200 MG capsule Take 1 capsule by mouth once daily    cholecalciferol, vitamin D3, (VITAMIN D3 ORAL) Take by mouth once daily.    FLUoxetine 40 MG capsule Take 1 capsule by mouth twice daily    fluticasone (FLONASE) 50 mcg/actuation nasal spray 1 spray by Nasal route daily as needed.     losartan-hydrochlorothiazide 100-25 mg (HYZAAR) 100-25 mg per tablet Take 1 tablet by mouth once daily    magnesium 30 mg Tab Take by mouth once.    MULTIVIT-IRON-MIN-FOLIC ACID 3,500-18-0.4 UNIT-MG-MG ORAL CHEW Take by mouth once daily.    polyethylene glycol (GLYCOLAX) 17 gram/dose powder Take 17 g by mouth once daily.    zolpidem (AMBIEN) 10 mg Tab Take 1 tablet (10 mg total) by mouth nightly as needed.    ondansetron (ZOFRAN) 4 MG tablet Take 1 tablet (4 mg total) by mouth as needed for Nausea.    [DISCONTINUED] alendronate (FOSAMAX) 70 MG tablet Take 70 mg by mouth every 7 days.    [DISCONTINUED] calcium carbonate (OS-TERI) 600 mg calcium (1,500 mg) Tab Take 600 mg by mouth once.    [DISCONTINUED] tiZANidine (ZANAFLEX) 4 MG tablet TAKE 1 TABLET BY MOUTH EVERY 8 HOURS AS NEEDED (Patient not taking: Reported on 2023)     Family History       Problem Relation (Age of Onset)    Hypertension Father          Tobacco Use    Smoking status: Former     Packs/day: 1.00     Years: 40.00     Pack years: 40.00     Types: Cigarettes     Quit date: 2018     Years since quittin.3    Smokeless tobacco: Never    Tobacco comments:     smokes 2-3 cigarettes a night   Substance and Sexual Activity    Alcohol use: Yes     Alcohol/week: 12.0 standard drinks     Types: 6 Cans of beer, 6 Standard drinks or equivalent per week     Comment: 2-3 a day/ social    Drug use: No    Sexual activity: Yes     Partners: Male     Review of Systems   Constitutional:  Positive for fatigue.  Negative for chills and fever.   HENT:  Negative for congestion and sore throat.    Eyes:  Negative for visual disturbance.   Respiratory:  Negative for cough and shortness of breath.    Cardiovascular:  Negative for chest pain and palpitations.   Gastrointestinal:  Negative for abdominal pain, nausea and vomiting.   Endocrine: Negative for cold intolerance and heat intolerance.   Genitourinary:  Negative for dysuria and hematuria.   Musculoskeletal:  Positive for back pain and neck pain. Negative for arthralgias and myalgias.   Skin:  Negative for pallor and rash.   Neurological:  Positive for weakness. Negative for tremors and seizures.   Hematological:  Negative for adenopathy. Does not bruise/bleed easily.   Psychiatric/Behavioral:  Negative for hallucinations.    Objective:     Vital Signs (Most Recent):  Temp: 97.9 °F (36.6 °C) (05/04/23 0755)  Pulse: (!) 56 (05/04/23 0755)  Resp: 18 (05/04/23 0755)  BP: (!) 148/75 (05/04/23 0755)  SpO2: 99 % (05/04/23 0755)   Vital Signs (24h Range):  Temp:  [97.9 °F (36.6 °C)] 97.9 °F (36.6 °C)  Pulse:  [56] 56  Resp:  [18] 18  SpO2:  [99 %] 99 %  BP: (148)/(75) 148/75     Weight: 74.8 kg (165 lb)  Body mass index is 28.32 kg/m².     Physical Exam  Constitutional:       Appearance: She is well-developed.   HENT:      Head: Normocephalic and atraumatic.   Eyes:      Conjunctiva/sclera: Conjunctivae normal.      Pupils: Pupils are equal, round, and reactive to light.   Neck:      Thyroid: No thyromegaly.      Vascular: No JVD.      Comments: Neck surgical dressing clean dry and intact with a RODRICK drain in place.  Soft neck collar is in place.  Cardiovascular:      Rate and Rhythm: Normal rate and regular rhythm.      Heart sounds: No murmur heard.    No friction rub. No gallop.   Pulmonary:      Effort: Pulmonary effort is normal.      Breath sounds: Normal breath sounds.   Abdominal:      General: Bowel sounds are normal. There is no distension.      Palpations: Abdomen is  soft. There is no mass.      Tenderness: There is no abdominal tenderness.   Musculoskeletal:         General: Normal range of motion.      Cervical back: Neck supple.   Skin:     General: Skin is warm and dry.   Neurological:      Mental Status: She is oriented to person, place, and time.      Cranial Nerves: No cranial nerve deficit.   Psychiatric:         Behavior: Behavior normal.            CRANIAL NERVES     CN III, IV, VI   Pupils are equal, round, and reactive to light.     Significant Labs: All pertinent labs within the past 24 hours have been reviewed.  CBC: No results for input(s): WBC, HGB, HCT, PLT in the last 48 hours.  CMP: No results for input(s): NA, K, CL, CO2, GLU, BUN, CREATININE, CALCIUM, PROT, ALBUMIN, BILITOT, ALKPHOS, AST, ALT, ANIONGAP, EGFRNONAA in the last 48 hours.    Invalid input(s): ESTGFAFR[place]    Significant Imaging: None    Assessment/Plan:     CKD (chronic kidney disease) stage 3, GFR 30-59 ml/min  Monitor BUN/SCr.  Monitor I/Os.  Monitor electrolytes.  Avoid non-steroidal anti-inflammatory medications.          Anxiety  Use anxiolytics as needed.      Essential hypertension  Chronic, controlled.  Latest blood pressure and vitals reviewed-   Temp:  [97.9 °F (36.6 °C)]   Pulse:  [56]   Resp:  [18]   BP: (148)/(75)   SpO2:  [99 %] .   Home meds for hypertension were reviewed and noted below.   Hypertension Medications               amLODIPine (NORVASC) 5 MG tablet Take 1 tablet by mouth once daily    losartan-hydrochlorothiazide 100-25 mg (HYZAAR) 100-25 mg per tablet Take 1 tablet by mouth once daily            While in the hospital, will manage blood pressure as follows; Continue home antihypertensive regimen    Will utilize p.r.n. blood pressure medication only if patient's blood pressure greater than  180/110 and she develops symptoms such as worsening chest pain or shortness of breath.         CAD s/p CABG  Patient with known CAD s/p CABG and monitor for S/Sx of angina/ACS.  Continue to monitor on telemetry.        GERD (gastroesophageal reflux disease)  Continue proton pump inhibitor.        VTE Risk Mitigation (From admission, onward)           Ordered     IP VTE LOW RISK PATIENT  Once.  No anticoagulation therapy as per recommendations by Dr. Martinez.        05/04/23 0719     Place RADHA hose  Until discontinued         05/04/23 0719     Place sequential compression device  Until discontinued         05/04/23 0719                               aKrol Garcia MD  Department of Hospital Medicine  [facility]

## 2023-05-04 NOTE — TRANSFER OF CARE
"Anesthesia Transfer of Care Note    Patient: Beatriz Gan    Procedure(s) Performed: Procedure(s) (LRB):  DISCECTOMY, SPINE, CERVICAL, ANTERIOR APPROACH, WITH FUSION C3-4 (N/A)    Patient location: PACU    Anesthesia Type: general    Transport from OR: Transported from OR on 6-10 L/min O2 by face mask with adequate spontaneous ventilation    Post pain: adequate analgesia    Post assessment: no apparent anesthetic complications and tolerated procedure well    Post vital signs: stable    Level of consciousness: awake    Nausea/Vomiting: no nausea/vomiting    Complications: none    Transfer of care protocol was followed      Last vitals:   Visit Vitals  BP (!) 135/95 (BP Location: Right arm, Patient Position: Lying)   Pulse 72   Temp 36.1 °C (97 °F) (Skin)   Resp 16   Ht 5' 4" (1.626 m)   Wt 74.8 kg (165 lb)   SpO2 99%   Breastfeeding No   BMI 28.32 kg/m²     "

## 2023-05-04 NOTE — OP NOTE
[facility]  Orthopedic  Operative Note    SUMMARY     Date of Procedure: 5/4/2023     Procedure:   1. Anterior cervical diskectomy and fusion C3-4 CPT code 91629  2. Insertion interbody device C3-4 level CPT code 13737  3. Anterior instrumentation C3-4 with Medtronic plate and screws CPT code 61437  4. Use of morselized allograft for spinal fusion CPT code 93396      Surgeon(s) and Role:     * Ruben Martinez MD - Primary    Assisting Surgeon: mabel villa    Pre-Operative Diagnosis: Spinal stenosis, cervical region [M48.02]  Cervical radiculopathy [M54.12]    Post-Operative Diagnosis: Post-Op Diagnosis Codes:     * Spinal stenosis, cervical region [M48.02]     * Cervical radiculopathy [M54.12]    Anesthesia: General    Procedure in General:  The patient brought to the operating room while supine was intubated a catheter placed in the bladder.  Head halter traction was used to stabilize her head with a may feel extension on the operating table in the arms tucked at her sides.  Bony prominences were padded.  The anterior cervical area was cleansed with alcohol and prepped with ChloraPrep solution then draped in the usual fashion.  A time-out was performed intravenous antibiotics were given.  This is to verify that in my opinion a qualified experienced surgical assistant was necessary for the surgical procedure.  The individuals responsibilities included assistance with patient positioning, surgical exposure, and wound closure.  The inclusion of a surgical assistant for the procedure also reduced the operative time and anesthesia time.  Ms. Mabel Villa serve as a capacity has surgical assistant.  C3-4 was localized with fluoroscopy a transverse incision made on the right side paralleling the medial border of the sternocleidomastoid muscle at.  We to do the platysma muscle and then the deep and superficial cervical fascia down to the prevertebral fascia where we identified the previous plate  which was placed from C4-C7 during previous surgery.  We identified the C3-4 disc space.  [place] distraction pins were placed into C3 and C4 and the C3-4 disc was incised and removed in a piecemeal fashion.  A diskectomy with cartilaginous endplates were removed and bilateral foraminotomies.  Trial interbody sizers were inserted.  A Medtronic 7 mm interbody device gave good restoration disc space height.  We felt the interbody device better fit this level then the stand-alone device.  Allograft bone was hydrated.  A Medtronic 7 mm titanium coated interbody device was brought onto the field and filled with allograft and then carefully impacted into the C3-4 disc space.  [place] distraction pins were removed.  Assessing the available room superiorly and anteriorly at C4 we felt we could place a plate above the previous cervical plate from which was placed from C4-C7.  We felt that this would give added fixation.  A 19 mm Medtronic titanium plate was then placed from C3-C4 with it at its lower in abutting the top of the previous plate.  We then placed bone screws to secure the plate to into C3 and 2 into C4 and had good bony fixation.  After visualized the construct with fluoroscopy we engaged the locking mechanism.  A drain was brought through a separate stab incision the wound was closed in layers using Dermabond.  Sterile dressings were applied followed by a soft cervical collar.            Complications: None    Estimated Blood Loss (EBL):            Implants:   Implant Name Type Inv. Item Serial No.  Lot No. LRB No. Used Action   Spacer    MEDTRONIC [id number] N/A 1 Implanted   PUTTY IRINA DBF 6CC - [id number]  PUTTY IRINA DBF 6C [id number] MEDTRONIC [facility]  N/A 1 Implanted       Specimens:   Specimen (24h ago, onward)       Start     Ordered    05/04/23 1256  Specimen to Pathology, Surgery Orthopedics  Once        Comments: Pre-op Diagnosis: Spinal stenosis, cervical region [M48.02]Cervical radiculopathy  [M54.12]Procedure(s):DISCECTOMY, SPINE, CERVICAL, ANTERIOR APPROACH, WITH FUSION C3-4 Number of specimens: 1Name of specimens: 1. C3-4 Disc     References:    Click here for ordering Quick Tip   Question Answer Comment   Procedure Type: Orthopedics    Which provider would you like to cc? SOLO SOLORZANO    Release to patient Immediate        05/04/23 1257                            Condition: Good    Disposition: PACU - hemodynamically stable.    Attestation: I was present and scrubbed for the entire procedure.

## 2023-05-04 NOTE — ASSESSMENT & PLAN NOTE
Patient with known CAD s/p CABG and monitor for S/Sx of angina/ACS. Continue to monitor on telemetry.

## 2023-05-04 NOTE — BRIEF OP NOTE
[facility]  Brief Operative Note    SUMMARY     Surgery Date: 5/4/2023     Surgeon(s) and Role:     * Solo Solorznao MD - Primary    Assisting Surgeon: andrew villa    Pre-op Diagnosis:  Spinal stenosis, cervical region [M48.02]  Cervical radiculopathy [M54.12]    Post-op Diagnosis:  Post-Op Diagnosis Codes:     * Spinal stenosis, cervical region [M48.02]     * Cervical radiculopathy [M54.12]    Procedure(s) (LRB):  DISCECTOMY, SPINE, CERVICAL, ANTERIOR APPROACH, WITH FUSION C3-4 (N/A)    Anesthesia: General    Operative Findings:  Cervical disc disease C3-4    Estimated Blood Loss: * No values recorded between 5/4/2023 12:16 PM and 5/4/2023  1:11 PM *    Estimated Blood Loss has been documented.         Specimens:   Specimen (24h ago, onward)       Start     Ordered    05/04/23 1256  Specimen to Pathology, Surgery Orthopedics  Once        Comments: Pre-op Diagnosis: Spinal stenosis, cervical region [M48.02]Cervical radiculopathy [M54.12]Procedure(s):DISCECTOMY, SPINE, CERVICAL, ANTERIOR APPROACH, WITH FUSION C3-4 Number of specimens: 1Name of specimens: 1. C3-4 Disc     References:    Click here for ordering Quick Tip   Question Answer Comment   Procedure Type: Orthopedics    Which provider would you like to cc? SOLO SOLORZANO    Release to patient Immediate        05/04/23 1257                    [id number]

## 2023-05-04 NOTE — NURSING
Pt arrived to med surg floor rm #[id number] via bed from PACU. Report received from JOE Caceres at bedside. AAOx4. 20G IV CDI. Post op site covered by soft cervical collar, CDI. RODRICK drain intact. Bautista catheter in place and draining. Pt on 3L O2. Tele #[phone]. Safety maintained with side rails up x2, bed locked and in lowest position, call light in reach. PCA pump at bedside. No further needs expressed at this time.

## 2023-05-04 NOTE — ASSESSMENT & PLAN NOTE
Chronic, controlled.  Latest blood pressure and vitals reviewed-   Temp:  [97.9 °F (36.6 °C)]   Pulse:  [56]   Resp:  [18]   BP: (148)/(75)   SpO2:  [99 %] .   Home meds for hypertension were reviewed and noted below.   Hypertension Medications             amLODIPine (NORVASC) 5 MG tablet Take 1 tablet by mouth once daily    losartan-hydrochlorothiazide 100-25 mg (HYZAAR) 100-25 mg per tablet Take 1 tablet by mouth once daily          While in the hospital, will manage blood pressure as follows; Continue home antihypertensive regimen    Will utilize p.r.n. blood pressure medication only if patient's blood pressure greater than  180/110 and she develops symptoms such as worsening chest pain or shortness of breath.

## 2023-05-04 NOTE — ANESTHESIA PREPROCEDURE EVALUATION
05/04/2023  Beatriz Gan is a 67 y.o., female.    Pre-op Assessment    I have reviewed the Patient Summary Reports.    I have reviewed the Nursing Notes.    I have reviewed the Medications.     Review of Systems  Anesthesia Hx:  No problems with previous Anesthesia    Social:  Former Smoker    Hematology/Oncology:         -- Anemia: --  Cancer in past history (Kidney s/p partial nephrectomy):    Cardiovascular:   Hypertension, well controlled hyperlipidemia    Pulmonary:   Pneumonia    Renal/:   Chronic Renal Disease    Hepatic/GI:   Hiatal Hernia, GERD, well controlled Liver Disease,    Musculoskeletal:   Arthritis     Neurological:   Neuromuscular Disease, Headaches    Endocrine:  Endocrine Normal    Psych:   Psychiatric History depression          Physical Exam  General:  Obesity      Airway/Jaw/Neck:  Airway Findings: Mouth Opening: Normal   Tongue: Normal   General Airway Assessment: Adult Oropharynx Findings:  Mallampati: II  Jaw/Neck Findings:  Neck ROM: Normal ROM      Eyes/Ears/Nose:  Eyes/Ears/Nose Findings:    Dental:  Dental Findings: In tact     Chest/Lungs:  Chest/Lungs Findings: Normal Respiratory Rate, Clear to auscultation      Heart/Vascular:  Heart Findings: Rate: Normal  Rhythm: Regular Rhythm        Mental Status:  Mental Status Findings:  Cooperative, Alert and Oriented         Anesthesia Plan  Type of Anesthesia, risks & benefits discussed:  Anesthesia Type:  Gen ETT    Patient's Preference: spinal  Plan Factors:          Intra-op Monitoring Plan: Standard ASA Monitors  Intra-op Monitoring Plan Comments:   Post Op Pain Control Plan: IV/PO Opioids PRN, multimodal analgesia and per primary service following discharge from PACU  Post Op Pain Control Plan Comments:     Induction:   IV  Beta Blocker:  Patient is not currently on a Beta-Blocker (No further documentation required).        Informed Consent: Informed consent signed with the Patient and all parties understand the risks and agree with anesthesia plan.  All questions answered.  Anesthesia consent signed with patient.  ASA Score: 3     Day of Surgery Review of History & Physical:  There are no significant changes.  H&P Update referred to the surgeon/provider.          Ready For Surgery From Anesthesia Perspective.           Physical Exam  General: Obesity    Airway:  Mallampati: II   Mouth Opening: Normal  Tongue: Normal  Neck ROM: Normal ROM    Dental:  In tact    Chest/Lungs:  Normal Respiratory Rate, Clear to auscultation    Heart:  Rate: Normal  Rhythm: Regular Rhythm          Anesthesia Plan  Type of Anesthesia, risks & benefits discussed:    Anesthesia Type: Gen ETT  Intra-op Monitoring Plan: Standard ASA Monitors  Post Op Pain Control Plan: IV/PO Opioids PRN, multimodal analgesia and per primary service following discharge from PACU  Induction:  IV  Airway Plan: Video  Informed Consent: Informed consent signed with the Patient and all parties understand the risks and agree with anesthesia plan.  All questions answered.   ASA Score: 3  Day of Surgery Review of History & Physical: H&P Update referred to the surgeon/provider.    Ready For Surgery From Anesthesia Perspective.       .

## 2023-05-05 VITALS
OXYGEN SATURATION: 96 % | HEART RATE: 68 BPM | RESPIRATION RATE: 18 BRPM | DIASTOLIC BLOOD PRESSURE: 94 MMHG | BODY MASS INDEX: 28.17 KG/M2 | HEIGHT: 64 IN | WEIGHT: 165 LBS | TEMPERATURE: 98 F | SYSTOLIC BLOOD PRESSURE: 152 MMHG

## 2023-05-05 LAB
ANION GAP SERPL CALC-SCNC: 9 MMOL/L (ref 8–16)
BASOPHILS # BLD AUTO: 0.01 K/UL (ref 0–0.2)
BASOPHILS NFR BLD: 0.1 % (ref 0–1.9)
BUN SERPL-MCNC: 12 MG/DL (ref 8–23)
CALCIUM SERPL-MCNC: 9.4 MG/DL (ref 8.7–10.5)
CHLORIDE SERPL-SCNC: 102 MMOL/L (ref 95–110)
CO2 SERPL-SCNC: 26 MMOL/L (ref 23–29)
CREAT SERPL-MCNC: 0.9 MG/DL (ref 0.5–1.4)
DIFFERENTIAL METHOD: ABNORMAL
EOSINOPHIL # BLD AUTO: 0 K/UL (ref 0–0.5)
EOSINOPHIL NFR BLD: 0 % (ref 0–8)
ERYTHROCYTE [DISTWIDTH] IN BLOOD BY AUTOMATED COUNT: 12.1 % (ref 11.5–14.5)
EST. GFR  (NO RACE VARIABLE): >60 ML/MIN/1.73 M^2
GLUCOSE SERPL-MCNC: 138 MG/DL (ref 70–110)
HCT VFR BLD AUTO: 38.3 % (ref 37–48.5)
HGB BLD-MCNC: 12.4 G/DL (ref 12–16)
IMM GRANULOCYTES # BLD AUTO: 0.02 K/UL (ref 0–0.04)
IMM GRANULOCYTES NFR BLD AUTO: 0.2 % (ref 0–0.5)
LYMPHOCYTES # BLD AUTO: 0.7 K/UL (ref 1–4.8)
LYMPHOCYTES NFR BLD: 8.1 % (ref 18–48)
MCH RBC QN AUTO: 32.2 PG (ref 27–31)
MCHC RBC AUTO-ENTMCNC: 32.4 G/DL (ref 32–36)
MCV RBC AUTO: 100 FL (ref 82–98)
MONOCYTES # BLD AUTO: 0.6 K/UL (ref 0.3–1)
MONOCYTES NFR BLD: 6.8 % (ref 4–15)
NEUTROPHILS # BLD AUTO: 6.8 K/UL (ref 1.8–7.7)
NEUTROPHILS NFR BLD: 84.8 % (ref 38–73)
NRBC BLD-RTO: 0 /100 WBC
PLATELET # BLD AUTO: 246 K/UL (ref 150–450)
PMV BLD AUTO: 10.1 FL (ref 9.2–12.9)
POTASSIUM SERPL-SCNC: 4.9 MMOL/L (ref 3.5–5.1)
RBC # BLD AUTO: 3.85 M/UL (ref 4–5.4)
SODIUM SERPL-SCNC: 137 MMOL/L (ref 136–145)
WBC # BLD AUTO: 8.04 K/UL (ref 3.9–12.7)

## 2023-05-05 PROCEDURE — 94761 N-INVAS EAR/PLS OXIMETRY MLT: CPT

## 2023-05-05 PROCEDURE — 94640 AIRWAY INHALATION TREATMENT: CPT

## 2023-05-05 PROCEDURE — 36415 COLL VENOUS BLD VENIPUNCTURE: CPT | Performed by: ORTHOPAEDIC SURGERY

## 2023-05-05 PROCEDURE — 94799 UNLISTED PULMONARY SVC/PX: CPT

## 2023-05-05 PROCEDURE — 85025 COMPLETE CBC W/AUTO DIFF WBC: CPT | Performed by: ORTHOPAEDIC SURGERY

## 2023-05-05 PROCEDURE — 27000221 HC OXYGEN, UP TO 24 HOURS

## 2023-05-05 PROCEDURE — 25000003 PHARM REV CODE 250: Performed by: ORTHOPAEDIC SURGERY

## 2023-05-05 PROCEDURE — 99900035 HC TECH TIME PER 15 MIN (STAT)

## 2023-05-05 PROCEDURE — 97161 PT EVAL LOW COMPLEX 20 MIN: CPT

## 2023-05-05 PROCEDURE — 80048 BASIC METABOLIC PNL TOTAL CA: CPT | Performed by: ORTHOPAEDIC SURGERY

## 2023-05-05 PROCEDURE — 63600175 PHARM REV CODE 636 W HCPCS: Performed by: ORTHOPAEDIC SURGERY

## 2023-05-05 RX ORDER — BUDESONIDE AND FORMOTEROL FUMARATE DIHYDRATE 160; 4.5 UG/1; UG/1
2 AEROSOL RESPIRATORY (INHALATION) 2 TIMES DAILY
Status: DISCONTINUED | OUTPATIENT
Start: 2023-05-05 | End: 2023-05-05 | Stop reason: HOSPADM

## 2023-05-05 RX ADMIN — DOCUSATE SODIUM 100 MG: 100 CAPSULE, LIQUID FILLED ORAL at 09:05

## 2023-05-05 RX ADMIN — OXYCODONE HYDROCHLORIDE 10 MG: 10 TABLET ORAL at 03:05

## 2023-05-05 RX ADMIN — GABAPENTIN 600 MG: 300 CAPSULE ORAL at 09:05

## 2023-05-05 RX ADMIN — AMLODIPINE BESYLATE 5 MG: 5 TABLET ORAL at 09:05

## 2023-05-05 RX ADMIN — LOSARTAN POTASSIUM AND HYDROCHLOROTHIAZIDE 2 TABLET: 12.5; 5 TABLET ORAL at 09:05

## 2023-05-05 RX ADMIN — CEFAZOLIN SODIUM 2 G: 2 SOLUTION INTRAVENOUS at 03:05

## 2023-05-05 RX ADMIN — CEFAZOLIN SODIUM 2 G: 2 SOLUTION INTRAVENOUS at 11:05

## 2023-05-05 RX ADMIN — FLUOXETINE HYDROCHLORIDE 40 MG: 20 CAPSULE ORAL at 09:05

## 2023-05-05 RX ADMIN — MUPIROCIN 1 G: 20 OINTMENT TOPICAL at 09:05

## 2023-05-05 RX ADMIN — OXYCODONE HYDROCHLORIDE 10 MG: 10 TABLET ORAL at 11:05

## 2023-05-05 RX ADMIN — BUDESONIDE AND FORMOTEROL FUMARATE DIHYDRATE 2 PUFF: 160; 4.5 AEROSOL RESPIRATORY (INHALATION) at 07:05

## 2023-05-05 NOTE — RESPIRATORY THERAPY
05/05/23 0739   Patient Assessment/Suction   Level of Consciousness (AVPU) alert   Respiratory Effort Normal;Unlabored   Expansion/Accessory Muscles/Retractions expansion symmetric;no retractions;no use of accessory muscles   All Lung Fields Breath Sounds Anterior:;Lateral:;clear;equal bilaterally   Rhythm/Pattern, Respiratory depth regular;unlabored;pattern regular   Cough Frequency no cough   Skin Integrity   $ Wound Care Tech Time 15 min   Area Observed Left;Right;Behind ear;Cheek;Upper lip   Skin Appearance without discoloration   PRE-TX-O2   Device (Oxygen Therapy) nasal cannula   $ Is the patient on Low Flow Oxygen? Yes   Flow (L/min) (S)  3  (turned off at wall notified nurse)   SpO2 99 %   Pulse Oximetry Type Continuous   $ Pulse Oximetry - Multiple Charge Pulse Oximetry - Multiple   Pulse 67   Resp 18   Positioning HOB elevated 45 degrees   Inhaler   $ Inhaler Charges MDI (Metered Dose Inahler) Treatment;Given With Spacer;Mouth rinsed post treatment   Daily Review of Necessity (Inhaler) completed   Respiratory Treatment Status (Inhaler) given   Treatment Route (Inhaler) spacer/holding chamber;mouthpiece   Patient Position (Inhaler) semi-Cohn's;HOB elevated   Post Treatment Assessment (Inhaler) breath sounds unchanged   Signs of Intolerance (Inhaler) none   Incentive Spirometer   $ Incentive Spirometer Charges done with encouragement   Incentive Spirometer Predicted Level (mL) 1600   Administration (IS) proper technique demonstrated   Number of Repetitions (IS) 5   Level Incentive Spirometer (mL) 1500   Patient Tolerance (IS) good;no adverse signs/symptoms present

## 2023-05-05 NOTE — PLAN OF CARE
Pt clear for DC from case management standpoint. Discharging to home         05/05/23 1142   Final Note   Assessment Type Final Discharge Note   Anticipated Discharge Disposition Home

## 2023-05-05 NOTE — HOSPITAL COURSE
Postop day 1     Patient has had her drain discontinued.  She was sitting up in the bed this morning she was awake.  Did well overnight.  Pain well controlled with p.o. medications.  She is swallowing, handling her secretions with a little discomfort.  She has not had any p.o. food yet.  She has been up and out of bed to the restroom.

## 2023-05-05 NOTE — HPI
67-year-old female with a history of anterior cervical diskectomy and fusion many years ago spanning C4-C7.  Presented to the clinic with adjacent segment disease at the level above C3-4.

## 2023-05-05 NOTE — PLAN OF CARE
[facility]  Initial Discharge Assessment       Primary Care Provider: Jorge Alonzo MD    Admission Diagnosis: Spinal stenosis, cervical region [M48.02]  Cervical radiculopathy [M54.12]  Cervical spinal stenosis [M48.02]    Admission Date: 5/4/2023  Expected Discharge Date: 5/5/2023    Discharge Barriers Identified: None    Payor: HU[employer] MANAGED MEDICARE / Plan: HU[employer] MEDICARE HMO / Product Type: Capitation /     Extended Emergency Contact Information  Primary Emergency Contact: everette goodwin  Mobile Phone: [phone]  Relation: Friend  Preferred language: English    Discharge Plan A: Home  Discharge Plan B: Home with family      [id number] [place] - [address]  [address]  [place] [address]  Phone: [phone] Fax: [phone]    [id number] [place] - [address]  [address] [place] [address]  Phone: [phone] Fax: [phone]    DC assessment completed, information on facesheet verified. Lives at listed address with her daughter, Cher, who will drive her home. PCP is Dr. Alonzo. Pharmacy is [facility] on [address]. Denies blood thinners, hh, hd. Dme is RW, WC, and nebulizer. Independent at baseline, drives self. Insurance verified as [employer] mgd medicare. Denies poa, nok is daughter. Denies recent admission. CM following for DC planning.     Initial Assessment (most recent)       Adult Discharge Assessment - 05/05/23 1155          Discharge Assessment    Assessment Type Discharge Planning Assessment     Confirmed/corrected address, phone number and insurance Yes     Confirmed Demographics Correct on Facesheet     Source of Information patient     Communicated ORA with patient/caregiver Yes     People in Home child(katey), adult     Facility Arrived From: home     Do you expect to return to your current living situation? Yes     Do you have help at  home or someone to help you manage your care at home? Yes     Who are your caregiver(s) and their phone number(s)? daughter     Prior to hospitilization cognitive status: Alert/Oriented     Current cognitive status: Alert/Oriented     Equipment Currently Used at Home wheelchair;walker, rolling;nebulizer     Readmission within 30 days? No     Patient currently being followed by outpatient case management? No     Do you currently have service(s) that help you manage your care at home? No     Do you take prescription medications? Yes     Do you have prescription coverage? Yes     Do you have any problems affording any of your prescribed medications? No     Is the patient taking medications as prescribed? yes     Who is going to help you get home at discharge? jessica Kwon     How do you get to doctors appointments? car, drives self     Are you on dialysis? No     Do you take coumadin? No     Discharge Plan A Home     Discharge Plan B Home with family     DME Needed Upon Discharge  none     Discharge Plan discussed with: Patient     Discharge Barriers Identified None

## 2023-05-05 NOTE — PT/OT/SLP EVAL
Physical Therapy Evaluation and Discharge Note    Patient Name:  Beatriz Gan   MRN:  [id number]    Recommendations:     Discharge Recommendations: home  Discharge Equipment Recommendations: none   Barriers to discharge: None    Assessment:     Beatriz Gan is a 67 y.o. female admitted with a medical diagnosis of cervical fusion. .  At this time, patient is functioning at their prior level of function and does not require further acute PT services.     Recent Surgery: Procedure(s) (LRB):  DISCECTOMY, SPINE, CERVICAL, ANTERIOR APPROACH, WITH FUSION C3-4 (N/A) 1 Day Post-Op    Plan:     During this hospitalization, patient does not require further acute PT services.  Please re-consult if situation changes.      Subjective     Chief Complaint: pain  Patient/Family Comments/goals: go home  Pain/Comfort:  Pain Rating 1: 8/10  Location - Side 1: Bilateral  Location - Orientation 1: posterior  Location 1: cervical spine  Pain Addressed 1: Reposition, Cessation of Activity  Pain Rating Post-Intervention 1: 8/10    Patients cultural, spiritual, Restorationism conflicts given the current situation:      Living Environment:  Currently lives with family in a 2 story home.  Prior to admission, patients level of function was independent.  Equipment used at home: none.  DME owned (not currently used): none.  Upon discharge, patient will have assistance from family.    Objective:     Communicated with nurse prior to session.  Patient found supine with bed alarm upon PT entry to room.    General Precautions: Standard, fall    Orthopedic Precautions:spinal precautions   Braces: Cervical collar  Respiratory Status: Room air    Exams:  RLE ROM: WFL  RLE Strength: WNL  LLE ROM: WFL  LLE Strength: WNL    Functional Mobility:  Bed Mobility:     Supine to Sit: independence  Sit to Supine: independence  Transfers:     Sit to Stand:  independence with no AD  Gait: x 250 feet no aD independence    AM-PAC 6 CLICK MOBILITY  Total  Score:24       Treatment and Education:  None given    AM-PAC 6 CLICK MOBILITY  Total Score:24     Patient left supine with call button in reach, bed alarm on, and nurse notified.    GOALS:   Multidisciplinary Problems       Physical Therapy Goals       Not on file                    History:     Past Medical History:   Diagnosis Date    Alcohol dependence     DDD (degenerative disc disease), lumbosacral     DJD (degenerative joint disease), lumbosacral     Encounter for blood transfusion     GERD (gastroesophageal reflux disease)     Gout     Hypercholesterolemia     Hypertension     NATHALIE (iron deficiency anemia)     questionable white coat hypertension    Kidney carcinoma, left 2014    Pneumonia     Renal cell carcinoma     Shingles 10/13/2012       Past Surgical History:   Procedure Laterality Date    APPENDECTOMY      ARTHROSCOPY OF SHOULDER WITH DECOMPRESSION OF SUBACROMIAL SPACE Left 10/31/2019    Procedure: ARTHROSCOPY, SHOULDER, WITH SUBACROMIAL SPACE DECOMPRESSION;  Surgeon: Ruben Martinez MD;  Location: [facility] OR;  Service: Orthopedics;  Laterality: Left;    BLADDER REPAIR      x 2    COLONOSCOPY  9/2011    DISTAL CLAVICLE EXCISION Left 10/31/2019    Procedure: EXCISION, CLAVICLE, DISTAL;  Surgeon: Ruben Martinez MD;  Location: [facility] OR;  Service: Orthopedics;  Laterality: Left;    EPIDURAL STEROID INJECTION INTO LUMBAR SPINE N/A 12/7/2020    Procedure: Injection-steroid-epidural-lumbar;  Surgeon: Dk Rosales MD;  [facility];  Service: Pain Management;  Laterality: N/A;  L4-L5    EPIDURAL STEROID INJECTION INTO LUMBAR SPINE N/A 5/17/2021    Procedure: Injection-steroid-epidural-lumbar;  Surgeon: Dk Rosales MD;  [facility];  Service: Pain Management;  Laterality: N/A;  L4-L5    ESOPHAGOGASTRODUODENOSCOPY  9/2011    EYE SURGERY      lasix bilateral    FIXATION KYPHOPLASTY N/A 1/31/2020    Procedure: Kyphoplasty T12;  Surgeon: Dk Rosales MD;  Location: [facility] OR;  Service: Pain  Management;  Laterality: N/A;    HERNIA REPAIR      hiatal hernai    HYSTERECTOMY      INJECTION OF ANESTHETIC AGENT AROUND NERVE Left 6/8/2020    Procedure: Block, Nerve;  Surgeon: Dk Rosales MD;  [facility]  Service: Pain Management;  Laterality: Left;  left genicular nerve block     KNEE ARTHROPLASTY Left 7/30/2020    Procedure: ARTHROPLASTY, KNEE LEFT;  Surgeon: Ruben Martinez MD;  Location: [facility] OR;  Service: Orthopedics;  Laterality: Left;  REP VALERY RUBY    KNEE ARTHROSCOPY W/ MENISCECTOMY Left 1/16/2020    Procedure: ARTHROSCOPY, KNEE, WITH MEDIAL MENISCECTOMY;  Surgeon: Ruben Martinez MD;  Location: [facility] OR;  Service: Orthopedics;  Laterality: Left;    LAPAROSCOPIC NISSEN FUNDOPLICATION      NEPHRECTOMY      LT partial    RADIOFREQUENCY ABLATION Left 6/19/2020    Procedure: Radiofrequency Ablation;  Surgeon: Dk Rosales MD;  Location: [facility] OR;  Service: Pain Management;  Laterality: Left;    RADIOFREQUENCY ABLATION OF LUMBAR MEDIAL BRANCH NERVE AT SINGLE LEVEL Bilateral 2/28/2020    Procedure: Radiofrequency Ablation, Nerve, Spinal, Lumbar, Medial Branch, 1 Level;  Surgeon: Dk Rosales MD;  [facility];  Service: Pain Management;  Laterality: Bilateral;  L3,L4,L5 - Burned at 80 degrees C. for 60 seconds x 2 each site    RADIOFREQUENCY ABLATION OF LUMBAR MEDIAL BRANCH NERVE AT SINGLE LEVEL Bilateral 10/19/2020    Procedure: Radiofrequency Ablation, Nerve, Spinal, Lumbar, Medial Branch, 1 Level;  Surgeon: Dk Rosales MD;  [facility];  Service: Pain Management;  Laterality: Bilateral;  L3, L4, L5    REFRACTIVE SURGERY      ROTATOR CUFF REPAIR Left 10/31/2019    Procedure: REPAIR, ROTATOR CUFF;  Surgeon: Ruben Martinez MD;  Location: [facility] OR;  Service: Orthopedics;  Laterality: Left;  arthrex    SKIN BIOPSY      TOTAL ABDOMINAL HYSTERECTOMY W/ BILATERAL SALPINGOOPHORECTOMY  age 48    TRANSFORAMINAL EPIDURAL INJECTION OF STEROID Bilateral 7/30/2021    Procedure:  Injection,steroid,epidural,transforaminal approach;  Surgeon: Dk Rosales MD;  Location: [facility] OR;  Service: Pain Management;  Laterality: Bilateral;  L5-S1     TRANSFORAMINAL EPIDURAL INJECTION OF STEROID Bilateral 3/28/2022    Procedure: INJECTION, STEROID, EPIDURAL, TRANSFORAMINAL APPROACH Lumbar L5-S1;  Surgeon: Dk Rosales MD;  Location: [facility] OR;  Service: Pain Management;  Laterality: Bilateral;       Time Tracking:     PT Received On: 05/05/23  PT Start Time: 0901     PT Stop Time: 0922  PT Total Time (min): 21 min     Billable Minutes: Evaluation 21 05/05/2023

## 2023-05-05 NOTE — NURSING
Spoke with Dr. Santos with Anesthesia about discontinuing PCA , states OK to DC.. Patient noted with sleep apnea which causes PCA pump to alarm. Continuous Pulse ox on, 99% 2L/NC. Medicated with Oxycodone 10mg for complaints of back pain. Call light at bedside.

## 2023-05-05 NOTE — SUBJECTIVE & OBJECTIVE
"Principal Problem:<principal problem not specified>    Principal Orthopedic Problem:  Neck pain    Interval History:  C3-4 anterior cervical diskectomy and fusion    Review of patient's allergies indicates:   Allergen Reactions    Phenergan [promethazine] Hives and Rash    Latex, natural rubber Hives     Per pt report-many years    Morphine Hives    Nsaids (non-steroidal anti-inflammatory drug)      Due to "kidney cancer"       Current Facility-Administered Medications   Medication    acetaminophen tablet 650 mg    aluminum-magnesium hydroxide-simethicone 200-200-20 mg/5 mL suspension 30 mL    amLODIPine tablet 5 mg    bisacodyL suppository 10 mg    budesonide-formoterol 160-4.5 mcg inhaler 2 puff    cefazolin (ANCEF) 2 gram in dextrose 5% 50 mL IVPB (premix)    diphenhydrAMINE capsule 50 mg    diphenhydrAMINE injection 12.5 mg    docusate sodium capsule 100 mg    FLUoxetine capsule 40 mg    gabapentin capsule 600 mg    HYDROmorphone (PF) injection 2 mg    HYDROmorphone injection 1 mg    lactated ringers infusion    losartan-hydrochlorothiazide 50-12.5 mg per tablet 2 tablet    melatonin tablet 9 mg    mupirocin 2 % ointment 1 g    naloxone 0.4 mg/mL injection 0.02 mg    ondansetron disintegrating tablet 8 mg    oxyCODONE immediate release tablet 15 mg    oxyCODONE immediate release tablet 5 mg    oxyCODONE immediate release tablet Tab 10 mg    senna-docusate 8.6-50 mg per tablet 2 tablet     Facility-Administered Medications Ordered in Other Encounters   Medication    dexmedeTOMIDine injection    electrolyte-S (ISOLYTE)    lactated ringers infusion    pregabalin capsule 75 mg     Objective:     Vital Signs (Most Recent):  Temp: 98.2 °F (36.8 °C) (05/05/23 0302)  Pulse: 73 (05/05/23 0302)  Resp: 18 (05/05/23 0302)  BP: (!) 173/84 (05/05/23 0302)  SpO2: 96 % (05/05/23 0302)   Vital Signs (24h Range):  Temp:  [97 °F (36.1 °C)-98.2 °F (36.8 °C)] 98.2 °F (36.8 °C)  Pulse:  [56-94] 73  Resp:  [10-20] 18  SpO2:  [94 %-100 " "%] 96 %  BP: ()/() 173/84     Weight: 74.8 kg (165 lb)  Height: 5' 4" (162.6 cm)  Body mass index is 28.32 kg/m².      Intake/Output Summary (Last 24 hours) at 5/5/2023 0712  Last data filed at 5/5/2023 0620  Gross per 24 hour   Intake 1890 ml   Output 2875 ml   Net -985 ml        General    Nursing note and vitals reviewed.  Constitutional: She is oriented to person, place, and time. She appears well-developed and well-nourished. No distress.   Neck: Neck supple.   Neurological: She is alert and oriented to person, place, and time.   Psychiatric: She has a normal mood and affect. Her behavior is normal. Thought content normal.         Back (L-Spine & T-Spine) / Neck (C-Spine) Exam     Comments:  Cervical soft collar was removed, the patient's anterior neck dressing was dry and intact.  Neck was supple, no masses.  Patient moved all extremities well.  No reports of numbness and tingling.  Distally neurovascularly intact.       Significant Labs:   Recent Lab Results         05/05/23  0424   05/04/23  1411        Anion Gap 9   10       Baso # 0.01   0.03       Basophil % 0.1   0.5       BUN 12   11       Calcium 9.4   8.7       Chloride 102   103       CO2 26   26       Creatinine 0.9   1.0       Differential Method Automated   Automated       eGFR >60   >60       Eos # 0.0   0.0       Eosinophil % 0.0   0.5       Glucose 138   122       Gran # (ANC) 6.8   5.4       Gran % 84.8   85.2       Hematocrit 38.3   42.8       Hemoglobin 12.4   14.0       Immature Grans (Abs) 0.02  Comment: Mild elevation in immature granulocytes is non specific and   can be seen in a variety of conditions including stress response,   acute inflammation, trauma and pregnancy. Correlation with other   laboratory and clinical findings is essential.     0.03  Comment: Mild elevation in immature granulocytes is non specific and   can be seen in a variety of conditions including stress response,   acute inflammation, trauma and " pregnancy. Correlation with other   laboratory and clinical findings is essential.         Immature Granulocytes 0.2   0.5       Lymph # 0.7   0.7       Lymph % 8.1   10.9       MCH 32.2   32.4       MCHC 32.4   32.7          99       Mono # 0.6   0.2       Mono % 6.8   2.4       MPV 10.1   9.4       nRBC 0   0       Platelets 246   255       Potassium 4.9   4.3       RBC 3.85   4.32       RDW 12.1   12.3       Sodium 137   139       WBC 8.04   6.34             All pertinent labs within the past 24 hours have been reviewed.    Significant Imaging: None

## 2023-05-05 NOTE — PROGRESS NOTES
"[facility]  Orthopedics  Progress Note    Patient Name: Beatriz Gan  MRN: [id number]  Admission Date: 5/4/2023  Hospital Length of Stay: 0 days  Attending Provider: Karol Garcia MD  Primary Care Provider: Jorge Alonzo MD  Follow-up For: Procedure(s) (LRB):  DISCECTOMY, SPINE, CERVICAL, ANTERIOR APPROACH, WITH FUSION C3-4 (N/A)    Post-Operative Day: 1 Day Post-Op  Subjective:     Principal Problem:<principal problem not specified>    Principal Orthopedic Problem:  Neck pain    Interval History:  C3-4 anterior cervical diskectomy and fusion    Review of patient's allergies indicates:   Allergen Reactions    Phenergan [promethazine] Hives and Rash    Latex, natural rubber Hives     Per pt report-many years    Morphine Hives    Nsaids (non-steroidal anti-inflammatory drug)      Due to "kidney cancer"       Current Facility-Administered Medications   Medication    acetaminophen tablet 650 mg    aluminum-magnesium hydroxide-simethicone 200-200-20 mg/5 mL suspension 30 mL    amLODIPine tablet 5 mg    bisacodyL suppository 10 mg    budesonide-formoterol 160-4.5 mcg inhaler 2 puff    cefazolin (ANCEF) 2 gram in dextrose 5% 50 mL IVPB (premix)    diphenhydrAMINE capsule 50 mg    diphenhydrAMINE injection 12.5 mg    docusate sodium capsule 100 mg    FLUoxetine capsule 40 mg    gabapentin capsule 600 mg    HYDROmorphone (PF) injection 2 mg    HYDROmorphone injection 1 mg    lactated ringers infusion    losartan-hydrochlorothiazide 50-12.5 mg per tablet 2 tablet    melatonin tablet 9 mg    mupirocin 2 % ointment 1 g    naloxone 0.4 mg/mL injection 0.02 mg    ondansetron disintegrating tablet 8 mg    oxyCODONE immediate release tablet 15 mg    oxyCODONE immediate release tablet 5 mg    oxyCODONE immediate release tablet Tab 10 mg    senna-docusate 8.6-50 mg per tablet 2 tablet     Facility-Administered Medications Ordered in Other Encounters   Medication    " "dexmedeTOMIDine injection    electrolyte-S (ISOLYTE)    lactated ringers infusion    pregabalin capsule 75 mg     Objective:     Vital Signs (Most Recent):  Temp: 98.2 °F (36.8 °C) (05/05/23 0302)  Pulse: 73 (05/05/23 0302)  Resp: 18 (05/05/23 0302)  BP: (!) 173/84 (05/05/23 0302)  SpO2: 96 % (05/05/23 0302)   Vital Signs (24h Range):  Temp:  [97 °F (36.1 °C)-98.2 °F (36.8 °C)] 98.2 °F (36.8 °C)  Pulse:  [56-94] 73  Resp:  [10-20] 18  SpO2:  [94 %-100 %] 96 %  BP: ()/() 173/84     Weight: 74.8 kg (165 lb)  Height: 5' 4" (162.6 cm)  Body mass index is 28.32 kg/m².      Intake/Output Summary (Last 24 hours) at 5/5/2023 0712  Last data filed at 5/5/2023 0620  Gross per 24 hour   Intake 1890 ml   Output 2875 ml   Net -985 ml        General    Nursing note and vitals reviewed.  Constitutional: She is oriented to person, place, and time. She appears well-developed and well-nourished. No distress.   Neck: Neck supple.   Neurological: She is alert and oriented to person, place, and time.   Psychiatric: She has a normal mood and affect. Her behavior is normal. Thought content normal.         Back (L-Spine & T-Spine) / Neck (C-Spine) Exam     Comments:  Cervical soft collar was removed, the patient's anterior neck dressing was dry and intact.  Neck was supple, no masses.  Patient moved all extremities well.  No reports of numbness and tingling.  Distally neurovascularly intact.       Significant Labs:   Recent Lab Results         05/05/23  0424   05/04/23  1411        Anion Gap 9   10       Baso # 0.01   0.03       Basophil % 0.1   0.5       BUN 12   11       Calcium 9.4   8.7       Chloride 102   103       CO2 26   26       Creatinine 0.9   1.0       Differential Method Automated   Automated       eGFR >60   >60       Eos # 0.0   0.0       Eosinophil % 0.0   0.5       Glucose 138   122       Gran # (ANC) 6.8   5.4       Gran % 84.8   85.2       Hematocrit 38.3   42.8       Hemoglobin 12.4   14.0       Immature " Grans (Abs) 0.02  Comment: Mild elevation in immature granulocytes is non specific and   can be seen in a variety of conditions including stress response,   acute inflammation, trauma and pregnancy. Correlation with other   laboratory and clinical findings is essential.     0.03  Comment: Mild elevation in immature granulocytes is non specific and   can be seen in a variety of conditions including stress response,   acute inflammation, trauma and pregnancy. Correlation with other   laboratory and clinical findings is essential.         Immature Granulocytes 0.2   0.5       Lymph # 0.7   0.7       Lymph % 8.1   10.9       MCH 32.2   32.4       MCHC 32.4   32.7          99       Mono # 0.6   0.2       Mono % 6.8   2.4       MPV 10.1   9.4       nRBC 0   0       Platelets 246   255       Potassium 4.9   4.3       RBC 3.85   4.32       RDW 12.1   12.3       Sodium 137   139       WBC 8.04   6.34             All pertinent labs within the past 24 hours have been reviewed.    Significant Imaging: None    Assessment/Plan:     Cervical spondylitis with radiculitis  Postop day 1.     Status post C3-4 anterior cervical diskectomy and fusion   Out of bed with nursing and PT   Regular diet, advance as tolerated   Dressing changes p.r.n.  Plan for discharge home today, follow up in the office in 2 weeks  Soft cervical collar for comfort  Stable for discharge from an orthopedic standpoint          THANIA Ramos  Orthopedics  [facility]

## 2023-05-05 NOTE — PLAN OF CARE
Problem: Adult Inpatient Plan of Care  Goal: Plan of Care Review  Outcome: Ongoing, Progressing     Problem: Adult Inpatient Plan of Care  Goal: Optimal Comfort and Wellbeing  Outcome: Ongoing, Progressing     POC reviewed with pt, verbalized understanding. AAOx4. Meds given per MAR. Post op site and RODRICK site CDI. Soft cervical collar in place. Pt maintained on 3L O2. Bautista in place and draining. Continuous cardiac monitoring in place. PCA at bedside. IS use encouraged. Return demonstration performed.  Pt educated to call for needs. Safety maintained. Needs attended to.

## 2023-05-05 NOTE — PLAN OF CARE
POC reviewed with pt, verbalized understanding. AAOx4. Meds given per MAR. Post op site and RODRICK site CDI. Soft cervical collar in place. Continuous cardiac monitoring in place.IS use encouraged. Return demonstration performed.  Pt educated to call for needs. Safety maintained. RODRICK drain site dressing clean dry and intact no redness swelling drainage noted at this time

## 2023-05-05 NOTE — DISCHARGE INSTRUCTIONS
Eat balanced meals stay hydrated No heavy lifting pulling or pushing greater than 10 pounds until released by Dr Martinez, Monitor Incision site for redness swelling or drainage if these occur call 911 or go to nearest emergency room, If you have problems moving arms or legs call 911 or go to nearest emergency room,Do not submerge incision under water shower bathe only.

## 2023-05-05 NOTE — ASSESSMENT & PLAN NOTE
Postop day 1.     Status post C3-4 anterior cervical diskectomy and fusion   Out of bed with nursing and PT   Regular diet, advance as tolerated   Dressing changes p.r.n.  Plan for discharge home today, follow up in the office in 2 weeks  Soft cervical collar for comfort  Stable for discharge from an orthopedic standpoint

## 2023-05-06 NOTE — DISCHARGE SUMMARY
[facility] Hospital Medicine  Discharge Summary      Patient Name: Beatriz Gan  MRN: [id number]  CAMELIA: [id number]  Patient Class: OP- Outpatient Recovery  Admission Date: 5/4/2023  Hospital Length of Stay: 0 days  Discharge Date and Time: 5/5/2023  1:00 PM  Attending Physician: No att. providers found   Discharging Provider: Lisa Gilliam MD  Primary Care Provider: Jorge Alonzo MD    Primary Care Team: Networked reference to record PCT     HPI:   Patient is a 67-year-old  female with past medical history significant for degenerative disc disease, coronary artery disease status post coronary artery bypass graft surgery, anxiety/depression disorder, chronic kidney disease stage 3, chronic obstructive pulmonary disease and history of hypertension who underwent C3-4 anterior cervical disc fusion surgery performed by Dr. Ruben Martinez.  Patient denies any new upper or lower extremity weakness, numbness, loss of bowel or bladder functions. Post-operatively, patient denied chest pain, shortness of breath, abdominal pain, nausea, vomiting, headache, vision changes, focal neuro-deficits, cough or fever.        Procedure(s) (LRB):  DISCECTOMY, SPINE, CERVICAL, ANTERIOR APPROACH, WITH FUSION C3-4 (N/A)      Hospital Course:   Patient was admitted after anterior cervical fusion with Dr. Martinez.  She was closely monitored postoperatively.  Hemoglobin was stable.  She was cleared for discharge the next day by orthopedist.  She will follow-up with orthopedist.  Narcotics prescribed by Orthopedics       Goals of Care Treatment Preferences:  Code Status: Full Code      Consults:     No new Assessment & Plan notes have been filed under this hospital service since the last note was generated.  Service: Hospital Medicine    Final Active Diagnoses:    Diagnosis Date Noted POA    Cervical spondylitis with radiculitis [M46.92, M54.12] 10/04/2022 Yes    CKD (chronic kidney disease) stage 3, GFR  30-59 ml/min [N18.30] 08/01/2020 Yes    Anxiety [F41.9] 11/11/2019 Yes    Essential hypertension [I10] 08/21/2015 Yes     CAD s/p CABG [I25.10] 01/13/2014 Yes    GERD (gastroesophageal reflux disease) [K21.9]  Yes      Problems Resolved During this Admission:       Discharged Condition: good    Disposition: Home or Self Care    Follow Up:   Follow-up Information     Ruben Martinez MD Follow up.    Specialties: Orthopedic Surgery, Surgery  Why: May19 Post OP 9:15 AM   THANIA Ramos  Contact information:  [address]  [address]  [place] [facility] [address]  [phone]                       Patient Instructions:      Notify your health care provider if you experience any of the following:  temperature >100.4     Notify your health care provider if you experience any of the following:  severe uncontrolled pain     Notify your health care provider if you experience any of the following:  redness, tenderness, or signs of infection (pain, swelling, redness, odor or green/yellow discharge around incision site)     Activity as tolerated       Significant Diagnostic Studies: Labs:   BMP:   Recent Labs   Lab 05/05/23  0424   *      K 4.9      CO2 26   BUN 12   CREATININE 0.9   CALCIUM 9.4    and CBC   Recent Labs   Lab 05/05/23 0424   WBC 8.04   HGB 12.4   HCT 38.3          Pending Diagnostic Studies:     Procedure Component Value Units Date/Time    Specimen to Pathology, Surgery Orthopedics [[id number]] Collected: 05/04/23 1302    Order Status: Sent Lab Status: In process Updated: 05/04/23 1643    Specimen: Tissue          Medications:  Reconciled Home Medications:      Medication List      START taking these medications    oxyCODONE-acetaminophen  mg per tablet  Commonly known as: PERCOCET  Take 1 tablet by mouth every 4 (four) hours as needed for Pain.        CONTINUE taking these medications    ALPRAZolam 1 MG tablet  Commonly known as: XANAX  Take 1 tablet by mouth twice daily as  needed for anxiety     amLODIPine 5 MG tablet  Commonly known as: NORVASC  Take 1 tablet by mouth once daily     azelastine 137 mcg (0.1 %) nasal spray  Commonly known as: ASTELIN  Use 1 spray(s) in each nostril twice daily     budesonide-formoterol 160-4.5 mcg 160-4.5 mcg/actuation Hfaa  Commonly known as: SYMBICORT  Inhale 2 puffs into the lungs every 12 (twelve) hours. Controller     celecoxib 200 MG capsule  Commonly known as: CeleBREX  Take 1 capsule by mouth once daily     FLUoxetine 40 MG capsule  Take 1 capsule by mouth twice daily     fluticasone propionate 50 mcg/actuation nasal spray  Commonly known as: FLONASE  1 spray by Nasal route daily as needed.     gabapentin 600 MG tablet  Commonly known as: NEURONTIN  Take 1 tablet (600 mg total) by mouth 3 (three) times daily.     losartan-hydrochlorothiazide 100-25 mg 100-25 mg per tablet  Commonly known as: HYZAAR  Take 1 tablet by mouth once daily     magnesium 30 mg Tab  Take by mouth once.     multivit-iron-min-folic acid 3,500-18-0.4 unit-mg-mg Chew  Take by mouth once daily.     ondansetron 4 MG tablet  Commonly known as: ZOFRAN  Take 1 tablet (4 mg total) by mouth as needed for Nausea.     polyethylene glycol 17 gram/dose powder  Commonly known as: GLYCOLAX  Take 17 g by mouth once daily.     VITAMIN D3 ORAL  Take by mouth once daily.     zolpidem 10 mg Tab  Commonly known as: AMBIEN  Take 1 tablet (10 mg total) by mouth nightly as needed.        STOP taking these medications    HYDROcodone-acetaminophen 5-325 mg per tablet  Commonly known as: NORCO            Indwelling Lines/Drains at time of discharge:   Lines/Drains/Airways     None                 Time spent on the discharge of patient: 35 minutes         Lisa Gilliam MD  Department of Hospital Medicine  [facility]

## 2023-05-06 NOTE — HOSPITAL COURSE
Patient was admitted after anterior cervical fusion with Dr. Martinez.  She was closely monitored postoperatively.  Hemoglobin was stable.  She was cleared for discharge the next day by orthopedist.  She will follow-up with orthopedist.  Narcotics prescribed by Orthopedics

## 2023-05-08 RX ORDER — OXYCODONE AND ACETAMINOPHEN 10; 325 MG/1; MG/1
1 TABLET ORAL EVERY 4 HOURS PRN
Qty: 40 TABLET | Refills: 0 | Status: SHIPPED | OUTPATIENT
Start: 2023-05-08 | End: 2023-05-15

## 2023-05-08 NOTE — TELEPHONE ENCOUNTER
----- Message from Franky Marcos MA sent at 5/8/2023  1:24 PM CDT -----  Patient states that she has a question about her medication.

## 2023-05-08 NOTE — TELEPHONE ENCOUNTER
[facility] will not fill rx due to quantity. She would like it sent to [facility] [address]. S/P ACDF 5/4/23

## 2023-05-09 LAB
FINAL PATHOLOGIC DIAGNOSIS: NORMAL
GROSS: NORMAL
Lab: NORMAL

## 2023-05-19 NOTE — ANESTHESIA POSTPROCEDURE EVALUATION
Anesthesia Post Evaluation    Patient: Beatriz Gan    Procedure(s) Performed: Procedure(s) (LRB):  DISCECTOMY, SPINE, CERVICAL, ANTERIOR APPROACH, WITH FUSION C3-4 (N/A)    Final Anesthesia Type: general      Patient location during evaluation: PACU  Patient participation: Yes- Able to Participate  Level of consciousness: awake and alert  Post-procedure vital signs: reviewed and stable  Pain management: adequate  Airway patency: patent    PONV status at discharge: No PONV  Anesthetic complications: no      Cardiovascular status: blood pressure returned to baseline  Respiratory status: unassisted and room air  Hydration status: euvolemic  Follow-up not needed.          Vitals Value Taken Time   /94 05/05/23 1147   Temp 36.5 °C (97.7 °F) 05/05/23 1147   Pulse 68 05/05/23 1147   Resp 18 05/05/23 1147   SpO2 96 % 05/05/23 1147         Event Time   Out of Recovery 14:50:00         Pain/Yaneli Score: No data recorded

## 2023-05-26 ENCOUNTER — OFFICE VISIT (OUTPATIENT)
Dept: ORTHOPEDICS | Facility: CLINIC | Age: 68
End: 2023-05-26
Payer: MEDICARE

## 2023-05-26 VITALS
HEIGHT: 64 IN | DIASTOLIC BLOOD PRESSURE: 82 MMHG | WEIGHT: 165 LBS | SYSTOLIC BLOOD PRESSURE: 126 MMHG | BODY MASS INDEX: 28.17 KG/M2

## 2023-05-26 DIAGNOSIS — Z98.1 S/P CERVICAL SPINAL FUSION: Primary | ICD-10-CM

## 2023-05-26 PROCEDURE — 1101F PR PT FALLS ASSESS DOC 0-1 FALLS W/OUT INJ PAST YR: ICD-10-PCS | Mod: CPTII,S$GLB,, | Performed by: PHYSICIAN ASSISTANT

## 2023-05-26 PROCEDURE — 3008F BODY MASS INDEX DOCD: CPT | Mod: CPTII,S$GLB,, | Performed by: PHYSICIAN ASSISTANT

## 2023-05-26 PROCEDURE — 1159F PR MEDICATION LIST DOCUMENTED IN MEDICAL RECORD: ICD-10-PCS | Mod: CPTII,S$GLB,, | Performed by: PHYSICIAN ASSISTANT

## 2023-05-26 PROCEDURE — 1101F PT FALLS ASSESS-DOCD LE1/YR: CPT | Mod: CPTII,S$GLB,, | Performed by: PHYSICIAN ASSISTANT

## 2023-05-26 PROCEDURE — 3079F PR MOST RECENT DIASTOLIC BLOOD PRESSURE 80-89 MM HG: ICD-10-PCS | Mod: CPTII,S$GLB,, | Performed by: PHYSICIAN ASSISTANT

## 2023-05-26 PROCEDURE — 1125F AMNT PAIN NOTED PAIN PRSNT: CPT | Mod: CPTII,S$GLB,, | Performed by: PHYSICIAN ASSISTANT

## 2023-05-26 PROCEDURE — 1159F MED LIST DOCD IN RCRD: CPT | Mod: CPTII,S$GLB,, | Performed by: PHYSICIAN ASSISTANT

## 2023-05-26 PROCEDURE — 1160F RVW MEDS BY RX/DR IN RCRD: CPT | Mod: CPTII,S$GLB,, | Performed by: PHYSICIAN ASSISTANT

## 2023-05-26 PROCEDURE — 99024 PR POST-OP FOLLOW-UP VISIT: ICD-10-PCS | Mod: S$GLB,,, | Performed by: PHYSICIAN ASSISTANT

## 2023-05-26 PROCEDURE — 1160F PR REVIEW ALL MEDS BY PRESCRIBER/CLIN PHARMACIST DOCUMENTED: ICD-10-PCS | Mod: CPTII,S$GLB,, | Performed by: PHYSICIAN ASSISTANT

## 2023-05-26 PROCEDURE — 3074F SYST BP LT 130 MM HG: CPT | Mod: CPTII,S$GLB,, | Performed by: PHYSICIAN ASSISTANT

## 2023-05-26 PROCEDURE — 3074F PR MOST RECENT SYSTOLIC BLOOD PRESSURE < 130 MM HG: ICD-10-PCS | Mod: CPTII,S$GLB,, | Performed by: PHYSICIAN ASSISTANT

## 2023-05-26 PROCEDURE — 99024 POSTOP FOLLOW-UP VISIT: CPT | Mod: S$GLB,,, | Performed by: PHYSICIAN ASSISTANT

## 2023-05-26 PROCEDURE — 3008F PR BODY MASS INDEX (BMI) DOCUMENTED: ICD-10-PCS | Mod: CPTII,S$GLB,, | Performed by: PHYSICIAN ASSISTANT

## 2023-05-26 PROCEDURE — 3079F DIAST BP 80-89 MM HG: CPT | Mod: CPTII,S$GLB,, | Performed by: PHYSICIAN ASSISTANT

## 2023-05-26 PROCEDURE — 1125F PR PAIN SEVERITY QUANTIFIED, PAIN PRESENT: ICD-10-PCS | Mod: CPTII,S$GLB,, | Performed by: PHYSICIAN ASSISTANT

## 2023-05-26 PROCEDURE — 3288F PR FALLS RISK ASSESSMENT DOCUMENTED: ICD-10-PCS | Mod: CPTII,S$GLB,, | Performed by: PHYSICIAN ASSISTANT

## 2023-05-26 PROCEDURE — 3288F FALL RISK ASSESSMENT DOCD: CPT | Mod: CPTII,S$GLB,, | Performed by: PHYSICIAN ASSISTANT

## 2023-05-26 NOTE — PROGRESS NOTES
[facility] ORTHOPEDICS  [address]  [place] [address]  Phone: [phone]   [phone]    Patient's PCP:Jorge Alonzo MD  Referring Provider: No ref. provider found    POST-OP Note:    Subjective:        Chief Complaint:   Chief Complaint   Patient presents with    Neck - Post-op Evaluation     C3-4 ACDF 5/4/23, she is still having residual right arm pain into hand with numbness. She also has pain in c/sp into shoulder blades with tightness in both sides of her neck. Hard to sleep       Past Medical History:   Diagnosis Date    Alcohol dependence     DDD (degenerative disc disease), lumbosacral     DJD (degenerative joint disease), lumbosacral     Encounter for blood transfusion     GERD (gastroesophageal reflux disease)     Gout     Hypercholesterolemia     Hypertension     NATHALIE (iron deficiency anemia)     questionable white coat hypertension    Kidney carcinoma, left 2014    Pneumonia     Renal cell carcinoma     Shingles 10/13/2012       Past Surgical History:   Procedure Laterality Date    ANTERIOR CERVICAL DISCECTOMY W/ FUSION N/A 5/4/2023    Procedure: DISCECTOMY, SPINE, CERVICAL, ANTERIOR APPROACH, WITH FUSION C3-4;  Surgeon: Ruben Martinez MD;  Location: [facility] OR;  Service: Orthopedics;  Laterality: N/A;    APPENDECTOMY      ARTHROSCOPY OF SHOULDER WITH DECOMPRESSION OF SUBACROMIAL SPACE Left 10/31/2019    Procedure: ARTHROSCOPY, SHOULDER, WITH SUBACROMIAL SPACE DECOMPRESSION;  Surgeon: Ruben Martinez MD;  Location: [facility] OR;  Service: Orthopedics;  Laterality: Left;    BLADDER REPAIR      x 2    COLONOSCOPY  9/2011    DISTAL CLAVICLE EXCISION Left 10/31/2019    Procedure: EXCISION, CLAVICLE, DISTAL;  Surgeon: Rbuen Martinez MD;  Location: [facility] OR;  Service: Orthopedics;  Laterality: Left;    EPIDURAL STEROID INJECTION INTO LUMBAR SPINE N/A 12/7/2020    Procedure: Injection-steroid-epidural-lumbar;  Surgeon: Dk Rosales MD;  Location: [facility] OR;  Service: Pain Management;   Laterality: N/A;  L4-L5    EPIDURAL STEROID INJECTION INTO LUMBAR SPINE N/A 5/17/2021    Procedure: Injection-steroid-epidural-lumbar;  Surgeon: Dk Rosales MD;  Location: [facility] OR;  Service: Pain Management;  Laterality: N/A;  L4-L5    ESOPHAGOGASTRODUODENOSCOPY  9/2011    EYE SURGERY      lasix bilateral    FIXATION KYPHOPLASTY N/A 1/31/2020    Procedure: Kyphoplasty T12;  Surgeon: Dk Rosales MD;  Location: [facility] OR;  Service: Pain Management;  Laterality: N/A;    HERNIA REPAIR      hiatal hernai    HYSTERECTOMY      INJECTION OF ANESTHETIC AGENT AROUND NERVE Left 6/8/2020    Procedure: Block, Nerve;  Surgeon: Dk Rosales MD;  Location: [facility] OR;  Service: Pain Management;  Laterality: Left;  left genicular nerve block     KNEE ARTHROPLASTY Left 7/30/2020    Procedure: ARTHROPLASTY, KNEE LEFT;  Surgeon: Ruben Martinez MD;  Location: [facility] OR;  Service: Orthopedics;  Laterality: Left;  REP VALERY RUBY    KNEE ARTHROSCOPY W/ MENISCECTOMY Left 1/16/2020    Procedure: ARTHROSCOPY, KNEE, WITH MEDIAL MENISCECTOMY;  Surgeon: Ruben Martinez MD;  Location: [facility] OR;  Service: Orthopedics;  Laterality: Left;    LAPAROSCOPIC NISSEN FUNDOPLICATION      NEPHRECTOMY      LT partial    RADIOFREQUENCY ABLATION Left 6/19/2020    Procedure: Radiofrequency Ablation;  Surgeon: Dk Rosales MD;  Location: [facility] OR;  Service: Pain Management;  Laterality: Left;    RADIOFREQUENCY ABLATION OF LUMBAR MEDIAL BRANCH NERVE AT SINGLE LEVEL Bilateral 2/28/2020    Procedure: Radiofrequency Ablation, Nerve, Spinal, Lumbar, Medial Branch, 1 Level;  Surgeon: Dk Rosales MD;  Location: [facility] OR;  Service: Pain Management;  Laterality: Bilateral;  L3,L4,L5 - Burned at 80 degrees C. for 60 seconds x 2 each site    RADIOFREQUENCY ABLATION OF LUMBAR MEDIAL BRANCH NERVE AT SINGLE LEVEL Bilateral 10/19/2020    Procedure: Radiofrequency Ablation, Nerve, Spinal, Lumbar, Medial Branch, 1 Level;  Surgeon: Dk Rosales MD;  [facility];  Service: Pain Management;  Laterality: Bilateral;  L3, L4, L5    REFRACTIVE SURGERY      ROTATOR CUFF REPAIR Left 10/31/2019    Procedure: REPAIR, ROTATOR CUFF;  Surgeon: Ruben Martinez MD;  Location: [facility] OR;  Service: Orthopedics;  Laterality: Left;  arthrex    SKIN BIOPSY      TOTAL ABDOMINAL HYSTERECTOMY W/ BILATERAL SALPINGOOPHORECTOMY  age 48    TRANSFORAMINAL EPIDURAL INJECTION OF STEROID Bilateral 7/30/2021    Procedure: Injection,steroid,epidural,transforaminal approach;  Surgeon: Dk Rosales MD;  [facility];  Service: Pain Management;  Laterality: Bilateral;  L5-S1     TRANSFORAMINAL EPIDURAL INJECTION OF STEROID Bilateral 3/28/2022    Procedure: INJECTION, STEROID, EPIDURAL, TRANSFORAMINAL APPROACH Lumbar L5-S1;  Surgeon: Dk Rosales MD;  [facility];  Service: Pain Management;  Laterality: Bilateral;       Current Outpatient Medications   Medication Sig    ALPRAZolam (XANAX) 1 MG tablet Take 1 tablet by mouth twice daily as needed for anxiety    amLODIPine (NORVASC) 5 MG tablet Take 1 tablet by mouth once daily    azelastine (ASTELIN) 137 mcg (0.1 %) nasal spray Use 1 spray(s) in each nostril twice daily    budesonide-formoterol 160-4.5 mcg (SYMBICORT) 160-4.5 mcg/actuation HFAA Inhale 2 puffs into the lungs every 12 (twelve) hours. Controller    celecoxib (CELEBREX) 200 MG capsule Take 1 capsule by mouth once daily    cholecalciferol, vitamin D3, (VITAMIN D3 ORAL) Take by mouth once daily.    FLUoxetine 40 MG capsule Take 1 capsule by mouth twice daily    fluticasone (FLONASE) 50 mcg/actuation nasal spray 1 spray by Nasal route daily as needed.     gabapentin (NEURONTIN) 600 MG tablet Take 1 tablet (600 mg total) by mouth 3 (three) times daily.    losartan-hydrochlorothiazide 100-25 mg (HYZAAR) 100-25 mg per tablet Take 1 tablet by mouth once daily    magnesium 30 mg Tab Take by mouth once.    MULTIVIT-IRON-MIN-FOLIC ACID 3,500-18-0.4 UNIT-MG-MG ORAL CHEW Take by mouth  "once daily.    ondansetron (ZOFRAN) 4 MG tablet Take 1 tablet (4 mg total) by mouth as needed for Nausea.    polyethylene glycol (GLYCOLAX) 17 gram/dose powder Take 17 g by mouth once daily.    zolpidem (AMBIEN) 10 mg Tab Take 1 tablet (10 mg total) by mouth nightly as needed.     No current facility-administered medications for this visit.     Facility-Administered Medications Ordered in Other Visits   Medication    electrolyte-S (ISOLYTE)    lactated ringers infusion    pregabalin capsule 75 mg       Review of patient's allergies indicates:   Allergen Reactions    Phenergan [promethazine] Hives and Rash    Latex, natural rubber Hives     Per pt report-many years    Morphine Hives    Nsaids (non-steroidal anti-inflammatory drug)      Due to "kidney cancer"       Family History   Problem Relation Age of Onset    Hypertension Father     Glaucoma Neg Hx     Macular degeneration Neg Hx     Retinal detachment Neg Hx        Social History     Socioeconomic History    Marital status:    Occupational History     Employer: [employer]   Tobacco Use    Smoking status: Former     Packs/day: 1.00     Years: 40.00     Pack years: 40.00     Types: Cigarettes     Quit date: 2018     Years since quittin.4    Smokeless tobacco: Never    Tobacco comments:     smokes 2-3 cigarettes a night   Substance and Sexual Activity    Alcohol use: Yes     Alcohol/week: 12.0 standard drinks     Types: 6 Cans of beer, 6 Standard drinks or equivalent per week     Comment: 2-3 a day/ social    Drug use: No    Sexual activity: Yes     Partners: Male       History of present illness:  67-year-old female, returns to clinic today now 22 days status post extension of a prior cervical fusion at C3-4.    Patient with chronic neck and upper back pain.  She states that her symptoms are unchanged if not worse and more prior to surgery with persistent right upper extremity numbness and tingling.      Review of Systems:    Musculoskeletal:  See " HPI       Objective:        Physical Examination:    Vital Signs:   Vitals:    05/26/23 1228   BP: 126/82       Body mass index is 28.32 kg/m².    This a well-developed, well nourished patient in no acute distress.  They are alert and oriented and cooperative to examination.        Examination of the neck, well-healed anterior surgical incision.  No evidence of wound failure dehiscence or infection.  Patient with moderately restricted range of motion as to be expected after surgery.  No reported difficulty swallowing.  Persistent subjective reports of numbness and tingling in the right upper extremity.    Pertinent New Results:     XRAY Report / Interpretation:  AP and lateral views of cervical spine taken today in the office demonstrate a stand-alone C3-4 interbody device with anterior plate and screws.  These all appear to be in appropriate position without evidence of hardware failure or loosening.  The prior fusion is unchanged.       Assessment:       1. S/P cervical spinal fusion      Plan:     S/P cervical spinal fusion  -     X-Ray Cervical Spine AP And Lateral        Follow up in about 4 weeks (around 6/23/2023) for Mon/Wed with Dr. Martinez Cervical fusion 5/4/23.    Stable status post anterior cervical diskectomy and fusion at C3-4.  Patient with EMG that shows chronic right C5-6 radiculopathy.  I am unsure whether not the patient will have any significant improvement in her right upper extremity symptoms but we would expect her right upper back and shoulder pain to potentially improve after the decompression of the right C4 nerve however I think it is too early to know.  This due to the chronic nature of the radiculopathy.  In terms of her upper back pain, she had facet arthritis, may referred to pain management for trial of facet injections in the cervical spine.    [unfilled]  FIDE Novak PA-C    This note was created using [employer] voice recognition software that occasionally  misinterprets words or phrases.

## 2023-06-28 ENCOUNTER — OFFICE VISIT (OUTPATIENT)
Dept: ORTHOPEDICS | Facility: CLINIC | Age: 68
End: 2023-06-28
Payer: MEDICARE

## 2023-06-28 VITALS
DIASTOLIC BLOOD PRESSURE: 82 MMHG | SYSTOLIC BLOOD PRESSURE: 124 MMHG | WEIGHT: 165 LBS | BODY MASS INDEX: 28.17 KG/M2 | HEIGHT: 64 IN

## 2023-06-28 DIAGNOSIS — M54.16 LUMBAR RADICULOPATHY: ICD-10-CM

## 2023-06-28 DIAGNOSIS — Z98.1 S/P CERVICAL SPINAL FUSION: Primary | ICD-10-CM

## 2023-06-28 DIAGNOSIS — M51.36 DISC DEGENERATION, LUMBAR: ICD-10-CM

## 2023-06-28 DIAGNOSIS — M51.37 DEGENERATION OF LUMBAR/LUMBOSACRAL DISC WITHOUT MYELOPATHY: ICD-10-CM

## 2023-06-28 PROCEDURE — 1125F PR PAIN SEVERITY QUANTIFIED, PAIN PRESENT: ICD-10-PCS | Mod: CPTII,S$GLB,, | Performed by: ORTHOPAEDIC SURGERY

## 2023-06-28 PROCEDURE — 1159F MED LIST DOCD IN RCRD: CPT | Mod: CPTII,S$GLB,, | Performed by: ORTHOPAEDIC SURGERY

## 2023-06-28 PROCEDURE — 3288F FALL RISK ASSESSMENT DOCD: CPT | Mod: CPTII,S$GLB,, | Performed by: ORTHOPAEDIC SURGERY

## 2023-06-28 PROCEDURE — 1159F PR MEDICATION LIST DOCUMENTED IN MEDICAL RECORD: ICD-10-PCS | Mod: CPTII,S$GLB,, | Performed by: ORTHOPAEDIC SURGERY

## 2023-06-28 PROCEDURE — 1160F RVW MEDS BY RX/DR IN RCRD: CPT | Mod: CPTII,S$GLB,, | Performed by: ORTHOPAEDIC SURGERY

## 2023-06-28 PROCEDURE — 3074F SYST BP LT 130 MM HG: CPT | Mod: CPTII,S$GLB,, | Performed by: ORTHOPAEDIC SURGERY

## 2023-06-28 PROCEDURE — 3074F PR MOST RECENT SYSTOLIC BLOOD PRESSURE < 130 MM HG: ICD-10-PCS | Mod: CPTII,S$GLB,, | Performed by: ORTHOPAEDIC SURGERY

## 2023-06-28 PROCEDURE — 3008F BODY MASS INDEX DOCD: CPT | Mod: CPTII,S$GLB,, | Performed by: ORTHOPAEDIC SURGERY

## 2023-06-28 PROCEDURE — 3079F DIAST BP 80-89 MM HG: CPT | Mod: CPTII,S$GLB,, | Performed by: ORTHOPAEDIC SURGERY

## 2023-06-28 PROCEDURE — 1125F AMNT PAIN NOTED PAIN PRSNT: CPT | Mod: CPTII,S$GLB,, | Performed by: ORTHOPAEDIC SURGERY

## 2023-06-28 PROCEDURE — 3008F PR BODY MASS INDEX (BMI) DOCUMENTED: ICD-10-PCS | Mod: CPTII,S$GLB,, | Performed by: ORTHOPAEDIC SURGERY

## 2023-06-28 PROCEDURE — 99024 PR POST-OP FOLLOW-UP VISIT: ICD-10-PCS | Mod: S$GLB,,, | Performed by: ORTHOPAEDIC SURGERY

## 2023-06-28 PROCEDURE — 3079F PR MOST RECENT DIASTOLIC BLOOD PRESSURE 80-89 MM HG: ICD-10-PCS | Mod: CPTII,S$GLB,, | Performed by: ORTHOPAEDIC SURGERY

## 2023-06-28 PROCEDURE — 1101F PR PT FALLS ASSESS DOC 0-1 FALLS W/OUT INJ PAST YR: ICD-10-PCS | Mod: CPTII,S$GLB,, | Performed by: ORTHOPAEDIC SURGERY

## 2023-06-28 PROCEDURE — 1101F PT FALLS ASSESS-DOCD LE1/YR: CPT | Mod: CPTII,S$GLB,, | Performed by: ORTHOPAEDIC SURGERY

## 2023-06-28 PROCEDURE — 3288F PR FALLS RISK ASSESSMENT DOCUMENTED: ICD-10-PCS | Mod: CPTII,S$GLB,, | Performed by: ORTHOPAEDIC SURGERY

## 2023-06-28 PROCEDURE — 99024 POSTOP FOLLOW-UP VISIT: CPT | Mod: S$GLB,,, | Performed by: ORTHOPAEDIC SURGERY

## 2023-06-28 PROCEDURE — 1160F PR REVIEW ALL MEDS BY PRESCRIBER/CLIN PHARMACIST DOCUMENTED: ICD-10-PCS | Mod: CPTII,S$GLB,, | Performed by: ORTHOPAEDIC SURGERY

## 2023-06-28 RX ORDER — TRAMADOL HYDROCHLORIDE 50 MG/1
50 TABLET ORAL EVERY 6 HOURS PRN
Qty: 28 TABLET | Refills: 0 | Status: SHIPPED | OUTPATIENT
Start: 2023-06-28 | End: 2023-07-05

## 2023-06-28 NOTE — PROGRESS NOTES
Subjective:       Patient ID: Beatriz Gan is a 67 y.o. female.    Chief Complaint: Post-op Evaluation of the Neck (s/p C3-4 Cervical fusion 5/4/23, doing ok. Pain varies) and Pain of the Spine (Complaining of right sided leg pain, into back of knee to foot. Numbness,tingling, and sharp pain. Feels like leg could give out, last XR done in march)      History of Present Illness    Prior to meeting with the patient I reviewed the medical chart in [facility]. This included reviewing the previous progress notes from our office, review of the patient's last appointment with their primary care provider, review of any visits to the emergency room, and review of any pain management appointments or procedures.   Status post anterior cervical fusion C3-4 doing well.  Also history of chronic back pain and noted some slight increased radicular symptoms in the right leg since surgery.    Current Medications  Current Outpatient Medications   Medication Sig Dispense Refill    ALPRAZolam (XANAX) 1 MG tablet Take 1 tablet by mouth twice daily as needed for anxiety 60 tablet 2    amLODIPine (NORVASC) 5 MG tablet Take 1 tablet by mouth once daily 90 tablet 2    azelastine (ASTELIN) 137 mcg (0.1 %) nasal spray Use 1 spray(s) in each nostril twice daily 30 mL 3    budesonide-formoterol 160-4.5 mcg (SYMBICORT) 160-4.5 mcg/actuation HFAA Inhale 2 puffs into the lungs every 12 (twelve) hours. Controller 10.2 g 11    celecoxib (CELEBREX) 200 MG capsule Take 1 capsule by mouth once daily 30 capsule 5    cholecalciferol, vitamin D3, (VITAMIN D3 ORAL) Take by mouth once daily.      FLUoxetine 40 MG capsule Take 1 capsule by mouth twice daily 180 capsule 3    fluticasone (FLONASE) 50 mcg/actuation nasal spray 1 spray by Nasal route daily as needed.       losartan-hydrochlorothiazide 100-25 mg (HYZAAR) 100-25 mg per tablet Take 1 tablet by mouth once daily 90 tablet 3    magnesium 30 mg Tab Take by mouth once.      MULTIVIT-IRON-MIN-FOLIC  "ACID 3,500-18-0.4 UNIT-MG-MG ORAL CHEW Take by mouth once daily.      ondansetron (ZOFRAN) 4 MG tablet Take 1 tablet (4 mg total) by mouth as needed for Nausea. 60 tablet 1    polyethylene glycol (GLYCOLAX) 17 gram/dose powder Take 17 g by mouth once daily. 510 g 3    zolpidem (AMBIEN) 10 mg Tab Take 1 tablet (10 mg total) by mouth nightly as needed. 90 tablet 0    gabapentin (NEURONTIN) 600 MG tablet Take 1 tablet (600 mg total) by mouth 3 (three) times daily. 90 tablet 2     No current facility-administered medications for this visit.     Facility-Administered Medications Ordered in Other Visits   Medication Dose Route Frequency Provider Last Rate Last Admin    electrolyte-S (ISOLYTE)   Intravenous Continuous Kota Ortega MD 10 mL/hr at 01/31/20 0624 New Bag at 01/31/20 0624    lactated ringers infusion   Intravenous Continuous Kota Ortega MD        pregabalin capsule 75 mg  75 mg Oral Once Peytoneem MD Mackenzie           Allergies  Review of patient's allergies indicates:   Allergen Reactions    Phenergan [promethazine] Hives and Rash    Latex, natural rubber Hives     Per pt report-many years    Morphine Hives    Nsaids (non-steroidal anti-inflammatory drug)      Due to "kidney cancer"       Past Medical History  Past Medical History:   Diagnosis Date    Alcohol dependence     DDD (degenerative disc disease), lumbosacral     DJD (degenerative joint disease), lumbosacral     Encounter for blood transfusion     GERD (gastroesophageal reflux disease)     Gout     Hypercholesterolemia     Hypertension     NATHALIE (iron deficiency anemia)     questionable white coat hypertension    Kidney carcinoma, left 2014    Pneumonia     Renal cell carcinoma     Shingles 10/13/2012       Surgical History  Past Surgical History:   Procedure Laterality Date    ANTERIOR CERVICAL DISCECTOMY W/ FUSION N/A 5/4/2023    Procedure: DISCECTOMY, SPINE, CERVICAL, ANTERIOR APPROACH, WITH FUSION C3-4;  Surgeon: Ruben Martinez MD;  Location: " [facility] OR;  Service: Orthopedics;  Laterality: N/A;    APPENDECTOMY      ARTHROSCOPY OF SHOULDER WITH DECOMPRESSION OF SUBACROMIAL SPACE Left 10/31/2019    Procedure: ARTHROSCOPY, SHOULDER, WITH SUBACROMIAL SPACE DECOMPRESSION;  Surgeon: Ruben Martinez MD;  Location: [facility] OR;  Service: Orthopedics;  Laterality: Left;    BLADDER REPAIR      x 2    COLONOSCOPY  9/2011    DISTAL CLAVICLE EXCISION Left 10/31/2019    Procedure: EXCISION, CLAVICLE, DISTAL;  Surgeon: Ruben Martinez MD;  Location: [facility] OR;  Service: Orthopedics;  Laterality: Left;    EPIDURAL STEROID INJECTION INTO LUMBAR SPINE N/A 12/7/2020    Procedure: Injection-steroid-epidural-lumbar;  Surgeon: Dk Rosales MD;  Location: [facility] OR;  Service: Pain Management;  Laterality: N/A;  L4-L5    EPIDURAL STEROID INJECTION INTO LUMBAR SPINE N/A 5/17/2021    Procedure: Injection-steroid-epidural-lumbar;  Surgeon: Dk Rosales MD;  Location: [facility] OR;  Service: Pain Management;  Laterality: N/A;  L4-L5    ESOPHAGOGASTRODUODENOSCOPY  9/2011    EYE SURGERY      lasix bilateral    FIXATION KYPHOPLASTY N/A 1/31/2020    Procedure: Kyphoplasty T12;  Surgeon: Dk Rosales MD;  Location: [facility] OR;  Service: Pain Management;  Laterality: N/A;    HERNIA REPAIR      hiatal hernai    HYSTERECTOMY      INJECTION OF ANESTHETIC AGENT AROUND NERVE Left 6/8/2020    Procedure: Block, Nerve;  Surgeon: Dk Rosales MD;  Location: [facility] OR;  Service: Pain Management;  Laterality: Left;  left genicular nerve block     KNEE ARTHROPLASTY Left 7/30/2020    Procedure: ARTHROPLASTY, KNEE LEFT;  Surgeon: Ruben Martinez MD;  Location: [facility] OR;  Service: Orthopedics;  Laterality: Left;  REP VALERY RUBY    KNEE ARTHROSCOPY W/ MENISCECTOMY Left 1/16/2020    Procedure: ARTHROSCOPY, KNEE, WITH MEDIAL MENISCECTOMY;  Surgeon: Ruben Martinez MD;  Location: [facility] OR;  Service: Orthopedics;  Laterality: Left;    LAPAROSCOPIC NISSEN FUNDOPLICATION      NEPHRECTOMY      LT partial     RADIOFREQUENCY ABLATION Left 6/19/2020    Procedure: Radiofrequency Ablation;  Surgeon: Dk Rosales MD;  Location: [facility] OR;  Service: Pain Management;  Laterality: Left;    RADIOFREQUENCY ABLATION OF LUMBAR MEDIAL BRANCH NERVE AT SINGLE LEVEL Bilateral 2/28/2020    Procedure: Radiofrequency Ablation, Nerve, Spinal, Lumbar, Medial Branch, 1 Level;  Surgeon: Dk Rosales MD;  Location: [facility] OR;  Service: Pain Management;  Laterality: Bilateral;  L3,L4,L5 - Burned at 80 degrees C. for 60 seconds x 2 each site    RADIOFREQUENCY ABLATION OF LUMBAR MEDIAL BRANCH NERVE AT SINGLE LEVEL Bilateral 10/19/2020    Procedure: Radiofrequency Ablation, Nerve, Spinal, Lumbar, Medial Branch, 1 Level;  Surgeon: Dk Rosales MD;  Location: [facility] OR;  Service: Pain Management;  Laterality: Bilateral;  L3, L4, L5    REFRACTIVE SURGERY      ROTATOR CUFF REPAIR Left 10/31/2019    Procedure: REPAIR, ROTATOR CUFF;  Surgeon: Ruben Martinez MD;  Location: [facility] OR;  Service: Orthopedics;  Laterality: Left;  arthrex    SKIN BIOPSY      TOTAL ABDOMINAL HYSTERECTOMY W/ BILATERAL SALPINGOOPHORECTOMY  age 48    TRANSFORAMINAL EPIDURAL INJECTION OF STEROID Bilateral 7/30/2021    Procedure: Injection,steroid,epidural,transforaminal approach;  Surgeon: Dk Rosales MD;  Location: [facility] OR;  Service: Pain Management;  Laterality: Bilateral;  L5-S1     TRANSFORAMINAL EPIDURAL INJECTION OF STEROID Bilateral 3/28/2022    Procedure: INJECTION, STEROID, EPIDURAL, TRANSFORAMINAL APPROACH Lumbar L5-S1;  Surgeon: Dk Rosales MD;  Location: [facility] OR;  Service: Pain Management;  Laterality: Bilateral;       Family History:   Family History   Problem Relation Age of Onset    Hypertension Father     Glaucoma Neg Hx     Macular degeneration Neg Hx     Retinal detachment Neg Hx        Social History:   Social History     Socioeconomic History    Marital status:    Occupational History     Employer: [employer]   Tobacco Use    Smoking  status: Former     Packs/day: 1.00     Years: 40.00     Pack years: 40.00     Types: Cigarettes     Quit date: 2018     Years since quittin.4    Smokeless tobacco: Never    Tobacco comments:     smokes 2-3 cigarettes a night   Substance and Sexual Activity    Alcohol use: Yes     Alcohol/week: 12.0 standard drinks     Types: 6 Cans of beer, 6 Standard drinks or equivalent per week     Comment: 2-3 a day/ social    Drug use: No    Sexual activity: Yes     Partners: Male       Hospitalization/Major Diagnostic Procedure:     Review of Systems     General/Constitutional:  Chills denies. Fatigue denies. Fever denies. Weight gain denies. Weight loss denies.    Respiratory:  Shortness of breath denies.    Cardiovascular:  Chest pain denies.    Gastrointestinal:  Constipation denies. Diarrhea denies. Nausea denies. Vomiting denies.     Hematology:  Easy bruising denies. Prolonged bleeding denies.     Genitourinary:  Frequent urination denies. Pain in lower back denies. Painful urination denies.     Musculoskeletal:  See HPI for details    Skin:  Rash denies.    Neurologic:  Dizziness denies. Gait abnormalities denies. Seizures denies. Tingling/Numbess denies.    Psychiatric:  Anxiety denies. Depressed mood denies.     Objective:   Vital Signs:   Vitals:    23 1248   BP: 124/82        Physical Exam      General Examination:     Constitutional: The patient is alert and oriented to lace person and time. Mood is pleasant.     Head/Face: Normal facial features normal eyebrows    Eyes: Normal extraocular motion bilaterally    Lungs: Respirations are equal and unlabored    Gait is coordinated.    Cardiovascular: There are no swelling or varicosities present.    Lymphatic: Negative for adenopathy    Skin: Normal    Neurological: Level of consciousness normal. Oriented to place person and time and situation    Psychiatric: Oriented to time place person and situation    Cervical incision well healed mild restriction of  motion  XRAY Report/ Interpretation:  AP and lateral x-rays of the cervical spine were performed today. Indications neck pain. Findings:  Interbody device C3-4 no signs of hardware loosening.  Previous fusion L4 thus C7    AP and lateral x-rays of lumbar spine will performed today. Indications low back pain. Findings:  Moderate disc space narrowing L5-S1 disc space consistent with longstanding degenerative disc disease  Assessment:       1. S/P cervical spinal fusion    2. Disc degeneration, lumbar    3. Lumbar radiculopathy    4. Degeneration of lumbar/lumbosacral disc without myelopathy        Plan:       Beatriz was seen today for post-op evaluation and pain.    Diagnoses and all orders for this visit:    S/P cervical spinal fusion  -     X-Ray Cervical Spine AP And Lateral    Disc degeneration, lumbar  -     X-Ray Lumbar Spine Ap And Lateral    Lumbar radiculopathy    Degeneration of lumbar/lumbosacral disc without myelopathy         Follow up in about 4 weeks (around 7/26/2023) for PO cervical fusion 5/4/23.  Leg symptoms appear to be in exacerbation of longstanding lumbar disc degeneration at L5-S1 we will observe these  Advised observed back at this time patient may have radicular symptoms in the right leg and if they continue we will order an MRI in the meantime will try to treated medication  Treatment options were discussed with regards to the nature of the medical condition. Conservative pain intervention and surgical options were discussed in detail. The probability of success of each separate treatment option was discussed. The patient expressed a clear understanding of the treatment options. With regards to surgery, the procedure risk, benefits, complications, and outcomes were discussed. No guarantees were given with regards to surgical outcome.   The risk of complications, morbidity, and mortality of patient management decisions have been made at the time of this visit. These are associated with the  patient's problems, diagnostic procedures and treatment options. This includes the possible management options selected and those considered but not selected by the patient after shared medical decision making we discussed with the patient.     This note was created using Dragon voice recognition software that occasionally misinterpreted phrases or words.

## 2023-07-21 ENCOUNTER — OFFICE VISIT (OUTPATIENT)
Dept: ORTHOPEDICS | Facility: CLINIC | Age: 68
End: 2023-07-21
Payer: MEDICARE

## 2023-07-21 VITALS — WEIGHT: 165 LBS | BODY MASS INDEX: 28.17 KG/M2 | HEIGHT: 64 IN

## 2023-07-21 DIAGNOSIS — S80.01XA CONTUSION OF RIGHT KNEE, INITIAL ENCOUNTER: ICD-10-CM
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
## 2023-07-21 PROCEDURE — 3288F FALL RISK ASSESSMENT DOCD: CPT | Mod: CPTII,S$GLB,, | Performed by: PHYSICIAN ASSISTANT

## 2023-07-21 PROCEDURE — 3008F PR BODY MASS INDEX (BMI) DOCUMENTED: ICD-10-PCS | Mod: CPTII,S$GLB,, | Performed by: PHYSICIAN ASSISTANT

## 2023-07-21 PROCEDURE — 1159F PR MEDICATION LIST DOCUMENTED IN MEDICAL RECORD: ICD-10-PCS | Mod: CPTII,S$GLB,, | Performed by: PHYSICIAN ASSISTANT

## 2023-07-21 PROCEDURE — 1160F PR REVIEW ALL MEDS BY PRESCRIBER/CLIN PHARMACIST DOCUMENTED: ICD-10-PCS | Mod: CPTII,S$GLB,, | Performed by: PHYSICIAN ASSISTANT

## 2023-07-21 PROCEDURE — 1160F RVW MEDS BY RX/DR IN RCRD: CPT | Mod: CPTII,S$GLB,, | Performed by: PHYSICIAN ASSISTANT

## 2023-07-21 PROCEDURE — 1125F PR PAIN SEVERITY QUANTIFIED, PAIN PRESENT: ICD-10-PCS | Mod: CPTII,S$GLB,, | Performed by: PHYSICIAN ASSISTANT

## 2023-07-21 PROCEDURE — 99213 OFFICE O/P EST LOW 20 MIN: CPT | Mod: 24,S$GLB,, | Performed by: PHYSICIAN ASSISTANT

## 2023-07-21 PROCEDURE — 1125F AMNT PAIN NOTED PAIN PRSNT: CPT | Mod: CPTII,S$GLB,, | Performed by: PHYSICIAN ASSISTANT

## 2023-07-21 PROCEDURE — 1159F MED LIST DOCD IN RCRD: CPT | Mod: CPTII,S$GLB,, | Performed by: PHYSICIAN ASSISTANT

## 2023-07-21 PROCEDURE — 3288F PR FALLS RISK ASSESSMENT DOCUMENTED: ICD-10-PCS | Mod: CPTII,S$GLB,, | Performed by: PHYSICIAN ASSISTANT

## 2023-07-21 PROCEDURE — 1100F PTFALLS ASSESS-DOCD GE2>/YR: CPT | Mod: CPTII,S$GLB,, | Performed by: PHYSICIAN ASSISTANT

## 2023-07-21 PROCEDURE — 99213 PR OFFICE/OUTPT VISIT, EST, LEVL III, 20-29 MIN: ICD-10-PCS | Mod: 24,S$GLB,, | Performed by: PHYSICIAN ASSISTANT

## 2023-07-21 PROCEDURE — 3008F BODY MASS INDEX DOCD: CPT | Mod: CPTII,S$GLB,, | Performed by: PHYSICIAN ASSISTANT

## 2023-07-21 PROCEDURE — 1100F PR PT FALLS ASSESS DOC 2+ FALLS/FALL W/INJURY/YR: ICD-10-PCS | Mod: CPTII,S$GLB,, | Performed by: PHYSICIAN ASSISTANT

## 2023-07-21 RX ORDER — METHYLPREDNISOLONE 4 MG/1
TABLET ORAL
Qty: 1 EACH | Refills: 0 | Status: ON HOLD | OUTPATIENT
Start: 2023-07-21 | End: 2023-08-15 | Stop reason: HOSPADM

## 2023-07-21 RX ORDER — HYDROCODONE BITARTRATE AND ACETAMINOPHEN 5; 325 MG/1; MG/1
1 TABLET ORAL EVERY 8 HOURS PRN
Qty: 21 TABLET | Refills: 0 | Status: SHIPPED | OUTPATIENT
Start: 2023-07-21 | End: 2023-07-28

## 2023-07-21 NOTE — PROGRESS NOTES
[facility] ORTHOPEDICS  [address]  [place] [address]  Phone: [phone]   [phone]    Patient's PCP: Jorge Alonzo MD  Referring Provider: No ref. provider found    Subjective:      Chief Complaint:   Chief Complaint   Patient presents with    Right Knee - Pain, Injury     Right knee pain that started after a fall last night,. States that she is unable to bear any weight on her knee       Past Medical History:   Diagnosis Date    Alcohol dependence     DDD (degenerative disc disease), lumbosacral     DJD (degenerative joint disease), lumbosacral     Encounter for blood transfusion     GERD (gastroesophageal reflux disease)     Gout     Hypercholesterolemia     Hypertension     NATHALIE (iron deficiency anemia)     questionable white coat hypertension    Kidney carcinoma, left 2014    Pneumonia     Renal cell carcinoma     Shingles 10/13/2012       Past Surgical History:   Procedure Laterality Date    ANTERIOR CERVICAL DISCECTOMY W/ FUSION N/A 5/4/2023    Procedure: DISCECTOMY, SPINE, CERVICAL, ANTERIOR APPROACH, WITH FUSION C3-4;  Surgeon: Ruben Martinez MD;  Location: [facility] OR;  Service: Orthopedics;  Laterality: N/A;    APPENDECTOMY      ARTHROSCOPY OF SHOULDER WITH DECOMPRESSION OF SUBACROMIAL SPACE Left 10/31/2019    Procedure: ARTHROSCOPY, SHOULDER, WITH SUBACROMIAL SPACE DECOMPRESSION;  Surgeon: Ruben Martinez MD;  Location: [facility] OR;  Service: Orthopedics;  Laterality: Left;    BLADDER REPAIR      x 2    COLONOSCOPY  9/2011    DISTAL CLAVICLE EXCISION Left 10/31/2019    Procedure: EXCISION, CLAVICLE, DISTAL;  Surgeon: Ruben Martinez MD;  Location: [facility] OR;  Service: Orthopedics;  Laterality: Left;    EPIDURAL STEROID INJECTION INTO LUMBAR SPINE N/A 12/7/2020    Procedure: Injection-steroid-epidural-lumbar;  Surgeon: Dk Rosales MD;  Location: [facility] OR;  Service: Pain Management;  Laterality: N/A;  L4-L5    EPIDURAL STEROID INJECTION INTO LUMBAR SPINE N/A 5/17/2021     Procedure: Injection-steroid-epidural-lumbar;  Surgeon: Dk Rosales MD;  Location: [facility] OR;  Service: Pain Management;  Laterality: N/A;  L4-L5    ESOPHAGOGASTRODUODENOSCOPY  9/2011    EYE SURGERY      lasix bilateral    FIXATION KYPHOPLASTY N/A 1/31/2020    Procedure: Kyphoplasty T12;  Surgeon: Dk Rosales MD;  Location: [facility] OR;  Service: Pain Management;  Laterality: N/A;    HERNIA REPAIR      hiatal hernai    HYSTERECTOMY      INJECTION OF ANESTHETIC AGENT AROUND NERVE Left 6/8/2020    Procedure: Block, Nerve;  Surgeon: Dk Rosales MD;  Location: [facility] OR;  Service: Pain Management;  Laterality: Left;  left genicular nerve block     KNEE ARTHROPLASTY Left 7/30/2020    Procedure: ARTHROPLASTY, KNEE LEFT;  Surgeon: Ruben Martinez MD;  Location: [facility] OR;  Service: Orthopedics;  Laterality: Left;  REP VALERY RUBY    KNEE ARTHROSCOPY W/ MENISCECTOMY Left 1/16/2020    Procedure: ARTHROSCOPY, KNEE, WITH MEDIAL MENISCECTOMY;  Surgeon: Ruben Martinez MD;  Location: [facility] OR;  Service: Orthopedics;  Laterality: Left;    LAPAROSCOPIC NISSEN FUNDOPLICATION      NEPHRECTOMY      LT partial    RADIOFREQUENCY ABLATION Left 6/19/2020    Procedure: Radiofrequency Ablation;  Surgeon: Dk Rosales MD;  Location: [facility] OR;  Service: Pain Management;  Laterality: Left;    RADIOFREQUENCY ABLATION OF LUMBAR MEDIAL BRANCH NERVE AT SINGLE LEVEL Bilateral 2/28/2020    Procedure: Radiofrequency Ablation, Nerve, Spinal, Lumbar, Medial Branch, 1 Level;  Surgeon: Dk Rosales MD;  Location: [facility] OR;  Service: Pain Management;  Laterality: Bilateral;  L3,L4,L5 - Burned at 80 degrees C. for 60 seconds x 2 each site    RADIOFREQUENCY ABLATION OF LUMBAR MEDIAL BRANCH NERVE AT SINGLE LEVEL Bilateral 10/19/2020    Procedure: Radiofrequency Ablation, Nerve, Spinal, Lumbar, Medial Branch, 1 Level;  Surgeon: Dk Rosales MD;  Location: [facility] OR;  Service: Pain Management;  Laterality: Bilateral;  L3, L4, L5    REFRACTIVE  SURGERY      ROTATOR CUFF REPAIR Left 10/31/2019    Procedure: REPAIR, ROTATOR CUFF;  Surgeon: Ruben Martinez MD;  Location: [facility] OR;  Service: Orthopedics;  Laterality: Left;  arthrex    SKIN BIOPSY      TOTAL ABDOMINAL HYSTERECTOMY W/ BILATERAL SALPINGOOPHORECTOMY  age 48    TRANSFORAMINAL EPIDURAL INJECTION OF STEROID Bilateral 7/30/2021    Procedure: Injection,steroid,epidural,transforaminal approach;  Surgeon: Dk Rosales MD;  Location: [facility] OR;  Service: Pain Management;  Laterality: Bilateral;  L5-S1     TRANSFORAMINAL EPIDURAL INJECTION OF STEROID Bilateral 3/28/2022    Procedure: INJECTION, STEROID, EPIDURAL, TRANSFORAMINAL APPROACH Lumbar L5-S1;  Surgeon: Dk Rosales MD;  Location: [facility] OR;  Service: Pain Management;  Laterality: Bilateral;       Current Outpatient Medications   Medication Sig    ALPRAZolam (XANAX) 1 MG tablet Take 1 tablet by mouth twice daily as needed for anxiety    amLODIPine (NORVASC) 5 MG tablet Take 1 tablet by mouth once daily    azelastine (ASTELIN) 137 mcg (0.1 %) nasal spray Use 1 spray(s) in each nostril twice daily    budesonide-formoterol 160-4.5 mcg (SYMBICORT) 160-4.5 mcg/actuation HFAA Inhale 2 puffs into the lungs every 12 (twelve) hours. Controller    celecoxib (CELEBREX) 200 MG capsule Take 1 capsule by mouth once daily    cholecalciferol, vitamin D3, (VITAMIN D3 ORAL) Take by mouth once daily.    FLUoxetine 40 MG capsule Take 1 capsule by mouth twice daily    fluticasone (FLONASE) 50 mcg/actuation nasal spray 1 spray by Nasal route daily as needed.     losartan-hydrochlorothiazide 100-25 mg (HYZAAR) 100-25 mg per tablet Take 1 tablet by mouth once daily    magnesium 30 mg Tab Take by mouth once.    MULTIVIT-IRON-MIN-FOLIC ACID 3,500-18-0.4 UNIT-MG-MG ORAL CHEW Take by mouth once daily.    ondansetron (ZOFRAN) 4 MG tablet Take 1 tablet (4 mg total) by mouth as needed for Nausea.    polyethylene glycol (GLYCOLAX) 17 gram/dose powder Take 17 g by mouth once  "daily.    zolpidem (AMBIEN) 10 mg Tab Take 1 tablet (10 mg total) by mouth nightly as needed.    gabapentin (NEURONTIN) 600 MG tablet Take 1 tablet (600 mg total) by mouth 3 (three) times daily.    HYDROcodone-acetaminophen (NORCO) 5-325 mg per tablet Take 1 tablet by mouth every 8 (eight) hours as needed for Pain.    methylPREDNISolone (MEDROL DOSEPACK) 4 mg tablet use as directed     No current facility-administered medications for this visit.     Facility-Administered Medications Ordered in Other Visits   Medication    electrolyte-S (ISOLYTE)    lactated ringers infusion    pregabalin capsule 75 mg       Review of patient's allergies indicates:   Allergen Reactions    Phenergan [promethazine] Hives and Rash    Latex, natural rubber Hives     Per pt report-many years    Morphine Hives    Nsaids (non-steroidal anti-inflammatory drug)      Due to "kidney cancer"       Family History   Problem Relation Age of Onset    Hypertension Father     Glaucoma Neg Hx     Macular degeneration Neg Hx     Retinal detachment Neg Hx        Social History     Socioeconomic History    Marital status:    Occupational History     Employer: [employer]   Tobacco Use    Smoking status: Former     Packs/day: 1.00     Years: 40.00     Pack years: 40.00     Types: Cigarettes     Quit date: 2018     Years since quittin.5    Smokeless tobacco: Never    Tobacco comments:     smokes 2-3 cigarettes a night   Substance and Sexual Activity    Alcohol use: Yes     Alcohol/week: 12.0 standard drinks     Types: 6 Cans of beer, 6 Standard drinks or equivalent per week     Comment: 2-3 a day/ social    Drug use: No    Sexual activity: Yes     Partners: Male       History of present illness:  Patient comes in today with a chief complaint of acute right knee pain after she suffered a fall onto her anterior right knee yesterday at home.  She reports she is been unable to weightbear and ambulate since the fall.  She does have a previous " history of a left total knee arthroplasty and this has done very well for her.  She does not complain of any left knee pain today.    Review of Systems:    Constitutional: Negative for chills, fever and weight loss.   HENT: Negative for congestion.    Eyes: Negative for discharge and redness.   Respiratory: Negative for cough and shortness of breath.    Cardiovascular: Negative for chest pain.   Gastrointestinal: Negative for nausea and vomiting.   Musculoskeletal: See HPI.   Skin: Negative for rash.   Neurological: Negative for headaches.   Endo/Heme/Allergies: Does not bruise/bleed easily.   Psychiatric/Behavioral: The patient is not nervous/anxious.    All other systems reviewed and are negative.       Objective:      Physical Examination:    Vital Signs:  There were no vitals filed for this visit.    Body mass index is 28.32 kg/m².    This a well-developed, well nourished patient in no acute distress.  They are alert and oriented and cooperative to examination.     Right knee exam:  Skin to the right knee shows a quarter-size abrasion to the anterior aspect over the patella.  There is no active bleeding or drainage.  There are no signs or symptoms of infection.  Right calf is soft and nontender.  She is neurovascularly intact throughout the right lower extremity.  Right EHL is intact.  I can range of motion of the right knee 0-100 degrees.  It is stable to varus and valgus stresses.  She has a 1+ effusion to the right knee.  She is diffusely tender to any palpation.  She would not weight bear or ambulate today.    Pertinent New Results:        XRAY Report / Interpretation:   Three views were taken of the right knee today: AP, lateral, and sunrise views.  These were nonweightbearing images.  I do not appreciate any acute fractures or dislocations.  Visualized soft tissues appear unremarkable.  She has mild-moderate arthrosis in the right knee.      Assessment:       1. Fall, initial encounter    2. Contusion of  right knee, initial encounter      Plan:     Fall, initial encounter  -     X-Ray Knee 3 View Right  -     methylPREDNISolone (MEDROL DOSEPACK) 4 mg tablet; use as directed  Dispense: 1 each; Refill: 0  -     HYDROcodone-acetaminophen (NORCO) 5-325 mg per tablet; Take 1 tablet by mouth every 8 (eight) hours as needed for Pain.  Dispense: 21 tablet; Refill: 0    Contusion of right knee, initial encounter  -     methylPREDNISolone (MEDROL DOSEPACK) 4 mg tablet; use as directed  Dispense: 1 each; Refill: 0  -     HYDROcodone-acetaminophen (NORCO) 5-325 mg per tablet; Take 1 tablet by mouth every 8 (eight) hours as needed for Pain.  Dispense: 21 tablet; Refill: 0        Follow up in about 2 weeks (around 8/4/2023) for rt knee .    Patient has an abrasion to the anterior aspect of her right knee with an associated contusion.  I believe she aggravated her pre-existing osteoarthritis in her knee.  Due to her diffuse tenderness, I will not inject the knee today.  I placed her on a Medrol Dosepak to help with her inflammation.  I will give her short course of Norco 5/325 to be taken once every 8 hours as needed for pain.  I prescribed quantity 21.  Just to ensure she is improving in not regressing in begins ambulating, I would like to have her follow-up in 2 weeks for re-evaluation.  I encouraged her to start weight-bearing, preferably with some type of ambulation aid sometime this weekend once the cortisone has been able to dung some inflammation.  I will avoid NSAIDs as she has had a recent ACDF and I do not want to blunt/delay the fusion with NSAID use.        Juan Jose Renteria, JANES, PA-C    This note was created using [employer] voice recognition software that occasionally misinterprets words or phrases.

## 2023-07-28 DIAGNOSIS — Z98.1 S/P CERVICAL SPINAL FUSION: Primary | ICD-10-CM

## 2023-07-28 RX ORDER — TRAMADOL HYDROCHLORIDE 50 MG/1
50 TABLET ORAL EVERY 8 HOURS PRN
Qty: 21 TABLET | Refills: 0 | Status: SHIPPED | OUTPATIENT
Start: 2023-07-28 | End: 2023-08-04

## 2023-07-28 NOTE — TELEPHONE ENCOUNTER
----- Message from Lorena David sent at 7/28/2023 10:25 AM CDT -----  Phone #: [phone]  Patient is requesting a refill of Hydrocodone        Pharmacy:   [id number] [address] [place] [address] [place] [address]  Phone: [phone] Fax: [phone]

## 2023-07-30 DIAGNOSIS — F41.9 ANXIETY: ICD-10-CM

## 2023-07-30 NOTE — TELEPHONE ENCOUNTER
Care Due:                  Date            Visit Type   Department     Provider  --------------------------------------------------------------------------------                                EP -                              PRIMARY      [facility] PRIMARY  Last Visit: 10-      CARE ([facility])   CARE           Jorge Lizarraga  Next Visit: None Scheduled  None         None Found                                                            Last  Test          Frequency    Reason                     Performed    Due Date  --------------------------------------------------------------------------------    Office Visit  12 months..  FLUoxetine,                10-   09-                             losartan-hydrochlorothiaz                             donovan......................    [employer] Embedded Care Due Messages. Reference number: [id number].   7/30/2023 6:19:41 PM CDT

## 2023-07-31 RX ORDER — ALPRAZOLAM 1 MG/1
TABLET ORAL
Qty: 60 TABLET | Refills: 0 | Status: SHIPPED | OUTPATIENT
Start: 2023-07-31 | End: 2023-08-31

## 2023-08-07 ENCOUNTER — OFFICE VISIT (OUTPATIENT)
Dept: ORTHOPEDICS | Facility: CLINIC | Age: 68
End: 2023-08-07
Payer: MEDICARE

## 2023-08-07 VITALS — HEIGHT: 64 IN | WEIGHT: 165 LBS | BODY MASS INDEX: 28.17 KG/M2

## 2023-08-07 DIAGNOSIS — S80.01XD CONTUSION OF RIGHT KNEE, SUBSEQUENT ENCOUNTER: ICD-10-CM

## 2023-08-07 DIAGNOSIS — M23.203 OTHER OLD TEAR OF MEDIAL MENISCUS OF RIGHT KNEE: ICD-10-CM
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
## 2023-08-07 DIAGNOSIS — Z98.1 S/P CERVICAL SPINAL FUSION: Primary | ICD-10-CM

## 2023-08-07 DIAGNOSIS — S80.211D ABRASION OF RIGHT KNEE, SUBSEQUENT ENCOUNTER: ICD-10-CM

## 2023-08-07 PROCEDURE — 1159F MED LIST DOCD IN RCRD: CPT | Mod: CPTII,S$GLB,, | Performed by: ORTHOPAEDIC SURGERY

## 2023-08-07 PROCEDURE — 1159F PR MEDICATION LIST DOCUMENTED IN MEDICAL RECORD: ICD-10-PCS | Mod: CPTII,S$GLB,, | Performed by: ORTHOPAEDIC SURGERY

## 2023-08-07 PROCEDURE — 1125F PR PAIN SEVERITY QUANTIFIED, PAIN PRESENT: ICD-10-PCS | Mod: CPTII,S$GLB,, | Performed by: ORTHOPAEDIC SURGERY

## 2023-08-07 PROCEDURE — 3008F PR BODY MASS INDEX (BMI) DOCUMENTED: ICD-10-PCS | Mod: CPTII,S$GLB,, | Performed by: ORTHOPAEDIC SURGERY

## 2023-08-07 PROCEDURE — 1100F PR PT FALLS ASSESS DOC 2+ FALLS/FALL W/INJURY/YR: ICD-10-PCS | Mod: CPTII,S$GLB,, | Performed by: ORTHOPAEDIC SURGERY

## 2023-08-07 PROCEDURE — 99213 PR OFFICE/OUTPT VISIT, EST, LEVL III, 20-29 MIN: ICD-10-PCS | Mod: 25,S$GLB,, | Performed by: ORTHOPAEDIC SURGERY

## 2023-08-07 PROCEDURE — 96372 THER/PROPH/DIAG INJ SC/IM: CPT | Mod: S$GLB,,, | Performed by: ORTHOPAEDIC SURGERY

## 2023-08-07 PROCEDURE — 1160F PR REVIEW ALL MEDS BY PRESCRIBER/CLIN PHARMACIST DOCUMENTED: ICD-10-PCS | Mod: CPTII,S$GLB,, | Performed by: ORTHOPAEDIC SURGERY

## 2023-08-07 PROCEDURE — 3008F BODY MASS INDEX DOCD: CPT | Mod: CPTII,S$GLB,, | Performed by: ORTHOPAEDIC SURGERY

## 2023-08-07 PROCEDURE — 3288F FALL RISK ASSESSMENT DOCD: CPT | Mod: CPTII,S$GLB,, | Performed by: ORTHOPAEDIC SURGERY

## 2023-08-07 PROCEDURE — 96372 PR INJECTION,THERAP/PROPH/DIAG2ST, IM OR SUBCUT: ICD-10-PCS | Mod: S$GLB,,, | Performed by: ORTHOPAEDIC SURGERY

## 2023-08-07 PROCEDURE — 1100F PTFALLS ASSESS-DOCD GE2>/YR: CPT | Mod: CPTII,S$GLB,, | Performed by: ORTHOPAEDIC SURGERY

## 2023-08-07 PROCEDURE — 1125F AMNT PAIN NOTED PAIN PRSNT: CPT | Mod: CPTII,S$GLB,, | Performed by: ORTHOPAEDIC SURGERY

## 2023-08-07 PROCEDURE — 99213 OFFICE O/P EST LOW 20 MIN: CPT | Mod: 25,S$GLB,, | Performed by: ORTHOPAEDIC SURGERY

## 2023-08-07 PROCEDURE — 3288F PR FALLS RISK ASSESSMENT DOCUMENTED: ICD-10-PCS | Mod: CPTII,S$GLB,, | Performed by: ORTHOPAEDIC SURGERY

## 2023-08-07 PROCEDURE — 1160F RVW MEDS BY RX/DR IN RCRD: CPT | Mod: CPTII,S$GLB,, | Performed by: ORTHOPAEDIC SURGERY

## 2023-08-07 RX ORDER — METHYLPREDNISOLONE ACETATE 40 MG/ML
40 INJECTION, SUSPENSION INTRA-ARTICULAR; INTRALESIONAL; INTRAMUSCULAR; SOFT TISSUE
Status: COMPLETED | OUTPATIENT
Start: 2023-08-07 | End: 2023-08-07

## 2023-08-07 RX ADMIN — METHYLPREDNISOLONE ACETATE 40 MG: 40 INJECTION, SUSPENSION INTRA-ARTICULAR; INTRALESIONAL; INTRAMUSCULAR; SOFT TISSUE at 03:08

## 2023-08-07 NOTE — PROGRESS NOTES
Subjective:       Patient ID: Beatriz Gan is a 67 y.o. female.    Chief Complaint: Pain of the Right Knee (Right knee pain/Contusion follow up. States that her pain is improving, however, she states that the knee is giving out, and feels very weak) and Post-op Evaluation of the Neck (S/P C3-4 Cervical Fusion 05/04/23. States she is doing very well)      History of Present Illness    Prior to meeting with the patient I reviewed the medical chart in [facility]. This included reviewing the previous progress notes from our office, review of the patient's last appointment with their primary care provider, review of any visits to the emergency room, and review of any pain management appointments or procedures.   67-year-old female, returns to clinic today to follow-up on multiple musculoskeletal complaints.  Patient is status post a 1 level cervical fusion at C3-4 done on May 4th of this year.  Overall she is doing well with minimal to no neck pain, and resolution in prior numbness and tingling in the bilateral upper extremities.  She has a history of prior C4-5, C5-6 and C6-7 fusion done previously.    She also follows up for the right knee, unfortunately she sustained a mechanical fall a few weeks ago and was seen in the office on July 21st.  This was all felt to be more musculoskeletal in nature.  And we treated this conservatively.  She continues to complain of pain, giving out.    Current Medications  Current Outpatient Medications   Medication Sig Dispense Refill    ALPRAZolam (XANAX) 1 MG tablet Take 1 tablet by mouth twice daily as needed for anxiety 60 tablet 0    amLODIPine (NORVASC) 5 MG tablet Take 1 tablet by mouth once daily 90 tablet 2    azelastine (ASTELIN) 137 mcg (0.1 %) nasal spray Use 1 spray(s) in each nostril twice daily 30 mL 3    budesonide-formoterol 160-4.5 mcg (SYMBICORT) 160-4.5 mcg/actuation HFAA Inhale 2 puffs into the lungs every 12 (twelve) hours. Controller 10.2 g 11    celecoxib  "(CELEBREX) 200 MG capsule Take 1 capsule by mouth once daily 30 capsule 5    cholecalciferol, vitamin D3, (VITAMIN D3 ORAL) Take by mouth once daily.      FLUoxetine 40 MG capsule Take 1 capsule by mouth twice daily 180 capsule 3    fluticasone (FLONASE) 50 mcg/actuation nasal spray 1 spray by Nasal route daily as needed.       losartan-hydrochlorothiazide 100-25 mg (HYZAAR) 100-25 mg per tablet Take 1 tablet by mouth once daily 90 tablet 3    magnesium 30 mg Tab Take by mouth once.      MULTIVIT-IRON-MIN-FOLIC ACID 3,500-18-0.4 UNIT-MG-MG ORAL CHEW Take by mouth once daily.      ondansetron (ZOFRAN) 4 MG tablet Take 1 tablet (4 mg total) by mouth as needed for Nausea. 60 tablet 1    polyethylene glycol (GLYCOLAX) 17 gram/dose powder Take 17 g by mouth once daily. 510 g 3    zolpidem (AMBIEN) 10 mg Tab Take 1 tablet (10 mg total) by mouth nightly as needed. 90 tablet 0    gabapentin (NEURONTIN) 600 MG tablet Take 1 tablet (600 mg total) by mouth 3 (three) times daily. 90 tablet 2    methylPREDNISolone (MEDROL DOSEPACK) 4 mg tablet use as directed (Patient not taking: Reported on 8/7/2023) 1 each 0     No current facility-administered medications for this visit.     Facility-Administered Medications Ordered in Other Visits   Medication Dose Route Frequency Provider Last Rate Last Admin    electrolyte-S (ISOLYTE)   Intravenous Continuous Kota Ortega MD 10 mL/hr at 01/31/20 0624 New Bag at 01/31/20 0624    lactated ringers infusion   Intravenous Continuous Kota Ortega MD        pregabalin capsule 75 mg  75 mg Oral Once Shiela Mann MD           Allergies  Review of patient's allergies indicates:   Allergen Reactions    Phenergan [promethazine] Hives and Rash    Latex, natural rubber Hives     Per pt report-many years    Morphine Hives    Nsaids (non-steroidal anti-inflammatory drug)      Due to "kidney cancer"       Past Medical History  Past Medical History:   Diagnosis Date    Alcohol dependence     DDD " (degenerative disc disease), lumbosacral     DJD (degenerative joint disease), lumbosacral     Encounter for blood transfusion     GERD (gastroesophageal reflux disease)     Gout     Hypercholesterolemia     Hypertension     NATHALIE (iron deficiency anemia)     questionable white coat hypertension    Kidney carcinoma, left 2014    Pneumonia     Renal cell carcinoma     Shingles 10/13/2012       Surgical History  Past Surgical History:   Procedure Laterality Date    ANTERIOR CERVICAL DISCECTOMY W/ FUSION N/A 5/4/2023    Procedure: DISCECTOMY, SPINE, CERVICAL, ANTERIOR APPROACH, WITH FUSION C3-4;  Surgeon: Ruben Martinez MD;  Location: [facility] OR;  Service: Orthopedics;  Laterality: N/A;    APPENDECTOMY      ARTHROSCOPY OF SHOULDER WITH DECOMPRESSION OF SUBACROMIAL SPACE Left 10/31/2019    Procedure: ARTHROSCOPY, SHOULDER, WITH SUBACROMIAL SPACE DECOMPRESSION;  Surgeon: Ruben Martinez MD;  Location: [facility] OR;  Service: Orthopedics;  Laterality: Left;    BLADDER REPAIR      x 2    COLONOSCOPY  9/2011    DISTAL CLAVICLE EXCISION Left 10/31/2019    Procedure: EXCISION, CLAVICLE, DISTAL;  Surgeon: Ruben Martinez MD;  Location: [facility] OR;  Service: Orthopedics;  Laterality: Left;    EPIDURAL STEROID INJECTION INTO LUMBAR SPINE N/A 12/7/2020    Procedure: Injection-steroid-epidural-lumbar;  Surgeon: Dk Rosales MD;  Location: [facility] OR;  Service: Pain Management;  Laterality: N/A;  L4-L5    EPIDURAL STEROID INJECTION INTO LUMBAR SPINE N/A 5/17/2021    Procedure: Injection-steroid-epidural-lumbar;  Surgeon: Dk Rosales MD;  Location: [facility] OR;  Service: Pain Management;  Laterality: N/A;  L4-L5    ESOPHAGOGASTRODUODENOSCOPY  9/2011    EYE SURGERY      lasix bilateral    FIXATION KYPHOPLASTY N/A 1/31/2020    Procedure: Kyphoplasty T12;  Surgeon: Dk Rosales MD;  Location: [facility] OR;  Service: Pain Management;  Laterality: N/A;    HERNIA REPAIR      hiatal hernai    HYSTERECTOMY      INJECTION OF ANESTHETIC AGENT AROUND  NERVE Left 6/8/2020    Procedure: Block, Nerve;  Surgeon: Dk Rosales MD;  Location: [facility] OR;  Service: Pain Management;  Laterality: Left;  left genicular nerve block     KNEE ARTHROPLASTY Left 7/30/2020    Procedure: ARTHROPLASTY, KNEE LEFT;  Surgeon: Ruben Martinez MD;  Location: [facility] OR;  Service: Orthopedics;  Laterality: Left;  REP VALERY RUBY    KNEE ARTHROSCOPY W/ MENISCECTOMY Left 1/16/2020    Procedure: ARTHROSCOPY, KNEE, WITH MEDIAL MENISCECTOMY;  Surgeon: Ruben Martinez MD;  Location: [facility] OR;  Service: Orthopedics;  Laterality: Left;    LAPAROSCOPIC NISSEN FUNDOPLICATION      NEPHRECTOMY      LT partial    RADIOFREQUENCY ABLATION Left 6/19/2020    Procedure: Radiofrequency Ablation;  Surgeon: Dk Rosales MD;  Location: [facility] OR;  Service: Pain Management;  Laterality: Left;    RADIOFREQUENCY ABLATION OF LUMBAR MEDIAL BRANCH NERVE AT SINGLE LEVEL Bilateral 2/28/2020    Procedure: Radiofrequency Ablation, Nerve, Spinal, Lumbar, Medial Branch, 1 Level;  Surgeon: Dk Rosales MD;  Location: [facility] OR;  Service: Pain Management;  Laterality: Bilateral;  L3,L4,L5 - Burned at 80 degrees C. for 60 seconds x 2 each site    RADIOFREQUENCY ABLATION OF LUMBAR MEDIAL BRANCH NERVE AT SINGLE LEVEL Bilateral 10/19/2020    Procedure: Radiofrequency Ablation, Nerve, Spinal, Lumbar, Medial Branch, 1 Level;  Surgeon: Dk Rosales MD;  Location: [facility] OR;  Service: Pain Management;  Laterality: Bilateral;  L3, L4, L5    REFRACTIVE SURGERY      ROTATOR CUFF REPAIR Left 10/31/2019    Procedure: REPAIR, ROTATOR CUFF;  Surgeon: Ruben Martinez MD;  Location: [facility] OR;  Service: Orthopedics;  Laterality: Left;  arthrex    SKIN BIOPSY      TOTAL ABDOMINAL HYSTERECTOMY W/ BILATERAL SALPINGOOPHORECTOMY  age 48    TRANSFORAMINAL EPIDURAL INJECTION OF STEROID Bilateral 7/30/2021    Procedure: Injection,steroid,epidural,transforaminal approach;  Surgeon: Dk Rosales MD;  Location: [facility] OR;  Service: Pain  Management;  Laterality: Bilateral;  L5-S1     TRANSFORAMINAL EPIDURAL INJECTION OF STEROID Bilateral 3/28/2022    Procedure: INJECTION, STEROID, EPIDURAL, TRANSFORAMINAL APPROACH Lumbar L5-S1;  Surgeon: Dk Rosales MD;  Location: [facility];  Service: Pain Management;  Laterality: Bilateral;       Family History:   Family History   Problem Relation Age of Onset    Hypertension Father     Glaucoma Neg Hx     Macular degeneration Neg Hx     Retinal detachment Neg Hx        Social History:   Social History     Socioeconomic History    Marital status:    Occupational History     Employer: [employer]   Tobacco Use    Smoking status: Former     Current packs/day: 0.00     Average packs/day: 1 pack/day for 40.0 years (40.0 ttl pk-yrs)     Types: Cigarettes     Start date: 1978     Quit date: 2018     Years since quittin.6    Smokeless tobacco: Never    Tobacco comments:     smokes 2-3 cigarettes a night   Substance and Sexual Activity    Alcohol use: Yes     Alcohol/week: 12.0 standard drinks of alcohol     Types: 6 Cans of beer, 6 Standard drinks or equivalent per week     Comment: 2-3 a day/ social    Drug use: No    Sexual activity: Yes     Partners: Male     Social Determinants of Health     Financial Resource Strain: Medium Risk (2020)    Overall Financial Resource Strain (CARDIA)     Difficulty of Paying Living Expenses: Somewhat hard   Food Insecurity: Food Insecurity Present (2020)    Hunger Vital Sign     Worried About Running Out of Food in the Last Year: Sometimes true     Ran Out of Food in the Last Year: Sometimes true   Transportation Needs: No Transportation Needs (2020)    PRAPARE - Transportation     Lack of Transportation (Medical): No     Lack of Transportation (Non-Medical): No   Physical Activity: Unknown (2020)    Exercise Vital Sign     Minutes of Exercise per Session: 0 min   Recent Concern: Physical Activity - Inactive (2020)    Exercise Vital Sign      Days of Exercise per Week: 0 days     Minutes of Exercise per Session: 10 min   Stress: Stress Concern Present (4/13/2020)    [place] [employer] - Occupational Stress Questionnaire     Feeling of Stress : Very much   Social Connections: Unknown (5/12/2020)    Social Connection and Isolation Panel [NHANES]     Frequency of Communication with Friends and Family: Once a week     Frequency of Social Gatherings with Friends and Family: Never       Hospitalization/Major Diagnostic Procedure:     Review of Systems     General/Constitutional:  Chills denies. Fatigue denies. Fever denies. Weight gain denies. Weight loss denies.    Respiratory:  Shortness of breath denies.    Cardiovascular:  Chest pain denies.    Gastrointestinal:  Constipation denies. Diarrhea denies. Nausea denies. Vomiting denies.     Hematology:  Easy bruising denies. Prolonged bleeding denies.     Genitourinary:  Frequent urination denies. Pain in lower back denies. Painful urination denies.     Musculoskeletal:  See HPI for details    Skin:  Rash denies.    Neurologic:  Dizziness denies. Gait abnormalities denies. Seizures denies. Tingling/Numbess denies.    Psychiatric:  Anxiety denies. Depressed mood denies.     Objective:   Vital Signs: There were no vitals filed for this visit.     Physical Exam      General Examination:     Constitutional: The patient is alert and oriented to lace person and time. Mood is pleasant.     Head/Face: Normal facial features normal eyebrows    Eyes: Normal extraocular motion bilaterally    Lungs: Respirations are equal and unlabored    Gait is coordinated.    Cardiovascular: There are no swelling or varicosities present.    Lymphatic: Negative for adenopathy    Skin: Normal    Neurological: Level of consciousness normal. Oriented to place person and time and situation    Psychiatric: Oriented to time place person and situation    Examination of the cervical spine, well-healed anterior surgical  "incision, skin is dry intact, no erythema or ecchymosis no signs symptoms of infection.  No evidence of wound failure dehiscence.  Range of motion, she has restricted range of motion in all planes secondary to multiple level fusions.  No subjective complaints of bilateral upper extremity numbness or tingling.  Strength is symmetrical bilateral upper extremities.    Examination of the right knee, she has a healing abrasion over the anterior aspect of the right knee.  She has some resolving ecchymosis as well.  Tender over the medial joint line, pain with medial Lazarus's testing.    XRAY Report/ Interpretation:  AP and lateral views of cervical spine taken today in the office demonstrate a C3-4 fusion with a anterior plate which appears in appropriate position and there appears to be some incorporation of the bone in the interbody device.  Prior C4-5, C5-6 and C6-7 fusions all appear stable with large anterior plate noted.      Assessment:       1. S/P cervical spinal fusion    2. Fall, subsequent encounter    3. Contusion of right knee, subsequent encounter    4. Other old tear of medial meniscus of right knee    5. Abrasion of right knee, subsequent encounter        Plan:       Beatriz was seen today for pain and post-op evaluation.    Diagnoses and all orders for this visit:    S/P cervical spinal fusion  -     X-Ray Cervical Spine AP And Lateral    Fall, subsequent encounter    Contusion of right knee, subsequent encounter    Other old tear of medial meniscus of right knee  -     MRI Knee Without Contrast Right; Future    Abrasion of right knee, subsequent encounter         Follow up for MRI Results right knee.    This is to attest that the physician's assistant Bryce Roberts served in the capacity as a scribe" for this patient encounter.  This is also to verify that I have reviewed the patient's history and helped formulate the treatment plan and discussed it with the physician's assistant.  I have actively " participated in the evaluation and treatment plan for this patient is visit.  The treatment plan and medical decision-making is outlined below:  67-year-old female, follows up today for her neck, she has the extension of her prior fusion to the C3-4 level 3 months ago.  She has some sensitivity around the surgical site.  No other significant complaints.  Resolution of previously documented upper extremity radicular symptoms. An IM injection was given today with 40 mg of Kenalog.     Unfortunately she suffered a fall, she has some soft tissue injuries to the right knee and persistent right knee pain and swelling with giving out.  I think she could have a meniscal tear.  X-rays despite having had a prior left total knee arthroplasty do not look that severe in nature.  Will do an MRI of the right knee to better evaluate for a possible meniscal tear.  A knee immobilizer was applied today  Treatment options were discussed with regards to the nature of the medical condition. Conservative pain intervention and surgical options were discussed in detail. The probability of success of each separate treatment option was discussed. The patient expressed a clear understanding of the treatment options. With regards to surgery, the procedure risk, benefits, complications, and outcomes were discussed. No guarantees were given with regards to surgical outcome.   The risk of complications, morbidity, and mortality of patient management decisions have been made at the time of this visit. These are associated with the patient's problems, diagnostic procedures and treatment options. This includes the possible management options selected and those considered but not selected by the patient after shared medical decision making we discussed with the patient.     This note was created using Dragon voice recognition software that occasionally misinterpreted phrases or words.

## 2023-08-08 ENCOUNTER — TELEPHONE (OUTPATIENT)
Dept: ORTHOPEDICS | Facility: CLINIC | Age: 68
End: 2023-08-08

## 2023-08-08 DIAGNOSIS — M23.203 OTHER OLD TEAR OF MEDIAL MENISCUS OF RIGHT KNEE: Primary | ICD-10-CM

## 2023-08-08 DIAGNOSIS — Z98.1 S/P CERVICAL SPINAL FUSION: ICD-10-CM
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
## 2023-08-08 RX ORDER — TRAMADOL HYDROCHLORIDE 50 MG/1
50 TABLET ORAL EVERY 8 HOURS PRN
Qty: 21 EACH | Refills: 0 | Status: ON HOLD | OUTPATIENT
Start: 2023-08-08 | End: 2023-08-15 | Stop reason: HOSPADM

## 2023-08-08 NOTE — TELEPHONE ENCOUNTER
----- Message from Josie Ruffin sent at 8/8/2023 10:48 AM CDT -----  Regarding: RX Request  Pt called and stated Dr. Martinez was supposed to send a script to the pharmacy. Confirmed the pharmacy with Pt. - [facility]     Phone #: [phone]  Patient is requesting a refill of   Pharmacy:   [address] [address]  Phone: [phone] Fax: [phone]

## 2023-08-11 DIAGNOSIS — M51.36 DEGENERATIVE DISC DISEASE, LUMBAR: ICD-10-CM

## 2023-08-11 DIAGNOSIS — F51.01 PRIMARY INSOMNIA: ICD-10-CM

## 2023-08-11 RX ORDER — ZOLPIDEM TARTRATE 10 MG/1
10 TABLET ORAL NIGHTLY PRN
Qty: 30 TABLET | Refills: 2 | Status: SHIPPED | OUTPATIENT
Start: 2023-08-11 | End: 2023-11-09

## 2023-08-11 RX ORDER — CELECOXIB 200 MG/1
CAPSULE ORAL
Qty: 30 CAPSULE | Refills: 2 | Status: SHIPPED | OUTPATIENT
Start: 2023-08-11 | End: 2023-12-19

## 2023-08-11 NOTE — TELEPHONE ENCOUNTER
No care due was identified.  [facility] Embedded Care Due Messages. Reference number: [id number].   8/11/2023 12:32:09 PM CDT

## 2023-08-13 ENCOUNTER — HOSPITAL ENCOUNTER (OUTPATIENT)
Facility: HOSPITAL | Age: 68
Discharge: HOME HEALTH | End: 2023-08-15
Attending: EMERGENCY MEDICINE | Admitting: INTERNAL MEDICINE
Payer: MEDICARE

## 2023-08-13 DIAGNOSIS — R07.9 CHEST PAIN: ICD-10-CM

## 2023-08-13 DIAGNOSIS — M25.569 KNEE PAIN: ICD-10-CM

## 2023-08-13 DIAGNOSIS — G08 DURAL VENOUS SINUS THROMBOSIS: Primary | ICD-10-CM

## 2023-08-13 DIAGNOSIS — R40.20 LOSS OF CONSCIOUSNESS: ICD-10-CM

## 2023-08-13 DIAGNOSIS — D32.9 MENINGIOMA: ICD-10-CM

## 2023-08-13 DIAGNOSIS — R55 SYNCOPE: ICD-10-CM

## 2023-08-13 LAB
ALBUMIN SERPL BCP-MCNC: 4 G/DL (ref 3.5–5.2)
ALP SERPL-CCNC: 137 U/L (ref 55–135)
ALT SERPL W/O P-5'-P-CCNC: 9 U/L (ref 10–44)
ANION GAP SERPL CALC-SCNC: 13 MMOL/L (ref 8–16)
AST SERPL-CCNC: 14 U/L (ref 10–40)
BASOPHILS # BLD AUTO: 0.02 K/UL (ref 0–0.2)
BASOPHILS NFR BLD: 0.3 % (ref 0–1.9)
BILIRUB SERPL-MCNC: 0.9 MG/DL (ref 0.1–1)
BILIRUB UR QL STRIP: NEGATIVE
BUN SERPL-MCNC: 18 MG/DL (ref 8–23)
CALCIUM SERPL-MCNC: 9.4 MG/DL (ref 8.7–10.5)
CHLORIDE SERPL-SCNC: 103 MMOL/L (ref 95–110)
CK SERPL-CCNC: 37 U/L (ref 20–180)
CLARITY UR: CLEAR
CO2 SERPL-SCNC: 24 MMOL/L (ref 23–29)
COLOR UR: YELLOW
CREAT SERPL-MCNC: 0.9 MG/DL (ref 0.5–1.4)
DIFFERENTIAL METHOD: ABNORMAL
EOSINOPHIL # BLD AUTO: 0.1 K/UL (ref 0–0.5)
EOSINOPHIL NFR BLD: 1.9 % (ref 0–8)
ERYTHROCYTE [DISTWIDTH] IN BLOOD BY AUTOMATED COUNT: 11.8 % (ref 11.5–14.5)
EST. GFR  (NO RACE VARIABLE): >60 ML/MIN/1.73 M^2
ETHANOL SERPL-MCNC: <10 MG/DL
GLUCOSE SERPL-MCNC: 99 MG/DL (ref 70–110)
GLUCOSE UR QL STRIP: NEGATIVE
HCT VFR BLD AUTO: 38.6 % (ref 37–48.5)
HGB BLD-MCNC: 13.1 G/DL (ref 12–16)
HGB UR QL STRIP: NEGATIVE
IMM GRANULOCYTES # BLD AUTO: 0.02 K/UL (ref 0–0.04)
IMM GRANULOCYTES NFR BLD AUTO: 0.3 % (ref 0–0.5)
KETONES UR QL STRIP: NEGATIVE
LEUKOCYTE ESTERASE UR QL STRIP: ABNORMAL
LYMPHOCYTES # BLD AUTO: 1.8 K/UL (ref 1–4.8)
LYMPHOCYTES NFR BLD: 26.2 % (ref 18–48)
MCH RBC QN AUTO: 31.6 PG (ref 27–31)
MCHC RBC AUTO-ENTMCNC: 33.9 G/DL (ref 32–36)
MCV RBC AUTO: 93 FL (ref 82–98)
MICROSCOPIC COMMENT: NORMAL
MONOCYTES # BLD AUTO: 0.7 K/UL (ref 0.3–1)
MONOCYTES NFR BLD: 10.3 % (ref 4–15)
NEUTROPHILS # BLD AUTO: 4.3 K/UL (ref 1.8–7.7)
NEUTROPHILS NFR BLD: 61 % (ref 38–73)
NITRITE UR QL STRIP: NEGATIVE
NRBC BLD-RTO: 0 /100 WBC
PH UR STRIP: 7 [PH] (ref 5–8)
PLATELET # BLD AUTO: 221 K/UL (ref 150–450)
PMV BLD AUTO: 9.4 FL (ref 9.2–12.9)
POTASSIUM SERPL-SCNC: 3.7 MMOL/L (ref 3.5–5.1)
PROT SERPL-MCNC: 7 G/DL (ref 6–8.4)
PROT UR QL STRIP: NEGATIVE
RBC # BLD AUTO: 4.14 M/UL (ref 4–5.4)
RBC #/AREA URNS HPF: 0 /HPF (ref 0–4)
SODIUM SERPL-SCNC: 140 MMOL/L (ref 136–145)
SP GR UR STRIP: 1.01 (ref 1–1.03)
SQUAMOUS #/AREA URNS HPF: 0 /HPF
TROPONIN I SERPL DL<=0.01 NG/ML-MCNC: 0.01 NG/ML (ref 0–0.03)
URN SPEC COLLECT METH UR: ABNORMAL
UROBILINOGEN UR STRIP-ACNC: NEGATIVE EU/DL
WBC # BLD AUTO: 7.01 K/UL (ref 3.9–12.7)
WBC #/AREA URNS HPF: 2 /HPF (ref 0–5)

## 2023-08-13 PROCEDURE — G0378 HOSPITAL OBSERVATION PER HR: HCPCS

## 2023-08-13 PROCEDURE — 96360 HYDRATION IV INFUSION INIT: CPT | Mod: 59

## 2023-08-13 PROCEDURE — 99285 EMERGENCY DEPT VISIT HI MDM: CPT | Mod: 25

## 2023-08-13 PROCEDURE — 93005 ELECTROCARDIOGRAM TRACING: CPT

## 2023-08-13 PROCEDURE — 93010 ELECTROCARDIOGRAM REPORT: CPT | Mod: ,,, | Performed by: SPECIALIST

## 2023-08-13 PROCEDURE — 82550 ASSAY OF CK (CPK): CPT | Performed by: EMERGENCY MEDICINE

## 2023-08-13 PROCEDURE — 94761 N-INVAS EAR/PLS OXIMETRY MLT: CPT

## 2023-08-13 PROCEDURE — 85025 COMPLETE CBC W/AUTO DIFF WBC: CPT | Performed by: EMERGENCY MEDICINE

## 2023-08-13 PROCEDURE — 25000003 PHARM REV CODE 250: Performed by: NURSE PRACTITIONER

## 2023-08-13 PROCEDURE — 25500020 PHARM REV CODE 255

## 2023-08-13 PROCEDURE — 93010 EKG 12-LEAD: ICD-10-PCS | Mod: ,,, | Performed by: SPECIALIST

## 2023-08-13 PROCEDURE — 36415 COLL VENOUS BLD VENIPUNCTURE: CPT | Performed by: EMERGENCY MEDICINE

## 2023-08-13 PROCEDURE — 80053 COMPREHEN METABOLIC PANEL: CPT | Performed by: EMERGENCY MEDICINE

## 2023-08-13 PROCEDURE — 81000 URINALYSIS NONAUTO W/SCOPE: CPT | Performed by: EMERGENCY MEDICINE

## 2023-08-13 PROCEDURE — 25000003 PHARM REV CODE 250: Performed by: EMERGENCY MEDICINE

## 2023-08-13 PROCEDURE — 82077 ASSAY SPEC XCP UR&BREATH IA: CPT | Performed by: EMERGENCY MEDICINE

## 2023-08-13 PROCEDURE — 36415 COLL VENOUS BLD VENIPUNCTURE: CPT | Performed by: NURSE PRACTITIONER

## 2023-08-13 PROCEDURE — 84484 ASSAY OF TROPONIN QUANT: CPT | Performed by: NURSE PRACTITIONER

## 2023-08-13 RX ORDER — IBUPROFEN 200 MG
16 TABLET ORAL
Status: DISCONTINUED | OUTPATIENT
Start: 2023-08-13 | End: 2023-08-15 | Stop reason: HOSPADM

## 2023-08-13 RX ORDER — SODIUM,POTASSIUM PHOSPHATES 280-250MG
2 POWDER IN PACKET (EA) ORAL
Status: DISCONTINUED | OUTPATIENT
Start: 2023-08-13 | End: 2023-08-15 | Stop reason: HOSPADM

## 2023-08-13 RX ORDER — LANOLIN ALCOHOL/MO/W.PET/CERES
800 CREAM (GRAM) TOPICAL
Status: DISCONTINUED | OUTPATIENT
Start: 2023-08-13 | End: 2023-08-15 | Stop reason: HOSPADM

## 2023-08-13 RX ORDER — GLUCAGON 1 MG
1 KIT INJECTION
Status: DISCONTINUED | OUTPATIENT
Start: 2023-08-13 | End: 2023-08-15 | Stop reason: HOSPADM

## 2023-08-13 RX ORDER — SIMETHICONE 80 MG
1 TABLET,CHEWABLE ORAL 4 TIMES DAILY PRN
Status: DISCONTINUED | OUTPATIENT
Start: 2023-08-13 | End: 2023-08-15 | Stop reason: HOSPADM

## 2023-08-13 RX ORDER — FLUTICASONE FUROATE AND VILANTEROL 100; 25 UG/1; UG/1
1 POWDER RESPIRATORY (INHALATION) DAILY
Status: DISCONTINUED | OUTPATIENT
Start: 2023-08-14 | End: 2023-08-15 | Stop reason: HOSPADM

## 2023-08-13 RX ORDER — AMOXICILLIN 250 MG
1 CAPSULE ORAL 2 TIMES DAILY
Status: DISCONTINUED | OUTPATIENT
Start: 2023-08-13 | End: 2023-08-15 | Stop reason: HOSPADM

## 2023-08-13 RX ORDER — ONDANSETRON 2 MG/ML
4 INJECTION INTRAMUSCULAR; INTRAVENOUS EVERY 6 HOURS PRN
Status: DISCONTINUED | OUTPATIENT
Start: 2023-08-13 | End: 2023-08-15 | Stop reason: HOSPADM

## 2023-08-13 RX ORDER — MAG HYDROX/ALUMINUM HYD/SIMETH 200-200-20
30 SUSPENSION, ORAL (FINAL DOSE FORM) ORAL 4 TIMES DAILY PRN
Status: DISCONTINUED | OUTPATIENT
Start: 2023-08-13 | End: 2023-08-15 | Stop reason: HOSPADM

## 2023-08-13 RX ORDER — SODIUM CHLORIDE 0.9 % (FLUSH) 0.9 %
10 SYRINGE (ML) INJECTION EVERY 8 HOURS PRN
Status: DISCONTINUED | OUTPATIENT
Start: 2023-08-13 | End: 2023-08-15 | Stop reason: HOSPADM

## 2023-08-13 RX ORDER — ACETAMINOPHEN 325 MG/1
650 TABLET ORAL EVERY 8 HOURS PRN
Status: DISCONTINUED | OUTPATIENT
Start: 2023-08-13 | End: 2023-08-15 | Stop reason: HOSPADM

## 2023-08-13 RX ORDER — FLUOXETINE HYDROCHLORIDE 20 MG/1
40 CAPSULE ORAL 2 TIMES DAILY
Status: DISCONTINUED | OUTPATIENT
Start: 2023-08-13 | End: 2023-08-15 | Stop reason: HOSPADM

## 2023-08-13 RX ORDER — TALC
6 POWDER (GRAM) TOPICAL NIGHTLY PRN
Status: DISCONTINUED | OUTPATIENT
Start: 2023-08-13 | End: 2023-08-15 | Stop reason: HOSPADM

## 2023-08-13 RX ORDER — LOSARTAN POTASSIUM AND HYDROCHLOROTHIAZIDE 12.5; 5 MG/1; MG/1
2 TABLET ORAL DAILY
Status: DISCONTINUED | OUTPATIENT
Start: 2023-08-13 | End: 2023-08-15 | Stop reason: HOSPADM

## 2023-08-13 RX ORDER — IBUPROFEN 200 MG
24 TABLET ORAL
Status: DISCONTINUED | OUTPATIENT
Start: 2023-08-13 | End: 2023-08-15 | Stop reason: HOSPADM

## 2023-08-13 RX ORDER — AMLODIPINE BESYLATE 5 MG/1
5 TABLET ORAL DAILY
Status: DISCONTINUED | OUTPATIENT
Start: 2023-08-13 | End: 2023-08-15 | Stop reason: HOSPADM

## 2023-08-13 RX ORDER — IPRATROPIUM BROMIDE AND ALBUTEROL SULFATE 2.5; .5 MG/3ML; MG/3ML
3 SOLUTION RESPIRATORY (INHALATION) EVERY 6 HOURS PRN
Status: DISCONTINUED | OUTPATIENT
Start: 2023-08-13 | End: 2023-08-15 | Stop reason: HOSPADM

## 2023-08-13 RX ADMIN — IOHEXOL 75 ML: 350 INJECTION, SOLUTION INTRAVENOUS at 08:08

## 2023-08-13 RX ADMIN — FLUOXETINE 40 MG: 20 CAPSULE ORAL at 11:08

## 2023-08-13 RX ADMIN — SODIUM CHLORIDE 1000 ML: 0.9 INJECTION, SOLUTION INTRAVENOUS at 06:08

## 2023-08-13 RX ADMIN — LOSARTAN POTASSIUM AND HYDROCHLOROTHIAZIDE 2 TABLET: 12.5; 5 TABLET ORAL at 10:08

## 2023-08-13 RX ADMIN — AMLODIPINE BESYLATE 5 MG: 5 TABLET ORAL at 10:08

## 2023-08-13 RX ADMIN — ACETAMINOPHEN 650 MG: 325 TABLET, FILM COATED ORAL at 11:08

## 2023-08-13 NOTE — LETTER
August 13, 2023         [address] [place] [address] DR FRACISCO SEGOVIA [id number]           To Whom It May Concern:    Please be advised that under state and federal laws as it relates to patient privacy and Health Insurance Accountability Act (HIPAA), we can not release our patient(s) name without authorization. Although, we can confirm that the individual listed below did accompany a person to our facility for healthcare services to be provided.    This document confirms that Jeniffer Gan accompanied this patient on 8/13/23-8/15/23      Sincerely,   [facility] RN

## 2023-08-13 NOTE — ED PROVIDER NOTES
"Encounter Date: 8/13/2023       History     Chief Complaint   Patient presents with    Fall     Unsure when the fall happened or how.  Dizziness and weakness     67-year-old female with a history of alcohol dependence, renal cell carcinoma presents to the ER with a head injury after a fall.  She went to bed last night at 9:30 a.m. and at some point got up to go to the bathroom and then fell.  She does not recall the entire incident but was able to get back into bed.  Granddaughter reports that She was not answering phone calls from her family today so they "broke into" the house and found her in bed and confused.  Granddaughter reports the patient has a history of daily alcohol use.  Patient states she has a headache and some right rib pain.  No shortness of breath no abdominal pain.  Family thinks she has some persistent confusion.  No neck pain.  Complaining of right knee pain.  Patient reports 1 or 2 alcoholic beverages daily, she has never had any withdrawal symptoms.    The history is provided by the patient.     Review of patient's allergies indicates:   Allergen Reactions    Phenergan [promethazine] Hives and Rash    Latex, natural rubber Hives     Per pt report-many years    Morphine Hives    Nsaids (non-steroidal anti-inflammatory drug)      Due to "kidney cancer"     Past Medical History:   Diagnosis Date    Alcohol dependence     DDD (degenerative disc disease), lumbosacral     DJD (degenerative joint disease), lumbosacral     Encounter for blood transfusion     GERD (gastroesophageal reflux disease)     Gout     Hypercholesterolemia     Hypertension     NATHALIE (iron deficiency anemia)     questionable white coat hypertension    Kidney carcinoma, left 2014    Pneumonia     Renal cell carcinoma     Shingles 10/13/2012     Past Surgical History:   Procedure Laterality Date    ANTERIOR CERVICAL DISCECTOMY W/ FUSION N/A 5/4/2023    Procedure: DISCECTOMY, SPINE, CERVICAL, ANTERIOR APPROACH, WITH FUSION C3-4;  " Surgeon: Ruben Martinez MD;  Location: [facility] OR;  Service: Orthopedics;  Laterality: N/A;    APPENDECTOMY      ARTHROSCOPY OF SHOULDER WITH DECOMPRESSION OF SUBACROMIAL SPACE Left 10/31/2019    Procedure: ARTHROSCOPY, SHOULDER, WITH SUBACROMIAL SPACE DECOMPRESSION;  Surgeon: Ruben Martinez MD;  Location: [facility] OR;  Service: Orthopedics;  Laterality: Left;    BLADDER REPAIR      x 2    COLONOSCOPY  9/2011    DISTAL CLAVICLE EXCISION Left 10/31/2019    Procedure: EXCISION, CLAVICLE, DISTAL;  Surgeon: Ruben Martinez MD;  Location: [facility] OR;  Service: Orthopedics;  Laterality: Left;    EPIDURAL STEROID INJECTION INTO LUMBAR SPINE N/A 12/7/2020    Procedure: Injection-steroid-epidural-lumbar;  Surgeon: Dk Rosales MD;  Location: [facility] OR;  Service: Pain Management;  Laterality: N/A;  L4-L5    EPIDURAL STEROID INJECTION INTO LUMBAR SPINE N/A 5/17/2021    Procedure: Injection-steroid-epidural-lumbar;  Surgeon: Dk Rosales MD;  Location: [facility] OR;  Service: Pain Management;  Laterality: N/A;  L4-L5    ESOPHAGOGASTRODUODENOSCOPY  9/2011    EYE SURGERY      lasix bilateral    FIXATION KYPHOPLASTY N/A 1/31/2020    Procedure: Kyphoplasty T12;  Surgeon: Dk Rosales MD;  Location: [facility] OR;  Service: Pain Management;  Laterality: N/A;    HERNIA REPAIR      hiatal hernai    HYSTERECTOMY      INJECTION OF ANESTHETIC AGENT AROUND NERVE Left 6/8/2020    Procedure: Block, Nerve;  Surgeon: Dk Rosales MD;  Location: [facility] OR;  Service: Pain Management;  Laterality: Left;  left genicular nerve block     KNEE ARTHROPLASTY Left 7/30/2020    Procedure: ARTHROPLASTY, KNEE LEFT;  Surgeon: Ruben Martinez MD;  Location: [facility] OR;  Service: Orthopedics;  Laterality: Left;  REP VALERY RUBY    KNEE ARTHROSCOPY W/ MENISCECTOMY Left 1/16/2020    Procedure: ARTHROSCOPY, KNEE, WITH MEDIAL MENISCECTOMY;  Surgeon: Ruben Martinez MD;  Location: [facility] OR;  Service: Orthopedics;  Laterality: Left;    LAPAROSCOPIC NISSEN FUNDOPLICATION       NEPHRECTOMY      LT partial    RADIOFREQUENCY ABLATION Left 6/19/2020    Procedure: Radiofrequency Ablation;  Surgeon: Dk Rosales MD;  Location: [facility] OR;  Service: Pain Management;  Laterality: Left;    RADIOFREQUENCY ABLATION OF LUMBAR MEDIAL BRANCH NERVE AT SINGLE LEVEL Bilateral 2/28/2020    Procedure: Radiofrequency Ablation, Nerve, Spinal, Lumbar, Medial Branch, 1 Level;  Surgeon: Dk Rosales MD;  Location: [facility] OR;  Service: Pain Management;  Laterality: Bilateral;  L3,L4,L5 - Burned at 80 degrees C. for 60 seconds x 2 each site    RADIOFREQUENCY ABLATION OF LUMBAR MEDIAL BRANCH NERVE AT SINGLE LEVEL Bilateral 10/19/2020    Procedure: Radiofrequency Ablation, Nerve, Spinal, Lumbar, Medial Branch, 1 Level;  Surgeon: Dk Rosales MD;  Location: [facility] OR;  Service: Pain Management;  Laterality: Bilateral;  L3, L4, L5    REFRACTIVE SURGERY      ROTATOR CUFF REPAIR Left 10/31/2019    Procedure: REPAIR, ROTATOR CUFF;  Surgeon: Ruben Martinez MD;  Location: [facility] OR;  Service: Orthopedics;  Laterality: Left;  arthrex    SKIN BIOPSY      TOTAL ABDOMINAL HYSTERECTOMY W/ BILATERAL SALPINGOOPHORECTOMY  age 48    TRANSFORAMINAL EPIDURAL INJECTION OF STEROID Bilateral 7/30/2021    Procedure: Injection,steroid,epidural,transforaminal approach;  Surgeon: Dk Rosales MD;  Location: [facility] OR;  Service: Pain Management;  Laterality: Bilateral;  L5-S1     TRANSFORAMINAL EPIDURAL INJECTION OF STEROID Bilateral 3/28/2022    Procedure: INJECTION, STEROID, EPIDURAL, TRANSFORAMINAL APPROACH Lumbar L5-S1;  Surgeon: Dk Rosales MD;  Location: [facility] OR;  Service: Pain Management;  Laterality: Bilateral;     Family History   Problem Relation Age of Onset    Hypertension Father     Glaucoma Neg Hx     Macular degeneration Neg Hx     Retinal detachment Neg Hx      Social History     Tobacco Use    Smoking status: Former     Current packs/day: 0.00     Average packs/day: 1 pack/day for 40.0 years (40.0 ttl pk-yrs)      Types: Cigarettes     Start date: 1978     Quit date: 2018     Years since quittin.6    Smokeless tobacco: Never    Tobacco comments:     smokes 2-3 cigarettes a night   Substance Use Topics    Alcohol use: Yes     Alcohol/week: 12.0 standard drinks of alcohol     Types: 6 Cans of beer, 6 Standard drinks or equivalent per week     Comment: 2-3 a day/ social    Drug use: No     Review of Systems   HENT: Negative.     Eyes: Negative.    Respiratory:  Negative for shortness of breath.    Cardiovascular:  Negative for chest pain.   Gastrointestinal:  Negative for abdominal pain.   Genitourinary:  Negative for flank pain.   Musculoskeletal:  Positive for arthralgias. Negative for neck pain.   Skin:  Positive for wound.   Neurological:  Positive for syncope and headaches.   Psychiatric/Behavioral:  Positive for confusion.    All other systems reviewed and are negative.      Physical Exam     Initial Vitals [23 1814]   BP Pulse Resp Temp SpO2   (!) 180/118 88 20 98 °F (36.7 °C) 98 %      MAP       --         Physical Exam    Nursing note and vitals reviewed.  Constitutional: She appears well-developed and well-nourished. She is not diaphoretic.  Non-toxic appearance. She does not have a sickly appearance. She does not appear ill. No distress.   HENT:   Head: Normocephalic and atraumatic.       Eyes: EOM are normal. Pupils are equal, round, and reactive to light.   Neck: Neck supple.   Normal range of motion.   Full passive range of motion without pain.     Cardiovascular:  Normal rate, regular rhythm and normal heart sounds.           Pulmonary/Chest: Breath sounds normal. No respiratory distress. She has no wheezes. She has no rhonchi. She has no rales. She exhibits tenderness. She exhibits no crepitus, no deformity and no retraction.     Abdominal: Abdomen is soft. She exhibits no distension. There is no abdominal tenderness. There is no rebound and no guarding.   Musculoskeletal:         General:  No edema.      Right shoulder: Normal.      Left shoulder: Normal.      Right elbow: Normal.      Left elbow: Normal.      Right wrist: Normal.      Left wrist: Normal.      Cervical back: Normal, full passive range of motion without pain, normal range of motion and neck supple. No rigidity. No spinous process tenderness or muscular tenderness. Normal range of motion.      Thoracic back: Normal.      Lumbar back: Normal.      Right hip: Normal.      Left hip: Normal.      Right knee: Decreased range of motion. Tenderness present over the medial joint line.      Left knee: Normal.      Right ankle: Normal.      Left ankle: Normal.     Neurological: She is alert and oriented to person, place, and time.   Skin: Skin is warm and dry.   Psychiatric: She has a normal mood and affect. Her behavior is normal. Judgment and thought content normal.         ED Course   Procedures  Labs Reviewed   CBC W/ AUTO DIFFERENTIAL - Abnormal; Notable for the following components:       Result Value    MCH 31.6 (*)     All other components within normal limits   COMPREHENSIVE METABOLIC PANEL - Abnormal; Notable for the following components:    Alkaline Phosphatase 137 (*)     ALT 9 (*)     All other components within normal limits   URINALYSIS, REFLEX TO URINE CULTURE - Abnormal; Notable for the following components:    Leukocytes, UA Trace (*)     All other components within normal limits    Narrative:     Specimen Source->Urine   ALCOHOL,MEDICAL (ETHANOL)   CK   URINALYSIS MICROSCOPIC    Narrative:     Specimen Source->Urine          Imaging Results              CT Abdomen Pelvis With Contrast (Final result)  Result time 08/13/23 20:39:05      Final result by Dallas Christopher DO (08/13/23 20:39:05)                   Impression:      1. No traumatic visceral injury of the abdomen or pelvis.  2. Left renal AML, stable.  3. Colonic diverticulosis.      Electronically signed by: Dallas Christopher  Date:    08/13/2023  Time:    20:39                Narrative:    EXAMINATION:  CT ABDOMEN PELVIS WITH CONTRAST    CLINICAL HISTORY:  Abdominal trauma, blunt;    TECHNIQUE:  Axial CT images with sagittal and coronal reformats were obtained of the abdomen and pelvis from the hemidiaphragms through the symphysis pubis after the administration of 75mL Omnipaque 350.    COMPARISON:  CT abdomen and pelvis from 04/14/2021.    FINDINGS:  Lung Bases: Clear.    Heart: Heart size is normal.  No pericardial effusion.    Liver: The liver is normal in size and demonstrates homogeneous enhancement without focal lesion.  The portal vasculature is patent.    Biliary tract: No intrahepatic or extrahepatic biliary ductal dilatation.    Gallbladder: No radiodense gallstone. No wall thickening or pericholecystic fluid.    Pancreas: Normal. No pancreatic ductal dilatation.    Spleen: Normal size without focal lesion.    Adrenals: Unremarkable.    Kidneys and urinary collecting systems: There is a 2.4 cm fat containing lesion arising from the left kidney midpole, compatible with an angiomyolipoma, unchanged in size from prior.  There is a right-sided extrarenal pelvis.  No hydronephrosis or urolithiasis.    Lymph nodes: None enlarged.    Stomach and bowel: There are postop changes of the gastroesophageal junction.  Loops of small and large bowel are normal in caliber without evidence for inflammation or obstruction.  There is colonic diverticulosis without evidence of acute diverticulitis.    Peritoneum and mesentery: No ascites or free intraperitoneal air.  No abdominal fluid collection.    Vasculature: No aneurysm or significant atherosclerosis.    Urinary bladder: No wall thickening.    Reproductive organs: The uterus is surgically absent.    Body wall: No abnormality.    Musculoskeletal: No aggressive osseous lesion.  There is a remote fracture of the right 11th rib posteriorly.  There is a remote compression fracture of T12 with vertebroplasty cement, height loss is unchanged.   There is disc height loss at L5-S1.                                       X-Ray Knee 3 View Right (Final result)  Result time 08/13/23 19:26:53      Final result by Dallas Christopher DO (08/13/23 19:26:53)                   Impression:      No acute osseous abnormality.      Electronically signed by: Dallas Christopher  Date:    08/13/2023  Time:    19:26               Narrative:    EXAMINATION:  XR KNEE 3 VIEW RIGHT    CLINICAL HISTORY:  Pain in unspecified knee    TECHNIQUE:  AP, lateral, and Merchant views of the right knee were performed.    COMPARISON:  07/21/2023.    FINDINGS:  There is no acute fracture or dislocation. Alignment is normal. Joint spaces are preserved. No joint effusion.                                       X-Ray Chest AP Portable (Final result)  Result time 08/13/23 19:29:25      Final result by Dallas Christopher DO (08/13/23 19:29:25)                   Impression:      No acute abnormality.      Electronically signed by: Dallas Christopher  Date:    08/13/2023  Time:    19:29               Narrative:    EXAMINATION:  XR CHEST AP PORTABLE    CLINICAL HISTORY:  syncope;    TECHNIQUE:  Single frontal view of the chest was performed.    COMPARISON:  04/27/2023.    FINDINGS:  The lungs are well expanded and clear. No focal opacities are seen. The pleural spaces are clear. The cardiac silhouette is unremarkable. There is partially imaged cervical spine hardware.  There is vertebroplasty cement within L1.  Osseous structures are intact.                                       CT Head Without Contrast (Final result)  Result time 08/13/23 19:25:18      Final result by Dallas Christopher DO (08/13/23 19:25:18)                   Impression:      No intracranial hemorrhage.    Right parietal and right frontal scalp hematomas.    Extra-axial mass abutting the left cerebellar hemisphere, may represent a meningioma or other dural-based lesion.  Further evaluation with nonemergent MRI brain with and without contrast  is recommended.      Electronically signed by: Dallas Christopher  Date:    08/13/2023  Time:    19:25               Narrative:    EXAMINATION:  CT HEAD WITHOUT CONTRAST    CLINICAL HISTORY:  Head trauma, moderate-severe;    TECHNIQUE:  Low dose axial CT images obtained throughout the head without intravenous contrast. Sagittal and coronal reconstructions were performed.    COMPARISON:  CT head 03/16/2021, 08/07/2018.    FINDINGS:  Ventricles and sulci are normal in size for age without evidence of hydrocephalus. No extra-axial blood or fluid collections.  The brain parenchyma is normal.  There is an extra-axial mass abutting the left cerebellar hemisphere measuring approximately 1.6 x 2.0 x 1.9 cm (series 2, image 11).  There is no evidence of significant mass effect or midline shift.  There is no evidence of vasogenic edema.  This was vaguely seen in retrospect on prior CT dated 03/16/2021 though has increased in size.  This was not seen on CT dated 08/07/2018.  No parenchymal hemorrhage, edema or major vascular distribution infarct.    No calvarial fracture.  There is a large right parietal scalp hematoma.  There is a small right frontal scalp hematoma.  Bilateral paranasal sinuses and mastoid air cells are clear.  There is a remote fracture of the medial wall of the right orbit.                                       Medications   sodium chloride 0.9% bolus 1,000 mL 1,000 mL (0 mLs Intravenous Stopped 8/13/23 1954)   iohexoL (OMNIPAQUE 350) 350 mg iodine/mL injection (75 mLs Intravenous Given 8/13/23 2020)     Medical Decision Making:   History:   I obtained history from: someone other than patient.       <> Summary of History: History obtained from granddaughter, see HPI  Initial Assessment:   Syncope  Differential Diagnosis:   Arrhythmia, subdural hemorrhage, electrolyte abnormality, bladder infection, alcohol withdrawal  Clinical Tests:   Lab Tests: Reviewed and Ordered  Radiological Study: Ordered and  Reviewed  Medical Tests: Reviewed and Ordered  ED Management:  Head CT, abdominal CT, chest x-ray labs EKG urine  2053 67-year-old female with a history of renal cell carcinoma, hypertension, 2-3 alcoholic beverages daily presents to the ER with an episode of syncope that occurred at some point early this morning.  She states that she went to bed between 9 and 10 as usual and then after midnight went to the bathroom.  While going back to bed she passed out and struck her head.  She does not really recall this event very well.  Family reports that she seems a little more confused than usual.  Somehow she was able to get back into bed and remained in bed all day.  Family could not get in touch with her and eventually had to break into her house to get to her.  Patient reports a headache at this time.  Also some right knee pain and some abdominal pain.  Extensive ER workup is unremarkable for any significant acute finding.  She does have an extra-axial cerebellar finding likely a meningioma.  No posttraumatic abdominal injury on CT.  Labs are unremarkable.  She will be admitted to Hospital Medicine for syncope evaluation.  No chest pain or shortness of breath raise suspicion for pulmonary embolus. [EF]               ED Course as of 08/13/23 2111   Sun Aug 13, 2023   1820 BP(!): 180/118 [EF]   1820 Temp: 98 °F (36.7 °C) [EF]   1820 Temp Source: Oral [EF]   1820 Pulse: 88 [EF]   1820 Resp: 20 [EF]   1820 SpO2: 98 % [EF]   1851 Sinus rhythm normal axis no ST elevation or depression 76 beats per minute.  First-degree AV block independently interpreted. [EF]   1910 WBC: 7.01 [EF]   1910 Hemoglobin: 13.1 [EF]   1910 Platelets: 221 [EF]   1919 Alcohol, Serum: <10 [EF]   1933 X-Ray Chest AP Portable [EF]   1933 X-Ray Knee 3 View Right [EF]   1933 CT Head Without Contrast [EF]   1955 WBC, UA: 2 [EF]   1955 Leukocytes, UA(!): Trace [EF]   2005 Now with some upper abdominal pain right upper quadrant pain on exam.  CT will be  obtained to rule out any sort of posttraumatic injury from the fall. [EF]   2045 CT Abdomen Pelvis With Contrast [EF]   2052 FERN jackson to admit for syncope [EF]   2053 67-year-old female with a history of renal cell carcinoma, hypertension, 2-3 alcoholic beverages daily presents to the ER with an episode of syncope that occurred at some point early this morning.  She states that she went to bed between 9 and 10 as usual and then after midnight went to the bathroom.  While going back to bed she passed out and struck her head.  She does not really recall this event very well.  Family reports that she seems a little more confused than usual.  Somehow she was able to get back into bed and remained in bed all day.  Family could not get in touch with her and eventually had to break into her house to get to her.  Patient reports a headache at this time.  Also some right knee pain and some abdominal pain.  Extensive ER workup is unremarkable for any significant acute finding.  She does have an extra-axial cerebellar finding likely a meningioma.  No posttraumatic abdominal injury on CT.  Labs are unremarkable.  She will be admitted to Hospital Medicine for syncope evaluation.  No chest pain or shortness of breath raise suspicion for pulmonary embolus. [EF]      ED Course User Index  [EF] Hipolito Henry MD                 Clinical Impression:   Final diagnoses:  [R55] Syncope  [M25.569] Knee pain        ED Disposition Condition    Observation Stable                Hipolito Henry MD  08/13/23 [id number]

## 2023-08-14 PROBLEM — D32.9 MENINGIOMA: Status: ACTIVE | Noted: 2023-08-14

## 2023-08-14 PROBLEM — G93.89 BRAIN MASS: Status: ACTIVE | Noted: 2023-08-14

## 2023-08-14 PROBLEM — R40.20 LOSS OF CONSCIOUSNESS: Status: ACTIVE | Noted: 2023-08-13

## 2023-08-14 LAB
ALBUMIN SERPL BCP-MCNC: 3.7 G/DL (ref 3.5–5.2)
ALP SERPL-CCNC: 141 U/L (ref 55–135)
ALT SERPL W/O P-5'-P-CCNC: 6 U/L (ref 10–44)
AMMONIA PLAS-SCNC: 19 UMOL/L (ref 10–50)
ANION GAP SERPL CALC-SCNC: 12 MMOL/L (ref 8–16)
AORTIC ROOT ANNULUS: 2.82 CM
AORTIC VALVE CUSP SEPERATION: 1.72 CM
AST SERPL-CCNC: 12 U/L (ref 10–40)
AV INDEX (PROSTH): 0.97
AV MEAN GRADIENT: 2 MMHG
AV PEAK GRADIENT: 4 MMHG
AV VALVE AREA BY VELOCITY RATIO: 2.86 CM²
AV VALVE AREA: 3.01 CM²
AV VELOCITY RATIO: 0.92
BASOPHILS # BLD AUTO: 0.02 K/UL (ref 0–0.2)
BASOPHILS NFR BLD: 0.4 % (ref 0–1.9)
BILIRUB SERPL-MCNC: 1.4 MG/DL (ref 0.1–1)
BSA FOR ECHO PROCEDURE: 1.91 M2
BUN SERPL-MCNC: 14 MG/DL (ref 8–23)
CALCIUM SERPL-MCNC: 9.6 MG/DL (ref 8.7–10.5)
CHLORIDE SERPL-SCNC: 103 MMOL/L (ref 95–110)
CO2 SERPL-SCNC: 26 MMOL/L (ref 23–29)
CREAT SERPL-MCNC: 0.8 MG/DL (ref 0.5–1.4)
CV ECHO LV RWT: 0.39 CM
DIFFERENTIAL METHOD: ABNORMAL
DOP CALC AO PEAK VEL: 1.01 M/S
DOP CALC AO VTI: 22.1 CM
DOP CALC LVOT AREA: 3.1 CM2
DOP CALC LVOT DIAMETER: 1.99 CM
DOP CALC LVOT PEAK VEL: 0.93 M/S
DOP CALC LVOT STROKE VOLUME: 66.53 CM3
DOP CALC MV VTI: 26.3 CM
DOP CALCLVOT PEAK VEL VTI: 21.4 CM
E WAVE DECELERATION TIME: 297.34 MSEC
E/A RATIO: 0.71
E/E' RATIO: 8.5 M/S
ECHO LV POSTERIOR WALL: 0.96 CM (ref 0.6–1.1)
EOSINOPHIL # BLD AUTO: 0.2 K/UL (ref 0–0.5)
EOSINOPHIL NFR BLD: 3.3 % (ref 0–8)
ERYTHROCYTE [DISTWIDTH] IN BLOOD BY AUTOMATED COUNT: 11.9 % (ref 11.5–14.5)
EST. GFR  (NO RACE VARIABLE): >60 ML/MIN/1.73 M^2
FRACTIONAL SHORTENING: 26 % (ref 28–44)
GLUCOSE SERPL-MCNC: 109 MG/DL (ref 70–110)
HCT VFR BLD AUTO: 38.3 % (ref 37–48.5)
HGB BLD-MCNC: 12.8 G/DL (ref 12–16)
IMM GRANULOCYTES # BLD AUTO: 0.01 K/UL (ref 0–0.04)
IMM GRANULOCYTES NFR BLD AUTO: 0.2 % (ref 0–0.5)
INTERVENTRICULAR SEPTUM: 1.15 CM (ref 0.6–1.1)
IVC DIAMETER: 1.58 CM
LEFT ATRIUM SIZE: 3.57 CM
LEFT ATRIUM VOLUME INDEX MOD: 14.5 ML/M2
LEFT ATRIUM VOLUME MOD: 27.19 CM3
LEFT INTERNAL DIMENSION IN SYSTOLE: 3.65 CM (ref 2.1–4)
LEFT VENTRICLE DIASTOLIC VOLUME INDEX: 61.73 ML/M2
LEFT VENTRICLE DIASTOLIC VOLUME: 115.43 ML
LEFT VENTRICLE MASS INDEX: 103 G/M2
LEFT VENTRICLE SYSTOLIC VOLUME INDEX: 30 ML/M2
LEFT VENTRICLE SYSTOLIC VOLUME: 56.13 ML
LEFT VENTRICULAR INTERNAL DIMENSION IN DIASTOLE: 4.95 CM (ref 3.5–6)
LEFT VENTRICULAR MASS: 192.47 G
LV LATERAL E/E' RATIO: 7.29 M/S
LV SEPTAL E/E' RATIO: 10.2 M/S
LVOT MG: 2.28 MMHG
LVOT MV: 0.73 CM/S
LYMPHOCYTES # BLD AUTO: 1.6 K/UL (ref 1–4.8)
LYMPHOCYTES NFR BLD: 30.6 % (ref 18–48)
MAGNESIUM SERPL-MCNC: 1.8 MG/DL (ref 1.6–2.6)
MCH RBC QN AUTO: 31.5 PG (ref 27–31)
MCHC RBC AUTO-ENTMCNC: 33.4 G/DL (ref 32–36)
MCV RBC AUTO: 94 FL (ref 82–98)
MONOCYTES # BLD AUTO: 0.6 K/UL (ref 0.3–1)
MONOCYTES NFR BLD: 10.8 % (ref 4–15)
MV MEAN GRADIENT: 1 MMHG
MV PEAK A VEL: 0.72 M/S
MV PEAK E VEL: 0.51 M/S
MV PEAK GRADIENT: 2 MMHG
MV STENOSIS PRESSURE HALF TIME: 101.2 MS
MV VALVE AREA BY CONTINUITY EQUATION: 2.53 CM2
MV VALVE AREA P 1/2 METHOD: 2.17 CM2
NEUTROPHILS # BLD AUTO: 2.8 K/UL (ref 1.8–7.7)
NEUTROPHILS NFR BLD: 54.7 % (ref 38–73)
NRBC BLD-RTO: 0 /100 WBC
OHS LV EJECTION FRACTION SIMPSONS BIPLANE MOD: 51 %
PHOSPHATE SERPL-MCNC: 3.9 MG/DL (ref 2.7–4.5)
PISA TR MAX VEL: 2.53 M/S
PLATELET # BLD AUTO: 188 K/UL (ref 150–450)
PMV BLD AUTO: 9.7 FL (ref 9.2–12.9)
POTASSIUM SERPL-SCNC: 3.6 MMOL/L (ref 3.5–5.1)
PROT SERPL-MCNC: 6.7 G/DL (ref 6–8.4)
PV MV: 0.69 M/S
PV PEAK GRADIENT: 3 MMHG
PV PEAK VELOCITY: 0.87 M/S
RA PRESSURE ESTIMATED: 3 MMHG
RBC # BLD AUTO: 4.06 M/UL (ref 4–5.4)
RV TB RVSP: 6 MMHG
RV TISSUE DOPPLER FREE WALL SYSTOLIC VELOCITY 1 (APICAL 4 CHAMBER VIEW): 18.19 CM/S
SODIUM SERPL-SCNC: 141 MMOL/L (ref 136–145)
TDI LATERAL: 0.07 M/S
TDI SEPTAL: 0.05 M/S
TDI: 0.06 M/S
TR MAX PG: 26 MMHG
TV REST PULMONARY ARTERY PRESSURE: 29 MMHG
WBC # BLD AUTO: 5.09 K/UL (ref 3.9–12.7)
Z-SCORE OF LEFT VENTRICULAR DIMENSION IN END DIASTOLE: -0.52
Z-SCORE OF LEFT VENTRICULAR DIMENSION IN END SYSTOLE: 1

## 2023-08-14 PROCEDURE — 36415 COLL VENOUS BLD VENIPUNCTURE: CPT | Performed by: NURSE PRACTITIONER

## 2023-08-14 PROCEDURE — 25000003 PHARM REV CODE 250: Performed by: NURSE PRACTITIONER

## 2023-08-14 PROCEDURE — A9585 GADOBUTROL INJECTION: HCPCS

## 2023-08-14 PROCEDURE — 25000242 PHARM REV CODE 250 ALT 637 W/ HCPCS: Performed by: NURSE PRACTITIONER

## 2023-08-14 PROCEDURE — 84100 ASSAY OF PHOSPHORUS: CPT | Performed by: NURSE PRACTITIONER

## 2023-08-14 PROCEDURE — 85025 COMPLETE CBC W/AUTO DIFF WBC: CPT | Performed by: NURSE PRACTITIONER

## 2023-08-14 PROCEDURE — 94640 AIRWAY INHALATION TREATMENT: CPT

## 2023-08-14 PROCEDURE — 99204 OFFICE O/P NEW MOD 45 MIN: CPT | Mod: ,,, | Performed by: NEUROLOGICAL SURGERY

## 2023-08-14 PROCEDURE — G0378 HOSPITAL OBSERVATION PER HR: HCPCS

## 2023-08-14 PROCEDURE — 25500020 PHARM REV CODE 255

## 2023-08-14 PROCEDURE — 82140 ASSAY OF AMMONIA: CPT | Performed by: NURSE PRACTITIONER

## 2023-08-14 PROCEDURE — 80053 COMPREHEN METABOLIC PANEL: CPT | Performed by: NURSE PRACTITIONER

## 2023-08-14 PROCEDURE — 83735 ASSAY OF MAGNESIUM: CPT | Performed by: NURSE PRACTITIONER

## 2023-08-14 PROCEDURE — 99204 PR OFFICE/OUTPT VISIT, NEW, LEVL IV, 45-59 MIN: ICD-10-PCS | Mod: ,,, | Performed by: NEUROLOGICAL SURGERY

## 2023-08-14 PROCEDURE — 94761 N-INVAS EAR/PLS OXIMETRY MLT: CPT

## 2023-08-14 PROCEDURE — 99900035 HC TECH TIME PER 15 MIN (STAT)

## 2023-08-14 RX ORDER — GADOBUTROL 604.72 MG/ML
INJECTION INTRAVENOUS
Status: COMPLETED
Start: 2023-08-14 | End: 2023-08-14

## 2023-08-14 RX ORDER — HYDROCODONE BITARTRATE AND ACETAMINOPHEN 5; 325 MG/1; MG/1
1 TABLET ORAL EVERY 6 HOURS PRN
Status: DISCONTINUED | OUTPATIENT
Start: 2023-08-14 | End: 2023-08-15 | Stop reason: HOSPADM

## 2023-08-14 RX ADMIN — FLUOXETINE 40 MG: 20 CAPSULE ORAL at 08:08

## 2023-08-14 RX ADMIN — POTASSIUM BICARBONATE 50 MEQ: 977.5 TABLET, EFFERVESCENT ORAL at 09:08

## 2023-08-14 RX ADMIN — FLUOXETINE 40 MG: 20 CAPSULE ORAL at 09:08

## 2023-08-14 RX ADMIN — LOSARTAN POTASSIUM AND HYDROCHLOROTHIAZIDE 2 TABLET: 12.5; 5 TABLET ORAL at 09:08

## 2023-08-14 RX ADMIN — Medication 800 MG: at 09:08

## 2023-08-14 RX ADMIN — ACETAMINOPHEN 650 MG: 325 TABLET, FILM COATED ORAL at 06:08

## 2023-08-14 RX ADMIN — FLUTICASONE FUROATE AND VILANTEROL TRIFENATATE 1 PUFF: 100; 25 POWDER RESPIRATORY (INHALATION) at 08:08

## 2023-08-14 RX ADMIN — ACETAMINOPHEN 650 MG: 325 TABLET, FILM COATED ORAL at 09:08

## 2023-08-14 RX ADMIN — DOCUSATE SODIUM AND SENNOSIDES 1 TABLET: 8.6; 5 TABLET, FILM COATED ORAL at 08:08

## 2023-08-14 RX ADMIN — AMLODIPINE BESYLATE 5 MG: 5 TABLET ORAL at 12:08

## 2023-08-14 RX ADMIN — GADOBUTROL 7 ML: 604.72 INJECTION INTRAVENOUS at 10:08

## 2023-08-14 RX ADMIN — HYDROCODONE BITARTRATE AND ACETAMINOPHEN 1 TABLET: 5; 325 TABLET ORAL at 08:08

## 2023-08-14 RX ADMIN — Medication 800 MG: at 02:08

## 2023-08-14 RX ADMIN — HYDROCODONE BITARTRATE AND ACETAMINOPHEN 1 TABLET: 5; 325 TABLET ORAL at 02:08

## 2023-08-14 NOTE — NURSING
Notified by MRI dept that MRV Brain can't be done today due to pt receiving MRI w Contrast earlier today and MRV Brain will be done tomorrow. Updated hospitalist SINAN Kendall np

## 2023-08-14 NOTE — PLAN OF CARE
MRI brain with without gadolinium reviewed.  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  The transverse sinus appears patent.  There is no edema.    No need for immediate surgery.  Can likely be followed with surveillance imaging. Recommend MRV brain to confirm sinus patency.      Patient also complains of neck pain, status post cervical fusion.  CT cervical spine pending    If above imaging negative, she can establish care as an outpatient with the brain tumor center at [facility] for monitoring.  Follow-up with Dr. Martinez for any ongoing neck issues.    Please call us with any questions or concerns.

## 2023-08-14 NOTE — ASSESSMENT & PLAN NOTE
-Pt with LOC of uncertain duration and uncertain context resulting in hematomas without fracture   -CT brain revealing for left extra-axial mass which we are further working up and asking neurosurgery to see   -given the uncertainty over LOC, frequent falls, loss of balance, blurred vision, and brain mass, I think it is prudent to have Neurology evaluate her as well and will consult.  -seizure Precautions in the interim.

## 2023-08-14 NOTE — ASSESSMENT & PLAN NOTE
Patient with known CAD s/p CABG, which is controlled Will continue to monitor for S/Sx of angina/ACS. Continue to monitor on telemetry.

## 2023-08-14 NOTE — H&P
[facility]  History & Physical    Patient Name: Beatriz Gan  MRN: [id number]  Patient Class: OP- Observation  Admission Date: 8/13/2023  Attending Physician: Karol Garcia MD   Primary Care Provider: Jorge Alonzo MD         Patient information was obtained from patient, past medical records and ER records.     Subjective:     Principal Problem:Syncope    Chief Complaint:   Chief Complaint   Patient presents with    Fall     Unsure when the fall happened or how.  Dizziness and weakness        HPI: Beatriz Gan is a 67-year-old female with a past medical history of GERD, hypertension, CAD s/p CABG, RCC, anxiety, DDD, COPD and regular EtOH use who presented to the ED of head injury s/p fall at home.  Patient states she went to bed last night and at some point got up to go to the bathroom and fell down.  Patient states she does not recall the incident, but recalls being able to get back in bed.  Family reports they had not heard from her today and she was not answering phone calls so they 4th their way into her home and found her lying in bed confused.  Patient's granddaughter reports patient has a history of daily alcohol use.  Patient states she did not drink alcohol today.  Patient endorses a headache with right rib pain.  She denies shortness of breath, abdominal pain and chest pain.  Per ED note, family states patient has persistent confusion.  Lab work performed in the ED was unremarkable.  CT abdomen pelvis showed no acute injury, left renal AML stable and colonic diverticulosis.  Chest x-ray showed no acute abnormality and right knee x-ray showed no acute abnormality.  CT head showed no acute hemorrhage.  Right parietal and frontal scalp hematomas present extra-axial mass abutting the left cerebral hemisphere may represent a meningioma or other dural-based lesion.  EKG showed sinus rhythm with first-degree AV block.  Upon arrival to the ED, patient found  to be hypertensive with SBP 190s.  Patient states she did not take her blood pressure medications today and has not taken lately.  She states her blood pressure has been controlled without medication.  Patient received 1 L IV bolus and was referred to Hospital Medicine.  Patient seen in the ED and she is complaining of a headache, chest wall tenderness and right knee pain.  She denies shortness of breath, nausea, vomiting, diarrhea, fever and chills.  Patient will be admitted to Hospital Medicine for further evaluation management.            Past Medical History:   Diagnosis Date    Alcohol dependence     DDD (degenerative disc disease), lumbosacral     DJD (degenerative joint disease), lumbosacral     Encounter for blood transfusion     GERD (gastroesophageal reflux disease)     Gout     Hypercholesterolemia     Hypertension     NATHALIE (iron deficiency anemia)     questionable white coat hypertension    Kidney carcinoma, left 2014    Pneumonia     Renal cell carcinoma     Shingles 10/13/2012       Past Surgical History:   Procedure Laterality Date    ANTERIOR CERVICAL DISCECTOMY W/ FUSION N/A 5/4/2023    Procedure: DISCECTOMY, SPINE, CERVICAL, ANTERIOR APPROACH, WITH FUSION C3-4;  Surgeon: Ruben Martinez MD;  Location: [facility] OR;  Service: Orthopedics;  Laterality: N/A;    APPENDECTOMY      ARTHROSCOPY OF SHOULDER WITH DECOMPRESSION OF SUBACROMIAL SPACE Left 10/31/2019    Procedure: ARTHROSCOPY, SHOULDER, WITH SUBACROMIAL SPACE DECOMPRESSION;  Surgeon: Ruben Martinez MD;  Location: [facility] OR;  Service: Orthopedics;  Laterality: Left;    BLADDER REPAIR      x 2    COLONOSCOPY  9/2011    DISTAL CLAVICLE EXCISION Left 10/31/2019    Procedure: EXCISION, CLAVICLE, DISTAL;  Surgeon: Ruben Martinez MD;  Location: [facility] OR;  Service: Orthopedics;  Laterality: Left;    EPIDURAL STEROID INJECTION INTO LUMBAR SPINE N/A 12/7/2020    Procedure: Injection-steroid-epidural-lumbar;  Surgeon: Dk Rosales MD;   Location: [facility] OR;  Service: Pain Management;  Laterality: N/A;  L4-L5    EPIDURAL STEROID INJECTION INTO LUMBAR SPINE N/A 5/17/2021    Procedure: Injection-steroid-epidural-lumbar;  Surgeon: Dk Rosales MD;  Location: [facility] OR;  Service: Pain Management;  Laterality: N/A;  L4-L5    ESOPHAGOGASTRODUODENOSCOPY  9/2011    EYE SURGERY      lasix bilateral    FIXATION KYPHOPLASTY N/A 1/31/2020    Procedure: Kyphoplasty T12;  Surgeon: Dk Rosales MD;  Location: [facility] OR;  Service: Pain Management;  Laterality: N/A;    HERNIA REPAIR      hiatal hernai    HYSTERECTOMY      INJECTION OF ANESTHETIC AGENT AROUND NERVE Left 6/8/2020    Procedure: Block, Nerve;  Surgeon: Dk Rosales MD;  Location: [facility] OR;  Service: Pain Management;  Laterality: Left;  left genicular nerve block     KNEE ARTHROPLASTY Left 7/30/2020    Procedure: ARTHROPLASTY, KNEE LEFT;  Surgeon: Ruben Martinez MD;  Location: [facility] OR;  Service: Orthopedics;  Laterality: Left;  REP VALERY RUBY    KNEE ARTHROSCOPY W/ MENISCECTOMY Left 1/16/2020    Procedure: ARTHROSCOPY, KNEE, WITH MEDIAL MENISCECTOMY;  Surgeon: Ruben Martinez MD;  Location: [facility] OR;  Service: Orthopedics;  Laterality: Left;    LAPAROSCOPIC NISSEN FUNDOPLICATION      NEPHRECTOMY      LT partial    RADIOFREQUENCY ABLATION Left 6/19/2020    Procedure: Radiofrequency Ablation;  Surgeon: Dk Rosales MD;  Location: [facility] OR;  Service: Pain Management;  Laterality: Left;    RADIOFREQUENCY ABLATION OF LUMBAR MEDIAL BRANCH NERVE AT SINGLE LEVEL Bilateral 2/28/2020    Procedure: Radiofrequency Ablation, Nerve, Spinal, Lumbar, Medial Branch, 1 Level;  Surgeon: Dk Rosales MD;  Location: [facility] OR;  Service: Pain Management;  Laterality: Bilateral;  L3,L4,L5 - Burned at 80 degrees C. for 60 seconds x 2 each site    RADIOFREQUENCY ABLATION OF LUMBAR MEDIAL BRANCH NERVE AT SINGLE LEVEL Bilateral 10/19/2020    Procedure: Radiofrequency Ablation, Nerve, Spinal, Lumbar, Medial  "Branch, 1 Level;  Surgeon: Dk Rosales MD;  Location: [facility] OR;  Service: Pain Management;  Laterality: Bilateral;  L3, L4, L5    REFRACTIVE SURGERY      ROTATOR CUFF REPAIR Left 10/31/2019    Procedure: REPAIR, ROTATOR CUFF;  Surgeon: Ruben Martinez MD;  Location: [facility] OR;  Service: Orthopedics;  Laterality: Left;  arthrex    SKIN BIOPSY      TOTAL ABDOMINAL HYSTERECTOMY W/ BILATERAL SALPINGOOPHORECTOMY  age 48    TRANSFORAMINAL EPIDURAL INJECTION OF STEROID Bilateral 7/30/2021    Procedure: Injection,steroid,epidural,transforaminal approach;  Surgeon: Dk Rosales MD;  Location: [facility] OR;  Service: Pain Management;  Laterality: Bilateral;  L5-S1     TRANSFORAMINAL EPIDURAL INJECTION OF STEROID Bilateral 3/28/2022    Procedure: INJECTION, STEROID, EPIDURAL, TRANSFORAMINAL APPROACH Lumbar L5-S1;  Surgeon: Dk Rosales MD;  Location: [facility] OR;  Service: Pain Management;  Laterality: Bilateral;       Review of patient's allergies indicates:   Allergen Reactions    Phenergan [promethazine] Hives and Rash    Latex, natural rubber Hives     Per pt report-many years    Morphine Hives    Nsaids (non-steroidal anti-inflammatory drug)      Due to "kidney cancer"       Current Facility-Administered Medications on File Prior to Encounter   Medication    electrolyte-S (ISOLYTE)    lactated ringers infusion    pregabalin capsule 75 mg     Current Outpatient Medications on File Prior to Encounter   Medication Sig    celecoxib (CELEBREX) 200 MG capsule Take 1 capsule by mouth once daily    zolpidem (AMBIEN) 10 mg Tab TAKE 1 TABLET BY MOUTH ONCE DAILY AT NIGHT AS NEEDED    ALPRAZolam (XANAX) 1 MG tablet Take 1 tablet by mouth twice daily as needed for anxiety    amLODIPine (NORVASC) 5 MG tablet Take 1 tablet by mouth once daily    azelastine (ASTELIN) 137 mcg (0.1 %) nasal spray Use 1 spray(s) in each nostril twice daily    budesonide-formoterol 160-4.5 mcg (SYMBICORT) 160-4.5 mcg/actuation HFAA " Inhale 2 puffs into the lungs every 12 (twelve) hours. Controller    cholecalciferol, vitamin D3, (VITAMIN D3 ORAL) Take by mouth once daily.    FLUoxetine 40 MG capsule Take 1 capsule by mouth twice daily    fluticasone (FLONASE) 50 mcg/actuation nasal spray 1 spray by Nasal route daily as needed.     gabapentin (NEURONTIN) 600 MG tablet Take 1 tablet (600 mg total) by mouth 3 (three) times daily.    losartan-hydrochlorothiazide 100-25 mg (HYZAAR) 100-25 mg per tablet Take 1 tablet by mouth once daily    magnesium 30 mg Tab Take by mouth once.    methylPREDNISolone (MEDROL DOSEPACK) 4 mg tablet use as directed (Patient not taking: Reported on 2023)    MULTIVIT-IRON-MIN-FOLIC ACID 3,500-18-0.4 UNIT-MG-MG ORAL CHEW Take by mouth once daily.    ondansetron (ZOFRAN) 4 MG tablet Take 1 tablet (4 mg total) by mouth as needed for Nausea.    polyethylene glycol (GLYCOLAX) 17 gram/dose powder Take 17 g by mouth once daily.    traMADoL (ULTRAM) 50 mg tablet Take 1 tablet (50 mg total) by mouth every 8 (eight) hours as needed for Pain.     Family History       Problem Relation (Age of Onset)    Hypertension Father          Tobacco Use    Smoking status: Former     Current packs/day: 0.00     Average packs/day: 1 pack/day for 40.0 years (40.0 ttl pk-yrs)     Types: Cigarettes     Start date: 1978     Quit date: 2018     Years since quittin.6    Smokeless tobacco: Never    Tobacco comments:     smokes 2-3 cigarettes a night   Substance and Sexual Activity    Alcohol use: Yes     Alcohol/week: 12.0 standard drinks of alcohol     Types: 6 Cans of beer, 6 Standard drinks or equivalent per week     Comment: 2-3 a day/ social    Drug use: No    Sexual activity: Yes     Partners: Male     Review of Systems   Constitutional:  Negative for activity change, appetite change, chills and fever.   HENT:  Negative for congestion, sore throat and trouble swallowing.    Eyes:  Negative for photophobia and  visual disturbance.   Respiratory:  Negative for cough, chest tightness and shortness of breath.    Cardiovascular:  Positive for chest pain (tenderness). Negative for palpitations and leg swelling.   Gastrointestinal:  Negative for abdominal pain, diarrhea and nausea.   Genitourinary:  Negative for dysuria, flank pain and hematuria.   Musculoskeletal:  Positive for arthralgias and gait problem. Negative for back pain.   Neurological:  Positive for headaches. Negative for dizziness and weakness.   Psychiatric/Behavioral:  Positive for confusion.      Objective:     Vital Signs (Most Recent):  Temp: 98 °F (36.7 °C) (08/13/23 1814)  Pulse: 70 (08/13/23 1952)  Resp: 20 (08/13/23 1814)  BP: (!) 194/90 (08/13/23 1952)  SpO2: 98 % (08/13/23 1952) Vital Signs (24h Range):  Temp:  [98 °F (36.7 °C)] 98 °F (36.7 °C)  Pulse:  [70-88] 70  Resp:  [20] 20  SpO2:  [97 %-98 %] 98 %  BP: (175-194)/() 194/90     Weight: 76.7 kg (169 lb)  Body mass index is 28.12 kg/m².     Physical Exam  Vitals reviewed.   Constitutional:       Appearance: Normal appearance. She is normal weight.   HENT:      Head: Normocephalic. Contusion (right forehead) present.      Comments: Hematoma to back of head     Mouth/Throat:      Mouth: Mucous membranes are moist.      Pharynx: Oropharynx is clear.   Eyes:      Pupils: Pupils are equal, round, and reactive to light.   Cardiovascular:      Rate and Rhythm: Normal rate and regular rhythm.      Pulses: Normal pulses.   Pulmonary:      Effort: Pulmonary effort is normal.      Breath sounds: Normal breath sounds.   Chest:      Chest wall: Tenderness present.   Abdominal:      General: Bowel sounds are normal.   Musculoskeletal:         General: Normal range of motion.      Cervical back: Normal range of motion.      Right knee: Tenderness present.   Skin:     General: Skin is warm and dry.   Neurological:      Mental Status: She is alert and oriented to person, place, and time. Mental status is at  baseline.   Psychiatric:         Mood and Affect: Mood normal.              CRANIAL NERVES     CN III, IV, VI   Pupils are equal, round, and reactive to light.       Significant Labs: All pertinent labs within the past 24 hours have been reviewed.  CBC:   Recent Labs   Lab 08/13/23  1856   WBC 7.01   HGB 13.1   HCT 38.6        CMP:   Recent Labs   Lab 08/13/23  1856      K 3.7      CO2 24   GLU 99   BUN 18   CREATININE 0.9   CALCIUM 9.4   PROT 7.0   ALBUMIN 4.0   BILITOT 0.9   ALKPHOS 137*   AST 14   ALT 9*   ANIONGAP 13       Significant Imaging: I have reviewed all pertinent imaging results/findings within the past 24 hours.  Imaging Results              CT Abdomen Pelvis With Contrast (Final result)  Result time 08/13/23 20:39:05      Final result by Dallas Christopher DO (08/13/23 20:39:05)                   Impression:      1. No traumatic visceral injury of the abdomen or pelvis.  2. Left renal AML, stable.  3. Colonic diverticulosis.      Electronically signed by: Dallas Christopher  Date:    08/13/2023  Time:    20:39               Narrative:    EXAMINATION:  CT ABDOMEN PELVIS WITH CONTRAST    CLINICAL HISTORY:  Abdominal trauma, blunt;    TECHNIQUE:  Axial CT images with sagittal and coronal reformats were obtained of the abdomen and pelvis from the hemidiaphragms through the symphysis pubis after the administration of 75mL Omnipaque 350.    COMPARISON:  CT abdomen and pelvis from 04/14/2021.    FINDINGS:  Lung Bases: Clear.    Heart: Heart size is normal.  No pericardial effusion.    Liver: The liver is normal in size and demonstrates homogeneous enhancement without focal lesion.  The portal vasculature is patent.    Biliary tract: No intrahepatic or extrahepatic biliary ductal dilatation.    Gallbladder: No radiodense gallstone. No wall thickening or pericholecystic fluid.    Pancreas: Normal. No pancreatic ductal dilatation.    Spleen: Normal size without focal lesion.    Adrenals:  Unremarkable.    Kidneys and urinary collecting systems: There is a 2.4 cm fat containing lesion arising from the left kidney midpole, compatible with an angiomyolipoma, unchanged in size from prior.  There is a right-sided extrarenal pelvis.  No hydronephrosis or urolithiasis.    Lymph nodes: None enlarged.    Stomach and bowel: There are postop changes of the gastroesophageal junction.  Loops of small and large bowel are normal in caliber without evidence for inflammation or obstruction.  There is colonic diverticulosis without evidence of acute diverticulitis.    Peritoneum and mesentery: No ascites or free intraperitoneal air.  No abdominal fluid collection.    Vasculature: No aneurysm or significant atherosclerosis.    Urinary bladder: No wall thickening.    Reproductive organs: The uterus is surgically absent.    Body wall: No abnormality.    Musculoskeletal: No aggressive osseous lesion.  There is a remote fracture of the right 11th rib posteriorly.  There is a remote compression fracture of T12 with vertebroplasty cement, height loss is unchanged.  There is disc height loss at L5-S1.                                       X-Ray Knee 3 View Right (Final result)  Result time 08/13/23 19:26:53      Final result by Dallas Christopher DO (08/13/23 19:26:53)                   Impression:      No acute osseous abnormality.      Electronically signed by: Dallas Christopher  Date:    08/13/2023  Time:    19:26               Narrative:    EXAMINATION:  XR KNEE 3 VIEW RIGHT    CLINICAL HISTORY:  Pain in unspecified knee    TECHNIQUE:  AP, lateral, and Merchant views of the right knee were performed.    COMPARISON:  07/21/2023.    FINDINGS:  There is no acute fracture or dislocation. Alignment is normal. Joint spaces are preserved. No joint effusion.                                       X-Ray Chest AP Portable (Final result)  Result time 08/13/23 19:29:25      Final result by Dallas Christopher DO (08/13/23 19:29:25)                    Impression:      No acute abnormality.      Electronically signed by: Dallas Christopher  Date:    08/13/2023  Time:    19:29               Narrative:    EXAMINATION:  XR CHEST AP PORTABLE    CLINICAL HISTORY:  syncope;    TECHNIQUE:  Single frontal view of the chest was performed.    COMPARISON:  04/27/2023.    FINDINGS:  The lungs are well expanded and clear. No focal opacities are seen. The pleural spaces are clear. The cardiac silhouette is unremarkable. There is partially imaged cervical spine hardware.  There is vertebroplasty cement within L1.  Osseous structures are intact.                                       CT Head Without Contrast (Final result)  Result time 08/13/23 19:25:18      Final result by Dallas Christopher DO (08/13/23 19:25:18)                   Impression:      No intracranial hemorrhage.    Right parietal and right frontal scalp hematomas.    Extra-axial mass abutting the left cerebellar hemisphere, may represent a meningioma or other dural-based lesion.  Further evaluation with nonemergent MRI brain with and without contrast is recommended.      Electronically signed by: Dallas Christopher  Date:    08/13/2023  Time:    19:25               Narrative:    EXAMINATION:  CT HEAD WITHOUT CONTRAST    CLINICAL HISTORY:  Head trauma, moderate-severe;    TECHNIQUE:  Low dose axial CT images obtained throughout the head without intravenous contrast. Sagittal and coronal reconstructions were performed.    COMPARISON:  CT head 03/16/2021, 08/07/2018.    FINDINGS:  Ventricles and sulci are normal in size for age without evidence of hydrocephalus. No extra-axial blood or fluid collections.  The brain parenchyma is normal.  There is an extra-axial mass abutting the left cerebellar hemisphere measuring approximately 1.6 x 2.0 x 1.9 cm (series 2, image 11).  There is no evidence of significant mass effect or midline shift.  There is no evidence of vasogenic edema.  This was vaguely seen in retrospect on  prior CT dated 03/16/2021 though has increased in size.  This was not seen on CT dated 08/07/2018.  No parenchymal hemorrhage, edema or major vascular distribution infarct.    No calvarial fracture.  There is a large right parietal scalp hematoma.  There is a small right frontal scalp hematoma.  Bilateral paranasal sinuses and mastoid air cells are clear.  There is a remote fracture of the medial wall of the right orbit.                                        Assessment/Plan:     * Syncope  -Trend troponin  -Fall precautions  -Telemetry        Essential hypertension  Chronic, controlled.  Latest blood pressure and vitals reviewed-     Temp:  [98 °F (36.7 °C)]   Pulse:  [70-88]   Resp:  [20]   BP: (175-194)/()   SpO2:  [97 %-98 %] .   Home meds for hypertension were reviewed and noted below-  Hypertension Medications             amLODIPine (NORVASC) 5 MG tablet Take 1 tablet by mouth once daily    losartan-hydrochlorothiazide 100-25 mg (HYZAAR) 100-25 mg per tablet Take 1 tablet by mouth once daily          While in the hospital, will manage blood pressure as follows; Continue home antihypertensive regimen    Will utilize p.r.n. blood pressure medication only if patient's blood pressure greater than 180/110 and she develops symptoms such as worsening chest pain or shortness of breath.        Alcohol dependence  Noted    -CIWA  -Fall precautions  -Seizure precautions        History of renal cell carcinoma  History noted    -Monitor for acute changes        COPD (chronic obstructive pulmonary disease)  Patient's COPD is controlled currently.  Patient is currently off COPD Pathway. Continue scheduled inhalers Supplemental oxygen PRN and monitor respiratory status closely.          CAD s/p CABG  Patient with known CAD s/p CABG, which is controlled Will continue to monitor for S/Sx of angina/ACS. Continue to monitor on telemetry.         GERD (gastroesophageal reflux disease)  Noted    -Continue PPI        VTE Risk  Mitigation (From admission, onward)    None               Nicky Kumar, NP  Department of Hospital Medicine  [facility]

## 2023-08-14 NOTE — HPI
Beatriz Gan is a 67-year-old female with a past medical history of GERD, hypertension, CAD s/p CABG, RCC, anxiety, DDD, COPD and regular EtOH use who presented to the ED of head injury s/p fall at home.  Patient states she went to bed last night and at some point got up to go to the bathroom and fell down.  Patient states she does not recall the incident, but recalls being able to get back in bed.  Family reports they had not heard from her today and she was not answering phone calls so they 4th their way into her home and found her lying in bed confused.  Patient's granddaughter reports patient has a history of daily alcohol use.  Patient states she did not drink alcohol today.  Patient endorses a headache with right rib pain.  She denies shortness of breath, abdominal pain and chest pain.  Per ED note, family states patient has persistent confusion.  Lab work performed in the ED was unremarkable.  CT abdomen pelvis showed no acute injury, left renal AML stable and colonic diverticulosis.  Chest x-ray showed no acute abnormality and right knee x-ray showed no acute abnormality.  CT head showed no acute hemorrhage.  Right parietal and frontal scalp hematomas present extra-axial mass abutting the left cerebral hemisphere may represent a meningioma or other dural-based lesion.  EKG showed sinus rhythm with first-degree AV block.  Upon arrival to the ED, patient found to be hypertensive with SBP 190s.  Patient states she did not take her blood pressure medications today and has not taken lately.  She states her blood pressure has been controlled without medication.  Patient received 1 L IV bolus and was referred to Hospital Medicine.  Patient seen in the ED and she is complaining of a headache, chest wall tenderness and right knee pain.  She denies shortness of breath, nausea, vomiting, diarrhea, fever and chills.  Patient will be admitted to Hospital Medicine for further evaluation management.

## 2023-08-14 NOTE — SUBJECTIVE & OBJECTIVE
Interval History:  Pt seen and examined.  She has generalized achiness from her fall.  She denies any nausea or vomiting.  She tells me that she is had dizziness with ambulation for quite a while with frequent falls.  She tells me that she is not a daily ETOH user, but does drink occasionally.  We discussed MRI findings in the left brain compatible with a meningioma measuring up to 2 cm without significant mass effect.  I query if this could cause the symptoms she is been experiencing including her increased falls, but we will ask Neurosurgery to weigh in and evaluate.    Plan to consult PT/OT to evaluate her ambulatory status.  The events surrounding the fall are very unclear with some precipitating dizziness expressed and problems with balance.    Review of Systems   Constitutional:  Positive for fatigue. Negative for chills and fever.   Eyes:  Positive for visual disturbance (blurred vision).   Respiratory:  Negative for cough and shortness of breath.    Cardiovascular:  Negative for chest pain and leg swelling.   Gastrointestinal:  Negative for nausea and vomiting.   Musculoskeletal:  Positive for arthralgias and myalgias.   Neurological:  Positive for dizziness and weakness (Generalized).   Psychiatric/Behavioral:  Negative for confusion. The patient is not nervous/anxious.      Objective:     Vital Signs (Most Recent):  Temp: 98 °F (36.7 °C) (08/14/23 1201)  Pulse: 83 (08/14/23 1312)  Resp: 10 (08/14/23 1402)  BP: 138/80 (08/14/23 1312)  SpO2: 98 % (08/14/23 1201) Vital Signs (24h Range):  Temp:  [96.5 °F (35.8 °C)-98 °F (36.7 °C)] 98 °F (36.7 °C)  Pulse:  [63-88] 83  Resp:  [10-20] 10  SpO2:  [96 %-98 %] 98 %  BP: (138-194)/() 138/80     Weight: 79.4 kg (175 lb)  Body mass index is 29.12 kg/m².  No intake or output data in the 24 hours ending 08/14/23 1412      Physical Exam  Vitals and nursing note reviewed.   Constitutional:       Appearance: Normal appearance.   HENT:      Head:      Comments: RIGHT  forehead contusion    Eyes:      Extraocular Movements: Extraocular movements intact.      Conjunctiva/sclera: Conjunctivae normal.      Pupils: Pupils are equal, round, and reactive to light.   Cardiovascular:      Rate and Rhythm: Normal rate and regular rhythm.   Pulmonary:      Effort: Pulmonary effort is normal.      Breath sounds: Normal breath sounds.   Abdominal:      General: Abdomen is flat. Bowel sounds are normal.      Palpations: Abdomen is soft.   Musculoskeletal:         General: Tenderness (elbows, knees, chest wall, back of head) present. Normal range of motion.   Skin:     General: Skin is warm and dry.      Capillary Refill: Capillary refill takes less than 2 seconds.      Findings: Bruising present.      Comments: Some abrasions and erythema over upper extremity joints   Neurological:      General: No focal deficit present.      Mental Status: She is alert and oriented to person, place, and time. Mental status is at baseline.   Psychiatric:         Mood and Affect: Mood normal.           Significant Labs: All pertinent labs within the past 24 hours have been reviewed.  CBC:   Recent Labs   Lab 08/13/23 1856 08/14/23  0535   WBC 7.01 5.09   HGB 13.1 12.8   HCT 38.6 38.3    188     CMP:   Recent Labs   Lab 08/13/23  1856 08/14/23  0535    141   K 3.7 3.6    103   CO2 24 26   GLU 99 109   BUN 18 14   CREATININE 0.9 0.8   CALCIUM 9.4 9.6   PROT 7.0 6.7   ALBUMIN 4.0 3.7   BILITOT 0.9 1.4*   ALKPHOS 137* 141*   AST 14 12   ALT 9* 6*   ANIONGAP 13 12       Significant Imaging: I have reviewed all pertinent imaging results/findings within the past 24 hours.

## 2023-08-14 NOTE — PLAN OF CARE
[facility] - Med/Surg  Initial Discharge Assessment       Primary Care Provider: Jorge Alonzo MD    Admission Diagnosis: Syncope [R55]    Admission Date: 8/13/2023  Expected Discharge Date: 8/17/2023    DC assessment completed with pt at bedside. Verified info on facesheet as correct. Pt lives at listed address with dependent daughter. PCP [place]. Pharmacy [facility]. DME listed. Denies hd/dm. Pt requests hh with no company preference. Pt drives and in independent at baseline. Pt does not take coumadin. Pt without recent admission. Family to provide ride home. CM to follow for discharge needs. TBD      Transition of Care Barriers: None    Payor: [employer] MANAGED MEDICARE / Plan: [employer] MEDICARE HMO / Product Type: Capitation /     Extended Emergency Contact Information  Primary Emergency Contact: everette goodwin  Mobile Phone: [phone]  Relation: Friend  Preferred language: English  Secondary Emergency Contact: Lucila White  Mobile Phone: [phone]  Relation: Relative  Preferred language: English   needed? No    Discharge Plan A: Home Health  Discharge Plan B: Home with family      [facility] [place] [address] [address] [place] - [address]  [address]  [place] [address]  Phone: [phone] Fax: [phone]    [id number] [place] - [address] AT [facility] [address]  [address] [place] [address]  Phone: [phone] Fax: [phone]      Initial Assessment (most recent)       Adult Discharge Assessment - 08/14/23 1501          Discharge Assessment    Assessment Type Discharge Planning Assessment     Confirmed/corrected address, phone number and insurance Yes     Confirmed Demographics Correct on Facesheet     Source of Information patient     Does patient/caregiver understand observation status Yes     People in Home child(katey), dependent     Do you expect to return to your current living  situation? Yes     Do you have help at home or someone to help you manage your care at home? No     Prior to hospitilization cognitive status: Unable to Assess     Current cognitive status: Alert/Oriented     Walking or Climbing Stairs ambulation difficulty, requires equipment     Dressing/Bathing bathing difficulty, requires equipment     Equipment Currently Used at Home walker, rolling;cane, straight;shower chair;wheelchair;bedside commode     Readmission within 30 days? No     Patient currently being followed by outpatient case management? No     Do you currently have service(s) that help you manage your care at home? No     Do you take prescription medications? Yes     Do you have prescription coverage? Yes     Do you have any problems affording any of your prescribed medications? No     Is the patient taking medications as prescribed? yes     How do you get to doctors appointments? family or friend will provide;car, drives self     Are you on dialysis? No     Do you take coumadin? No     Discharge Plan A Home Health     Discharge Plan B Home with family     DME Needed Upon Discharge  none     Discharge Plan discussed with: Patient     Transition of Care Barriers None

## 2023-08-14 NOTE — ASSESSMENT & PLAN NOTE
Chronic, controlled.  Latest blood pressure and vitals reviewed-     Temp:  [96.5 °F (35.8 °C)-98 °F (36.7 °C)]   Pulse:  [63-88]   Resp:  [10-20]   BP: (138-194)/()   SpO2:  [96 %-98 %] .   Home meds for hypertension were reviewed and noted below-  Hypertension Medications             amLODIPine (NORVASC) 5 MG tablet Take 1 tablet by mouth once daily    losartan-hydrochlorothiazide 100-25 mg (HYZAAR) 100-25 mg per tablet Take 1 tablet by mouth once daily          While in the hospital, will manage blood pressure as follows; Continue home antihypertensive regimen    Will utilize p.r.n. blood pressure medication only if patient's blood pressure greater than 180/110 and she develops symptoms such as worsening chest pain or shortness of breath.

## 2023-08-14 NOTE — ASSESSMENT & PLAN NOTE
Ms. Gan is a 68 y/o female who loss consciousness due to a fall on yesterday. MRI brain obtained today revealed meningoma. Exam neurologically intact.     Recommend:   CT cervical due to midline tenderness and previous cervical surgery.    Syncopal episode workout based on Hospital Medicine recommendcations   MRV   Consult [facility]    Discussed plan with Dr. Parson.

## 2023-08-14 NOTE — SUBJECTIVE & OBJECTIVE
Medications Prior to Admission   Medication Sig Dispense Refill Last Dose    celecoxib (CELEBREX) 200 MG capsule Take 1 capsule by mouth once daily 30 capsule 2     zolpidem (AMBIEN) 10 mg Tab TAKE 1 TABLET BY MOUTH ONCE DAILY AT NIGHT AS NEEDED 30 tablet 2     ALPRAZolam (XANAX) 1 MG tablet Take 1 tablet by mouth twice daily as needed for anxiety 60 tablet 0     amLODIPine (NORVASC) 5 MG tablet Take 1 tablet by mouth once daily 90 tablet 2     azelastine (ASTELIN) 137 mcg (0.1 %) nasal spray Use 1 spray(s) in each nostril twice daily 30 mL 3     budesonide-formoterol 160-4.5 mcg (SYMBICORT) 160-4.5 mcg/actuation HFAA Inhale 2 puffs into the lungs every 12 (twelve) hours. Controller 10.2 g 11     cholecalciferol, vitamin D3, (VITAMIN D3 ORAL) Take by mouth once daily.       FLUoxetine 40 MG capsule Take 1 capsule by mouth twice daily 180 capsule 3     fluticasone (FLONASE) 50 mcg/actuation nasal spray 1 spray by Nasal route daily as needed.        gabapentin (NEURONTIN) 600 MG tablet Take 1 tablet (600 mg total) by mouth 3 (three) times daily. 90 tablet 2     losartan-hydrochlorothiazide 100-25 mg (HYZAAR) 100-25 mg per tablet Take 1 tablet by mouth once daily 90 tablet 3     magnesium 30 mg Tab Take by mouth once.       methylPREDNISolone (MEDROL DOSEPACK) 4 mg tablet use as directed (Patient not taking: Reported on 8/7/2023) 1 each 0     MULTIVIT-IRON-MIN-FOLIC ACID 3,500-18-0.4 UNIT-MG-MG ORAL CHEW Take by mouth once daily.       ondansetron (ZOFRAN) 4 MG tablet Take 1 tablet (4 mg total) by mouth as needed for Nausea. 60 tablet 1     polyethylene glycol (GLYCOLAX) 17 gram/dose powder Take 17 g by mouth once daily. 510 g 3     traMADoL (ULTRAM) 50 mg tablet Take 1 tablet (50 mg total) by mouth every 8 (eight) hours as needed for Pain. 21 each 0        Review of patient's allergies indicates:   Allergen Reactions    Phenergan [promethazine] Hives and Rash    Latex, natural rubber Hives     Per pt report-many years  "   Morphine Hives    Nsaids (non-steroidal anti-inflammatory drug)      Due to "kidney cancer"       Past Medical History:   Diagnosis Date    Alcohol dependence     DDD (degenerative disc disease), lumbosacral     DJD (degenerative joint disease), lumbosacral     Encounter for blood transfusion     GERD (gastroesophageal reflux disease)     Gout     Hypercholesterolemia     Hypertension     NATHALIE (iron deficiency anemia)     questionable white coat hypertension    Kidney carcinoma, left 2014    Pneumonia     Renal cell carcinoma     Shingles 10/13/2012     Past Surgical History:   Procedure Laterality Date    ANTERIOR CERVICAL DISCECTOMY W/ FUSION N/A 5/4/2023    Procedure: DISCECTOMY, SPINE, CERVICAL, ANTERIOR APPROACH, WITH FUSION C3-4;  Surgeon: Ruben Martinez MD;  Location: [facility] OR;  Service: Orthopedics;  Laterality: N/A;    APPENDECTOMY      ARTHROSCOPY OF SHOULDER WITH DECOMPRESSION OF SUBACROMIAL SPACE Left 10/31/2019    Procedure: ARTHROSCOPY, SHOULDER, WITH SUBACROMIAL SPACE DECOMPRESSION;  Surgeon: Ruben Martinez MD;  Location: [facility] OR;  Service: Orthopedics;  Laterality: Left;    BLADDER REPAIR      x 2    COLONOSCOPY  9/2011    DISTAL CLAVICLE EXCISION Left 10/31/2019    Procedure: EXCISION, CLAVICLE, DISTAL;  Surgeon: Ruben Martinez MD;  Location: [facility] OR;  Service: Orthopedics;  Laterality: Left;    EPIDURAL STEROID INJECTION INTO LUMBAR SPINE N/A 12/7/2020    Procedure: Injection-steroid-epidural-lumbar;  Surgeon: Dk Rosales MD;  [facility];  Service: Pain Management;  Laterality: N/A;  L4-L5    EPIDURAL STEROID INJECTION INTO LUMBAR SPINE N/A 5/17/2021    Procedure: Injection-steroid-epidural-lumbar;  Surgeon: Dk Rosales MD;  [facility];  Service: Pain Management;  Laterality: N/A;  L4-L5    ESOPHAGOGASTRODUODENOSCOPY  9/2011    EYE SURGERY      lasix bilateral    FIXATION KYPHOPLASTY N/A 1/31/2020    Procedure: Kyphoplasty T12;  Surgeon: Dk Rosales MD;  " Location: [facility] OR;  Service: Pain Management;  Laterality: N/A;    HERNIA REPAIR      hiatal hernai    HYSTERECTOMY      INJECTION OF ANESTHETIC AGENT AROUND NERVE Left 6/8/2020    Procedure: Block, Nerve;  Surgeon: Dk Rosales MD;  Location: [facility] OR;  Service: Pain Management;  Laterality: Left;  left genicular nerve block     KNEE ARTHROPLASTY Left 7/30/2020    Procedure: ARTHROPLASTY, KNEE LEFT;  Surgeon: Ruben Martinez MD;  Location: [facility] OR;  Service: Orthopedics;  Laterality: Left;  REP VALERY RUBY    KNEE ARTHROSCOPY W/ MENISCECTOMY Left 1/16/2020    Procedure: ARTHROSCOPY, KNEE, WITH MEDIAL MENISCECTOMY;  Surgeon: Ruben Martinez MD;  Location: [facility] OR;  Service: Orthopedics;  Laterality: Left;    LAPAROSCOPIC NISSEN FUNDOPLICATION      NEPHRECTOMY      LT partial    RADIOFREQUENCY ABLATION Left 6/19/2020    Procedure: Radiofrequency Ablation;  Surgeon: Dk Rosales MD;  Location: [facility] OR;  Service: Pain Management;  Laterality: Left;    RADIOFREQUENCY ABLATION OF LUMBAR MEDIAL BRANCH NERVE AT SINGLE LEVEL Bilateral 2/28/2020    Procedure: Radiofrequency Ablation, Nerve, Spinal, Lumbar, Medial Branch, 1 Level;  Surgeon: Dk Rosales MD;  Location: [facility] OR;  Service: Pain Management;  Laterality: Bilateral;  L3,L4,L5 - Burned at 80 degrees C. for 60 seconds x 2 each site    RADIOFREQUENCY ABLATION OF LUMBAR MEDIAL BRANCH NERVE AT SINGLE LEVEL Bilateral 10/19/2020    Procedure: Radiofrequency Ablation, Nerve, Spinal, Lumbar, Medial Branch, 1 Level;  Surgeon: Dk Rosales MD;  Location: [facility] OR;  Service: Pain Management;  Laterality: Bilateral;  L3, L4, L5    REFRACTIVE SURGERY      ROTATOR CUFF REPAIR Left 10/31/2019    Procedure: REPAIR, ROTATOR CUFF;  Surgeon: Ruben Martinez MD;  Location: [facility] OR;  Service: Orthopedics;  Laterality: Left;  arthrex    SKIN BIOPSY      TOTAL ABDOMINAL HYSTERECTOMY W/ BILATERAL SALPINGOOPHORECTOMY  age 48    TRANSFORAMINAL EPIDURAL INJECTION OF STEROID  Bilateral 2021    Procedure: Injection,steroid,epidural,transforaminal approach;  Surgeon: Dk Rosales MD;  Location: [facility] OR;  Service: Pain Management;  Laterality: Bilateral;  L5-S1     TRANSFORAMINAL EPIDURAL INJECTION OF STEROID Bilateral 3/28/2022    Procedure: INJECTION, STEROID, EPIDURAL, TRANSFORAMINAL APPROACH Lumbar L5-S1;  Surgeon: Dk Rosales MD;  Location: [facility] OR;  Service: Pain Management;  Laterality: Bilateral;     Family History       Problem Relation (Age of Onset)    Hypertension Father          Tobacco Use    Smoking status: Former     Current packs/day: 0.00     Average packs/day: 1 pack/day for 40.0 years (40.0 ttl pk-yrs)     Types: Cigarettes     Start date: 1978     Quit date: 2018     Years since quittin.6    Smokeless tobacco: Never    Tobacco comments:     smokes 2-3 cigarettes a night   Substance and Sexual Activity    Alcohol use: Yes     Alcohol/week: 12.0 standard drinks of alcohol     Types: 6 Cans of beer, 6 Standard drinks or equivalent per week     Comment: 2-3 a day/ social    Drug use: No    Sexual activity: Yes     Partners: Male     Objective:     Weight: 79.4 kg (175 lb)  Body mass index is 29.12 kg/m².  Vital Signs (Most Recent):  Temp: 98 °F (36.7 °C) (23 1201)  Pulse: 83 (23 1312)  Resp: 10 (23 1402)  BP: 138/80 (23 1312)  SpO2: 98 % (23 1201) Vital Signs (24h Range):  Temp:  [96.5 °F (35.8 °C)-98 °F (36.7 °C)] 98 °F (36.7 °C)  Pulse:  [63-88] 83  Resp:  [10-20] 10  SpO2:  [96 %-98 %] 98 %  BP: (138-194)/() 138/80     Date 23 0700 - 08/15/23 0659   Shift 5594-2608 7833-7286 9725-5136 24 Hour Total   INTAKE   P.O. 200   200   Shift Total(mL/kg) 200(2.5)   200(2.5)   OUTPUT   Shift Total(mL/kg)       Weight (kg) 79.4 79.4 79.4 79.4       Neurosurgery Physical Exam  General: well developed, well nourished, no distress.   Head: normocephalic, traumatic. Bruising observed on right side of forehead  Neck: No  "tracheal deviation. No palpable masses. Full ROM.   Neurologic: Alert and oriented. Thought content appropriate.  GCS: E4 V5 M6; Total: 15  Mental Status: Awake, Alert, Oriented to month, year and president  Language: No aphasia  Speech: No dysarthria  Cranial nerves: face symmetric, tongue midline, CN II-XII grossly intact.   Eyes: pupils equal, round, reactive to light with accomodation, EOMI.   Pulmonary: normal respirations, no signs of respiratory distress  Right knee bruising observed with full range of motion.  Vascular: Pulses 2+ and symmetric radial and dorsalis pedis. No LE edema.   Skin: Skin is warm, dry and intact.    Sensory: intact to light touch throughout  Motor Strength: Moves all extremities spontaneously with good tone.   No abnormal movements seen.     Strength  Deltoids Triceps Biceps Wrist Extension Wrist Flexion Hand    Upper: R 5/5 5/5 5/5 5/5 5/5 5/5    L 5/5 5/5 5/5 5/5 5/5 5/5     Iliopsoas Quadriceps Knee  Flexion Tibialis  anterior Gastro- cnemius EHL   Lower: R 4/5 4/5 5/5 5/5 5/5 5/5    L 5/5 5/5 5/5 5/5 5/5 5/5   Right Lower leg strength limited by knee pain       Cerebellar:   Finger-to-nose: intact bilaterally   Pronator drift: absent bilaterally     Cervical:   ROM: Full with flexion, extension, lateral rotation and ear-to-shoulder bend.   Midline TTP: Positive.       Significant Labs:  Recent Labs   Lab 08/13/23  1856 08/14/23  0535   GLU 99 109    141   K 3.7 3.6    103   CO2 24 26   BUN 18 14   CREATININE 0.9 0.8   CALCIUM 9.4 9.6   MG  --  1.8     Recent Labs   Lab 08/13/23  1856 08/14/23  0535   WBC 7.01 5.09   HGB 13.1 12.8   HCT 38.6 38.3    188     No results for input(s): "LABPT", "INR", "APTT" in the last 48 hours.  Microbiology Results (last 7 days)       ** No results found for the last 168 hours. **              Significant Diagnostics:  I have reviewed and interpreted all pertinent imaging results/findings within the past 24 hours.  "

## 2023-08-14 NOTE — ASSESSMENT & PLAN NOTE
Chronic, controlled.  Latest blood pressure and vitals reviewed-     Temp:  [98 °F (36.7 °C)]   Pulse:  [70-88]   Resp:  [20]   BP: (175-194)/()   SpO2:  [97 %-98 %] .   Home meds for hypertension were reviewed and noted below-  Hypertension Medications             amLODIPine (NORVASC) 5 MG tablet Take 1 tablet by mouth once daily    losartan-hydrochlorothiazide 100-25 mg (HYZAAR) 100-25 mg per tablet Take 1 tablet by mouth once daily          While in the hospital, will manage blood pressure as follows; Continue home antihypertensive regimen    Will utilize p.r.n. blood pressure medication only if patient's blood pressure greater than 180/110 and she develops symptoms such as worsening chest pain or shortness of breath.

## 2023-08-14 NOTE — ASSESSMENT & PLAN NOTE
ETOH use without dependence.    -CIWA  -Fall precautions  -Seizure precautions  -No obvious withdrawal symptoms at present.

## 2023-08-14 NOTE — ASSESSMENT & PLAN NOTE
Pt with newly diagnosed left extra-axial mass with radiology read favoring meningioma   -I have ordered an MRI and reviewed with Pt   -I have consulted Neurosurgery

## 2023-08-14 NOTE — ASSESSMENT & PLAN NOTE
Patient's COPD is controlled currently.  Patient is currently off COPD Pathway. Continue scheduled inhalers Supplemental oxygen PRN and monitor respiratory status closely.

## 2023-08-14 NOTE — HPI
(Per Hospital Medicine 8/13/23) HPI: Beatriz Gan is a 67-year-old female with a past medical history of GERD, hypertension, CAD s/p CABG, RCC, anxiety, DDD, COPD and regular EtOH use who presented to the ED of head injury s/p fall at home.  Patient states she went to bed last night and at some point got up to go to the bathroom and fell down.  Patient states she does not recall the incident, but recalls being able to get back in bed.  Family reports they had not heard from her today and she was not answering phone calls so they 4th their way into her home and found her lying in bed confused.  Patient's granddaughter reports patient has a history of daily alcohol use.  Patient states she did not drink alcohol today.  Patient endorses a headache with right rib pain.  She denies shortness of breath, abdominal pain and chest pain.  Per ED note, family states patient has persistent confusion.  Lab work performed in the ED was unremarkable.  CT abdomen pelvis showed no acute injury, left renal AML stable and colonic diverticulosis.  Chest x-ray showed no acute abnormality and right knee x-ray showed no acute abnormality.  CT head showed no acute hemorrhage.  Right parietal and frontal scalp hematomas present extra-axial mass abutting the left cerebral hemisphere may represent a meningioma or other dural-based lesion.  EKG showed sinus rhythm with first-degree AV block.  Upon arrival to the ED, patient found to be hypertensive with SBP 190s.  Patient states she did not take her blood pressure medications today and has not taken lately.  She states her blood pressure has been controlled without medication.  Patient received 1 L IV bolus and was referred to Hospital Medicine.  Patient seen in the ED and she is complaining of a headache, chest wall tenderness and right knee pain.  She denies shortness of breath, nausea, vomiting, diarrhea, fever and chills.  Patient will be admitted to Hospital Medicine for further  evaluation management    MRI brain obtained today revealed  extra-axial dural-based mass within the left posterior fossa along the tentorium most likely a meningioma measuring up to 2.0 cm.       NSGY consulted for mass.    Pt found in bed awake and alert talking on the phone. Pt is status post cervical and lumbar spinal surgery performed by Dr. Martinez. Pt admits to right side headache without radiation and neck pain. She also admits to degenerative macular disease and states black spots have increased in size since fall. She had a previous fall about one month ago and states since than she has been using a walker. She reports unknown cause of previous fall. Denies nausea, vomiting, diarrhea.

## 2023-08-14 NOTE — PROGRESS NOTES
Wound care consult completed.         08/14/23 1402        Altered Skin Integrity 08/13/23 2230 Right Frontal region #1 Contusion Purple or maroon localized area of discolored intact skin or non-intact skin or a blood-filled blister.   Date First Assessed/Time First Assessed: 08/13/23 2230   Altered Skin Integrity Present on Admission - Did Patient arrive to the hospital with altered skin?: yes  Side: Right  Location: Frontal region  Wound Number: #1  Is this injury device related?:...   Wound Image    Description of Altered Skin Integrity Purple or maroon localized area of discolored intact skin or non-intact skin or a blood-filled blister.   Dressing Appearance Open to air   Drainage Amount None   Care Cleansed with:;Soap and water;Applied:  (Triad cream)        Altered Skin Integrity 08/13/23 2230 Right Parietal region #2 Contusion Purple or maroon localized area of discolored intact skin or non-intact skin or a blood-filled blister.   Date First Assessed/Time First Assessed: 08/13/23 2230   Altered Skin Integrity Present on Admission - Did Patient arrive to the hospital with altered skin?: yes  Side: Right  Location: Parietal region  Wound Number: #2  Is this injury device related?...   Wound Image    Description of Altered Skin Integrity Purple or maroon localized area of discolored intact skin or non-intact skin or a blood-filled blister.   Dressing Appearance Open to air   Drainage Amount None        Altered Skin Integrity 08/13/23 2230 Right posterior Elbow #3 Abrasion(s) Purple or maroon localized area of discolored intact skin or non-intact skin or a blood-filled blister.   Date First Assessed/Time First Assessed: 08/13/23 2230   Altered Skin Integrity Present on Admission - Did Patient arrive to the hospital with altered skin?: yes  Side: Right  Orientation: posterior  Location: Elbow  Wound Number: #3  Is this injury d...   Wound Image    Description of Altered Skin Integrity Purple or maroon localized  area of discolored intact skin or non-intact skin or a blood-filled blister.   Dressing Appearance Open to air   Drainage Amount None   Appearance Intact   Care Cleansed with:;Wound cleanser;Applied:  (Triad cream)        Altered Skin Integrity 08/13/23 2230 Left posterior Elbow #4 Abrasion(s) Purple or maroon localized area of discolored intact skin or non-intact skin or a blood-filled blister.   Date First Assessed/Time First Assessed: 08/13/23 2230   Altered Skin Integrity Present on Admission - Did Patient arrive to the hospital with altered skin?: yes  Side: Left  Orientation: posterior  Location: Elbow  Wound Number: #4  Is this injury de...   Wound Image    Description of Altered Skin Integrity Purple or maroon localized area of discolored intact skin or non-intact skin or a blood-filled blister.   Dressing Appearance Open to air   Drainage Amount None   Care Cleansed with:;Soap and water;Applied:  (Triad cream)        Altered Skin Integrity 08/13/23 2230 Right anterior Knee #5 Abrasion(s) Purple or maroon localized area of discolored intact skin or non-intact skin or a blood-filled blister.   Date First Assessed/Time First Assessed: 08/13/23 2230   Altered Skin Integrity Present on Admission - Did Patient arrive to the hospital with altered skin?: yes  Side: Right  Orientation: anterior  Location: Knee  Wound Number: #5  Is this injury dev...   Wound Image    Description of Altered Skin Integrity Purple or maroon localized area of discolored intact skin or non-intact skin or a blood-filled blister.   Dressing Appearance Open to air   Appearance Maroon;Intact   Care Cleansed with:;Soap and water;Applied:  (Triad cream applied)

## 2023-08-14 NOTE — PLAN OF CARE
08/14/23 1408   ANGEL Message   Medicare Outpatient and Observation Notification regarding financial responsibility Explained to patient/caregiver;Signed/date by patient/caregiver   Date ANGEL was signed 08/14/23   Time ANGEL was signed [id number]

## 2023-08-14 NOTE — CONSULTS
[facility]/Surg  Neurosurgery  Consult Note    Consults  Subjective:     Chief Complaint/Reason for Admission: Syncopal episode    History of Present Illness: (Per Hospital Medicine 8/13/23) HPI: Beatriz Gan is a 67-year-old female with a past medical history of GERD, hypertension, CAD s/p CABG, RCC, anxiety, DDD, COPD and regular EtOH use who presented to the ED of head injury s/p fall at home.  Patient states she went to bed last night and at some point got up to go to the bathroom and fell down.  Patient states she does not recall the incident, but recalls being able to get back in bed.  Family reports they had not heard from her today and she was not answering phone calls so they 4th their way into her home and found her lying in bed confused.  Patient's granddaughter reports patient has a history of daily alcohol use.  Patient states she did not drink alcohol today.  Patient endorses a headache with right rib pain.  She denies shortness of breath, abdominal pain and chest pain.  Per ED note, family states patient has persistent confusion.  Lab work performed in the ED was unremarkable.  CT abdomen pelvis showed no acute injury, left renal AML stable and colonic diverticulosis.  Chest x-ray showed no acute abnormality and right knee x-ray showed no acute abnormality.  CT head showed no acute hemorrhage.  Right parietal and frontal scalp hematomas present extra-axial mass abutting the left cerebral hemisphere may represent a meningioma or other dural-based lesion.  EKG showed sinus rhythm with first-degree AV block.  Upon arrival to the ED, patient found to be hypertensive with SBP 190s.  Patient states she did not take her blood pressure medications today and has not taken lately.  She states her blood pressure has been controlled without medication.  Patient received 1 L IV bolus and was referred to Hospital Medicine.  Patient seen in the ED and she is complaining of a headache, chest  wall tenderness and right knee pain.  She denies shortness of breath, nausea, vomiting, diarrhea, fever and chills.  Patient will be admitted to Hospital Medicine for further evaluation management    MRI brain obtained today revealed  extra-axial dural-based mass within the left posterior fossa along the tentorium most likely a meningioma measuring up to 2.0 cm.       NSGY consulted for mass.    Pt found in bed awake and alert talking on the phone. Pt is status post cervical and lumbar spinal surgery performed by Dr. Martinez. Pt admits to right side headache without radiation and neck pain. She also admits to degenerative macular disease and states black spots have increased in size since fall. She had a previous fall about one month ago and states since than she has been using a walker. She reports unknown cause of previous fall. Denies nausea, vomiting, diarrhea.      Medications Prior to Admission   Medication Sig Dispense Refill Last Dose    celecoxib (CELEBREX) 200 MG capsule Take 1 capsule by mouth once daily 30 capsule 2     zolpidem (AMBIEN) 10 mg Tab TAKE 1 TABLET BY MOUTH ONCE DAILY AT NIGHT AS NEEDED 30 tablet 2     ALPRAZolam (XANAX) 1 MG tablet Take 1 tablet by mouth twice daily as needed for anxiety 60 tablet 0     amLODIPine (NORVASC) 5 MG tablet Take 1 tablet by mouth once daily 90 tablet 2     azelastine (ASTELIN) 137 mcg (0.1 %) nasal spray Use 1 spray(s) in each nostril twice daily 30 mL 3     budesonide-formoterol 160-4.5 mcg (SYMBICORT) 160-4.5 mcg/actuation HFAA Inhale 2 puffs into the lungs every 12 (twelve) hours. Controller 10.2 g 11     cholecalciferol, vitamin D3, (VITAMIN D3 ORAL) Take by mouth once daily.       FLUoxetine 40 MG capsule Take 1 capsule by mouth twice daily 180 capsule 3     fluticasone (FLONASE) 50 mcg/actuation nasal spray 1 spray by Nasal route daily as needed.        gabapentin (NEURONTIN) 600 MG tablet Take 1 tablet (600 mg total) by mouth 3 (three) times  "daily. 90 tablet 2     losartan-hydrochlorothiazide 100-25 mg (HYZAAR) 100-25 mg per tablet Take 1 tablet by mouth once daily 90 tablet 3     magnesium 30 mg Tab Take by mouth once.       methylPREDNISolone (MEDROL DOSEPACK) 4 mg tablet use as directed (Patient not taking: Reported on 8/7/2023) 1 each 0     MULTIVIT-IRON-MIN-FOLIC ACID 3,500-18-0.4 UNIT-MG-MG ORAL CHEW Take by mouth once daily.       ondansetron (ZOFRAN) 4 MG tablet Take 1 tablet (4 mg total) by mouth as needed for Nausea. 60 tablet 1     polyethylene glycol (GLYCOLAX) 17 gram/dose powder Take 17 g by mouth once daily. 510 g 3     traMADoL (ULTRAM) 50 mg tablet Take 1 tablet (50 mg total) by mouth every 8 (eight) hours as needed for Pain. 21 each 0        Review of patient's allergies indicates:   Allergen Reactions    Phenergan [promethazine] Hives and Rash    Latex, natural rubber Hives     Per pt report-many years    Morphine Hives    Nsaids (non-steroidal anti-inflammatory drug)      Due to "kidney cancer"       Past Medical History:   Diagnosis Date    Alcohol dependence     DDD (degenerative disc disease), lumbosacral     DJD (degenerative joint disease), lumbosacral     Encounter for blood transfusion     GERD (gastroesophageal reflux disease)     Gout     Hypercholesterolemia     Hypertension     NATHALIE (iron deficiency anemia)     questionable white coat hypertension    Kidney carcinoma, left 2014    Pneumonia     Renal cell carcinoma     Shingles 10/13/2012     Past Surgical History:   Procedure Laterality Date    ANTERIOR CERVICAL DISCECTOMY W/ FUSION N/A 5/4/2023    Procedure: DISCECTOMY, SPINE, CERVICAL, ANTERIOR APPROACH, WITH FUSION C3-4;  Surgeon: Ruben Martinez MD;  Location: [facility];  Service: Orthopedics;  Laterality: N/A;    APPENDECTOMY      ARTHROSCOPY OF SHOULDER WITH DECOMPRESSION OF SUBACROMIAL SPACE Left 10/31/2019    Procedure: ARTHROSCOPY, SHOULDER, WITH SUBACROMIAL SPACE DECOMPRESSION;  Surgeon: " Ruben Matrinez MD;  Location: [facility] OR;  Service: Orthopedics;  Laterality: Left;    BLADDER REPAIR      x 2    COLONOSCOPY  9/2011    DISTAL CLAVICLE EXCISION Left 10/31/2019    Procedure: EXCISION, CLAVICLE, DISTAL;  Surgeon: Ruben Martinez MD;  Location: [facility] OR;  Service: Orthopedics;  Laterality: Left;    EPIDURAL STEROID INJECTION INTO LUMBAR SPINE N/A 12/7/2020    Procedure: Injection-steroid-epidural-lumbar;  Surgeon: Dk Rosales MD;  Location: [facility] OR;  Service: Pain Management;  Laterality: N/A;  L4-L5    EPIDURAL STEROID INJECTION INTO LUMBAR SPINE N/A 5/17/2021    Procedure: Injection-steroid-epidural-lumbar;  Surgeon: Dk Rosales MD;  Location: [facility] OR;  Service: Pain Management;  Laterality: N/A;  L4-L5    ESOPHAGOGASTRODUODENOSCOPY  9/2011    EYE SURGERY      lasix bilateral    FIXATION KYPHOPLASTY N/A 1/31/2020    Procedure: Kyphoplasty T12;  Surgeon: Dk Rosales MD;  Location: [facility] OR;  Service: Pain Management;  Laterality: N/A;    HERNIA REPAIR      hiatal hernai    HYSTERECTOMY      INJECTION OF ANESTHETIC AGENT AROUND NERVE Left 6/8/2020    Procedure: Block, Nerve;  Surgeon: Dk Rosales MD;  Location: [facility] OR;  Service: Pain Management;  Laterality: Left;  left genicular nerve block     KNEE ARTHROPLASTY Left 7/30/2020    Procedure: ARTHROPLASTY, KNEE LEFT;  Surgeon: Ruben Martinez MD;  Location: [facility] OR;  Service: Orthopedics;  Laterality: Left;  REP VALERY RUBY    KNEE ARTHROSCOPY W/ MENISCECTOMY Left 1/16/2020    Procedure: ARTHROSCOPY, KNEE, WITH MEDIAL MENISCECTOMY;  Surgeon: Ruben Martinez MD;  Location: [facility] OR;  Service: Orthopedics;  Laterality: Left;    LAPAROSCOPIC NISSEN FUNDOPLICATION      NEPHRECTOMY      LT partial    RADIOFREQUENCY ABLATION Left 6/19/2020    Procedure: Radiofrequency Ablation;  Surgeon: Dk Rosales MD;  Location: [facility] OR;  Service: Pain Management;  Laterality: Left;    RADIOFREQUENCY ABLATION OF LUMBAR MEDIAL BRANCH  NERVE AT SINGLE LEVEL Bilateral 2020    Procedure: Radiofrequency Ablation, Nerve, Spinal, Lumbar, Medial Branch, 1 Level;  Surgeon: Dk Rosales MD;  Location: [facility] OR;  Service: Pain Management;  Laterality: Bilateral;  L3,L4,L5 - Burned at 80 degrees C. for 60 seconds x 2 each site    RADIOFREQUENCY ABLATION OF LUMBAR MEDIAL BRANCH NERVE AT SINGLE LEVEL Bilateral 10/19/2020    Procedure: Radiofrequency Ablation, Nerve, Spinal, Lumbar, Medial Branch, 1 Level;  Surgeon: Dk Rosales MD;  Location: [facility] OR;  Service: Pain Management;  Laterality: Bilateral;  L3, L4, L5    REFRACTIVE SURGERY      ROTATOR CUFF REPAIR Left 10/31/2019    Procedure: REPAIR, ROTATOR CUFF;  Surgeon: Ruben Martinez MD;  Location: [facility] OR;  Service: Orthopedics;  Laterality: Left;  arthrex    SKIN BIOPSY      TOTAL ABDOMINAL HYSTERECTOMY W/ BILATERAL SALPINGOOPHORECTOMY  age 48    TRANSFORAMINAL EPIDURAL INJECTION OF STEROID Bilateral 2021    Procedure: Injection,steroid,epidural,transforaminal approach;  Surgeon: Dk Rosales MD;  Location: [facility] OR;  Service: Pain Management;  Laterality: Bilateral;  L5-S1     TRANSFORAMINAL EPIDURAL INJECTION OF STEROID Bilateral 3/28/2022    Procedure: INJECTION, STEROID, EPIDURAL, TRANSFORAMINAL APPROACH Lumbar L5-S1;  Surgeon: Dk Rosales MD;  Location: [facility];  Service: Pain Management;  Laterality: Bilateral;     Family History       Problem Relation (Age of Onset)    Hypertension Father          Tobacco Use    Smoking status: Former     Current packs/day: 0.00     Average packs/day: 1 pack/day for 40.0 years (40.0 ttl pk-yrs)     Types: Cigarettes     Start date: 1978     Quit date: 2018     Years since quittin.6    Smokeless tobacco: Never    Tobacco comments:     smokes 2-3 cigarettes a night   Substance and Sexual Activity    Alcohol use: Yes     Alcohol/week: 12.0 standard drinks of alcohol     Types: 6 Cans of beer, 6 Standard drinks or  equivalent per week     Comment: 2-3 a day/ social    Drug use: No    Sexual activity: Yes     Partners: Male     Objective:     Weight: 79.4 kg (175 lb)  Body mass index is 29.12 kg/m².  Vital Signs (Most Recent):  Temp: 98 °F (36.7 °C) (08/14/23 1201)  Pulse: 83 (08/14/23 1312)  Resp: 10 (08/14/23 1402)  BP: 138/80 (08/14/23 1312)  SpO2: 98 % (08/14/23 1201) Vital Signs (24h Range):  Temp:  [96.5 °F (35.8 °C)-98 °F (36.7 °C)] 98 °F (36.7 °C)  Pulse:  [63-88] 83  Resp:  [10-20] 10  SpO2:  [96 %-98 %] 98 %  BP: (138-194)/() 138/80     Date 08/14/23 0700 - 08/15/23 0659   Shift 2364-4540 7665-5029 6589-5675 24 Hour Total   INTAKE   P.O. 200   200   Shift Total(mL/kg) 200(2.5)   200(2.5)   OUTPUT   Shift Total(mL/kg)       Weight (kg) 79.4 79.4 79.4 79.4       Neurosurgery Physical Exam  General: well developed, well nourished, no distress.   Head: normocephalic, traumatic. Bruising observed on right side of forehead  Neck: No tracheal deviation. Full ROM. Midline tender to palpation  Neurologic: Alert and oriented. Thought content appropriate.  GCS: E4 V5 M6; Total: 15  Mental Status: Awake, Alert, Oriented to month, year and president  Language: No aphasia  Speech: No dysarthria  Cranial nerves: face symmetric, tongue midline, CN II-XII grossly intact.   Eyes: pupils equal, round, reactive to light with accomodation, EOMI.   Pulmonary: normal respirations, no signs of respiratory distress  Right knee bruising observed with full range of motion.  Vascular: Pulses 2+ and symmetric radial and dorsalis pedis. No LE edema.   Skin: Skin is warm, dry and intact.    Sensory: intact to light touch throughout  Motor Strength: Moves all extremities spontaneously with good tone.   No abnormal movements seen.     Strength  Deltoids Triceps Biceps Wrist Extension Wrist Flexion Hand    Upper: R 5/5 5/5 5/5 5/5 5/5 5/5    L 5/5 5/5 5/5 5/5 5/5 5/5     Iliopsoas Quadriceps Knee  Flexion Tibialis  anterior Gastro- cnemius  "EHL   Lower: R 4/5 4/5 5/5 5/5 5/5 5/5    L 5/5 5/5 5/5 5/5 5/5 5/5   Right Lower leg strength limited by knee pain       Cerebellar:   Finger-to-nose: intact bilaterally   Pronator drift: absent bilaterally     Cervical:   ROM: Full with flexion, extension, lateral rotation and ear-to-shoulder bend.   Midline TTP: Positive.       Significant Labs:  Recent Labs   Lab 08/13/23 1856 08/14/23  0535   GLU 99 109    141   K 3.7 3.6    103   CO2 24 26   BUN 18 14   CREATININE 0.9 0.8   CALCIUM 9.4 9.6   MG  --  1.8     Recent Labs   Lab 08/13/23 1856 08/14/23  0535   WBC 7.01 5.09   HGB 13.1 12.8   HCT 38.6 38.3    188     No results for input(s): "LABPT", "INR", "APTT" in the last 48 hours.  Microbiology Results (last 7 days)       ** No results found for the last 168 hours. **              Significant Diagnostics:  I have reviewed and interpreted all pertinent imaging results/findings within the past 24 hours.    Assessment/Plan:     Meningioma  Ms. Gan is a 68 y/o female who loss consciousness due to a fall on yesterday. MRI brain obtained today revealed meningoma. Exam neurologically intact.     Recommend:   CT cervical due to midline tenderness and previous cervical surgery.    Syncopal episode workout based on Hospital Medicine recommendcations   MRV   [facility]ult [facility] [place]    Discussed plan with Dr. Parson.              Thank you for your consult. I will follow-up with patient. Please contact us if you have any additional questions.    WILLIAM ANDERSON PA-C  Neurosurgery  [facility]  "

## 2023-08-14 NOTE — PROGRESS NOTES
[facility]  Progress Note    Patient Name: Beatriz Gan  MRN: [id number]  Patient Class: OP- Observation   Admission Date: 8/13/2023  Length of Stay: 0 days  Attending Physician: Karol Garcia MD  Primary Care Provider: Jorge Alonzo MD        Subjective:     Principal Problem:Loss of consciousness        HPI:  Beatriz Gan is a 67-year-old female with a past medical history of GERD, hypertension, CAD s/p CABG, RCC, anxiety, DDD, COPD and regular EtOH use who presented to the ED of head injury s/p fall at home.  Patient states she went to bed last night and at some point got up to go to the bathroom and fell down.  Patient states she does not recall the incident, but recalls being able to get back in bed.  Family reports they had not heard from her today and she was not answering phone calls so they 4th their way into her home and found her lying in bed confused.  Patient's granddaughter reports patient has a history of daily alcohol use.  Patient states she did not drink alcohol today.  Patient endorses a headache with right rib pain.  She denies shortness of breath, abdominal pain and chest pain.  Per ED note, family states patient has persistent confusion.  Lab work performed in the ED was unremarkable.  CT abdomen pelvis showed no acute injury, left renal AML stable and colonic diverticulosis.  Chest x-ray showed no acute abnormality and right knee x-ray showed no acute abnormality.  CT head showed no acute hemorrhage.  Right parietal and frontal scalp hematomas present extra-axial mass abutting the left cerebral hemisphere may represent a meningioma or other dural-based lesion.  EKG showed sinus rhythm with first-degree AV block.  Upon arrival to the ED, patient found to be hypertensive with SBP 190s.  Patient states she did not take her blood pressure medications today and has not taken lately.  She states her blood pressure has been controlled  without medication.  Patient received 1 L IV bolus and was referred to Hospital Medicine.  Patient seen in the ED and she is complaining of a headache, chest wall tenderness and right knee pain.  She denies shortness of breath, nausea, vomiting, diarrhea, fever and chills.  Patient will be admitted to Hospital Medicine for further evaluation management.            Overview/Hospital Course:  No notes on file    Interval History:  Pt seen and examined.  She has generalized achiness from her fall.  She denies any nausea or vomiting.  She tells me that she is had dizziness with ambulation for quite a while with frequent falls.  She tells me that she is not a daily ETOH user, but does drink occasionally.  We discussed MRI findings in the left brain compatible with a meningioma measuring up to 2 cm without significant mass effect.  I query if this could cause the symptoms she is been experiencing including her increased falls, but we will ask Neurosurgery to weigh in and evaluate.    Plan to consult PT/OT to evaluate her ambulatory status.  The events surrounding the fall are very unclear with some precipitating dizziness expressed and problems with balance.    Review of Systems   Constitutional:  Positive for fatigue. Negative for chills and fever.   Eyes:  Positive for visual disturbance (blurred vision).   Respiratory:  Negative for cough and shortness of breath.    Cardiovascular:  Negative for chest pain and leg swelling.   Gastrointestinal:  Negative for nausea and vomiting.   Musculoskeletal:  Positive for arthralgias and myalgias.   Neurological:  Positive for dizziness and weakness (Generalized).   Psychiatric/Behavioral:  Negative for confusion. The patient is not nervous/anxious.      Objective:     Vital Signs (Most Recent):  Temp: 98 °F (36.7 °C) (08/14/23 1201)  Pulse: 83 (08/14/23 1312)  Resp: 10 (08/14/23 1402)  BP: 138/80 (08/14/23 1312)  SpO2: 98 % (08/14/23 1201) Vital Signs (24h Range):  Temp:  [96.5  °F (35.8 °C)-98 °F (36.7 °C)] 98 °F (36.7 °C)  Pulse:  [63-88] 83  Resp:  [10-20] 10  SpO2:  [96 %-98 %] 98 %  BP: (138-194)/() 138/80     Weight: 79.4 kg (175 lb)  Body mass index is 29.12 kg/m².  No intake or output data in the 24 hours ending 08/14/23 1412      Physical Exam  Vitals and nursing note reviewed.   Constitutional:       Appearance: Normal appearance.   HENT:      Head:      Comments: RIGHT forehead contusion    Eyes:      Extraocular Movements: Extraocular movements intact.      Conjunctiva/sclera: Conjunctivae normal.      Pupils: Pupils are equal, round, and reactive to light.   Cardiovascular:      Rate and Rhythm: Normal rate and regular rhythm.   Pulmonary:      Effort: Pulmonary effort is normal.      Breath sounds: Normal breath sounds.   Abdominal:      General: Abdomen is flat. Bowel sounds are normal.      Palpations: Abdomen is soft.   Musculoskeletal:         General: Tenderness (elbows, knees, chest wall, back of head) present. Normal range of motion.   Skin:     General: Skin is warm and dry.      Capillary Refill: Capillary refill takes less than 2 seconds.      Findings: Bruising present.      Comments: Some abrasions and erythema over upper extremity joints   Neurological:      General: No focal deficit present.      Mental Status: She is alert and oriented to person, place, and time. Mental status is at baseline.   Psychiatric:         Mood and Affect: Mood normal.           Significant Labs: All pertinent labs within the past 24 hours have been reviewed.  CBC:   Recent Labs   Lab 08/13/23  1856 08/14/23  0535   WBC 7.01 5.09   HGB 13.1 12.8   HCT 38.6 38.3    188     CMP:   Recent Labs   Lab 08/13/23  1856 08/14/23  0535    141   K 3.7 3.6    103   CO2 24 26   GLU 99 109   BUN 18 14   CREATININE 0.9 0.8   CALCIUM 9.4 9.6   PROT 7.0 6.7   ALBUMIN 4.0 3.7   BILITOT 0.9 1.4*   ALKPHOS 137* 141*   AST 14 12   ALT 9* 6*   ANIONGAP 13 12       Significant  Imaging: I have reviewed all pertinent imaging results/findings within the past 24 hours.      Assessment/Plan:      * Loss of consciousness  -Pt with LOC of uncertain duration and uncertain context resulting in hematomas without fracture   -CT brain revealing for left extra-axial mass which we are further working up and asking neurosurgery to see   -given the uncertainty over LOC, frequent falls, loss of balance, blurred vision, and brain mass, I think it is prudent to have Neurology evaluate her as well and will consult.  -seizure Precautions in the interim.    Brain mass  Pt with newly diagnosed left extra-axial mass with radiology read favoring meningioma   -I have ordered an MRI and reviewed with Pt   -I have consulted Neurosurgery       Essential hypertension  Chronic, controlled.  Latest blood pressure and vitals reviewed-     Temp:  [96.5 °F (35.8 °C)-98 °F (36.7 °C)]   Pulse:  [63-88]   Resp:  [10-20]   BP: (138-194)/()   SpO2:  [96 %-98 %] .   Home meds for hypertension were reviewed and noted below-  Hypertension Medications             amLODIPine (NORVASC) 5 MG tablet Take 1 tablet by mouth once daily    losartan-hydrochlorothiazide 100-25 mg (HYZAAR) 100-25 mg per tablet Take 1 tablet by mouth once daily          While in the hospital, will manage blood pressure as follows; Continue home antihypertensive regimen    Will utilize p.r.n. blood pressure medication only if patient's blood pressure greater than 180/110 and she develops symptoms such as worsening chest pain or shortness of breath.        Alcohol dependence  ETOH use without dependence.    -CIWA  -Fall precautions  -Seizure precautions  -No obvious withdrawal symptoms at present.        History of renal cell carcinoma  History noted    -Monitor for acute changes        COPD (chronic obstructive pulmonary disease)  Patient's COPD is controlled currently.  Patient is currently off COPD Pathway. Continue scheduled inhalers Supplemental  oxygen PRN and monitor respiratory status closely.          CAD s/p CABG  Patient with known CAD s/p CABG, which is controlled Will continue to monitor for S/Sx of angina/ACS. Continue to monitor on telemetry.         GERD (gastroesophageal reflux disease)  Noted    -Continue PPI        VTE Risk Mitigation (From admission, onward)         Ordered     IP VTE LOW RISK PATIENT  Once         08/13/23 2247     Place sequential compression device  Until discontinued         08/13/23 2247                Discharge Planning   ORA:  8/16/23    Code Status: Full Code   Is the patient medically ready for discharge?:     Reason for patient still in hospital (select all that apply): Patient new problem, Patient trending condition, Treatment and Consult recommendations                     Liz Kendall NP  Department of Hospital Medicine   [facility]/Surg

## 2023-08-15 VITALS
BODY MASS INDEX: 29.02 KG/M2 | OXYGEN SATURATION: 97 % | HEIGHT: 65 IN | RESPIRATION RATE: 18 BRPM | DIASTOLIC BLOOD PRESSURE: 91 MMHG | TEMPERATURE: 98 F | SYSTOLIC BLOOD PRESSURE: 143 MMHG | WEIGHT: 174.19 LBS | HEART RATE: 83 BPM

## 2023-08-15 LAB
ALBUMIN SERPL BCP-MCNC: 3.7 G/DL (ref 3.5–5.2)
ALP SERPL-CCNC: 128 U/L (ref 55–135)
ALT SERPL W/O P-5'-P-CCNC: 6 U/L (ref 10–44)
ANION GAP SERPL CALC-SCNC: 9 MMOL/L (ref 8–16)
AST SERPL-CCNC: 12 U/L (ref 10–40)
BASOPHILS # BLD AUTO: 0.03 K/UL (ref 0–0.2)
BASOPHILS NFR BLD: 0.7 % (ref 0–1.9)
BILIRUB SERPL-MCNC: 0.9 MG/DL (ref 0.1–1)
BUN SERPL-MCNC: 12 MG/DL (ref 8–23)
CALCIUM SERPL-MCNC: 9.7 MG/DL (ref 8.7–10.5)
CHLORIDE SERPL-SCNC: 102 MMOL/L (ref 95–110)
CO2 SERPL-SCNC: 29 MMOL/L (ref 23–29)
CREAT SERPL-MCNC: 0.9 MG/DL (ref 0.5–1.4)
DIFFERENTIAL METHOD: ABNORMAL
EOSINOPHIL # BLD AUTO: 0.2 K/UL (ref 0–0.5)
EOSINOPHIL NFR BLD: 4.1 % (ref 0–8)
ERYTHROCYTE [DISTWIDTH] IN BLOOD BY AUTOMATED COUNT: 11.8 % (ref 11.5–14.5)
EST. GFR  (NO RACE VARIABLE): >60 ML/MIN/1.73 M^2
GLUCOSE SERPL-MCNC: 114 MG/DL (ref 70–110)
HCT VFR BLD AUTO: 37.3 % (ref 37–48.5)
HGB BLD-MCNC: 12.4 G/DL (ref 12–16)
IMM GRANULOCYTES # BLD AUTO: 0.01 K/UL (ref 0–0.04)
IMM GRANULOCYTES NFR BLD AUTO: 0.2 % (ref 0–0.5)
LYMPHOCYTES # BLD AUTO: 1.4 K/UL (ref 1–4.8)
LYMPHOCYTES NFR BLD: 31.2 % (ref 18–48)
MAGNESIUM SERPL-MCNC: 1.9 MG/DL (ref 1.6–2.6)
MCH RBC QN AUTO: 31.8 PG (ref 27–31)
MCHC RBC AUTO-ENTMCNC: 33.2 G/DL (ref 32–36)
MCV RBC AUTO: 96 FL (ref 82–98)
MONOCYTES # BLD AUTO: 0.5 K/UL (ref 0.3–1)
MONOCYTES NFR BLD: 10 % (ref 4–15)
NEUTROPHILS # BLD AUTO: 2.5 K/UL (ref 1.8–7.7)
NEUTROPHILS NFR BLD: 53.8 % (ref 38–73)
NRBC BLD-RTO: 0 /100 WBC
PHOSPHATE SERPL-MCNC: 4.1 MG/DL (ref 2.7–4.5)
PLATELET # BLD AUTO: 179 K/UL (ref 150–450)
PMV BLD AUTO: 9.7 FL (ref 9.2–12.9)
POTASSIUM SERPL-SCNC: 4 MMOL/L (ref 3.5–5.1)
PROT SERPL-MCNC: 6.7 G/DL (ref 6–8.4)
RBC # BLD AUTO: 3.9 M/UL (ref 4–5.4)
SODIUM SERPL-SCNC: 140 MMOL/L (ref 136–145)
WBC # BLD AUTO: 4.59 K/UL (ref 3.9–12.7)

## 2023-08-15 PROCEDURE — 94640 AIRWAY INHALATION TREATMENT: CPT

## 2023-08-15 PROCEDURE — 25000003 PHARM REV CODE 250: Performed by: NURSE PRACTITIONER

## 2023-08-15 PROCEDURE — 85025 COMPLETE CBC W/AUTO DIFF WBC: CPT | Performed by: NURSE PRACTITIONER

## 2023-08-15 PROCEDURE — 94761 N-INVAS EAR/PLS OXIMETRY MLT: CPT

## 2023-08-15 PROCEDURE — G0378 HOSPITAL OBSERVATION PER HR: HCPCS

## 2023-08-15 PROCEDURE — 80053 COMPREHEN METABOLIC PANEL: CPT | Performed by: NURSE PRACTITIONER

## 2023-08-15 PROCEDURE — 36415 COLL VENOUS BLD VENIPUNCTURE: CPT | Performed by: NURSE PRACTITIONER

## 2023-08-15 PROCEDURE — 84100 ASSAY OF PHOSPHORUS: CPT | Performed by: NURSE PRACTITIONER

## 2023-08-15 PROCEDURE — 83735 ASSAY OF MAGNESIUM: CPT | Performed by: NURSE PRACTITIONER

## 2023-08-15 PROCEDURE — 97161 PT EVAL LOW COMPLEX 20 MIN: CPT

## 2023-08-15 PROCEDURE — 94760 N-INVAS EAR/PLS OXIMETRY 1: CPT

## 2023-08-15 PROCEDURE — 95819 EEG AWAKE AND ASLEEP: CPT

## 2023-08-15 PROCEDURE — 95816 PR EEG,W/AWAKE & DROWSY RECORD: ICD-10-PCS | Mod: 26,,, | Performed by: PSYCHIATRY & NEUROLOGY

## 2023-08-15 PROCEDURE — 95816 EEG AWAKE AND DROWSY: CPT | Mod: 26,,, | Performed by: PSYCHIATRY & NEUROLOGY

## 2023-08-15 RX ORDER — LIDOCAINE 50 MG/G
1 PATCH TOPICAL DAILY
Qty: 30 PATCH | Refills: 0 | Status: SHIPPED | OUTPATIENT
Start: 2023-08-15 | End: 2023-10-04

## 2023-08-15 RX ORDER — ASPIRIN 81 MG/1
81 TABLET ORAL DAILY
Qty: 30 TABLET | Refills: 0 | Status: SHIPPED | OUTPATIENT
Start: 2023-08-15 | End: 2024-08-14

## 2023-08-15 RX ORDER — LIDOCAINE 50 MG/G
1 PATCH TOPICAL
Status: DISCONTINUED | OUTPATIENT
Start: 2023-08-15 | End: 2023-08-15 | Stop reason: HOSPADM

## 2023-08-15 RX ORDER — HYDROCODONE BITARTRATE AND ACETAMINOPHEN 5; 325 MG/1; MG/1
1 TABLET ORAL EVERY 6 HOURS PRN
Qty: 14 TABLET | Refills: 0 | Status: SHIPPED | OUTPATIENT
Start: 2023-08-15 | End: 2023-10-04

## 2023-08-15 RX ADMIN — LOSARTAN POTASSIUM AND HYDROCHLOROTHIAZIDE 2 TABLET: 12.5; 5 TABLET ORAL at 08:08

## 2023-08-15 RX ADMIN — HYDROCODONE BITARTRATE AND ACETAMINOPHEN 1 TABLET: 5; 325 TABLET ORAL at 05:08

## 2023-08-15 RX ADMIN — THERA TABS 1 TABLET: TAB at 08:08

## 2023-08-15 RX ADMIN — HYDROCODONE BITARTRATE AND ACETAMINOPHEN 1 TABLET: 5; 325 TABLET ORAL at 09:08

## 2023-08-15 RX ADMIN — HYDROCODONE BITARTRATE AND ACETAMINOPHEN 1 TABLET: 5; 325 TABLET ORAL at 03:08

## 2023-08-15 RX ADMIN — FLUTICASONE FUROATE AND VILANTEROL TRIFENATATE 1 PUFF: 100; 25 POWDER RESPIRATORY (INHALATION) at 07:08

## 2023-08-15 RX ADMIN — LIDOCAINE 1 PATCH: 50 PATCH CUTANEOUS at 05:08

## 2023-08-15 RX ADMIN — AMLODIPINE BESYLATE 5 MG: 5 TABLET ORAL at 08:08

## 2023-08-15 RX ADMIN — FLUOXETINE 40 MG: 20 CAPSULE ORAL at 08:08

## 2023-08-15 RX ADMIN — DOCUSATE SODIUM AND SENNOSIDES 1 TABLET: 8.6; 5 TABLET, FILM COATED ORAL at 08:08

## 2023-08-15 NOTE — HOSPITAL COURSE
Pt was monitored closely during her stay.  She had significant bruising and pain related to her fall including hematoma the head.  She underwent imaging to evaluate for injury with no acute fractures identified.  She had no intracranial bleeding, however CT head was identifiable for mass favoring meningioma and an MRI with and without was recommended.  The MRI was performed. 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  Neurosurgery evaluated and identified NO need for acute, emergent surgery. He ordered MRV of the brain which showed severe sinus narrowing without filling defect to suggest thrombosis.  Case was discussed with Dr. Parson who recommended a baby aspirin in very close follow-up establishment with the brain tumor center at [facility].  Pt had significant pain and soreness from her fall.  She worked with PT who recommended home health which was ordered.  Pain Was well controlled with low-dose oral narcotics here in the hospital.  We were able to obtain an outpatient neuro surgery appointment with her for 8/16 due to a cancellation.  When discussing discharge, Pt very nervous about discharge home due to lack of social support.  I did acknowledge with her that she is a very high fall risk and has had multiple falls in the past.  We did discuss polypharmacy at length-Pt states she is aware that medication mixing will increase her imbalance and she avoids this.  I discussed with her sister who had visited her in the hospital that I think Pt is a high fall risk and would recommend some support at home to decrease her risk of injury.  I discussed that we could try for SNF if they would like, though I do not want to delay further follow-up with her mass and Pt did not recommend placement.  Pt states she is not interested in placement.  She is overwhelmed with new health information, recent death of her , and lack of support at home.  She requests discharge home today.  We  did discuss that I will write her a short course of oral narcotics given that she has had a fall with significant pain noted.  We discussed the dangers of mixing medications as above, she verbalized understanding.  I will order a lidocaine patch.  I encouraged complete ETOH cessation which she agrees with.  Neurology followed her here in the hospital in reviewed EEG which was negative for any acute abnormality.  I did an echo to evaluate for valvular disease which was negative.  She had no arrhythmias while hospitalized and no further episodes of LOC. return precautions were provided on discharge.  Pt verbalized understanding and agreement with discharge plan.    Pt was seen on day of discharge and deemed appropriate for discharge home.  She is a high fall risk and I contacted her sister to discuss and encouraged

## 2023-08-15 NOTE — DISCHARGE SUMMARY
[facility] Medicine  Discharge Summary      Patient Name: Beatriz Gan  MRN: [id number]  [facility]: [id number]  Patient Class: OP- Observation  Admission Date: 8/13/2023  Hospital Length of Stay: 0 days  Discharge Date and Time:  08/15/2023 4:19 PM  Attending Physician: Karol Garcia MD   Discharging Provider: Liz Kendall NP  Primary Care Provider: Jorge Alonzo MD    Primary Care Team: Networked reference to record PCT     HPI:   Beatriz Gan is a 67-year-old female with a past medical history of GERD, hypertension, CAD s/p CABG, RCC, anxiety, DDD, COPD and regular EtOH use who presented to the ED of head injury s/p fall at home.  Patient states she went to bed last night and at some point got up to go to the bathroom and fell down.  Patient states she does not recall the incident, but recalls being able to get back in bed.  Family reports they had not heard from her today and she was not answering phone calls so they 4th their way into her home and found her lying in bed confused.  Patient's granddaughter reports patient has a history of daily alcohol use.  Patient states she did not drink alcohol today.  Patient endorses a headache with right rib pain.  She denies shortness of breath, abdominal pain and chest pain.  Per ED note, family states patient has persistent confusion.  Lab work performed in the ED was unremarkable.  CT abdomen pelvis showed no acute injury, left renal AML stable and colonic diverticulosis.  Chest x-ray showed no acute abnormality and right knee x-ray showed no acute abnormality.  CT head showed no acute hemorrhage.  Right parietal and frontal scalp hematomas present extra-axial mass abutting the left cerebral hemisphere may represent a meningioma or other dural-based lesion.  EKG showed sinus rhythm with first-degree AV block.  Upon arrival to the ED, patient found to be hypertensive with SBP 190s.  Patient states she did not take her  blood pressure medications today and has not taken lately.  She states her blood pressure has been controlled without medication.  Patient received 1 L IV bolus and was referred to Hospital Medicine.  Patient seen in the ED and she is complaining of a headache, chest wall tenderness and right knee pain.  She denies shortness of breath, nausea, vomiting, diarrhea, fever and chills.  Patient will be admitted to Hospital Medicine for further evaluation management.            * No surgery found *      Hospital Course:   Pt was monitored closely during her stay.  She had significant bruising and pain related to her fall including hematoma the head.  She underwent imaging to evaluate for injury with no acute fractures identified.  She had no intracranial bleeding, however CT head was identifiable for mass favoring meningioma and an MRI with and without was recommended.  The MRI was performed. 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  Neurosurgery evaluated and identified NO need for acute, emergent surgery. He ordered MRV of the brain which showed severe sinus narrowing without filling defect to suggest thrombosis.  Case was discussed with Dr. Parson who recommended a baby aspirin in very close follow-up establishment with the brain tumor center at [facility].  Pt had significant pain and soreness from her fall.  She worked with PT who recommended home health which was ordered.  Pain Was well controlled with low-dose oral narcotics here in the hospital.  We were able to obtain an outpatient neuro surgery appointment with her for 8/16 due to a cancellation.  When discussing discharge, Pt very nervous about discharge home due to lack of social support.  I did acknowledge with her that she is a very high fall risk and has had multiple falls in the past.  We did discuss polypharmacy at length-Pt states she is aware that medication mixing will increase her imbalance and she avoids this.  I  discussed with her sister who had visited her in the hospital that I think Pt is a high fall risk and would recommend some support at home to decrease her risk of injury.  I discussed that we could try for SNF if they would like, though I do not want to delay further follow-up with her mass and Pt did not recommend placement.  Pt states she is not interested in placement.  She is overwhelmed with new health information, recent death of her , and lack of support at home.  She requests discharge home today.  We did discuss that I will write her a short course of oral narcotics given that she has had a fall with significant pain noted.  We discussed the dangers of mixing medications as above, she verbalized understanding.  I will order a lidocaine patch.  I encouraged complete ETOH cessation which she agrees with.  Neurology followed her here in the hospital in reviewed EEG which was negative for any acute abnormality.  I did an echo to evaluate for valvular disease which was negative.  She had no arrhythmias while hospitalized and no further episodes of LOC. return precautions were provided on discharge.  Pt verbalized understanding and agreement with discharge plan.    Pt was seen on day of discharge and deemed appropriate for discharge home.  She is a high fall risk and I contacted her sister to discuss and encouraged       Goals of Care Treatment Preferences:  Code Status: Full Code      Consults:   Consults (From admission, onward)          Status Ordering Provider     Inpatient consult to Neurology  Once        Provider:  Madelyn Petersen MD    Acknowledged MILY LOWERY     Inpatient consult to Neurosurgery  Once        Provider:  Sincere Parson DO    Acknowledged MILY LOWERY     IP consult to case management  Once        Provider:  (Not yet assigned)    Acknowledged TIFFANY RAINEY            No new Assessment & Plan notes have been filed under this hospital service since the last note was  generated.  Service: Hospital Medicine    Final Active Diagnoses:    Diagnosis Date Noted POA    PRINCIPAL PROBLEM:  Loss of consciousness [R40.20] 08/13/2023 Yes    Brain mass [G93.89] 08/14/2023 Yes    Meningioma [D32.9] 08/14/2023 Yes    Alcohol dependence [F10.20] 08/21/2015 Yes    Essential hypertension [I10] 08/21/2015 Yes    History of renal cell carcinoma [Z85.528] 02/03/2014 Not Applicable     CAD s/p CABG [I25.10] 01/13/2014 Yes    COPD (chronic obstructive pulmonary disease) [J44.9] 01/13/2014 Yes    GERD (gastroesophageal reflux disease) [K21.9]  Yes      Problems Resolved During this Admission:       Discharged Condition: fair    Disposition: Home-Health Care [facility]    Follow Up:   Follow-up Information       Jorge Alonzo MD Follow up in 1 week(s).    Specialty: Internal Medicine  Why: 8/29 @ 9 am  pt was added to the wait list to hopefully get a sooner appt  Contact information:  [address]  [place] [address]  [phone]               Twan Walsh,  Follow up.    Specialty: Neurosurgery  Why: 8/16 @ 3pm  brain tumor center  Contact information:  [address]  [place] [address]  [phone]               [facility] Follow up.    Specialties: Home Health Services, Home Therapy Services, Home Living Aide Services  Why: 1st visit 8/18  Contact information:  [address] [address]  [phone]             Ruben Martinez MD Follow up.    Specialties: Orthopedic Surgery, Surgery  Contact information:  [address]  [place] [place] [facility] [address]  [phone]                           Patient Instructions:      Ambulatory referral/consult to Neurosurgery   Standing Status: Future   Referral Priority: Routine Referral Type: Consultation   Referral Reason: Specialty Services Required   Requested Specialty: Neurosurgery   Number of Visits Requested: 1     Ambulatory referral/consult to Home Health   Standing  Status: Future   Referral Priority: Routine Referral Type: Home Health   Referral Reason: Specialty Services Required   Requested Specialty: Home Health Services   Number of Visits Requested: 1     Ambulatory referral/consult to [facility]   Standing Status: Future   Referral Priority: Routine Referral Type: Consultation   Referral Reason: Specialty Services Required   Number of Visits Requested: 1     Notify your health care provider if you experience any of the following:  persistent nausea and vomiting or diarrhea     Notify your health care provider if you experience any of the following:  severe uncontrolled pain     Notify your health care provider if you experience any of the following:  persistent dizziness, light-headedness, or visual disturbances     Notify your health care provider if you experience any of the following:  increased confusion or weakness     Activity as tolerated       Significant Diagnostic Studies: Labs:   CMP   Recent Labs   Lab 08/13/23  1856 08/14/23  0535 08/15/23  0516    141 140   K 3.7 3.6 4.0    103 102   CO2 24 26 29   GLU 99 109 114*   BUN 18 14 12   CREATININE 0.9 0.8 0.9   CALCIUM 9.4 9.6 9.7   PROT 7.0 6.7 6.7   ALBUMIN 4.0 3.7 3.7   BILITOT 0.9 1.4* 0.9   ALKPHOS 137* 141* 128   AST 14 12 12   ALT 9* 6* 6*   ANIONGAP 13 12 9    and CBC   Recent Labs   Lab 08/13/23  1856 08/14/23  0535 08/15/23  0516   WBC 7.01 5.09 4.59   HGB 13.1 12.8 12.4   HCT 38.6 38.3 37.3    188 179     Imaging Results              CT Abdomen Pelvis With Contrast (Final result)  Result time 08/13/23 20:39:05      Final result by Dallas Christopher DO (08/13/23 20:39:05)                   Impression:      1. No traumatic visceral injury of the abdomen or pelvis.  2. Left renal AML, stable.  3. Colonic diverticulosis.      Electronically signed by: Dallas Christopher  Date:    08/13/2023  Time:    20:39               Narrative:    EXAMINATION:  CT ABDOMEN PELVIS WITH  CONTRAST    CLINICAL HISTORY:  Abdominal trauma, blunt;    TECHNIQUE:  Axial CT images with sagittal and coronal reformats were obtained of the abdomen and pelvis from the hemidiaphragms through the symphysis pubis after the administration of 75mL Omnipaque 350.    COMPARISON:  CT abdomen and pelvis from 04/14/2021.    FINDINGS:  Lung Bases: Clear.    Heart: Heart size is normal.  No pericardial effusion.    Liver: The liver is normal in size and demonstrates homogeneous enhancement without focal lesion.  The portal vasculature is patent.    Biliary tract: No intrahepatic or extrahepatic biliary ductal dilatation.    Gallbladder: No radiodense gallstone. No wall thickening or pericholecystic fluid.    Pancreas: Normal. No pancreatic ductal dilatation.    Spleen: Normal size without focal lesion.    Adrenals: Unremarkable.    Kidneys and urinary collecting systems: There is a 2.4 cm fat containing lesion arising from the left kidney midpole, compatible with an angiomyolipoma, unchanged in size from prior.  There is a right-sided extrarenal pelvis.  No hydronephrosis or urolithiasis.    Lymph nodes: None enlarged.    Stomach and bowel: There are postop changes of the gastroesophageal junction.  Loops of small and large bowel are normal in caliber without evidence for inflammation or obstruction.  There is colonic diverticulosis without evidence of acute diverticulitis.    Peritoneum and mesentery: No ascites or free intraperitoneal air.  No abdominal fluid collection.    Vasculature: No aneurysm or significant atherosclerosis.    Urinary bladder: No wall thickening.    Reproductive organs: The uterus is surgically absent.    Body wall: No abnormality.    Musculoskeletal: No aggressive osseous lesion.  There is a remote fracture of the right 11th rib posteriorly.  There is a remote compression fracture of T12 with vertebroplasty cement, height loss is unchanged.  There is disc height loss at L5-S1.                                        X-Ray Knee 3 View Right (Final result)  Result time 08/13/23 19:26:53      Final result by Dallas Christopher DO (08/13/23 19:26:53)                   Impression:      No acute osseous abnormality.      Electronically signed by: Dallas Christopher  Date:    08/13/2023  Time:    19:26               Narrative:    EXAMINATION:  XR KNEE 3 VIEW RIGHT    CLINICAL HISTORY:  Pain in unspecified knee    TECHNIQUE:  AP, lateral, and Merchant views of the right knee were performed.    COMPARISON:  07/21/2023.    FINDINGS:  There is no acute fracture or dislocation. Alignment is normal. Joint spaces are preserved. No joint effusion.                                       X-Ray Chest AP Portable (Final result)  Result time 08/13/23 19:29:25      Final result by Dallas Christopher DO (08/13/23 19:29:25)                   Impression:      No acute abnormality.      Electronically signed by: Dallas Christopher  Date:    08/13/2023  Time:    19:29               Narrative:    EXAMINATION:  XR CHEST AP PORTABLE    CLINICAL HISTORY:  syncope;    TECHNIQUE:  Single frontal view of the chest was performed.    COMPARISON:  04/27/2023.    FINDINGS:  The lungs are well expanded and clear. No focal opacities are seen. The pleural spaces are clear. The cardiac silhouette is unremarkable. There is partially imaged cervical spine hardware.  There is vertebroplasty cement within L1.  Osseous structures are intact.                                       CT Head Without Contrast (Final result)  Result time 08/13/23 19:25:18      Final result by Dallas Christopher DO (08/13/23 19:25:18)                   Impression:      No intracranial hemorrhage.    Right parietal and right frontal scalp hematomas.    Extra-axial mass abutting the left cerebellar hemisphere, may represent a meningioma or other dural-based lesion.  Further evaluation with nonemergent MRI brain with and without contrast is recommended.      Electronically signed by: Dallas  Candi  Date:    08/13/2023  Time:    19:25               Narrative:    EXAMINATION:  CT HEAD WITHOUT CONTRAST    CLINICAL HISTORY:  Head trauma, moderate-severe;    TECHNIQUE:  Low dose axial CT images obtained throughout the head without intravenous contrast. Sagittal and coronal reconstructions were performed.    COMPARISON:  CT head 03/16/2021, 08/07/2018.    FINDINGS:  Ventricles and sulci are normal in size for age without evidence of hydrocephalus. No extra-axial blood or fluid collections.  The brain parenchyma is normal.  There is an extra-axial mass abutting the left cerebellar hemisphere measuring approximately 1.6 x 2.0 x 1.9 cm (series 2, image 11).  There is no evidence of significant mass effect or midline shift.  There is no evidence of vasogenic edema.  This was vaguely seen in retrospect on prior CT dated 03/16/2021 though has increased in size.  This was not seen on CT dated 08/07/2018.  No parenchymal hemorrhage, edema or major vascular distribution infarct.    No calvarial fracture.  There is a large right parietal scalp hematoma.  There is a small right frontal scalp hematoma.  Bilateral paranasal sinuses and mastoid air cells are clear.  There is a remote fracture of the medial wall of the right orbit.                                      Pending Diagnostic Studies:       None           Medications:  Reconciled Home Medications:      Medication List        START taking these medications      aspirin 81 MG EC tablet  Commonly known as: ECOTRIN  Take 1 tablet (81 mg total) by mouth once daily.     HYDROcodone-acetaminophen 5-325 mg per tablet  Commonly known as: NORCO  Take 1 tablet by mouth every 6 (six) hours as needed for Pain.     LIDOcaine 5 %  Commonly known as: LIDODERM  Place 1 patch onto the skin once daily. Remove & Discard patch within 12 hours or as directed by MD            CONTINUE taking these medications      ALPRAZolam 1 MG tablet  Commonly known as: XANAX  Take 1 tablet by  mouth twice daily as needed for anxiety     amLODIPine 5 MG tablet  Commonly known as: NORVASC  Take 1 tablet by mouth once daily     azelastine 137 mcg (0.1 %) nasal spray  Commonly known as: ASTELIN  Use 1 spray(s) in each nostril twice daily     budesonide-formoterol 160-4.5 mcg 160-4.5 mcg/actuation Hfaa  Commonly known as: SYMBICORT  Inhale 2 puffs into the lungs every 12 (twelve) hours. Controller     celecoxib 200 MG capsule  Commonly known as: CeleBREX  Take 1 capsule by mouth once daily     FLUoxetine 40 MG capsule  Take 1 capsule by mouth twice daily     fluticasone propionate 50 mcg/actuation nasal spray  Commonly known as: FLONASE  1 spray by Nasal route daily as needed.     gabapentin 600 MG tablet  Commonly known as: NEURONTIN  Take 1 tablet (600 mg total) by mouth 3 (three) times daily.     losartan-hydrochlorothiazide 100-25 mg 100-25 mg per tablet  Commonly known as: HYZAAR  Take 1 tablet by mouth once daily     magnesium 30 mg Tab  Take by mouth once.     multivit-iron-min-folic acid 3,500-18-0.4 unit-mg-mg Chew  Take by mouth once daily.     ondansetron 4 MG tablet  Commonly known as: ZOFRAN  Take 1 tablet (4 mg total) by mouth as needed for Nausea.     polyethylene glycol 17 gram/dose powder  Commonly known as: GLYCOLAX  Take 17 g by mouth once daily.     VITAMIN D3 ORAL  Take by mouth once daily.     zolpidem 10 mg Tab  Commonly known as: AMBIEN  TAKE 1 TABLET BY MOUTH ONCE DAILY AT NIGHT AS NEEDED            STOP taking these medications      methylPREDNISolone 4 mg tablet  Commonly known as: MEDROL DOSEPACK     traMADoL 50 mg tablet  Commonly known as: ULTRAM              Indwelling Lines/Drains at time of discharge:   Lines/Drains/Airways       None                   Time spent on the discharge of patient: 45 minutes         Liz Kendall NP  Department of Hospital Medicine  [facility]/Surg

## 2023-08-15 NOTE — PLAN OF CARE
Referral sent to [facility] for          08/15/23 [id number]   Post-Acute Status   Post-Acute Authorization Home Health   Home Health Status Referrals Sent

## 2023-08-15 NOTE — PT/OT/SLP EVAL
"Physical Therapy Evaluation    Patient Name:  Beatriz Gan   MRN:  [id number]    Recommendations:     Discharge Recommendations: home health PT   Discharge Equipment Recommendations: none   Barriers to discharge: None    Assessment:     Beatriz Gan is a 67 y.o. female admitted with a medical diagnosis of Loss of consciousness.  She presents with the following impairments/functional limitations: weakness, impaired endurance, impaired functional mobility, gait instability, impaired balance, pain .    Pt seen supine in bed, alert/cooperative. Sister at bedside assisting with care. Pt stated does not remember falling. Pt c//o head pain- has ecchymosis R side of face and posterior head. Per nurse Annmarie pain meds due in 30 min and that pt going for MRI. Pt agreeable to PT and ambulated within room and to bathroom with RW. Pt returned back to bed per nurse request.  Pt to benefit from HHPT .    Rehab Prognosis: Fair; patient would benefit from acute skilled PT services to address these deficits and reach maximum level of function.    Recent Surgery: * No surgery found *      Plan:     During this hospitalization, patient to be seen 6 x/week to address the identified rehab impairments via gait training, therapeutic activities, therapeutic exercises and progress toward the following goals:    Plan of Care Expires:  08/30/23    Subjective   Pt stated that she lives at home with 16y/o G daughter/daughter and that she was not home during the fall episode  Pt stated " Don't remember" the fall  Chief Complaint: head pain  Patient/Family Comments/goals: get well  Pain/Comfort:  Pain Rating 1: 10/10  Location 1: head  Pain Addressed 1: Reposition, Distraction, Cessation of Activity, Nurse notified    Patients cultural, spiritual, [employer] conflicts given the current situation:      Living Environment:  Home with daughter  Prior to admission, patients level of function was ambulatory/drives.  Equipment used at home: " none.  DME owned (not currently used): rolling walker.  Upon discharge, patient will have assistance from family.    Objective:     Communicated with nurse Saunders prior to session.  Patient found HOB elevated with bed alarm, telemetry  upon PT entry to room.    General Precautions: Standard, fall  Orthopedic Precautions:N/A   Braces: N/A  Respiratory Status: Room air    Exams:  Postural Exam:  Patient presented with the following abnormalities:    -       Rounded shoulders  -       Forward head  -       BMI 28  RLE ROM: WFL  RLE Strength: WFL  LLE ROM: WFL  LLE Strength: WFL    Functional Mobility:  Bed Mobility:     Rolling Right: modified independence  Scooting: minimum assistance  Supine to Sit: minimum assistance  Sit to Supine: minimum assistance  Transfers:     Sit to Stand:  minimum assistance with rolling walker  Toilet Transfer: minimum assistance with  rolling walker  using  Stand Pivot  Gait: 30ft within room with RW min assist      AM-PAC 6 CLICK MOBILITY  Total Score:16       Treatment & Education:  Patient was educated on the importance of OOB activity and functional mobility to negate negative effects of prolonged bed rest during hospitalization, safe transfers and ambulation, and D/C planning   Back to bed post PT for MRI    Patient left HOB elevated with all lines intact, call button in reach, bed alarm on, nurse Annmarie notified, and sister present.    GOALS:   Multidisciplinary Problems       Physical Therapy Goals          Problem: Physical Therapy    Goal Priority Disciplines Outcome Goal Variances Interventions   Physical Therapy Goal     PT, PT/OT Ongoing, Progressing     Description: Goals to be met by: 2023     Patient will increase functional independence with mobility by performin. Supine to sit with Modified [place]  2. Sit to stand transfer with Contact Guard Assistance  3. Bed to chair transfer with Contact Guard Assistance using Rolling Walker  4. Gait  x 250 feet with  Contact Guard Assistance using Rolling Walker.   5. Lower extremity exercise program x20 reps                        History:     Past Medical History:   Diagnosis Date    Alcohol dependence     DDD (degenerative disc disease), lumbosacral     DJD (degenerative joint disease), lumbosacral     Encounter for blood transfusion     GERD (gastroesophageal reflux disease)     Gout     Hypercholesterolemia     Hypertension     NATHALIE (iron deficiency anemia)     questionable white coat hypertension    Kidney carcinoma, left 2014    Pneumonia     Renal cell carcinoma     Shingles 10/13/2012       Past Surgical History:   Procedure Laterality Date    ANTERIOR CERVICAL DISCECTOMY W/ FUSION N/A 5/4/2023    Procedure: DISCECTOMY, SPINE, CERVICAL, ANTERIOR APPROACH, WITH FUSION C3-4;  Surgeon: Rbuen Martinez MD;  Location: [facility] OR;  Service: Orthopedics;  Laterality: N/A;    APPENDECTOMY      ARTHROSCOPY OF SHOULDER WITH DECOMPRESSION OF SUBACROMIAL SPACE Left 10/31/2019    Procedure: ARTHROSCOPY, SHOULDER, WITH SUBACROMIAL SPACE DECOMPRESSION;  Surgeon: Ruben Martinez MD;  Location: [facility] OR;  Service: Orthopedics;  Laterality: Left;    BLADDER REPAIR      x 2    COLONOSCOPY  9/2011    DISTAL CLAVICLE EXCISION Left 10/31/2019    Procedure: EXCISION, CLAVICLE, DISTAL;  Surgeon: Ruben Martinez MD;  Location: [facility] OR;  Service: Orthopedics;  Laterality: Left;    EPIDURAL STEROID INJECTION INTO LUMBAR SPINE N/A 12/7/2020    Procedure: Injection-steroid-epidural-lumbar;  Surgeon: Dk Rosales MD;  Location: [facility] OR;  Service: Pain Management;  Laterality: N/A;  L4-L5    EPIDURAL STEROID INJECTION INTO LUMBAR SPINE N/A 5/17/2021    Procedure: Injection-steroid-epidural-lumbar;  Surgeon: Dk Rosales MD;  Location: [facility] OR;  Service: Pain Management;  Laterality: N/A;  L4-L5    ESOPHAGOGASTRODUODENOSCOPY  9/2011    EYE SURGERY      lasix bilateral    FIXATION KYPHOPLASTY N/A 1/31/2020    Procedure: Kyphoplasty T12;  Surgeon:  Dk Rosales MD;  Location: [facility] OR;  Service: Pain Management;  Laterality: N/A;    HERNIA REPAIR      hiatal [place]    HYSTERECTOMY      INJECTION OF ANESTHETIC AGENT AROUND NERVE Left 6/8/2020    Procedure: Block, Nerve;  Surgeon: Dk Rosales MD;  Location: [facility] OR;  Service: Pain Management;  Laterality: Left;  left genicular nerve block     KNEE ARTHROPLASTY Left 7/30/2020    Procedure: ARTHROPLASTY, KNEE LEFT;  Surgeon: Ruben Martinez MD;  Location: [facility] OR;  Service: Orthopedics;  Laterality: Left;  REP VALERY RUBY    KNEE ARTHROSCOPY W/ MENISCECTOMY Left 1/16/2020    Procedure: ARTHROSCOPY, KNEE, WITH MEDIAL MENISCECTOMY;  Surgeon: Ruben Martinez MD;  Location: [facility] OR;  Service: Orthopedics;  Laterality: Left;    LAPAROSCOPIC NISSEN FUNDOPLICATION      NEPHRECTOMY      LT partial    RADIOFREQUENCY ABLATION Left 6/19/2020    Procedure: Radiofrequency Ablation;  Surgeon: Dk Rosales MD;  Location: [facility] OR;  Service: Pain Management;  Laterality: Left;    RADIOFREQUENCY ABLATION OF LUMBAR MEDIAL BRANCH NERVE AT SINGLE LEVEL Bilateral 2/28/2020    Procedure: Radiofrequency Ablation, Nerve, Spinal, Lumbar, Medial Branch, 1 Level;  Surgeon: Dk Rosales MD;  Location: [facility] OR;  Service: Pain Management;  Laterality: Bilateral;  L3,L4,L5 - Burned at 80 degrees C. for 60 seconds x 2 each site    RADIOFREQUENCY ABLATION OF LUMBAR MEDIAL BRANCH NERVE AT SINGLE LEVEL Bilateral 10/19/2020    Procedure: Radiofrequency Ablation, Nerve, Spinal, Lumbar, Medial Branch, 1 Level;  Surgeon: Dk Rosales MD;  Location: [facility] OR;  Service: Pain Management;  Laterality: Bilateral;  L3, L4, L5    REFRACTIVE SURGERY      ROTATOR CUFF REPAIR Left 10/31/2019    Procedure: REPAIR, ROTATOR CUFF;  Surgeon: Ruben Martinez MD;  Location: [facility] OR;  Service: Orthopedics;  Laterality: Left;  arthrex    SKIN BIOPSY      TOTAL ABDOMINAL HYSTERECTOMY W/ BILATERAL SALPINGOOPHORECTOMY  age 48    TRANSFORAMINAL EPIDURAL  INJECTION OF STEROID Bilateral 7/30/2021    Procedure: Injection,steroid,epidural,transforaminal approach;  Surgeon: Dk Rosales MD;  Location: [facility] OR;  Service: Pain Management;  Laterality: Bilateral;  L5-S1     TRANSFORAMINAL EPIDURAL INJECTION OF STEROID Bilateral 3/28/2022    Procedure: INJECTION, STEROID, EPIDURAL, TRANSFORAMINAL APPROACH Lumbar L5-S1;  Surgeon: Dk Rosales MD;  Location: [facility] OR;  Service: Pain Management;  Laterality: Bilateral;       Time Tracking:     PT Received On: 08/15/23  PT Start Time: 0925     PT Stop Time: 0938  PT Total Time (min): 13 min     Billable Minutes: Evaluation 13      08/15/2023

## 2023-08-15 NOTE — DISCHARGE INSTRUCTIONS
Would recommended voiding polypharmacy-combination of pain medications and benzos can certainly increase your imbalance and put you at high risk for falls   -please use your walker to ambulate  -it is very important that you follow-up as scheduled with Neurosurgery in [place]    Wound care:  Clean abrasions to bilateral elbows and bilateral knees with soap and water and dry. Apply Triad hydrophilic wound dressing ointment daily to these areas and leave open to air.

## 2023-08-15 NOTE — PROCEDURES
Date of service  08/15/2023    Introduction  Electroencephalographic (EEG) recording is performed with electrodes placed according to the International 10-20 placement system. Thirty two (32) channels of digital signal (sampling rate of 512/sec) including T1 and T2 was simultaneously recorded from the scalp and may include EKG, EMG, and/or eye monitors. Recording band pass was 0.1 to 512 Hz. Digital video recording of the patient is simultaneously recorded with the EEG. The patient is instructed to report clinical symptoms which may occur during the recording session. EEG and video recording is stored and archived in digital format. Activation procedures which include photic stimulation, hyperventilation and instructing patients to perform simple tasks are done in selected patients.    The EEG is displayed on a monitor screen and can be reviewed using different montages. Computer assisted analysis is employed to detect spike and electrographic seizure activity. The entire record is submitted for computer analysis. The entire recording is visually reviewed and, the times identified by computer analysis as being spikes or seizures are reviewed again.     Compressed spectral analysis (CSA) is also performed on the activity recorded from each individual channel. This is displayed as a power display of frequencies from 0 to 30 Hz over time. The CSA is reviewed looking for asymmetries in power between homologous areas of the scalp and then compared with the original EEG recording.     Findings  The patient's background consists of a posteriorly dominant 10 Hz alpha rhythm, which is well-formed, well-modulated, and abolishes with eye opening.     During the course of the recording, the patient is noted to be awake, and subsequently becomes drowsy.  Normal sleep architecture is not appreciated.    Hyperventilation was not performed.  Photic stimulation did not produce any abnormal response.    There is no focal slowing.   There are no focal, lateralized, or epileptiform transients.  No electrographic seizures are seen.    EKG demonstrates regular rhythm.    Interpretation  This is a normal EEG in an awake and drowsy adult. No potentially epileptiform activity was seen. Please be aware that a normal EEG does not exclude the possibility of an underlying seizure disorder.

## 2023-08-15 NOTE — CONSULTS
[facility]  Neurology Consult    Patient Name: Beatriz Gan  MRN: [id number]  : 1955  TODAY'S DATE: 08/15/2023  ADMIT DATE: 2023  6:18 PM                                          CONSULTED PROVIDER: Madelyn Rodarte MD, Neurologist. On-call Phone: [phone]  CONSULT REQUESTED BY: Karol Garcia MD     Chief Complaint   Patient presents with    Fall     Unsure when the fall happened or how.  Dizziness and weakness       HPI per EMR:  Beatriz Gan is a 67-year-old female with a past medical history of GERD, hypertension, CAD s/p CABG, RCC, anxiety, DDD, COPD and regular EtOH use who presented to the ED of head injury s/p fall at home.  Patient states she went to bed last night and at some point got up to go to the bathroom and fell down.  Patient states she does not recall the incident, but recalls being able to get back in bed.  Family reports they had not heard from her today and she was not answering phone calls so they 4th their way into her home and found her lying in bed confused.  Patient's granddaughter reports patient has a history of daily alcohol use.  Patient states she did not drink alcohol today.  Patient endorses a headache with right rib pain.  She denies shortness of breath, abdominal pain and chest pain.  Per ED note, family states patient has persistent confusion.  Lab work performed in the ED was unremarkable.  CT abdomen pelvis showed no acute injury, left renal AML stable and colonic diverticulosis.  Chest x-ray showed no acute abnormality and right knee x-ray showed no acute abnormality.  CT head showed no acute hemorrhage.  Right parietal and frontal scalp hematomas present extra-axial mass abutting the left cerebral hemisphere may represent a meningioma or other dural-based lesion.  EKG showed sinus rhythm with first-degree AV block.  Upon arrival to the ED, patient found to be hypertensive with SBP 190s.  Patient states she did not take her  blood pressure medications today and has not taken lately.  She states her blood pressure has been controlled without medication.  Patient received 1 L IV bolus and was referred to Hospital Medicine.  Patient seen in the ED and she is complaining of a headache, chest wall tenderness and right knee pain.  She denies shortness of breath, nausea, vomiting, diarrhea, fever and chills.  Patient will be admitted to Hospital Medicine for further evaluation management.    Neurology Consult:  Patient was seen and examined by me today. She is a 68 yo woman with history of hypertension, CAD s/p CABG, RCC, anxiety, DDD, COPD and regular EtOH use who presented to the ED after she experienced a passing out event. She states that she woke up at night to go to the restroom when she suddenly felt very dizzy. When she came to, she was on the floor. She then stood up and went to her bed, where she was found by her granddaughter still confused and dizzy. She does not have a prior history of seizures, but she does have history of balance problems and frequent falls. She denies any focal weakness, slurred speech, but does complain of blurred vision. CT head was obtained and showed a left cerebellar extra-axial mass concerning for meningioma, so MRI brain was ordered, neurosurgery and neurology were consulted.    Review of Systems:    A comprehensive ROS was performed and all systems were negative except as noted above in HPI.    Past Medical History:  has a past medical history of Alcohol dependence, DDD (degenerative disc disease), lumbosacral, DJD (degenerative joint disease), lumbosacral, Encounter for blood transfusion, GERD (gastroesophageal reflux disease), Gout, Hypercholesterolemia, Hypertension, NATHALIE (iron deficiency anemia), Kidney carcinoma, left (2014), Pneumonia, Renal cell carcinoma, and Shingles (10/13/2012).    Past Surgical History:  has a past surgical history that includes Total abdominal hysterectomy w/ bilateral  "salpingoophorectomy (age 48); Appendectomy; Bladder repair; Colonoscopy (9/2011); Esophagogastroduodenoscopy (9/2011); Hysterectomy; Refractive surgery; Nephrectomy; Laparoscopic Nissen fundoplication; Eye surgery; Hernia repair; Skin biopsy; Rotator cuff repair (Left, 10/31/2019); Distal clavicle excision (Left, 10/31/2019); Arthroscopy of shoulder with decompression of subacromial space (Left, 10/31/2019); Knee arthroscopy w/ meniscectomy (Left, 1/16/2020); Fixation Kyphoplasty (N/A, 1/31/2020); Radiofrequency ablation of lumbar medial branch nerve at single level (Bilateral, 2/28/2020); Injection of anesthetic agent around nerve (Left, 6/8/2020); Radiofrequency ablation (Left, 6/19/2020); Knee Arthroplasty (Left, 7/30/2020); Radiofrequency ablation of lumbar medial branch nerve at single level (Bilateral, 10/19/2020); Epidural steroid injection into lumbar spine (N/A, 12/7/2020); Epidural steroid injection into lumbar spine (N/A, 5/17/2021); Transforaminal epidural injection of steroid (Bilateral, 7/30/2021); Transforaminal epidural injection of steroid (Bilateral, 3/28/2022); and Anterior cervical discectomy w/ fusion (N/A, 5/4/2023).    Family Medical History: family history includes Hypertension in her father.    Social History:  reports that she quit smoking about 5 years ago. Her smoking use included cigarettes. She started smoking about 45 years ago. She has a 40.0 pack-year smoking history. She has never used smokeless tobacco. She reports current alcohol use of about 12.0 standard drinks of alcohol per week. She reports that she does not use drugs.    PCP: Jorge Alonzo MD    Allergies:   Review of patient's allergies indicates:   Allergen Reactions    Phenergan [promethazine] Hives and Rash    Latex, natural rubber Hives     Per pt report-many years    Morphine Hives    Nsaids (non-steroidal anti-inflammatory drug)      Due to "kidney cancer"       Medications:   Current Facility-Administered " Medications on File Prior to Encounter   Medication Dose Route Frequency Provider Last Rate Last Admin    electrolyte-S (ISOLYTE)   Intravenous Continuous Kota Ortega MD 10 mL/hr at 01/31/20 0624 New Bag at 01/31/20 0624    lactated ringers infusion   Intravenous Continuous Kota Ortega MD        pregabalin capsule 75 mg  75 mg Oral Once Shiela Mann MD         Current Outpatient Medications on File Prior to Encounter   Medication Sig Dispense Refill    celecoxib (CELEBREX) 200 MG capsule Take 1 capsule by mouth once daily 30 capsule 2    zolpidem (AMBIEN) 10 mg Tab TAKE 1 TABLET BY MOUTH ONCE DAILY AT NIGHT AS NEEDED 30 tablet 2    ALPRAZolam (XANAX) 1 MG tablet Take 1 tablet by mouth twice daily as needed for anxiety 60 tablet 0    amLODIPine (NORVASC) 5 MG tablet Take 1 tablet by mouth once daily 90 tablet 2    azelastine (ASTELIN) 137 mcg (0.1 %) nasal spray Use 1 spray(s) in each nostril twice daily 30 mL 3    budesonide-formoterol 160-4.5 mcg (SYMBICORT) 160-4.5 mcg/actuation HFAA Inhale 2 puffs into the lungs every 12 (twelve) hours. Controller 10.2 g 11    cholecalciferol, vitamin D3, (VITAMIN D3 ORAL) Take by mouth once daily.      FLUoxetine 40 MG capsule Take 1 capsule by mouth twice daily 180 capsule 3    fluticasone (FLONASE) 50 mcg/actuation nasal spray 1 spray by Nasal route daily as needed.       gabapentin (NEURONTIN) 600 MG tablet Take 1 tablet (600 mg total) by mouth 3 (three) times daily. 90 tablet 2    losartan-hydrochlorothiazide 100-25 mg (HYZAAR) 100-25 mg per tablet Take 1 tablet by mouth once daily 90 tablet 3    magnesium 30 mg Tab Take by mouth once.      methylPREDNISolone (MEDROL DOSEPACK) 4 mg tablet use as directed (Patient not taking: Reported on 8/7/2023) 1 each 0    MULTIVIT-IRON-MIN-FOLIC ACID 3,500-18-0.4 UNIT-MG-MG ORAL CHEW Take by mouth once daily.      ondansetron (ZOFRAN) 4 MG tablet Take 1 tablet (4 mg total) by mouth as needed for Nausea. 60 tablet 1     polyethylene glycol (GLYCOLAX) 17 gram/dose powder Take 17 g by mouth once daily. 510 g 3    traMADoL (ULTRAM) 50 mg tablet Take 1 tablet (50 mg total) by mouth every 8 (eight) hours as needed for Pain. 21 each 0     Scheduled Meds:   amLODIPine  5 mg Oral Daily    FLUoxetine  40 mg Oral BID    fluticasone furoate-vilanteroL  1 puff Inhalation Daily    LIDOcaine  1 patch Transdermal Q24H    losartan-hydrochlorothiazide 50-12.5 mg  2 tablet Oral Daily    multivitamin  1 tablet Oral Daily    senna-docusate 8.6-50 mg  1 tablet Oral BID     Continuous Infusions:  PRN Meds:.acetaminophen, albuterol-ipratropium, aluminum-magnesium hydroxide-simethicone, dextrose 10%, dextrose 10%, glucagon (human recombinant), glucose, glucose, HYDROcodone-acetaminophen, magnesium oxide, magnesium oxide, melatonin, ondansetron, potassium bicarbonate, potassium bicarbonate, potassium bicarbonate, potassium, sodium phosphates, potassium, sodium phosphates, potassium, sodium phosphates, simethicone, sodium chloride 0.9%      Physical Exam  Current Vitals:  Vitals:    08/15/23 0736   BP:    Pulse: 63   Resp: 18   Temp:      Physical Exam:  General: AO x4  HEENT: PERRL, EOMI, bruising seen on the right frontal area and periorbital as well  CV: RRR  Lungs: no respiratory distress  Abdomen: soft, non tender    Neurological Exam    MENTAL STATUS EXAM:  Level of alertness: Alert  Level of attention: Attentive w/out deficit  Orientation/Awareness: intact to person, place, time, situation  Language: fluent. Comprehension/repetition/naming intact    CRANIAL NERVE EXAM:  II/III: fundoscopic exam deferred, PERRL; visual fields full to confrontation  III/IV/VI: EOMI with horizontal and vertical nystagmus  V: no deficits appreciated to light touch  VII: no facial asymmetry noted  VIII: no deficits in hearing bilaterally  IX/X: palate @ ML and raises symmetrically  XI: shoulder shrug 5/5 bilaterally; head turn 5/5 bilaterally  XII: tongue to midline w/out  "asymmetry  No dysarthria noted on exam.    MOTOR EXAM:  Bulk and Tone: normal throughout  Strength is 5/5 proximally and distally in upper and lower extremities without deficits.  No pronator drift in bilateral UE.     REFLEXES:  1+ in bilateral upper and lower extremities, no Gifford's, no clonus. Downgoing plantars.    SENSORY EXAM  Light touch intact throughout in upper and lower extremities without deficits.    COORDINATION/CEREBELLAR EXAM:  FTN: no dysmetria or other signs of appendicular ataxia  HTS: unable to assess    GAIT:  Deferred for safety.    Laboratory Data & Studies    Recent Labs   Lab 08/13/23 1856 08/14/23 0535 08/15/23  0516   WBC 7.01 5.09 4.59   HGB 13.1 12.8 12.4    188 179   MCV 93 94 96       Recent Labs   Lab 08/13/23 1856 08/14/23 0535 08/15/23  0516    141 140   K 3.7 3.6 4.0    103 102   CO2 24 26 29   BUN 18 14 12   GLU 99 109 114*   CALCIUM 9.4 9.6 9.7   MG  --  1.8 1.9   PHOS  --  3.9 4.1       Recent Labs   Lab 08/13/23 1856 08/14/23 0535 08/15/23  0516   PROT 7.0 6.7 6.7   ALBUMIN 4.0 3.7 3.7   BILITOT 0.9 1.4* 0.9   AST 14 12 12   ALT 9* 6* 6*   ALKPHOS 137* 141* 128       No results for input(s): "LABPT", "INR", "APTT" in the last 168 hours.    No results for input(s): "HGBA1C", "CHOL", "TRIG", "LDLCALC", "HDL", "TSH" in the last 168 hours.      Microbiology:  Microbiology Results (last 7 days)       ** No results found for the last 168 hours. **              Imaging:  Echo    Result Date: 8/14/2023    Left Ventricle: The left ventricle is normal in size. Normal wall thickness. Normal wall motion. There is normal systolic function with a visually estimated ejection fraction of 55 - 60%. There is normal diastolic function.   Right Ventricle: Normal right ventricular cavity size. Wall thickness is normal. Right ventricle wall motion  is normal. Systolic function is normal.   Tricuspid Valve: There is mild to moderate regurgitation.  No pulmonary " hypertension.   IVC/SVC: Normal venous pressure at 3 mmHg.     CT Cervical Spine Without Contrast    Result Date: 8/14/2023  EXAMINATION: CT CERVICAL SPINE WITHOUT CONTRAST CLINICAL HISTORY: Neck pain, acute, prior cervical surgery; TECHNIQUE: Low dose axial images, sagittal and coronal reformations were performed though the cervical spine.  Contrast was not administered. COMPARISON: MRI brain 08/14/2023.  Cervical spine radiographs 08/07/2023.  MRI of the cervical spine 03/28/2023.  CT of the cervical spine 08/07/2018. FINDINGS: Bones: ACDF changes are redemonstrated at C3-C4 and C4-C7 demonstrating stable and satisfactory alignment without evidence of fracture or obvious loosening.  No acute bony changes, specifically no fractures or destructive process. Alignment: Trace anterolisthesis of C2 on C3 with straightening of the expected normal cervical lordosis. Craniocervical junction: Regional osseous anatomy is within normal limits.  No malalignment. Disc levels: Extensive artifact limits evaluation.  No CT evidence of any substantial osseous spinal canal narrowing.  Uncovertebral spurring and facet arthrosis produce moderate to severe right foraminal narrowing at C3-C4, C5-C6, and C6-C7. Paraspinal soft tissues: Mild edema involving the right neck superficial soft tissues.  No discrete fluid collections.  This is presumably in the setting of recent trauma with clinical correlation needed.  Extra-axial mass within the left posterior fossa previously characterized as a probable meningioma based on MRI 08/14/2023.     1. No acute bony changes or traumatic malalignment. 2. Routine postsurgical changes spanning C3-C7. 3. Degenerative changes as discussed above most notable for right-sided foraminal narrowing at C3-C4, C5-C6, and C6-C7. Electronically signed by: Elliott Adler Date:    08/14/2023 Time:    16:57    MRI Brain W WO Contrast    Result Date: 8/14/2023  EXAMINATION: MRI BRAIN W WO CONTRAST CLINICAL HISTORY:  extraaxial mass on ct;. TECHNIQUE: Multiplanar multisequence MR imaging of the brain was performed before and after the administration of 7 mL Gadavist  intravenous contrast. COMPARISON: Head CT 08/13/2023..  Cervical spine radiographs 08/07/2023. FINDINGS: Brain: There is an avidly enhancing well-circumscribed smoothly marginated extra-axial mass within the left posterior fossa along the inferior tentorium statistically representing a meningioma measuring 2.0 x 1.6 x 1.8 cm with minimal mass effect on the subjacent cerebellum without associated parenchymal edema.  There is also mass effect and possible invasion of the left distal transverse venous sinus.  No other intracranial mass.  The brain parenchyma is normal in appearance for age.  No recent infarct or hemorrhage.  No space-occupying extra-axial fluid collections. Midline Structures: The midline structures of the brain are normal. Ventricles: The ventricles, sulci and cisterns are within normal limits. Vasculature: The vascular flow voids at the base of the brain are within normal limits. Calvarium: The visualized osseous structures are unremarkable. Sinuses: The paranasal sinuses are adequately aerated. Orbits: The orbits and globes are unremarkable. Mastoids: The mastoid air cells are adequately aerated. Extracranial soft tissues: Redemonstrated large right parietal predominant scalp hematoma and small right inferior frontal scalp hematoma.     1. Avidly enhancing extra-axial dural-based mass within the left posterior fossa along the tentorium most compatible with a meningioma measuring up to 2.0 cm.  Potential involvement of the distal left transverse sinus.  No significant mass effect on the brain parenchyma.  No parenchymal edema or acute process.  Given patient's clinical history, follow-up MRI is recommended to ensure stability of this lesion. 2. Posttraumatic scalp changes including a large hematoma posteriorly.  No gross change from prior head CT.  Electronically signed by: Elliott Adler Date:    08/14/2023 Time:    12:09    CT Abdomen Pelvis With Contrast    Result Date: 8/13/2023  EXAMINATION: CT ABDOMEN PELVIS WITH CONTRAST CLINICAL HISTORY: Abdominal trauma, blunt; TECHNIQUE: Axial CT images with sagittal and coronal reformats were obtained of the abdomen and pelvis from the hemidiaphragms through the symphysis pubis after the administration of 75mL Omnipaque 350. COMPARISON: CT abdomen and pelvis from 04/14/2021. FINDINGS: Lung Bases: Clear. Heart: Heart size is normal.  No pericardial effusion. Liver: The liver is normal in size and demonstrates homogeneous enhancement without focal lesion.  The portal vasculature is patent. Biliary tract: No intrahepatic or extrahepatic biliary ductal dilatation. Gallbladder: No radiodense gallstone. No wall thickening or pericholecystic fluid. Pancreas: Normal. No pancreatic ductal dilatation. Spleen: Normal size without focal lesion. Adrenals: Unremarkable. Kidneys and urinary collecting systems: There is a 2.4 cm fat containing lesion arising from the left kidney midpole, compatible with an angiomyolipoma, unchanged in size from prior.  There is a right-sided extrarenal pelvis.  No hydronephrosis or urolithiasis. Lymph nodes: None enlarged. Stomach and bowel: There are postop changes of the gastroesophageal junction.  Loops of small and large bowel are normal in caliber without evidence for inflammation or obstruction.  There is colonic diverticulosis without evidence of acute diverticulitis. Peritoneum and mesentery: No ascites or free intraperitoneal air.  No abdominal fluid collection. Vasculature: No aneurysm or significant atherosclerosis. Urinary bladder: No wall thickening. Reproductive organs: The uterus is surgically absent. Body wall: No abnormality. Musculoskeletal: No aggressive osseous lesion.  There is a remote fracture of the right 11th rib posteriorly.  There is a remote compression fracture of T12 with  vertebroplasty cement, height loss is unchanged.  There is disc height loss at L5-S1.     1. No traumatic visceral injury of the abdomen or pelvis. 2. Left renal AML, stable. 3. Colonic diverticulosis. Electronically signed by: Dallas Christopher Date:    08/13/2023 Time:    20:39    X-Ray Chest AP Portable    Result Date: 8/13/2023  EXAMINATION: XR CHEST AP PORTABLE CLINICAL HISTORY: syncope; TECHNIQUE: Single frontal view of the chest was performed. COMPARISON: 04/27/2023. FINDINGS: The lungs are well expanded and clear. No focal opacities are seen. The pleural spaces are clear. The cardiac silhouette is unremarkable. There is partially imaged cervical spine hardware.  There is vertebroplasty cement within L1.  Osseous structures are intact.     No acute abnormality. Electronically signed by: Dallas Christopher Date:    08/13/2023 Time:    19:29    X-Ray Knee 3 View Right    Result Date: 8/13/2023  EXAMINATION: XR KNEE 3 VIEW RIGHT CLINICAL HISTORY: Pain in unspecified knee TECHNIQUE: AP, lateral, and Merchant views of the right knee were performed. COMPARISON: 07/21/2023. FINDINGS: There is no acute fracture or dislocation. Alignment is normal. Joint spaces are preserved. No joint effusion.     No acute osseous abnormality. Electronically signed by: Dallas Christopher Date:    08/13/2023 Time:    19:26    CT Head Without Contrast    Result Date: 8/13/2023  EXAMINATION: CT HEAD WITHOUT CONTRAST CLINICAL HISTORY: Head trauma, moderate-severe; TECHNIQUE: Low dose axial CT images obtained throughout the head without intravenous contrast. Sagittal and coronal reconstructions were performed. COMPARISON: CT head 03/16/2021, 08/07/2018. FINDINGS: Ventricles and sulci are normal in size for age without evidence of hydrocephalus. No extra-axial blood or fluid collections.  The brain parenchyma is normal.  There is an extra-axial mass abutting the left cerebellar hemisphere measuring approximately 1.6 x 2.0 x 1.9 cm (series 2, image 11).   There is no evidence of significant mass effect or midline shift.  There is no evidence of vasogenic edema.  This was vaguely seen in retrospect on prior CT dated 03/16/2021 though has increased in size.  This was not seen on CT dated 08/07/2018.  No parenchymal hemorrhage, edema or major vascular distribution infarct. No calvarial fracture.  There is a large right parietal scalp hematoma.  There is a small right frontal scalp hematoma.  Bilateral paranasal sinuses and mastoid air cells are clear.  There is a remote fracture of the medial wall of the right orbit.     No intracranial hemorrhage. Right parietal and right frontal scalp hematomas. Extra-axial mass abutting the left cerebellar hemisphere, may represent a meningioma or other dural-based lesion.  Further evaluation with nonemergent MRI brain with and without contrast is recommended. Electronically signed by: Dallas Christopher Date:    08/13/2023 Time:    19:25    X-Ray Cervical Spine AP And Lateral    Result Date: 8/7/2023  See office visit note for XRAY Report/Interpretation dictation. This procedure was auto-finalized.    X-Ray Knee 3 View Right    Result Date: 7/21/2023  See office visit note for XRAY Report/Interpretation dictation. This procedure was auto-finalized.        Assessment and Plan:    Syncope vs seizure  Left cerebellar mass with compression of the transverse venous sinus  Gait imbalance and frequent falls  66 yo woman with history of hypertension, CAD s/p CABG, RCC, anxiety, DDD, COPD and regular EtOH use who presented to the ED after she experienced a passing out event. She states that she woke up at night to go to the restroom after a syncopal event vs seizure-like activity. CT brain revealed a left cerebellar mass, and MRI showed a left cerebellar dural mass consistent with a meningioma, with some compression of the left transverse venous sinus, so MRV was ordered and showed no thrombus. Neuro exam is non focal.   - Admitted to hospital  medicine with q4 hour neuro checks, on telemetry, continuous pulse oximetry  - Seizure precautions while inpatient  - Ordered EEG routine to assess for epileptiform abnornalities  - Ordered MRI brain with and without contrast which showed a left cerebellar mass as above  - MRV brain with no evidence of CVST  - Neurosurgery was consulted for evaluation, appreciate assistance  - Avoid seizure threshold lowering medications such as:  Bupropion, diphenhydramine, tramadol, certain antibiotics  - patient will need follow-up as outpatient with Neurology for long-term EEG monitoring to better rule out epileptiform activity  - Patient will need follow up with neurosurgery for management of left cerebellar mass  - Maintain Euthermia with Tylenol prn temp > 37.2 degrees C.  - Assessment for rehab with PT/OT/SLP evaluation and treatment.  - workup and treatment of metabolic and infectious abnormalities as per primary team  - Will follow along    Seizure education:  No driving for now, no swimming alone, no climbing high areas, no operation of heavy machinery or working with high risk electricity equipment.  Continue to take AEDs as prescribed. If any major side effects from neurological medications go to the ED including mood changes and severe depression.  Patient and/or next of kin informed.  Medication side effects discussed with the patient and/or caregiver.       DVT prophylaxis with chemo/SCD prophylaxis      [phone] within 3 days from discharge.       All questions were answered.                              Thank you kindly for including us in the care of this patient. Please do not hesitate to contact us with any questions.       65 minutes of care time has been spent evaluating with the patient. Time includes chart review not limited to diagnostic imaging, labs, and vitals, patient assessment, discussion with family and nursing, current order evaluations, and  new order entries.      Madelyn Rodarte MD  Neurology

## 2023-08-15 NOTE — NURSING
Follow up with patient for home wound care instructions with [facility]. Patient verbalized understanding.

## 2023-08-15 NOTE — PLAN OF CARE
Problem: Adult Inpatient Plan of Care  Goal: Plan of Care Review  Outcome: Ongoing, Progressing  Goal: Patient-Specific Goal (Individualized)  Outcome: Ongoing, Progressing  Goal: Absence of Hospital-Acquired Illness or Injury  Outcome: Ongoing, Progressing  Goal: Optimal Comfort and Wellbeing  Outcome: Ongoing, Progressing  Goal: Readiness for Transition of Care  Outcome: Ongoing, Progressing     Problem: Skin Injury Risk Increased  Goal: Skin Health and Integrity  Outcome: Ongoing, Progressing     Problem: Pain Acute  Goal: Acceptable Pain Control and Functional Ability  Outcome: Ongoing, Progressing     Problem: Mobility Impairment  Goal: Optimal Mobility  Outcome: Ongoing, Progressing

## 2023-08-15 NOTE — RESPIRATORY THERAPY
08/15/23 0736   Patient Assessment/Suction   Level of Consciousness (AVPU) alert   Respiratory Effort Unlabored;Normal   Expansion/Accessory Muscles/Retractions expansion symmetric;no use of accessory muscles;no retractions   All Lung Fields Breath Sounds Anterior:;Lateral:;clear;equal bilaterally   Rhythm/Pattern, Respiratory depth regular;pattern regular;unlabored;no shortness of breath reported   Cough Frequency no cough   PRE-TX-O2   Device (Oxygen Therapy) room air   SpO2 97 %   Pulse Oximetry Type Intermittent   $ Pulse Oximetry - Multiple Charge Pulse Oximetry - Multiple   Pulse 63   Resp 18   Positioning HOB elevated 45 degrees   Inhaler   $ Inhaler Charges MDI (Metered Dose Inahler) Treatment   Respiratory Treatment Status (Inhaler) given;mouth rinsed post treatment   Treatment Route (Inhaler) mouthpiece   Patient Position (Inhaler) HOB elevated;semi-Cohn's   Post Treatment Assessment (Inhaler) breath sounds unchanged   Signs of Intolerance (Inhaler) none

## 2023-08-15 NOTE — PLAN OF CARE
Problem: Physical Therapy  Goal: Physical Therapy Goal  Description: Goals to be met by: 2023     Patient will increase functional independence with mobility by performin. Supine to sit with Modified [place]  2. Sit to stand transfer with Contact Guard Assistance  3. Bed to chair transfer with Contact Guard Assistance using Rolling Walker  4. Gait  x 250 feet with Contact Guard Assistance using Rolling Walker.   5. Lower extremity exercise program x20 reps   Outcome: Ongoing, Progressing   PT eval and treat. Min assist mobility and ambulation within room 30ft, pt c/o head pain. Pt had fall at home with forehead ecchymosis. HHPT

## 2023-08-15 NOTE — PLAN OF CARE
Discharge instructions given to patient. Patient verbalized complete understanding. IV discontinued with catheter intact, pressure applied to site and secured with tape. Patient tolerated well.   Problem: Adult Inpatient Plan of Care  Goal: Plan of Care Review  Outcome: Met  Goal: Patient-Specific Goal (Individualized)  Outcome: Met  Goal: Absence of Hospital-Acquired Illness or Injury  Outcome: Met  Goal: Optimal Comfort and Wellbeing  Outcome: Met  Goal: Readiness for Transition of Care  Outcome: Met     Problem: Infection  Goal: Absence of Infection Signs and Symptoms  Outcome: Met     Problem: Impaired Wound Healing  Goal: Optimal Wound Healing  Outcome: Met     Problem: Skin Injury Risk Increased  Goal: Skin Health and Integrity  Outcome: Met     Problem: Pain Acute  Goal: Acceptable Pain Control and Functional Ability  Outcome: Met     Problem: Mobility Impairment  Goal: Optimal Mobility  Outcome: Met

## 2023-08-16 ENCOUNTER — PES CALL (OUTPATIENT)
Dept: ADMINISTRATIVE | Facility: CLINIC | Age: 68
End: 2023-08-16
Payer: MEDICARE

## 2023-08-18 ENCOUNTER — TELEPHONE (OUTPATIENT)
Dept: NEUROSURGERY | Facility: CLINIC | Age: 68
End: 2023-08-18
Payer: MEDICARE

## 2023-08-18 NOTE — TELEPHONE ENCOUNTER
Returned call to patient and notified her that we will add her to the waitlist so if a sooner appointment opens up with Dr. Walsh, she will be contacted to schedule sooner than 9/5.    ----- Message from Khushbu Massey sent at 8/17/2023  4:54 PM CDT -----  Regarding: Appt  Contact: Pt [phone]  Thao/ Mikaela Nurse is calling to see if pt can get a sooner appt please call

## 2023-08-19 ENCOUNTER — HOSPITAL ENCOUNTER (EMERGENCY)
Facility: HOSPITAL | Age: 68
Discharge: HOME | End: 2023-08-19
Attending: EMERGENCY MEDICINE
Payer: MEDICARE

## 2023-08-19 VITALS
SYSTOLIC BLOOD PRESSURE: 128 MMHG | HEART RATE: 84 BPM | HEIGHT: 65 IN | OXYGEN SATURATION: 97 % | TEMPERATURE: 102 F | RESPIRATION RATE: 20 BRPM | DIASTOLIC BLOOD PRESSURE: 78 MMHG | WEIGHT: 174 LBS | BODY MASS INDEX: 28.99 KG/M2

## 2023-08-19 DIAGNOSIS — G93.89 BRAIN MASS: ICD-10-CM

## 2023-08-19 DIAGNOSIS — U07.1 COVID-19 VIRUS DETECTED: ICD-10-CM

## 2023-08-19 DIAGNOSIS — U07.1 COVID-19: Primary | ICD-10-CM

## 2023-08-19 DIAGNOSIS — R05.9 COUGH: ICD-10-CM

## 2023-08-19 LAB
ALBUMIN SERPL BCP-MCNC: 4.2 G/DL (ref 3.5–5.2)
ALP SERPL-CCNC: 144 U/L (ref 55–135)
ALT SERPL W/O P-5'-P-CCNC: 20 U/L (ref 10–44)
ANION GAP SERPL CALC-SCNC: 14 MMOL/L (ref 8–16)
AST SERPL-CCNC: 37 U/L (ref 10–40)
BASOPHILS # BLD AUTO: 0.03 K/UL (ref 0–0.2)
BASOPHILS NFR BLD: 0.4 % (ref 0–1.9)
BILIRUB SERPL-MCNC: 0.6 MG/DL (ref 0.1–1)
BUN SERPL-MCNC: 16 MG/DL (ref 8–23)
CALCIUM SERPL-MCNC: 10 MG/DL (ref 8.7–10.5)
CHLORIDE SERPL-SCNC: 98 MMOL/L (ref 95–110)
CO2 SERPL-SCNC: 25 MMOL/L (ref 23–29)
CREAT SERPL-MCNC: 1 MG/DL (ref 0.5–1.4)
DIFFERENTIAL METHOD: ABNORMAL
EOSINOPHIL # BLD AUTO: 0 K/UL (ref 0–0.5)
EOSINOPHIL NFR BLD: 0 % (ref 0–8)
ERYTHROCYTE [DISTWIDTH] IN BLOOD BY AUTOMATED COUNT: 11.9 % (ref 11.5–14.5)
EST. GFR  (NO RACE VARIABLE): >60 ML/MIN/1.73 M^2
GLUCOSE SERPL-MCNC: 121 MG/DL (ref 70–110)
HCT VFR BLD AUTO: 41.2 % (ref 37–48.5)
HGB BLD-MCNC: 13.5 G/DL (ref 12–16)
IMM GRANULOCYTES # BLD AUTO: 0.03 K/UL (ref 0–0.04)
IMM GRANULOCYTES NFR BLD AUTO: 0.4 % (ref 0–0.5)
INFLUENZA A, MOLECULAR: NEGATIVE
INFLUENZA B, MOLECULAR: NEGATIVE
LYMPHOCYTES # BLD AUTO: 0.9 K/UL (ref 1–4.8)
LYMPHOCYTES NFR BLD: 10.8 % (ref 18–48)
MCH RBC QN AUTO: 31.1 PG (ref 27–31)
MCHC RBC AUTO-ENTMCNC: 32.8 G/DL (ref 32–36)
MCV RBC AUTO: 95 FL (ref 82–98)
MONOCYTES # BLD AUTO: 0.9 K/UL (ref 0.3–1)
MONOCYTES NFR BLD: 11.1 % (ref 4–15)
NEUTROPHILS # BLD AUTO: 6.3 K/UL (ref 1.8–7.7)
NEUTROPHILS NFR BLD: 77.3 % (ref 38–73)
NRBC BLD-RTO: 0 /100 WBC
PLATELET # BLD AUTO: 202 K/UL (ref 150–450)
PMV BLD AUTO: 9.6 FL (ref 9.2–12.9)
POTASSIUM SERPL-SCNC: 3.7 MMOL/L (ref 3.5–5.1)
PROT SERPL-MCNC: 8.1 G/DL (ref 6–8.4)
RBC # BLD AUTO: 4.34 M/UL (ref 4–5.4)
SARS-COV-2 RDRP RESP QL NAA+PROBE: POSITIVE
SODIUM SERPL-SCNC: 137 MMOL/L (ref 136–145)
SPECIMEN SOURCE: NORMAL
WBC # BLD AUTO: 8.17 K/UL (ref 3.9–12.7)

## 2023-08-19 PROCEDURE — 87502 INFLUENZA DNA AMP PROBE: CPT | Performed by: NURSE PRACTITIONER

## 2023-08-19 PROCEDURE — 85025 COMPLETE CBC W/AUTO DIFF WBC: CPT | Performed by: NURSE PRACTITIONER

## 2023-08-19 PROCEDURE — 25000003 PHARM REV CODE 250: Performed by: NURSE PRACTITIONER

## 2023-08-19 PROCEDURE — 99284 EMERGENCY DEPT VISIT MOD MDM: CPT | Mod: 25

## 2023-08-19 PROCEDURE — 36415 COLL VENOUS BLD VENIPUNCTURE: CPT | Performed by: NURSE PRACTITIONER

## 2023-08-19 PROCEDURE — 80053 COMPREHEN METABOLIC PANEL: CPT | Performed by: NURSE PRACTITIONER

## 2023-08-19 PROCEDURE — U0002 COVID-19 LAB TEST NON-CDC: HCPCS | Performed by: NURSE PRACTITIONER

## 2023-08-19 RX ORDER — ONDANSETRON 4 MG/1
4 TABLET, ORALLY DISINTEGRATING ORAL EVERY 6 HOURS PRN
Qty: 1 TABLET | Refills: 0 | Status: SHIPPED | OUTPATIENT
Start: 2023-08-19 | End: 2023-09-08 | Stop reason: SDUPTHER

## 2023-08-19 RX ORDER — ACETAMINOPHEN 500 MG
1000 TABLET ORAL
Status: COMPLETED | OUTPATIENT
Start: 2023-08-19 | End: 2023-08-19

## 2023-08-19 RX ADMIN — ACETAMINOPHEN 1000 MG: 500 TABLET ORAL at 03:08

## 2023-08-19 NOTE — ED PROVIDER NOTES
"Encounter Date: 8/19/2023       History     Chief Complaint   Patient presents with    Fever     X 3 days     Cough    Sore Throat     Patient is a 67 y.o. female who presents to the ED 08/19/2023 with a chief complaint of fever, cough, sore throat for 3 days.  Patient states she was seen here within the last 2 weeks for which she believes to be a seizure and was admitted and they found a brain mass at that time.  She states she is been running fever at night up to 105 and was concerned she would have a seizure.  She states she is never had seizures before.  She denies drinking alcohol since before her previous admission sometime.  She reports associated body aches.  She denies any nausea, vomiting, diarrhea, dysuria.  She is been taking Tylenol for her fever.  She currently has home health who told her to be re-evaluated at the emergency department today for her sickness.  She did not follow-up with the neurosurgeon as she was scheduled and has another appointment in the next 2 weeks for this.  She is not seeing neurology currently.  She is history of alcoholism, hyperlipidemia, hypertension, anemia, renal cell carcinoma, pneumonia, and new brain mass. PSH noted below.           Review of patient's allergies indicates:   Allergen Reactions    Phenergan [promethazine] Hives and Rash    Latex, natural rubber Hives     Per pt report-many years    Morphine Hives    Nsaids (non-steroidal anti-inflammatory drug)      Due to "kidney cancer"     Past Medical History:   Diagnosis Date    Alcohol dependence     DDD (degenerative disc disease), lumbosacral     DJD (degenerative joint disease), lumbosacral     Encounter for blood transfusion     GERD (gastroesophageal reflux disease)     Gout     Hypercholesterolemia     Hypertension     NATHALIE (iron deficiency anemia)     questionable white coat hypertension    Kidney carcinoma, left 2014    Pneumonia     Renal cell carcinoma     Shingles 10/13/2012     Past Surgical History: "   Procedure Laterality Date    ANTERIOR CERVICAL DISCECTOMY W/ FUSION N/A 5/4/2023    Procedure: DISCECTOMY, SPINE, CERVICAL, ANTERIOR APPROACH, WITH FUSION C3-4;  Surgeon: Ruben Martinez MD;  Location: [facility] OR;  Service: Orthopedics;  Laterality: N/A;    APPENDECTOMY      ARTHROSCOPY OF SHOULDER WITH DECOMPRESSION OF SUBACROMIAL SPACE Left 10/31/2019    Procedure: ARTHROSCOPY, SHOULDER, WITH SUBACROMIAL SPACE DECOMPRESSION;  Surgeon: Ruben Martinez MD;  Location: [facility] OR;  Service: Orthopedics;  Laterality: Left;    BLADDER REPAIR      x 2    COLONOSCOPY  9/2011    DISTAL CLAVICLE EXCISION Left 10/31/2019    Procedure: EXCISION, CLAVICLE, DISTAL;  Surgeon: Ruben Martinez MD;  Location: [facility] OR;  Service: Orthopedics;  Laterality: Left;    EPIDURAL STEROID INJECTION INTO LUMBAR SPINE N/A 12/7/2020    Procedure: Injection-steroid-epidural-lumbar;  Surgeon: Dk Rosales MD;  Location: [facility] OR;  Service: Pain Management;  Laterality: N/A;  L4-L5    EPIDURAL STEROID INJECTION INTO LUMBAR SPINE N/A 5/17/2021    Procedure: Injection-steroid-epidural-lumbar;  Surgeon: Dk Rosales MD;  Location: [facility] OR;  Service: Pain Management;  Laterality: N/A;  L4-L5    ESOPHAGOGASTRODUODENOSCOPY  9/2011    EYE SURGERY      lasix bilateral    FIXATION KYPHOPLASTY N/A 1/31/2020    Procedure: Kyphoplasty T12;  Surgeon: Dk Rosales MD;  Location: [facility] OR;  Service: Pain Management;  Laterality: N/A;    HERNIA REPAIR      hiatal hernai    HYSTERECTOMY      INJECTION OF ANESTHETIC AGENT AROUND NERVE Left 6/8/2020    Procedure: Block, Nerve;  Surgeon: Dk Rosales MD;  Location: [facility] OR;  Service: Pain Management;  Laterality: Left;  left genicular nerve block     KNEE ARTHROPLASTY Left 7/30/2020    Procedure: ARTHROPLASTY, KNEE LEFT;  Surgeon: Ruben Martinez MD;  Location: [facility] OR;  Service: Orthopedics;  Laterality: Left;  REP VALERY RUBY    KNEE ARTHROSCOPY W/ MENISCECTOMY Left 1/16/2020    Procedure: ARTHROSCOPY,  KNEE, WITH MEDIAL MENISCECTOMY;  Surgeon: Ruben Martinez MD;  Location: [facility] OR;  Service: Orthopedics;  Laterality: Left;    LAPAROSCOPIC NISSEN FUNDOPLICATION      NEPHRECTOMY      LT partial    RADIOFREQUENCY ABLATION Left 6/19/2020    Procedure: Radiofrequency Ablation;  Surgeon: Dk Rosales MD;  Location: [facility] OR;  Service: Pain Management;  Laterality: Left;    RADIOFREQUENCY ABLATION OF LUMBAR MEDIAL BRANCH NERVE AT SINGLE LEVEL Bilateral 2/28/2020    Procedure: Radiofrequency Ablation, Nerve, Spinal, Lumbar, Medial Branch, 1 Level;  Surgeon: Dk Rosales MD;  Location: [facility] OR;  Service: Pain Management;  Laterality: Bilateral;  L3,L4,L5 - Burned at 80 degrees C. for 60 seconds x 2 each site    RADIOFREQUENCY ABLATION OF LUMBAR MEDIAL BRANCH NERVE AT SINGLE LEVEL Bilateral 10/19/2020    Procedure: Radiofrequency Ablation, Nerve, Spinal, Lumbar, Medial Branch, 1 Level;  Surgeon: Dk Rosales MD;  Location: [facility] OR;  Service: Pain Management;  Laterality: Bilateral;  L3, L4, L5    REFRACTIVE SURGERY      ROTATOR CUFF REPAIR Left 10/31/2019    Procedure: REPAIR, ROTATOR CUFF;  Surgeon: Ruben Martinez MD;  Location: [facility] OR;  Service: Orthopedics;  Laterality: Left;  arthrex    SKIN BIOPSY      TOTAL ABDOMINAL HYSTERECTOMY W/ BILATERAL SALPINGOOPHORECTOMY  age 48    TRANSFORAMINAL EPIDURAL INJECTION OF STEROID Bilateral 7/30/2021    Procedure: Injection,steroid,epidural,transforaminal approach;  Surgeon: Dk Rosales MD;  Location: [facility] OR;  Service: Pain Management;  Laterality: Bilateral;  L5-S1     TRANSFORAMINAL EPIDURAL INJECTION OF STEROID Bilateral 3/28/2022    Procedure: INJECTION, STEROID, EPIDURAL, TRANSFORAMINAL APPROACH Lumbar L5-S1;  Surgeon: Dk Rosales MD;  Location: [facility] OR;  Service: Pain Management;  Laterality: Bilateral;     Family History   Problem Relation Age of Onset    Hypertension Father     Glaucoma Neg Hx     Macular degeneration Neg Hx     Retinal detachment  Neg Hx      Social History     Tobacco Use    Smoking status: Former     Current packs/day: 0.00     Average packs/day: 1 pack/day for 40.0 years (40.0 ttl pk-yrs)     Types: Cigarettes     Start date: 1978     Quit date: 2018     Years since quittin.6    Smokeless tobacco: Never    Tobacco comments:     smokes 2-3 cigarettes a night   Substance Use Topics    Alcohol use: Yes     Alcohol/week: 12.0 standard drinks of alcohol     Types: 6 Cans of beer, 6 Standard drinks or equivalent per week     Comment: 2-3 a day/ social    Drug use: No     Review of Systems   Constitutional:  Positive for chills and fever.   HENT:  Positive for sore throat.    Respiratory:  Positive for cough. Negative for chest tightness and shortness of breath.    Cardiovascular:  Negative for chest pain.   Gastrointestinal:  Negative for abdominal pain.   Genitourinary:  Negative for dysuria.   Musculoskeletal:  Negative for arthralgias and myalgias.   Skin:  Negative for rash and wound.   Neurological:  Negative for syncope.   Hematological:  Does not bruise/bleed easily.       Physical Exam     Initial Vitals [23 1250]   BP Pulse Resp Temp SpO2   128/78 101 18 98.4 °F (36.9 °C) 95 %      MAP       --         Physical Exam    Nursing note and vitals reviewed.  Constitutional: Vital signs are normal. She appears well-developed and well-nourished.   HENT:   Head: Normocephalic and atraumatic.   Mouth/Throat: Uvula is midline, oropharynx is clear and moist and mucous membranes are normal.   Right forehead small hematoma. Right parietal scalp hematoma. Ecchymosis to right side of face and neck with no swelling.    Eyes: Pupils are equal, round, and reactive to light.   Neck: Neck supple.   Cardiovascular:  Normal rate, regular rhythm, normal heart sounds and intact distal pulses.     Exam reveals no gallop and no friction rub.       No murmur heard.  Pulmonary/Chest: She has no wheezes. She has rhonchi in the right middle field,  the right lower field, the left middle field and the left lower field. She has no rales.   Abdominal: Abdomen is soft. Bowel sounds are normal. There is no abdominal tenderness.   Musculoskeletal:      Cervical back: Neck supple.     Neurological: She is alert and oriented to person, place, and time. She has normal strength.   Skin: Skin is warm, dry and intact.   Psychiatric: She has a normal mood and affect. Her speech is normal and behavior is normal.         ED Course   Procedures  Labs Reviewed   SARS-COV-2 RNA AMPLIFICATION, QUAL - Abnormal; Notable for the following components:       Result Value    SARS-CoV-2 RNA, Amplification, Qual Positive (*)     All other components within normal limits    Narrative:     Covid- 19 critical result(s) called and verbal readback obtained from   Kota Montalvo. by NEDA 08/19/2023 13:40   CBC W/ AUTO DIFFERENTIAL - Abnormal; Notable for the following components:    MCH 31.1 (*)     Lymph # 0.9 (*)     Gran % 77.3 (*)     Lymph % 10.8 (*)     All other components within normal limits   COMPREHENSIVE METABOLIC PANEL - Abnormal; Notable for the following components:    Glucose 121 (*)     Alkaline Phosphatase 144 (*)     All other components within normal limits   INFLUENZA A & B BY MOLECULAR          Imaging Results              X-Ray Chest PA And Lateral (Final result)  Result time 08/19/23 13:56:52      Final result by Elliott Chris MD (08/19/23 13:56:52)                   Impression:      No acute radiographic abnormality.      Electronically signed by: Elliott Chris  Date:    08/19/2023  Time:    13:56               Narrative:    EXAMINATION:  XR CHEST PA AND LATERAL    CLINICAL HISTORY:  Cough, unspecified    TECHNIQUE:  PA and lateral views of the chest were performed.    COMPARISON:  08/13/2023    FINDINGS:  Cardiomediastinal silhouette is within normal limits.   Lungs are expanded and appear grossly clear.  No definite consolidation, pleural effusion, or pneumothorax.  Chronic T12 compression fracture status post vertebral body cement augmentation.  Anterior cervical fusion hardware partially included.  No acute osseous abnormality.                                       Medications   acetaminophen tablet 1,000 mg (1,000 mg Oral Given 8/19/23 1522)     Medical Decision Making  Amount and/or Complexity of Data Reviewed  Labs: ordered.  Radiology: ordered. Decision-making details documented in ED Course.    Risk  OTC drugs.  Prescription drug management.         APC / Resident Notes:   Patient is a 67 y.o. female who presents to the ED 08/19/2023 who underwent emergent evaluation for fever with associated cough and sore throat.  Chest x-ray without acute findings.  No evidence of pneumonia.  Patient is not hypoxic or tachypneic and is in no acute respiratory distress.  She test positive for COVID-19 today which is consistent with her symptoms.  Do not think acute withdrawal.  She denies any recent alcohol use since before her previous admission.  Records reviewed.  Is unclear if patient ever actually had a seizure.  Her initial fall was unwitnessed prior to her previous admission.  Patient states she is never had a seizure before this.  She is had no recent seizure-like activity.  Instructed to treat her fever with Tylenol and to continue to avoid alcohol use..  No leukocytosis.  I do not think any secondary bacterial infection.  Patient is not appear septic or toxic.  Did have 1 episode of vomiting in the emergency department is given antiemetics for home.  She is not appear acutely dehydrated.  I see no indication for antibiotics.  Discussed risk versus benefit of Paxlovid treatment and she is given FDA fact sheet on Paxlovid and she is prescribed this given her multiple comorbidities.  Recommend continued follow-up with neurology and neurosurgery per previous discharge recommendations. Based on my clinical evaluation, I do not appreciate any immediate, emergent, or life  threatening condition or etiology that warrants additional workup today and feel that the patient can be discharged with close follow up care. Case discussed with Dr. Brown who is agreeable to plan of care. Follow up and return precautions discussed; patient verbalized understanding and is agreeable to plan of care. Patient discharged home in stable condition.               Attending Attestation:     Physician Attestation Statement for NP/PA:   I have directed and reviewed the workup performed by the PA/NP.  I performed the substantive portion of the medical decision making.     Other NP/PA Attestation Additions:    History of Present Illness: 67-year-old female presented with multiple complaints.    Medical Decision Making: Initial differential diagnosis included but not limited to COVID, influenza, and pneumonia.  The patient has a positive COVID test noted here in the emergency department.  This is likely the etiology of her symptoms.   I am in agreement with the nurse practitioner's assessment, treatment, and plan of care.             ED Course as of 08/20/23 0946   Sat Aug 19, 2023   1359 X-Ray Chest PA And Lateral [JK]      ED Course User Index  [JK] Liz Edouard NP               Medical Decision Making:   Differential Diagnosis:   Pneumonia  Viral illness  Withdrawal syndrome      Clinical Impression:   Final diagnoses:  [R05.9] Cough  [U07.1] COVID-19 (Primary)  [G93.89] Brain mass        ED Disposition Condition    Discharge Stable          ED Prescriptions       Medication Sig Dispense Start Date End Date Auth. Provider    ondansetron (ZOFRAN-ODT) 4 MG TbDL Take 1 tablet (4 mg total) by mouth every 6 (six) hours as needed (NAUSEA OR VOMITING). 1 tablet 8/19/2023 -- Liz Edouard NP    nirmatrelvir-ritonavir 300 mg (150 mg x 2)-100 mg copackaged tablets (EUA) Take 3 tablets by mouth 2 (two) times daily for 5 days. Each dose contains 2 nirmatrelvir (pink tablets) and 1 ritonavir (white tablet). Take all  3 tablets together 30 tablet 8/19/2023 8/24/2023 Liz Edouard NP          Follow-up Information       Follow up With Specialties Details Why Contact Info Additional Information    Jorge Alonzo MD Internal Medicine In 3 days  [id number]   [place] [address]  [phone]       [facility] -  Emergency Medicine  As needed, If symptoms worsen [address] [place] [address] [address] [place] [address] 1st floor             Liz Edouard NP  08/19/23 1731       Liz Edouard NP  08/19/23 1736       William Brown MD  08/20/23 0902

## 2023-08-19 NOTE — DISCHARGE INSTRUCTIONS
Hold norvasc while taking paxlovid. See attached paxlovid fact sheet. Take tylenol as discussed for fever. Continue to avoid alcohol use.

## 2023-08-28 ENCOUNTER — OFFICE VISIT (OUTPATIENT)
Dept: HOME HEALTH SERVICES | Facility: CLINIC | Age: 68
End: 2023-08-28
Payer: MEDICARE

## 2023-08-28 VITALS
SYSTOLIC BLOOD PRESSURE: 158 MMHG | RESPIRATION RATE: 18 BRPM | DIASTOLIC BLOOD PRESSURE: 94 MMHG | OXYGEN SATURATION: 96 % | HEART RATE: 83 BPM | TEMPERATURE: 98 F

## 2023-08-28 DIAGNOSIS — R42 DIZZINESS: ICD-10-CM

## 2023-08-28 DIAGNOSIS — I10 ESSENTIAL HYPERTENSION: ICD-10-CM

## 2023-08-28 DIAGNOSIS — Z74.8 ASSISTANCE NEEDED WITH TRANSPORTATION: ICD-10-CM

## 2023-08-28 DIAGNOSIS — G93.89 BRAIN MASS: Primary | ICD-10-CM

## 2023-08-28 DIAGNOSIS — R26.81 UNSTEADY GAIT: ICD-10-CM

## 2023-08-28 PROCEDURE — 3080F PR MOST RECENT DIASTOLIC BLOOD PRESSURE >= 90 MM HG: ICD-10-PCS | Mod: CPTII,S$GLB,,

## 2023-08-28 PROCEDURE — 3080F DIAST BP >= 90 MM HG: CPT | Mod: CPTII,S$GLB,,

## 2023-08-28 PROCEDURE — 1159F MED LIST DOCD IN RCRD: CPT | Mod: CPTII,S$GLB,,

## 2023-08-28 PROCEDURE — 1159F PR MEDICATION LIST DOCUMENTED IN MEDICAL RECORD: ICD-10-PCS | Mod: CPTII,S$GLB,,

## 2023-08-28 PROCEDURE — 1160F RVW MEDS BY RX/DR IN RCRD: CPT | Mod: CPTII,S$GLB,,

## 2023-08-28 PROCEDURE — 99350 HOME/RES VST EST HIGH MDM 60: CPT | Mod: S$GLB,,,

## 2023-08-28 PROCEDURE — 1160F PR REVIEW ALL MEDS BY PRESCRIBER/CLIN PHARMACIST DOCUMENTED: ICD-10-PCS | Mod: CPTII,S$GLB,,

## 2023-08-28 PROCEDURE — 3077F SYST BP >= 140 MM HG: CPT | Mod: CPTII,S$GLB,,

## 2023-08-28 PROCEDURE — 3077F PR MOST RECENT SYSTOLIC BLOOD PRESSURE >= 140 MM HG: ICD-10-PCS | Mod: CPTII,S$GLB,,

## 2023-08-28 PROCEDURE — 99350 PR HOME VISIT,ESTAB PATIENT,LEVEL IV: ICD-10-PCS | Mod: S$GLB,,,

## 2023-08-29 ENCOUNTER — OFFICE VISIT (OUTPATIENT)
Dept: PRIMARY CARE CLINIC | Facility: CLINIC | Age: 68
End: 2023-08-29
Payer: MEDICARE

## 2023-08-29 ENCOUNTER — TELEPHONE (OUTPATIENT)
Dept: HOME HEALTH SERVICES | Facility: CLINIC | Age: 68
End: 2023-08-29
Payer: MEDICARE

## 2023-08-29 VITALS
OXYGEN SATURATION: 97 % | BODY MASS INDEX: 27.66 KG/M2 | DIASTOLIC BLOOD PRESSURE: 88 MMHG | SYSTOLIC BLOOD PRESSURE: 130 MMHG | WEIGHT: 166 LBS | HEIGHT: 65 IN | HEART RATE: 75 BPM

## 2023-08-29 DIAGNOSIS — F51.01 PRIMARY INSOMNIA: ICD-10-CM

## 2023-08-29 DIAGNOSIS — J41.0 SIMPLE CHRONIC BRONCHITIS: ICD-10-CM

## 2023-08-29 DIAGNOSIS — F33.1 MAJOR DEPRESSIVE DISORDER, RECURRENT EPISODE, MODERATE: ICD-10-CM

## 2023-08-29 DIAGNOSIS — F41.9 ANXIETY: ICD-10-CM

## 2023-08-29 DIAGNOSIS — I25.10 CAD, MULTIPLE VESSEL: ICD-10-CM

## 2023-08-29 DIAGNOSIS — Z85.528 HISTORY OF RENAL CELL CARCINOMA: ICD-10-CM

## 2023-08-29 DIAGNOSIS — M47.816 LUMBAR SPONDYLOSIS: ICD-10-CM

## 2023-08-29 DIAGNOSIS — I10 ESSENTIAL HYPERTENSION: ICD-10-CM

## 2023-08-29 DIAGNOSIS — D32.9 MENINGIOMA: Primary | ICD-10-CM

## 2023-08-29 PROCEDURE — 99999 PR PBB SHADOW E&M-EST. PATIENT-LVL IV: CPT | Mod: PBBFAC,,, | Performed by: INTERNAL MEDICINE

## 2023-08-29 PROCEDURE — 3079F PR MOST RECENT DIASTOLIC BLOOD PRESSURE 80-89 MM HG: ICD-10-PCS | Mod: CPTII,S$GLB,, | Performed by: INTERNAL MEDICINE

## 2023-08-29 PROCEDURE — 99999 PR PBB SHADOW E&M-EST. PATIENT-LVL IV: ICD-10-PCS | Mod: PBBFAC,,, | Performed by: INTERNAL MEDICINE

## 2023-08-29 PROCEDURE — 99214 OFFICE O/P EST MOD 30 MIN: CPT | Mod: S$GLB,,, | Performed by: INTERNAL MEDICINE

## 2023-08-29 PROCEDURE — 1159F PR MEDICATION LIST DOCUMENTED IN MEDICAL RECORD: ICD-10-PCS | Mod: CPTII,S$GLB,, | Performed by: INTERNAL MEDICINE

## 2023-08-29 PROCEDURE — 1126F PR PAIN SEVERITY QUANTIFIED, NO PAIN PRESENT: ICD-10-PCS | Mod: CPTII,S$GLB,, | Performed by: INTERNAL MEDICINE

## 2023-08-29 PROCEDURE — 3075F PR MOST RECENT SYSTOLIC BLOOD PRESS GE 130-139MM HG: ICD-10-PCS | Mod: CPTII,S$GLB,, | Performed by: INTERNAL MEDICINE

## 2023-08-29 PROCEDURE — 1126F AMNT PAIN NOTED NONE PRSNT: CPT | Mod: CPTII,S$GLB,, | Performed by: INTERNAL MEDICINE

## 2023-08-29 PROCEDURE — 1159F MED LIST DOCD IN RCRD: CPT | Mod: CPTII,S$GLB,, | Performed by: INTERNAL MEDICINE

## 2023-08-29 PROCEDURE — 3008F BODY MASS INDEX DOCD: CPT | Mod: CPTII,S$GLB,, | Performed by: INTERNAL MEDICINE

## 2023-08-29 PROCEDURE — 3075F SYST BP GE 130 - 139MM HG: CPT | Mod: CPTII,S$GLB,, | Performed by: INTERNAL MEDICINE

## 2023-08-29 PROCEDURE — 3079F DIAST BP 80-89 MM HG: CPT | Mod: CPTII,S$GLB,, | Performed by: INTERNAL MEDICINE

## 2023-08-29 PROCEDURE — 99214 PR OFFICE/OUTPT VISIT, EST, LEVL IV, 30-39 MIN: ICD-10-PCS | Mod: S$GLB,,, | Performed by: INTERNAL MEDICINE

## 2023-08-29 PROCEDURE — 3008F PR BODY MASS INDEX (BMI) DOCUMENTED: ICD-10-PCS | Mod: CPTII,S$GLB,, | Performed by: INTERNAL MEDICINE

## 2023-08-29 RX ORDER — AMLODIPINE BESYLATE 5 MG/1
5 TABLET ORAL DAILY
Qty: 90 TABLET | Refills: 2
Start: 2023-08-29

## 2023-08-29 NOTE — ASSESSMENT & PLAN NOTE
-Has not been taking BP medication since discharge due to cough which hurts her ribs s/p fall  -BP high today at 158/94  -Encouraged to continue to take at least Amlodipine starting today  -Discuss losartan-HCTZ with PCP at appt tomorrow  -Verbalized understand, Amlodipine taken during my visit.

## 2023-08-29 NOTE — PROGRESS NOTES
[facility]    Chief Complaint      Chief Complaint   Patient presents with    Follow-up     Hospital f/u       History of Present Illness      Beatriz Gan is a 67 y.o. female with chronic conditions of HTN, CAD s/p CABG, Anxiety, insomnia, osteoarthritis left shoulder, chronic low back pain, hx of RCC who presents today for:  Hospital Follow-up.  Patient reported waking up in the middle of the night and having a fall.  Has no memory of the incident.  Family eventually checked on her and found her lying in bed confused.  ER imaging showed right parietal and frontal scalp hematomas and extra-axial mass abnormality abutting the left cerebral hemisphere, possibly a meningioma or other dural-based lesion.  Otherwise chest x-ray unremarkable, knee x-ray unremarkable, CT abdomen and pelvis unremarkable.  Lab work showed no significant abnormalities.  Of note, blood pressure was elevated systolic 190s.  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  Neurosurgery evaluated while hospitalized and recommended outpatient follow-up.  MRV of brain showed severe sinus narrowing with out filling defect to suggest thrombosis.  Discharge with baby aspirin and close follow-up with brain tumor neurosurgery.  Has appt with neurosurgery 9/5.    HTN: BP at goal on amlodipine.  Stopping losartan due to cough.  CAD s/p CABG:   Anxiety: Incompletely controlled on prozac, xanax as needed.    Insomnia: Controlled on ambien.  No new or worsening symptoms  Osteoarthritis, shoulder, chronic low back pain:  Sees Dr. Martinez and Dr. Rosales.  Doing much better on gabapentin and diclofenac. Restarted PT.  Hx of RCC: Sees Dr. Morrissey for surveillance.    Osteoporosis: Previously on Prolia.  Bone density does not indicate restarting treatment.  Will repeat DEXA in 2024.  Flu shot due.  TdAP 2011.  Prevnar UTD.    Mammogram 2021.  DEXA 2019.    Cscope 15  yrs ago.  Cologuard UTD per pt?  Due for FOBT     Past Medical History:  Past Medical History:   Diagnosis Date    Alcohol dependence     DDD (degenerative disc disease), lumbosacral     DJD (degenerative joint disease), lumbosacral     Encounter for blood transfusion     GERD (gastroesophageal reflux disease)     Gout     Hypercholesterolemia     Hypertension     NATHALIE (iron deficiency anemia)     questionable white coat hypertension    Kidney carcinoma, left 2014    Pneumonia     Renal cell carcinoma     Shingles 10/13/2012       Past Surgical History:   has a past surgical history that includes Total abdominal hysterectomy w/ bilateral salpingoophorectomy (age 48); Appendectomy; Bladder repair; Colonoscopy (9/2011); Esophagogastroduodenoscopy (9/2011); Hysterectomy; Refractive surgery; Nephrectomy; Laparoscopic Nissen fundoplication; Eye surgery; Hernia repair; Skin biopsy; Rotator cuff repair (Left, 10/31/2019); Distal clavicle excision (Left, 10/31/2019); Arthroscopy of shoulder with decompression of subacromial space (Left, 10/31/2019); Knee arthroscopy w/ meniscectomy (Left, 1/16/2020); Fixation Kyphoplasty (N/A, 1/31/2020); Radiofrequency ablation of lumbar medial branch nerve at single level (Bilateral, 2/28/2020); Injection of anesthetic agent around nerve (Left, 6/8/2020); Radiofrequency ablation (Left, 6/19/2020); Knee Arthroplasty (Left, 7/30/2020); Radiofrequency ablation of lumbar medial branch nerve at single level (Bilateral, 10/19/2020); Epidural steroid injection into lumbar spine (N/A, 12/7/2020); Epidural steroid injection into lumbar spine (N/A, 5/17/2021); Transforaminal epidural injection of steroid (Bilateral, 7/30/2021); Transforaminal epidural injection of steroid (Bilateral, 3/28/2022); and Anterior cervical discectomy w/ fusion (N/A, 5/4/2023).    Family History:  family history includes Hypertension in her father.     Social History:  Social History     Tobacco Use    Smoking status:  Former     Current packs/day: 0.00     Average packs/day: 1 pack/day for 40.0 years (40.0 ttl pk-yrs)     Types: Cigarettes     Start date: 1978     Quit date: 2018     Years since quittin.6    Smokeless tobacco: Never    Tobacco comments:     smokes 2-3 cigarettes a night   Substance Use Topics    Alcohol use: Yes     Alcohol/week: 12.0 standard drinks of alcohol     Types: 6 Cans of beer, 6 Standard drinks or equivalent per week     Comment: 2-3 a day/ social    Drug use: No       I personally reviewed all past medical, surgical, social and family history.    Review of Systems   Constitutional:  Negative for chills, fever and malaise/fatigue.   Respiratory:  Negative for shortness of breath.    Cardiovascular:  Negative for chest pain.   Gastrointestinal:  Negative for constipation, diarrhea, nausea and vomiting.   Skin:  Negative for rash.   Neurological:  Negative for weakness.   All other systems reviewed and are negative.       Medications:  Outpatient Encounter Medications as of 2023   Medication Sig Dispense Refill    celecoxib (CELEBREX) 200 MG capsule Take 1 capsule by mouth once daily 30 capsule 2    zolpidem (AMBIEN) 10 mg Tab TAKE 1 TABLET BY MOUTH ONCE DAILY AT NIGHT AS NEEDED 30 tablet 2    [DISCONTINUED] amLODIPine (NORVASC) 5 MG tablet Take 1 tablet by mouth once daily 90 tablet 2    ALPRAZolam (XANAX) 1 MG tablet Take 1 tablet by mouth twice daily as needed for anxiety 60 tablet 0    amLODIPine (NORVASC) 5 MG tablet Take 1 tablet (5 mg total) by mouth once daily. 90 tablet 2    aspirin (ECOTRIN) 81 MG EC tablet Take 1 tablet (81 mg total) by mouth once daily. 30 tablet 0    azelastine (ASTELIN) 137 mcg (0.1 %) nasal spray Use 1 spray(s) in each nostril twice daily 30 mL 3    budesonide-formoterol 160-4.5 mcg (SYMBICORT) 160-4.5 mcg/actuation HFAA Inhale 2 puffs into the lungs every 12 (twelve) hours. Controller 10.2 g 11    cholecalciferol, vitamin D3, (VITAMIN D3 ORAL) Take by  mouth once daily.      FLUoxetine 40 MG capsule Take 1 capsule by mouth twice daily 180 capsule 3    fluticasone (FLONASE) 50 mcg/actuation nasal spray 1 spray by Nasal route daily as needed.       gabapentin (NEURONTIN) 600 MG tablet Take 1 tablet (600 mg total) by mouth 3 (three) times daily. 90 tablet 2    HYDROcodone-acetaminophen (NORCO) 5-325 mg per tablet Take 1 tablet by mouth every 6 (six) hours as needed for Pain. 14 tablet 0    LIDOcaine (LIDODERM) 5 % Place 1 patch onto the skin once daily. Remove & Discard patch within 12 hours or as directed by MD 30 patch 0    magnesium 30 mg Tab Take by mouth once.      MULTIVIT-IRON-MIN-FOLIC ACID 3,500-18-0.4 UNIT-MG-MG ORAL CHEW Take by mouth once daily.      ondansetron (ZOFRAN) 4 MG tablet Take 1 tablet (4 mg total) by mouth as needed for Nausea. 60 tablet 1    ondansetron (ZOFRAN-ODT) 4 MG TbDL Take 1 tablet (4 mg total) by mouth every 6 (six) hours as needed (NAUSEA OR VOMITING). 1 tablet 0    polyethylene glycol (GLYCOLAX) 17 gram/dose powder Take 17 g by mouth once daily. 510 g 3    [DISCONTINUED] losartan-hydrochlorothiazide 100-25 mg (HYZAAR) 100-25 mg per tablet Take 1 tablet by mouth once daily 90 tablet 3    [DISCONTINUED] nirmatrelvir-ritonavir 300 mg (150 mg x 2)-100 mg copackaged tablets (EUA) Take 3 tablets by mouth 2 (two) times daily for 5 days. Each dose contains 2 nirmatrelvir (pink tablets) and 1 ritonavir (white tablet). Take all 3 tablets together 30 tablet 0     Facility-Administered Encounter Medications as of 8/29/2023   Medication Dose Route Frequency Provider Last Rate Last Admin    electrolyte-S (ISOLYTE)   Intravenous Continuous Kota Ortega MD 10 mL/hr at 01/31/20 0624 New Bag at 01/31/20 0624    lactated ringers infusion   Intravenous Continuous Kota Ortega MD        pregabalin capsule 75 mg  75 mg Oral Once Shiela Mann MD           Allergies:  Review of patient's allergies indicates:   Allergen Reactions    Phenergan  "[promethazine] Hives and Rash    Latex, natural rubber Hives     Per pt report-many years    Morphine Hives    Nsaids (non-steroidal anti-inflammatory drug)      Due to "kidney cancer"       Health Maintenance:  Immunization History   Administered Date(s) Administered    COVID-19, MRNA, LN-S, PF (Pfizer) (Purple Cap) 02/10/2021, 03/03/2021, 10/05/2021    Influenza 10/16/2014    Influenza (FLUAD) - Quadrivalent - Adjuvanted - PF *Preferred* (65+) 09/29/2020, 11/16/2021, 10/04/2022    Influenza (FLUAD) - Trivalent - Adjuvanted - PF (65+) 09/11/2018    Influenza - High Dose - PF (65 years and older) 11/11/2019    Influenza - Quadrivalent - PF *Preferred* (6 months and older) 10/22/2008, 10/16/2014, 11/05/2015, 01/09/2017    Influenza A (H1N1) 2009 Monovalent - IM 01/08/2010    Influenza Split 11/05/2015, 01/09/2017    Pneumococcal Conjugate - 13 Valent 01/17/2014, 01/17/2014    Tdap 06/21/2011      Health Maintenance   Topic Date Due    High Dose Statin  Never done    Shingles Vaccine (1 of 2) Never done    LDCT Lung Screen  08/28/2016    TETANUS VACCINE  06/21/2021    Mammogram  10/04/2022    Colorectal Cancer Screening  10/25/2022    Lipid Panel  10/06/2023    Aspirin/Antiplatelet Therapy  08/15/2024    DEXA Scan  03/30/2025    Hepatitis C Screening  Completed        Physical Exam      Vital Signs  Pulse: 75  SpO2: 97 %  BP: 130/88  BP Location: Right arm  Patient Position: Sitting  Pain Score: 0-No pain  Height and Weight  Height: 5' 5" (165.1 cm)  Weight: 75.3 kg (166 lb 0.1 oz)  BSA (Calculated - sq m): 1.86 sq meters  BMI (Calculated): 27.6  Weight in (lb) to have BMI = 25: 149.9]    Physical Exam  Vitals reviewed.   Constitutional:       Appearance: She is well-developed.   HENT:      Head: Normocephalic and atraumatic.      Right Ear: External ear normal.      Left Ear: External ear normal.   Cardiovascular:      Rate and Rhythm: Normal rate and regular rhythm.      Heart sounds: Normal heart sounds. No murmur " heard.  Pulmonary:      Effort: Pulmonary effort is normal.      Breath sounds: Normal breath sounds. No wheezing or rales.   Abdominal:      General: Bowel sounds are normal. There is no distension.      Palpations: Abdomen is soft.      Tenderness: There is no abdominal tenderness.          Laboratory:  CBC:  Recent Labs   Lab 08/14/23  0535 08/15/23  0516 08/19/23  1347   WBC 5.09 4.59 8.17   RBC 4.06 3.90 L 4.34   Hemoglobin 12.8 12.4 13.5   Hematocrit 38.3 37.3 41.2   Platelets 188 179 202   MCV 94 96 95   MCH 31.5 H 31.8 H 31.1 H   MCHC 33.4 33.2 32.8     CMP:  Recent Labs   Lab 08/14/23  0535 08/15/23  0516 08/19/23  1347   Glucose 109 114 H 121 H   Calcium 9.6 9.7 10.0   Albumin 3.7 3.7 4.2   Total Protein 6.7 6.7 8.1   Sodium 141 140 137   Potassium 3.6 4.0 3.7   CO2 26 29 25   Chloride 103 102 98   BUN 14 12 16   Alkaline Phosphatase 141 H 128 144 H   ALT 6 L 6 L 20   AST 12 12 37   Total Bilirubin 1.4 H 0.9 0.6     URINALYSIS:  Recent Labs   Lab 03/30/21  1052 04/27/23  1322 08/13/23  1934   Color, UA Yellow   < > Yellow   Specific Gravity, UA 1.015   < > 1.015   pH, UA 7.0   < > 7.0   Protein, UA Negative   < > Negative   Bacteria Few A  --   --    Nitrite, UA Negative   < > Negative   Leukocytes, UA Trace A   < > Trace A   Urobilinogen, UA Negative   < > Negative    < > = values in this interval not displayed.      LIPIDS:  Recent Labs   Lab 03/30/21  1052 10/01/21  0921 10/06/22  0832   TSH 1.200  --   --    HDL 80 H 90 H 75   Cholesterol 223 H 215 H 188   Triglycerides 84 70 73   LDL Cholesterol 126.2 111.0 98.4   HDL/Cholesterol Ratio 35.9 41.9 39.9   Non-HDL Cholesterol 143 125 113   Total Cholesterol/HDL Ratio 2.8 2.4 2.5     TSH:  Recent Labs   Lab 03/30/21  1052   TSH 1.200     A1C:  Recent Labs   Lab 10/01/21  0921 10/06/22  0832   Hemoglobin A1C 4.8 4.8       Assessment/Plan     Beatriz Gan is a 67 y.o.female with:    1. Meningioma  Follow-up neurosurgery for evaluation and  consideration for for resection.  2. Essential hypertension  - amLODIPine (NORVASC) 5 MG tablet; Take 1 tablet (5 mg total) by mouth once daily.  Dispense: 90 tablet; Refill: 2  Continue amlodipine, stop losartan due to cough.  3.  CAD s/p CABG  Continue current medications.  4. Major depressive disorder, recurrent episode, moderate  5. Anxiety  Continue current medications.  6. Simple chronic bronchitis  Continue current medications.  7. Primary insomnia  Continue current medications.  8. Lumbar spondylosis  Continue current medications.  9. History of renal cell carcinoma  Reviewed CT abdomen pelvis with no concerning findings.  Patient no longer sees urology after  left practice.    Chronic conditions status updated as per HPI.  Other than changes above, cont current medications and maintain follow up with specialists.  Follow up in about 3 months (around 11/29/2023) for Follow up visit.    Future Appointments   Date Time Provider Department Center   9/5/2023 10:20 AM Twan Walsh, DO [facility] NEUROSC Shiva Alonzo MD  [facility]

## 2023-08-29 NOTE — ASSESSMENT & PLAN NOTE
-Has f/u appt next Tuesday  -c/o dizziness, memory impairment  -Is worried about not being able to drive and caring for herself and great granddaughter.

## 2023-08-29 NOTE — PROGRESS NOTES
[facility]  Transition of Care Home Visit    Visit Date: 8/28/2023  Encounter Provider: Nicolasa JANSEN-LYN  PCP:  Jorge Alonzo MD    PRESENTING HISTORY      Patient ID: Beatriz Gan is a 67 y.o. female.    Consult Requested By:  Liz Kendall  Reason for Consult:  Hospital Follow Up    Beatriz is being seen at home due to due to physical debility that presents a taxing effort to leave the home, to mitigate high risk of hospital readmission and/or due to the limited availability of reliable or safe options for transportation to the point of access to the provider. Prior to treatment on this visit the chart was reviewed and patient verbal consent was obtained. .      Chief Complaint: Transitional Care    History of Present Illness: Ms. Beatriz Gan is a 67 y.o. female who was recently admitted to the hospital.    Admission Date: 8/13/2023  Discharge Date and Time:  08/15/2023 4:19 PM  Hospital Diagnosis: Loss of consciousness [R40.20] Brain Mass; Meningioma    HPI:   Beatriz Gan is a 67-year-old female with a past medical history of GERD, hypertension, CAD s/p CABG, RCC, anxiety, DDD, COPD and regular EtOH use who presented to the ED of head injury s/p fall at home.  Patient states she went to bed last night and at some point got up to go to the bathroom and fell down.  Patient states she does not recall the incident, but recalls being able to get back in bed.  Family reports they had not heard from her today and she was not answering phone calls so they 4th their way into her home and found her lying in bed confused.  Patient's granddaughter reports patient has a history of daily alcohol use.  Patient states she did not drink alcohol today.  Patient endorses a headache with right rib pain.  She denies shortness of breath, abdominal pain and chest pain.  Per ED note, family states patient has persistent confusion.  Lab work performed in the ED was unremarkable.  CT abdomen  pelvis showed no acute injury, left renal AML stable and colonic diverticulosis.  Chest x-ray showed no acute abnormality and right knee x-ray showed no acute abnormality.  CT head showed no acute hemorrhage.  Right parietal and frontal scalp hematomas present extra-axial mass abutting the left cerebral hemisphere may represent a meningioma or other dural-based lesion.  EKG showed sinus rhythm with first-degree AV block.  Upon arrival to the ED, patient found to be hypertensive with SBP 190s.  Patient states she did not take her blood pressure medications today and has not taken lately.  She states her blood pressure has been controlled without medication.  Patient received 1 L IV bolus and was referred to Hospital Medicine.  Patient seen in the ED and she is complaining of a headache, chest wall tenderness and right knee pain.  She denies shortness of breath, nausea, vomiting, diarrhea, fever and chills.  Patient will be admitted to Hospital Medicine for further evaluation management.     Hospital Course:   Pt was monitored closely during her stay.  She had significant bruising and pain related to her fall including hematoma the head.  She underwent imaging to evaluate for injury with no acute fractures identified.  She had no intracranial bleeding, however CT head was identifiable for mass favoring meningioma and an MRI with and without was recommended.  The MRI was performed. 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  Neurosurgery evaluated and identified NO need for acute, emergent surgery. He ordered MRV of the brain which showed severe sinus narrowing without filling defect to suggest thrombosis.  Case was discussed with Dr. Parson who recommended a baby aspirin in very close follow-up establishment with the brain tumor center at [facility].  Pt had significant pain and soreness from her fall.  She worked with PT who recommended home health which was ordered.  Pain Was well  controlled with low-dose oral narcotics here in the hospital.  We were able to obtain an outpatient neuro surgery appointment with her for 8/16 due to a cancellation.  When discussing discharge, Pt very nervous about discharge home due to lack of social support.  I did acknowledge with her that she is a very high fall risk and has had multiple falls in the past.  We did discuss polypharmacy at length-Pt states she is aware that medication mixing will increase her imbalance and she avoids this.  I discussed with her sister who had visited her in the hospital that I think Pt is a high fall risk and would recommend some support at home to decrease her risk of injury.  I discussed that we could try for SNF if they would like, though I do not want to delay further follow-up with her mass and Pt did not recommend placement.  Pt states she is not interested in placement.  She is overwhelmed with new health information, recent death of her , and lack of support at home.  She requests discharge home today.  We did discuss that I will write her a short course of oral narcotics given that she has had a fall with significant pain noted.  We discussed the dangers of mixing medications as above, she verbalized understanding.  I will order a lidocaine patch.  I encouraged complete ETOH cessation which she agrees with.  Neurology followed her here in the hospital in reviewed EEG which was negative for any acute abnormality.  I did an echo to evaluate for valvular disease which was negative.  She had no arrhythmias while hospitalized and no further episodes of LOC. return precautions were provided on discharge.  Pt verbalized understanding and agreement with discharge plan.     Pt was seen on day of discharge and deemed appropriate for discharge home.  She is a high fall risk and I contacted her sister to discuss and encouraged  ___________________________________________________________________    Today: Beatriz Torres  "Malik is being seen in the home for a hospital follow up. See hospital course above for details.     HPI:  Greeted at the door by by Ms. Henderson.  Visit conducted in living room.     Ms. Gan is a 67 y.o. female who has a past medical history of Alcohol dependence, DDD (degenerative disc disease), lumbosacral, DJD (degenerative joint disease), lumbosacral, Encounter for blood transfusion, GERD (gastroesophageal reflux disease), Gout, Hypercholesterolemia, Hypertension, NATHALIE (iron deficiency anemia), Kidney carcinoma, left, Pneumonia, Renal cell carcinoma, and Shingles. She is Aox3 and the primary historian for this visit.     She states she is doing "just okay" since discharge; has no acute complaints today. She feels as though she is overall very overwhelmed. She is the primary caregiver for her great granddaughter who is 14 and lives with her. She has had 4 family deaths in a short amount of time, including her . Has limited family support near her, would like to relocate to [facility] to be near family but has limited financial resources. She feels she could use more help at home with her own carissa and fuctionality. She was instructed in the hospital not to drive and this is further adding to her stress. States she used to bring and pickup her great granddaughter from school everyday.    Since discharge: She reports sleeping well with aide of Ambien- denies daytime grogginess. Takes only when in bed for the night. Appetite is fair-poor, eats only 1 good meal per day but snacks often and supplements with protein shakes. No acute problems with elimination, denies constipation, diarrhea, dysuria.     Hypertensive today.  has not taken BP medications since discharge because they cause her to cough and she has significant rib pain since fall that brought her to hospital.  has only had 1/2 of a spritzer since discharge. Does not plan to return to daily drinking. All hospital discharge orders " reviewed and being followed, all medications reconciled and reviewed, patient verbalized understanding. HH with [facility], states is going well.       PCP is Jorge Alonzo MD- has appointment tomorrow. Plan to follow up as needed to decrease risk and/or frequency of hospitalizations. [facility] contact information provided to patient and left in home.      Assessments:  Environmental: lives in a two story home with great granddaughter for whom she is the primary caregiver. Adequate lighting and ventilation in home.    Functional Status: Currently independent for all ADLs uses cane for ambulation, has walker but does not fit in doorways or hallways. She manages her own medications and was independent for grocery and meal prep prior to not being able to drive. Has a friend that is able to take to appointment tomorrow but is not a long term solution as she is the primary caregiver for her sick .  Safety: Significant fall risk  Nutritional: Adequate food in home. Fair-poor appetite.   [facility] cane, walker, bsc, stair lift to second story.        Review of Systems   Constitutional:  Negative for activity change, appetite change and fever.   HENT: Negative.     Eyes: Negative.    Respiratory:  Positive for cough. Negative for shortness of breath.    Cardiovascular:  Negative for chest pain and leg swelling.   Gastrointestinal:  Negative for abdominal pain, constipation, diarrhea and nausea.   Genitourinary:  Negative for difficulty urinating and hematuria.   Musculoskeletal:  Positive for myalgias. Negative for joint swelling and neck stiffness.        Rib pain   Skin:  Negative for color change and wound.   Neurological:  Positive for dizziness, syncope, weakness and light-headedness. Negative for numbness and headaches.         PAST HISTORY:     Past Medical History:   Diagnosis Date    Alcohol dependence     DDD (degenerative disc disease), lumbosacral     DJD  (degenerative joint disease), lumbosacral     Encounter for blood transfusion     GERD (gastroesophageal reflux disease)     Gout     Hypercholesterolemia     Hypertension     NATHALIE (iron deficiency anemia)     questionable white coat hypertension    Kidney carcinoma, left 2014    Pneumonia     Renal cell carcinoma     Shingles 10/13/2012       Past Surgical History:   Procedure Laterality Date    ANTERIOR CERVICAL DISCECTOMY W/ FUSION N/A 5/4/2023    Procedure: DISCECTOMY, SPINE, CERVICAL, ANTERIOR APPROACH, WITH FUSION C3-4;  Surgeon: Ruben Martinez MD;  Location: [facility] OR;  Service: Orthopedics;  Laterality: N/A;    APPENDECTOMY      ARTHROSCOPY OF SHOULDER WITH DECOMPRESSION OF SUBACROMIAL SPACE Left 10/31/2019    Procedure: ARTHROSCOPY, SHOULDER, WITH SUBACROMIAL SPACE DECOMPRESSION;  Surgeon: Ruben Martinez MD;  Location: [facility] OR;  Service: Orthopedics;  Laterality: Left;    BLADDER REPAIR      x 2    COLONOSCOPY  9/2011    DISTAL CLAVICLE EXCISION Left 10/31/2019    Procedure: EXCISION, CLAVICLE, DISTAL;  Surgeon: Ruben Martinez MD;  Location: [facility] OR;  Service: Orthopedics;  Laterality: Left;    EPIDURAL STEROID INJECTION INTO LUMBAR SPINE N/A 12/7/2020    Procedure: Injection-steroid-epidural-lumbar;  Surgeon: Dk Rosales MD;  Location: [facility] OR;  Service: Pain Management;  Laterality: N/A;  L4-L5    EPIDURAL STEROID INJECTION INTO LUMBAR SPINE N/A 5/17/2021    Procedure: Injection-steroid-epidural-lumbar;  Surgeon: Dk Rosales MD;  Location: [facility] OR;  Service: Pain Management;  Laterality: N/A;  L4-L5    ESOPHAGOGASTRODUODENOSCOPY  9/2011    EYE SURGERY      lasix bilateral    FIXATION KYPHOPLASTY N/A 1/31/2020    Procedure: Kyphoplasty T12;  Surgeon: Dk Rosales MD;  Location: [facility] OR;  Service: Pain Management;  Laterality: N/A;    HERNIA REPAIR      hiatal hernai    HYSTERECTOMY      INJECTION OF ANESTHETIC AGENT AROUND NERVE Left 6/8/2020    Procedure: Block, Nerve;  Surgeon: Dk  BUBBA Rosales MD;  Location: [facility] OR;  Service: Pain Management;  Laterality: Left;  left genicular nerve block     KNEE ARTHROPLASTY Left 7/30/2020    Procedure: ARTHROPLASTY, KNEE LEFT;  Surgeon: Ruben Martinez MD;  Location: [facility] OR;  Service: Orthopedics;  Laterality: Left;  REP VALERY RUBY    KNEE ARTHROSCOPY W/ MENISCECTOMY Left 1/16/2020    Procedure: ARTHROSCOPY, KNEE, WITH MEDIAL MENISCECTOMY;  Surgeon: Ruben Martinez MD;  Location: [facility] OR;  Service: Orthopedics;  Laterality: Left;    LAPAROSCOPIC NISSEN FUNDOPLICATION      NEPHRECTOMY      LT partial    RADIOFREQUENCY ABLATION Left 6/19/2020    Procedure: Radiofrequency Ablation;  Surgeon: Dk Rosales MD;  Location: [facility] OR;  Service: Pain Management;  Laterality: Left;    RADIOFREQUENCY ABLATION OF LUMBAR MEDIAL BRANCH NERVE AT SINGLE LEVEL Bilateral 2/28/2020    Procedure: Radiofrequency Ablation, Nerve, Spinal, Lumbar, Medial Branch, 1 Level;  Surgeon: Dk Rosales MD;  Location: [facility] OR;  Service: Pain Management;  Laterality: Bilateral;  L3,L4,L5 - Burned at 80 degrees C. for 60 seconds x 2 each site    RADIOFREQUENCY ABLATION OF LUMBAR MEDIAL BRANCH NERVE AT SINGLE LEVEL Bilateral 10/19/2020    Procedure: Radiofrequency Ablation, Nerve, Spinal, Lumbar, Medial Branch, 1 Level;  Surgeon: Dk Rosales MD;  Location: [facility] OR;  Service: Pain Management;  Laterality: Bilateral;  L3, L4, L5    REFRACTIVE SURGERY      ROTATOR CUFF REPAIR Left 10/31/2019    Procedure: REPAIR, ROTATOR CUFF;  Surgeon: Ruben Martinez MD;  Location: [facility] OR;  Service: Orthopedics;  Laterality: Left;  arthrex    SKIN BIOPSY      TOTAL ABDOMINAL HYSTERECTOMY W/ BILATERAL SALPINGOOPHORECTOMY  age 48    TRANSFORAMINAL EPIDURAL INJECTION OF STEROID Bilateral 7/30/2021    Procedure: Injection,steroid,epidural,transforaminal approach;  Surgeon: Dk Rosales MD;  Location: [facility] OR;  Service: Pain Management;  Laterality: Bilateral;  L5-S1     TRANSFORAMINAL EPIDURAL  INJECTION OF STEROID Bilateral 3/28/2022    Procedure: INJECTION, STEROID, EPIDURAL, TRANSFORAMINAL APPROACH Lumbar L5-S1;  Surgeon: Dk Rosales MD;  Location: [facility] OR;  Service: Pain Management;  Laterality: Bilateral;       Family History   Problem Relation Age of Onset    Hypertension Father     Glaucoma Neg Hx     Macular degeneration Neg Hx     Retinal detachment Neg Hx        Social History     Socioeconomic History    Marital status:    Occupational History     Employer: [employer]   Tobacco Use    Smoking status: Former     Current packs/day: 0.00     Average packs/day: 1 pack/day for 40.0 years (40.0 ttl pk-yrs)     Types: Cigarettes     Start date: 1978     Quit date: 2018     Years since quittin.6    Smokeless tobacco: Never    Tobacco comments:     smokes 2-3 cigarettes a night   Substance and Sexual Activity    Alcohol use: Yes     Alcohol/week: 12.0 standard drinks of alcohol     Types: 6 Cans of beer, 6 Standard drinks or equivalent per week     Comment: 2-3 a day/ social    Drug use: No    Sexual activity: Yes     Partners: Male     Social Determinants of Health     Financial Resource Strain: Medium Risk (2020)    Overall Financial Resource Strain (CARDIA)     Difficulty of Paying Living Expenses: Somewhat hard   Food Insecurity: Food Insecurity Present (2020)    Hunger Vital Sign     Worried About Running Out of Food in the Last Year: Sometimes true     Ran Out of Food in the Last Year: Sometimes true   Transportation Needs: No Transportation Needs (2020)    PRAPARE - Transportation     Lack of Transportation (Medical): No     Lack of Transportation (Non-Medical): No   Physical Activity: Unknown (2020)    Exercise Vital Sign     Minutes of Exercise per Session: 0 min   Recent Concern: Physical Activity - Inactive (2020)    Exercise Vital Sign     Days of Exercise per Week: 0 days     Minutes of Exercise per Session: 10 min   Stress: Stress Concern  Present (4/13/2020)    [employer] - Occupational Stress Questionnaire     Feeling of Stress : Very much   Social Connections: Unknown (5/12/2020)    Social Connection and Isolation Panel [NHANES]     Frequency of Communication with Friends and Family: Once a week     Frequency of Social Gatherings with Friends and Family: Never       MEDICATIONS & ALLERGIES:     Current Outpatient Medications on File Prior to Visit   Medication Sig Dispense Refill    celecoxib (CELEBREX) 200 MG capsule Take 1 capsule by mouth once daily 30 capsule 2    zolpidem (AMBIEN) 10 mg Tab TAKE 1 TABLET BY MOUTH ONCE DAILY AT NIGHT AS NEEDED 30 tablet 2    ALPRAZolam (XANAX) 1 MG tablet Take 1 tablet by mouth twice daily as needed for anxiety 60 tablet 0    aspirin (ECOTRIN) 81 MG EC tablet Take 1 tablet (81 mg total) by mouth once daily. 30 tablet 0    azelastine (ASTELIN) 137 mcg (0.1 %) nasal spray Use 1 spray(s) in each nostril twice daily 30 mL 3    budesonide-formoterol 160-4.5 mcg (SYMBICORT) 160-4.5 mcg/actuation HFAA Inhale 2 puffs into the lungs every 12 (twelve) hours. Controller 10.2 g 11    cholecalciferol, vitamin D3, (VITAMIN D3 ORAL) Take by mouth once daily.      FLUoxetine 40 MG capsule Take 1 capsule by mouth twice daily 180 capsule 3    fluticasone (FLONASE) 50 mcg/actuation nasal spray 1 spray by Nasal route daily as needed.       gabapentin (NEURONTIN) 600 MG tablet Take 1 tablet (600 mg total) by mouth 3 (three) times daily. 90 tablet 2    HYDROcodone-acetaminophen (NORCO) 5-325 mg per tablet Take 1 tablet by mouth every 6 (six) hours as needed for Pain. 14 tablet 0    LIDOcaine (LIDODERM) 5 % Place 1 patch onto the skin once daily. Remove & Discard patch within 12 hours or as directed by MD 30 patch 0    losartan-hydrochlorothiazide 100-25 mg (HYZAAR) 100-25 mg per tablet Take 1 tablet by mouth once daily 90 tablet 3    magnesium 30 mg Tab Take by mouth once.      MULTIVIT-IRON-MIN-FOLIC  "ACID 3,500-18-0.4 UNIT-MG-MG ORAL CHEW Take by mouth once daily.      ondansetron (ZOFRAN) 4 MG tablet Take 1 tablet (4 mg total) by mouth as needed for Nausea. 60 tablet 1    ondansetron (ZOFRAN-ODT) 4 MG TbDL Take 1 tablet (4 mg total) by mouth every 6 (six) hours as needed (NAUSEA OR VOMITING). 1 tablet 0    polyethylene glycol (GLYCOLAX) 17 gram/dose powder Take 17 g by mouth once daily. 510 g 3     Current Facility-Administered Medications on File Prior to Visit   Medication Dose Route Frequency Provider Last Rate Last Admin    electrolyte-S (ISOLYTE)   Intravenous Continuous Kota Ortega MD 10 mL/hr at 01/31/20 0624 New Bag at 01/31/20 0624    lactated ringers infusion   Intravenous Continuous Kota Ortega MD        pregabalin capsule 75 mg  75 mg Oral Once Shiela Mann MD            Review of patient's allergies indicates:   Allergen Reactions    Phenergan [promethazine] Hives and Rash    Latex, natural rubber Hives     Per pt report-many years    Morphine Hives    Nsaids (non-steroidal anti-inflammatory drug)      Due to "kidney cancer"       OBJECTIVE:     Vital Signs:  Vitals:    08/28/23 2156   BP: (!) 158/94   Pulse: 83   Resp: 18   Temp: 98.3 °F (36.8 °C)     There is no height or weight on file to calculate BMI.     Physical Exam:  Physical Exam  Constitutional:       Appearance: Normal appearance.   HENT:      Head: Atraumatic. Mass: soft to right occipital region.   Cardiovascular:      Rate and Rhythm: Normal rate and regular rhythm.      Pulses: Normal pulses.      Heart sounds: Normal heart sounds.   Pulmonary:      Effort: Pulmonary effort is normal.      Breath sounds: Normal breath sounds.   Abdominal:      General: Bowel sounds are normal.      Palpations: Abdomen is soft.   Musculoskeletal:         General: Normal range of motion.      Cervical back: Normal range of motion and neck supple.   Skin:     General: Skin is warm and dry.      Findings: Bruising (above right eye) " "present.   Neurological:      Mental Status: She is alert and oriented to person, place, and time. Mental status is at baseline.      Motor: Weakness present.      Gait: Gait abnormal.   Psychiatric:         Mood and Affect: Mood normal.         Behavior: Behavior normal.         Laboratory  Lab Results   Component Value Date    WBC 8.17 08/19/2023    HGB 13.5 08/19/2023    HCT 41.2 08/19/2023    MCV 95 08/19/2023     08/19/2023     Lab Results   Component Value Date    INR 1.1 08/21/2015    INR 1.0 11/11/2014    INR 1.0 01/10/2014     Lab Results   Component Value Date    HGBA1C 4.8 10/06/2022     No results for input(s): "POCTGLUCOSE" in the last 72 hours.        TRANSITION OF CARE:     [facility] 8/16    Family and/or Caretaker present at visit?  No.  Diagnostic tests reviewed/disposition: No diagnosic tests pending after this hospitalization.  Disease/illness education: [facility]  Home health/community services discussion/referrals: Patient has home health established at [facility] .   Establishment or re-establishment of referral orders for community resources: No other necessary community resources.   Discussion with other health care providers: No discussion with other health care providers necessary.     Transition of Care Visit:     I have reviewed and updated the history and problem list.  I have reconciled the medication list.  I have discussed the hospitalization and current medical issues, prognosis and plans with the patient/family.  I  spent more than 50% of time discussing the care with the patient/family.  Total Face-to-Face Encounter: 60 minutes.    Medications Reconciliation:   I have reconciled the patient's home medications and discharge medications with the patient/family. I have updated all changes.  Refer to After-Visit Medication List.    ASSESSMENT & PLAN:       1. Brain mass  Assessment & Plan:  -Has f/u appt next Tuesday  -c/o dizziness, memory impairment  -Is " worried about not being able to drive and caring for herself and great granddaughter.     Orders:  -     WHEELCHAIR FOR HOME USE  -     BATH/SHOWER CHAIR FOR HOME USE  -     Ambulatory referral/consult to Outpatient Case Management    2. Essential hypertension  Assessment & Plan:  -Has not been taking BP medication since discharge due to cough which hurts her ribs s/p fall  -BP high today at 158/94  -Encouraged to continue to take at least Amlodipine starting today  -[facility]  -Verbalized understand, Amlodipine taken during my visit.       3. Unsteady gait  -     WHEELCHAIR FOR HOME USE  -     BATH/SHOWER CHAIR FOR HOME USE    4. Dizziness  -     WHEELCHAIR FOR HOME USE  -     BATH/SHOWER CHAIR FOR HOME USE    5. Assistance needed with transportation  -     Ambulatory referral/consult to Outpatient Case Management          Were controlled substances prescribed?  No.       Patient Instructions Given:  - Continue all medications, treatments and therapies as ordered.   - Follow all instructions, recommendations as discussed.  - Maintain Safety Precautions at all times.  - Attend all medical appointments as scheduled.  - For worsening symptoms: call Primary Care Physician or Nurse Practitioner.  - For emergencies, call 911 or immediately report to the nearest emergency room.    After Visit Medication List :     Medication List            Accurate as of August 28, 2023 10:31 PM. If you have any questions, ask your nurse or doctor.                CONTINUE taking these medications      ALPRAZolam 1 MG tablet  Commonly known as: XANAX  Take 1 tablet by mouth twice daily as needed for anxiety     aspirin 81 MG EC tablet  Commonly known as: ECOTRIN  Take 1 tablet (81 mg total) by mouth once daily.     azelastine 137 mcg (0.1 %) nasal spray  Commonly known as: ASTELIN  Use 1 spray(s) in each nostril twice daily     budesonide-formoterol 160-4.5 mcg 160-4.5 mcg/actuation Hfaa  Commonly  known as: SYMBICORT  Inhale 2 puffs into the lungs every 12 (twelve) hours. Controller     celecoxib 200 MG capsule  Commonly known as: CeleBREX  Take 1 capsule by mouth once daily     FLUoxetine 40 MG capsule  Take 1 capsule by mouth twice daily     fluticasone propionate 50 mcg/actuation nasal spray  Commonly known as: FLONASE     gabapentin 600 MG tablet  Commonly known as: NEURONTIN  Take 1 tablet (600 mg total) by mouth 3 (three) times daily.     HYDROcodone-acetaminophen 5-325 mg per tablet  Commonly known as: NORCO  Take 1 tablet by mouth every 6 (six) hours as needed for Pain.     LIDOcaine 5 %  Commonly known as: LIDODERM  Place 1 patch onto the skin once daily. Remove & Discard patch within 12 hours or as directed by MD     losartan-hydrochlorothiazide 100-25 mg 100-25 mg per tablet  Commonly known as: HYZAAR  Take 1 tablet by mouth once daily     magnesium 30 mg Tab     multivit-iron-min-folic acid 3,500-18-0.4 unit-mg-mg Chew     ondansetron 4 MG tablet  Commonly known as: ZOFRAN  Take 1 tablet (4 mg total) by mouth as needed for Nausea.     ondansetron 4 MG Tbdl  Commonly known as: ZOFRAN-ODT  Take 1 tablet (4 mg total) by mouth every 6 (six) hours as needed (NAUSEA OR VOMITING).     polyethylene glycol 17 gram/dose powder  Commonly known as: GLYCOLAX  Take 17 g by mouth once daily.     VITAMIN D3 ORAL     zolpidem 10 mg Tab  Commonly known as: AMBIEN  TAKE 1 TABLET BY MOUTH ONCE DAILY AT NIGHT AS NEEDED            Future Appointments   Date Time Provider Department Center   8/29/2023  9:00 AM Jorge Alonzo MD [facility]   9/5/2023 10:20 AM Twan Walsh DO [facility] NEUROSC Shiva Barahona           Signature:

## 2023-08-29 NOTE — TELEPHONE ENCOUNTER
Faxed orders and demographics to [facility] for patient to receive a wheelchair and shower chair per NP request.  Fax confirmation received.

## 2023-08-30 ENCOUNTER — TELEPHONE (OUTPATIENT)
Dept: PRIMARY CARE CLINIC | Facility: CLINIC | Age: 68
End: 2023-08-30
Payer: MEDICARE

## 2023-08-30 DIAGNOSIS — D32.9 MENINGIOMA: Primary | ICD-10-CM

## 2023-08-30 DIAGNOSIS — M47.816 LUMBAR SPONDYLOSIS: ICD-10-CM

## 2023-08-30 NOTE — TELEPHONE ENCOUNTER
----- Message from Jimena Smallwood sent at 8/30/2023  3:14 PM CDT -----  Contact: [phone]  [facility] is calling to get an order for a wheelchair and a tub bench. Pt height is 5'5 and weight 165. Please call and advise. Fax #[phone]

## 2023-08-31 DIAGNOSIS — F41.9 ANXIETY: ICD-10-CM

## 2023-08-31 RX ORDER — ALPRAZOLAM 1 MG/1
TABLET ORAL
Qty: 60 TABLET | Refills: 3 | Status: SHIPPED | OUTPATIENT
Start: 2023-08-31 | End: 2024-01-09 | Stop reason: SDUPTHER

## 2023-08-31 NOTE — TELEPHONE ENCOUNTER
No care due was identified.  [facility] Embedded Care Due Messages. Reference number: [id number].   8/31/2023 9:05:30 AM CDT

## 2023-09-05 ENCOUNTER — OFFICE VISIT (OUTPATIENT)
Dept: NEUROSURGERY | Facility: CLINIC | Age: 68
End: 2023-09-05
Payer: MEDICARE

## 2023-09-05 VITALS
WEIGHT: 170.44 LBS | BODY MASS INDEX: 28.4 KG/M2 | TEMPERATURE: 97 F | HEART RATE: 70 BPM | DIASTOLIC BLOOD PRESSURE: 99 MMHG | HEIGHT: 65 IN | SYSTOLIC BLOOD PRESSURE: 150 MMHG

## 2023-09-05 DIAGNOSIS — D32.9 MENINGIOMA: ICD-10-CM

## 2023-09-05 PROCEDURE — 1101F PR PT FALLS ASSESS DOC 0-1 FALLS W/OUT INJ PAST YR: ICD-10-PCS | Mod: HCNC,CPTII,S$GLB, | Performed by: NEUROLOGICAL SURGERY

## 2023-09-05 PROCEDURE — 1160F PR REVIEW ALL MEDS BY PRESCRIBER/CLIN PHARMACIST DOCUMENTED: ICD-10-PCS | Mod: HCNC,CPTII,S$GLB, | Performed by: NEUROLOGICAL SURGERY

## 2023-09-05 PROCEDURE — 1159F MED LIST DOCD IN RCRD: CPT | Mod: HCNC,CPTII,S$GLB, | Performed by: NEUROLOGICAL SURGERY

## 2023-09-05 PROCEDURE — 1125F AMNT PAIN NOTED PAIN PRSNT: CPT | Mod: HCNC,CPTII,S$GLB, | Performed by: NEUROLOGICAL SURGERY

## 2023-09-05 PROCEDURE — 1101F PT FALLS ASSESS-DOCD LE1/YR: CPT | Mod: HCNC,CPTII,S$GLB, | Performed by: NEUROLOGICAL SURGERY

## 2023-09-05 PROCEDURE — 3077F SYST BP >= 140 MM HG: CPT | Mod: HCNC,CPTII,S$GLB, | Performed by: NEUROLOGICAL SURGERY

## 2023-09-05 PROCEDURE — 3008F PR BODY MASS INDEX (BMI) DOCUMENTED: ICD-10-PCS | Mod: HCNC,CPTII,S$GLB, | Performed by: NEUROLOGICAL SURGERY

## 2023-09-05 PROCEDURE — 3008F BODY MASS INDEX DOCD: CPT | Mod: HCNC,CPTII,S$GLB, | Performed by: NEUROLOGICAL SURGERY

## 2023-09-05 PROCEDURE — 1160F RVW MEDS BY RX/DR IN RCRD: CPT | Mod: HCNC,CPTII,S$GLB, | Performed by: NEUROLOGICAL SURGERY

## 2023-09-05 PROCEDURE — 3077F PR MOST RECENT SYSTOLIC BLOOD PRESSURE >= 140 MM HG: ICD-10-PCS | Mod: HCNC,CPTII,S$GLB, | Performed by: NEUROLOGICAL SURGERY

## 2023-09-05 PROCEDURE — 3080F PR MOST RECENT DIASTOLIC BLOOD PRESSURE >= 90 MM HG: ICD-10-PCS | Mod: HCNC,CPTII,S$GLB, | Performed by: NEUROLOGICAL SURGERY

## 2023-09-05 PROCEDURE — 99999 PR PBB SHADOW E&M-EST. PATIENT-LVL V: ICD-10-PCS | Mod: PBBFAC,HCNC,, | Performed by: NEUROLOGICAL SURGERY

## 2023-09-05 PROCEDURE — 1125F PR PAIN SEVERITY QUANTIFIED, PAIN PRESENT: ICD-10-PCS | Mod: HCNC,CPTII,S$GLB, | Performed by: NEUROLOGICAL SURGERY

## 2023-09-05 PROCEDURE — 3288F PR FALLS RISK ASSESSMENT DOCUMENTED: ICD-10-PCS | Mod: HCNC,CPTII,S$GLB, | Performed by: NEUROLOGICAL SURGERY

## 2023-09-05 PROCEDURE — 1159F PR MEDICATION LIST DOCUMENTED IN MEDICAL RECORD: ICD-10-PCS | Mod: HCNC,CPTII,S$GLB, | Performed by: NEUROLOGICAL SURGERY

## 2023-09-05 PROCEDURE — 99999 PR PBB SHADOW E&M-EST. PATIENT-LVL V: CPT | Mod: PBBFAC,HCNC,, | Performed by: NEUROLOGICAL SURGERY

## 2023-09-05 PROCEDURE — 3080F DIAST BP >= 90 MM HG: CPT | Mod: HCNC,CPTII,S$GLB, | Performed by: NEUROLOGICAL SURGERY

## 2023-09-05 PROCEDURE — 99215 OFFICE O/P EST HI 40 MIN: CPT | Mod: HCNC,S$GLB,, | Performed by: NEUROLOGICAL SURGERY

## 2023-09-05 PROCEDURE — 99215 PR OFFICE/OUTPT VISIT, EST, LEVL V, 40-54 MIN: ICD-10-PCS | Mod: HCNC,S$GLB,, | Performed by: NEUROLOGICAL SURGERY

## 2023-09-05 PROCEDURE — 3288F FALL RISK ASSESSMENT DOCD: CPT | Mod: HCNC,CPTII,S$GLB, | Performed by: NEUROLOGICAL SURGERY

## 2023-09-06 NOTE — PROGRESS NOTES
"CHIEF COMPLAINT:  Meningioma    HPI:  Beatriz Gan is a 67 y.o.  female with below PMH, who is referred for evaluation of meningioma.  Patient was found to have a left-sided posterior fossa meningioma attached to the sigmoid sinus.  This was discovered as part of the workup for seizures.  She was found down approximately 1 month ago without any memory of the details of the incident.  She did not have any incontinence or tongue biting.  EEG was negative however she was diagnosed a seizure.  She reports that she lives alone and has significant imbalance.  She finds that she is unable to catch herself and will suddenly fall or dropped to the ground.  She states that she has to get up slowly or she will see stars.  She underwent 2 ACDF surgeries with Dr. Martinez with the last 1 being a C3-4 for adjacent level degeneration on 5/4/23.  The source of her imbalance and falling remains unclear.    Review of patient's allergies indicates:   Allergen Reactions    Phenergan [promethazine] Hives and Rash    Latex, natural rubber Hives     Per pt report-many years    Morphine Hives    Nsaids (non-steroidal anti-inflammatory drug)      Due to "kidney cancer"       Past Medical History:   Diagnosis Date    Alcohol dependence     DDD (degenerative disc disease), lumbosacral     DJD (degenerative joint disease), lumbosacral     Encounter for blood transfusion     GERD (gastroesophageal reflux disease)     Gout     Hypercholesterolemia     Hypertension     NATHALIE (iron deficiency anemia)     questionable white coat hypertension    Kidney carcinoma, left 2014    Pneumonia     Renal cell carcinoma     Shingles 10/13/2012     Past Surgical History:   Procedure Laterality Date    ANTERIOR CERVICAL DISCECTOMY W/ FUSION N/A 5/4/2023    Procedure: DISCECTOMY, SPINE, CERVICAL, ANTERIOR APPROACH, WITH FUSION C3-4;  Surgeon: Ruben Martinez MD;  Location: [facility];  Service: Orthopedics;  Laterality: N/A;    APPENDECTOMY      ARTHROSCOPY " OF SHOULDER WITH DECOMPRESSION OF SUBACROMIAL SPACE Left 10/31/2019    Procedure: ARTHROSCOPY, SHOULDER, WITH SUBACROMIAL SPACE DECOMPRESSION;  Surgeon: Ruben Martinez MD;  Location: [facility] OR;  Service: Orthopedics;  Laterality: Left;    BLADDER REPAIR      x 2    COLONOSCOPY  9/2011    DISTAL CLAVICLE EXCISION Left 10/31/2019    Procedure: EXCISION, CLAVICLE, DISTAL;  Surgeon: Ruben Martinez MD;  Location: [facility] OR;  Service: Orthopedics;  Laterality: Left;    EPIDURAL STEROID INJECTION INTO LUMBAR SPINE N/A 12/7/2020    Procedure: Injection-steroid-epidural-lumbar;  Surgeon: Dk Rosales MD;  Location: [facility] OR;  Service: Pain Management;  Laterality: N/A;  L4-L5    EPIDURAL STEROID INJECTION INTO LUMBAR SPINE N/A 5/17/2021    Procedure: Injection-steroid-epidural-lumbar;  Surgeon: Dk Rosales MD;  Location: [facility] OR;  Service: Pain Management;  Laterality: N/A;  L4-L5    ESOPHAGOGASTRODUODENOSCOPY  9/2011    EYE SURGERY      lasix bilateral    FIXATION KYPHOPLASTY N/A 1/31/2020    Procedure: Kyphoplasty T12;  Surgeon: Dk Rosales MD;  Location: [facility] OR;  Service: Pain Management;  Laterality: N/A;    HERNIA REPAIR      hiatal hernai    HYSTERECTOMY      INJECTION OF ANESTHETIC AGENT AROUND NERVE Left 6/8/2020    Procedure: Block, Nerve;  Surgeon: Dk Rosales MD;  Location: [facility] OR;  Service: Pain Management;  Laterality: Left;  left genicular nerve block     KNEE ARTHROPLASTY Left 7/30/2020    Procedure: ARTHROPLASTY, KNEE LEFT;  Surgeon: Ruben Martinez MD;  Location: [facility] OR;  Service: Orthopedics;  Laterality: Left;  REP VALERY RUBY    KNEE ARTHROSCOPY W/ MENISCECTOMY Left 1/16/2020    Procedure: ARTHROSCOPY, KNEE, WITH MEDIAL MENISCECTOMY;  Surgeon: Ruben Martinez MD;  Location: [facility] OR;  Service: Orthopedics;  Laterality: Left;    LAPAROSCOPIC NISSEN FUNDOPLICATION      NEPHRECTOMY      LT partial    RADIOFREQUENCY ABLATION Left 6/19/2020    Procedure: Radiofrequency Ablation;  Surgeon:  Dk Rosales MD;  Location: [facility] OR;  Service: Pain Management;  Laterality: Left;    RADIOFREQUENCY ABLATION OF LUMBAR MEDIAL BRANCH NERVE AT SINGLE LEVEL Bilateral 2020    Procedure: Radiofrequency Ablation, Nerve, Spinal, Lumbar, Medial Branch, 1 Level;  Surgeon: Dk Rosales MD;  Location: [facility] OR;  Service: Pain Management;  Laterality: Bilateral;  L3,L4,L5 - Burned at 80 degrees C. for 60 seconds x 2 each site    RADIOFREQUENCY ABLATION OF LUMBAR MEDIAL BRANCH NERVE AT SINGLE LEVEL Bilateral 10/19/2020    Procedure: Radiofrequency Ablation, Nerve, Spinal, Lumbar, Medial Branch, 1 Level;  Surgeon: Dk Rosales MD;  Location: [facility] OR;  Service: Pain Management;  Laterality: Bilateral;  L3, L4, L5    REFRACTIVE SURGERY      ROTATOR CUFF REPAIR Left 10/31/2019    Procedure: REPAIR, ROTATOR CUFF;  Surgeon: Ruben Martinez MD;  Location: [facility] OR;  Service: Orthopedics;  Laterality: Left;  arthrex    SKIN BIOPSY      TOTAL ABDOMINAL HYSTERECTOMY W/ BILATERAL SALPINGOOPHORECTOMY  age 48    TRANSFORAMINAL EPIDURAL INJECTION OF STEROID Bilateral 2021    Procedure: Injection,steroid,epidural,transforaminal approach;  Surgeon: Dk Rosales MD;  Location: [facility] OR;  Service: Pain Management;  Laterality: Bilateral;  L5-S1     TRANSFORAMINAL EPIDURAL INJECTION OF STEROID Bilateral 3/28/2022    Procedure: INJECTION, STEROID, EPIDURAL, TRANSFORAMINAL APPROACH Lumbar L5-S1;  Surgeon: Dk Rosales MD;  Location: [facility] OR;  Service: Pain Management;  Laterality: Bilateral;     Family History   Problem Relation Age of Onset    Hypertension Father     Glaucoma Neg Hx     Macular degeneration Neg Hx     Retinal detachment Neg Hx      Social History     Tobacco Use    Smoking status: Former     Current packs/day: 0.00     Average packs/day: 1 pack/day for 40.0 years (40.0 ttl pk-yrs)     Types: Cigarettes     Start date: 1978     Quit date: 2018     Years since quittin.6    Smokeless tobacco:  Never    Tobacco comments:     smokes 2-3 cigarettes a night   Substance Use Topics    Alcohol use: Yes     Alcohol/week: 12.0 standard drinks of alcohol     Types: 6 Cans of beer, 6 Standard drinks or equivalent per week     Comment: 2-3 a day/ social    Drug use: No        Review of Systems   Constitutional: Negative.    HENT:  Negative for congestion, ear discharge, ear pain, hearing loss, nosebleeds, sinus pain and tinnitus.    Eyes: Negative.    Respiratory: Negative.     Cardiovascular:  Negative for chest pain, palpitations, claudication and leg swelling.   Gastrointestinal:  Negative for abdominal pain, blood in stool, constipation, diarrhea, melena and vomiting.   Genitourinary:  Negative for flank pain, frequency and urgency.   Musculoskeletal:  Positive for falls. Negative for joint pain and neck pain.   Skin: Negative.    Neurological:  Positive for loss of consciousness. Negative for dizziness, tingling, tremors, sensory change, speech change, focal weakness, seizures, weakness and headaches.   Endo/Heme/Allergies:  Does not bruise/bleed easily.   Psychiatric/Behavioral: Negative.         OBJECTIVE:   Vital Signs:  Temp: 97.3 °F (36.3 °C) (09/05/23 1031)  Pulse: 70 (09/05/23 1031)  BP: (!) 150/99 (09/05/23 1031)    Physical Exam:  Constitutional: Patient sitting comfortably in chair. Appears well developed and well nourished.  Skin: Exposed areas are intact without abnormal markings, rashes or other lesions.  HEENT: Normocephalic. Normal conjunctivae.  Cardiovascular: Normal rate and regular rhythm.  Respiratory: Chest wall rises and falls symmetrically, without signs of respiratory distress.  Abdomen: Soft and non-tender.  Extremities: Warm and without edema. Calves supple, non-tender.  Psych/Behavior: Normal affect.    Neurological:    Mental status: Alert and oriented. Conversational and appropriate.       Cranial Nerves: VFF to confrontation. PERRL. EOMI without nystagmus. Facial STLT normal and  symmetric. Strong, symmetric muscles of mastication. Facial strength full and symmetric. Hearing equal bilaterally to finger rub. Palate and uvula rise and fall normally in midline. Shoulder shrug 5/5 strength. Tongue midline.     Motor:    Upper:  Deltoids Triceps Biceps WE WF     R 5/5 5/5 5/5 5/5 5/5 5/5    L 5/5 5/5 5/5 5/5 5/5 5/5      Lower:  HF KE KF DF PF EHL    R 5/5 5/5 5/5 5/5 5/5 5/5    L 5/5 5/5 5/5 5/5 5/5 5/5     Sensory: Intact sensation to light touch in all extremities. Romberg negative.    Reflexes:          DTR: 2+ symmetrically throughout.     Gifford's: Negative.     Babinski's: Negative.     Clonus: Negative.    Cerebellar: Finger-to-nose and rapid alternating movements normal.     Gait stable    Diagnostic Results:  All imaging was independently reviewed by me.    MRI brain, dated 8/14/23:  1. 1.8 x 1.9 cm left post fossa meningioma near sigmoid sinus  2. No Flair change    ASSESSMENT/PLAN:     Problem List Items Addressed This Visit          Oncology    Meningioma    Relevant Orders    MRI Brain W WO Contrast    Ambulatory referral/consult to Neurology       Gait disturbance with falls  Meningioma  Episode of LOC - seizure vs syncope    Patient has a longstanding history of imbalance and falling due to unclear etiology.  She was found down approximately 1 month ago without any memory of the incident.  She was diagnosed with having had a seizure but EEG was negative and she denies any incontinence or tongue biting.  It remains unclear if this was a true seizure or a syncopal episode.  She has undergone 2 ACDF by Dr. Martinez (ortho) in the past with the last being in May of 2023.  Her cervical MRI does not show any spinal cord compression or spinal cord injury to suggest myelopathy.  Workup for her symptoms yielded a left-sided posterior fossa extra-axial mass which is most likely a meningioma in close proximity to the sigmoid sinus.  There is no FLAIR signal change in the cerebellum and  therefore I do not believe that this mass is related to her imbalance or falls as it is small and not causing significant mass effect or irritation to the cerebellar surface.    - return to clinic in 6 months with a brain MRI to monitor for growth of the mass  - referral to Neurology for gait disturbance and possible seizure workup  - follow-up with PCP regarding syncopal workup    The patient understands and agrees with the plan of care. All questions were answered.    Time spent on this encounter: 60 minutes. This includes face-to-face time and non-face to face time preparing to see the patient (eg, review of tests), obtaining and/or reviewing separately obtained history, documenting clinical information in the electronic or other health record, independently interpreting results and communicating results to the patient/family/caregiver, or care coordinator.          .

## 2023-09-08 RX ORDER — ONDANSETRON 4 MG/1
4 TABLET, ORALLY DISINTEGRATING ORAL EVERY 6 HOURS PRN
Qty: 30 TABLET | Refills: 0 | Status: SHIPPED | OUTPATIENT
Start: 2023-09-08

## 2023-09-08 NOTE — TELEPHONE ENCOUNTER
----- Message from Hussain Lizama sent at 9/8/2023 11:45 AM CDT -----  Contact: self [phone]  Requesting an RX refill or new RX.  Is this a refill or new RX: refill  RX name and strength (copy/paste from chart):  ondansetron (ZOFRAN-ODT) 4 MG TbDL  Is this a 30 day or 90 day RX:   Pharmacy name and phone # (copy/paste from chart):    [place] [address] [facility]  [address] [place], [place] [address] [address]  Phone: [phone] Fax: [phone]        The doctors have asked that we provide their patients with the following 2 reminders -- prescription refills can take up to 72 hours, and a friendly reminder that in the future you can use your [facility] account to request refills: yes      Please call and advise

## 2023-09-21 ENCOUNTER — EXTERNAL HOME HEALTH (OUTPATIENT)
Dept: HOME HEALTH SERVICES | Facility: HOSPITAL | Age: 68
End: 2023-09-21
Payer: MEDICARE

## 2023-09-28 ENCOUNTER — DOCUMENTATION ONLY (OUTPATIENT)
Dept: NEUROSURGERY | Facility: HOSPITAL | Age: 68
End: 2023-09-28
Payer: MEDICARE

## 2023-09-28 ENCOUNTER — TELEPHONE (OUTPATIENT)
Dept: NEUROSURGERY | Facility: CLINIC | Age: 68
End: 2023-09-28
Payer: MEDICARE

## 2023-09-28 ENCOUNTER — HOSPITAL ENCOUNTER (EMERGENCY)
Facility: HOSPITAL | Age: 68
Discharge: HOME | End: 2023-09-28
Attending: EMERGENCY MEDICINE
Payer: MEDICARE

## 2023-09-28 ENCOUNTER — TELEPHONE (OUTPATIENT)
Dept: PRIMARY CARE CLINIC | Facility: CLINIC | Age: 68
End: 2023-09-28
Payer: MEDICARE

## 2023-09-28 VITALS
TEMPERATURE: 98 F | HEART RATE: 54 BPM | DIASTOLIC BLOOD PRESSURE: 85 MMHG | SYSTOLIC BLOOD PRESSURE: 144 MMHG | OXYGEN SATURATION: 98 % | HEIGHT: 65 IN | BODY MASS INDEX: 28.32 KG/M2 | RESPIRATION RATE: 18 BRPM | WEIGHT: 170 LBS

## 2023-09-28 DIAGNOSIS — R51.9 ACUTE NONINTRACTABLE HEADACHE, UNSPECIFIED HEADACHE TYPE: Primary | ICD-10-CM

## 2023-09-28 DIAGNOSIS — D32.9 MENINGIOMA: ICD-10-CM

## 2023-09-28 LAB
ALBUMIN SERPL BCP-MCNC: 3.6 G/DL (ref 3.5–5.2)
ALP SERPL-CCNC: 109 U/L (ref 55–135)
ALT SERPL W/O P-5'-P-CCNC: 11 U/L (ref 10–44)
ANION GAP SERPL CALC-SCNC: 9 MMOL/L (ref 8–16)
AST SERPL-CCNC: 16 U/L (ref 10–40)
BASOPHILS # BLD AUTO: 0.03 K/UL (ref 0–0.2)
BASOPHILS NFR BLD: 0.7 % (ref 0–1.9)
BILIRUB SERPL-MCNC: 0.5 MG/DL (ref 0.1–1)
BUN SERPL-MCNC: 10 MG/DL (ref 8–23)
CALCIUM SERPL-MCNC: 9.2 MG/DL (ref 8.7–10.5)
CHLORIDE SERPL-SCNC: 106 MMOL/L (ref 95–110)
CO2 SERPL-SCNC: 25 MMOL/L (ref 23–29)
CREAT SERPL-MCNC: 0.9 MG/DL (ref 0.5–1.4)
DIFFERENTIAL METHOD: NORMAL
EOSINOPHIL # BLD AUTO: 0.1 K/UL (ref 0–0.5)
EOSINOPHIL NFR BLD: 3 % (ref 0–8)
ERYTHROCYTE [DISTWIDTH] IN BLOOD BY AUTOMATED COUNT: 12.7 % (ref 11.5–14.5)
EST. GFR  (NO RACE VARIABLE): >60 ML/MIN/1.73 M^2
GLUCOSE SERPL-MCNC: 97 MG/DL (ref 70–110)
HCT VFR BLD AUTO: 37.8 % (ref 37–48.5)
HGB BLD-MCNC: 12.2 G/DL (ref 12–16)
IMM GRANULOCYTES # BLD AUTO: 0.01 K/UL (ref 0–0.04)
IMM GRANULOCYTES NFR BLD AUTO: 0.2 % (ref 0–0.5)
LYMPHOCYTES # BLD AUTO: 1.2 K/UL (ref 1–4.8)
LYMPHOCYTES NFR BLD: 27 % (ref 18–48)
MCH RBC QN AUTO: 30.5 PG (ref 27–31)
MCHC RBC AUTO-ENTMCNC: 32.3 G/DL (ref 32–36)
MCV RBC AUTO: 95 FL (ref 82–98)
MONOCYTES # BLD AUTO: 0.4 K/UL (ref 0.3–1)
MONOCYTES NFR BLD: 8.7 % (ref 4–15)
NEUTROPHILS # BLD AUTO: 2.8 K/UL (ref 1.8–7.7)
NEUTROPHILS NFR BLD: 60.4 % (ref 38–73)
NRBC BLD-RTO: 0 /100 WBC
PLATELET # BLD AUTO: 214 K/UL (ref 150–450)
PMV BLD AUTO: 9.7 FL (ref 9.2–12.9)
POTASSIUM SERPL-SCNC: 4.2 MMOL/L (ref 3.5–5.1)
PROT SERPL-MCNC: 6.5 G/DL (ref 6–8.4)
RBC # BLD AUTO: 4 M/UL (ref 4–5.4)
SODIUM SERPL-SCNC: 140 MMOL/L (ref 136–145)
WBC # BLD AUTO: 4.6 K/UL (ref 3.9–12.7)

## 2023-09-28 PROCEDURE — 85025 COMPLETE CBC W/AUTO DIFF WBC: CPT | Performed by: PHYSICIAN ASSISTANT

## 2023-09-28 PROCEDURE — 99284 EMERGENCY DEPT VISIT MOD MDM: CPT | Mod: 25

## 2023-09-28 PROCEDURE — 96374 THER/PROPH/DIAG INJ IV PUSH: CPT

## 2023-09-28 PROCEDURE — 80053 COMPREHEN METABOLIC PANEL: CPT | Performed by: PHYSICIAN ASSISTANT

## 2023-09-28 PROCEDURE — 63600175 PHARM REV CODE 636 W HCPCS: Performed by: NURSE PRACTITIONER

## 2023-09-28 PROCEDURE — 96375 TX/PRO/DX INJ NEW DRUG ADDON: CPT

## 2023-09-28 PROCEDURE — 96361 HYDRATE IV INFUSION ADD-ON: CPT

## 2023-09-28 PROCEDURE — 36415 COLL VENOUS BLD VENIPUNCTURE: CPT | Performed by: PHYSICIAN ASSISTANT

## 2023-09-28 RX ORDER — BUTALBITAL, ACETAMINOPHEN AND CAFFEINE 50; 325; 40 MG/1; MG/1; MG/1
1 TABLET ORAL EVERY 4 HOURS PRN
Qty: 18 TABLET | Refills: 0 | Status: SHIPPED | OUTPATIENT
Start: 2023-09-28 | End: 2023-10-01

## 2023-09-28 RX ORDER — DROPERIDOL 2.5 MG/ML
1.25 INJECTION, SOLUTION INTRAMUSCULAR; INTRAVENOUS
Status: COMPLETED | OUTPATIENT
Start: 2023-09-28 | End: 2023-09-28

## 2023-09-28 RX ORDER — DIPHENHYDRAMINE HYDROCHLORIDE 50 MG/ML
12.5 INJECTION INTRAMUSCULAR; INTRAVENOUS
Status: COMPLETED | OUTPATIENT
Start: 2023-09-28 | End: 2023-09-28

## 2023-09-28 RX ADMIN — DROPERIDOL 1.25 MG: 2.5 INJECTION, SOLUTION INTRAMUSCULAR; INTRAVENOUS at 12:09

## 2023-09-28 RX ADMIN — DIPHENHYDRAMINE HYDROCHLORIDE 12.5 MG: 50 INJECTION INTRAMUSCULAR; INTRAVENOUS at 12:09

## 2023-09-28 RX ADMIN — SODIUM CHLORIDE, POTASSIUM CHLORIDE, SODIUM LACTATE AND CALCIUM CHLORIDE 1000 ML: 600; 310; 30; 20 INJECTION, SOLUTION INTRAVENOUS at 12:09

## 2023-09-28 NOTE — TELEPHONE ENCOUNTER
----- Message from Carlyle Lizama sent at 9/28/2023  8:18 AM CDT -----  Contact: [phone]  [email or web address]     Patient is calling for Medical Advice regarding: pt states she has been going thru serious of problems. She has a tumor in her head and has some ongoing headaches and eyeball pain  she would like to discuss.      How long has patient had these symptoms:    Pharmacy name and phone#:    Would like response via [employer]:  no    Comments:

## 2023-09-28 NOTE — FIRST PROVIDER EVALUATION
" Emergency Department TeleTriage Encounter Note      CHIEF COMPLAINT    Chief Complaint   Patient presents with    Eye Pain     To left eye, head and behind left ear since Sunday; known tumor to left lower skull, referred to ED by neurologist        VITAL SIGNS   Initial Vitals [09/28/23 1006]   BP Pulse Resp Temp SpO2   137/85 73 18 98.1 °F (36.7 °C) 98 %      MAP       --            ALLERGIES    Review of patient's allergies indicates:   Allergen Reactions    Phenergan [promethazine] Hives and Rash    Latex, natural rubber Hives     Per pt report-many years    Morphine Hives    Nsaids (non-steroidal anti-inflammatory drug)      Due to "kidney cancer"       PROVIDER TRIAGE NOTE  Patient presents with complaint of left eye pain. Reports chronic bad vision in that eye. Symptoms present since four days ago. Instructed to come to ED secondary to known meningioma and concern for possible  dural venous sinus thrombosis/occlusion.      Phy:   Constitutional: well nourished, well developed, appearing stated age, NAD   HEENT: NCAT, symmetrical lids, No obvious facial deformity.  Normal phonation. Normal Conjunctiva   Neck: NAROM   Respiratory: Normal effort.  No obvious use of accessory muscles   Musculoskeletal: Moved upper extremities well   Neuro: Alert, answers questions appropriately    Psych: appropriate mood and affect      Initial orders will be placed and care will be transferred to an alternate provider when patient is roomed for a full evaluation. Any additional orders and the final disposition will be determined by that provider.        ORDERS  Labs Reviewed - No data to display    ED Orders (720h ago, onward)      Start Ordered     Status Ordering Provider    09/28/23 1025 09/28/23 1024  Saline lock IV  Once         Ordered CANDIDO RICHARDS    09/28/23 1025 09/28/23 1024  CBC auto differential  STAT         Ordered CANDIDO RICHARDS    09/28/23 1025 09/28/23 1024  Comprehensive metabolic " panel  STAT         Ordered CANDIDO RICHARDS    09/28/23 1025 09/28/23 1024  MRI Brain W WO Contrast  1 time imaging         Ordered CANDIDO RICHARDS              Virtual Visit Note: The provider triage portion of this emergency department evaluation and documentation was performed via [employer], a HIPAA-compliant telemedicine application, in concert with a tele-presenter in the room. A face to face patient evaluation with one of my colleagues will occur once the patient is placed in an emergency department room.      DISCLAIMER: This note was prepared with [employer] voice recognition transcription software. Garbled syntax, mangled pronouns, and other bizarre constructions may be attributed to that software system.

## 2023-09-28 NOTE — DISCHARGE INSTRUCTIONS
You were seen and evaluated in the ER today.  Your MRI today shows that your meningioma is stable.  We have given you medications to treat your headache.  Please continue to increase fluids and rest.  Take medications as needed for headache.  Follow-up with neurology as scheduled.  Please follow-up with your PCP as needed.  Please return to the ED for any worsening symptoms such as chest pain, shortness of breath, fever not controlled with Tylenol or ibuprofen or uncontrolled pain.      Our goal in the emergency department is to always give you outstanding care and exceptional service. You may receive a survey by mail or e-mail in the next week regarding your experience in our ED. We would greatly appreciate your completing and returning the survey. Your feedback provides us with a way to recognize our staff who give very good care and it helps us learn how to improve when your experience was below our aspiration of excellence.

## 2023-09-28 NOTE — PROGRESS NOTES
Patient called the office this morning with complaints of intractable left sided migraine with severe left eye pain and photophobia since this past weekend (9/23). Symptoms with no improvement with relief with usual headache medications. Reports has never had a headache like this before. Reports left pupil is larger than right pupil. Reports she has movement in all directions with left eye. Patient has a known left extra axial mass (presumed meningioma) that is causing severe narrowing and potential invasion of sinuses on MRV dated 8/15/23. Instructed patient to go emergency room immediately for concerns of dural venous sinus thrombosis/occlusion. Patient verbalized agreement.     Brianna Pereira PA-C  Neurosurgery   [facility]

## 2023-09-28 NOTE — TELEPHONE ENCOUNTER
Pt advised to report to ER in [place] by Brianna. See note with details.    ----- Message from Brianna Cullne sent at 9/28/2023  8:32 AM CDT -----  Regarding: not feeling well  Contact: [phone]  Patient called states she Is having severe headaches and left eyeball is also and sever pain was advised by Pcp to contact Dr Walsh please contact pt [phone] (

## 2023-09-28 NOTE — ED PROVIDER NOTES
"Source of History:  Patient, chart     Chief complaint:  Eye Pain (To left eye, head and behind left ear since Sunday; known tumor to left lower skull, referred to ED by neurologist )      HPI:  Beatriz Gan is a 68 y.o. female with a PMHX of alcohol dependence, DDD, GERD, Gout, HTN, renal cell carcinoma, and hypercholesteremia presenting with left occipital pain, left ocular pain, and blurry vision to L eye,that started Sunday night. Pt states that her left eye pain is getting worse and she has tried Tylenol at home for pain with no relief. Pt states her current pain is an 8/10 to her left eye. Pt does follow with Neurosurgery due to meningioma.  Pt contacted them due to symptoms and was advised to come the to ED for evaluation.  She denies any dizziness, numbness, seizures, lightheadedness, tinnitus, vertigo, LOC, weakness, spasms, or tremors.     This is the extent to the patients complaints today here in the emergency department.    ROS: As per HPI and below:  Constitutional: No fever.  No chills.  Eyes: No visual changes. Positive for left eye pain. Positive for blurry vision to left eye.   ENT: No sore throat. No ear pain    Cardiovascular: No chest pain.  Respiratory: No shortness of breath.  GI: No abdominal pain.  No nausea or vomiting.  Genitourinary: No abnormal urination.  Neurologic: Positive for headache. Negative for lightheadedness, dizziness, weakness, spasms, seizures, tremors.   MSK: no back pain.  Integument: No rashes or lesions.  Hematologic: No easy bruising.  Endocrine: No excessive thirst or urination.    Review of patient's allergies indicates:   Allergen Reactions    Phenergan [promethazine] Hives and Rash    Latex, natural rubber Hives     Per pt report-many years    Morphine Hives    Nsaids (non-steroidal anti-inflammatory drug)      Due to "kidney cancer"       PMH:  As per HPI and below:  Past Medical History:   Diagnosis Date    Alcohol dependence     DDD (degenerative disc " disease), lumbosacral     DJD (degenerative joint disease), lumbosacral     Encounter for blood transfusion     GERD (gastroesophageal reflux disease)     Gout     Hypercholesterolemia     Hypertension     NATHALIE (iron deficiency anemia)     questionable white coat hypertension    Kidney carcinoma, left 2014    Pneumonia     Renal cell carcinoma     Shingles 10/13/2012     Past Surgical History:   Procedure Laterality Date    ANTERIOR CERVICAL DISCECTOMY W/ FUSION N/A 5/4/2023    Procedure: DISCECTOMY, SPINE, CERVICAL, ANTERIOR APPROACH, WITH FUSION C3-4;  Surgeon: Ruben Martinez MD;  Location: [facility] OR;  Service: Orthopedics;  Laterality: N/A;    APPENDECTOMY      ARTHROSCOPY OF SHOULDER WITH DECOMPRESSION OF SUBACROMIAL SPACE Left 10/31/2019    Procedure: ARTHROSCOPY, SHOULDER, WITH SUBACROMIAL SPACE DECOMPRESSION;  Surgeon: Ruben Martinez MD;  Location: [facility] OR;  Service: Orthopedics;  Laterality: Left;    BLADDER REPAIR      x 2    COLONOSCOPY  9/2011    DISTAL CLAVICLE EXCISION Left 10/31/2019    Procedure: EXCISION, CLAVICLE, DISTAL;  Surgeon: Ruben Martinez MD;  Location: [facility] OR;  Service: Orthopedics;  Laterality: Left;    EPIDURAL STEROID INJECTION INTO LUMBAR SPINE N/A 12/7/2020    Procedure: Injection-steroid-epidural-lumbar;  Surgeon: Dk Rosales MD;  Location: [facility] OR;  Service: Pain Management;  Laterality: N/A;  L4-L5    EPIDURAL STEROID INJECTION INTO LUMBAR SPINE N/A 5/17/2021    Procedure: Injection-steroid-epidural-lumbar;  Surgeon: Dk Rosales MD;  Location: [facility] OR;  Service: Pain Management;  Laterality: N/A;  L4-L5    ESOPHAGOGASTRODUODENOSCOPY  9/2011    EYE SURGERY      lasix bilateral    FIXATION KYPHOPLASTY N/A 1/31/2020    Procedure: Kyphoplasty T12;  Surgeon: Dk Rosales MD;  Location: [facility] OR;  Service: Pain Management;  Laterality: N/A;    HERNIA REPAIR      hiatal hernai    HYSTERECTOMY      INJECTION OF ANESTHETIC AGENT AROUND NERVE Left 6/8/2020    Procedure:  Block, Nerve;  Surgeon: Dk Rosales MD;  Location: [facility] OR;  Service: Pain Management;  Laterality: Left;  left genicular nerve block     KNEE ARTHROPLASTY Left 7/30/2020    Procedure: ARTHROPLASTY, KNEE LEFT;  Surgeon: Ruben Martinez MD;  Location: [facility] OR;  Service: Orthopedics;  Laterality: Left;  REP VALERY RUBY    KNEE ARTHROSCOPY W/ MENISCECTOMY Left 1/16/2020    Procedure: ARTHROSCOPY, KNEE, WITH MEDIAL MENISCECTOMY;  Surgeon: Ruben Martinez MD;  Location: [facility] OR;  Service: Orthopedics;  Laterality: Left;    LAPAROSCOPIC NISSEN FUNDOPLICATION      NEPHRECTOMY      LT partial    RADIOFREQUENCY ABLATION Left 6/19/2020    Procedure: Radiofrequency Ablation;  Surgeon: Dk Rosales MD;  Location: [facility] OR;  Service: Pain Management;  Laterality: Left;    RADIOFREQUENCY ABLATION OF LUMBAR MEDIAL BRANCH NERVE AT SINGLE LEVEL Bilateral 2/28/2020    Procedure: Radiofrequency Ablation, Nerve, Spinal, Lumbar, Medial Branch, 1 Level;  Surgeon: Dk Rosales MD;  Location: [facility] OR;  Service: Pain Management;  Laterality: Bilateral;  L3,L4,L5 - Burned at 80 degrees C. for 60 seconds x 2 each site    RADIOFREQUENCY ABLATION OF LUMBAR MEDIAL BRANCH NERVE AT SINGLE LEVEL Bilateral 10/19/2020    Procedure: Radiofrequency Ablation, Nerve, Spinal, Lumbar, Medial Branch, 1 Level;  Surgeon: Dk Rosales MD;  Location: [facility] OR;  Service: Pain Management;  Laterality: Bilateral;  L3, L4, L5    REFRACTIVE SURGERY      ROTATOR CUFF REPAIR Left 10/31/2019    Procedure: REPAIR, ROTATOR CUFF;  Surgeon: Ruben Martinez MD;  Location: [facility] OR;  Service: Orthopedics;  Laterality: Left;  arthrex    SKIN BIOPSY      TOTAL ABDOMINAL HYSTERECTOMY W/ BILATERAL SALPINGOOPHORECTOMY  age 48    TRANSFORAMINAL EPIDURAL INJECTION OF STEROID Bilateral 7/30/2021    Procedure: Injection,steroid,epidural,transforaminal approach;  Surgeon: Dk Rosales MD;  Location: [facility] OR;  Service: Pain Management;  Laterality: Bilateral;  L5-S1   "   TRANSFORAMINAL EPIDURAL INJECTION OF STEROID Bilateral 3/28/2022    Procedure: INJECTION, STEROID, EPIDURAL, TRANSFORAMINAL APPROACH Lumbar L5-S1;  Surgeon: Dk Rosales MD;  Location: [facility] OR;  Service: Pain Management;  Laterality: Bilateral;       Social History     Tobacco Use    Smoking status: Former     Current packs/day: 0.00     Average packs/day: 1 pack/day for 40.0 years (40.0 ttl pk-yrs)     Types: Cigarettes     Start date: 1978     Quit date: 2018     Years since quittin.7    Smokeless tobacco: Never    Tobacco comments:     smokes 2-3 cigarettes a night   Substance Use Topics    Alcohol use: Yes     Alcohol/week: 12.0 standard drinks of alcohol     Types: 6 Cans of beer, 6 Standard drinks or equivalent per week     Comment: 2-3 a day/ social    Drug use: No       Physical Exam:    BP (!) 144/85   Pulse (!) 54   Temp 98.1 °F (36.7 °C) (Oral)   Resp 18   Ht 5' 5" (1.651 m)   Wt 77.1 kg (170 lb)   SpO2 98%   Breastfeeding No   BMI 28.29 kg/m²   Nursing note and vital signs reviewed.  Constitutional: No acute distress.  Nontoxic  Eyes: Extraocular muscles are intact. PERRLA. No nystagmus, no redness, no tearing. Positive for left eye pain and blurriness.   ENT: Oropharynx clear.    Cardiovascular: Regular rate and rhythm.  No murmurs. No gallops. No rubs.  Respiratory: Clear to auscultation bilaterally.  Good air movement.  No wheezes.  No rhonchi. No rales. No accessory muscle use.  Abdomen: Soft.  Not distended.  Nontender.  No guarding.  No rebound. Non-peritoneal.  Musculoskeletal: Good range of motion all joints.  No deformities.  Neck supple.  No meningismus.  Skin: No rashes seen.  Good turgor.  No abrasions.  No ecchymoses.  Neurologic:  Cranial nerves 2-12 are intact.  All extremities are 5/5 strength.  Motor and sensory intact.  No ataxia.  Negative Romberg test.  No cerebellar findings.  Good finger-to-nose.  No focal neurological deficits. Negative for numbness, " tremors, spasms, or memory loss. Positive for headache to base of skull.   Psych:  Appropriate, conversant    MDM    Emergent evaluation of a 67 yo female presenting for worsening left eye pain, blurry vision to left eye, and headache to base of left skull. Pt states she was recently diagnosed (about 2 months ago) with Meningioma, when she presented to the ED for a seizure. Pt states she is seeing a neurologist and was referred to the ED today for imaging due to worsening of her symptoms. Pt states on Sunday night, her left eye pain became worse and she describes the pain as pressure to the back of her eyeball. She has only taken Tylenol for pain and states it does not relieve the pain. Pt currently rates her pain as an 8/10. Pt normally wears trifocal glasses, but is unable to wear them due to causing strain and more pain to her left eye. On exam pt is A&Ox3. No dizziness, weakness, numbness, tremors, noted. VSS. Nonfebrile and nontoxic appearing.  Mucous membranes pink and moist. Tonsils with no redness, erythema or exudates. Breath sounds clear bilaterally.  Abdomen soft and nontender. No rebound or guarding appreciated on exam. BS WNL. Pt speaking in full sentences.    History Acquisition   Additional history was not acquired from other historians.  Chart    The patient's list of active medical problems, social history, medications, and allergies as documented per RN staff has been reviewed.     Differential Diagnoses   Based on available information and the initial assessment, the working differential diagnoses considered during this evaluation include but are not limited to migraine, stroke, optic neuritis, glaucoma, mass, dural venous sinus thrombosis/occlusion, others.    I will get labs, imaging and reassess.      LABS     Labs Reviewed   CBC W/ AUTO DIFFERENTIAL   COMPREHENSIVE METABOLIC PANEL         Imaging     Imaging Results              MRV Brain Without Contrast (Final result)  Result time 09/28/23  12:07:00   Procedure changed from MRV Brain With & W/O Contrast     Final result by Elliott Chris MD (09/28/23 12:07:00)                   Impression:      1. Stable focal high-grade stenosis at the left transverse-sigmoid sinus junction secondary to mass effect by the previously demonstrated extra-axial, dural-based mass most likely representing a meningioma.  A component of invasion by lesion would be difficult to entirely exclude.  2. Stable focal high-grade stenosis at the junction of the right transverse-sigmoid sinus junction.  Constellation of findings, including mildly expanded sella and fluid distended optic nerve sheaths on prior MRI exam, are not entirely specific but raise a suspicion for possible idiopathic intracranial hypertension.  Clinical correlation recommended.  3. No new superimposed filling defects to suggest dural venous sinus thrombosis.      Electronically signed by: Elliott Chris  Date:    09/28/2023  Time:    12:07               Narrative:    EXAMINATION:  MRV BRAIN WITHOUT CONTRAST    CLINICAL HISTORY:  Dural venous sinus thrombosis suspected;    TECHNIQUE:  Magnetic resonance venography of the dural venous sinuses was performed using noncontrast time of flight technique.  These images were used to generate maximum intensity projections (MIP) reconstructions.    COMPARISON:  MRV 08/15/2023, MRI brain 08/14/2023    FINDINGS:  Persistent short segment-high-grade stenosis at the left transverse-sigmoid sinus junction secondary to mass effect by the previously demonstrated extra-axial, dural-based mass within the left posterior fossa along the cerebellar tentorium suggestive of a meningioma, not significantly changed in comparison to the prior study from 08/15/2023.  A component of invasion by the lesion would be difficult to entirely exclude.    In addition, there is stable focal short segment high-grade stenosis at the junction of the right transverse-sigmoid sinus junction,  unchanged.    Normal signal is demonstrated within the superior sagittal sinus. The jugular veins are normal. No new luminal filling defects are seen.                                      A radiology report was available for my review at the time of the encounter.    EKG        Additional Consideration   All available testing was considered during the course of this workup.  Comorbidities taken into consideration during the patient's evaluation and treatment include weight, age.    Social determinants of health were taken into consideration during development of our treatment plan.    Medications   lactated ringers bolus 1,000 mL (0 mLs Intravenous Stopped 9/28/23 1311)   droPERidol injection 1.25 mg (1.25 mg Intravenous Given 9/28/23 1211)   diphenhydrAMINE injection 12.5 mg (12.5 mg Intravenous Given 9/28/23 1212)      ED Course as of 09/28/23 1400   Thu Sep 28, 2023   1200 CBC unremarkable.  No leukocytosis noted.  H&H stable at 12.2 and 37.8.  CMP unremarkable. [RZ]   1223 Independently reviewed by myself and radiology.  Stable with no signs of  dural venous sinus thrombosis.  Discussed case with neurosurgery and advised pt can be discharged home after headache controlled.  Pt just received IV medications.  Updated on results.  Awaiting reassessment.   [RZ]   1300 Patient reassessed.  Patient states pain now 0/10.  Patient advised that we will send home with medications to help with pain.  Advised to increase fluids rest.  Follow up with neuro surgery as scheduled.  Strict return to ED precautions discussed.  Patient verbalized understanding of this plan of care.  All questions and concerns addressed. [RZ]   1316 Patient is hemodynamically stable, vital signs are normal. Discharge instructions given. Return to ED precautions discussed. Follow up as directed. Pt verbalized understanding of this plan.  Pt is stable for discharge.  [RZ]      ED Course User Index  [RZ] Fawn Mena NP            Attending  Attestation:     Physician Attestation Statement for NP/PA:   I have directed and reviewed the workup performed by the PA/NP.  I performed the substantive portion of the medical decision making.     Other NP/PA Attestation Additions:    History of Present Illness: 68-year-old female presents with headache and eye pain.    Medical Decision Making: Initial differential diagnosis included but not limited to worsening intracranial mass, atypical migraine, and dural venous sinus thrombosis.  The patient's MRV shows no acute abnormalities per Radiology.  The patient was treated with parental Inapsine, with improvement in her symptoms here in the emergency department.  The patient is stable for discharge to home.  I am in agreement with the nurse practitioner's assessment, treatment, and plan of care.             CLINICAL IMPRESSION  1. Acute nonintractable headache, unspecified headache type    2. Meningioma         ED Disposition Condition    Discharge Stable            Instruction:  I see no indication of an emergent process beyond that addressed during our encounter but have duly counseled the patient/family regarding the need for prompt follow-up as well as the indications that should prompt immediate return to the emergency room should new or worrisome developments occur.  The patient/family has been provided with verbal and printed direction regarding our final diagnosis(es) as well as instructions regarding use of OTC and/or Rx medications intended to manage the patient's aforementioned conditions including:  ED Prescriptions       Medication Sig Dispense Start Date End Date Auth. Provider    butalbital-acetaminophen-caffeine -40 mg (FIORICET, ESGIC) -40 mg per tablet Take 1 tablet by mouth every 4 (four) hours as needed for Headaches. 18 tablet 9/28/2023 10/1/2023 Fawn Mena NP          Patient has been advised of following recommended follow-up instructions:  Follow-up Information       Follow  up With Specialties Details Why Contact Info    Jorge Alonzo MD Internal Medicine Schedule an appointment as soon as possible for a visit  As needed [id number] [place] [address] [phone]            The patient/family communicates understanding of all this information and all remaining questions and concerns were addressed at this time.      The patient's condition did warrant review of the  and prescription of controlled substances.      This note was created using dictation software.  This program may occasionally mistype words and phrases.         Fawn Mena NP  09/28/23 1316       William Brown MD  09/28/23 1401

## 2023-10-02 ENCOUNTER — TELEPHONE (OUTPATIENT)
Dept: NEUROSURGERY | Facility: CLINIC | Age: 68
End: 2023-10-02
Payer: MEDICARE

## 2023-10-02 NOTE — TELEPHONE ENCOUNTER
Returned call to pt's home health nurse. Advised that Dr. Walsh's PA reviewed where the mass is located in the brain and does not believe it is causing seizures, therefore she does not need to take Keppra at this time. She also asked if the patient could be seen sooner to discuss if surgery is needed because it is causing her stress.I told her I would ask Dr. Walsh and get back to the patient on whether or not she needs to be seen for a sooner appt.     ----- Message from Bob Shah sent at 10/2/2023 10:16 AM CDT -----  Regarding: medication  Contact: @[phone]  Home health nurse calling in regards to pt and states at last appt on 9/5 dr wanted her to start kepra and would like to know update on that ... Also pt went to ED last week and was rx furicet ....Pls call and adv@[phone]

## 2023-10-03 ENCOUNTER — TELEPHONE (OUTPATIENT)
Dept: PRIMARY CARE CLINIC | Facility: CLINIC | Age: 68
End: 2023-10-03
Payer: MEDICARE

## 2023-10-03 NOTE — TELEPHONE ENCOUNTER
Pt states she is not feeling well and does not want to go to the hospital. Very nauseated and whole body hurts. Advised UC for eval, states shes in bed and not going anywhere. Asked for abx, told her she needs to be evaluated/tested before any meds can be given. Scheduled for tomorrow with Jasmin.

## 2023-10-03 NOTE — TELEPHONE ENCOUNTER
----- Message from Eliezer Alcaraz sent at 10/3/2023 12:02 PM CDT -----  .Type:  Needs Medical Advice    Who Called: pt    Would the patient rather a call back or a response via [facility]? Call back  Best Call Back Number: [phone]  Additional Information:     Pt stated she is not feeling well and would like a call back please

## 2023-10-04 ENCOUNTER — OFFICE VISIT (OUTPATIENT)
Dept: PRIMARY CARE CLINIC | Facility: CLINIC | Age: 68
End: 2023-10-04
Payer: MEDICARE

## 2023-10-04 ENCOUNTER — HOSPITAL ENCOUNTER (OUTPATIENT)
Dept: RADIOLOGY | Facility: HOSPITAL | Age: 68
Discharge: HOME | End: 2023-10-04
Attending: NURSE PRACTITIONER
Payer: MEDICARE

## 2023-10-04 VITALS
WEIGHT: 171.75 LBS | HEART RATE: 67 BPM | DIASTOLIC BLOOD PRESSURE: 70 MMHG | TEMPERATURE: 98 F | OXYGEN SATURATION: 97 % | SYSTOLIC BLOOD PRESSURE: 130 MMHG | BODY MASS INDEX: 28.58 KG/M2

## 2023-10-04 DIAGNOSIS — Z85.528 HISTORY OF RENAL CELL CARCINOMA: ICD-10-CM

## 2023-10-04 DIAGNOSIS — J44.9 CHRONIC OBSTRUCTIVE PULMONARY DISEASE, UNSPECIFIED COPD TYPE: ICD-10-CM

## 2023-10-04 DIAGNOSIS — B34.9 VIRAL ILLNESS: ICD-10-CM

## 2023-10-04 DIAGNOSIS — R53.81 MALAISE: Primary | ICD-10-CM

## 2023-10-04 DIAGNOSIS — J02.9 PHARYNGITIS, UNSPECIFIED ETIOLOGY: ICD-10-CM

## 2023-10-04 LAB
CTP QC/QA: YES
POC MOLECULAR INFLUENZA A AGN: NEGATIVE
POC MOLECULAR INFLUENZA B AGN: NEGATIVE
S PYO RRNA THROAT QL PROBE: NEGATIVE
SARS-COV-2 AG RESP QL IA.RAPID: NEGATIVE

## 2023-10-04 PROCEDURE — 1160F PR REVIEW ALL MEDS BY PRESCRIBER/CLIN PHARMACIST DOCUMENTED: ICD-10-PCS | Mod: CPTII,S$GLB,, | Performed by: NURSE PRACTITIONER

## 2023-10-04 PROCEDURE — 87811 SARS-COV-2 COVID19 W/OPTIC: CPT | Mod: QW,S$GLB,, | Performed by: NURSE PRACTITIONER

## 2023-10-04 PROCEDURE — 1125F AMNT PAIN NOTED PAIN PRSNT: CPT | Mod: CPTII,S$GLB,, | Performed by: NURSE PRACTITIONER

## 2023-10-04 PROCEDURE — 71046 X-RAY EXAM CHEST 2 VIEWS: CPT | Mod: TC

## 2023-10-04 PROCEDURE — 3008F BODY MASS INDEX DOCD: CPT | Mod: CPTII,S$GLB,, | Performed by: NURSE PRACTITIONER

## 2023-10-04 PROCEDURE — 87811 SARS CORONAVIRUS 2 ANTIGEN POCT, MANUAL READ: ICD-10-PCS | Mod: QW,S$GLB,, | Performed by: NURSE PRACTITIONER

## 2023-10-04 PROCEDURE — 3078F DIAST BP <80 MM HG: CPT | Mod: CPTII,S$GLB,, | Performed by: NURSE PRACTITIONER

## 2023-10-04 PROCEDURE — 1160F RVW MEDS BY RX/DR IN RCRD: CPT | Mod: CPTII,S$GLB,, | Performed by: NURSE PRACTITIONER

## 2023-10-04 PROCEDURE — 1159F MED LIST DOCD IN RCRD: CPT | Mod: CPTII,S$GLB,, | Performed by: NURSE PRACTITIONER

## 2023-10-04 PROCEDURE — 3075F SYST BP GE 130 - 139MM HG: CPT | Mod: CPTII,S$GLB,, | Performed by: NURSE PRACTITIONER

## 2023-10-04 PROCEDURE — 87502 POCT INFLUENZA A/B MOLECULAR: ICD-10-PCS | Mod: QW,S$GLB,, | Performed by: NURSE PRACTITIONER

## 2023-10-04 PROCEDURE — 99999 PR PBB SHADOW E&M-EST. PATIENT-LVL V: CPT | Mod: PBBFAC,,, | Performed by: NURSE PRACTITIONER

## 2023-10-04 PROCEDURE — 87880 POCT RAPID STREP A: ICD-10-PCS | Mod: QW,S$GLB,, | Performed by: NURSE PRACTITIONER

## 2023-10-04 PROCEDURE — 71046 X-RAY EXAM CHEST 2 VIEWS: CPT | Mod: 26,,, | Performed by: STUDENT IN AN ORGANIZED HEALTH CARE EDUCATION/TRAINING PROGRAM

## 2023-10-04 PROCEDURE — 87502 INFLUENZA DNA AMP PROBE: CPT | Mod: QW,S$GLB,, | Performed by: NURSE PRACTITIONER

## 2023-10-04 PROCEDURE — 1159F PR MEDICATION LIST DOCUMENTED IN MEDICAL RECORD: ICD-10-PCS | Mod: CPTII,S$GLB,, | Performed by: NURSE PRACTITIONER

## 2023-10-04 PROCEDURE — 1125F PR PAIN SEVERITY QUANTIFIED, PAIN PRESENT: ICD-10-PCS | Mod: CPTII,S$GLB,, | Performed by: NURSE PRACTITIONER

## 2023-10-04 PROCEDURE — 99999 PR PBB SHADOW E&M-EST. PATIENT-LVL V: ICD-10-PCS | Mod: PBBFAC,,, | Performed by: NURSE PRACTITIONER

## 2023-10-04 PROCEDURE — 99214 PR OFFICE/OUTPT VISIT, EST, LEVL IV, 30-39 MIN: ICD-10-PCS | Mod: S$GLB,,, | Performed by: NURSE PRACTITIONER

## 2023-10-04 PROCEDURE — 99214 OFFICE O/P EST MOD 30 MIN: CPT | Mod: S$GLB,,, | Performed by: NURSE PRACTITIONER

## 2023-10-04 PROCEDURE — 87880 STREP A ASSAY W/OPTIC: CPT | Mod: QW,S$GLB,, | Performed by: NURSE PRACTITIONER

## 2023-10-04 PROCEDURE — 71046 XR CHEST PA AND LATERAL: ICD-10-PCS | Mod: 26,,, | Performed by: STUDENT IN AN ORGANIZED HEALTH CARE EDUCATION/TRAINING PROGRAM

## 2023-10-04 PROCEDURE — 3078F PR MOST RECENT DIASTOLIC BLOOD PRESSURE < 80 MM HG: ICD-10-PCS | Mod: CPTII,S$GLB,, | Performed by: NURSE PRACTITIONER

## 2023-10-04 PROCEDURE — 3008F PR BODY MASS INDEX (BMI) DOCUMENTED: ICD-10-PCS | Mod: CPTII,S$GLB,, | Performed by: NURSE PRACTITIONER

## 2023-10-04 PROCEDURE — 3075F PR MOST RECENT SYSTOLIC BLOOD PRESS GE 130-139MM HG: ICD-10-PCS | Mod: CPTII,S$GLB,, | Performed by: NURSE PRACTITIONER

## 2023-10-04 NOTE — PROGRESS NOTES
[facility] Note    Chief Complaint      Chief Complaint   Patient presents with    Nausea    Generalized Body Aches       History of Present Illness      Beatriz Gan is a 67 y.o. female with chronic conditions of HTN, CAD s/p CABG, Anxiety, insomnia, osteoarthritis left shoulder, chronic low back pain, hx of RCC  who presents today for: body-aches, nausea, headaches, congestion, sore throat, mild productive cough, fatigue x 2-3 days. Negative for Covid, Strep and Flu. Reportedly had Covid 1month ago. Denies diarrhea, vomiting.    Recently went to the ER for migraine on 9/28. Had MRI, labs. Was given Fioricet.     +COPD: sat 97% on RA.   Hx of RCC: needs a new Nephrology. Will place referral.   Meningioma: managed by Dr. Walsh. Has follow up in December. Not recommended to be on keppra, due to location of tumor. Pt eager for sugery.     Past Medical History:  Past Medical History:   Diagnosis Date    Alcohol dependence     DDD (degenerative disc disease), lumbosacral     DJD (degenerative joint disease), lumbosacral     Encounter for blood transfusion     GERD (gastroesophageal reflux disease)     Gout     Hypercholesterolemia     Hypertension     NATHALIE (iron deficiency anemia)     questionable white coat hypertension    Kidney carcinoma, left 2014    Pneumonia     Renal cell carcinoma     Shingles 10/13/2012       Past Surgical History:   has a past surgical history that includes Total abdominal hysterectomy w/ bilateral salpingoophorectomy (age 48); Appendectomy; Bladder repair; Colonoscopy (9/2011); Esophagogastroduodenoscopy (9/2011); Hysterectomy; Refractive surgery; Nephrectomy; Laparoscopic Nissen fundoplication; Eye surgery; Hernia repair; Skin biopsy; Rotator cuff repair (Left, 10/31/2019); Distal clavicle excision (Left, 10/31/2019); Arthroscopy of shoulder with decompression of subacromial space (Left, 10/31/2019); Knee arthroscopy w/ meniscectomy (Left, 1/16/2020); Fixation  Kyphoplasty (N/A, 2020); Radiofrequency ablation of lumbar medial branch nerve at single level (Bilateral, 2020); Injection of anesthetic agent around nerve (Left, 2020); Radiofrequency ablation (Left, 2020); Knee Arthroplasty (Left, 2020); Radiofrequency ablation of lumbar medial branch nerve at single level (Bilateral, 10/19/2020); Epidural steroid injection into lumbar spine (N/A, 2020); Epidural steroid injection into lumbar spine (N/A, 2021); Transforaminal epidural injection of steroid (Bilateral, 2021); Transforaminal epidural injection of steroid (Bilateral, 3/28/2022); and Anterior cervical discectomy w/ fusion (N/A, 2023).    Family History:  family history includes Hypertension in her father.     Social History:  Social History     Tobacco Use    Smoking status: Former     Current packs/day: 0.00     Average packs/day: 1 pack/day for 40.0 years (40.0 ttl pk-yrs)     Types: Cigarettes     Start date: 1978     Quit date: 2018     Years since quittin.7    Smokeless tobacco: Never    Tobacco comments:     smokes 2-3 cigarettes a night   Substance Use Topics    Alcohol use: Yes     Alcohol/week: 12.0 standard drinks of alcohol     Types: 6 Cans of beer, 6 Standard drinks or equivalent per week     Comment: 2-3 a day/ social    Drug use: No       Review of Systems   Constitutional:  Positive for malaise/fatigue. Negative for chills and fever.   HENT:  Positive for sore throat.    Respiratory:  Positive for cough and sputum production.    Cardiovascular:  Negative for chest pain and palpitations.   Gastrointestinal:  Negative for constipation, diarrhea, nausea and vomiting.   Genitourinary:  Negative for dysuria and hematuria.   Musculoskeletal:  Negative for falls.   Neurological:  Positive for headaches.        Medications:  Outpatient Encounter Medications as of 10/4/2023   Medication Sig Dispense Refill    ALPRAZolam (XANAX) 1 MG tablet Take 1 tablet by  mouth twice daily as needed for anxiety 60 tablet 3    amLODIPine (NORVASC) 5 MG tablet Take 1 tablet (5 mg total) by mouth once daily. 90 tablet 2    aspirin (ECOTRIN) 81 MG EC tablet Take 1 tablet (81 mg total) by mouth once daily. 30 tablet 0    azelastine (ASTELIN) 137 mcg (0.1 %) nasal spray Use 1 spray(s) in each nostril twice daily 30 mL 3    budesonide-formoterol 160-4.5 mcg (SYMBICORT) 160-4.5 mcg/actuation HFAA Inhale 2 puffs into the lungs every 12 (twelve) hours. Controller 10.2 g 11    celecoxib (CELEBREX) 200 MG capsule Take 1 capsule by mouth once daily 30 capsule 2    cholecalciferol, vitamin D3, (VITAMIN D3 ORAL) Take by mouth once daily.      FLUoxetine 40 MG capsule Take 1 capsule by mouth twice daily 180 capsule 3    fluticasone (FLONASE) 50 mcg/actuation nasal spray 1 spray by Nasal route daily as needed.       gabapentin (NEURONTIN) 600 MG tablet Take 1 tablet (600 mg total) by mouth 3 (three) times daily. 90 tablet 2    magnesium 30 mg Tab Take by mouth once.      MULTIVIT-IRON-MIN-FOLIC ACID 3,500-18-0.4 UNIT-MG-MG ORAL CHEW Take by mouth once daily.      ondansetron (ZOFRAN) 4 MG tablet Take 1 tablet (4 mg total) by mouth as needed for Nausea. 60 tablet 1    ondansetron (ZOFRAN-ODT) 4 MG TbDL Take 1 tablet (4 mg total) by mouth every 6 (six) hours as needed (NAUSEA OR VOMITING). 30 tablet 0    polyethylene glycol (GLYCOLAX) 17 gram/dose powder Take 17 g by mouth once daily. 510 g 3    zolpidem (AMBIEN) 10 mg Tab TAKE 1 TABLET BY MOUTH ONCE DAILY AT NIGHT AS NEEDED 30 tablet 2    [DISCONTINUED] HYDROcodone-acetaminophen (NORCO) 5-325 mg per tablet Take 1 tablet by mouth every 6 (six) hours as needed for Pain. (Patient not taking: Reported on 9/5/2023) 14 tablet 0    [DISCONTINUED] LIDOcaine (LIDODERM) 5 % Place 1 patch onto the skin once daily. Remove & Discard patch within 12 hours or as directed by MD (Patient not taking: Reported on 9/5/2023) 30 patch 0     Facility-Administered Encounter  "Medications as of 10/4/2023   Medication Dose Route Frequency Provider Last Rate Last Admin    electrolyte-S (ISOLYTE)   Intravenous Continuous Kota Ortega MD 10 mL/hr at 01/31/20 0624 New Bag at 01/31/20 0624    lactated ringers infusion   Intravenous Continuous Kota Ortega MD        pregabalin capsule 75 mg  75 mg Oral Once Shiela Mann MD           Allergies:  Review of patient's allergies indicates:   Allergen Reactions    Phenergan [promethazine] Hives and Rash    Latex, natural rubber Hives     Per pt report-many years    Morphine Hives    Nsaids (non-steroidal anti-inflammatory drug)      Due to "kidney cancer"       Health Maintenance:  Immunization History   Administered Date(s) Administered    COVID-19, MRNA, LN-S, PF (Pfizer) (Purple Cap) 02/10/2021, 03/03/2021, 10/05/2021    Influenza 10/16/2014    Influenza (FLUAD) - Quadrivalent - Adjuvanted - PF *Preferred* (65+) 09/29/2020, 11/16/2021, 10/04/2022    Influenza (FLUAD) - Trivalent - Adjuvanted - PF (65+) 09/11/2018    Influenza - High Dose - PF (65 years and older) 11/11/2019    Influenza - Quadrivalent - PF *Preferred* (6 months and older) 10/22/2008, 10/16/2014, 11/05/2015, 01/09/2017    Influenza A (H1N1) 2009 Monovalent - IM 01/08/2010    Influenza Split 11/05/2015, 01/09/2017    Pneumococcal Conjugate - 13 Valent 01/17/2014, 01/17/2014    Tdap 06/21/2011      Health Maintenance   Topic Date Due    High Dose Statin  Never done    Shingles Vaccine (1 of 2) Never done    LDCT Lung Screen  08/28/2016    TETANUS VACCINE  06/21/2021    Mammogram  10/04/2022    Colorectal Cancer Screening  10/25/2022    Lipid Panel  10/06/2023    Aspirin/Antiplatelet Therapy  09/06/2024    DEXA Scan  03/30/2025    Hepatitis C Screening  Completed        Physical Exam      Vital Signs  Temp: 98.3 °F (36.8 °C)  Pulse: 67  SpO2: 97 %  BP: 130/70  BP Location: Right arm  Pain Score:   8  Height and Weight  Weight: 77.9 kg (171 lb 11.8 oz)]    Physical " Exam  Constitutional:       Appearance: She is well-developed.   HENT:      Head: Normocephalic and atraumatic.      Right Ear: Tympanic membrane normal.      Left Ear: Tympanic membrane normal.      Nose: Nose normal.      Mouth/Throat:      Mouth: Mucous membranes are moist.   Eyes:      Pupils: Pupils are equal, round, and reactive to light.   Cardiovascular:      Rate and Rhythm: Normal rate and regular rhythm.      Heart sounds: Normal heart sounds. No murmur heard.  Pulmonary:      Effort: Pulmonary effort is normal. No respiratory distress.      Breath sounds: Normal breath sounds.   Abdominal:      General: There is no distension.      Palpations: Abdomen is soft.      Tenderness: There is no abdominal tenderness. There is no guarding.   Musculoskeletal:      Cervical back: Normal range of motion.   Skin:     General: Skin is warm and dry.   Neurological:      Mental Status: She is alert. Mental status is at baseline.   Psychiatric:         Behavior: Behavior normal.          Laboratory:  CBC:  Recent Labs   Lab 08/15/23  0516 08/19/23  1347 09/28/23  1105   WBC 4.59 8.17 4.60   RBC 3.90 L 4.34 4.00   Hemoglobin 12.4 13.5 12.2   Hematocrit 37.3 41.2 37.8   Platelets 179 202 214   MCV 96 95 95   MCH 31.8 H 31.1 H 30.5   MCHC 33.2 32.8 32.3     CMP:  Recent Labs   Lab 08/15/23  0516 08/19/23  1347 09/28/23  1105   Glucose 114 H 121 H 97   Calcium 9.7 10.0 9.2   Albumin 3.7 4.2 3.6   Total Protein 6.7 8.1 6.5   Sodium 140 137 140   Potassium 4.0 3.7 4.2   CO2 29 25 25   Chloride 102 98 106   BUN 12 16 10   Alkaline Phosphatase 128 144 H 109   ALT 6 L 20 11   AST 12 37 16   Total Bilirubin 0.9 0.6 0.5     URINALYSIS:  Recent Labs   Lab 03/30/21  1052 04/27/23  1322 08/13/23  1934   Color, UA Yellow   < > Yellow   Specific Gravity, UA 1.015   < > 1.015   pH, UA 7.0   < > 7.0   Protein, UA Negative   < > Negative   Bacteria Few A  --   --    Nitrite, UA Negative   < > Negative   Leukocytes, UA Trace A   < > Trace  A   Urobilinogen, UA Negative   < > Negative    < > = values in this interval not displayed.      LIPIDS:  Recent Labs   Lab 03/30/21  1052 10/01/21  0921 10/06/22  0832   TSH 1.200  --   --    HDL 80 H 90 H 75   Cholesterol 223 H 215 H 188   Triglycerides 84 70 73   LDL Cholesterol 126.2 111.0 98.4   HDL/Cholesterol Ratio 35.9 41.9 39.9   Non-HDL Cholesterol 143 125 113   Total Cholesterol/HDL Ratio 2.8 2.4 2.5     TSH:  Recent Labs   Lab 03/30/21  1052   TSH 1.200     A1C:  Recent Labs   Lab 10/01/21  0921 10/06/22  0832   Hemoglobin A1C 4.8 4.8       Assessment/Plan     Beatriz Gan is a 68 y.o.female with:    1. Malaise  - SARS Coronavirus 2 Antigen, POCT Manual Read  - POCT Influenza A/B Molecular  - POCT Rapid Strep A  - CBC W/ AUTO DIFFERENTIAL; Future  - COMPREHENSIVE METABOLIC PANEL; Future  Offered labs by Friday if no improvement     2. Viral illness  Continue symptomatic treatment. Recommended tylenol, rest, hydration.     3. Pharyngitis, unspecified etiology  - POCT Rapid Strep A- negative     4. History of renal cell carcinoma  - Ambulatory referral/consult to Nephrology; Future    5. Chronic obstructive pulmonary disease, unspecified COPD type  - X-Ray Chest PA And Lateral; Future    Discussed going to ER if SOB, or extreme lethargy.       Chronic conditions status updated as per HPI.  Other than changes above, cont current medications and maintain follow up with specialists.  No follow-ups on file.    Future Appointments   Date Time Provider Department Center   10/6/2023  8:00 AM LAB, [facility] [place]   12/4/2023  1:45 PM Jorge Alonzo MD [facility] [place]   12/29/2023 10:00 AM Juanito Kimbrough MD [facility] [place]   2/5/2024  8:00 AM Diane Limon MD [facility] NEPHRO [address] Adreinne Cotaya, FNP [facility]

## 2023-10-11 ENCOUNTER — OFFICE VISIT (OUTPATIENT)
Dept: ORTHOPEDICS | Facility: CLINIC | Age: 68
End: 2023-10-11
Payer: MEDICARE

## 2023-10-11 VITALS — HEIGHT: 65 IN | BODY MASS INDEX: 28.49 KG/M2 | WEIGHT: 171 LBS
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
## 2023-10-11 DIAGNOSIS — M17.11 PRIMARY OSTEOARTHRITIS OF RIGHT KNEE: Primary | ICD-10-CM

## 2023-10-11 PROCEDURE — 1100F PTFALLS ASSESS-DOCD GE2>/YR: CPT | Mod: CPTII,S$GLB,, | Performed by: ORTHOPAEDIC SURGERY

## 2023-10-11 PROCEDURE — 3008F PR BODY MASS INDEX (BMI) DOCUMENTED: ICD-10-PCS | Mod: CPTII,S$GLB,, | Performed by: ORTHOPAEDIC SURGERY

## 2023-10-11 PROCEDURE — 1160F RVW MEDS BY RX/DR IN RCRD: CPT | Mod: CPTII,S$GLB,, | Performed by: ORTHOPAEDIC SURGERY

## 2023-10-11 PROCEDURE — 1125F PR PAIN SEVERITY QUANTIFIED, PAIN PRESENT: ICD-10-PCS | Mod: CPTII,S$GLB,, | Performed by: ORTHOPAEDIC SURGERY

## 2023-10-11 PROCEDURE — 99213 OFFICE O/P EST LOW 20 MIN: CPT | Mod: 25,S$GLB,, | Performed by: ORTHOPAEDIC SURGERY

## 2023-10-11 PROCEDURE — 1159F MED LIST DOCD IN RCRD: CPT | Mod: CPTII,S$GLB,, | Performed by: ORTHOPAEDIC SURGERY

## 2023-10-11 PROCEDURE — 3288F PR FALLS RISK ASSESSMENT DOCUMENTED: ICD-10-PCS | Mod: CPTII,S$GLB,, | Performed by: ORTHOPAEDIC SURGERY

## 2023-10-11 PROCEDURE — 1125F AMNT PAIN NOTED PAIN PRSNT: CPT | Mod: CPTII,S$GLB,, | Performed by: ORTHOPAEDIC SURGERY

## 2023-10-11 PROCEDURE — 1100F PR PT FALLS ASSESS DOC 2+ FALLS/FALL W/INJURY/YR: ICD-10-PCS | Mod: CPTII,S$GLB,, | Performed by: ORTHOPAEDIC SURGERY

## 2023-10-11 PROCEDURE — 1160F PR REVIEW ALL MEDS BY PRESCRIBER/CLIN PHARMACIST DOCUMENTED: ICD-10-PCS | Mod: CPTII,S$GLB,, | Performed by: ORTHOPAEDIC SURGERY

## 2023-10-11 PROCEDURE — 20610 LARGE JOINT ASPIRATION/INJECTION: R KNEE: ICD-10-PCS | Mod: RT,S$GLB,, | Performed by: ORTHOPAEDIC SURGERY

## 2023-10-11 PROCEDURE — 99213 PR OFFICE/OUTPT VISIT, EST, LEVL III, 20-29 MIN: ICD-10-PCS | Mod: 25,S$GLB,, | Performed by: ORTHOPAEDIC SURGERY

## 2023-10-11 PROCEDURE — 3288F FALL RISK ASSESSMENT DOCD: CPT | Mod: CPTII,S$GLB,, | Performed by: ORTHOPAEDIC SURGERY

## 2023-10-11 PROCEDURE — 20610 DRAIN/INJ JOINT/BURSA W/O US: CPT | Mod: RT,S$GLB,, | Performed by: ORTHOPAEDIC SURGERY

## 2023-10-11 PROCEDURE — 1159F PR MEDICATION LIST DOCUMENTED IN MEDICAL RECORD: ICD-10-PCS | Mod: CPTII,S$GLB,, | Performed by: ORTHOPAEDIC SURGERY

## 2023-10-11 PROCEDURE — 3008F BODY MASS INDEX DOCD: CPT | Mod: CPTII,S$GLB,, | Performed by: ORTHOPAEDIC SURGERY

## 2023-10-11 RX ORDER — TRIAMCINOLONE ACETONIDE 40 MG/ML
40 INJECTION, SUSPENSION INTRA-ARTICULAR; INTRAMUSCULAR
Status: DISCONTINUED | OUTPATIENT
Start: 2023-10-11 | End: 2023-10-11 | Stop reason: HOSPADM

## 2023-10-11 RX ADMIN — TRIAMCINOLONE ACETONIDE 40 MG: 40 INJECTION, SUSPENSION INTRA-ARTICULAR; INTRAMUSCULAR at 12:10

## 2023-10-11 NOTE — PROGRESS NOTES
Subjective:       Patient ID: Beatriz Gan is a 68 y.o. female.    Chief Complaint: Pain of the Right Knee (Right knee pain follow up. Was unable to get her MRI due to current medical status. Had seizure and fell again. Would like inj today)      History of Present Illness    Prior to meeting with the patient I reviewed the medical chart in [facility]. This included reviewing the previous progress notes from our office, review of the patient's last appointment with their primary care provider, review of any visits to the emergency room, and review of any pain management appointments or procedures.   When last seen we had recommended an MRI of this patient however she has some medical problems that have taken precedence and the she can not have an MRI    Current Medications  Current Outpatient Medications   Medication Sig Dispense Refill    ALPRAZolam (XANAX) 1 MG tablet Take 1 tablet by mouth twice daily as needed for anxiety 60 tablet 3    amLODIPine (NORVASC) 5 MG tablet Take 1 tablet (5 mg total) by mouth once daily. 90 tablet 2    aspirin (ECOTRIN) 81 MG EC tablet Take 1 tablet (81 mg total) by mouth once daily. 30 tablet 0    azelastine (ASTELIN) 137 mcg (0.1 %) nasal spray Use 1 spray(s) in each nostril twice daily 30 mL 3    celecoxib (CELEBREX) 200 MG capsule Take 1 capsule by mouth once daily 30 capsule 2    cholecalciferol, vitamin D3, (VITAMIN D3 ORAL) Take by mouth once daily.      FLUoxetine 40 MG capsule Take 1 capsule by mouth twice daily 180 capsule 3    fluticasone (FLONASE) 50 mcg/actuation nasal spray 1 spray by Nasal route daily as needed.       magnesium 30 mg Tab Take by mouth once.      MULTIVIT-IRON-MIN-FOLIC ACID 3,500-18-0.4 UNIT-MG-MG ORAL CHEW Take by mouth once daily.      ondansetron (ZOFRAN) 4 MG tablet Take 1 tablet (4 mg total) by mouth as needed for Nausea. 60 tablet 1    ondansetron (ZOFRAN-ODT) 4 MG TbDL Take 1 tablet (4 mg total) by mouth every 6 (six) hours as needed (NAUSEA  "OR VOMITING). 30 tablet 0    polyethylene glycol (GLYCOLAX) 17 gram/dose powder Take 17 g by mouth once daily. 510 g 3    zolpidem (AMBIEN) 10 mg Tab TAKE 1 TABLET BY MOUTH ONCE DAILY AT NIGHT AS NEEDED 30 tablet 2    budesonide-formoterol 160-4.5 mcg (SYMBICORT) 160-4.5 mcg/actuation HFAA Inhale 2 puffs into the lungs every 12 (twelve) hours. Controller 10.2 g 11    gabapentin (NEURONTIN) 600 MG tablet Take 1 tablet (600 mg total) by mouth 3 (three) times daily. 90 tablet 2     No current facility-administered medications for this visit.     Facility-Administered Medications Ordered in Other Visits   Medication Dose Route Frequency Provider Last Rate Last Admin    electrolyte-S (ISOLYTE)   Intravenous Continuous Kota Ortega MD 10 mL/hr at 01/31/20 0624 New Bag at 01/31/20 0624    lactated ringers infusion   Intravenous Continuous Kota Ortega MD        pregabalin capsule 75 mg  75 mg Oral Once Shiela Mann MD           Allergies  Review of patient's allergies indicates:   Allergen Reactions    Phenergan [promethazine] Hives and Rash    Latex, natural rubber Hives     Per pt report-many years    Morphine Hives    Nsaids (non-steroidal anti-inflammatory drug)      Due to "kidney cancer"       Past Medical History  Past Medical History:   Diagnosis Date    Alcohol dependence     DDD (degenerative disc disease), lumbosacral     DJD (degenerative joint disease), lumbosacral     Encounter for blood transfusion     GERD (gastroesophageal reflux disease)     Gout     Hypercholesterolemia     Hypertension     NATHALIE (iron deficiency anemia)     questionable white coat hypertension    Kidney carcinoma, left 2014    Pneumonia     Renal cell carcinoma     Shingles 10/13/2012       Surgical History  Past Surgical History:   Procedure Laterality Date    ANTERIOR CERVICAL DISCECTOMY W/ FUSION N/A 5/4/2023    Procedure: DISCECTOMY, SPINE, CERVICAL, ANTERIOR APPROACH, WITH FUSION C3-4;  Surgeon: Ruben Martinez MD;  " Location: [facility] OR;  Service: Orthopedics;  Laterality: N/A;    APPENDECTOMY      ARTHROSCOPY OF SHOULDER WITH DECOMPRESSION OF SUBACROMIAL SPACE Left 10/31/2019    Procedure: ARTHROSCOPY, SHOULDER, WITH SUBACROMIAL SPACE DECOMPRESSION;  Surgeon: Ruben Martinez MD;  Location: [facility] OR;  Service: Orthopedics;  Laterality: Left;    BLADDER REPAIR      x 2    COLONOSCOPY  9/2011    DISTAL CLAVICLE EXCISION Left 10/31/2019    Procedure: EXCISION, CLAVICLE, DISTAL;  Surgeon: Ruben Martinez MD;  Location: [facility] OR;  Service: Orthopedics;  Laterality: Left;    EPIDURAL STEROID INJECTION INTO LUMBAR SPINE N/A 12/7/2020    Procedure: Injection-steroid-epidural-lumbar;  Surgeon: Dk Rosales MD;  Location: [facility] OR;  Service: Pain Management;  Laterality: N/A;  L4-L5    EPIDURAL STEROID INJECTION INTO LUMBAR SPINE N/A 5/17/2021    Procedure: Injection-steroid-epidural-lumbar;  Surgeon: Dk Rosales MD;  Location: [facility] OR;  Service: Pain Management;  Laterality: N/A;  L4-L5    ESOPHAGOGASTRODUODENOSCOPY  9/2011    EYE SURGERY      lasix bilateral    FIXATION KYPHOPLASTY N/A 1/31/2020    Procedure: Kyphoplasty T12;  Surgeon: kD Rosales MD;  Location: [facility] OR;  Service: Pain Management;  Laterality: N/A;    HERNIA REPAIR      hiatal hernai    HYSTERECTOMY      INJECTION OF ANESTHETIC AGENT AROUND NERVE Left 6/8/2020    Procedure: Block, Nerve;  Surgeon: Dk Rosales MD;  Location: [facility] OR;  Service: Pain Management;  Laterality: Left;  left genicular nerve block     KNEE ARTHROPLASTY Left 7/30/2020    Procedure: ARTHROPLASTY, KNEE LEFT;  Surgeon: Ruben Martinez MD;  Location: [facility] OR;  Service: Orthopedics;  Laterality: Left;  REP VALERY RUBY    KNEE ARTHROSCOPY W/ MENISCECTOMY Left 1/16/2020    Procedure: ARTHROSCOPY, KNEE, WITH MEDIAL MENISCECTOMY;  Surgeon: Ruben Martinez MD;  Location: [facility] OR;  Service: Orthopedics;  Laterality: Left;    LAPAROSCOPIC NISSEN FUNDOPLICATION      NEPHRECTOMY      LT partial     RADIOFREQUENCY ABLATION Left 6/19/2020    Procedure: Radiofrequency Ablation;  Surgeon: Dk Rosales MD;  Location: [facility] OR;  Service: Pain Management;  Laterality: Left;    RADIOFREQUENCY ABLATION OF LUMBAR MEDIAL BRANCH NERVE AT SINGLE LEVEL Bilateral 2/28/2020    Procedure: Radiofrequency Ablation, Nerve, Spinal, Lumbar, Medial Branch, 1 Level;  Surgeon: Dk Rosales MD;  Location: [facility] OR;  Service: Pain Management;  Laterality: Bilateral;  L3,L4,L5 - Burned at 80 degrees C. for 60 seconds x 2 each site    RADIOFREQUENCY ABLATION OF LUMBAR MEDIAL BRANCH NERVE AT SINGLE LEVEL Bilateral 10/19/2020    Procedure: Radiofrequency Ablation, Nerve, Spinal, Lumbar, Medial Branch, 1 Level;  Surgeon: Dk Rosales MD;  Location: [facility] OR;  Service: Pain Management;  Laterality: Bilateral;  L3, L4, L5    REFRACTIVE SURGERY      ROTATOR CUFF REPAIR Left 10/31/2019    Procedure: REPAIR, ROTATOR CUFF;  Surgeon: Ruben Martinez MD;  Location: [facility] OR;  Service: Orthopedics;  Laterality: Left;  arthrex    SKIN BIOPSY      TOTAL ABDOMINAL HYSTERECTOMY W/ BILATERAL SALPINGOOPHORECTOMY  age 48    TRANSFORAMINAL EPIDURAL INJECTION OF STEROID Bilateral 7/30/2021    Procedure: Injection,steroid,epidural,transforaminal approach;  Surgeon: Dk Rosales MD;  Location: [facility] OR;  Service: Pain Management;  Laterality: Bilateral;  L5-S1     TRANSFORAMINAL EPIDURAL INJECTION OF STEROID Bilateral 3/28/2022    Procedure: INJECTION, STEROID, EPIDURAL, TRANSFORAMINAL APPROACH Lumbar L5-S1;  Surgeon: Dk Rosales MD;  Location: [facility] OR;  Service: Pain Management;  Laterality: Bilateral;       Family History:   Family History   Problem Relation Age of Onset    Hypertension Father     Glaucoma Neg Hx     Macular degeneration Neg Hx     Retinal detachment Neg Hx        Social History:   Social History     Socioeconomic History    Marital status:    Occupational History     Employer: [employer]   Tobacco Use    Smoking  status: Former     Current packs/day: 0.00     Average packs/day: 1 pack/day for 40.0 years (40.0 ttl pk-yrs)     Types: Cigarettes     Start date: 1978     Quit date: 2018     Years since quittin.7    Smokeless tobacco: Never    Tobacco comments:     smokes 2-3 cigarettes a night   Substance and Sexual Activity    Alcohol use: Yes     Alcohol/week: 12.0 standard drinks of alcohol     Types: 6 Cans of beer, 6 Standard drinks or equivalent per week     Comment: 2-3 a day/ social    Drug use: No    Sexual activity: Yes     Partners: Male     Social Determinants of Health     Financial Resource Strain: Medium Risk (2020)    Overall Financial Resource Strain (CARDIA)     Difficulty of Paying Living Expenses: Somewhat hard   Food Insecurity: Food Insecurity Present (2020)    Hunger Vital Sign     Worried About Running Out of Food in the Last Year: Sometimes true     Ran Out of Food in the Last Year: Sometimes true   Transportation Needs: No Transportation Needs (2020)    PRAPARE - Transportation     Lack of Transportation (Medical): No     Lack of Transportation (Non-Medical): No   Physical Activity: Unknown (2020)    Exercise Vital Sign     Minutes of Exercise per Session: 0 min   Recent Concern: Physical Activity - Inactive (2020)    Exercise Vital Sign     Days of Exercise per Week: 0 days     Minutes of Exercise per Session: 10 min   Stress: Stress Concern Present (2020)    [place] [place] [employer] - Occupational Stress Questionnaire     Feeling of Stress : Very much   Social Connections: Unknown (2020)    Social Connection and Isolation Panel [NHANES]     Frequency of Communication with Friends and Family: Once a week     Frequency of Social Gatherings with Friends and Family: Never       Hospitalization/Major Diagnostic Procedure:     Review of Systems     General/Constitutional:  Chills denies. Fatigue denies. Fever denies. Weight gain denies. Weight  loss denies.    Respiratory:  Shortness of breath denies.    Cardiovascular:  Chest pain denies.    Gastrointestinal:  Constipation denies. Diarrhea denies. Nausea denies. Vomiting denies.     Hematology:  Easy bruising denies. Prolonged bleeding denies.     Genitourinary:  Frequent urination denies. Pain in lower back denies. Painful urination denies.     Musculoskeletal:  See HPI for details    Skin:  Rash denies.    Neurologic:  Dizziness denies. Gait abnormalities denies. Seizures denies. Tingling/Numbess denies.    Psychiatric:  Anxiety denies. Depressed mood denies.     Objective:   Vital Signs: There were no vitals filed for this visit.     Physical Exam      General Examination:     Constitutional: The patient is alert and oriented to lace person and time. Mood is pleasant.     Head/Face: Normal facial features normal eyebrows    Eyes: Normal extraocular motion bilaterally    Lungs: Respirations are equal and unlabored    Gait is coordinated.    Cardiovascular: There are no swelling or varicosities present.    Lymphatic: Negative for adenopathy    Skin: Normal    Neurological: Level of consciousness normal. Oriented to place person and time and situation    Psychiatric: Oriented to time place person and situation    Right knee tender mediolateral joint lines mild crepitus no instability Lachman's test negative.  XRAY Report/ Interpretation:  AP and lateral x-rays two views right knee were taken and reviewed today.  Findings minimal medial compartment narrowing overall minimal degenerative changes.      Assessment:       1. Primary osteoarthritis of right knee    2. Fall, subsequent encounter        Plan:       Beatriz was seen today for pain.    Diagnoses and all orders for this visit:    Primary osteoarthritis of right knee    Fall, subsequent encounter  -     X-Ray Knee 1 or 2 View Right    Other orders  -     Large Joint Aspiration/Injection         Follow up if symptoms worsen or fail to  improve.    Treatment options were discussed patient requested and received the 40 mg Kenalog plus xylocaine intra-articular injection right in the sterile conditions patient advised to return p.r.n. or when she has  Treatment options were discussed with regards to the nature of the medical condition. Conservative pain intervention and surgical options were discussed in detail. The probability of success of each separate treatment option was discussed. The patient expressed a clear understanding of the treatment options. With regards to surgery, the procedure risk, benefits, complications, and outcomes were discussed. No guarantees were given with regards to surgical outcome.   The risk of complications, morbidity, and mortality of patient management decisions have been made at the time of this visit. These are associated with the patient's problems, diagnostic procedures and treatment options. This includes the possible management options selected and those considered but not selected by the patient after shared medical decision making we discussed with the patient.     This note was created using Dragon voice recognition software that occasionally misinterpreted phrases or words.

## 2023-10-11 NOTE — PROCEDURES
Large Joint Aspiration/Injection: R knee    Date/Time: 10/11/2023 12:45 PM    Performed by: Ruben Martinez MD  Authorized by: Ruben Martinez MD    Consent Done?:  Yes (Verbal)  Indications:  Arthritis and pain  Site marked: the procedure site was marked    Timeout: prior to procedure the correct patient, procedure, and site was verified    Prep: patient was prepped and draped in usual sterile fashion      Local anesthesia used?: Yes    Local anesthetic:  Lidocaine 1% without epinephrine    Details:  Needle Size:  22 G  Ultrasonic Guidance for needle placement?: No    Location:  Knee  Site:  R knee  Medications:  40 mg triamcinolone acetonide 40 mg/mL  Patient tolerance:  Patient tolerated the procedure well with no immediate complications

## 2023-10-19 ENCOUNTER — PATIENT MESSAGE (OUTPATIENT)
Dept: ADMINISTRATIVE | Facility: OTHER | Age: 68
End: 2023-10-19
Payer: MEDICARE

## 2023-10-19 ENCOUNTER — OUTPATIENT CASE MANAGEMENT (OUTPATIENT)
Dept: ADMINISTRATIVE | Facility: OTHER | Age: 68
End: 2023-10-19
Payer: MEDICARE

## 2023-10-19 NOTE — LETTER
Beatriz Gan  [address] [address]    Dear Ms. Beatriz Gan,     Welcome to [facility] Outpatient Care Management Program. We are here to assist patients with multiple long-term (chronic) conditions who often need more personalized healthcare.    It was a pleasure talking with you today. My name is Jocelynn Solis. I look forward to working with you as your Care Manager. I will be contacting you by telephone routinely to help coordinate care and resolve issues.    My goal is to help you function at the healthiest and highest level possible. You can contact me directly at [phone].    As an [facility] patient with [employer], some of the services we provide, at no cost to you, include:     Development of an individualized care plan with a Registered Nurse   Connection with a   Assistance from a Community Health Worker  Connection with available resources and services    Coordinate communication among your care team members   Provide coaching and education  Help you understand your doctor's treatment plan  Help you obtain information about your insurance coverage.    All services provided by [facility]s Outpatient Care Managers and other care team members are coordinated with and communicated to your primary care team.      As part of your enrollment, you will be receiving education materials and more information about these services in your [facility] account, by phone, or through the mail. If you do not wish to participate or receive information, you can Opt Out by contacting our office at [phone].      Kind Regards,      Jocelynn Solis RN  [facility]   Outpatient Care Management

## 2023-10-19 NOTE — PROGRESS NOTES
Outpatient Care Management  Initial Patient Assessment    Patient: Beatriz Gan  MRN: [id number]  Date of Service: 10/19/2023  Completed by: Jocelynn Solis RN  Referral Date: 08/28/2023  Program: High Risk  Status: Ongoing  Effective Dates: 10/19/2023 - present  Responsible Staff: Jocelynn Solis RN        Reason for Visit   Patient presents with    OPCM Chart Review     10/19/23    Nursing Assessment     10/19/23    OPCM Enrollment Call     10/19/23       Brief Summary:  Beatriz Gan was referred by Nicolasa Pineda NP for Brain mass and transportation. Patient qualifies for program based on risk score of 69%.   Active problem list, medical, surgical and social history reviewed. Active comorbidities include HTN, Hyperlipidemia, COPD, Chronic pain, and Meningioma. Areas of need identified by patient include financial, transportation assistance, and help managing health. Referral has been sent to [facility] .   Next steps: Follow up with patient on or around 11/2/23.    Disability Status  Is the patient alert and oriented (person, place, time, and situation)?: Alert and oriented x 4  Hearing Difficulty or Deaf: no (no difficulties)  Visual Difficulty or Blind: yes  Visual and Hearing Needs Conclusion: has trouble with vision- uses glasses or magnifiying glass  Vision Management: Magnifier (readers)  Difficulty Concentrating, Remembering or Making Decisions: no  Communication Difficulty: no  Eating/Swallowing Difficulty: no  Walking or Climbing Stairs Difficulty: yes  Walking or Climbing Stairs: ambulation difficulty, requires equipment  Mobility Management: uses cane or walker  Dressing/Bathing Difficulty: no  Toileting : Independent (no issues)  Continence : Continence - Not a problem  Difficulty Managing Errands Independently: no  Equipment Currently Used at Home: blood pressure machine; cane, quad; walker, rolling  ADL Conclusion Statement: Patient can still drive short  distances; Able to cook, feed herself, dress and bathe herself, and use restroom independently. Does home chores.  Change in Functional Status Since Onset of Current Illness/Injury: yes (Patient has benign brain tumor and she sometimes has seizures. Says she feels tired a lot of the time.)        Spiritual Beliefs  Spiritual, Cultural Beliefs, Synagogue Practices, Values that Affect Care: no      Social History     Socioeconomic History    Marital status:    Occupational History     Employer: [employer]   Tobacco Use    Smoking status: Former     Current packs/day: 0.00     Average packs/day: 1 pack/day for 40.0 years (40.0 ttl pk-yrs)     Types: Cigarettes     Start date: 1978     Quit date: 2018     Years since quittin.8    Smokeless tobacco: Never    Tobacco comments:     smokes 2-3 cigarettes a night   Substance and Sexual Activity    Alcohol use: Yes     Alcohol/week: 12.0 standard drinks of alcohol     Types: 6 Cans of beer, 6 Standard drinks or equivalent per week     Comment: 2-3 a day/ social    Drug use: No    Sexual activity: Yes     Partners: Male     Social Determinants of Health     Financial Resource Strain: High Risk (10/19/2023)    Overall Financial Resource Strain (CARDIA)     Difficulty of Paying Living Expenses: Hard   Food Insecurity: Food Insecurity Present (10/19/2023)    Hunger Vital Sign     Worried About Running Out of Food in the Last Year: Sometimes true     Ran Out of Food in the Last Year: Sometimes true   Transportation Needs: Unmet Transportation Needs (10/19/2023)    PRAPARE - Transportation     Lack of Transportation (Medical): Yes     Lack of Transportation (Non-Medical): No   Physical Activity: Inactive (10/19/2023)    Exercise Vital Sign     Days of Exercise per Week: 0 days     Minutes of Exercise per Session: 0 min   Stress: Stress Concern Present (10/19/2023)    [place] [employer] - Occupational Stress Questionnaire     Feeling of Stress  : Very much   Social Connections: Moderately Integrated (10/19/2023)    Social Connection and Isolation Panel [NHANES]     Frequency of Communication with Friends and Family: Twice a week     Frequency of Social Gatherings with Friends and Family: Once a week     Attends Tenriism Services: More than 4 times per year     Active Member of Clubs or Organizations: Yes     Marital Status:    Housing Stability: Low Risk  (10/19/2023)    Housing Stability Vital Sign     Unable to Pay for Housing in the Last Year: No     Number of Places Lived in the Last Year: 1     Unstable Housing in the Last Year: No       Roles and Relationships  Primary Source of Support/Comfort: extended family  Name of Support/Comfort Primary Source: Lucila White  Secondary Source of Support/Comfort: friend  Name of Support/Comfort Secondary Source: Marcy Fernandez      Advance Directives (For Healthcare)  Advance Directive  (If Adv Dir status is received, view document under Adv Dir in header or Chart Review Media tab): Patient has advance directive, copy not received.  Patient Requests Assistance: Patient will do independently        Patient Reported Insurance  Verified current insurance plan:: [employer] Advantage  [employer] benefits discussed:: Mail Order Pharmacy            10/19/2023     3:00 PM 10/11/2023    12:48 PM 10/4/2023     9:06 AM 8/7/2023     1:46 PM 7/21/2023     9:20 AM 6/28/2023    12:49 PM 4/27/2022     9:42 AM   Depression Patient Health Questionnaire   Over the last two weeks how often have you been bothered by little interest or pleasure in doing things Not at all Several days Not at all Not at all Not at all Not at all Nearly every day   Over the last two weeks how often have you been bothered by feeling down, depressed or hopeless Several days Several days Not at all Several days Several days Not at all Nearly every day   PHQ-2 Total Score 1 2 0 1 1 0 6   Over the last two weeks how often have you been bothered by  trouble falling or staying asleep, or sleeping too much       More than half the days   Over the last two weeks how often have you been bothered by feeling tired or having little energy       Several days   Over the last two weeks how often have you been bothered by a poor appetite or overeating       Several days   Over the last two weeks how often have you been bothered by feeling bad about yourself - or that you are a failure or have let yourself or your family down       Several days   Over the last two weeks how often have you been bothered by trouble concentrating on things, such as reading the newspaper or watching television       More than half the days   Over the last two weeks how often have you been bothered by moving or speaking so slowly that other people could have noticed. Or the opposite - being so fidgety or restless that you have been moving around a lot more than usual.       Not at all   Over the last two weeks how often have you been bothered by thoughts that you would be better off dead, or of hurting yourself       Not at all   If you checked off any problems, how difficult have these problems made it for you to do your work, take care of things at home or get along with other people?       Somewhat difficult   PHQ-9 Score       13   PHQ-9 Interpretation       Moderate       Learning Assessment       10/19/2023 1455 [facility] (10/19/2023 - Present)   Created by Jocelynn Solis, RN -  (Nurse) Status: Complete                 PRIMARY LEARNER     Primary Learner Name:  Beatriz Gan  - 10/19/2023 1455    Relationship:  Patient BB - 10/19/2023 1455    Does the primary learner have any barriers to learning?:  Visual  - 10/19/2023 1455    Comment: uses readers and magnifying glasses     What is the preferred language of the primary learner?:  English  - 10/19/2023 1455    How does the primary learner prefer to learn new concepts?:  Reading, Listening  -  10/19/2023 1457    How often do you need to have someone help you read instructions, pamphlets, or written material from your doctor or pharmacy?:  Sometimes BB - 10/19/2023 1455        CO-LEARNER #1     No question answered        CO-LEARNER #2     No question answered        SPECIAL TOPICS     No question answered        ANSWERED BY:     No question answered        Edit History       Jocelynn Solis, RN -  (Nurse)   10/19/2023 1458

## 2023-10-26 ENCOUNTER — OUTPATIENT CASE MANAGEMENT (OUTPATIENT)
Dept: ADMINISTRATIVE | Facility: OTHER | Age: 68
End: 2023-10-26
Payer: MEDICARE

## 2023-11-01 ENCOUNTER — OUTPATIENT CASE MANAGEMENT (OUTPATIENT)
Dept: ADMINISTRATIVE | Facility: OTHER | Age: 68
End: 2023-11-01
Payer: MEDICARE

## 2023-11-01 NOTE — PROGRESS NOTES
Outpatient Care Management   - High Risk Patient Assessment    Patient: Beatriz Gan  MRN:  [id number]  Date of Service:  11/1/2023  Completed by:  Zully Patton LCSW  Referral Date: 08/28/2023    Reason for Visit   Patient presents with    Other     11/1/2023  1st attempt to complete Initial Assessment  for Outpatient Care Management, left message.        Social Work Assessment - High Risk       Brief Summary:  received a referral from OPCM WILDER Scales for the following patient identified psycho-social needs of pt needing transportation, utility bill assistance and [facility] assistance. Care plan was created in collaboration with patient/caregiver input.

## 2023-11-07 ENCOUNTER — OUTPATIENT CASE MANAGEMENT (OUTPATIENT)
Dept: ADMINISTRATIVE | Facility: OTHER | Age: 68
End: 2023-11-07
Payer: MEDICARE

## 2023-11-07 NOTE — PROGRESS NOTES
11/7/23 1st attempt to complete follow-up for Outpatient Care Management: Left message for patient requesting a return call. OPCM letter has been sent. Will attempt to contact patient again at a later date.

## 2023-11-07 NOTE — LETTER
Beatriz Rodriguezreiber  [address] [address]      Dear Ms. Gan,  I am your nurse with [facility]s Outpatient Care Management Department. I have been unsuccessful at reaching you since we spoke on 10/19.  At your earliest convenience, I would like to discuss your healthcare progress.      Please contact me at [phone] from 8:00 AM to 4:30 PM on Monday through Friday.     As you know, [facility] is a program offered to you through [facility] where a nurse is available 24/7 to answer questions or provide medical advice, their number is [phone].    Kind Regards,      Jocelynn Solis, RN  Outpatient Care Management

## 2023-11-08 ENCOUNTER — OUTPATIENT CASE MANAGEMENT (OUTPATIENT)
Dept: ADMINISTRATIVE | Facility: OTHER | Age: 68
End: 2023-11-08
Payer: MEDICARE

## 2023-11-09 DIAGNOSIS — F51.01 PRIMARY INSOMNIA: ICD-10-CM

## 2023-11-09 RX ORDER — ZOLPIDEM TARTRATE 10 MG/1
10 TABLET ORAL NIGHTLY PRN
Qty: 30 TABLET | Refills: 0 | Status: SHIPPED | OUTPATIENT
Start: 2023-11-09 | End: 2023-12-08 | Stop reason: SDUPTHER

## 2023-11-09 NOTE — TELEPHONE ENCOUNTER
No care due was identified.  Health [facility] Embedded Care Due Messages. Reference number: [id number].   11/09/2023 11:21:54 AM CST

## 2023-11-15 ENCOUNTER — OUTPATIENT CASE MANAGEMENT (OUTPATIENT)
Dept: ADMINISTRATIVE | Facility: OTHER | Age: 68
End: 2023-11-15
Payer: MEDICARE

## 2023-11-29 ENCOUNTER — OUTPATIENT CASE MANAGEMENT (OUTPATIENT)
Dept: ADMINISTRATIVE | Facility: OTHER | Age: 68
End: 2023-11-29
Payer: MEDICARE

## 2023-11-29 NOTE — PROGRESS NOTES
Outpatient Care Management  Patient Not Qualified    Patient: Beatriz Gan  MRN:  [id number]  Date of Service:  11/29/2023  Completed by:  Jocelynn Solis RN    Chief Complaint   Patient presents with    OPCM Chart Review     11/29/23    Case Closure     11/29/23       Patient Summary                11/29/23 Unable to reach patient for follow up after multiple attempts. OPCM letter has been sent. No response from patient. Closing case.

## 2023-12-01 ENCOUNTER — PATIENT OUTREACH (OUTPATIENT)
Dept: ADMINISTRATIVE | Facility: HOSPITAL | Age: 68
End: 2023-12-01
Payer: MEDICARE

## 2023-12-04 ENCOUNTER — OFFICE VISIT (OUTPATIENT)
Dept: PRIMARY CARE CLINIC | Facility: CLINIC | Age: 68
End: 2023-12-04
Payer: MEDICARE

## 2023-12-04 VITALS
SYSTOLIC BLOOD PRESSURE: 130 MMHG | HEIGHT: 65 IN | WEIGHT: 181.44 LBS | DIASTOLIC BLOOD PRESSURE: 90 MMHG | BODY MASS INDEX: 30.23 KG/M2

## 2023-12-04 DIAGNOSIS — G93.89 BRAIN MASS: ICD-10-CM

## 2023-12-04 DIAGNOSIS — K21.9 GASTROESOPHAGEAL REFLUX DISEASE WITHOUT ESOPHAGITIS: ICD-10-CM

## 2023-12-04 DIAGNOSIS — J06.9 URTI (ACUTE UPPER RESPIRATORY INFECTION): ICD-10-CM

## 2023-12-04 DIAGNOSIS — Z12.31 SCREENING MAMMOGRAM, ENCOUNTER FOR: ICD-10-CM

## 2023-12-04 DIAGNOSIS — D50.9 IRON DEFICIENCY ANEMIA, UNSPECIFIED IRON DEFICIENCY ANEMIA TYPE: ICD-10-CM

## 2023-12-04 DIAGNOSIS — J44.9 CHRONIC OBSTRUCTIVE PULMONARY DISEASE, UNSPECIFIED COPD TYPE: ICD-10-CM

## 2023-12-04 DIAGNOSIS — R06.09 DYSPNEA ON EXERTION: Primary | ICD-10-CM

## 2023-12-04 DIAGNOSIS — I10 ESSENTIAL HYPERTENSION: ICD-10-CM

## 2023-12-04 DIAGNOSIS — E78.2 HYPERLIPIDEMIA, MIXED: ICD-10-CM

## 2023-12-04 PROCEDURE — 3008F PR BODY MASS INDEX (BMI) DOCUMENTED: ICD-10-PCS | Mod: CPTII,S$GLB,, | Performed by: INTERNAL MEDICINE

## 2023-12-04 PROCEDURE — 1159F MED LIST DOCD IN RCRD: CPT | Mod: CPTII,S$GLB,, | Performed by: INTERNAL MEDICINE

## 2023-12-04 PROCEDURE — 1159F PR MEDICATION LIST DOCUMENTED IN MEDICAL RECORD: ICD-10-PCS | Mod: CPTII,S$GLB,, | Performed by: INTERNAL MEDICINE

## 2023-12-04 PROCEDURE — 3075F SYST BP GE 130 - 139MM HG: CPT | Mod: CPTII,S$GLB,, | Performed by: INTERNAL MEDICINE

## 2023-12-04 PROCEDURE — 3075F PR MOST RECENT SYSTOLIC BLOOD PRESS GE 130-139MM HG: ICD-10-PCS | Mod: CPTII,S$GLB,, | Performed by: INTERNAL MEDICINE

## 2023-12-04 PROCEDURE — 3080F DIAST BP >= 90 MM HG: CPT | Mod: CPTII,S$GLB,, | Performed by: INTERNAL MEDICINE

## 2023-12-04 PROCEDURE — 99999 PR PBB SHADOW E&M-EST. PATIENT-LVL V: CPT | Mod: PBBFAC,,, | Performed by: INTERNAL MEDICINE

## 2023-12-04 PROCEDURE — 1125F AMNT PAIN NOTED PAIN PRSNT: CPT | Mod: CPTII,S$GLB,, | Performed by: INTERNAL MEDICINE

## 2023-12-04 PROCEDURE — 3080F PR MOST RECENT DIASTOLIC BLOOD PRESSURE >= 90 MM HG: ICD-10-PCS | Mod: CPTII,S$GLB,, | Performed by: INTERNAL MEDICINE

## 2023-12-04 PROCEDURE — 3008F BODY MASS INDEX DOCD: CPT | Mod: CPTII,S$GLB,, | Performed by: INTERNAL MEDICINE

## 2023-12-04 PROCEDURE — 99214 OFFICE O/P EST MOD 30 MIN: CPT | Mod: S$GLB,,, | Performed by: INTERNAL MEDICINE

## 2023-12-04 PROCEDURE — 99214 PR OFFICE/OUTPT VISIT, EST, LEVL IV, 30-39 MIN: ICD-10-PCS | Mod: S$GLB,,, | Performed by: INTERNAL MEDICINE

## 2023-12-04 PROCEDURE — 1125F PR PAIN SEVERITY QUANTIFIED, PAIN PRESENT: ICD-10-PCS | Mod: CPTII,S$GLB,, | Performed by: INTERNAL MEDICINE

## 2023-12-04 PROCEDURE — 99999 PR PBB SHADOW E&M-EST. PATIENT-LVL V: ICD-10-PCS | Mod: PBBFAC,,, | Performed by: INTERNAL MEDICINE

## 2023-12-04 RX ORDER — METHYLPREDNISOLONE 4 MG/1
TABLET ORAL
Qty: 21 EACH | Refills: 0 | Status: SHIPPED | OUTPATIENT
Start: 2023-12-04 | End: 2023-12-25

## 2023-12-04 NOTE — PROGRESS NOTES
[facility] Note    Chief Complaint      Chief Complaint   Patient presents with    Follow-up       History of Present Illness      Beatriz Gan is a 68 y.o. female with chronic conditions of HTN, Anxiety, insomnia, osteoarthritis left shoulder, chronic low back pain, hx of RCC who presents today for: follow up chronic conditions.  Complains of fatigue, shortness of breath with light activity, indigestion, episodes of lightheadedness.  Had COVID 8/2023 and hasn't felt right since.    Complains of right posterior knee pain and bilateral quadriceps weakness and pain.  Saw Dr. Martienz, ortho, who gave a right knee steroid injection without benefit.    Has been referred to nephrology and has appt 2/2024 to establish.   Pt has quit smoking and quit alcohol.     Brain mass: Sees Dr. Walsh, neurosurgery, has appt to establish with neurology.    HTN: BP at goal on amlodipine.  Stopping losartan due to cough.  Anxiety: Incompletely controlled on prozac, xanax as needed.    Insomnia: Controlled on ambien.  No new or worsening symptoms  Osteoarthritis, shoulder, chronic low back pain:  Sees Dr. Martinez and Dr. Rosales.  Doing much better on gabapentin and diclofenac. Restarted PT.  Hx of RCC: Sees Dr. Morrissey for surveillance.    Osteoporosis: Previously on Prolia.  Bone density does not indicate restarting treatment.  Will repeat DEXA in 2024.  Flu shot due.  TdAP 2011.  Prevnar UTD.    Mammogram 2021.  DEXA 2019.    Cscope 15 yrs ago.  Cologuard UTD per pt?  Due for FOBT       Past Medical History:  Past Medical History:   Diagnosis Date    Alcohol dependence     DDD (degenerative disc disease), lumbosacral     DJD (degenerative joint disease), lumbosacral     Encounter for blood transfusion     GERD (gastroesophageal reflux disease)     Gout     Hypercholesterolemia     Hypertension     NATHALIE (iron deficiency anemia)     questionable white coat hypertension    Kidney carcinoma, left 2014    Pneumonia      Renal cell carcinoma     Shingles 10/13/2012       Past Surgical History:   has a past surgical history that includes Total abdominal hysterectomy w/ bilateral salpingoophorectomy (age 48); Appendectomy; Bladder repair; Colonoscopy (2011); Esophagogastroduodenoscopy (2011); Hysterectomy; Refractive surgery; Nephrectomy; Laparoscopic Nissen fundoplication; Eye surgery; Hernia repair; Skin biopsy; Rotator cuff repair (Left, 10/31/2019); Distal clavicle excision (Left, 10/31/2019); Arthroscopy of shoulder with decompression of subacromial space (Left, 10/31/2019); Knee arthroscopy w/ meniscectomy (Left, 2020); Fixation Kyphoplasty (N/A, 2020); Radiofrequency ablation of lumbar medial branch nerve at single level (Bilateral, 2020); Injection of anesthetic agent around nerve (Left, 2020); Radiofrequency ablation (Left, 2020); Knee Arthroplasty (Left, 2020); Radiofrequency ablation of lumbar medial branch nerve at single level (Bilateral, 10/19/2020); Epidural steroid injection into lumbar spine (N/A, 2020); Epidural steroid injection into lumbar spine (N/A, 2021); Transforaminal epidural injection of steroid (Bilateral, 2021); Transforaminal epidural injection of steroid (Bilateral, 3/28/2022); and Anterior cervical discectomy w/ fusion (N/A, 2023).    Family History:  family history includes Hypertension in her father.     Social History:  Social History     Tobacco Use    Smoking status: Former     Current packs/day: 0.00     Average packs/day: 1 pack/day for 40.0 years (40.0 ttl pk-yrs)     Types: Cigarettes     Start date: 1978     Quit date: 2018     Years since quittin.9    Smokeless tobacco: Never    Tobacco comments:     smokes 2-3 cigarettes a night   Substance Use Topics    Alcohol use: Yes     Alcohol/week: 12.0 standard drinks of alcohol     Types: 6 Cans of beer, 6 Standard drinks or equivalent per week     Comment: 2-3 a day/ social    Drug  use: No       I personally reviewed all past medical, surgical, social and family history.    Review of Systems   Constitutional:  Negative for chills, fever and malaise/fatigue.   Respiratory:  Negative for shortness of breath.    Cardiovascular:  Negative for chest pain.   Gastrointestinal:  Negative for constipation, diarrhea, nausea and vomiting.   Skin:  Negative for rash.   Neurological:  Negative for weakness.   All other systems reviewed and are negative.       Medications:  Outpatient Encounter Medications as of 12/4/2023   Medication Sig Dispense Refill    ALPRAZolam (XANAX) 1 MG tablet Take 1 tablet by mouth twice daily as needed for anxiety 60 tablet 3    amLODIPine (NORVASC) 5 MG tablet Take 1 tablet (5 mg total) by mouth once daily. 90 tablet 2    aspirin (ECOTRIN) 81 MG EC tablet Take 1 tablet (81 mg total) by mouth once daily. 30 tablet 0    azelastine (ASTELIN) 137 mcg (0.1 %) nasal spray Use 1 spray(s) in each nostril twice daily 30 mL 3    budesonide-formoterol 160-4.5 mcg (SYMBICORT) 160-4.5 mcg/actuation HFAA Inhale 2 puffs into the lungs every 12 (twelve) hours. Controller 10.2 g 11    celecoxib (CELEBREX) 200 MG capsule Take 1 capsule by mouth once daily 30 capsule 2    cholecalciferol, vitamin D3, (VITAMIN D3 ORAL) Take by mouth once daily.      FLUoxetine 40 MG capsule Take 1 capsule by mouth twice daily 180 capsule 3    fluticasone (FLONASE) 50 mcg/actuation nasal spray 1 spray by Nasal route daily as needed.       gabapentin (NEURONTIN) 600 MG tablet Take 1 tablet (600 mg total) by mouth 3 (three) times daily. 90 tablet 2    magnesium 30 mg Tab Take by mouth once.      methylPREDNISolone (MEDROL DOSEPACK) 4 mg tablet use as directed 21 each 0    MULTIVIT-IRON-MIN-FOLIC ACID 3,500-18-0.4 UNIT-MG-MG ORAL CHEW Take by mouth once daily.      ondansetron (ZOFRAN) 4 MG tablet Take 1 tablet (4 mg total) by mouth as needed for Nausea. 60 tablet 1    ondansetron (ZOFRAN-ODT) 4 MG TbDL Take 1 tablet  "(4 mg total) by mouth every 6 (six) hours as needed (NAUSEA OR VOMITING). 30 tablet 0    polyethylene glycol (GLYCOLAX) 17 gram/dose powder Take 17 g by mouth once daily. 510 g 3    zolpidem (AMBIEN) 10 mg Tab TAKE 1 TABLET BY MOUTH ONCE DAILY AT NIGHT AS NEEDED 30 tablet 0    [DISCONTINUED] zolpidem (AMBIEN) 10 mg Tab TAKE 1 TABLET BY MOUTH ONCE DAILY AT NIGHT AS NEEDED 30 tablet 2     Facility-Administered Encounter Medications as of 12/4/2023   Medication Dose Route Frequency Provider Last Rate Last Admin    electrolyte-S (ISOLYTE)   Intravenous Continuous Kota Ortega MD 10 mL/hr at 01/31/20 0624 New Bag at 01/31/20 0624    lactated ringers infusion   Intravenous Continuous Kota Ortega MD        pregabalin capsule 75 mg  75 mg Oral Once Shiela Mann MD           Allergies:  Review of patient's allergies indicates:   Allergen Reactions    Phenergan [promethazine] Hives and Rash    Latex, natural rubber Hives     Per pt report-many years    Morphine Hives    Nsaids (non-steroidal anti-inflammatory drug)      Due to "kidney cancer"       Health Maintenance:  Immunization History   Administered Date(s) Administered    COVID-19, MRNA, LN-S, PF (Pfizer) (Purple Cap) 02/10/2021, 03/03/2021, 10/05/2021    COVID-19, mRNA, LNP-S, PF, cassy-sucrose, 30 mcg/0.3 mL (Pfizer 2023 Ages 12+) 10/31/2023    Influenza 10/16/2014    Influenza (FLUAD) - Quadrivalent - Adjuvanted - PF *Preferred* (65+) 09/29/2020, 11/16/2021, 10/04/2022    Influenza (FLUAD) - Trivalent - Adjuvanted - PF (65+) 09/11/2018    Influenza - High Dose - PF (65 years and older) 11/11/2019    Influenza - Quadrivalent - PF *Preferred* (6 months and older) 10/22/2008, 10/16/2014, 11/05/2015, 01/09/2017    Influenza A (H1N1) 2009 Monovalent - IM 01/08/2010    Influenza Split 11/05/2015, 01/09/2017    Pneumococcal Conjugate - 13 Valent 01/17/2014, 01/17/2014    Tdap 06/21/2011      Health Maintenance   Topic Date Due    High Dose Statin  Never done    " "Shingles Vaccine (1 of 2) Never done    LDCT Lung Screen  08/28/2016    TETANUS VACCINE  06/21/2021    Mammogram  10/04/2022    Colorectal Cancer Screening  10/25/2022    Lipid Panel  10/06/2023    Aspirin/Antiplatelet Therapy  10/19/2024    DEXA Scan  03/30/2025    Hepatitis C Screening  Completed        Physical Exam      Vital Signs  BP: (!) 130/90  BP Location: Left arm  Patient Position: Sitting  Pain Score:   8  Pain Loc: Knee  Height and Weight  Height: 5' 5" (165.1 cm)  Weight: 82.3 kg (181 lb 7 oz)  BSA (Calculated - sq m): 1.94 sq meters  BMI (Calculated): 30.2  Weight in (lb) to have BMI = 25: 149.9]    Physical Exam  Vitals reviewed.   Constitutional:       Appearance: She is well-developed.   HENT:      Head: Normocephalic and atraumatic.      Right Ear: External ear normal.      Left Ear: External ear normal.   Cardiovascular:      Rate and Rhythm: Normal rate and regular rhythm.      Heart sounds: Normal heart sounds. No murmur heard.  Pulmonary:      Effort: Pulmonary effort is normal.      Breath sounds: Normal breath sounds. No wheezing or rales.   Abdominal:      General: Bowel sounds are normal. There is no distension.      Palpations: Abdomen is soft.      Tenderness: There is no abdominal tenderness.          Laboratory:  CBC:  Recent Labs   Lab 08/15/23  0516 08/19/23  1347 09/28/23  1105   WBC 4.59 8.17 4.60   RBC 3.90 L 4.34 4.00   Hemoglobin 12.4 13.5 12.2   Hematocrit 37.3 41.2 37.8   Platelets 179 202 214   MCV 96 95 95   MCH 31.8 H 31.1 H 30.5   MCHC 33.2 32.8 32.3     CMP:  Recent Labs   Lab 08/15/23  0516 08/19/23  1347 09/28/23  1105   Glucose 114 H 121 H 97   Calcium 9.7 10.0 9.2   Albumin 3.7 4.2 3.6   Total Protein 6.7 8.1 6.5   Sodium 140 137 140   Potassium 4.0 3.7 4.2   CO2 29 25 25   Chloride 102 98 106   BUN 12 16 10   Alkaline Phosphatase 128 144 H 109   ALT 6 L 20 11   AST 12 37 16   Total Bilirubin 0.9 0.6 0.5     URINALYSIS:  Recent Labs   Lab 03/30/21  1052 04/27/23  1322 " 08/13/23  1934   Color, UA Yellow   < > Yellow   Specific Gravity, UA 1.015   < > 1.015   pH, UA 7.0   < > 7.0   Protein, UA Negative   < > Negative   Bacteria Few A  --   --    Nitrite, UA Negative   < > Negative   Leukocytes, UA Trace A   < > Trace A   Urobilinogen, UA Negative   < > Negative    < > = values in this interval not displayed.      LIPIDS:  Recent Labs   Lab 03/30/21  1052 10/01/21  0921 10/06/22  0832   TSH 1.200  --   --    HDL 80 H 90 H 75   Cholesterol 223 H 215 H 188   Triglycerides 84 70 73   LDL Cholesterol 126.2 111.0 98.4   HDL/Cholesterol Ratio 35.9 41.9 39.9   Non-HDL Cholesterol 143 125 113   Total Cholesterol/HDL Ratio 2.8 2.4 2.5     TSH:  Recent Labs   Lab 03/30/21  1052   TSH 1.200     A1C:  Recent Labs   Lab 10/01/21  0921 10/06/22  0832   Hemoglobin A1C 4.8 4.8       Assessment/Plan     eBatriz Gan is a 68 y.o.female with:    1. Dyspnea on exertion  - Ambulatory referral/consult to Pulmonology; Future  - Ambulatory referral/consult to Cardiology; Future    2. URTI (acute upper respiratory infection)  - methylPREDNISolone (MEDROL DOSEPACK) 4 mg tablet; use as directed  Dispense: 21 each; Refill: 0    3. Iron deficiency anemia, unspecified iron deficiency anemia type  - CBC Auto Differential; Future    4. Chronic obstructive pulmonary disease, unspecified COPD type    5. Gastroesophageal reflux disease without esophagitis    6. Brain mass    7. Essential hypertension  - Comprehensive Metabolic Panel; Future    8. Hyperlipidemia, mixed  - Comprehensive Metabolic Panel; Future  - Lipid Panel; Future    9. Screening mammogram, encounter for  - Mammo Digital Screening Bilat w/ Law; Future       Chronic conditions status updated as per HPI.  Other than changes above, cont current medications and maintain follow up with specialists.  No follow-ups on file.    Future Appointments   Date Time Provider Department Center   12/29/2023 10:00 AM Juanito Kimbrough MD [facility] NEURO [facility]    2/5/2024  8:00 AM Diane Limon MD [facility] NEPHRO [facility]       Jorge Alonzo MD  [facility] [place]

## 2023-12-08 DIAGNOSIS — F51.01 PRIMARY INSOMNIA: ICD-10-CM

## 2023-12-08 RX ORDER — ZOLPIDEM TARTRATE 10 MG/1
10 TABLET ORAL NIGHTLY PRN
Qty: 30 TABLET | Refills: 2 | Status: SHIPPED | OUTPATIENT
Start: 2023-12-08 | End: 2024-03-04

## 2023-12-08 NOTE — TELEPHONE ENCOUNTER
No care due was identified.  Health [facility] Embedded Care Due Messages. Reference number: [id number].   12/08/2023 3:39:14 PM CST

## 2023-12-08 NOTE — TELEPHONE ENCOUNTER
----- Message from Johanny Javi sent at 12/8/2023  3:26 PM CST -----  Contact: patient [phone]  Requesting an RX refill or new RX.  Is this a refill or new RX: new  RX name and strength (copy/paste from chart):  zolpidem (AMBIEN) 10 mg Tab  Is this a 30 day or 90 day RX:   Pharmacy name and phone # (copy/paste from chart):      [id number] - [place] - [address] AT [facility] [address]  [address] [address]  Phone: [phone] Fax: [phone]     The doctors have asked that we provide their patients with the following 2 reminders -- prescription refills can take up to 72 hours, and a friendly reminder that in the future you can use your [facility] account to request refills: yes

## 2023-12-19 DIAGNOSIS — M51.36 DEGENERATIVE DISC DISEASE, LUMBAR: ICD-10-CM

## 2023-12-19 RX ORDER — CELECOXIB 200 MG/1
200 CAPSULE ORAL
Qty: 30 CAPSULE | Refills: 2 | Status: SHIPPED | OUTPATIENT
Start: 2023-12-19 | End: 2024-03-28

## 2023-12-19 NOTE — TELEPHONE ENCOUNTER
No care due was identified.  [facility] Embedded Care Due Messages. Reference number: [id number].   12/19/2023 8:09:13 AM CST  
Please see the attached refill request.  
Abdominal Pain, N/V/D
soft

## 2024-01-09 ENCOUNTER — TELEPHONE (OUTPATIENT)
Dept: PRIMARY CARE CLINIC | Facility: CLINIC | Age: 69
End: 2024-01-09

## 2024-01-09 DIAGNOSIS — D32.9 MENINGIOMA: Primary | ICD-10-CM

## 2024-01-09 DIAGNOSIS — G40.909 SEIZURE DISORDER: ICD-10-CM

## 2024-01-09 DIAGNOSIS — F41.9 ANXIETY: ICD-10-CM

## 2024-01-09 RX ORDER — ALPRAZOLAM 1 MG/1
1 TABLET ORAL 2 TIMES DAILY
Qty: 60 TABLET | Refills: 0 | Status: SHIPPED | OUTPATIENT
Start: 2024-01-09 | End: 2024-02-09

## 2024-01-09 NOTE — TELEPHONE ENCOUNTER
No care due was identified.  [facility] Embedded Care Due Messages. Reference number: [id number].   1/09/2024 10:52:44 AM CST

## 2024-01-09 NOTE — TELEPHONE ENCOUNTER
Asking for neuro referral to see someone in [place]. States it is for her tumor? Pt has brain mass in dx list.

## 2024-01-09 NOTE — TELEPHONE ENCOUNTER
----- Message from Vicenta Perry sent at 1/9/2024 10:41 AM CST -----  Contact: Self/ [phone]  Requesting an RX refill or new RX.  Is this a refill or new RX: New   RX name and strength : ALPRAZolam (XANAX) 1 MG tablet   Is this a 30 day or 90 day RX: 30  Pharmacy name and phone # :  [id number] - [place]  [address] AT [address] [facility] [address]  [facility] [address]  Phone: [phone] Fax: [phone]     The doctors have asked that we provide their patients with the following 2 reminders -- prescription refills can take up to 72 hours, and a friendly reminder that in the future you can use your [facility] account to request refills: pt stated that she is out of her medication

## 2024-01-09 NOTE — TELEPHONE ENCOUNTER
----- Message from Annmarie Daley sent at 1/9/2024  1:16 PM CST -----  Contact: [phone] Patient  Patient is returning a phone call.  Who left a message for the patient: Jasmin Wren MA  Does patient know what this is regarding:    Would you like a call back, or a response through your [facility] portal?:   Call Back Please.  Comments: Pt does not want to keep coming to [place] to find out the appointment is canceled. The pt would like to see Neurology and a Neurosurgeon in [place] for her tumor.

## 2024-01-09 NOTE — TELEPHONE ENCOUNTER
----- Message from Vicenta Perry sent at 1/9/2024 10:44 AM CST -----  Contact: Self/ [phone]   Reason for the appointment:  discuss medical condition   Patient preference of timeframe to be scheduled:  pt is requesting a virtual visit for 1/11  Would the patient like a call back, or a response through their [facility] portal?:   call back   Comments:

## 2024-01-09 NOTE — TELEPHONE ENCOUNTER
----- Message from Marge Collado sent at 1/9/2024 12:55 PM CST -----  Contact: Beatriz   Beatriz would like a call back. She would like to be referred to a Neurologist & Neuo surgeon in [place]

## 2024-01-10 ENCOUNTER — TELEPHONE (OUTPATIENT)
Dept: PRIMARY CARE CLINIC | Facility: CLINIC | Age: 69
End: 2024-01-10

## 2024-01-10 NOTE — TELEPHONE ENCOUNTER
I don't think [facility] rotates any neurology to [place].    I placed a referral for a [facility]ist, Dr. Heard, with [facility].  I believe he rotates through their slide office.

## 2024-01-10 NOTE — TELEPHONE ENCOUNTER
----- Message from Antonietta Ybarra sent at 1/10/2024 10:27 AM CST -----  Contact: [phone]  Patient is returning a phone call.  Who left a message for the patient: Dr Alonzo's office  Does patient know what this is regarding:  yes  Would you like a call back, or a response through your [facility] portal?:   phone  Comments:     Patient said that neurology cancel her appointment, and she is under too much stress to be [place] [email or web address], therefore she is calling and want to get a closer to her home neurosurgeon and neurology doctors. Thank you.

## 2024-01-10 NOTE — TELEPHONE ENCOUNTER
[facility] doesn't have the right specialist for her tumor.  I'd recommend continuing with her current neurosurgeon.

## 2024-01-10 NOTE — TELEPHONE ENCOUNTER
Patient said that neurology cancel her appointment, and she is under too much stress to be [facility] [facility]. She has to get her daughter from school and her car can't handle that drive

## 2024-01-11 ENCOUNTER — TELEPHONE (OUTPATIENT)
Dept: PRIMARY CARE CLINIC | Facility: CLINIC | Age: 69
End: 2024-01-11

## 2024-01-11 NOTE — TELEPHONE ENCOUNTER
----- Message from Enedina Gonzalez sent at 1/11/2024 11:39 AM CST -----  Contact: [phone]  Patient is returning a phone call.  Who left a message for the patient: Jayleen Chaparro,  Does patient know what this is regarding:  REFERRAL   Would you like a call back, or a response through your [facility] portal?:   CALL BACK   Comments:

## 2024-01-12 ENCOUNTER — OFFICE VISIT (OUTPATIENT)
Dept: PULMONOLOGY | Facility: CLINIC | Age: 69
End: 2024-01-12
Payer: MEDICARE

## 2024-01-12 ENCOUNTER — TELEPHONE (OUTPATIENT)
Dept: PULMONOLOGY | Facility: CLINIC | Age: 69
End: 2024-01-12

## 2024-01-12 VITALS
BODY MASS INDEX: 29.9 KG/M2 | SYSTOLIC BLOOD PRESSURE: 148 MMHG | HEIGHT: 65 IN | DIASTOLIC BLOOD PRESSURE: 106 MMHG | OXYGEN SATURATION: 98 % | HEART RATE: 72 BPM | WEIGHT: 179.44 LBS

## 2024-01-12 DIAGNOSIS — Z87.891 HISTORY OF NICOTINE DEPENDENCE: ICD-10-CM

## 2024-01-12 DIAGNOSIS — J44.9 CHRONIC OBSTRUCTIVE PULMONARY DISEASE, UNSPECIFIED COPD TYPE: Primary | ICD-10-CM

## 2024-01-12 DIAGNOSIS — R06.09 DYSPNEA ON EXERTION: ICD-10-CM

## 2024-01-12 DIAGNOSIS — R53.83 FATIGUE, UNSPECIFIED TYPE: ICD-10-CM

## 2024-01-12 PROCEDURE — 3288F FALL RISK ASSESSMENT DOCD: CPT | Mod: CPTII,S$GLB,, | Performed by: NURSE PRACTITIONER

## 2024-01-12 PROCEDURE — 3080F DIAST BP >= 90 MM HG: CPT | Mod: CPTII,S$GLB,, | Performed by: NURSE PRACTITIONER

## 2024-01-12 PROCEDURE — 3077F SYST BP >= 140 MM HG: CPT | Mod: CPTII,S$GLB,, | Performed by: NURSE PRACTITIONER

## 2024-01-12 PROCEDURE — 99999 PR PBB SHADOW E&M-EST. PATIENT-LVL V: CPT | Mod: PBBFAC,,, | Performed by: NURSE PRACTITIONER

## 2024-01-12 PROCEDURE — 3008F BODY MASS INDEX DOCD: CPT | Mod: CPTII,S$GLB,, | Performed by: NURSE PRACTITIONER

## 2024-01-12 PROCEDURE — 1159F MED LIST DOCD IN RCRD: CPT | Mod: CPTII,S$GLB,, | Performed by: NURSE PRACTITIONER

## 2024-01-12 PROCEDURE — 1126F AMNT PAIN NOTED NONE PRSNT: CPT | Mod: CPTII,S$GLB,, | Performed by: NURSE PRACTITIONER

## 2024-01-12 PROCEDURE — 1160F RVW MEDS BY RX/DR IN RCRD: CPT | Mod: CPTII,S$GLB,, | Performed by: NURSE PRACTITIONER

## 2024-01-12 PROCEDURE — 1101F PT FALLS ASSESS-DOCD LE1/YR: CPT | Mod: CPTII,S$GLB,, | Performed by: NURSE PRACTITIONER

## 2024-01-12 PROCEDURE — 99204 OFFICE O/P NEW MOD 45 MIN: CPT | Mod: S$GLB,,, | Performed by: NURSE PRACTITIONER

## 2024-01-12 RX ORDER — PREDNISONE 10 MG/1
TABLET ORAL
Qty: 12 TABLET | Refills: 0 | Status: SHIPPED | OUTPATIENT
Start: 2024-01-12

## 2024-01-12 RX ORDER — ALBUTEROL SULFATE 90 UG/1
2 AEROSOL, METERED RESPIRATORY (INHALATION) EVERY 4 HOURS PRN
Qty: 18 G | Refills: 11 | Status: SHIPPED | OUTPATIENT
Start: 2024-01-12

## 2024-01-12 NOTE — PROGRESS NOTES
1/12/2024    Beatriz Gan  New Patient Consult    Chief Complaint   Patient presents with    Shortness of Breath    Fatigue       HPI: 1/12/2024- complaint of shortness of breath, onset years, difficult to walk up stairs in home and wash hair with out stopping to rest. 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   Complaint of daytime fatigue, dealing with depression, using cane for ambulation due to needed knee replacements. Has muscle weakness and soreness.     Social Hx: lives with 14 year old daughter, has pet dog and cat, retired Agent for import/export with customs for 40 years, no known Asbestosis exposure, Smoking Hx: quit last year, 50 pack years  Family Hx: no Lung Cancer, COPD, or Asthma  Medical Hx: previous pneumonia years prior hospitalized and intubated ;left previous shoulder surgery/ no chest surgery/ neck surgery 8 years prior.       The chief compliant  problem is new to me  PFSH:  Past Medical History:   Diagnosis Date    Alcohol dependence     DDD (degenerative disc disease), lumbosacral     DJD (degenerative joint disease), lumbosacral     Encounter for blood transfusion     GERD (gastroesophageal reflux disease)     Gout     Hypercholesterolemia     Hypertension     NATHALIE (iron deficiency anemia)     questionable white coat hypertension    Kidney carcinoma, left 2014    Pneumonia     Renal cell carcinoma     Shingles 10/13/2012         Past Surgical History:   Procedure Laterality Date    ANTERIOR CERVICAL DISCECTOMY W/ FUSION N/A 5/4/2023    Procedure: DISCECTOMY, SPINE, CERVICAL, ANTERIOR APPROACH, WITH FUSION C3-4;  Surgeon: Ruben Martinez MD;  Location: [facility] OR;  Service: Orthopedics;  Laterality: N/A;    APPENDECTOMY      ARTHROSCOPY OF SHOULDER WITH DECOMPRESSION OF SUBACROMIAL SPACE Left 10/31/2019    Procedure: ARTHROSCOPY, SHOULDER, WITH SUBACROMIAL SPACE DECOMPRESSION;  Surgeon: Ruben Martinez MD;  Location: [facility]  OR;  Service: Orthopedics;  Laterality: Left;    BLADDER REPAIR      x 2    COLONOSCOPY  9/2011    DISTAL CLAVICLE EXCISION Left 10/31/2019    Procedure: EXCISION, CLAVICLE, DISTAL;  Surgeon: Ruben Martinez MD;  Location: [facility] OR;  Service: Orthopedics;  Laterality: Left;    EPIDURAL STEROID INJECTION INTO LUMBAR SPINE N/A 12/7/2020    Procedure: Injection-steroid-epidural-lumbar;  Surgeon: Dk Rosales MD;  Location: [facility] OR;  Service: Pain Management;  Laterality: N/A;  L4-L5    EPIDURAL STEROID INJECTION INTO LUMBAR SPINE N/A 5/17/2021    Procedure: Injection-steroid-epidural-lumbar;  Surgeon: Dk Rosales MD;  Location: [facility] OR;  Service: Pain Management;  Laterality: N/A;  L4-L5    ESOPHAGOGASTRODUODENOSCOPY  9/2011    EYE SURGERY      lasix bilateral    FIXATION KYPHOPLASTY N/A 1/31/2020    Procedure: Kyphoplasty T12;  Surgeon: Dk Rosales MD;  Location: [facility] OR;  Service: Pain Management;  Laterality: N/A;    HERNIA REPAIR      hiatal hernai    HYSTERECTOMY      INJECTION OF ANESTHETIC AGENT AROUND NERVE Left 6/8/2020    Procedure: Block, Nerve;  Surgeon: Dk Rosales MD;  Location: [facility] OR;  Service: Pain Management;  Laterality: Left;  left genicular nerve block     KNEE ARTHROPLASTY Left 7/30/2020    Procedure: ARTHROPLASTY, KNEE LEFT;  Surgeon: Ruben Martinez MD;  Location: [facility] OR;  Service: Orthopedics;  Laterality: Left;  REP VALERY RUBY    KNEE ARTHROSCOPY W/ MENISCECTOMY Left 1/16/2020    Procedure: ARTHROSCOPY, KNEE, WITH MEDIAL MENISCECTOMY;  Surgeon: Ruben Martinez MD;  Location: [facility] OR;  Service: Orthopedics;  Laterality: Left;    LAPAROSCOPIC NISSEN FUNDOPLICATION      NEPHRECTOMY      LT partial    RADIOFREQUENCY ABLATION Left 6/19/2020    Procedure: Radiofrequency Ablation;  Surgeon: Dk Rosales MD;  Location: [facility] OR;  Service: Pain Management;  Laterality: Left;    RADIOFREQUENCY ABLATION OF LUMBAR MEDIAL BRANCH NERVE AT SINGLE LEVEL Bilateral 2/28/2020     Procedure: Radiofrequency Ablation, Nerve, Spinal, Lumbar, Medial Branch, 1 Level;  Surgeon: Dk Rosales MD;  Location: [facility] OR;  Service: Pain Management;  Laterality: Bilateral;  L3,L4,L5 - Burned at 80 degrees C. for 60 seconds x 2 each site    RADIOFREQUENCY ABLATION OF LUMBAR MEDIAL BRANCH NERVE AT SINGLE LEVEL Bilateral 10/19/2020    Procedure: Radiofrequency Ablation, Nerve, Spinal, Lumbar, Medial Branch, 1 Level;  Surgeon: Dk Rosales MD;  Location: [facility] OR;  Service: Pain Management;  Laterality: Bilateral;  L3, L4, L5    REFRACTIVE SURGERY      ROTATOR CUFF REPAIR Left 10/31/2019    Procedure: REPAIR, ROTATOR CUFF;  Surgeon: Ruben Martinez MD;  Location: [facility] OR;  Service: Orthopedics;  Laterality: Left;  arthrex    SKIN BIOPSY      TOTAL ABDOMINAL HYSTERECTOMY W/ BILATERAL SALPINGOOPHORECTOMY  age 48    TRANSFORAMINAL EPIDURAL INJECTION OF STEROID Bilateral 2021    Procedure: Injection,steroid,epidural,transforaminal approach;  Surgeon: Dk Rosales MD;  Location: [facility] OR;  Service: Pain Management;  Laterality: Bilateral;  L5-S1     TRANSFORAMINAL EPIDURAL INJECTION OF STEROID Bilateral 3/28/2022    Procedure: INJECTION, STEROID, EPIDURAL, TRANSFORAMINAL APPROACH Lumbar L5-S1;  Surgeon: Dk Rosales MD;  Location: [facility] OR;  Service: Pain Management;  Laterality: Bilateral;     Social History     Tobacco Use    Smoking status: Former     Current packs/day: 0.00     Average packs/day: 1 pack/day for 40.0 years (40.0 ttl pk-yrs)     Types: Cigarettes     Start date: 1978     Quit date: 2018     Years since quittin.0    Smokeless tobacco: Never    Tobacco comments:     smokes 2-3 cigarettes a night   Substance Use Topics    Alcohol use: Yes     Alcohol/week: 12.0 standard drinks of alcohol     Types: 6 Cans of beer, 6 Standard drinks or equivalent per week     Comment: 2-3 a day/ social    Drug use: No     Family History   Problem Relation Age of Onset    Hypertension  "Father     Glaucoma Neg Hx     Macular degeneration Neg Hx     Retinal detachment Neg Hx      Review of patient's allergies indicates:   Allergen Reactions    Phenergan [promethazine] Hives and Rash    Latex, natural rubber Hives     Per pt report-many years    Morphine Hives    Nsaids (non-steroidal anti-inflammatory drug)      Due to "kidney cancer"     I have reviewed past medical, family, and social history. I have reviewed previous nurse notes.        Performance Status:The patient's activity level is mobility with asit devices: cane        Review of Systems   Constitutional: Negative for activity change, appetite change, chills, fever and unexpected weight change. Positive for fatigue, nocturnal diaphoresis  HENT: Negative for dental problem, postnasal drip, rhinorrhea, sinus pressure, sinus pain, sneezing.  Positive for  sore throat, trouble swallowing and voice change  Respiratory: Negative for apnea, and stridor.  Positive for cough, chest tightness, shortness of breath, wheezing   Cardiovascular: Negative for chest pain, palpitations and leg swelling.   Gastrointestinal: Negative for abdominal distention, abdominal pain, constipation and nausea.   Musculoskeletal: Positive for gait problem, myalgias and neck pain  Skin: Negative for color change and pallor.   Allergic/Immunologic: Negative for environmental allergies and food allergies.   Neurological: Negative for dizziness, speech difficulty,  Positive for weakness, light-headedness, numbness and headaches.  Hematological: Negative for adenopathy. Does not bruise/bleed easily.   Psychiatric/Behavioral: The patient is not nervous/anxious.  Positive for dysphoric mood and sleep disturbance.         Exam:Comprehensive exam done. BP (!) 148/106 (BP Location: Left arm, Patient Position: Sitting, BP Method: Medium (Manual))   Pulse 72   Ht 5' 5" (1.651 m)   Wt 81.4 kg (179 lb 7.3 oz)   SpO2 98% Comment: on room air at rest  BMI 29.86 kg/m²   Exam included " Vitals as listed  Constitutional:  oriented to person, place, and time. appears well-developed. No distress.   Nose: Nose normal.   Mouth/Throat: Uvula is midline, oropharynx is clear and moist and mucous membranes are normal. No dental caries. No oropharyngeal exudate, posterior oropharyngeal edema, posterior oropharyngeal erythema or tonsillar abscesses.  Mallapatti (M) score 3  Eyes: Pupils are equal, round, and reactive to light.   Neck: No JVD present. No thyromegaly present.   Cardiovascular: Normal rate, regular rhythm and normal heart sounds. Exam reveals no gallop and no friction rub.   No murmur heard.  Pulmonary/Chest: Effort normal and breath sounds normal. No accessory muscle usage or stridor. No apnea and no tachypnea. No respiratory distress, decreased breath sounds, wheezes, rhonchi, rales, or tenderness.   Abdominal: Soft. exhibits no mass. There is no tenderness. No hepatosplenomegaly, hernias and normoactive bowel sounds  Musculoskeletal: Normal range of motion. exhibits no edema.   Lymphadenopathy:     no cervical adenopathy.     no axillary adenopathy.   Neurological:  alert and oriented to person, place, and time. not disoriented.   Skin: Skin is warm and dry. Capillary refill takes less 2 sec. No cyanosis or erythema. No pallor. Nails show no clubbing.   Psychiatric: normal mood and affect. behavior is normal. Judgment and thought content normal.       Radiographs (ct chest and cxr) reviewed: CT chest  patient imaging studies reviewed and interpreted independently. My personal interpretation of most resent images include:      X-Ray Chest PA And Lateral 10/04/2023 lungs clear    Transthoracic echo (TTE) complete 8/14/23 Normal EF.  No pulmonary hypertension.    CT Chest With Contrast 08/28/15 clear        Labs: Patients labs reviewed including CBC TSH and CMP  reviewed       Lab Results   Component Value Date    WBC 4.60 09/28/2023    RBC 4.00 09/28/2023    HGB 12.2 09/28/2023    HCT 37.8  09/28/2023    MCV 95 09/28/2023    MCH 30.5 09/28/2023    MCHC 32.3 09/28/2023    RDW 12.7 09/28/2023     09/28/2023    MPV 9.7 09/28/2023    GRAN 2.8 09/28/2023    GRAN 60.4 09/28/2023    LYMPH 1.2 09/28/2023    LYMPH 27.0 09/28/2023    MONO 0.4 09/28/2023    MONO 8.7 09/28/2023    EOS 0.1 09/28/2023    BASO 0.03 09/28/2023    EOSINOPHIL 3.0 09/28/2023    BASOPHIL 0.7 09/28/2023      Latest Reference Range & Units 08/15/23 05:16 08/19/23 13:47 09/28/23 11:05   CO2 23 - 29 mmol/L 29 25 25      Latest Reference Range & Units Most Recent   TSH 0.400 - 4.000 uIU/mL 1.200  3/30/21 10:52   Free T4 0.71 - 1.51 ng/dL 0.95  5/27/18 04:28           PFT results reviewed  Pulmonary Functions Testing Results:    8/28/2015 FEV1/FVC 77% FEV1 2.29L or 93% no bronchodilator response, % DLC0 91%    Plan:  Clinical impression is resonably certain and repeated evaluation prn +/- follow up will be needed as below.    Beatriz was seen today for shortness of breath and fatigue.    Diagnoses and all orders for this visit:    Chronic obstructive pulmonary disease, unspecified COPD type  -     Complete PFT with bronchodilator; Future  -     Six Minute Walk Test to qualify for Home Oxygen; Future  -     PULSE OXIMETRY OVERNIGHT; Future  -     fluticasone-umeclidin-vilanter (TRELEGY ELLIPTA) 100-62.5-25 mcg DsDv; Inhale 1 puff into the lungs once daily.  -     CBC auto differential; Future  -     predniSONE (DELTASONE) 10 MG tablet; Take one pill a day for three days, repeat for shortness of breath  -     albuterol (VENTOLIN HFA) 90 mcg/actuation inhaler; Inhale 2 puffs into the lungs every 4 (four) hours as needed for Wheezing or Shortness of Breath. Rescue    Dyspnea on exertion  -     Ambulatory referral/consult to Pulmonology  -     Complete PFT with bronchodilator; Future  -     Six Minute Walk Test to qualify for Home Oxygen; Future  -     CBC auto differential; Future  -     predniSONE (DELTASONE) 10 MG tablet; Take one  pill a day for three days, repeat for shortness of breath    Fatigue, unspecified type  Comments:  - overnight pulse oximetry    History of nicotine dependence  -     CT Chest Lung Screening Low Dose; Future          Follow up in about 3 months (around 4/12/2024), or if symptoms worsen or fail to improve.      Discussed with patient above for education the following:      Patient Instructions   Starting new medications    Trelegy 100 1 puff once a day every day, rinse mouth after using due to risk for thrush if mouth or tongue has white sores contact clinic  You have to call a number to get a cheaper price with [facility] pharmacy    Albuterol Inhaler 1-2 puffs every 4 hours, for cough or shortness of breath    Prednisone 10 mg pills, Take one pill a day for three days, repeat for shortness of breath or wheeze    Have lung testing in next few weeks.    Have blood test on day of next appointment before the appointment

## 2024-01-12 NOTE — PATIENT INSTRUCTIONS
Starting new medications    Trelegy 100 1 puff once a day every day, rinse mouth after using due to risk for thrush if mouth or tongue has white sores contact clinic  You have to call a number to get a cheaper price with [facility] pharmacy    Albuterol Inhaler 1-2 puffs every 4 hours, for cough or shortness of breath    Prednisone 10 mg pills, Take one pill a day for three days, repeat for shortness of breath or wheeze    Have lung testing in next few weeks.    Have blood test on day of next appointment before the appointment

## 2024-02-06 DIAGNOSIS — F41.9 ANXIETY: ICD-10-CM

## 2024-02-09 RX ORDER — ALPRAZOLAM 1 MG/1
1 TABLET ORAL 2 TIMES DAILY PRN
Qty: 60 TABLET | Refills: 2 | Status: ON HOLD | OUTPATIENT
Start: 2024-02-09 | End: 2024-05-08 | Stop reason: SDUPTHER

## 2024-02-19 ENCOUNTER — TELEPHONE (OUTPATIENT)
Dept: PULMONOLOGY | Facility: CLINIC | Age: 69
End: 2024-02-19

## 2024-03-03 DIAGNOSIS — F51.01 PRIMARY INSOMNIA: ICD-10-CM

## 2024-03-03 NOTE — TELEPHONE ENCOUNTER
No care due was identified.  [facility] Embedded Care Due Messages. Reference number: [id number].   3/03/2024 1:12:31 PM CST

## 2024-03-04 DIAGNOSIS — R06.09 DYSPNEA ON EXERTION: ICD-10-CM

## 2024-03-04 DIAGNOSIS — J44.9 CHRONIC OBSTRUCTIVE PULMONARY DISEASE, UNSPECIFIED COPD TYPE: ICD-10-CM

## 2024-03-04 RX ORDER — ALBUTEROL SULFATE 90 UG/1
2 AEROSOL, METERED RESPIRATORY (INHALATION) EVERY 4 HOURS PRN
Qty: 18 G | Refills: 11 | Status: SHIPPED | OUTPATIENT
Start: 2024-03-04

## 2024-03-04 RX ORDER — PREDNISONE 10 MG/1
TABLET ORAL
Qty: 12 TABLET | Refills: 0 | Status: SHIPPED | OUTPATIENT
Start: 2024-03-04 | End: 2024-04-17 | Stop reason: SDUPTHER

## 2024-03-04 RX ORDER — ZOLPIDEM TARTRATE 10 MG/1
10 TABLET ORAL NIGHTLY PRN
Qty: 30 TABLET | Refills: 2 | Status: ON HOLD | OUTPATIENT
Start: 2024-03-04 | End: 2024-05-08 | Stop reason: SDUPTHER

## 2024-03-04 NOTE — TELEPHONE ENCOUNTER
----- Message from Laurenmacario Martha sent at 3/4/2024 10:08 AM CST -----  Regarding: Pt Advice  Contact: [facility]  Needs Medical Advice      Who Called: Cynthia     Symptoms (please be specific):   1. fluticasone-umeclidin-vilanter (TRELEGY ELLIPTA) 100-62.5-25 mcg DsDv  2. albuterol (VENTOLIN HFA) 90 mcg/actuation inhaler  3. predniSONE (DELTASONE) 10 MG tablet  4. SYMBICORT 160 -4.5 mcg/actuation HFAA      Pharmacy name and phone #:      [facility] - [place], [place] - [address] [facility]  [address] [place] [id number]  Phone: [phone] Fax: [phone]        Would the patient rather a call back or a response via [facility]? Call back    Best Call Back Number:  [phone]      Additional Information: [facility] would like a call to discuss several medications needing to be moved/sent to their location now that the pt is now using them for mail service.l   Please Advise - Thank you

## 2024-03-04 NOTE — TELEPHONE ENCOUNTER
----- Message from Jodi Escoto sent at 3/4/2024 10:30 AM CST -----  Contact: MsDerrick Mary Phone# [phone] Direct  MsDerrick Mary [facility] said that an order for the patients ALPRAZolam (XANAX) 1 MG tablet was faxed over to you on February fifteenth and February twenty eight and she would like to know if you have received the order? If so, Ms. Mary would like to know what is the status on the order?

## 2024-03-05 ENCOUNTER — TELEPHONE (OUTPATIENT)
Dept: NEUROLOGY | Facility: CLINIC | Age: 69
End: 2024-03-05
Payer: MEDICARE

## 2024-03-05 NOTE — TELEPHONE ENCOUNTER
Spoke to the pt, she needs an appt for possible seizures.  Attempted to schedule, no solutions.  The pt state's, she needs to update her insurance, she will call back tomorrow to update.  She declined to be transferred.

## 2024-03-05 NOTE — TELEPHONE ENCOUNTER
----- Message from Zina Barba sent at 3/5/2024 12:13 PM CST -----  Contact: pt [phone]  Type:  Patient Returning Call    Who Called:  Pt   Who Left Message for Patient:  Maci Rosales   Does the patient know what this is regarding?:  appt on Monday and insurance question   Best Call Back Number:  [phone]    Additional Information:  Pt stated someone called her from Neurology. Pls call back and advise

## 2024-03-27 ENCOUNTER — TELEPHONE (OUTPATIENT)
Dept: PULMONOLOGY | Facility: CLINIC | Age: 69
End: 2024-03-27
Payer: MEDICARE

## 2024-03-28 ENCOUNTER — TELEPHONE (OUTPATIENT)
Dept: ORTHOPEDICS | Facility: CLINIC | Age: 69
End: 2024-03-28

## 2024-03-28 DIAGNOSIS — M51.36 DEGENERATIVE DISC DISEASE, LUMBAR: ICD-10-CM

## 2024-03-28 DIAGNOSIS — M47.896 OTHER SPONDYLOSIS, LUMBAR REGION: ICD-10-CM

## 2024-03-28 DIAGNOSIS — M51.36 DDD (DEGENERATIVE DISC DISEASE), LUMBAR: ICD-10-CM

## 2024-03-28 RX ORDER — GABAPENTIN 600 MG/1
600 TABLET ORAL 3 TIMES DAILY
Qty: 90 TABLET | Refills: 2 | Status: SHIPPED | OUTPATIENT
Start: 2024-03-28 | End: 2024-06-04

## 2024-03-28 RX ORDER — CELECOXIB 200 MG/1
CAPSULE ORAL
Qty: 30 CAPSULE | Refills: 1 | Status: ON HOLD | OUTPATIENT
Start: 2024-03-28 | End: 2024-05-11 | Stop reason: HOSPADM

## 2024-03-28 NOTE — TELEPHONE ENCOUNTER
----- Message from Lorena David sent at 3/28/2024 12:09 PM CDT -----  Phone #: [phone]  Patient is requesting a refill of  Gabapentin      Pharmacy:   [id number] - [place] - [address] [facility] [address]  [facility] [address]  Phone: [phone] Fax: [phone]

## 2024-03-28 NOTE — TELEPHONE ENCOUNTER
No care due was identified.  [facility] Embedded Care Due Messages. Reference number: [id number].   3/28/2024 3:02:08 AM CDT

## 2024-03-28 NOTE — TELEPHONE ENCOUNTER
Refill Routing Note   Medication(s) are not appropriate for processing by [facility] for the following reason(s):        Outside of protocol    [facility] action(s):  Route               Appointments  past 12m or future 3m with PCP    Date Provider   Last Visit   12/4/2023 Jorge Alonzo MD   Next Visit   6/4/2024 Jorge Alonzo MD   ED visits in past 90 days: 0        Note composed:7:42 AM 03/28/2024

## 2024-04-01 RX ORDER — INFLUENZA A VIRUS A/VICTORIA/4897/2022 IVR-238 (H1N1) ANTIGEN (FORMALDEHYDE INACTIVATED), INFLUENZA A VIRUS A/DARWIN/9/2021 SAN-010 (H3N2) ANTIGEN (FORMALDEHYDE INACTIVATED), INFLUENZA B VIRUS B/PHUKET/3073/2013 ANTIGEN (FORMALDEHYDE INACTIVATED), AND INFLUENZA B VIRUS B/MICHIGAN/01/2021 ANTIGEN (FORMALDEHYDE INACTIVATED) 60; 60; 60; 60 UG/.7ML; UG/.7ML; UG/.7ML; UG/.7ML
INJECTION, SUSPENSION INTRAMUSCULAR
Status: ON HOLD | COMMUNITY
Start: 2023-10-31 | End: 2024-05-10 | Stop reason: CLARIF

## 2024-04-01 RX ORDER — RESPIRATORY SYNCYTIAL VISUS VACCINE RECOMBINANT, ADJUVANTED 120MCG/0.5
0.5 KIT INTRAMUSCULAR
Status: ON HOLD | COMMUNITY
Start: 2023-10-31 | End: 2024-05-10 | Stop reason: CLARIF

## 2024-04-01 RX ORDER — COVID-19 VACCINE, MRNA 0.04 MG/.418ML
0.3 INJECTION, SUSPENSION INTRAMUSCULAR
Status: ON HOLD | COMMUNITY
Start: 2023-10-31 | End: 2024-05-10 | Stop reason: CLARIF

## 2024-04-13 ENCOUNTER — HOSPITAL ENCOUNTER (EMERGENCY)
Facility: HOSPITAL | Age: 69
Discharge: HOME | End: 2024-04-13
Attending: EMERGENCY MEDICINE
Payer: MEDICARE

## 2024-04-13 VITALS
WEIGHT: 165 LBS | RESPIRATION RATE: 18 BRPM | HEIGHT: 65 IN | OXYGEN SATURATION: 95 % | HEART RATE: 80 BPM | DIASTOLIC BLOOD PRESSURE: 73 MMHG | BODY MASS INDEX: 27.49 KG/M2 | SYSTOLIC BLOOD PRESSURE: 128 MMHG | TEMPERATURE: 98 F

## 2024-04-13 DIAGNOSIS — J18.9 PNEUMONIA DUE TO INFECTIOUS ORGANISM, UNSPECIFIED LATERALITY, UNSPECIFIED PART OF LUNG: ICD-10-CM

## 2024-04-13 DIAGNOSIS — R06.02 SOB (SHORTNESS OF BREATH): Primary | ICD-10-CM

## 2024-04-13 DIAGNOSIS — R07.9 CHEST PAIN: ICD-10-CM

## 2024-04-13 DIAGNOSIS — R74.8 ELEVATED LIVER ENZYMES: ICD-10-CM

## 2024-04-13 LAB
ALBUMIN SERPL BCP-MCNC: 4.1 G/DL (ref 3.5–5.2)
ALP SERPL-CCNC: 556 U/L (ref 55–135)
ALT SERPL W/O P-5'-P-CCNC: 676 U/L (ref 10–44)
ANION GAP SERPL CALC-SCNC: 10 MMOL/L (ref 8–16)
APAP SERPL-MCNC: 0.5 UG/ML (ref 10–20)
APTT PPP: 32.8 SEC (ref 21–32)
AST SERPL-CCNC: 421 U/L (ref 10–40)
BASOPHILS # BLD AUTO: 0.03 K/UL (ref 0–0.2)
BASOPHILS NFR BLD: 0.4 % (ref 0–1.9)
BILIRUB SERPL-MCNC: 2.7 MG/DL (ref 0.1–1)
BNP SERPL-MCNC: 155 PG/ML (ref 0–99)
BUN SERPL-MCNC: 13 MG/DL (ref 8–23)
CALCIUM SERPL-MCNC: 9.7 MG/DL (ref 8.7–10.5)
CHLORIDE SERPL-SCNC: 100 MMOL/L (ref 95–110)
CO2 SERPL-SCNC: 26 MMOL/L (ref 23–29)
CREAT SERPL-MCNC: 0.8 MG/DL (ref 0.5–1.4)
DIFFERENTIAL METHOD BLD: ABNORMAL
EOSINOPHIL # BLD AUTO: 0.1 K/UL (ref 0–0.5)
EOSINOPHIL NFR BLD: 1.1 % (ref 0–8)
ERYTHROCYTE [DISTWIDTH] IN BLOOD BY AUTOMATED COUNT: 13.7 % (ref 11.5–14.5)
EST. GFR  (NO RACE VARIABLE): >60 ML/MIN/1.73 M^2
GLUCOSE SERPL-MCNC: 112 MG/DL (ref 70–110)
HCT VFR BLD AUTO: 48.5 % (ref 37–48.5)
HGB BLD-MCNC: 15.8 G/DL (ref 12–16)
IMM GRANULOCYTES # BLD AUTO: 0.04 K/UL (ref 0–0.04)
IMM GRANULOCYTES NFR BLD AUTO: 0.5 % (ref 0–0.5)
INFLUENZA A, MOLECULAR: NEGATIVE
INFLUENZA B, MOLECULAR: NEGATIVE
INR PPP: 1.1 (ref 0.8–1.2)
LIPASE SERPL-CCNC: 21 U/L (ref 4–60)
LYMPHOCYTES # BLD AUTO: 1.1 K/UL (ref 1–4.8)
LYMPHOCYTES NFR BLD: 13.3 % (ref 18–48)
MAGNESIUM SERPL-MCNC: 1.6 MG/DL (ref 1.6–2.6)
MCH RBC QN AUTO: 31.7 PG (ref 27–31)
MCHC RBC AUTO-ENTMCNC: 32.6 G/DL (ref 32–36)
MCV RBC AUTO: 97 FL (ref 82–98)
MONOCYTES # BLD AUTO: 0.8 K/UL (ref 0.3–1)
MONOCYTES NFR BLD: 9.1 % (ref 4–15)
NEUTROPHILS # BLD AUTO: 6.5 K/UL (ref 1.8–7.7)
NEUTROPHILS NFR BLD: 75.6 % (ref 38–73)
NRBC BLD-RTO: 0 /100 WBC
PLATELET # BLD AUTO: 270 K/UL (ref 150–450)
PMV BLD AUTO: 10.7 FL (ref 9.2–12.9)
POTASSIUM SERPL-SCNC: 3.6 MMOL/L (ref 3.5–5.1)
PROT SERPL-MCNC: 7.5 G/DL (ref 6–8.4)
PROTHROMBIN TIME: 11.9 SEC (ref 9–12.5)
RBC # BLD AUTO: 4.99 M/UL (ref 4–5.4)
SARS-COV-2 RDRP RESP QL NAA+PROBE: NEGATIVE
SODIUM SERPL-SCNC: 136 MMOL/L (ref 136–145)
SPECIMEN SOURCE: NORMAL
TROPONIN I SERPL HS-MCNC: 11.5 PG/ML (ref 0–14.9)
TROPONIN I SERPL HS-MCNC: 11.7 PG/ML (ref 0–14.9)
WBC # BLD AUTO: 8.55 K/UL (ref 3.9–12.7)

## 2024-04-13 PROCEDURE — 94799 UNLISTED PULMONARY SVC/PX: CPT

## 2024-04-13 PROCEDURE — 84484 ASSAY OF TROPONIN QUANT: CPT | Performed by: NURSE PRACTITIONER

## 2024-04-13 PROCEDURE — 94760 N-INVAS EAR/PLS OXIMETRY 1: CPT | Mod: XB

## 2024-04-13 PROCEDURE — 63600175 PHARM REV CODE 636 W HCPCS: Performed by: STUDENT IN AN ORGANIZED HEALTH CARE EDUCATION/TRAINING PROGRAM

## 2024-04-13 PROCEDURE — 80053 COMPREHEN METABOLIC PANEL: CPT | Performed by: NURSE PRACTITIONER

## 2024-04-13 PROCEDURE — 63700000 PHARM REV CODE 250 ALT 637 W/O HCPCS: Performed by: STUDENT IN AN ORGANIZED HEALTH CARE EDUCATION/TRAINING PROGRAM

## 2024-04-13 PROCEDURE — 99900031 HC PATIENT EDUCATION (STAT)

## 2024-04-13 PROCEDURE — 25000003 PHARM REV CODE 250: Performed by: STUDENT IN AN ORGANIZED HEALTH CARE EDUCATION/TRAINING PROGRAM

## 2024-04-13 PROCEDURE — 25000242 PHARM REV CODE 250 ALT 637 W/ HCPCS: Performed by: STUDENT IN AN ORGANIZED HEALTH CARE EDUCATION/TRAINING PROGRAM

## 2024-04-13 PROCEDURE — 85025 COMPLETE CBC W/AUTO DIFF WBC: CPT | Performed by: NURSE PRACTITIONER

## 2024-04-13 PROCEDURE — 36415 COLL VENOUS BLD VENIPUNCTURE: CPT | Performed by: NURSE PRACTITIONER

## 2024-04-13 PROCEDURE — 99900035 HC TECH TIME PER 15 MIN (STAT)

## 2024-04-13 PROCEDURE — 25500020 PHARM REV CODE 255: Performed by: EMERGENCY MEDICINE

## 2024-04-13 PROCEDURE — 83880 ASSAY OF NATRIURETIC PEPTIDE: CPT | Performed by: NURSE PRACTITIONER

## 2024-04-13 PROCEDURE — 96375 TX/PRO/DX INJ NEW DRUG ADDON: CPT

## 2024-04-13 PROCEDURE — 85730 THROMBOPLASTIN TIME PARTIAL: CPT | Performed by: NURSE PRACTITIONER

## 2024-04-13 PROCEDURE — 85610 PROTHROMBIN TIME: CPT | Performed by: NURSE PRACTITIONER

## 2024-04-13 PROCEDURE — 96374 THER/PROPH/DIAG INJ IV PUSH: CPT | Mod: 59

## 2024-04-13 PROCEDURE — 84484 ASSAY OF TROPONIN QUANT: CPT | Mod: 91 | Performed by: STUDENT IN AN ORGANIZED HEALTH CARE EDUCATION/TRAINING PROGRAM

## 2024-04-13 PROCEDURE — 25000003 PHARM REV CODE 250: Performed by: EMERGENCY MEDICINE

## 2024-04-13 PROCEDURE — 94761 N-INVAS EAR/PLS OXIMETRY MLT: CPT

## 2024-04-13 PROCEDURE — 94640 AIRWAY INHALATION TREATMENT: CPT

## 2024-04-13 PROCEDURE — 80143 DRUG ASSAY ACETAMINOPHEN: CPT | Mod: GZ | Performed by: NURSE PRACTITIONER

## 2024-04-13 PROCEDURE — U0002 COVID-19 LAB TEST NON-CDC: HCPCS | Performed by: NURSE PRACTITIONER

## 2024-04-13 PROCEDURE — 80074 ACUTE HEPATITIS PANEL: CPT | Performed by: STUDENT IN AN ORGANIZED HEALTH CARE EDUCATION/TRAINING PROGRAM

## 2024-04-13 PROCEDURE — 99285 EMERGENCY DEPT VISIT HI MDM: CPT | Mod: 25

## 2024-04-13 PROCEDURE — 93010 ELECTROCARDIOGRAM REPORT: CPT | Mod: ,,, | Performed by: INTERNAL MEDICINE

## 2024-04-13 PROCEDURE — 83690 ASSAY OF LIPASE: CPT | Performed by: NURSE PRACTITIONER

## 2024-04-13 PROCEDURE — 87502 INFLUENZA DNA AMP PROBE: CPT | Performed by: NURSE PRACTITIONER

## 2024-04-13 PROCEDURE — 83735 ASSAY OF MAGNESIUM: CPT | Performed by: NURSE PRACTITIONER

## 2024-04-13 PROCEDURE — 87522 HEPATITIS C REVRS TRNSCRPJ: CPT | Performed by: STUDENT IN AN ORGANIZED HEALTH CARE EDUCATION/TRAINING PROGRAM

## 2024-04-13 PROCEDURE — 93005 ELECTROCARDIOGRAM TRACING: CPT | Performed by: INTERNAL MEDICINE

## 2024-04-13 RX ORDER — ASPIRIN 325 MG
325 TABLET ORAL
Status: COMPLETED | OUTPATIENT
Start: 2024-04-13 | End: 2024-04-13

## 2024-04-13 RX ORDER — AZITHROMYCIN 250 MG/1
500 TABLET, FILM COATED ORAL ONCE
Status: COMPLETED | OUTPATIENT
Start: 2024-04-13 | End: 2024-04-13

## 2024-04-13 RX ORDER — IPRATROPIUM BROMIDE AND ALBUTEROL SULFATE 2.5; .5 MG/3ML; MG/3ML
3 SOLUTION RESPIRATORY (INHALATION)
Status: COMPLETED | OUTPATIENT
Start: 2024-04-13 | End: 2024-04-13

## 2024-04-13 RX ORDER — DIPHENHYDRAMINE HYDROCHLORIDE 50 MG/ML
25 INJECTION INTRAMUSCULAR; INTRAVENOUS
Status: COMPLETED | OUTPATIENT
Start: 2024-04-13 | End: 2024-04-13

## 2024-04-13 RX ORDER — METHYLPREDNISOLONE SOD SUCC 125 MG
125 VIAL (EA) INJECTION
Status: COMPLETED | OUTPATIENT
Start: 2024-04-13 | End: 2024-04-13

## 2024-04-13 RX ORDER — AMOXICILLIN AND CLAVULANATE POTASSIUM 875; 125 MG/1; MG/1
1 TABLET, FILM COATED ORAL
Status: COMPLETED | OUTPATIENT
Start: 2024-04-13 | End: 2024-04-13

## 2024-04-13 RX ORDER — PROCHLORPERAZINE EDISYLATE 5 MG/ML
10 INJECTION INTRAMUSCULAR; INTRAVENOUS
Status: COMPLETED | OUTPATIENT
Start: 2024-04-13 | End: 2024-04-13

## 2024-04-13 RX ORDER — AMOXICILLIN AND CLAVULANATE POTASSIUM 875; 125 MG/1; MG/1
1 TABLET, FILM COATED ORAL EVERY 12 HOURS
Qty: 10 TABLET | Refills: 0 | Status: SHIPPED | OUTPATIENT
Start: 2024-04-13 | End: 2024-04-18

## 2024-04-13 RX ORDER — AZITHROMYCIN 500 MG/1
500 TABLET, FILM COATED ORAL DAILY
Qty: 5 TABLET | Refills: 0 | Status: SHIPPED | OUTPATIENT
Start: 2024-04-13 | End: 2024-04-18

## 2024-04-13 RX ORDER — UBROGEPANT 100 MG/1
100 TABLET ORAL
COMMUNITY
Start: 2024-03-19

## 2024-04-13 RX ADMIN — AZITHROMYCIN DIHYDRATE 500 MG: 250 TABLET ORAL at 09:04

## 2024-04-13 RX ADMIN — PROCHLORPERAZINE EDISYLATE 10 MG: 5 INJECTION INTRAMUSCULAR; INTRAVENOUS at 08:04

## 2024-04-13 RX ADMIN — ASPIRIN 325 MG ORAL TABLET 325 MG: 325 PILL ORAL at 05:04

## 2024-04-13 RX ADMIN — IPRATROPIUM BROMIDE AND ALBUTEROL SULFATE 3 ML: 2.5; .5 SOLUTION RESPIRATORY (INHALATION) at 05:04

## 2024-04-13 RX ADMIN — IPRATROPIUM BROMIDE AND ALBUTEROL SULFATE 3 ML: 2.5; .5 SOLUTION RESPIRATORY (INHALATION) at 08:04

## 2024-04-13 RX ADMIN — IOHEXOL 100 ML: 350 INJECTION, SOLUTION INTRAVENOUS at 06:04

## 2024-04-13 RX ADMIN — DIPHENHYDRAMINE HYDROCHLORIDE 25 MG: 50 INJECTION INTRAMUSCULAR; INTRAVENOUS at 08:04

## 2024-04-13 RX ADMIN — METHYLPREDNISOLONE SODIUM SUCCINATE 125 MG: 125 INJECTION, POWDER, FOR SOLUTION INTRAMUSCULAR; INTRAVENOUS at 05:04

## 2024-04-13 RX ADMIN — AMOXICILLIN AND CLAVULANATE POTASSIUM 1 TABLET: 875; 125 TABLET, FILM COATED ORAL at 09:04
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
## 2024-04-14 NOTE — DISCHARGE INSTRUCTIONS
Please return to the emergency department if he experienced any worsening shortness of breath, develop chest pain, fever, or if any concerning symptoms arise.

## 2024-04-14 NOTE — RESPIRATORY THERAPY
04/13/24 2015   Patient Assessment/Suction   Level of Consciousness (AVPU) alert   Respiratory Effort Normal;Unlabored   Expansion/Accessory Muscles/Retractions no retractions;no use of accessory muscles   All Lung Fields Breath Sounds clear   Rhythm/Pattern, Respiratory pattern regular;unlabored   Cough Frequency infrequent   Cough Type nonproductive   PRE-TX-O2   Device (Oxygen Therapy) room air   SpO2 95 %   Pulse Oximetry Type Continuous   $ Pulse Oximetry - Multiple Charge Pulse Oximetry - Multiple   Pulse 77   Resp 18   Aerosol Therapy   $ Aerosol Therapy Charges Aerosol Treatment   Daily Review of Necessity (SVN) completed   Respiratory Treatment Status (SVN) given   Treatment Route (SVN) mask;oxygen   Patient Position (SVN) HOB elevated   Post Treatment Assessment (SVN) breath sounds unchanged   Signs of Intolerance (SVN) none   Breath Sounds Post-Respiratory Treatment   Throughout All Fields Post-Treatment All Fields   Throughout All Fields Post-Treatment no change   Post-treatment Heart Rate (beats/min) 79   Post-treatment Resp Rate (breaths/min) 18   Education   $ Education Bronchodilator;15 min   Tobacco Cessation Intervention   Do you use any type of tobacco product? No   Respiratory Evaluation   $ Care Plan Tech Time 15 min   $ Eval/Re-eval Charges Evaluation   Evaluation For New Orders   Admitting Diagnosis SOB   Cardiac Diagnosis cad, cabg   Pulmonary Diagnosis copd   Current Surgeries n/a   Home Oxygen   Has Home Oxygen? No   Home Aerosol, MDI, DPI, and Other Treatments/Therapies   Home Respiratory Therapy Per Patient/Review of Chart Yes   MDI Home Meds/Freq Albuterol prn   Oxygen Care Plan   Rationale No rational found   Bronchodilator Care Plan   Bronchodilator Care Plan Aerosol  (Albuterol once)   Atelectasis Care Plan   Rationale No Rational Found   Airway Clearance Care Plan   Rationale No rationale found

## 2024-04-14 NOTE — RESPIRATORY THERAPY
04/13/24 2015   Patient Assessment/Suction   Level of Consciousness (AVPU) alert   Respiratory Effort Normal;Unlabored   Expansion/Accessory Muscles/Retractions no retractions;no use of accessory muscles   All Lung Fields Breath Sounds clear   Rhythm/Pattern, Respiratory pattern regular;unlabored   Cough Frequency infrequent   Cough Type nonproductive   PRE-TX-O2   Device (Oxygen Therapy) room air   SpO2 95 %   Pulse Oximetry Type Continuous   $ Pulse Oximetry - Multiple Charge Pulse Oximetry - Multiple   Pulse 77   Resp 18   Aerosol Therapy   $ Aerosol Therapy Charges Aerosol Treatment   Daily Review of Necessity (SVN) completed   Respiratory Treatment Status (SVN) given   Treatment Route (SVN) mask;oxygen   Patient Position (SVN) HOB elevated   Post Treatment Assessment (SVN) breath sounds unchanged   Signs of Intolerance (SVN) none   Breath Sounds Post-Respiratory Treatment   Throughout All Fields Post-Treatment All Fields   Throughout All Fields Post-Treatment no change   Post-treatment Heart Rate (beats/min) 79   Post-treatment Resp Rate (breaths/min) 18   Education   $ Education Bronchodilator;15 min

## 2024-04-16 ENCOUNTER — OFFICE VISIT (OUTPATIENT)
Dept: PULMONOLOGY | Facility: CLINIC | Age: 69
End: 2024-04-16
Payer: MEDICARE

## 2024-04-16 ENCOUNTER — LAB VISIT (OUTPATIENT)
Dept: LAB | Facility: HOSPITAL | Age: 69
End: 2024-04-16
Attending: NURSE PRACTITIONER
Payer: MEDICARE

## 2024-04-16 VITALS
HEIGHT: 65 IN | HEART RATE: 78 BPM | WEIGHT: 167.56 LBS | SYSTOLIC BLOOD PRESSURE: 123 MMHG | DIASTOLIC BLOOD PRESSURE: 90 MMHG | BODY MASS INDEX: 27.92 KG/M2 | OXYGEN SATURATION: 96 %

## 2024-04-16 DIAGNOSIS — R05.1 ACUTE COUGH: ICD-10-CM

## 2024-04-16 DIAGNOSIS — J45.909 ACUTE ASTHMA: ICD-10-CM

## 2024-04-16 DIAGNOSIS — R06.09 DYSPNEA ON EXERTION: ICD-10-CM

## 2024-04-16 DIAGNOSIS — J45.51 SEVERE PERSISTENT ASTHMA WITH ACUTE EXACERBATION: Primary | ICD-10-CM

## 2024-04-16 DIAGNOSIS — J44.9 CHRONIC OBSTRUCTIVE PULMONARY DISEASE, UNSPECIFIED COPD TYPE: ICD-10-CM

## 2024-04-16 LAB
BASOPHILS # BLD AUTO: 0.03 K/UL (ref 0–0.2)
BASOPHILS NFR BLD: 0.5 % (ref 0–1.9)
DIFFERENTIAL METHOD BLD: ABNORMAL
EOSINOPHIL # BLD AUTO: 0.2 K/UL (ref 0–0.5)
EOSINOPHIL NFR BLD: 2.4 % (ref 0–8)
ERYTHROCYTE [DISTWIDTH] IN BLOOD BY AUTOMATED COUNT: 13.3 % (ref 11.5–14.5)
HCT VFR BLD AUTO: 46.4 % (ref 37–48.5)
HGB BLD-MCNC: 14.8 G/DL (ref 12–16)
IMM GRANULOCYTES # BLD AUTO: 0.09 K/UL (ref 0–0.04)
IMM GRANULOCYTES NFR BLD AUTO: 1.4 % (ref 0–0.5)
LYMPHOCYTES # BLD AUTO: 0.9 K/UL (ref 1–4.8)
LYMPHOCYTES NFR BLD: 14.9 % (ref 18–48)
MCH RBC QN AUTO: 31.6 PG (ref 27–31)
MCHC RBC AUTO-ENTMCNC: 31.9 G/DL (ref 32–36)
MCV RBC AUTO: 99 FL (ref 82–98)
MONOCYTES # BLD AUTO: 0.8 K/UL (ref 0.3–1)
MONOCYTES NFR BLD: 12 % (ref 4–15)
NEUTROPHILS # BLD AUTO: 4.3 K/UL (ref 1.8–7.7)
NEUTROPHILS NFR BLD: 68.8 % (ref 38–73)
NRBC BLD-RTO: 0 /100 WBC
PLATELET # BLD AUTO: 354 K/UL (ref 150–450)
PMV BLD AUTO: 9.6 FL (ref 9.2–12.9)
RBC # BLD AUTO: 4.69 M/UL (ref 4–5.4)
WBC # BLD AUTO: 6.23 K/UL (ref 3.9–12.7)

## 2024-04-16 PROCEDURE — 99214 OFFICE O/P EST MOD 30 MIN: CPT | Mod: ,,, | Performed by: NURSE PRACTITIONER

## 2024-04-16 PROCEDURE — 85025 COMPLETE CBC W/AUTO DIFF WBC: CPT | Performed by: NURSE PRACTITIONER

## 2024-04-16 PROCEDURE — 36415 COLL VENOUS BLD VENIPUNCTURE: CPT | Performed by: NURSE PRACTITIONER

## 2024-04-16 PROCEDURE — 99999 PR PBB SHADOW E&M-EST. PATIENT-LVL V: CPT | Mod: PBBFAC,,, | Performed by: NURSE PRACTITIONER

## 2024-04-16 RX ORDER — PREDNISONE 20 MG/1
TABLET ORAL
Qty: 36 TABLET | Refills: 0 | Status: ON HOLD | OUTPATIENT
Start: 2024-04-16 | End: 2024-05-10 | Stop reason: CLARIF

## 2024-04-16 RX ORDER — BUDESONIDE AND FORMOTEROL FUMARATE DIHYDRATE 160; 4.5 UG/1; UG/1
2 AEROSOL RESPIRATORY (INHALATION) EVERY 12 HOURS
Qty: 30.6 G | Refills: 3 | Status: SHIPPED | OUTPATIENT
Start: 2024-04-16 | End: 2024-04-26 | Stop reason: SDUPTHER

## 2024-04-16 RX ORDER — CODEINE PHOSPHATE AND GUAIFENESIN 10; 100 MG/5ML; MG/5ML
5 SOLUTION ORAL EVERY 4 HOURS PRN
Qty: 210 ML | Refills: 0 | Status: SHIPPED | OUTPATIENT
Start: 2024-04-16 | End: 2024-04-23

## 2024-04-16 NOTE — PATIENT INSTRUCTIONS
Changing from Trelegy to Symbicort due to sores in mouth caused by Trelegy, use 2 puffs twice daily every day, rinse mouth after using due to risk for thrush if mouth or tongue has white sores contact clinic      Starting new medication Fasenra, expect phone call from [facility]     Use codeine cough syrup sparingly, this will decrease cough and allow you to rest at night, do not take with other pain or sleeping medications.
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
## 2024-04-17 ENCOUNTER — TELEPHONE (OUTPATIENT)
Dept: PRIMARY CARE CLINIC | Facility: CLINIC | Age: 69
End: 2024-04-17
Payer: MEDICARE

## 2024-04-17 DIAGNOSIS — J44.9 CHRONIC OBSTRUCTIVE PULMONARY DISEASE, UNSPECIFIED COPD TYPE: ICD-10-CM

## 2024-04-17 DIAGNOSIS — R06.09 DYSPNEA ON EXERTION: ICD-10-CM

## 2024-04-17 RX ORDER — FLUOXETINE HYDROCHLORIDE 40 MG/1
40 CAPSULE ORAL 2 TIMES DAILY
Qty: 180 CAPSULE | Refills: 3 | Status: ON HOLD | OUTPATIENT
Start: 2024-04-17 | End: 2024-05-08 | Stop reason: SDUPTHER

## 2024-04-17 RX ORDER — PREDNISONE 10 MG/1
TABLET ORAL
Qty: 12 TABLET | Refills: 0 | Status: ON HOLD | OUTPATIENT
Start: 2024-04-17 | End: 2024-05-11 | Stop reason: HOSPADM

## 2024-04-17 NOTE — TELEPHONE ENCOUNTER
----- Message from Madelaine Goodman sent at 4/17/2024 11:58 AM CDT -----  Type:  RX Refill Request    Who Called: Rukhsana with [facility]    Refill or New Rx:refill    RX Name and Strength:  predniSONE (DELTASONE) 10 MG tablet     How is the patient currently taking it? (ex. 1XDay):as directed    Is this a 30 day or 90 day RX:30    Preferred Pharmacy with phone number:    [facility] - [facility] [address] [place] [address]  Phone: [phone] Fax: [phone]        Local or Mail Order:mail    Ordering Provider:tom    Would the patient rather a call back or a response via [facility]? Call back    Best Call Back Number:[phone]      Additional Information:       Please call Back to advise. Thanks!

## 2024-04-18 LAB
DIAGNOSTIC IMP SPEC-IMP: NORMAL
DIAGNOSTIC IMP SPEC-IMP: NORMAL
HCV RNA SERPL NAA+PROBE-ACNC: NORMAL IU/ML
HCV RNA SERPL NAA+PROBE-LOG IU: 7.43 LOG10 IU/ML
REF LAB TEST REF RANGE: NORMAL
REF LAB TEST REF RANGE: NORMAL

## 2024-04-19 ENCOUNTER — TELEPHONE (OUTPATIENT)
Dept: PULMONOLOGY | Facility: CLINIC | Age: 69
End: 2024-04-19
Payer: MEDICARE

## 2024-04-19 ENCOUNTER — TELEPHONE (OUTPATIENT)
Dept: PRIMARY CARE CLINIC | Facility: CLINIC | Age: 69
End: 2024-04-19
Payer: MEDICARE

## 2024-04-19 DIAGNOSIS — R05.1 ACUTE COUGH: Primary | ICD-10-CM

## 2024-04-19 RX ORDER — DEXTROMETHORPHAN HBR AND GUAIFENESIN 5; 100 MG/5ML; MG/5ML
5 LIQUID ORAL EVERY 4 HOURS PRN
Qty: 300 ML | Refills: 0 | Status: SHIPPED | OUTPATIENT
Start: 2024-04-19 | End: 2024-04-29

## 2024-04-19 NOTE — TELEPHONE ENCOUNTER
----- Message from Leticia Gu sent at 4/19/2024 10:01 AM CDT -----  Regarding: Needs return call  Type: Needs Medical Advice  Who Called:  Pt    Pharmacy name and phone #:    [id number] - [place] - [address] [facility] [address]  [facility] [address]  Phone: [phone] Fax: [phone]      Best Call Back Number: [phone]    Additional Information: Pt is still waiting on the codine cough medicine to be approved to her pharmacy please call to advise

## 2024-04-19 NOTE — TELEPHONE ENCOUNTER
Spoke to the patient and informed her that the medication was e-prescribed.  She should be able to pick it up.  She will call the pharmacy and let us know if she has any issues. SCOUT

## 2024-04-19 NOTE — TELEPHONE ENCOUNTER
FLUoxetine 40 MG capsule 180 capsule 3 4/17/2024 -- No   Sig - Route: Take 1 capsule (40 mg total) by mouth 2 (two) times daily. - Oral   Sent to pharmacy as: FLUoxetine 40 MG capsule   Class: Normal   Order: [id number]   Date/Time Signed: 4/17/2024 08:05       E-Prescribing Status: Receipt confirmed by pharmacy (4/17/2024  8:05 AM CDT)

## 2024-04-19 NOTE — TELEPHONE ENCOUNTER
----- Message from Maria C Pickett sent at 4/19/2024 10:16 AM CDT -----  Contact: [phone]  Requesting an RX refill or new RX.  Is this a refill or new RX: Refill   RX name and strength ):FLUoxetine 40 MG capsule    Is this a 30 day or 90 day RX: 90      Pharmacy name and phone #     [facility] #[id number] - [place] [address] AT [facility] [address]  [facility] [address]  Phone: [phone] Fax: [phone]    Patient is out of the medication .

## 2024-04-19 NOTE — TELEPHONE ENCOUNTER
----- Message from Jerojasmin Machadorandy Henley sent at 4/19/2024  2:25 PM CDT -----  Type:  RX Refill Request    Who Called:  pt   Refill or New Rx:  refill   RX Name and Strength:  guaiFENesin-codeine 100-10 mg/5 ml (TUSSI-ORGANIDIN NR)  mg/5 mL syrup  Medication  How is the patient currently taking it? (ex. 1XDay):  as directed   Preferred Pharmacy with phone number:    [facility] #[id number] - [place] - [address]  [address] [address]  Phone: [phone] Fax: [phone]  Local or Mail Order:  local   Ordering Provider:  Sylvain Sandhu Call Back Number:  [phone]  Additional Information:  pt stated she needs approval to get pts listed rx refilled and sent to pharm above please call back to advise asap thanks!

## 2024-04-19 NOTE — TELEPHONE ENCOUNTER
Pt is out of the network with her insurance. Pt was requesting a refill on her guaifenesin-codeine. Informed pt to get the over the counter mucinex plain. Pt will call back when her insurance changes.

## 2024-04-23 ENCOUNTER — TELEPHONE (OUTPATIENT)
Dept: CARDIOLOGY | Facility: CLINIC | Age: 69
End: 2024-04-23
Payer: MEDICARE

## 2024-04-23 PROBLEM — J45.51 SEVERE PERSISTENT ASTHMA WITH ACUTE EXACERBATION: Status: ACTIVE | Noted: 2024-04-23

## 2024-04-23 NOTE — TELEPHONE ENCOUNTER
04/23/24  Notified patient their insurance was out of network.   Gave them a couple of local cardiologist names they can contact.  Priyanka

## 2024-04-24 ENCOUNTER — TELEPHONE (OUTPATIENT)
Dept: PRIMARY CARE CLINIC | Facility: CLINIC | Age: 69
End: 2024-04-24
Payer: MEDICARE

## 2024-04-24 NOTE — TELEPHONE ENCOUNTER
----- Message from Enedina Gonzalez sent at 4/24/2024  1:57 PM CDT -----  Contact: [phone]  1MEDICALADVICE     Patient is calling for Medical Advice regarding: pt wants a call back about blood work     How long has patient had these symptoms:    Pharmacy name and phone#:    Would like response via [facility]: calll back   Comments:

## 2024-04-24 NOTE — TELEPHONE ENCOUNTER
----- Message from Leonel Duran sent at 4/24/2024 10:21 AM CDT -----  Type:  Sooner Apoointment Request    Caller is requesting a sooner appointment.  Caller declined first available appointment listed below.  Caller will not accept being placed on the waitlist and is requesting a message be sent to doctor.  Name of Caller: Beatriz  When is the first available appointment?   Symptoms:  f/u after ED visit on 4-   Would the patient rather a call back or a response via [facility]?  Call back   Best Call Back Number: [phone]  Additional Information:  not

## 2024-04-26 DIAGNOSIS — J45.51 SEVERE PERSISTENT ASTHMA WITH ACUTE EXACERBATION: ICD-10-CM

## 2024-04-26 NOTE — TELEPHONE ENCOUNTER
----- Message from Wilfredo Salazar sent at 4/26/2024 10:47 AM CDT -----  Regarding: refill  Contact: patient  Type:  RX Refill Request    Who Called:  patient   Refill or New Rx:  refill  RX Name and Strength:  budesonide-formoterol 160-4.5 mcg (SYMBICORT) 160-4.5 mcg/actuation HFAA 30.6 g 3 4/16/2024 4/16/2025   Sig - Route: Inhale 2 puffs into the lungs every 12 (twelve) hours. Controller - Inhalation   Sent to pharmacy as: budesonide-formoterol 160-4.5 mcg (SYMBICORT) 160-4.5 mcg/actuation HFAA   E-Prescribing Status: Receipt confirmed by pharmacy (4/16/2024 10:51 AM CDT)       How is the patient currently taking it? (ex. 1XDay):  see above   Is this a 30 day or 90 day RX:  see above   Preferred Pharmacy with phone number:      [facility] - [place], [place] - [address] [place] [address]  Phone: [phone] Fax: [phone]    Local or Mail Order:  mail order   Ordering Provider:  Sylvain Sandhu Call Back Number:  [phone]  Additional Information:  thanks

## 2024-04-29 RX ORDER — BUDESONIDE AND FORMOTEROL FUMARATE DIHYDRATE 160; 4.5 UG/1; UG/1
2 AEROSOL RESPIRATORY (INHALATION) EVERY 12 HOURS
Qty: 30.6 G | Refills: 3 | Status: ON HOLD | OUTPATIENT
Start: 2024-04-29 | End: 2024-05-08 | Stop reason: SDUPTHER

## 2024-05-01 ENCOUNTER — TELEPHONE (OUTPATIENT)
Dept: PRIMARY CARE CLINIC | Facility: CLINIC | Age: 69
End: 2024-05-01
Payer: MEDICARE

## 2024-05-01 NOTE — TELEPHONE ENCOUNTER
----- Message from Anastasiia Chaparro sent at 5/1/2024 10:34 AM CDT -----  Contact: [phone] pt  1MEDICALADVICE     Patient is calling for Medical Advice regarding:pt is calling to schedule an appt with Dr. Alonzo but nothing was coming up I went all the way to [place].  Pt was recently in the hospital at [place] and was discharge on 4/13/24.  Please call pt back to schedule a hospital f/u she only wants to see him.    How long has patient had these symptoms:    Pharmacy name and phone#:    Would like response via [facility]:  callback      Comments:

## 2024-05-02 LAB
OHS QRS DURATION: 66 MS
OHS QTC CALCULATION: 527 MS

## 2024-05-03 ENCOUNTER — LAB VISIT (OUTPATIENT)
Dept: LAB | Facility: HOSPITAL | Age: 69
End: 2024-05-03
Attending: NURSE PRACTITIONER
Payer: MEDICARE

## 2024-05-03 ENCOUNTER — OFFICE VISIT (OUTPATIENT)
Dept: PRIMARY CARE CLINIC | Facility: CLINIC | Age: 69
End: 2024-05-03
Payer: MEDICARE

## 2024-05-03 VITALS
HEART RATE: 76 BPM | SYSTOLIC BLOOD PRESSURE: 130 MMHG | HEIGHT: 65 IN | WEIGHT: 171.31 LBS | OXYGEN SATURATION: 97 % | DIASTOLIC BLOOD PRESSURE: 78 MMHG | BODY MASS INDEX: 28.54 KG/M2

## 2024-05-03 DIAGNOSIS — B19.20 HEPATITIS C VIRUS INFECTION WITHOUT HEPATIC COMA, UNSPECIFIED CHRONICITY: ICD-10-CM

## 2024-05-03 DIAGNOSIS — R06.09 DYSPNEA ON EXERTION: Primary | ICD-10-CM

## 2024-05-03 DIAGNOSIS — R19.7 DIARRHEA, UNSPECIFIED TYPE: ICD-10-CM

## 2024-05-03 DIAGNOSIS — I70.0 AORTIC ATHEROSCLEROSIS: ICD-10-CM

## 2024-05-03 DIAGNOSIS — M46.92 CERVICAL SPONDYLITIS WITH RADICULITIS: ICD-10-CM

## 2024-05-03 DIAGNOSIS — G40.89 OTHER SEIZURES: ICD-10-CM

## 2024-05-03 DIAGNOSIS — R19.5 DARK STOOLS: ICD-10-CM

## 2024-05-03 DIAGNOSIS — R74.8 ELEVATED LIVER ENZYMES: ICD-10-CM

## 2024-05-03 DIAGNOSIS — D32.9 MENINGIOMA: ICD-10-CM

## 2024-05-03 DIAGNOSIS — M54.12 CERVICAL SPONDYLITIS WITH RADICULITIS: ICD-10-CM

## 2024-05-03 DIAGNOSIS — J44.9 CHRONIC OBSTRUCTIVE PULMONARY DISEASE, UNSPECIFIED COPD TYPE: ICD-10-CM

## 2024-05-03 LAB
BASOPHILS # BLD AUTO: 0.01 K/UL (ref 0–0.2)
BASOPHILS NFR BLD: 0.2 % (ref 0–1.9)
DIFFERENTIAL METHOD BLD: ABNORMAL
EOSINOPHIL # BLD AUTO: 0 K/UL (ref 0–0.5)
EOSINOPHIL NFR BLD: 0 % (ref 0–8)
ERYTHROCYTE [DISTWIDTH] IN BLOOD BY AUTOMATED COUNT: 13.9 % (ref 11.5–14.5)
HCT VFR BLD AUTO: 44.4 % (ref 37–48.5)
HGB BLD-MCNC: 13.8 G/DL (ref 12–16)
IMM GRANULOCYTES # BLD AUTO: 0.01 K/UL (ref 0–0.04)
IMM GRANULOCYTES NFR BLD AUTO: 0.2 % (ref 0–0.5)
LYMPHOCYTES # BLD AUTO: 1.3 K/UL (ref 1–4.8)
LYMPHOCYTES NFR BLD: 22.7 % (ref 18–48)
MCH RBC QN AUTO: 31.9 PG (ref 27–31)
MCHC RBC AUTO-ENTMCNC: 31.1 G/DL (ref 32–36)
MCV RBC AUTO: 103 FL (ref 82–98)
MONOCYTES # BLD AUTO: 0.6 K/UL (ref 0.3–1)
MONOCYTES NFR BLD: 9.6 % (ref 4–15)
NEUTROPHILS # BLD AUTO: 4 K/UL (ref 1.8–7.7)
NEUTROPHILS NFR BLD: 67.3 % (ref 38–73)
NRBC BLD-RTO: 0 /100 WBC
PLATELET # BLD AUTO: 213 K/UL (ref 150–450)
PMV BLD AUTO: 11.1 FL (ref 9.2–12.9)
RBC # BLD AUTO: 4.33 M/UL (ref 4–5.4)
WBC # BLD AUTO: 5.91 K/UL (ref 3.9–12.7)

## 2024-05-03 PROCEDURE — 99999 PR PBB SHADOW E&M-EST. PATIENT-LVL V: CPT | Mod: PBBFAC,,, | Performed by: NURSE PRACTITIONER

## 2024-05-03 PROCEDURE — 3008F BODY MASS INDEX DOCD: CPT | Mod: CPTII,S$GLB,, | Performed by: NURSE PRACTITIONER

## 2024-05-03 PROCEDURE — 36415 COLL VENOUS BLD VENIPUNCTURE: CPT | Performed by: NURSE PRACTITIONER

## 2024-05-03 PROCEDURE — 99214 OFFICE O/P EST MOD 30 MIN: CPT | Mod: S$GLB,,, | Performed by: NURSE PRACTITIONER

## 2024-05-03 PROCEDURE — 85025 COMPLETE CBC W/AUTO DIFF WBC: CPT | Performed by: NURSE PRACTITIONER

## 2024-05-03 PROCEDURE — 3075F SYST BP GE 130 - 139MM HG: CPT | Mod: CPTII,S$GLB,, | Performed by: NURSE PRACTITIONER

## 2024-05-03 PROCEDURE — 1126F AMNT PAIN NOTED NONE PRSNT: CPT | Mod: CPTII,S$GLB,, | Performed by: NURSE PRACTITIONER

## 2024-05-03 PROCEDURE — 3078F DIAST BP <80 MM HG: CPT | Mod: CPTII,S$GLB,, | Performed by: NURSE PRACTITIONER

## 2024-05-03 NOTE — PROGRESS NOTES
"[facility] Note    Chief Complaint      Chief Complaint   Patient presents with    Hospital Follow Up       History of Present Illness      Beatriz Gna is a 68 y.o. female with chronic conditions of DDD, GERD, Gout, htn, hld, NATHALIE,  who presents today for: hospital follow up on 4/13 - went to ER shortness of breath, chest pain, abdominal pain. Found to have severely elevated liver enzymes, and tested positive for Hep C. Will refer to Hepatology. Denies abdominal pain/nausea. Does also need to establish with new Cardiologist secondary to insurance. Pt complaining of diarrhea since antibiotics. Denies fever or chills. Does report being more tired since being discharged. Did not realize her test results for Hepatitis C. Upset since she stated her  had something "similar" also taking care of granddaughter at home.appetite ok.     Hospital Chart below:  4/16/2024- Asthma exacerbation 4/13/2024 treated in ER with nebulized medications and discharged on Aztihromycin antibiotics and Augmentin. Tested COVID-19 negative. Complaint of worsening cough and wheeze 1 week following [place] that caused loss of electricity for 5 days.   Copd/Asthma: sees Dr. Narayan. On Albuterol, Astelin, symbicort, and benralizumab every 8 weeks. Finished course of prednisone. Discontinued Trelegy due to side effects. Has felt better with above regimen.       Past Medical History:  Past Medical History:   Diagnosis Date    Alcohol dependence     DDD (degenerative disc disease), lumbosacral     DJD (degenerative joint disease), lumbosacral     Encounter for blood transfusion     GERD (gastroesophageal reflux disease)     Gout     Hypercholesterolemia     Hypertension     NATHALIE (iron deficiency anemia)     questionable white coat hypertension    Kidney carcinoma, left 2014    Pneumonia     Renal cell carcinoma     Shingles 10/13/2012       Past Surgical History:   has a past surgical history that includes Total abdominal " hysterectomy w/ bilateral salpingoophorectomy (age 48); Appendectomy; Bladder repair; Colonoscopy (2011); Esophagogastroduodenoscopy (2011); Hysterectomy; Refractive surgery; Nephrectomy; Laparoscopic Nissen fundoplication; Eye surgery; Hernia repair; Skin biopsy; Rotator cuff repair (Left, 10/31/2019); Distal clavicle excision (Left, 10/31/2019); Arthroscopy of shoulder with decompression of subacromial space (Left, 10/31/2019); Knee arthroscopy w/ meniscectomy (Left, 2020); Fixation Kyphoplasty (N/A, 2020); Radiofrequency ablation of lumbar medial branch nerve at single level (Bilateral, 2020); Injection of anesthetic agent around nerve (Left, 2020); Radiofrequency ablation (Left, 2020); Knee Arthroplasty (Left, 2020); Radiofrequency ablation of lumbar medial branch nerve at single level (Bilateral, 10/19/2020); Epidural steroid injection into lumbar spine (N/A, 2020); Epidural steroid injection into lumbar spine (N/A, 2021); Transforaminal epidural injection of steroid (Bilateral, 2021); Transforaminal epidural injection of steroid (Bilateral, 3/28/2022); and Anterior cervical discectomy w/ fusion (N/A, 2023).    Family History:  family history includes Hypertension in her father.     Social History:  Social History     Tobacco Use    Smoking status: Former     Current packs/day: 0.00     Average packs/day: 1 pack/day for 40.0 years (40.0 ttl pk-yrs)     Types: Cigarettes     Start date: 1978     Quit date: 2018     Years since quittin.4    Smokeless tobacco: Never    Tobacco comments:     smokes 2-3 cigarettes a night   Substance Use Topics    Alcohol use: Yes     Alcohol/week: 12.0 standard drinks of alcohol     Types: 6 Cans of beer, 6 Standard drinks or equivalent per week     Comment: 2-3 a day/ social    Drug use: No       Review of Systems   Constitutional:  Positive for malaise/fatigue. Negative for chills and fever.   Respiratory:  Negative  for cough and shortness of breath.    Cardiovascular:  Negative for chest pain and palpitations.   Gastrointestinal:  Negative for constipation, diarrhea, nausea and vomiting.   Genitourinary:  Negative for dysuria and hematuria.   Musculoskeletal:  Negative for falls.   Neurological:  Negative for headaches.   Psychiatric/Behavioral:  Negative for hallucinations.         Medications:  Outpatient Encounter Medications as of 5/3/2024   Medication Sig Note Dispense Refill    albuterol (VENTOLIN HFA) 90 mcg/actuation inhaler Inhale 2 puffs into the lungs every 4 (four) hours as needed for Wheezing or Shortness of Breath. Rescue  18 g 11    azelastine (ASTELIN) 137 mcg (0.1 %) nasal spray Use 1 spray(s) in each nostril twice daily  30 mL 3    benralizumab 30 mg/mL AtIn Inject 30 mg into the skin every 8 weeks.  1 each 5    benralizumab 30 mg/mL AtIn Inject 30 mg into the skin every 28 days.  1 mL 2    cholecalciferol, vitamin D3, (VITAMIN D3 ORAL) Take 1 capsule by mouth once daily.       fluticasone (FLONASE) 50 mcg/actuation nasal spray 1 spray by Nasal route daily as needed.        gabapentin (NEURONTIN) 600 MG tablet Take 1 tablet (600 mg total) by mouth 3 (three) times daily.  90 tablet 2    MULTIVIT-IRON-MIN-FOLIC ACID 3,500-18-0.4 UNIT-MG-MG ORAL CHEW Take by mouth once daily.       UBRELVY 100 mg tablet Take 100 mg by mouth as needed. Max 1 in 24 hours (Patient not taking: Reported on 5/14/2024) 5/14/2024: Currently out of medication.      [DISCONTINUED] ALPRAZolam (XANAX) 1 MG tablet TAKE 1 TABLET(1 MG) BY MOUTH TWICE DAILY AS NEEDED FOR ANXIETY  60 tablet 2    [DISCONTINUED] amLODIPine (NORVASC) 5 MG tablet Take 1 tablet (5 mg total) by mouth once daily.  90 tablet 2    [DISCONTINUED] AREXVY, PF, 120 mcg/0.5 mL SusR vaccine Inject 0.5 mLs into the muscle.       [DISCONTINUED] aspirin (ECOTRIN) 81 MG EC tablet Take 1 tablet (81 mg total) by mouth once daily.  30 tablet 0    [DISCONTINUED] budesonide-formoterol  160-4.5 mcg (SYMBICORT) 160-4.5 mcg/actuation HFAA Inhale 2 puffs into the lungs every 12 (twelve) hours. Controller  30.6 g 3    [DISCONTINUED] celecoxib (CELEBREX) 200 MG capsule TAKE ONE CAPSULE BY MOUTH DAILY AT 9 AM  30 capsule 1    [DISCONTINUED] [facility],,PF, 30 mcg/0.3 mL inection Inject 0.3 mLs into the muscle.       [DISCONTINUED] FLUoxetine 40 MG capsule Take 1 capsule (40 mg total) by mouth 2 (two) times daily.  180 capsule 3    [DISCONTINUED] FLUZONE HIGHDOSE QUAD 23-24  mcg/0.7 mL Syrg        [DISCONTINUED] magnesium 30 mg Tab Take by mouth once.       [DISCONTINUED] ondansetron (ZOFRAN) 4 MG tablet Take 1 tablet (4 mg total) by mouth as needed for Nausea.  60 tablet 1    [DISCONTINUED] ondansetron (ZOFRAN-ODT) 4 MG TbDL Take 1 tablet (4 mg total) by mouth every 6 (six) hours as needed (NAUSEA OR VOMITING).  30 tablet 0    [DISCONTINUED] polyethylene glycol (GLYCOLAX) 17 gram/dose powder Take 17 g by mouth once daily.  510 g 3    [DISCONTINUED] predniSONE (DELTASONE) 10 MG tablet Take one pill a day for three days, repeat for shortness of breath  12 tablet 0    [DISCONTINUED] predniSONE (DELTASONE) 20 MG tablet 3 pills for 3 days, 2 for 3 days, 1 for 3 days, repeat for cough  36 tablet 0    [DISCONTINUED] zolpidem (AMBIEN) 10 mg Tab TAKE 1 TABLET(10 MG) BY MOUTH EVERY NIGHT AS NEEDED  30 tablet 2     Facility-Administered Encounter Medications as of 5/3/2024   Medication Dose Route Frequency Provider Last Rate Last Admin    electrolyte-S (ISOLYTE)   Intravenous Continuous Kota Ortega MD 10 mL/hr at 01/31/20 0624 New Bag at 01/31/20 0624    lactated ringers infusion   Intravenous Continuous Kota Ortega MD        pregabalin capsule 75 mg  75 mg Oral Once Shiela Mann MD           Allergies:  Review of patient's allergies indicates:   Allergen Reactions    Phenergan [promethazine] Hives and Rash    Latex, natural rubber Hives     Per pt report-many years    Morphine  "Hives       Health Maintenance:  Immunization History   Administered Date(s) Administered    COVID-19, MRNA, LN-S, PF (Pfizer) (Purple Cap) 02/10/2021, 03/03/2021, 10/05/2021    Influenza 10/16/2014    Influenza (FLUAD) - Quadrivalent - Adjuvanted - PF *Preferred* (65+) 09/29/2020, 11/16/2021, 10/04/2022    Influenza (FLUAD) - Trivalent - Adjuvanted - PF (65+) 09/11/2018    Influenza - High Dose - PF (65 years and older) 11/11/2019    Influenza - Quadrivalent - PF *Preferred* (6 months and older) 10/22/2008, 10/16/2014, 11/05/2015, 01/09/2017    Influenza A (H1N1) 2009 Monovalent - IM 01/08/2010    Influenza Split 11/05/2015, 01/09/2017    Pneumococcal Conjugate - 13 Valent 01/17/2014, 01/17/2014    Tdap 06/21/2011      Health Maintenance   Topic Date Due    Shingles Vaccine (1 of 2) Never done    TETANUS VACCINE  06/21/2021    Mammogram  10/04/2022    Colorectal Cancer Screening  10/25/2022    Lipid Panel  10/06/2023    DEXA Scan  03/30/2025    LDCT Lung Screen  04/13/2025    Aspirin/Antiplatelet Therapy  05/31/2025    Hepatitis C Screening  Completed        Physical Exam      Vital Signs  Pulse: 76  SpO2: 97 %  BP: 130/78  Pain Score: 0-No pain  Height and Weight  Height: 5' 5" (165.1 cm)  Weight: 77.7 kg (171 lb 4.8 oz)  BSA (Calculated - sq m): 1.89 sq meters  BMI (Calculated): 28.5  Weight in (lb) to have BMI = 25: 149.9]    Physical Exam  Constitutional:       Appearance: She is well-developed.   HENT:      Head: Normocephalic and atraumatic.   Cardiovascular:      Rate and Rhythm: Normal rate and regular rhythm.      Heart sounds: Normal heart sounds. No murmur heard.  Pulmonary:      Effort: Pulmonary effort is normal. No respiratory distress.      Breath sounds: Normal breath sounds.   Abdominal:      General: There is no distension.      Palpations: Abdomen is soft.      Tenderness: There is no abdominal tenderness. There is no guarding.   Skin:     General: Skin is warm and dry.   Neurological:      " Mental Status: She is alert. Mental status is at baseline.   Psychiatric:         Behavior: Behavior normal.          Laboratory:  CBC:  Recent Labs   Lab 05/09/24  0338 05/10/24  0552 05/11/24  0411   WBC 3.83 L 3.82 L 4.33   RBC 3.94 L 4.08 3.89 L   Hemoglobin 12.6 12.9 12.4   Hematocrit 39.0 39.9 39.2   Platelets 170 190 196   MCV 99 H 98 101 H   MCH 32.0 H 31.6 H 31.9 H   MCHC 32.3 32.3 31.6 L     CMP:  Recent Labs   Lab 05/09/24  0338 05/10/24  0551 05/11/24  0411   Glucose 87 88 103   Calcium 9.5 9.7 9.3   Albumin 2.6 L 2.8 L 2.6 L   Total Protein 5.5 L 6.1 5.7 L   Sodium 143 140 141   Potassium 3.7 4.4 4.3   CO2 26 29 27   Chloride 107 104 106   BUN 10 10 13   Alkaline Phosphatase 409 H 407 H 356 H    H 683 H 488 H    H 478 H 279 H   Total Bilirubin 6.1 H 5.6 H 3.7 H     URINALYSIS:  Recent Labs   Lab 05/07/24  1423   Color, UA Yellow   Specific Gravity, UA 1.005   pH, UA 7.0   Protein, UA Negative   Nitrite, UA Negative   Leukocytes, UA Negative   Urobilinogen, UA Negative      LIPIDS:  Recent Labs   Lab 10/01/21  0921 10/06/22  0832   HDL 90 H 75   Cholesterol 215 H 188   Triglycerides 70 73   LDL Cholesterol 111.0 98.4   HDL/Cholesterol Ratio 41.9 39.9   Non-HDL Cholesterol 125 113   Total Cholesterol/HDL Ratio 2.4 2.5     TSH:      A1C:  Recent Labs   Lab 10/01/21  0921 10/06/22  0832   Hemoglobin A1C 4.8 4.8       Assessment/Plan     Beatriz Gan is a 68 y.o.female with:    1. Dyspnea on exertion  - Ambulatory referral/consult to Cardiology; Future    2. Hepatitis C virus infection without hepatic coma, unspecified chronicity  - Ambulatory referral/consult to Hepatology; Future    3. Elevated liver enzymes  - COMPREHENSIVE METABOLIC PANEL; Future  - will repeat liver enzymes and make recs,placed referral for Hepatology. Had u/s and CT in ER.     4. Dark stools  - Occult blood x 1, stool; Future  - CLOSTRIDIUM DIFFICILE; Future    5. Diarrhea, unspecified type  - CLOSTRIDIUM  DIFFICILE; Future  - CBC W/ AUTO DIFFERENTIAL; Future     6. Chronic Obstructive Pulmonary Disease (COPD)  Stable. Continue follow up with Pulm.    7. Other Seizures   Stable. Continue current meds.      8. Aortic Atherosclerosis  Stable. Continue current meds.     9. Meningoma   Stable. Continue follow up with specialist.     10. Cervical spondylitis with radiculitis  Stable. Continue current meds.      Recommended patient go to ER if had continued fatigue, diarrhea, unable to tolerate PO, or yellowing of skin or eyes.   Chronic conditions status updated as per HPI.  Other than changes above, cont current medications and maintain follow up with specialists.  No follow-ups on file.    Future Appointments   Date Time Provider Department Center   6/4/2024 11:40 AM Scheuermann, Jennifer B., PA [facility] HEPAT [place] [facility]   6/14/2024  1:15 PM Jorge Alonzo MD [facility] [place]   6/24/2024  1:00 PM [facility]   7/17/2024  9:00 AM Maci Narayan NP [facility] PULM [place] [facility]       Adrienne Cotaya, FNP [facility]

## 2024-05-06 ENCOUNTER — TELEPHONE (OUTPATIENT)
Dept: HEPATOLOGY | Facility: CLINIC | Age: 69
End: 2024-05-06
Payer: MEDICARE

## 2024-05-06 NOTE — TELEPHONE ENCOUNTER
Zoya Griffin NP ordered that patient be scheduled for a hepatology consult visit for hep c.  Patient hep c quant positive.  Attempt made to reach her for scheduling with PA Scheuermann.  I left a VM asking that she call hepatology back.

## 2024-05-07 ENCOUNTER — TELEPHONE (OUTPATIENT)
Dept: PRIMARY CARE CLINIC | Facility: CLINIC | Age: 69
End: 2024-05-07
Payer: MEDICARE

## 2024-05-07 ENCOUNTER — HOSPITAL ENCOUNTER (OUTPATIENT)
Facility: HOSPITAL | Age: 69
Discharge: OTHER INSTITUTION | End: 2024-05-09
Attending: EMERGENCY MEDICINE | Admitting: STUDENT IN AN ORGANIZED HEALTH CARE EDUCATION/TRAINING PROGRAM
Payer: MEDICARE

## 2024-05-07 DIAGNOSIS — K74.69 DECOMPENSATED LIVER DISEASE: ICD-10-CM

## 2024-05-07 DIAGNOSIS — K72.00 ACUTE LIVER FAILURE WITHOUT HEPATIC COMA: Primary | ICD-10-CM

## 2024-05-07 DIAGNOSIS — G93.40 ENCEPHALOPATHY: ICD-10-CM

## 2024-05-07 DIAGNOSIS — R07.9 CHEST PAIN: ICD-10-CM

## 2024-05-07 LAB
ALBUMIN SERPL BCP-MCNC: 3.2 G/DL (ref 3.5–5.2)
ALP SERPL-CCNC: 470 U/L (ref 55–135)
ALT SERPL W/O P-5'-P-CCNC: 1158 U/L (ref 10–44)
AMMONIA PLAS-SCNC: 41 UMOL/L (ref 10–50)
ANION GAP SERPL CALC-SCNC: 12 MMOL/L (ref 8–16)
APAP SERPL-MCNC: <3 UG/ML (ref 10–20)
AST SERPL-CCNC: 1087 U/L (ref 10–40)
BASOPHILS # BLD AUTO: 0.01 K/UL (ref 0–0.2)
BASOPHILS NFR BLD: 0.2 % (ref 0–1.9)
BILIRUB SERPL-MCNC: 6.3 MG/DL (ref 0.1–1)
BILIRUB UR QL STRIP: NEGATIVE
BNP SERPL-MCNC: 92 PG/ML (ref 0–99)
BUN SERPL-MCNC: 10 MG/DL (ref 8–23)
CALCIUM SERPL-MCNC: 9.8 MG/DL (ref 8.7–10.5)
CHLORIDE SERPL-SCNC: 105 MMOL/L (ref 95–110)
CLARITY UR: CLEAR
CO2 SERPL-SCNC: 24 MMOL/L (ref 23–29)
COLOR UR: YELLOW
CREAT SERPL-MCNC: 1.1 MG/DL (ref 0.5–1.4)
DIFFERENTIAL METHOD BLD: ABNORMAL
EOSINOPHIL # BLD AUTO: 0 K/UL (ref 0–0.5)
EOSINOPHIL NFR BLD: 0 % (ref 0–8)
ERYTHROCYTE [DISTWIDTH] IN BLOOD BY AUTOMATED COUNT: 14.5 % (ref 11.5–14.5)
EST. GFR  (NO RACE VARIABLE): 54 ML/MIN/1.73 M^2
GLUCOSE SERPL-MCNC: 89 MG/DL (ref 70–110)
GLUCOSE UR QL STRIP: NEGATIVE
HCT VFR BLD AUTO: 42.1 % (ref 37–48.5)
HGB BLD-MCNC: 13.7 G/DL (ref 12–16)
HGB UR QL STRIP: NEGATIVE
IMM GRANULOCYTES # BLD AUTO: 0.01 K/UL (ref 0–0.04)
IMM GRANULOCYTES NFR BLD AUTO: 0.2 % (ref 0–0.5)
INR PPP: 1.3 (ref 0.8–1.2)
KETONES UR QL STRIP: NEGATIVE
LEUKOCYTE ESTERASE UR QL STRIP: NEGATIVE
LYMPHOCYTES # BLD AUTO: 1.3 K/UL (ref 1–4.8)
LYMPHOCYTES NFR BLD: 30.9 % (ref 18–48)
MCH RBC QN AUTO: 31.9 PG (ref 27–31)
MCHC RBC AUTO-ENTMCNC: 32.5 G/DL (ref 32–36)
MCV RBC AUTO: 98 FL (ref 82–98)
MONOCYTES # BLD AUTO: 0.6 K/UL (ref 0.3–1)
MONOCYTES NFR BLD: 12.9 % (ref 4–15)
NEUTROPHILS # BLD AUTO: 2.4 K/UL (ref 1.8–7.7)
NEUTROPHILS NFR BLD: 55.8 % (ref 38–73)
NITRITE UR QL STRIP: NEGATIVE
NRBC BLD-RTO: 0 /100 WBC
PH UR STRIP: 7 [PH] (ref 5–8)
PLATELET # BLD AUTO: 162 K/UL (ref 150–450)
PMV BLD AUTO: 10.4 FL (ref 9.2–12.9)
POTASSIUM SERPL-SCNC: 3.2 MMOL/L (ref 3.5–5.1)
PROT SERPL-MCNC: 6.5 G/DL (ref 6–8.4)
PROT UR QL STRIP: NEGATIVE
PROTHROMBIN TIME: 13.3 SEC (ref 9–12.5)
RBC # BLD AUTO: 4.3 M/UL (ref 4–5.4)
SODIUM SERPL-SCNC: 141 MMOL/L (ref 136–145)
SP GR UR STRIP: 1 (ref 1–1.03)
TROPONIN I SERPL DL<=0.01 NG/ML-MCNC: <0.006 NG/ML (ref 0–0.03)
URN SPEC COLLECT METH UR: NORMAL
UROBILINOGEN UR STRIP-ACNC: NEGATIVE EU/DL
WBC # BLD AUTO: 4.34 K/UL (ref 3.9–12.7)

## 2024-05-07 PROCEDURE — 83880 ASSAY OF NATRIURETIC PEPTIDE: CPT | Performed by: NURSE PRACTITIONER

## 2024-05-07 PROCEDURE — 84484 ASSAY OF TROPONIN QUANT: CPT | Performed by: NURSE PRACTITIONER

## 2024-05-07 PROCEDURE — 99285 EMERGENCY DEPT VISIT HI MDM: CPT | Mod: 25

## 2024-05-07 PROCEDURE — 36415 COLL VENOUS BLD VENIPUNCTURE: CPT | Performed by: EMERGENCY MEDICINE

## 2024-05-07 PROCEDURE — 94760 N-INVAS EAR/PLS OXIMETRY 1: CPT

## 2024-05-07 PROCEDURE — 80053 COMPREHEN METABOLIC PANEL: CPT | Performed by: NURSE PRACTITIONER

## 2024-05-07 PROCEDURE — 85025 COMPLETE CBC W/AUTO DIFF WBC: CPT | Performed by: NURSE PRACTITIONER

## 2024-05-07 PROCEDURE — 25000003 PHARM REV CODE 250: Performed by: EMERGENCY MEDICINE

## 2024-05-07 PROCEDURE — 80143 DRUG ASSAY ACETAMINOPHEN: CPT | Performed by: EMERGENCY MEDICINE

## 2024-05-07 PROCEDURE — 36415 COLL VENOUS BLD VENIPUNCTURE: CPT | Performed by: NURSE PRACTITIONER

## 2024-05-07 PROCEDURE — 85610 PROTHROMBIN TIME: CPT | Performed by: NURSE PRACTITIONER

## 2024-05-07 PROCEDURE — 93005 ELECTROCARDIOGRAM TRACING: CPT

## 2024-05-07 PROCEDURE — 82140 ASSAY OF AMMONIA: CPT | Performed by: EMERGENCY MEDICINE

## 2024-05-07 PROCEDURE — 81003 URINALYSIS AUTO W/O SCOPE: CPT | Performed by: NURSE PRACTITIONER

## 2024-05-07 PROCEDURE — 93010 ELECTROCARDIOGRAM REPORT: CPT | Mod: ,,, | Performed by: GENERAL PRACTICE

## 2024-05-07 PROCEDURE — 94761 N-INVAS EAR/PLS OXIMETRY MLT: CPT

## 2024-05-07 RX ORDER — OXYCODONE HYDROCHLORIDE 5 MG/1
5 TABLET ORAL
Status: COMPLETED | OUTPATIENT
Start: 2024-05-07 | End: 2024-05-07

## 2024-05-07 RX ADMIN — OXYCODONE 5 MG: 5 TABLET ORAL at 07:05

## 2024-05-07 NOTE — TELEPHONE ENCOUNTER
Pt was given the date and time of her cardiology appt. She also wants you to call her wouldn't say the reason just that it's personal. She stated that the liver doctor (no name given) told her go to the ER. She also gave no information on why they told her that.

## 2024-05-07 NOTE — FIRST PROVIDER EVALUATION
" Emergency Department TeleTriage Encounter Note      CHIEF COMPLAINT    Chief Complaint   Patient presents with    Jaundice       VITAL SIGNS   Initial Vitals [05/07/24 1225]   BP Pulse Resp Temp SpO2   133/82 66 18 97.8 °F (36.6 °C) 95 %      MAP       --            ALLERGIES    Review of patient's allergies indicates:   Allergen Reactions    Phenergan [promethazine] Hives and Rash    Latex, natural rubber Hives     Per pt report-many years    Morphine Hives    Nsaids (non-steroidal anti-inflammatory drug)      Due to "kidney cancer"       PROVIDER TRIAGE NOTE  Verbal consent for the teletriage evaluation was given by the patient at the start of the evaluation.  All efforts will be made to maintain patient's privacy during the evaluation.      This is a teletriage evaluation of a 68 y.o. female presenting to the ED with c/o jaundice since being discharged last month that is getting worse.  Also reports left-sided chest pain since April. Limited physical exam via telehealth: The patient is awake, alert, answering questions appropriately and is not in respiratory distress.  As the Teletriage provider, I performed an initial assessment and ordered appropriate labs and imaging studies, if any, to facilitate the patient's care once placed in the ED. Once a room is available, care and a full evaluation will be completed by an alternate ED provider.  Any additional orders and the final disposition will be determined by that provider.  All imaging and labs will not be followed-up by the Teletriage Team, including myself.          ORDERS  Labs Reviewed   CBC W/ AUTO DIFFERENTIAL   COMPREHENSIVE METABOLIC PANEL   URINALYSIS, REFLEX TO URINE CULTURE   PROTIME-INR   TROPONIN I   B-TYPE NATRIURETIC PEPTIDE       ED Orders (720h ago, onward)      Start Ordered     Status Ordering Provider    05/07/24 1233 05/07/24 1232  Saline lock IV  Once         Ordered BARBARA MELLO    05/07/24 1233 05/07/24 1232  Pulse Oximetry Continuous  " Continuous         Ordered BARBARA MELLO    05/07/24 1233 05/07/24 1232  Cardiac Monitoring - Adult  Continuous        Comments: Notify Physician If:    Ordered BARBARA MELLO    05/07/24 1233 05/07/24 1232  EKG 12-lead  Once         Ordered BARBARA MELLO    05/07/24 1233 05/07/24 1232  CBC auto differential  STAT         Ordered BARBARA MELLO    05/07/24 1233 05/07/24 1232  Comprehensive metabolic panel  STAT         Ordered BARBARA MELLO    05/07/24 1233 05/07/24 1232  Urinalysis, Reflex to Urine Culture Urine, Clean Catch  STAT         Ordered BARBARA MELLO    05/07/24 1233 05/07/24 1232  Protime-INR  STAT         Ordered BARBARA MELLO    05/07/24 1233 05/07/24 1232  Troponin I  STAT         Ordered BARBARA MELLO    05/07/24 1233 05/07/24 1232  B-Type natriuretic peptide (BNP)  STAT         Ordered BARBARA MELLO    05/07/24 1233 05/07/24 1232  X-Ray Chest PA And Lateral  1 time imaging         Ordered BARBAAR MELLO              Virtual Visit Note: The provider triage portion of this emergency department evaluation and documentation was performed via [employer], a HIPAA-compliant telemedicine application, in concert with a tele-presenter in the room. A face to face patient evaluation with one of my colleagues will occur once the patient is placed in an emergency department room.      DISCLAIMER: This note was prepared with [employer] voice recognition transcription software. Garbled syntax, mangled pronouns, and other bizarre constructions may be attributed to that software system.

## 2024-05-07 NOTE — ED NOTES
Pt identifiers checked and accurate with Beatriz Gan    Pt presents to ED with complaints of worsening jaundice, referred to ED by MD. Pt denies all other complaints at this time.      Subjective   Patient ID: Viktoriya Carroll is a 44 y.o. female who presents for Gynecologic Exam (EP.  Well woman exam.  No pap or breast exam  Labs done.  Rash under L eye, had 1 month ago and recheck L ear.  R wrist pain and loss of strength.).  HPI      Viktoriya Carroll is here for annual check-up.      Concerns here for annual     Has pain when cutting w knife  and putting pressure   on  hand  no real repetitive activities, the pain runs down the inner aspect of her right wrist   Minimal typing    Eye rash under left  ear   Still feels clogged in left ear   Had  blood in ear the time of the last visit with no known trauma   Has a crackle still that comes and goes that was there prior to the bleeding    Reported Health here for annual    Dental visits  reg  Vision checks reg    Diet healthy  Exercise reg  Caffeine 1 c  Tobacco  remote 1 yr to 2   Alcohol  never     Female : Menstrual status /pregnancy status   erratic has missed     Colorectal cancer screening  never  will schedule     Current issues see above      Review of Systems  GENERAL - Denies fever, fatigue or chills  SKIN - Denies rash, new skin lesions, or change in moles  EYES - Denies blurred vision, or change in visual acuity  EARS -see above  NOSE - Denies nasal congestion, discharge, or bleeding  MOUTH - Denies sore throat, postnasal drip or painful/difficulty swallowing  NECK - Denies pain or swelling  RESPIRATORY - Denies shortness of breath, cough, wheezing  CARDIOVASCULAR - Denies palpitations, chest pain, orthopnea, peripheral edema, syncope or claudication  GASTROINTESTINAL - Denies nausea, vomiting, diarrhea, constipation, abdominal pain, melena and or bright red blood  GENITOURINARY - Denies dysuria, frequency of urination, urgency, or hesitancy  MUSCULOSKELETAL -see above   NEUROLOGICAL - Denies localized numbness, weakness, or tingling  PSYCHIATRIC - Denies depression, anxiety, substance abuse, suicidal or homicidal  "ideation  ENDOCRINE - Denies heat or cold intolerance, weight loss or gain, increasing thirst  HEMATO-IMMUNOLOGIC - Denies easy bruising, bleeding, oral ulcerations or recurrent infections      Objective   /68   Pulse 85   Temp 36.7 °C (98.1 °F) (Oral)   Ht 1.676 m (5' 6\")   Wt 68.5 kg (151 lb)   LMP 04/07/2024   SpO2 99%   BMI 24.37 kg/m²    Physical Exam  CONSTITUTIONAL - well nourished, well developed, looks like stated age, in no acute distress, not ill-appearing, and not tired appearing  SKIN - normal skin color and pigmentation, normal skin turgor without rash, lesions, or nodules visualized  HEAD - no trauma, normocephalic  EYES - nl  ENT -reveals inflammation of the left TM there is a brown nodule in the left canal suspect wax or hematoma right TM is normal nares normal bilaterally   NECK - supple without rigidity, no neck mass was observed, no thyromegaly or thyroid nodules  CHEST - clear to auscultation, no wheezing, no crackles and no rales, good effort  CARDIAC - regular rate and regular rhythm, no skipped beats, no murmur  ABDOMEN - no organomegaly, soft, nontender, nondistended, normal bowel sounds, no guarding/rebound/rigidity  EXTREMITIES - no edema, no deformities  NEUROLOGICAL -  nl  PSYCHIATRIC - alert, pleasant and cordial, age-appropriate  LYMPHATIC- no cervical lymphadenopathy    Assessment/Plan   Diagnoses and all orders for this visit:  Routine general medical examination at a health care facility  -     Thyroid Stimulating Hormone; Future  -     Thyroxine, Free; Future  -     Triiodothyronine, Free; Future  -     CBC and Auto Differential; Future  -     Comprehensive Metabolic Panel; Future  -     Lipid Panel; Future  Hypothyroidism, unspecified  -     Synthroid 75 mcg tablet; Take 1 tablet (75 mcg) by mouth once daily.  Pain of right hand  -     XR hand right 3+ views; Future  Dysfunction of left eustachian tube  -     Referral to ENT; Future    Call with issues  Blood work " ordered  Follow-up with me in 6 months  Refer to ENT for left eustachian tube dysfunction  X-ray hand will purchase of wrist brace or already has 1 at home she will apply ice nightly and rest  She will continue her routine medications  Her physical blood work and her physical or office queue so that blood work was ordered for her next appointment in October  Gwendolyn Mercado MD

## 2024-05-07 NOTE — TELEPHONE ENCOUNTER
When I spoke with patient earlier I told her that she should report to ER for symptoms.  She states that PCP told her to report to ER only if symptoms worsened and that she saw PCP 5/3/24.  She wanted hepatologist to review her chart and respond.  Attempt made to reach patient again.  Msg from PA Scheuermann left on her VM.  I asked that she call back to confirm receipt of msg from PA Scheuermann and to confirm that she was going to ER.

## 2024-05-07 NOTE — TELEPHONE ENCOUNTER
Patient referred to hepatology for hep c treatment.  Patient states that her eyes and skin are turning yellow. She has mild abdominal swelling and reports passing black tarry stool intermittently since 4/13/24.  Patient has a history of having 1 large hemorrhoid and she does pass red stools when it is irritated.  She denied coughing up blood.  She states that she is confused at times and dizzy, with SOB and chest discomfort because of a fractured rib that she got from coughing when she had pneumonia.  Attempt made to schedule patient for a sooner appt with PA Scheuermann.  She reports having transportation issues so visit scheduled 6/4/24 as she requested.  I got a hard stop regarding insurance coverage when scheduling patient.  Patient states that she no longer has [employer] but can't understand why it has not been removed from her coverage list.  I told her that I had to tell her that a got a hard stop when scheduling because she might receive a bill.  She is going to check with family members to see if someone can bring her for a sooner visit with hepatology.  I noted that patient LFTs were very elevated on 4/13/24.  Please advise.

## 2024-05-07 NOTE — TELEPHONE ENCOUNTER
----- Message from Betty Alessandro sent at 5/7/2024 10:34 AM CDT -----  Contact: [phone]  Would like to receive medical advice.    Pt called and stated that she having a hard time trying to find a Cardiology doctor in [place].     Would they like a call back or a response via [facility]:  call    Additional information:  please call to advise.

## 2024-05-07 NOTE — TELEPHONE ENCOUNTER
----- Message from Ezekiel Aguilera sent at 5/7/2024 10:51 AM CDT -----  Contact: [phone]@[email or web address]  Good morning patient would like a call back from the doc to get a approval for a new PCP. Please call patient to advise  [phone]

## 2024-05-07 NOTE — PROVIDER TRANSFER
"   Outside Transfer Acceptance Note / Regional Referral Center    Referring facility: [facility]   Referring provider: MAYANK JERNIGAN  Accepting facility: [facility]  Accepting provider:  Brandon Munoz MD  Admitting provider: Brandon Munoz MD  Reason for transfer:  Higher LOC  Transfer diagnosis: Transaminitis   Transfer specialty requested: Hepatology  Transfer specialty notified: No  Transfer level: NUMBER 1-5: 2  Bed type requested: med/surg  Isolation status: No active isolations   Admission class or status: IP- Inpatient      Narrative     Beatriz Gan is a 67yo F with a PMH of HTN, HLD, asthma/COPD, NATHALIE, GERD, and alcohol dependence who presented to the [facility] ED on 5/7/2024 with complaints of jaundice, confusion, and chest pain. Per referring MD: "was seen in the ER on 04/13 and found to have new significantly abnormal liver panel with a total bilirubin of 2.7 AST of 421 ALT of 676 and normal INR 1.1. Ultrasound abdomen CT abdomen was performed and unremarkable. Patient was diagnosed with pneumonia and urinary tract infection and given Augmentin and azithromycin with PCP follow-up. Hepatitis panel positive for HCV only. Her primary care doctor upon follow-up referred her to hepatology but her appointment is not till June. Per patient last colonoscopy was over 10 years ago and was benign. She also reports having some dark tarry stool after her ER visit a month ago. She had not taken any Tylenol over the past month. She has not on a statin."    In the ED, vitals significant for /104. Labs remarkable for K 3.2, tbili 6.3, , AST 1087, and ALT 1158. CXR without any acute cardiopulmonary processes.      PFC contacted to facilitate transfer for higher LOC and hepatology evaluation. Pt accepted to  at [facility] for further care and management.    Objective     Vitals: Temp: 97.8 °F (36.6 °C) (05/07/24 1225)  Pulse: 64 (05/07/24 1806)  Resp: 18 " "(05/07/24 1806)  BP: (!) 144/90 (05/07/24 1806)  SpO2: 99 % (05/07/24 1806)  Recent Labs: All pertinent labs within the past 24 hours have been reviewed.       IV access:        Peripheral IV - Single Lumen 05/07/24 1549 20 G Left Antecubital (Active)   Site Assessment Clean;Dry;Intact;No redness;No swelling 05/07/24 1549   Line Status Blood return noted;Saline locked;Flushed 05/07/24 1549       Allergies:   Review of patient's allergies indicates:   Allergen Reactions    Phenergan [promethazine] Hives and Rash    Latex, natural rubber Hives     Per pt report-many years    Morphine Hives    Nsaids (non-steroidal anti-inflammatory drug)      Due to "kidney cancer"      NPO: No    Anticoagulation:   Anticoagulants       None             Instructions      Shiva Barahona-  Admit to Hospital Medicine  Upon patient arrival to floor, please send SecureChat to [facility] or call extension [phone] (if no answer, do NOT leave a callback number after the beep, rather please send a SecureChat to [facility]), for Hospital Medicine admit team assignment and for additional admit orders for the patient.  Do not page the attending physician associated with the patient on arrival (this physician may not be on duty at the time of arrival).  Rather, always send a SecureChat to [facility] or call [phone] to reach the triage physician for orders and team assignment.    Brandon Munoz MD  Attending Physician  Medical Director - [facility]  Department of Hospital Medicine  5/7/2024    "

## 2024-05-07 NOTE — TELEPHONE ENCOUNTER
----- Message from Anastasiia Thelma Shankar sent at 5/7/2024 11:06 AM CDT -----  Contact: [phone] pt  Type: Returning a call    Who left a message?Jasmin Wren MA    When did the practice call? today    Comments:please call her back today if possible/ the liver  Wants her to go to the er.  She wants to talk with you before she goes.

## 2024-05-07 NOTE — TELEPHONE ENCOUNTER
----- Message from Ezekiel Aguilera sent at 5/7/2024 10:49 AM CDT -----  Contact: [phone]@[email or web address]  Good morning patient would like a call back to discuss some apt that she needs. Please call patient to advise  [phone]

## 2024-05-07 NOTE — ED PROVIDER NOTES
"Encounter Date: 5/7/2024       History     Chief Complaint   Patient presents with    Jaundice     HPI patient is a 68-year-old woman who presents emergency department for evaluation of jaundice, confusion, left-sided chest pain, shortness of breath worsening over the past 3 weeks.  She has a history of COPD/asthma and was seen in the ER on 04/13 and found to have new significantly abnormal liver panel with a total bilirubin of 2.7 AST of 421 ALT of 676 and normal INR 1.1.  Ultrasound abdomen CT abdomen was performed and unremarkable.  Patient was diagnosed with pneumonia and urinary tract infection and given Augmentin and azithromycin with PCP follow-up.  Hepatitis panel positive for HCV only.  Her primary care doctor upon follow-up referred her to hepatology but her appointment is not till June.  Per patient last colonoscopy was over 10 years ago and was benign.  She also reports having some dark tarry stool after her ER visit a month ago.  She had not taken any Tylenol over the past month.  She has not on a statin.    Review of patient's allergies indicates:   Allergen Reactions    Phenergan [promethazine] Hives and Rash    Latex, natural rubber Hives     Per pt report-many years    Morphine Hives    Nsaids (non-steroidal anti-inflammatory drug)      Due to "kidney cancer"     Past Medical History:   Diagnosis Date    Alcohol dependence     DDD (degenerative disc disease), lumbosacral     DJD (degenerative joint disease), lumbosacral     Encounter for blood transfusion     GERD (gastroesophageal reflux disease)     Gout     Hypercholesterolemia     Hypertension     NATHALIE (iron deficiency anemia)     questionable white coat hypertension    Kidney carcinoma, left 2014    Pneumonia     Renal cell carcinoma     Shingles 10/13/2012     Past Surgical History:   Procedure Laterality Date    ANTERIOR CERVICAL DISCECTOMY W/ FUSION N/A 5/4/2023    Procedure: DISCECTOMY, SPINE, CERVICAL, ANTERIOR APPROACH, WITH FUSION C3-4;  " Surgeon: Ruben Martinez MD;  Location: [facility] OR;  Service: Orthopedics;  Laterality: N/A;    APPENDECTOMY      ARTHROSCOPY OF SHOULDER WITH DECOMPRESSION OF SUBACROMIAL SPACE Left 10/31/2019    Procedure: ARTHROSCOPY, SHOULDER, WITH SUBACROMIAL SPACE DECOMPRESSION;  Surgeon: Ruben Martinez MD;  Location: [facility] OR;  Service: Orthopedics;  Laterality: Left;    BLADDER REPAIR      x 2    COLONOSCOPY  9/2011    DISTAL CLAVICLE EXCISION Left 10/31/2019    Procedure: EXCISION, CLAVICLE, DISTAL;  Surgeon: Ruben Martinez MD;  Location: [facility] OR;  Service: Orthopedics;  Laterality: Left;    EPIDURAL STEROID INJECTION INTO LUMBAR SPINE N/A 12/7/2020    Procedure: Injection-steroid-epidural-lumbar;  Surgeon: Dk Rosales MD;  Location: [facility] OR;  Service: Pain Management;  Laterality: N/A;  L4-L5    EPIDURAL STEROID INJECTION INTO LUMBAR SPINE N/A 5/17/2021    Procedure: Injection-steroid-epidural-lumbar;  Surgeon: Dk Rosales MD;  Location: [facility] OR;  Service: Pain Management;  Laterality: N/A;  L4-L5    ESOPHAGOGASTRODUODENOSCOPY  9/2011    EYE SURGERY      lasix bilateral    FIXATION KYPHOPLASTY N/A 1/31/2020    Procedure: Kyphoplasty T12;  Surgeon: Dk Rosales MD;  Location: [facility] OR;  Service: Pain Management;  Laterality: N/A;    HERNIA REPAIR      hiatal hernai    HYSTERECTOMY      INJECTION OF ANESTHETIC AGENT AROUND NERVE Left 6/8/2020    Procedure: Block, Nerve;  Surgeon: Dk Rosales MD;  Location: [facility] OR;  Service: Pain Management;  Laterality: Left;  left genicular nerve block     KNEE ARTHROPLASTY Left 7/30/2020    Procedure: ARTHROPLASTY, KNEE LEFT;  Surgeon: Ruben Martinez MD;  Location: [facility] OR;  Service: Orthopedics;  Laterality: Left;  REP VALERY RUBY    KNEE ARTHROSCOPY W/ MENISCECTOMY Left 1/16/2020    Procedure: ARTHROSCOPY, KNEE, WITH MEDIAL MENISCECTOMY;  Surgeon: Ruben Martinez MD;  Location: [facility] OR;  Service: Orthopedics;  Laterality: Left;    LAPAROSCOPIC NISSEN FUNDOPLICATION       NEPHRECTOMY      LT partial    RADIOFREQUENCY ABLATION Left 6/19/2020    Procedure: Radiofrequency Ablation;  Surgeon: Dk Rosales MD;  Location: [facility] OR;  Service: Pain Management;  Laterality: Left;    RADIOFREQUENCY ABLATION OF LUMBAR MEDIAL BRANCH NERVE AT SINGLE LEVEL Bilateral 2/28/2020    Procedure: Radiofrequency Ablation, Nerve, Spinal, Lumbar, Medial Branch, 1 Level;  Surgeon: Dk Rosales MD;  Location: [facility] OR;  Service: Pain Management;  Laterality: Bilateral;  L3,L4,L5 - Burned at 80 degrees C. for 60 seconds x 2 each site    RADIOFREQUENCY ABLATION OF LUMBAR MEDIAL BRANCH NERVE AT SINGLE LEVEL Bilateral 10/19/2020    Procedure: Radiofrequency Ablation, Nerve, Spinal, Lumbar, Medial Branch, 1 Level;  Surgeon: Dk Rosales MD;  Location: [facility] OR;  Service: Pain Management;  Laterality: Bilateral;  L3, L4, L5    REFRACTIVE SURGERY      ROTATOR CUFF REPAIR Left 10/31/2019    Procedure: REPAIR, ROTATOR CUFF;  Surgeon: Ruben Martinez MD;  Location: [facility] OR;  Service: Orthopedics;  Laterality: Left;  arthrex    SKIN BIOPSY      TOTAL ABDOMINAL HYSTERECTOMY W/ BILATERAL SALPINGOOPHORECTOMY  age 48    TRANSFORAMINAL EPIDURAL INJECTION OF STEROID Bilateral 7/30/2021    Procedure: Injection,steroid,epidural,transforaminal approach;  Surgeon: Dk Rosales MD;  Location: [facility] OR;  Service: Pain Management;  Laterality: Bilateral;  L5-S1     TRANSFORAMINAL EPIDURAL INJECTION OF STEROID Bilateral 3/28/2022    Procedure: INJECTION, STEROID, EPIDURAL, TRANSFORAMINAL APPROACH Lumbar L5-S1;  Surgeon: Dk Rosales MD;  Location: [facility] OR;  Service: Pain Management;  Laterality: Bilateral;     Family History   Problem Relation Name Age of Onset    Hypertension Father      Glaucoma Neg Hx      Macular degeneration Neg Hx      Retinal detachment Neg Hx       Social History     Tobacco Use    Smoking status: Former     Current packs/day: 0.00     Average packs/day: 1 pack/day for 40.0 years (40.0 ttl  pk-yrs)     Types: Cigarettes     Start date: 1978     Quit date: 2018     Years since quittin.3    Smokeless tobacco: Never    Tobacco comments:     smokes 2-3 cigarettes a night   Substance Use Topics    Alcohol use: Yes     Alcohol/week: 12.0 standard drinks of alcohol     Types: 6 Cans of beer, 6 Standard drinks or equivalent per week     Comment: 2-3 a day/ social    Drug use: No     Review of Systems   Constitutional:  Negative for fever.   HENT:  Negative for sore throat.    Respiratory:  Positive for shortness of breath.    Cardiovascular:  Positive for chest pain.   Gastrointestinal:  Positive for abdominal pain (Right upper and lower quadrant.). Negative for nausea.   Genitourinary:  Negative for dysuria.   Musculoskeletal:  Negative for back pain.   Skin:  Positive for color change. Negative for rash.   Neurological:  Negative for weakness.   Hematological:  Does not bruise/bleed easily.   Psychiatric/Behavioral:  Positive for confusion.        Physical Exam     Initial Vitals [24 1225]   BP Pulse Resp Temp SpO2   133/82 66 18 97.8 °F (36.6 °C) 95 %      MAP       --         Physical Exam    Constitutional: Vital signs are normal. She appears well-developed and well-nourished.  Non-toxic appearance. No distress.   HENT:   Head: Normocephalic and atraumatic.   Eyes: EOM are normal. Pupils are equal, round, and reactive to light. Scleral icterus is present.   Neck: Neck supple. No JVD present.   Normal range of motion.  Cardiovascular:  Normal rate, regular rhythm, normal heart sounds and intact distal pulses.     Exam reveals no gallop and no friction rub.       No murmur heard.  Pulmonary/Chest: Breath sounds normal. She has no wheezes. She has no rhonchi. She has no rales.   Abdominal: Abdomen is soft. Bowel sounds are normal. There is abdominal tenderness in the right upper quadrant and right lower quadrant. There is no rebound and no guarding.   Musculoskeletal:         General:  Normal range of motion.      Cervical back: Normal range of motion and neck supple. No rigidity.     Neurological: She is alert and oriented to person, place, and time. She has normal strength and normal reflexes. No cranial nerve deficit or sensory deficit. She exhibits normal muscle tone. Coordination normal. GCS eye subscore is 4. GCS verbal subscore is 5. GCS motor subscore is 6.   Skin: Skin is warm and dry.   Jaundiced   Psychiatric: She has a normal mood and affect. Her speech is normal and behavior is normal. She is not actively hallucinating.         ED Course   Procedures  Labs Reviewed   CBC W/ AUTO DIFFERENTIAL - Abnormal; Notable for the following components:       Result Value    MCH 31.9 (*)     All other components within normal limits   COMPREHENSIVE METABOLIC PANEL - Abnormal; Notable for the following components:    Potassium 3.2 (*)     Albumin 3.2 (*)     Total Bilirubin 6.3 (*)     Alkaline Phosphatase 470 (*)     AST 1,087 (*)     ALT 1,158 (*)     eGFR 54 (*)     All other components within normal limits   PROTIME-INR - Abnormal; Notable for the following components:    Prothrombin Time 13.3 (*)     INR 1.3 (*)     All other components within normal limits   ACETAMINOPHEN LEVEL - Abnormal; Notable for the following components:    Acetaminophen (Tylenol), Serum <3.0 (*)     All other components within normal limits   CBC W/ AUTO DIFFERENTIAL - Abnormal; Notable for the following components:    WBC 3.69 (*)     RBC 3.91 (*)     MCH 32.0 (*)     RDW 14.7 (*)     All other components within normal limits   URINALYSIS, REFLEX TO URINE CULTURE    Narrative:     Specimen Source->Urine   TROPONIN I   B-TYPE NATRIURETIC PEPTIDE   AMMONIA   MAGNESIUM   COMPREHENSIVE METABOLIC PANEL   CERULOPLASMIN   ANTI-SMOOTH MUSCLE ANTIBODY   ANTIMITOCHONDRIAL ANTIBODY   ALPHA-1-ANTITRYPSIN   HEMOCHROMATOSIS DNA ANALYSIS (PCR)          Imaging Results              US Liver with Doppler (xpd) (In process)                       X-Ray Chest PA And Lateral (Final result)  Result time 05/07/24 14:02:06      Final result by Jorge Santos Jr., MD (05/07/24 14:02:06)                   Impression:      Aortic ectasia otherwise negative chest      Electronically signed by: Jorge Santos MD  Date:    05/07/2024  Time:    14:02               Narrative:    EXAMINATION:  XR CHEST PA AND LATERAL    CLINICAL HISTORY:  Chest Pain;    TECHNIQUE:  PA and lateral views of the chest were performed.    COMPARISON:  X-ray.  Chest x-ray of April 13, 2024    FINDINGS:  There is ectasia of the thoracic aorta.  The cardiac size is within normal limits.  No intrapulmonary mass or infiltrate is seen.  There is no pneumothorax or pleural effusion.                                      MELD 3.0: 20 at 5/7/2024  1:16 PM  MELD-Na: 17 at 5/7/2024  1:16 PM  Calculated from:  Serum Creatinine: 1.1 mg/dL at 5/7/2024  1:16 PM  Serum Sodium: 141 mmol/L (Using max of 137 mmol/L) at 5/7/2024  1:16 PM  Total Bilirubin: 6.3 mg/dL at 5/7/2024  1:16 PM  Serum Albumin: 3.2 g/dL at 5/7/2024  1:16 PM  INR(ratio): 1.3 at 5/7/2024  1:15 PM  Age at listing (hypothetical): 68 years  Sex: Female at 5/7/2024  1:16 PM           Medications   budesonide nebulizer solution 0.5 mg (has no administration in time range)   arformoteroL nebulizer solution 15 mcg (has no administration in time range)   potassium bicarbonate disintegrating tablet 50 mEq (has no administration in time range)   potassium bicarbonate disintegrating tablet 35 mEq (has no administration in time range)   potassium bicarbonate disintegrating tablet 60 mEq (has no administration in time range)   magnesium oxide tablet 800 mg (has no administration in time range)   magnesium oxide tablet 800 mg (has no administration in time range)   ondansetron injection 4 mg (has no administration in time range)   lactulose 20 gram/30 mL solution Soln 15 g (has no administration in time range)   oxyCODONE immediate  release tablet 5 mg (5 mg Oral Given 5/7/24 1958)   melatonin tablet 6 mg (6 mg Oral Given 5/8/24 0129)     Medical Decision Making  68-year-old woman presents emergency department for jaundiced.  She is found to have significant transaminitis with AST and ALT greater than 1000 and total bilirubin 6.3.  Differential diagnosis includes viral hepatitis, alcohol-induced hepatitis, drug-induced hepatitis.  Patient reports that she has been confused indicate encephalopathy.  Currently A&O x3.  Also has been having some left-sided chest pain but that has been going on for 3 weeks.  Cardiac workup is fortunately negative in the emergency department.  I doubt ACS, pneumothorax, pneumonia or pulmonary embolism.  No persistent tachycardia or hypoxia.  Chest x-ray PA and lateral performed showing no evidence of pneumothorax, pneumonia or acute abnormalities.  INR is 1.3.  White blood cell count is normal at 4.3.  Discussed with GI who recommends transfer for hepatology evaluation.  Transfer pending room availability at [facility] [place].    Risk  Prescription drug management.               ED Course as of 05/08/24 1017   Tue May 07, 2024   1526 Discussed with gastroenterology who recommends transfer to [facility] for hepatology since patient is having encephalopathy with findings of acute liver failure. [AS]   1925 Spoke with nose who spoke with the patient's son who reported that she drinks quite heavily.  This could be the cause of her hepatitis. [AS]   Wed May 08, 2024   0849 Discussed case with charge nurse and for transfer center still awaiting bed and unknown time for transfer therefore discussed with hospital medicine, Dr. Nance, who will come down and consult/manage on patient until transfer occurs. [BD]      ED Course User Index  [AS] Javier Stephen MD  [BD] Zack Verduzco MD                             Clinical Impression:  Final diagnoses:  [R07.9] Chest pain  [K72.00] Acute liver failure without  hepatic coma (Primary)  [G93.40] Encephalopathy          ED Disposition Condition    Observation                 Javier Stephen MD  05/07/24 2157       Javier Stephen MD  05/08/24 1017

## 2024-05-07 NOTE — TELEPHONE ENCOUNTER
Attempt made to reach patient again for scheduling.  I left a VM asking that she call me back direct at [phone] for scheduling.

## 2024-05-07 NOTE — TELEPHONE ENCOUNTER
Chart review:  69 y/o WF w/ COPD / asthma seen in ER 4/13 for SOB, CP.  Found to have new, significantly abnl liver panel (, TBili 2.7, , ). INR normal 1.1  U/S abd & CT abdomen unremarkable revealed no liver pathology.    Ultimately dx w/ pneumonia and UTI.   Given starting doses of Augmentin and Azithromycin and d/c home w/ rec to see PCP for f/u on liver panel  Hep panel (+) HCV only. (HAV, HBV neg)    Saw primary care 5/3/24 who referred pt to Hepatology / HCV clinic but did not repeat liver panel. CBC that day w/ normal Hgb 13.5.    Now pt reports sx of jaundice, abd swelling, black tarry stool, confusion / dizziness    Pls call pt: Given black, tarry stool, confusion, concerns for jaundice in setting of new & significantly abnormal liver panel 3+ weeks ago I recommend she report to ER.

## 2024-05-08 DIAGNOSIS — F41.9 ANXIETY: ICD-10-CM

## 2024-05-08 DIAGNOSIS — I10 ESSENTIAL HYPERTENSION: ICD-10-CM

## 2024-05-08 DIAGNOSIS — J45.51 SEVERE PERSISTENT ASTHMA WITH ACUTE EXACERBATION: ICD-10-CM

## 2024-05-08 DIAGNOSIS — F51.01 PRIMARY INSOMNIA: ICD-10-CM

## 2024-05-08 PROBLEM — G62.9 POLYNEUROPATHY: Status: ACTIVE | Noted: 2024-03-11

## 2024-05-08 PROBLEM — Z86.73 HISTORY OF CEREBROVASCULAR ACCIDENT: Status: ACTIVE | Noted: 2024-03-11

## 2024-05-08 PROBLEM — B19.20 HEPATITIS C VIRUS INFECTION WITHOUT HEPATIC COMA: Status: ACTIVE | Noted: 2024-05-08

## 2024-05-08 PROBLEM — F10.10 ETOH ABUSE: Status: ACTIVE | Noted: 2024-05-08

## 2024-05-08 PROBLEM — F55.8 ACETAMINOPHEN ABUSE: Status: ACTIVE | Noted: 2024-05-08

## 2024-05-08 PROBLEM — K72.00 ACUTE LIVER FAILURE WITHOUT HEPATIC COMA: Status: ACTIVE | Noted: 2024-05-08

## 2024-05-08 LAB
ALBUMIN SERPL BCP-MCNC: 2.7 G/DL (ref 3.5–5.2)
ALP SERPL-CCNC: 421 U/L (ref 55–135)
ALT SERPL W/O P-5'-P-CCNC: 984 U/L (ref 10–44)
ANION GAP SERPL CALC-SCNC: 9 MMOL/L (ref 8–16)
AST SERPL-CCNC: 974 U/L (ref 10–40)
BASOPHILS # BLD AUTO: 0.01 K/UL (ref 0–0.2)
BASOPHILS NFR BLD: 0.3 % (ref 0–1.9)
BILIRUB SERPL-MCNC: 7.4 MG/DL (ref 0.1–1)
BUN SERPL-MCNC: 13 MG/DL (ref 8–23)
CALCIUM SERPL-MCNC: 9.1 MG/DL (ref 8.7–10.5)
CHLORIDE SERPL-SCNC: 104 MMOL/L (ref 95–110)
CO2 SERPL-SCNC: 25 MMOL/L (ref 23–29)
CREAT SERPL-MCNC: 1 MG/DL (ref 0.5–1.4)
DIFFERENTIAL METHOD BLD: ABNORMAL
EOSINOPHIL # BLD AUTO: 0 K/UL (ref 0–0.5)
EOSINOPHIL NFR BLD: 0 % (ref 0–8)
ERYTHROCYTE [DISTWIDTH] IN BLOOD BY AUTOMATED COUNT: 14.7 % (ref 11.5–14.5)
EST. GFR  (NO RACE VARIABLE): >60 ML/MIN/1.73 M^2
GLUCOSE SERPL-MCNC: 97 MG/DL (ref 70–110)
HCT VFR BLD AUTO: 38.3 % (ref 37–48.5)
HGB BLD-MCNC: 12.5 G/DL (ref 12–16)
IMM GRANULOCYTES # BLD AUTO: 0.01 K/UL (ref 0–0.04)
IMM GRANULOCYTES NFR BLD AUTO: 0.3 % (ref 0–0.5)
LYMPHOCYTES # BLD AUTO: 1 K/UL (ref 1–4.8)
LYMPHOCYTES NFR BLD: 28.2 % (ref 18–48)
MAGNESIUM SERPL-MCNC: 1.8 MG/DL (ref 1.6–2.6)
MCH RBC QN AUTO: 32 PG (ref 27–31)
MCHC RBC AUTO-ENTMCNC: 32.6 G/DL (ref 32–36)
MCV RBC AUTO: 98 FL (ref 82–98)
MONOCYTES # BLD AUTO: 0.5 K/UL (ref 0.3–1)
MONOCYTES NFR BLD: 12.7 % (ref 4–15)
NEUTROPHILS # BLD AUTO: 2.2 K/UL (ref 1.8–7.7)
NEUTROPHILS NFR BLD: 58.5 % (ref 38–73)
NRBC BLD-RTO: 0 /100 WBC
PLATELET # BLD AUTO: 158 K/UL (ref 150–450)
PMV BLD AUTO: 10.4 FL (ref 9.2–12.9)
POTASSIUM SERPL-SCNC: 3.9 MMOL/L (ref 3.5–5.1)
PROT SERPL-MCNC: 5.6 G/DL (ref 6–8.4)
RBC # BLD AUTO: 3.91 M/UL (ref 4–5.4)
SODIUM SERPL-SCNC: 138 MMOL/L (ref 136–145)
WBC # BLD AUTO: 3.69 K/UL (ref 3.9–12.7)

## 2024-05-08 PROCEDURE — 96365 THER/PROPH/DIAG IV INF INIT: CPT

## 2024-05-08 PROCEDURE — 94761 N-INVAS EAR/PLS OXIMETRY MLT: CPT

## 2024-05-08 PROCEDURE — G0378 HOSPITAL OBSERVATION PER HR: HCPCS

## 2024-05-08 PROCEDURE — 25000242 PHARM REV CODE 250 ALT 637 W/ HCPCS: Performed by: STUDENT IN AN ORGANIZED HEALTH CARE EDUCATION/TRAINING PROGRAM

## 2024-05-08 PROCEDURE — 83735 ASSAY OF MAGNESIUM: CPT | Performed by: STUDENT IN AN ORGANIZED HEALTH CARE EDUCATION/TRAINING PROGRAM

## 2024-05-08 PROCEDURE — 80053 COMPREHEN METABOLIC PANEL: CPT | Performed by: STUDENT IN AN ORGANIZED HEALTH CARE EDUCATION/TRAINING PROGRAM

## 2024-05-08 PROCEDURE — 82103 ALPHA-1-ANTITRYPSIN TOTAL: CPT | Performed by: STUDENT IN AN ORGANIZED HEALTH CARE EDUCATION/TRAINING PROGRAM

## 2024-05-08 PROCEDURE — 82390 ASSAY OF CERULOPLASMIN: CPT | Performed by: STUDENT IN AN ORGANIZED HEALTH CARE EDUCATION/TRAINING PROGRAM

## 2024-05-08 PROCEDURE — 63600175 PHARM REV CODE 636 W HCPCS: Performed by: STUDENT IN AN ORGANIZED HEALTH CARE EDUCATION/TRAINING PROGRAM

## 2024-05-08 PROCEDURE — 99900031 HC PATIENT EDUCATION (STAT)

## 2024-05-08 PROCEDURE — 25000003 PHARM REV CODE 250: Performed by: STUDENT IN AN ORGANIZED HEALTH CARE EDUCATION/TRAINING PROGRAM

## 2024-05-08 PROCEDURE — 36415 COLL VENOUS BLD VENIPUNCTURE: CPT | Performed by: STUDENT IN AN ORGANIZED HEALTH CARE EDUCATION/TRAINING PROGRAM

## 2024-05-08 PROCEDURE — 94640 AIRWAY INHALATION TREATMENT: CPT | Mod: XB

## 2024-05-08 PROCEDURE — 25000003 PHARM REV CODE 250: Performed by: EMERGENCY MEDICINE

## 2024-05-08 PROCEDURE — 25000003 PHARM REV CODE 250

## 2024-05-08 PROCEDURE — 85025 COMPLETE CBC W/AUTO DIFF WBC: CPT | Performed by: STUDENT IN AN ORGANIZED HEALTH CARE EDUCATION/TRAINING PROGRAM

## 2024-05-08 PROCEDURE — 96375 TX/PRO/DX INJ NEW DRUG ADDON: CPT

## 2024-05-08 PROCEDURE — 96374 THER/PROPH/DIAG INJ IV PUSH: CPT | Mod: 59

## 2024-05-08 PROCEDURE — 81256 HFE GENE: CPT | Performed by: STUDENT IN AN ORGANIZED HEALTH CARE EDUCATION/TRAINING PROGRAM

## 2024-05-08 PROCEDURE — 86381 MITOCHONDRIAL ANTIBODY EACH: CPT | Performed by: STUDENT IN AN ORGANIZED HEALTH CARE EDUCATION/TRAINING PROGRAM

## 2024-05-08 PROCEDURE — 99900035 HC TECH TIME PER 15 MIN (STAT)

## 2024-05-08 RX ORDER — BUDESONIDE AND FORMOTEROL FUMARATE DIHYDRATE 160; 4.5 UG/1; UG/1
2 AEROSOL RESPIRATORY (INHALATION) EVERY 12 HOURS
Qty: 30.6 G | Refills: 3 | Status: SHIPPED | OUTPATIENT
Start: 2024-05-08 | End: 2025-05-08

## 2024-05-08 RX ORDER — LANOLIN ALCOHOL/MO/W.PET/CERES
800 CREAM (GRAM) TOPICAL
Status: DISCONTINUED | OUTPATIENT
Start: 2024-05-08 | End: 2024-05-09 | Stop reason: HOSPADM

## 2024-05-08 RX ORDER — HYDROXYZINE HYDROCHLORIDE 25 MG/1
25 TABLET, FILM COATED ORAL 3 TIMES DAILY PRN
Status: DISCONTINUED | OUTPATIENT
Start: 2024-05-08 | End: 2024-05-09 | Stop reason: HOSPADM

## 2024-05-08 RX ORDER — TALC
6 POWDER (GRAM) TOPICAL ONCE
Status: COMPLETED | OUTPATIENT
Start: 2024-05-09 | End: 2024-05-08

## 2024-05-08 RX ORDER — BUDESONIDE 0.5 MG/2ML
0.5 INHALANT ORAL EVERY 12 HOURS
Status: DISCONTINUED | OUTPATIENT
Start: 2024-05-08 | End: 2024-05-09 | Stop reason: HOSPADM

## 2024-05-08 RX ORDER — IBUPROFEN 400 MG/1
400 TABLET ORAL EVERY 6 HOURS PRN
Status: DISCONTINUED | OUTPATIENT
Start: 2024-05-08 | End: 2024-05-09 | Stop reason: HOSPADM

## 2024-05-08 RX ORDER — ONDANSETRON HYDROCHLORIDE 2 MG/ML
4 INJECTION, SOLUTION INTRAVENOUS EVERY 6 HOURS PRN
Status: DISCONTINUED | OUTPATIENT
Start: 2024-05-08 | End: 2024-05-09 | Stop reason: HOSPADM

## 2024-05-08 RX ORDER — LACTULOSE 10 G/15ML
15 SOLUTION ORAL EVERY 6 HOURS PRN
Status: DISCONTINUED | OUTPATIENT
Start: 2024-05-08 | End: 2024-05-09 | Stop reason: HOSPADM

## 2024-05-08 RX ORDER — TALC
6 POWDER (GRAM) TOPICAL
Status: COMPLETED | OUTPATIENT
Start: 2024-05-08 | End: 2024-05-08

## 2024-05-08 RX ORDER — ARFORMOTEROL TARTRATE 15 UG/2ML
15 SOLUTION RESPIRATORY (INHALATION) 2 TIMES DAILY
Status: DISCONTINUED | OUTPATIENT
Start: 2024-05-08 | End: 2024-05-09 | Stop reason: HOSPADM

## 2024-05-08 RX ADMIN — ACETYLCYSTEINE 3600 MG: 200 INJECTION, SOLUTION INTRAVENOUS at 02:05

## 2024-05-08 RX ADMIN — MELATONIN TAB 3 MG 6 MG: 3 TAB at 01:05

## 2024-05-08 RX ADMIN — ONDANSETRON 4 MG: 2 INJECTION INTRAMUSCULAR; INTRAVENOUS at 02:05

## 2024-05-08 RX ADMIN — MELATONIN TAB 3 MG 6 MG: 3 TAB at 11:05

## 2024-05-08 RX ADMIN — IBUPROFEN 400 MG: 400 TABLET, FILM COATED ORAL at 07:05

## 2024-05-08 RX ADMIN — ARFORMOTEROL TARTRATE 15 MCG: 15 SOLUTION RESPIRATORY (INHALATION) at 07:05

## 2024-05-08 RX ADMIN — BUDESONIDE INHALATION 0.5 MG: 0.5 SUSPENSION RESPIRATORY (INHALATION) at 07:05

## 2024-05-08 RX ADMIN — ACETYLCYSTEINE 10800 MG: 200 INJECTION, SOLUTION INTRAVENOUS at 01:05

## 2024-05-08 RX ADMIN — HYDROXYZINE HYDROCHLORIDE 25 MG: 25 TABLET ORAL at 07:05

## 2024-05-08 NOTE — PLAN OF CARE
Patient with nausea and vomiting after acetylcysteine initiation. Stopping infusion and will continue to monitor.

## 2024-05-08 NOTE — TELEPHONE ENCOUNTER
No care due was identified.  [facility] Embedded Care Due Messages. Reference number: [id number].   5/08/2024 11:48:56 AM CDT

## 2024-05-08 NOTE — H&P
[facility] Medicine  History & Physical    Patient Name: Beatriz Gan  MRN: [id number]  Patient Class: OP- Observation  Admission Date: 5/7/2024  Attending Physician: Elliott Nance MD  Primary Care Provider: Jorge Alonzo MD         Patient information was obtained from patient, past medical records, and ER records.     Subjective:     Principal Problem:Acute liver failure without hepatic coma    Chief Complaint:   Chief Complaint   Patient presents with    Jaundice        HPI: Beatriz Gan is a 68 year old female with a past medical history of EtOH use, HTN, HLD, COPD, and MDD/NAWAF who presented with acute liver failure with HCV infection, EtOH abuse, and overuse of acetaminophen. Hospital Medicine consulting while the patient awaits transfer from ED to [facility] for Hepatology and ICU. Workup has been started while the patient awaits transfer; ultrasound shows a liver cirrhosis. She is being monitored for EtOH withdrawal. Acetylcysteine has been started.    Past Medical History:   Diagnosis Date    Alcohol dependence     DDD (degenerative disc disease), lumbosacral     DJD (degenerative joint disease), lumbosacral     Encounter for blood transfusion     GERD (gastroesophageal reflux disease)     Gout     Hypercholesterolemia     Hypertension     NATHALIE (iron deficiency anemia)     questionable white coat hypertension    Kidney carcinoma, left 2014    Pneumonia     Renal cell carcinoma     Shingles 10/13/2012       Past Surgical History:   Procedure Laterality Date    ANTERIOR CERVICAL DISCECTOMY W/ FUSION N/A 5/4/2023    Procedure: DISCECTOMY, SPINE, CERVICAL, ANTERIOR APPROACH, WITH FUSION C3-4;  Surgeon: Ruben Martinez MD;  Location: [facility];  Service: Orthopedics;  Laterality: N/A;    APPENDECTOMY      ARTHROSCOPY OF SHOULDER WITH DECOMPRESSION OF SUBACROMIAL SPACE Left 10/31/2019    Procedure: ARTHROSCOPY, SHOULDER, WITH SUBACROMIAL SPACE DECOMPRESSION;  Surgeon:  Ruben Martinez MD;  Location: [facility] OR;  Service: Orthopedics;  Laterality: Left;    BLADDER REPAIR      x 2    COLONOSCOPY  9/2011    DISTAL CLAVICLE EXCISION Left 10/31/2019    Procedure: EXCISION, CLAVICLE, DISTAL;  Surgeon: Ruebn Martinez MD;  Location: [facility] OR;  Service: Orthopedics;  Laterality: Left;    EPIDURAL STEROID INJECTION INTO LUMBAR SPINE N/A 12/7/2020    Procedure: Injection-steroid-epidural-lumbar;  Surgeon: Dk Rosales MD;  Location: [facility] OR;  Service: Pain Management;  Laterality: N/A;  L4-L5    EPIDURAL STEROID INJECTION INTO LUMBAR SPINE N/A 5/17/2021    Procedure: Injection-steroid-epidural-lumbar;  Surgeon: Dk Rosales MD;  Location: [facility] OR;  Service: Pain Management;  Laterality: N/A;  L4-L5    ESOPHAGOGASTRODUODENOSCOPY  9/2011    EYE SURGERY      lasix bilateral    FIXATION KYPHOPLASTY N/A 1/31/2020    Procedure: Kyphoplasty T12;  Surgeon: Dk Rosales MD;  Location: [facility] OR;  Service: Pain Management;  Laterality: N/A;    HERNIA REPAIR      hiatal hernai    HYSTERECTOMY      INJECTION OF ANESTHETIC AGENT AROUND NERVE Left 6/8/2020    Procedure: Block, Nerve;  Surgeon: Dk Rosales MD;  Location: [facility] OR;  Service: Pain Management;  Laterality: Left;  left genicular nerve block     KNEE ARTHROPLASTY Left 7/30/2020    Procedure: ARTHROPLASTY, KNEE LEFT;  Surgeon: Ruben Martinez MD;  Location: [facility] OR;  Service: Orthopedics;  Laterality: Left;  REP VALERY RUBY    KNEE ARTHROSCOPY W/ MENISCECTOMY Left 1/16/2020    Procedure: ARTHROSCOPY, KNEE, WITH MEDIAL MENISCECTOMY;  Surgeon: Ruben Martinez MD;  Location: [facility] OR;  Service: Orthopedics;  Laterality: Left;    LAPAROSCOPIC NISSEN FUNDOPLICATION      NEPHRECTOMY      LT partial    RADIOFREQUENCY ABLATION Left 6/19/2020    Procedure: Radiofrequency Ablation;  Surgeon: Dk Rosales MD;  Location: [facility] OR;  Service: Pain Management;  Laterality: Left;    RADIOFREQUENCY ABLATION OF LUMBAR MEDIAL BRANCH NERVE AT SINGLE  "LEVEL Bilateral 2/28/2020    Procedure: Radiofrequency Ablation, Nerve, Spinal, Lumbar, Medial Branch, 1 Level;  Surgeon: Dk Rosales MD;  Location: [facility] OR;  Service: Pain Management;  Laterality: Bilateral;  L3,L4,L5 - Burned at 80 degrees C. for 60 seconds x 2 each site    RADIOFREQUENCY ABLATION OF LUMBAR MEDIAL BRANCH NERVE AT SINGLE LEVEL Bilateral 10/19/2020    Procedure: Radiofrequency Ablation, Nerve, Spinal, Lumbar, Medial Branch, 1 Level;  Surgeon: Dk Rosales MD;  Location: [facility] OR;  Service: Pain Management;  Laterality: Bilateral;  L3, L4, L5    REFRACTIVE SURGERY      ROTATOR CUFF REPAIR Left 10/31/2019    Procedure: REPAIR, ROTATOR CUFF;  Surgeon: Ruben Martinez MD;  Location: [facility] OR;  Service: Orthopedics;  Laterality: Left;  arthrex    SKIN BIOPSY      TOTAL ABDOMINAL HYSTERECTOMY W/ BILATERAL SALPINGOOPHORECTOMY  age 48    TRANSFORAMINAL EPIDURAL INJECTION OF STEROID Bilateral 7/30/2021    Procedure: Injection,steroid,epidural,transforaminal approach;  Surgeon: Dk Rosales MD;  Location: [facility] OR;  Service: Pain Management;  Laterality: Bilateral;  L5-S1     TRANSFORAMINAL EPIDURAL INJECTION OF STEROID Bilateral 3/28/2022    Procedure: INJECTION, STEROID, EPIDURAL, TRANSFORAMINAL APPROACH Lumbar L5-S1;  Surgeon: Dk Rosales MD;  Location: [facility] OR;  Service: Pain Management;  Laterality: Bilateral;       Review of patient's allergies indicates:   Allergen Reactions    Phenergan [promethazine] Hives and Rash    Latex, natural rubber Hives     Per pt report-many years    Morphine Hives    Nsaids (non-steroidal anti-inflammatory drug)      Due to "kidney cancer"       Current Facility-Administered Medications on File Prior to Encounter   Medication    electrolyte-S (ISOLYTE)    lactated ringers infusion    pregabalin capsule 75 mg     Current Outpatient Medications on File Prior to Encounter   Medication Sig    albuterol (VENTOLIN HFA) 90 mcg/actuation inhaler Inhale 2 puffs into " the lungs every 4 (four) hours as needed for Wheezing or Shortness of Breath. Rescue    ALPRAZolam (XANAX) 1 MG tablet TAKE 1 TABLET(1 MG) BY MOUTH TWICE DAILY AS NEEDED FOR ANXIETY    amLODIPine (NORVASC) 5 MG tablet Take 1 tablet (5 mg total) by mouth once daily.    aspirin (ECOTRIN) 81 MG EC tablet Take 1 tablet (81 mg total) by mouth once daily.    azelastine (ASTELIN) 137 mcg (0.1 %) nasal spray Use 1 spray(s) in each nostril twice daily    benralizumab 30 mg/mL AtIn Inject 30 mg into the skin every 8 weeks.    benralizumab 30 mg/mL AtIn Inject 30 mg into the skin every 28 days.    budesonide-formoterol 160-4.5 mcg (SYMBICORT) 160-4.5 mcg/actuation HFAA Inhale 2 puffs into the lungs every 12 (twelve) hours. Controller    celecoxib (CELEBREX) 200 MG capsule TAKE ONE CAPSULE BY MOUTH DAILY AT 9 AM    cholecalciferol, vitamin D3, (VITAMIN D3 ORAL) Take 1 capsule by mouth once daily.    COMIRNATY 2023-24, 12Y UP,,PF, 30 mcg/0.3 mL inection Inject 0.3 mLs into the muscle.    FLUoxetine 40 MG capsule Take 1 capsule (40 mg total) by mouth 2 (two) times daily.    fluticasone (FLONASE) 50 mcg/actuation nasal spray 1 spray by Nasal route daily as needed.     FLUZONE HIGHDOSE QUAD 23-24  mcg/0.7 mL Syrg     gabapentin (NEURONTIN) 600 MG tablet Take 1 tablet (600 mg total) by mouth 3 (three) times daily.    magnesium 30 mg Tab Take by mouth once.    MULTIVIT-IRON-MIN-FOLIC ACID 3,500-18-0.4 UNIT-MG-MG ORAL CHEW Take by mouth once daily.    polyethylene glycol (GLYCOLAX) 17 gram/dose powder Take 17 g by mouth once daily.    AREXVY, PF, 120 mcg/0.5 mL SusR vaccine Inject 0.5 mLs into the muscle.    ondansetron (ZOFRAN) 4 MG tablet Take 1 tablet (4 mg total) by mouth as needed for Nausea.    ondansetron (ZOFRAN-ODT) 4 MG TbDL Take 1 tablet (4 mg total) by mouth every 6 (six) hours as needed (NAUSEA OR VOMITING).    predniSONE (DELTASONE) 10 MG tablet Take one pill a day for three days, repeat for shortness of breath     predniSONE (DELTASONE) 20 MG tablet 3 pills for 3 days, 2 for 3 days, 1 for 3 days, repeat for cough    UBRELVY 100 mg tablet Take 100 mg by mouth as needed. Max 1 in 24 hours    zolpidem (AMBIEN) 10 mg Tab TAKE 1 TABLET(10 MG) BY MOUTH EVERY NIGHT AS NEEDED     Family History       Problem Relation (Age of Onset)    Hypertension Father          Tobacco Use    Smoking status: Former     Current packs/day: 0.00     Average packs/day: 1 pack/day for 40.0 years (40.0 ttl pk-yrs)     Types: Cigarettes     Start date: 1978     Quit date: 2018     Years since quittin.3    Smokeless tobacco: Never    Tobacco comments:     smokes 2-3 cigarettes a night   Substance and Sexual Activity    Alcohol use: Yes     Alcohol/week: 12.0 standard drinks of alcohol     Types: 6 Cans of beer, 6 Standard drinks or equivalent per week     Comment: 2-3 a day/ social    Drug use: No    Sexual activity: Yes     Partners: Male     Review of Systems   Gastrointestinal:  Positive for abdominal pain. Negative for abdominal distention.   Skin:  Positive for color change.   Allergic/Immunologic: Positive for immunocompromised state.   Psychiatric/Behavioral:  Negative for confusion.      Objective:     Vital Signs (Most Recent):  Temp: 97.8 °F (36.6 °C) (24 1305)  Pulse: 64 (24 1414)  Resp: (!) 29 (24 1414)  BP: (!) 166/80 (24 1414)  SpO2: 96 % (24 1414) Vital Signs (24h Range):  Temp:  [97.7 °F (36.5 °C)-98 °F (36.7 °C)] 97.8 °F (36.6 °C)  Pulse:  [56-84] 64  Resp:  [16-29] 29  SpO2:  [91 %-100 %] 96 %  BP: (101-190)/() 166/80     Weight: 72.6 kg (160 lb 0.9 oz)  Body mass index is 26.63 kg/m².     Physical Exam           Significant Labs: All pertinent labs within the past 24 hours have been reviewed.    Significant Imaging: I have reviewed all pertinent imaging results/findings within the past 24 hours.  Assessment/Plan:     * Acute liver failure without hepatic coma  HCV positive, EtOH abuse and  acetaminophen overuse.  -[place] [facility] [place]  -Start acetylcysteine protocol  -Patient counseled on EtOH cessation  -Trend LFTs  -Follow up remaining preliminary workup          Acetaminophen abuse  -Patient should no longer use acetaminophen  -Start acetylcysteine infusion protocol    ETOH abuse  -Patient counseled on cessation  -CIWA Q4H      Hepatitis C virus infection without hepatic coma  -[place] [facility] [place]      Major depressive disorder, recurrent episode, moderate  -Hold fluoxetine    Hypokalemia  -Replete PRN  -Trend K    COPD (chronic obstructive pulmonary disease)  -Budesonide and arformoterol nebulizers      VTE Risk Mitigation (From admission, onward)      None               On 05/08/2024, patient should be placed in hospital observation services under my care.             Elliott Nance MD  Department of Hospital Medicine  [facility]/Surg

## 2024-05-08 NOTE — NURSING
Arrived to room 224 from ED by stretcher. Pt awake and alert- MAEW, following commands appropriately. Speech clear, appropriate- aware of plan to transfer to [facility] when bed available. Pt able to sit up and move herself to SD bed- gait steady. Pt's belongings with her- purse, phone, cane, clothes. Noted to have PIV left forearm, 2nd PIV started to posterior right forearm. Denies any c/o nausea, abdominal pain or itching. Pt states she still has a headache.

## 2024-05-08 NOTE — CARE UPDATE
05/08/24 1414   Patient Assessment/Suction   Level of Consciousness (AVPU) alert   Respiratory Effort Unlabored   PRE-TX-O2   Device (Oxygen Therapy) room air   SpO2 96 %   Pulse Oximetry Type Continuous   $ Pulse Oximetry - Multiple Charge Pulse Oximetry - Multiple   Pulse 64   Resp (!) 29   BP (!) 166/80   Tobacco Cessation Intervention   Do you use any type of tobacco product? No   Respiratory Evaluation   $ Care Plan Tech Time 15 min   Evaluation For New Orders   Admitting Diagnosis Liver Failure   Home Oxygen   Has Home Oxygen? No   Home Aerosol, MDI, DPI, and Other Treatments/Therapies   Home Respiratory Therapy Per Patient/Review of Chart Yes   MDI Home Meds/Freq Symbicort PRN   Bronchodilator Care Plan   Bronchodilator Care Plan Aerosol   Aerosol Meds w/ frequency Pulmicort(Budenoside) 0.5mg BID;Other  (Brovana/BID)   Rationale Maintain home respiratory medicine

## 2024-05-08 NOTE — ASSESSMENT & PLAN NOTE
HCV positive, EtOH abuse and acetaminophen overuse.  -[place] [facility] [place]  -Start acetylcysteine protocol  -Patient counseled on EtOH cessation  -Trend LFTs  -Follow up remaining preliminary workup

## 2024-05-08 NOTE — TELEPHONE ENCOUNTER
----- Message from Ivy Sanchez sent at 5/8/2024 11:35 AM CDT -----  Contact: [phone] [facility]  Requesting an RX refill or new RX.  Is this a refill or new RX: refill  RX name and strength (copy/paste from chart): amLODIPine (NORVASC) 5 MG tablet   Is this a 30 day or 90 day RX: 90  Pharmacy name and phone # (copy/paste from chart):     [facility] [place] - [address] [place] [address]  Phone: [phone] Fax: [phone]      The doctors have asked that we provide their patients with the following 2 reminders -- prescription refills can take up to 72 hours, and a friendly reminder that in the future you can use your [facility] account to request refills: pharm    Requesting an RX refill or new RX.  Is this a refill or new RX: refill  RX name and strength (copy/paste from chart):  FLUoxetine 40 MG capsule  Is this a 30 day or 90 day RX: 90  Pharmacy name and phone # (copy/paste from chart):      [facility] - [address] [place] [address]  Phone: [phone] Fax: [phone]     The doctors have asked that we provide their patients with the following 2 reminders -- prescription refills can take up to 72 hours, and a friendly reminder that in the future you can use your [facility] account to request refills: pharm    Requesting an RX refill or new RX.  Is this a refill or new RX: refill  RX name and strength (copy/paste from chart):  aspirin (ECOTRIN) 81 MG EC tablet  Is this a 30 day or 90 day RX: 90  Pharmacy name and phone # (copy/paste from chart):      [facility] - [address] [place] [address]  Phone: [phone] Fax: [phone]     The doctors have asked that we provide their patients with the following 2 reminders -- prescription refills can take up to 72 hours, and a friendly reminder that in the future you can use your [facility]  account to request refills: pharm    Requesting an RX refill or new RX.  Is this a refill or new RX: refill  RX name and strength (copy/paste from chart):  zolpidem (AMBIEN) 10 mg Tab  Is this a 30 day or 90 day RX: 90  Pharmacy name and phone # (copy/paste from chart):     [facility] ([place] - [address] [place] [address]  Phone: [phone] Fax: [phone]      The doctors have asked that we provide their patients with the following 2 reminders -- prescription refills can take up to 72 hours, and a friendly reminder that in the future you can use your [facility] account to request refills: pharm    Requesting an RX refill or new RX.  Is this a refill or new RX: refill  RX name and strength (copy/paste from chart):  ALPRAZolam (XANAX) 1 MG tablet  Is this a 30 day or 90 day RX: 90  Pharmacy name and phone # (copy/paste from chart):      [facility] ([place] - [address] [place] [address]  Phone: [phone] Fax: [phone]     The doctors have asked that we provide their patients with the following 2 reminders -- prescription refills can take up to 72 hours, and a friendly reminder that in the future you can use your [facility] account to request refills: pharm is stating they have sent for all of these and they have had no response please advise

## 2024-05-08 NOTE — HPI
Beatriz Gan is a 68 year old female with a past medical history of EtOH use, HTN, HLD, COPD, and MDD/NAWAF who presented with acute liver failure with HCV infection, EtOH abuse, and overuse of acetaminophen. Hospital Medicine consulting while the patient awaits transfer from ED to [facility] for Hepatology and ICU. Workup has been started while the patient awaits transfer; ultrasound shows a liver cirrhosis. She is being monitored for EtOH withdrawal. Acetylcysteine has been started.

## 2024-05-08 NOTE — SUBJECTIVE & OBJECTIVE
Past Medical History:   Diagnosis Date    Alcohol dependence     DDD (degenerative disc disease), lumbosacral     DJD (degenerative joint disease), lumbosacral     Encounter for blood transfusion     GERD (gastroesophageal reflux disease)     Gout     Hypercholesterolemia     Hypertension     NATHALIE (iron deficiency anemia)     questionable white coat hypertension    Kidney carcinoma, left 2014    Pneumonia     Renal cell carcinoma     Shingles 10/13/2012       Past Surgical History:   Procedure Laterality Date    ANTERIOR CERVICAL DISCECTOMY W/ FUSION N/A 5/4/2023    Procedure: DISCECTOMY, SPINE, CERVICAL, ANTERIOR APPROACH, WITH FUSION C3-4;  Surgeon: Ruben Martinez MD;  Location: [facility] OR;  Service: Orthopedics;  Laterality: N/A;    APPENDECTOMY      ARTHROSCOPY OF SHOULDER WITH DECOMPRESSION OF SUBACROMIAL SPACE Left 10/31/2019    Procedure: ARTHROSCOPY, SHOULDER, WITH SUBACROMIAL SPACE DECOMPRESSION;  Surgeon: Ruben Martinez MD;  Location: [facility] OR;  Service: Orthopedics;  Laterality: Left;    BLADDER REPAIR      x 2    COLONOSCOPY  9/2011    DISTAL CLAVICLE EXCISION Left 10/31/2019    Procedure: EXCISION, CLAVICLE, DISTAL;  Surgeon: Ruben Martinez MD;  Location: [facility] OR;  Service: Orthopedics;  Laterality: Left;    EPIDURAL STEROID INJECTION INTO LUMBAR SPINE N/A 12/7/2020    Procedure: Injection-steroid-epidural-lumbar;  Surgeon: Dk Rosales MD;  Location: [facility] OR;  Service: Pain Management;  Laterality: N/A;  L4-L5    EPIDURAL STEROID INJECTION INTO LUMBAR SPINE N/A 5/17/2021    Procedure: Injection-steroid-epidural-lumbar;  Surgeon: Dk Rosales MD;  Location: [facility] OR;  Service: Pain Management;  Laterality: N/A;  L4-L5    ESOPHAGOGASTRODUODENOSCOPY  9/2011    EYE SURGERY      lasix bilateral    FIXATION KYPHOPLASTY N/A 1/31/2020    Procedure: Kyphoplasty T12;  Surgeon: Dk Rosales MD;  Location: [facility] OR;  Service: Pain Management;  Laterality: N/A;    HERNIA REPAIR      hiatal hernai     HYSTERECTOMY      INJECTION OF ANESTHETIC AGENT AROUND NERVE Left 6/8/2020    Procedure: Block, Nerve;  Surgeon: Dk Rosales MD;  Location: [facility] OR;  Service: Pain Management;  Laterality: Left;  left genicular nerve block     KNEE ARTHROPLASTY Left 7/30/2020    Procedure: ARTHROPLASTY, KNEE LEFT;  Surgeon: Ruben Martinez MD;  Location: [facility] OR;  Service: Orthopedics;  Laterality: Left;  REP VALERY RUBY    KNEE ARTHROSCOPY W/ MENISCECTOMY Left 1/16/2020    Procedure: ARTHROSCOPY, KNEE, WITH MEDIAL MENISCECTOMY;  Surgeon: Ruben Martinez MD;  Location: [facility] OR;  Service: Orthopedics;  Laterality: Left;    LAPAROSCOPIC NISSEN FUNDOPLICATION      NEPHRECTOMY      LT partial    RADIOFREQUENCY ABLATION Left 6/19/2020    Procedure: Radiofrequency Ablation;  Surgeon: Dk Rosales MD;  Location: [facility] OR;  Service: Pain Management;  Laterality: Left;    RADIOFREQUENCY ABLATION OF LUMBAR MEDIAL BRANCH NERVE AT SINGLE LEVEL Bilateral 2/28/2020    Procedure: Radiofrequency Ablation, Nerve, Spinal, Lumbar, Medial Branch, 1 Level;  Surgeon: Dk Rosales MD;  Location: [facility] OR;  Service: Pain Management;  Laterality: Bilateral;  L3,L4,L5 - Burned at 80 degrees C. for 60 seconds x 2 each site    RADIOFREQUENCY ABLATION OF LUMBAR MEDIAL BRANCH NERVE AT SINGLE LEVEL Bilateral 10/19/2020    Procedure: Radiofrequency Ablation, Nerve, Spinal, Lumbar, Medial Branch, 1 Level;  Surgeon: Dk Rosales MD;  Location: [facility] OR;  Service: Pain Management;  Laterality: Bilateral;  L3, L4, L5    REFRACTIVE SURGERY      ROTATOR CUFF REPAIR Left 10/31/2019    Procedure: REPAIR, ROTATOR CUFF;  Surgeon: Ruben Martinez MD;  Location: [facility] OR;  Service: Orthopedics;  Laterality: Left;  arthrex    SKIN BIOPSY      TOTAL ABDOMINAL HYSTERECTOMY W/ BILATERAL SALPINGOOPHORECTOMY  age 48    TRANSFORAMINAL EPIDURAL INJECTION OF STEROID Bilateral 7/30/2021    Procedure: Injection,steroid,epidural,transforaminal approach;  Surgeon: Dk MAC  "MD Connie;  Location: [facility] OR;  Service: Pain Management;  Laterality: Bilateral;  L5-S1     TRANSFORAMINAL EPIDURAL INJECTION OF STEROID Bilateral 3/28/2022    Procedure: INJECTION, STEROID, EPIDURAL, TRANSFORAMINAL APPROACH Lumbar L5-S1;  Surgeon: Dk Rosales MD;  Location: [facility] OR;  Service: Pain Management;  Laterality: Bilateral;       Review of patient's allergies indicates:   Allergen Reactions    Phenergan [promethazine] Hives and Rash    Latex, natural rubber Hives     Per pt report-many years    Morphine Hives    Nsaids (non-steroidal anti-inflammatory drug)      Due to "kidney cancer"       Current Facility-Administered Medications on File Prior to Encounter   Medication    electrolyte-S (ISOLYTE)    lactated ringers infusion    pregabalin capsule 75 mg     Current Outpatient Medications on File Prior to Encounter   Medication Sig    albuterol (VENTOLIN HFA) 90 mcg/actuation inhaler Inhale 2 puffs into the lungs every 4 (four) hours as needed for Wheezing or Shortness of Breath. Rescue    ALPRAZolam (XANAX) 1 MG tablet TAKE 1 TABLET(1 MG) BY MOUTH TWICE DAILY AS NEEDED FOR ANXIETY    amLODIPine (NORVASC) 5 MG tablet Take 1 tablet (5 mg total) by mouth once daily.    aspirin (ECOTRIN) 81 MG EC tablet Take 1 tablet (81 mg total) by mouth once daily.    azelastine (ASTELIN) 137 mcg (0.1 %) nasal spray Use 1 spray(s) in each nostril twice daily    benralizumab 30 mg/mL AtIn Inject 30 mg into the skin every 8 weeks.    benralizumab 30 mg/mL AtIn Inject 30 mg into the skin every 28 days.    budesonide-formoterol 160-4.5 mcg (SYMBICORT) 160-4.5 mcg/actuation HFAA Inhale 2 puffs into the lungs every 12 (twelve) hours. Controller    celecoxib (CELEBREX) 200 MG capsule TAKE ONE CAPSULE BY MOUTH DAILY AT 9 AM    cholecalciferol, vitamin D3, (VITAMIN D3 ORAL) Take 1 capsule by mouth once daily.    COMIRNATY 2023-24, 12Y UP,,PF, 30 mcg/0.3 mL inection Inject 0.3 mLs into the muscle.    FLUoxetine 40 MG capsule " Take 1 capsule (40 mg total) by mouth 2 (two) times daily.    fluticasone (FLONASE) 50 mcg/actuation nasal spray 1 spray by Nasal route daily as needed.     FLUZONE HIGHDOSE QUAD 23-24  mcg/0.7 mL Syrg     gabapentin (NEURONTIN) 600 MG tablet Take 1 tablet (600 mg total) by mouth 3 (three) times daily.    magnesium 30 mg Tab Take by mouth once.    MULTIVIT-IRON-MIN-FOLIC ACID 3,500-18-0.4 UNIT-MG-MG ORAL CHEW Take by mouth once daily.    polyethylene glycol (GLYCOLAX) 17 gram/dose powder Take 17 g by mouth once daily.    AREXVY, PF, 120 mcg/0.5 mL SusR vaccine Inject 0.5 mLs into the muscle.    ondansetron (ZOFRAN) 4 MG tablet Take 1 tablet (4 mg total) by mouth as needed for Nausea.    ondansetron (ZOFRAN-ODT) 4 MG TbDL Take 1 tablet (4 mg total) by mouth every 6 (six) hours as needed (NAUSEA OR VOMITING).    predniSONE (DELTASONE) 10 MG tablet Take one pill a day for three days, repeat for shortness of breath    predniSONE (DELTASONE) 20 MG tablet 3 pills for 3 days, 2 for 3 days, 1 for 3 days, repeat for cough    UBRELVY 100 mg tablet Take 100 mg by mouth as needed. Max 1 in 24 hours    zolpidem (AMBIEN) 10 mg Tab TAKE 1 TABLET(10 MG) BY MOUTH EVERY NIGHT AS NEEDED     Family History       Problem Relation (Age of Onset)    Hypertension Father          Tobacco Use    Smoking status: Former     Current packs/day: 0.00     Average packs/day: 1 pack/day for 40.0 years (40.0 ttl pk-yrs)     Types: Cigarettes     Start date: 1978     Quit date: 2018     Years since quittin.3    Smokeless tobacco: Never    Tobacco comments:     smokes 2-3 cigarettes a night   Substance and Sexual Activity    Alcohol use: Yes     Alcohol/week: 12.0 standard drinks of alcohol     Types: 6 Cans of beer, 6 Standard drinks or equivalent per week     Comment: 2-3 a day/ social    Drug use: No    Sexual activity: Yes     Partners: Male     Review of Systems   Gastrointestinal:  Positive for abdominal pain. Negative for  abdominal distention.   Skin:  Positive for color change.   Allergic/Immunologic: Positive for immunocompromised state.   Psychiatric/Behavioral:  Negative for confusion.      Objective:     Vital Signs (Most Recent):  Temp: 97.8 °F (36.6 °C) (05/08/24 1305)  Pulse: 64 (05/08/24 1414)  Resp: (!) 29 (05/08/24 1414)  BP: (!) 166/80 (05/08/24 1414)  SpO2: 96 % (05/08/24 1414) Vital Signs (24h Range):  Temp:  [97.7 °F (36.5 °C)-98 °F (36.7 °C)] 97.8 °F (36.6 °C)  Pulse:  [56-84] 64  Resp:  [16-29] 29  SpO2:  [91 %-100 %] 96 %  BP: (101-190)/() 166/80     Weight: 72.6 kg (160 lb 0.9 oz)  Body mass index is 26.63 kg/m².     Physical Exam           Significant Labs: All pertinent labs within the past 24 hours have been reviewed.    Significant Imaging: I have reviewed all pertinent imaging results/findings within the past 24 hours.

## 2024-05-08 NOTE — TELEPHONE ENCOUNTER
Spoke to Cynthia they need a new RX because they had a system update and couldn't locate original RX with refills.

## 2024-05-09 ENCOUNTER — HOSPITAL ENCOUNTER (INPATIENT)
Facility: HOSPITAL | Age: 69
LOS: 2 days | Discharge: HOME | DRG: 433 | End: 2024-05-11
Attending: STUDENT IN AN ORGANIZED HEALTH CARE EDUCATION/TRAINING PROGRAM | Admitting: STUDENT IN AN ORGANIZED HEALTH CARE EDUCATION/TRAINING PROGRAM
Payer: MEDICARE

## 2024-05-09 VITALS
RESPIRATION RATE: 20 BRPM | BODY MASS INDEX: 26.67 KG/M2 | HEIGHT: 65 IN | TEMPERATURE: 98 F | SYSTOLIC BLOOD PRESSURE: 136 MMHG | HEART RATE: 72 BPM | WEIGHT: 160.06 LBS | DIASTOLIC BLOOD PRESSURE: 96 MMHG | OXYGEN SATURATION: 98 %

## 2024-05-09 DIAGNOSIS — F10.10 ETOH ABUSE: ICD-10-CM

## 2024-05-09 DIAGNOSIS — B17.10 ACUTE HEPATITIS C VIRUS INFECTION WITHOUT HEPATIC COMA: Primary | ICD-10-CM

## 2024-05-09 DIAGNOSIS — R74.01 TRANSAMINITIS: ICD-10-CM

## 2024-05-09 PROBLEM — N18.30 CKD (CHRONIC KIDNEY DISEASE) STAGE 3, GFR 30-59 ML/MIN: Status: RESOLVED | Noted: 2020-08-01 | Resolved: 2024-05-09

## 2024-05-09 PROBLEM — K74.60 DECOMPENSATED LIVER DISEASE: Status: ACTIVE | Noted: 2024-05-08

## 2024-05-09 PROBLEM — Z01.818 PREOP TESTING: Status: RESOLVED | Noted: 2020-07-30 | Resolved: 2024-05-09

## 2024-05-09 PROBLEM — K72.90 DECOMPENSATED LIVER DISEASE: Status: ACTIVE | Noted: 2024-05-08

## 2024-05-09 PROBLEM — K74.69 DECOMPENSATED LIVER DISEASE: Status: ACTIVE | Noted: 2024-05-08

## 2024-05-09 PROBLEM — J45.51 SEVERE PERSISTENT ASTHMA WITH ACUTE EXACERBATION: Status: RESOLVED | Noted: 2024-04-23 | Resolved: 2024-05-09

## 2024-05-09 PROBLEM — R40.20 LOSS OF CONSCIOUSNESS: Status: RESOLVED | Noted: 2023-08-13 | Resolved: 2024-05-09

## 2024-05-09 LAB
A1AT SERPL NEPH-MCNC: 160 MG/DL (ref 100–190)
ALBUMIN SERPL BCP-MCNC: 2.6 G/DL (ref 3.5–5.2)
ALP SERPL-CCNC: 409 U/L (ref 55–135)
ALT SERPL W/O P-5'-P-CCNC: 855 U/L (ref 10–44)
ANION GAP SERPL CALC-SCNC: 10 MMOL/L (ref 8–16)
AST SERPL-CCNC: 720 U/L (ref 10–40)
BASOPHILS # BLD AUTO: 0.01 K/UL (ref 0–0.2)
BASOPHILS NFR BLD: 0.3 % (ref 0–1.9)
BILIRUB SERPL-MCNC: 6.1 MG/DL (ref 0.1–1)
BUN SERPL-MCNC: 10 MG/DL (ref 8–23)
CALCIUM SERPL-MCNC: 9.5 MG/DL (ref 8.7–10.5)
CERULOPLASMIN SERPL-MCNC: 23.6 MG/DL (ref 20–51)
CHLORIDE SERPL-SCNC: 107 MMOL/L (ref 95–110)
CO2 SERPL-SCNC: 26 MMOL/L (ref 23–29)
CREAT SERPL-MCNC: 1 MG/DL (ref 0.5–1.4)
DIFFERENTIAL METHOD BLD: ABNORMAL
EOSINOPHIL # BLD AUTO: 0 K/UL (ref 0–0.5)
EOSINOPHIL NFR BLD: 0 % (ref 0–8)
ERYTHROCYTE [DISTWIDTH] IN BLOOD BY AUTOMATED COUNT: 14.6 % (ref 11.5–14.5)
EST. GFR  (NO RACE VARIABLE): >60 ML/MIN/1.73 M^2
GLUCOSE SERPL-MCNC: 87 MG/DL (ref 70–110)
HCT VFR BLD AUTO: 39 % (ref 37–48.5)
HGB BLD-MCNC: 12.6 G/DL (ref 12–16)
IMM GRANULOCYTES # BLD AUTO: 0 K/UL (ref 0–0.04)
IMM GRANULOCYTES NFR BLD AUTO: 0 % (ref 0–0.5)
LYMPHOCYTES # BLD AUTO: 1.6 K/UL (ref 1–4.8)
LYMPHOCYTES NFR BLD: 41.3 % (ref 18–48)
MAGNESIUM SERPL-MCNC: 1.7 MG/DL (ref 1.6–2.6)
MAGNESIUM SERPL-MCNC: 1.7 MG/DL (ref 1.6–2.6)
MCH RBC QN AUTO: 32 PG (ref 27–31)
MCHC RBC AUTO-ENTMCNC: 32.3 G/DL (ref 32–36)
MCV RBC AUTO: 99 FL (ref 82–98)
MONOCYTES # BLD AUTO: 0.5 K/UL (ref 0.3–1)
MONOCYTES NFR BLD: 13.8 % (ref 4–15)
NEUTROPHILS # BLD AUTO: 1.7 K/UL (ref 1.8–7.7)
NEUTROPHILS NFR BLD: 44.6 % (ref 38–73)
NRBC BLD-RTO: 0 /100 WBC
PLATELET # BLD AUTO: 170 K/UL (ref 150–450)
PMV BLD AUTO: 11.1 FL (ref 9.2–12.9)
POTASSIUM SERPL-SCNC: 3.7 MMOL/L (ref 3.5–5.1)
PROT SERPL-MCNC: 5.5 G/DL (ref 6–8.4)
RBC # BLD AUTO: 3.94 M/UL (ref 4–5.4)
SODIUM SERPL-SCNC: 143 MMOL/L (ref 136–145)
WBC # BLD AUTO: 3.83 K/UL (ref 3.9–12.7)

## 2024-05-09 PROCEDURE — G0378 HOSPITAL OBSERVATION PER HR: HCPCS

## 2024-05-09 PROCEDURE — 96376 TX/PRO/DX INJ SAME DRUG ADON: CPT

## 2024-05-09 PROCEDURE — 94640 AIRWAY INHALATION TREATMENT: CPT | Mod: XB

## 2024-05-09 PROCEDURE — 85025 COMPLETE CBC W/AUTO DIFF WBC: CPT | Performed by: STUDENT IN AN ORGANIZED HEALTH CARE EDUCATION/TRAINING PROGRAM

## 2024-05-09 PROCEDURE — 36415 COLL VENOUS BLD VENIPUNCTURE: CPT | Performed by: STUDENT IN AN ORGANIZED HEALTH CARE EDUCATION/TRAINING PROGRAM

## 2024-05-09 PROCEDURE — 63600175 PHARM REV CODE 636 W HCPCS: Performed by: STUDENT IN AN ORGANIZED HEALTH CARE EDUCATION/TRAINING PROGRAM

## 2024-05-09 PROCEDURE — 25000003 PHARM REV CODE 250: Performed by: STUDENT IN AN ORGANIZED HEALTH CARE EDUCATION/TRAINING PROGRAM

## 2024-05-09 PROCEDURE — 25000003 PHARM REV CODE 250: Performed by: HOSPITALIST

## 2024-05-09 PROCEDURE — 11000001 HC ACUTE MED/SURG PRIVATE ROOM

## 2024-05-09 PROCEDURE — 25000242 PHARM REV CODE 250 ALT 637 W/ HCPCS: Performed by: STUDENT IN AN ORGANIZED HEALTH CARE EDUCATION/TRAINING PROGRAM

## 2024-05-09 PROCEDURE — 83735 ASSAY OF MAGNESIUM: CPT | Performed by: STUDENT IN AN ORGANIZED HEALTH CARE EDUCATION/TRAINING PROGRAM

## 2024-05-09 PROCEDURE — 99900031 HC PATIENT EDUCATION (STAT)

## 2024-05-09 PROCEDURE — 94761 N-INVAS EAR/PLS OXIMETRY MLT: CPT

## 2024-05-09 PROCEDURE — 21400001 HC TELEMETRY ROOM

## 2024-05-09 PROCEDURE — 80053 COMPREHEN METABOLIC PANEL: CPT | Performed by: STUDENT IN AN ORGANIZED HEALTH CARE EDUCATION/TRAINING PROGRAM

## 2024-05-09 RX ORDER — IBUPROFEN 200 MG
24 TABLET ORAL
Status: DISCONTINUED | OUTPATIENT
Start: 2024-05-09 | End: 2024-05-11 | Stop reason: HOSPADM

## 2024-05-09 RX ORDER — TALC
6 POWDER (GRAM) TOPICAL NIGHTLY PRN
Status: DISCONTINUED | OUTPATIENT
Start: 2024-05-09 | End: 2024-05-11 | Stop reason: HOSPADM

## 2024-05-09 RX ORDER — SODIUM CHLORIDE 0.9 % (FLUSH) 0.9 %
10 SYRINGE (ML) INJECTION
Status: DISCONTINUED | OUTPATIENT
Start: 2024-05-09 | End: 2024-05-11 | Stop reason: HOSPADM

## 2024-05-09 RX ORDER — SIMETHICONE 80 MG
1 TABLET,CHEWABLE ORAL 3 TIMES DAILY PRN
Status: DISCONTINUED | OUTPATIENT
Start: 2024-05-09 | End: 2024-05-09 | Stop reason: HOSPADM

## 2024-05-09 RX ORDER — PANTOPRAZOLE SODIUM 40 MG/1
40 TABLET, DELAYED RELEASE ORAL DAILY
Status: DISCONTINUED | OUTPATIENT
Start: 2024-05-09 | End: 2024-05-09 | Stop reason: HOSPADM

## 2024-05-09 RX ORDER — GLUCAGON 1 MG
1 KIT INJECTION
Status: DISCONTINUED | OUTPATIENT
Start: 2024-05-09 | End: 2024-05-11 | Stop reason: HOSPADM

## 2024-05-09 RX ORDER — IBUPROFEN 200 MG
16 TABLET ORAL
Status: DISCONTINUED | OUTPATIENT
Start: 2024-05-09 | End: 2024-05-11 | Stop reason: HOSPADM

## 2024-05-09 RX ORDER — ACETAMINOPHEN 325 MG/1
650 TABLET ORAL EVERY 4 HOURS PRN
Status: DISCONTINUED | OUTPATIENT
Start: 2024-05-09 | End: 2024-05-09

## 2024-05-09 RX ORDER — ONDANSETRON HYDROCHLORIDE 2 MG/ML
4 INJECTION, SOLUTION INTRAVENOUS EVERY 8 HOURS PRN
Status: DISCONTINUED | OUTPATIENT
Start: 2024-05-09 | End: 2024-05-11 | Stop reason: HOSPADM

## 2024-05-09 RX ORDER — PROCHLORPERAZINE EDISYLATE 5 MG/ML
5 INJECTION INTRAMUSCULAR; INTRAVENOUS EVERY 6 HOURS PRN
Status: DISCONTINUED | OUTPATIENT
Start: 2024-05-09 | End: 2024-05-11 | Stop reason: HOSPADM

## 2024-05-09 RX ORDER — NALOXONE HCL 0.4 MG/ML
0.02 VIAL (ML) INJECTION
Status: DISCONTINUED | OUTPATIENT
Start: 2024-05-09 | End: 2024-05-11 | Stop reason: HOSPADM

## 2024-05-09 RX ADMIN — BUDESONIDE INHALATION 0.5 MG: 0.5 SUSPENSION RESPIRATORY (INHALATION) at 07:05

## 2024-05-09 RX ADMIN — ONDANSETRON 4 MG: 2 INJECTION INTRAMUSCULAR; INTRAVENOUS at 03:05

## 2024-05-09 RX ADMIN — Medication 800 MG: at 10:05

## 2024-05-09 RX ADMIN — Medication 6 MG: at 09:05

## 2024-05-09 RX ADMIN — Medication 800 MG: at 06:05

## 2024-05-09 RX ADMIN — PANTOPRAZOLE SODIUM 40 MG: 40 TABLET, DELAYED RELEASE ORAL at 03:05

## 2024-05-09 RX ADMIN — ARFORMOTEROL TARTRATE 15 MCG: 15 SOLUTION RESPIRATORY (INHALATION) at 07:05

## 2024-05-09 RX ADMIN — POTASSIUM BICARBONATE 50 MEQ: 977.5 TABLET, EFFERVESCENT ORAL at 06:05

## 2024-05-09 NOTE — PLAN OF CARE
[facility] - Med/Surg  Initial Discharge Assessment       Primary Care Provider: Jorge Alonzo MD    Admission Diagnosis: Acute liver failure without hepatic coma [K72.00]    Admission Date: 5/7/2024  Expected Discharge Date:     Transition of Care Barriers: None    Payor: HU[employer] MANAGED MEDICARE / Plan: HU[employer] MEDICARE PPO / Product Type: Medicare Advantage /     Extended Emergency Contact Information  Primary Emergency Contact: everette goodwin  Mobile Phone: [phone]  Relation: Friend  Preferred language: English  Secondary Emergency Contact: Lucila White  Mobile Phone: [phone]  Relation: Relative  Preferred language: English   needed? No    Discharge Plan A: Hospital Transfer  Discharge Plan B: Hospital Transfer      [id number] [place] [facility] [address] [facility] [address]  [address] [address]  Phone: [phone] Fax: [phone]    [facility] [place] [address] [address]  Phone: [phone] Fax: [phone]    [facility] level of [facility].     Initial Assessment (most recent)       Adult Discharge Assessment - 05/09/24 1538          Discharge Assessment    Assessment Type Discharge Planning Assessment     Confirmed/corrected address, phone number and insurance Yes     Confirmed Demographics Correct on Facesheet     Source of Information patient     Does patient/caregiver understand observation status Yes     Communicated ORA with patient/caregiver Yes     People in Home grandchild(katey)     Facility Arrived From: home     Do you expect to return to your current living situation? Other (see comments)   transferring [facility]    Do you have help at home or someone to help you manage your care at home? No     Prior to hospitilization cognitive status: Alert/Oriented     Current cognitive status: Alert/Oriented     Equipment  Currently Used at Home oxygen     Readmission within 30 days? No     Patient currently being followed by outpatient case management? No     Do you currently have service(s) that help you manage your care at home? No     Do you take prescription medications? Yes     Do you have prescription coverage? Yes     Do you have any problems affording any of your prescribed medications? No     Is the patient taking medications as prescribed? yes     Who is going to help you get home at discharge? family     How do you get to doctors appointments? car, drives self;family or friend will provide     Are you on dialysis? No     Do you take coumadin? No     Discharge Plan A Hospital Transfer     Discharge Plan B Hospital Transfer     DME Needed Upon Discharge  none     Discharge Plan discussed with: Patient     Transition of Care Barriers None

## 2024-05-09 NOTE — PLAN OF CARE
05/09/24 1541   ANGEL Message   Medicare Outpatient and Observation Notification regarding financial responsibility Given to patient/caregiver;Explained to patient/caregiver;Signed/date by patient/caregiver   Date ANGEL was signed 05/09/24   Time ANGEL was signed 1533

## 2024-05-09 NOTE — CARE UPDATE
05/08/24 1935   Patient Assessment/Suction   Level of Consciousness (AVPU) alert   Respiratory Effort Unlabored   Expansion/Accessory Muscles/Retractions expansion symmetric;no retractions;no use of accessory muscles   LELO Breath Sounds clear   LLL Breath Sounds diminished   RUL Breath Sounds clear   RML Breath Sounds clear   RLL Breath Sounds diminished   Rhythm/Pattern, Respiratory depth regular;pattern regular;unlabored   PRE-TX-O2   Device (Oxygen Therapy) room air   SpO2 96 %   Pulse Oximetry Type Continuous   $ Pulse Oximetry - Multiple Charge Pulse Oximetry - Multiple   Pulse 66   Resp (!) 22   Aerosol Therapy   $ Aerosol Therapy Charges Aerosol Treatment   Daily Review of Necessity (SVN) completed   Respiratory Treatment Status (SVN) given   Treatment Route (SVN) mask;oxygen   Post Treatment Assessment (SVN) breath sounds unchanged;increased aeration   Signs of Intolerance (SVN) none   Breath Sounds Post-Respiratory Treatment   Throughout All Fields Post-Treatment All Fields   Throughout All Fields Post-Treatment aeration increased;no change   Education   $ Education Bronchodilator;15 min

## 2024-05-09 NOTE — SUBJECTIVE & OBJECTIVE
"Interval History: see "Hospital Course"    Review of Systems   Gastrointestinal:  Positive for abdominal pain. Negative for abdominal distention.   Skin:  Positive for color change.   Allergic/Immunologic: Positive for immunocompromised state.   Psychiatric/Behavioral:  Negative for confusion.      Objective:     Vital Signs (Most Recent):  Temp: 97.5 °F (36.4 °C) (05/09/24 1000)  Pulse: 65 (05/09/24 1000)  Resp: 12 (05/09/24 1000)  BP: 112/62 (05/09/24 1000)  SpO2: 96 % (05/09/24 1000) Vital Signs (24h Range):  Temp:  [97.5 °F (36.4 °C)-97.8 °F (36.6 °C)] 97.5 °F (36.4 °C)  Pulse:  [49-84] 65  Resp:  [11-48] 12  SpO2:  [93 %-100 %] 96 %  BP: (106-173)/(57-87) 112/62     Weight: 72.6 kg (160 lb 0.9 oz)  Body mass index is 26.63 kg/m².    Intake/Output Summary (Last 24 hours) at 5/9/2024 1149  Last data filed at 5/8/2024 1816  Gross per 24 hour   Intake 400 ml   Output --   Net 400 ml         Physical Exam  Vitals and nursing note reviewed.   Constitutional:       General: She is not in acute distress.  HENT:      Head: Normocephalic and atraumatic.      Right Ear: External ear normal.      Left Ear: External ear normal.      Nose: Nose normal.      Mouth/Throat:      Mouth: Mucous membranes are moist.      Pharynx: Oropharynx is clear.   Eyes:      Extraocular Movements: Extraocular movements intact.   Cardiovascular:      Rate and Rhythm: Normal rate and regular rhythm.      Pulses: Normal pulses.      Heart sounds: Normal heart sounds.   Pulmonary:      Effort: Pulmonary effort is normal.      Breath sounds: Normal breath sounds.   Abdominal:      General: Bowel sounds are normal.      Palpations: Abdomen is soft.   Musculoskeletal:      Cervical back: Normal range of motion and neck supple.      Right lower leg: No edema.      Left lower leg: No edema.   Skin:     Coloration: Skin is jaundiced.   Neurological:      Mental Status: She is alert and oriented to person, place, and time. Mental status is at baseline. "   Psychiatric:         Mood and Affect: Mood normal.         Behavior: Behavior normal.             Significant Labs: All pertinent labs within the past 24 hours have been reviewed.    Significant Imaging: I have reviewed all pertinent imaging results/findings within the past 24 hours.

## 2024-05-09 NOTE — ASSESSMENT & PLAN NOTE
HCV positive, EtOH abuse and acetaminophen overuse.  -[place] [facility] [place] evaluation  -Start acetylcysteine protocol; stopped 5/8  -Patient counseled on EtOH cessation  -Trend LFTs  -Follow up remaining preliminary workup

## 2024-05-09 NOTE — NURSING
Nurses Note -- 4 Eyes      5/8/2024   7:45 PM      Skin assessed during: Admit      [x] No Altered Skin Integrity Present    []Prevention Measures Documented      [] Yes- Altered Skin Integrity Present or Discovered   [] LDA Added if Not in Epic (Describe Wound)   [] New Altered Skin Integrity was Present on Admit and Documented in LDA   [] Wound Image Taken    Wound Care Consulted? No    Attending Nurse:  Bridgette Elder Rn    Second RN/Staff Member:  Queenie DIAZ

## 2024-05-09 NOTE — CARE UPDATE
05/09/24 0712   Patient Assessment/Suction   Level of Consciousness (AVPU) alert   Respiratory Effort Normal;Unlabored   Expansion/Accessory Muscles/Retractions no use of accessory muscles;no retractions;expansion symmetric   All Lung Fields Breath Sounds Anterior:;Lateral:;diminished;clear   Rhythm/Pattern, Respiratory unlabored;pattern regular;depth regular;no shortness of breath reported   Cough Frequency no cough   PRE-TX-O2   Device (Oxygen Therapy) room air   SpO2 99 %   Pulse Oximetry Type Continuous   $ Pulse Oximetry - Multiple Charge Pulse Oximetry - Multiple   Pulse (!) 58   Resp 20   BP (!) 173/83   Positioning HOB elevated 30 degrees   Aerosol Therapy   $ Aerosol Therapy Charges Aerosol Treatment   Respiratory Treatment Status (SVN) given   Treatment Route (SVN) mask;oxygen   Patient Position HOB elevated   Post Treatment Assessment (SVN) increased aeration   Signs of Intolerance (SVN) none   Education   $ Education Bronchodilator;15 min

## 2024-05-09 NOTE — PROGRESS NOTES
"[facility] Medicine  Progress Note    Patient Name: Beatriz Gan  MRN: [id number]  Patient Class: OP- Observation   Admission Date: 5/7/2024  Length of Stay: 0 days  Attending Physician: Elliott Nance MD  Primary Care Provider: Jorge Alonzo MD        Subjective:     Principal Problem:Acute liver failure without hepatic coma        HPI:  Beatriz Gan is a 68 year old female with a past medical history of EtOH use, HTN, HLD, COPD, and MDD/NAWAF who presented with acute liver failure with HCV infection, EtOH abuse, and overuse of acetaminophen. Hospital Medicine consulting while the patient awaits transfer [place] ED [place] [facility] [place] and ICU. Workup has been started while the patient awaits transfer; ultrasound shows a liver cirrhosis. She is being monitored for EtOH withdrawal. Acetylcysteine has been started.    Overview/Hospital Course:  Beatriz Gan is a 68 year old female with a past medical history of EtOH use, HTN, HLD, COPD, and MDD/NAWAF who presented with acute liver failure with HCV infection, EtOH abuse, and overuse of acetaminophen. Hospital Medicine consulting while the patient awaits transfer from ED to [facility] for Hepatology and ICU. Workup has been started while the patient awaits transfer; ultrasound shows a possible liver cirrhosis. She is being monitored for EtOH withdrawal. Acetylcysteine was started, yet it was discontinued given shortness of breath, nausea and vomiting. Her LFTs are slowly improving.    Interval History: see "Hospital Course"    Review of Systems   Gastrointestinal:  Positive for abdominal pain. Negative for abdominal distention.   Skin:  Positive for color change.   Allergic/Immunologic: Positive for immunocompromised state.   Psychiatric/Behavioral:  Negative for confusion.      Objective:     Vital Signs (Most Recent):  Temp: 97.5 °F (36.4 °C) (05/09/24 1000)  Pulse: 65 (05/09/24 1000)  Resp: 12 (05/09/24 1000)  BP: 112/62 " (05/09/24 1000)  SpO2: 96 % (05/09/24 1000) Vital Signs (24h Range):  Temp:  [97.5 °F (36.4 °C)-97.8 °F (36.6 °C)] 97.5 °F (36.4 °C)  Pulse:  [49-84] 65  Resp:  [11-48] 12  SpO2:  [93 %-100 %] 96 %  BP: (106-173)/(57-87) 112/62     Weight: 72.6 kg (160 lb 0.9 oz)  Body mass index is 26.63 kg/m².    Intake/Output Summary (Last 24 hours) at 5/9/2024 1149  Last data filed at 5/8/2024 1816  Gross per 24 hour   Intake 400 ml   Output --   Net 400 ml         Physical Exam  Vitals and nursing note reviewed.   Constitutional:       General: She is not in acute distress.  HENT:      Head: Normocephalic and atraumatic.      Right Ear: External ear normal.      Left Ear: External ear normal.      Nose: Nose normal.      Mouth/Throat:      Mouth: Mucous membranes are moist.      Pharynx: Oropharynx is clear.   Eyes:      Extraocular Movements: Extraocular movements intact.   Cardiovascular:      Rate and Rhythm: Normal rate and regular rhythm.      Pulses: Normal pulses.      Heart sounds: Normal heart sounds.   Pulmonary:      Effort: Pulmonary effort is normal.      Breath sounds: Normal breath sounds.   Abdominal:      General: Bowel sounds are normal.      Palpations: Abdomen is soft.   Musculoskeletal:      Cervical back: Normal range of motion and neck supple.      Right lower leg: No edema.      Left lower leg: No edema.   Skin:     Coloration: Skin is jaundiced.   Neurological:      Mental Status: She is alert and oriented to person, place, and time. Mental status is at baseline.   Psychiatric:         Mood and Affect: Mood normal.         Behavior: Behavior normal.             Significant Labs: All pertinent labs within the past 24 hours have been reviewed.    Significant Imaging: I have reviewed all pertinent imaging results/findings within the past 24 hours.    Assessment/Plan:      * Acute liver failure without hepatic coma  HCV positive, EtOH abuse and acetaminophen overuse.  -Transfer to [facility] [place] for Hepatology  evaluation  -Start acetylcysteine protocol; stopped 5/8  -Patient counseled on EtOH cessation  -Trend LFTs  -Follow up remaining preliminary workup          Acetaminophen abuse  -Patient should no longer use acetaminophen  -Start acetylcysteine infusion protocol; stopped 5/8    ETOH abuse  -Patient counseled on cessation  -CIWA Q4H      Hepatitis C virus infection without hepatic coma  -Transfer to [facility] for Hepatology evaluation      Major depressive disorder, recurrent episode, moderate  -Hold fluoxetine    COPD (chronic obstructive pulmonary disease)  -Budesonide and arformoterol nebulizers      VTE Risk Mitigation (From admission, onward)      None            Discharge Planning   ORA:      Code Status: Full Code   Is the patient medically ready for discharge?:     Reason for patient still in hospital (select all that apply): Patient trending condition, Laboratory test, and Pending disposition                     Elliott Nance MD  Department of Hospital Medicine   [facility]/Surg

## 2024-05-09 NOTE — PLAN OF CARE
Problem: Adult Inpatient Plan of Care  Goal: Plan of Care Review  Outcome: Progressing  Goal: Readiness for Transition of Care  Outcome: Progressing     Problem: Liver Failure  Goal: Optimal Coping with Liver Failure  5/9/2024 0404 by Dunia Drake RN  Outcome: Progressing  5/9/2024 0403 by Dunia Drake RN  Outcome: Progressing  Goal: Optimize Renal Function  5/9/2024 0404 by Dunia Drake RN  Outcome: Progressing  5/9/2024 0403 by Dunia Drake RN  Outcome: Progressing     Problem: Alcohol Withdrawal  Goal: Alcohol Withdrawal Symptom Control  Outcome: Progressing  Goal: Readiness for Change Identified  Outcome: Progressing     Problem: COPD (Chronic Obstructive Pulmonary Disease)  Goal: Optimal Chronic Illness Coping  Outcome: Progressing  Goal: Effective Oxygenation and Ventilation  Outcome: Progressing   Pt still awaiting bed per transfer center.

## 2024-05-09 NOTE — HOSPITAL COURSE
Beatriz Gan is a 68 year old female with a past medical history of EtOH use, HTN, HLD, COPD, and MDD/NAWAF who presented with acute liver failure with HCV infection, EtOH abuse, and overuse of acetaminophen. Hospital Medicine consulting while the patient awaits transfer from ED to [facility] for Hepatology and ICU. Workup has been started while the patient awaits transfer; ultrasound shows a possible liver cirrhosis. She is being monitored for EtOH withdrawal. Acetylcysteine was started, yet it was discontinued given shortness of breath, nausea and vomiting. Her LFTs are slowly improving. She was transferred to [facility] 5/9.

## 2024-05-10 PROBLEM — J45.40 MODERATE PERSISTENT ASTHMA WITHOUT COMPLICATION: Status: ACTIVE | Noted: 2024-05-10

## 2024-05-10 LAB
AFP SERPL-MCNC: 6.3 NG/ML (ref 0–8.4)
ALBUMIN SERPL BCP-MCNC: 2.8 G/DL (ref 3.5–5.2)
ALP SERPL-CCNC: 407 U/L (ref 55–135)
ALT SERPL W/O P-5'-P-CCNC: 683 U/L (ref 10–44)
ANION GAP SERPL CALC-SCNC: 7 MMOL/L (ref 8–16)
AST SERPL-CCNC: 478 U/L (ref 10–40)
BASOPHILS # BLD AUTO: 0.01 K/UL (ref 0–0.2)
BASOPHILS NFR BLD: 0.3 % (ref 0–1.9)
BILIRUB SERPL-MCNC: 5.6 MG/DL (ref 0.1–1)
BUN SERPL-MCNC: 10 MG/DL (ref 8–23)
CALCIUM SERPL-MCNC: 9.7 MG/DL (ref 8.7–10.5)
CHLORIDE SERPL-SCNC: 104 MMOL/L (ref 95–110)
CO2 SERPL-SCNC: 29 MMOL/L (ref 23–29)
CREAT SERPL-MCNC: 0.9 MG/DL (ref 0.5–1.4)
DIFFERENTIAL METHOD BLD: ABNORMAL
EOSINOPHIL # BLD AUTO: 0 K/UL (ref 0–0.5)
EOSINOPHIL NFR BLD: 0 % (ref 0–8)
ERYTHROCYTE [DISTWIDTH] IN BLOOD BY AUTOMATED COUNT: 15.2 % (ref 11.5–14.5)
EST. GFR  (NO RACE VARIABLE): >60 ML/MIN/1.73 M^2
GLUCOSE SERPL-MCNC: 88 MG/DL (ref 70–110)
HCT VFR BLD AUTO: 39.9 % (ref 37–48.5)
HGB BLD-MCNC: 12.9 G/DL (ref 12–16)
IMM GRANULOCYTES # BLD AUTO: 0.01 K/UL (ref 0–0.04)
IMM GRANULOCYTES NFR BLD AUTO: 0.3 % (ref 0–0.5)
INR PPP: 1.2 (ref 0.8–1.2)
LYMPHOCYTES # BLD AUTO: 1.6 K/UL (ref 1–4.8)
LYMPHOCYTES NFR BLD: 41.4 % (ref 18–48)
MAGNESIUM SERPL-MCNC: 1.9 MG/DL (ref 1.6–2.6)
MCH RBC QN AUTO: 31.6 PG (ref 27–31)
MCHC RBC AUTO-ENTMCNC: 32.3 G/DL (ref 32–36)
MCV RBC AUTO: 98 FL (ref 82–98)
MITOCHONDRIA M2 AB SER IA-ACNC: <0.1 U
MONOCYTES # BLD AUTO: 0.5 K/UL (ref 0.3–1)
MONOCYTES NFR BLD: 12 % (ref 4–15)
NEUTROPHILS # BLD AUTO: 1.8 K/UL (ref 1.8–7.7)
NEUTROPHILS NFR BLD: 46 % (ref 38–73)
NRBC BLD-RTO: 0 /100 WBC
PLATELET # BLD AUTO: 190 K/UL (ref 150–450)
PMV BLD AUTO: 11.1 FL (ref 9.2–12.9)
POTASSIUM SERPL-SCNC: 4.4 MMOL/L (ref 3.5–5.1)
PROT SERPL-MCNC: 6.1 G/DL (ref 6–8.4)
PROTHROMBIN TIME: 12.7 SEC (ref 9–12.5)
RBC # BLD AUTO: 4.08 M/UL (ref 4–5.4)
SODIUM SERPL-SCNC: 140 MMOL/L (ref 136–145)
WBC # BLD AUTO: 3.82 K/UL (ref 3.9–12.7)

## 2024-05-10 PROCEDURE — 83735 ASSAY OF MAGNESIUM: CPT | Performed by: HOSPITALIST

## 2024-05-10 PROCEDURE — 86015 ACTIN ANTIBODY EACH: CPT | Performed by: HOSPITALIST

## 2024-05-10 PROCEDURE — 21400001 HC TELEMETRY ROOM

## 2024-05-10 PROCEDURE — 80321 ALCOHOLS BIOMARKERS 1OR 2: CPT | Performed by: HOSPITALIST

## 2024-05-10 PROCEDURE — 80053 COMPREHEN METABOLIC PANEL: CPT | Performed by: HOSPITALIST

## 2024-05-10 PROCEDURE — 36415 COLL VENOUS BLD VENIPUNCTURE: CPT | Performed by: HOSPITALIST

## 2024-05-10 PROCEDURE — 25000003 PHARM REV CODE 250: Performed by: STUDENT IN AN ORGANIZED HEALTH CARE EDUCATION/TRAINING PROGRAM

## 2024-05-10 PROCEDURE — 85025 COMPLETE CBC W/AUTO DIFF WBC: CPT | Performed by: HOSPITALIST

## 2024-05-10 PROCEDURE — 82105 ALPHA-FETOPROTEIN SERUM: CPT | Performed by: STUDENT IN AN ORGANIZED HEALTH CARE EDUCATION/TRAINING PROGRAM

## 2024-05-10 PROCEDURE — 86038 ANTINUCLEAR ANTIBODIES: CPT | Performed by: STUDENT IN AN ORGANIZED HEALTH CARE EDUCATION/TRAINING PROGRAM

## 2024-05-10 PROCEDURE — 99223 1ST HOSP IP/OBS HIGH 75: CPT | Mod: ,,, | Performed by: INTERNAL MEDICINE

## 2024-05-10 PROCEDURE — 90792 PSYCH DIAG EVAL W/MED SRVCS: CPT | Mod: ,,, | Performed by: PSYCHIATRY & NEUROLOGY

## 2024-05-10 PROCEDURE — 85610 PROTHROMBIN TIME: CPT | Performed by: HOSPITALIST

## 2024-05-10 PROCEDURE — 36415 COLL VENOUS BLD VENIPUNCTURE: CPT | Performed by: STUDENT IN AN ORGANIZED HEALTH CARE EDUCATION/TRAINING PROGRAM

## 2024-05-10 RX ORDER — FOLIC ACID 1 MG/1
1 TABLET ORAL DAILY
Status: DISCONTINUED | OUTPATIENT
Start: 2024-05-11 | End: 2024-05-11 | Stop reason: HOSPADM

## 2024-05-10 RX ORDER — THIAMINE HCL 100 MG
100 TABLET ORAL DAILY
Status: DISCONTINUED | OUTPATIENT
Start: 2024-05-11 | End: 2024-05-11 | Stop reason: HOSPADM

## 2024-05-10 RX ORDER — ALPRAZOLAM 1 MG/1
1 TABLET ORAL 2 TIMES DAILY PRN
Qty: 60 TABLET | Refills: 2 | Status: SHIPPED | OUTPATIENT
Start: 2024-05-10 | End: 2024-05-17

## 2024-05-10 RX ORDER — AMLODIPINE BESYLATE 5 MG/1
5 TABLET ORAL DAILY
Qty: 90 TABLET | Refills: 3 | Status: SHIPPED | OUTPATIENT
Start: 2024-05-10

## 2024-05-10 RX ORDER — LORAZEPAM 1 MG/1
2 TABLET ORAL EVERY 4 HOURS
Status: DISCONTINUED | OUTPATIENT
Start: 2024-05-10 | End: 2024-05-11 | Stop reason: HOSPADM

## 2024-05-10 RX ORDER — FLUOXETINE HYDROCHLORIDE 40 MG/1
40 CAPSULE ORAL 2 TIMES DAILY
Qty: 180 CAPSULE | Refills: 3 | Status: SHIPPED | OUTPATIENT
Start: 2024-05-10 | End: 2024-05-16 | Stop reason: SDUPTHER

## 2024-05-10 RX ORDER — FLUTICASONE FUROATE AND VILANTEROL 200; 25 UG/1; UG/1
1 POWDER RESPIRATORY (INHALATION) DAILY
Status: DISCONTINUED | OUTPATIENT
Start: 2024-05-10 | End: 2024-05-11 | Stop reason: HOSPADM

## 2024-05-10 RX ORDER — GABAPENTIN 300 MG/1
600 CAPSULE ORAL 3 TIMES DAILY
Status: DISCONTINUED | OUTPATIENT
Start: 2024-05-10 | End: 2024-05-11 | Stop reason: HOSPADM

## 2024-05-10 RX ORDER — ASPIRIN 81 MG/1
81 TABLET ORAL DAILY
Qty: 30 TABLET | Refills: 0 | Status: SHIPPED | OUTPATIENT
Start: 2024-05-10 | End: 2024-05-31

## 2024-05-10 RX ORDER — ZOLPIDEM TARTRATE 10 MG/1
10 TABLET ORAL NIGHTLY PRN
Qty: 30 TABLET | Refills: 2 | Status: SHIPPED | OUTPATIENT
Start: 2024-05-10

## 2024-05-10 RX ORDER — FLUOXETINE HYDROCHLORIDE 20 MG/1
40 CAPSULE ORAL 2 TIMES DAILY
Status: DISCONTINUED | OUTPATIENT
Start: 2024-05-10 | End: 2024-05-11 | Stop reason: HOSPADM

## 2024-05-10 RX ADMIN — FLUOXETINE HYDROCHLORIDE 40 MG: 20 CAPSULE ORAL at 08:05

## 2024-05-10 RX ADMIN — GABAPENTIN 600 MG: 300 CAPSULE ORAL at 05:05

## 2024-05-10 RX ADMIN — FLUOXETINE HYDROCHLORIDE 40 MG: 20 CAPSULE ORAL at 10:05

## 2024-05-10 RX ADMIN — GABAPENTIN 600 MG: 300 CAPSULE ORAL at 10:05

## 2024-05-10 RX ADMIN — LORAZEPAM 2 MG: 1 TABLET ORAL at 10:05

## 2024-05-10 NOTE — PLAN OF CARE
Problem: Adult Inpatient Plan of Care  Goal: Plan of Care Review  Outcome: Progressing  Goal: Patient-Specific Goal (Individualized)  Outcome: Progressing  Goal: Absence of Hospital-Acquired Illness or Injury  Outcome: Progressing  Goal: Optimal Comfort and Wellbeing  Outcome: Progressing  Goal: Readiness for Transition of Care  Outcome: Progressing     Problem: Liver Failure  Goal: Optimal Coping with Liver Failure  Outcome: Progressing  Goal: Fluid and Electrolyte Balance  Outcome: Progressing  Goal: Optimal Gastrointestinal Function  Outcome: Progressing  Goal: Absence of Infection Signs and Symptoms  Outcome: Progressing  Goal: Optimize Renal Function  Outcome: Progressing     Problem: Alcohol Withdrawal  Goal: Alcohol Withdrawal Symptom Control  Outcome: Progressing  Goal: Optimal Neurologic Function  Outcome: Progressing  Goal: Readiness for Change Identified  Outcome: Progressing     Problem: COPD (Chronic Obstructive Pulmonary Disease)  Goal: Optimal Chronic Illness Coping  Outcome: Progressing  Goal: Optimal Level of Functional [place]  Outcome: Progressing  Goal: Absence of Infection Signs and Symptoms  Outcome: Progressing  Goal: Improved Oral Intake  Outcome: Progressing  Goal: Effective Oxygenation and Ventilation  Outcome: Progressing

## 2024-05-10 NOTE — CONSULTS
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 mL Gadavist  intravenous contrast.    COMPARISON:  Head CT 08/13/2023..  Cervical spine radiographs 08/07/2023.    FINDINGS:  Brain: There is an avidly enhancing well-circumscribed smoothly marginated extra-axial mass within the left posterior fossa along the inferior tentorium statistically representing a meningioma measuring 2.0 x 1.6 x 1.8 cm with minimal mass effect on the subjacent cerebellum without associated parenchymal edema.  There is also mass effect and possible invasion of the left distal transverse venous sinus.  No other intracranial mass.  The brain parenchyma is normal in appearance for age.  No recent infarct or hemorrhage.  No space-occupying extra-axial fluid collections.  Midline Structures: The midline structures of the brain are normal.  Ventricles: The ventricles, sulci and cisterns are within normal limits.  Vasculature: The vascular flow voids at the base of the brain are within normal limits.  Calvarium: The visualized osseous structures are unremarkable.  Sinuses: The paranasal sinuses are adequately aerated.  Orbits: The orbits and globes  are unremarkable.  Mastoids: The mastoid air cells are adequately aerated.  Extracranial soft tissues: Redemonstrated large right parietal predominant scalp hematoma and small right inferior frontal scalp hematoma.      Impression    1. Avidly enhancing extra-axial dural-based mass within the left posterior fossa along the tentorium most compatible with a meningioma measuring up to 2.0 cm.  Potential involvement of the distal left transverse sinus.  No significant mass effect on the brain parenchyma.  No parenchymal edema or acute process.  Given patient's clinical history, follow-up MRI is recommended to ensure stability of this lesion.  2. Posttraumatic scalp changes including a large hematoma posteriorly.  No gross change from prior head CT.      Electronically signed by: Elliott Adler  Date:    08/14/2023  Time:    12:09   CT Head Without Contrast    Narrative    EXAMINATION:  CT HEAD WITHOUT CONTRAST    CLINICAL HISTORY:  Head trauma, moderate-severe;    TECHNIQUE:  Low dose axial CT images obtained throughout the head without intravenous contrast. Sagittal and coronal reconstructions were performed.    COMPARISON:  CT head 03/16/2021, 08/07/2018.    FINDINGS:  Ventricles and sulci are normal in size for age without evidence of hydrocephalus. No extra-axial blood or fluid collections.  The brain parenchyma is normal.  There is an extra-axial mass abutting the left cerebellar hemisphere measuring approximately 1.6 x 2.0 x 1.9 cm (series 2, image 11).  There is no evidence of significant mass effect or midline shift.  There is no evidence of vasogenic edema.  This was vaguely seen in retrospect on prior CT dated 03/16/2021 though has increased in size.  This was not seen on CT dated 08/07/2018.  No parenchymal hemorrhage, edema or major vascular distribution infarct.    No calvarial fracture.  There is a large right parietal scalp hematoma.  There is a small right frontal scalp hematoma.  Bilateral paranasal sinuses  and mastoid air cells are clear.  There is a remote fracture of the medial wall of the right orbit.      Impression    No intracranial hemorrhage.    Right parietal and right frontal scalp hematomas.    Extra-axial mass abutting the left cerebellar hemisphere, may represent a meningioma or other dural-based lesion.  Further evaluation with nonemergent MRI brain with and without contrast is recommended.      Electronically signed by: Dallas Christopher  Date:    08/13/2023  Time:    19:25     *Note: Due to a large number of results and/or encounters for the requested time period, some results have not been displayed. A complete set of results can be found in Results Review.       CONSULTATION:     A diagnostic psychiatric evaluation was performed and responsiveness to treatment was assessed.  The patient demonstrates adequate ability/capacity to respond to treatment.    Consults

## 2024-05-10 NOTE — PROGRESS NOTES
"[facility] Medicine  Progress Note    Patient Name: Beatriz Gan  MRN: [id number]  Patient Class: IP- Inpatient   Admission Date: 5/9/2024  Length of Stay: 1 days  Attending Physician: Maricarmen Stern MD  Primary Care Provider: Jorge Alonzo MD        Subjective:     Principal Problem:Decompensated liver disease        HPI:  67yo F with a PMHx  HTN, HLD, asthma/COPD, NATHALIE, GERD, and alcohol dependence who presented to the [facility] ED on 5/7/2024 with complaints of jaundice, confusion, and chest pain. Per referring MD: "was seen in the ER on 04/13 and found to have new significantly abnormal liver panel with a total bilirubin of 2.7 AST of 421 ALT of 676 and normal INR 1.1. Ultrasound abdomen CT abdomen was performed and unremarkable. Patient was diagnosed with pneumonia and urinary tract infection and given Augmentin and azithromycin with PCP follow-up. Hepatitis panel positive for HCV only. Her primary care doctor upon follow-up referred her to hepatology but her appointment is not till June. Pt. Went home 4/13 and completed the course of augmentin and azithromycin. She developed worsening jaundice and weakness at home over the last few weeks, however, which prompted her to present again to the ED 5/7.      In the ED, vitals significant for /104. Labs remarkable for K 3.2, tbili 6.3, , AST 1087, and ALT 1158. CXR without any acute cardiopulmonary processes. Acetaminophen level undetectable, but pt. Started on NAC infusion protocol and was admitted to hospital medicine. She completed NAC 5/8 and has been receiving mostly supportive care withsome downtrend in her AST/ALT (now 720/855). Liver Doppler U/S 5/8 showed "Cirrhosis without focal mass. Ascites, splenomegaly or reversal of flow in the portal vein is not seen."    Pt. Reports that she previously drank everyday, but she has not had alcohol in ~2 months. She also used to take tylenol daily, around 7-8 325mg per day for " treatment of headaches (she did not always keep a good count however). She also has not had this medications in ~3 months.        PFC contacted to facilitate transfer for higher LOC and hepatology evaluation. Pt accepted to  at [facility] for further care and management.       Overview/Hospital Course:  Was seen by hepatology, given her low MELD was recommended alcohol abstinence and rehab and also treatment and establish care with Hepatitis C clinic.      Seen by addiction psychiatry and suziered for [facility] IOP program.. Out of caution patricia placed on PRN CIWA with lorazepam. Will also start thiamine and folate.        Interval History:   Resting at bedside comfortably, denies any distress.   Able to tolerate diet.   Feels optimistic about quitting alcohol, will follow at [facility]     Review of Systems   Constitutional:  Negative for activity change, chills, fatigue and fever.   HENT:  Negative for congestion and sore throat.    Respiratory:  Negative for cough and shortness of breath.    Cardiovascular:  Negative for chest pain.   Gastrointestinal:  Negative for abdominal distention, abdominal pain, nausea and vomiting.   Genitourinary:  Negative for difficulty urinating, dysuria and hematuria.   Musculoskeletal:  Positive for arthralgias. Negative for myalgias.   Skin:  Positive for color change. Negative for rash.   Neurological:  Negative for dizziness, tremors, seizures and weakness.   Psychiatric/Behavioral:  Negative for confusion. The patient is not nervous/anxious.      Objective:     Vital Signs (Most Recent):  Temp: 98.2 °F (36.8 °C) (05/10/24 1559)  Pulse: 72 (05/10/24 1559)  Resp: 16 (05/10/24 0348)  BP: 115/73 (05/10/24 1559)  SpO2: 95 % (05/10/24 1559) Vital Signs (24h Range):  Temp:  [97.8 °F (36.6 °C)-98.2 °F (36.8 °C)] 98.2 °F (36.8 °C)  Pulse:  [54-72] 72  Resp:  [16-20] 16  SpO2:  [94 %-98 %] 95 %  BP: (115-168)/(73-98) 115/73     Weight: 72.6 kg (160 lb 0.9 oz)  Body mass index is  26.63 kg/m².     Physical Exam  Vitals reviewed.   Constitutional:       General: She is not in acute distress.     Appearance: She is well-developed.   HENT:      Head: Normocephalic and atraumatic.   Eyes:      General: Scleral icterus present.      Extraocular Movements: Extraocular movements intact.   Neck:      Vascular: No JVD.      Trachea: No tracheal deviation.   Cardiovascular:      Rate and Rhythm: Normal rate and regular rhythm.      Heart sounds: No murmur heard.  Pulmonary:      Effort: No respiratory distress.      Breath sounds: Normal breath sounds.   Abdominal:      General: Bowel sounds are normal. There is no distension.      Palpations: Abdomen is soft.      Tenderness: There is abdominal tenderness.      Comments: Mild TTP in RUQ and RLQ   Musculoskeletal:      Cervical back: Neck supple.   Skin:     General: Skin is warm and dry.      Coloration: Skin is jaundiced.      Findings: No rash.   Neurological:      Mental Status: She is alert and oriented to person, place, and time.   Psychiatric:         Behavior: Behavior normal.              Significant Labs: All pertinent labs within the past 24 hours have been reviewed.    Significant Imaging: I have reviewed all pertinent imaging results/findings within the past 24 hours.    Assessment/Plan:      * Decompensated liver disease  MELD 3.0: 18 at 5/10/2024  5:52 AM  MELD-Na: 15 at 5/10/2024  5:52 AM  Calculated from:  Serum Creatinine: 0.9 mg/dL (Using min of 1 mg/dL) at 5/10/2024  5:51 AM  Serum Sodium: 140 mmol/L (Using max of 137 mmol/L) at 5/10/2024  5:51 AM  Total Bilirubin: 5.6 mg/dL at 5/10/2024  5:51 AM  Serum Albumin: 2.8 g/dL at 5/10/2024  5:51 AM  INR(ratio): 1.2 at 5/10/2024  5:52 AM  Age at listing (hypothetical): 68 years  Sex: Female at 5/10/2024  5:52 AM    -Presented with jaundice, elevated LFTs, evidence of cirrhosis on imaging  -Multifactorial with Hep C/Alcohol use. New diagnosis and no prior informations   - No known substance  use, likely exposure with blood transfusion and surgeries in 1990s  -LFTs in 1000s, trending down since admission   - Appreciate hepatology recommendations, Will arrange for outpatient follow up in 2 weeks  - Appreciate addition psych evaluation. Patient interested in [facility] program.        Moderate persistent asthma without complication  Follows with pulmonology   No acute flare   Continue home inhaler       Alcohol abuse  Reported extensive alcohol use   Interested in rehab and will follow with [facility] program, appreciate addiction psych recommendations   Will start thiamine and folate   Monitor on CIWA       Hepatitis C virus infection without hepatic coma  -Hep antibody and RNA positive 4/13/2024.  - follow up outpatient in Hep C clinic in 2 weeks.     Essential hypertension  -BP WNL, hold home amlodpine for now    Major depressive disorder, recurrent episode, moderate  Patient has persistent depression which is mild and is currently controlled. Will Continue anti-depressant medications. We will not consult psychiatry at this time. Patient does not display psychosis at this time. Continue to monitor closely and adjust plan of care as needed.    Continue home prozac      DDD (degenerative disc disease), lumbar  H/o surgeries, continues home neurontin         VTE Risk Mitigation (From admission, onward)           Ordered     IP VTE HIGH RISK PATIENT  Once         05/09/24 2023     Place sequential compression device  Until discontinued         05/09/24 2023                    Discharge Planning   ORA: 5/12/2024     Code Status: Full Code   Is the patient medically ready for discharge?:     Reason for patient still in hospital (select all that apply): Patient trending condition, Laboratory test, Treatment, and Consult recommendations                     Maricarmen Stern MD  Department of Hospital Medicine   [facility] Surg

## 2024-05-10 NOTE — HPI
"67yo F with a PMHx  HTN, HLD, asthma/COPD, NATHALIE, GERD, and alcohol dependence who presented to the [facility] ED on 5/7/2024 with complaints of jaundice, confusion, and chest pain. Per referring MD: "was seen in the ER on 04/13 and found to have new significantly abnormal liver panel with a total bilirubin of 2.7 AST of 421 ALT of 676 and normal INR 1.1. Ultrasound abdomen CT abdomen was performed and unremarkable. Patient was diagnosed with pneumonia and urinary tract infection and given Augmentin and azithromycin with PCP follow-up. Hepatitis panel positive for HCV only. Her primary care doctor upon follow-up referred her to hepatology but her appointment is not till June. Pt. Went home 4/13 and completed the course of augmentin and azithromycin. She developed worsening jaundice and weakness at home over the last few weeks, however, which prompted her to present again to the ED 5/7.      In the ED, vitals significant for /104. Labs remarkable for K 3.2, tbili 6.3, , AST 1087, and ALT 1158. CXR without any acute cardiopulmonary processes. Acetaminophen level undetectable, but pt. Started on NAC infusion protocol and was admitted to hospital medicine. She completed NAC 5/8 and has been receiving mostly supportive care withsome downtrend in her AST/ALT (now 720/855). Liver Doppler U/S 5/8 showed "Cirrhosis without focal mass. Ascites, splenomegaly or reversal of flow in the portal vein is not seen."    Pt. Reports that she previously drank everyday, but she has not had alcohol in ~2 months. She also used to take tylenol daily, around 7-8 325mg per day for treatment of headaches (she did not always keep a good count however). She also has not had this medications in ~3 months.        PFC contacted to facilitate transfer for higher LOC and hepatology evaluation. Pt accepted to  at [facility] for further care and management.     "

## 2024-05-10 NOTE — DISCHARGE SUMMARY
[facility] Medicine  Discharge Summary      Patient Name: Beatriz Gan  MRN: [id number]  CAMELIA: [id number]  Patient Class: OP- Observation  Admission Date: 5/7/2024  Hospital Length of Stay: 0 days  Discharge Date and Time: 5/9/2024  7:07 PM  Attending Physician: No att. providers found   Discharging Provider: Elliott Nance MD  Primary Care Provider: Jorge Alonzo MD    Primary Care Team: Networked reference to record PCT     HPI:   Beatriz Gan is a 68 year old female with a past medical history of EtOH use, HTN, HLD, COPD, and MDD/NAWAF who presented with acute liver failure with HCV infection, EtOH abuse, and overuse of acetaminophen. Hospital Medicine consulting while the patient awaits [place] [facility] [place]. Workup has been started while the patient awaits transfer; ultrasound shows a liver cirrhosis. She is being monitored for EtOH withdrawal. Acetylcysteine has been started.    * No surgery found *      Hospital Course:   Beatriz Gan is a 68 year old female with a past medical history of EtOH use, HTN, HLD, COPD, and MDD/NAWAF who presented with acute liver failure with HCV infection, EtOH abuse, and overuse of acetaminophen. Hospital Medicine consulting while the patient awaits transfer [place] ED [place] [facility] [place]. Workup has been started while the patient awaits transfer; ultrasound shows a possible liver cirrhosis. She is being monitored for EtOH withdrawal. Acetylcysteine was started, yet it was discontinued given shortness of breath, nausea and vomiting. Her LFTs are slowly improving. She was [place] [facility] [place].     Goals of Care Treatment Preferences:  Code Status: Full Code      Consults:     No new Assessment & Plan notes have been filed under this hospital service since the last note was generated.  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] 05/08/2024 Yes    Major depressive disorder, recurrent episode, moderate [F33.1] 08/21/2015 Yes    COPD (chronic obstructive pulmonary disease) [J44.9] 01/13/2014 Yes      Problems Resolved During this Admission:    Diagnosis Date Noted Date Resolved POA    Hypokalemia [E87.6] 01/13/2014 05/09/2024 Yes       Discharged Condition: stable    Disposition: Another Health Care Inst*    Follow Up:    Patient Instructions:      Ambulatory referral/consult to Outpatient Case Management   Referral Priority: Routine Referral Type: Consultation   Referral Reason: Specialty Services Required   Number of Visits Requested: 1       Significant Diagnostic Studies: Labs: All labs within the past 24 hours have been reviewed    Pending Diagnostic Studies:       Procedure Component Value Units Date/Time    Antimitochondrial Antibody [[id number]] Collected: 05/08/24 0936    Order Status: Sent Lab Status: In process Updated: 05/08/24 0950    Specimen: Blood     Hemochromatosis DNA Analysis (PCR) [[id number]] Collected: 05/08/24 0936    Order Status: Sent Lab Status: In process Updated: 05/08/24 0950    Specimen: Blood            Medications:  Reconciled Home Medications:      Medication List        ASK your doctor about these medications      albuterol 90 mcg/actuation inhaler  Commonly known as: VENTOLIN HFA  Inhale 2 puffs into the lungs every 4 (four) hours as needed for Wheezing or Shortness of Breath. Rescue     ALPRAZolam 1 MG tablet  Commonly known as: XANAX  TAKE 1 TABLET(1 MG) BY MOUTH TWICE DAILY AS NEEDED FOR ANXIETY     amLODIPine 5 MG tablet  Commonly known as: NORVASC  Take 1 tablet (5 mg total) by mouth once daily.     AREXVY (PF) 120 mcg/0.5 mL Susr vaccine  Generic drug: RSVPreF3 antigen-AS01E (PF)  Inject 0.5 mLs into the  muscle.     aspirin 81 MG EC tablet  Commonly known as: ECOTRIN  Take 1 tablet (81 mg total) by mouth once daily.     azelastine 137 mcg (0.1 %) nasal spray  Commonly known as: ASTELIN  Use 1 spray(s) in each nostril twice daily     * benralizumab 30 mg/mL Atin  Inject 30 mg into the skin every 8 weeks.     * benralizumab 30 mg/mL Atin  Inject 30 mg into the skin every 28 days.     budesonide-formoterol 160-4.5 mcg 160-4.5 mcg/actuation Hfaa  Commonly known as: SYMBICORT  Inhale 2 puffs into the lungs every 12 (twelve) hours. Controller  Ask about: Which instructions should I use?     celecoxib 200 MG capsule  Commonly known as: CeleBREX  TAKE ONE CAPSULE BY MOUTH DAILY AT 9 AM     COMIRNATY 2023-24 (12Y UP)(PF) 30 mcg/0.3 mL inection  Generic drug: COVID vac23-24(12up)(raxt)(PF)  Inject 0.3 mLs into the muscle.     FLUoxetine 40 MG capsule  Take 1 capsule (40 mg total) by mouth 2 (two) times daily.     fluticasone propionate 50 mcg/actuation nasal spray  Commonly known as: FLONASE  1 spray by Nasal route daily as needed.     gabapentin 600 MG tablet  Commonly known as: NEURONTIN  Take 1 tablet (600 mg total) by mouth 3 (three) times daily.     magnesium 30 mg Tab  Take by mouth once.     multivit-iron-min-folic acid 3,500-18-0.4 unit-mg-mg Chew  Take by mouth once daily.     ondansetron 4 MG tablet  Commonly known as: ZOFRAN  Take 1 tablet (4 mg total) by mouth as needed for Nausea.     ondansetron 4 MG Tbdl  Commonly known as: ZOFRAN-ODT  Take 1 tablet (4 mg total) by mouth every 6 (six) hours as needed (NAUSEA OR VOMITING).     polyethylene glycol 17 gram/dose powder  Commonly known as: GLYCOLAX  Take 17 g by mouth once daily.     * predniSONE 20 MG tablet  Commonly known as: DELTASONE  3 pills for 3 days, 2 for 3 days, 1 for 3 days, repeat for cough     * predniSONE 10 MG tablet  Commonly known as: DELTASONE  Take one pill a day for three days, repeat for shortness of breath     UBRELVY 100 mg tablet  Generic  drug: ubrogepant  Take 100 mg by mouth as needed. Max 1 in 24 hours     VITAMIN D3 ORAL  Take 1 capsule by mouth once daily.     zolpidem 10 mg Tab  Commonly known as: AMBIEN  TAKE 1 TABLET(10 MG) BY MOUTH EVERY NIGHT AS NEEDED           * This list has 4 medication(s) that are the same as other medications prescribed for you. Read the directions carefully, and ask your doctor or other care provider to review them with you.                  Indwelling Lines/Drains at time of discharge:   Lines/Drains/Airways       None                   Time spent on the discharge of patient: 36 minutes         Elliott Nance MD  Department of Hospital Medicine  [facility]/Surg

## 2024-05-10 NOTE — HPI
Ms. Gan is a 69 y/o F with Hx HTN, 50 pack year, suspected COPD, GERD , alcohol depedence who presented to OSH initially w/ jaundice, confusion, chest pain on 5/7. Initially went in to ED on 4/13 for pneumonia, found to have t.bili 2.7, ,  Hep C+. CT Abd without liver abnormality on read. She was sent home with Hepatology f/u but developed worsening weakness so she went back to the ED. This time LFTs up to the 1000s, T.Bili 3.2,  repeat U/S liver w/ cirrhosis. Patient transferred to [facility] for hepatology eval.     Tylenol Use: ~60 capsules a month  Supplements: Melatonin  Alcohol: 1L vodka per week over the past 2 years, drinking heavily since a young age. Quit last month  Social History: Worked as a  most of her life. Had multiple surgeries as in her 30-40s, required blood transfusions during a hysterectomy, denies IVDU. Good social support at home, has 7 children/grandchildren,  passed from cirrhosis, one daughter passed from IVDU. Lives in [place].

## 2024-05-10 NOTE — ASSESSMENT & PLAN NOTE
-Hep antibody and RNA positive 4/13/2024. New diagnosis but Unclear how long patient has been infected.  -will need referral for treatment at time of discharge

## 2024-05-10 NOTE — ASSESSMENT & PLAN NOTE
68 year old with extensive alcohol history as above who presented initially for confusion and juandice. Found to have acute LFT elevation to the 100s, T.Bili 3.2, . U/S liver consistent with cirrhosis. She is also Hep C+ with quant > 27 million. Her acute hepatitis likely in the setting of alcohol use with concurrent Hep C infection.     Recommendations:  Addiction psychiatry consult   Follow-up [facility]  Nutrition Consult to discuss low sodium high protein diet  Follow-up in Hep C clinic in 2 weeks

## 2024-05-10 NOTE — ASSESSMENT & PLAN NOTE
Risk factors for Hep C include prior blood transfusions, denies IVDU. Follow-up in 2 weeks with hepatitis clinic.

## 2024-05-10 NOTE — MEDICAL/APP STUDENT
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 CT.      Electronically signed by: Elliott Adler  Date:    08/14/2023  Time:    12:09   CT Head Without Contrast    Narrative    EXAMINATION:  CT HEAD WITHOUT CONTRAST    CLINICAL HISTORY:  Head trauma, moderate-severe;    TECHNIQUE:  Low dose axial CT images obtained throughout the head without intravenous contrast. Sagittal and coronal reconstructions were performed.    COMPARISON:  CT head 03/16/2021, 08/07/2018.    FINDINGS:  Ventricles and sulci are normal in size for age without evidence of hydrocephalus. No extra-axial blood or fluid collections.  The brain parenchyma is normal.  There is an extra-axial mass abutting the left cerebellar hemisphere measuring approximately 1.6 x 2.0 x 1.9 cm (series 2, image 11).  There is no evidence of significant mass effect or midline shift.  There is no evidence of vasogenic edema.  This was vaguely seen in retrospect on prior CT dated 03/16/2021 though has increased in size.  This was not seen on CT dated 08/07/2018.  No parenchymal hemorrhage, edema or major vascular distribution infarct.    No calvarial fracture.  There is a large right parietal scalp hematoma.  There is a small right frontal scalp hematoma.  Bilateral paranasal sinuses and  mastoid air cells are clear.  There is a remote fracture of the medial wall of the right orbit.      Impression    No intracranial hemorrhage.    Right parietal and right frontal scalp hematomas.    Extra-axial mass abutting the left cerebellar hemisphere, may represent a meningioma or other dural-based lesion.  Further evaluation with nonemergent MRI brain with and without contrast is recommended.      Electronically signed by: Dallas Christopher  Date:    08/13/2023  Time:    19:25     *Note: Due to a large number of results and/or encounters for the requested time period, some results have not been displayed. A complete set of results can be found in Results Review.       CONSULTATION:     A diagnostic psychiatric evaluation was performed and responsiveness to treatment was assessed.  {XX-ResponseTX:[id number]}    Consults    {XX-Courtesy:[id number]}

## 2024-05-10 NOTE — SUBJECTIVE & OBJECTIVE
Review of Systems   Constitutional:  Negative for chills and fatigue.   HENT:  Negative for sore throat.    Respiratory:  Negative for shortness of breath.    Cardiovascular:  Negative for chest pain.   Gastrointestinal:  Negative for abdominal pain, diarrhea, nausea and vomiting.   Genitourinary:  Negative for dysuria and flank pain.   Musculoskeletal:  Negative for arthralgias and joint swelling.   Skin:  Positive for color change.   Neurological:  Negative for dizziness and headaches.   Psychiatric/Behavioral:  Negative for agitation and confusion.        Past Medical History:   Diagnosis Date    Alcohol dependence     DDD (degenerative disc disease), lumbosacral     DJD (degenerative joint disease), lumbosacral     Encounter for blood transfusion     GERD (gastroesophageal reflux disease)     Gout     Hypercholesterolemia     Hypertension     NATHALIE (iron deficiency anemia)     questionable white coat hypertension    Kidney carcinoma, left 2014    Pneumonia     Renal cell carcinoma     Shingles 10/13/2012       Past Surgical History:   Procedure Laterality Date    ANTERIOR CERVICAL DISCECTOMY W/ FUSION N/A 5/4/2023    Procedure: DISCECTOMY, SPINE, CERVICAL, ANTERIOR APPROACH, WITH FUSION C3-4;  Surgeon: Ruben Martinez MD;  Location: [facility] OR;  Service: Orthopedics;  Laterality: N/A;    APPENDECTOMY      ARTHROSCOPY OF SHOULDER WITH DECOMPRESSION OF SUBACROMIAL SPACE Left 10/31/2019    Procedure: ARTHROSCOPY, SHOULDER, WITH SUBACROMIAL SPACE DECOMPRESSION;  Surgeon: Ruben Martinez MD;  Location: [facility] OR;  Service: Orthopedics;  Laterality: Left;    BLADDER REPAIR      x 2    COLONOSCOPY  9/2011    DISTAL CLAVICLE EXCISION Left 10/31/2019    Procedure: EXCISION, CLAVICLE, DISTAL;  Surgeon: Ruben Martinez MD;  Location: [facility] OR;  Service: Orthopedics;  Laterality: Left;    EPIDURAL STEROID INJECTION INTO LUMBAR SPINE N/A 12/7/2020    Procedure: Injection-steroid-epidural-lumbar;  Surgeon: Dk Rosales MD;   Location: [facility] OR;  Service: Pain Management;  Laterality: N/A;  L4-L5    EPIDURAL STEROID INJECTION INTO LUMBAR SPINE N/A 5/17/2021    Procedure: Injection-steroid-epidural-lumbar;  Surgeon: Dk Rosales MD;  Location: [facility] OR;  Service: Pain Management;  Laterality: N/A;  L4-L5    ESOPHAGOGASTRODUODENOSCOPY  9/2011    EYE SURGERY      lasix bilateral    FIXATION KYPHOPLASTY N/A 1/31/2020    Procedure: Kyphoplasty T12;  Surgeon: Dk Rosales MD;  Location: [facility] OR;  Service: Pain Management;  Laterality: N/A;    HERNIA REPAIR      hiatal hernai    HYSTERECTOMY      INJECTION OF ANESTHETIC AGENT AROUND NERVE Left 6/8/2020    Procedure: Block, Nerve;  Surgeon: Dk Rosales MD;  Location: [facility] OR;  Service: Pain Management;  Laterality: Left;  left genicular nerve block     KNEE ARTHROPLASTY Left 7/30/2020    Procedure: ARTHROPLASTY, KNEE LEFT;  Surgeon: Ruben Martinez MD;  Location: [facility] OR;  Service: Orthopedics;  Laterality: Left;  REP VALERY RUBY    KNEE ARTHROSCOPY W/ MENISCECTOMY Left 1/16/2020    Procedure: ARTHROSCOPY, KNEE, WITH MEDIAL MENISCECTOMY;  Surgeon: Ruben Martinez MD;  Location: [facility] OR;  Service: Orthopedics;  Laterality: Left;    LAPAROSCOPIC NISSEN FUNDOPLICATION      NEPHRECTOMY      LT partial    RADIOFREQUENCY ABLATION Left 6/19/2020    Procedure: Radiofrequency Ablation;  Surgeon: Dk Rosales MD;  Location: [facility] OR;  Service: Pain Management;  Laterality: Left;    RADIOFREQUENCY ABLATION OF LUMBAR MEDIAL BRANCH NERVE AT SINGLE LEVEL Bilateral 2/28/2020    Procedure: Radiofrequency Ablation, Nerve, Spinal, Lumbar, Medial Branch, 1 Level;  Surgeon: Dk Rosales MD;  Location: [facility] OR;  Service: Pain Management;  Laterality: Bilateral;  L3,L4,L5 - Burned at 80 degrees C. for 60 seconds x 2 each site    RADIOFREQUENCY ABLATION OF LUMBAR MEDIAL BRANCH NERVE AT SINGLE LEVEL Bilateral 10/19/2020    Procedure: Radiofrequency Ablation, Nerve, Spinal, Lumbar, Medial Branch, 1  Level;  Surgeon: Dk Rosales MD;  Location: [facility] OR;  Service: Pain Management;  Laterality: Bilateral;  L3, L4, L5    REFRACTIVE SURGERY      ROTATOR CUFF REPAIR Left 10/31/2019    Procedure: REPAIR, ROTATOR CUFF;  Surgeon: Ruben Martinez MD;  Location: [facility] OR;  Service: Orthopedics;  Laterality: Left;  arthrex    SKIN BIOPSY      TOTAL ABDOMINAL HYSTERECTOMY W/ BILATERAL SALPINGOOPHORECTOMY  age 48    TRANSFORAMINAL EPIDURAL INJECTION OF STEROID Bilateral 2021    Procedure: Injection,steroid,epidural,transforaminal approach;  Surgeon: Dk Rosales MD;  Location: [facility] OR;  Service: Pain Management;  Laterality: Bilateral;  L5-S1     TRANSFORAMINAL EPIDURAL INJECTION OF STEROID Bilateral 3/28/2022    Procedure: INJECTION, STEROID, EPIDURAL, TRANSFORAMINAL APPROACH Lumbar L5-S1;  Surgeon: Dk Rosales MD;  Location: [facility] OR;  Service: Pain Management;  Laterality: Bilateral;       Family history of liver disease: Yes    Review of patient's allergies indicates:   Allergen Reactions    Phenergan [promethazine] Hives and Rash    Latex, natural rubber Hives     Per pt report-many years    Morphine Hives         Tobacco Use    Smoking status: Former     Current packs/day: 0.00     Average packs/day: 1 pack/day for 40.0 years (40.0 ttl pk-yrs)     Types: Cigarettes     Start date: 1978     Quit date: 2018     Years since quittin.3    Smokeless tobacco: Never    Tobacco comments:     smokes 2-3 cigarettes a night   Substance and Sexual Activity    Alcohol use: Yes     Alcohol/week: 12.0 standard drinks of alcohol     Types: 6 Cans of beer, 6 Standard drinks or equivalent per week     Comment: 2-3 a day/ social    Drug use: No    Sexual activity: Yes     Partners: Male       Medications Prior to Admission   Medication Sig Dispense Refill Last Dose    albuterol (VENTOLIN HFA) 90 mcg/actuation inhaler Inhale 2 puffs into the lungs every 4 (four) hours as needed for Wheezing or Shortness of  Breath. Rescue 18 g 11     ALPRAZolam (XANAX) 1 MG tablet TAKE 1 TABLET(1 MG) BY MOUTH TWICE DAILY AS NEEDED FOR ANXIETY 60 tablet 2     budesonide-formoterol 160-4.5 mcg (SYMBICORT) 160-4.5 mcg/actuation HFAA Inhale 2 puffs into the lungs every 12 (twelve) hours. Controller 30.6 g 3     celecoxib (CELEBREX) 200 MG capsule TAKE ONE CAPSULE BY MOUTH DAILY AT 9 AM 30 capsule 1     FLUoxetine 40 MG capsule Take 1 capsule (40 mg total) by mouth 2 (two) times daily. 180 capsule 3     gabapentin (NEURONTIN) 600 MG tablet Take 1 tablet (600 mg total) by mouth 3 (three) times daily. 90 tablet 2     UBRELVY 100 mg tablet Take 100 mg by mouth as needed. Max 1 in 24 hours       zolpidem (AMBIEN) 10 mg Tab TAKE 1 TABLET(10 MG) BY MOUTH EVERY NIGHT AS NEEDED 30 tablet 2     aspirin (ECOTRIN) 81 MG EC tablet Take 1 tablet (81 mg total) by mouth once daily. 30 tablet 0     azelastine (ASTELIN) 137 mcg (0.1 %) nasal spray Use 1 spray(s) in each nostril twice daily 30 mL 3     benralizumab 30 mg/mL AtIn Inject 30 mg into the skin every 8 weeks. 1 each 5     benralizumab 30 mg/mL AtIn Inject 30 mg into the skin every 28 days. 1 mL 2     cholecalciferol, vitamin D3, (VITAMIN D3 ORAL) Take 1 capsule by mouth once daily.       fluticasone (FLONASE) 50 mcg/actuation nasal spray 1 spray by Nasal route daily as needed.        magnesium 30 mg Tab Take by mouth once.       MULTIVIT-IRON-MIN-FOLIC ACID 3,500-18-0.4 UNIT-MG-MG ORAL CHEW Take by mouth once daily.       polyethylene glycol (GLYCOLAX) 17 gram/dose powder Take 17 g by mouth once daily. 510 g 3     predniSONE (DELTASONE) 10 MG tablet Take one pill a day for three days, repeat for shortness of breath 12 tablet 0        Objective:     Vital Signs (Most Recent):  Temp: 97.8 °F (36.6 °C) (05/10/24 0711)  Pulse: (!) 56 (05/10/24 0711)  Resp: 16 (05/10/24 0348)  BP: 126/83 (05/10/24 0711)  SpO2: 97 % (05/10/24 0711) Vital Signs (24h Range):  Temp:  [97.5 °F (36.4 °C)-98.4 °F (36.9 °C)]  97.8 °F (36.6 °C)  Pulse:  [54-80] 56  Resp:  [12-38] 16  SpO2:  [94 %-99 %] 97 %  BP: (112-160)/(62-96) 126/83     Weight: 72.6 kg (160 lb 0.9 oz) (05/09/24 2150)  Body mass index is 26.63 kg/m².       Physical Exam  Constitutional:       Appearance: Normal appearance. She is not ill-appearing.   HENT:      Head: Normocephalic and atraumatic.      Nose: Nose normal.      Mouth/Throat:      Mouth: Mucous membranes are moist.   Eyes:      General: Scleral icterus present.      Extraocular Movements: Extraocular movements intact.      Pupils: Pupils are equal, round, and reactive to light.   Cardiovascular:      Rate and Rhythm: Normal rate and regular rhythm.      Pulses: Normal pulses.      Heart sounds: Normal heart sounds.   Pulmonary:      Effort: Pulmonary effort is normal. No respiratory distress.      Breath sounds: Normal breath sounds.   Abdominal:      General: Abdomen is flat. There is no distension.      Tenderness: There is no abdominal tenderness.   Musculoskeletal:         General: Normal range of motion.      Cervical back: Normal range of motion.   Skin:     General: Skin is warm and dry.      Coloration: Skin is jaundiced.   Neurological:      General: No focal deficit present.      Mental Status: She is alert and oriented to person, place, and time.   Psychiatric:         Mood and Affect: Mood normal.         Behavior: Behavior normal.            MELD 3.0: 18 at 5/10/2024  5:52 AM  MELD-Na: 15 at 5/10/2024  5:52 AM  Calculated from:  Serum Creatinine: 0.9 mg/dL (Using min of 1 mg/dL) at 5/10/2024  5:51 AM  Serum Sodium: 140 mmol/L (Using max of 137 mmol/L) at 5/10/2024  5:51 AM  Total Bilirubin: 5.6 mg/dL at 5/10/2024  5:51 AM  Serum Albumin: 2.8 g/dL at 5/10/2024  5:51 AM  INR(ratio): 1.2 at 5/10/2024  5:52 AM  Age at listing (hypothetical): 68 years  Sex: Female at 5/10/2024  5:52 AM      Significant Labs:  CBC:   Recent Labs   Lab 05/10/24  0569   WBC 3.82*   RBC 4.08   HGB 12.9   HCT 39.9   PLT  190     CMP:   Recent Labs   Lab 05/10/24  0551   GLU 88   CALCIUM 9.7   ALBUMIN 2.8*   PROT 6.1      K 4.4   CO2 29      BUN 10   CREATININE 0.9   ALKPHOS 407*   *   *   BILITOT 5.6*     Coagulation:   Recent Labs   Lab 05/10/24  0552   INR 1.2       Significant Imaging:  Labs: Reviewed  US: Reviewed  CT: Reviewed

## 2024-05-10 NOTE — SUBJECTIVE & OBJECTIVE
Past Medical History:   Diagnosis Date    Alcohol dependence     DDD (degenerative disc disease), lumbosacral     DJD (degenerative joint disease), lumbosacral     Encounter for blood transfusion     GERD (gastroesophageal reflux disease)     Gout     Hypercholesterolemia     Hypertension     NATHALIE (iron deficiency anemia)     questionable white coat hypertension    Kidney carcinoma, left 2014    Pneumonia     Renal cell carcinoma     Shingles 10/13/2012       Past Surgical History:   Procedure Laterality Date    ANTERIOR CERVICAL DISCECTOMY W/ FUSION N/A 5/4/2023    Procedure: DISCECTOMY, SPINE, CERVICAL, ANTERIOR APPROACH, WITH FUSION C3-4;  Surgeon: Ruben Martinez MD;  Location: [facility] OR;  Service: Orthopedics;  Laterality: N/A;    APPENDECTOMY      ARTHROSCOPY OF SHOULDER WITH DECOMPRESSION OF SUBACROMIAL SPACE Left 10/31/2019    Procedure: ARTHROSCOPY, SHOULDER, WITH SUBACROMIAL SPACE DECOMPRESSION;  Surgeon: Ruben Martinez MD;  Location: [facility] OR;  Service: Orthopedics;  Laterality: Left;    BLADDER REPAIR      x 2    COLONOSCOPY  9/2011    DISTAL CLAVICLE EXCISION Left 10/31/2019    Procedure: EXCISION, CLAVICLE, DISTAL;  Surgeon: Ruben Martinez MD;  Location: [facility] OR;  Service: Orthopedics;  Laterality: Left;    EPIDURAL STEROID INJECTION INTO LUMBAR SPINE N/A 12/7/2020    Procedure: Injection-steroid-epidural-lumbar;  Surgeon: Dk Rosales MD;  Location: [facility] OR;  Service: Pain Management;  Laterality: N/A;  L4-L5    EPIDURAL STEROID INJECTION INTO LUMBAR SPINE N/A 5/17/2021    Procedure: Injection-steroid-epidural-lumbar;  Surgeon: Dk Rosales MD;  Location: [facility] OR;  Service: Pain Management;  Laterality: N/A;  L4-L5    ESOPHAGOGASTRODUODENOSCOPY  9/2011    EYE SURGERY      lasix bilateral    FIXATION KYPHOPLASTY N/A 1/31/2020    Procedure: Kyphoplasty T12;  Surgeon: Dk Rosales MD;  Location: [facility] OR;  Service: Pain Management;  Laterality: N/A;    HERNIA REPAIR      hiatal hernai     HYSTERECTOMY      INJECTION OF ANESTHETIC AGENT AROUND NERVE Left 6/8/2020    Procedure: Block, Nerve;  Surgeon: Dk Rosales MD;  Location: [facility] OR;  Service: Pain Management;  Laterality: Left;  left genicular nerve block     KNEE ARTHROPLASTY Left 7/30/2020    Procedure: ARTHROPLASTY, KNEE LEFT;  Surgeon: Ruben Martinez MD;  Location: [facility] OR;  Service: Orthopedics;  Laterality: Left;  REP VALERY RUBY    KNEE ARTHROSCOPY W/ MENISCECTOMY Left 1/16/2020    Procedure: ARTHROSCOPY, KNEE, WITH MEDIAL MENISCECTOMY;  Surgeon: Ruben Martinez MD;  Location: [facility] OR;  Service: Orthopedics;  Laterality: Left;    LAPAROSCOPIC NISSEN FUNDOPLICATION      NEPHRECTOMY      LT partial    RADIOFREQUENCY ABLATION Left 6/19/2020    Procedure: Radiofrequency Ablation;  Surgeon: Dk Rosales MD;  Location: [facility] OR;  Service: Pain Management;  Laterality: Left;    RADIOFREQUENCY ABLATION OF LUMBAR MEDIAL BRANCH NERVE AT SINGLE LEVEL Bilateral 2/28/2020    Procedure: Radiofrequency Ablation, Nerve, Spinal, Lumbar, Medial Branch, 1 Level;  Surgeon: Dk Rosales MD;  Location: [facility] OR;  Service: Pain Management;  Laterality: Bilateral;  L3,L4,L5 - Burned at 80 degrees C. for 60 seconds x 2 each site    RADIOFREQUENCY ABLATION OF LUMBAR MEDIAL BRANCH NERVE AT SINGLE LEVEL Bilateral 10/19/2020    Procedure: Radiofrequency Ablation, Nerve, Spinal, Lumbar, Medial Branch, 1 Level;  Surgeon: Dk Rosales MD;  Location: [facility] OR;  Service: Pain Management;  Laterality: Bilateral;  L3, L4, L5    REFRACTIVE SURGERY      ROTATOR CUFF REPAIR Left 10/31/2019    Procedure: REPAIR, ROTATOR CUFF;  Surgeon: Ruben Martinez MD;  Location: [facility] OR;  Service: Orthopedics;  Laterality: Left;  arthrex    SKIN BIOPSY      TOTAL ABDOMINAL HYSTERECTOMY W/ BILATERAL SALPINGOOPHORECTOMY  age 48    TRANSFORAMINAL EPIDURAL INJECTION OF STEROID Bilateral 7/30/2021    Procedure: Injection,steroid,epidural,transforaminal approach;  Surgeon: Dk MAC  MD Connie;  Location: [facility] OR;  Service: Pain Management;  Laterality: Bilateral;  L5-S1     TRANSFORAMINAL EPIDURAL INJECTION OF STEROID Bilateral 3/28/2022    Procedure: INJECTION, STEROID, EPIDURAL, TRANSFORAMINAL APPROACH Lumbar L5-S1;  Surgeon: Dk Rosales MD;  Location: [facility] OR;  Service: Pain Management;  Laterality: Bilateral;       Review of patient's allergies indicates:   Allergen Reactions    Phenergan [promethazine] Hives and Rash    Latex, natural rubber Hives     Per pt report-many years    Morphine Hives       Current Facility-Administered Medications on File Prior to Encounter   Medication    electrolyte-S (ISOLYTE)    lactated ringers infusion    [COMPLETED] melatonin tablet 6 mg    pregabalin capsule 75 mg    [DISCONTINUED] acetylcysteine 20% (200 mg/ml) (ACETADOTE) 12,000 mg in dextrose 5 % (D5W) 500 mL infusion    [DISCONTINUED] acetylcysteine 20% (200 mg/ml) (ACETADOTE) 7,200 mg in dextrose 5 % (D5W) 1,000 mL infusion    [DISCONTINUED] arformoteroL nebulizer solution 15 mcg    [DISCONTINUED] budesonide nebulizer solution 0.5 mg    [DISCONTINUED] hydrOXYzine HCL tablet 25 mg    [DISCONTINUED] ibuprofen tablet 400 mg    [DISCONTINUED] lactulose 20 gram/30 mL solution Soln 15 g    [DISCONTINUED] magnesium oxide tablet 800 mg    [DISCONTINUED] magnesium oxide tablet 800 mg    [DISCONTINUED] ondansetron injection 4 mg    [DISCONTINUED] pantoprazole EC tablet 40 mg    [DISCONTINUED] potassium bicarbonate disintegrating tablet 35 mEq    [DISCONTINUED] potassium bicarbonate disintegrating tablet 50 mEq    [DISCONTINUED] potassium bicarbonate disintegrating tablet 60 mEq    [DISCONTINUED] simethicone chewable tablet 80 mg     Current Outpatient Medications on File Prior to Encounter   Medication Sig    albuterol (VENTOLIN HFA) 90 mcg/actuation inhaler Inhale 2 puffs into the lungs every 4 (four) hours as needed for Wheezing or Shortness of Breath. Rescue    ALPRAZolam (XANAX) 1 MG tablet TAKE 1  TABLET(1 MG) BY MOUTH TWICE DAILY AS NEEDED FOR ANXIETY    amLODIPine (NORVASC) 5 MG tablet Take 1 tablet (5 mg total) by mouth once daily.    AREXVY, PF, 120 mcg/0.5 mL SusR vaccine Inject 0.5 mLs into the muscle.    aspirin (ECOTRIN) 81 MG EC tablet Take 1 tablet (81 mg total) by mouth once daily.    azelastine (ASTELIN) 137 mcg (0.1 %) nasal spray Use 1 spray(s) in each nostril twice daily    benralizumab 30 mg/mL AtIn Inject 30 mg into the skin every 8 weeks.    benralizumab 30 mg/mL AtIn Inject 30 mg into the skin every 28 days.    celecoxib (CELEBREX) 200 MG capsule TAKE ONE CAPSULE BY MOUTH DAILY AT 9 AM    cholecalciferol, vitamin D3, (VITAMIN D3 ORAL) Take 1 capsule by mouth once daily.    COMIRNATY 2023-24, 12Y UP,,PF, 30 mcg/0.3 mL inection Inject 0.3 mLs into the muscle.    FLUoxetine 40 MG capsule Take 1 capsule (40 mg total) by mouth 2 (two) times daily.    fluticasone (FLONASE) 50 mcg/actuation nasal spray 1 spray by Nasal route daily as needed.     FLUZONE HIGHDOSE QUAD 23-24  mcg/0.7 mL Syrg     gabapentin (NEURONTIN) 600 MG tablet Take 1 tablet (600 mg total) by mouth 3 (three) times daily.    magnesium 30 mg Tab Take by mouth once.    MULTIVIT-IRON-MIN-FOLIC ACID 3,500-18-0.4 UNIT-MG-MG ORAL CHEW Take by mouth once daily.    ondansetron (ZOFRAN) 4 MG tablet Take 1 tablet (4 mg total) by mouth as needed for Nausea.    ondansetron (ZOFRAN-ODT) 4 MG TbDL Take 1 tablet (4 mg total) by mouth every 6 (six) hours as needed (NAUSEA OR VOMITING).    polyethylene glycol (GLYCOLAX) 17 gram/dose powder Take 17 g by mouth once daily.    predniSONE (DELTASONE) 10 MG tablet Take one pill a day for three days, repeat for shortness of breath    predniSONE (DELTASONE) 20 MG tablet 3 pills for 3 days, 2 for 3 days, 1 for 3 days, repeat for cough    UBRELVY 100 mg tablet Take 100 mg by mouth as needed. Max 1 in 24 hours    zolpidem (AMBIEN) 10 mg Tab TAKE 1 TABLET(10 MG) BY MOUTH EVERY NIGHT AS NEEDED      Family History       Problem Relation (Age of Onset)    Hypertension Father          Tobacco Use    Smoking status: Former     Current packs/day: 0.00     Average packs/day: 1 pack/day for 40.0 years (40.0 ttl pk-yrs)     Types: Cigarettes     Start date: 1978     Quit date: 2018     Years since quittin.3    Smokeless tobacco: Never    Tobacco comments:     smokes 2-3 cigarettes a night   Substance and Sexual Activity    Alcohol use: Yes     Alcohol/week: 12.0 standard drinks of alcohol     Types: 6 Cans of beer, 6 Standard drinks or equivalent per week     Comment: 2-3 a day/ social    Drug use: No    Sexual activity: Yes     Partners: Male     Review of Systems   Constitutional:  Positive for fatigue. Negative for activity change, appetite change, chills, fever and unexpected weight change.   HENT:  Negative for congestion and sore throat.    Respiratory:  Negative for cough and shortness of breath.    Cardiovascular:  Negative for chest pain, palpitations and leg swelling.   Gastrointestinal:  Positive for nausea. Negative for abdominal distention, abdominal pain, blood in stool, constipation, diarrhea and vomiting.   Genitourinary:  Negative for difficulty urinating, dysuria and hematuria.   Musculoskeletal:  Positive for arthralgias. Negative for myalgias.   Skin:  Positive for color change. Negative for rash.   Neurological:  Positive for weakness. Negative for dizziness, tremors and seizures.   Objective:     Vital Signs (Most Recent):  Temp: 98.1 °F (36.7 °C) (24)  Pulse: 66 (24)  Resp: 17 (24)  BP: 131/74 (24)  SpO2: (!) 94 % (24) Vital Signs (24h Range):  Temp:  [97.5 °F (36.4 °C)-98.4 °F (36.9 °C)] 98.1 °F (36.7 °C)  Pulse:  [49-80] 66  Resp:  [11-48] 17  SpO2:  [94 %-100 %] 94 %  BP: (106-173)/(57-96) 131/74        There is no height or weight on file to calculate BMI.     Physical Exam  Vitals reviewed.   Constitutional:        General: She is not in acute distress.     Appearance: She is well-developed.   HENT:      Head: Normocephalic and atraumatic.   Eyes:      General: Scleral icterus present.      Extraocular Movements: Extraocular movements intact.      Pupils: Pupils are equal, round, and reactive to light.   Neck:      Vascular: No JVD.      Trachea: No tracheal deviation.   Cardiovascular:      Rate and Rhythm: Normal rate and regular rhythm.      Heart sounds: No murmur heard.     No friction rub. No gallop.   Pulmonary:      Effort: No respiratory distress.      Breath sounds: Normal breath sounds. No wheezing or rales.   Abdominal:      General: Bowel sounds are normal. There is no distension.      Palpations: Abdomen is soft. There is no mass.      Tenderness: There is abdominal tenderness.      Comments: Mild TTP in RUQ and RLQ   Musculoskeletal:         General: No deformity.      Cervical back: Neck supple.   Lymphadenopathy:      Cervical: No cervical adenopathy.   Skin:     General: Skin is warm and dry.      Coloration: Skin is jaundiced.      Findings: No rash.   Neurological:      Mental Status: She is alert and oriented to person, place, and time.            CRANIAL NERVES     CN III, IV, VI   Pupils are equal, round, and reactive to light.     Significant Labs: All pertinent labs within the past 24 hours have been reviewed.    Significant Imaging: I have reviewed all pertinent imaging results/findings within the past 24 hours.

## 2024-05-10 NOTE — DISCHARGE INSTRUCTIONS
REFERRAL RECOMMENDATIONS FOR SUBSTANCE ABUSE & MENTAL HEALTH      IN CASE OF SUICIDAL THINKING, call the National Suicide Hotline Number: 988    988 Suicide & Crisis Lifeline: 987 , 1-703-292-GRDV (7427)  https://988[facility] Memorial HospitalSpring Pharmaceuticals.Trusted Opinion       SUBSTANCE ABUSE:     [facility] REC[facility]ERY PRO[facility] (formerly known as the ABU)  [x] [phone], Option 2  [x] [address] 4th Floor, [place] [address]  [x] https://www.[facility].org/services/[facility]-recovery-program  [x] The [facility] Recovery Program delivers comprehensive and collaborative treatment for alcohol and substance use disorders.  Excellent program for working professionals or anyone else seeking recovery.  [x] Requires insurance approval prior to starting program, call number above for more information.  [x] Intensive Outpatient Rehabilitation Program - M-F 9am-3pm - daily groups with psychologists and social workers, sessions with MDs 3x per week   [x] Ambulatory detox and dual diagnosis available          12 STEP PROGRAMS (and similar):     Alcoholics Anonymous (local)  [x] [phone]  [x] www.[place].org for schedules for in-person and online meetings  [x] There are AA meetings throughout the day all over [facility]  [x] AA costs nothing to attend; they pass a basket for donations but this is not required    Narcotics Anonymous  [x] [phone]  [x] www.noana.org  [x] There are NA meetings throughout the day all over [facility]  [x] NA costs nothing to attend; they pass a basket for donations but this is not required    Alcoholics Anonymous Online Intergroup (national)  [x] www.aa-intergroup.org  [x] Good resource for large, nation-wide meetings  [x] Can also attend smaller, local meetings in other cities  [x] Countless meetings all day and all night  [x] AA costs nothing to attend; they pass a basket for donations but this is not required    [facility] So[facility] - 24/7 zoom meetings for women and coed - sign on anytime,  anywhere!  [email or web address] Price Wizardser.Pixium Vision/[email or web address]-[email or web address]/    Online Intergroup of AA - [address] Open AA [place] Meeting - 24/7 zoom meetings  https://aa-intergroup.org/meetings/    LOOKING FOR AN ALTERNATIVE TO 12 STEP PROGRAMS - check out:  SMART Recovery: https://www.smartrecovery.org/about-us  [facility] Recovery: https://recoverydharma.org      DETOX UNITS (USUALLY 5-7 DAYS):     [facility]: [address], [place]  [phone], call first to ensure bed availability    [facility]: [address] [place]  [phone], Option 1, call first to ensure bed availability    [facility] Recovery Center: [address] [place] [address] , [place]  [phone] (intake by appointment only)    [facility]: [address], [place]  [phone]      INTENSIVE OUTPATIENT PROGRAMS:     [facility] RECOVERY PROGRAM (formerly known as the ABU)  [x] [phone], Option 2  [x] [address] [facility] 4th Floor, [facility] [address]  [x] [email or web address]/services/[email or web address]-recovery-program  [x] The [facility] Recovery Program delivers comprehensive and collaborative treatment for alcohol and substance use disorders.  Excellent program for working professionals or anyone else seeking recovery.  [x] Requires insurance approval prior to starting program, call number above for more information.  [x] Intensive Outpatient Rehabilitation Program - M-F 9am-3pm - daily groups with psychologists and social workers, sessions with MDs 3x per week   [x] Ambulatory detox and dual diagnosis available    [facility] Intensive Outpatient Program  [x] [phone]  [x] [address] [facility] (the clinic not on [facility] main [facility])  [x] Call number above for more info and to check insurance requirements    [facility]  [address] [place] [address] [phone]    [facility]:  [address] [facility]?, [place], [place] [address]?, [phone]  [address] [facility]?, [place], [place] [address]?,  [phone]    RESIDENTIAL REHABS (USUALLY 28 DAYS):     [facility]: [address], [phone]    [facility] Recovery Center: [address] [place]  [phone] (intake by appointment only)    [facility] (men only) [address], [place], [phone]    [facility] (Female only) [address] [place], [phone]    [facility]: [address], [place], men's program [phone], women's program [phone]    [facility]: [address] [place] [address], [phone]    [facility]: [address], [place], [place], [phone]    [facility]: Men only, [phone], [address] [facility] [place] [facility]: [address], [place], [place], [phone]    [facility]: [facility] [place], [place],  [phone]  New Location: [address] [place] [address] [address] [address], [phone]    [facility]:   ?[address] [place] [address] 36?[place], [place] [address]?[phone]      COMMUNITY ADDICTION CLINICS:     [facility]: [address] [facility], [address] [place], [phone] -or- [address] [place] [address]    [facility] Addiction Recovery [place]: [address] [place]  [address]     [facility]: Clinics [phone]; Crisis [phone]    [facility]: [address] [place] [address]    [facility]: [address] [place] [address]    [facility]: [address] [place], [place] [address] Center: 2nd Floor [address] [place] [address]    [facility]: [address] [place] [address] , [place] [address]    [facility], [address] [place] [address]    [facility] Center: [address] [place] [address] [phone]  (serves youth 16-23 years old)    [facility]: [facility]  Leonora/[place]ley/[place] [phone]    [facility]: [address] [facility], [place] [phone]    [facility]: [address], [facility] [phone]    [facility]: [address] [facility], [address], [phone]     [facility]: [address] [facility] [place], [phone], [phone]        [facility], [address] [facility], [phone] ([facility])    [facility], [address] [facility], [phone] ([facility])    [facility], [address] [place] [address], [phone] ([facility])              MENTAL HEALTH:     [facility] - Outpatient Clinic  [phone]    [facility] - General Psychiatry Intensive Outpatient Program  [facility]ness [facility] (formerly known as the BMU)  [phone], option 3    [facility] - Dual Diagnosis Intensive Outpatient Program  [facility] Recovery [facility] (formerly known as the ABU)  [phone], option 2      [facility] MENTAL HEALTH CENTERS:     [facility]  (aka [facility], [facility])  Serves [facility].  Serves uninsured patients & those with Medicaid.  Main location: [address] [place] [address]  [phone]  Walk-in's available during regular business hours.  24/7 Crisis Line: [phone]    [facility]  (aka [facility])  Serves [facility].  Serves uninsured patients, those with Medicaid and some private plans.  Walk-in's available during regular business hours.  Primary care services available as well.  [place] [facility]: [address] [place] [address];  [phone]  [place]: [address]  [place] [address];  [phone]  24/7 Crisis Line: [phone]    [facility]  Serves uninsured patients & those with Medicaid, call for more info.  Primary care, pediatrics, HIV treatment, and dentistry services available as well.  Three locations.  [phone]    [facility] [place]Corporate Office  Serves patients with Medicaid, Medicare, and private insurance  [address]  [place],?[place] [address]  [phone]    [facility]  Serves uninsured on a sliding scale, as well as Medicaid, Medicare, and private plans.  Eight locations around the [facility].  [phone]    [facility]  Serves uninsured patients & those with Medicaid, private insurances.  Primary care available as well.  [phone]  [address] [place] [address]    [facility]  Serves veterans who were honorably discharged.  [address] [place] [address]  [phone]  24/7 Veterans Crisis Line: [phone] (Press 1)    If you have private insurance and need to find a specialist, please contact your insurance network to request a list of providers covered by your benefits.      MENTAL HEALTH/ADDICTIVE DISORDERS:     AA ([phone]), NA ([phone])   National Suicide Prevention Lifeline- Call 1-128.571.7936 Available 24 hours everyday  [facility] 893-0642; Crisis Line 365-8895 - Call for options A-F:  Intensive Outpatient Treatment/ Day programs   [facility], please contact   [facility], please contact [phone] or [phone] to speak with an admissions counselor.  [facility] (Methadone Maintenance)   [address] [place] [address], [phone]  [address] [place] [address] [phone]  [facility], [address] [place] [address], [phone]  [place] [facility]  [facility] [phone]  [facility] please contact [phone]  [facility], [address], 4th [place] [address] Phone: [phone]   [facility]  [place] Office: [address] [place] [address], [phone]  [place] Office: [address] [facility], [address], [place] [address], [phone]  [place] Office: [address], [place] [address] [phone]    Outpatient Substance Abuse Treatment   [facility] Group (Methadone Maintenance)   [address] [place] [address], [phone]  [address], [place] [address] [phone]  [facility], [address] [place] [address], [phone]  [facility]  [place] Office: [address] [place] [address], [phone]  [place] Office: [address] [facility], [address], [place] [address], [phone]  [place] Office: [address], [place] [address] [phone]  [facility], [address] [place] [address] [phone]   [facility], [address] [facility], [address], [phone]  [facility], [address] [place], [place] [phone]    Residential Substance Abuse Treatment   [facility] [address] [facility], [phone]  [facility], [address] [facility], [phone]  [facility] (men only) [address] [place] [address], [phone]  [facility] (women only) [address] [place] [address] [phone]  [facility], [address], [place] [address] [phone]  [place] (women only), intakes at [address] [facility], [phone]  [facility] (7-day program, $100, [address] [place], [phone], [phone], [phone])  [place] [facility] (Men only, [phone]), [address], [place] (Vets*/Non-Vets)  [facility] (Men only, $400/month program fee) [address] [place], [phone]  [facility] (Women over age 39 only), [address] [facility], [phone]    Out of Area:    [place], [address] [facility] [address], [place], [place] [phone])  [facility] (men only), [address] [facility] [place] [address], [phone]  [facility], [address] [place], [place] ([phone])  [place], [facility] [place], [place] ([phone])  [facility], [address] [facility], [phone]  [facility] (Women only, has to have mental illness, can be homeless or substance abuser), [phone]        IN CASE OF SUICIDAL THINKING, call the National Suicide Hotline Number: 988    988 Suicide & Crisis Lifeline: 988 , 7-369-985-TALK (9056)  Provides 24/7, free and confidential support for people in distress, prevention and crisis resources for you or your loved ones, and best practices for professionals.    Call, text or chat.  https://988Aruspexline.org            Please discuss with your primary care doctor and Psychiatrist regarding alternate medicines for anxiety and depression and limit use of Xanax and Ambien.

## 2024-05-10 NOTE — NURSING
Nurses Note -- 4 Eyes      5/10/2024   2:43 AM      Skin assessed during: Admit      [x] No Altered Skin Integrity Present    [x]Prevention Measures Documented      [] Yes- Altered Skin Integrity Present or Discovered   [] LDA Added if Not in Epic (Describe Wound)   [] New Altered Skin Integrity was Present on Admit and Documented in LDA   [] Wound Image Taken    Wound Care Consulted? No    Attending Nurse:  Virginia Lemos RN/Staff Member:   RICCARDO Willis

## 2024-05-10 NOTE — MEDICAL/APP STUDENT
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      Electronically signed by: Elliott Adler  Date:    08/14/2023  Time:    12:09   CT Head Without Contrast    Narrative    EXAMINATION:  CT HEAD WITHOUT CONTRAST    CLINICAL HISTORY:  Head trauma, moderate-severe;    TECHNIQUE:  Low dose axial CT images obtained throughout the head without intravenous contrast. Sagittal and coronal reconstructions were performed.    COMPARISON:  CT head 03/16/2021, 08/07/2018.    FINDINGS:  Ventricles and sulci are normal in size for age without evidence of hydrocephalus. No extra-axial blood or fluid collections.  The brain parenchyma is normal.  There is an extra-axial mass abutting the left cerebellar hemisphere measuring approximately 1.6 x 2.0 x 1.9 cm (series 2, image 11).  There is no evidence of significant mass effect or midline shift.  There is no evidence of vasogenic edema.  This was vaguely seen in retrospect on prior CT dated 03/16/2021 though has increased in size.  This was not seen on CT dated 08/07/2018.  No parenchymal hemorrhage, edema or major vascular distribution infarct.    No calvarial fracture.  There is a large right parietal scalp hematoma.  There is a small right frontal scalp hematoma.  Bilateral paranasal sinuses and  mastoid air cells are clear.  There is a remote fracture of the medial wall of the right orbit.      Impression    No intracranial hemorrhage.    Right parietal and right frontal scalp hematomas.    Extra-axial mass abutting the left cerebellar hemisphere, may represent a meningioma or other dural-based lesion.  Further evaluation with nonemergent MRI brain with and without contrast is recommended.      Electronically signed by: Dallas Christopher  Date:    08/13/2023  Time:    19:25     *Note: Due to a large number of results and/or encounters for the requested time period, some results have not been displayed. A complete set of results can be found in Results Review.       CONSULTATION:     A diagnostic psychiatric evaluation was performed and responsiveness to treatment was assessed.  {XX-ResponseTX:[id number]}    Consults    {XX-Courtesy:[id number]}

## 2024-05-10 NOTE — ASSESSMENT & PLAN NOTE
MELD 3.0: 20 at 5/9/2024  3:38 AM  MELD-Na: 16 at 5/9/2024  3:38 AM  Calculated from:  Serum Creatinine: 1.0 mg/dL at 5/9/2024  3:38 AM  Serum Sodium: 143 mmol/L (Using max of 137 mmol/L) at 5/9/2024  3:38 AM  Total Bilirubin: 6.1 mg/dL at 5/9/2024  3:38 AM  Serum Albumin: 2.6 g/dL at 5/9/2024  3:38 AM  INR(ratio): 1.3 at 5/7/2024  1:15 PM  Age at listing (hypothetical): 68 years  Sex: Female at 5/9/2024  3:38 AM  -Pt. With no prior known hepatic disease presenting with jaundice, elevated LFTs, evidence of cirrhosis on imaging  -Hep C positive (new diagnosis no prior tests in system), known alcohol abuse and daily tylenol. One CT in her chart 2014 showed hepatic steatosis, more recent imaging did no comment on liver contour. I suspect pt. Has had some degress of chronic liver disease that has now become acutely worse, potentially do to multifactorial issue of Hep C/alcohol abuse, chronic tylenol overdose  -Continue to trend LFTs with supportive care, hepatology consulted for further assistance.

## 2024-05-10 NOTE — H&P
"  [facility] Medicine  History & Physical    Patient Name: Beatriz Gan  MRN: [id number]  Patient Class: IP- Inpatient  Admission Date: 5/9/2024  Attending Physician: Riccardo Chamorro MD   Primary Care Provider: Jorge Alonzo MD         Patient information was obtained from patient, past medical records, and ER records.     Subjective:     Principal Problem:Decompensated liver disease    Chief Complaint: No chief complaint on file.       HPI: 69yo F with a PMHx  HTN, HLD, asthma/COPD, NATHALIE, GERD, and alcohol dependence who presented to the [facility] ED on 5/7/2024 with complaints of jaundice, confusion, and chest pain. Per referring MD: "was seen in the ER on 04/13 and found to have new significantly abnormal liver panel with a total bilirubin of 2.7 AST of 421 ALT of 676 and normal INR 1.1. Ultrasound abdomen CT abdomen was performed and unremarkable. Patient was diagnosed with pneumonia and urinary tract infection and given Augmentin and azithromycin with PCP follow-up. Hepatitis panel positive for HCV only. Her primary care doctor upon follow-up referred her to hepatology but her appointment is not till June. Pt. Went home 4/13 and completed the course of augmentin and azithromycin. She developed worsening jaundice and weakness at home over the last few weeks, however, which prompted her to present again to the ED 5/7.      In the ED, vitals significant for /104. Labs remarkable for K 3.2, tbili 6.3, , AST 1087, and ALT 1158. CXR without any acute cardiopulmonary processes. Acetaminophen level undetectable, but pt. Started on NAC infusion protocol and was admitted to hospital medicine. She completed NAC 5/8 and has been receiving mostly supportive care withsome downtrend in her AST/ALT (now 720/855). Liver Doppler U/S 5/8 showed "Cirrhosis without focal mass. Ascites, splenomegaly or reversal of flow in the portal vein is not seen."    Pt. Reports that she previously drank " everyday, but she has not had alcohol in ~2 months. She also used to take tylenol daily, around 7-8 325mg per day for treatment of headaches (she did not always keep a good count however). She also has not had this medications in ~3 months.        [facility] contacted to facilitate transfer for higher LOC and hepatology evaluation. Pt accepted to  at [facility] for further care and management.       Past Medical History:   Diagnosis Date    Alcohol dependence     DDD (degenerative disc disease), lumbosacral     DJD (degenerative joint disease), lumbosacral     Encounter for blood transfusion     GERD (gastroesophageal reflux disease)     Gout     Hypercholesterolemia     Hypertension     NATHALIE (iron deficiency anemia)     questionable white coat hypertension    Kidney carcinoma, left 2014    Pneumonia     Renal cell carcinoma     Shingles 10/13/2012       Past Surgical History:   Procedure Laterality Date    ANTERIOR CERVICAL DISCECTOMY W/ FUSION N/A 5/4/2023    Procedure: DISCECTOMY, SPINE, CERVICAL, ANTERIOR APPROACH, WITH FUSION C3-4;  Surgeon: Ruben Martinez MD;  Location: [facility] OR;  Service: Orthopedics;  Laterality: N/A;    APPENDECTOMY      ARTHROSCOPY OF SHOULDER WITH DECOMPRESSION OF SUBACROMIAL SPACE Left 10/31/2019    Procedure: ARTHROSCOPY, SHOULDER, WITH SUBACROMIAL SPACE DECOMPRESSION;  Surgeon: Ruben Martinez MD;  Location: [facility] OR;  Service: Orthopedics;  Laterality: Left;    BLADDER REPAIR      x 2    COLONOSCOPY  9/2011    DISTAL CLAVICLE EXCISION Left 10/31/2019    Procedure: EXCISION, CLAVICLE, DISTAL;  Surgeon: Ruben Martinez MD;  Location: [facility] OR;  Service: Orthopedics;  Laterality: Left;    EPIDURAL STEROID INJECTION INTO LUMBAR SPINE N/A 12/7/2020    Procedure: Injection-steroid-epidural-lumbar;  Surgeon: Dk Rosales MD;  Location: [facility] OR;  Service: Pain Management;  Laterality: N/A;  L4-L5    EPIDURAL STEROID INJECTION INTO LUMBAR SPINE N/A 5/17/2021    Procedure:  Injection-steroid-epidural-lumbar;  Surgeon: Dk Rosales MD;  Location: [facility] OR;  Service: Pain Management;  Laterality: N/A;  L4-L5    ESOPHAGOGASTRODUODENOSCOPY  9/2011    EYE SURGERY      lasix bilateral    FIXATION KYPHOPLASTY N/A 1/31/2020    Procedure: Kyphoplasty T12;  Surgeon: Dk Rosales MD;  Location: [facility] OR;  Service: Pain Management;  Laterality: N/A;    HERNIA REPAIR      hiatal hernai    HYSTERECTOMY      INJECTION OF ANESTHETIC AGENT AROUND NERVE Left 6/8/2020    Procedure: Block, Nerve;  Surgeon: Dk Rosales MD;  Location: [facility] OR;  Service: Pain Management;  Laterality: Left;  left genicular nerve block     KNEE ARTHROPLASTY Left 7/30/2020    Procedure: ARTHROPLASTY, KNEE LEFT;  Surgeon: Ruben Martinez MD;  Location: [facility] OR;  Service: Orthopedics;  Laterality: Left;  REP VALERY RUBY    KNEE ARTHROSCOPY W/ MENISCECTOMY Left 1/16/2020    Procedure: ARTHROSCOPY, KNEE, WITH MEDIAL MENISCECTOMY;  Surgeon: Ruben Martinez MD;  Location: [facility] OR;  Service: Orthopedics;  Laterality: Left;    LAPAROSCOPIC NISSEN FUNDOPLICATION      NEPHRECTOMY      LT partial    RADIOFREQUENCY ABLATION Left 6/19/2020    Procedure: Radiofrequency Ablation;  Surgeon: Dk Rosales MD;  Location: [facility] OR;  Service: Pain Management;  Laterality: Left;    RADIOFREQUENCY ABLATION OF LUMBAR MEDIAL BRANCH NERVE AT SINGLE LEVEL Bilateral 2/28/2020    Procedure: Radiofrequency Ablation, Nerve, Spinal, Lumbar, Medial Branch, 1 Level;  Surgeon: Dk Rosales MD;  Location: [facility] OR;  Service: Pain Management;  Laterality: Bilateral;  L3,L4,L5 - Burned at 80 degrees C. for 60 seconds x 2 each site    RADIOFREQUENCY ABLATION OF LUMBAR MEDIAL BRANCH NERVE AT SINGLE LEVEL Bilateral 10/19/2020    Procedure: Radiofrequency Ablation, Nerve, Spinal, Lumbar, Medial Branch, 1 Level;  Surgeon: Dk Rosales MD;  Location: [facility] OR;  Service: Pain Management;  Laterality: Bilateral;  L3, L4, L5    REFRACTIVE SURGERY       ROTATOR CUFF REPAIR Left 10/31/2019    Procedure: REPAIR, ROTATOR CUFF;  Surgeon: Ruben Martinez MD;  Location: [facility] OR;  Service: Orthopedics;  Laterality: Left;  arthrex    SKIN BIOPSY      TOTAL ABDOMINAL HYSTERECTOMY W/ BILATERAL SALPINGOOPHORECTOMY  age 48    TRANSFORAMINAL EPIDURAL INJECTION OF STEROID Bilateral 7/30/2021    Procedure: Injection,steroid,epidural,transforaminal approach;  Surgeon: Dk Rosales MD;  Location: [facility] OR;  Service: Pain Management;  Laterality: Bilateral;  L5-S1     TRANSFORAMINAL EPIDURAL INJECTION OF STEROID Bilateral 3/28/2022    Procedure: INJECTION, STEROID, EPIDURAL, TRANSFORAMINAL APPROACH Lumbar L5-S1;  Surgeon: Dk Rosales MD;  Location: [facility] OR;  Service: Pain Management;  Laterality: Bilateral;       Review of patient's allergies indicates:   Allergen Reactions    Phenergan [promethazine] Hives and Rash    Latex, natural rubber Hives     Per pt report-many years    Morphine Hives       Current Facility-Administered Medications on File Prior to Encounter   Medication    electrolyte-S (ISOLYTE)    lactated ringers infusion    [COMPLETED] melatonin tablet 6 mg    pregabalin capsule 75 mg    [DISCONTINUED] acetylcysteine 20% (200 mg/ml) (ACETADOTE) 12,000 mg in dextrose 5 % (D5W) 500 mL infusion    [DISCONTINUED] acetylcysteine 20% (200 mg/ml) (ACETADOTE) 7,200 mg in dextrose 5 % (D5W) 1,000 mL infusion    [DISCONTINUED] arformoteroL nebulizer solution 15 mcg    [DISCONTINUED] budesonide nebulizer solution 0.5 mg    [DISCONTINUED] hydrOXYzine HCL tablet 25 mg    [DISCONTINUED] ibuprofen tablet 400 mg    [DISCONTINUED] lactulose 20 gram/30 mL solution Soln 15 g    [DISCONTINUED] magnesium oxide tablet 800 mg    [DISCONTINUED] magnesium oxide tablet 800 mg    [DISCONTINUED] ondansetron injection 4 mg    [DISCONTINUED] pantoprazole EC tablet 40 mg    [DISCONTINUED] potassium bicarbonate disintegrating tablet 35 mEq    [DISCONTINUED] potassium bicarbonate  disintegrating tablet 50 mEq    [DISCONTINUED] potassium bicarbonate disintegrating tablet 60 mEq    [DISCONTINUED] simethicone chewable tablet 80 mg     Current Outpatient Medications on File Prior to Encounter   Medication Sig    albuterol (VENTOLIN HFA) 90 mcg/actuation inhaler Inhale 2 puffs into the lungs every 4 (four) hours as needed for Wheezing or Shortness of Breath. Rescue    ALPRAZolam (XANAX) 1 MG tablet TAKE 1 TABLET(1 MG) BY MOUTH TWICE DAILY AS NEEDED FOR ANXIETY    amLODIPine (NORVASC) 5 MG tablet Take 1 tablet (5 mg total) by mouth once daily.    AREXVY, PF, 120 mcg/0.5 mL SusR vaccine Inject 0.5 mLs into the muscle.    aspirin (ECOTRIN) 81 MG EC tablet Take 1 tablet (81 mg total) by mouth once daily.    azelastine (ASTELIN) 137 mcg (0.1 %) nasal spray Use 1 spray(s) in each nostril twice daily    benralizumab 30 mg/mL AtIn Inject 30 mg into the skin every 8 weeks.    benralizumab 30 mg/mL AtIn Inject 30 mg into the skin every 28 days.    celecoxib (CELEBREX) 200 MG capsule TAKE ONE CAPSULE BY MOUTH DAILY AT 9 AM    cholecalciferol, vitamin D3, (VITAMIN D3 ORAL) Take 1 capsule by mouth once daily.    COMIRNATY 2023-24, 12Y UP,,PF, 30 mcg/0.3 mL inection Inject 0.3 mLs into the muscle.    FLUoxetine 40 MG capsule Take 1 capsule (40 mg total) by mouth 2 (two) times daily.    fluticasone (FLONASE) 50 mcg/actuation nasal spray 1 spray by Nasal route daily as needed.     FLUZONE HIGHDOSE QUAD 23-24  mcg/0.7 mL Syrg     gabapentin (NEURONTIN) 600 MG tablet Take 1 tablet (600 mg total) by mouth 3 (three) times daily.    magnesium 30 mg Tab Take by mouth once.    MULTIVIT-IRON-MIN-FOLIC ACID 3,500-18-0.4 UNIT-MG-MG ORAL CHEW Take by mouth once daily.    ondansetron (ZOFRAN) 4 MG tablet Take 1 tablet (4 mg total) by mouth as needed for Nausea.    ondansetron (ZOFRAN-ODT) 4 MG TbDL Take 1 tablet (4 mg total) by mouth every 6 (six) hours as needed (NAUSEA OR VOMITING).    polyethylene glycol (GLYCOLAX)  17 gram/dose powder Take 17 g by mouth once daily.    predniSONE (DELTASONE) 10 MG tablet Take one pill a day for three days, repeat for shortness of breath    predniSONE (DELTASONE) 20 MG tablet 3 pills for 3 days, 2 for 3 days, 1 for 3 days, repeat for cough    UBRELVY 100 mg tablet Take 100 mg by mouth as needed. Max 1 in 24 hours    zolpidem (AMBIEN) 10 mg Tab TAKE 1 TABLET(10 MG) BY MOUTH EVERY NIGHT AS NEEDED     Family History       Problem Relation (Age of Onset)    Hypertension Father          Tobacco Use    Smoking status: Former     Current packs/day: 0.00     Average packs/day: 1 pack/day for 40.0 years (40.0 ttl pk-yrs)     Types: Cigarettes     Start date: 1978     Quit date: 2018     Years since quittin.3    Smokeless tobacco: Never    Tobacco comments:     smokes 2-3 cigarettes a night   Substance and Sexual Activity    Alcohol use: Yes     Alcohol/week: 12.0 standard drinks of alcohol     Types: 6 Cans of beer, 6 Standard drinks or equivalent per week     Comment: 2-3 a day/ social    Drug use: No    Sexual activity: Yes     Partners: Male     Review of Systems   Constitutional:  Positive for fatigue. Negative for activity change, appetite change, chills, fever and unexpected weight change.   HENT:  Negative for congestion and sore throat.    Respiratory:  Negative for cough and shortness of breath.    Cardiovascular:  Negative for chest pain, palpitations and leg swelling.   Gastrointestinal:  Positive for nausea. Negative for abdominal distention, abdominal pain, blood in stool, constipation, diarrhea and vomiting.   Genitourinary:  Negative for difficulty urinating, dysuria and hematuria.   Musculoskeletal:  Positive for arthralgias. Negative for myalgias.   Skin:  Positive for color change. Negative for rash.   Neurological:  Positive for weakness. Negative for dizziness, tremors and seizures.   Objective:     Vital Signs (Most Recent):  Temp: 98.1 °F (36.7 °C) (24  2150)  Pulse: 66 (05/09/24 2150)  Resp: 17 (05/09/24 2150)  BP: 131/74 (05/09/24 2150)  SpO2: (!) 94 % (05/09/24 2150) Vital Signs (24h Range):  Temp:  [97.5 °F (36.4 °C)-98.4 °F (36.9 °C)] 98.1 °F (36.7 °C)  Pulse:  [49-80] 66  Resp:  [11-48] 17  SpO2:  [94 %-100 %] 94 %  BP: (106-173)/(57-96) 131/74        There is no height or weight on file to calculate BMI.     Physical Exam  Vitals reviewed.   Constitutional:       General: She is not in acute distress.     Appearance: She is well-developed.   HENT:      Head: Normocephalic and atraumatic.   Eyes:      General: Scleral icterus present.      Extraocular Movements: Extraocular movements intact.      Pupils: Pupils are equal, round, and reactive to light.   Neck:      Vascular: No JVD.      Trachea: No tracheal deviation.   Cardiovascular:      Rate and Rhythm: Normal rate and regular rhythm.      Heart sounds: No murmur heard.     No friction rub. No gallop.   Pulmonary:      Effort: No respiratory distress.      Breath sounds: Normal breath sounds. No wheezing or rales.   Abdominal:      General: Bowel sounds are normal. There is no distension.      Palpations: Abdomen is soft. There is no mass.      Tenderness: There is abdominal tenderness.      Comments: Mild TTP in RUQ and RLQ   Musculoskeletal:         General: No deformity.      Cervical back: Neck supple.   Lymphadenopathy:      Cervical: No cervical adenopathy.   Skin:     General: Skin is warm and dry.      Coloration: Skin is jaundiced.      Findings: No rash.   Neurological:      Mental Status: She is alert and oriented to person, place, and time.            CRANIAL NERVES     CN III, IV, VI   Pupils are equal, round, and reactive to light.     Significant Labs: All pertinent labs within the past 24 hours have been reviewed.    Significant Imaging: I have reviewed all pertinent imaging results/findings within the past 24 hours.  Assessment/Plan:     * Decompensated liver disease  MELD 3.0: 20 at  5/9/2024  3:38 AM  MELD-Na: 16 at 5/9/2024  3:38 AM  Calculated from:  Serum Creatinine: 1.0 mg/dL at 5/9/2024  3:38 AM  Serum Sodium: 143 mmol/L (Using max of 137 mmol/L) at 5/9/2024  3:38 AM  Total Bilirubin: 6.1 mg/dL at 5/9/2024  3:38 AM  Serum Albumin: 2.6 g/dL at 5/9/2024  3:38 AM  INR(ratio): 1.3 at 5/7/2024  1:15 PM  Age at listing (hypothetical): 68 years  Sex: Female at 5/9/2024  3:38 AM  -Pt. With no prior known hepatic disease presenting with jaundice, elevated LFTs, evidence of cirrhosis on imaging  -Hep C positive (new diagnosis no prior tests in system), known alcohol abuse and daily tylenol. One CT in her chart 2014 showed hepatic steatosis, more recent imaging did no comment on liver contour. I suspect pt. Has had some degress of chronic liver disease that has now become acutely worse, potentially do to multifactorial issue of Hep C/alcohol abuse, chronic tylenol overdose  -Continue to trend LFTs with supportive care, hepatology consulted for further assistance.       Hepatitis C virus infection without hepatic coma  -Hep antibody and RNA positive 4/13/2024. New diagnosis but Unclear how long patient has been infected.  -will need referral for treatment at time of discharge      Essential hypertension  -BP WNL, hold home amlodpine for now      VTE Risk Mitigation (From admission, onward)           Ordered     IP VTE HIGH RISK PATIENT  Once         05/09/24 2023     Place sequential compression device  Until discontinued         05/09/24 2023                                    Osei Young MD  Department of Hospital Medicine  [facility]

## 2024-05-10 NOTE — CONSULTS
[facility] Surg  Hepatology  Consult Note    Patient Name: Beatriz Gan  MRN: [id number]  Admission Date: 5/9/2024  Hospital Length of Stay: 1 days  Attending Provider: Maricarmen Stern MD   Primary Care Physician: Jorge Alonzo MD  Principal Problem:Decompensated liver disease    Inpatient consult to Hepatology  Consult performed by: Blanka Limon DO  Consult ordered by: Osei Young MD        Subjective:     Transplant status: No    HPI:  Ms. Gan is a 67 y/o F with Hx HTN, 50 pack year, suspected COPD, GERD , alcohol depedence who presented to OSH initially w/ jaundice, confusion, chest pain on 5/7. Initially went in to ED on 4/13 for pneumonia, found to have t.bili 2.7, ,  Hep C+. CT Abd without liver abnormality on read. She was sent home with Hepatology f/u but developed worsening weakness so she went back to the ED. This time LFTs up to the 1000s, T.Bili 3.2,  repeat U/S liver w/ cirrhosis. Patient transferred to [facility] [place] for hepatology eval.     Tylenol Use: ~60 capsules a month  Supplements: Melatonin  Alcohol: 1L vodka per week over the past 2 years, drinking heavily since a young age. Quit last month  Social History: Worked as a  most of her life. Had multiple surgeries as in her 30-40s, required blood transfusions during a hysterectomy, denies IVDU. Good social support at home, has 7 children/grandchildren,  passed from cirrhosis, one daughter passed from IVDU. Lives in [place].    Review of Systems   Constitutional:  Negative for chills and fatigue.   HENT:  Negative for sore throat.    Respiratory:  Negative for shortness of breath.    Cardiovascular:  Negative for chest pain.   Gastrointestinal:  Negative for abdominal pain, diarrhea, nausea and vomiting.   Genitourinary:  Negative for dysuria and flank pain.   Musculoskeletal:  Negative for arthralgias and joint swelling.   Skin:  Positive for color change.   Neurological:  Negative for dizziness  and headaches.   Psychiatric/Behavioral:  Negative for agitation and confusion.        Past Medical History:   Diagnosis Date    Alcohol dependence     DDD (degenerative disc disease), lumbosacral     DJD (degenerative joint disease), lumbosacral     Encounter for blood transfusion     GERD (gastroesophageal reflux disease)     Gout     Hypercholesterolemia     Hypertension     NATHALIE (iron deficiency anemia)     questionable white coat hypertension    Kidney carcinoma, left 2014    Pneumonia     Renal cell carcinoma     Shingles 10/13/2012       Past Surgical History:   Procedure Laterality Date    ANTERIOR CERVICAL DISCECTOMY W/ FUSION N/A 5/4/2023    Procedure: DISCECTOMY, SPINE, CERVICAL, ANTERIOR APPROACH, WITH FUSION C3-4;  Surgeon: Ruben Martinez MD;  Location: [facility] OR;  Service: Orthopedics;  Laterality: N/A;    APPENDECTOMY      ARTHROSCOPY OF SHOULDER WITH DECOMPRESSION OF SUBACROMIAL SPACE Left 10/31/2019    Procedure: ARTHROSCOPY, SHOULDER, WITH SUBACROMIAL SPACE DECOMPRESSION;  Surgeon: Ruben Martinez MD;  Location: [facility] OR;  Service: Orthopedics;  Laterality: Left;    BLADDER REPAIR      x 2    COLONOSCOPY  9/2011    DISTAL CLAVICLE EXCISION Left 10/31/2019    Procedure: EXCISION, CLAVICLE, DISTAL;  Surgeon: Ruben Martinez MD;  Location: [facility] OR;  Service: Orthopedics;  Laterality: Left;    EPIDURAL STEROID INJECTION INTO LUMBAR SPINE N/A 12/7/2020    Procedure: Injection-steroid-epidural-lumbar;  Surgeon: Dk Rosales MD;  Location: [facility] OR;  Service: Pain Management;  Laterality: N/A;  L4-L5    EPIDURAL STEROID INJECTION INTO LUMBAR SPINE N/A 5/17/2021    Procedure: Injection-steroid-epidural-lumbar;  Surgeon: Dk Rosales MD;  Location: [facility] OR;  Service: Pain Management;  Laterality: N/A;  L4-L5    ESOPHAGOGASTRODUODENOSCOPY  9/2011    EYE SURGERY      lasix bilateral    FIXATION KYPHOPLASTY N/A 1/31/2020    Procedure: Kyphoplasty T12;  Surgeon: Dk Rosales MD;  Location: [facility] OR;   Service: Pain Management;  Laterality: N/A;    HERNIA REPAIR      hiatal hernai    HYSTERECTOMY      INJECTION OF ANESTHETIC AGENT AROUND NERVE Left 6/8/2020    Procedure: Block, Nerve;  Surgeon: Dk Rosales MD;  Location: [facility] OR;  Service: Pain Management;  Laterality: Left;  left genicular nerve block     KNEE ARTHROPLASTY Left 7/30/2020    Procedure: ARTHROPLASTY, KNEE LEFT;  Surgeon: Ruben Martinez MD;  Location: [facility] OR;  Service: Orthopedics;  Laterality: Left;  REP VALERY RUBY    KNEE ARTHROSCOPY W/ MENISCECTOMY Left 1/16/2020    Procedure: ARTHROSCOPY, KNEE, WITH MEDIAL MENISCECTOMY;  Surgeon: Ruben Martinez MD;  Location: [facility] OR;  Service: Orthopedics;  Laterality: Left;    LAPAROSCOPIC NISSEN FUNDOPLICATION      NEPHRECTOMY      LT partial    RADIOFREQUENCY ABLATION Left 6/19/2020    Procedure: Radiofrequency Ablation;  Surgeon: Dk Rosales MD;  Location: [facility] OR;  Service: Pain Management;  Laterality: Left;    RADIOFREQUENCY ABLATION OF LUMBAR MEDIAL BRANCH NERVE AT SINGLE LEVEL Bilateral 2/28/2020    Procedure: Radiofrequency Ablation, Nerve, Spinal, Lumbar, Medial Branch, 1 Level;  Surgeon: Dk Rosales MD;  Location: [facility] OR;  Service: Pain Management;  Laterality: Bilateral;  L3,L4,L5 - Burned at 80 degrees C. for 60 seconds x 2 each site    RADIOFREQUENCY ABLATION OF LUMBAR MEDIAL BRANCH NERVE AT SINGLE LEVEL Bilateral 10/19/2020    Procedure: Radiofrequency Ablation, Nerve, Spinal, Lumbar, Medial Branch, 1 Level;  Surgeon: Dk Rosales MD;  Location: [facility] OR;  Service: Pain Management;  Laterality: Bilateral;  L3, L4, L5    REFRACTIVE SURGERY      ROTATOR CUFF REPAIR Left 10/31/2019    Procedure: REPAIR, ROTATOR CUFF;  Surgeon: Ruben Martinez MD;  Location: [facility] OR;  Service: Orthopedics;  Laterality: Left;  arthrex    SKIN BIOPSY      TOTAL ABDOMINAL HYSTERECTOMY W/ BILATERAL SALPINGOOPHORECTOMY  age 48    TRANSFORAMINAL EPIDURAL INJECTION OF STEROID Bilateral 7/30/2021     Procedure: Injection,steroid,epidural,transforaminal approach;  Surgeon: Dk Rosales MD;  Location: [facility] OR;  Service: Pain Management;  Laterality: Bilateral;  L5-S1     TRANSFORAMINAL EPIDURAL INJECTION OF STEROID Bilateral 3/28/2022    Procedure: INJECTION, STEROID, EPIDURAL, TRANSFORAMINAL APPROACH Lumbar L5-S1;  Surgeon: Dk Rosales MD;  Location: [facility] OR;  Service: Pain Management;  Laterality: Bilateral;       Family history of liver disease: Yes    Review of patient's allergies indicates:   Allergen Reactions    Phenergan [promethazine] Hives and Rash    Latex, natural rubber Hives     Per pt report-many years    Morphine Hives         Tobacco Use    Smoking status: Former     Current packs/day: 0.00     Average packs/day: 1 pack/day for 40.0 years (40.0 ttl pk-yrs)     Types: Cigarettes     Start date: 1978     Quit date: 2018     Years since quittin.3    Smokeless tobacco: Never    Tobacco comments:     smokes 2-3 cigarettes a night   Substance and Sexual Activity    Alcohol use: Yes     Alcohol/week: 12.0 standard drinks of alcohol     Types: 6 Cans of beer, 6 Standard drinks or equivalent per week     Comment: 2-3 a day/ social    Drug use: No    Sexual activity: Yes     Partners: Male       Medications Prior to Admission   Medication Sig Dispense Refill Last Dose    albuterol (VENTOLIN HFA) 90 mcg/actuation inhaler Inhale 2 puffs into the lungs every 4 (four) hours as needed for Wheezing or Shortness of Breath. Rescue 18 g 11     ALPRAZolam (XANAX) 1 MG tablet TAKE 1 TABLET(1 MG) BY MOUTH TWICE DAILY AS NEEDED FOR ANXIETY 60 tablet 2     budesonide-formoterol 160-4.5 mcg (SYMBICORT) 160-4.5 mcg/actuation HFAA Inhale 2 puffs into the lungs every 12 (twelve) hours. Controller 30.6 g 3     celecoxib (CELEBREX) 200 MG capsule TAKE ONE CAPSULE BY MOUTH DAILY AT 9 AM 30 capsule 1     FLUoxetine 40 MG capsule Take 1 capsule (40 mg total) by mouth 2 (two) times daily. 180 capsule 3      gabapentin (NEURONTIN) 600 MG tablet Take 1 tablet (600 mg total) by mouth 3 (three) times daily. 90 tablet 2     UBRELVY 100 mg tablet Take 100 mg by mouth as needed. Max 1 in 24 hours       zolpidem (AMBIEN) 10 mg Tab TAKE 1 TABLET(10 MG) BY MOUTH EVERY NIGHT AS NEEDED 30 tablet 2     aspirin (ECOTRIN) 81 MG EC tablet Take 1 tablet (81 mg total) by mouth once daily. 30 tablet 0     azelastine (ASTELIN) 137 mcg (0.1 %) nasal spray Use 1 spray(s) in each nostril twice daily 30 mL 3     benralizumab 30 mg/mL AtIn Inject 30 mg into the skin every 8 weeks. 1 each 5     benralizumab 30 mg/mL AtIn Inject 30 mg into the skin every 28 days. 1 mL 2     cholecalciferol, vitamin D3, (VITAMIN D3 ORAL) Take 1 capsule by mouth once daily.       fluticasone (FLONASE) 50 mcg/actuation nasal spray 1 spray by Nasal route daily as needed.        magnesium 30 mg Tab Take by mouth once.       MULTIVIT-IRON-MIN-FOLIC ACID 3,500-18-0.4 UNIT-MG-MG ORAL CHEW Take by mouth once daily.       polyethylene glycol (GLYCOLAX) 17 gram/dose powder Take 17 g by mouth once daily. 510 g 3     predniSONE (DELTASONE) 10 MG tablet Take one pill a day for three days, repeat for shortness of breath 12 tablet 0        Objective:     Vital Signs (Most Recent):  Temp: 97.8 °F (36.6 °C) (05/10/24 0711)  Pulse: (!) 56 (05/10/24 0711)  Resp: 16 (05/10/24 0348)  BP: 126/83 (05/10/24 0711)  SpO2: 97 % (05/10/24 0711) Vital Signs (24h Range):  Temp:  [97.5 °F (36.4 °C)-98.4 °F (36.9 °C)] 97.8 °F (36.6 °C)  Pulse:  [54-80] 56  Resp:  [12-38] 16  SpO2:  [94 %-99 %] 97 %  BP: (112-160)/(62-96) 126/83     Weight: 72.6 kg (160 lb 0.9 oz) (05/09/24 2150)  Body mass index is 26.63 kg/m².       Physical Exam  Constitutional:       Appearance: Normal appearance. She is not ill-appearing.   HENT:      Head: Normocephalic and atraumatic.      Nose: Nose normal.      Mouth/Throat:      Mouth: Mucous membranes are moist.   Eyes:      General: Scleral icterus present.       Extraocular Movements: Extraocular movements intact.      Pupils: Pupils are equal, round, and reactive to light.   Cardiovascular:      Rate and Rhythm: Normal rate and regular rhythm.      Pulses: Normal pulses.      Heart sounds: Normal heart sounds.   Pulmonary:      Effort: Pulmonary effort is normal. No respiratory distress.      Breath sounds: Normal breath sounds.   Abdominal:      General: Abdomen is flat. There is no distension.      Tenderness: There is no abdominal tenderness.   Musculoskeletal:         General: Normal range of motion.      Cervical back: Normal range of motion.   Skin:     General: Skin is warm and dry.      Coloration: Skin is jaundiced.   Neurological:      General: No focal deficit present.      Mental Status: She is alert and oriented to person, place, and time.   Psychiatric:         Mood and Affect: Mood normal.         Behavior: Behavior normal.            MELD 3.0: 18 at 5/10/2024  5:52 AM  MELD-Na: 15 at 5/10/2024  5:52 AM  Calculated from:  Serum Creatinine: 0.9 mg/dL (Using min of 1 mg/dL) at 5/10/2024  5:51 AM  Serum Sodium: 140 mmol/L (Using max of 137 mmol/L) at 5/10/2024  5:51 AM  Total Bilirubin: 5.6 mg/dL at 5/10/2024  5:51 AM  Serum Albumin: 2.8 g/dL at 5/10/2024  5:51 AM  INR(ratio): 1.2 at 5/10/2024  5:52 AM  Age at listing (hypothetical): 68 years  Sex: Female at 5/10/2024  5:52 AM      Significant Labs:  CBC:   Recent Labs   Lab 05/10/24  0552   WBC 3.82*   RBC 4.08   HGB 12.9   HCT 39.9        CMP:   Recent Labs   Lab 05/10/24  0551   GLU 88   CALCIUM 9.7   ALBUMIN 2.8*   PROT 6.1      K 4.4   CO2 29      BUN 10   CREATININE 0.9   ALKPHOS 407*   *   *   BILITOT 5.6*     Coagulation:   Recent Labs   Lab 05/10/24  0552   INR 1.2       Significant Imaging:  Labs: Reviewed  US: Reviewed  CT: Reviewed    Assessment/Plan:     GI  * Decompensated liver disease  68 year old with extensive alcohol history as above who presented initially  for confusion and juandice. Found to have acute LFT elevation to the 1000s, T.Bili 3.2, . U/S liver consistent with cirrhosis. She is also Hep C+ with quant > 27 million. She has a history of heavy alcohol use and has worked as a  for most of her adult life. Hep C risk factors include blood transfusions prior to 1990 for hysterectomy/multiple orthopedic surgeries. Her acute hepatitis likely in the setting of alcohol vs acute hepatitis C. AST/ALT have been steadily improving since admission. Vitals are stable and she appears functional otherwise.     Recommendations:  Addiction psychiatry consult   Follow-up [facility]  Nutrition Consult to discuss low sodium high protein diet  Follow-up in Hep C clinic in 2 weeks    Hepatitis C virus infection without hepatic coma  Risk factors for Hep C include prior blood transfusions, denies IVDU. Follow-up in 2 weeks with hepatitis clinic.        Thank you for your consult. I will follow-up with patient. Please contact us if you have any additional questions.    Blanka Limon, DO  Hepatology  [facility] Surg

## 2024-05-10 NOTE — SUBJECTIVE & OBJECTIVE
Interval History:   Resting at bedside comfortably, denies any distress.   Able to tolerate diet.   Feels optimistic about quitting alcohol, will follow at [facility] at IOP program     Review of Systems   Constitutional:  Negative for activity change, chills, fatigue and fever.   HENT:  Negative for congestion and sore throat.    Respiratory:  Negative for cough and shortness of breath.    Cardiovascular:  Negative for chest pain.   Gastrointestinal:  Negative for abdominal distention, abdominal pain, nausea and vomiting.   Genitourinary:  Negative for difficulty urinating, dysuria and hematuria.   Musculoskeletal:  Positive for arthralgias. Negative for myalgias.   Skin:  Positive for color change. Negative for rash.   Neurological:  Negative for dizziness, tremors, seizures and weakness.   Psychiatric/Behavioral:  Negative for confusion. The patient is not nervous/anxious.      Objective:     Vital Signs (Most Recent):  Temp: 98.2 °F (36.8 °C) (05/10/24 1559)  Pulse: 72 (05/10/24 1559)  Resp: 16 (05/10/24 0348)  BP: 115/73 (05/10/24 1559)  SpO2: 95 % (05/10/24 1559) Vital Signs (24h Range):  Temp:  [97.8 °F (36.6 °C)-98.2 °F (36.8 °C)] 98.2 °F (36.8 °C)  Pulse:  [54-72] 72  Resp:  [16-20] 16  SpO2:  [94 %-98 %] 95 %  BP: (115-168)/(73-98) 115/73     Weight: 72.6 kg (160 lb 0.9 oz)  Body mass index is 26.63 kg/m².     Physical Exam  Vitals reviewed.   Constitutional:       General: She is not in acute distress.     Appearance: She is well-developed.   HENT:      Head: Normocephalic and atraumatic.   Eyes:      General: Scleral icterus present.      Extraocular Movements: Extraocular movements intact.   Neck:      Vascular: No JVD.      Trachea: No tracheal deviation.   Cardiovascular:      Rate and Rhythm: Normal rate and regular rhythm.      Heart sounds: No murmur heard.  Pulmonary:      Effort: No respiratory distress.      Breath sounds: Normal breath sounds.   Abdominal:      General: Bowel sounds are normal.  There is no distension.      Palpations: Abdomen is soft.      Tenderness: There is abdominal tenderness.      Comments: Mild TTP in RUQ and RLQ   Musculoskeletal:      Cervical back: Neck supple.   Skin:     General: Skin is warm and dry.      Coloration: Skin is jaundiced.      Findings: No rash.   Neurological:      Mental Status: She is alert and oriented to person, place, and time.   Psychiatric:         Behavior: Behavior normal.              Significant Labs: All pertinent labs within the past 24 hours have been reviewed.    Significant Imaging: I have reviewed all pertinent imaging results/findings within the past 24 hours.

## 2024-05-10 NOTE — CONSULTS
Food & Nutrition  Education    Diet Education: Low Sodium, High-Protein  Time Spent: 15-20 minutes  Learners: Patient      Nutrition Education provided with handouts: Low Sodium Nutrition Therapy, High-Calorie, High-Protein Nutrition Therapy      Comments: RD spoke with patient regarding low-sodium nutrition therapy.  RD explained eating less sodium helps if you have high blood pressure, heart failure, or kidney or liver disease. Eat more fresh foods. Limiting processed meats. Not all processed foods are unhealthy, but some may have too much sodium. Select foods with 140 mg of sodium or less per serving. Check serving sizes on the Nutrition Facts label. Reviewed other options to season your food such as salt-free seasonings. Reviewed high sodium foods that should be avoided. Encouraged healthy, fresh foods that are low in sodium that are good for consumption. High-calorie, high-protein foods encouraged. . Small, frequent meals may be more easily tolerated than larger meals. Aim to eat at least 6 meals/snacks. Add high-calorie/high-protein supplement drink that contains 200-400 calories. Eat more fat.  Extra calories add butter, margarine, peanut butter to foods. Avoid low-fat/calorie foods. Patient understands the information provided, agrees to comply with recommendations. All questions/concerns were addressed.      All questions and concerns answered. Dietitian's contact information provided.       Follow-Up: Yes (1x/ week)    Please Re-consult as needed        Thanks!    Eran Reynolds MS, RD, LDN

## 2024-05-11 VITALS
TEMPERATURE: 98 F | HEART RATE: 79 BPM | RESPIRATION RATE: 18 BRPM | BODY MASS INDEX: 26.67 KG/M2 | HEIGHT: 65 IN | WEIGHT: 160.06 LBS | OXYGEN SATURATION: 97 % | DIASTOLIC BLOOD PRESSURE: 91 MMHG | SYSTOLIC BLOOD PRESSURE: 127 MMHG

## 2024-05-11 LAB
ALBUMIN SERPL BCP-MCNC: 2.6 G/DL (ref 3.5–5.2)
ALP SERPL-CCNC: 356 U/L (ref 55–135)
ALT SERPL W/O P-5'-P-CCNC: 488 U/L (ref 10–44)
ANION GAP SERPL CALC-SCNC: 8 MMOL/L (ref 8–16)
AST SERPL-CCNC: 279 U/L (ref 10–40)
BASOPHILS # BLD AUTO: 0.01 K/UL (ref 0–0.2)
BASOPHILS NFR BLD: 0.2 % (ref 0–1.9)
BILIRUB SERPL-MCNC: 3.7 MG/DL (ref 0.1–1)
BUN SERPL-MCNC: 13 MG/DL (ref 8–23)
CALCIUM SERPL-MCNC: 9.3 MG/DL (ref 8.7–10.5)
CHLORIDE SERPL-SCNC: 106 MMOL/L (ref 95–110)
CO2 SERPL-SCNC: 27 MMOL/L (ref 23–29)
CREAT SERPL-MCNC: 1 MG/DL (ref 0.5–1.4)
DIFFERENTIAL METHOD BLD: ABNORMAL
EOSINOPHIL # BLD AUTO: 0 K/UL (ref 0–0.5)
EOSINOPHIL NFR BLD: 0 % (ref 0–8)
ERYTHROCYTE [DISTWIDTH] IN BLOOD BY AUTOMATED COUNT: 15.2 % (ref 11.5–14.5)
EST. GFR  (NO RACE VARIABLE): >60 ML/MIN/1.73 M^2
GLUCOSE SERPL-MCNC: 103 MG/DL (ref 70–110)
HAV IGG SER QL IA: NORMAL
HBV SURFACE AB SER-ACNC: 354.62 MIU/ML
HBV SURFACE AB SER-ACNC: REACTIVE M[IU]/ML
HCT VFR BLD AUTO: 39.2 % (ref 37–48.5)
HGB BLD-MCNC: 12.4 G/DL (ref 12–16)
HIV 1+2 AB+HIV1 P24 AG SERPL QL IA: NORMAL
IMM GRANULOCYTES # BLD AUTO: 0.02 K/UL (ref 0–0.04)
IMM GRANULOCYTES NFR BLD AUTO: 0.5 % (ref 0–0.5)
INR PPP: 1.2 (ref 0.8–1.2)
LYMPHOCYTES # BLD AUTO: 1.8 K/UL (ref 1–4.8)
LYMPHOCYTES NFR BLD: 42.3 % (ref 18–48)
MAGNESIUM SERPL-MCNC: 1.8 MG/DL (ref 1.6–2.6)
MCH RBC QN AUTO: 31.9 PG (ref 27–31)
MCHC RBC AUTO-ENTMCNC: 31.6 G/DL (ref 32–36)
MCV RBC AUTO: 101 FL (ref 82–98)
MONOCYTES # BLD AUTO: 0.5 K/UL (ref 0.3–1)
MONOCYTES NFR BLD: 12.2 % (ref 4–15)
NEUTROPHILS # BLD AUTO: 1.9 K/UL (ref 1.8–7.7)
NEUTROPHILS NFR BLD: 44.8 % (ref 38–73)
NRBC BLD-RTO: 0 /100 WBC
PHOSPHATE SERPL-MCNC: 3.4 MG/DL (ref 2.7–4.5)
PLATELET # BLD AUTO: 196 K/UL (ref 150–450)
PMV BLD AUTO: 10.7 FL (ref 9.2–12.9)
POTASSIUM SERPL-SCNC: 4.3 MMOL/L (ref 3.5–5.1)
PROT SERPL-MCNC: 5.7 G/DL (ref 6–8.4)
PROTHROMBIN TIME: 13.1 SEC (ref 9–12.5)
RBC # BLD AUTO: 3.89 M/UL (ref 4–5.4)
SODIUM SERPL-SCNC: 141 MMOL/L (ref 136–145)
WBC # BLD AUTO: 4.33 K/UL (ref 3.9–12.7)

## 2024-05-11 PROCEDURE — 84100 ASSAY OF PHOSPHORUS: CPT | Performed by: STUDENT IN AN ORGANIZED HEALTH CARE EDUCATION/TRAINING PROGRAM

## 2024-05-11 PROCEDURE — 25000003 PHARM REV CODE 250: Performed by: STUDENT IN AN ORGANIZED HEALTH CARE EDUCATION/TRAINING PROGRAM

## 2024-05-11 PROCEDURE — 36415 COLL VENOUS BLD VENIPUNCTURE: CPT | Performed by: STUDENT IN AN ORGANIZED HEALTH CARE EDUCATION/TRAINING PROGRAM

## 2024-05-11 PROCEDURE — 80053 COMPREHEN METABOLIC PANEL: CPT | Performed by: STUDENT IN AN ORGANIZED HEALTH CARE EDUCATION/TRAINING PROGRAM

## 2024-05-11 PROCEDURE — 86790 VIRUS ANTIBODY NOS: CPT | Performed by: STUDENT IN AN ORGANIZED HEALTH CARE EDUCATION/TRAINING PROGRAM

## 2024-05-11 PROCEDURE — 86706 HEP B SURFACE ANTIBODY: CPT | Performed by: STUDENT IN AN ORGANIZED HEALTH CARE EDUCATION/TRAINING PROGRAM

## 2024-05-11 PROCEDURE — 87389 HIV-1 AG W/HIV-1&-2 AB AG IA: CPT | Performed by: STUDENT IN AN ORGANIZED HEALTH CARE EDUCATION/TRAINING PROGRAM

## 2024-05-11 PROCEDURE — 85025 COMPLETE CBC W/AUTO DIFF WBC: CPT | Performed by: STUDENT IN AN ORGANIZED HEALTH CARE EDUCATION/TRAINING PROGRAM

## 2024-05-11 PROCEDURE — 85610 PROTHROMBIN TIME: CPT | Performed by: STUDENT IN AN ORGANIZED HEALTH CARE EDUCATION/TRAINING PROGRAM

## 2024-05-11 PROCEDURE — 83735 ASSAY OF MAGNESIUM: CPT | Performed by: STUDENT IN AN ORGANIZED HEALTH CARE EDUCATION/TRAINING PROGRAM

## 2024-05-11 RX ORDER — LANOLIN ALCOHOL/MO/W.PET/CERES
100 CREAM (GRAM) TOPICAL DAILY
Qty: 90 TABLET | Refills: 0 | Status: SHIPPED | OUTPATIENT
Start: 2024-05-12 | End: 2024-05-11

## 2024-05-11 RX ORDER — FOLIC ACID 1 MG/1
1 TABLET ORAL DAILY
Qty: 90 TABLET | Refills: 0 | Status: SHIPPED | OUTPATIENT
Start: 2024-05-12 | End: 2024-05-11

## 2024-05-11 RX ORDER — LANOLIN ALCOHOL/MO/W.PET/CERES
100 CREAM (GRAM) TOPICAL DAILY
Qty: 90 TABLET | Refills: 0 | Status: SHIPPED | OUTPATIENT
Start: 2024-05-12 | End: 2024-08-10

## 2024-05-11 RX ORDER — FOLIC ACID 1 MG/1
1 TABLET ORAL DAILY
Qty: 90 TABLET | Refills: 0 | Status: SHIPPED | OUTPATIENT
Start: 2024-05-12 | End: 2024-08-10

## 2024-05-11 RX ADMIN — FLUOXETINE HYDROCHLORIDE 40 MG: 20 CAPSULE ORAL at 08:05

## 2024-05-11 RX ADMIN — LORAZEPAM 2 MG: 1 TABLET ORAL at 02:05

## 2024-05-11 RX ADMIN — Medication 100 MG: at 08:05

## 2024-05-11 RX ADMIN — GABAPENTIN 600 MG: 300 CAPSULE ORAL at 08:05

## 2024-05-11 RX ADMIN — LORAZEPAM 2 MG: 1 TABLET ORAL at 10:05

## 2024-05-11 RX ADMIN — LORAZEPAM 2 MG: 1 TABLET ORAL at 06:05

## 2024-05-11 RX ADMIN — FOLIC ACID 1 MG: 1 TABLET ORAL at 08:05

## 2024-05-11 NOTE — HOSPITAL COURSE
Was seen by hepatology, given her low MELD was recommended alcohol abstinence and rehab and also treatment and establish care with Hepatitis C clinic.      Seen by addiction psychiatry and shereen for [facility] IOP program.. Out of caution patricia placed on PRN CIWA with lorazepam. Will also start thiamine and folate.    Review of Systems   Constitutional:  Negative for activity change, chills, fatigue and fever.   HENT:  Negative for congestion and sore throat.    Respiratory:  Negative for cough and shortness of breath.    Cardiovascular:  Negative for chest pain.   Gastrointestinal:  Negative for abdominal distention, abdominal pain, nausea and vomiting.   Genitourinary:  Negative for difficulty urinating, dysuria and hematuria.   Musculoskeletal:  Positive for arthralgias. Negative for myalgias.   Skin:  Positive for color change. Negative for rash.   Neurological:  Negative for dizziness, tremors, seizures and weakness.   Psychiatric/Behavioral:  Negative for confusion. The patient is not nervous/anxious.      Objective:     Vital Signs (Most Recent):  Temp: 97.7 °F (36.5 °C) (05/11/24 1110)  Pulse: 79 (05/11/24 1110)  Resp: 18 (05/11/24 1110)  BP: (!) 127/91 (05/11/24 1110)  SpO2: 97 % (05/11/24 1110) Vital Signs (24h Range):        Weight: 72.6 kg (160 lb 0.9 oz)  Body mass index is 26.63 kg/m².     Physical Exam  Vitals reviewed.   Constitutional:       General: She is not in acute distress.     Appearance: She is well-developed.   HENT:      Head: Normocephalic and atraumatic.   Eyes:      General: Scleral icterus present.      Extraocular Movements: Extraocular movements intact.   Neck:      Vascular: No JVD.      Trachea: No tracheal deviation.   Cardiovascular:      Rate and Rhythm: Normal rate and regular rhythm.      Heart sounds: No murmur heard.  Pulmonary:      Effort: No respiratory distress.      Breath sounds: Normal breath sounds.   Abdominal:      General: Bowel sounds are normal. There is no  distension.      Palpations: Abdomen is soft.      Tenderness: There is no abdominal tenderness.      Comments: Mild TTP in RUQ and RLQ   Musculoskeletal:      Cervical back: Neck supple.   Skin:     General: Skin is warm and dry.      Coloration: Skin is jaundiced.      Findings: No rash.   Neurological:      Mental Status: She is alert and oriented to person, place, and time.   Psychiatric:         Behavior: Behavior normal.

## 2024-05-11 NOTE — ASSESSMENT & PLAN NOTE
Patient has persistent depression which is mild and is currently controlled. Will Continue anti-depressant medications. We will not consult psychiatry at this time. Patient does not display psychosis at this time. Continue to monitor closely and adjust plan of care as needed.    Continue home prozac

## 2024-05-11 NOTE — ASSESSMENT & PLAN NOTE
MELD 3.0: 18 at 5/10/2024  5:52 AM  MELD-Na: 15 at 5/10/2024  5:52 AM  Calculated from:  Serum Creatinine: 0.9 mg/dL (Using min of 1 mg/dL) at 5/10/2024  5:51 AM  Serum Sodium: 140 mmol/L (Using max of 137 mmol/L) at 5/10/2024  5:51 AM  Total Bilirubin: 5.6 mg/dL at 5/10/2024  5:51 AM  Serum Albumin: 2.8 g/dL at 5/10/2024  5:51 AM  INR(ratio): 1.2 at 5/10/2024  5:52 AM  Age at listing (hypothetical): 68 years  Sex: Female at 5/10/2024  5:52 AM    -Presented with jaundice, elevated LFTs, evidence of cirrhosis on imaging  -Multifactorial with Hep C/Alcohol use. New diagnosis and no prior informations   - No known substance use, likely exposure with blood transfusion and surgeries in 1990s  -LFTs in 1000s, trending down since admission   - Appreciate hepatology recommendations, Will arrange for outpatient follow up in 2 weeks  - Appreciate addition psych evaluation. Patient interested in [facility] IOP program.

## 2024-05-11 NOTE — ASSESSMENT & PLAN NOTE
Reported extensive alcohol use   Interested in rehab and will follow with [facility] IOP program, appreciate addiction psych recommendations   Will start thiamine and folate   Monitor on CIWA

## 2024-05-11 NOTE — PLAN OF CARE
Pt discharged to home via wheelchair with belongings and family. IV removed. Discharge packet discussed with pt and pts family. All questions answered. New meds to be picked up from pharmacy.

## 2024-05-11 NOTE — PLAN OF CARE
Transfer from [facility]    CM to bedside - pt provided assessment info and CM obtained info from [facility] admit. Pt w/ home oxygen in place, lives w/ grandchildren. Pt will likely d/c home w/ no needs.     REUBEN SageN, RN, [facility]  Transitional Care Manager  [phone]  [email or web address] - [email or web address]  Discharge Assessment    Primary Care Provider: Jorge Alonzo MD     Discharge Assessment (most recent)       BRIEF DISCHARGE ASSESSMENT - 05/11/24 1200          Discharge Planning    Assessment Type Discharge Planning Brief Assessment     Resource/Environmental Concerns none     Support Systems Family members     Equipment Currently Used at Home oxygen     Current Living Arrangements home     Patient/Family Anticipates Transition to home with family     Patient/Family Anticipated Services at Transition none     DME Needed Upon Discharge  none     Discharge Plan A Home with family     Discharge Plan B Home with family;Home Health        Physical Activity    On average, how many days per week do you engage in moderate to strenuous exercise (like a brisk walk)? 0 days     On average, how many minutes do you engage in exercise at this level? 0 min        Financial Resource Strain    How hard is it for you to pay for the very basics like food, housing, medical care, and heating? Not very hard        Housing Stability    In the last 12 months, was there a time when you were not able to pay the mortgage or rent on time? No        Transportation Needs    Has the lack of transportation kept you from medical appointments, meetings, work or from getting things needed for daily living? No        Food Insecurity    Within the past 12 months, you worried that your food would run out before you got the money to buy more. Never true     Within the past 12 months, the food you bought just didn't last and you didn't have money to get more. Never true        Stress    Do you feel stress - tense,  restless, nervous, or anxious, or unable to sleep at night because your mind is troubled all the time - these days? Only a little        Social Isolation    How often do you feel lonely or isolated from those around you?  Rarely        Alcohol Use    Q1: How often do you have a drink containing alcohol? 2-4 times a month     Q2: How many drinks containing alcohol do you have on a typical day when you are drinking? 1 or 2     Q3: How often do you have six or more drinks on one occasion? Never        Utilities    In the past 12 months has the electric, gas, oil, or water company threatened to shut off services in your home? No        Health Literacy    How often do you need to have someone help you when you read instructions, pamphlets, or other written material from your doctor or pharmacy? Rarely

## 2024-05-11 NOTE — PLAN OF CARE
05/11/24 1159   Readmission   Why were you hospitalized in the last 30 days? no readmission - transfer from [facility]

## 2024-05-11 NOTE — PLAN OF CARE
Patient expected to discharge to home w/ no needs. Appts added to follow-up.    REUBEN SageN, RN, [facility]  Transitional Care Manager  [phone]  [email or web address] - [email or web address]  Discharge Final Note    Primary Care Provider: Jorge Alonzo MD    Expected Discharge Date: 5/11/2024    Final Discharge Note (most recent)       Final Note - 05/11/24 1205          Final Note    Assessment Type Final Discharge Note     Anticipated Discharge Disposition Home or Self Care     Hospital Resources/Appts/Education Provided Appointments scheduled and added to AVS        Post-Acute Status    Post-Acute Authorization Other     Other Status No Post-Acute Service Needs     Discharge Delays None known at this time                     Important Message from Medicare             Contact Info       Jorge Alonzo MD   Specialty: Internal Medicine   Relationship: PCP - General    [address] [place] [address] [place] [address]   Phone: [phone]       Next Steps: Follow up on 6/4/2024    Instructions: Tuesday 6/4 @ 1:30pm    [facility] - Hepatology   Specialty: Hepatology    [address]  [place] [address]   Phone: [phone]       Next Steps: Follow up on 6/4/2024    Instructions: Tuesday 6/4 @ 11:40am with Jennifer B. Scheuermann, PA

## 2024-05-12 NOTE — ASSESSMENT & PLAN NOTE
Patient has persistent depression which is mild and is currently controlled. Will Continue anti-depressant medications. We will not consult psychiatry at this time. Patient does not display psychosis at this time. Continue to monitor closely and adjust plan of care as needed.    Continue home prozac  Recommendation to continue follow up with outpatient psychiatry.

## 2024-05-12 NOTE — DISCHARGE SUMMARY
"[facility] Medicine  Discharge Summary      Patient Name: Beatriz Gan  MRN: [id number]  CAMELIA: [id number]  Patient Class: IP- Inpatient  Admission Date: 5/9/2024  Hospital Length of Stay: 2 days  Discharge Date and Time: 5/11/2024 12:04 PM  Attending Physician: No att. providers found   Discharging Provider: Maricarmen Stern MD  Primary Care Provider: Jorge Alonzo MD  [facility] MED  Maricarmen Stern MD  Primary Care Team: [facility]    HPI:   67yo F with a PMHx  HTN, HLD, asthma/COPD, NATHALIE, GERD, and alcohol dependence who presented to the [facility] ED on 5/7/2024 with complaints of jaundice, confusion, and chest pain. Per referring MD: "was seen in the ER on 04/13 and found to have new significantly abnormal liver panel with a total bilirubin of 2.7 AST of 421 ALT of 676 and normal INR 1.1. Ultrasound abdomen CT abdomen was performed and unremarkable. Patient was diagnosed with pneumonia and urinary tract infection and given Augmentin and azithromycin with PCP follow-up. Hepatitis panel positive for HCV only. Her primary care doctor upon follow-up referred her to hepatology but her appointment is not till June. Pt. Went home 4/13 and completed the course of augmentin and azithromycin. She developed worsening jaundice and weakness at home over the last few weeks, however, which prompted her to present again to the ED 5/7.      In the ED, vitals significant for /104. Labs remarkable for K 3.2, tbili 6.3, , AST 1087, and ALT 1158. CXR without any acute cardiopulmonary processes. Acetaminophen level undetectable, but pt. Started on NAC infusion protocol and was admitted to hospital medicine. She completed NAC 5/8 and has been receiving mostly supportive care withsome downtrend in her AST/ALT (now 720/855). Liver Doppler U/S 5/8 showed "Cirrhosis without focal mass. Ascites, splenomegaly or reversal of flow in the portal vein is not seen."    Pt. Reports that she " previously drank everyday, but she has not had alcohol in ~2 months. She also used to take tylenol daily, around 7-8 325mg per day for treatment of headaches (she did not always keep a good count however). She also has not had this medications in ~3 months.        [facility] contacted to facilitate transfer for higher LOC and hepatology evaluation. Pt accepted to  at [facility] for further care and management.       * No surgery found *      Hospital Course:   Was seen by hepatology, given her low MELD was recommended alcohol abstinence and rehab and also treatment and establish care with Hepatitis C clinic.      Seen by addiction psychiatry and shereen for [facility] IOP program.. Out of caution patricia placed on PRN CIWA with lorazepam. Will also start thiamine and folate.    Review of Systems   Constitutional:  Negative for activity change, chills, fatigue and fever.   HENT:  Negative for congestion and sore throat.    Respiratory:  Negative for cough and shortness of breath.    Cardiovascular:  Negative for chest pain.   Gastrointestinal:  Negative for abdominal distention, abdominal pain, nausea and vomiting.   Genitourinary:  Negative for difficulty urinating, dysuria and hematuria.   Musculoskeletal:  Positive for arthralgias. Negative for myalgias.   Skin:  Positive for color change. Negative for rash.   Neurological:  Negative for dizziness, tremors, seizures and weakness.   Psychiatric/Behavioral:  Negative for confusion. The patient is not nervous/anxious.      Objective:     Vital Signs (Most Recent):  Temp: 97.7 °F (36.5 °C) (05/11/24 1110)  Pulse: 79 (05/11/24 1110)  Resp: 18 (05/11/24 1110)  BP: (!) 127/91 (05/11/24 1110)  SpO2: 97 % (05/11/24 1110) Vital Signs (24h Range):        Weight: 72.6 kg (160 lb 0.9 oz)  Body mass index is 26.63 kg/m².     Physical Exam  Vitals reviewed.   Constitutional:       General: She is not in acute distress.     Appearance: She is well-developed.   HENT:      Head: Normocephalic and  atraumatic.   Eyes:      General: Scleral icterus present.      Extraocular Movements: Extraocular movements intact.   Neck:      Vascular: No JVD.      Trachea: No tracheal deviation.   Cardiovascular:      Rate and Rhythm: Normal rate and regular rhythm.      Heart sounds: No murmur heard.  Pulmonary:      Effort: No respiratory distress.      Breath sounds: Normal breath sounds.   Abdominal:      General: Bowel sounds are normal. There is no distension.      Palpations: Abdomen is soft.      Tenderness: There is no abdominal tenderness.      Comments: Mild TTP in RUQ and RLQ   Musculoskeletal:      Cervical back: Neck supple.   Skin:     General: Skin is warm and dry.      Coloration: Skin is jaundiced.      Findings: No rash.   Neurological:      Mental Status: She is alert and oriented to person, place, and time.   Psychiatric:         Behavior: Behavior normal.           Goals of Care Treatment Preferences:  Code Status: Full Code      Consults:   Consults (From admission, onward)          Status Ordering Provider     Inpatient consult to Registered Dietitian/Nutritionist  Once        Provider:  (Not yet assigned)    Completed PRESTON MCNEIL     Inpatient consult to Psychiatry  Once        Provider:  (Not yet assigned)    Completed JAMES SCHULZ     Inpatient consult to Hepatology  Once        Provider:  (Not yet assigned)    Completed YAIR BEDOLLA            Neuro  DDD (degenerative disc disease), lumbar  H/o surgeries, continues home neurontin       Psychiatric  Alcohol abuse  Reported extensive alcohol use   Interested in rehab and will follow with [facility] program, appreciate addiction psych recommendations   Will start thiamine and folate and continue his outpatient  No concerns for withdrawal while on CIWA monitoring.      Major depressive disorder, recurrent episode, moderate  Patient has persistent depression which is mild and is currently controlled. Will Continue anti-depressant medications. We  will not consult psychiatry at this time. Patient does not display psychosis at this time. Continue to monitor closely and adjust plan of care as needed.    Continue home prozac  Recommendation to continue follow up with outpatient psychiatry.      Pulmonary  Moderate persistent asthma without complication  Follows with pulmonology   No acute flare   Continue home inhaler       Cardiac/Vascular  Essential hypertension  -resume home amlodipine.    GI  * Decompensated liver disease  MELD 3.0: 17 at 5/11/2024  4:11 AM  MELD-Na: 13 at 5/11/2024  4:11 AM  Calculated from:  Serum Creatinine: 1.0 mg/dL at 5/11/2024  4:11 AM  Serum Sodium: 141 mmol/L (Using max of 137 mmol/L) at 5/11/2024  4:11 AM  Total Bilirubin: 3.7 mg/dL at 5/11/2024  4:11 AM  Serum Albumin: 2.6 g/dL at 5/11/2024  4:11 AM  INR(ratio): 1.2 at 5/11/2024  4:11 AM  Age at listing (hypothetical): 68 years  Sex: Female at 5/11/2024  4:11 AM    -Presented with jaundice, elevated LFTs, evidence of cirrhosis on imaging  -Multifactorial with Hep C/Alcohol use. New diagnosis and no prior informations   - No known substance use, likely exposure with blood transfusion and surgeries in 1990s  -LFTs in 1000s, trending down since admission   - Appreciate hepatology recommendations, Will arrange for outpatient follow up in 2 weeks for treatment of hepatitis-C  - Appreciate addition psych evaluation. Patient interested in [facility] IOP program.        Hepatitis C virus infection without hepatic coma  -Hep antibody and RNA positive 4/13/2024.  - follow up outpatient in Hep C clinic in 2 weeks.       Final Active Diagnoses:    Diagnosis Date Noted POA    PRINCIPAL PROBLEM:  Decompensated liver disease [K74.69] 05/08/2024 Yes    Moderate persistent asthma without complication [J45.40] 05/10/2024 Yes    Hepatitis C virus infection without hepatic coma [B19.20] 05/08/2024 Yes    Alcohol abuse [F10.10] 05/08/2024 Yes    Essential hypertension [I10] 08/21/2015 Yes    Major  depressive disorder, recurrent episode, moderate [F33.1] 08/21/2015 Yes    DDD (degenerative disc disease), lumbar [M51.36] 05/09/2014 Yes      Problems Resolved During this Admission:       Discharged Condition: stable    Disposition: Home or Self Care    Follow Up:   Follow-up Information       Jorge Alonzo MD Follow up on 6/4/2024.    Specialty: Internal Medicine  Why: Tuesday 6/4 @ 1:30pm  Contact information:  [address]  [place] [address]  [phone]               [facility] - Hepatology Follow up on 6/4/2024.    Specialty: Hepatology  Why: Tuesday 6/4 @ 11:40am with Jennifer B. Scheuermann, PA  Contact information:  [address] [facility] [address]  [facility] [address] [phone]  Additional information:  [facility]                         Patient Instructions:      Diet Adult Regular     Order Specific Question Answer Comments   Na restriction, if any: 2gNa    Additional restrictions: High Protein/High Calorie      Notify your health care provider if you experience any of the following:  persistent nausea and vomiting or diarrhea     Notify your health care provider if you experience any of the following:  temperature >100.4     Notify your health care provider if you experience any of the following:  persistent dizziness, light-headedness, or visual disturbances     Notify your health care provider if you experience any of the following:  increased confusion or weakness     Notify your health care provider if you experience any of the following:  severe persistent headache     Notify your health care provider if you experience any of the following:  difficulty breathing or increased cough     Activity as tolerated       Significant Diagnostic Studies: Labs: CMP   Recent Labs   Lab 05/11/24  0411      K 4.3      CO2 27      BUN 13   CREATININE 1.0   CALCIUM 9.3   PROT 5.7*   ALBUMIN 2.6*   BILITOT 3.7*   ALKPHOS 356*   *   *   ANIONGAP 8    and CBC    Recent Labs   Lab 05/11/24  0411   WBC 4.33   HGB 12.4   HCT 39.2          Pending Diagnostic Studies:       Procedure Component Value Units Date/Time    THI [[id number]] Collected: 05/10/24 0840    Order Status: Sent Lab Status: In process Updated: 05/10/24 0908    Specimen: Blood     Anti-smooth muscle antibody [[id number]] Collected: 05/10/24 0551    Order Status: Sent Lab Status: In process Updated: 05/10/24 0642    Specimen: Blood     Phosphatidylethanol (PETH) [[id number]] Collected: 05/10/24 0551    Order Status: Sent Lab Status: In process Updated: 05/10/24 0642    Specimen: Blood            Medications:  Reconciled Home Medications:      Medication List        START taking these medications      folic acid 1 MG tablet  Commonly known as: FOLVITE  Take 1 tablet (1 mg total) by mouth once daily.     thiamine 100 MG tablet  Take 1 tablet (100 mg total) by mouth once daily.            CONTINUE taking these medications      albuterol 90 mcg/actuation inhaler  Commonly known as: VENTOLIN HFA  Inhale 2 puffs into the lungs every 4 (four) hours as needed for Wheezing or Shortness of Breath. Rescue     ALPRAZolam 1 MG tablet  Commonly known as: XANAX  Take 1 tablet (1 mg total) by mouth 2 (two) times daily as needed for Anxiety.     amLODIPine 5 MG tablet  Commonly known as: NORVASC  Take 1 tablet (5 mg total) by mouth once daily.     aspirin 81 MG EC tablet  Commonly known as: ECOTRIN  Take 1 tablet (81 mg total) by mouth once daily.     azelastine 137 mcg (0.1 %) nasal spray  Commonly known as: ASTELIN  Use 1 spray(s) in each nostril twice daily     * benralizumab 30 mg/mL Atin  Inject 30 mg into the skin every 8 weeks.     * FASENRA PEN 30 mg/mL Atin  Generic drug: benralizumab  Inject 30 mg into the skin every 28 days.     budesonide-formoterol 160-4.5 mcg 160-4.5 mcg/actuation Hfaa  Commonly known as: SYMBICORT  Inhale 2 puffs into the lungs every 12 (twelve) hours. Controller     FLUoxetine 40 MG  capsule  Take 1 capsule (40 mg total) by mouth 2 (two) times daily.     fluticasone propionate 50 mcg/actuation nasal spray  Commonly known as: FLONASE  1 spray by Nasal route daily as needed.     gabapentin 600 MG tablet  Commonly known as: NEURONTIN  Take 1 tablet (600 mg total) by mouth 3 (three) times daily.     multivit-iron-min-folic acid 3,500-18-0.4 unit-mg-mg Chew  Take by mouth once daily.     UBRELVY 100 mg tablet  Generic drug: ubrogepant  Take 100 mg by mouth as needed. Max 1 in 24 hours     VITAMIN D3 ORAL  Take 1 capsule by mouth once daily.     zolpidem 10 mg Tab  Commonly known as: AMBIEN  Take 1 tablet (10 mg total) by mouth nightly as needed.           * This list has 2 medication(s) that are the same as other medications prescribed for you. Read the directions carefully, and ask your doctor or other care provider to review them with you.                STOP taking these medications      celecoxib 200 MG capsule  Commonly known as: CeleBREX     magnesium 30 mg Tab     polyethylene glycol 17 gram/dose powder  Commonly known as: GLYCOLAX     predniSONE 10 MG tablet  Commonly known as: DELTASONE              Indwelling Lines/Drains at time of discharge:   Lines/Drains/Airways       None                   Time spent on the discharge of patient: 45 minutes         Maricarmen Stern MD  Department of Hospital Medicine  [facility] Surg

## 2024-05-12 NOTE — ASSESSMENT & PLAN NOTE
MELD 3.0: 17 at 5/11/2024  4:11 AM  MELD-Na: 13 at 5/11/2024  4:11 AM  Calculated from:  Serum Creatinine: 1.0 mg/dL at 5/11/2024  4:11 AM  Serum Sodium: 141 mmol/L (Using max of 137 mmol/L) at 5/11/2024  4:11 AM  Total Bilirubin: 3.7 mg/dL at 5/11/2024  4:11 AM  Serum Albumin: 2.6 g/dL at 5/11/2024  4:11 AM  INR(ratio): 1.2 at 5/11/2024  4:11 AM  Age at listing (hypothetical): 68 years  Sex: Female at 5/11/2024  4:11 AM    -Presented with jaundice, elevated LFTs, evidence of cirrhosis on imaging  -Multifactorial with Hep C/Alcohol use. New diagnosis and no prior informations   - No known substance use, likely exposure with blood transfusion and surgeries in 1990s  -LFTs in 1000s, trending down since admission   - Appreciate hepatology recommendations, Will arrange for outpatient follow up in 2 weeks for treatment of hepatitis-C  - Appreciate addition psych evaluation. Patient interested in [facility] IOP program.       none

## 2024-05-12 NOTE — SUBJECTIVE & OBJECTIVE
Interval History:   Resting at bedside comfortably, denies any distress.   Able to tolerate diet.   Feels optimistic about quitting alcohol, will follow at [facility] at IOP program     Review of Systems   Constitutional:  Negative for activity change, chills, fatigue and fever.   HENT:  Negative for congestion and sore throat.    Respiratory:  Negative for cough and shortness of breath.    Cardiovascular:  Negative for chest pain.   Gastrointestinal:  Negative for abdominal distention, abdominal pain, nausea and vomiting.   Genitourinary:  Negative for difficulty urinating, dysuria and hematuria.   Musculoskeletal:  Positive for arthralgias. Negative for myalgias.   Skin:  Positive for color change. Negative for rash.   Neurological:  Negative for dizziness, tremors, seizures and weakness.   Psychiatric/Behavioral:  Negative for confusion. The patient is not nervous/anxious.      Objective:     Vital Signs (Most Recent):  Temp: 97.7 °F (36.5 °C) (05/11/24 1110)  Pulse: 79 (05/11/24 1110)  Resp: 18 (05/11/24 1110)  BP: (!) 127/91 (05/11/24 1110)  SpO2: 97 % (05/11/24 1110) Vital Signs (24h Range):        Weight: 72.6 kg (160 lb 0.9 oz)  Body mass index is 26.63 kg/m².     Physical Exam  Vitals reviewed.   Constitutional:       General: She is not in acute distress.     Appearance: She is well-developed.   HENT:      Head: Normocephalic and atraumatic.   Eyes:      General: Scleral icterus present.      Extraocular Movements: Extraocular movements intact.   Neck:      Vascular: No JVD.      Trachea: No tracheal deviation.   Cardiovascular:      Rate and Rhythm: Normal rate and regular rhythm.      Heart sounds: No murmur heard.  Pulmonary:      Effort: No respiratory distress.      Breath sounds: Normal breath sounds.   Abdominal:      General: Bowel sounds are normal. There is no distension.      Palpations: Abdomen is soft.      Tenderness: There is no abdominal tenderness.      Comments: Mild TTP in RUQ and RLQ    Musculoskeletal:      Cervical back: Neck supple.   Skin:     General: Skin is warm and dry.      Coloration: Skin is jaundiced.      Findings: No rash.   Neurological:      Mental Status: She is alert and oriented to person, place, and time.   Psychiatric:         Behavior: Behavior normal.              Significant Labs: All pertinent labs within the past 24 hours have been reviewed.    Significant Imaging: I have reviewed all pertinent imaging results/findings within the past 24 hours.

## 2024-05-12 NOTE — ASSESSMENT & PLAN NOTE
Reported extensive alcohol use   Interested in rehab and will follow with [facility] IOP program, appreciate addiction psych recommendations   Will start thiamine and folate and continue his outpatient  No concerns for withdrawal while on CIWA monitoring.

## 2024-05-13 ENCOUNTER — TELEPHONE (OUTPATIENT)
Dept: PULMONOLOGY | Facility: CLINIC | Age: 69
End: 2024-05-13
Payer: MEDICARE

## 2024-05-13 LAB
ANA SER QL IF: NORMAL
CLINICAL BIOCHEMIST REVIEW: NORMAL
PLPETH BLD-MCNC: <10 NG/ML
POPETH BLD-MCNC: 14 NG/ML
SMOOTH MUSCLE AB TITR SER IF: NORMAL {TITER}

## 2024-05-13 NOTE — TELEPHONE ENCOUNTER
Left voicemail.     ----- Message from Madelaine Goodman sent at 5/13/2024  4:22 PM CDT -----  Type:  RX Refill Request    Who Called:  Ailyn [facility]    Refill or New Rx: refill    RX Name and Strength: prednisone 10mg    How is the patient currently taking it? (ex. 1XDay):as directed    Is this a 30 day or 90 day RX:90    Preferred Pharmacy with phone number:  [facility] [facility] [address] [place] [address]  Phone: [phone] Fax: [phone]        Local or Mail Order:mail    Ordering Provider:tom    Would the patient rather a call back or a response via [facility]? Call back    Best Call Back Number:Ailyn [phone]     Additional Information: said they sent a refill request on 3/28      Please call Back to advise. Thanks!

## 2024-05-14 ENCOUNTER — OUTPATIENT CASE MANAGEMENT (OUTPATIENT)
Dept: ADMINISTRATIVE | Facility: OTHER | Age: 69
End: 2024-05-14
Payer: MEDICARE

## 2024-05-14 LAB
HAV IGM SERPL QL IA: NEGATIVE
HBV CORE IGM SERPL QL IA: NEGATIVE
HBV SURFACE AG SERPL QL IA: NEGATIVE
HCV AB S/CO SERPL IA: REACTIVE
HCV AB SERPL QL IA: ABNORMAL

## 2024-05-14 NOTE — LETTER
Beatriz Gan  [address] [address]    Dear Beatriz Gan,     Welcome to [facility]. We are here to assist patients with multiple long-term (chronic) conditions who often need more personalized healthcare.    It was a pleasure talking with you today. My name is Randi Silva RN. I look forward to working with you as your Care Manager. I will be contacting you by telephone routinely to help coordinate care and resolve issues.    My goal is to help you function at the healthiest and highest level possible. You can contact me directly at [phone].    As an [facility] patient with [employer], some of the services we provide, at no cost to you, include:     Development of an individualized care plan with a Registered Nurse   Connection with a   Assistance from a Community Health Worker  Connection with available resources and services    Coordinate communication among your care team members   Provide coaching and education  Help you understand your doctor's treatment plan  Help you obtain information about your insurance coverage.    All services provided by [facility] Outpatient Care Managers and other care team members are coordinated with and communicated to your primary care team.      As part of your enrollment, you will be receiving education materials and more information about these services in your [facility] account, by phone, or through the mail. If you do not wish to participate or receive information, you can Opt Out by contacting our office at [phone].      Sincerely,        Randi Silva RN  [facility]   Outpatient Care Management

## 2024-05-14 NOTE — PROGRESS NOTES
"Outpatient Care Management  Initial Patient Assessment    Patient: Beatriz Gan  MRN: [id number]  Date of Service: 05/14/2024  Completed by: Randi Horta RN  Referral Date: 05/08/2024  Date of Eligibility: 5/9/2024  Program:   High Risk  Status: Ongoing  Effective Dates: 5/14/2024 - present  Responsible Staff: Randi Horta, RN        Reason for Visit   Patient presents with    OPCM Enrollment Call       Brief Summary:  Beatriz Gan was referred by Dr. Nance for acute liver failure without hepatic coma. Patient qualifies for program based on high risk score of 88.9%.   Active problem list, medical, surgical and social history reviewed. Active comorbidities include alcohol dependence, DDD lumbosacral, GERD, gout, hypercholesterolemia, HTN, renal cell carcinoma s/p partial left nephrectomy, major depressive disorder, anxiety, COPD, moderate persistent asthma, meningioma, and CAD s/p CABG. . Areas of need identified by patient include "learning about hepatitis and finding out if my liver is working properly."   Next steps:   Free from falls?  Weakness improved?  Review if Mrs. Otto has been utilizing briefs/pads regularly.   Confirm date of last alcohol intake, risks/harms associated with drinking.  Consider IOP, per notes from hospital stay Mrs. Otto was interested.   SW referral for transportation assistance/insurance questions.   Mrs. Otto agreed to OPCM RN follow up on or around 5/21/24.    Disability Status  Is the patient alert and oriented (person, place, time, and situation)?: Alert and oriented x 4  Hearing Difficulty or Deaf: no  Visual Difficulty or Blind: yes  Visual and Hearing Needs Conclusion: Mrs. Henderson reports vision difficulties and hard of hearing.  Difficulty Concentrating, Remembering or Making Decisions: yes  Communication Difficulty: no  Eating/Swallowing Difficulty: no  Walking or Climbing Stairs Difficulty: yes  Walking or Climbing Stairs: ambulation difficulty, " requires equipment  Dressing/Bathing Difficulty: yes  Dressing/Bathing: bathing difficulty, requires equipment  Toileting : Independent  Continence : Incontience - Bladder; Incontinence - Bowel  Difficulty Managing Errands Independently: yes  Equipment Currently Used at Home: cane, straight; walker, rolling; wheelchair; bath bench  ADL Conclusion Statement: Mrs. Henderson reports that she lives in a two story home, her grandson Ruben and his wife live with her; she utilizes a straight cane for ambulation but also has a rolling walker and wheelchair for longer distances; she uses a bath bench for bathing; she expressed that she is need of reliable transportation currently relying on friends and family which can be a burden. Mrs. Henderson has difficulty with bowel and bladder incontinence occasionally, wears briefs and pads when needed.  Change in Functional Status Since Onset of Current Illness/Injury: yes        Spiritual Beliefs  Spiritual, Cultural Beliefs, Rastafarian Practices, Values that Affect Care: no      Social History     Socioeconomic History    Marital status:    Occupational History     Employer: [employer]   Tobacco Use    Smoking status: Former     Current packs/day: 0.00     Average packs/day: 1 pack/day for 40.0 years (40.0 ttl pk-yrs)     Types: Cigarettes     Start date: 1978     Quit date: 2018     Years since quittin.3    Smokeless tobacco: Never    Tobacco comments:     smokes 2-3 cigarettes a night   Substance and Sexual Activity    Alcohol use: Yes     Alcohol/week: 12.0 standard drinks of alcohol     Types: 6 Cans of beer, 6 Standard drinks or equivalent per week     Comment: 2-3 a day/ social    Drug use: No    Sexual activity: Yes     Partners: Male     Social Determinants of Health     Financial Resource Strain: Low Risk  (2024)    Overall Financial Resource Strain (CARDIA)     Difficulty of Paying Living Expenses: Not very hard   Food Insecurity: No Food Insecurity  (5/11/2024)    Hunger Vital Sign     Worried About Running Out of Food in the Last Year: Never true     Ran Out of Food in the Last Year: Never true   Transportation Needs: No Transportation Needs (5/11/2024)    TRANSPORTATION NEEDS     Transportation : No   Physical Activity: Inactive (5/11/2024)    Exercise Vital Sign     Days of Exercise per Week: 0 days     Minutes of Exercise per Session: 0 min   Stress: No Stress Concern Present (5/11/2024)    [employer] - Occupational Stress Questionnaire     Feeling of Stress : Only a little   Housing Stability: Low Risk  (10/19/2023)    Housing Stability Vital Sign     Unable to Pay for Housing in the Last Year: No     Number of Places Lived in the Last Year: 1     Unstable Housing in the Last Year: No       Roles and Relationships  Primary Source of Support/Comfort: extended family (grandson)  Name of Support/Comfort Primary Source: Ruben      Advance Directives (For Healthcare)  Advance Directive  (If Adv Dir status is received, view document under Adv Dir in header or Chart Review Media tab): Patient has advance directive, copy not received.  Patient Requests Assistance: Patient will do independently        Patient Reported Insurance  Verified current insurance plan:: [employer] Advantage  [employer] benefits discussed:: Mail Order Pharmacy; OTC Prescription Discounts            5/14/2024    10:44 AM 5/3/2024    11:35 AM 10/19/2023     3:00 PM 10/11/2023    12:48 PM 10/4/2023     9:06 AM 8/7/2023     1:46 PM 7/21/2023     9:20 AM   Depression Patient Health Questionnaire   Over the last two weeks how often have you been bothered by little interest or pleasure in doing things Not at all Not at all Not at all Several days Not at all Not at all Not at all   Over the last two weeks how often have you been bothered by feeling down, depressed or hopeless Several days Not at all Several days Several days Not at all Several days Several days    PHQ-2 Total Score 1 0 1 2 0 1 1       Learning Assessment       05/15/2024 0857 [facility] (5/14/2024 - Present)   Created by Randi Horta RN - RN (Nurse) Status: Complete                 PRIMARY LEARNER     Primary Learner Name:  Beatriz Gan ED - 05/15/2024 0857    Relationship:  Patient ED - 05/15/2024 0857    Does the primary learner have any barriers to learning?:  No Barriers ED - 05/15/2024 0857    What is the preferred language of the primary learner?:  English ED - 05/15/2024 0857    Is an  required?:  No ED - 05/15/2024 0857    How does the primary learner prefer to learn new concepts?:  Listening, Reading ED - 05/15/2024 0857    How often do you need to have someone help you read instructions, pamphlets, or written material from your doctor or pharmacy?:  Never ED - 05/15/2024 0857        CO-LEARNER #1     No question answered        CO-LEARNER #2     No question answered        SPECIAL TOPICS     No question answered        ANSWERED BY:     No question answered        Comments         Edit History       Randi Horta, RN - RN (Nurse)   05/15/2024 0857

## 2024-05-15 LAB
OHS QRS DURATION: 78 MS
OHS QTC CALCULATION: 444 MS

## 2024-05-16 DIAGNOSIS — F41.9 ANXIETY: ICD-10-CM

## 2024-05-16 RX ORDER — FLUOXETINE HYDROCHLORIDE 40 MG/1
40 CAPSULE ORAL 2 TIMES DAILY
Qty: 180 CAPSULE | Refills: 3 | Status: SHIPPED | OUTPATIENT
Start: 2024-05-16

## 2024-05-16 NOTE — TELEPHONE ENCOUNTER
No care due was identified.  [facility] Embedded Care Due Messages. Reference number: [id number].   5/16/2024 1:17:35 PM CDT

## 2024-05-16 NOTE — TELEPHONE ENCOUNTER
No care due was identified.  [facility] Embedded Care Due Messages. Reference number: [id number].   5/16/2024 12:47:11 PM CDT

## 2024-05-16 NOTE — TELEPHONE ENCOUNTER
----- Message from Mara Rivers sent at 5/16/2024 12:50 PM CDT -----  Type:  RX Refill Request    Who Called: Pt  Refill or New Rx:Refill/New auth  RX Name and Strength:  ALPRAZolam (XANAX) 1 MG tablet  FLUoxetine 40 MG capsule    How is the patient currently taking it? (ex. 1XDay):  2xday  2xDay  Is this a 30 day or 90 day RX:60/180  Preferred Pharmacy with phone number:[id number] - [facility] - [address] AT [facility] [address]   Phone: [phone]  Fax: [phone]        Local or Mail Order:Local  Ordering Provider:[place]  Would the patient rather a call back or a response via [facility]? Call back  Best Call Back Number:[phone]   Additional Information: refill on Xanax and Renew Prozac

## 2024-05-17 RX ORDER — ALPRAZOLAM 1 MG/1
1 TABLET ORAL 2 TIMES DAILY
Qty: 60 TABLET | Refills: 0 | Status: SHIPPED | OUTPATIENT
Start: 2024-05-17

## 2024-05-20 ENCOUNTER — OUTPATIENT CASE MANAGEMENT (OUTPATIENT)
Dept: ADMINISTRATIVE | Facility: OTHER | Age: 69
End: 2024-05-20
Payer: MEDICARE

## 2024-05-20 NOTE — PROGRESS NOTES
Outpatient Care Management   - Patient Assessment    Patient: Beatriz Gan  MRN:  [id number]  Date of Service:  5/20/2024  Completed by:  Bette Turner LCSW  Referral Date: 05/08/2024    Reason for Visit   Patient presents with    Social Work Assessment     05/20/2024       Brief Summary:  received a referral from OPCM RN for the following HR SW psychosocial needs: transportation assistance, questions regarding insurance coverage for vision, and SDOH. Spoke with patient who explained that she has been getting transportation from friends, however, as summer approaches, many of her friends will be out of town and she will need reliable transportation for appts. Patient reports a friend is repairing a vehicle to give to her, but it will take months to repair. Discussed [facility] transportation and patient requests for SW to mail the information to her. Patient inquired as to if she has vision benefits; according to [employer] summary of benefits, patient does appear to have vision coverage. Offered to mail a list of in-network providers for patient to choose from and patient is agreeable. Also offered to send a copy of her benefits summary with the vision coverage details highlighted and tabbed, patient is agreeable. When asked about past substance abuse history, patient stated she drank in her younger years due to the stress of being a single mother. Patient reports she adopted her great-granddaughter, now 15 years old, who lives with her. Patient states she is able to do her own ADLs, but does not dress or bath unless someone is home due to fear of falling. Patient reports she does not use a cane or walker in the home, but does use a cane, walker, or W/C during outings. Patient reports she manages her medications but sometimes forgets if she has taken it for the day. SW inquired if patient has a pill box, patient states she does but has not used it yet. SW strongly encouraged patient to  set up her pill box to help keep track of her medication intake, patient verbalized understanding. Discussed [facility] that was presented as an option to patient during her last hospital admission; patient reports she still has the card and is thinking about calling, SW encouraged patient to call and obtain more information about the program, and sign up if possible. Patient reports she will think about it. Patient states she takes medication prescribed by her PCP for general anxiety which she finds helpful. Patient reports she is still adjusting to the new liver diagnosis and inquired as to what her last liver labs results were. Informed that ZEINA will collaborate with OPCM RN to provide that information, patient verbalized understanding. Patient reports she has not received any shut off notices, but does worry her utility expenses will rise in the summer. Discussed [facility] and patient is agreeable for SW to mail the information for future use if needed. Patient reports she is able to afford food, but it does become strained at times due to her fixed income. Educated regarding home delivered meals by [employer] and will mail information to patient as requested. Discussed that SW will follow up in 2 weeks due to SW being out the following week. Mailed: [employer] by [facility], home delivered meals by [facility], Mikaela DANIELLE summary of benefits with vision benefits highlighted & tabbed for patient to review, and a list of optometrists in network with patient's insurance. Care plan was created in collaboration with patient/caregiver input.  completed the SDOH questionnaire.

## 2024-05-21 ENCOUNTER — OUTPATIENT CASE MANAGEMENT (OUTPATIENT)
Dept: ADMINISTRATIVE | Facility: OTHER | Age: 69
End: 2024-05-21
Payer: MEDICARE

## 2024-05-21 LAB
GENETICIST REVIEW: NORMAL
HFE GENE MUT ANL BLD/T: NORMAL
HFE RELEASED BY: NORMAL
HFE RESULT SUMMARY: NEGATIVE
REF LAB TEST METHOD: NORMAL
SPECIMEN SOURCE: NORMAL
SPECIMEN,  HEMOCHROMATOSIS: NORMAL

## 2024-05-21 NOTE — PROGRESS NOTES
Outpatient Care Management  Plan of Care Follow Up Visit    Patient: Beatriz Gan  MRN: [id number]  Date of Service: 05/21/2024  Completed by: Randi Silva RN  Referral Date: 05/08/2024    Reason for Visit   Patient presents with    Nursing Assessment    OPCM RN Follow Up Call       Brief Summary: OPCM RN followed up with Mrs. Otto today for care plan review.    Next Steps:   Free from falls?  Review if Mrs. Otto has been utilizing briefs/pads regularly.   Review appt with hepatology for plan of care.   Mrs. Otto agreed to OPCM RN follow up on or around 6/5/24.

## 2024-05-31 RX ORDER — ASPIRIN 81 MG/1
TABLET ORAL
Qty: 30 TABLET | Refills: 2 | Status: SHIPPED | OUTPATIENT
Start: 2024-05-31

## 2024-05-31 NOTE — TELEPHONE ENCOUNTER
Per [employer] letter covered inhalers are:    Symbicort, Breo, Advair HFA, Wixela and fluticasone propionate-salmeterol

## 2024-06-03 ENCOUNTER — OUTPATIENT CASE MANAGEMENT (OUTPATIENT)
Dept: ADMINISTRATIVE | Facility: OTHER | Age: 69
End: 2024-06-03
Payer: MEDICARE

## 2024-06-03 PROBLEM — I70.0 AORTIC ATHEROSCLEROSIS: Status: ACTIVE | Noted: 2024-06-03

## 2024-06-03 PROBLEM — G40.89 OTHER SEIZURES: Status: ACTIVE | Noted: 2024-06-03

## 2024-06-03 NOTE — PROGRESS NOTES
Outpatient Care Management   - Care Plan Follow Up    Patient: Beatriz Gan  MRN:  [id number]  Date of Service:  6/3/2024  Completed by:  Bette Turner LCSW  Referral Date: 05/08/2024    Reason for Visit   Patient presents with    OPCM SW Follow Up Call     06/03/2024       Brief Summary: Spoke with patient who reports she received the mailed resources. Discussed [facility]'s transportation and home delivered meals programs. Patient states she plans to call this week to sign up for meals and ask questions about their transportation service for future use. Patient states she has not explored [facility], but will consider it after she has successfully signed up with [facility]. Patient also reports she plans to make an optometrist appt.    Complex Care Plan     Care plan was discussed and completed today with input from patient and/or caregiver.    Patient Instructions     No follow-ups on file.

## 2024-06-04 ENCOUNTER — LAB VISIT (OUTPATIENT)
Dept: LAB | Facility: HOSPITAL | Age: 69
End: 2024-06-04
Attending: PHYSICIAN ASSISTANT
Payer: MEDICARE

## 2024-06-04 ENCOUNTER — OFFICE VISIT (OUTPATIENT)
Dept: HEPATOLOGY | Facility: CLINIC | Age: 69
End: 2024-06-04
Payer: MEDICARE

## 2024-06-04 VITALS — BODY MASS INDEX: 28.57 KG/M2 | WEIGHT: 171.5 LBS | HEIGHT: 65 IN

## 2024-06-04 DIAGNOSIS — F10.90 ALCOHOL USE DISORDER: Primary | ICD-10-CM

## 2024-06-04 DIAGNOSIS — M51.36 DDD (DEGENERATIVE DISC DISEASE), LUMBAR: ICD-10-CM

## 2024-06-04 DIAGNOSIS — M47.896 OTHER SPONDYLOSIS, LUMBAR REGION: ICD-10-CM

## 2024-06-04 DIAGNOSIS — B19.20 HEPATITIS C VIRUS INFECTION WITHOUT HEPATIC COMA, UNSPECIFIED CHRONICITY: ICD-10-CM

## 2024-06-04 DIAGNOSIS — K74.60 HEPATIC CIRRHOSIS, UNSPECIFIED HEPATIC CIRRHOSIS TYPE, UNSPECIFIED WHETHER ASCITES PRESENT: ICD-10-CM

## 2024-06-04 LAB
ALBUMIN SERPL BCP-MCNC: 3.5 G/DL (ref 3.5–5.2)
ALP SERPL-CCNC: 170 U/L (ref 55–135)
ALT SERPL W/O P-5'-P-CCNC: 51 U/L (ref 10–44)
ANION GAP SERPL CALC-SCNC: 9 MMOL/L (ref 8–16)
AST SERPL-CCNC: 21 U/L (ref 10–40)
BILIRUB DIRECT SERPL-MCNC: 0.7 MG/DL (ref 0.1–0.3)
BILIRUB SERPL-MCNC: 1.2 MG/DL (ref 0.1–1)
BUN SERPL-MCNC: 21 MG/DL (ref 8–23)
CALCIUM SERPL-MCNC: 9.8 MG/DL (ref 8.7–10.5)
CHLORIDE SERPL-SCNC: 107 MMOL/L (ref 95–110)
CO2 SERPL-SCNC: 26 MMOL/L (ref 23–29)
CREAT SERPL-MCNC: 1.1 MG/DL (ref 0.5–1.4)
EST. GFR  (NO RACE VARIABLE): 54 ML/MIN/1.73 M^2
GLUCOSE SERPL-MCNC: 95 MG/DL (ref 70–110)
INR PPP: 1.1 (ref 0.8–1.2)
POTASSIUM SERPL-SCNC: 4.4 MMOL/L (ref 3.5–5.1)
PROT SERPL-MCNC: 6.7 G/DL (ref 6–8.4)
PROTHROMBIN TIME: 12.1 SEC (ref 9–12.5)
SODIUM SERPL-SCNC: 142 MMOL/L (ref 136–145)

## 2024-06-04 PROCEDURE — 36415 COLL VENOUS BLD VENIPUNCTURE: CPT | Performed by: PHYSICIAN ASSISTANT

## 2024-06-04 PROCEDURE — 1101F PT FALLS ASSESS-DOCD LE1/YR: CPT | Mod: CPTII,S$GLB,, | Performed by: PHYSICIAN ASSISTANT

## 2024-06-04 PROCEDURE — 82248 BILIRUBIN DIRECT: CPT | Performed by: PHYSICIAN ASSISTANT

## 2024-06-04 PROCEDURE — 85610 PROTHROMBIN TIME: CPT | Performed by: PHYSICIAN ASSISTANT

## 2024-06-04 PROCEDURE — 99999 PR PBB SHADOW E&M-EST. PATIENT-LVL IV: CPT | Mod: PBBFAC,,, | Performed by: PHYSICIAN ASSISTANT

## 2024-06-04 PROCEDURE — 99215 OFFICE O/P EST HI 40 MIN: CPT | Mod: S$GLB,,, | Performed by: PHYSICIAN ASSISTANT

## 2024-06-04 PROCEDURE — 3288F FALL RISK ASSESSMENT DOCD: CPT | Mod: CPTII,S$GLB,, | Performed by: PHYSICIAN ASSISTANT

## 2024-06-04 PROCEDURE — 86704 HEP B CORE ANTIBODY TOTAL: CPT | Performed by: PHYSICIAN ASSISTANT

## 2024-06-04 PROCEDURE — 1160F RVW MEDS BY RX/DR IN RCRD: CPT | Mod: CPTII,S$GLB,, | Performed by: PHYSICIAN ASSISTANT

## 2024-06-04 PROCEDURE — 1111F DSCHRG MED/CURRENT MED MERGE: CPT | Mod: CPTII,S$GLB,, | Performed by: PHYSICIAN ASSISTANT

## 2024-06-04 PROCEDURE — 3008F BODY MASS INDEX DOCD: CPT | Mod: CPTII,S$GLB,, | Performed by: PHYSICIAN ASSISTANT

## 2024-06-04 PROCEDURE — 1159F MED LIST DOCD IN RCRD: CPT | Mod: CPTII,S$GLB,, | Performed by: PHYSICIAN ASSISTANT

## 2024-06-04 PROCEDURE — 80053 COMPREHEN METABOLIC PANEL: CPT | Performed by: PHYSICIAN ASSISTANT

## 2024-06-04 RX ORDER — VELPATASVIR AND SOFOSBUVIR 100; 400 MG/1; MG/1
1 TABLET, FILM COATED ORAL DAILY
Qty: 28 TABLET | Refills: 5 | Status: ACTIVE | OUTPATIENT
Start: 2024-06-04

## 2024-06-04 RX ORDER — CELECOXIB 200 MG/1
200 CAPSULE ORAL EVERY MORNING
COMMUNITY
Start: 2024-05-21

## 2024-06-04 RX ORDER — GABAPENTIN 600 MG/1
TABLET ORAL
Qty: 90 TABLET | Refills: 0 | Status: SHIPPED | OUTPATIENT
Start: 2024-06-04

## 2024-06-04 NOTE — PROGRESS NOTES
"HEPATOLOGY CLINIC VISIT NOTE - HCV clinic  REFERRING PROVIDER: Zoya Griffin NP  CHIEF COMPLAINT: Hepatitis C   (accompanied by: friend)    HISTORY       This is a 68 y.o. White female originally referred to me in early May for newly dx HCV. Shortly after referral but before appt w/ me pt was hospitalized (locally and then transferred to [facility] for higher level care) for acute liver failure (AST/ALT in 1000s, TBili 7s, INR 1.3) due to alcohol (stopped 1 month prior to admission w/ peth while inpt of 14, HCV, and recent augmentin / azithromycin use). Seen by hepatology while inpt. Labs were improving / stabilizing so she was d/c home w/ rec to d/c alcohol and see me for HCV Rx.     Pt reports she is still off alcohol. "No plans to drink again" b/c wants to live  Reports alcohol intake increased 2 yrs ago after  . Daughter  around then as well. 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    Slowly gaining strength but still feels "wobbly" and using cane.  (+) muscle cramps  Denies jaundice, dark urine, hematemesis, melena, slowed mentation, abdominal distention, VERA.       HCV history:  Recent diagnosis  - Prior Treatment: No  - Genotype ?  - HCV RNA >27 mill IU/mL - 2024    Liver staging:  No formal liver staging  Cirrhosis based on labs / imaging    Cirrhosis history:  Recent decompensation / acute liver failure    MELD 3.0: 17 at 2024  4:11 AM  MELD-Na: 13 at 2024  4:11 AM  Calculated from:  Serum Creatinine: 1.0 mg/dL at 2024  4:11 AM  Serum Sodium: 141 mmol/L (Using max of 137 mmol/L) at 2024  4:11 AM  Total Bilirubin: 3.7 mg/dL at 2024  4:11 AM  Serum Albumin: 2.6 g/dL at 2024  4:11 AM  INR(ratio): 1.2 at 2024  4:11 AM  Age at listing (hypothetical): 68 years  Sex: Female at 2024  4:11 AM    Cirrhosis health maintenance:  - HCC screening: Up to date 2024  - Varices screening:  EGD needed  - HAV status: Lacking immunity  - HBV status: " Unknown    PMH, PSH, SOCIAL HX, FAMILY HX      Reviewed in Epic  Pertinent findings:  FAMILY HX: neg for liver diease  SOCIAL HX: Resides in [place].   Alcohol - long hx of alcohol use, previously worked in bar. Has quit during periods in past. Alcohol intake increased 2 yrs ago after  . Quit 2024  Drugs - no  Spiritual - Yes, Yazdanism.    ROS: as per HPI    PHYSICAL EXAM:  Friendly White female, in no acute distress; alert and oriented to person, place and time  HEENT: Sclerae anicteric.   NECK: Supple  LUNGS: Normal respiratory effort.   ABDOMEN: Flat, soft, nontender. No organomegaly or masses. No ascites or hernias.   SKIN: Warm and dry. No jaundice, No obvious rashes.   EXTREMITIES: No lower extremity edema  NEURO/PSYCH: Normal gate. Memory intact. Thought and speech pattern appropriate. Behavior normal. No depression or anxiety noted.    PERTINENT DIAGNOSTIC RESULTS      Lab Results   Component Value Date    WBC 4.33 2024    HGB 12.4 2024     2024     Lab Results   Component Value Date    INR 1.2 2024     Lab Results   Component Value Date     (H) 2024     (H) 2024    BILITOT 3.7 (H) 2024    ALBUMIN 2.6 (L) 2024    ALKPHOS 356 (H) 2024    CREATININE 1.0 2024    BUN 13 2024     2024    K 4.3 2024    AFP 6.3 05/10/2024     US LIVER WITH DOPPLER 24   CLINICAL HISTORY:liver failure;   TECHNIQUE:Sagittal and transverse images are obtained through the liver with Doppler ultrasound evaluation of the vessels.   COMPARISON:Abdomen ultrasound of 2024, CT abdomen of 2024     FINDINGS:  The liver is of normal size but heterogeneous and nodular in contour measuring 17.1 cm.  A focal mass of the liver parenchyma is not seen.  The gallbladder is of normal size without ultrasound evidence of stone.  The common bile duct is not dilated at 4 mm.  The spleen measures 11.7 cm with  homogeneous echogenicity.  No ascites is identified.     There is hepatopetal flow in the superior mesenteric vein, main portal vein and left and right portal veins.  The peak systolic velocity within the main hepatic artery is 27 centimeters/second with a velocity of 32 centimeters/second in the left hepatic artery and 29 centimeters/second in the right hepatic artery.  There is patent flow in the inferior vena cava, right middle and left hepatic veins.  There is normal flow in the splenic artery at 64 centimeters/second and normal flow in the splenic vein.     Impression:   Cirrhosis without focal mass.  Ascites, splenomegaly or reversal of flow in the portal vein is not seen.  The    ASSESSMENT        68 y.o. White female with:  1. Chronic HCV, genotype  ?  - treatment naive  -- Elevated transaminases  -- HAV status - Lacking immunity  -- HBV status - Unknown    2. Cirrhosis, recent decompensation / acute liver failure  -- MELD score: 17  -- HCC screening: up to date 5/2024  -- EGD needed    PLAN        Labs today  Orders Placed This Encounter   Procedures    Comprehensive Metabolic Panel    Protime-INR    Bilirubin, Direct    Hepatitis C Genotype    Hepatitis B Core Antibody, Total     Epclusa x 24 weeks sent to [facility]  -- Patient to notify me of Rx start date so labs can be scheduled.  F/U visit in 6 wks  Remain off alcohol      _________________________________________________________________  EDUCATION:  HCV RX  Discussed goal of HCV eradication to prevent progression of liver disease.  Discussed use of Epclusa daily x 24 weeks w/ potential side effects of fatigue and headache.     Reviewed limitations on acid suppressant medications due to DDI w/ Epclusa:  -- Antacids, H2 Receptor Antagonist, PPI - Pt not taking  Patient instructed to contact me if experiencing acid related symptoms so further recommendations can be made regarding acid suppression therapy.      Herbal / alternative  therapies must be discontinued  Discussed importance of medication adherence and risk of treatment failure / viral resistance if not adherent. Pt has verbalized understanding.    __________________________________________________________________    Duration of encounter: 46 min  This includes face-to-face time and non face-to-face time preparing to see the patient (eg, review of tests), obtaining and/or reviewing separately obtained history, documenting clinical information in the electronic or other health record, independently interpreting resultsand communicating results to the patient/family/caregiver, or care coordination.

## 2024-06-05 ENCOUNTER — TELEPHONE (OUTPATIENT)
Dept: HEPATOLOGY | Facility: CLINIC | Age: 69
End: 2024-06-05
Payer: MEDICARE

## 2024-06-05 ENCOUNTER — OUTPATIENT CASE MANAGEMENT (OUTPATIENT)
Dept: ADMINISTRATIVE | Facility: OTHER | Age: 69
End: 2024-06-05
Payer: MEDICARE

## 2024-06-05 ENCOUNTER — TELEPHONE (OUTPATIENT)
Dept: PULMONOLOGY | Facility: CLINIC | Age: 69
End: 2024-06-05
Payer: MEDICARE

## 2024-06-05 LAB — HBV CORE AB SERPL QL IA: POSITIVE

## 2024-06-05 NOTE — TELEPHONE ENCOUNTER
"Spoke to pt, an gave her the phone number   ----- Message from Socorro Machado PharmD sent at 6/5/2024  2:03 PM CDT -----  THANIA Locke is still good on file. This is my referral note from 5/1: "Thomas MONTEIRO approved. PA Case: [id number]. Effective: 1/1/2024 - 12/31/2024. Test claim: $11.20. First dose in office on 4/16. Next dose due: 5/14. Forward to initial for: 5/7"    I've also ran a test claim just now to confirm, claim still goes through with PA on file until 12/31/2024. I have made a call attempt to her yesterday but had to leave a voicemail. Her next dose (if she followed the calendar I gave her for the injection date) will be due on Tuesday, 6/11. If you can reach her, please ask her to give me a call. [phone].    Thank you,  Socorro Machado PharmD  Clinical Pharmacist  [facility]  (P): [phone]  (F): [phone]  ----- Message -----  From: Candi Pressley MA  Sent: 6/5/2024   1:35 PM CDT  To: Radha Gardner, could please call pt to Verify if need new PA.    Thanks  Candi YANG  ----- Message -----  From: Randi Horta RN  Sent: 6/5/2024   1:28 PM CDT  To: [facility] Lux Staff    Good afternoon,    I recently spoke with Mrs. Gan for follow up. She reports that she received a letter in the mail from her insurance that her Fasenra injections will no longer be covered and will require a PA.     Please reach out to Mrs. Gan at your earliest convenience for continuity of care.     Thank you for your assistance.     Kind regards,    Randi Horta RN  [facility]  [phone]  "

## 2024-06-05 NOTE — TELEPHONE ENCOUNTER
Sent message to [facility]   ----- Message from Randi Horta RN sent at 6/5/2024  1:25 PM CDT -----  Good afternoon,    I recently spoke with Mrs. Gan for follow up. She reports that she received a letter in the mail from her insurance that her Fasenra injections will no longer be covered and will require a PA.     Please reach out to Mrs. Gan at your earliest convenience for continuity of care.     Thank you for your assistance.     Kind regards,    Randi Horta RN  [facility]  [phone]

## 2024-06-05 NOTE — PROGRESS NOTES
Outpatient Care Management  Plan of Care Follow Up Visit    Patient: Beatriz Gan  MRN: [id number]  Date of Service: 06/05/2024  Completed by: Randi Silva RN  Referral Date: 05/08/2024    Reason for Visit   Patient presents with    OPCM RN Follow Up Call       Brief Summary: OPCM RN followed up with Mrs. Otto today for care plan review.    Next Steps:   Follow up in regard to approval status of Epclusa.  Explore if Mrs. Otto received her lab results.  Review if she received contact from pulmonology in regard to Fasenra injections.   Review appt with Dr. Alonzo for plan of care.  Mrs. Otto agreed to OPCM RN follow up on or around 6/19/24.

## 2024-06-06 NOTE — TELEPHONE ENCOUNTER
6/4 labs reviewed    MELD 3.0: 10 at 6/4/2024  1:22 PM  MELD-Na: 9 at 6/4/2024  1:22 PM  Calculated from:  Serum Creatinine: 1.1 mg/dL at 6/4/2024  1:22 PM  Serum Sodium: 142 mmol/L (Using max of 137 mmol/L) at 6/4/2024  1:22 PM  Total Bilirubin: 1.2 mg/dL at 6/4/2024  1:22 PM  Serum Albumin: 3.5 g/dL at 6/4/2024  1:22 PM  INR(ratio): 1.1 at 6/4/2024  1:22 PM  Age at listing (hypothetical): 68 years  Sex: Female at 6/4/2024  1:22 PM    Pls tell pt labs show liver numbers have improved significantly  Liver enzymes are now 21 (normal), 51 (mildly elevated)    Stay off alcohol  Proceed w/ HCV Rx as planned

## 2024-06-07 ENCOUNTER — TELEPHONE (OUTPATIENT)
Dept: HEPATOLOGY | Facility: CLINIC | Age: 69
End: 2024-06-07
Payer: MEDICARE

## 2024-06-07 DIAGNOSIS — B18.2 CHRONIC HEPATITIS C WITHOUT HEPATIC COMA: Primary | ICD-10-CM

## 2024-06-07 LAB
HCV GENTYP SERPL NAA+PROBE: ABNORMAL
HCV RNA SERPL NAA+PROBE-LOG IU: 2.2 LOG (10) IU/ML
HCV RNA SERPL QL NAA+PROBE: DETECTED
HCV RNA SPEC NAA+PROBE-ACNC: 159 IU/ML

## 2024-06-07 NOTE — TELEPHONE ENCOUNTER
Attempt made to reach patient.  Msg from PA Scheuermann left on her VM.  Lab draw scheduled 6/18/24; appt reminder notice mailed.

## 2024-06-07 NOTE — TELEPHONE ENCOUNTER
Spoke to pt:  6/4/24 HCV  - substantial drop from > 27 million on 4/13  ??trying to clear on own    Will hold off on HCV Rx for now.    Katiuska - caio schedule labs ~ 6/18:  Orders Placed This Encounter   Procedures    Protime-INR    Hepatitis C RNA, Quantitative, PCR    Comprehensive Metabolic Panel

## 2024-06-13 ENCOUNTER — TELEPHONE (OUTPATIENT)
Dept: PULMONOLOGY | Facility: CLINIC | Age: 69
End: 2024-06-13
Payer: MEDICARE

## 2024-06-13 NOTE — TELEPHONE ENCOUNTER
Placed a call to the pt in regards to her Celebrex medication. Our providers did not order this medication. Tried to call the pt.  Could not leave a message.

## 2024-06-13 NOTE — TELEPHONE ENCOUNTER
----- Message from Maci Mullen sent at 6/13/2024  9:36 AM CDT -----  Contact: [facility]  Type:  RX Refill Request    Who Called: [facility]    Refill or New Rx: refill    RX Name and Strength: 2 medications    celecoxib (CELEBREX) 200 MG capsule  Prednisone    How is the patient currently taking it? (ex. 1XDay):as directed    Is this a 30 day or 90 day RX: 90    Preferred Pharmacy with phone number:     [facility] [place] - [address] [address]  Phone: [phone] Fax: [phone]    Local or Mail Order: mail order    Ordering Provider: Sylvain    Would the patient rather a call back or a response via [facility]?  Call if questions    Best Call Back Number: [phone] - [facility]    Additional Information: please refill scripts  thanks

## 2024-06-14 ENCOUNTER — TELEPHONE (OUTPATIENT)
Dept: PRIMARY CARE CLINIC | Facility: CLINIC | Age: 69
End: 2024-06-14
Payer: MEDICARE

## 2024-06-14 DIAGNOSIS — Z12.11 ENCOUNTER FOR COLORECTAL CANCER SCREENING: Primary | ICD-10-CM

## 2024-06-14 DIAGNOSIS — M51.37 DDD (DEGENERATIVE DISC DISEASE), LUMBOSACRAL: ICD-10-CM

## 2024-06-14 DIAGNOSIS — I10 ESSENTIAL HYPERTENSION: ICD-10-CM

## 2024-06-14 DIAGNOSIS — Z12.12 ENCOUNTER FOR COLORECTAL CANCER SCREENING: Primary | ICD-10-CM

## 2024-06-14 RX ORDER — TRAMADOL HYDROCHLORIDE 50 MG/1
50 TABLET ORAL EVERY 8 HOURS PRN
Qty: 30 TABLET | Refills: 0 | Status: SHIPPED | OUTPATIENT
Start: 2024-06-14

## 2024-06-14 NOTE — TELEPHONE ENCOUNTER
----- Message from Audrey Salazar sent at 6/14/2024 10:44 AM CDT -----  Contact: [phone]  Pt would like to be advised on why Dr Alonzo canceled her zolpidem (AMBIEN) 10 mg Tab 30 tablet. Please call.       Per pt, she called her Pharmacy to request a refill and was told it was canceled. Pt is using   [id number] - [place] - [address]  [facility] [place] [address]  Phone: [phone] Fax: [phone]              Thank you

## 2024-06-17 ENCOUNTER — OUTPATIENT CASE MANAGEMENT (OUTPATIENT)
Dept: ADMINISTRATIVE | Facility: OTHER | Age: 69
End: 2024-06-17
Payer: MEDICARE

## 2024-06-17 NOTE — PROGRESS NOTES
Outpatient Care Management   - Care Plan Follow Up    Patient: Beatriz Gan  MRN:  [id number]  Date of Service:  6/17/2024  Completed by:  Bette Turner LCSW  Referral Date: 05/08/2024    Reason for Visit   Patient presents with    OPCM SW Follow Up Call     06/17/2024       Brief Summary: Spoke with patient who states she is now set up with [employer] for transportation to/from MD appts. Patient reports her friend will bring her to MD appts this week, but she plans to use [facility] for future appts. Patient is awaiting a return call from [employer] regarding home delivered meal set up. Discussed [facility] and [facility] locate in [place], patient is not interested in IOP at this time. Patient denies any further needs or questions. Will close case and notify OPCM RN.    Complex Care Plan     Care plan was discussed and completed today with input from patient and/or caregiver.    Patient Instructions     No follow-ups on file.

## 2024-06-19 ENCOUNTER — OUTPATIENT CASE MANAGEMENT (OUTPATIENT)
Dept: ADMINISTRATIVE | Facility: OTHER | Age: 69
End: 2024-06-19
Payer: MEDICARE

## 2024-06-19 NOTE — PROGRESS NOTES
Outpatient Care Management  Plan of Care Follow Up Visit    Patient: Beatriz Gan  MRN: [id number]  Date of Service: 06/19/2024  Completed by: Randi Silva RN  Referral Date: 05/08/2024    Reason for Visit   Patient presents with    OPCM RN Follow Up Call       Brief Summary: OPCM RN followed up with Ms. Otto today for care plan review.    Next Steps:   Explore how Ms. Otto is feeling.   Review if continued lab results have been received.   Explore if appt with Dr. Alonzo was rescheduled.   Ms. Otto agreed to OPCM RN follow up on or around 7/3/24.

## 2024-06-20 ENCOUNTER — TELEPHONE (OUTPATIENT)
Dept: HEPATOLOGY | Facility: CLINIC | Age: 69
End: 2024-06-20
Payer: MEDICARE

## 2024-06-20 ENCOUNTER — TELEPHONE (OUTPATIENT)
Dept: GASTROENTEROLOGY | Facility: CLINIC | Age: 69
End: 2024-06-20
Payer: MEDICARE

## 2024-06-20 DIAGNOSIS — Z12.11 SCREENING FOR COLON CANCER: Primary | ICD-10-CM

## 2024-06-20 NOTE — TELEPHONE ENCOUNTER
Colonoscopy 9/18 at 11am arrive for 10am  instructions reviewed and patient states understanding. Copy mailed per patient request address verified.

## 2024-06-20 NOTE — TELEPHONE ENCOUNTER
"----- Message from Dallas Brewer sent at 6/20/2024  1:48 PM CDT -----  Reschedule Existing Appointment    Appt Date: 6/18    Type of appt: BEL    Physician: Scheuermann    Reason for rescheduling?  Requesting to r/s for 6/24 @ [facility]    Caller: Self    Contact Preference: [phone]       Additional Information:  "Thank you for all that you do for our patients"  "

## 2024-06-24 ENCOUNTER — LAB VISIT (OUTPATIENT)
Dept: LAB | Facility: HOSPITAL | Age: 69
End: 2024-06-24
Attending: PHYSICIAN ASSISTANT
Payer: MEDICARE

## 2024-06-24 DIAGNOSIS — B18.2 CHRONIC HEPATITIS C WITHOUT HEPATIC COMA: ICD-10-CM

## 2024-06-24 LAB
ALBUMIN SERPL BCP-MCNC: 3.7 G/DL (ref 3.5–5.2)
ALP SERPL-CCNC: 137 U/L (ref 55–135)
ALT SERPL W/O P-5'-P-CCNC: 51 U/L (ref 10–44)
ANION GAP SERPL CALC-SCNC: 11 MMOL/L (ref 8–16)
AST SERPL-CCNC: 29 U/L (ref 10–40)
BILIRUB SERPL-MCNC: 0.7 MG/DL (ref 0.1–1)
BUN SERPL-MCNC: 14 MG/DL (ref 8–23)
CALCIUM SERPL-MCNC: 9.6 MG/DL (ref 8.7–10.5)
CHLORIDE SERPL-SCNC: 98 MMOL/L (ref 95–110)
CO2 SERPL-SCNC: 26 MMOL/L (ref 23–29)
CREAT SERPL-MCNC: 1 MG/DL (ref 0.5–1.4)
EST. GFR  (NO RACE VARIABLE): >60 ML/MIN/1.73 M^2
GLUCOSE SERPL-MCNC: 84 MG/DL (ref 70–110)
INR PPP: 1.1 (ref 0.8–1.2)
POTASSIUM SERPL-SCNC: 3.9 MMOL/L (ref 3.5–5.1)
PROT SERPL-MCNC: 6.8 G/DL (ref 6–8.4)
PROTHROMBIN TIME: 12.2 SEC (ref 9–12.5)
SODIUM SERPL-SCNC: 135 MMOL/L (ref 136–145)

## 2024-06-24 PROCEDURE — 80053 COMPREHEN METABOLIC PANEL: CPT | Performed by: PHYSICIAN ASSISTANT

## 2024-06-24 PROCEDURE — 85610 PROTHROMBIN TIME: CPT | Performed by: PHYSICIAN ASSISTANT

## 2024-06-24 PROCEDURE — 87522 HEPATITIS C REVRS TRNSCRPJ: CPT | Performed by: PHYSICIAN ASSISTANT

## 2024-06-25 ENCOUNTER — TELEPHONE (OUTPATIENT)
Dept: HEPATOLOGY | Facility: CLINIC | Age: 69
End: 2024-06-25
Payer: MEDICARE

## 2024-06-25 NOTE — TELEPHONE ENCOUNTER
----- Message from Tosin Thomas sent at 6/25/2024 10:07 AM CDT -----  Regarding: Lab Results  Contact: Beatriz  Regarding:Lab Results          Name Of Caller: Beatriz Gan          Contact Preference: [phone] (home)            Nature of call:  pt is calling to  get lab results for labs that were taken on :  06 /24 / 2024 Please advise. Requesting a call back.

## 2024-06-25 NOTE — TELEPHONE ENCOUNTER
I spoke with patient and told her that labs were stable and that her hep c quant result was pending.  I stressed that PA Scheuermann would review results and provide new orders but was out of the office today.  Patient asked that she be called with results because her computer is not working at this time and she can't view Kian.

## 2024-06-26 ENCOUNTER — TELEPHONE (OUTPATIENT)
Dept: HEPATOLOGY | Facility: CLINIC | Age: 69
End: 2024-06-26
Payer: MEDICARE

## 2024-06-26 LAB — HCV RNA SERPL NAA+PROBE-ACNC: 31 IU/ML

## 2024-06-26 NOTE — TELEPHONE ENCOUNTER
Pls call pt:  6/24/24 labs show  - liver numbers continuing to improve.  - HCV RNA now down to 31    Still appears she may be trying to clear virus on her own without treatment    Will Continue to hold off on HCV Rx for now.  Keep July appt w me    We'll set up labs to be done that day after I see her.

## 2024-07-01 DIAGNOSIS — M47.896 OTHER SPONDYLOSIS, LUMBAR REGION: ICD-10-CM

## 2024-07-01 DIAGNOSIS — M51.36 DDD (DEGENERATIVE DISC DISEASE), LUMBAR: ICD-10-CM

## 2024-07-01 RX ORDER — GABAPENTIN 600 MG/1
TABLET ORAL
Qty: 90 TABLET | Refills: 0 | Status: SHIPPED | OUTPATIENT
Start: 2024-07-01

## 2024-07-03 ENCOUNTER — OUTPATIENT CASE MANAGEMENT (OUTPATIENT)
Dept: ADMINISTRATIVE | Facility: OTHER | Age: 69
End: 2024-07-03
Payer: MEDICARE

## 2024-07-03 NOTE — LETTER
Beatriz Gan  [address] [place] [address]      Dear Beatriz Gan,     I am your nurse with [facility]. I was unsuccessful in reaching you today. At your earliest convenience, I would like to discuss your healthcare progress.      Please contact me at [phone] from 8:00AM to 4:30 PM on Monday thru Friday.     As you know, [facility] is a program offered to you through [facility] where a nurse is available 24/7 to answer questions or provide medical advice, their number is [phone].    Thanks,      Randi Silva, RN  Outpatient Care Management

## 2024-07-08 ENCOUNTER — OUTPATIENT CASE MANAGEMENT (OUTPATIENT)
Dept: ADMINISTRATIVE | Facility: OTHER | Age: 69
End: 2024-07-08
Payer: MEDICARE

## 2024-07-08 ENCOUNTER — TELEPHONE (OUTPATIENT)
Dept: PULMONOLOGY | Facility: CLINIC | Age: 69
End: 2024-07-08
Payer: MEDICARE

## 2024-07-08 NOTE — PROGRESS NOTES
Outpatient Care Management  Plan of Care Follow Up Visit    Patient: Beatriz Gan  MRN: [id number]  Date of Service: 07/08/2024  Completed by: Randi Silva RN  Referral Date: 05/08/2024    Reason for Visit   Patient presents with    OPCM RN Follow Up Call       Brief Summary: OPCM RN followed up with Mrs. Otto today for care plan review.    Next Steps:   Explore how Mrs. Otto is feeling.   Review appt with hepatology for plan of care.   Mrs. Otto agreed to OPCM RN follow up on or around 7/29/24.

## 2024-07-08 NOTE — TELEPHONE ENCOUNTER
Spoke to patient got appt rescheduled.   ----- Message from Randi Silva RN sent at 7/8/2024  9:58 AM CDT -----  Good morning,    I recently spoke with Mrs. Otto for follow up. She inquired if her appt with you on 7/17 could possibly be changed to an afternoon time slot if there is availability.     Please reach out to Mrs. Otto at your earliest convenience.     Thank you for your assistance.    Kind regards,     Randi Silva RN  [facility]  [phone]

## 2024-07-16 ENCOUNTER — LAB VISIT (OUTPATIENT)
Dept: LAB | Facility: HOSPITAL | Age: 69
End: 2024-07-16
Attending: PHYSICIAN ASSISTANT
Payer: MEDICARE

## 2024-07-16 ENCOUNTER — OFFICE VISIT (OUTPATIENT)
Dept: HEPATOLOGY | Facility: CLINIC | Age: 69
End: 2024-07-16
Payer: MEDICARE

## 2024-07-16 VITALS — WEIGHT: 170.75 LBS | BODY MASS INDEX: 28.45 KG/M2 | HEIGHT: 65 IN

## 2024-07-16 DIAGNOSIS — F10.21 ALCOHOL USE DISORDER, MODERATE, IN EARLY REMISSION: ICD-10-CM

## 2024-07-16 DIAGNOSIS — F10.11 ALCOHOL ABUSE, IN REMISSION: ICD-10-CM

## 2024-07-16 DIAGNOSIS — K74.60 HEPATIC CIRRHOSIS, UNSPECIFIED HEPATIC CIRRHOSIS TYPE, UNSPECIFIED WHETHER ASCITES PRESENT: ICD-10-CM

## 2024-07-16 DIAGNOSIS — B18.2 CHRONIC HEPATITIS C WITHOUT HEPATIC COMA: Primary | ICD-10-CM

## 2024-07-16 DIAGNOSIS — B18.2 CHRONIC HEPATITIS C WITHOUT HEPATIC COMA: ICD-10-CM

## 2024-07-16 LAB
ALBUMIN SERPL BCP-MCNC: 3.9 G/DL (ref 3.5–5.2)
ALP SERPL-CCNC: 148 U/L (ref 55–135)
ALT SERPL W/O P-5'-P-CCNC: 235 U/L (ref 10–44)
ANION GAP SERPL CALC-SCNC: 11 MMOL/L (ref 8–16)
AST SERPL-CCNC: 102 U/L (ref 10–40)
BILIRUB SERPL-MCNC: 0.6 MG/DL (ref 0.1–1)
BUN SERPL-MCNC: 20 MG/DL (ref 8–23)
CALCIUM SERPL-MCNC: 9.9 MG/DL (ref 8.7–10.5)
CHLORIDE SERPL-SCNC: 97 MMOL/L (ref 95–110)
CO2 SERPL-SCNC: 27 MMOL/L (ref 23–29)
CREAT SERPL-MCNC: 1 MG/DL (ref 0.5–1.4)
EST. GFR  (NO RACE VARIABLE): >60 ML/MIN/1.73 M^2
GLUCOSE SERPL-MCNC: 109 MG/DL (ref 70–110)
INR PPP: 1.1 (ref 0.8–1.2)
POTASSIUM SERPL-SCNC: 4.2 MMOL/L (ref 3.5–5.1)
PROT SERPL-MCNC: 7.1 G/DL (ref 6–8.4)
PROTHROMBIN TIME: 11.9 SEC (ref 9–12.5)
SODIUM SERPL-SCNC: 135 MMOL/L (ref 136–145)

## 2024-07-16 PROCEDURE — 87522 HEPATITIS C REVRS TRNSCRPJ: CPT | Performed by: PHYSICIAN ASSISTANT

## 2024-07-16 PROCEDURE — 80053 COMPREHEN METABOLIC PANEL: CPT | Performed by: PHYSICIAN ASSISTANT

## 2024-07-16 PROCEDURE — 99999 PR PBB SHADOW E&M-EST. PATIENT-LVL IV: CPT | Mod: PBBFAC,,, | Performed by: PHYSICIAN ASSISTANT

## 2024-07-16 PROCEDURE — 36415 COLL VENOUS BLD VENIPUNCTURE: CPT | Performed by: PHYSICIAN ASSISTANT

## 2024-07-16 PROCEDURE — 1159F MED LIST DOCD IN RCRD: CPT | Mod: CPTII,S$GLB,, | Performed by: PHYSICIAN ASSISTANT

## 2024-07-16 PROCEDURE — 3288F FALL RISK ASSESSMENT DOCD: CPT | Mod: CPTII,S$GLB,, | Performed by: PHYSICIAN ASSISTANT

## 2024-07-16 PROCEDURE — 1160F RVW MEDS BY RX/DR IN RCRD: CPT | Mod: CPTII,S$GLB,, | Performed by: PHYSICIAN ASSISTANT

## 2024-07-16 PROCEDURE — 80321 ALCOHOLS BIOMARKERS 1OR 2: CPT | Performed by: PHYSICIAN ASSISTANT

## 2024-07-16 PROCEDURE — 1100F PTFALLS ASSESS-DOCD GE2>/YR: CPT | Mod: CPTII,S$GLB,, | Performed by: PHYSICIAN ASSISTANT

## 2024-07-16 PROCEDURE — 99215 OFFICE O/P EST HI 40 MIN: CPT | Mod: S$GLB,,, | Performed by: PHYSICIAN ASSISTANT

## 2024-07-16 PROCEDURE — 3008F BODY MASS INDEX DOCD: CPT | Mod: CPTII,S$GLB,, | Performed by: PHYSICIAN ASSISTANT

## 2024-07-16 PROCEDURE — 85610 PROTHROMBIN TIME: CPT | Performed by: PHYSICIAN ASSISTANT

## 2024-07-16 NOTE — PROGRESS NOTES
"HEPATOLOGY CLINIC VISIT NOTE - HCV clinic  CHIEF COMPLAINT: Hepatitis C, Cirrhosis   (accompanied by: friend)    HISTORY       This is a 68 y.o. White female originally referred to me in early May for newly dx HCV. Shortly after referral but before appt w/ me pt was hospitalized for acute liver failure due to alcohol (stopped 1 month prior to admission; peth while inpt 14), HCV, and recent augmentin / azithromycin use. Saw hepatology while inpt. Labs were improving / stabilizing so she was d/c home w/ rec to d/c alcohol and see me for HCV Rx.     Initial visit (24):  Still off alcohol. "No plans to drink again" b/c wants to live  Reports alcohol intake increased 2 yrs ago after  . Daughter  around then as well. 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    Slowly gaining strength but still feels "wobbly" and using cane.  (+) muscle cramps  Denies jaundice, dark urine, hematemesis, melena, slowed mentation, abdominal distention, VERA.     HCV history:  Recent diagnosis  - Prior Treatment: No  - Genotype ?    Liver staging:  No formal liver staging  Cirrhosis based on labs / imaging    Cirrhosis history:  Recent decompensation / acute liver failure  MELD 17 (24)    Cirrhosis health maintenance:  - HCC screening: Up to date 2024  - Varices screening:  EGD needed  - HAV status: Lacking immunity  - HBV status: Unknown    Interval history (24):  Epclusa prescribed but not started b/c labs indicate ??self-clearance of HCV  HCV RNA (24): >27 mill --> (24) 159 --> (24) 31    Liver panel continues to improve:       MELD 3.0: 10 at 2024 12:56 PM  MELD-Na: 7 at 2024 12:56 PM  Calculated from:  Serum Creatinine: 1.0 mg/dL at 2024 12:56 PM  Serum Sodium: 135 mmol/L at 2024 12:56 PM  Total Bilirubin: 0.7 mg/dL (Using min of 1 mg/dL) at 2024 12:56 PM  Serum Albumin: 3.7 g/dL (Using max of 3.5 g/dL) at 2024 12:56 PM  INR(ratio): 1.1 at 2024 12:56 PM  Age " at listing (hypothetical): 68 years  Sex: Female at 2024 12:56 PM    Describes fatigue, migraines.   Denies jaundice, dark urine, hematemesis, melena, slowed mentation, abdominal distention.     Risks for HCV: tattoo decades ago. Blood transfusion ~ 20 yrs ago    PMH, PSH, SOCIAL HX, FAMILY HX      Reviewed in Epic  Pertinent findings:  FAMILY HX: neg for liver diease  SOCIAL HX: Resides in [place].   Alcohol - long hx of alcohol use, previously worked in bar. Has quit during periods in past. Alcohol intake increased 2 yrs ago after  . Quit 2024  Drugs - no  Spiritual - Yes, Mandaeism.    ROS: as per HPI    PHYSICAL EXAM:  Friendly White female, in no acute distress; alert and oriented to person, place and time  HEENT: Sclerae anicteric.   NECK: Supple  LUNGS: Normal respiratory effort.   ABDOMEN: Flat, soft, nontender.   SKIN: Warm and dry. No jaundice, No obvious rashes.   EXTREMITIES: No lower extremity edema  NEURO/PSYCH: Normal gate. Memory intact. Thought and speech pattern appropriate. Behavior normal. No depression or anxiety noted.    PERTINENT DIAGNOSTIC RESULTS      Lab Results   Component Value Date    WBC 4.33 2024    HGB 12.4 2024     2024     Lab Results   Component Value Date    INR 1.1 2024     Lab Results   Component Value Date    AST 29 2024    ALT 51 (H) 2024    BILITOT 0.7 2024    ALBUMIN 3.7 2024    ALKPHOS 137 (H) 2024    CREATININE 1.0 2024    BUN 14 2024     (L) 2024    K 3.9 2024    AFP 6.3 05/10/2024     US LIVER WITH DOPPLER 24   CLINICAL HISTORY:liver failure;   TECHNIQUE:Sagittal and transverse images are obtained through the liver with Doppler ultrasound evaluation of the vessels.   COMPARISON:Abdomen ultrasound of 2024, CT abdomen of 2024     FINDINGS:  The liver is of normal size but heterogeneous and nodular in contour measuring 17.1 cm.  A focal  mass of the liver parenchyma is not seen.  The gallbladder is of normal size without ultrasound evidence of stone.  The common bile duct is not dilated at 4 mm.  The spleen measures 11.7 cm with homogeneous echogenicity.  No ascites is identified.     There is hepatopetal flow in the superior mesenteric vein, main portal vein and left and right portal veins.  The peak systolic velocity within the main hepatic artery is 27 centimeters/second with a velocity of 32 centimeters/second in the left hepatic artery and 29 centimeters/second in the right hepatic artery.  There is patent flow in the inferior vena cava, right middle and left hepatic veins.  There is normal flow in the splenic artery at 64 centimeters/second and normal flow in the splenic vein.     Impression:   Cirrhosis without focal mass.  Ascites, splenomegaly or reversal of flow in the portal vein is not seen.  The    ASSESSMENT        68 y.o. White female with:  1. Chronic HCV, genotype ?: treatment naive, clearing on own??  -- Elevated transaminases  -- HAV status - Lacking immunity  -- HBV status - Prior resolved HBV    2. Cirrhosis, recent decompensation / acute liver failure  -- MELD score: 10  -- HCC screening: up to date 5/2024  -- EGD needed    3. Alcohol use disorder, recent remission    PLAN        Labs today: HCV RNA, Peth, CMP, INR  CBC, CMP, INR, AFP, U/S, VISIT every 6 months: due 11/2024  HAV vaccine sent to [facility]  Will message GI to see if EGD to screen for EV can be done w/ upcoming colonoscopy  Remain off alcohol    *will continue to trend HCV RNA to determine if Epclusa needed.    Orders Placed This Encounter   Procedures    US Abdomen Limited    Hepatitis C RNA, Quantitative, PCR    Phosphatidylethanol (PETH)    Comprehensive Metabolic Panel    Protime-INR    CBC Without Differential    Comprehensive Metabolic Panel    Protime-INR    AFP Tumor Marker      ______________________________________________________________________________  EDUCATION:  CIRRHOSIS  Discussed evidence that indicates that pt has cirrhosis.   -- Discussed liver fibrosis/scarring and relation to liver function.   -- Signs and symptoms of hepatic decompensation were reviewed, including jaundice, ascites, and slowed mentation due to hepatic encephalopathy. The patient should seek medical attention if any of these things occur.   --  We discussed the potential for bleeding from esophageal varices with symptoms of hematemesis and melena. The patient should report to the Emergency Department for these symptoms.   -- We discussed the increased risk of hepatocellular carcinoma due to cirrhosis. Lifelong screening every six months with ultrasound and AFP is recommended.   -- Discussed portal hypertension, including potential development of esophageal varices. Role of EGD in screening for varices was reviewed.     -- Tylenol (acetaminophen) is okay up to 2,000mg daily  -- Avoid NSAIDs     Dietary recommendations:  -- Avoid alcohol  -- Avoid raw seafood  __________________________________________________________________    Duration of encounter: 42 min  This includes face-to-face time and non face-to-face time preparing to see the patient (eg, review of tests), obtaining and/or reviewing separately obtained history, documenting clinical information in the electronic or other health record, independently interpreting resultsand communicating results to the patient/family/caregiver, or care coordination.

## 2024-07-16 NOTE — Clinical Note
Aruna Calero - I see you are doing a colonoscopy on this pt in Sept. Would you be able to add on an EGD? She has recently diagnosed cirrhosis and needs screening for varices. Thank you, Jen Scheuermann, PA-C Hepatology - HCV Clinic

## 2024-07-18 LAB — HCV RNA SERPL NAA+PROBE-ACNC: 2170 IU/ML

## 2024-07-19 ENCOUNTER — TELEPHONE (OUTPATIENT)
Dept: HEPATOLOGY | Facility: CLINIC | Age: 69
End: 2024-07-19
Payer: MEDICARE

## 2024-07-19 DIAGNOSIS — R06.09 DYSPNEA ON EXERTION: ICD-10-CM

## 2024-07-19 DIAGNOSIS — J44.9 CHRONIC OBSTRUCTIVE PULMONARY DISEASE, UNSPECIFIED COPD TYPE: ICD-10-CM

## 2024-07-19 LAB
CLINICAL BIOCHEMIST REVIEW: NORMAL
PLPETH BLD-MCNC: <10 NG/ML
POPETH BLD-MCNC: <10 NG/ML

## 2024-07-19 RX ORDER — CELECOXIB 200 MG/1
CAPSULE ORAL
Qty: 30 CAPSULE | Refills: 1 | Status: SHIPPED | OUTPATIENT
Start: 2024-07-19

## 2024-07-19 RX ORDER — PREDNISONE 10 MG/1
TABLET ORAL
Qty: 12 TABLET | Refills: 0 | Status: SHIPPED | OUTPATIENT
Start: 2024-07-19

## 2024-07-19 NOTE — TELEPHONE ENCOUNTER
Attempt made to reach patient.  Msg from PA Scheuermann left on her VM.  I stressed that she call with [facility] start date.

## 2024-07-19 NOTE — TELEPHONE ENCOUNTER
7/16/24 labs:  MELD 3.0: 10 at 7/16/2024  1:32 PM  MELD-Na: 7 at 7/16/2024  1:32 PM  Calculated from:  Serum Creatinine: 1.0 mg/dL at 7/16/2024  1:32 PM  Serum Sodium: 135 mmol/L at 7/16/2024  1:32 PM  Total Bilirubin: 0.6 mg/dL (Using min of 1 mg/dL) at 7/16/2024  1:32 PM  Serum Albumin: 3.9 g/dL (Using max of 3.5 g/dL) at 7/16/2024  1:32 PM  INR(ratio): 1.1 at 7/16/2024  1:32 PM  Age at listing (hypothetical): 68 years  Sex: Female at 7/16/2024  1:32 PM    Peth neg  INR 1.1  CMP stable, AST/ALT up to 102/235  HCV up to 2170 (from 31)    Pls call pt  I think she already saw her labs, but her Hep C virus level has gone up some (from 31 to 2170). It looks like her body is not getting rid of the virus. I am going to ask the [facility] to move forward w/ the 24-week epclusa prescription that we talked about at her first visit. They'll be in touch soon; remind her to let us know when she starts treatment.

## 2024-07-23 DIAGNOSIS — F51.01 PRIMARY INSOMNIA: ICD-10-CM

## 2024-07-23 NOTE — TELEPHONE ENCOUNTER
No care due was identified.  [facility] Embedded Care Due Messages. Reference number: [id number].   7/23/2024 5:21:34 PM CDT

## 2024-07-24 ENCOUNTER — TELEPHONE (OUTPATIENT)
Dept: HEPATOLOGY | Facility: CLINIC | Age: 69
End: 2024-07-24
Payer: MEDICARE

## 2024-07-24 NOTE — TELEPHONE ENCOUNTER
----- Message from Mary More sent at 7/24/2024  9:54 AM CDT -----  Regarding: Consult/Advisory  Contact: [phone]  CONSULT/ADVISORY    Name of Caller: JOSE MOBLEY [[id number]]    Contact Preference:  [phone] (home)       Nature of Call:  Requesting to speak with Katiuska about her meds for Hep C.

## 2024-07-25 ENCOUNTER — TELEPHONE (OUTPATIENT)
Dept: HEPATOLOGY | Facility: CLINIC | Age: 69
End: 2024-07-25
Payer: MEDICARE

## 2024-07-25 DIAGNOSIS — K74.60 HEPATIC CIRRHOSIS, UNSPECIFIED HEPATIC CIRRHOSIS TYPE, UNSPECIFIED WHETHER ASCITES PRESENT: Primary | ICD-10-CM

## 2024-07-25 DIAGNOSIS — B18.2 CHRONIC HEPATITIS C WITHOUT HEPATIC COMA: Primary | ICD-10-CM

## 2024-07-25 RX ORDER — ZOLPIDEM TARTRATE 10 MG/1
TABLET ORAL
Qty: 30 TABLET | Refills: 2 | Status: SHIPPED | OUTPATIENT
Start: 2024-07-25

## 2024-07-25 NOTE — TELEPHONE ENCOUNTER
Pt beginning 24 weeks Epclusa on 7/25/24  Anticipated treatment end date: 1/8/25  F 4 decompensated  Jennifer ?  Prior HCV treatment: No        Pls update episode and schedule:  - LFT, HCV RNA at week 6 -   - Keep Nov appts for labs, u/s, visit  - LFT mid Dec  - LFT, HCV RNA - SVR12 - 4/8/25

## 2024-07-25 NOTE — TELEPHONE ENCOUNTER
Msg from PA Scheuermann mailed to patient.  Labs scheduled 9/4/24, 12/16/24 and 4/8/25; appt reminder notices mailed.

## 2024-07-26 ENCOUNTER — TELEPHONE (OUTPATIENT)
Dept: HEPATOLOGY | Facility: CLINIC | Age: 69
End: 2024-07-26
Payer: MEDICARE

## 2024-07-26 NOTE — TELEPHONE ENCOUNTER
Advised pt via vm, nurse Katiuska is out until Monday. Will send her a message to call pt Monday. TREY SCHULER

## 2024-07-26 NOTE — TELEPHONE ENCOUNTER
"----- Message from Dallas Brewer sent at 7/26/2024 10:15 AM CDT -----  Consult/Advisory    Name Of Caller: Self    Contact Preference?: [phone]     What is the nature of the call?: Calling to nathalie w/ Katiuska about starting of Hep C medication    Additional Notes:  "Thank you for all that you do for our patients"  "

## 2024-07-29 ENCOUNTER — TELEPHONE (OUTPATIENT)
Dept: HEPATOLOGY | Facility: CLINIC | Age: 69
End: 2024-07-29
Payer: MEDICARE

## 2024-07-29 ENCOUNTER — TELEPHONE (OUTPATIENT)
Dept: PRIMARY CARE CLINIC | Facility: CLINIC | Age: 69
End: 2024-07-29
Payer: MEDICARE

## 2024-07-29 ENCOUNTER — OUTPATIENT CASE MANAGEMENT (OUTPATIENT)
Dept: ADMINISTRATIVE | Facility: OTHER | Age: 69
End: 2024-07-29
Payer: MEDICARE

## 2024-07-29 DIAGNOSIS — B18.2 CHRONIC HEPATITIS C WITHOUT HEPATIC COMA: Primary | ICD-10-CM

## 2024-07-29 DIAGNOSIS — G43.009 MIGRAINE WITHOUT AURA AND WITHOUT STATUS MIGRAINOSUS, NOT INTRACTABLE: Primary | ICD-10-CM

## 2024-07-29 RX ORDER — UBROGEPANT 50 MG/1
50 TABLET ORAL DAILY PRN
Qty: 30 TABLET | Refills: 3 | Status: SHIPPED | OUTPATIENT
Start: 2024-07-29

## 2024-07-29 NOTE — PROGRESS NOTES
Outpatient Care Management  Plan of Care Follow Up Visit    Patient: Beatriz Gan  MRN: [id number]  Date of Service: 07/29/2024  Completed by: Randi Silva RN  Referral Date: 05/08/2024    Reason for Visit   Patient presents with    OPCM RN Follow Up Call       Brief Summary: OPCM RN followed up with Mrs. Otto today for care plan review.    Next Steps:   Explore how Mrs. Otto is feeling. (Headaches/fatigue?)  Taking Epclusa as prescribed?  Mrs. Otto agreed to OPCM RN follow up on or around 8/12/24.

## 2024-07-29 NOTE — LETTER
Beatriz Gan  [address] [address]      Dear Beatriz Gan,     I am your nurse with [facility]. I was unsuccessful in reaching you today. At your earliest convenience, I would like to discuss your healthcare progress.      Please contact me at [phone] from 8:00AM to 4:30 PM on Monday thru Friday.     As you know, [facility] is a program offered to you through [facility] where a nurse is available 24/7 to answer questions or provide medical advice, their number is [phone].    Thanks,      Randi Silva, RN  Outpatient Care Management

## 2024-07-29 NOTE — TELEPHONE ENCOUNTER
Pls call pt:  Epclusa will raise ubrelvy levels  So it's ok for her to take ubrelvy, but she'll need to use a lower dose of 50mg instead of 100mg.   She can take an initial ubrelvy dose of 50mg for a migraine. If headache not better after 2 hours she can take a second 50mg dose. (For a total of 100mg in the day)    She may need to talk to whomever has been treating her migraines & prescribing ubrelvy to get a lower dose script for use while on epclusa    thx

## 2024-07-29 NOTE — TELEPHONE ENCOUNTER
----- Message from Randi Silva RN sent at 7/29/2024 11:21 AM CDT -----  Good morning,    I recently spoke with Mrs. Otto for follow up. She expressed that she is experiencing frequent headaches, states that she was previously prescribed Ubrelvy, but is currently out of this medication.   Due to hepatitis diagnosis, I know she is limited with OTC treatment options. However, in review of Ubrelvy and Epclusa, there was a note as a potential drug interaction.     Please reach out to Mrs. Otto at your earliest convenience with recommendations/advisement.     Thank you for your assistance.    Kind regards,     Randi Silva RN  [facility]  [phone]

## 2024-07-29 NOTE — TELEPHONE ENCOUNTER
----- Message from Randi Horta RN sent at 7/29/2024  2:36 PM CDT -----  Good afternoon,    I recently spoke with Mrs. Otto for follow up. She expressed that she recently has been experiencing frequent headaches. She states that she saw neuro once, per chart review it appears she had an appt with Dr. Brandon Heard on 3/11. Ubrelvy was prescribed on 3/19, but I am not able to verify who prescribed this medication. I am inquiring if you could possibly refill her prescription. I messaged THANIA Vides, hepatology, regarding this medication and potential interactions with new prescription Epclusa. Please see her advisement below.    Pls call pt:  Epclusa will raise ubrelvy levels  So it's ok for her to take ubrelvy, but she'll need to use a lower dose of 50mg instead of 100mg.   She can take an initial ubrelvy dose of 50mg for a migraine. If headache not better after 2 hours she can take a second 50mg dose. (For a total of 100mg in the day)  She may need to talk to whomever has been treating her migraines & prescribing ubrelvy to get a lower dose script for use while on epclusa.      Please contact Mrs. Otto with your advisement/recommendation.     Kind regards,     Randi Horta RN  [facility]  [phone]

## 2024-07-29 NOTE — TELEPHONE ENCOUNTER
"----- Message from Nuris Diego MA sent at 7/26/2024 10:51 AM CDT -----    ----- Message -----  From: Dallas Brewer  Sent: 7/26/2024  10:16 AM CDT  To: Scheuermann Jennifer B Staff    Consult/Advisory    Name Of Caller: Self    Contact Preference?: [phone]     What is the nature of the call?: Calling to nathalie w/ Katiuska about starting of Hep C medication    Additional Notes:  "Thank you for all that you do for our patients"  "

## 2024-07-29 NOTE — TELEPHONE ENCOUNTER
Noted  Pls tell pt it should be fine to proceed w/ cataract surgery.    Also, can you schedule her for labs at week 2 of HCV Rx please: CMP, INR    thx

## 2024-07-29 NOTE — PROGRESS NOTES
7/29/2024  1st attempt to complete Follow-Up  for Outpatient Care Management, left message.  Will send portal message with unable to assess letter.

## 2024-07-29 NOTE — TELEPHONE ENCOUNTER
I spoke with patient and msg from PA Scheuermann relayed.  Lab draw scheduled 8/12/24; appt reminder notice mailed.

## 2024-07-29 NOTE — TELEPHONE ENCOUNTER
I spoke with patient.  She states that she will actually start hep c tx today.  Episode updated.  SVR draw moved to 4/14/24; appt reminder notice mailed.  Patient states that she's scheduled to see eye MD on 7/31/24 and will be scheduled for cataract surgery for both eyes soon.  She wanted PA Scheuermann to be aware of this.

## 2024-08-07 ENCOUNTER — OFFICE VISIT (OUTPATIENT)
Dept: PULMONOLOGY | Facility: CLINIC | Age: 69
End: 2024-08-07
Payer: MEDICARE

## 2024-08-07 VITALS
HEART RATE: 71 BPM | WEIGHT: 178.56 LBS | BODY MASS INDEX: 29.75 KG/M2 | HEIGHT: 65 IN | DIASTOLIC BLOOD PRESSURE: 102 MMHG | OXYGEN SATURATION: 98 % | SYSTOLIC BLOOD PRESSURE: 165 MMHG

## 2024-08-07 DIAGNOSIS — J45.50 SEVERE PERSISTENT ASTHMA WITHOUT COMPLICATION: Primary | ICD-10-CM

## 2024-08-07 PROBLEM — J45.40 MODERATE PERSISTENT ASTHMA WITHOUT COMPLICATION: Status: RESOLVED | Noted: 2024-05-10 | Resolved: 2024-08-07

## 2024-08-07 PROCEDURE — 99213 OFFICE O/P EST LOW 20 MIN: CPT | Mod: S$GLB,,, | Performed by: NURSE PRACTITIONER

## 2024-08-07 PROCEDURE — 3008F BODY MASS INDEX DOCD: CPT | Mod: CPTII,S$GLB,, | Performed by: NURSE PRACTITIONER

## 2024-08-07 PROCEDURE — 99999 PR PBB SHADOW E&M-EST. PATIENT-LVL IV: CPT | Mod: PBBFAC,,, | Performed by: NURSE PRACTITIONER

## 2024-08-07 PROCEDURE — 3077F SYST BP >= 140 MM HG: CPT | Mod: CPTII,S$GLB,, | Performed by: NURSE PRACTITIONER

## 2024-08-07 PROCEDURE — 1101F PT FALLS ASSESS-DOCD LE1/YR: CPT | Mod: CPTII,S$GLB,, | Performed by: NURSE PRACTITIONER

## 2024-08-07 PROCEDURE — 1126F AMNT PAIN NOTED NONE PRSNT: CPT | Mod: CPTII,S$GLB,, | Performed by: NURSE PRACTITIONER

## 2024-08-07 PROCEDURE — 1159F MED LIST DOCD IN RCRD: CPT | Mod: CPTII,S$GLB,, | Performed by: NURSE PRACTITIONER

## 2024-08-07 PROCEDURE — 3288F FALL RISK ASSESSMENT DOCD: CPT | Mod: CPTII,S$GLB,, | Performed by: NURSE PRACTITIONER

## 2024-08-07 PROCEDURE — 3080F DIAST BP >= 90 MM HG: CPT | Mod: CPTII,S$GLB,, | Performed by: NURSE PRACTITIONER

## 2024-08-09 ENCOUNTER — TELEPHONE (OUTPATIENT)
Dept: ORTHOPEDICS | Facility: CLINIC | Age: 69
End: 2024-08-09
Payer: MEDICARE

## 2024-08-09 DIAGNOSIS — M51.36 DDD (DEGENERATIVE DISC DISEASE), LUMBAR: ICD-10-CM

## 2024-08-09 DIAGNOSIS — M47.896 OTHER SPONDYLOSIS, LUMBAR REGION: ICD-10-CM

## 2024-08-09 RX ORDER — GABAPENTIN 600 MG/1
600 TABLET ORAL 3 TIMES DAILY
Qty: 90 TABLET | Refills: 5 | Status: SHIPPED | OUTPATIENT
Start: 2024-08-09

## 2024-08-13 ENCOUNTER — OUTPATIENT CASE MANAGEMENT (OUTPATIENT)
Dept: ADMINISTRATIVE | Facility: OTHER | Age: 69
End: 2024-08-13
Payer: MEDICARE

## 2024-08-13 NOTE — PROGRESS NOTES
8/13/2024  1st attempt to complete Follow-Up  for Outpatient Care Management, left message.  Will send portal message with unable to assess letter.

## 2024-08-13 NOTE — LETTER
Beatriz Gan  [address] [address]      Dear Beatriz Gan,     I am your nurse with [facility]. I was unsuccessful in reaching you today. At your earliest convenience, I would like to discuss your healthcare progress.      Please contact me at [phone] from 8:00AM to 4:30 PM on Monday thru Friday.     As you know, [facility] is a program offered to you through [facility] where a nurse is available 24/7 to answer questions or provide medical advice, their number is [phone].    Thanks,      Randi Silva, RN  Outpatient Care Management

## 2024-08-14 ENCOUNTER — OUTPATIENT CASE MANAGEMENT (OUTPATIENT)
Dept: ADMINISTRATIVE | Facility: OTHER | Age: 69
End: 2024-08-14
Payer: MEDICARE

## 2024-08-14 ENCOUNTER — PATIENT MESSAGE (OUTPATIENT)
Dept: ADMINISTRATIVE | Facility: OTHER | Age: 69
End: 2024-08-14
Payer: MEDICARE

## 2024-08-14 ENCOUNTER — TELEPHONE (OUTPATIENT)
Dept: HEPATOLOGY | Facility: CLINIC | Age: 69
End: 2024-08-14
Payer: MEDICARE

## 2024-08-14 NOTE — PROGRESS NOTES
Outpatient Care Management  Plan of Care Follow Up Visit    Patient: Beatriz Gan  MRN: [id number]  Date of Service: 08/14/2024  Completed by: Randi Silva RN  Referral Date: 05/08/2024    Reason for Visit   Patient presents with    OPCM RN Follow Up Call       Brief Summary: OPCM RN followed up with Mrs. Otto today for care plan review.    Next Steps:   Explore how Mrs. Otto has been feeling. (Fatigue, bloating)  Explore if lab results were received.   Review recent daily routine/bedtime routine.  Review if stairlift was evaluated by [facility].   Mrs. Otto agreed to OPCM RN follow up on or around 8/28/24.     No

## 2024-08-15 ENCOUNTER — LAB VISIT (OUTPATIENT)
Dept: LAB | Facility: HOSPITAL | Age: 69
End: 2024-08-15
Attending: PHYSICIAN ASSISTANT
Payer: MEDICARE

## 2024-08-15 DIAGNOSIS — B18.2 CHRONIC HEPATITIS C WITHOUT HEPATIC COMA: ICD-10-CM

## 2024-08-15 LAB
ALBUMIN SERPL BCP-MCNC: 3.4 G/DL (ref 3.5–5.2)
ALP SERPL-CCNC: 99 U/L (ref 55–135)
ALT SERPL W/O P-5'-P-CCNC: 16 U/L (ref 10–44)
ANION GAP SERPL CALC-SCNC: 7 MMOL/L (ref 8–16)
AST SERPL-CCNC: 19 U/L (ref 10–40)
BILIRUB SERPL-MCNC: 0.4 MG/DL (ref 0.1–1)
BUN SERPL-MCNC: 17 MG/DL (ref 8–23)
CALCIUM SERPL-MCNC: 9.4 MG/DL (ref 8.7–10.5)
CHLORIDE SERPL-SCNC: 106 MMOL/L (ref 95–110)
CO2 SERPL-SCNC: 25 MMOL/L (ref 23–29)
CREAT SERPL-MCNC: 1.1 MG/DL (ref 0.5–1.4)
EST. GFR  (NO RACE VARIABLE): 54 ML/MIN/1.73 M^2
GLUCOSE SERPL-MCNC: 91 MG/DL (ref 70–110)
INR PPP: 1.1 (ref 0.8–1.2)
POTASSIUM SERPL-SCNC: 4.6 MMOL/L (ref 3.5–5.1)
PROT SERPL-MCNC: 6.4 G/DL (ref 6–8.4)
PROTHROMBIN TIME: 11.3 SEC (ref 9–12.5)
SODIUM SERPL-SCNC: 138 MMOL/L (ref 136–145)

## 2024-08-15 PROCEDURE — 85610 PROTHROMBIN TIME: CPT | Performed by: PHYSICIAN ASSISTANT

## 2024-08-15 PROCEDURE — 80053 COMPREHEN METABOLIC PANEL: CPT | Performed by: PHYSICIAN ASSISTANT

## 2024-08-15 PROCEDURE — 36415 COLL VENOUS BLD VENIPUNCTURE: CPT | Performed by: PHYSICIAN ASSISTANT

## 2024-08-16 ENCOUNTER — TELEPHONE (OUTPATIENT)
Dept: HEPATOLOGY | Facility: CLINIC | Age: 69
End: 2024-08-16
Payer: MEDICARE

## 2024-08-16 NOTE — TELEPHONE ENCOUNTER
Pt began 24 weeks Epclusa on 7/25/24  Anticipated treatment end date: 1/8/25  F 4 decompensated  Jennifer ?  Prior HCV treatment: No    8/15 CMP, INR stable. LFT Normal    MELD 3.0: 9 at 8/15/2024  1:28 PM  MELD-Na: 8 at 8/15/2024  1:28 PM  Calculated from:  Serum Creatinine: 1.1 mg/dL at 8/15/2024  1:28 PM  Serum Sodium: 138 mmol/L (Using max of 137 mmol/L) at 8/15/2024  1:28 PM  Total Bilirubin: 0.4 mg/dL (Using min of 1 mg/dL) at 8/15/2024  1:28 PM  Serum Albumin: 3.4 g/dL at 8/15/2024  1:28 PM  INR(ratio): 1.1 at 8/15/2024  1:28 PM  Age at listing (hypothetical): 68 years  Sex: Female at 8/15/2024  1:28 PM    Cont epclusa    Next appts  - LFT, HCV RNA at week 6 -   - Keep Nov appts for labs, u/s, visit  - LFT mid Dec  - LFT, HCV RNA - SVR12 - 4/8/25

## 2024-08-20 ENCOUNTER — TELEPHONE (OUTPATIENT)
Dept: HEPATOLOGY | Facility: CLINIC | Age: 69
End: 2024-08-20
Payer: MEDICARE

## 2024-08-20 DIAGNOSIS — F41.9 ANXIETY: ICD-10-CM

## 2024-08-20 NOTE — TELEPHONE ENCOUNTER
Patient was advised of her lab results.  Other questions were asked and Kendra Thomas (Nurse) assisted.    ----- Message from Diana Hallman sent at 8/20/2024 10:05 AM CDT -----  Regarding: Consult/Advisory  Contact: Beatriz Gan     Consult/Advisory     Name Of Caller:Beatriz Gan         Contact Preference:[phone] (home)       Nature of call:Patient is calling to get test results. Requesting a call back

## 2024-08-20 NOTE — TELEPHONE ENCOUNTER
No care due was identified.  [facility] Embedded Care Due Messages. Reference number: [id number].   8/20/2024 5:38:40 PM CDT

## 2024-08-22 RX ORDER — ALPRAZOLAM 1 MG/1
TABLET ORAL
Qty: 60 TABLET | Refills: 2 | Status: SHIPPED | OUTPATIENT
Start: 2024-08-22

## 2024-08-27 RX ORDER — CELECOXIB 200 MG/1
CAPSULE ORAL
Qty: 90 CAPSULE | Refills: 3 | Status: SHIPPED | OUTPATIENT
Start: 2024-08-27

## 2024-08-28 ENCOUNTER — OUTPATIENT CASE MANAGEMENT (OUTPATIENT)
Dept: ADMINISTRATIVE | Facility: OTHER | Age: 69
End: 2024-08-28
Payer: MEDICARE

## 2024-08-28 ENCOUNTER — TELEPHONE (OUTPATIENT)
Dept: PRIMARY CARE CLINIC | Facility: CLINIC | Age: 69
End: 2024-08-28
Payer: MEDICARE

## 2024-08-28 ENCOUNTER — TELEPHONE (OUTPATIENT)
Dept: HEPATOLOGY | Facility: CLINIC | Age: 69
End: 2024-08-28
Payer: MEDICARE

## 2024-08-28 NOTE — TELEPHONE ENCOUNTER
----- Message from Pauly Mendez MA sent at 8/28/2024 11:06 AM CDT -----    ----- Message -----  From: Randi Horta RN  Sent: 8/28/2024  11:02 AM CDT  To: Clinton Patel Staff; #    Good morning,    I recently spoke with Mrs. Otto for care plan review. She expressed that she has been experiencing increased joint pain in her knees and hands since starting Epclusa. She is currently taking Celebrex prescribed by Dr. Alonzo, but reports that it is ineffective in relieving her pain. She voiced that she recently started using her cane to ambulate due to her right knee buckling.     In addition, she reports swelling in her ankles at the end of each day, SOB is at baseline, she denies any chest pain. I educated her on s/s to monitor for, and the importance of elevating her legs while at rest.     Please reach out to Mrs. Otto at your earliest convenience for advisement.     Thank you for your assistance.    Kind regards,    Randi Horta RN  [facility]  [phone]

## 2024-08-28 NOTE — TELEPHONE ENCOUNTER
----- Message from Randi Silva RN sent at 8/28/2024 10:56 AM CDT -----  Good morning,    I recently spoke with Mrs. Otto for care plan review. She expressed that she has been experiencing increased joint pain in her knees and hands since starting Epclusa. She is currently taking Celebrex prescribed by Dr. Alonzo, but reports that it is ineffective in relieving her pain. She voiced that she recently started using her cane to ambulate due to her right knee buckling.     In addition, she reports swelling in her ankles at the end of each day, SOB is at baseline, she denies any chest pain. I educated her on s/s to monitor for, and the importance of elevating her legs while at rest.     Please reach out to Mrs. Otto at your earliest convenience for advisement.     Thank you for your assistance.    Kind regards,    Randi Silva RN  [facility]  [phone]

## 2024-08-28 NOTE — PROGRESS NOTES
Outpatient Care Management  Plan of Care Follow Up Visit    Patient: Beatriz Gan  MRN: [id number]  Date of Service: 08/28/2024  Completed by: Randi Silva RN  Referral Date: 05/08/2024    Reason for Visit   Patient presents with    OPCM RN Follow Up Call       Brief Summary: OPCM RN followed up with Mrs. Otto today for care plan review.    Next Steps:   Review pain rating.   Review if swelling in her lower extremities has improved.   Free from falls?  Mrs. Otto agrees to follow up on or around 9/11/24.

## 2024-08-28 NOTE — TELEPHONE ENCOUNTER
We don't have a stronger alternative anti-inflammatory to celebrex.  It may be a side effect to Epclusa which would need to be addressed by the liver team.  She's already getting tramadol for breakthrough pain. I'm not sure of any other options

## 2024-08-29 NOTE — TELEPHONE ENCOUNTER
Spoke to pt  Experiencing joint pains, knee giving out on her, fatigue  Has not gone to ortho b/c of her HCV treatment, was under impression she couldn't have steroid joint injection while on HCV Rx.  Discussed that joint pain, knee giving out on her is NOT expected side effect of epclusa. She should return to ortho for mngmt of that.  Advised her to change epclusa to PM dosing to see if that helps her daytime fatigue.    Encouragement provided

## 2024-09-04 ENCOUNTER — LAB VISIT (OUTPATIENT)
Dept: LAB | Facility: HOSPITAL | Age: 69
End: 2024-09-04
Attending: PHYSICIAN ASSISTANT
Payer: MEDICARE

## 2024-09-04 DIAGNOSIS — K74.60 HEPATIC CIRRHOSIS, UNSPECIFIED HEPATIC CIRRHOSIS TYPE, UNSPECIFIED WHETHER ASCITES PRESENT: ICD-10-CM

## 2024-09-04 LAB
ALBUMIN SERPL BCP-MCNC: 3.7 G/DL (ref 3.5–5.2)
ALP SERPL-CCNC: 117 U/L (ref 55–135)
ALT SERPL W/O P-5'-P-CCNC: 13 U/L (ref 10–44)
AST SERPL-CCNC: 20 U/L (ref 10–40)
BILIRUB DIRECT SERPL-MCNC: 0.2 MG/DL (ref 0.1–0.3)
BILIRUB SERPL-MCNC: 0.5 MG/DL (ref 0.1–1)
PROT SERPL-MCNC: 6.9 G/DL (ref 6–8.4)

## 2024-09-04 PROCEDURE — 36415 COLL VENOUS BLD VENIPUNCTURE: CPT | Performed by: PHYSICIAN ASSISTANT

## 2024-09-04 PROCEDURE — 80076 HEPATIC FUNCTION PANEL: CPT | Performed by: PHYSICIAN ASSISTANT

## 2024-09-04 PROCEDURE — 87522 HEPATITIS C REVRS TRNSCRPJ: CPT | Performed by: PHYSICIAN ASSISTANT

## 2024-09-06 ENCOUNTER — TELEPHONE (OUTPATIENT)
Dept: HEPATOLOGY | Facility: CLINIC | Age: 69
End: 2024-09-06
Payer: MEDICARE

## 2024-09-06 LAB — HCV RNA SERPL NAA+PROBE-ACNC: NORMAL IU/ML

## 2024-09-06 NOTE — TELEPHONE ENCOUNTER
Pt began 24 weeks Epclusa on 7/25/24  Anticipated treatment end date: 1/8/25  F 4 decompensated  Jennifer ?  Prior HCV treatment: No    9/4: HCV neg, LFT Normal    Please call pt  Tell her labs look great!  Liver numbers are normal  Hep C virus is negative. Epclsua is doing it's job!!  Don't stop epclusa!! Continue 1 pill every day for 24 weeks total. She should end in early January.    Next appts  - Keep Nov appts for labs, u/s, visit  - LFT mid Dec  - LFT, HCV RNA - SVR12 - 4/8/25

## 2024-09-11 ENCOUNTER — OUTPATIENT CASE MANAGEMENT (OUTPATIENT)
Dept: ADMINISTRATIVE | Facility: OTHER | Age: 69
End: 2024-09-11
Payer: MEDICARE

## 2024-09-11 ENCOUNTER — PATIENT MESSAGE (OUTPATIENT)
Dept: ADMINISTRATIVE | Facility: OTHER | Age: 69
End: 2024-09-11
Payer: MEDICARE

## 2024-09-11 NOTE — PROGRESS NOTES
Outpatient Care Management  Plan of Care Follow Up Visit    Patient: Beatriz Gan  MRN: [id number]  Date of Service: 09/11/2024  Completed by: Randi Silva RN  Referral Date: 05/08/2024    Reason for Visit   Patient presents with    OPCM RN Follow Up Call       Brief Summary: OPCM RN followed up with Mrs. Otto today for care plan review.    Next Steps:   Ensure follow up appt with ortho is scheduled.   Explore pain rating/location of pain.  Free from falls?  Mrs. Otto agrees to follow up on or around 9/25/24.

## 2024-09-13 ENCOUNTER — TELEPHONE (OUTPATIENT)
Dept: ORTHOPEDICS | Facility: CLINIC | Age: 69
End: 2024-09-13
Payer: MEDICARE

## 2024-09-13 NOTE — TELEPHONE ENCOUNTER
"----- Message from Candi Gardner LPN sent at 9/12/2024  2:42 PM CDT -----    ----- Message -----  From: Randi Horta RN  Sent: 9/11/2024  11:45 AM CDT  To: Michelle Land    Good morning,    I am the  for Mrs. Gan. She would like to schedule a follow up with Dr. Martinez to address bilateral knee pain, she reports that her right knee keeps "giving out." She is interested in a mid to late October appt if there is availability.     Please reach out to Mrs. Gan at your earliest convenience for scheduling.    Thank you for your assistance.    Kind regards,     Randi Horta RN  [facility]  [phone]  "

## 2024-09-18 ENCOUNTER — ANESTHESIA EVENT (OUTPATIENT)
Dept: ENDOSCOPY | Facility: HOSPITAL | Age: 69
End: 2024-09-18
Payer: MEDICARE

## 2024-09-18 ENCOUNTER — ANESTHESIA (OUTPATIENT)
Dept: ENDOSCOPY | Facility: HOSPITAL | Age: 69
End: 2024-09-18
Payer: MEDICARE

## 2024-09-18 ENCOUNTER — HOSPITAL ENCOUNTER (OUTPATIENT)
Facility: HOSPITAL | Age: 69
Discharge: HOME | End: 2024-09-18
Attending: INTERNAL MEDICINE | Admitting: INTERNAL MEDICINE
Payer: MEDICARE

## 2024-09-18 DIAGNOSIS — K57.90 DIVERTICULOSIS: ICD-10-CM

## 2024-09-18 DIAGNOSIS — Z12.11 COLON CANCER SCREENING: ICD-10-CM

## 2024-09-18 DIAGNOSIS — K29.70 GASTRITIS, PRESENCE OF BLEEDING UNSPECIFIED, UNSPECIFIED CHRONICITY, UNSPECIFIED GASTRITIS TYPE: Primary | ICD-10-CM

## 2024-09-18 DIAGNOSIS — K64.8 INTERNAL HEMORRHOIDS: ICD-10-CM

## 2024-09-18 DIAGNOSIS — K63.5 POLYP OF COLON, UNSPECIFIED PART OF COLON, UNSPECIFIED TYPE: ICD-10-CM

## 2024-09-18 PROCEDURE — 45380 COLONOSCOPY AND BIOPSY: CPT | Mod: PT,59,, | Performed by: INTERNAL MEDICINE

## 2024-09-18 PROCEDURE — 25000003 PHARM REV CODE 250: Performed by: NURSE ANESTHETIST, CERTIFIED REGISTERED

## 2024-09-18 PROCEDURE — 27201012 HC FORCEPS, HOT/COLD, DISP: Performed by: INTERNAL MEDICINE

## 2024-09-18 PROCEDURE — 45385 COLONOSCOPY W/LESION REMOVAL: CPT | Mod: PT,,, | Performed by: INTERNAL MEDICINE

## 2024-09-18 PROCEDURE — 43239 EGD BIOPSY SINGLE/MULTIPLE: CPT | Performed by: INTERNAL MEDICINE

## 2024-09-18 PROCEDURE — 43239 EGD BIOPSY SINGLE/MULTIPLE: CPT | Mod: 51,,, | Performed by: INTERNAL MEDICINE

## 2024-09-18 PROCEDURE — 63600175 PHARM REV CODE 636 W HCPCS: Performed by: NURSE ANESTHETIST, CERTIFIED REGISTERED

## 2024-09-18 PROCEDURE — 88305 TISSUE EXAM BY PATHOLOGIST: CPT | Mod: TC,59 | Performed by: PATHOLOGY

## 2024-09-18 PROCEDURE — 27201089 HC SNARE, DISP (ANY): Performed by: INTERNAL MEDICINE

## 2024-09-18 PROCEDURE — 37000008 HC ANESTHESIA 1ST 15 MINUTES: Performed by: INTERNAL MEDICINE

## 2024-09-18 PROCEDURE — 25000003 PHARM REV CODE 250: Performed by: ANESTHESIOLOGY

## 2024-09-18 PROCEDURE — 25000003 PHARM REV CODE 250: Performed by: INTERNAL MEDICINE

## 2024-09-18 PROCEDURE — 37000009 HC ANESTHESIA EA ADD 15 MINS: Performed by: INTERNAL MEDICINE

## 2024-09-18 PROCEDURE — 27200997: Performed by: INTERNAL MEDICINE

## 2024-09-18 PROCEDURE — 45380 COLONOSCOPY AND BIOPSY: CPT | Mod: PT,59 | Performed by: INTERNAL MEDICINE

## 2024-09-18 PROCEDURE — 45385 COLONOSCOPY W/LESION REMOVAL: CPT | Mod: PT | Performed by: INTERNAL MEDICINE

## 2024-09-18 RX ORDER — SODIUM CHLORIDE 9 MG/ML
INJECTION, SOLUTION INTRAVENOUS CONTINUOUS
Status: DISCONTINUED | OUTPATIENT
Start: 2024-09-18 | End: 2024-09-18 | Stop reason: HOSPADM

## 2024-09-18 RX ORDER — PROPOFOL 10 MG/ML
VIAL (ML) INTRAVENOUS
Status: DISCONTINUED | OUTPATIENT
Start: 2024-09-18 | End: 2024-09-18

## 2024-09-18 RX ORDER — PANTOPRAZOLE SODIUM 40 MG/1
40 TABLET, DELAYED RELEASE ORAL DAILY
Qty: 90 TABLET | Refills: 3 | Status: SHIPPED | OUTPATIENT
Start: 2024-09-18 | End: 2025-09-18

## 2024-09-18 RX ORDER — LIDOCAINE HYDROCHLORIDE 20 MG/ML
INJECTION INTRAVENOUS
Status: DISCONTINUED | OUTPATIENT
Start: 2024-09-18 | End: 2024-09-18

## 2024-09-18 RX ADMIN — PROPOFOL 50 MG: 10 INJECTION, EMULSION INTRAVENOUS at 11:09

## 2024-09-18 RX ADMIN — SODIUM CHLORIDE, SODIUM GLUCONATE, SODIUM ACETATE, POTASSIUM CHLORIDE, MAGNESIUM CHLORIDE, SODIUM PHOSPHATE, DIBASIC, AND POTASSIUM PHOSPHATE: .53; .5; .37; .037; .03; .012; .00082 INJECTION, SOLUTION INTRAVENOUS at 11:09

## 2024-09-18 RX ADMIN — PROPOFOL 50 MG: 10 INJECTION, EMULSION INTRAVENOUS at 12:09

## 2024-09-18 RX ADMIN — PROPOFOL 100 MG: 10 INJECTION, EMULSION INTRAVENOUS at 11:09

## 2024-09-18 RX ADMIN — SODIUM CHLORIDE: 9 INJECTION, SOLUTION INTRAVENOUS at 10:09

## 2024-09-18 RX ADMIN — LIDOCAINE HYDROCHLORIDE 100 MG: 20 INJECTION, SOLUTION INTRAVENOUS at 11:09

## 2024-09-18 RX ADMIN — PROPOFOL 30 MG: 10 INJECTION, EMULSION INTRAVENOUS at 11:09

## 2024-09-18 NOTE — TRANSFER OF CARE
"Anesthesia Transfer of Care Note    Patient: Beatriz Gan    Procedure(s) Performed: Procedure(s) (LRB):  COLONOSCOPY (N/A)  EGD (ESOPHAGOGASTRODUODENOSCOPY) (N/A)    Patient location: GI    Anesthesia Type: general    Transport from OR: Transported from OR on room air with adequate spontaneous ventilation    Post pain: adequate analgesia    Post assessment: no apparent anesthetic complications and tolerated procedure well    Post vital signs: stable    Level of consciousness: awake, alert and oriented    Nausea/Vomiting: no nausea/vomiting    Complications: none    Transfer of care protocol was followed    Last vitals: Visit Vitals  BP (!) 175/72 (BP Location: Left arm, Patient Position: Lying)   Pulse 62   Temp 36.7 °C (98.1 °F) (Skin)   Resp 18   Ht 5' 4.5" (1.638 m)   Wt 80.7 kg (178 lb)   SpO2 98%   Breastfeeding No   BMI 30.08 kg/m²     "

## 2024-09-18 NOTE — PROVATION PATIENT INSTRUCTIONS
Discharge Summary/Instructions after an Endoscopic Procedure  Patient Name: Beatriz Gan  Patient MRN: [id number]  Patient YOB: 1955 Wednesday, September 18, 2024  Ceasar Sylvester MD  Dear patient,  As a result of recent federal legislation (The Federal Cures Act), you may   receive lab or pathology results from your procedure in your [facility]   account before your physician is able to contact you. Your physician or   their representative will relay the results to you with their   recommendations at their soonest availability.  Thank you,  RESTRICTIONS:  During your procedure today, you received medications for sedation.  These   medications may affect your judgment, balance and coordination.  Therefore,   for 24 hours, you have the following restrictions:   - DO NOT drive a car, operate machinery, make legal/financial decisions,   sign important papers or drink alcohol.    ACTIVITY:  Today: no heavy lifting, straining or running due to procedural   sedation/anesthesia.  The following day: return to full activity including work.  DIET:  Eat and drink normally unless instructed otherwise.     TREATMENT FOR COMMON SIDE EFFECTS:  - Mild abdominal pain, nausea, belching, bloating or excessive gas:  rest,   eat lightly and use a heating pad.  - Sore Throat: treat with throat lozenges and/or gargle with warm salt   water.  - Because air was used during the procedure, expelling large amounts of air   from your rectum or belching is normal.  - If a bowel prep was taken, you may not have a bowel movement for 1-3 days.    This is normal.  SYMPTOMS TO WATCH FOR AND REPORT TO YOUR PHYSICIAN:  1. Abdominal pain or bloating, other than gas cramps.  2. Chest pain.  3. Back pain.  4. Signs of infection such as: chills or fever occurring within 24 hours   after the procedure.  5. Rectal bleeding, which would show as bright red, maroon, or black stools.   (A tablespoon of blood from the rectum is not serious,  especially if   hemorrhoids are present.)  6. Vomiting.  7. Weakness or dizziness.  GO DIRECTLY TO THE NEAREST EMERGENCY ROOM IF YOU HAVE ANY OF THE FOLLOWING:      Difficulty breathing              Chills and/or fever over 101 F   Persistent vomiting and/or vomiting blood   Severe abdominal pain   Severe chest pain   Black, tarry stools   Bleeding- more than one tablespoon   Any other symptom or condition that you feel may need urgent attention  Your doctor recommends these additional instructions:  If any biopsies were taken, your doctors clinic will contact you in 1 to 2   weeks with any results.  - Patient has a contact number available for emergencies.  The signs and   symptoms of potential delayed complications were discussed with the   patient.  Return to normal activities tomorrow.  Written discharge   instructions were provided to the patient.   - Resume previous diet.   - Continue present medications.   - No aspirin, ibuprofen, naproxen, or other non-steroidal anti-inflammatory   drugs.   - Await pathology results.   - Repeat upper endoscopy in 2 years for surveillance.   - Discharge patient to home (ambulatory).   - Follow an antireflux regimen.   - Use Protonix (pantoprazole) 40 mg PO daily.   - Return to GI office PRN.   - Perform a colonoscopy today.  For questions, problems or results please call your physician - Ceasar Sylvester MD at Work:  [phone].  [facility], EMERGENCY ROOM PHONE NUMBER: [phone]  IF A COMPLICATION OR EMERGENCY SITUATION ARISES AND YOU ARE UNABLE TO REACH   YOUR PHYSICIAN - GO DIRECTLY TO THE EMERGENCY ROOM.  Ceasar Sylvester MD  9/18/2024 11:50:36 AM  This report has been verified and signed electronically.  Dear patient,  As a result of recent federal legislation (The Federal Cures Act), you may   receive lab or pathology results from your procedure in your [facility]   account before your physician is able to contact you. Your physician or   their  representative will relay the results to you with their   recommendations at their soonest availability.  Thank you,  PROVATION

## 2024-09-18 NOTE — ANESTHESIA PREPROCEDURE EVALUATION
09/18/2024  Beatriz Gan is a 69 y.o., female.      Pre-op Assessment    I have reviewed the Patient Summary Reports.     I have reviewed the Nursing Notes. I have reviewed the NPO Status.   I have reviewed the Medications.     Review of Systems  Anesthesia Hx:  No problems with previous Anesthesia                Social:  Former Smoker       Cardiovascular:     Hypertension   CAD                  Coronary Artery Disease:                            Hypertension         Pulmonary:  Pneumonia  Asthma       Asthma:           Pulmonary Infection:  Pneumonia.     Renal/:  Chronic Renal Disease   Hx nephrectomy     Kidney Function/Disease             Hepatic/GI:    Hiatal Hernia, GERD Liver Disease, Hepatitis, C    Gerd    Hernia, Hiatal Hernia   Liver Disease, Hepatitis        Musculoskeletal:  Arthritis        Arthritis     Spine Disorders: lumbar            Neurological:   CVA Neuromuscular Disease,  Headaches Seizures     Dx of Headaches   Arthritis      Seizure Disorder                        Neuromuscular Disease   Psych:  Psychiatric History                  Physical Exam  General: Well nourished    Airway:  Mallampati: II   Mouth Opening: Normal  TM Distance: Normal  Neck ROM: Normal ROM        Anesthesia Plan  Type of Anesthesia, risks & benefits discussed:    Anesthesia Type: Gen Natural Airway  Intra-op Monitoring Plan: Standard ASA Monitors  Induction:  IV  Informed Consent: Informed consent signed with the Patient and all parties understand the risks and agree with anesthesia plan.  All questions answered.   ASA Score: 3    Ready For Surgery From Anesthesia Perspective.     .

## 2024-09-18 NOTE — H&P
CC: Variceal screening, screening for colorectal cancer    69 year old female with above. States that symptoms are absent, no alleviating/exacerbating factors. No family history of colorectal CA. No personal history of polyps. No bleeding or weight loss.     ROS:  No headache, no fever/chills, no chest pain/SOB, no nausea/vomiting/diarrhea/constipation/GI bleeding/abdominal pain, no dysuria/hematuria.    VSSAF   Exam:   Alert and oriented x 3; no apparent distress   PERRLA, sclera anicteric  CV: Regular rate/rhythm, normal PMI   Lungs: Clear bilaterally with no wheeze/rales   Abdomen: Soft, NT/ND, normal bowel sounds   Ext: No cyanosis, clubbing     Impression:   As above    Plan:   Proceed with endoscopy. Further recs to follow.

## 2024-09-18 NOTE — ANESTHESIA POSTPROCEDURE EVALUATION
Anesthesia Post Evaluation    Patient: Beatriz Gan    Procedure(s) Performed: Procedure(s) (LRB):  COLONOSCOPY (N/A)  EGD (ESOPHAGOGASTRODUODENOSCOPY) (N/A)    Final Anesthesia Type: general      Patient location during evaluation: PACU  Patient participation: Yes- Able to Participate  Level of consciousness: awake and alert  Post-procedure vital signs: reviewed and stable  Pain management: adequate  Airway patency: patent    PONV status at discharge: No PONV  Anesthetic complications: no      Cardiovascular status: blood pressure returned to baseline  Respiratory status: unassisted  Hydration status: euvolemic  Follow-up not needed.              Vitals Value Taken Time   /81 09/18/24 1244   Temp 36.7 °C (98.1 °F) 09/18/24 1215   Pulse 55 09/18/24 1244   Resp 14 09/18/24 1235   SpO2 100 % 09/18/24 1244   Vitals shown include unfiled device data.      No case tracking events are documented in the log.      Pain/Yaneli Score: Yaneli Score: 10 (9/18/2024 12:35 PM)

## 2024-09-18 NOTE — PROVATION PATIENT INSTRUCTIONS
Discharge Summary/Instructions after an Endoscopic Procedure  Patient Name: Beatriz Gan  Patient MRN: [id number]  Patient YOB: 1955 Wednesday, September 18, 2024  Ceasar Sylvester MD  Dear patient,  As a result of recent federal legislation (The Federal Cures Act), you may   receive lab or pathology results from your procedure in your [facility]   account before your physician is able to contact you. Your physician or   their representative will relay the results to you with their   recommendations at their soonest availability.  Thank you,  RESTRICTIONS:  During your procedure today, you received medications for sedation.  These   medications may affect your judgment, balance and coordination.  Therefore,   for 24 hours, you have the following restrictions:   - DO NOT drive a car, operate machinery, make legal/financial decisions,   sign important papers or drink alcohol.    ACTIVITY:  Today: no heavy lifting, straining or running due to procedural   sedation/anesthesia.  The following day: return to full activity including work.  DIET:  Eat and drink normally unless instructed otherwise.     TREATMENT FOR COMMON SIDE EFFECTS:  - Mild abdominal pain, nausea, belching, bloating or excessive gas:  rest,   eat lightly and use a heating pad.  - Sore Throat: treat with throat lozenges and/or gargle with warm salt   water.  - Because air was used during the procedure, expelling large amounts of air   from your rectum or belching is normal.  - If a bowel prep was taken, you may not have a bowel movement for 1-3 days.    This is normal.  SYMPTOMS TO WATCH FOR AND REPORT TO YOUR PHYSICIAN:  1. Abdominal pain or bloating, other than gas cramps.  2. Chest pain.  3. Back pain.  4. Signs of infection such as: chills or fever occurring within 24 hours   after the procedure.  5. Rectal bleeding, which would show as bright red, maroon, or black stools.   (A tablespoon of blood from the rectum is not serious,  especially if   hemorrhoids are present.)  6. Vomiting.  7. Weakness or dizziness.  GO DIRECTLY TO THE NEAREST EMERGENCY ROOM IF YOU HAVE ANY OF THE FOLLOWING:      Difficulty breathing              Chills and/or fever over 101 F   Persistent vomiting and/or vomiting blood   Severe abdominal pain   Severe chest pain   Black, tarry stools   Bleeding- more than one tablespoon   Any other symptom or condition that you feel may need urgent attention  Your doctor recommends these additional instructions:  If any biopsies were taken, your doctors clinic will contact you in 1 to 2   weeks with any results.  - Patient has a contact number available for emergencies.  The signs and   symptoms of potential delayed complications were discussed with the   patient.  Return to normal activities tomorrow.  Written discharge   instructions were provided to the patient.   - High fiber diet.   - Continue present medications.   - Await pathology results.   - Repeat colonoscopy in 3 years for surveillance.   - Discharge patient to home (ambulatory).   - Return to GI office after studies are complete.  For questions, problems or results please call your physician - Ceasar Sylvester MD at Work:  [phone].  [facility], EMERGENCY ROOM PHONE NUMBER: [phone]  IF A COMPLICATION OR EMERGENCY SITUATION ARISES AND YOU ARE UNABLE TO REACH   YOUR PHYSICIAN - GO DIRECTLY TO THE EMERGENCY ROOM.  Ceasar Sylvester MD  9/18/2024 12:15:53 PM  This report has been verified and signed electronically.  Dear patient,  As a result of recent federal legislation (The Federal Cures Act), you may   receive lab or pathology results from your procedure in your [facility]   account before your physician is able to contact you. Your physician or   their representative will relay the results to you with their   recommendations at their soonest availability.  Thank you,  PROVATION

## 2024-09-19 VITALS
BODY MASS INDEX: 29.66 KG/M2 | DIASTOLIC BLOOD PRESSURE: 81 MMHG | TEMPERATURE: 98 F | SYSTOLIC BLOOD PRESSURE: 162 MMHG | RESPIRATION RATE: 14 BRPM | OXYGEN SATURATION: 100 % | WEIGHT: 178 LBS | HEART RATE: 55 BPM | HEIGHT: 65 IN

## 2024-09-25 ENCOUNTER — OUTPATIENT CASE MANAGEMENT (OUTPATIENT)
Dept: ADMINISTRATIVE | Facility: OTHER | Age: 69
End: 2024-09-25
Payer: MEDICARE

## 2024-09-25 NOTE — LETTER
Beatriz Gan  [address] [address]      Dear Beatriz Gan,     I am your nurse with [facility]. I was unsuccessful in reaching you today. At your earliest convenience, I would like to discuss your healthcare progress.      Please contact me at [phone] from 8:00AM to 4:30 PM on Monday thru Friday.     As you know, [facility] is a program offered to you through [facility] where a nurse is available 24/7 to answer questions or provide medical advice, their number is [phone].    Thanks,      Randi Silva, RN  Outpatient Care Management

## 2024-09-25 NOTE — PROGRESS NOTES
9/25/2024  1st attempt to complete Follow-Up for Outpatient Care Management, left message. Will send portal message with unable to assess letter.

## 2024-09-27 ENCOUNTER — TELEPHONE (OUTPATIENT)
Dept: PRIMARY CARE CLINIC | Facility: CLINIC | Age: 69
End: 2024-09-27
Payer: MEDICARE

## 2024-09-27 DIAGNOSIS — M51.37 DDD (DEGENERATIVE DISC DISEASE), LUMBOSACRAL: ICD-10-CM

## 2024-09-27 DIAGNOSIS — M17.10 PRIMARY OSTEOARTHRITIS OF KNEE, UNSPECIFIED LATERALITY: Primary | ICD-10-CM

## 2024-09-27 RX ORDER — TRAMADOL HYDROCHLORIDE 100 MG/1
100 TABLET, COATED ORAL EVERY 8 HOURS PRN
Qty: 90 TABLET | Refills: 0 | Status: SHIPPED | OUTPATIENT
Start: 2024-09-27

## 2024-09-27 RX ORDER — DICLOFENAC SODIUM 10 MG/G
4 GEL TOPICAL 2 TIMES DAILY
Qty: 200 G | Refills: 3 | Status: SHIPPED | OUTPATIENT
Start: 2024-09-27

## 2024-09-27 NOTE — TELEPHONE ENCOUNTER
Pt with severe back pain and sciatica.  Unable to take aspirin, NSAIDs, Tylenol, oxycodone.  Sending in tramadol 100 mg dose and voltaren gel

## 2024-10-01 ENCOUNTER — OUTPATIENT CASE MANAGEMENT (OUTPATIENT)
Dept: ADMINISTRATIVE | Facility: OTHER | Age: 69
End: 2024-10-01
Payer: MEDICARE

## 2024-10-01 NOTE — PROGRESS NOTES
Outpatient Care Management  Plan of Care Follow Up Visit    Patient: Beatriz Gan  MRN: [id number]  Date of Service: 10/01/2024  Completed by: Randi Silva RN  Referral Date: 05/08/2024    Reason for Visit   Patient presents with    OPCM RN Follow Up Call       Brief Summary: OPCM RN followed up with Mrs. Otto today for care plan review.    Next Steps:   Review pain rating. Tramadol reducing her pain?  Explore follow up appt with ortho.   Review if Mrs. Otto has tried guided imagery or deep breathing to help with pain.  Mrs. Otto agrees to follow up on or around 10/15/2024.

## 2024-10-07 ENCOUNTER — TELEPHONE (OUTPATIENT)
Dept: FAMILY MEDICINE | Facility: CLINIC | Age: 69
End: 2024-10-07
Payer: MEDICARE

## 2024-10-07 ENCOUNTER — TELEPHONE (OUTPATIENT)
Dept: ADMINISTRATIVE | Facility: CLINIC | Age: 69
End: 2024-10-07
Payer: MEDICARE

## 2024-10-07 NOTE — TELEPHONE ENCOUNTER
----- Message from Zehra sent at 10/7/2024 12:52 PM CDT -----  Type: Needs Medical Advice  Who Called:  pt  Best Call Back Number: [phone]  Additional Information: pt is calling in regards to needing to speak to someone in the office to have her appt move up to about 9:30 for transportation reasons. Please call pt and let her know if the appt can be moved. Please call back and advise. Thanks!

## 2024-10-14 ENCOUNTER — OFFICE VISIT (OUTPATIENT)
Dept: ORTHOPEDICS | Facility: CLINIC | Age: 69
End: 2024-10-14
Payer: MEDICARE

## 2024-10-14 VITALS — WEIGHT: 177.94 LBS | BODY MASS INDEX: 30.38 KG/M2 | HEIGHT: 64 IN

## 2024-10-14 DIAGNOSIS — M17.11 PRIMARY OSTEOARTHRITIS OF RIGHT KNEE: Primary | ICD-10-CM

## 2024-10-14 DIAGNOSIS — M51.370 DEGENERATION OF INTERVERTEBRAL DISC OF LUMBOSACRAL REGION WITH DISCOGENIC BACK PAIN: ICD-10-CM

## 2024-10-14 DIAGNOSIS — M46.06 SPINAL ENTHESOPATHY OF LUMBAR REGION: ICD-10-CM

## 2024-10-14 PROCEDURE — 20552 NJX 1/MLT TRIGGER POINT 1/2: CPT | Mod: 51,XS,GZ,S$GLB | Performed by: ORTHOPAEDIC SURGERY

## 2024-10-14 PROCEDURE — 1100F PTFALLS ASSESS-DOCD GE2>/YR: CPT | Mod: CPTII,S$GLB,, | Performed by: ORTHOPAEDIC SURGERY

## 2024-10-14 PROCEDURE — 20610 DRAIN/INJ JOINT/BURSA W/O US: CPT | Mod: RT,S$GLB,, | Performed by: ORTHOPAEDIC SURGERY

## 2024-10-14 PROCEDURE — 1125F AMNT PAIN NOTED PAIN PRSNT: CPT | Mod: CPTII,S$GLB,, | Performed by: ORTHOPAEDIC SURGERY

## 2024-10-14 PROCEDURE — 99213 OFFICE O/P EST LOW 20 MIN: CPT | Mod: 25,S$GLB,, | Performed by: ORTHOPAEDIC SURGERY

## 2024-10-14 PROCEDURE — 99999 PR PBB SHADOW E&M-EST. PATIENT-LVL III: CPT | Mod: PBBFAC,,, | Performed by: ORTHOPAEDIC SURGERY

## 2024-10-14 PROCEDURE — 1159F MED LIST DOCD IN RCRD: CPT | Mod: CPTII,S$GLB,, | Performed by: ORTHOPAEDIC SURGERY

## 2024-10-14 PROCEDURE — 3288F FALL RISK ASSESSMENT DOCD: CPT | Mod: CPTII,S$GLB,, | Performed by: ORTHOPAEDIC SURGERY

## 2024-10-14 PROCEDURE — 1160F RVW MEDS BY RX/DR IN RCRD: CPT | Mod: CPTII,S$GLB,, | Performed by: ORTHOPAEDIC SURGERY

## 2024-10-14 PROCEDURE — 3008F BODY MASS INDEX DOCD: CPT | Mod: CPTII,S$GLB,, | Performed by: ORTHOPAEDIC SURGERY

## 2024-10-14 RX ADMIN — TRIAMCINOLONE ACETONIDE 40 MG: 40 INJECTION, SUSPENSION INTRA-ARTICULAR; INTRAMUSCULAR at 02:10

## 2024-10-14 NOTE — PROCEDURES
Large Joint Aspiration/Injection: R knee    Date/Time: 10/14/2024 2:45 PM    Performed by: Ruben Martinez MD  Authorized by: Ruben Martinez MD    Consent Done?:  Yes (Verbal)  Indications:  Pain  Site marked: the procedure site was marked    Timeout: prior to procedure the correct patient, procedure, and site was verified    Prep: patient was prepped and draped in usual sterile fashion      Local anesthesia used?: Yes    Local anesthetic:  Lidocaine 1% without epinephrine    Details:  Needle Size:  25 G  Ultrasonic Guidance for needle placement?: No    Location:  Knee  Site:  R knee  Medications:  40 mg triamcinolone acetonide 40 mg/mL  Patient tolerance:  Patient tolerated the procedure well with no immediate complications  Trigger Point Injection    Performed by: Ruben Martinez MD  Authorized by: Ruben Martinez MD    Lumbar Paraspinal:  Left    Consent Done?:  Yes (Verbal)    Pre-Procedure:   Indications:  Pain  Site marked: the procedure site was marked     Timeout: prior to procedure the correct patient, procedure, and site was verified    Prep: patient was prepped and draped in usual sterile fashion     Local anesthesia used?: Yes    Local anesthetic:  Lidocaine 1% without epinephrine  Medications: 40 mg triamcinolone acetonide 40 mg/mL

## 2024-10-14 NOTE — PROGRESS NOTES
Subjective:       Patient ID: Beatriz Gan is a 69 y.o. female.    Chief Complaint: Pain of the Right Knee (Patient is here for right knee pain, states pain has gotten worse since last visit. Has popping as well as tingling along with feeling of wanting to give way ) and Pain of the Lumbar Spine (Also here with complaints of lumbar pain for many years and is progressively getting worse. Has numbness and tingling as well as burning sensation. Painful and limited ROM )      History of Present Illness    Prior to meeting with the patient I reviewed the medical chart in [facility]. This included reviewing the previous progress notes from our office, review of the patient's last appointment with their primary care provider, review of any visits to the emergency room, and review of any pain management appointments or procedures.   Continued complaints of right knee pain with known history of osteoarthritis periodic was treated with steroid injections also has some midline low back pain and wants to be treated conservatively    Current Medications  Current Outpatient Medications   Medication Sig Dispense Refill    albuterol (VENTOLIN HFA) 90 mcg/actuation inhaler Inhale 2 puffs into the lungs every 4 (four) hours as needed for Wheezing or Shortness of Breath. Rescue 18 g 11    ALPRAZolam (XANAX) 1 MG tablet TAKE ONE TABLET (1MG TOTAL) BY MOUTH TWICE DAILY AS NEEDED FOR ANXIETY 60 tablet 2    amLODIPine (NORVASC) 5 MG tablet Take 1 tablet (5 mg total) by mouth once daily. 90 tablet 3    aspirin (ECOTRIN) 81 MG EC tablet TAKE ONE TABLET BY MOUTH DAILY AT 9 AM 30 tablet 2    azelastine (ASTELIN) 137 mcg (0.1 %) nasal spray Use 1 spray(s) in each nostril twice daily 30 mL 3    benralizumab 30 mg/mL AtIn Inject 30 mg into the skin every 8 weeks. 1 mL 5    budesonide-formoterol 160-4.5 mcg (SYMBICORT) 160-4.5 mcg/actuation HFAA Inhale 2 puffs into the lungs every 12 (twelve) hours. Controller 30.6 g 3    celecoxib (CELEBREX)  200 MG capsule TAKE ONE CAPSULE BY MOUTH DAILY AT 9 AM 90 capsule 3    cholecalciferol, vitamin D3, (VITAMIN D3 ORAL) Take 1 capsule by mouth once daily.      diclofenac sodium (VOLTAREN) 1 % Gel Apply 4 g topically 2 (two) times daily. 200 g 3    FLUoxetine 40 MG capsule Take 1 capsule (40 mg total) by mouth 2 (two) times daily. 180 capsule 3    fluticasone (FLONASE) 50 mcg/actuation nasal spray 1 spray by Nasal route daily as needed.       gabapentin (NEURONTIN) 600 MG tablet Take 1 tablet (600 mg total) by mouth 3 (three) times daily. 90 tablet 5    MULTIVIT-IRON-MIN-FOLIC ACID 3,500-18-0.4 UNIT-MG-MG ORAL CHEW Take by mouth once daily.      pantoprazole (PROTONIX) 40 MG tablet Take 1 tablet (40 mg total) by mouth once daily. 90 tablet 3    predniSONE (DELTASONE) 10 MG tablet Take one pill a day for three days, repeat for shortness of breath 12 tablet 0    traMADoL 100 mg Tab Take 100 mg by mouth every 8 (eight) hours as needed for Pain. 90 tablet 0    ubrogepant (UBRELVY) 50 mg tablet Take 1 tablet (50 mg total) by mouth daily as needed for Migraine. If symptoms persist or return, may repeat dose after 2 hours. Maximum: 200 mg per 24 hours 30 tablet 3    zolpidem (AMBIEN) 10 mg Tab TAKE ONE TABLET (10MG TOTAL) BY MOUTH NIGHTLY AS NEEDED 30 tablet 2    folic acid (FOLVITE) 1 MG tablet Take 1 tablet (1 mg total) by mouth once daily. 90 tablet 0    sofosbuvir-velpatasvir 400-100 mg Tab Take 1 tablet by mouth once daily. (Patient not taking: Reported on 10/14/2024) 28 tablet 5     No current facility-administered medications for this visit.     Facility-Administered Medications Ordered in Other Visits   Medication Dose Route Frequency Provider Last Rate Last Admin    electrolyte-S (ISOLYTE)   Intravenous Continuous Kota Ortega MD 10 mL/hr at 01/31/20 0624 New Bag at 09/18/24 1130    lactated ringers infusion   Intravenous Continuous Kota Ortega MD        pregabalin capsule 75 mg  75 mg Oral Once Mackenzie  MD Shiela           Allergies  Review of patient's allergies indicates:   Allergen Reactions    Phenergan [promethazine] Hives and Rash    Latex, natural rubber Hives     Per pt report-many years    Morphine Hives       Past Medical History  Past Medical History:   Diagnosis Date    Alcohol dependence     Asthma     DDD (degenerative disc disease), lumbosacral     DJD (degenerative joint disease), lumbosacral     Encounter for blood transfusion     GERD (gastroesophageal reflux disease)     Gout     Hepatitis C     Hypercholesterolemia     Hypertension     NATHALIE (iron deficiency anemia)     questionable white coat hypertension    Kidney carcinoma, left 2014    Pneumonia     Renal cell carcinoma     Seizures     Shingles 10/13/2012       Surgical History  Past Surgical History:   Procedure Laterality Date    ANTERIOR CERVICAL DISCECTOMY W/ FUSION N/A 5/4/2023    Procedure: DISCECTOMY, SPINE, CERVICAL, ANTERIOR APPROACH, WITH FUSION C3-4;  Surgeon: Ruben Martinez MD;  Location: [facility] OR;  Service: Orthopedics;  Laterality: N/A;    APPENDECTOMY      ARTHROSCOPY OF SHOULDER WITH DECOMPRESSION OF SUBACROMIAL SPACE Left 10/31/2019    Procedure: ARTHROSCOPY, SHOULDER, WITH SUBACROMIAL SPACE DECOMPRESSION;  Surgeon: Ruben Martinez MD;  Location: [facility] OR;  Service: Orthopedics;  Laterality: Left;    BLADDER REPAIR      x 2    COLONOSCOPY  9/2011    COLONOSCOPY N/A 9/18/2024    Procedure: COLONOSCOPY;  Surgeon: Ceasar Calero MD;  Location: [facility];  Service: Endoscopy;  Laterality: N/A;    DISTAL CLAVICLE EXCISION Left 10/31/2019    Procedure: EXCISION, CLAVICLE, DISTAL;  Surgeon: Ruben Martinez MD;  Location: [facility] OR;  Service: Orthopedics;  Laterality: Left;    EPIDURAL STEROID INJECTION INTO LUMBAR SPINE N/A 12/7/2020    Procedure: Injection-steroid-epidural-lumbar;  Surgeon: Dk Rosales MD;  Location: [facility] OR;  Service: Pain Management;  Laterality: N/A;  L4-L5    EPIDURAL STEROID INJECTION INTO LUMBAR  SPINE N/A 5/17/2021    Procedure: Injection-steroid-epidural-lumbar;  Surgeon: Dk Rosales MD;  Location: [facility] OR;  Service: Pain Management;  Laterality: N/A;  L4-L5    ESOPHAGOGASTRODUODENOSCOPY  9/2011    ESOPHAGOGASTRODUODENOSCOPY N/A 9/18/2024    Procedure: EGD (ESOPHAGOGASTRODUODENOSCOPY);  Surgeon: Ceasar Calero MD;  Location: [facility];  Service: Endoscopy;  Laterality: N/A;    EYE SURGERY      lasix bilateral    FIXATION KYPHOPLASTY N/A 1/31/2020    Procedure: Kyphoplasty T12;  Surgeon: Dk Rosales MD;  Location: [facility] OR;  Service: Pain Management;  Laterality: N/A;    HERNIA REPAIR      hiatal hernai    HYSTERECTOMY      INJECTION OF ANESTHETIC AGENT AROUND NERVE Left 6/8/2020    Procedure: Block, Nerve;  Surgeon: Dk Rosales MD;  Location: [facility] OR;  Service: Pain Management;  Laterality: Left;  left genicular nerve block     KNEE ARTHROPLASTY Left 7/30/2020    Procedure: ARTHROPLASTY, KNEE LEFT;  Surgeon: Ruben Martinez MD;  Location: [facility] OR;  Service: Orthopedics;  Laterality: Left;  REP VALERY RUBY    KNEE ARTHROSCOPY W/ MENISCECTOMY Left 1/16/2020    Procedure: ARTHROSCOPY, KNEE, WITH MEDIAL MENISCECTOMY;  Surgeon: Ruben Martinez MD;  Location: [facility] OR;  Service: Orthopedics;  Laterality: Left;    LAPAROSCOPIC NISSEN FUNDOPLICATION      NEPHRECTOMY      LT partial    RADIOFREQUENCY ABLATION Left 6/19/2020    Procedure: Radiofrequency Ablation;  Surgeon: Dk Rosales MD;  Location: [facility] OR;  Service: Pain Management;  Laterality: Left;    RADIOFREQUENCY ABLATION OF LUMBAR MEDIAL BRANCH NERVE AT SINGLE LEVEL Bilateral 2/28/2020    Procedure: Radiofrequency Ablation, Nerve, Spinal, Lumbar, Medial Branch, 1 Level;  Surgeon: Dk Rosales MD;  Location: [facility] OR;  Service: Pain Management;  Laterality: Bilateral;  L3,L4,L5 - Burned at 80 degrees C. for 60 seconds x 2 each site    RADIOFREQUENCY ABLATION OF LUMBAR MEDIAL BRANCH NERVE AT SINGLE LEVEL Bilateral 10/19/2020     Procedure: Radiofrequency Ablation, Nerve, Spinal, Lumbar, Medial Branch, 1 Level;  Surgeon: Dk Rosales MD;  Location: [facility] OR;  Service: Pain Management;  Laterality: Bilateral;  L3, L4, L5    REFRACTIVE SURGERY      ROTATOR CUFF REPAIR Left 10/31/2019    Procedure: REPAIR, ROTATOR CUFF;  Surgeon: Ruben Martniez MD;  Location: [facility] OR;  Service: Orthopedics;  Laterality: Left;  arthrex    SKIN BIOPSY      TOTAL ABDOMINAL HYSTERECTOMY W/ BILATERAL SALPINGOOPHORECTOMY  age 48    TRANSFORAMINAL EPIDURAL INJECTION OF STEROID Bilateral 2021    Procedure: Injection,steroid,epidural,transforaminal approach;  Surgeon: Dk Rosales MD;  Location: [facility] OR;  Service: Pain Management;  Laterality: Bilateral;  L5-S1     TRANSFORAMINAL EPIDURAL INJECTION OF STEROID Bilateral 3/28/2022    Procedure: INJECTION, STEROID, EPIDURAL, TRANSFORAMINAL APPROACH Lumbar L5-S1;  Surgeon: Dk Rosales MD;  Location: [facility] OR;  Service: Pain Management;  Laterality: Bilateral;       Family History:   Family History   Problem Relation Name Age of Onset    Hypertension Father      Glaucoma Neg Hx      Macular degeneration Neg Hx      Retinal detachment Neg Hx         Social History:   Social History     Socioeconomic History    Marital status:    Occupational History     Employer: [employer]   Tobacco Use    Smoking status: Former     Current packs/day: 0.00     Average packs/day: 1 pack/day for 40.0 years (40.0 ttl pk-yrs)     Types: Cigarettes     Start date: 1978     Quit date: 2018     Years since quittin.7    Smokeless tobacco: Never    Tobacco comments:     smokes 2-3 cigarettes a night   Substance and Sexual Activity    Alcohol use: Yes     Alcohol/week: 12.0 standard drinks of alcohol     Types: 6 Cans of beer, 6 Unspecified drink type per week     Comment: 2-3 a day/ social    Drug use: No    Sexual activity: Yes     Partners: Male     Social Drivers of Health     Financial Resource Strain: Medium  Risk (5/20/2024)    Overall Financial Resource Strain (CARDIA)     Difficulty of Paying Living Expenses: Somewhat hard   Food Insecurity: Food Insecurity Present (5/20/2024)    Hunger Vital Sign     Worried About Running Out of Food in the Last Year: Sometimes true     Ran Out of Food in the Last Year: Never true   Transportation Needs: Unmet Transportation Needs (5/20/2024)    TRANSPORTATION NEEDS     Transportation : Yes, it has kept me from medical appointments or from getting my medications.   Physical Activity: Insufficiently Active (5/20/2024)    Exercise Vital Sign     Days of Exercise per Week: 2 days     Minutes of Exercise per Session: 10 min   Stress: Stress Concern Present (5/20/2024)    [place] [place] [employer] - Occupational Stress Questionnaire     Feeling of Stress : Very much   Housing Stability: High Risk (5/20/2024)    Housing Stability Vital Sign     Unable to Pay for Housing in the Last Year: Yes     Homeless in the Last Year: No       Hospitalization/Major Diagnostic Procedure:     Review of Systems     General/Constitutional:  Chills denies. Fatigue denies. Fever denies. Weight gain denies. Weight loss denies.    Respiratory:  Shortness of breath denies.    Cardiovascular:  Chest pain denies.    Gastrointestinal:  Constipation denies. Diarrhea denies. Nausea denies. Vomiting denies.     Hematology:  Easy bruising denies. Prolonged bleeding denies.     Genitourinary:  Frequent urination denies. Pain in lower back denies. Painful urination denies.     Musculoskeletal:  See HPI for details    Skin:  Rash denies.    Neurologic:  Dizziness denies. Gait abnormalities denies. Seizures denies. Tingling/Numbess denies.    Psychiatric:  Anxiety denies. Depressed mood denies.     Objective:   Vital Signs: There were no vitals filed for this visit.     Physical Exam      General Examination:     Constitutional: The patient is alert and oriented to lace person and time. Mood is pleasant.      Head/Face: Normal facial features normal eyebrows    Eyes: Normal extraocular motion bilaterally    Lungs: Respirations are equal and unlabored    Gait is coordinated.    Cardiovascular: There are no swelling or varicosities present.    Lymphatic: Negative for adenopathy    Skin: Normal    Neurological: Level of consciousness normal. Oriented to place person and time and situation    Psychiatric: Oriented to time place person and situation    Right knee shows crepitance limitation of motion no effusion slight limitation of motion with no instability    Tenderness interspinous ligament L5-S1 level in both posterior iliac spine regions without spasm mild limitation of motion neurovascular status normal motor exam intact  XRAY Report/ Interpretation:  Prior x-rays were reviewed      Assessment:       1. Primary osteoarthritis of right knee    2. Degeneration of intervertebral disc of lumbosacral region with discogenic back pain    3. Spinal enthesopathy of lumbar region        Plan:       Beatriz was seen today for pain and pain.    Diagnoses and all orders for this visit:    Primary osteoarthritis of right knee  -     Large Joint Aspiration/Injection: R knee    Degeneration of intervertebral disc of lumbosacral region with discogenic back pain    Spinal enthesopathy of lumbar region  -     Trigger Point Injection         Follow up if symptoms worsen or fail to improve.    Treatment options were discussed.  She is not ready to have a total knee replacement.  The operation requested and received a steroid xylocaine injection mixture into the right knee under sterile conditions without any side effects.  Regards to her low back pain she does seem to have a trigger point at the posterior iliac spine on the right side without spasm at the patient's request she received a 2 cc injection of a steroid xylocaine trigger point injection at that level no side effects noted  Treatment options were discussed with regards to  the nature of the medical condition. Conservative pain intervention and surgical options were discussed in detail. The probability of success of each separate treatment option was discussed. The patient expressed a clear understanding of the treatment options. With regards to surgery, the procedure risk, benefits, complications, and outcomes were discussed. No guarantees were given with regards to surgical outcome.   The risk of complications, morbidity, and mortality of patient management decisions have been made at the time of this visit. These are associated with the patient's problems, diagnostic procedures and treatment options. This includes the possible management options selected and those considered but not selected by the patient after shared medical decision making we discussed with the patient.     This note was created using Dragon voice recognition software that occasionally misinterpreted phrases or words.

## 2024-10-15 ENCOUNTER — OFFICE VISIT (OUTPATIENT)
Dept: HOME HEALTH SERVICES | Facility: CLINIC | Age: 69
End: 2024-10-15
Payer: MEDICARE

## 2024-10-15 ENCOUNTER — OUTPATIENT CASE MANAGEMENT (OUTPATIENT)
Dept: ADMINISTRATIVE | Facility: OTHER | Age: 69
End: 2024-10-15
Payer: MEDICARE

## 2024-10-15 DIAGNOSIS — Z85.528 HISTORY OF RENAL CELL CARCINOMA: ICD-10-CM

## 2024-10-15 DIAGNOSIS — M81.0 AGE-RELATED OSTEOPOROSIS WITHOUT CURRENT PATHOLOGICAL FRACTURE: ICD-10-CM

## 2024-10-15 DIAGNOSIS — M51.372 DEGENERATION OF INTERVERTEBRAL DISC OF LUMBOSACRAL REGION WITH DISCOGENIC BACK PAIN AND LOWER EXTREMITY PAIN: ICD-10-CM

## 2024-10-15 DIAGNOSIS — F33.0 MAJOR DEPRESSIVE DISORDER, RECURRENT EPISODE, MILD: ICD-10-CM

## 2024-10-15 DIAGNOSIS — F41.9 ANXIETY: ICD-10-CM

## 2024-10-15 DIAGNOSIS — F51.01 PRIMARY INSOMNIA: ICD-10-CM

## 2024-10-15 DIAGNOSIS — N18.31 STAGE 3A CHRONIC KIDNEY DISEASE: Primary | ICD-10-CM

## 2024-10-15 DIAGNOSIS — F10.11 ALCOHOL ABUSE, IN REMISSION: ICD-10-CM

## 2024-10-15 DIAGNOSIS — I10 ESSENTIAL HYPERTENSION: ICD-10-CM

## 2024-10-15 DIAGNOSIS — J45.50 SEVERE PERSISTENT ASTHMA WITHOUT COMPLICATION: ICD-10-CM

## 2024-10-15 DIAGNOSIS — G43.809 OTHER MIGRAINE WITHOUT STATUS MIGRAINOSUS, NOT INTRACTABLE: ICD-10-CM

## 2024-10-15 DIAGNOSIS — G62.9 POLYNEUROPATHY: ICD-10-CM

## 2024-10-15 DIAGNOSIS — Z00.00 ENCOUNTER FOR PREVENTIVE HEALTH EXAMINATION: ICD-10-CM

## 2024-10-15 DIAGNOSIS — B18.2 CHRONIC HEPATITIS C WITHOUT HEPATIC COMA: ICD-10-CM

## 2024-10-15 DIAGNOSIS — Z90.5 HISTORY OF PARTIAL NEPHRECTOMY: ICD-10-CM

## 2024-10-15 RX ORDER — TRIAMCINOLONE ACETONIDE 40 MG/ML
40 INJECTION, SUSPENSION INTRA-ARTICULAR; INTRAMUSCULAR
Status: DISCONTINUED | OUTPATIENT
Start: 2024-10-14 | End: 2024-10-15 | Stop reason: HOSPADM

## 2024-10-15 NOTE — PROGRESS NOTES
Outpatient Care Management  Plan of Care Follow Up Visit    Patient: Beatriz Gan  MRN: [id number]  Date of Service: 10/15/2024  Completed by: Randi Silva RN  Referral Date: 05/08/2024    Reason for Visit   Patient presents with    OPCM RN Follow Up Call       Brief Summary: OPCM RN followed up with Mrs. Otto today for care plan review.    Next Steps:   Explore pain rating. Continue to improve after steroid injections?  Review if lab results were received and follow up visit with hepatology.  Mrs. Otto  agrees to follow up on or around 11/6/24.

## 2024-10-18 VITALS
RESPIRATION RATE: 20 BRPM | TEMPERATURE: 99 F | WEIGHT: 172 LBS | DIASTOLIC BLOOD PRESSURE: 78 MMHG | OXYGEN SATURATION: 99 % | SYSTOLIC BLOOD PRESSURE: 140 MMHG | HEART RATE: 88 BPM | BODY MASS INDEX: 29.52 KG/M2

## 2024-10-18 PROBLEM — Z00.00 ENCOUNTER FOR PREVENTIVE HEALTH EXAMINATION: Status: ACTIVE | Noted: 2020-07-30

## 2024-10-18 PROBLEM — K74.69 DECOMPENSATED LIVER DISEASE: Status: RESOLVED | Noted: 2024-05-08 | Resolved: 2024-10-18

## 2024-10-18 PROBLEM — K72.90 DECOMPENSATED LIVER DISEASE: Status: RESOLVED | Noted: 2024-05-08 | Resolved: 2024-10-18

## 2024-10-18 PROBLEM — N18.31 STAGE 3A CHRONIC KIDNEY DISEASE: Status: ACTIVE | Noted: 2020-08-01

## 2024-10-18 PROBLEM — F10.11 ALCOHOL ABUSE, IN REMISSION: Status: ACTIVE | Noted: 2024-05-08

## 2024-10-18 PROBLEM — K74.60 DECOMPENSATED LIVER DISEASE: Status: RESOLVED | Noted: 2024-05-08 | Resolved: 2024-10-18

## 2024-10-18 PROBLEM — M75.122 NONTRAUMATIC COMPLETE TEAR OF LEFT ROTATOR CUFF: Status: RESOLVED | Noted: 2019-10-31 | Resolved: 2024-10-18

## 2024-10-18 PROBLEM — R42 DIZZINESS: Status: RESOLVED | Noted: 2023-08-28 | Resolved: 2024-10-18

## 2024-10-18 NOTE — ASSESSMENT & PLAN NOTE
Chronic, mild depression controlled on fluoxetine.   PHQ9 socre 5 today.  Denies SI/HI.  Followed by PCP.

## 2024-10-18 NOTE — ASSESSMENT & PLAN NOTE
Reports has not drank alcohol in a year now. Being treated by Hepatitis C currently.  Followed by GI.  Encouraged to reach out to PCP if urge to drink arises.

## 2024-10-18 NOTE — ASSESSMENT & PLAN NOTE
Chronic with associated pain.  Takes tramadol prn.  Followed by Pain Management in the past but managed by PCP now.

## 2024-10-18 NOTE — PATIENT INSTRUCTIONS
Counseling and Referral of Other Preventative  (Italic type indicates deductible and co-insurance are waived)    Patient Name: Beatriz Gan  Today's Date: 10/18/2024    Health Maintenance       Date Due Completion Date    Shingles Vaccine (1 of 2) Never done ---    Pneumococcal Vaccines (Age 65+) (2 of 2 - PPSV23 or PCV20) 03/14/2014 1/17/2014    TETANUS VACCINE 06/21/2021 6/21/2011    Mammogram 10/04/2022 10/4/2021    Lipid Panel 10/06/2023 10/6/2022    Influenza Vaccine (1) 09/01/2024 10/31/2023    COVID-19 Vaccine (5 - 2024-25 season) 09/01/2024 10/31/2023    DEXA Scan 03/30/2025 3/30/2022    LDCT Lung Screen 04/13/2025 4/13/2024    Aspirin/Antiplatelet Therapy 10/14/2025 10/14/2024    Colorectal Cancer Screening 09/18/2027 9/18/2024    Override on 9/28/2011: Done        No orders of the defined types were placed in this encounter.    The following information is provided to all patients.  This information is to help you find resources for any of the problems found today that may be affecting your health:                  Living healthy guide: www.[facility].[place].gov      Understanding Diabetes: www.diabetes.org      Eating healthy: www.cdc.gov/healthyweight      CDC home safety checklist: www.cdc.gov/steadi/patient.html      Agency on Aging: www.goea.[place].gov      Alcoholics anonymous (AA): www.aa.org      Physical Activity: www.vinicius.nih.gov/uj4rsew      Tobacco use: www.quitwithusla.org

## 2024-10-18 NOTE — PROGRESS NOTES
Beatriz Gan presented for a follow-up Medicare AWV today. The following components were reviewed and updated:    Medical history  Family History  Social history  Allergies and Current Medications  Health Risk Assessment  Health Maintenance  Care Team    **See Completed Assessments for Annual Wellness visit with in the encounter summary    The following assessments were completed:  Depression Screening  Cognitive function Screening  Timed Get Up Test  Whisper Test      Opioid documentation:      Patient does have a current opioid prescription.      Patient accepted further discussion regarding opioid medication use.      Patient is currently taking tramadol narcotic for generalized pain.        Pain level today is 4/10.       In addition to narcotic pain medications, patient is also using (no other oral, topical, or alternative treatments) for pain control.       Patient is not followed by a specialist currently for their pain and will not be referred today.  Followed by PCP.    Patient's opioid risk potential based on ORT-OUD tool:       Adan each box that applies   No   Yes     Family history of substance abuse   Alcohol [x] []   Illegal drugs [x] []   Rx drugs [x] []     Personal history of substance abuse   Alcohol [] [x]   Illegal drugs [x] []   Rx drugs [x] []     Age between 16-45 years   [x]   []     Patient with ADD, OCD, Bipolar disorder, schizoprenia   [x]   []     Patient with depression   []   [x]                         Scoring total                                                                 Non-opioid treatment options have been discussed today and added to the patient's after visit summary.          Vitals:    10/15/24 1218   BP: (!) 140/78   Pulse: 88   Resp: 20   Temp: 98.7 °F (37.1 °C)   SpO2: 99%   Weight: 78 kg (172 lb)     Body mass index is 29.52 kg/m².       Physical Exam  Constitutional:       General: She is not in acute distress.     Appearance: Normal appearance. She is  ill-appearing.   HENT:      Head: Normocephalic and atraumatic.      Right Ear: External ear normal.      Left Ear: External ear normal.      Nose: Nose normal.      Mouth/Throat:      Mouth: Mucous membranes are dry.      Pharynx: Oropharynx is clear.   Eyes:      Extraocular Movements: Extraocular movements intact.      Conjunctiva/sclera: Conjunctivae normal.      Pupils: Pupils are equal, round, and reactive to light.   Cardiovascular:      Rate and Rhythm: Normal rate and regular rhythm.      Pulses: Normal pulses.      Heart sounds: Normal heart sounds.   Pulmonary:      Effort: Pulmonary effort is normal.      Breath sounds: Normal breath sounds.   Abdominal:      General: Bowel sounds are normal. There is no distension.      Palpations: Abdomen is soft.      Tenderness: There is no abdominal tenderness.   Musculoskeletal:      Cervical back: Normal range of motion and neck supple.      Comments: Decreased ROM hands, shoulders, knees   Skin:     General: Skin is warm and dry.      Capillary Refill: Capillary refill takes 2 to 3 seconds.   Neurological:      Mental Status: She is alert and oriented to person, place, and time.      Motor: No weakness.      Gait: Gait normal.   Psychiatric:         Mood and Affect: Mood normal.         Behavior: Behavior normal.         Thought Content: Thought content normal.         Judgment: Judgment normal.         Diagnoses and health risks identified today and associated recommendations/orders:  Polyneuropathy  Chronic, stable on gabapentin.  Followed by PCP.    Nonintractable migraine  Chronic, stable on Ubrogepant.  Followed by PCP.    DDD (degenerative disc disease), lumbosacral  Chronic with associated pain.  Takes tramadol prn.  Followed by Pain Management in the past but managed by PCP now.      Major depressive disorder, recurrent episode, mild  Chronic, mild depression controlled on fluoxetine.   PHQ9 socre 5 today.  Denies SI/HI.  Followed by  PCP.    Anxiety  Chronic, stable on alprazolam.  Followed by PCP.    Alcohol abuse, in remission  Reports has not drank alcohol in a year now. Being treated by Hepatitis C currently.  Followed by GI.  Encouraged to reach out to PCP if urge to drink arises.    Severe persistent asthma without complication  Currently stable on albuterol HFA, Astelin, Symbicort.  Followed by Pulmonary.  Continue tobacco cessation.    Essential hypertension  Stable on amlodipine.  Followed by PCP.     History of partial nephrectomy- left  Stable.  Encouraged to avoid nephrotoxic meds and monitor renal function as per PCP.    History of renal cell carcinoma  Stable.  Encouraged to avoid nephrotoxic meds and monitor renal function as per PCP.    Age-related osteoporosis without current pathological fracture  Stable.   Fall precautions and safety advised.  Last DEXA 3/2022.    Hepatitis C virus infection without hepatic coma  Stable, continue sofosbuvir-velpatasvir. Followed by GI.    Primary insomnia  Stable on zolpidem.  Followed by PCP.  Reviewed sleep hygiene.    Encounter for preventive health examination  Assessment completed. Preventive measures and maintenance reviewed with patient.    Stage 3a chronic kidney disease  Stable.  Avoid nephrotoxins, keep BP WNL.  Followed by PCP.      Provided Beatriz with a 5-10 year written screening schedule and personal prevention plan. Recommendations were developed using the USPSTF age appropriate recommendations. Education, counseling, and referrals were provided as needed.  After Visit Summary printed and given to patient which includes a list of additional screenings\tests needed.    Follow up in about 1 year (around 10/15/2025).        Magui Prasad NP    I offered to discuss advanced care planning, including how to pick a person who would make decisions for you if you were unable to make them for yourself, called a health care power of , and what kind of decisions you might make  such as use of life sustaining treatments such as ventilators and tube feeding when faced with a life limiting illness recorded on a living will that they will need to know. (How you want to be cared for as you near the end of your natural life)     X Patient is interested in learning more about how to make advanced directives.  I provided them paperwork and offered to discuss this with them.

## 2024-10-18 NOTE — ASSESSMENT & PLAN NOTE
Currently stable on albuterol HFA, Astelin, Symbicort.  Followed by Pulmonary.  Continue tobacco cessation.

## 2024-10-22 ENCOUNTER — TELEPHONE (OUTPATIENT)
Dept: PRIMARY CARE CLINIC | Facility: CLINIC | Age: 69
End: 2024-10-22
Payer: MEDICARE

## 2024-10-22 NOTE — TELEPHONE ENCOUNTER
----- Message from Lisa sent at 10/22/2024 11:02 AM CDT -----  Contact: [phone] Patient  Pharmacy is calling to clarify an RX.    RX name:  back brace    What do they need to clarify:  If faxed back brace request received on 10/7/2024    Comments: Lisa with [employer] [phone] (direct) [phone] (fax) - will refax now to be sure.

## 2024-11-01 ENCOUNTER — HOSPITAL ENCOUNTER (OUTPATIENT)
Dept: RADIOLOGY | Facility: HOSPITAL | Age: 69
Discharge: HOME | End: 2024-11-01
Attending: PHYSICIAN ASSISTANT
Payer: MEDICARE

## 2024-11-01 DIAGNOSIS — K74.60 HEPATIC CIRRHOSIS, UNSPECIFIED HEPATIC CIRRHOSIS TYPE, UNSPECIFIED WHETHER ASCITES PRESENT: ICD-10-CM

## 2024-11-01 PROCEDURE — 76705 ECHO EXAM OF ABDOMEN: CPT | Mod: 26,,, | Performed by: RADIOLOGY

## 2024-11-01 PROCEDURE — 76705 ECHO EXAM OF ABDOMEN: CPT | Mod: TC

## 2024-11-04 ENCOUNTER — TELEPHONE (OUTPATIENT)
Dept: PULMONOLOGY | Facility: CLINIC | Age: 69
End: 2024-11-04
Payer: MEDICARE

## 2024-11-04 ENCOUNTER — TELEPHONE (OUTPATIENT)
Dept: HEPATOLOGY | Facility: CLINIC | Age: 69
End: 2024-11-04
Payer: MEDICARE

## 2024-11-04 NOTE — TELEPHONE ENCOUNTER
----- Message from Donna sent at 11/4/2024  2:57 PM CST -----  Type:  Sooner Appointment Request    Caller is requesting a sooner appointment.  Caller declined first available appointment listed below.  Caller will not accept being placed on the waitlist and is requesting a message be sent to doctor.    Name of Caller:  pt   When is the first available appointment?  Nov 5   Symptoms:  f/u   Would the patient rather a call back or a response via [facility]? C all   Best Call Back Number:  [phone] (home)     Additional Information:  please advise

## 2024-11-04 NOTE — TELEPHONE ENCOUNTER
----- Message from Alexsander sent at 11/4/2024  2:34 PM CST -----  Contact: pt  Type: Requesting to speak with nurse        Who Called: PT  Regarding: would like to change her appointment to virtual   Would the patient rather a call back or a response via [facility]? Call back  Best Call Back Number:[phone]   Additional Information:

## 2024-11-04 NOTE — TELEPHONE ENCOUNTER
Spoke to pt, appt on 11/07 was changed to a virtual, pt states she can't make it in that day and would prefer virtual.

## 2024-11-05 ENCOUNTER — OFFICE VISIT (OUTPATIENT)
Dept: HEPATOLOGY | Facility: CLINIC | Age: 69
End: 2024-11-05
Payer: MEDICARE

## 2024-11-05 ENCOUNTER — TELEPHONE (OUTPATIENT)
Dept: HEPATOLOGY | Facility: CLINIC | Age: 69
End: 2024-11-05
Payer: MEDICARE

## 2024-11-05 DIAGNOSIS — K74.60 HEPATIC CIRRHOSIS, UNSPECIFIED HEPATIC CIRRHOSIS TYPE, UNSPECIFIED WHETHER ASCITES PRESENT: Primary | ICD-10-CM

## 2024-11-05 PROCEDURE — 99215 OFFICE O/P EST HI 40 MIN: CPT | Mod: 95,,, | Performed by: PHYSICIAN ASSISTANT

## 2024-11-05 PROCEDURE — 1160F RVW MEDS BY RX/DR IN RCRD: CPT | Mod: CPTII,95,, | Performed by: PHYSICIAN ASSISTANT

## 2024-11-05 PROCEDURE — 1159F MED LIST DOCD IN RCRD: CPT | Mod: CPTII,95,, | Performed by: PHYSICIAN ASSISTANT

## 2024-11-05 RX ORDER — LACTULOSE 10 G/15ML
20 SOLUTION ORAL DAILY
Qty: 1800 ML | Refills: 3 | Status: SHIPPED | OUTPATIENT
Start: 2024-11-05

## 2024-11-05 NOTE — TELEPHONE ENCOUNTER
I spoke with patient and msg from PA Scheuermann relayed.  She states that she has ride issues and will call me back to setup [facility].  F/u visit scheduled 1/21/25.  HCC testing scheduled 5/13/25 and f/u visit scheduled 5/20/25; appt reminder notices mailed.

## 2024-11-05 NOTE — TELEPHONE ENCOUNTER
----- Message from Jennifer Scheuermann, PA sent at 11/5/2024  2:53 PM CST -----  Pls schedule: 1.) NH3 lab  2.) visit in Jan (in person if possible)  3.) CBC, CMP, INR, AFP, U/S in 5/2025 w/ testing week after

## 2024-11-05 NOTE — PROGRESS NOTES
"HEPATOLOGY VIDEO VISIT NOTE - HCV clinic  Visit type: Audiovisual  (Converted to audio during visit due to technical issues)    Each patient to whom he or she provides medical services by telemedicine is:  (1) informed of the relationship between the physician and patient and the respective role of any other health care provider with respect to management of the patient; and (2) notified that he or she may decline to receive medical services by telemedicine and may withdraw from such care at any time.    CHIEF COMPLAINT: Hepatitis C, Cirrhosis     HISTORY       This is a 69 y.o. White female w/ HCV cirrhosis and episode of acute liver failure 5/2024 (MELD 17) due to alcohol (had stopped 1 month prior; peth while inpt 14) and recent augmentin / azithromycin use. At initial visit reported "No plans to drink again" b/c "wants to live."    HCV history:  Recent diagnosis  Risks for HCV: tattoo decades ago. Blood transfusion ~ 20 yrs ago  - Prior Treatment: No  - Genotype ?    Liver staging:  No formal liver staging  Cirrhosis based on labs / imaging    Cirrhosis history:  Recent decompensation / acute liver failure  No portal HTN    Cirrhosis health maintenance:  - Varices screening:  EGD needed  - HAV status: Lacking immunity  - HBV status: Unknown    Interval history (7/16/24):  Epclusa prescribed but not started; labs indicated possible self-clearance   Labs 6/2024: stable. MELD 10. Liver panel continuing to improve  Labs 7/2024: Peth neg  No s/s liver decompensation  Describes fatigue, migraines.     Interval history (11/5/24):  HCV RNA climbing on 7/2024 labs so started 24wks epclusa 7/25/24   Virologic response: RNA neg (9/4/24)   Adherence issues:   Tolerability issues:    EGD 9/2024: No EV.   Gastritis. Protonix 40mg prescribed but not taking    HAV vaccine #1, 7/31/24    Labs 11/2024: Stable. MELD 10. Normal liver panel, AFP  U/S 11/2024: No liver masses or ascites    C/o marked fatigue  AM dizziness / balance " issues.  ?memory / confusion  In talking w/ pt it is not clear if sx are due to ??HE or some other process    Denies alcohol. Denies sinus sx / URI sx.     Current symptoms of hepatic decompensation:  Ascites / VERA - no   Diuretic use - no  TBili elevation - no  HE? See above  EV bleed / hematemesis / melena - no        PMH, PSH, SOCIAL HX, FAMILY HX      Reviewed in Epic  Pertinent findings:  FAMILY HX: neg for liver diease  SOCIAL HX: Resides in [place]. . 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 Quit during periods in past. Alcohol intake increased  after  & daughter . Quit 2024  Drugs - no  Spiritual - Yes, [employer].    ROS: as per HPI    PHYSICAL EXAM:  Friendly White female, in no acute distress; alert and oriented to person, place and time  LUNGS: Normal respiratory effort.  NEURO/PSYCH: Memory intact. Thought and speech pattern appropriate. Behavior normal. No depression or anxiety noted.    PERTINENT DIAGNOSTIC RESULTS      Lab Results   Component Value Date    WBC 5.79 2024    HGB 13.7 2024     2024     Lab Results   Component Value Date    INR 1.1 2024     Lab Results   Component Value Date    AST 14 2024    ALT 13 2024    BILITOT 0.4 2024    ALBUMIN 3.8 2024    ALKPHOS 117 2024    CREATININE 1.2 2024    BUN 22 2024     2024    K 4.4 2024    AFP 3.3 2024     Results for orders placed during the hospital encounter of 24  US Abdomen Limited  CLINICAL HISTORY:Unspecified cirrhosis of liver  TECHNIQUE:Limited ultrasound of the right upper quadrant of the abdomen (including pancreas, liver, gallbladder, common bile duct, and spleen) was performed.  COMPARISON:2024    FINDINGS:  Liver: Normal in size, measuring 15.8 cm.  Slight coarse echogenicity suggesting cirrhosis no focal hepatic lesions.  Gallbladder: No calculi, wall  thickening, or pericholecystic fluid.  No sonographic Belle's sign.  Biliary system: The common duct is not dilated, measuring 3.5 mm.  No intrahepatic ductal dilatation.  Spleen: Normal in size and echotexture, measuring 10 cm.  Miscellaneous: No upper abdominal ascites.    Impression  Slight coarse echogenicity of the liver suggesting cirrhosis.    ASSESSMENT        69 y.o. White female with:  1. Chronic HCV, genotype ?: on epclusa  -- HAV status - Lacking immunity, vaccine underway  -- HBV status - Prior resolved HBV    2. Cirrhosis, 5/2024 decompensation / acute liver failure  -- MELD score: 10  -- HCC screening: up to date 11/2024  -- EGD 9/2024: no EV    3. Alcohol use disorder, recent remission    4. Balance issues, dizzy, memory, confusion issues  -- r/o HE although this would not be expected given overall improvement in liver parameters and liver function w/ alcohol cessation and HCV Rx    PLAN        Continue epclusa x 24 wks w/ labs for SVR as planned  I had hoped to have pt do NH3 lab now and if normal instruct her to see PCP or neuro. Pt w/ transportation issues at present so instead will do lactulose trial. If continued sx despite lactulose she'll need to see PCP. May need neuro or ENT eval  CBC, CMP, INR, AFP, U/S, VISIT every 6 months: due 5/2025  HAV vaccine dose #2 due 1/2025 - 7/2025  EGD due 9/2026-9/2027 if HCV cured and remains off alcohol  Remain off alcohol  F/U in 3 months      __________________________________________________________________    Duration of encounter: 51 min  This includes face-to-face time and non face-to-face time preparing to see the patient (eg, review of tests), obtaining and/or reviewing separately obtained history, documenting clinical information in the electronic or other health record, independently interpreting resultsand communicating results to the patient/family/caregiver, or care coordination.

## 2024-11-06 ENCOUNTER — OUTPATIENT CASE MANAGEMENT (OUTPATIENT)
Dept: ADMINISTRATIVE | Facility: OTHER | Age: 69
End: 2024-11-06
Payer: MEDICARE

## 2024-11-06 NOTE — PROGRESS NOTES
Outpatient Care Management  Plan of Care Follow Up Visit    Patient: Beatriz Gan  MRN: [id number]  Date of Service: 11/06/2024  Completed by: Randi Silva RN  Referral Date: 05/08/2024    Reason for Visit   Patient presents with    OPCM RN Follow Up Call       Brief Summary: OPCM RN followed up with Mrs. Otto today for care plan review.    Next Steps:   Explore how Mrs. Otto is feeling, dizziness/memory changes improved?  Review if labs were completed. Results received?  Mrs. Otto agrees to follow up on or around 11/20/24.

## 2024-11-07 ENCOUNTER — OFFICE VISIT (OUTPATIENT)
Dept: PULMONOLOGY | Facility: CLINIC | Age: 69
End: 2024-11-07
Payer: MEDICARE

## 2024-11-07 DIAGNOSIS — R11.0 NAUSEA: ICD-10-CM

## 2024-11-07 DIAGNOSIS — R42 DIZZINESS: Primary | ICD-10-CM

## 2024-11-07 DIAGNOSIS — H91.8X3 OTHER SPECIFIED HEARING LOSS OF BOTH EARS: ICD-10-CM

## 2024-11-07 PROCEDURE — 99213 OFFICE O/P EST LOW 20 MIN: CPT | Mod: 95,,, | Performed by: NURSE PRACTITIONER

## 2024-11-07 RX ORDER — MECLIZINE HCL 12.5 MG 12.5 MG/1
12.5 TABLET ORAL 3 TIMES DAILY PRN
Qty: 30 TABLET | Refills: 0 | Status: SHIPPED | OUTPATIENT
Start: 2024-11-07

## 2024-11-07 RX ORDER — ONDANSETRON 4 MG/1
4 TABLET, ORALLY DISINTEGRATING ORAL EVERY 12 HOURS PRN
Qty: 14 TABLET | Refills: 0 | Status: SHIPPED | OUTPATIENT
Start: 2024-11-07
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
## 2024-11-12 DIAGNOSIS — M51.379 DDD (DEGENERATIVE DISC DISEASE), LUMBOSACRAL: ICD-10-CM

## 2024-11-12 DIAGNOSIS — F51.01 PRIMARY INSOMNIA: ICD-10-CM

## 2024-11-12 RX ORDER — TRAMADOL HYDROCHLORIDE 100 MG/1
100 TABLET, COATED ORAL EVERY 8 HOURS PRN
Qty: 90 TABLET | Refills: 0 | Status: SHIPPED | OUTPATIENT
Start: 2024-11-12

## 2024-11-12 RX ORDER — ZOLPIDEM TARTRATE 10 MG/1
10 TABLET ORAL NIGHTLY
Qty: 30 TABLET | Refills: 2 | Status: SHIPPED | OUTPATIENT
Start: 2024-11-12

## 2024-11-12 NOTE — TELEPHONE ENCOUNTER
Care Due:                  Date            Visit Type   Department     Provider  --------------------------------------------------------------------------------                                EP -                              PRIMARY      [facility]  Last Visit: 12-      CARE ([facility])   CARE           Jorge Lizarraga  Next Visit: None Scheduled  None         None Found                                                            Last  Test          Frequency    Reason                     Performed    Due Date  --------------------------------------------------------------------------------    Office Visit  12 months..  celecoxib, diclofenac....  12- 11-    [facility] Embedded Care Due Messages. Reference number: [id number].   11/12/2024 11:27:28 AM CST

## 2024-11-12 NOTE — TELEPHONE ENCOUNTER
----- Message from Shobha sent at 11/12/2024 11:16 AM CST -----  Contact: [phone]  Requesting an RX refill or new RX.    Is this a refill or new RX: REFILLS (2)    RX name and strength (copy/paste from chart):      -traMADoL 100 mg Tab    - zolpidem (AMBIEN) 10 mg Tab      Is this a 30 day or 90 day RX: 90    Pharmacy name and phone # (copy/paste from chart):    [facility] [place] [facility] [place] [address] [address]  Phone: [phone] Fax: [phone]      The doctors have asked that we provide their patients with the following 2 reminders -- prescription refills can take up to 72 hours, and a friendly reminder that in the future you can use your [facility] account to request refills: Y

## 2024-11-20 ENCOUNTER — OUTPATIENT CASE MANAGEMENT (OUTPATIENT)
Dept: ADMINISTRATIVE | Facility: OTHER | Age: 69
End: 2024-11-20
Payer: MEDICARE

## 2024-11-20 NOTE — LETTER
Beatriz Gan  [address] [address]      Dear Beatriz Gan,     I am your nurse with [facility]s Outpatient [facility] Management Department. I was unsuccessful in reaching you today. At your earliest convenience, I would like to discuss your healthcare progress.      Please contact me at [phone] from 8:00AM to 4:30 PM on Monday thru Friday.     As you know, [facility] is a program offered to you through [facility] where a nurse is available 24/7 to answer questions or provide medical advice, their number is [phone].    Thanks,      Randi Silva, RN  Outpatient Care Management

## 2024-11-20 NOTE — PROGRESS NOTES
11/20/2024  1st attempt to complete Follow-Up for Outpatient Care Management, left message. Will send portal message with unable to assess letter.

## 2024-11-25 ENCOUNTER — OUTPATIENT CASE MANAGEMENT (OUTPATIENT)
Dept: ADMINISTRATIVE | Facility: OTHER | Age: 69
End: 2024-11-25
Payer: MEDICARE

## 2024-11-25 ENCOUNTER — TELEPHONE (OUTPATIENT)
Dept: PRIMARY CARE CLINIC | Facility: CLINIC | Age: 69
End: 2024-11-25
Payer: MEDICARE

## 2024-11-25 NOTE — TELEPHONE ENCOUNTER
----- Message from JOE Bryan sent at 11/25/2024 10:46 AM CST -----  Good morning,    I recently spoke with Mrs. Otto for follow up. She expressed that she has recently experienced increased anxiety. She has been taking additional Xanax daily, instead of the prescribed 1 mg twice daily, she is taking it three times daily. She would like to discuss possibly increasing her medication.    I also provided education on additional ways to decrease anxiety and stress such as deep breathing.    Please contact Mrs. Otto at your earliest convenience for advisement.     Thank you for your assistance.     Kind regards,    Randi Silva RN  [facility]  [phone]

## 2024-11-25 NOTE — TELEPHONE ENCOUNTER
----- Message from Ivy sent at 11/25/2024 12:57 PM CST -----  Contact: [phone]  Patient is returning a phone call.    Who left a message for the patient: Clifford Lopez MA    Does patient know what this is regarding:  yes     Would you like a call back, or a response through your [facility] portal?:   call back     Comments:

## 2024-11-25 NOTE — TELEPHONE ENCOUNTER
----- Message from Antonietta sent at 11/25/2024  1:49 PM CST -----  Contact: [phone]  Patient is returning a phone call.    Who left a message for the patient: Dr Alonzo    Does patient know what this is regarding:  no    Would you like a call back, or a response through your [facility] portal?:   phone    Comments:

## 2024-11-25 NOTE — TELEPHONE ENCOUNTER
LOV 05/03/24 with Zoya. Pt requesting Xanax increase. Do you want pt to follow up either virtual or in person first? Please advise.

## 2024-11-25 NOTE — PROGRESS NOTES
Outpatient Care Management  Plan of Care Follow Up Visit    Patient: Beatriz Gan  MRN: [id number]  Date of Service: 11/25/2024  Completed by: Randi Silva RN  Referral Date: 05/08/2024    Reason for Visit   Patient presents with    OPCM RN Follow Up Call       Brief Summary: OPCM RN followed up with Mrs. Otto today for care plan review.    Next Steps:   Explore how Mrs. Otto is feeling. Anxiety/stress improved?  Ensure that appts with ENT and eye specialist are scheduled.   Free from falls?  Review if dizziness has improved? How frequent is she taking Meclizine?  Mrs. Otto agrees to follow up on or around 12/9/24.

## 2024-12-08 DIAGNOSIS — R42 DIZZINESS: ICD-10-CM

## 2024-12-08 DIAGNOSIS — F41.9 ANXIETY: ICD-10-CM

## 2024-12-08 DIAGNOSIS — F51.01 PRIMARY INSOMNIA: ICD-10-CM

## 2024-12-08 NOTE — TELEPHONE ENCOUNTER
No care due was identified.  [facility] Embedded Care Due Messages. Reference number: [id number].   12/08/2024 3:16:13 PM CST

## 2024-12-09 ENCOUNTER — OUTPATIENT CASE MANAGEMENT (OUTPATIENT)
Dept: ADMINISTRATIVE | Facility: OTHER | Age: 69
End: 2024-12-09
Payer: MEDICARE

## 2024-12-09 RX ORDER — ALPRAZOLAM 1 MG/1
1 TABLET ORAL 2 TIMES DAILY PRN
Qty: 60 TABLET | Refills: 2 | Status: SHIPPED | OUTPATIENT
Start: 2024-12-09

## 2024-12-09 RX ORDER — ZOLPIDEM TARTRATE 10 MG/1
10 TABLET ORAL NIGHTLY
Qty: 30 TABLET | Refills: 2 | Status: SHIPPED | OUTPATIENT
Start: 2024-12-09

## 2024-12-09 NOTE — PROGRESS NOTES
12/9/2024  1st attempt to complete Follow-Up for Outpatient Care Management, left message.  Will send portal message with unable to assess letter.

## 2024-12-09 NOTE — LETTER
Beatriz Gan  [address] [address]      Dear Beatriz Gan,     I am your nurse with [facility] Department. I was unsuccessful in reaching you today. At your earliest convenience, I would like to discuss your healthcare progress.      Please contact me at [phone] from 8:00AM to 4:30 PM on Monday thru Friday.     As you know, [facility] is a program offered to you through [facility] where a nurse is available 24/7 to answer questions or provide medical advice, their number is [phone].    Thanks,      Randi Silva, RN  Outpatient Care Management

## 2024-12-11 RX ORDER — MECLIZINE HCL 12.5 MG 12.5 MG/1
12.5 TABLET ORAL 3 TIMES DAILY PRN
Qty: 30 TABLET | Refills: 0 | Status: SHIPPED | OUTPATIENT
Start: 2024-12-11

## 2024-12-12 ENCOUNTER — OUTPATIENT CASE MANAGEMENT (OUTPATIENT)
Dept: ADMINISTRATIVE | Facility: OTHER | Age: 69
End: 2024-12-12
Payer: MEDICARE

## 2024-12-12 ENCOUNTER — TELEPHONE (OUTPATIENT)
Dept: HEPATOLOGY | Facility: CLINIC | Age: 69
End: 2024-12-12
Payer: MEDICARE

## 2024-12-12 ENCOUNTER — TELEPHONE (OUTPATIENT)
Dept: OTOLARYNGOLOGY | Facility: CLINIC | Age: 69
End: 2024-12-12
Payer: MEDICARE

## 2024-12-12 NOTE — TELEPHONE ENCOUNTER
Spoke w/pt per call back msg for scheduling.  W/pt, ENT appt scheduled for 12/23 @ 0900 w/Raza (audio prior).  Pt confirmed appt date/time.  Pt has no further ENT questions/concerns at this time.

## 2024-12-12 NOTE — PROGRESS NOTES
Outpatient Care Management  Plan of Care Follow Up Visit    Patient: Beatriz Gan  MRN: [id number]  Date of Service: 12/12/2024  Completed by: Randi Silva RN  Referral Date: 05/08/2024    Reason for Visit   Patient presents with    OPCM RN Follow Up Call       Brief Summary: OPCM RN followed up with Mrs. Otto today for care plan review.    Next Steps:   Ensure that her ENT visit is scheduled.   Review fatigue rating.   Free from falls  Mrs. Otto agrees to follow up on or around 12/30/24.

## 2024-12-12 NOTE — TELEPHONE ENCOUNTER
----- Message from JOE Bryan sent at 12/12/2024 11:26 AM CST -----  Good morning,     I recently spoke with Mrs. Otto for follow up. She was initally referred to ENT on 11/7 by Dr. Narayan, pulmonology. She missed 2 calls from scheduling, and has not had a chance to call back to schedule her visit.      Please contact Mrs. Otto at your earliest convenience for scheduling.    Thank you for your assistance.    Kind regards,     Randi Silva RN  [facility]  [phone]

## 2024-12-12 NOTE — TELEPHONE ENCOUNTER
----- Message from JOE Bryan sent at 12/12/2024 11:32 AM CST -----  Good morning,    I spoke with Mrs. Otto for follow up this morning. She voiced that the past few days she has experienced pain in her left side which has improved as of today, she believes it may have been from constipation. She has been taking OTC stool softener which has helped her bowel movements. She currently denies any N/V, diarrhea, or constipation. She wanted me to send you a message just to make you aware.     Kind regards,    Randi Silva RN  [facility]  [phone]

## 2024-12-12 NOTE — TELEPHONE ENCOUNTER
I spoke with patient and she denied having signs of liver decompensation.  She reports having abdominal pain because she was constipated.  She took stool softener and is now feeling better.  I stressed that she f/u with PCP if needed.  She just wanted to keep PA Scheuermann aware of her health issues.

## 2024-12-19 ENCOUNTER — TELEPHONE (OUTPATIENT)
Dept: HEPATOLOGY | Facility: CLINIC | Age: 69
End: 2024-12-19
Payer: MEDICARE

## 2024-12-19 ENCOUNTER — LAB VISIT (OUTPATIENT)
Dept: LAB | Facility: HOSPITAL | Age: 69
End: 2024-12-19
Attending: PHYSICIAN ASSISTANT
Payer: MEDICARE

## 2024-12-19 DIAGNOSIS — B18.2 CHRONIC HEPATITIS C WITHOUT HEPATIC COMA: ICD-10-CM

## 2024-12-19 LAB
ALBUMIN SERPL BCP-MCNC: 3.8 G/DL (ref 3.5–5.2)
ALP SERPL-CCNC: 117 U/L (ref 40–150)
ALT SERPL W/O P-5'-P-CCNC: 11 U/L (ref 10–44)
AST SERPL-CCNC: 17 U/L (ref 10–40)
BILIRUB DIRECT SERPL-MCNC: 0.2 MG/DL (ref 0.1–0.3)
BILIRUB SERPL-MCNC: 0.6 MG/DL (ref 0.1–1)
PROT SERPL-MCNC: 7.1 G/DL (ref 6–8.4)

## 2024-12-19 PROCEDURE — 80076 HEPATIC FUNCTION PANEL: CPT | Performed by: PHYSICIAN ASSISTANT

## 2024-12-19 PROCEDURE — 36415 COLL VENOUS BLD VENIPUNCTURE: CPT | Performed by: PHYSICIAN ASSISTANT

## 2024-12-19 NOTE — TELEPHONE ENCOUNTER
Pt began 24 weeks Epclusa on 7/25/24  Anticipated treatment end date: 1/8/25  F 4 decompensated  Jennifer ?  Prior HCV treatment: No    12/19:  LFT Normal    Please call pt  Tell her labs look great!  Liver numbers are normal  Continue 1 pill every day for 24 weeks total. She should end in early January.    Next appts  - visit 1/21/25  - LFT, HCV RNA - SVR12 - 4/8/25

## 2024-12-30 ENCOUNTER — OUTPATIENT CASE MANAGEMENT (OUTPATIENT)
Dept: ADMINISTRATIVE | Facility: OTHER | Age: 69
End: 2024-12-30
Payer: MEDICARE

## 2024-12-30 NOTE — LETTER
Beatriz Gan  [address] [address]      Dear Beatriz Gan,     I am your nurse with [facility]. I was unsuccessful in reaching you today. At your earliest convenience, I would like to discuss your healthcare progress.      Please contact me at [phone] from 8:00AM to 4:30 PM on Monday thru Friday.     As you know, [facility] is a program offered to you through [facility] where a nurse is available 24/7 to answer questions or provide medical advice, their number is [phone].    Thanks,      Randi Silva, RN  Outpatient Care Management

## 2024-12-30 NOTE — PROGRESS NOTES
12/30/2024  1st attempt to complete Follow up for Outpatient Care Management, left message.  Will send portal message with unable to assess letter.

## 2025-01-06 ENCOUNTER — OUTPATIENT CASE MANAGEMENT (OUTPATIENT)
Dept: ADMINISTRATIVE | Facility: OTHER | Age: 70
End: 2025-01-06
Payer: MEDICARE

## 2025-01-06 NOTE — PROGRESS NOTES
Outpatient Care Management  Plan of Care Follow Up Visit    Patient: Beatriz Gan  MRN: [id number]  Date of Service: 01/06/2025  Completed by: Randi Silva RN  Referral Date: 05/08/2024    Reason for Visit   Patient presents with    OPCM RN Follow Up Call    Case Closure     1/6/2025  Ms. Otto agreed to OPCM graduation, OPCM RN ensured she has contact information for any future questions/concerns, will proceed with case closure.        Brief Summary: OPCM RN followed up with Ms. Otto today for care plan review.    Next Steps:  Ms. Otto agreed to OPCM graduation, OPCM RN ensured she has contact information for any future questions/concerns, will proceed with case closure.

## 2025-01-21 ENCOUNTER — OFFICE VISIT (OUTPATIENT)
Dept: HEPATOLOGY | Facility: CLINIC | Age: 70
End: 2025-01-21
Payer: MEDICARE

## 2025-01-21 DIAGNOSIS — B18.2 CHRONIC HEPATITIS C WITHOUT HEPATIC COMA: ICD-10-CM

## 2025-01-21 DIAGNOSIS — F10.21 ALCOHOL USE DISORDER, MODERATE, IN EARLY REMISSION: ICD-10-CM

## 2025-01-21 DIAGNOSIS — K74.60 HEPATIC CIRRHOSIS, UNSPECIFIED HEPATIC CIRRHOSIS TYPE, UNSPECIFIED WHETHER ASCITES PRESENT: Primary | ICD-10-CM

## 2025-01-21 NOTE — Clinical Note
1. Pls schedule CBC, CMP, INR, AFP, U/S 5/2025. VISIT due 1 week later 2. Pls push back SVR labs. Looks like she'll actually end ~ 1/26/25 based on current pill count. thx

## 2025-01-21 NOTE — PROGRESS NOTES
"HEPATOLOGY VIDEO VISIT NOTE - HCV clinic  Visit type: Audiovisual  (Converted to audio during visit due to technical issues)    Each patient to whom he or she provides medical services by telemedicine is:  (1) informed of the relationship between the physician and patient and the respective role of any other health care provider with respect to management of the patient; and (2) notified that he or she may decline to receive medical services by telemedicine and may withdraw from such care at any time.    CHIEF COMPLAINT: Hepatitis C, Cirrhosis     HISTORY       This is a 69 y.o. White female w/ HCV cirrhosis and episode of acute liver failure 5/2024 (MELD 17) due to alcohol (had stopped 1 month prior; peth while inpt 14) and recent augmentin / azithromycin use. At initial visit reported "No plans to drink again" b/c "wants to live." Being seen for routine f/u    HCV history:  Recent diagnosis  Risks for HCV: tattoo decades ago. Blood transfusion ~ 20 yrs ago  - Prior Treatment: No  - Genotype ?    Liver staging:  No formal liver staging  Cirrhosis based on labs / imaging    Cirrhosis history:  Recent decompensation / acute liver failure  No portal HTN  Varices: no  Variceal bleed: n/a  Variceal banding: n/a    Cirrhosis health maintenance:  - Varices screening:  EGD 9/2024: No EV.   - HAV status: Lacking immunity  - HBV status: Unknown    Interval history (7/16/24):  Epclusa prescribed but not started; labs indicated possible self-clearance   Labs 6/2024: stable. MELD 10. Liver panel continuing to improve  Labs 7/2024: Peth neg  No s/s liver decompensation  Describes fatigue, migraines.     Interval history (11/5/24):  HCV RNA climbing started 24wks epclusa 7/29/24:  RNA neg (9/4/24)  EGD 9/2024: No EV; Gastritis. Protonix 40mg prescribed but not taking  HAV vaccine #1, 7/31/24  Labs 11/2024: Stable. MELD 10. Normal liver panel, AFP  U/S 11/2024: No liver masses or ascites  Current liver sx:  ?memory / confusion; In " talking w/ pt it is not clear if sx are due to ??HE or other process   No ascites, EV bleed, jaundice  C/o marked fatigue. AM dizziness / balance issues. No sinus sx / URI sx.   Denies alcohol    Interval history (2025):  U/S 2024: no masses or ascites  Labs 2024: stable. AFP normal. MELD 10    No jaundice, dark urine, hematemesis, melena, slowed mentation, abdominal distention.   C/o joint pain    Still on epclusa  - (+) adherence issues noted: anticipated EOT date  but 6 pills remaining still   - SVR labs scheduled in April -> will need to push back      PMH, PSH, SOCIAL HX, FAMILY HX      Reviewed in Epic  Pertinent findings:  FAMILY HX: neg for liver diease  SOCIAL HX: Resides in [place]. . 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 Quit during periods in past. Alcohol intake increased  after  & daughter . Quit 2024  Drugs - no  Spiritual - Yes, Denominational.    ROS: as per HPI    PHYSICAL EXAM:  Friendly White female, in no acute distress; alert and oriented to person, place and time  LUNGS: Normal respiratory effort.  NEURO/PSYCH: Memory intact. Thought and speech pattern appropriate. Behavior normal. No depression or anxiety noted.    PERTINENT DIAGNOSTIC RESULTS      Lab Results   Component Value Date    WBC 5.79 2024    HGB 13.7 2024     2024     Lab Results   Component Value Date    INR 1.1 2024     Lab Results   Component Value Date    AST 17 2024    ALT 11 2024    BILITOT 0.6 2024    ALBUMIN 3.8 2024    ALKPHOS 117 2024    CREATININE 1.2 2024    BUN 22 2024     2024    K 4.4 2024    AFP 3.3 2024     Results for orders placed during the hospital encounter of 24  US Abdomen Limited  CLINICAL HISTORY:Unspecified cirrhosis of liver  TECHNIQUE:Limited ultrasound of the right upper quadrant of the abdomen (including  pancreas, liver, gallbladder, common bile duct, and spleen) was performed.  COMPARISON:04/13/2024    FINDINGS:  Liver: Normal in size, measuring 15.8 cm.  Slight coarse echogenicity suggesting cirrhosis no focal hepatic lesions.  Gallbladder: No calculi, wall thickening, or pericholecystic fluid.  No sonographic Belle's sign.  Biliary system: The common duct is not dilated, measuring 3.5 mm.  No intrahepatic ductal dilatation.  Spleen: Normal in size and echotexture, measuring 10 cm.  Miscellaneous: No upper abdominal ascites.    Impression  Slight coarse echogenicity of the liver suggesting cirrhosis.      ASSESSMENT        69 y.o. White female with:  1. Chronic HCV, genotype ?: on epclusa, some missed doses  -- HAV status - Lacking immunity, vaccine underway  -- HBV status - Prior resolved HBV    2. Cirrhosis, 5/2024 decompensation / acute liver failure  -- MELD score: 10  -- HCC screening: up to date 11/2024  -- EGD 9/2024: no EV    3. Alcohol use disorder, recent remission      PLAN        Continue epclusa x 24 wks w/ labs for SVR (will adjust lab date)  CBC, CMP, INR, AFP, U/S, VISIT every 6 months: due 5/2025  HAV vaccine dose #2 due 1/2025 - 7/2025  EGD due 9/2026-9/2027 if HCV cured and remains off alcohol  Remain off alcohol        __________________________________________________________________    Duration of encounter: 30 min  This includes face-to-face time and non face-to-face time preparing to see the patient (eg, review of tests), obtaining and/or reviewing separately obtained history, documenting clinical information in the electronic or other health record, independently interpreting resultsand communicating results to the patient/family/caregiver, or care coordination.

## 2025-01-23 ENCOUNTER — TELEPHONE (OUTPATIENT)
Dept: HEPATOLOGY | Facility: CLINIC | Age: 70
End: 2025-01-23
Payer: MEDICARE

## 2025-01-23 DIAGNOSIS — M47.896 OTHER SPONDYLOSIS, LUMBAR REGION: ICD-10-CM

## 2025-01-23 DIAGNOSIS — M51.369 DDD (DEGENERATIVE DISC DISEASE), LUMBAR: ICD-10-CM

## 2025-01-23 RX ORDER — GABAPENTIN 600 MG/1
TABLET ORAL
Qty: 90 TABLET | Refills: 3 | Status: SHIPPED | OUTPATIENT
Start: 2025-01-23

## 2025-01-23 NOTE — TELEPHONE ENCOUNTER
I spoke with patient and msg from PA Scheuermann relayed.  HCC testing 5/13 and f/u visit 5/20.  She asked that svr draw be added to 5/13 draw; done.

## 2025-01-23 NOTE — TELEPHONE ENCOUNTER
----- Message from Jennifer Scheuermann, PA sent at 1/21/2025  1:49 PM CST -----  1. Pls schedule CBC, CMP, INR, AFP, U/S 5/2025. VISIT due 1 week later  2. Pls push back SVR labs. Looks like she'll actually end ~ 1/26/25 based on current pill count.  thx

## 2025-02-21 DIAGNOSIS — Z00.00 ENCOUNTER FOR MEDICARE ANNUAL WELLNESS EXAM: ICD-10-CM

## 2025-03-20 ENCOUNTER — PATIENT MESSAGE (OUTPATIENT)
Dept: PRIMARY CARE CLINIC | Facility: CLINIC | Age: 70
End: 2025-03-20
Payer: MEDICARE

## 2025-03-24 NOTE — TELEPHONE ENCOUNTER
Care Due:                  Date            Visit Type   Department     Provider  --------------------------------------------------------------------------------                                EP -                              PRIMARY      [facility]  Last Visit: 12-      CARE ([facility])   CARE           Jorge Lizarraga  Next Visit: None Scheduled  None         None Found                                                            Last  Test          Frequency    Reason                     Performed    Due Date  --------------------------------------------------------------------------------    Office Visit  12 months..  FLUoxetine, amLODIPine,    12- 11-                             celecoxib, diclofenac....    [employer] Embedded Care Due Messages. Reference number: [id number].   3/24/2025 9:41:03 AM CDT

## 2025-03-25 RX ORDER — FLUOXETINE HYDROCHLORIDE 40 MG/1
CAPSULE ORAL
Qty: 180 CAPSULE | Refills: 11 | Status: SHIPPED | OUTPATIENT
Start: 2025-03-25

## 2025-04-01 DIAGNOSIS — F41.9 ANXIETY: ICD-10-CM

## 2025-04-01 RX ORDER — ALPRAZOLAM 1 MG/1
TABLET ORAL
Qty: 60 TABLET | Refills: 2 | Status: SHIPPED | OUTPATIENT
Start: 2025-04-01

## 2025-04-01 NOTE — TELEPHONE ENCOUNTER
No care due was identified.  [facility] Embedded Care Due Messages. Reference number: [id number].   4/01/2025 4:39:20 AM CDT

## 2025-04-14 ENCOUNTER — PATIENT MESSAGE (OUTPATIENT)
Dept: ORTHOPEDICS | Facility: CLINIC | Age: 70
End: 2025-04-14
Payer: MEDICARE

## 2025-04-14 ENCOUNTER — OFFICE VISIT (OUTPATIENT)
Dept: ORTHOPEDICS | Facility: CLINIC | Age: 70
End: 2025-04-14
Payer: MEDICARE

## 2025-04-14 ENCOUNTER — HOSPITAL ENCOUNTER (OUTPATIENT)
Dept: RADIOLOGY | Facility: HOSPITAL | Age: 70
Discharge: HOME | End: 2025-04-14
Attending: ORTHOPAEDIC SURGERY
Payer: MEDICARE

## 2025-04-14 VITALS
HEIGHT: 64 IN | DIASTOLIC BLOOD PRESSURE: 84 MMHG | WEIGHT: 171.94 LBS | SYSTOLIC BLOOD PRESSURE: 128 MMHG | BODY MASS INDEX: 29.35 KG/M2

## 2025-04-14 DIAGNOSIS — M51.370 DEGENERATION OF INTERVERTEBRAL DISC OF LUMBOSACRAL REGION WITH DISCOGENIC BACK PAIN: ICD-10-CM

## 2025-04-14 DIAGNOSIS — M17.11 PRIMARY OSTEOARTHRITIS OF RIGHT KNEE: Primary | ICD-10-CM

## 2025-04-14 DIAGNOSIS — M47.816 LUMBAR SPONDYLOSIS: ICD-10-CM

## 2025-04-14 DIAGNOSIS — Z01.818 PRE-OP TESTING: ICD-10-CM

## 2025-04-14 PROCEDURE — 3074F SYST BP LT 130 MM HG: CPT | Mod: HCNC,CPTII,S$GLB, | Performed by: ORTHOPAEDIC SURGERY

## 2025-04-14 PROCEDURE — 1160F RVW MEDS BY RX/DR IN RCRD: CPT | Mod: HCNC,CPTII,S$GLB, | Performed by: ORTHOPAEDIC SURGERY

## 2025-04-14 PROCEDURE — 3288F FALL RISK ASSESSMENT DOCD: CPT | Mod: HCNC,CPTII,S$GLB, | Performed by: ORTHOPAEDIC SURGERY

## 2025-04-14 PROCEDURE — 72170 X-RAY EXAM OF PELVIS: CPT | Mod: 26,HCNC,, | Performed by: RADIOLOGY

## 2025-04-14 PROCEDURE — 1159F MED LIST DOCD IN RCRD: CPT | Mod: HCNC,CPTII,S$GLB, | Performed by: ORTHOPAEDIC SURGERY

## 2025-04-14 PROCEDURE — 99999 PR PBB SHADOW E&M-EST. PATIENT-LVL V: CPT | Mod: PBBFAC,HCNC,, | Performed by: ORTHOPAEDIC SURGERY

## 2025-04-14 PROCEDURE — 72170 X-RAY EXAM OF PELVIS: CPT | Mod: TC,HCNC,PN

## 2025-04-14 PROCEDURE — 72100 X-RAY EXAM L-S SPINE 2/3 VWS: CPT | Mod: 26,HCNC,, | Performed by: RADIOLOGY

## 2025-04-14 PROCEDURE — 3079F DIAST BP 80-89 MM HG: CPT | Mod: HCNC,CPTII,S$GLB, | Performed by: ORTHOPAEDIC SURGERY

## 2025-04-14 PROCEDURE — 72100 X-RAY EXAM L-S SPINE 2/3 VWS: CPT | Mod: TC,HCNC,PN

## 2025-04-14 PROCEDURE — 3008F BODY MASS INDEX DOCD: CPT | Mod: HCNC,CPTII,S$GLB, | Performed by: ORTHOPAEDIC SURGERY

## 2025-04-14 PROCEDURE — 99213 OFFICE O/P EST LOW 20 MIN: CPT | Mod: HCNC,S$GLB,, | Performed by: ORTHOPAEDIC SURGERY

## 2025-04-14 PROCEDURE — 1125F AMNT PAIN NOTED PAIN PRSNT: CPT | Mod: HCNC,CPTII,S$GLB, | Performed by: ORTHOPAEDIC SURGERY

## 2025-04-14 PROCEDURE — 1101F PT FALLS ASSESS-DOCD LE1/YR: CPT | Mod: HCNC,CPTII,S$GLB, | Performed by: ORTHOPAEDIC SURGERY

## 2025-04-14 RX ORDER — CEFAZOLIN SODIUM 2 G/50ML
2 SOLUTION INTRAVENOUS
OUTPATIENT
Start: 2025-04-14

## 2025-04-14 NOTE — PROGRESS NOTES
Subjective:       Patient ID: Beatriz Gan is a 69 y.o. female.    Chief Complaint: Pain of the Lumbar Spine (Patient is here for lumbar and right knee pain, states pain has gotten worse since last visit. Has painful and limited ROM as well as sharp shooting pain in the lower back. Also has burning and shooting pain as well as numbness and tingling that radiates down the right leg to the foot ) and Pain of the Right Knee      History of Present Illness    Prior to meeting with the patient I reviewed the medical chart in [facility]. This included reviewing the previous progress notes from our office, review of the patient's last appointment with their primary care provider, review of any visits to the emergency room, and review of any pain management appointments or procedures.   69-year-old female following up for right knee and lumbar spine pain.  She has known osteoarthritis of the right knee.  She has been treated with corticosteroid injections intermittently which have not been providing her any relief recently.  She is unable to perform her daily activities due to her right knee pain.  For her lumbar spine, she admits to low back pain with radiating numbness and tingling down her right leg to her right foot.  She has had previous epidural steroid injections in the past performed by pain management without significant relief of her symptoms.  She states that her right knee pain is significantly greater than her the back pain today.    Current Medications  Current Medications[1]    Allergies  Review of patient's allergies indicates:   Allergen Reactions    Phenergan [promethazine] Hives and Rash    Latex, natural rubber Hives     Per pt report-many years    Morphine Hives       Past Medical History  Past Medical History:   Diagnosis Date    Alcohol dependence     Asthma     DDD (degenerative disc disease), lumbosacral     DJD (degenerative joint disease), lumbosacral     Encounter for blood transfusion      GERD (gastroesophageal reflux disease)     Gout     Hepatitis C     Hypercholesterolemia     Hypertension     NATHALIE (iron deficiency anemia)     questionable white coat hypertension    Kidney carcinoma, left 2014    Pneumonia     Renal cell carcinoma     Seizures     Shingles 10/13/2012       Surgical History  Past Surgical History:   Procedure Laterality Date    ANTERIOR CERVICAL DISCECTOMY W/ FUSION N/A 5/4/2023    Procedure: DISCECTOMY, SPINE, CERVICAL, ANTERIOR APPROACH, WITH FUSION C3-4;  Surgeon: Ruben Martinez MD;  Location: [facility] OR;  Service: Orthopedics;  Laterality: N/A;    APPENDECTOMY      ARTHROSCOPY OF SHOULDER WITH DECOMPRESSION OF SUBACROMIAL SPACE Left 10/31/2019    Procedure: ARTHROSCOPY, SHOULDER, WITH SUBACROMIAL SPACE DECOMPRESSION;  Surgeon: Ruben Martinez MD;  Location: [facility] OR;  Service: Orthopedics;  Laterality: Left;    BLADDER REPAIR      x 2    COLONOSCOPY  9/2011    COLONOSCOPY N/A 9/18/2024    Procedure: COLONOSCOPY;  Surgeon: Ceasar Calero MD;  Location: [facility];  Service: Endoscopy;  Laterality: N/A;    DISTAL CLAVICLE EXCISION Left 10/31/2019    Procedure: EXCISION, CLAVICLE, DISTAL;  Surgeon: Ruben Martinez MD;  Location: [facility] OR;  Service: Orthopedics;  Laterality: Left;    EPIDURAL STEROID INJECTION INTO LUMBAR SPINE N/A 12/7/2020    Procedure: Injection-steroid-epidural-lumbar;  Surgeon: Dk Rosales MD;  Location: [facility] OR;  Service: Pain Management;  Laterality: N/A;  L4-L5    EPIDURAL STEROID INJECTION INTO LUMBAR SPINE N/A 5/17/2021    Procedure: Injection-steroid-epidural-lumbar;  Surgeon: Dk Rosales MD;  Location: [facility] OR;  Service: Pain Management;  Laterality: N/A;  L4-L5    ESOPHAGOGASTRODUODENOSCOPY  9/2011    ESOPHAGOGASTRODUODENOSCOPY N/A 9/18/2024    Procedure: EGD (ESOPHAGOGASTRODUODENOSCOPY);  Surgeon: Ceasar Calero MD;  Location: [facility];  Service: Endoscopy;  Laterality: N/A;    EYE SURGERY      lasix bilateral    FIXATION KYPHOPLASTY  N/A 1/31/2020    Procedure: Kyphoplasty T12;  Surgeon: Dk Rosales MD;  Location: [facility] OR;  Service: Pain Management;  Laterality: N/A;    HERNIA REPAIR      hiatal hernai    HYSTERECTOMY      INJECTION OF ANESTHETIC AGENT AROUND NERVE Left 6/8/2020    Procedure: Block, Nerve;  Surgeon: Dk Rosales MD;  Location: [facility] OR;  Service: Pain Management;  Laterality: Left;  left genicular nerve block     KNEE ARTHROPLASTY Left 7/30/2020    Procedure: ARTHROPLASTY, KNEE LEFT;  Surgeon: Ruben Martinez MD;  Location: [facility] OR;  Service: Orthopedics;  Laterality: Left;  REP VALERY RUBY    KNEE ARTHROSCOPY W/ MENISCECTOMY Left 1/16/2020    Procedure: ARTHROSCOPY, KNEE, WITH MEDIAL MENISCECTOMY;  Surgeon: Ruben Martinez MD;  Location: [facility] OR;  Service: Orthopedics;  Laterality: Left;    LAPAROSCOPIC NISSEN FUNDOPLICATION      NEPHRECTOMY      LT partial    RADIOFREQUENCY ABLATION Left 6/19/2020    Procedure: Radiofrequency Ablation;  Surgeon: Dk Rosales MD;  Location: [facility] OR;  Service: Pain Management;  Laterality: Left;    RADIOFREQUENCY ABLATION OF LUMBAR MEDIAL BRANCH NERVE AT SINGLE LEVEL Bilateral 2/28/2020    Procedure: Radiofrequency Ablation, Nerve, Spinal, Lumbar, Medial Branch, 1 Level;  Surgeon: Dk Rosales MD;  Location: [facility] OR;  Service: Pain Management;  Laterality: Bilateral;  L3,L4,L5 - Burned at 80 degrees C. for 60 seconds x 2 each site    RADIOFREQUENCY ABLATION OF LUMBAR MEDIAL BRANCH NERVE AT SINGLE LEVEL Bilateral 10/19/2020    Procedure: Radiofrequency Ablation, Nerve, Spinal, Lumbar, Medial Branch, 1 Level;  Surgeon: Dk Rosales MD;  Location: [facility] OR;  Service: Pain Management;  Laterality: Bilateral;  L3, L4, L5    REFRACTIVE SURGERY      ROTATOR CUFF REPAIR Left 10/31/2019    Procedure: REPAIR, ROTATOR CUFF;  Surgeon: Ruben Martinez MD;  Location: [facility] OR;  Service: Orthopedics;  Laterality: Left;  arthrex    SKIN BIOPSY      TOTAL ABDOMINAL HYSTERECTOMY W/ BILATERAL  SALPINGOOPHORECTOMY  age 48    TRANSFORAMINAL EPIDURAL INJECTION OF STEROID Bilateral 7/30/2021    Procedure: Injection,steroid,epidural,transforaminal approach;  Surgeon: Dk Rosales MD;  Location: [facility] OR;  Service: Pain Management;  Laterality: Bilateral;  L5-S1     TRANSFORAMINAL EPIDURAL INJECTION OF STEROID Bilateral 3/28/2022    Procedure: INJECTION, STEROID, EPIDURAL, TRANSFORAMINAL APPROACH Lumbar L5-S1;  Surgeon: Dk Rosales MD;  Location: [facility] OR;  Service: Pain Management;  Laterality: Bilateral;       Family History:   Family History   Problem Relation Name Age of Onset    Hypertension Father      Glaucoma Neg Hx      Macular degeneration Neg Hx      Retinal detachment Neg Hx         Social History:   Social History[2]    Hospitalization/Major Diagnostic Procedure:     Review of Systems     General/Constitutional:  Chills denies. Fatigue denies. Fever denies. Weight gain denies. Weight loss denies.    Respiratory:  Shortness of breath denies.    Cardiovascular:  Chest pain denies.    Gastrointestinal:  Constipation denies. Diarrhea denies. Nausea denies. Vomiting denies.     Hematology:  Easy bruising denies. Prolonged bleeding denies.     Genitourinary:  Frequent urination denies. Pain in lower back denies. Painful urination denies.     Musculoskeletal:  See HPI for details    Skin:  Rash denies.    Neurologic:  Dizziness denies. Gait abnormalities denies. Seizures denies. Tingling/Numbess denies.    Psychiatric:  Anxiety denies. Depressed mood denies.     Objective:   Vital Signs:   Vitals:    04/14/25 1058   BP: 128/84        Physical Exam      General Examination:     Constitutional: The patient is alert and oriented to lace person and time. Mood is pleasant.     Head/Face: Normal facial features normal eyebrows    Eyes: Normal extraocular motion bilaterally    Lungs: Respirations are equal and unlabored    Gait is coordinated.    Cardiovascular: There are no swelling or varicosities  present.    Lymphatic: Negative for adenopathy    Skin: Normal    Neurological: Level of consciousness normal. Oriented to place person and time and situation    Psychiatric: Oriented to time place person and situation    Right knee exam: Skin overlying the right knee is clean dry and intact.  No erythema or ecchymosis.  No signs or symptoms of infection.  Tenderness to medial and lateral joint lines of the right knee.  Range of motion of the right knee 0-120 degrees.  Stable varus valgus stress.  Negative anterior-posterior drawer test.  Negative Homans on the right.  Negative straight leg raise test on the right.  Distally neurovascularly intact.    Lumbar spine exam: Skin overlying the lumbar spine is clean dry and intact.  No erythema or ecchymosis.  No signs or symptoms of infection.  Limited range of motion of the lumbar spine with flexion-extension, rotation, lateral bending secondary to pain.  Diffuse tenderness to the paraspinals of the lumbar spine. No tenderness to the greater trochanters bilaterally.  Native straight leg raise test bilaterally.  Distally neurovascularly intact.    XRAY Report/ Interpretation:  Two views of the lumbar spine taken in clinic today reveal degenerative disc disease of the lumbar spine most prominent at L5-S1.  Sclerosis of the facet joints noted throughout the lumbar spine.  No acute fractures or dislocations.      Assessment:       1. DDD (degenerative disc disease), lumbar    2. Other spondylosis, lumbar region    3. Degeneration of intervertebral disc of lumbosacral region with discogenic back pain    4. Lumbar radiculopathy        Plan:       Beatriz was seen today for pain and pain.    Diagnoses and all orders for this visit:    DDD (degenerative disc disease), lumbar  -     X-Ray Lumbar Spine Ap And Lateral  -     X-Ray Pelvis Routine AP    Other spondylosis, lumbar region  -     X-Ray Lumbar Spine Ap And Lateral  -     X-Ray Pelvis Routine AP    Degeneration of  intervertebral disc of lumbosacral region with discogenic back pain  -     X-Ray Lumbar Spine Ap And Lateral  -     X-Ray Pelvis Routine AP    Lumbar radiculopathy  -     X-Ray Lumbar Spine Ap And Lateral  -     X-Ray Pelvis Routine AP         No follow-ups on file.    Right knee osteoarthritis, degenerative disc disease with facet joint arthritis of the lumbar spine.  She has previously received corticosteroid injections of the right knee which have initially provided her significant relief of her symptoms; however, she no longer receives any relief with the injections.  We discussed her treatment options with her whether it be to continue corticosteroid injections in the right knee versus right total knee arthroplasty.  She would like to proceed with surgical intervention at this time.    Patient is candidate for total knee arthroplasty in my opinion We discussed the details of a right total knee arthroplasty with her today and answered all of her questions.  Surgical consents were obtained today.  As far as her lumbar spine, we will continue to monitor her low back pain and radicular symptoms in proceed with additional intervention if needed after she is fully healed from her right total knee arthroplasty.  Risks of the procedure were reviewed with the patient.  This includes, but is not limited to:  infection, bleeding, pain, swelling, decreased range of motion, nerve injury/damage, numbness, leg length discrepancy, wound dehiscence, hardware failure, deep vein thrombosis, pulmonary embolism, reflex sympathetic dystrophy, and possible need for further surgery.  Treatment options were discussed with regards to the nature of the medical condition. Conservative pain intervention and surgical options were discussed in detail. The probability of success of each separate treatment option was discussed. The patient expressed a clear understanding of the treatment options. With regards to surgery, the procedure risk,  benefits, complications, and outcomes were discussed. No guarantees were given with regards to surgical outcome.   The risk of complications, morbidity, and mortality of patient management decisions have been made at the time of this visit. These are associated with the patient's problems, diagnostic procedures and treatment options. This includes the possible management options selected and those considered but not selected by the patient after shared medical decision making we discussed with the patient.     This note was created using Dragon voice recognition software that occasionally misinterpreted phrases or words.         [1]   Current Outpatient Medications   Medication Sig Dispense Refill    albuterol (VENTOLIN HFA) 90 mcg/actuation inhaler Inhale 2 puffs into the lungs every 4 (four) hours as needed for Wheezing or Shortness of Breath. Rescue 18 g 11    ALPRAZolam (XANAX) 1 MG tablet TAKE ONE TABLET (1 MG TOTAL) BY MOUTH TWICE DAILY AS NEEDED FOR ANXIETY 60 tablet 2    amLODIPine (NORVASC) 5 MG tablet Take 1 tablet (5 mg total) by mouth once daily. 90 tablet 3    aspirin (ECOTRIN) 81 MG EC tablet TAKE ONE TABLET BY MOUTH DAILY AT 9 AM 30 tablet 2    azelastine (ASTELIN) 137 mcg (0.1 %) nasal spray Use 1 spray(s) in each nostril twice daily 30 mL 3    benralizumab 30 mg/mL AtIn Inject 30 mg into the skin every 8 weeks. 1 mL 5    celecoxib (CELEBREX) 200 MG capsule TAKE ONE CAPSULE BY MOUTH DAILY AT 9 AM 90 capsule 3    cholecalciferol, vitamin D3, (VITAMIN D3 ORAL) Take 1 capsule by mouth once daily.      FLUoxetine 40 MG capsule TAKE ONE CAPSULE (40mg) BY MOUTH TWICE DAILY @9am-@5pm 180 capsule 11    fluticasone (FLONASE) 50 mcg/actuation nasal spray 1 spray by Nasal route daily as needed.       gabapentin (NEURONTIN) 600 MG tablet TAKE ONE TABLET BY MOUTH THREE TIMES DAILY @ 9AM-3PM-9PM 90 tablet 3    meclizine (ANTIVERT) 12.5 mg tablet Take 1 tablet (12.5 mg total) by mouth 3 (three) times daily as  needed for Dizziness. 30 tablet 0    MULTIVIT-IRON-MIN-FOLIC ACID 3,500-18-0.4 UNIT-MG-MG ORAL CHEW Take by mouth once daily.      ondansetron (ZOFRAN-ODT) 4 MG TbDL dissolve 1 tablet (4 mg total) by mouth every 12 (twelve) hours as needed (nausea). 14 tablet 0    sofosbuvir-velpatasvir 400-100 mg Tab Take 1 tablet by mouth once daily. 28 tablet 5    traMADoL 100 mg Tab Take 100 mg by mouth every 8 (eight) hours as needed. 90 tablet 0    ubrogepant (UBRELVY) 50 mg tablet Take 1 tablet (50 mg total) by mouth daily as needed for Migraine. If symptoms persist or return, may repeat dose after 2 hours. Maximum: 200 mg per 24 hours 30 tablet 3    zolpidem (AMBIEN) 10 mg Tab Take 1 tablet (10 mg total) by mouth every evening. 30 tablet 2    budesonide-formoterol 160-4.5 mcg (SYMBICORT) 160-4.5 mcg/actuation HFAA Inhale 2 puffs into the lungs every 12 (twelve) hours. Controller 30.6 g 3    diclofenac sodium (VOLTAREN) 1 % Gel Apply 4 g topically 2 (two) times daily. (Patient not taking: Reported on 4/14/2025) 200 g 3    folic acid (FOLVITE) 1 MG tablet Take 1 tablet (1 mg total) by mouth once daily. 90 tablet 0    lactulose (CHRONULAC) 20 gram/30 mL Soln Take 30 mLs (20 g total) by mouth once daily. Goal of 3-5 soft stools each day to help memory (Patient not taking: Reported on 4/14/2025) 1800 mL 3    pantoprazole (PROTONIX) 40 MG tablet Take 1 tablet (40 mg total) by mouth once daily. (Patient not taking: Reported on 4/14/2025) 90 tablet 3    predniSONE (DELTASONE) 10 MG tablet Take one pill a day for three days, repeat for shortness of breath (Patient not taking: Reported on 4/14/2025) 12 tablet 0     No current facility-administered medications for this visit.     Facility-Administered Medications Ordered in Other Visits   Medication Dose Route Frequency Provider Last Rate Last Admin    electrolyte-S (ISOLYTE)   Intravenous Continuous Kota Ortega MD 10 mL/hr at 01/31/20 0624 New Bag at 09/18/24 1130    lactated  ringers infusion   Intravenous Continuous Kota Ortega MD        pregabalin capsule 75 mg  75 mg Oral Once Shiela Mann MD       [2]   Social History  Socioeconomic History    Marital status:    Occupational History     Employer: [employer]   Tobacco Use    Smoking status: Former     Current packs/day: 0.00     Average packs/day: 1 pack/day for 40.0 years (40.0 ttl pk-yrs)     Types: Cigarettes     Start date: 1978     Quit date: 2018     Years since quittin.2    Smokeless tobacco: Never    Tobacco comments:     smokes 2-3 cigarettes a night   Substance and Sexual Activity    Alcohol use: Not Currently     Alcohol/week: 12.0 standard drinks of alcohol     Types: 6 Cans of beer, 6 Unspecified drink type per week     Comment: states has not had alcohol since 10/2023    Drug use: No    Sexual activity: Not Currently     Partners: Male     Social Drivers of Health     Financial Resource Strain: Medium Risk (10/15/2024)    Overall Financial Resource Strain (CARDIA)     Difficulty of Paying Living Expenses: Somewhat hard   Food Insecurity: Food Insecurity Present (10/15/2024)    Hunger Vital Sign     Worried About Running Out of Food in the Last Year: Sometimes true     Ran Out of Food in the Last Year: Never true   Transportation Needs: No Transportation Needs (10/15/2024)    PRAPARE - Transportation     Lack of Transportation (Medical): No     Lack of Transportation (Non-Medical): No   Physical Activity: Inactive (10/15/2024)    Exercise Vital Sign     Days of Exercise per Week: 0 days     Minutes of Exercise per Session: 0 min   Stress: No Stress Concern Present (10/15/2024)    [employer] - Occupational Stress Questionnaire     Feeling of Stress : Not at all   Housing Stability: Unknown (10/15/2024)    Housing Stability Vital Sign     Unable to Pay for Housing in the Last Year: No     Homeless in the Last Year: No

## 2025-04-16 DIAGNOSIS — F51.01 PRIMARY INSOMNIA: ICD-10-CM

## 2025-04-16 RX ORDER — ZOLPIDEM TARTRATE 10 MG/1
TABLET ORAL
Qty: 30 TABLET | Refills: 2 | Status: SHIPPED | OUTPATIENT
Start: 2025-04-16

## 2025-04-16 NOTE — TELEPHONE ENCOUNTER
No care due was identified.  [facility] Embedded Care Due Messages. Reference number: [id number].   4/16/2025 8:24:19 AM CDT

## 2025-04-17 ENCOUNTER — HOSPITAL ENCOUNTER (OUTPATIENT)
Dept: PREADMISSION TESTING | Facility: HOSPITAL | Age: 70
Discharge: HOME | End: 2025-04-17
Attending: ORTHOPAEDIC SURGERY
Payer: MEDICARE

## 2025-04-17 ENCOUNTER — HOSPITAL ENCOUNTER (OUTPATIENT)
Dept: RADIOLOGY | Facility: HOSPITAL | Age: 70
Discharge: HOME | End: 2025-04-17
Attending: ORTHOPAEDIC SURGERY
Payer: MEDICARE

## 2025-04-17 VITALS — WEIGHT: 181 LBS | BODY MASS INDEX: 30.9 KG/M2 | HEIGHT: 64 IN

## 2025-04-17 DIAGNOSIS — Z01.818 PRE-OP TESTING: ICD-10-CM

## 2025-04-17 DIAGNOSIS — M17.11 PRIMARY OSTEOARTHRITIS OF RIGHT KNEE: ICD-10-CM

## 2025-04-17 LAB
ABSOLUTE EOSINOPHIL (SMH): 0 K/UL
ABSOLUTE MONOCYTE (SMH): 0.39 K/UL (ref 0.3–1)
ABSOLUTE NEUTROPHIL COUNT (SMH): 2 K/UL (ref 1.8–7.7)
ALBUMIN SERPL-MCNC: 3.9 G/DL (ref 3.5–5.2)
ALP SERPL-CCNC: 109 UNIT/L (ref 40–150)
ALT SERPL-CCNC: 11 UNIT/L (ref 10–44)
ANION GAP (SMH): 10 MMOL/L (ref 8–16)
AST SERPL-CCNC: 23 UNIT/L (ref 11–45)
BASOPHILS # BLD AUTO: 0.01 K/UL
BASOPHILS NFR BLD AUTO: 0.3 %
BILIRUB SERPL-MCNC: 0.5 MG/DL (ref 0.1–1)
BUN SERPL-MCNC: 17 MG/DL (ref 8–23)
CALCIUM SERPL-MCNC: 9.4 MG/DL (ref 8.7–10.5)
CHLORIDE SERPL-SCNC: 105 MMOL/L (ref 95–110)
CO2 SERPL-SCNC: 28 MMOL/L (ref 23–29)
CREAT SERPL-MCNC: 1 MG/DL (ref 0.5–1.4)
ERYTHROCYTE [DISTWIDTH] IN BLOOD BY AUTOMATED COUNT: 11.9 % (ref 11.5–14.5)
GFR SERPLBLD CREATININE-BSD FMLA CKD-EPI: 60 ML/MIN/1.73/M2
GLUCOSE SERPL-MCNC: 93 MG/DL (ref 70–110)
HCT VFR BLD AUTO: 40.5 % (ref 37–48.5)
HGB BLD-MCNC: 13 GM/DL (ref 12–16)
IMM GRANULOCYTES # BLD AUTO: 0.01 K/UL (ref 0–0.04)
IMM GRANULOCYTES NFR BLD AUTO: 0.3 % (ref 0–0.5)
INDIRECT COOMBS: NORMAL
LYMPHOCYTES # BLD AUTO: 1.32 K/UL (ref 1–4.8)
MCH RBC QN AUTO: 29.5 PG (ref 27–31)
MCHC RBC AUTO-ENTMCNC: 32.1 G/DL (ref 32–36)
MCV RBC AUTO: 92 FL (ref 82–98)
NUCLEATED RBC (/100WBC) (SMH): 0 /100 WBC
OHS QRS DURATION: 70 MS
OHS QTC CALCULATION: 429 MS
PLATELET # BLD AUTO: 224 K/UL (ref 150–450)
PMV BLD AUTO: 9.8 FL (ref 9.2–12.9)
POTASSIUM SERPL-SCNC: 4.2 MMOL/L (ref 3.5–5.1)
PROT SERPL-MCNC: 7 GM/DL (ref 6–8.4)
RBC # BLD AUTO: 4.4 M/UL (ref 4–5.4)
RELATIVE EOSINOPHIL (SMH): 0 % (ref 0–8)
RELATIVE LYMPHOCYTE (SMH): 35.1 % (ref 18–48)
RELATIVE MONOCYTE (SMH): 10.4 % (ref 4–15)
RELATIVE NEUTROPHIL (SMH): 53.9 % (ref 38–73)
RH BLD: NORMAL
SODIUM SERPL-SCNC: 143 MMOL/L (ref 136–145)
SPECIMEN OUTDATE: NORMAL
WBC # BLD AUTO: 3.76 K/UL (ref 3.9–12.7)

## 2025-04-17 PROCEDURE — 85025 COMPLETE CBC W/AUTO DIFF WBC: CPT | Performed by: ORTHOPAEDIC SURGERY

## 2025-04-17 PROCEDURE — 73700 CT LOWER EXTREMITY W/O DYE: CPT | Mod: TC,RT

## 2025-04-17 PROCEDURE — 80053 COMPREHEN METABOLIC PANEL: CPT | Performed by: ORTHOPAEDIC SURGERY

## 2025-04-17 PROCEDURE — 93005 ELECTROCARDIOGRAM TRACING: CPT

## 2025-04-17 PROCEDURE — 73700 CT LOWER EXTREMITY W/O DYE: CPT | Mod: 26,RT,, | Performed by: RADIOLOGY

## 2025-04-17 PROCEDURE — 36415 COLL VENOUS BLD VENIPUNCTURE: CPT | Performed by: ORTHOPAEDIC SURGERY

## 2025-04-17 PROCEDURE — 86900 BLOOD TYPING SEROLOGIC ABO: CPT | Performed by: ORTHOPAEDIC SURGERY

## 2025-04-17 PROCEDURE — 93010 ELECTROCARDIOGRAM REPORT: CPT | Mod: ,,, | Performed by: GENERAL PRACTICE

## 2025-04-17 NOTE — DISCHARGE INSTRUCTIONS
To confirm, Your doctor has instructed you that surgery is scheduled for: 5/1/25 WITH DR. SOLORZANO    Please report to [facility], Registration the morning of surgery. You must check-in and receive a wristband before going to your procedure.  [address] [place] [address] [place] [address]    Pre-Op will call the afternoon prior to surgery between 1:00 and 6:00 PM with the final arrival time.  Phone number: [phone]    PLEASE NOTE:  The surgery schedule has many variables which may affect the time of your surgery case.  Family members should be available if your surgery time changes.  Plan to be here the day of your procedure between 4-6 hours.    MEDICATIONS:  TAKE ONLY THESE MEDICATIONS WITH A SMALL SIP OF WATER THE MORNING OF YOUR PROCEDURE:  SEE MED LIST      DO NOT TAKE THESE MEDICATIONS 5-7 DAYS PRIOR to your procedure or per your surgeon's request:   ASPIRIN, ALEVE, ADVIL, IBUPROFEN, FISH OIL VITAMIN E, HERBALS  (May take Tylenol)    ONLY if you are prescribed any types of blood thinners such as:  Aspirin, Coumadin, Plavix, Pradaxa, Xarelto, Aggrenox, Effient, Eliquis, Savasya, Brilinta, or any other, ask your surgeon whether you should stop taking them and how long before surgery you should stop.  You may also need to verify with the prescribing physician if it is ok to stop your medication.      INSTRUCTIONS IMPORTANT!!  Do not eat or drink anything between midnight and the time of your procedure- this includes gum, mints, and candy.  EXCEPT: you may have clear liquids such as:  WATER, BLACK COFFEE, UNSWEET TEA, OR GATORADE (NO RED OR PURPLE) UP TO 2 HOURS PRIOR TO YOUR ARRIVAL TIME.  Do not smoke or drink alcoholic beverages 24 hours prior to your procedure.  Shower the night before AND the morning of your procedure with a Chlorhexidine wash such as Hibiclens or Dial antibacterial soap from the neck down.  Do not get it on your face or in your eyes.  You may use your own shampoo and face  wash. This helps your skin to be as bacteria free as possible.    If you wear contact lenses, dentures, hearing aids or glasses, bring a container to put them in during surgery and give to a family member for safe keeping.  Please leave all jewelry, piercing's and valuables at home. You must remove your false eyelashes prior to surgery.    DO NOT remove hair from the surgery site.  Do not shave the incision site unless you are given specific instructions to do so.    ONLY if you have been diagnosed with sleep apnea please bring your C-PAP machine.  ONLY if you wear home oxygen please bring your portable oxygen tank the day of your procedure.  ONLY if you have a history of OPEN HEART SURGERY you will need a clearance from your Cardiologist per Anesthesia.      ONLY for patients requiring bowel prep, written instructions will be given by your doctor's office.  ONLY if you have a neuro stimulator, please bring the controller with you the morning of surgery  ONLY if a type and screen test is needed before surgery, please return:  If your doctor has scheduled you for an overnight stay, bring a small overnight bag with any personal items you need.  Make arrangements in advance for transportation home by a responsible adult. You can not go home in an uber or a cab per hospital policy.  It is not safe to drive a vehicle during the 24 hours after anesthesia.          All  facilities and properties are tobacco free.  Smoking is NOT allowed.   If you have any questions about these instructions, call Pre-Op Admit  Nursing at [phone] or the Pre-Op Day Surgery Unit at [phone].

## 2025-04-21 ENCOUNTER — TELEPHONE (OUTPATIENT)
Dept: ORTHOPEDICS | Facility: CLINIC | Age: 70
End: 2025-04-21
Payer: MEDICARE

## 2025-04-21 DIAGNOSIS — M17.11 PRIMARY OSTEOARTHRITIS OF RIGHT KNEE: Primary | ICD-10-CM

## 2025-04-21 RX ORDER — TRAMADOL HYDROCHLORIDE 50 MG/1
50 TABLET ORAL EVERY 8 HOURS PRN
Qty: 21 EACH | Refills: 0 | Status: SHIPPED | OUTPATIENT
Start: 2025-04-21 | End: 2025-04-28

## 2025-04-21 NOTE — TELEPHONE ENCOUNTER
Electronically prescribed a prescription for Tramadol with instructions to take one tablet by mouth every 8 hours as needed for pain.  I prescribed quantity 21 for a one week supply.

## 2025-04-23 ENCOUNTER — TELEPHONE (OUTPATIENT)
Dept: ORTHOPEDICS | Facility: CLINIC | Age: 70
End: 2025-04-23
Payer: MEDICARE

## 2025-04-23 NOTE — TELEPHONE ENCOUNTER
Patient called in to follow up, Been taking Celebrex for years. She will take what she has an get refills as needed.

## 2025-04-23 NOTE — TELEPHONE ENCOUNTER
----- Message from Med Assistant Mccullough sent at 4/23/2025 11:52 AM CDT -----  Regarding: Pain meds  Right knee replacement cancelled due to Martinez being out, states she is  unable to walk on her knee, Tramadol isnt helping, wants something stronger. [facility] on [facility]

## 2025-04-24 ENCOUNTER — TELEPHONE (OUTPATIENT)
Dept: ORTHOPEDICS | Facility: CLINIC | Age: 70
End: 2025-04-24
Payer: MEDICARE

## 2025-04-24 DIAGNOSIS — J45.51 SEVERE PERSISTENT ASTHMA WITH ACUTE EXACERBATION: ICD-10-CM

## 2025-04-24 NOTE — TELEPHONE ENCOUNTER
----- Message from Mariano sent at 4/24/2025 11:11 AM CDT -----  Pt is requesting a call back concerning surgery next week with Dr. Martinez.

## 2025-04-25 RX ORDER — BENRALIZUMAB 30 MG/ML
30 INJECTION, SOLUTION SUBCUTANEOUS
Qty: 1 ML | Refills: 5 | Status: ACTIVE | OUTPATIENT
Start: 2025-04-25

## 2025-04-28 DIAGNOSIS — M17.11 PRIMARY OSTEOARTHRITIS OF RIGHT KNEE: ICD-10-CM

## 2025-04-28 RX ORDER — TRAMADOL HYDROCHLORIDE 50 MG/1
50 TABLET ORAL EVERY 8 HOURS PRN
Qty: 21 EACH | Refills: 0 | Status: SHIPPED | OUTPATIENT
Start: 2025-04-28 | End: 2025-05-05

## 2025-04-28 NOTE — TELEPHONE ENCOUNTER
Requesting refill of tramadol. Was scheduled fro [facility] this week, but cancelled due to Dr Martinez, being out.     Also requesting if the tramadol could be changed from q8 to q6 due to her pain.  Rx pended is for q8. Not sure if you are changing frequency or not.

## 2025-05-05 DIAGNOSIS — J44.9 CHRONIC OBSTRUCTIVE PULMONARY DISEASE, UNSPECIFIED COPD TYPE: ICD-10-CM

## 2025-05-05 RX ORDER — ALBUTEROL SULFATE 90 UG/1
2 INHALANT RESPIRATORY (INHALATION) EVERY 4 HOURS PRN
Qty: 54 G | Refills: 3 | Status: SHIPPED | OUTPATIENT
Start: 2025-05-05

## 2025-05-12 DIAGNOSIS — I10 ESSENTIAL HYPERTENSION: ICD-10-CM

## 2025-05-12 RX ORDER — AMLODIPINE BESYLATE 5 MG/1
TABLET ORAL
Qty: 90 TABLET | Refills: 3 | Status: SHIPPED | OUTPATIENT
Start: 2025-05-12

## 2025-05-12 NOTE — TELEPHONE ENCOUNTER
No care due was identified.  [facility] Embedded Care Due Messages. Reference number: [id number].   5/12/2025 2:18:47 AM CDT

## 2025-05-13 ENCOUNTER — HOSPITAL ENCOUNTER (OUTPATIENT)
Dept: RADIOLOGY | Facility: HOSPITAL | Age: 70
Discharge: HOME | End: 2025-05-13
Attending: PHYSICIAN ASSISTANT
Payer: MEDICARE

## 2025-05-13 DIAGNOSIS — K74.60 HEPATIC CIRRHOSIS, UNSPECIFIED HEPATIC CIRRHOSIS TYPE, UNSPECIFIED WHETHER ASCITES PRESENT: ICD-10-CM

## 2025-05-13 DIAGNOSIS — M17.11 PRIMARY OSTEOARTHRITIS OF RIGHT KNEE: Primary | ICD-10-CM

## 2025-05-13 PROCEDURE — 76705 ECHO EXAM OF ABDOMEN: CPT | Mod: TC

## 2025-05-13 PROCEDURE — 76705 ECHO EXAM OF ABDOMEN: CPT | Mod: 26,,, | Performed by: RADIOLOGY

## 2025-05-19 ENCOUNTER — HOSPITAL ENCOUNTER (OUTPATIENT)
Dept: PREADMISSION TESTING | Facility: HOSPITAL | Age: 70
Discharge: HOME | End: 2025-05-19
Attending: ORTHOPAEDIC SURGERY
Payer: MEDICARE

## 2025-05-19 DIAGNOSIS — M17.11 PRIMARY OSTEOARTHRITIS OF RIGHT KNEE: Primary | ICD-10-CM

## 2025-05-19 DIAGNOSIS — Z01.818 PREOP TESTING: Primary | ICD-10-CM

## 2025-05-19 RX ORDER — OXYCODONE AND ACETAMINOPHEN 10; 325 MG/1; MG/1
1 TABLET ORAL EVERY 4 HOURS PRN
Qty: 42 TABLET | Refills: 0 | Status: SHIPPED | OUTPATIENT
Start: 2025-05-19

## 2025-05-19 RX ORDER — CELECOXIB 200 MG/1
200 CAPSULE ORAL 2 TIMES DAILY
Qty: 60 CAPSULE | Refills: 0 | Status: SHIPPED | OUTPATIENT
Start: 2025-05-19 | End: 2025-06-18

## 2025-05-19 RX ORDER — DOCUSATE SODIUM 100 MG/1
100 CAPSULE, LIQUID FILLED ORAL 2 TIMES DAILY
Qty: 60 CAPSULE | Refills: 0 | Status: SHIPPED | OUTPATIENT
Start: 2025-05-19 | End: 2025-06-20

## 2025-05-19 RX ORDER — ONDANSETRON 4 MG/1
4 TABLET, FILM COATED ORAL EVERY 6 HOURS PRN
Qty: 10 TABLET | Refills: 0 | Status: SHIPPED | OUTPATIENT
Start: 2025-05-19

## 2025-05-19 RX ORDER — ASPIRIN 81 MG/1
81 TABLET ORAL EVERY 12 HOURS
Qty: 60 TABLET | Refills: 0 | Status: SHIPPED | OUTPATIENT
Start: 2025-05-19 | End: 2025-06-20

## 2025-05-19 NOTE — OR NURSING
PAT refresher done via phone. Original preadmit done 4/17, as [facility] preadmit. Sent to [facility], as well. Verbalized understanding.

## 2025-05-20 ENCOUNTER — LAB VISIT (OUTPATIENT)
Dept: LAB | Facility: HOSPITAL | Age: 70
End: 2025-05-20
Attending: ORTHOPAEDIC SURGERY
Payer: MEDICARE

## 2025-05-20 ENCOUNTER — OFFICE VISIT (OUTPATIENT)
Dept: HEPATOLOGY | Facility: CLINIC | Age: 70
End: 2025-05-20
Payer: MEDICARE

## 2025-05-20 VITALS — WEIGHT: 184 LBS | BODY MASS INDEX: 31.41 KG/M2 | HEIGHT: 64 IN

## 2025-05-20 DIAGNOSIS — K74.60 HEPATIC CIRRHOSIS, UNSPECIFIED HEPATIC CIRRHOSIS TYPE, UNSPECIFIED WHETHER ASCITES PRESENT: Primary | ICD-10-CM

## 2025-05-20 DIAGNOSIS — F10.11 ALCOHOL ABUSE, IN REMISSION: ICD-10-CM

## 2025-05-20 DIAGNOSIS — Z86.19 HEPATITIS C VIRUS INFECTION CURED AFTER ANTIVIRAL DRUG THERAPY: ICD-10-CM

## 2025-05-20 DIAGNOSIS — Z01.818 PREOP TESTING: ICD-10-CM

## 2025-05-20 DIAGNOSIS — R41.3 MEMORY DEFICIT: ICD-10-CM

## 2025-05-20 LAB — MRSA PCR SCRN (SMH): NOT DETECTED

## 2025-05-20 PROCEDURE — 99999 PR PBB SHADOW E&M-EST. PATIENT-LVL IV: CPT | Mod: PBBFAC,HCNC,, | Performed by: PHYSICIAN ASSISTANT

## 2025-05-20 PROCEDURE — 87641 MR-STAPH DNA AMP PROBE: CPT

## 2025-05-20 NOTE — Clinical Note
Aruna Martinez - I saw this pt today for f/u regarding her cirrhosis. She mentioned her upcoming knee replacement so I am including in my note surgical risk scores. Thanks, Jen Scheuermann PA-C

## 2025-05-20 NOTE — PROGRESS NOTES
"HEPATOLOGY VISIT NOTE - HCV clinic  CHIEF COMPLAINT: Hepatitis C, Cirrhosis   (Here w/ friend)    HISTORY       This is a 69 y.o. White female w/ HCV cirrhosis and episode of acute liver failure 5/2024 (MELD 17) due to alcohol (had stopped 1 month prior; peth while inpt 14) and recent augmentin / azithromycin use. At initial visit reported "No plans to drink again" b/c "wants to live." Being seen for routine f/u    HCV history:  Recent diagnosis  Risks for HCV: tattoo decades ago. Blood transfusion ~ 20 yrs ago  - Prior Treatment: No  - Genotype ?    Liver staging:  No formal liver staging  Cirrhosis based on labs / imaging    Cirrhosis history:  Recent decompensation / acute liver failure  No portal HTN  Varices: no  Variceal bleed: n/a  Variceal banding: n/a    Cirrhosis health maintenance:  - Varices screening:  EGD 9/2024: No EV.   - HAV status: Lacking immunity  - HBV status: Unknown    Interval history (1/21/25):  U/S 11/2024: no masses or ascites  Labs 11/2024: stable. AFP normal. MELD 10  No s/s liver decompensation  Still on epclusa  - (+) adherence issues noted: anticipated EOT date 1/12 but 6 pills remaining still   - SVR labs scheduled in April -> will need to push back    Interval history (5/20/25):  U/S 5/2025: no masses or ascites  Labs 5/2025: stable. AFP normal.  HCV neg, confirms cure (SVR)    MELD 3.0: 8 at 5/13/2025  8:45 AM  MELD-Na: 7 at 5/13/2025  8:45 AM  Calculated from:  Serum Creatinine: 0.9 mg/dL (Using min of 1 mg/dL) at 5/13/2025  8:45 AM  Serum Sodium: 141 mmol/L (Using max of 137 mmol/L) at 5/13/2025  8:45 AM  Total Bilirubin: 0.5 mg/dL (Using min of 1 mg/dL) at 5/13/2025  8:45 AM  Serum Albumin: 3.8 g/dL (Using max of 3.5 g/dL) at 5/13/2025  8:45 AM  INR(ratio): 1.1 at 5/13/2025  8:45 AM  Age at listing (hypothetical): 69 years  Sex: Female at 5/13/2025  8:45 AM    Current liver sx:  HE? Decreased memory. Occasional confusion in conversations.  Meds: prescribed lactulose in past but " unclear if this helped or not. Will represcribe  No ascites, EV bleed, jaundice      Upcoming knee replacement surgery    PMH, PSH, SOCIAL HX, FAMILY HX      Reviewed in Epic  Pertinent findings:  FAMILY HX: neg for liver diease  SOCIAL HX: Resides in [place]. . 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 Quit during periods in past. Alcohol intake increased  after  & daughter . Quit 2024  Drugs - no  Spiritual - Yes, Bahai.    ROS: as per HPI    PHYSICAL EXAM:  Friendly White female, in no acute distress; alert and oriented to person, place and time  HEENT: Sclerae anicteric.   LUNGS: Normal respiratory effort.  ABDOMEN: Nondistended. Soft. Nontender.   EXTREMITIES: No edema.   SKIN: No jaundice or spider telangectasias. No rashes on exposed skin  NEURO/PSYCH: ambulating w/ cane. Memory intact. Thought and speech patterns appropriate. No obvious depression or anxiety.       PERTINENT DIAGNOSTIC RESULTS      Lab Results   Component Value Date    WBC 3.26 (L) 2025    HGB 12.8 2025     2025     Lab Results   Component Value Date    INR 1.1 2025     Lab Results   Component Value Date    AST 25 2025    ALT 11 2025    BILITOT 0.5 2025    ALBUMIN 3.8 2025    ALKPHOS 111 2025    CREATININE 0.9 2025    BUN 13 2025     2025    K 3.8 2025      Latest Reference Range & Units 25 08:45   AFP-Tumor Marker ng/mL 3.8       Results for orders placed during the hospital encounter of 25  US Abdomen Limited  CLINICAL HISTORY:Unspecified cirrhosis of liver  TECHNIQUE:Limited ultrasound of the right upper quadrant of the abdomen (including pancreas, liver, gallbladder, common bile duct, and spleen) was performed.  COMPARISON:2024    FINDINGS:  Liver: Enlarged measuring 18 cm. There is a coarse echotexture throughout the hepatic parenchyma.  No focal  hepatic lesions.  Gallbladder: No calculi, wall thickening, or pericholecystic fluid.  No sonographic Belle's sign.  Biliary system: The common duct is not dilated, measuring 5 mm.  No intrahepatic ductal dilatation.  Spleen: Normal in size and echotexture, measuring 10.5 cm.  Miscellaneous: No upper abdominal ascites.    Impression  There is hepatomegaly and the hepatic parenchyma shows a coarse echotexture.  These findings may be seen with cirrhosis.  No focal hepatic lesions are visualized.      ASSESSMENT        69 y.o. White female with:  1. History of HCV: treated / cured  -- s/p 24 wks epclusa: SVR confirmed 5/2025  -- HAV status - Lacking immunity, vaccine underway  -- HBV status - Prior resolved HBV    2. Cirrhosis: prior decomp / MCC, now stable biochemically but c/f mild HE  -- 5/2024 decompensation / acute liver failure (still drinking, pre HCV cure)  -- ??HE  -- MELD score: 8  -- HCC screening: up to date 5/2025  -- EGD 9/2024: no EV    3. Alcohol use disorder: remission      PLAN        Lactulose trial. Dose titration discussed w/ pt.  - if no improvement, consider neurology referral  CBC, CMP, INR, AFP, U/S, VISIT every 6 months: due 11/2025  HAV vaccine dose #2: discuss next visit  FibroScan ~9/2026 to assess risk for EV and need for surveillance EGD vs BBlockade: due 9/2026-9/2027  Remain off alcohol    Regarding upcoming knee replacement, surgical risk scores using VOCAL-Prince score are as follows. (Including data for both well compensated and decompensated disease)   Predicted Postoperative Outcomes w/ ASA designation of 3 (well compensated cirrhosis)  30-day mortality: 0.5%      90-day mortality: 2.4%      180-day mortality: 3.2%      90-day decompensation: 2.4%    Predicted Postoperative Outcomes w/ ASA designation of 4 (decompensated cirrhosis)   30-day mortality: 1.4%      90-day mortality: 5.0%      180-day mortality: 7.0%      90-day decompensation:  3.2%    __________________________________________________________________    Duration of encounter: 53 min  This includes face-to-face time and non face-to-face time preparing to see the patient (eg, review of tests), obtaining and/or reviewing separately obtained history, documenting clinical information in the electronic or other health record, independently interpreting resultsand communicating results to the patient/family/caregiver, or care coordination.

## 2025-05-21 ENCOUNTER — ANESTHESIA EVENT (OUTPATIENT)
Dept: SURGERY | Facility: HOSPITAL | Age: 70
End: 2025-05-21
Payer: MEDICARE

## 2025-05-22 ENCOUNTER — ANESTHESIA (OUTPATIENT)
Dept: SURGERY | Facility: HOSPITAL | Age: 70
End: 2025-05-22
Payer: MEDICARE

## 2025-05-22 ENCOUNTER — HOSPITAL ENCOUNTER (OUTPATIENT)
Facility: HOSPITAL | Age: 70
Discharge: HOME | End: 2025-05-22
Attending: ORTHOPAEDIC SURGERY | Admitting: ORTHOPAEDIC SURGERY
Payer: MEDICARE

## 2025-05-22 VITALS
WEIGHT: 183 LBS | TEMPERATURE: 98 F | HEIGHT: 64 IN | HEART RATE: 70 BPM | SYSTOLIC BLOOD PRESSURE: 159 MMHG | DIASTOLIC BLOOD PRESSURE: 80 MMHG | BODY MASS INDEX: 31.24 KG/M2 | OXYGEN SATURATION: 99 % | RESPIRATION RATE: 16 BRPM

## 2025-05-22 DIAGNOSIS — M47.896 OTHER SPONDYLOSIS, LUMBAR REGION: ICD-10-CM

## 2025-05-22 DIAGNOSIS — M51.369 DDD (DEGENERATIVE DISC DISEASE), LUMBAR: ICD-10-CM

## 2025-05-22 DIAGNOSIS — Z01.818 PREOP TESTING: Primary | ICD-10-CM

## 2025-05-22 DIAGNOSIS — M17.11 PRIMARY OSTEOARTHRITIS OF RIGHT KNEE: ICD-10-CM

## 2025-05-22 DIAGNOSIS — Z01.818 PRE-OP TESTING: ICD-10-CM

## 2025-05-22 PROCEDURE — 63600175 PHARM REV CODE 636 W HCPCS: Performed by: ANESTHESIOLOGY

## 2025-05-22 PROCEDURE — 63600175 PHARM REV CODE 636 W HCPCS: Mod: TB,JZ | Performed by: ORTHOPAEDIC SURGERY

## 2025-05-22 PROCEDURE — 63600175 PHARM REV CODE 636 W HCPCS: Mod: JZ,TB | Performed by: ANESTHESIOLOGY

## 2025-05-22 PROCEDURE — C1713 ANCHOR/SCREW BN/BN,TIS/BN: HCPCS | Performed by: ORTHOPAEDIC SURGERY

## 2025-05-22 PROCEDURE — 25000003 PHARM REV CODE 250: Performed by: ANESTHESIOLOGY

## 2025-05-22 PROCEDURE — 37000008 HC ANESTHESIA 1ST 15 MINUTES: Performed by: ORTHOPAEDIC SURGERY

## 2025-05-22 PROCEDURE — 97110 THERAPEUTIC EXERCISES: CPT

## 2025-05-22 PROCEDURE — 27200651 HC AIRWAY, LMA: Performed by: ANESTHESIOLOGY

## 2025-05-22 PROCEDURE — 37000009 HC ANESTHESIA EA ADD 15 MINS: Performed by: ORTHOPAEDIC SURGERY

## 2025-05-22 PROCEDURE — 71000015 HC POSTOP RECOV 1ST HR: Performed by: ORTHOPAEDIC SURGERY

## 2025-05-22 PROCEDURE — 25000003 PHARM REV CODE 250: Performed by: ORTHOPAEDIC SURGERY

## 2025-05-22 PROCEDURE — 63600175 PHARM REV CODE 636 W HCPCS

## 2025-05-22 PROCEDURE — 97161 PT EVAL LOW COMPLEX 20 MIN: CPT

## 2025-05-22 PROCEDURE — C1769 GUIDE WIRE: HCPCS | Performed by: ORTHOPAEDIC SURGERY

## 2025-05-22 PROCEDURE — C1776 JOINT DEVICE (IMPLANTABLE): HCPCS | Performed by: ORTHOPAEDIC SURGERY

## 2025-05-22 PROCEDURE — 27447 TOTAL KNEE ARTHROPLASTY: CPT | Mod: RT,,, | Performed by: ORTHOPAEDIC SURGERY

## 2025-05-22 PROCEDURE — 94799 UNLISTED PULMONARY SVC/PX: CPT

## 2025-05-22 PROCEDURE — 0055T BONE SRGRY CMPTR CT/MRI IMAG: CPT | Mod: ,,, | Performed by: ORTHOPAEDIC SURGERY

## 2025-05-22 PROCEDURE — 36000712 HC OR TIME LEV V 1ST 15 MIN: Performed by: ORTHOPAEDIC SURGERY

## 2025-05-22 PROCEDURE — 27201423 OPTIME MED/SURG SUP & DEVICES STERILE SUPPLY: Performed by: ORTHOPAEDIC SURGERY

## 2025-05-22 PROCEDURE — 27200688 HC TRAY, SPINAL-HYPER/ ISOBARIC: Performed by: ANESTHESIOLOGY

## 2025-05-22 PROCEDURE — 25000003 PHARM REV CODE 250: Performed by: NURSE ANESTHETIST, CERTIFIED REGISTERED

## 2025-05-22 PROCEDURE — 64447 NJX AA&/STRD FEMORAL NRV IMG: CPT | Performed by: ANESTHESIOLOGY

## 2025-05-22 PROCEDURE — 71000039 HC RECOVERY, EACH ADD'L HOUR: Performed by: ORTHOPAEDIC SURGERY

## 2025-05-22 PROCEDURE — 36000713 HC OR TIME LEV V EA ADD 15 MIN: Performed by: ORTHOPAEDIC SURGERY

## 2025-05-22 PROCEDURE — 63600175 PHARM REV CODE 636 W HCPCS: Performed by: NURSE ANESTHETIST, CERTIFIED REGISTERED

## 2025-05-22 PROCEDURE — 63600175 PHARM REV CODE 636 W HCPCS: Performed by: ORTHOPAEDIC SURGERY

## 2025-05-22 PROCEDURE — 71000033 HC RECOVERY, INTIAL HOUR: Performed by: ORTHOPAEDIC SURGERY

## 2025-05-22 PROCEDURE — 97116 GAIT TRAINING THERAPY: CPT

## 2025-05-22 PROCEDURE — 27200750 HC INSULATED NEEDLE/ STIMUPLEX: Performed by: ANESTHESIOLOGY

## 2025-05-22 PROCEDURE — 71000016 HC POSTOP RECOV ADDL HR: Performed by: ORTHOPAEDIC SURGERY

## 2025-05-22 DEVICE — SIMPLEX® HV IS A FAST-SETTING ACRYLIC RESIN FOR USE IN BONE SURGERY. MIXING THE TWO SEPARATE STERILE COMPONENTS PRODUCES A DUCTILE BONE CEMENT WHICH, AFTER HARDENING, FIXES THE IMPLANT AND TRANSFERS STRESSES PRODUCED DURING MOVEMENT EVENLY TO THE BONE. SIMPLEX® HV CEMENT POWDER ALSO CONTAINS INSOLUBLE ZIRCONIUM DIOXIDE AS AN X-RAY CONTRAST MEDIUM. SIMPLEX® HV DOES NOT EMIT A SIGNAL AND DOES NOT POSE A SAFETY RISK IN A MAGNETIC RESONANCE ENVIRONMENT.
Type: IMPLANTABLE DEVICE | Site: KNEE | Status: FUNCTIONAL
Brand: SIMPLEX HV

## 2025-05-22 DEVICE — CRUCIATE RETAINING FEMORAL
Type: IMPLANTABLE DEVICE | Site: KNEE | Status: FUNCTIONAL
Brand: TRIATHLON

## 2025-05-22 DEVICE — TIBIAL BEARING INSERT - CS
Type: IMPLANTABLE DEVICE | Site: KNEE | Status: FUNCTIONAL
Brand: TRIATHLON

## 2025-05-22 DEVICE — SYMMETRIC PATELLA
Type: IMPLANTABLE DEVICE | Site: KNEE | Status: FUNCTIONAL
Brand: TRIATHLON

## 2025-05-22 DEVICE — PRIMARY TIBIAL BASEPLATE
Type: IMPLANTABLE DEVICE | Site: KNEE | Status: FUNCTIONAL
Brand: TRIATHLON

## 2025-05-22 RX ORDER — ONDANSETRON HYDROCHLORIDE 2 MG/ML
INJECTION, SOLUTION INTRAMUSCULAR; INTRAVENOUS
Status: DISCONTINUED | OUTPATIENT
Start: 2025-05-22 | End: 2025-05-22

## 2025-05-22 RX ORDER — MIDAZOLAM HYDROCHLORIDE 1 MG/ML
1-2 INJECTION, SOLUTION INTRAMUSCULAR; INTRAVENOUS ONCE
Status: DISCONTINUED | OUTPATIENT
Start: 2025-05-22 | End: 2025-05-22 | Stop reason: HOSPADM

## 2025-05-22 RX ORDER — CEFAZOLIN 2 G/1
2 INJECTION, POWDER, FOR SOLUTION INTRAMUSCULAR; INTRAVENOUS
Status: CANCELLED | OUTPATIENT
Start: 2025-05-22 | End: 2025-05-23

## 2025-05-22 RX ORDER — OXYCODONE HYDROCHLORIDE 5 MG/1
5 TABLET ORAL
Status: DISCONTINUED | OUTPATIENT
Start: 2025-05-22 | End: 2025-05-22 | Stop reason: HOSPADM

## 2025-05-22 RX ORDER — BUPIVACAINE HYDROCHLORIDE 5 MG/ML
INJECTION, SOLUTION EPIDURAL; INTRACAUDAL; PERINEURAL
Status: COMPLETED | OUTPATIENT
Start: 2025-05-22 | End: 2025-05-22

## 2025-05-22 RX ORDER — OXYCODONE HYDROCHLORIDE 10 MG/1
10 TABLET ORAL EVERY 4 HOURS PRN
Refills: 0 | Status: CANCELLED | OUTPATIENT
Start: 2025-05-22

## 2025-05-22 RX ORDER — SCOPOLAMINE 1 MG/3D
1 PATCH, EXTENDED RELEASE TRANSDERMAL ONCE
Status: DISCONTINUED | OUTPATIENT
Start: 2025-05-22 | End: 2025-05-22 | Stop reason: HOSPADM

## 2025-05-22 RX ORDER — PROPOFOL 10 MG/ML
VIAL (ML) INTRAVENOUS CONTINUOUS PRN
Status: DISCONTINUED | OUTPATIENT
Start: 2025-05-22 | End: 2025-05-22

## 2025-05-22 RX ORDER — ONDANSETRON 4 MG/1
8 TABLET, ORALLY DISINTEGRATING ORAL EVERY 8 HOURS PRN
Status: CANCELLED | OUTPATIENT
Start: 2025-05-22

## 2025-05-22 RX ORDER — SODIUM CHLORIDE 0.9 % (FLUSH) 0.9 %
3 SYRINGE (ML) INJECTION
Status: DISCONTINUED | OUTPATIENT
Start: 2025-05-22 | End: 2025-05-22 | Stop reason: HOSPADM

## 2025-05-22 RX ORDER — OXYCODONE HYDROCHLORIDE 5 MG/1
5 TABLET ORAL ONCE
Status: COMPLETED | OUTPATIENT
Start: 2025-05-22 | End: 2025-05-22

## 2025-05-22 RX ORDER — BUPIVACAINE 13.3 MG/ML
INJECTION, SUSPENSION, LIPOSOMAL INFILTRATION
Status: COMPLETED | OUTPATIENT
Start: 2025-05-22 | End: 2025-05-22

## 2025-05-22 RX ORDER — PROPOFOL 10 MG/ML
VIAL (ML) INTRAVENOUS
Status: DISCONTINUED | OUTPATIENT
Start: 2025-05-22 | End: 2025-05-22

## 2025-05-22 RX ORDER — CEFAZOLIN 2 G/1
2 INJECTION, POWDER, FOR SOLUTION INTRAMUSCULAR; INTRAVENOUS
Status: COMPLETED | OUTPATIENT
Start: 2025-05-22 | End: 2025-05-22

## 2025-05-22 RX ORDER — FENTANYL CITRATE 50 UG/ML
25-200 INJECTION, SOLUTION INTRAMUSCULAR; INTRAVENOUS
Status: DISCONTINUED | OUTPATIENT
Start: 2025-05-22 | End: 2025-05-22 | Stop reason: HOSPADM

## 2025-05-22 RX ORDER — POLYETHYLENE GLYCOL 3350 17 G/17G
17 POWDER, FOR SOLUTION ORAL DAILY
Status: CANCELLED | OUTPATIENT
Start: 2025-05-22

## 2025-05-22 RX ORDER — OXYCODONE HCL 10 MG/1
10 TABLET, FILM COATED, EXTENDED RELEASE ORAL ONCE
Status: COMPLETED | OUTPATIENT
Start: 2025-05-22 | End: 2025-05-22

## 2025-05-22 RX ORDER — CELECOXIB 100 MG/1
200 CAPSULE ORAL 2 TIMES DAILY
Status: CANCELLED | OUTPATIENT
Start: 2025-05-22

## 2025-05-22 RX ORDER — FENTANYL CITRATE 50 UG/ML
25 INJECTION, SOLUTION INTRAMUSCULAR; INTRAVENOUS EVERY 5 MIN PRN
Refills: 0 | Status: DISCONTINUED | OUTPATIENT
Start: 2025-05-22 | End: 2025-05-22 | Stop reason: HOSPADM

## 2025-05-22 RX ORDER — FAMOTIDINE 20 MG/1
20 TABLET, FILM COATED ORAL 2 TIMES DAILY
Status: CANCELLED | OUTPATIENT
Start: 2025-05-22

## 2025-05-22 RX ORDER — MIDAZOLAM HYDROCHLORIDE 1 MG/ML
INJECTION INTRAMUSCULAR; INTRAVENOUS
Status: DISCONTINUED | OUTPATIENT
Start: 2025-05-22 | End: 2025-05-22

## 2025-05-22 RX ORDER — CELECOXIB 100 MG/1
400 CAPSULE ORAL ONCE
Status: COMPLETED | OUTPATIENT
Start: 2025-05-22 | End: 2025-05-22

## 2025-05-22 RX ORDER — FENTANYL CITRATE 50 UG/ML
25 INJECTION, SOLUTION INTRAMUSCULAR; INTRAVENOUS EVERY 5 MIN PRN
Status: COMPLETED | OUTPATIENT
Start: 2025-05-22 | End: 2025-05-22

## 2025-05-22 RX ORDER — GABAPENTIN 600 MG/1
TABLET ORAL
Qty: 90 TABLET | Refills: 11 | OUTPATIENT
Start: 2025-05-22

## 2025-05-22 RX ORDER — SODIUM CHLORIDE 0.9 % (FLUSH) 0.9 %
5 SYRINGE (ML) INJECTION
Status: DISCONTINUED | OUTPATIENT
Start: 2025-05-22 | End: 2025-05-22 | Stop reason: HOSPADM

## 2025-05-22 RX ORDER — LIDOCAINE HYDROCHLORIDE 10 MG/ML
1 INJECTION, SOLUTION EPIDURAL; INFILTRATION; INTRACAUDAL; PERINEURAL ONCE
Status: DISCONTINUED | OUTPATIENT
Start: 2025-05-22 | End: 2025-05-22 | Stop reason: HOSPADM

## 2025-05-22 RX ORDER — ZOLPIDEM TARTRATE 5 MG/1
5 TABLET ORAL NIGHTLY PRN
Status: CANCELLED | OUTPATIENT
Start: 2025-05-22

## 2025-05-22 RX ORDER — GABAPENTIN 600 MG/1
600 TABLET ORAL 3 TIMES DAILY
Qty: 90 TABLET | Refills: 11 | Status: SHIPPED | OUTPATIENT
Start: 2025-05-22

## 2025-05-22 RX ORDER — BUPIVACAINE HYDROCHLORIDE 7.5 MG/ML
INJECTION, SOLUTION EPIDURAL; RETROBULBAR
Status: DISCONTINUED | OUTPATIENT
Start: 2025-05-22 | End: 2025-05-22

## 2025-05-22 RX ORDER — TRANEXAMIC ACID 1 G/10ML
INJECTION, SOLUTION INTRAVENOUS
Status: DISCONTINUED | OUTPATIENT
Start: 2025-05-22 | End: 2025-05-22

## 2025-05-22 RX ORDER — ACETAMINOPHEN 500 MG
1000 TABLET ORAL
Status: DISCONTINUED | OUTPATIENT
Start: 2025-05-22 | End: 2025-05-22 | Stop reason: HOSPADM

## 2025-05-22 RX ORDER — ONDANSETRON HYDROCHLORIDE 2 MG/ML
4 INJECTION, SOLUTION INTRAVENOUS DAILY PRN
Status: DISCONTINUED | OUTPATIENT
Start: 2025-05-22 | End: 2025-05-22 | Stop reason: HOSPADM

## 2025-05-22 RX ORDER — BISACODYL 10 MG/1
10 SUPPOSITORY RECTAL DAILY
Status: CANCELLED | OUTPATIENT
Start: 2025-05-22

## 2025-05-22 RX ADMIN — SODIUM CHLORIDE, SODIUM GLUCONATE, SODIUM ACETATE, POTASSIUM CHLORIDE AND MAGNESIUM CHLORIDE: 526; 502; 368; 37; 30 INJECTION, SOLUTION INTRAVENOUS at 07:05

## 2025-05-22 RX ADMIN — FENTANYL CITRATE 25 MCG: 50 INJECTION INTRAMUSCULAR; INTRAVENOUS at 12:05

## 2025-05-22 RX ADMIN — SCOPOLAMINE 1 PATCH: 1.5 PATCH, EXTENDED RELEASE TRANSDERMAL at 08:05

## 2025-05-22 RX ADMIN — FENTANYL CITRATE 25 MCG: 50 INJECTION INTRAMUSCULAR; INTRAVENOUS at 01:05

## 2025-05-22 RX ADMIN — MIDAZOLAM HYDROCHLORIDE 2 MG: 1 INJECTION, SOLUTION INTRAMUSCULAR; INTRAVENOUS at 09:05

## 2025-05-22 RX ADMIN — OXYCODONE HYDROCHLORIDE 10 MG: 10 TABLET, FILM COATED, EXTENDED RELEASE ORAL at 07:05

## 2025-05-22 RX ADMIN — SODIUM CHLORIDE, SODIUM GLUCONATE, SODIUM ACETATE, POTASSIUM CHLORIDE AND MAGNESIUM CHLORIDE: 526; 502; 368; 37; 30 INJECTION, SOLUTION INTRAVENOUS at 10:05

## 2025-05-22 RX ADMIN — KETOROLAC TROMETHAMINE: 30 INJECTION, SOLUTION INTRAMUSCULAR; INTRAVENOUS at 10:05

## 2025-05-22 RX ADMIN — TRANEXAMIC ACID 800 MG: 100 INJECTION, SOLUTION INTRAVENOUS at 12:05

## 2025-05-22 RX ADMIN — FENTANYL CITRATE 50 MCG: 50 INJECTION, SOLUTION INTRAMUSCULAR; INTRAVENOUS at 10:05

## 2025-05-22 RX ADMIN — PROPOFOL 50 MCG/KG/MIN: 10 INJECTION, EMULSION INTRAVENOUS at 10:05

## 2025-05-22 RX ADMIN — SODIUM CHLORIDE, SODIUM GLUCONATE, SODIUM ACETATE, POTASSIUM CHLORIDE AND MAGNESIUM CHLORIDE: 526; 502; 368; 37; 30 INJECTION, SOLUTION INTRAVENOUS at 12:05

## 2025-05-22 RX ADMIN — ONDANSETRON 4 MG: 2 INJECTION INTRAMUSCULAR; INTRAVENOUS at 10:05

## 2025-05-22 RX ADMIN — TRANEXAMIC ACID 800 MG: 100 INJECTION, SOLUTION INTRAVENOUS at 10:05

## 2025-05-22 RX ADMIN — GLYCOPYRROLATE 0.2 MG: 0.2 INJECTION, SOLUTION INTRAMUSCULAR; INTRAVITREAL at 10:05

## 2025-05-22 RX ADMIN — PROPOFOL 100 MG: 10 INJECTION, EMULSION INTRAVENOUS at 11:05

## 2025-05-22 RX ADMIN — CELECOXIB 400 MG: 100 CAPSULE ORAL at 07:05

## 2025-05-22 RX ADMIN — CEFAZOLIN 2 G: 2 INJECTION, POWDER, FOR SOLUTION INTRAMUSCULAR; INTRAVENOUS at 10:05

## 2025-05-22 RX ADMIN — BUPIVACAINE 20 ML: 13.3 INJECTION, SUSPENSION, LIPOSOMAL INFILTRATION at 09:05

## 2025-05-22 RX ADMIN — FENTANYL CITRATE 50 MCG: 50 INJECTION, SOLUTION INTRAMUSCULAR; INTRAVENOUS at 09:05

## 2025-05-22 RX ADMIN — BUPIVACAINE HYDROCHLORIDE 10 ML: 5 INJECTION, SOLUTION EPIDURAL; INTRACAUDAL; PERINEURAL at 09:05

## 2025-05-22 RX ADMIN — BUPIVACAINE HYDROCHLORIDE 1.4 ML: 7.5 INJECTION, SOLUTION EPIDURAL; RETROBULBAR at 10:05

## 2025-05-22 RX ADMIN — OXYCODONE 5 MG: 5 TABLET ORAL at 01:05

## 2025-05-22 NOTE — PLAN OF CARE
Pt prepped for surgery. Consents at bedside. Incentive spirometry taught by respiratory. Pt belongings placed in postop cabinet including rolling walker. Friend, Camille set up with text alerts.

## 2025-05-22 NOTE — PT/OT/SLP EVAL
Physical Therapy Evaluation and Discharge Note    Patient Name:  Beatriz Gan   MRN:  [id number]    Recommendations:     Discharge Recommendations: Low Intensity Therapy  Discharge Equipment Recommendations: walker, rolling   Barriers to discharge: None    Assessment:     Beatriz Gan is a 69 y.o. female admitted with a medical diagnosis of right  TKA .  At this time, patient is scheduled for discharge home today and appears will be safe with mobility in the home.  Patient would benefit though from continued PT with HHPT to work on ROM and strengthening to further increase safety with mobility.  Patient would also benefit from a RW for home mobility.  The mobility limitation cannot be sufficiently resolved by the use of a cane. The patient's functional mobility deficit can be sufficiently resolved with the use of a rolling walker. The patient's mobility limitation significantly impairs their ability to participate in one or more activities of daily living. The use of a rolling walker will significantly improve the patient's ability to participate in mobility-related activities of daily living and the patient will use it on a regular basis in the home.      Recent Surgery: Procedure(s) (LRB):  ROBOTIC ARTHROPLASTY, KNEE, TOTAL (Right) * Day of Surgery *    Plan:     During this hospitalization, patient does not require further acute PT services.  Please re-consult if situation changes.      Subjective     Chief Complaint: pain and weakness  Patient/Family Comments/goals: get stronger  Pain/Comfort:  Pain Rating 1: 3/10  Location - Side 1: Right  Location - Orientation 1: lower  Location 1: knee  Pain Addressed 1: Reposition, Cessation of Activity  Pain Rating Post-Intervention 1: 6/10    Patients cultural, spiritual, Nondenominational conflicts given the current situation:      Living Environment:  Currently lives with daughter in 2 story home with bedroom downstairs and with a 1 step entry.  Prior to  "admission, patients level of function was independent.  Equipment used at home: cane, straight.  DME owned (not currently used): none.  Upon discharge, patient will have assistance from family.    Objective:     Communicated with nurse prior to session.  Patient found supine with cryotherapy upon PT entry to room.    General Precautions: Standard, fall    Orthopedic Precautions:RLE weight bearing as tolerated   Braces:    Respiratory Status: Room air    Exams:  RLE ROM: Deficits: knee extension -5 degrees, flexion 90 degrees both taken in sitting  RLE Strength: Deficits: 3-/5 overall  LLE ROM: WFL  LLE Strength: Deficits: 4/5 overall    Functional Mobility:  Bed Mobility:     Supine to Sit: stand by assistance  Transfers:     Sit to Stand:  contact guard assistance with rolling walker  Gait: x 150 feet rw min assist due to slight right LE buckling  Stairs:  Pt ascended/descended 4" curb step with Rolling Walker with no handrails with Contact Guard Assistance.     AM-PAC 6 CLICK MOBILITY  Total Score:20       Treatment and Education:  Exercise to include ankle pumps, quad sets, heel slides, hip abd and LAQ.  All done right LE x 10 reps  Gait training x 150 feet rw min, x 150 feet, x 200 feet rw cga, up/down 4 inch step rw cga    AM-PAC 6 CLICK MOBILITY  Total Score:20     Patient left up in chair with call button in reach, nurse notified, and family present.    GOALS:   Multidisciplinary Problems       Physical Therapy Goals       Not on file                    DME Justifications:  No DME recommended requiring DME justifications    History:     Past Medical History:   Diagnosis Date    Alcohol dependence     Asthma     Cirrhosis of liver     DDD (degenerative disc disease), lumbosacral     DJD (degenerative joint disease), lumbosacral     Encounter for blood transfusion     GERD (gastroesophageal reflux disease)     Gout     HCV: treated / cured (SVR 5/2025)     Hypercholesterolemia     Hypertension     NATHALIE (iron " deficiency anemia)     questionable white coat hypertension    Kidney carcinoma, left 2014    Pneumonia     Renal cell carcinoma     Seizures     Shingles 10/13/2012       Past Surgical History:   Procedure Laterality Date    ANTERIOR CERVICAL DISCECTOMY W/ FUSION N/A 5/4/2023    Procedure: DISCECTOMY, SPINE, CERVICAL, ANTERIOR APPROACH, WITH FUSION C3-4;  Surgeon: Ruben Martinez MD;  Location: [facility] OR;  Service: Orthopedics;  Laterality: N/A;    APPENDECTOMY      ARTHROSCOPY OF SHOULDER WITH DECOMPRESSION OF SUBACROMIAL SPACE Left 10/31/2019    Procedure: ARTHROSCOPY, SHOULDER, WITH SUBACROMIAL SPACE DECOMPRESSION;  Surgeon: Ruben Martinez MD;  Location: [facility] OR;  Service: Orthopedics;  Laterality: Left;    BLADDER REPAIR      x 2    COLONOSCOPY  9/2011    COLONOSCOPY N/A 9/18/2024    Procedure: COLONOSCOPY;  Surgeon: Ceasar Calero MD;  Location: [facility];  Service: Endoscopy;  Laterality: N/A;    DISTAL CLAVICLE EXCISION Left 10/31/2019    Procedure: EXCISION, CLAVICLE, DISTAL;  Surgeon: Ruben Martinez MD;  Location: [facility] OR;  Service: Orthopedics;  Laterality: Left;    EPIDURAL STEROID INJECTION INTO LUMBAR SPINE N/A 12/7/2020    Procedure: Injection-steroid-epidural-lumbar;  Surgeon: Dk Rosales MD;  Location: [facility] OR;  Service: Pain Management;  Laterality: N/A;  L4-L5    EPIDURAL STEROID INJECTION INTO LUMBAR SPINE N/A 5/17/2021    Procedure: Injection-steroid-epidural-lumbar;  Surgeon: Dk Rosales MD;  Location: [facility] OR;  Service: Pain Management;  Laterality: N/A;  L4-L5    ESOPHAGOGASTRODUODENOSCOPY  9/2011    ESOPHAGOGASTRODUODENOSCOPY N/A 9/18/2024    Procedure: EGD (ESOPHAGOGASTRODUODENOSCOPY);  Surgeon: Ceasar Calero MD;  Location: [facility];  Service: Endoscopy;  Laterality: N/A;    EYE SURGERY      lasix bilateral    FIXATION KYPHOPLASTY N/A 1/31/2020    Procedure: Kyphoplasty T12;  Surgeon: Dk Rosales MD;  Location: [facility] OR;  Service: Pain Management;  Laterality:  N/A;    HERNIA REPAIR      hiatal hernai    HYSTERECTOMY      INJECTION OF ANESTHETIC AGENT AROUND NERVE Left 6/8/2020    Procedure: Block, Nerve;  Surgeon: Dk Rosales MD;  Location: [facility] OR;  Service: Pain Management;  Laterality: Left;  left genicular nerve block     KNEE ARTHROPLASTY Left 7/30/2020    Procedure: ARTHROPLASTY, KNEE LEFT;  Surgeon: Ruben Martinez MD;  Location: [facility] OR;  Service: Orthopedics;  Laterality: Left;  REP VALERY RUBY    KNEE ARTHROSCOPY W/ MENISCECTOMY Left 1/16/2020    Procedure: ARTHROSCOPY, KNEE, WITH MEDIAL MENISCECTOMY;  Surgeon: Ruben Martinez MD;  Location: [facility] OR;  Service: Orthopedics;  Laterality: Left;    LAPAROSCOPIC NISSEN FUNDOPLICATION      NEPHRECTOMY      LT partial    RADIOFREQUENCY ABLATION Left 6/19/2020    Procedure: Radiofrequency Ablation;  Surgeon: Dk Rosales MD;  Location: [facility] OR;  Service: Pain Management;  Laterality: Left;    RADIOFREQUENCY ABLATION OF LUMBAR MEDIAL BRANCH NERVE AT SINGLE LEVEL Bilateral 2/28/2020    Procedure: Radiofrequency Ablation, Nerve, Spinal, Lumbar, Medial Branch, 1 Level;  Surgeon: Dk Rosales MD;  Location: [facility] OR;  Service: Pain Management;  Laterality: Bilateral;  L3,L4,L5 - Burned at 80 degrees C. for 60 seconds x 2 each site    RADIOFREQUENCY ABLATION OF LUMBAR MEDIAL BRANCH NERVE AT SINGLE LEVEL Bilateral 10/19/2020    Procedure: Radiofrequency Ablation, Nerve, Spinal, Lumbar, Medial Branch, 1 Level;  Surgeon: Dk Rosales MD;  Location: [facility] OR;  Service: Pain Management;  Laterality: Bilateral;  L3, L4, L5    REFRACTIVE SURGERY      ROTATOR CUFF REPAIR Left 10/31/2019    Procedure: REPAIR, ROTATOR CUFF;  Surgeon: Ruben Martinez MD;  Location: [facility] OR;  Service: Orthopedics;  Laterality: Left;  arthrex    SKIN BIOPSY      TOTAL ABDOMINAL HYSTERECTOMY W/ BILATERAL SALPINGOOPHORECTOMY  age 48    TRANSFORAMINAL EPIDURAL INJECTION OF STEROID Bilateral 7/30/2021    Procedure:  Injection,steroid,epidural,transforaminal approach;  Surgeon: Dk Rosales MD;  Location: [facility] OR;  Service: Pain Management;  Laterality: Bilateral;  L5-S1     TRANSFORAMINAL EPIDURAL INJECTION OF STEROID Bilateral 3/28/2022    Procedure: INJECTION, STEROID, EPIDURAL, TRANSFORAMINAL APPROACH Lumbar L5-S1;  Surgeon: Dk Rosales MD;  Location: [facility] OR;  Service: Pain Management;  Laterality: Bilateral;       Time Tracking:     PT Received On: 05/22/25  PT Start Time: 1440     PT Stop Time: 1529  PT Total Time (min): 49 min     Billable Minutes: Evaluation 20, Gait Training 20, and Therapeutic Exercise 9      05/22/2025

## 2025-05-22 NOTE — ANESTHESIA PREPROCEDURE EVALUATION
05/22/2025  Beatriz Gan is a 69 y.o., female.      Pre-op Assessment    I have reviewed the Patient Summary Reports.     I have reviewed the Nursing Notes. I have reviewed the NPO Status.   I have reviewed the Medications.     Review of Systems  Anesthesia Hx:  No problems with previous Anesthesia                Social:  Non-Smoker, Alcohol Use       Hematology/Oncology:       -- Anemia:                    --  Cancer in past history (kidney CA s/p partial nephrectomy):                     Cardiovascular:     Hypertension, well controlled   CAD  asymptomatic CABG/stent                                       Pulmonary:  Pneumonia  Asthma asymptomatic and mild                   Renal/:  Chronic Renal Disease (stage 3a), CKD                Hepatic/GI:    Hiatal Hernia, GERD Liver Disease, (cirrhosis) Hepatitis, C              Musculoskeletal:  Arthritis          Spine Disorders: lumbar Disc disease and Degenerative disease           Neurological:    Neuromuscular Disease,  Headaches Seizures, well controlled                                Endocrine:  Endocrine Normal          Obesity / BMI > 30, Morbid Obesity / BMI > 40  Psych:  Psychiatric History                Physical Exam  General: Well nourished, Cooperative, Alert and Oriented    Airway:  Mallampati: II   Mouth Opening: Normal  TM Distance: Normal  Neck ROM: Normal ROM    Anesthesia Plan  Type of Anesthesia, risks & benefits discussed:    Anesthesia Type: Gen ETT, Gen Supraglottic Airway, Gen Natural Airway, MAC  Intra-op Monitoring Plan: Standard ASA Monitors  Post Op Pain Control Plan: multimodal analgesia  Induction:  IV  Airway Plan: Direct, Video and Fiberoptic, Post-Induction  Informed Consent: Informed consent signed with the Patient and all parties understand the risks and agree with anesthesia plan.  All questions answered.   ASA  Score: 3    Ready For Surgery From Anesthesia Perspective.   .

## 2025-05-22 NOTE — ANESTHESIA PROCEDURE NOTES
Right Adductor Canal    Patient location during procedure: pre-op   Block not for primary anesthetic.  Reason for block: at surgeon's request and post-op pain management   Post-op Pain Location: right knee pain   Start time: 5/22/2025 9:00 AM  Timeout: 5/22/2025 9:00 AM   End time: 5/22/2025 9:05 AM    Staffing  Authorizing Provider: Jorge Martinez MD  Performing Provider: Jorge Martinez MD    Staffing  Performed by: Jorge Martinez MD  Authorized by: Jorge Martinez MD    Preanesthetic Checklist  Completed: patient identified, IV checked, site marked, risks and benefits discussed, surgical consent, monitors and equipment checked, pre-op evaluation and timeout performed  Peripheral Block  Patient position: supine  Prep: ChloraPrep  Patient monitoring: heart rate, cardiac monitor, continuous pulse ox, continuous capnometry and frequent blood pressure checks  Block type: adductor canal  Laterality: right  Injection technique: single shot  Needle  Needle type: Stimuplex   Needle gauge: 21 G  Needle length: 4 in  Needle localization: anatomical landmarks and ultrasound guidance  Needle insertion depth: 6 cm   -ultrasound image captured on disc.  Assessment  Injection assessment: negative aspiration, negative parasthesia and local visualized surrounding nerve  Paresthesia pain: none  Heart rate change: no  Slow fractionated injection: yes  Pain Tolerance: comfortable throughout block and no complaints  Medications:    Medications: BUPivacaine liposome (PF) 1.3 % (13.3 mg/mL) suspension - Injection   20 mL - 5/22/2025 9:00:00 AM  bupivacaine (pf) (MARCAINE) injection 0.5% - Perineural   10 mL - 5/22/2025 9:00:00 AM    Additional Notes  VSS.  [facility] RN monitoring vitals throughout procedure.  Patient tolerated procedure well.     Exparel 20ml + Marcaine 0.5% 10ml dosed under direct Ultrasound guidance.

## 2025-05-22 NOTE — OP NOTE
[facility]  Orthopedic  Operative Note    SUMMARY     Date of Procedure: 5/22/2025     Procedure: Procedure(s) (LRB):  ROBOTIC ARTHROPLASTY, KNEE, TOTAL (Right)       Surgeons and Role:     * Ruben Martinez MD - Primary    Assisting Surgeon: luz zhu    Pre-Operative Diagnosis: Primary osteoarthritis of right knee [M17.11]  Pre-op testing [Z01.818]    Post-Operative Diagnosis: Post-Op Diagnosis Codes:     * Primary osteoarthritis of right knee [M17.11]     * Pre-op testing [Z01.818]    Anesthesia: General    Procedure in General:  The patient was brought to the operating room while supine was was given some intravenous sedation then a spinal anesthetic.  A tourniquet was placed on the proximal right thigh right lower extremity was cleansed with alcohol prepped and draped in the usual fashion.  Due to the complexity of the surgical procedure it is my opinion that a skilled surgical assistant was necessary to complete the procedure in a safe and timely manner. the role of the assistant included patient positioning surgical exposure and wound closure. Meghan lainez serve as 1st surgical assistant  Intravenous antibiotics were given.  After prepping and draping the leg for robotic arthroplasty the leg was exsanguinated with the Esmarch tourniquet and the pneumatic tourniquet inflated to 300 mm Hg according to the Mike robotic protocol we brought the hip through a range of motion and then inserted our femoral and tibial reference arc as well as femoral and tibial reference guide pins.  After doing varus and valgus stress testing in flexion and extension the robotic saw was brought onto the field.  Check points were verified on the femur and tibia.  We proceeded with making our chamfer cuts on the femur in routine fashion and then the chamfer cuts on the tibia.  The tibia was sized to a size 2 base plate.  Trial components were inserted.  Prior to surgery the knee was noted to have 2° of  hyperextension and laxity in extension.  With the trial components in place we that corrected the recurvatum as well as the laxity.  We then removed the articular surface of the patella.  A trial patellar button was inserted with the femoral and tibial guides and trials in place.  The knee appeared to be stable.  The trial components were removed.  The knee was thoroughly irrigated with pulsatile lavage.  We cemented in a size 2 tibial base plate with 9 mm polyethylene and used a nap porous-coated noncemented femoral component we cemented in L polyethylene patella button.  The knee was reduced it appeared to be stable.  The knee throughout the case was irrigated with pulsatile lavage.  We deflated the tourniquet.  We closed the wound in layers with a combination of absorbable and nonabsorbable sutures.  Staples were used on the skin followed by sterile dressings.            Complications: None    Estimated Blood Loss (EBL):            Implants:   Implant Name Type Inv. Item Serial No.  Lot No. LRB No. Used Action   CEMENT BONE SIMPLEX HV RADPQ - [id number]  CEMENT BONE SIMPLEX HV RADPQ  [employer] [id number] Right 1 Implanted   PIN BONE 3.0S555UK - [id number]  PIN BONE 3.2A271ZE  [employer] [id number] Right 1 Implanted and Explanted   PIN FIXATION BONE 140X3.2MM - [id number]  PIN FIXATION BONE 140X3.2MM  [employer] [id number] Right 1 Implanted and Explanted   PATELLA TRI 29X8 X3 POLYETHYLE - [id number]  PATELLA TRI 29X8 X3 POLYETHYLE  [employer] [id number] Right 1 Implanted   COMPONENT TRIATHLON CR R SZ 2 - [id number]  COMPONENT TRIATHLON CR R SZ 2  [employer] [id number] Right 1 Implanted   INSERT X3 BEAR TIB 9MM SZ 2 - [id number]  INSERT X3 BEAR TIB 9MM SZ 2  [employer] [id number] Right 1 Implanted   BASEPLATE TIB CESAR PRIM SZ 2 - [id number]  BASEPLATE TIB CESAR PRIM SZ 2  [employer] [id number] Right 1 Implanted       Specimens:   Specimen (24h ago, onward)      None                     Condition: Good    Disposition: PACU - hemodynamically stable.    Attestation: I was present and scrubbed for the entire procedure.

## 2025-05-22 NOTE — H&P
CC/Indication for Procedure: 69 y.o. female with Primary osteoarthritis of right knee [M17.11]  Pre-op testing [Z01.818].    Patient scheduled for OK TOTAL KNEE ARTHROPLASTY [40466] (ROBOTIC ARTHROPLASTY, KNEE, TOTAL, RIGHT).    Past Medical History:   Diagnosis Date    Alcohol dependence     Asthma     Cirrhosis of liver     DDD (degenerative disc disease), lumbosacral     DJD (degenerative joint disease), lumbosacral     Encounter for blood transfusion     GERD (gastroesophageal reflux disease)     Gout     HCV: treated / cured (SVR 5/2025)     Hypercholesterolemia     Hypertension     NATHALIE (iron deficiency anemia)     questionable white coat hypertension    Kidney carcinoma, left 2014    Pneumonia     Renal cell carcinoma     Seizures     Shingles 10/13/2012     Past Surgical History:   Procedure Laterality Date    ANTERIOR CERVICAL DISCECTOMY W/ FUSION N/A 5/4/2023    Procedure: DISCECTOMY, SPINE, CERVICAL, ANTERIOR APPROACH, WITH FUSION C3-4;  Surgeon: Ruben Martinez MD;  Location: [facility];  Service: Orthopedics;  Laterality: N/A;    APPENDECTOMY      ARTHROSCOPY OF SHOULDER WITH DECOMPRESSION OF SUBACROMIAL SPACE Left 10/31/2019    Procedure: ARTHROSCOPY, SHOULDER, WITH SUBACROMIAL SPACE DECOMPRESSION;  Surgeon: Ruben Martinez MD;  Location: [facility] OR;  Service: Orthopedics;  Laterality: Left;    BLADDER REPAIR      x 2    COLONOSCOPY  9/2011    COLONOSCOPY N/A 9/18/2024    Procedure: COLONOSCOPY;  Surgeon: Ceasar Calero MD;  Location: [facility];  Service: Endoscopy;  Laterality: N/A;    DISTAL CLAVICLE EXCISION Left 10/31/2019    Procedure: EXCISION, CLAVICLE, DISTAL;  Surgeon: Ruben Martinez MD;  Location: [facility] OR;  Service: Orthopedics;  Laterality: Left;    EPIDURAL STEROID INJECTION INTO LUMBAR SPINE N/A 12/7/2020    Procedure: Injection-steroid-epidural-lumbar;  Surgeon: Dk Rosales MD;  Location: [facility] OR;  Service: Pain Management;  Laterality: N/A;  L4-L5    EPIDURAL STEROID INJECTION INTO  LUMBAR SPINE N/A 5/17/2021    Procedure: Injection-steroid-epidural-lumbar;  Surgeon: Dk Rosales MD;  Location: [facility] OR;  Service: Pain Management;  Laterality: N/A;  L4-L5    ESOPHAGOGASTRODUODENOSCOPY  9/2011    ESOPHAGOGASTRODUODENOSCOPY N/A 9/18/2024    Procedure: EGD (ESOPHAGOGASTRODUODENOSCOPY);  Surgeon: Ceasar Calero MD;  Location: [facility];  Service: Endoscopy;  Laterality: N/A;    EYE SURGERY      lasix bilateral    FIXATION KYPHOPLASTY N/A 1/31/2020    Procedure: Kyphoplasty T12;  Surgeon: Dk Rosales MD;  Location: [facility] OR;  Service: Pain Management;  Laterality: N/A;    HERNIA REPAIR      hiatal hernai    HYSTERECTOMY      INJECTION OF ANESTHETIC AGENT AROUND NERVE Left 6/8/2020    Procedure: Block, Nerve;  Surgeon: Dk Rosales MD;  Location: [facility] OR;  Service: Pain Management;  Laterality: Left;  left genicular nerve block     KNEE ARTHROPLASTY Left 7/30/2020    Procedure: ARTHROPLASTY, KNEE LEFT;  Surgeon: Ruben Martinez MD;  Location: [facility] OR;  Service: Orthopedics;  Laterality: Left;  REP VALERY RUBY    KNEE ARTHROSCOPY W/ MENISCECTOMY Left 1/16/2020    Procedure: ARTHROSCOPY, KNEE, WITH MEDIAL MENISCECTOMY;  Surgeon: Ruben Martinez MD;  Location: [facility] OR;  Service: Orthopedics;  Laterality: Left;    LAPAROSCOPIC NISSEN FUNDOPLICATION      NEPHRECTOMY      LT partial    RADIOFREQUENCY ABLATION Left 6/19/2020    Procedure: Radiofrequency Ablation;  Surgeon: Dk Rosales MD;  Location: [facility] OR;  Service: Pain Management;  Laterality: Left;    RADIOFREQUENCY ABLATION OF LUMBAR MEDIAL BRANCH NERVE AT SINGLE LEVEL Bilateral 2/28/2020    Procedure: Radiofrequency Ablation, Nerve, Spinal, Lumbar, Medial Branch, 1 Level;  Surgeon: Dk Rosales MD;  Location: [facility] OR;  Service: Pain Management;  Laterality: Bilateral;  L3,L4,L5 - Burned at 80 degrees C. for 60 seconds x 2 each site    RADIOFREQUENCY ABLATION OF LUMBAR MEDIAL BRANCH NERVE AT SINGLE LEVEL Bilateral 10/19/2020     Procedure: Radiofrequency Ablation, Nerve, Spinal, Lumbar, Medial Branch, 1 Level;  Surgeon: Dk Rosales MD;  Location: [facility] OR;  Service: Pain Management;  Laterality: Bilateral;  L3, L4, L5    REFRACTIVE SURGERY      ROTATOR CUFF REPAIR Left 10/31/2019    Procedure: REPAIR, ROTATOR CUFF;  Surgeon: Ruben Martinez MD;  Location: [facility] OR;  Service: Orthopedics;  Laterality: Left;  arthrex    SKIN BIOPSY      TOTAL ABDOMINAL HYSTERECTOMY W/ BILATERAL SALPINGOOPHORECTOMY  age 48    TRANSFORAMINAL EPIDURAL INJECTION OF STEROID Bilateral 2021    Procedure: Injection,steroid,epidural,transforaminal approach;  Surgeon: Dk Rosales MD;  Location: [facility] OR;  Service: Pain Management;  Laterality: Bilateral;  L5-S1     TRANSFORAMINAL EPIDURAL INJECTION OF STEROID Bilateral 3/28/2022    Procedure: INJECTION, STEROID, EPIDURAL, TRANSFORAMINAL APPROACH Lumbar L5-S1;  Surgeon: Dk Rosales MD;  Location: [facility] OR;  Service: Pain Management;  Laterality: Bilateral;     Family History   Problem Relation Name Age of Onset    Hypertension Father      Glaucoma Neg Hx      Macular degeneration Neg Hx      Retinal detachment Neg Hx       Social History     Socioeconomic History    Marital status:    Occupational History     Employer: [employer]   Tobacco Use    Smoking status: Former     Current packs/day: 0.00     Average packs/day: 1 pack/day for 40.0 years (40.0 ttl pk-yrs)     Types: Cigarettes     Start date: 1978     Quit date: 2018     Years since quittin.3    Smokeless tobacco: Never    Tobacco comments:     smokes 2-3 cigarettes a night   Substance and Sexual Activity    Alcohol use: Not Currently     Alcohol/week: 12.0 standard drinks of alcohol     Types: 6 Cans of beer, 6 Unspecified drink type per week     Comment: states has not had alcohol since 10/2023    Drug use: No    Sexual activity: Not Currently     Partners: Male     Social Drivers of Health     Financial Resource Strain:  Medium Risk (10/15/2024)    Overall Financial Resource Strain (CARDIA)     Difficulty of Paying Living Expenses: Somewhat hard   Food Insecurity: Food Insecurity Present (10/15/2024)    Hunger Vital Sign     Worried About Running Out of Food in the Last Year: Sometimes true     Ran Out of Food in the Last Year: Never true   Transportation Needs: No Transportation Needs (10/15/2024)    PRAPARE - Transportation     Lack of Transportation (Medical): No     Lack of Transportation (Non-Medical): No   Physical Activity: Inactive (10/15/2024)    Exercise Vital Sign     Days of Exercise per Week: 0 days     Minutes of Exercise per Session: 0 min   Stress: No Stress Concern Present (10/15/2024)    [employer] - Occupational Stress Questionnaire     Feeling of Stress : Not at all   Housing Stability: Unknown (10/15/2024)    Housing Stability Vital Sign     Unable to Pay for Housing in the Last Year: No     Homeless in the Last Year: No       Review of patient's allergies indicates:   Allergen Reactions    Phenergan [promethazine] Hives and Rash    Latex, natural rubber Hives     Per pt report-many years    Morphine Hives       Current Medications[1]    ROS:    Denies chest pain or palpitations  Denies shortness of breath  Denies fevers or chills  Denies chest pain  Denies abdominal pain    PE:    General Appearance: Well nourished  Orientation: Oriented to time, place, person  Mental Status: Alert  Heart: RRR  Lungs: CTA  Abdomen: Soft and non-tender    Anesthesia/Surgery risks, benefits and alternative options discussed and understood by patient/family.    This note was created using Dragon voice recognition software that occasionally misinterpreted phrases or words.        [1]   Current Facility-Administered Medications:     0.9% NaCl 49.3 mL with ROPIvacaine 0.5% (PF) (NAROPIN) 49.3 mL, ketorolac (TORADOL) 30 mg, EPINEPHrine (ADRENALINE) 0.5 mg injection, , Other, Once, Ruben Martinez MD     acetaminophen tablet 1,000 mg, 1,000 mg, Oral, Once Pre-Op, Davon Edge MD    ceFAZolin 2 g, 2 g, Intravenous, On Call Procedure, uRben Martinez MD    electrolyte-S (ISOLYTE), , Intravenous, Continuous, Davon Edge MD, Last Rate: 10 mL/hr at 05/22/25 0745, New Bag at 05/22/25 0745    fentaNYL 50 mcg/mL injection  mcg,  mcg, Intravenous, PRN, Davon Edge MD, 50 mcg at 05/22/25 0900    LIDOcaine (PF) 10 mg/ml (1%) injection 10 mg, 1 mL, Intradermal, Once, Davon Edge MD    midazolam injection 1-2 mg, 1-2 mg, Intravenous, Once, Davon Edge MD    scopolamine 1.3-1.5 mg (1 mg over 3 days) 1 patch, 1 patch, Transdermal, Once, Jorge Martinez MD, 1 patch at 05/22/25 0802    tranexamic acid (CYKLOKAPRON) 1,000 mg in 0.9% NaCl 100 mL IVPB (MB+), 1,000 mg, Intravenous, On Call Procedure, Ruben Martinez MD    Facility-Administered Medications Ordered in Other Encounters:     electrolyte-S (ISOLYTE), , Intravenous, Continuous, Kota Ortega MD, Last Rate: 10 mL/hr at 01/31/20 0624, New Bag at 09/18/24 1130    lactated ringers infusion, , Intravenous, Continuous, Kota Ortega MD    midazolam (PF) injection, , Intravenous, PRN, Jeff Joseph RN, 2 mg at 05/22/25 0900    pregabalin capsule 75 mg, 75 mg, Oral, Once, Shiela Mann MD

## 2025-05-22 NOTE — ANESTHESIA PROCEDURE NOTES
Spinal    Diagnosis: Right Knee Arthroplasty  Patient location during procedure: OR  Start time: 5/22/2025 10:12 AM  Timeout: 5/22/2025 10:12 AM  End time: 5/22/2025 10:22 AM    Staffing  Authorizing Provider: Jorge Martinez MD  Performing Provider: Jorge Martinez MD    Staffing  Performed by: Jorge Martinez MD  Authorized by: Jorge Martinez MD    Preanesthetic Checklist  Completed: patient identified, IV checked, site marked, risks and benefits discussed, surgical consent, monitors and equipment checked, pre-op evaluation and timeout performed  Spinal Block  Patient position: sitting  Prep: Betadine and ChloraPrep  Patient monitoring: cardiac monitor, continuous pulse ox, continuous capnometry, frequent blood pressure checks and heart rate  Approach: midline  Location: L3-4  Injection technique: single shot  CSF Fluid: clear free-flowing CSF  Needle  Needle type: Quincke   Needle gauge: 25 G  Needle length: 4 in  Additional Documentation: incremental injection, negative aspiration for heme and no paresthesia on injection  Needle localization: anatomical landmarks  Assessment  Sensory level: T4   Dermatomal levels determined by pinch or prick  Ease of block: easy  Patient's tolerance of the procedure: comfortable throughout block and no complaints  Additional Notes  +Barbotage  Medications:    Medications: bupivacaine (pf) (MARCAINE) injection 0.75% - Intraspinal   1.4 mL - 5/22/2025 10:22:00 AM

## 2025-05-22 NOTE — BRIEF OP NOTE
[facility]  Brief Operative Note    Surgery Date: 5/22/2025     Surgeons and Role:     * Ruben Martinez MD - Primary    Assisting Surgeon: luz zhu    Pre-op Diagnosis:  Primary osteoarthritis of right knee [M17.11]  Pre-op testing [Z01.818]    Post-op Diagnosis:  Post-Op Diagnosis Codes:     * Primary osteoarthritis of right knee [M17.11]     * Pre-op testing [Z01.818]    Procedure(s) (LRB):  ROBOTIC ARTHROPLASTY, KNEE, TOTAL (Right)    Anesthesia: General    Operative Findings:  Osteoarthritis right knee    Estimated Blood Loss: 301 mL         Specimens:   Specimen (24h ago, onward)      None            * No specimens in log *        Discharge Note    OUTCOME: Patient tolerated treatment/procedure well without complication and is now ready for discharge.    DISPOSITION: Home or Self Care    FINAL DIAGNOSIS:  <principal problem not specified>    FOLLOWUP: In clinic on may 23,2025    DISCHARGE INSTRUCTIONS:    Discharge Procedure Orders   Diet general     Call MD for:  temperature >100.4     Call MD for:  persistent nausea and vomiting     Call MD for:  severe uncontrolled pain     Call MD for:  difficulty breathing, headache or visual disturbances     Call MD for:  redness, tenderness, or signs of infection (pain, swelling, redness, odor or green/yellow discharge around incision site)     Call MD for:  hives     Call MD for:  persistent dizziness or light-headedness     Call MD for:  extreme fatigue     Sponge bath only until clinic visit     Keep surgical extremity elevated     No driving, operating heavy equipment or signing legal documents while taking pain medication     Activity as tolerated     Weight bearing as tolerated

## 2025-05-22 NOTE — ANESTHESIA PROCEDURE NOTES
Intubation    Date/Time: 5/22/2025 11:41 AM    Performed by: Aman Galicia CRNA  Authorized by: Jorge Martinez MD    Intubation:     Intubated:  N/a    Mask Ventilation:  N/a    Attempts:  1    Attempted By:  CRNA    Difficult Airway Encountered?: No      Complications:  None    Airway Device:  Supraglottic airway/LMA    Airway Device Size:  3.0    Style/Cuff Inflation:  Cuffed    Secured at:  The lips    Placement Verified By:  Capnometry    Complicating Factors:  None    Findings Post-Intubation:  BS equal bilateral

## 2025-05-22 NOTE — ANESTHESIA POSTPROCEDURE EVALUATION
Anesthesia Post Evaluation    Patient: Beatriz Gan    Procedure(s) Performed: Procedure(s) (LRB):  ROBOTIC ARTHROPLASTY, KNEE, TOTAL (Right)    Final Anesthesia Type: general      Patient location during evaluation: PACU  Patient participation: Yes- Able to Participate  Level of consciousness: awake and alert and oriented  Post-procedure vital signs: reviewed and stable  Pain management: adequate  Airway patency: patent    PONV status at discharge: No PONV  Anesthetic complications: no      Cardiovascular status: blood pressure returned to baseline and stable  Respiratory status: unassisted and spontaneous ventilation  Hydration status: euvolemic  Follow-up not needed.  Comments: Pt very comfortable throughout, but started moving before surgeon finished, so converted to General.             Vitals Value Taken Time   /70 05/22/25 13:36   Temp 36.5 °C (97.7 °F) 05/22/25 12:37   Pulse 68 05/22/25 13:36   Resp 16 05/22/25 13:36   SpO2 91 % 05/22/25 13:36   Vitals shown include unfiled device data.      No case tracking events are documented in the log.      Pain/Yaneli Score: Pain Rating Prior to Med Admin: 8 (5/22/2025  1:14 PM)  Pain Rating Post Med Admin: 8 (5/22/2025  1:14 PM)  Yaneli Score: 10 (5/22/2025  1:30 PM)

## 2025-05-22 NOTE — TRANSFER OF CARE
"Anesthesia Transfer of Care Note    Patient: Beatriz Gan    Procedure(s) Performed: Procedure(s) (LRB):  ROBOTIC ARTHROPLASTY, KNEE, TOTAL (Right)    Patient location: PACU    Anesthesia Type: general    Transport from OR: Transported from OR on 2-3 L/min O2 by NC with adequate spontaneous ventilation    Post pain: adequate analgesia    Post assessment: no apparent anesthetic complications    Post vital signs: stable    Level of consciousness: awake    Nausea/Vomiting: no nausea/vomiting    Complications: none    Transfer of care protocol was followed      Last vitals: Visit Vitals  BP (!) 146/80 (BP Location: Right arm, Patient Position: Lying)   Pulse (!) 50   Temp 36.8 °C (98.2 °F) (Temporal)   Resp 11   Ht 5' 4" (1.626 m)   Wt 83 kg (182 lb 15.7 oz)   SpO2 99%   Breastfeeding No   BMI 31.41 kg/m²     "

## 2025-05-22 NOTE — DISCHARGE INSTRUCTIONS
"Discharge Instructions: After Your Surgery/Procedure  Youve just had surgery. During surgery you were given medicine called anesthesia to keep you relaxed and free of pain. After surgery you may have some pain or nausea. This is common. Here are some tips for feeling better and getting well after surgery.     Stay on schedule with your medication.   Going home  Your doctor or nurse will show you how to take care of yourself when you go home. He or she will also answer your questions. Have an adult family member or friend drive you home.      For your safety we recommend these precaution for the first 24 hours after your procedure:  Do not drive or use heavy equipment.  Do not make important decisions or sign legal papers.  Do not drink alcohol.  Have someone stay with you, if needed. He or she can watch for problems and help keep you safe.  Your concentration, balance, coordination, and judgement may be impaired for many hours after anesthesia.  Use caution when ambulating or standing up.     You may feel weak and "washed out" after anesthesia and surgery.      Subtle residual effects of general anesthesia or sedation with regional / local anesthesia can last more than 24 hours.  Rest for the remainder of the day or longer if your Doctor/Surgeon has advised you to do so.  Although you may feel normal within the first 24 hours, your reflexes and mental ability may be impaired without you realizing it.  You may feel dizzy, lightheaded or sleepy for 24 hours or longer.      Be sure to go to all follow-up visits with your doctor. And rest after your surgery for as long as your doctor tells you to.  Coping with pain  If you have pain after surgery, pain medicine will help you feel better. Take it as told, before pain becomes severe. Also, ask your doctor or pharmacist about other ways to control pain. This might be with heat, ice, or relaxation. And follow any other instructions your surgeon or nurse gives you.  Tips " for taking pain medicine  To get the best relief possible, remember these points:  Pain medicines can upset your stomach. Taking them with a little food may help.  Most pain relievers taken by mouth need at least 20 to 30 minutes to start to work.  Taking medicine on a schedule can help you remember to take it. Try to time your medicine so that you can take it before starting an activity. This might be before you get dressed, go for a walk, or sit down for dinner.  Constipation is a common side effect of pain medicines. Call your doctor before taking any medicines such as laxatives or stool softeners to help ease constipation. Also ask if you should skip any foods. Drinking lots of fluids and eating foods such as fruits and vegetables that are high in fiber can also help. Remember, do not take laxatives unless your surgeon has prescribed them.  Drinking alcohol and taking pain medicine can cause dizziness and slow your breathing. It can even be deadly. Do not drink alcohol while taking pain medicine.  Pain medicine can make you react more slowly to things. Do not drive or run machinery while taking pain medicine.  Your health care provider may tell you to take acetaminophen to help ease your pain. Ask him or her how much you are supposed to take each day. Acetaminophen or other pain relievers may interact with your prescription medicines or other over-the-counter (OTC) drugs. Some prescription medicines have acetaminophen and other ingredients. Using both prescription and OTC acetaminophen for pain can cause you to overdose. Read the labels on your OTC medicines with care. This will help you to clearly know the list of ingredients, how much to take, and any warnings. It may also help you not take too much acetaminophen. If you have questions or do not understand the information, ask your pharmacist or health care provider to explain it to you before you take the OTC medicine.  Managing nausea  Some people have an  upset stomach after surgery. This is often because of anesthesia, pain, or pain medicine, or the stress of surgery. These tips will help you handle nausea and eat healthy foods as you get better. If you were on a special food plan before surgery, ask your doctor if you should follow it while you get better. These tips may help:  Do not push yourself to eat. Your body will tell you when to eat and how much.  Start off with clear liquids and soup. They are easier to digest.  Next try semi-solid foods, such as mashed potatoes, applesauce, and gelatin, as you feel ready.  Slowly move to solid foods. Dont eat fatty, rich, or spicy foods at first.  Do not force yourself to have 3 large meals a day. Instead eat smaller amounts more often.  Take pain medicines with a small amount of solid food, such as crackers or toast, to avoid nausea.     Call your surgeon if  You still have pain an hour after taking medicine. The medicine may not be strong enough.  You feel too sleepy, dizzy, or groggy. The medicine may be too strong.  You have side effects like nausea, vomiting, or skin changes, such as rash, itching, or hives.       If you have obstructive sleep apnea  You were given anesthesia medicine during surgery to keep you comfortable and free of pain. After surgery, you may have more apnea spells because of this medicine and other medicines you were given. The spells may last longer than usual.   At home:  Keep using the continuous positive airway pressure (CPAP) device when you sleep. Unless your health care provider tells you not to, use it when you sleep, day or night. CPAP is a common device used to treat obstructive sleep apnea.  Talk with your provider before taking any pain medicine, muscle relaxants, or sedatives. Your provider will tell you about the possible dangers of taking these medicines.  © 0716-3337 [employer]. [address] [place] [address], [place] [address]. All rights reserved. This information is  not intended as a substitute for professional medical care. Always follow your healthcare professional's instructions.          Using an Incentive Spirometer    An incentive spirometer is a device that helps you do deep breathing exercises. These exercises expand your lungs, aid in circulation, and help prevent pneumonia. Deep breathing exercises also help you breathe better and improve the function of your lungs by:  Keeping your lungs clear  Strengthening your breathing muscles  Helping prevent respiratory complications or problems  The incentive spirometer gives you a way to take an active part in recover. A nurse or therapist will teach you breathing exercises. To do these exercises, you will breathe in through your mouth and not your nose. The incentive spirometer only works correctly if you breathe in through your mouth.  Steps to clear lungs  Step 1. Exhale normally. Then, inhale normally.  Relax and breathe out.  Step 2. Place your lips tightly around the mouthpiece.  Make sure the device is upright and not tilted.  Step 3. Inhale as much air as you can through the mouthpiece (don't breath through your nose).  Inhale slowly and deeply.  Hold your breath long enough to keep the balls or disk raised for at least 3 to 5 seconds, or as instructed by your healthcare provider.  Some spirometers have an indicator to let you know that you are breathing in too fast. If the indicator goes off, breathe in more slowly.  Step 4. Repeat the exercise regularly.  Do this exercise every hour while you're awake, or as instructed by your healthcare provider.  If you were taught deep breathing and coughing exercises, do them regularly as instructed by your healthcare provider.           Post op instructions for prevention of DVT  What is deep vein thrombosis?  Deep vein thrombosis (DVT) is the medical term for blood clots in the deep veins of the leg.  These blood clots can be dangerous.  A DVT can block a blood vessel and keep  blood from getting where it needs to go.  Another problem is that the clot can travel to other parts of the body such as the lungs.  A clot that travels to the lungs is called a pulmonary embolus (PE) and can cause serious problems with breathing which can lead to death.  Am I at risk for DVT/PE?  If you are not very active, you are at risk of DVT.  Anyone confined to bed, sitting for long periods of time, recovering from surgery, etc. increases the risk of DVT.  Other risk factors are cancer diagnosis, certain medications, estrogen replacement in any form,older age, obesity, pregnancy, smoking, history of clotting disorders, and dehydration.  How will I know if I have a DVT?  Swelling in the lower leg  Pain  Warmth, redness, hardness or bulging of the vein  If you have any of these symptoms, call your doctors office right away.  Some people will not have any symptoms until the clot moves to the lungs.  What are the symptoms of a PE?  Panting, shortness of breath, or trouble breathing  Sharp, knife-like chest pain when you breathe  Coughing or coughing up blood  Rapid heartbeat  If you have any of these symptoms or get worse quickly, call 911 for emergency treatment.  How can I prevent a DVT?  Avoid long periods of inactivity and dont cross your legs--get up and walk around every hour or so.  Stay active--walking after surgery is highly encouraged.  This means you should get out of the house and walk in the neighborhood.  Going up and down stairs will not impair healing (unless advised against such activity by your doctor).    Drink plenty of noncaffeinated, nonalcoholic fluids each day to prevent dehydration.  Wear special support stockings, if they have been advised by your doctor.  If you travel, stop at least once an hour and walk around.  Avoid smoking (assistance with stopping is available through your healthcare provider)  Always notify your doctor if you are not able to follow the post operative  instructions that are given to you at the time of discharge.  It may be necessary to prescribe one of the medications available to prevent DVT.          Exparel(bupivacaine) has been injected to provide approximately 72 hours of reduced pain after your surgery.  Do not remove the bracelet for five days.  Report to your doctor as soon as possible if you experience any of the following:   Restlessness   Anxiety   Speech problems    Lightheadedness   Numbness and tingling of the mouth and lips   Seizures    Metallic taste   Blurred vision   Tremors    Twitching   Depression   Extreme drowsiness  Avoid additional use of local anesthetics (such as dental procedures) for five days (96 hours).          We hope your stay was comfortable as you heal now, mend and rest.    For we have enjoyed taking care of you by giving your our best.    And as you get better, by regaining your health and strength;   We count it as a privilege to have served you and hope your time at [facility] was well spent.      Thank  You!!!

## 2025-05-22 NOTE — PLAN OF CARE
Patient cleared by PT to discharge to home.  Patient and friend voiced readiness to discharge to home.  Patient tolerating po intake well with no complaints of nausea/vomiting.  Patient able to void with no problems noted.  Dressing to right knee remains clean dry and intact.  Medications delivered to bedside and reviewed with patient and friend.  Discharge instructions given to pt and family/friend, verbalized understanding and questions answered. Handouts provided. Belongings given back to pt. IV removed- catheter intact. Discharge via wheelchair.

## 2025-05-23 ENCOUNTER — CLINICAL SUPPORT (OUTPATIENT)
Dept: ORTHOPEDICS | Facility: CLINIC | Age: 70
End: 2025-05-23
Payer: MEDICARE

## 2025-05-23 VITALS — BODY MASS INDEX: 31.24 KG/M2 | HEIGHT: 64 IN | WEIGHT: 183 LBS

## 2025-05-23 DIAGNOSIS — Z96.651 STATUS POST TOTAL RIGHT KNEE REPLACEMENT: Primary | ICD-10-CM

## 2025-05-23 PROCEDURE — 99999 PR PBB SHADOW E&M-EST. PATIENT-LVL III: CPT | Mod: PBBFAC,HCNC,,

## 2025-05-23 NOTE — PROGRESS NOTES
Subjective:    Patient ID: Beatriz Gan is a 69 y.o. female.    Chief Complaint: Post-op Evaluation of the Right Knee ( POD 1 Right TKA 5/22/25. Had one fall yesterday, one fall today. Fell onto R ankle and L wrist. Pain medication with relief, slept well last night. Family states pt fell on her side, not onto the knee. )      History of Present Illness    Prior to meeting with the patient I reviewed the medical chart in [facility]. This included reviewing the previous progress notes from our office, review of the patient's last appointment with their primary care provider, review of any visits to the emergency room, and review of any pain management appointments or procedures.  Patient comes in today postop day 1 right total knee arthroplasty.  Comes in today for wound check, dressing change, pain control assessment.  Her pain is controlled.  Unfortunately, she did have a stumble and fall at home in her rolling walker caught on the ledge/threshold through an entry way.  Contusion to her left forearm.    Current Medications  Current Medications[1]    Allergies  Review of patient's allergies indicates:   Allergen Reactions    Phenergan [promethazine] Hives and Rash    Latex, natural rubber Hives     Per pt report-many years    Morphine Hives       Past Medical History  Past Medical History:   Diagnosis Date    Alcohol dependence     Asthma     Cirrhosis of liver     DDD (degenerative disc disease), lumbosacral     DJD (degenerative joint disease), lumbosacral     Encounter for blood transfusion     GERD (gastroesophageal reflux disease)     Gout     HCV: treated / cured (SVR 5/2025)     Hypercholesterolemia     Hypertension     NATHALIE (iron deficiency anemia)     questionable white coat hypertension    Kidney carcinoma, left 2014    Pneumonia     Renal cell carcinoma     Seizures     Shingles 10/13/2012       Surgical History  Past Surgical History:   Procedure Laterality Date    ANTERIOR CERVICAL DISCECTOMY W/  FUSION N/A 5/4/2023    Procedure: DISCECTOMY, SPINE, CERVICAL, ANTERIOR APPROACH, WITH FUSION C3-4;  Surgeon: Ruben Martinez MD;  Location: [facility] OR;  Service: Orthopedics;  Laterality: N/A;    APPENDECTOMY      ARTHROSCOPY OF SHOULDER WITH DECOMPRESSION OF SUBACROMIAL SPACE Left 10/31/2019    Procedure: ARTHROSCOPY, SHOULDER, WITH SUBACROMIAL SPACE DECOMPRESSION;  Surgeon: Ruben Martinez MD;  Location: [facility] OR;  Service: Orthopedics;  Laterality: Left;    BLADDER REPAIR      x 2    COLONOSCOPY  9/2011    COLONOSCOPY N/A 9/18/2024    Procedure: COLONOSCOPY;  Surgeon: Ceasar Calero MD;  Location: [facility];  Service: Endoscopy;  Laterality: N/A;    DISTAL CLAVICLE EXCISION Left 10/31/2019    Procedure: EXCISION, CLAVICLE, DISTAL;  Surgeon: Ruben Martinez MD;  Location: [facility] OR;  Service: Orthopedics;  Laterality: Left;    EPIDURAL STEROID INJECTION INTO LUMBAR SPINE N/A 12/7/2020    Procedure: Injection-steroid-epidural-lumbar;  Surgeon: Dk Rosales MD;  Location: [facility] OR;  Service: Pain Management;  Laterality: N/A;  L4-L5    EPIDURAL STEROID INJECTION INTO LUMBAR SPINE N/A 5/17/2021    Procedure: Injection-steroid-epidural-lumbar;  Surgeon: Dk Rosales MD;  Location: [facility] OR;  Service: Pain Management;  Laterality: N/A;  L4-L5    ESOPHAGOGASTRODUODENOSCOPY  9/2011    ESOPHAGOGASTRODUODENOSCOPY N/A 9/18/2024    Procedure: EGD (ESOPHAGOGASTRODUODENOSCOPY);  Surgeon: Ceasar Calero MD;  Location: [facility];  Service: Endoscopy;  Laterality: N/A;    EYE SURGERY      lasix bilateral    FIXATION KYPHOPLASTY N/A 1/31/2020    Procedure: Kyphoplasty T12;  Surgeon: Dk Rosales MD;  Location: [facility] OR;  Service: Pain Management;  Laterality: N/A;    HERNIA REPAIR      hiatal hernai    HYSTERECTOMY      INJECTION OF ANESTHETIC AGENT AROUND NERVE Left 6/8/2020    Procedure: Block, Nerve;  Surgeon: Dk Rosales MD;  Location: [facility] OR;  Service: Pain Management;  Laterality: Left;  left genicular  nerve block     KNEE ARTHROPLASTY Left 7/30/2020    Procedure: ARTHROPLASTY, KNEE LEFT;  Surgeon: Ruben Martinez MD;  Location: [facility] OR;  Service: Orthopedics;  Laterality: Left;  REP VALERY RUBY    KNEE ARTHROSCOPY W/ MENISCECTOMY Left 1/16/2020    Procedure: ARTHROSCOPY, KNEE, WITH MEDIAL MENISCECTOMY;  Surgeon: Ruben Martinez MD;  Location: [facility] OR;  Service: Orthopedics;  Laterality: Left;    LAPAROSCOPIC NISSEN FUNDOPLICATION      NEPHRECTOMY      LT partial    RADIOFREQUENCY ABLATION Left 6/19/2020    Procedure: Radiofrequency Ablation;  Surgeon: Dk Rosales MD;  Location: [facility] OR;  Service: Pain Management;  Laterality: Left;    RADIOFREQUENCY ABLATION OF LUMBAR MEDIAL BRANCH NERVE AT SINGLE LEVEL Bilateral 2/28/2020    Procedure: Radiofrequency Ablation, Nerve, Spinal, Lumbar, Medial Branch, 1 Level;  Surgeon: Dk Rosales MD;  Location: [facility] OR;  Service: Pain Management;  Laterality: Bilateral;  L3,L4,L5 - Burned at 80 degrees C. for 60 seconds x 2 each site    RADIOFREQUENCY ABLATION OF LUMBAR MEDIAL BRANCH NERVE AT SINGLE LEVEL Bilateral 10/19/2020    Procedure: Radiofrequency Ablation, Nerve, Spinal, Lumbar, Medial Branch, 1 Level;  Surgeon: Dk Rosales MD;  Location: [facility] OR;  Service: Pain Management;  Laterality: Bilateral;  L3, L4, L5    REFRACTIVE SURGERY      ROBOTIC ARTHROPLASTY, KNEE Right 5/22/2025    Procedure: ROBOTIC ARTHROPLASTY, KNEE, TOTAL;  Surgeon: Ruben Martinez MD;  Location: [facility] OR;  Service: Orthopedics;  Laterality: Right;  Mike-Oliverio    ROTATOR CUFF REPAIR Left 10/31/2019    Procedure: REPAIR, ROTATOR CUFF;  Surgeon: Ruben Martinez MD;  Location: [facility] OR;  Service: Orthopedics;  Laterality: Left;  arthrex    SKIN BIOPSY      TOTAL ABDOMINAL HYSTERECTOMY W/ BILATERAL SALPINGOOPHORECTOMY  age 48    TRANSFORAMINAL EPIDURAL INJECTION OF STEROID Bilateral 7/30/2021    Procedure: Injection,steroid,epidural,transforaminal approach;  Surgeon: Dk Rosales MD;   Location: [facility] OR;  Service: Pain Management;  Laterality: Bilateral;  L5-S1     TRANSFORAMINAL EPIDURAL INJECTION OF STEROID Bilateral 3/28/2022    Procedure: INJECTION, STEROID, EPIDURAL, TRANSFORAMINAL APPROACH Lumbar L5-S1;  Surgeon: Dk Rosales MD;  Location: [facility] OR;  Service: Pain Management;  Laterality: Bilateral;       Family History:   Family History   Problem Relation Name Age of Onset    Hypertension Father      Glaucoma Neg Hx      Macular degeneration Neg Hx      Retinal detachment Neg Hx         Social History:   Social History[2]    Date of surgery:  May 22, 2025    Review of Systems     General/Constitutional: Chills denies. Fatigue denies. Fever denies. Weight gain denies. Weight loss denies.    Musculoskeletal: Comments: See HPI for details    Skin: Rash denies.    Objective:   Vital Signs: There were no vitals filed for this visit.     Physical Exam    This a well-developed, well nourished patient in no acute distress.  They are alert and oriented and cooperative to examination.  Pt. walks without an antalgic gait.      General Examination:     Constitutional: The patient is alert and oriented to lace person and time. Mood is pleasant.     Head/Face: Normal facial features normal eyebrows    Eyes: Normal extraocular motion bilaterally    Lungs: Respirations are equal and unlabored    Gait is coordinated.    Cardiovascular: There are no swelling or varicosities present.    Lymphatic: Negative for adenopathy    Skin: Normal    Neurological: Level of consciousness normal. Oriented to place person and time and situation    Psychiatric: Oriented to time place person and situation    Right knee exam: Skin to right knee clean dry and intact.  No erythema.  Diffuse ecchymosis anteriorly both medial and lateral to the incision.  No wound dehiscence.  Slight trickle of blood to a pinpoint area in the distal 3rd of the incision.  Negative Homans on the left.  She can actively extend to 0 and flex to  about 90°.  She is neurovascularly intact throughout the right lower extremity.  She comes in today ambulating via wheelchair.  Right EHL is intact.    Left wrist and arm exam: Skin to left wrist and arm clean dry and intact.  No erythema or ecchymosis.  No signs or symptoms of infection.  Neurovascularly intact throughout the left upper extremity.  She can open and close her left hand into a fist.  She can fully pronate/supinate the left forearm and flex/extend the left wrist.  She has a hematoma over the distal portion of the wrist/distal radial region.  It is not warm or pulsatile or fluctuant.  It is mildly tender.      XRAY Report/ Interpretation:  No new radiographs taken on today's clinic visit.      Assessment:       1. Status post total right knee replacement        Plan:       Beatriz was seen today for post-op evaluation.    Diagnoses and all orders for this visit:    Status post total right knee replacement         Follow up with Bryce on 6.6.25 (already scheduled) for staple removal.    Dressing change to right knee today.  She tolerated this well.  Advised her she may need to change more frequently than daily if she has any bleed through from this pinpoint area.  She will be using aspirin 81 mg by mouth twice a day for 1 month postop for DVT prophylaxis.  She is set to begin home health PT.  We will see her back in about 10-12 days for wound check and suture removal.    FIDE Prasad PA-C    Treatment options were discussed with regards to the nature of the medical condition. Conservative pain intervention and surgical options were discussed in detail. The probability of success of each separate treatment option was discussed. The patient expressed a clear understanding of the treatment options. With regards to surgery, the procedure risk, benefits, complications, and outcomes were discussed. No guarantees were given with regards to surgical outcome.   The risk of complications,  morbidity, and mortality of patient management decisions have been made at the time of this visit. These are associated with the patient's problems, diagnostic procedures and treatment options. This includes the possible management options selected and those considered but not selected by the patient after shared medical decision making we discussed with the patient.     This note was created using Dragon voice recognition software that occasionally misinterpreted phrases or words.                      [1]   Current Outpatient Medications   Medication Sig Dispense Refill    albuterol (VENTOLIN HFA) 90 mcg/actuation inhaler Inhale 2 puffs into the lungs every 4 (four) hours as needed for Wheezing or Shortness of Breath. Rescue 54 g 3    ALPRAZolam (XANAX) 1 MG tablet TAKE ONE TABLET (1 MG TOTAL) BY MOUTH TWICE DAILY AS NEEDED FOR ANXIETY 60 tablet 2    amLODIPine (NORVASC) 5 MG tablet TAKE ONE TABLET BY MOUTH DAILY AT 9 AM 90 tablet 3    azelastine (ASTELIN) 137 mcg (0.1 %) nasal spray Use 1 spray(s) in each nostril twice daily 30 mL 3    benralizumab (FASENRA PEN) 30 mg/mL AtIn Inject 30 mg into the skin every 8 weeks. 1 mL 5    celecoxib (CELEBREX) 200 MG capsule Take 1 capsule (200 mg total) by mouth 2 (two) times daily. 60 capsule 0    cholecalciferol, vitamin D3, (VITAMIN D3 ORAL) Take 1 capsule by mouth once daily.      docusate sodium (COLACE) 100 MG capsule Take 1 capsule (100 mg total) by mouth 2 (two) times daily. 60 capsule 0    ergocalciferol, vitamin D2, (VITAMIN D2 ORAL) Take 1 tablet by mouth once daily.      FLUoxetine 40 MG capsule TAKE ONE CAPSULE (40mg) BY MOUTH TWICE DAILY @9am-@5pm 180 capsule 11    fluticasone (FLONASE) 50 mcg/actuation nasal spray 1 spray by Nasal route daily as needed.       gabapentin (NEURONTIN) 600 MG tablet Take 1 tablet (600 mg total) by mouth 3 (three) times daily. 90 tablet 11    lactulose (CHRONULAC) 20 gram/30 mL Soln Take 30 mLs (20 g total) by mouth once daily. Goal  of 3-5 soft stools each day to help memory 1800 mL 3    meclizine (ANTIVERT) 12.5 mg tablet Take 1 tablet (12.5 mg total) by mouth 3 (three) times daily as needed for Dizziness. 30 tablet 0    MULTIVIT-IRON-MIN-FOLIC ACID 3,500-18-0.4 UNIT-MG-MG ORAL CHEW Take by mouth once daily.      ondansetron (ZOFRAN) 4 MG tablet Take 1 tablet (4 mg total) by mouth every 6 (six) hours as needed for Nausea. 10 tablet 0    oxyCODONE-acetaminophen (PERCOCET)  mg per tablet Take 1 tablet by mouth every 4 (four) hours as needed for Pain. 42 tablet 0    predniSONE (DELTASONE) 10 MG tablet Take one pill a day for three days, repeat for shortness of breath 12 tablet 0    ubrogepant (UBRELVY) 50 mg tablet Take 1 tablet (50 mg total) by mouth daily as needed for Migraine. If symptoms persist or return, may repeat dose after 2 hours. Maximum: 200 mg per 24 hours 30 tablet 3    zolpidem (AMBIEN) 10 mg Tab TAKE ONE TABLET (10 MG TOTAL) BY MOUTH EVERY EVENING 30 tablet 2    aspirin (ECOTRIN) 81 MG EC tablet Take 1 tablet (81 mg total) by mouth every 12 (twelve) hours. (Patient not taking: Reported on 5/23/2025) 60 tablet 0    budesonide-formoterol 160-4.5 mcg (SYMBICORT) 160-4.5 mcg/actuation HFAA Inhale 2 puffs into the lungs every 12 (twelve) hours. Controller 30.6 g 3    folic acid (FOLVITE) 1 MG tablet Take 1 tablet (1 mg total) by mouth once daily. 90 tablet 0    pantoprazole (PROTONIX) 40 MG tablet Take 1 tablet (40 mg total) by mouth once daily. (Patient not taking: Reported on 5/23/2025) 90 tablet 3     No current facility-administered medications for this visit.     Facility-Administered Medications Ordered in Other Visits   Medication Dose Route Frequency Provider Last Rate Last Admin    electrolyte-S (ISOLYTE)   Intravenous Continuous Kota Ortega MD 10 mL/hr at 01/31/20 0624 New Bag at 09/18/24 1130    lactated ringers infusion   Intravenous Continuous Kota Ortega MD        pregabalin capsule 75 mg  75 mg Oral Once  Shiela Mann MD       [2]   Social History  Socioeconomic History    Marital status:    Occupational History     Employer: [employer]   Tobacco Use    Smoking status: Former     Current packs/day: 0.00     Average packs/day: 1 pack/day for 40.0 years (40.0 ttl pk-yrs)     Types: Cigarettes     Start date: 1978     Quit date: 2018     Years since quittin.3    Smokeless tobacco: Never    Tobacco comments:     smokes 2-3 cigarettes a night   Substance and Sexual Activity    Alcohol use: Not Currently     Alcohol/week: 12.0 standard drinks of alcohol     Types: 6 Cans of beer, 6 Unspecified drink type per week     Comment: states has not had alcohol since 10/2023    Drug use: No    Sexual activity: Not Currently     Partners: Male     Social Drivers of Health     Financial Resource Strain: Medium Risk (10/15/2024)    Overall Financial Resource Strain (CARDIA)     Difficulty of Paying Living Expenses: Somewhat hard   Food Insecurity: Food Insecurity Present (10/15/2024)    Hunger Vital Sign     Worried About Running Out of Food in the Last Year: Sometimes true     Ran Out of Food in the Last Year: Never true   Transportation Needs: No Transportation Needs (10/15/2024)    PRAPARE - Transportation     Lack of Transportation (Medical): No     Lack of Transportation (Non-Medical): No   Physical Activity: Inactive (10/15/2024)    Exercise Vital Sign     Days of Exercise per Week: 0 days     Minutes of Exercise per Session: 0 min   Stress: No Stress Concern Present (10/15/2024)    [employer] [place] [employer] - Occupational Stress Questionnaire     Feeling of Stress : Not at all   Housing Stability: Unknown (10/15/2024)    Housing Stability Vital Sign     Unable to Pay for Housing in the Last Year: No     Homeless in the Last Year: No

## 2025-05-28 ENCOUNTER — OFFICE VISIT (OUTPATIENT)
Dept: ORTHOPEDICS | Facility: CLINIC | Age: 70
End: 2025-05-28
Payer: MEDICARE

## 2025-05-28 ENCOUNTER — HOSPITAL ENCOUNTER (OUTPATIENT)
Dept: RADIOLOGY | Facility: HOSPITAL | Age: 70
Discharge: HOME | End: 2025-05-28
Attending: ORTHOPAEDIC SURGERY
Payer: MEDICARE

## 2025-05-28 VITALS — BODY MASS INDEX: 31.24 KG/M2 | HEIGHT: 64 IN | WEIGHT: 183 LBS
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
## 2025-05-28 DIAGNOSIS — S93.429A: ICD-10-CM

## 2025-05-28 DIAGNOSIS — S52.502A CLOSED FRACTURE OF DISTAL END OF LEFT RADIUS, UNSPECIFIED FRACTURE MORPHOLOGY, INITIAL ENCOUNTER: Primary | ICD-10-CM

## 2025-05-28 DIAGNOSIS — Z96.651 STATUS POST TOTAL RIGHT KNEE REPLACEMENT: ICD-10-CM

## 2025-05-28 DIAGNOSIS — S52.602A: ICD-10-CM

## 2025-05-28 DIAGNOSIS — Z78.0 MENOPAUSE: ICD-10-CM

## 2025-05-28 PROCEDURE — 73110 X-RAY EXAM OF WRIST: CPT | Mod: 26,LT,, | Performed by: RADIOLOGY

## 2025-05-28 PROCEDURE — 73560 X-RAY EXAM OF KNEE 1 OR 2: CPT | Mod: 26,RT,, | Performed by: RADIOLOGY

## 2025-05-28 PROCEDURE — 73610 X-RAY EXAM OF ANKLE: CPT | Mod: 26,RT,, | Performed by: RADIOLOGY

## 2025-05-28 PROCEDURE — 73560 X-RAY EXAM OF KNEE 1 OR 2: CPT | Mod: TC,PN,RT

## 2025-05-28 PROCEDURE — 73610 X-RAY EXAM OF ANKLE: CPT | Mod: TC,PN,RT

## 2025-05-28 PROCEDURE — 73110 X-RAY EXAM OF WRIST: CPT | Mod: TC,PN,LT

## 2025-05-28 PROCEDURE — 99999 PR PBB SHADOW E&M-EST. PATIENT-LVL V: CPT | Mod: PBBFAC,,, | Performed by: ORTHOPAEDIC SURGERY

## 2025-05-28 RX ORDER — LACTULOSE 10 G/15ML
30 SOLUTION ORAL; RECTAL
COMMUNITY
Start: 2025-05-20

## 2025-05-28 RX ORDER — HYDROCODONE BITARTRATE AND ACETAMINOPHEN 7.5; 325 MG/1; MG/1
1 TABLET ORAL EVERY 6 HOURS PRN
Qty: 28 TABLET | Refills: 0 | Status: SHIPPED | OUTPATIENT
Start: 2025-05-28 | End: 2025-06-04

## 2025-05-28 NOTE — PROGRESS NOTES
Subjective:    Patient ID: Beatriz Gan is a 69 y.o. female.    Chief Complaint: Post-op Evaluation of the Right Knee (Patient is here for a PO follow up on right TKA 5.22.25, states pain has improved since last visit. Has numbness as well as burning and shooting pain. Patient did have a fall the day of surgery), Pain of the Left Wrist (Also here for left wrist pain after the same fall, has burning and shooting pain as well as sharp stabbing pain with ROM ), and Pain of the Right Ankle (Right ankle sharp pain and swelling after the fall as well )      History of Present Illness    Prior to meeting with the patient I reviewed the medical chart in [facility]. This included reviewing the previous progress notes from our office, review of the patient's last appointment with their primary care provider, review of any visits to the emergency room, and review of any pain management appointments or procedures.  Status post total knee replacement patient fell and has left wrist and right ankle pain for evaluation today    Current Medications  Current Medications[1]    Allergies  Review of patient's allergies indicates:   Allergen Reactions    Phenergan [promethazine] Hives and Rash    Latex, natural rubber Hives     Per pt report-many years    Morphine Hives       Past Medical History  Past Medical History:   Diagnosis Date    Alcohol dependence     Asthma     Cirrhosis of liver     DDD (degenerative disc disease), lumbosacral     DJD (degenerative joint disease), lumbosacral     Encounter for blood transfusion     GERD (gastroesophageal reflux disease)     Gout     HCV: treated / cured (SVR 5/2025)     Hypercholesterolemia     Hypertension     NATHALIE (iron deficiency anemia)     questionable white coat hypertension    Kidney carcinoma, left 2014    Pneumonia     Renal cell carcinoma     Seizures     Shingles 10/13/2012       Surgical History  Past Surgical History:   Procedure Laterality Date    ANTERIOR CERVICAL  DISCECTOMY W/ FUSION N/A 5/4/2023    Procedure: DISCECTOMY, SPINE, CERVICAL, ANTERIOR APPROACH, WITH FUSION C3-4;  Surgeon: Ruben Martinez MD;  Location: [facility] OR;  Service: Orthopedics;  Laterality: N/A;    APPENDECTOMY      ARTHROSCOPY OF SHOULDER WITH DECOMPRESSION OF SUBACROMIAL SPACE Left 10/31/2019    Procedure: ARTHROSCOPY, SHOULDER, WITH SUBACROMIAL SPACE DECOMPRESSION;  Surgeon: Ruben Martinez MD;  Location: [facility] OR;  Service: Orthopedics;  Laterality: Left;    BLADDER REPAIR      x 2    COLONOSCOPY  9/2011    COLONOSCOPY N/A 9/18/2024    Procedure: COLONOSCOPY;  Surgeon: Ceasar Calero MD;  Location: [facility];  Service: Endoscopy;  Laterality: N/A;    DISTAL CLAVICLE EXCISION Left 10/31/2019    Procedure: EXCISION, CLAVICLE, DISTAL;  Surgeon: Ruben Martinez MD;  Location: [facility] OR;  Service: Orthopedics;  Laterality: Left;    EPIDURAL STEROID INJECTION INTO LUMBAR SPINE N/A 12/7/2020    Procedure: Injection-steroid-epidural-lumbar;  Surgeon: Dk Rosales MD;  Location: [facility] OR;  Service: Pain Management;  Laterality: N/A;  L4-L5    EPIDURAL STEROID INJECTION INTO LUMBAR SPINE N/A 5/17/2021    Procedure: Injection-steroid-epidural-lumbar;  Surgeon: Dk Rosales MD;  Location: [facility] OR;  Service: Pain Management;  Laterality: N/A;  L4-L5    ESOPHAGOGASTRODUODENOSCOPY  9/2011    ESOPHAGOGASTRODUODENOSCOPY N/A 9/18/2024    Procedure: EGD (ESOPHAGOGASTRODUODENOSCOPY);  Surgeon: Ceasar Calero MD;  Location: [facility];  Service: Endoscopy;  Laterality: N/A;    EYE SURGERY      lasix bilateral    FIXATION KYPHOPLASTY N/A 1/31/2020    Procedure: Kyphoplasty T12;  Surgeon: Dk Rosales MD;  Location: [facility] OR;  Service: Pain Management;  Laterality: N/A;    HERNIA REPAIR      hiatal hernai    HYSTERECTOMY      INJECTION OF ANESTHETIC AGENT AROUND NERVE Left 6/8/2020    Procedure: Block, Nerve;  Surgeon: Dk Rosales MD;  Location: [facility] OR;  Service: Pain Management;  Laterality: Left;   left genicular nerve block     KNEE ARTHROPLASTY Left 7/30/2020    Procedure: ARTHROPLASTY, KNEE LEFT;  Surgeon: Ruben Martinez MD;  Location: [facility] OR;  Service: Orthopedics;  Laterality: Left;  REP VALERY RUBY    KNEE ARTHROSCOPY W/ MENISCECTOMY Left 1/16/2020    Procedure: ARTHROSCOPY, KNEE, WITH MEDIAL MENISCECTOMY;  Surgeon: Ruben Martinez MD;  Location: [facility] OR;  Service: Orthopedics;  Laterality: Left;    LAPAROSCOPIC NISSEN FUNDOPLICATION      NEPHRECTOMY      LT partial    RADIOFREQUENCY ABLATION Left 6/19/2020    Procedure: Radiofrequency Ablation;  Surgeon: Dk Rosales MD;  Location: [facility] OR;  Service: Pain Management;  Laterality: Left;    RADIOFREQUENCY ABLATION OF LUMBAR MEDIAL BRANCH NERVE AT SINGLE LEVEL Bilateral 2/28/2020    Procedure: Radiofrequency Ablation, Nerve, Spinal, Lumbar, Medial Branch, 1 Level;  Surgeon: Dk Rosales MD;  Location: [facility] OR;  Service: Pain Management;  Laterality: Bilateral;  L3,L4,L5 - Burned at 80 degrees C. for 60 seconds x 2 each site    RADIOFREQUENCY ABLATION OF LUMBAR MEDIAL BRANCH NERVE AT SINGLE LEVEL Bilateral 10/19/2020    Procedure: Radiofrequency Ablation, Nerve, Spinal, Lumbar, Medial Branch, 1 Level;  Surgeon: Dk Rosales MD;  Location: [facility] OR;  Service: Pain Management;  Laterality: Bilateral;  L3, L4, L5    REFRACTIVE SURGERY      ROBOTIC ARTHROPLASTY, KNEE Right 5/22/2025    Procedure: ROBOTIC ARTHROPLASTY, KNEE, TOTAL;  Surgeon: Ruben Martinez MD;  Location: [facility] OR;  Service: Orthopedics;  Laterality: Right;  Imke-Oliverio    ROTATOR CUFF REPAIR Left 10/31/2019    Procedure: REPAIR, ROTATOR CUFF;  Surgeon: Ruben Martinez MD;  Location: [facility] OR;  Service: Orthopedics;  Laterality: Left;  arthrex    SKIN BIOPSY      TOTAL ABDOMINAL HYSTERECTOMY W/ BILATERAL SALPINGOOPHORECTOMY  age 48    TRANSFORAMINAL EPIDURAL INJECTION OF STEROID Bilateral 7/30/2021    Procedure: Injection,steroid,epidural,transforaminal approach;  Surgeon: Dk  BUBBA Rosales MD;  Location: [facility] OR;  Service: Pain Management;  Laterality: Bilateral;  L5-S1     TRANSFORAMINAL EPIDURAL INJECTION OF STEROID Bilateral 3/28/2022    Procedure: INJECTION, STEROID, EPIDURAL, TRANSFORAMINAL APPROACH Lumbar L5-S1;  Surgeon: Dk Rosales MD;  Location: [facility] OR;  Service: Pain Management;  Laterality: Bilateral;       Family History:   Family History   Problem Relation Name Age of Onset    Hypertension Father      Glaucoma Neg Hx      Macular degeneration Neg Hx      Retinal detachment Neg Hx         Social History:   Social History[2]    Date of surgery:     Review of Systems     General/Constitutional: Chills denies. Fatigue denies. Fever denies. Weight gain denies. Weight loss denies.    Musculoskeletal: Comments: See HPI for details    Skin: Rash denies.    Objective:   Vital Signs: There were no vitals filed for this visit.     Physical Exam    This a well-developed, well nourished patient in no acute distress.  They are alert and oriented and cooperative to examination.  Pt. walks without an antalgic gait.      General Examination:     Constitutional: The patient is alert and oriented to lace person and time. Mood is pleasant.     Head/Face: Normal facial features normal eyebrows    Eyes: Normal extraocular motion bilaterally    Lungs: Respirations are equal and unlabored    Gait is coordinated.    Cardiovascular: There are no swelling or varicosities present.    Lymphatic: Negative for adenopathy    Skin: Normal    Neurological: Level of consciousness normal. Oriented to place person and time and situation    Psychiatric: Oriented to time place person and situation    Incision right knee healing well staples in place range of motion +5-80 80 or 90°.  Right ankle marked tenderness over the distal fibular palpation mild swelling left wrist tender over the dorsum of the left wrist limited active and passive range of motion  XRAY Report/ Interpretation:  AP lateral x-rays right knee  were taken today and they show normal postoperative appearance for total knee replacement  AP lateral mortise x-rays of the right ankle were taken and personally reviewed distal fibular fracture with about 2 mm displacement ankle mortise appears to be intact    AP lateral and oblique x-rays left wrist were taken today there appears to be a distal radius fracture and ulnar styloid fracture there may be a die punch fracture of the distal dorsal radius      Assessment:       1. Closed fracture of distal end of left radius, unspecified fracture morphology, initial encounter    2. Status post total right knee replacement    3. Fall, initial encounter    4. Sprain involving medial aspect of ankle, unspecified laterality, initial encounter    5. Closed fracture of head of left ulna, initial encounter        Plan:       Beatriz was seen today for post-op evaluation, pain and pain.    Diagnoses and all orders for this visit:    Closed fracture of distal end of left radius, unspecified fracture morphology, initial encounter    Status post total right knee replacement  -     X-Ray Knee 1 or 2 View Right    Fall, initial encounter  -     X-Ray Wrist Complete Left  -     X-Ray Knee 1 or 2 View Right  -     X-Ray Ankle Complete Right    Sprain involving medial aspect of ankle, unspecified laterality, initial encounter    Closed fracture of head of left ulna, initial encounter  -     HYDROcodone-acetaminophen (NORCO) 7.5-325 mg per tablet; Take 1 tablet by mouth every 6 (six) hours as needed for Pain.         Follow up for Keep PO Appt On 6/6/25.    Treatment of distal radius fracture with wrist splint patient advised to wear a removable wrist splint at all times  Fracture walking boot given for distal fibular fracture patient advised to be nonweightbearing  Continue physical therapy for range of motion and strengthening right lower extremity but no weight-bearing on right lower extremity due to distal fibular fracture    I will  send a copy of the x-rays of her wrist to Dr. Figueroa just want to get a 2nd opinion about whether she requires internal fixation of a distal radius fracture though I doubt it    Treatment options were discussed with regards to the nature of the medical condition. Conservative pain intervention and surgical options were discussed in detail. The probability of success of each separate treatment option was discussed. The patient expressed a clear understanding of the treatment options. With regards to surgery, the procedure risk, benefits, complications, and outcomes were discussed. No guarantees were given with regards to surgical outcome.   The risk of complications, morbidity, and mortality of patient management decisions have been made at the time of this visit. These are associated with the patient's problems, diagnostic procedures and treatment options. This includes the possible management options selected and those considered but not selected by the patient after shared medical decision making we discussed with the patient.     This note was created using Dragon voice recognition software that occasionally misinterpreted phrases or words.                    [1]   Current Outpatient Medications   Medication Sig Dispense Refill    albuterol (VENTOLIN HFA) 90 mcg/actuation inhaler Inhale 2 puffs into the lungs every 4 (four) hours as needed for Wheezing or Shortness of Breath. Rescue 54 g 3    ALPRAZolam (XANAX) 1 MG tablet TAKE ONE TABLET (1 MG TOTAL) BY MOUTH TWICE DAILY AS NEEDED FOR ANXIETY 60 tablet 2    amLODIPine (NORVASC) 5 MG tablet TAKE ONE TABLET BY MOUTH DAILY AT 9 AM 90 tablet 3    azelastine (ASTELIN) 137 mcg (0.1 %) nasal spray Use 1 spray(s) in each nostril twice daily 30 mL 3    benralizumab (FASENRA PEN) 30 mg/mL AtIn Inject 30 mg into the skin every 8 weeks. 1 mL 5    celecoxib (CELEBREX) 200 MG capsule Take 1 capsule (200 mg total) by mouth 2 (two) times daily. 60 capsule 0    cholecalciferol,  vitamin D3, (VITAMIN D3 ORAL) Take 1 capsule by mouth once daily.      docusate sodium (COLACE) 100 MG capsule Take 1 capsule (100 mg total) by mouth 2 (two) times daily. 60 capsule 0    ergocalciferol, vitamin D2, (VITAMIN D2 ORAL) Take 1 tablet by mouth once daily.      FLUoxetine 40 MG capsule TAKE ONE CAPSULE (40mg) BY MOUTH TWICE DAILY @9am-@5pm 180 capsule 11    fluticasone (FLONASE) 50 mcg/actuation nasal spray 1 spray by Nasal route daily as needed.       gabapentin (NEURONTIN) 600 MG tablet Take 1 tablet (600 mg total) by mouth 3 (three) times daily. 90 tablet 11    lactulose (CHRONULAC) 10 gram/15 mL solution Take 30 mLs by mouth.      lactulose (CHRONULAC) 20 gram/30 mL Soln Take 30 mLs (20 g total) by mouth once daily. Goal of 3-5 soft stools each day to help memory 1800 mL 3    meclizine (ANTIVERT) 12.5 mg tablet Take 1 tablet (12.5 mg total) by mouth 3 (three) times daily as needed for Dizziness. 30 tablet 0    MULTIVIT-IRON-MIN-FOLIC ACID 3,500-18-0.4 UNIT-MG-MG ORAL CHEW Take by mouth once daily.      ondansetron (ZOFRAN) 4 MG tablet Take 1 tablet (4 mg total) by mouth every 6 (six) hours as needed for Nausea. 10 tablet 0    oxyCODONE-acetaminophen (PERCOCET)  mg per tablet Take 1 tablet by mouth every 4 (four) hours as needed for Pain. 42 tablet 0    predniSONE (DELTASONE) 10 MG tablet Take one pill a day for three days, repeat for shortness of breath 12 tablet 0    ubrogepant (UBRELVY) 50 mg tablet Take 1 tablet (50 mg total) by mouth daily as needed for Migraine. If symptoms persist or return, may repeat dose after 2 hours. Maximum: 200 mg per 24 hours 30 tablet 3    zolpidem (AMBIEN) 10 mg Tab TAKE ONE TABLET (10 MG TOTAL) BY MOUTH EVERY EVENING 30 tablet 2    aspirin (ECOTRIN) 81 MG EC tablet Take 1 tablet (81 mg total) by mouth every 12 (twelve) hours. (Patient not taking: Reported on 5/20/2025) 60 tablet 0    budesonide-formoterol 160-4.5 mcg (SYMBICORT) 160-4.5 mcg/actuation HFAA Inhale  2 puffs into the lungs every 12 (twelve) hours. Controller 30.6 g 3    folic acid (FOLVITE) 1 MG tablet Take 1 tablet (1 mg total) by mouth once daily. 90 tablet 0    HYDROcodone-acetaminophen (NORCO) 7.5-325 mg per tablet Take 1 tablet by mouth every 6 (six) hours as needed for Pain. 28 tablet 0    pantoprazole (PROTONIX) 40 MG tablet Take 1 tablet (40 mg total) by mouth once daily. (Patient not taking: Reported on 2025) 90 tablet 3     No current facility-administered medications for this visit.     Facility-Administered Medications Ordered in Other Visits   Medication Dose Route Frequency Provider Last Rate Last Admin    electrolyte-S (ISOLYTE)   Intravenous Continuous Kota Ortega MD 10 mL/hr at 20 0624 New Bag at 24 1130    lactated ringers infusion   Intravenous Continuous Kota Ortega MD        pregabalin capsule 75 mg  75 mg Oral Once Shiela Mann MD       [2]   Social History  Socioeconomic History    Marital status:    Occupational History     Employer: [employer]   Tobacco Use    Smoking status: Former     Current packs/day: 0.00     Average packs/day: 1 pack/day for 40.0 years (40.0 ttl pk-yrs)     Types: Cigarettes     Start date: 1978     Quit date: 2018     Years since quittin.4    Smokeless tobacco: Never    Tobacco comments:     smokes 2-3 cigarettes a night   Substance and Sexual Activity    Alcohol use: Not Currently     Alcohol/week: 12.0 standard drinks of alcohol     Types: 6 Cans of beer, 6 Unspecified drink type per week     Comment: states has not had alcohol since 10/2023    Drug use: No    Sexual activity: Not Currently     Partners: Male     Social Drivers of Health     Financial Resource Strain: Medium Risk (10/15/2024)    Overall Financial Resource Strain (CARDIA)     Difficulty of Paying Living Expenses: Somewhat hard   Food Insecurity: Food Insecurity Present (10/15/2024)    Hunger Vital Sign     Worried About Running Out of Food in the  Last Year: Sometimes true     Ran Out of Food in the Last Year: Never true   Transportation Needs: No Transportation Needs (10/15/2024)    PRAPARE - Transportation     Lack of Transportation (Medical): No     Lack of Transportation (Non-Medical): No   Physical Activity: Inactive (10/15/2024)    Exercise Vital Sign     Days of Exercise per Week: 0 days     Minutes of Exercise per Session: 0 min   Stress: No Stress Concern Present (10/15/2024)    [employer] [place] [employer] - Occupational Stress Questionnaire     Feeling of Stress : Not at all   Housing Stability: Unknown (10/15/2024)    Housing Stability Vital Sign     Unable to Pay for Housing in the Last Year: No     Homeless in the Last Year: No

## 2025-05-30 ENCOUNTER — NURSE TRIAGE (OUTPATIENT)
Dept: ADMINISTRATIVE | Facility: CLINIC | Age: 70
End: 2025-05-30
Payer: MEDICARE

## 2025-05-30 NOTE — TELEPHONE ENCOUNTER
Pt is calling to get information on her Physical Therapy plan.  How many days a week and Dr. Martinez's detailed timeline for healing.  Questions regarding her ankle and wrist injuries.  Questions also on her activity and what she is allowed to do.  Pt is aware at this time no weight bearing on her right left.  Care advice was home care.      Pt is requesting someone with Dr. Patel office to please call her at [phone] to discuss.      Patient verbally understands, all questions answered, advised to call back for any worsening symptoms or further needs.     Reason for Disposition   General information question, no triage required and triager able to answer question    Protocols used: Information Only Call - No Triage-A-[place]

## 2025-06-02 ENCOUNTER — TELEPHONE (OUTPATIENT)
Dept: ORTHOPEDICS | Facility: CLINIC | Age: 70
End: 2025-06-02
Payer: MEDICARE

## 2025-06-03 DIAGNOSIS — F41.9 ANXIETY: ICD-10-CM

## 2025-06-03 DIAGNOSIS — S52.602A: ICD-10-CM

## 2025-06-04 RX ORDER — HYDROCODONE BITARTRATE AND ACETAMINOPHEN 7.5; 325 MG/1; MG/1
1 TABLET ORAL EVERY 6 HOURS PRN
Qty: 28 TABLET | Refills: 0 | Status: SHIPPED | OUTPATIENT
Start: 2025-06-04 | End: 2025-06-11

## 2025-06-04 RX ORDER — ALPRAZOLAM 1 MG/1
1 TABLET ORAL 2 TIMES DAILY PRN
Qty: 60 TABLET | Refills: 2 | Status: SHIPPED | OUTPATIENT
Start: 2025-06-04

## 2025-06-05 ENCOUNTER — TELEPHONE (OUTPATIENT)
Dept: ORTHOPEDICS | Facility: CLINIC | Age: 70
End: 2025-06-05
Payer: MEDICARE

## 2025-06-05 ENCOUNTER — TELEPHONE (OUTPATIENT)
Dept: PRIMARY CARE CLINIC | Facility: CLINIC | Age: 70
End: 2025-06-05
Payer: MEDICARE

## 2025-06-06 ENCOUNTER — OFFICE VISIT (OUTPATIENT)
Dept: ORTHOPEDICS | Facility: CLINIC | Age: 70
End: 2025-06-06
Payer: MEDICARE

## 2025-06-06 ENCOUNTER — HOSPITAL ENCOUNTER (OUTPATIENT)
Dept: RADIOLOGY | Facility: HOSPITAL | Age: 70
Discharge: HOME | End: 2025-06-06
Payer: MEDICARE

## 2025-06-06 VITALS
SYSTOLIC BLOOD PRESSURE: 132 MMHG | BODY MASS INDEX: 31.24 KG/M2 | HEIGHT: 64 IN | WEIGHT: 183 LBS | DIASTOLIC BLOOD PRESSURE: 84 MMHG

## 2025-06-06 DIAGNOSIS — Z96.651 STATUS POST TOTAL RIGHT KNEE REPLACEMENT: Primary | ICD-10-CM

## 2025-06-06 DIAGNOSIS — S82.831D CLOSED AVULSION FRACTURE OF DISTAL END OF RIGHT FIBULA WITH ROUTINE HEALING, SUBSEQUENT ENCOUNTER: ICD-10-CM

## 2025-06-06 DIAGNOSIS — J45.51 SEVERE PERSISTENT ASTHMA WITH ACUTE EXACERBATION: ICD-10-CM

## 2025-06-06 PROCEDURE — 73560 X-RAY EXAM OF KNEE 1 OR 2: CPT | Mod: TC,PN,RT

## 2025-06-06 PROCEDURE — 73560 X-RAY EXAM OF KNEE 1 OR 2: CPT | Mod: 26,RT,, | Performed by: RADIOLOGY

## 2025-06-06 PROCEDURE — 99999 PR PBB SHADOW E&M-EST. PATIENT-LVL V: CPT | Mod: PBBFAC,,,

## 2025-06-06 RX ORDER — BENRALIZUMAB 30 MG/ML
30 INJECTION, SOLUTION SUBCUTANEOUS
Qty: 1 ML | Refills: 5 | Status: SHIPPED | OUTPATIENT
Start: 2025-06-06

## 2025-06-09 ENCOUNTER — TELEPHONE (OUTPATIENT)
Dept: PRIMARY CARE CLINIC | Facility: CLINIC | Age: 70
End: 2025-06-09
Payer: MEDICARE

## 2025-06-09 NOTE — TELEPHONE ENCOUNTER
Copied from CRM #[id number]. Topic: General Inquiry - Status Check  >> Jun 9, 2025  3:44 PM Gigi wrote:  .1MEDICALADVICE     Patient is calling for Medical Advice regarding:Patsy Al called in regards to she states the order for the lyft  chair needs to go to [employer] fax # [phone] or  Patsy states  [employer] does not do lyft chairs.     How long has patient had these symptoms:N/A    Pharmacy name and phone#:N/A    Patient wants a call back or thru [facility]:Callback    Comments:    Please advise patient replies from provider may take up to 48 hours.

## 2025-06-09 NOTE — TELEPHONE ENCOUNTER
Copied from CRM #[id number]. Topic: General Inquiry - Status Check  >> Jun 9, 2025  3:11 PM Vicenta wrote:  .1MEDICALADVICE     Patient is calling for Medical Advice regarding:Status check     How long has patient had these symptoms:BOBBY    Pharmacy name and phone#:NA    Patient wants a call back or thru [facility]:Call back , call pt @[phone]    Comments: Patsy from [facility] is calling to check if a order for the Lyft chair was faxed to [employer] bc she does not have the auth on file she stated that [facility] can be contacted at: [phone] she stated that the auth has to be faxed to [facility] and them [facility] faxes the auth to [employer] . Please advise     Please advise patient replies from provider may take up to 48 hours.

## 2025-06-09 NOTE — TELEPHONE ENCOUNTER
Copied from CRM #[id number]. Topic: General Inquiry - Patient Advice  >> Jun 9, 2025  3:48 PM Madison wrote:  .1MEDICALADVICE     Patient is calling for Medical Advice regarding:[employer] is calling to very that they correct notes are available for a medicatical authorization. Call back number  reference number [id number].    How long has patient had these symptoms:    Pharmacy name and phone#:    Patient wants a call back or thru [facility]:    Comments:    Please advise patient replies from provider may take up to 48 hours.

## 2025-06-10 ENCOUNTER — TELEPHONE (OUTPATIENT)
Dept: PRIMARY CARE CLINIC | Facility: CLINIC | Age: 70
End: 2025-06-10
Payer: MEDICARE

## 2025-06-10 ENCOUNTER — PATIENT MESSAGE (OUTPATIENT)
Dept: PRIMARY CARE CLINIC | Facility: CLINIC | Age: 70
End: 2025-06-10
Payer: MEDICARE

## 2025-06-10 NOTE — TELEPHONE ENCOUNTER
Copied from CRM #[id number]. Topic: General Inquiry - Patient Advice  >> Manuel 10, 2025  8:11 AM Trevon wrote:  .1MEDICALADVICE     Patient is calling for Medical Advice regarding: [employer] called to discuss a recent order for a stair lift. She said she needs to speak to office regarding it.     How long has patient had these symptoms:    Pharmacy name and phone#:    Patient wants a call back or thru [facility]: Call - [phone] op 7    Comments:    Please advise patient replies from provider may take up to 48 hours.

## 2025-06-10 NOTE — TELEPHONE ENCOUNTER
Attempted to fax order to [employer] and [employer]. [employer] doesn't do stair lifts and [employer] won't go to [place] for repair. Attempted to contact [employer] but was no help. Spoke to pt to see if she had ever needed to have it fixed and if she had a specific company already. Pt denies. Pt informed we are working on it.

## 2025-06-12 ENCOUNTER — DOCUMENT SCAN (OUTPATIENT)
Dept: HOME HEALTH SERVICES | Facility: HOSPITAL | Age: 70
End: 2025-06-12
Payer: MEDICARE

## 2025-06-12 NOTE — TELEPHONE ENCOUNTER
Per [employer] they have one place in [place] that may be able to help pt. Faxed order to them @ [phone]

## 2025-06-13 ENCOUNTER — TELEPHONE (OUTPATIENT)
Dept: PULMONOLOGY | Facility: CLINIC | Age: 70
End: 2025-06-13
Payer: MEDICARE

## 2025-06-13 NOTE — TELEPHONE ENCOUNTER
Copied from CRM #[id number]. Topic: Medications - Medication Status Check   >> Jun 12, 2025  8:56 AM Terra wrote:  Type:  Needs Medical Advice    Who Called: Andreina bush/[facility]    Pharmacy name and phone #:      [facility] - [place], [place] - [address]  [address] [address]  Phone: [phone] Fax: [phone]    Would the patient rather a call back or a response via [facility]? call  Best Call Back Number: Andreina   Additional Information: Andreina stated that they faxed a request for this medication on Friday and she was just following up to be sure the fax was received in the office and to see if the prescription will be sent in

## 2025-06-13 NOTE — TELEPHONE ENCOUNTER
Andreina was informed that request has been recvd and is pending ins approval  once done rx will be sent to [facility]  states she will document pt's acct

## 2025-06-16 ENCOUNTER — EXTERNAL HOME HEALTH (OUTPATIENT)
Dept: HOME HEALTH SERVICES | Facility: HOSPITAL | Age: 70
End: 2025-06-16
Payer: MEDICARE

## 2025-06-16 DIAGNOSIS — Z96.651 STATUS POST TOTAL RIGHT KNEE REPLACEMENT: Primary | ICD-10-CM

## 2025-06-16 DIAGNOSIS — S82.831D CLOSED AVULSION FRACTURE OF DISTAL END OF RIGHT FIBULA WITH ROUTINE HEALING, SUBSEQUENT ENCOUNTER: ICD-10-CM

## 2025-06-16 RX ORDER — HYDROCODONE BITARTRATE AND ACETAMINOPHEN 7.5; 325 MG/1; MG/1
1 TABLET ORAL EVERY 6 HOURS PRN
Qty: 28 TABLET | Refills: 0 | Status: SHIPPED | OUTPATIENT
Start: 2025-06-16 | End: 2025-06-23

## 2025-06-16 NOTE — TELEPHONE ENCOUNTER
----- Message from Mariano sent at 6/16/2025 11:56 AM CDT -----  Phone #: [phone]  Patient is requesting a refill of       Pharmacy:   [id number] - [place] - [address]  [address] [place] [address]  Phone: [phone] Fax: [phone]    [facility] - [facility] [place] - [address]  [address] [address]  Phone: [phone] Fax: [phone]    [facility] - [place] - [address]  [address]  [place] [address]  Phone: [phone] Fax: [phone]    [facility]  [address]  [place] [address]  Phone: [phone] Fax: [phone]    [facility] - [place], [place] - [address]  [address] [facility] [address]  Phone: [phone] Fax: [phone]    [facility]  [address]  [facility] [address]  Phone: [phone] Fax: [phone]        PT IS REQUESTING PAIN MEDS

## 2025-06-17 ENCOUNTER — TELEPHONE (OUTPATIENT)
Dept: PULMONOLOGY | Facility: CLINIC | Age: 70
End: 2025-06-17
Payer: MEDICARE

## 2025-06-17 NOTE — TELEPHONE ENCOUNTER
Copied from CRM #[id number]. Topic: Medications - Medication Refill  >> Jun 17, 2025  1:47 PM Mely wrote:  Type:  RX Refill Request    Who Called: Kel with [facility]  Refill or New Rx:refill  RX Name and Strength:benralizumab (FASENRA PEN) 30 mg/mL AtIn  How is the patient currently taking it? (ex. 1XDay):as directed  Is this a 30 day or 90 day RX:90  Preferred Pharmacy with phone number:  [facility] - [place], [place] - [address]  [address] [address]  Phone: [phone] Fax: [phone]    Local or Mail Order:mail order  Ordering Provider:ARLENE Narayan  Would the patient rather a call back or a response via [facility]? call  Best Call Back Number:[phone]  Additional Information: Please call Kel to advise.  Thanks!

## 2025-06-19 DIAGNOSIS — F51.01 PRIMARY INSOMNIA: ICD-10-CM

## 2025-06-19 NOTE — TELEPHONE ENCOUNTER
No care due was identified.  [facility] Embedded Care Due Messages. Reference number: [id number].   6/19/2025 2:04:24 PM CDT

## 2025-06-20 RX ORDER — ZOLPIDEM TARTRATE 10 MG/1
TABLET ORAL
Qty: 30 TABLET | Refills: 2 | Status: SHIPPED | OUTPATIENT
Start: 2025-06-20

## 2025-06-28 DIAGNOSIS — S82.831D CLOSED AVULSION FRACTURE OF DISTAL END OF RIGHT FIBULA WITH ROUTINE HEALING, SUBSEQUENT ENCOUNTER: ICD-10-CM

## 2025-06-28 DIAGNOSIS — Z96.651 STATUS POST TOTAL RIGHT KNEE REPLACEMENT: ICD-10-CM

## 2025-06-30 ENCOUNTER — PATIENT MESSAGE (OUTPATIENT)
Dept: ADMINISTRATIVE | Facility: HOSPITAL | Age: 70
End: 2025-06-30
Payer: MEDICARE

## 2025-06-30 RX ORDER — HYDROCODONE BITARTRATE AND ACETAMINOPHEN 7.5; 325 MG/1; MG/1
1 TABLET ORAL EVERY 6 HOURS PRN
Qty: 28 TABLET | Refills: 0 | Status: SHIPPED | OUTPATIENT
Start: 2025-06-30 | End: 2025-07-07

## 2025-07-07 ENCOUNTER — OFFICE VISIT (OUTPATIENT)
Dept: ORTHOPEDICS | Facility: CLINIC | Age: 70
End: 2025-07-07
Payer: MEDICARE

## 2025-07-07 ENCOUNTER — HOSPITAL ENCOUNTER (OUTPATIENT)
Dept: RADIOLOGY | Facility: HOSPITAL | Age: 70
Discharge: HOME | End: 2025-07-07
Attending: ORTHOPAEDIC SURGERY
Payer: MEDICARE

## 2025-07-07 VITALS — HEIGHT: 64 IN | WEIGHT: 183 LBS | BODY MASS INDEX: 31.24 KG/M2

## 2025-07-07 DIAGNOSIS — Z96.651 STATUS POST TOTAL RIGHT KNEE REPLACEMENT: Primary | ICD-10-CM

## 2025-07-07 DIAGNOSIS — S82.831D CLOSED AVULSION FRACTURE OF DISTAL END OF RIGHT FIBULA WITH ROUTINE HEALING, SUBSEQUENT ENCOUNTER: ICD-10-CM

## 2025-07-07 DIAGNOSIS — S52.502A CLOSED FRACTURE OF DISTAL END OF LEFT RADIUS, UNSPECIFIED FRACTURE MORPHOLOGY, INITIAL ENCOUNTER: ICD-10-CM

## 2025-07-07 PROCEDURE — 1100F PTFALLS ASSESS-DOCD GE2>/YR: CPT | Mod: CPTII,HCNC,S$GLB, | Performed by: ORTHOPAEDIC SURGERY

## 2025-07-07 PROCEDURE — 73610 X-RAY EXAM OF ANKLE: CPT | Mod: 26,HCNC,RT, | Performed by: RADIOLOGY

## 2025-07-07 PROCEDURE — 1160F RVW MEDS BY RX/DR IN RCRD: CPT | Mod: CPTII,HCNC,S$GLB, | Performed by: ORTHOPAEDIC SURGERY

## 2025-07-07 PROCEDURE — 1159F MED LIST DOCD IN RCRD: CPT | Mod: CPTII,HCNC,S$GLB, | Performed by: ORTHOPAEDIC SURGERY

## 2025-07-07 PROCEDURE — 3288F FALL RISK ASSESSMENT DOCD: CPT | Mod: CPTII,HCNC,S$GLB, | Performed by: ORTHOPAEDIC SURGERY

## 2025-07-07 PROCEDURE — 73610 X-RAY EXAM OF ANKLE: CPT | Mod: TC,HCNC,PN,RT

## 2025-07-07 PROCEDURE — 73110 X-RAY EXAM OF WRIST: CPT | Mod: 26,HCNC,LT, | Performed by: RADIOLOGY

## 2025-07-07 PROCEDURE — 99999 PR PBB SHADOW E&M-EST. PATIENT-LVL V: CPT | Mod: PBBFAC,HCNC,, | Performed by: ORTHOPAEDIC SURGERY

## 2025-07-07 PROCEDURE — 73110 X-RAY EXAM OF WRIST: CPT | Mod: TC,HCNC,PN,LT

## 2025-07-07 PROCEDURE — 1125F AMNT PAIN NOTED PAIN PRSNT: CPT | Mod: CPTII,HCNC,S$GLB, | Performed by: ORTHOPAEDIC SURGERY

## 2025-07-07 PROCEDURE — 99024 POSTOP FOLLOW-UP VISIT: CPT | Mod: HCNC,S$GLB,, | Performed by: ORTHOPAEDIC SURGERY

## 2025-07-07 RX ORDER — CELECOXIB 200 MG/1
200 CAPSULE ORAL EVERY MORNING
COMMUNITY
Start: 2025-06-16

## 2025-07-07 NOTE — PROGRESS NOTES
Subjective:    Patient ID: Beatriz Gan is a 69 y.o. female.    Chief Complaint: Post-op Evaluation of the Right Knee (1 month with Dr. Martinez for R PO TKA 5/22/25. States her knee is doing well today. Has occasional stabbing pains. Stopped wearing boot d/t pain, was making it worse. Uses wheelchair most of the time. Has been going to PT, but is limited d/t being nonweightbearing. /), Pain of the Left Wrist (L wrist fx. Pain has gotten worse since previous appt. Pain is located at the medial wrist, worse when rotating it. Has been wearing a brace with good relief. ), and Injury of the Right Ankle (R ankle fx. No pain today. Would like to make sure break healed properly. )      History of Present Illness    Prior to meeting with the patient I reviewed the medical chart in [employer]. This included reviewing the previous progress notes from our office, review of the patient's last appointment with their primary care provider, review of any visits to the emergency room, and review of any pain management appointments or procedures.   Patient comes in today for follow-up for her right total knee arthroplasty.  She is 6 weeks postop and doing very well with it.  Unfortunately she had a fall right after surgery where she suffered a left distal radius fracture of the right distal fibula fracture.  She is following up for those today as well.  She reports her right ankle and right knee are doing well.  She is having continued pain in the left wrist.  She is wearing her left wrist splint today.  Comes in today ambulating via wheelchair.    Current Medications  Current Medications[1]    Allergies  Review of patient's allergies indicates:   Allergen Reactions    Phenergan [promethazine] Hives and Rash    Latex, natural rubber Hives     Per pt report-many years    Morphine Hives       Past Medical History  Past Medical History:   Diagnosis Date    Alcohol dependence     Asthma     Cirrhosis of liver     DDD (degenerative  disc disease), lumbosacral     DJD (degenerative joint disease), lumbosacral     Encounter for blood transfusion     GERD (gastroesophageal reflux disease)     Gout     HCV: treated / cured (SVR 5/2025)     Hypercholesterolemia     Hypertension     NATHALIE (iron deficiency anemia)     questionable white coat hypertension    Kidney carcinoma, left 2014    Pneumonia     Renal cell carcinoma     Seizures     Shingles 10/13/2012       Surgical History  Past Surgical History:   Procedure Laterality Date    ANTERIOR CERVICAL DISCECTOMY W/ FUSION N/A 5/4/2023    Procedure: DISCECTOMY, SPINE, CERVICAL, ANTERIOR APPROACH, WITH FUSION C3-4;  Surgeon: Ruben Martinez MD;  Location: [facility] OR;  Service: Orthopedics;  Laterality: N/A;    APPENDECTOMY      ARTHROSCOPY OF SHOULDER WITH DECOMPRESSION OF SUBACROMIAL SPACE Left 10/31/2019    Procedure: ARTHROSCOPY, SHOULDER, WITH SUBACROMIAL SPACE DECOMPRESSION;  Surgeon: Ruben Martinez MD;  Location: [facility] OR;  Service: Orthopedics;  Laterality: Left;    BLADDER REPAIR      x 2    COLONOSCOPY  9/2011    COLONOSCOPY N/A 9/18/2024    Procedure: COLONOSCOPY;  Surgeon: Ceasar Calero MD;  Location: [facility];  Service: Endoscopy;  Laterality: N/A;    DISTAL CLAVICLE EXCISION Left 10/31/2019    Procedure: EXCISION, CLAVICLE, DISTAL;  Surgeon: Ruben Martinez MD;  Location: [facility] OR;  Service: Orthopedics;  Laterality: Left;    EPIDURAL STEROID INJECTION INTO LUMBAR SPINE N/A 12/7/2020    Procedure: Injection-steroid-epidural-lumbar;  Surgeon: Dk Rosales MD;  Location: [facility] OR;  Service: Pain Management;  Laterality: N/A;  L4-L5    EPIDURAL STEROID INJECTION INTO LUMBAR SPINE N/A 5/17/2021    Procedure: Injection-steroid-epidural-lumbar;  Surgeon: Dk Rosales MD;  Location: [facility] OR;  Service: Pain Management;  Laterality: N/A;  L4-L5    ESOPHAGOGASTRODUODENOSCOPY  9/2011    ESOPHAGOGASTRODUODENOSCOPY N/A 9/18/2024    Procedure: EGD (ESOPHAGOGASTRODUODENOSCOPY);  Surgeon:  Ceasar Calero MD;  Location: [facility];  Service: Endoscopy;  Laterality: N/A;    EYE SURGERY      lasix bilateral    FIXATION KYPHOPLASTY N/A 1/31/2020    Procedure: Kyphoplasty T12;  Surgeon: Dk Rosales MD;  Location: [facility] OR;  Service: Pain Management;  Laterality: N/A;    HERNIA REPAIR      hiatal hernai    HYSTERECTOMY      INJECTION OF ANESTHETIC AGENT AROUND NERVE Left 6/8/2020    Procedure: Block, Nerve;  Surgeon: Dk Rosales MD;  Location: [facility] OR;  Service: Pain Management;  Laterality: Left;  left genicular nerve block     KNEE ARTHROPLASTY Left 7/30/2020    Procedure: ARTHROPLASTY, KNEE LEFT;  Surgeon: Ruben Martinez MD;  Location: [facility] OR;  Service: Orthopedics;  Laterality: Left;  REP VALERY RUBY    KNEE ARTHROSCOPY W/ MENISCECTOMY Left 1/16/2020    Procedure: ARTHROSCOPY, KNEE, WITH MEDIAL MENISCECTOMY;  Surgeon: Ruben Martinez MD;  Location: [facility] OR;  Service: Orthopedics;  Laterality: Left;    LAPAROSCOPIC NISSEN FUNDOPLICATION      NEPHRECTOMY      LT partial    RADIOFREQUENCY ABLATION Left 6/19/2020    Procedure: Radiofrequency Ablation;  Surgeon: Dk Rosales MD;  Location: [facility] OR;  Service: Pain Management;  Laterality: Left;    RADIOFREQUENCY ABLATION OF LUMBAR MEDIAL BRANCH NERVE AT SINGLE LEVEL Bilateral 2/28/2020    Procedure: Radiofrequency Ablation, Nerve, Spinal, Lumbar, Medial Branch, 1 Level;  Surgeon: Dk Rosales MD;  Location: [facility] OR;  Service: Pain Management;  Laterality: Bilateral;  L3,L4,L5 - Burned at 80 degrees C. for 60 seconds x 2 each site    RADIOFREQUENCY ABLATION OF LUMBAR MEDIAL BRANCH NERVE AT SINGLE LEVEL Bilateral 10/19/2020    Procedure: Radiofrequency Ablation, Nerve, Spinal, Lumbar, Medial Branch, 1 Level;  Surgeon: Dk Rosales MD;  Location: [facility] OR;  Service: Pain Management;  Laterality: Bilateral;  L3, L4, L5    REFRACTIVE SURGERY      ROBOTIC ARTHROPLASTY, KNEE Right 5/22/2025    Procedure: ROBOTIC ARTHROPLASTY, KNEE, TOTAL;   Surgeon: Ruben Martinez MD;  Location: [facility] OR;  Service: Orthopedics;  Laterality: Right;  [place]    ROTATOR CUFF REPAIR Left 10/31/2019    Procedure: REPAIR, ROTATOR CUFF;  Surgeon: Ruben Martinez MD;  Location: [facility] OR;  Service: Orthopedics;  Laterality: Left;  arthrex    SKIN BIOPSY      TOTAL ABDOMINAL HYSTERECTOMY W/ BILATERAL SALPINGOOPHORECTOMY  age 48    TRANSFORAMINAL EPIDURAL INJECTION OF STEROID Bilateral 7/30/2021    Procedure: Injection,steroid,epidural,transforaminal approach;  Surgeon: Dk Rosales MD;  Location: [facility] OR;  Service: Pain Management;  Laterality: Bilateral;  L5-S1     TRANSFORAMINAL EPIDURAL INJECTION OF STEROID Bilateral 3/28/2022    Procedure: INJECTION, STEROID, EPIDURAL, TRANSFORAMINAL APPROACH Lumbar L5-S1;  Surgeon: Dk Rosales MD;  Location: [facility] OR;  Service: Pain Management;  Laterality: Bilateral;       Family History:   Family History   Problem Relation Name Age of Onset    Hypertension Father      Glaucoma Neg Hx      Macular degeneration Neg Hx      Retinal detachment Neg Hx         Social History:   Social History[2]    Date of surgery:  May 22, 2025    Review of Systems     General/Constitutional: Chills denies. Fatigue denies. Fever denies. Weight gain denies. Weight loss denies.    Musculoskeletal: Comments: See HPI for details    Skin: Rash denies.    Objective:   Vital Signs: There were no vitals filed for this visit.     Physical Exam    This a well-developed, well nourished patient in no acute distress.  They are alert and oriented and cooperative to examination.  Pt. walks without an antalgic gait.      General Examination:     Constitutional: The patient is alert and oriented to lace person and time. Mood is pleasant.     Head/Face: Normal facial features normal eyebrows    Eyes: Normal extraocular motion bilaterally    Lungs: Respirations are equal and unlabored    Gait is coordinated.    Cardiovascular: There are no swelling or varicosities  present.    Lymphatic: Negative for adenopathy    Skin: Normal    Neurological: Level of consciousness normal. Oriented to place person and time and situation    Psychiatric: Oriented to time place person and situation     Right ankle exam: Skin to right ankle clean dry and intact.  No erythema or ecchymosis.  No signs or symptoms of infection.  Neurovascularly intact throughout the right lower extremity.  Right EHL is intact.  Negative Homans on the right.  She can dorsiflex plantar flex the right ankle without difficulty.      Right knee exam: Skin to right knee clean dry and intact.  No erythema or ecchymosis.  No signs or symptoms of infection.  Neurovascularly intact throughout the right lower extremity.  Right knee anterior midline incision well healed without wound dehiscence or drainage.  Right knee range of motion is 0-110 degrees. Right knee is stable to varus and valgus stresses.      Left wrist exam: Skin to left wrist clean dry and intact.  No erythema or ecchymosis.  No signs or symptoms of infection.  Neurovascularly intact throughout the left upper extremity.  She can wiggle all fingers in the left hand.  She can open and close her left hand into a fist.  Capillary refill is brisk to all digits in the hand.    She has difficulty fully supinate.  She can fully pronate.  Some limitations with flexion / extension of the left wrist with discomfort at the extremes of motion.    XRAY Report/ Interpretation: Three views taken of the right ankle today: AP, lateral, oblique views.  They reveal a healing fracture of the right distal fibula.  Ankle mortise is maintained.  No dislocations seen.    Three views taken of the left wrist today: AP, lateral, and oblique views.  They reveal a healed left distal radius fracture.  No dislocations seen.      Assessment:       1. Closed fracture of distal end of left radius, unspecified fracture morphology, initial encounter    2. Closed avulsion fracture of distal end of  right fibula with routine healing, subsequent encounter    3. Status post total right knee replacement        Plan:       Beatriz was seen today for post-op evaluation, pain and injury.    Diagnoses and all orders for this visit:    Closed fracture of distal end of left radius, unspecified fracture morphology, initial encounter  -     X-Ray Wrist Complete Left    Closed avulsion fracture of distal end of right fibula with routine healing, subsequent encounter  -     X-Ray Ankle Complete Right    Status post total right knee replacement         No follow-ups on file.  This is to attest that the assistant, Elliott Renteria served in the capacity as a scribe for this patient's encounter.  This is also verify that I have reviewed the patient's history and  formulated the treatment plan for this patient.  I have evaluated this patient and formulated a treatment plan for this patient visit.  The treatment plan and medical decision-making is outlined below.    She can begin weight-bearing as tolerated on the right ankle/lower extremity.  No knee to wear the boot any longer.  She has actually discontinued this on her own.  For the left wrist, she can discontinue the splint.  We will get her into OT to work on range of motion.  She can do that in coordination with her outpatient therapy where she is doing PT for her postop right total knee arthroplasty.  We will check her back in 6 weeks to assess her progress.    Treatment options were discussed with regards to the nature of the medical condition. Conservative pain intervention and surgical options were discussed in detail. The probability of success of each separate treatment option was discussed. The patient expressed a clear understanding of the treatment options. With regards to surgery, the procedure risk, benefits, complications, and outcomes were discussed. No guarantees were given with regards to surgical outcome.   The risk of complications, morbidity, and mortality  of patient management decisions have been made at the time of this visit. These are associated with the patient's problems, diagnostic procedures and treatment options. This includes the possible management options selected and those considered but not selected by the patient after shared medical decision making we discussed with the patient.     This note was created using Dragon voice recognition software that occasionally misinterpreted phrases or words.                      [1]   Current Outpatient Medications   Medication Sig Dispense Refill    albuterol (VENTOLIN HFA) 90 mcg/actuation inhaler Inhale 2 puffs into the lungs every 4 (four) hours as needed for Wheezing or Shortness of Breath. Rescue 54 g 3    ALPRAZolam (XANAX) 1 MG tablet Take 1 tablet (1 mg total) by mouth 2 (two) times daily as needed for Anxiety. 60 tablet 2    amLODIPine (NORVASC) 5 MG tablet TAKE ONE TABLET BY MOUTH DAILY AT 9 AM 90 tablet 3    aspirin (ECOTRIN) 81 MG EC tablet Take 1 tablet (81 mg total) by mouth every 12 (twelve) hours. 60 tablet 0    azelastine (ASTELIN) 137 mcg (0.1 %) nasal spray Use 1 spray(s) in each nostril twice daily 30 mL 3    benralizumab (FASENRA PEN) 30 mg/mL AtIn Inject 30 mg into the skin every 8 weeks. 1 mL 5    celecoxib (CELEBREX) 200 MG capsule Take 200 mg by mouth every morning.      cholecalciferol, vitamin D3, (VITAMIN D3 ORAL) Take 1 capsule by mouth once daily.      ergocalciferol, vitamin D2, (VITAMIN D2 ORAL) Take 1 tablet by mouth once daily.      FLUoxetine 40 MG capsule TAKE ONE CAPSULE (40mg) BY MOUTH TWICE DAILY @9am-@5pm 180 capsule 11    fluticasone (FLONASE) 50 mcg/actuation nasal spray 1 spray by Nasal route daily as needed.       gabapentin (NEURONTIN) 600 MG tablet Take 1 tablet (600 mg total) by mouth 3 (three) times daily. 90 tablet 11    HYDROcodone-acetaminophen (NORCO) 7.5-325 mg per tablet Take 1 tablet by mouth every 6 (six) hours as needed for Pain. 28 tablet 0    lactulose  (CHRONULAC) 10 gram/15 mL solution Take 30 mLs by mouth.      lactulose (CHRONULAC) 20 gram/30 mL Soln Take 30 mLs (20 g total) by mouth once daily. Goal of 3-5 soft stools each day to help memory 1800 mL 3    meclizine (ANTIVERT) 12.5 mg tablet Take 1 tablet (12.5 mg total) by mouth 3 (three) times daily as needed for Dizziness. 30 tablet 0    MULTIVIT-IRON-MIN-FOLIC ACID 3,500-18-0.4 UNIT-MG-MG ORAL CHEW Take by mouth once daily.      ondansetron (ZOFRAN) 4 MG tablet Take 1 tablet (4 mg total) by mouth every 6 (six) hours as needed for Nausea. 10 tablet 0    predniSONE (DELTASONE) 10 MG tablet Take one pill a day for three days, repeat for shortness of breath 12 tablet 0    ubrogepant (UBRELVY) 50 mg tablet Take 1 tablet (50 mg total) by mouth daily as needed for Migraine. If symptoms persist or return, may repeat dose after 2 hours. Maximum: 200 mg per 24 hours 30 tablet 3    zolpidem (AMBIEN) 10 mg Tab TAKE ONE TABLET (10 MG TOTAL) BY MOUTH EVERY EVENING 30 tablet 2    budesonide-formoterol 160-4.5 mcg (SYMBICORT) 160-4.5 mcg/actuation HFAA Inhale 2 puffs into the lungs every 12 (twelve) hours. Controller 30.6 g 3    folic acid (FOLVITE) 1 MG tablet Take 1 tablet (1 mg total) by mouth once daily. 90 tablet 0    pantoprazole (PROTONIX) 40 MG tablet Take 1 tablet (40 mg total) by mouth once daily. (Patient not taking: Reported on 4/14/2025) 90 tablet 3     No current facility-administered medications for this visit.     Facility-Administered Medications Ordered in Other Visits   Medication Dose Route Frequency Provider Last Rate Last Admin    electrolyte-S (ISOLYTE)   Intravenous Continuous Kota Ortega MD 10 mL/hr at 01/31/20 0624 New Bag at 09/18/24 1130    lactated ringers infusion   Intravenous Continuous Kota Ortega MD        pregabalin capsule 75 mg  75 mg Oral Once Shiela Mann MD       [2]   Social History  Socioeconomic History    Marital status:    Occupational History     Employer:  [employer]   Tobacco Use    Smoking status: Former     Current packs/day: 0.00     Average packs/day: 1 pack/day for 40.0 years (40.0 ttl pk-yrs)     Types: Cigarettes     Start date: 1978     Quit date: 2018     Years since quittin.5    Smokeless tobacco: Never    Tobacco comments:     smokes 2-3 cigarettes a night   Substance and Sexual Activity    Alcohol use: Not Currently     Alcohol/week: 12.0 standard drinks of alcohol     Types: 6 Cans of beer, 6 Unspecified drink type per week     Comment: states has not had alcohol since 10/2023    Drug use: No    Sexual activity: Not Currently     Partners: Male     Social Drivers of Health     Financial Resource Strain: Medium Risk (10/15/2024)    Overall Financial Resource Strain (CARDIA)     Difficulty of Paying Living Expenses: Somewhat hard   Food Insecurity: Food Insecurity Present (10/15/2024)    Hunger Vital Sign     Worried About Running Out of Food in the Last Year: Sometimes true     Ran Out of Food in the Last Year: Never true   Transportation Needs: No Transportation Needs (10/15/2024)    PRAPARE - Transportation     Lack of Transportation (Medical): No     Lack of Transportation (Non-Medical): No   Physical Activity: Inactive (10/15/2024)    Exercise Vital Sign     Days of Exercise per Week: 0 days     Minutes of Exercise per Session: 0 min   Stress: No Stress Concern Present (10/15/2024)    [employer] [place] [employer] - Occupational Stress Questionnaire     Feeling of Stress : Not at all   Housing Stability: Unknown (10/15/2024)    Housing Stability Vital Sign     Unable to Pay for Housing in the Last Year: No     Homeless in the Last Year: No

## 2025-07-08 ENCOUNTER — PATIENT OUTREACH (OUTPATIENT)
Dept: ADMINISTRATIVE | Facility: HOSPITAL | Age: 70
End: 2025-07-08
Payer: MEDICARE

## 2025-07-08 VITALS — DIASTOLIC BLOOD PRESSURE: 84 MMHG | SYSTOLIC BLOOD PRESSURE: 132 MMHG

## 2025-07-10 DIAGNOSIS — Z96.651 STATUS POST TOTAL RIGHT KNEE REPLACEMENT: ICD-10-CM

## 2025-07-10 DIAGNOSIS — M17.11 PRIMARY OSTEOARTHRITIS OF RIGHT KNEE: ICD-10-CM

## 2025-07-10 DIAGNOSIS — G43.009 MIGRAINE WITHOUT AURA AND WITHOUT STATUS MIGRAINOSUS, NOT INTRACTABLE: ICD-10-CM

## 2025-07-10 DIAGNOSIS — F41.9 ANXIETY: ICD-10-CM

## 2025-07-10 DIAGNOSIS — S82.831D CLOSED AVULSION FRACTURE OF DISTAL END OF RIGHT FIBULA WITH ROUTINE HEALING, SUBSEQUENT ENCOUNTER: ICD-10-CM

## 2025-07-10 RX ORDER — DOCUSATE SODIUM 100 MG/1
100 CAPSULE, LIQUID FILLED ORAL 2 TIMES DAILY
Qty: 60 CAPSULE | Refills: 0 | OUTPATIENT
Start: 2025-07-10 | End: 2025-08-09

## 2025-07-10 RX ORDER — HYDROCODONE BITARTRATE AND ACETAMINOPHEN 5; 325 MG/1; MG/1
1 TABLET ORAL EVERY 8 HOURS PRN
Qty: 21 TABLET | Refills: 0 | Status: SHIPPED | OUTPATIENT
Start: 2025-07-10

## 2025-07-10 RX ORDER — ALPRAZOLAM 1 MG/1
1 TABLET ORAL 2 TIMES DAILY PRN
Qty: 60 TABLET | Refills: 2 | Status: SHIPPED | OUTPATIENT
Start: 2025-07-10

## 2025-07-10 RX ORDER — HYDROCODONE BITARTRATE AND ACETAMINOPHEN 7.5; 325 MG/1; MG/1
1 TABLET ORAL EVERY 6 HOURS PRN
Qty: 28 TABLET | Refills: 0 | Status: CANCELLED | OUTPATIENT
Start: 2025-07-10 | End: 2025-07-17

## 2025-07-10 NOTE — TELEPHONE ENCOUNTER
No care due was identified.  [facility] Embedded Care Due Messages. Reference number: [id number].   7/10/2025 1:07:25 PM CDT

## 2025-07-15 ENCOUNTER — TELEPHONE (OUTPATIENT)
Dept: ORTHOPEDICS | Facility: CLINIC | Age: 70
End: 2025-07-15
Payer: MEDICARE

## 2025-07-15 NOTE — TELEPHONE ENCOUNTER
----- Message from Lorena sent at 7/15/2025 10:29 AM CDT -----  [phone]-she was told to take brace off, but the pain is increasing, please call to advise

## 2025-07-15 NOTE — TELEPHONE ENCOUNTER
Patient having increased pain and weakness in left wrist / forearm. Scheduled appt for her to come back in and be re-evaluated.

## 2025-07-18 ENCOUNTER — HOSPITAL ENCOUNTER (EMERGENCY)
Facility: HOSPITAL | Age: 70
Discharge: HOME | End: 2025-07-18
Attending: EMERGENCY MEDICINE
Payer: MEDICARE

## 2025-07-18 VITALS
OXYGEN SATURATION: 98 % | TEMPERATURE: 98 F | WEIGHT: 182 LBS | RESPIRATION RATE: 15 BRPM | SYSTOLIC BLOOD PRESSURE: 161 MMHG | DIASTOLIC BLOOD PRESSURE: 89 MMHG | HEART RATE: 63 BPM | BODY MASS INDEX: 31.07 KG/M2 | HEIGHT: 64 IN

## 2025-07-18 DIAGNOSIS — S90.121A: ICD-10-CM
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
## 2025-07-18 DIAGNOSIS — S00.83XA TRAUMATIC HEMATOMA OF FOREHEAD, INITIAL ENCOUNTER: Primary | ICD-10-CM

## 2025-07-18 DIAGNOSIS — S80.01XA CONTUSION OF RIGHT KNEE, INITIAL ENCOUNTER: ICD-10-CM

## 2025-07-18 DIAGNOSIS — S93.514A: ICD-10-CM

## 2025-07-18 PROCEDURE — 94760 N-INVAS EAR/PLS OXIMETRY 1: CPT

## 2025-07-18 PROCEDURE — 99284 EMERGENCY DEPT VISIT MOD MDM: CPT | Mod: 25

## 2025-07-18 PROCEDURE — 25000003 PHARM REV CODE 250: Performed by: EMERGENCY MEDICINE

## 2025-07-18 RX ORDER — HYDROCODONE BITARTRATE AND ACETAMINOPHEN 10; 325 MG/1; MG/1
1 TABLET ORAL
Refills: 0 | Status: COMPLETED | OUTPATIENT
Start: 2025-07-18 | End: 2025-07-18

## 2025-07-18 RX ORDER — ACETAMINOPHEN 500 MG
1000 TABLET ORAL
Status: DISCONTINUED | OUTPATIENT
Start: 2025-07-18 | End: 2025-07-18 | Stop reason: HOSPADM

## 2025-07-18 RX ADMIN — HYDROCODONE BITARTRATE AND ACETAMINOPHEN 1 TABLET: 10; 325 TABLET ORAL at 07:07

## 2025-07-19 ENCOUNTER — HOSPITAL ENCOUNTER (EMERGENCY)
Facility: HOSPITAL | Age: 70
Discharge: HOME | End: 2025-07-19
Attending: EMERGENCY MEDICINE
Payer: MEDICARE

## 2025-07-19 ENCOUNTER — NURSE TRIAGE (OUTPATIENT)
Dept: ADMINISTRATIVE | Facility: CLINIC | Age: 70
End: 2025-07-19
Payer: MEDICARE

## 2025-07-19 VITALS
DIASTOLIC BLOOD PRESSURE: 72 MMHG | WEIGHT: 182 LBS | HEART RATE: 76 BPM | HEIGHT: 64 IN | TEMPERATURE: 98 F | OXYGEN SATURATION: 97 % | BODY MASS INDEX: 31.07 KG/M2 | SYSTOLIC BLOOD PRESSURE: 140 MMHG | RESPIRATION RATE: 16 BRPM

## 2025-07-19 DIAGNOSIS — S06.0X0A CONCUSSION WITHOUT LOSS OF CONSCIOUSNESS, INITIAL ENCOUNTER: Primary | ICD-10-CM

## 2025-07-19 DIAGNOSIS — S00.03XD SCALP HEMATOMA, SUBSEQUENT ENCOUNTER: ICD-10-CM

## 2025-07-19 DIAGNOSIS — S80.01XD CONTUSION OF RIGHT KNEE, SUBSEQUENT ENCOUNTER: ICD-10-CM

## 2025-07-19 DIAGNOSIS — S90.31XD CONTUSION OF RIGHT FOOT, SUBSEQUENT ENCOUNTER: ICD-10-CM

## 2025-07-19 DIAGNOSIS — R42 LIGHTHEADED: ICD-10-CM

## 2025-07-19 DIAGNOSIS — N30.00 ACUTE CYSTITIS WITHOUT HEMATURIA: ICD-10-CM

## 2025-07-19 LAB
ABSOLUTE EOSINOPHIL (SMH): 0 K/UL
ABSOLUTE MONOCYTE (SMH): 0.57 K/UL (ref 0.3–1)
ABSOLUTE NEUTROPHIL COUNT (SMH): 2.9 K/UL (ref 1.8–7.7)
ALBUMIN SERPL-MCNC: 3.9 G/DL (ref 3.5–5.2)
ALP SERPL-CCNC: 129 UNIT/L (ref 40–150)
ALT SERPL-CCNC: <8 UNIT/L (ref 10–44)
ANION GAP (SMH): 12 MMOL/L (ref 8–16)
AST SERPL-CCNC: 19 UNIT/L (ref 11–45)
BACTERIA #/AREA URNS AUTO: ABNORMAL /HPF
BASOPHILS # BLD AUTO: 0.01 K/UL
BASOPHILS NFR BLD AUTO: 0.2 %
BILIRUB SERPL-MCNC: 0.6 MG/DL (ref 0.1–1)
BILIRUB UR QL STRIP.AUTO: NEGATIVE
BUN SERPL-MCNC: 14 MG/DL (ref 8–23)
CALCIUM SERPL-MCNC: 9.9 MG/DL (ref 8.7–10.5)
CHLORIDE SERPL-SCNC: 105 MMOL/L (ref 95–110)
CLARITY UR: CLEAR
CO2 SERPL-SCNC: 22 MMOL/L (ref 23–29)
COLOR UR AUTO: YELLOW
CREAT SERPL-MCNC: 0.8 MG/DL (ref 0.5–1.4)
ERYTHROCYTE [DISTWIDTH] IN BLOOD BY AUTOMATED COUNT: 12.5 % (ref 11.5–14.5)
GFR SERPLBLD CREATININE-BSD FMLA CKD-EPI: >60 ML/MIN/1.73/M2
GLUCOSE SERPL-MCNC: 99 MG/DL (ref 70–110)
GLUCOSE UR QL STRIP: NEGATIVE
HCT VFR BLD AUTO: 38.5 % (ref 37–48.5)
HGB BLD-MCNC: 12.5 GM/DL (ref 12–16)
HGB UR QL STRIP: NEGATIVE
IMM GRANULOCYTES # BLD AUTO: 0 K/UL (ref 0–0.04)
IMM GRANULOCYTES NFR BLD AUTO: 0 % (ref 0–0.5)
KETONES UR QL STRIP: ABNORMAL
LEUKOCYTE ESTERASE UR QL STRIP: ABNORMAL
LYMPHOCYTES # BLD AUTO: 1.44 K/UL (ref 1–4.8)
MCH RBC QN AUTO: 28.7 PG (ref 27–31)
MCHC RBC AUTO-ENTMCNC: 32.5 G/DL (ref 32–36)
MCV RBC AUTO: 89 FL (ref 82–98)
MICROSCOPIC COMMENT: ABNORMAL
NITRITE UR QL STRIP: NEGATIVE
NUCLEATED RBC (/100WBC) (SMH): 0 /100 WBC
PH UR STRIP: 7 [PH]
PLATELET # BLD AUTO: 237 K/UL (ref 150–450)
PMV BLD AUTO: 9.8 FL (ref 9.2–12.9)
POTASSIUM SERPL-SCNC: 3.6 MMOL/L (ref 3.5–5.1)
PROT SERPL-MCNC: 7.1 GM/DL (ref 6–8.4)
PROT UR QL STRIP: NEGATIVE
RBC # BLD AUTO: 4.35 M/UL (ref 4–5.4)
RBC #/AREA URNS AUTO: 1 /HPF
RELATIVE EOSINOPHIL (SMH): 0 % (ref 0–8)
RELATIVE LYMPHOCYTE (SMH): 29.3 % (ref 18–48)
RELATIVE MONOCYTE (SMH): 11.6 % (ref 4–15)
RELATIVE NEUTROPHIL (SMH): 58.9 % (ref 38–73)
SODIUM SERPL-SCNC: 139 MMOL/L (ref 136–145)
SP GR UR STRIP: 1.01
SQUAMOUS #/AREA URNS AUTO: 4 /HPF
TROPONIN I SERPL HS-MCNC: 4 NG/L
UROBILINOGEN UR STRIP-ACNC: NEGATIVE EU/DL
WBC # BLD AUTO: 4.92 K/UL (ref 3.9–12.7)
WBC #/AREA URNS AUTO: 11 /HPF
YEAST UR QL AUTO: ABNORMAL

## 2025-07-19 PROCEDURE — 36415 COLL VENOUS BLD VENIPUNCTURE: CPT | Performed by: PHYSICIAN ASSISTANT

## 2025-07-19 PROCEDURE — 96374 THER/PROPH/DIAG INJ IV PUSH: CPT

## 2025-07-19 PROCEDURE — 25000003 PHARM REV CODE 250: Performed by: PHYSICIAN ASSISTANT

## 2025-07-19 PROCEDURE — 63700000 PHARM REV CODE 250 ALT 637 W/O HCPCS: Performed by: PHYSICIAN ASSISTANT

## 2025-07-19 PROCEDURE — 84484 ASSAY OF TROPONIN QUANT: CPT | Performed by: PHYSICIAN ASSISTANT

## 2025-07-19 PROCEDURE — 93010 ELECTROCARDIOGRAM REPORT: CPT | Mod: ,,, | Performed by: INTERNAL MEDICINE

## 2025-07-19 PROCEDURE — 80053 COMPREHEN METABOLIC PANEL: CPT | Performed by: PHYSICIAN ASSISTANT

## 2025-07-19 PROCEDURE — 93005 ELECTROCARDIOGRAM TRACING: CPT

## 2025-07-19 PROCEDURE — 85025 COMPLETE CBC W/AUTO DIFF WBC: CPT | Performed by: PHYSICIAN ASSISTANT

## 2025-07-19 PROCEDURE — 99285 EMERGENCY DEPT VISIT HI MDM: CPT | Mod: 25

## 2025-07-19 PROCEDURE — 87086 URINE CULTURE/COLONY COUNT: CPT | Performed by: PHYSICIAN ASSISTANT

## 2025-07-19 PROCEDURE — 63600175 PHARM REV CODE 636 W HCPCS: Performed by: PHYSICIAN ASSISTANT

## 2025-07-19 PROCEDURE — 81001 URINALYSIS AUTO W/SCOPE: CPT | Performed by: PHYSICIAN ASSISTANT

## 2025-07-19 RX ORDER — ACETAMINOPHEN 500 MG
1000 TABLET ORAL
Status: DISCONTINUED | OUTPATIENT
Start: 2025-07-19 | End: 2025-07-19 | Stop reason: HOSPADM

## 2025-07-19 RX ORDER — FLUCONAZOLE 100 MG/1
200 TABLET ORAL
Status: COMPLETED | OUTPATIENT
Start: 2025-07-19 | End: 2025-07-19

## 2025-07-19 RX ORDER — CEFTRIAXONE 1 G/1
1 INJECTION, POWDER, FOR SOLUTION INTRAMUSCULAR; INTRAVENOUS
Status: COMPLETED | OUTPATIENT
Start: 2025-07-19 | End: 2025-07-19

## 2025-07-19 RX ORDER — MECLIZINE HYDROCHLORIDE 25 MG/1
25 TABLET ORAL 3 TIMES DAILY PRN
Qty: 20 TABLET | Refills: 0 | Status: SHIPPED | OUTPATIENT
Start: 2025-07-19

## 2025-07-19 RX ORDER — MECLIZINE HYDROCHLORIDE 25 MG/1
25 TABLET ORAL
Status: COMPLETED | OUTPATIENT
Start: 2025-07-19 | End: 2025-07-19

## 2025-07-19 RX ADMIN — MECLIZINE HYDROCHLORIDE 25 MG: 25 TABLET ORAL at 06:07

## 2025-07-19 RX ADMIN — SODIUM CHLORIDE 1000 ML: 9 INJECTION, SOLUTION INTRAVENOUS at 06:07

## 2025-07-19 RX ADMIN — FLUCONAZOLE 200 MG: 100 TABLET ORAL at 06:07

## 2025-07-19 RX ADMIN — CEFTRIAXONE SODIUM 1 G: 1 INJECTION, POWDER, FOR SOLUTION INTRAMUSCULAR; INTRAVENOUS at 06:07

## 2025-07-19 NOTE — TELEPHONE ENCOUNTER
Pt reports yesterday was supposed to come in to see Michelle Allen. But reports she fell on the right knee that he was replaced 2 months ago. Also complains of fractured tibia again and pain to right second to middle toe. She is requesting to make an appointment with Dr. Martinez and need medication for the pain. Complains she is having pain everywhere (knee, toe, head, and throbbing pain on left wrist). She reports being confused and dizzy (reports has been going on for years, denies new onset). Pt reports she is too weak to stand and skin is pale. According to care advice, call  now. Pt verbalized understanding.     Reason for Disposition   Shock suspected (e.g., cold/pale/clammy skin, too weak to stand, low BP, rapid pulse)    Additional Information   Negative: [1] Difficult to awaken or acting confused (e.g., disoriented, slurred speech) AND [2] present now AND [3] diabetes mellitus   Negative: [1] Difficult to awaken or acting confused (e.g., disoriented, slurred speech) AND [2] present now AND [3] new-onset   Negative: [1] Weakness of the face, arm, or leg on one side of the body AND [2] new-onset   Negative: [1] Numbness of the face, arm, or leg on one side of the body AND [2] new-onset   Negative: [1] Loss of speech or garbled speech AND [2] new-onset   Negative: Difficulty breathing or bluish lips    Protocols used: Confusion - Delirium-A-AH

## 2025-07-19 NOTE — FIRST PROVIDER EVALUATION
Emergency Department TeleTriage Encounter Note      CHIEF COMPLAINT    Chief Complaint   Patient presents with    Dizziness     States she has been dizzy for years. Pt had fall yesterday and was seen at this ER. States she called ortho doctor today and nurse on hotline told her to come to ER today to be reevaluated.       VITAL SIGNS   Initial Vitals [07/19/25 1504]   BP Pulse Resp Temp SpO2   (!) 143/100 89 18 97.9 °F (36.6 °C) 98 %      MAP       --            ALLERGIES    Review of patient's allergies indicates:   Allergen Reactions    Phenergan [promethazine] Hives and Rash    Latex, natural rubber Hives     Per pt report-many years    Morphine Hives       PROVIDER TRIAGE NOTE  Patient presents with complaint of feeling lightheaded with standing. This has happened many times for years. She states she discussed with her doctor today and they advised her to come to the ED. She denies any chest pain or SOB. She was seen in the ED yesterday after a fall and had head CT and multiple xrays.       ORDERS  Labs Reviewed - No data to display    ED Orders (720h ago, onward)      None              Virtual Visit Note: The provider triage portion of this emergency department evaluation and documentation was performed via [employer], a HIPAA-compliant telemedicine application, in concert with a tele-presenter in the room. A face to face patient evaluation with one of my colleagues will occur once the patient is placed in an emergency department room.      DISCLAIMER: This note was prepared with [employer] voice recognition transcription software. Garbled syntax, mangled pronouns, and other bizarre constructions may be attributed to that software system.

## 2025-07-21 ENCOUNTER — PATIENT OUTREACH (OUTPATIENT)
Facility: OTHER | Age: 70
End: 2025-07-21
Payer: MEDICARE

## 2025-07-21 LAB
BACTERIA UR CULT: NORMAL
OHS QRS DURATION: 82 MS
OHS QTC CALCULATION: 456 MS

## 2025-07-22 NOTE — PROGRESS NOTES
Patient was seen in the ED on 7/18/25. Phoned patient on 2 separate occasions to assist with Post ED Discharge Navigation. Patient was unavailable. Message left on voice mail. Encounter closed.  Lizette Whaley

## 2025-07-29 ENCOUNTER — TELEPHONE (OUTPATIENT)
Dept: FAMILY MEDICINE | Facility: CLINIC | Age: 70
End: 2025-07-29
Payer: MEDICARE

## 2025-07-29 NOTE — TELEPHONE ENCOUNTER
Spoke with patient and advised that she is on some medications that dr. BAKER does not prescribe, pain medication, xanax, ambien, gabapentin.  Patient states she understand and wants to be seen anyway.

## 2025-07-29 NOTE — TELEPHONE ENCOUNTER
----- Message from Loida sent at 7/29/2025 12:55 PM CDT -----  The patient is a new patient and she knows Dr. Alvarado's wife's grand parents. She goes to [employer] with them. She wanted to get in with him sooner  than the appointments he has available. Pt's # [phone] GH

## 2025-08-05 ENCOUNTER — LAB VISIT (OUTPATIENT)
Dept: LAB | Facility: HOSPITAL | Age: 70
End: 2025-08-05
Attending: STUDENT IN AN ORGANIZED HEALTH CARE EDUCATION/TRAINING PROGRAM
Payer: MEDICARE

## 2025-08-05 ENCOUNTER — OFFICE VISIT (OUTPATIENT)
Dept: FAMILY MEDICINE | Facility: CLINIC | Age: 70
End: 2025-08-05
Payer: MEDICARE

## 2025-08-05 VITALS
HEART RATE: 70 BPM | HEIGHT: 64 IN | DIASTOLIC BLOOD PRESSURE: 80 MMHG | WEIGHT: 174.19 LBS | BODY MASS INDEX: 29.74 KG/M2 | OXYGEN SATURATION: 96 % | SYSTOLIC BLOOD PRESSURE: 124 MMHG | TEMPERATURE: 99 F

## 2025-08-05 DIAGNOSIS — Z12.31 ENCOUNTER FOR SCREENING MAMMOGRAM FOR MALIGNANT NEOPLASM OF BREAST: ICD-10-CM

## 2025-08-05 DIAGNOSIS — I10 ESSENTIAL HYPERTENSION: ICD-10-CM

## 2025-08-05 DIAGNOSIS — K74.60 HEPATIC CIRRHOSIS, UNSPECIFIED HEPATIC CIRRHOSIS TYPE, UNSPECIFIED WHETHER ASCITES PRESENT: ICD-10-CM

## 2025-08-05 DIAGNOSIS — M89.9 DISORDER OF BONE, UNSPECIFIED: ICD-10-CM

## 2025-08-05 DIAGNOSIS — R53.82 CHRONIC FATIGUE: ICD-10-CM

## 2025-08-05 DIAGNOSIS — Z87.891 FORMER SMOKER: Primary | ICD-10-CM

## 2025-08-05 DIAGNOSIS — Z78.0 MENOPAUSE: ICD-10-CM

## 2025-08-05 DIAGNOSIS — R42 DIZZINESS: ICD-10-CM

## 2025-08-05 DIAGNOSIS — Z86.39 HISTORY OF VITAMIN D DEFICIENCY: ICD-10-CM

## 2025-08-05 PROBLEM — Z00.00 ENCOUNTER FOR PREVENTIVE HEALTH EXAMINATION: Status: RESOLVED | Noted: 2020-07-30 | Resolved: 2025-08-05

## 2025-08-05 LAB
25(OH)D3+25(OH)D2 SERPL-MCNC: 53 NG/ML (ref 30–96)
ALBUMIN SERPL BCP-MCNC: 4.1 G/DL (ref 3.5–5.2)
ALP SERPL-CCNC: 123 UNIT/L (ref 40–150)
ALT SERPL W/O P-5'-P-CCNC: 9 UNIT/L (ref 0–55)
AST SERPL-CCNC: 19 UNIT/L (ref 0–50)
BILIRUB DIRECT SERPL-MCNC: 0.1 MG/DL (ref 0.1–0.3)
BILIRUB SERPL-MCNC: 0.3 MG/DL (ref 0.1–1)
PROT SERPL-MCNC: 7.1 GM/DL (ref 6–8.4)
TSH SERPL-ACNC: 0.98 UIU/ML (ref 0.4–4)

## 2025-08-05 PROCEDURE — 80076 HEPATIC FUNCTION PANEL: CPT | Mod: HCNC

## 2025-08-05 PROCEDURE — 82306 VITAMIN D 25 HYDROXY: CPT | Mod: HCNC

## 2025-08-05 PROCEDURE — 99999 PR PBB SHADOW E&M-EST. PATIENT-LVL IV: CPT | Mod: PBBFAC,HCNC,, | Performed by: STUDENT IN AN ORGANIZED HEALTH CARE EDUCATION/TRAINING PROGRAM

## 2025-08-05 PROCEDURE — 84443 ASSAY THYROID STIM HORMONE: CPT | Mod: HCNC

## 2025-08-05 PROCEDURE — 36415 COLL VENOUS BLD VENIPUNCTURE: CPT | Mod: HCNC,PN

## 2025-08-05 RX ORDER — MECLIZINE HYDROCHLORIDE 25 MG/1
25 TABLET ORAL 3 TIMES DAILY PRN
Qty: 20 TABLET | Refills: 0 | Status: SHIPPED | OUTPATIENT
Start: 2025-08-05

## 2025-08-05 NOTE — PROGRESS NOTES
Subjective      Beatriz Gan is a 69 y.o. female        Chief Complaint   Patient presents with    Establish Care        HPI     Patient presents today to establish care.      Problem Noted   Former Smoker 8/5/2025    Patient with tobacco abuse. The patient reports smoking 0.5 packs per day for 53 years.  She has quit smoking within the last year and a half.       History of Vitamin D Deficiency 8/5/2025    Patient with history of decreased Vitamin D levels.  The patient reports Current dailyvitamin d supplementation.        Chronic Fatigue 8/5/2025   Essential Hypertension 8/21/2015    Patient with Hypertension. The patient does not routinely check their blood pressure at home and reports compliance with amlodipine..     Wt Readings from Last 3 Encounters:   08/05/25 79 kg (174 lb 2.6 oz)   07/19/25 82.6 kg (182 lb)   07/18/25 82.6 kg (182 lb)     Temp Readings from Last 3 Encounters:   08/05/25 98.6 °F (37 °C) (Oral)   07/19/25 98.2 °F (36.8 °C) (Oral)   07/18/25 97.6 °F (36.4 °C) (Oral)     BP Readings from Last 3 Encounters:   08/05/25 124/80   07/19/25 (!) 140/72   07/18/25 (!) 161/89     Pulse Readings from Last 3 Encounters:   08/05/25 70   07/19/25 76   07/18/25 63          Encounter for Preventive Health Examination (Resolved) 7/30/2020        ALLERGIES  Phenergan [promethazine]; Latex, natural rubber; and Morphine     MEDICATIONS  Encounter Medications[1]     PAST MEDICAL HISTORY  Past Medical History:   Diagnosis Date    Alcohol dependence     Asthma     Cirrhosis of liver     DDD (degenerative disc disease), lumbosacral     DJD (degenerative joint disease), lumbosacral     Encounter for blood transfusion     GERD (gastroesophageal reflux disease)     Gout     HCV: treated / cured (SVR 5/2025)     Hypercholesterolemia     Hypertension     NATHALIE (iron deficiency anemia)     questionable white coat hypertension    Kidney carcinoma, left 2014    Pneumonia     Renal cell carcinoma     Seizures      Shingles 10/13/2012        PAST SURGIAL HISTORY  Past Surgical History:   Procedure Laterality Date    ANTERIOR CERVICAL DISCECTOMY W/ FUSION N/A 5/4/2023    Procedure: DISCECTOMY, SPINE, CERVICAL, ANTERIOR APPROACH, WITH FUSION C3-4;  Surgeon: Ruben Martinez MD;  Location: [facility] OR;  Service: Orthopedics;  Laterality: N/A;    APPENDECTOMY      ARTHROSCOPY OF SHOULDER WITH DECOMPRESSION OF SUBACROMIAL SPACE Left 10/31/2019    Procedure: ARTHROSCOPY, SHOULDER, WITH SUBACROMIAL SPACE DECOMPRESSION;  Surgeon: Ruben Martinez MD;  Location: [facility] OR;  Service: Orthopedics;  Laterality: Left;    BLADDER REPAIR      x 2    COLONOSCOPY  9/2011    COLONOSCOPY N/A 9/18/2024    Procedure: COLONOSCOPY;  Surgeon: Ceasar Calero MD;  Location: [facility];  Service: Endoscopy;  Laterality: N/A;    DISTAL CLAVICLE EXCISION Left 10/31/2019    Procedure: EXCISION, CLAVICLE, DISTAL;  Surgeon: Ruben Martinez MD;  Location: [facility] OR;  Service: Orthopedics;  Laterality: Left;    EPIDURAL STEROID INJECTION INTO LUMBAR SPINE N/A 12/7/2020    Procedure: Injection-steroid-epidural-lumbar;  Surgeon: Dk Rosales MD;  [facility];  Service: Pain Management;  Laterality: N/A;  L4-L5    EPIDURAL STEROID INJECTION INTO LUMBAR SPINE N/A 5/17/2021    Procedure: Injection-steroid-epidural-lumbar;  Surgeon: Dk Rosales MD;  [facility];  Service: Pain Management;  Laterality: N/A;  L4-L5    ESOPHAGOGASTRODUODENOSCOPY  9/2011    ESOPHAGOGASTRODUODENOSCOPY N/A 9/18/2024    Procedure: EGD (ESOPHAGOGASTRODUODENOSCOPY);  Surgeon: Ceasar Calero MD;  Location: [facility];  Service: Endoscopy;  Laterality: N/A;    EYE SURGERY      lasix bilateral    FIXATION KYPHOPLASTY N/A 1/31/2020    Procedure: Kyphoplasty T12;  Surgeon: Dk Rosales MD;  Location: [facility] OR;  Service: Pain Management;  Laterality: N/A;    HERNIA REPAIR      hiatal hernai    HYSTERECTOMY      INJECTION OF ANESTHETIC AGENT AROUND NERVE Left 6/8/2020     Procedure: Block, Nerve;  Surgeon: Dk Rosales MD;  Location: [facility] OR;  Service: Pain Management;  Laterality: Left;  left genicular nerve block     KNEE ARTHROPLASTY Left 7/30/2020    Procedure: ARTHROPLASTY, KNEE LEFT;  Surgeon: Ruben Martinez MD;  Location: [facility] OR;  Service: Orthopedics;  Laterality: Left;  REP VALERY RUBY    KNEE ARTHROSCOPY W/ MENISCECTOMY Left 1/16/2020    Procedure: ARTHROSCOPY, KNEE, WITH MEDIAL MENISCECTOMY;  Surgeon: Ruben Martinez MD;  Location: [facility] OR;  Service: Orthopedics;  Laterality: Left;    LAPAROSCOPIC NISSEN FUNDOPLICATION      NEPHRECTOMY      LT partial    RADIOFREQUENCY ABLATION Left 6/19/2020    Procedure: Radiofrequency Ablation;  Surgeon: Dk Rosales MD;  Location: [facility] OR;  Service: Pain Management;  Laterality: Left;    RADIOFREQUENCY ABLATION OF LUMBAR MEDIAL BRANCH NERVE AT SINGLE LEVEL Bilateral 2/28/2020    Procedure: Radiofrequency Ablation, Nerve, Spinal, Lumbar, Medial Branch, 1 Level;  Surgeon: Dk Rosales MD;  Location: [facility] OR;  Service: Pain Management;  Laterality: Bilateral;  L3,L4,L5 - Burned at 80 degrees C. for 60 seconds x 2 each site    RADIOFREQUENCY ABLATION OF LUMBAR MEDIAL BRANCH NERVE AT SINGLE LEVEL Bilateral 10/19/2020    Procedure: Radiofrequency Ablation, Nerve, Spinal, Lumbar, Medial Branch, 1 Level;  Surgeon: Dk Rosales MD;  Location: [facility] OR;  Service: Pain Management;  Laterality: Bilateral;  L3, L4, L5    REFRACTIVE SURGERY      ROBOTIC ARTHROPLASTY, KNEE Right 5/22/2025    Procedure: ROBOTIC ARTHROPLASTY, KNEE, TOTAL;  Surgeon: Ruben Martinez MD;  Location: [facility] OR;  Service: Orthopedics;  Laterality: Right;  [place]-Oliverio    ROTATOR CUFF REPAIR Left 10/31/2019    Procedure: REPAIR, ROTATOR CUFF;  Surgeon: Ruben Martinez MD;  Location: [facility] OR;  Service: Orthopedics;  Laterality: Left;  arthrex    SKIN BIOPSY      TOTAL ABDOMINAL HYSTERECTOMY W/ BILATERAL SALPINGOOPHORECTOMY  age 48    TRANSFORAMINAL EPIDURAL  INJECTION OF STEROID Bilateral 2021    Procedure: Injection,steroid,epidural,transforaminal approach;  Surgeon: Dk Rosales MD;  Location: [facility] OR;  Service: Pain Management;  Laterality: Bilateral;  L5-S1     TRANSFORAMINAL EPIDURAL INJECTION OF STEROID Bilateral 3/28/2022    Procedure: INJECTION, STEROID, EPIDURAL, TRANSFORAMINAL APPROACH Lumbar L5-S1;  Surgeon: Dk Rosales MD;  Location: [facility] OR;  Service: Pain Management;  Laterality: Bilateral;        FAMILY HISTORY  Family History   Problem Relation Name Age of Onset    Hypertension Father      Glaucoma Neg Hx      Macular degeneration Neg Hx      Retinal detachment Neg Hx          PAST SOCIAL HISTORY  Social History     Socioeconomic History    Marital status:    Occupational History     Employer: [employer]   Tobacco Use    Smoking status: Former     Current packs/day: 0.00     Average packs/day: 0.5 packs/day for 53.0 years (26.5 ttl pk-yrs)     Types: Cigarettes     Start date:      Quit date:      Years since quittin.5    Smokeless tobacco: Never    Tobacco comments:     smokes 2-3 cigarettes a night   Substance and Sexual Activity    Alcohol use: Not Currently     Alcohol/week: 12.0 standard drinks of alcohol     Types: 6 Cans of beer, 6 Unspecified drink type per week     Comment: states has not had alcohol since 10/2023    Drug use: No    Sexual activity: Not Currently     Partners: Male     Social Drivers of Health     Financial Resource Strain: Medium Risk (10/15/2024)    Overall Financial Resource Strain (CARDIA)     Difficulty of Paying Living Expenses: Somewhat hard   Food Insecurity: Food Insecurity Present (10/15/2024)    Hunger Vital Sign     Worried About Running Out of Food in the Last Year: Sometimes true     Ran Out of Food in the Last Year: Never true   Transportation Needs: No Transportation Needs (10/15/2024)    PRAPARE - Transportation     Lack of Transportation (Medical): No     Lack of Transportation  "(Non-Medical): No   Physical Activity: Inactive (10/15/2024)    Exercise Vital Sign     Days of Exercise per Week: 0 days     Minutes of Exercise per Session: 0 min   Stress: No Stress Concern Present (10/15/2024)    [employer] [place] [employer] - Occupational Stress Questionnaire     Feeling of Stress : Not at all   Housing Stability: Unknown (10/15/2024)    Housing Stability Vital Sign     Unable to Pay for Housing in the Last Year: No     Homeless in the Last Year: No        Health Maintenance   Topic Date Due    Sign Pain Contract  Never done    Naloxone Prescription  Never done    Aspirin/Antiplatelet Therapy  Never done    Shingles Vaccine (1 of 2) Never done    Pneumococcal Vaccines (Age 50+) (2 of 2 - PPSV23) 03/14/2014    Urine Drug Screen  04/19/2018    TETANUS VACCINE  06/21/2021    DEXA Scan  09/30/2022    Mammogram  10/04/2022    Lipid Panel  10/06/2023    COVID-19 Vaccine (5 - 2024-25 season) 09/01/2024    LDCT Lung Screen  04/13/2025    Influenza Vaccine (1) 09/01/2025    Colorectal Cancer Screening  09/18/2027    Hepatitis C Screening  Completed    Complete Opioid Risk Tool  Completed    RSV Vaccine (Age 60+ and Pregnant patients)  Completed           ROS        Objective   Vitals:    08/05/25 1037   BP: 124/80   BP Location: Right arm   Patient Position: Sitting   Pulse: 70   Temp: 98.6 °F (37 °C)   TempSrc: Oral   SpO2: 96%   Weight: 79 kg (174 lb 2.6 oz)   Height: 5' 4" (1.626 m)       Body mass index is 29.9 kg/m².       Physical Exam  Constitutional:       General: She is not in acute distress.     Appearance: She is not ill-appearing.      Comments: overweight   HENT:      Head: Normocephalic and atraumatic.   Eyes:      Extraocular Movements: Extraocular movements intact.      Pupils: Pupils are equal, round, and reactive to light.   Cardiovascular:      Rate and Rhythm: Normal rate and regular rhythm.   Pulmonary:      Effort: Pulmonary effort is normal.      Breath sounds: Normal " breath sounds. No wheezing, rhonchi or rales.   Musculoskeletal:      Comments: Ambulating with cane   Skin:     General: Skin is warm and dry.      Findings: Bruising (facial) present.   Neurological:      General: No focal deficit present.      Mental Status: She is alert and oriented to person, place, and time.   Psychiatric:         Mood and Affect: Mood normal.         Thought Content: Thought content normal.          Former smoker  Assessment & Plan:  Chronic problem.  Applauded for cessation.  We will assess with low-dose CT scan    Orders:  -     CT Chest Lung Screening Low Dose; Future; Expected date: 08/05/2025    Chronic fatigue  Assessment & Plan:  Chronic problem with progression.  Not well controlled per patient.  Unsure of etiology may be related to medication side effects versus chronic medical problems versus hypothyroidism.  We will assess with updated blood work including TSH    Orders:  -     TSH; Future; Expected date: 08/05/2025  -     Vitamin D; Future; Expected date: 08/05/2025    Menopause  -     DXA Bone Density Axial Skeleton 1 or more sites; Future; Expected date: 08/05/2025    Encounter for screening mammogram for malignant neoplasm of breast  -     Mammo Digital Screening Bilat w/ Law (XPD); Future; Expected date: 08/05/2025    Dizziness  Comments:  Chronic problem with progression.  Not well controlled per patient.  Start Antivert, which was previously prescribed by ER.  ENT referral placed  Orders:  -     Ambulatory referral/consult to ENT; Future; Expected date: 08/12/2025  -     meclizine (ANTIVERT) 25 mg tablet; Take 1 tablet (25 mg total) by mouth 3 (three) times daily as needed for Dizziness or Nausea.  Dispense: 20 tablet; Refill: 0    History of vitamin D deficiency  Assessment & Plan:  Chronic problem unsure of stability.  Will assess with updated blood work including vitamin D.          Disorder of bone, unspecified  Comments:  Osteopenia noted on multiple x-rays, we will  assess with vitamin-D  Orders:  -     Vitamin D; Future; Expected date: 08/05/2025    Essential hypertension  Assessment & Plan:  Chronic problem stable based on  in-clinic vitals.  Continue current regimen including amlodipine                Scooby Ramonajezrobbie             Portions of this note were created using voice recognition software. There may be voice recognition errors found in the text, and attempts were made to correct these errors prior to signature.              [1]   Outpatient Encounter Medications as of 8/5/2025   Medication Sig Dispense Refill    albuterol (VENTOLIN HFA) 90 mcg/actuation inhaler Inhale 2 puffs into the lungs every 4 (four) hours as needed for Wheezing or Shortness of Breath. Rescue 54 g 3    amLODIPine (NORVASC) 5 MG tablet TAKE ONE TABLET BY MOUTH DAILY AT 9 AM 90 tablet 3    aspirin (ECOTRIN) 81 MG EC tablet Take 1 tablet (81 mg total) by mouth every 12 (twelve) hours. 60 tablet 0    azelastine (ASTELIN) 137 mcg (0.1 %) nasal spray Use 1 spray(s) in each nostril twice daily 30 mL 3    benralizumab (FASENRA PEN) 30 mg/mL AtIn Inject 30 mg into the skin every 8 weeks. 1 mL 5    celecoxib (CELEBREX) 200 MG capsule Take 200 mg by mouth every morning.      cholecalciferol, vitamin D3, (VITAMIN D3 ORAL) Take 1 capsule by mouth once daily.      FLUoxetine 40 MG capsule TAKE ONE CAPSULE (40mg) BY MOUTH TWICE DAILY @9am-@5pm 180 capsule 11    fluticasone (FLONASE) 50 mcg/actuation nasal spray 1 spray by Nasal route daily as needed.       gabapentin (NEURONTIN) 600 MG tablet Take 1 tablet (600 mg total) by mouth 3 (three) times daily. 90 tablet 11    lactulose (CHRONULAC) 20 gram/30 mL Soln Take 30 mLs (20 g total) by mouth once daily. Goal of 3-5 soft stools each day to help memory 1800 mL 3    MULTIVIT-IRON-MIN-FOLIC ACID 3,500-18-0.4 UNIT-MG-MG ORAL CHEW Take by mouth once daily.      ondansetron (ZOFRAN) 4 MG tablet Take 1 tablet (4 mg total) by mouth every 6 (six) hours as needed for  Nausea. 10 tablet 0    ubrogepant (UBRELVY) 50 mg tablet Take 1 tablet (50 mg total) by mouth daily as needed for Migraine. If symptoms persist or return, may repeat dose after 2 hours. Maximum: 200 mg per 24 hours 30 tablet 3    [DISCONTINUED] ALPRAZolam (XANAX) 1 MG tablet Take 1 tablet (1 mg total) by mouth 2 (two) times daily as needed for Anxiety. (Patient taking differently: Take 22 mg by mouth 2 (two) times daily as needed for Anxiety.) 60 tablet 2    [DISCONTINUED] meclizine (ANTIVERT) 25 mg tablet Take 1 tablet (25 mg total) by mouth 3 (three) times daily as needed for Dizziness or Nausea. 20 tablet 0    budesonide-formoterol 160-4.5 mcg (SYMBICORT) 160-4.5 mcg/actuation HFAA Inhale 2 puffs into the lungs every 12 (twelve) hours. Controller 30.6 g 3    ergocalciferol, vitamin D2, (VITAMIN D2 ORAL) Take 1 tablet by mouth once daily.      folic acid (FOLVITE) 1 MG tablet Take 1 tablet (1 mg total) by mouth once daily. 90 tablet 0    HYDROcodone-acetaminophen (NORCO) 5-325 mg per tablet Take 1 tablet by mouth every 8 (eight) hours as needed for Pain. 21 tablet 0    lactulose (CHRONULAC) 10 gram/15 mL solution Take 30 mLs by mouth.      meclizine (ANTIVERT) 25 mg tablet Take 1 tablet (25 mg total) by mouth 3 (three) times daily as needed for Dizziness or Nausea. 20 tablet 0    pantoprazole (PROTONIX) 40 MG tablet Take 1 tablet (40 mg total) by mouth once daily. (Patient not taking: Reported on 8/5/2025) 90 tablet 3    predniSONE (DELTASONE) 10 MG tablet Take one pill a day for three days, repeat for shortness of breath 12 tablet 0    [DISCONTINUED] meclizine (ANTIVERT) 12.5 mg tablet Take 1 tablet (12.5 mg total) by mouth 3 (three) times daily as needed for Dizziness. 30 tablet 0    [DISCONTINUED] zolpidem (AMBIEN) 10 mg Tab TAKE ONE TABLET (10 MG TOTAL) BY MOUTH EVERY EVENING 30 tablet 2     Facility-Administered Encounter Medications as of 8/5/2025   Medication Dose Route Frequency Provider Last Rate Last  Admin    electrolyte-S (ISOLYTE)   Intravenous Continuous Kota Ortega MD 10 mL/hr at 01/31/20 0624 New Bag at 09/18/24 1130    lactated ringers infusion   Intravenous Continuous Kota Ortega MD        pregabalin capsule 75 mg  75 mg Oral Once Shiela Mann MD

## 2025-08-05 NOTE — ASSESSMENT & PLAN NOTE
Chronic problem unsure of stability.  Will assess with updated blood work including vitamin D.

## 2025-08-05 NOTE — ASSESSMENT & PLAN NOTE
Chronic problem with progression.  Not well controlled per patient.  Unsure of etiology may be related to medication side effects versus chronic medical problems versus hypothyroidism.  We will assess with updated blood work including TSH

## 2025-08-11 ENCOUNTER — HOSPITAL ENCOUNTER (OUTPATIENT)
Dept: RADIOLOGY | Facility: HOSPITAL | Age: 70
Discharge: HOME | End: 2025-08-11
Attending: STUDENT IN AN ORGANIZED HEALTH CARE EDUCATION/TRAINING PROGRAM
Payer: MEDICARE

## 2025-08-11 DIAGNOSIS — Z12.31 ENCOUNTER FOR SCREENING MAMMOGRAM FOR MALIGNANT NEOPLASM OF BREAST: ICD-10-CM

## 2025-08-11 DIAGNOSIS — Z78.0 MENOPAUSE: ICD-10-CM

## 2025-08-11 DIAGNOSIS — Z87.891 FORMER SMOKER: ICD-10-CM

## 2025-08-11 PROCEDURE — 77067 SCR MAMMO BI INCL CAD: CPT | Mod: 26,,, | Performed by: RADIOLOGY

## 2025-08-11 PROCEDURE — 77067 SCR MAMMO BI INCL CAD: CPT | Mod: TC,PO

## 2025-08-11 PROCEDURE — 77063 BREAST TOMOSYNTHESIS BI: CPT | Mod: 26,,, | Performed by: RADIOLOGY

## 2025-08-11 PROCEDURE — 77080 DXA BONE DENSITY AXIAL: CPT | Mod: TC,PO

## 2025-08-11 PROCEDURE — 77080 DXA BONE DENSITY AXIAL: CPT | Mod: 26,,, | Performed by: RADIOLOGY

## 2025-08-11 PROCEDURE — 71271 CT THORAX LUNG CANCER SCR C-: CPT | Mod: TC,PO

## 2025-08-11 PROCEDURE — 71271 CT THORAX LUNG CANCER SCR C-: CPT | Mod: 26,,, | Performed by: RADIOLOGY

## 2025-08-13 ENCOUNTER — HOSPITAL ENCOUNTER (OUTPATIENT)
Dept: RADIOLOGY | Facility: HOSPITAL | Age: 70
Discharge: HOME | End: 2025-08-13
Attending: ORTHOPAEDIC SURGERY
Payer: MEDICARE

## 2025-08-13 ENCOUNTER — OFFICE VISIT (OUTPATIENT)
Dept: ORTHOPEDICS | Facility: CLINIC | Age: 70
End: 2025-08-13
Payer: MEDICARE

## 2025-08-13 VITALS — WEIGHT: 170 LBS | BODY MASS INDEX: 29.02 KG/M2 | HEIGHT: 64 IN

## 2025-08-13 DIAGNOSIS — Z96.651 STATUS POST TOTAL RIGHT KNEE REPLACEMENT: Primary | ICD-10-CM

## 2025-08-13 DIAGNOSIS — S82.831D CLOSED AVULSION FRACTURE OF DISTAL END OF RIGHT FIBULA WITH ROUTINE HEALING, SUBSEQUENT ENCOUNTER: ICD-10-CM

## 2025-08-13 DIAGNOSIS — S52.502A CLOSED FRACTURE OF DISTAL END OF LEFT RADIUS, UNSPECIFIED FRACTURE MORPHOLOGY, INITIAL ENCOUNTER: ICD-10-CM

## 2025-08-13 DIAGNOSIS — M47.816 LUMBAR SPONDYLOSIS: ICD-10-CM

## 2025-08-13 DIAGNOSIS — M51.360 DEGENERATION OF INTERVERTEBRAL DISC OF LUMBAR REGION WITH DISCOGENIC BACK PAIN: ICD-10-CM

## 2025-08-13 PROCEDURE — 73110 X-RAY EXAM OF WRIST: CPT | Mod: TC,HCNC,PN,LT

## 2025-08-13 PROCEDURE — 73562 X-RAY EXAM OF KNEE 3: CPT | Mod: 26,HCNC,RT, | Performed by: RADIOLOGY

## 2025-08-13 PROCEDURE — 73562 X-RAY EXAM OF KNEE 3: CPT | Mod: TC,HCNC,PN,RT

## 2025-08-13 PROCEDURE — 99999 PR PBB SHADOW E&M-EST. PATIENT-LVL V: CPT | Mod: PBBFAC,HCNC,, | Performed by: ORTHOPAEDIC SURGERY

## 2025-08-13 PROCEDURE — 73110 X-RAY EXAM OF WRIST: CPT | Mod: 26,HCNC,LT, | Performed by: RADIOLOGY

## 2025-08-13 RX ORDER — TRIAMCINOLONE ACETONIDE 40 MG/ML
40 INJECTION, SUSPENSION INTRA-ARTICULAR; INTRAMUSCULAR
Status: COMPLETED | OUTPATIENT
Start: 2025-08-13 | End: 2025-08-13

## 2025-08-13 RX ADMIN — TRIAMCINOLONE ACETONIDE 40 MG: 40 INJECTION, SUSPENSION INTRA-ARTICULAR; INTRAMUSCULAR at 10:08

## 2025-08-14 DIAGNOSIS — S82.831D CLOSED AVULSION FRACTURE OF DISTAL END OF RIGHT FIBULA WITH ROUTINE HEALING, SUBSEQUENT ENCOUNTER: ICD-10-CM

## 2025-08-14 DIAGNOSIS — S52.502A CLOSED FRACTURE OF DISTAL END OF LEFT RADIUS, UNSPECIFIED FRACTURE MORPHOLOGY, INITIAL ENCOUNTER: ICD-10-CM

## 2025-08-14 DIAGNOSIS — Z96.651 STATUS POST TOTAL RIGHT KNEE REPLACEMENT: Primary | ICD-10-CM

## 2025-08-14 RX ORDER — TRAMADOL HYDROCHLORIDE 50 MG/1
50 TABLET, FILM COATED ORAL EVERY 8 HOURS PRN
Qty: 21 TABLET | Refills: 0 | Status: SHIPPED | OUTPATIENT
Start: 2025-08-14

## (undated) DEVICE — GLOVE SURG ULTRA TOUCH 6

## (undated) DEVICE — UNDERGLOVES BIOGEL PI SIZE 7.5

## (undated) DEVICE — PENCIL SMK EVAC CONNECTOR 10FT

## (undated) DEVICE — GLOVE SURG ULTRA TOUCH 7.5

## (undated) DEVICE — TOURNIQUET SB QC DP 34X4IN

## (undated) DEVICE — DRAIN SIL FLT FULL FLUTED 7F

## (undated) DEVICE — PACK SHOULDER

## (undated) DEVICE — SEE MEDLINE ITEM 157117

## (undated) DEVICE — DRAPE STERI U-SHAPED 47X51IN

## (undated) DEVICE — LINER SUCTION 3000CC

## (undated) DEVICE — SPONGE SUPER KERLIX 6X6.75IN

## (undated) DEVICE — SEE MEDLINE ITEM 146313

## (undated) DEVICE — ADHESIVE DERMABOND ADVANCED

## (undated) DEVICE — PACK LOWER EXTREMITY

## (undated) DEVICE — SYR 30CC LUER LOCK

## (undated) DEVICE — PACK BASIC

## (undated) DEVICE — DRAPE INCISE IOBAN 2 23X17IN

## (undated) DEVICE — SYS LABEL CORRECT MED

## (undated) DEVICE — TUBING MINIBORE EXTENSION

## (undated) DEVICE — STRAP OR TABLE 5IN X 72IN

## (undated) DEVICE — SUT ETHILON 4-0 PS2 18 BLK

## (undated) DEVICE — NDL SPINAL 18GX3.5 SPINOCAN

## (undated) DEVICE — GOWN TOGA SYS PEELWY ZIP 2 XL

## (undated) DEVICE — BANDAGE ESMARK 6X12

## (undated) DEVICE — TOOL MR8 MATCHHEAD 7CM 3MM

## (undated) DEVICE — PAD GROUNDING DISPER ELECTRODE

## (undated) DEVICE — COVER TABLE 44X90 STERILE

## (undated) DEVICE — PACK CUSTOM LUMBAR LAM SLI

## (undated) DEVICE — BLADE SURG CARBON STEEL SZ11

## (undated) DEVICE — SOL WATER STRL IRR 1000ML

## (undated) DEVICE — HALTER DELUXE DISCARD HEAD

## (undated) DEVICE — CATH URETHRAL RED 16FR

## (undated) DEVICE — SEE MEDLINE ITEM 146231

## (undated) DEVICE — SEE MEDLINE ITEM 152622

## (undated) DEVICE — DRESSING GAUZE OIL EMUL 3X8

## (undated) DEVICE — NDL SAFETY 25G X 1.5 ECLIPSE

## (undated) DEVICE — PACK SIRUS BASIC V SURG STRL

## (undated) DEVICE — TAPE SILK 3IN

## (undated) DEVICE — UNDERGLOVES BIOGEL PI SIZE 8.5

## (undated) DEVICE — PIN DISTRACTION 12MM
Type: IMPLANTABLE DEVICE | Site: SPINE CERVICAL | Status: NON-FUNCTIONAL
Removed: 2023-05-04

## (undated) DEVICE — KIT TRIATHLON CR TIB PREP SZ2

## (undated) DEVICE — SYR DISP LL 5CC

## (undated) DEVICE — DRESSING LEUKOPLAST FLEX 1X3IN

## (undated) DEVICE — GLOVE SENSICARE PI ALOE 6.5

## (undated) DEVICE — BOWL MIXING BONE CEMENT

## (undated) DEVICE — SUT #2 TI-CRON HGS-21 30IN

## (undated) DEVICE — GLOVE SENSICARE PI GRN 6

## (undated) DEVICE — NDL SAFETY 21G X 1 1/2 ECLPSE

## (undated) DEVICE — WRAP KNEE ACCU THERM GEL PACK

## (undated) DEVICE — PAD ABD 8X10 STERILE

## (undated) DEVICE — MIXER BONE CEMENT

## (undated) DEVICE — PIN THREADED STERILE
Type: IMPLANTABLE DEVICE | Site: KNEE | Status: NON-FUNCTIONAL
Removed: 2020-07-30

## (undated) DEVICE — PAD ABDOMINAL STERILE 8X10IN

## (undated) DEVICE — NDL BOX COUNTER

## (undated) DEVICE — SEE MEDLINE ITEM 157131

## (undated) DEVICE — SYR 10CC LUER LOCK

## (undated) DEVICE — PADDING CAST SPECIALIST 6X4YD

## (undated) DEVICE — HEMOSTAT SURGICEL 2X14IN

## (undated) DEVICE — BLADE SAW SGTL 21.2X85.5X1.2

## (undated) DEVICE — DRAPE C ARM 42 X 120 10/BX

## (undated) DEVICE — UNDERGLOVES BIOGEL PI SIZE 8

## (undated) DEVICE — PAD CAST SPECIALIST STRL 6

## (undated) DEVICE — NDL SPINAL SPINOCAN 22GX3.5

## (undated) DEVICE — GOWN POLY REINF BRTH SLV 2XL

## (undated) DEVICE — STOCKING ATS XX-LG.

## (undated) DEVICE — UNDERGLOVE BIOGEL PI SZ 6.5 LF

## (undated) DEVICE — DRESSING N ADH OIL EMUL 3X3

## (undated) DEVICE — SYR GLASS 5CC LUER LOK

## (undated) DEVICE — SEE MEDLINE ITEM 146420

## (undated) DEVICE — ALCOHOL RUBBING 70% ISO 4OZ

## (undated) DEVICE — DRAPE INCISE IOBAN 2 23X33IN

## (undated) DEVICE — SEE MEDLINE ITEM 107746

## (undated) DEVICE — SEE MEDLINE ITEM 152487

## (undated) DEVICE — CHLORAPREP 10.5 ML APPLICATOR

## (undated) DEVICE — SYR 50CC LL

## (undated) DEVICE — DRAIN SINGLE ROUND 3/16 15F

## (undated) DEVICE — SEE MEDLINE ITEM 157166

## (undated) DEVICE — COVER SURG LIGHT HANDLE

## (undated) DEVICE — APPLICATOR CHLORAPREP ORN 26ML

## (undated) DEVICE — KIT DRAPE RIO ONE PIECE W/POCK

## (undated) DEVICE — ELECTRODE REM PLYHSV RETURN 9

## (undated) DEVICE — CONTAINER SPECIMEN STRL 4OZ

## (undated) DEVICE — SUT 2-0 VICRYL / CT-1

## (undated) DEVICE — PADDING CAST 4IN SPECIALIST

## (undated) DEVICE — DRAPE STERI INSTRUMENT 1018

## (undated) DEVICE — DRAPE STERI-DRAPE 1000 17X11IN

## (undated) DEVICE — STAPLER SKIN ROTATING HEAD

## (undated) DEVICE — DRESSING N ADH OIL EMUL 3X8 3S

## (undated) DEVICE — RESECTOR 5.5MM

## (undated) DEVICE — SLEEVE SCD EXPRESS CALF MEDIUM

## (undated) DEVICE — YANKAUER FLEX NO VENT HI CAP

## (undated) DEVICE — CANNULA SHOULDER 10/BOX

## (undated) DEVICE — TUBING ARTHROSCOPY

## (undated) DEVICE — DRAPE ORTH SPLIT 77X108IN

## (undated) DEVICE — DECANTER FLUID TRNSF WHITE 9IN

## (undated) DEVICE — GLOVE SURGEONS ULTRA TOUCH 6.5

## (undated) DEVICE — SYS SURGIPHOR STRL IRRG

## (undated) DEVICE — SUT BONE WAX 2.5 GRMS 12/BX

## (undated) DEVICE — NDL HYPODERMIC BLUNT 18G 1.5IN

## (undated) DEVICE — FORCEP BAYO INSU SGL USE STRGT

## (undated) DEVICE — GOWN POLY REINF X-LONG XL

## (undated) DEVICE — PACK CUSTOM UNIV BASIN SLI

## (undated) DEVICE — SPONGE KITTNER DISECT 1/4X9/16

## (undated) DEVICE — INTERPULSE SET

## (undated) DEVICE — SEE MEDLINE ITEM 157116

## (undated) DEVICE — KIT CHECKPOINT TIBIAL

## (undated) DEVICE — NDL SPINAL 22GX5

## (undated) DEVICE — SEE MEDLINE ITEM 146292

## (undated) DEVICE — KIT TRIATHLON CR FEM PREP SZ2

## (undated) DEVICE — GOWN X-LG STERILE BACK

## (undated) DEVICE — DRAPE THREE-QTR REINF 53X77IN

## (undated) DEVICE — SET IRR URLGY 2LINE UNIV SPIKE

## (undated) DEVICE — TOGA FLYTE PEEL AWAY XLARGE

## (undated) DEVICE — BLADE SURG CARBON STEEL #10

## (undated) DEVICE — GLOVE SURG ULTRA TOUCH 8

## (undated) DEVICE — SPONGE LAP 18X18 PREWASHED

## (undated) DEVICE — SPONGE GAUZE 16PLY 4X4

## (undated) DEVICE — SOL 9P NACL IRR PIC IL

## (undated) DEVICE — DRAPE ORTHOMAX BAR

## (undated) DEVICE — SUT CTD VICRYL CT-1 27

## (undated) DEVICE — ELECTRODE MEGADYNE RETURN DUAL

## (undated) DEVICE — BNDG COFLEX FOAM LF2 ST 6X5YD

## (undated) DEVICE — BNDG COFLEX FOAM LF2 ST 4X5YD

## (undated) DEVICE — SPONGE BULKEE II ABSRB 6X6.75

## (undated) DEVICE — SOL IRR NACL .9% 3000ML

## (undated) DEVICE — PIN FIXATION ANTERIOR CERVIAL
Type: IMPLANTABLE DEVICE | Site: SPINE CERVICAL | Status: NON-FUNCTIONAL
Removed: 2023-05-04

## (undated) DEVICE — Device

## (undated) DEVICE — SUT CTD VICRYL 0 UND BR

## (undated) DEVICE — MANIFOLD 4 PORT

## (undated) DEVICE — SUT CTD VICRYL 2.0

## (undated) DEVICE — SLEEVE SCD EXPRESS KNEE MEDIUM

## (undated) DEVICE — COVER PROXIMA MAYO STAND

## (undated) DEVICE — GLOVE SENSICARE PI ALOE 7

## (undated) DEVICE — SOL NACL IRR 1000ML BTL

## (undated) DEVICE — TRAY CATH FOL SIL URIMTR 16FR

## (undated) DEVICE — BONE PINS (3.2MM X 110MM)
Type: IMPLANTABLE DEVICE | Site: KNEE | Status: NON-FUNCTIONAL
Removed: 2025-05-22

## (undated) DEVICE — SKINMARKER W/RULER DEVON

## (undated) DEVICE — BANDAGE MATRIX HK LOOP 6IN 5YD

## (undated) DEVICE — PACK EXTREMITY ORTHOMAX

## (undated) DEVICE — EVACUATOR WOUND BULB 100CC

## (undated) DEVICE — DRAPE U SPLIT SHEET 54X76IN

## (undated) DEVICE — NDL TUOHY EPIDURAL 20G X 3.5

## (undated) DEVICE — BLADE MAKO NARROW

## (undated) DEVICE — SOL NACL IRR 3000ML

## (undated) DEVICE — UNGERGLOVE BIOGEL PI INDIC SZ9

## (undated) DEVICE — ALCOHOL 70% ISOP RUBBING 4OZ

## (undated) DEVICE — STRIP SKIN TRACTION ADH 3X40 L

## (undated) DEVICE — SEE MEDLINE ITEM 146270

## (undated) DEVICE — KIT LEG POSITIONER DISPOSABLES

## (undated) DEVICE — KIT FRACTR IVAS ELITE 11G 15MM

## (undated) DEVICE — NDL SPINAL 25GX3.5 SPINOCAN

## (undated) DEVICE — CUBE COLD THERAPY POLAR CARE

## (undated) DEVICE — DRAPE PLASTIC U 60X72

## (undated) DEVICE — BONE PINS (3.2MM X 140MM)
Type: IMPLANTABLE DEVICE | Site: KNEE | Status: NON-FUNCTIONAL
Removed: 2025-05-22

## (undated) DEVICE — SOL IRR STRL WATER 500ML

## (undated) DEVICE — SEE MEDLINE ITEM 152530

## (undated) DEVICE — KIT PROBE COOLIEF RF 17G 50MM

## (undated) DEVICE — SHEET DRAPE MEDIUM

## (undated) DEVICE — DRAPE MINOR PROCEDURE

## (undated) DEVICE — CANNULA RADIOPAQUE 20G CURVED

## (undated) DEVICE — KIT TRIATHLON TIBIAL SIZER

## (undated) DEVICE — PROBE ABLATION RF ARTHSCP 3.5

## (undated) DEVICE — TOWEL OR DISP STRL BLUE 4/PK

## (undated) DEVICE — PENCIL ROCKER SWITCH 10FT CORD

## (undated) DEVICE — GLOVE SENSICARE PI GRN 7

## (undated) DEVICE — GLOVE SENSICARE PI GRN 6.5

## (undated) DEVICE — GLOVE SENSICARE PI ALOE 8

## (undated) DEVICE — SLING ORTHOPEDIC LARGE

## (undated) DEVICE — DRAPE T THYROID STERILE

## (undated) DEVICE — INSTRUMENT FRAZIER 10FR W/VENT

## (undated) DEVICE — PIN TEMPORARY
Type: IMPLANTABLE DEVICE | Site: KNEE | Status: NON-FUNCTIONAL
Removed: 2020-07-30

## (undated) DEVICE — BANDAGE ESMARK ELASTIC ST 6X9

## (undated) DEVICE — KIT VIZADISC KNEE TRACKING

## (undated) DEVICE — CUTTER MENISCUS AGGRESSIVE 4.0

## (undated) DEVICE — GLOVE SENSICARE PI GRN 8.5

## (undated) DEVICE — KNEE IMMB UNV FOAM/MESH 20IN